# Patient Record
Sex: FEMALE | Race: WHITE | NOT HISPANIC OR LATINO | Employment: OTHER | URBAN - METROPOLITAN AREA
[De-identification: names, ages, dates, MRNs, and addresses within clinical notes are randomized per-mention and may not be internally consistent; named-entity substitution may affect disease eponyms.]

---

## 2017-05-31 RX ORDER — OXYBUTYNIN CHLORIDE 5 MG/1
TABLET ORAL
Qty: 180 TABLET | Refills: 3 | Status: SHIPPED | OUTPATIENT
Start: 2017-05-31 | End: 2021-02-03 | Stop reason: SDUPTHER

## 2019-02-19 PROBLEM — M17.0 PRIMARY OSTEOARTHRITIS OF BOTH KNEES: Status: ACTIVE | Noted: 2019-02-19

## 2019-02-19 PROBLEM — M85.80 OSTEOPENIA DETERMINED BY X-RAY: Status: ACTIVE | Noted: 2019-02-19

## 2019-02-19 PROBLEM — Z87.81 HISTORY OF VERTEBRAL COMPRESSION FRACTURE: Status: ACTIVE | Noted: 2019-02-19

## 2020-03-12 ENCOUNTER — OFFICE VISIT (OUTPATIENT)
Dept: HEMATOLOGY/ONCOLOGY | Facility: CLINIC | Age: 75
End: 2020-03-12
Payer: MEDICARE

## 2020-03-12 VITALS
TEMPERATURE: 99 F | HEART RATE: 99 BPM | RESPIRATION RATE: 20 BRPM | WEIGHT: 202 LBS | HEIGHT: 61 IN | DIASTOLIC BLOOD PRESSURE: 78 MMHG | BODY MASS INDEX: 38.14 KG/M2 | SYSTOLIC BLOOD PRESSURE: 127 MMHG

## 2020-03-12 DIAGNOSIS — D53.9 ANEMIA ASSOCIATED WITH NUTRITIONAL DEFICIENCY: ICD-10-CM

## 2020-03-12 DIAGNOSIS — D50.0 IRON DEFICIENCY ANEMIA DUE TO CHRONIC BLOOD LOSS: ICD-10-CM

## 2020-03-12 DIAGNOSIS — E07.9 THYROID DISORDER: Primary | ICD-10-CM

## 2020-03-12 PROCEDURE — 3074F PR MOST RECENT SYSTOLIC BLOOD PRESSURE < 130 MM HG: ICD-10-PCS | Mod: S$GLB,,, | Performed by: INTERNAL MEDICINE

## 2020-03-12 PROCEDURE — 1126F PR PAIN SEVERITY QUANTIFIED, NO PAIN PRESENT: ICD-10-PCS | Mod: S$GLB,,, | Performed by: INTERNAL MEDICINE

## 2020-03-12 PROCEDURE — 1101F PT FALLS ASSESS-DOCD LE1/YR: CPT | Mod: S$GLB,,, | Performed by: INTERNAL MEDICINE

## 2020-03-12 PROCEDURE — 99204 PR OFFICE/OUTPT VISIT, NEW, LEVL IV, 45-59 MIN: ICD-10-PCS | Mod: S$GLB,,, | Performed by: INTERNAL MEDICINE

## 2020-03-12 PROCEDURE — 3078F DIAST BP <80 MM HG: CPT | Mod: S$GLB,,, | Performed by: INTERNAL MEDICINE

## 2020-03-12 PROCEDURE — 3078F PR MOST RECENT DIASTOLIC BLOOD PRESSURE < 80 MM HG: ICD-10-PCS | Mod: S$GLB,,, | Performed by: INTERNAL MEDICINE

## 2020-03-12 PROCEDURE — 1159F PR MEDICATION LIST DOCUMENTED IN MEDICAL RECORD: ICD-10-PCS | Mod: S$GLB,,, | Performed by: INTERNAL MEDICINE

## 2020-03-12 PROCEDURE — 1101F PR PT FALLS ASSESS DOC 0-1 FALLS W/OUT INJ PAST YR: ICD-10-PCS | Mod: S$GLB,,, | Performed by: INTERNAL MEDICINE

## 2020-03-12 PROCEDURE — 3074F SYST BP LT 130 MM HG: CPT | Mod: S$GLB,,, | Performed by: INTERNAL MEDICINE

## 2020-03-12 PROCEDURE — 99204 OFFICE O/P NEW MOD 45 MIN: CPT | Mod: S$GLB,,, | Performed by: INTERNAL MEDICINE

## 2020-03-12 PROCEDURE — 1159F MED LIST DOCD IN RCRD: CPT | Mod: S$GLB,,, | Performed by: INTERNAL MEDICINE

## 2020-03-12 PROCEDURE — 1126F AMNT PAIN NOTED NONE PRSNT: CPT | Mod: S$GLB,,, | Performed by: INTERNAL MEDICINE

## 2020-03-12 NOTE — PATIENT INSTRUCTIONS
Iron-Deficiency Anemia (Adult)  Red blood cells carry oxygen to the tissues of your body. Anemia is a condition in which you have too few red blood cells. You need iron to make red cells. Anemia makes you feel tired and run down. When anemia becomes severe, your skin becomes pale. You may feel short of breath after physical activity. Other symptoms include:  · Headaches  · Dizziness  · Leg cramps with physical activity  · Drowsiness  · Restless legs  Your anemia is caused by not having enough iron in your body. This may be because of:  · Loss of blood. This can be caused by heavy menstrual periods. It can also be caused by bleeding from the stomach or intestines.  · Poor diet. You may not be eating enough foods that contain iron.  · Inability to absorb iron from the foods you eat  · Pregnancy  If your blood count is low enough, your healthcare provider may prescribe an iron supplement. It usually takes about 2 to 3 months of treatment with iron supplements to correct anemia. Severe cases of anemia need a blood transfusion to quickly ease symptoms and deliver more oxygen to the cells.  Home care  Follow these guidelines when caring for yourself at home:  · Eat foods high in iron. This will boost the amount of iron stored in your body. It is a natural way to build up the number of blood cells. Good sources of iron include beef, liver, spinach and other dark green leafy vegetables, whole grains, beans, and nuts.  · Do not overexert yourself.  · Talk with your healthcare provider before traveling by air or traveling to high altitudes.  Follow-up care  Follow up with your healthcare provider in 2 months, or as advised. This is to have another red blood cell count to be sure your anemia has been fixed.  When to seek medical advice  Call your healthcare provider right away if any of these occur:  · Shortness of breath or chest pain  · Dizziness or fainting  · Vomiting blood or passing red or black-colored stool   Date  Last Reviewed: 2/25/2016  © 1843-9756 [employer]. [address] [place] [address], [place] [address]. All rights reserved. This information is not intended as a substitute for professional medical care. Always follow your healthcare professional's instructions.

## 2020-03-23 NOTE — PROGRESS NOTES
[facility] History & Physical    Subjective:      Patient ID:   Daryleen G Moran  74 y.o. female  1945  [facility],      Chief Complaint:   Anemia eval.    HPI:  74 y.o. female with FARRIS+, dizzyness, fatigue x's 6 weeks.  Tongue soreness x's 1 year.  VHD, stenosis per ECHO.  AVS.    Adrenal mass removed on L 1 1/2 year ago, has a hernia at site.  At [facility] main campus.  Complicated by fluid overload and CHF sx.  Breast reduction.  Appendectomy, T & A, vocal cord nodule removed.    Borderline DM, HTN, cholesterol, depression.    Smoke 1/2 ppd x's 50 years.  Discussed referral to smoking clinic.  She does not want referral to the smoking clinic.  Allergy sulfa.  ETOH no.    Sister anemia, Sister MVA, LBP, plate in neck.  Dad CABG, CVA.  [place] name Mikey, Best.        ROS:   GEN: normal without any fever, night sweats or weight loss  HEENT: normal with no HA's, sore throat, stiff neck, changes in vision  CV: See HPI, normal with no CP, SOB, PND, FARRIS or orthopnea  PULM: normal with no SOB, cough, hemoptysis, sputum or pleuritic pain  GI: normal with no abdominal pain, nausea, vomiting, constipation, diarrhea, melanotic stools, BRBPR, or hematemesis  : normal with no hematuria, dysuria  BREAST: normal with no mass, discharge, pain  SKIN: normal with no rash, erythema, bruising, or swelling     Past Medical History:   Diagnosis Date    Arthritis     Diabetes mellitus type II     Hypertension     Osteoporosis     Wears glasses      Past Surgical History:   Procedure Laterality Date     vocal cord nodules removed  long time ago     twice    APPENDECTOMY  within last 5yrs    FIXATION KYPHOPLASTY THORACIC SPINE      8-20-13    FOOT SURGERY      left 2nd toe was too long     HERNIA REPAIR  within last 5yrs    TONSILLECTOMY, ADENOIDECTOMY  long time ago       Review of patient's allergies indicates:   Allergen Reactions    Sulfacetamide sodium      Pain perineal area    Adhesive Itching     SKIN GETS RED WITH  "TAPE AND BANDAIDS    Sulfa (sulfonamide antibiotics) Rash           Current Outpatient Medications:     aspirin (ECOTRIN) 81 MG EC tablet, Take 81 mg by mouth once daily., Disp: , Rfl:     calcium carbonate (OS-TK) 600 mg (1,500 mg) Tab, Take 600 mg by mouth 2 (two) times daily with meals., Disp: , Rfl:     citalopram (CELEXA) 40 MG tablet, Take 40 mg by mouth once daily., Disp: , Rfl:     ergocalciferol (ERGOCALCIFEROL) 50,000 unit Cap, Take 50,000 Units by mouth every 7 days., Disp: , Rfl:     lisinopril (PRINIVIL,ZESTRIL) 20 MG tablet, Take 20 mg by mouth every evening. , Disp: , Rfl:     lisinopril-hydrochlorothiazide (PRINZIDE,ZESTORETIC) 20-12.5 mg per tablet, Take 1 tablet by mouth once daily. , Disp: , Rfl:     metformin (GLUCOPHAGE) 500 MG tablet, Take 1 tablet (500 mg total) by mouth 2 (two) times daily with meals., Disp: , Rfl:     multivitamin capsule, Take 1 capsule by mouth once daily., Disp: , Rfl:     omeprazole (PRILOSEC) 20 MG capsule, Take 20 mg by mouth once daily., Disp: , Rfl:     oxybutynin (DITROPAN) 5 MG Tab, TAKE 1 TABLET TWICE DAILY, Disp: 180 tablet, Rfl: 3    oxycodone-acetaminophen (PERCOCET) 5-325 mg per tablet, Take 1-2 tablets by mouth every 4 (four) hours as needed., Disp: 31 tablet, Rfl: 0    polyethylene glycol (GLYCOLAX) 17 gram PwPk, Take 17 g by mouth once daily., Disp: 30 packet, Rfl: 0    simvastatin (ZOCOR) 40 MG tablet, Take 40 mg by mouth every evening. , Disp: , Rfl:     furosemide (LASIX) 40 MG tablet, Take 1 tablet (40 mg total) by mouth once daily., Disp: 1 tablet, Rfl: 0          Objective:   Vitals:  Blood pressure 127/78, pulse 99, temperature 98.6 °F (37 °C), temperature source Oral, resp. rate 20, height 5' 1" (1.549 m), weight 91.6 kg (202 lb).    Physical Examination:   GEN: no apparent distress, comfortable  HEAD: atraumatic and normocephalic  EYES: + pallor, no icterus  ENT:  No pharyngeal erythema, external ears WNL; no nasal discharge  NECK: " no masses, thyroid normal, trachea midline, no LAD/LN's, supple  CV: RRR with no murmur; normal pulse; normal S1 and S2; no pedal edema  CHEST: Normal respiratory effort; CTAB; normal breath sounds; no wheeze or crackles  ABDOM: nontender and nondistended; soft; normal bowel sounds; no rebound/guarding, L/S NP  MUSC/Skeletal: ROM normal; no crepitus; joints normal; no deformities   EXTREM: no clubbing, cyanosis, inflammation or swelling  SKIN: no rashes, lesions, ulcers, petechiae   : no cvat  NEURO: grossly intact; motor/sensory WNL;  no tremors  PSYCH: normal mood, affect and behavior  LYMPH: normal cervical, supraclavicular, axillary and groin LN's    H/H 7.4/25.6  Ferritin 11  MCV 72.      Assessment:   (1) 74 y.o. female with diagnosis of Fe deficiency anemia  2nd GI blood loss, or hemolysis 2nd VHD.  Check stools for occult blood.    (2)Discussed Ferrlicet infusion x's 8 weeks.  1-2% of reaction to infusion, observe for 1 hour after each infusion.  Orders written.  RTC 1 months, with lab.                Follow up in about 5 weeks (around 4/16/2020) for check of blood status after therapy.    I have explained and the patient understands all of  the current recommendation(s). I have answered all of their questions to the best of my ability and to their complete satisfaction.

## 2020-04-15 ENCOUNTER — OFFICE VISIT (OUTPATIENT)
Dept: HEMATOLOGY/ONCOLOGY | Facility: CLINIC | Age: 75
End: 2020-04-15
Payer: MEDICARE

## 2020-04-15 DIAGNOSIS — D53.9 ANEMIA ASSOCIATED WITH NUTRITIONAL DEFICIENCY: ICD-10-CM

## 2020-04-15 DIAGNOSIS — D50.0 IRON DEFICIENCY ANEMIA DUE TO CHRONIC BLOOD LOSS: Primary | ICD-10-CM

## 2020-04-15 PROCEDURE — 99441 PR PHYSICIAN TELEPHONE EVALUATION 5-10 MIN: CPT | Mod: 95,,, | Performed by: INTERNAL MEDICINE

## 2020-04-15 PROCEDURE — 99441 PR PHYSICIAN TELEPHONE EVALUATION 5-10 MIN: ICD-10-PCS | Mod: 95,,, | Performed by: INTERNAL MEDICINE

## 2020-04-18 NOTE — PROGRESS NOTES
[facility] telemedicine audio visit progress note  April 15, 2020    He she has been in isolation at home because of the corona virus epidemic.    The the this is a audio phone conversation only in lieu of in-person visit due to the corona virus emergency.  Patient acknowledged and agreed to the telephone encounter.    Subjective:      Patient ID:   Daryleen G Moran  75 y.o. female  1945  [facility],      Chief Complaint:   Anemia eval.    HPI:  75 y.o. female with FARRIS+, dizzyness, fatigue x's 6 weeks.  Tongue soreness x's 1 year.  VHD, stenosis per ECHO.  AVS.    Adrenal mass removed on L 1 1/2 year ago, has a hernia at site.  At [facility].  Complicated by fluid overload and CHF sx.  Breast reduction.  Appendectomy, T & A, vocal cord nodule removed.    Borderline DM, HTN, cholesterol, depression.    Smoke 1/2 ppd x's 50 years.  Discussed referral to smoking clinic.  She does not want referral to the smoking clinic.  Allergy sulfa.  ETOH no.    Sister anemia, Sister MVA, LBP, plate in neck.  Dad CABG, CVA.  [place] name Best Jensen.        ROS:   GEN: normal without any fever, night sweats or weight loss  HEENT: normal with no HA's, sore throat, stiff neck, changes in vision  CV: See HPI, normal with no CP, SOB, PND, FARRIS or orthopnea  PULM: normal with no SOB, cough, hemoptysis, sputum or pleuritic pain  GI: normal with no abdominal pain, nausea, vomiting, constipation, diarrhea, melanotic stools, BRBPR, or hematemesis  : normal with no hematuria, dysuria  BREAST: normal with no mass, discharge, pain  SKIN: normal with no rash, erythema, bruising, or swelling     Past Medical History:   Diagnosis Date    Arthritis     Diabetes mellitus type II     Hypertension     Osteoporosis     Wears glasses      Past Surgical History:   Procedure Laterality Date     vocal cord nodules removed  long time ago     twice    APPENDECTOMY  within last 5yrs    FIXATION KYPHOPLASTY THORACIC SPINE      8-20-13    FOOT  SURGERY      left 2nd toe was too long     HERNIA REPAIR  within last 5yrs    TONSILLECTOMY, ADENOIDECTOMY  long time ago       Review of patient's allergies indicates:   Allergen Reactions    Sulfacetamide sodium      Pain perineal area    Adhesive Itching     SKIN GETS RED WITH TAPE AND BANDAIDS    Sulfa (sulfonamide antibiotics) Rash           Current Outpatient Medications:     aspirin (ECOTRIN) 81 MG EC tablet, Take 81 mg by mouth once daily., Disp: , Rfl:     calcium carbonate (OS-TK) 600 mg (1,500 mg) Tab, Take 600 mg by mouth 2 (two) times daily with meals., Disp: , Rfl:     citalopram (CELEXA) 40 MG tablet, Take 40 mg by mouth once daily., Disp: , Rfl:     ergocalciferol (ERGOCALCIFEROL) 50,000 unit Cap, Take 50,000 Units by mouth every 7 days., Disp: , Rfl:     furosemide (LASIX) 40 MG tablet, Take 1 tablet (40 mg total) by mouth once daily., Disp: 1 tablet, Rfl: 0    lisinopril (PRINIVIL,ZESTRIL) 20 MG tablet, Take 20 mg by mouth every evening. , Disp: , Rfl:     lisinopril-hydrochlorothiazide (PRINZIDE,ZESTORETIC) 20-12.5 mg per tablet, Take 1 tablet by mouth once daily. , Disp: , Rfl:     metformin (GLUCOPHAGE) 500 MG tablet, Take 1 tablet (500 mg total) by mouth 2 (two) times daily with meals., Disp: , Rfl:     multivitamin capsule, Take 1 capsule by mouth once daily., Disp: , Rfl:     omeprazole (PRILOSEC) 20 MG capsule, Take 20 mg by mouth once daily., Disp: , Rfl:     oxybutynin (DITROPAN) 5 MG Tab, TAKE 1 TABLET TWICE DAILY, Disp: 180 tablet, Rfl: 3    oxycodone-acetaminophen (PERCOCET) 5-325 mg per tablet, Take 1-2 tablets by mouth every 4 (four) hours as needed., Disp: 31 tablet, Rfl: 0    polyethylene glycol (GLYCOLAX) 17 gram PwPk, Take 17 g by mouth once daily., Disp: 30 packet, Rfl: 0    simvastatin (ZOCOR) 40 MG tablet, Take 40 mg by mouth every evening. , Disp: , Rfl:           Objective:   Vitals:  There were no vitals taken for this visit.    Physical Examination:    GEN: no apparent distress, comfortable  HEAD: atraumatic and normocephalic  EYES: + pallor, no icterus  ENT:  No pharyngeal erythema, external ears WNL; no nasal discharge  NECK: no masses, thyroid normal, trachea midline, no LAD/LN's, supple  CV: RRR with no murmur; normal pulse; normal S1 and S2; no pedal edema  CHEST: Normal respiratory effort; CTAB; normal breath sounds; no wheeze or crackles  ABDOM: nontender and nondistended; soft; normal bowel sounds; no rebound/guarding, L/S NP  MUSC/Skeletal: ROM normal; no crepitus; joints normal; no deformities   EXTREM: no clubbing, cyanosis, inflammation or swelling  SKIN: no rashes, lesions, ulcers, petechiae   : no cvat  NEURO: grossly intact; motor/sensory WNL;  no tremors  PSYCH: normal mood, affect and behavior  LYMPH: normal cervical, supraclavicular, axillary and groin LN's    H/H 7.4/25.6  Ferritin 11  MCV 72.    The current labs are to be done.  He she has not gone for lab work because of fear of contagioun with the corona virus epidemic.  Assessment:   (1) 75 y.o. female with diagnosis of Fe deficiency anemia  2nd GI blood loss, or hemolysis 2nd VHD.  Check stools for occult blood.  Pending.    (2)Discussed Ferrlicet infusion x's 8 weeks.  1-2% of reaction to infusion, observe for 1 hour after each infusion.  Orders written.  Infusions have been completed.  Her energy is improved he and activity is improved.    CBC and ferritin and return to clinic in 3 months in June            I have explained and the patient understands all of  the current recommendation(s). I have answered all of their questions to the best of my ability and to their complete satisfaction.

## 2020-04-21 ENCOUNTER — TELEPHONE (OUTPATIENT)
Dept: HEMATOLOGY/ONCOLOGY | Facility: CLINIC | Age: 75
End: 2020-04-21

## 2020-07-21 ENCOUNTER — TELEPHONE (OUTPATIENT)
Dept: HEMATOLOGY/ONCOLOGY | Facility: CLINIC | Age: 75
End: 2020-07-21

## 2020-07-21 DIAGNOSIS — D50.0 IRON DEFICIENCY ANEMIA DUE TO CHRONIC BLOOD LOSS: Primary | ICD-10-CM

## 2020-07-21 DIAGNOSIS — D53.9 ANEMIA ASSOCIATED WITH NUTRITIONAL DEFICIENCY: ICD-10-CM

## 2020-07-21 NOTE — TELEPHONE ENCOUNTER
----- Message from Piedad Meadows sent at 7/21/2020  3:33 PM CDT -----  The patient called to ask for lab orders to be sent to [place] so she can go get them done before her appointment next week. She would like a call back once the orders are faxed so she will know they are there. Please call her at [phone].

## 2020-07-30 ENCOUNTER — OFFICE VISIT (OUTPATIENT)
Dept: HEMATOLOGY/ONCOLOGY | Facility: CLINIC | Age: 75
End: 2020-07-30
Payer: MEDICARE

## 2020-07-30 ENCOUNTER — TELEPHONE (OUTPATIENT)
Dept: HEMATOLOGY/ONCOLOGY | Facility: CLINIC | Age: 75
End: 2020-07-30

## 2020-07-30 VITALS
BODY MASS INDEX: 37.13 KG/M2 | RESPIRATION RATE: 19 BRPM | DIASTOLIC BLOOD PRESSURE: 68 MMHG | SYSTOLIC BLOOD PRESSURE: 135 MMHG | HEART RATE: 93 BPM | TEMPERATURE: 97 F | WEIGHT: 196.5 LBS

## 2020-07-30 DIAGNOSIS — D51.8 ANEMIA OF DECREASED VITAMIN B12 ABSORPTION: ICD-10-CM

## 2020-07-30 DIAGNOSIS — D50.0 IRON DEFICIENCY ANEMIA DUE TO CHRONIC BLOOD LOSS: Primary | ICD-10-CM

## 2020-07-30 PROCEDURE — 99214 PR OFFICE/OUTPT VISIT, EST, LEVL IV, 30-39 MIN: ICD-10-PCS | Mod: S$GLB,,, | Performed by: INTERNAL MEDICINE

## 2020-07-30 PROCEDURE — 1125F PR PAIN SEVERITY QUANTIFIED, PAIN PRESENT: ICD-10-PCS | Mod: S$GLB,,, | Performed by: INTERNAL MEDICINE

## 2020-07-30 PROCEDURE — 99214 OFFICE O/P EST MOD 30 MIN: CPT | Mod: S$GLB,,, | Performed by: INTERNAL MEDICINE

## 2020-07-30 PROCEDURE — 3075F PR MOST RECENT SYSTOLIC BLOOD PRESS GE 130-139MM HG: ICD-10-PCS | Mod: S$GLB,,, | Performed by: INTERNAL MEDICINE

## 2020-07-30 PROCEDURE — 3008F PR BODY MASS INDEX (BMI) DOCUMENTED: ICD-10-PCS | Mod: S$GLB,,, | Performed by: INTERNAL MEDICINE

## 2020-07-30 PROCEDURE — 3078F DIAST BP <80 MM HG: CPT | Mod: S$GLB,,, | Performed by: INTERNAL MEDICINE

## 2020-07-30 PROCEDURE — 3008F BODY MASS INDEX DOCD: CPT | Mod: S$GLB,,, | Performed by: INTERNAL MEDICINE

## 2020-07-30 PROCEDURE — 1159F PR MEDICATION LIST DOCUMENTED IN MEDICAL RECORD: ICD-10-PCS | Mod: S$GLB,,, | Performed by: INTERNAL MEDICINE

## 2020-07-30 PROCEDURE — 3078F PR MOST RECENT DIASTOLIC BLOOD PRESSURE < 80 MM HG: ICD-10-PCS | Mod: S$GLB,,, | Performed by: INTERNAL MEDICINE

## 2020-07-30 PROCEDURE — 1159F MED LIST DOCD IN RCRD: CPT | Mod: S$GLB,,, | Performed by: INTERNAL MEDICINE

## 2020-07-30 PROCEDURE — 1101F PT FALLS ASSESS-DOCD LE1/YR: CPT | Mod: S$GLB,,, | Performed by: INTERNAL MEDICINE

## 2020-07-30 PROCEDURE — 3075F SYST BP GE 130 - 139MM HG: CPT | Mod: S$GLB,,, | Performed by: INTERNAL MEDICINE

## 2020-07-30 PROCEDURE — 1101F PR PT FALLS ASSESS DOC 0-1 FALLS W/OUT INJ PAST YR: ICD-10-PCS | Mod: S$GLB,,, | Performed by: INTERNAL MEDICINE

## 2020-07-30 PROCEDURE — 1125F AMNT PAIN NOTED PAIN PRSNT: CPT | Mod: S$GLB,,, | Performed by: INTERNAL MEDICINE

## 2020-07-30 RX ORDER — GLUCOSAMINE/CHONDRO SU A 500-400 MG
1 TABLET ORAL DAILY
COMMUNITY

## 2020-07-31 NOTE — PROGRESS NOTES
[place] [facility] in office encounter for follow-up progress note    July 30, 2020  Subjective:      Patient ID:   Daryleen G Moran  75 y.o. female  1945  [place],      Chief Complaint:   Anemia eval.    HPI:  75 y.o. female with FARRIS+, dizzyness, fatigue x's 6 weeks.  Tongue soreness x's 1 year.  VHD, stenosis per ECHO.  AVS.    Adrenal mass removed on L 1 1/2 year ago, has a hernia at site.  At [facility].  Complicated by fluid overload and CHF sx.  Breast reduction.  Appendectomy, T & A, vocal cord nodule removed.    Borderline DM, HTN, cholesterol, depression.    Smoke 1/2 ppd x's 50 years.  Discussed referral to smoking clinic.  She does not want referral to the smoking clinic.  Allergy sulfa.  ETOH no.    Sister anemia, Sister MVA, LBP, plate in neck.  Dad CABG, CVA.  [place] name Mikey, [place].    She has iron deficiency anemia      ROS:   GEN: normal without any fever, night sweats or weight loss  HEENT: normal with no HA's, sore throat, stiff neck, changes in vision  CV: See HPI, normal with no CP, SOB, PND, FARRIS or orthopnea  PULM: normal with no SOB, cough, hemoptysis, sputum or pleuritic pain  GI: normal with no abdominal pain, nausea, vomiting, constipation, diarrhea, melanotic stools, BRBPR, or hematemesis  : normal with no hematuria, dysuria  BREAST: normal with no mass, discharge, pain  SKIN: normal with no rash, erythema, bruising, or swelling     Past Medical History:   Diagnosis Date    Arthritis     Diabetes mellitus type II     Hypertension     Osteoporosis     Wears glasses      Past Surgical History:   Procedure Laterality Date     vocal cord nodules removed  long time ago     twice    APPENDECTOMY  within last 5yrs    FIXATION KYPHOPLASTY THORACIC SPINE      8-20-13    FOOT SURGERY      left 2nd toe was too long     HERNIA REPAIR  within last 5yrs    TONSILLECTOMY, ADENOIDECTOMY  long time ago       Review of patient's allergies indicates:    Allergen Reactions    Sulfacetamide sodium      Pain perineal area    Adhesive Itching     SKIN GETS RED WITH TAPE AND BANDAIDS    Sulfa (sulfonamide antibiotics) Rash           Current Outpatient Medications:     aspirin (ECOTRIN) 81 MG EC tablet, Take 81 mg by mouth once daily., Disp: , Rfl:     calcium carbonate (OS-TK) 600 mg (1,500 mg) Tab, Take 600 mg by mouth 2 (two) times daily with meals., Disp: , Rfl:     citalopram (CELEXA) 40 MG tablet, Take 40 mg by mouth once daily., Disp: , Rfl:     ergocalciferol (ERGOCALCIFEROL) 50,000 unit Cap, Take 50,000 Units by mouth every 7 days., Disp: , Rfl:     glucosamine-chondroitin 500-400 mg tablet, Take 1 tablet by mouth 3 (three) times daily., Disp: , Rfl:     lisinopril (PRINIVIL,ZESTRIL) 20 MG tablet, Take 20 mg by mouth every evening. , Disp: , Rfl:     lisinopril-hydrochlorothiazide (PRINZIDE,ZESTORETIC) 20-12.5 mg per tablet, Take 1 tablet by mouth once daily. , Disp: , Rfl:     metformin (GLUCOPHAGE) 500 MG tablet, Take 1 tablet (500 mg total) by mouth 2 (two) times daily with meals., Disp: , Rfl:     multivitamin capsule, Take 1 capsule by mouth once daily., Disp: , Rfl:     omeprazole (PRILOSEC) 20 MG capsule, Take 20 mg by mouth once daily., Disp: , Rfl:     oxybutynin (DITROPAN) 5 MG Tab, TAKE 1 TABLET TWICE DAILY, Disp: 180 tablet, Rfl: 3    oxycodone-acetaminophen (PERCOCET) 5-325 mg per tablet, Take 1-2 tablets by mouth every 4 (four) hours as needed., Disp: 31 tablet, Rfl: 0    polyethylene glycol (GLYCOLAX) 17 gram PwPk, Take 17 g by mouth once daily., Disp: 30 packet, Rfl: 0    simvastatin (ZOCOR) 40 MG tablet, Take 40 mg by mouth every evening. , Disp: , Rfl:     furosemide (LASIX) 40 MG tablet, Take 1 tablet (40 mg total) by mouth once daily., Disp: 1 tablet, Rfl: 0          Objective:   Vitals:  Blood pressure 135/68, pulse 93, temperature 97.4 °F (36.3 °C), resp. rate 19, weight 89.1 kg (196 lb 8 oz).    Physical Examination:    GEN: no apparent distress, comfortable  HEAD: atraumatic and normocephalic  EYES: + pallor, no icterus  ENT:  No pharyngeal erythema, external ears WNL; no nasal discharge  NECK: no masses, thyroid normal, trachea midline, no LAD/LN's, supple  CV: RRR with no murmur; normal pulse; normal S1 and S2; no pedal edema  CHEST: Normal respiratory effort; CTAB; normal breath sounds; no wheeze or crackles  ABDOM: nontender and nondistended; soft; normal bowel sounds; no rebound/guarding, L/S NP  MUSC/Skeletal: ROM normal; no crepitus; joints normal; no deformities   EXTREM: no clubbing, cyanosis, inflammation or swelling  SKIN: no rashes, lesions, ulcers, petechiae   : no cvat  NEURO: grossly intact; motor/sensory WNL;  no tremors  PSYCH: normal mood, affect and behavior  LYMPH: normal cervical, supraclavicular, axillary and groin LN's    H/H 7.4/25.6  Ferritin 11  MCV 72.    The current labs are to be done.  He she has not gone for lab work because of fear of contagioun with the corona virus epidemic.  Assessment:   (1) 75 y.o. female with diagnosis of Fe deficiency anemia  2nd GI blood loss, or hemolysis 2nd VHD.  Check stools for occult blood.  Pending.    (2)Discussed Ferrlicet infusion x's 8 weeks.  1-2% of reaction to infusion, observe for 1 hour after each infusion.  Orders written.  Infusions have been completed.  Her energy is improved he and activity is improved.    CBC and ferritin and return to clinic in 3 months in June            I have explained and the patient understands all of  the current recommendation(s). I have answered all of their questions to the best of my ability and to their complete satisfaction.

## 2020-07-31 NOTE — PROGRESS NOTES
[place] [facility] Oncology in office encounter for follow-up progress note    July 30, 2020    Subjective:      Patient ID:   Daryleen G Moran  75 y.o. female  1945  [place],      Chief Complaint:   Anemia eval.    HPI:  75 y.o. female with FARRIS+, dizzyness, fatigue x's 6 weeks.  Tongue soreness x's 1 year.  VHD, stenosis per ECHO.  AVS.    Adrenal mass removed on L 1 1/2 year ago, has a hernia at site.  At [facility] main campus.  Complicated by fluid overload and CHF sx.  Breast reduction.  Appendectomy, T & A, vocal cord nodule removed.    Borderline DM, HTN, cholesterol, depression.    Smoke 1/2 ppd x's 50 years.  Discussed referral to smoking clinic.  She does not want referral to the smoking clinic.  Allergy sulfa.  ETOH no.    Sister anemia, Sister MVA, LBP, plate in neck.  Dad CABG, CVA.  [place] name Mikey, Best.    She had an iron deficiency anemia.  She took Ferrlecit infusion weekly.  Energy is improved.  Tongue soreness has resolved.        ROS:   GEN: normal without any fever, night sweats or weight loss  HEENT: normal with no HA's, sore throat, stiff neck, changes in vision  CV: See HPI, normal with no CP, SOB, PND, FARRIS or orthopnea  PULM: normal with no SOB, cough, hemoptysis, sputum or pleuritic pain  GI: normal with no abdominal pain, nausea, vomiting, constipation, diarrhea, melanotic stools, BRBPR, or hematemesis  : normal with no hematuria, dysuria  BREAST: normal with no mass, discharge, pain  SKIN: normal with no rash, erythema, bruising, or swelling     Past Medical History:   Diagnosis Date    Arthritis     Diabetes mellitus type II     Hypertension     Osteoporosis     Wears glasses      Past Surgical History:   Procedure Laterality Date     vocal cord nodules removed  long time ago     twice    APPENDECTOMY  within last 5yrs    FIXATION KYPHOPLASTY THORACIC SPINE      8-20-13    FOOT SURGERY      left 2nd toe was too long     HERNIA REPAIR  within last 5yrs     TONSILLECTOMY, ADENOIDECTOMY  long time ago       Review of patient's allergies indicates:   Allergen Reactions    Sulfacetamide sodium      Pain perineal area    Adhesive Itching     SKIN GETS RED WITH TAPE AND BANDAIDS    Sulfa (sulfonamide antibiotics) Rash           Current Outpatient Medications:     aspirin (ECOTRIN) 81 MG EC tablet, Take 81 mg by mouth once daily., Disp: , Rfl:     calcium carbonate (OS-TK) 600 mg (1,500 mg) Tab, Take 600 mg by mouth 2 (two) times daily with meals., Disp: , Rfl:     citalopram (CELEXA) 40 MG tablet, Take 40 mg by mouth once daily., Disp: , Rfl:     ergocalciferol (ERGOCALCIFEROL) 50,000 unit Cap, Take 50,000 Units by mouth every 7 days., Disp: , Rfl:     furosemide (LASIX) 40 MG tablet, Take 1 tablet (40 mg total) by mouth once daily., Disp: 1 tablet, Rfl: 0    glucosamine-chondroitin 500-400 mg tablet, Take 1 tablet by mouth 3 (three) times daily., Disp: , Rfl:     lisinopril (PRINIVIL,ZESTRIL) 20 MG tablet, Take 20 mg by mouth every evening. , Disp: , Rfl:     lisinopril-hydrochlorothiazide (PRINZIDE,ZESTORETIC) 20-12.5 mg per tablet, Take 1 tablet by mouth once daily. , Disp: , Rfl:     metformin (GLUCOPHAGE) 500 MG tablet, Take 1 tablet (500 mg total) by mouth 2 (two) times daily with meals., Disp: , Rfl:     multivitamin capsule, Take 1 capsule by mouth once daily., Disp: , Rfl:     omeprazole (PRILOSEC) 20 MG capsule, Take 20 mg by mouth once daily., Disp: , Rfl:     oxybutynin (DITROPAN) 5 MG Tab, TAKE 1 TABLET TWICE DAILY, Disp: 180 tablet, Rfl: 3    oxycodone-acetaminophen (PERCOCET) 5-325 mg per tablet, Take 1-2 tablets by mouth every 4 (four) hours as needed., Disp: 31 tablet, Rfl: 0    polyethylene glycol (GLYCOLAX) 17 gram PwPk, Take 17 g by mouth once daily., Disp: 30 packet, Rfl: 0    simvastatin (ZOCOR) 40 MG tablet, Take 40 mg by mouth every evening. , Disp: , Rfl:           Objective:   Vitals:  There were no vitals taken for this  visit.    Physical Examination:   GEN: no apparent distress, comfortable  HEAD: atraumatic and normocephalic  EYES: + pallor, no icterus  ENT:  No pharyngeal erythema, external ears WNL; no nasal discharge  NECK: no masses, thyroid normal, trachea midline, no LAD/LN's, supple  CV: RRR with no murmur; normal pulse; normal S1 and S2; no pedal edema  CHEST: Normal respiratory effort; CTAB; normal breath sounds; no wheeze or crackles  ABDOM: nontender and nondistended; soft; normal bowel sounds; no rebound/guarding, L/S NP  MUSC/Skeletal: ROM normal; no crepitus; joints normal; no deformities   EXTREM: no clubbing, cyanosis, inflammation or swelling  SKIN: no rashes, lesions, ulcers, petechiae   : no cvat  NEURO: grossly intact; motor/sensory WNL;  no tremors  PSYCH: normal mood, affect and behavior  LYMPH: normal cervical, supraclavicular, axillary and groin LN's    H/H 7.4/25.6  Ferritin 11  MCV 72.  H/H improved up to 10.1 in 31.5, MCV is 81,  ferritin is still at 10.  Assessment:   (1) 75 y.o. female with diagnosis of Fe deficiency anemia  2nd GI blood loss, or hemolysis 2nd VHD.  Check stools for occult blood are negative x3.    (2)Discussed Ferrlicet infusion x's 8 weeks.  1-2% of reaction to infusion, observe for 1 hour after each infusion.  Orders written.  Infusions have been completed.  Her energy is improved he and activity is improved.  She is improved after the administration of iron infusion however hemoglobin is 10 and ferritin is 10.  She needs more iron.  Will order an additional 6 Ferrlecit infusions to replenish iron stores.    CBC and ferritin and return to clinic in 3 months            I have explained and the patient understands all of  the current recommendation(s). I have answered all of their questions to the best of my ability and to their complete satisfaction.

## 2020-07-31 NOTE — TELEPHONE ENCOUNTER
I have placed orders for Ferrlecit at [facility] weekly x6 weeks.    Please get her a date and time.     Please get authorization.    CBC and ferritin [facility] in September.      Return to clinic October 2020.

## 2020-10-27 ENCOUNTER — TELEPHONE (OUTPATIENT)
Dept: CARDIOLOGY | Facility: CLINIC | Age: 75
End: 2020-10-27

## 2020-10-27 DIAGNOSIS — D50.0 IRON DEFICIENCY ANEMIA DUE TO CHRONIC BLOOD LOSS: ICD-10-CM

## 2020-10-27 DIAGNOSIS — I10 ESSENTIAL HYPERTENSION: ICD-10-CM

## 2020-10-27 DIAGNOSIS — E78.5 HYPERLIPIDEMIA, UNSPECIFIED HYPERLIPIDEMIA TYPE: Primary | ICD-10-CM

## 2020-10-27 NOTE — TELEPHONE ENCOUNTER
----- Message from Javier Alamo sent at 10/27/2020 11:07 AM CDT -----  Regarding: orders  Pt and  have appt 12/2/2020 he has orders but she doesn't   Orders for [facility] for both       Roni Cyr  Male, 82 y.o., 09/13/1938  [phone]  MRN:   [id number]

## 2020-11-13 ENCOUNTER — TELEPHONE (OUTPATIENT)
Dept: HEMATOLOGY/ONCOLOGY | Facility: CLINIC | Age: 75
End: 2020-11-13

## 2020-11-13 DIAGNOSIS — D50.0 IRON DEFICIENCY ANEMIA DUE TO CHRONIC BLOOD LOSS: Primary | ICD-10-CM

## 2020-11-13 NOTE — TELEPHONE ENCOUNTER
----- Message from Alyson Lal, Patient Care Assistant sent at 11/13/2020 11:12 AM CST -----  Regarding: Lab orders  Patient called in stating she would like her lab orders sent to [facility] lab. She can be reached at [phone]

## 2020-11-23 ENCOUNTER — TELEPHONE (OUTPATIENT)
Dept: CARDIOLOGY | Facility: CLINIC | Age: 75
End: 2020-11-23

## 2020-11-23 NOTE — TELEPHONE ENCOUNTER
----- Message from Princess TORSTEN Dos Santos sent at 11/23/2020  2:46 PM CST -----  Contact: pt  Type: Needs Medical Advice  Who Called:  pt   Best Call Back Number: [phone]     Additional Information: requesting lab orders to be sent over so she and he  can be done tomorrow if possible.

## 2020-12-02 ENCOUNTER — TELEPHONE (OUTPATIENT)
Dept: CARDIOLOGY | Facility: CLINIC | Age: 75
End: 2020-12-02

## 2020-12-02 NOTE — TELEPHONE ENCOUNTER
----- Message from Mili Beltran, Patient Care Assistant sent at 12/2/2020  3:05 PM CST -----  Regarding: advice  Contact: pt  Type: Needs Medical Advice  Who Called:  pt   Best Call Back Number: [phone]  Additional Information: pt states she would like a callback regarding a scan. Thanks!

## 2020-12-17 ENCOUNTER — OFFICE VISIT (OUTPATIENT)
Dept: CARDIOLOGY | Facility: CLINIC | Age: 75
End: 2020-12-17
Payer: MEDICARE

## 2020-12-17 VITALS
OXYGEN SATURATION: 98 % | RESPIRATION RATE: 16 BRPM | SYSTOLIC BLOOD PRESSURE: 126 MMHG | HEIGHT: 61 IN | DIASTOLIC BLOOD PRESSURE: 76 MMHG | BODY MASS INDEX: 38.14 KG/M2 | WEIGHT: 202 LBS | HEART RATE: 85 BPM

## 2020-12-17 DIAGNOSIS — I35.0 AORTIC STENOSIS, MILD: ICD-10-CM

## 2020-12-17 DIAGNOSIS — I10 ESSENTIAL HYPERTENSION: ICD-10-CM

## 2020-12-17 DIAGNOSIS — E78.5 HYPERLIPIDEMIA, UNSPECIFIED HYPERLIPIDEMIA TYPE: Primary | ICD-10-CM

## 2020-12-17 DIAGNOSIS — J44.9 COPD, MILD: ICD-10-CM

## 2020-12-17 PROCEDURE — 1159F MED LIST DOCD IN RCRD: CPT | Mod: S$GLB,,, | Performed by: INTERNAL MEDICINE

## 2020-12-17 PROCEDURE — 3078F PR MOST RECENT DIASTOLIC BLOOD PRESSURE < 80 MM HG: ICD-10-PCS | Mod: CPTII,S$GLB,, | Performed by: INTERNAL MEDICINE

## 2020-12-17 PROCEDURE — 99499 RISK ADDL DX/OHS AUDIT: ICD-10-PCS | Mod: S$GLB,,, | Performed by: INTERNAL MEDICINE

## 2020-12-17 PROCEDURE — 3288F PR FALLS RISK ASSESSMENT DOCUMENTED: ICD-10-PCS | Mod: CPTII,S$GLB,, | Performed by: INTERNAL MEDICINE

## 2020-12-17 PROCEDURE — 1101F PR PT FALLS ASSESS DOC 0-1 FALLS W/OUT INJ PAST YR: ICD-10-PCS | Mod: CPTII,S$GLB,, | Performed by: INTERNAL MEDICINE

## 2020-12-17 PROCEDURE — 3008F PR BODY MASS INDEX (BMI) DOCUMENTED: ICD-10-PCS | Mod: CPTII,S$GLB,, | Performed by: INTERNAL MEDICINE

## 2020-12-17 PROCEDURE — 3074F PR MOST RECENT SYSTOLIC BLOOD PRESSURE < 130 MM HG: ICD-10-PCS | Mod: CPTII,S$GLB,, | Performed by: INTERNAL MEDICINE

## 2020-12-17 PROCEDURE — 99214 OFFICE O/P EST MOD 30 MIN: CPT | Mod: S$GLB,,, | Performed by: INTERNAL MEDICINE

## 2020-12-17 PROCEDURE — 1125F PR PAIN SEVERITY QUANTIFIED, PAIN PRESENT: ICD-10-PCS | Mod: S$GLB,,, | Performed by: INTERNAL MEDICINE

## 2020-12-17 PROCEDURE — 1101F PT FALLS ASSESS-DOCD LE1/YR: CPT | Mod: CPTII,S$GLB,, | Performed by: INTERNAL MEDICINE

## 2020-12-17 PROCEDURE — 3008F BODY MASS INDEX DOCD: CPT | Mod: CPTII,S$GLB,, | Performed by: INTERNAL MEDICINE

## 2020-12-17 PROCEDURE — 99214 PR OFFICE/OUTPT VISIT, EST, LEVL IV, 30-39 MIN: ICD-10-PCS | Mod: S$GLB,,, | Performed by: INTERNAL MEDICINE

## 2020-12-17 PROCEDURE — 1159F PR MEDICATION LIST DOCUMENTED IN MEDICAL RECORD: ICD-10-PCS | Mod: S$GLB,,, | Performed by: INTERNAL MEDICINE

## 2020-12-17 PROCEDURE — 3078F DIAST BP <80 MM HG: CPT | Mod: CPTII,S$GLB,, | Performed by: INTERNAL MEDICINE

## 2020-12-17 PROCEDURE — 3074F SYST BP LT 130 MM HG: CPT | Mod: CPTII,S$GLB,, | Performed by: INTERNAL MEDICINE

## 2020-12-17 PROCEDURE — 99499 UNLISTED E&M SERVICE: CPT | Mod: S$GLB,,, | Performed by: INTERNAL MEDICINE

## 2020-12-17 PROCEDURE — 1125F AMNT PAIN NOTED PAIN PRSNT: CPT | Mod: S$GLB,,, | Performed by: INTERNAL MEDICINE

## 2020-12-17 PROCEDURE — 3288F FALL RISK ASSESSMENT DOCD: CPT | Mod: CPTII,S$GLB,, | Performed by: INTERNAL MEDICINE

## 2020-12-17 NOTE — ASSESSMENT & PLAN NOTE
Her cholesterol is controlled on current medications.  She has carotid stenosis she was asked to discontinue smoking

## 2020-12-17 NOTE — ASSESSMENT & PLAN NOTE
She has mild COPD she continues to smoke.  She had carotid stenosis she has been asked to discontinue smoking

## 2021-01-06 ENCOUNTER — TELEPHONE (OUTPATIENT)
Dept: HEMATOLOGY/ONCOLOGY | Facility: CLINIC | Age: 76
End: 2021-01-06

## 2021-01-06 DIAGNOSIS — D50.0 IRON DEFICIENCY ANEMIA DUE TO CHRONIC BLOOD LOSS: Primary | ICD-10-CM

## 2021-01-06 DIAGNOSIS — D51.8 ANEMIA OF DECREASED VITAMIN B12 ABSORPTION: ICD-10-CM

## 2021-02-03 RX ORDER — BENAZEPRIL HYDROCHLORIDE 20 MG/1
20 TABLET ORAL
COMMUNITY
Start: 2020-06-08 | End: 2021-12-06

## 2021-02-03 RX ORDER — EZETIMIBE AND SIMVASTATIN 10; 40 MG/1; MG/1
1 TABLET ORAL
Status: ON HOLD | COMMUNITY
End: 2022-05-03 | Stop reason: HOSPADM

## 2021-02-03 RX ORDER — BUPROPION HYDROCHLORIDE 150 MG/1
150 TABLET ORAL
COMMUNITY
End: 2022-11-22

## 2021-02-03 RX ORDER — DIPHENOXYLATE HYDROCHLORIDE AND ATROPINE SULFATE 2.5; .025 MG/1; MG/1
TABLET ORAL
COMMUNITY
End: 2021-12-15 | Stop reason: CLARIF

## 2021-02-03 RX ORDER — METFORMIN HYDROCHLORIDE 500 MG/1
TABLET ORAL
COMMUNITY
Start: 2020-10-12 | End: 2021-02-03 | Stop reason: SDUPTHER

## 2021-02-03 RX ORDER — DOCUSATE SODIUM 100 MG/1
CAPSULE, LIQUID FILLED ORAL
COMMUNITY
Start: 2020-07-30 | End: 2021-12-15 | Stop reason: CLARIF

## 2021-02-03 RX ORDER — HYDROCHLOROTHIAZIDE 12.5 MG/1
12.5 CAPSULE ORAL
COMMUNITY
End: 2021-12-15 | Stop reason: CLARIF

## 2021-02-03 RX ORDER — GLUC/MSM/COLGN2/HYAL/ANTIARTH3 375-375-20
1 TABLET ORAL
COMMUNITY
End: 2022-11-22

## 2021-02-03 RX ORDER — FERROUS SULFATE 325(65) MG
1 TABLET ORAL
COMMUNITY
Start: 2020-07-31 | End: 2021-12-15 | Stop reason: CLARIF

## 2021-02-04 RX ORDER — SIMVASTATIN 40 MG/1
40 TABLET, FILM COATED ORAL NIGHTLY
Qty: 90 TABLET | Refills: 3 | Status: SHIPPED | OUTPATIENT
Start: 2021-02-04 | End: 2023-09-08 | Stop reason: DRUGHIGH

## 2021-02-04 RX ORDER — OXYBUTYNIN CHLORIDE 5 MG/1
5 TABLET ORAL 2 TIMES DAILY
Qty: 180 TABLET | Refills: 3 | Status: SHIPPED | OUTPATIENT
Start: 2021-02-04 | End: 2024-02-12

## 2021-02-04 RX ORDER — OMEPRAZOLE 20 MG/1
20 CAPSULE, DELAYED RELEASE ORAL DAILY
Qty: 90 CAPSULE | Refills: 3 | Status: ON HOLD | OUTPATIENT
Start: 2021-02-04 | End: 2023-09-15 | Stop reason: HOSPADM

## 2021-02-04 RX ORDER — CITALOPRAM 40 MG/1
40 TABLET, FILM COATED ORAL DAILY
Qty: 90 TABLET | Refills: 3 | Status: SHIPPED | OUTPATIENT
Start: 2021-02-04

## 2021-02-04 RX ORDER — METFORMIN HYDROCHLORIDE 500 MG/1
500 TABLET ORAL 2 TIMES DAILY WITH MEALS
Qty: 180 TABLET | Refills: 3 | Status: ON HOLD | OUTPATIENT
Start: 2021-02-04 | End: 2022-05-06 | Stop reason: SDUPTHER

## 2021-02-04 RX ORDER — ERGOCALCIFEROL 1.25 MG/1
50000 CAPSULE ORAL
Qty: 12 CAPSULE | Refills: 3 | Status: ON HOLD | OUTPATIENT
Start: 2021-02-04 | End: 2022-05-03 | Stop reason: HOSPADM

## 2021-04-19 ENCOUNTER — TELEPHONE (OUTPATIENT)
Dept: CARDIOLOGY | Facility: CLINIC | Age: 76
End: 2021-04-19

## 2021-05-04 ENCOUNTER — OFFICE VISIT (OUTPATIENT)
Dept: CARDIOLOGY | Facility: CLINIC | Age: 76
End: 2021-05-04
Payer: MEDICARE

## 2021-05-04 VITALS
DIASTOLIC BLOOD PRESSURE: 80 MMHG | HEART RATE: 97 BPM | WEIGHT: 202 LBS | BODY MASS INDEX: 38.14 KG/M2 | SYSTOLIC BLOOD PRESSURE: 140 MMHG | OXYGEN SATURATION: 96 % | HEIGHT: 61 IN

## 2021-05-04 DIAGNOSIS — E78.2 MIXED HYPERLIPIDEMIA: ICD-10-CM

## 2021-05-04 DIAGNOSIS — I71.40 ABDOMINAL AORTIC ANEURYSM, WITHOUT RUPTURE: ICD-10-CM

## 2021-05-04 DIAGNOSIS — I10 ESSENTIAL HYPERTENSION: ICD-10-CM

## 2021-05-04 DIAGNOSIS — I35.0 AORTIC STENOSIS, MILD: ICD-10-CM

## 2021-05-04 PROCEDURE — 3079F PR MOST RECENT DIASTOLIC BLOOD PRESSURE 80-89 MM HG: ICD-10-PCS | Mod: CPTII,S$GLB,, | Performed by: INTERNAL MEDICINE

## 2021-05-04 PROCEDURE — 1101F PT FALLS ASSESS-DOCD LE1/YR: CPT | Mod: CPTII,S$GLB,, | Performed by: INTERNAL MEDICINE

## 2021-05-04 PROCEDURE — 3079F DIAST BP 80-89 MM HG: CPT | Mod: CPTII,S$GLB,, | Performed by: INTERNAL MEDICINE

## 2021-05-04 PROCEDURE — 3077F SYST BP >= 140 MM HG: CPT | Mod: CPTII,S$GLB,, | Performed by: INTERNAL MEDICINE

## 2021-05-04 PROCEDURE — 3288F PR FALLS RISK ASSESSMENT DOCUMENTED: ICD-10-PCS | Mod: CPTII,S$GLB,, | Performed by: INTERNAL MEDICINE

## 2021-05-04 PROCEDURE — 1159F MED LIST DOCD IN RCRD: CPT | Mod: S$GLB,,, | Performed by: INTERNAL MEDICINE

## 2021-05-04 PROCEDURE — 3077F PR MOST RECENT SYSTOLIC BLOOD PRESSURE >= 140 MM HG: ICD-10-PCS | Mod: CPTII,S$GLB,, | Performed by: INTERNAL MEDICINE

## 2021-05-04 PROCEDURE — 99214 PR OFFICE/OUTPT VISIT, EST, LEVL IV, 30-39 MIN: ICD-10-PCS | Mod: S$GLB,,, | Performed by: INTERNAL MEDICINE

## 2021-05-04 PROCEDURE — 1159F PR MEDICATION LIST DOCUMENTED IN MEDICAL RECORD: ICD-10-PCS | Mod: S$GLB,,, | Performed by: INTERNAL MEDICINE

## 2021-05-04 PROCEDURE — 99214 OFFICE O/P EST MOD 30 MIN: CPT | Mod: S$GLB,,, | Performed by: INTERNAL MEDICINE

## 2021-05-04 PROCEDURE — 3288F FALL RISK ASSESSMENT DOCD: CPT | Mod: CPTII,S$GLB,, | Performed by: INTERNAL MEDICINE

## 2021-05-04 PROCEDURE — 1101F PR PT FALLS ASSESS DOC 0-1 FALLS W/OUT INJ PAST YR: ICD-10-PCS | Mod: CPTII,S$GLB,, | Performed by: INTERNAL MEDICINE

## 2021-05-11 ENCOUNTER — TELEPHONE (OUTPATIENT)
Dept: HEMATOLOGY/ONCOLOGY | Facility: CLINIC | Age: 76
End: 2021-05-11

## 2021-05-12 ENCOUNTER — OFFICE VISIT (OUTPATIENT)
Dept: HEMATOLOGY/ONCOLOGY | Facility: CLINIC | Age: 76
End: 2021-05-12
Payer: MEDICARE

## 2021-05-12 VITALS
SYSTOLIC BLOOD PRESSURE: 121 MMHG | TEMPERATURE: 98 F | HEART RATE: 91 BPM | DIASTOLIC BLOOD PRESSURE: 74 MMHG | WEIGHT: 202 LBS | BODY MASS INDEX: 38.17 KG/M2

## 2021-05-12 DIAGNOSIS — D50.0 IRON DEFICIENCY ANEMIA DUE TO CHRONIC BLOOD LOSS: Primary | ICD-10-CM

## 2021-05-12 DIAGNOSIS — D53.9 ANEMIA ASSOCIATED WITH NUTRITIONAL DEFICIENCY: ICD-10-CM

## 2021-05-12 PROCEDURE — 1159F MED LIST DOCD IN RCRD: CPT | Mod: S$GLB,,, | Performed by: INTERNAL MEDICINE

## 2021-05-12 PROCEDURE — 99214 OFFICE O/P EST MOD 30 MIN: CPT | Mod: S$GLB,,, | Performed by: INTERNAL MEDICINE

## 2021-05-12 PROCEDURE — 1101F PT FALLS ASSESS-DOCD LE1/YR: CPT | Mod: S$GLB,,, | Performed by: INTERNAL MEDICINE

## 2021-05-12 PROCEDURE — 99214 PR OFFICE/OUTPT VISIT, EST, LEVL IV, 30-39 MIN: ICD-10-PCS | Mod: S$GLB,,, | Performed by: INTERNAL MEDICINE

## 2021-05-12 PROCEDURE — 3288F PR FALLS RISK ASSESSMENT DOCUMENTED: ICD-10-PCS | Mod: S$GLB,,, | Performed by: INTERNAL MEDICINE

## 2021-05-12 PROCEDURE — 1126F AMNT PAIN NOTED NONE PRSNT: CPT | Mod: S$GLB,,, | Performed by: INTERNAL MEDICINE

## 2021-05-12 PROCEDURE — 1126F PR PAIN SEVERITY QUANTIFIED, NO PAIN PRESENT: ICD-10-PCS | Mod: S$GLB,,, | Performed by: INTERNAL MEDICINE

## 2021-05-12 PROCEDURE — 1101F PR PT FALLS ASSESS DOC 0-1 FALLS W/OUT INJ PAST YR: ICD-10-PCS | Mod: S$GLB,,, | Performed by: INTERNAL MEDICINE

## 2021-05-12 PROCEDURE — 3288F FALL RISK ASSESSMENT DOCD: CPT | Mod: S$GLB,,, | Performed by: INTERNAL MEDICINE

## 2021-05-12 PROCEDURE — 1159F PR MEDICATION LIST DOCUMENTED IN MEDICAL RECORD: ICD-10-PCS | Mod: S$GLB,,, | Performed by: INTERNAL MEDICINE

## 2021-05-13 ENCOUNTER — TELEPHONE (OUTPATIENT)
Dept: CARDIOLOGY | Facility: CLINIC | Age: 76
End: 2021-05-13

## 2021-05-31 ENCOUNTER — HOSPITAL ENCOUNTER (OUTPATIENT)
Dept: RADIOLOGY | Facility: HOSPITAL | Age: 76
Discharge: HOME | End: 2021-05-31
Attending: INTERNAL MEDICINE
Payer: MEDICARE

## 2021-05-31 DIAGNOSIS — E78.2 MIXED HYPERLIPIDEMIA: ICD-10-CM

## 2021-05-31 DIAGNOSIS — I71.40 ABDOMINAL AORTIC ANEURYSM, WITHOUT RUPTURE: ICD-10-CM

## 2021-05-31 DIAGNOSIS — I35.0 AORTIC STENOSIS, MILD: ICD-10-CM

## 2021-05-31 DIAGNOSIS — I10 ESSENTIAL HYPERTENSION: ICD-10-CM

## 2021-05-31 LAB
CREAT SERPL-MCNC: 0.9 MG/DL (ref 0.5–1.4)
SAMPLE: NORMAL

## 2021-05-31 PROCEDURE — 82565 ASSAY OF CREATININE: CPT | Mod: PO

## 2021-05-31 PROCEDURE — 25500020 PHARM REV CODE 255: Mod: PO | Performed by: INTERNAL MEDICINE

## 2021-05-31 RX ADMIN — IOHEXOL 100 ML: 350 INJECTION, SOLUTION INTRAVENOUS at 09:05

## 2021-06-03 ENCOUNTER — OFFICE VISIT (OUTPATIENT)
Dept: CARDIOLOGY | Facility: CLINIC | Age: 76
End: 2021-06-03
Payer: MEDICARE

## 2021-06-03 VITALS
OXYGEN SATURATION: 97 % | SYSTOLIC BLOOD PRESSURE: 120 MMHG | DIASTOLIC BLOOD PRESSURE: 70 MMHG | WEIGHT: 205 LBS | HEIGHT: 61 IN | BODY MASS INDEX: 38.71 KG/M2 | HEART RATE: 96 BPM

## 2021-06-03 DIAGNOSIS — I35.0 AORTIC STENOSIS, MILD: ICD-10-CM

## 2021-06-03 DIAGNOSIS — I10 ESSENTIAL HYPERTENSION: ICD-10-CM

## 2021-06-03 DIAGNOSIS — I35.0 AORTIC VALVE STENOSIS, ETIOLOGY OF CARDIAC VALVE DISEASE UNSPECIFIED: Chronic | ICD-10-CM

## 2021-06-03 DIAGNOSIS — E78.2 MIXED HYPERLIPIDEMIA: ICD-10-CM

## 2021-06-03 DIAGNOSIS — I71.40 ABDOMINAL ANEURYSM: Chronic | ICD-10-CM

## 2021-06-03 PROCEDURE — 1159F PR MEDICATION LIST DOCUMENTED IN MEDICAL RECORD: ICD-10-PCS | Mod: CPTII,S$GLB,, | Performed by: INTERNAL MEDICINE

## 2021-06-03 PROCEDURE — 1160F PR REVIEW ALL MEDS BY PRESCRIBER/CLIN PHARMACIST DOCUMENTED: ICD-10-PCS | Mod: CPTII,S$GLB,, | Performed by: INTERNAL MEDICINE

## 2021-06-03 PROCEDURE — 3074F PR MOST RECENT SYSTOLIC BLOOD PRESSURE < 130 MM HG: ICD-10-PCS | Mod: CPTII,S$GLB,, | Performed by: INTERNAL MEDICINE

## 2021-06-03 PROCEDURE — 3288F PR FALLS RISK ASSESSMENT DOCUMENTED: ICD-10-PCS | Mod: CPTII,S$GLB,, | Performed by: INTERNAL MEDICINE

## 2021-06-03 PROCEDURE — 1101F PR PT FALLS ASSESS DOC 0-1 FALLS W/OUT INJ PAST YR: ICD-10-PCS | Mod: CPTII,S$GLB,, | Performed by: INTERNAL MEDICINE

## 2021-06-03 PROCEDURE — 1159F MED LIST DOCD IN RCRD: CPT | Mod: CPTII,S$GLB,, | Performed by: INTERNAL MEDICINE

## 2021-06-03 PROCEDURE — 3078F PR MOST RECENT DIASTOLIC BLOOD PRESSURE < 80 MM HG: ICD-10-PCS | Mod: CPTII,S$GLB,, | Performed by: INTERNAL MEDICINE

## 2021-06-03 PROCEDURE — 99213 OFFICE O/P EST LOW 20 MIN: CPT | Mod: S$GLB,,, | Performed by: INTERNAL MEDICINE

## 2021-06-03 PROCEDURE — 3078F DIAST BP <80 MM HG: CPT | Mod: CPTII,S$GLB,, | Performed by: INTERNAL MEDICINE

## 2021-06-03 PROCEDURE — 1160F RVW MEDS BY RX/DR IN RCRD: CPT | Mod: CPTII,S$GLB,, | Performed by: INTERNAL MEDICINE

## 2021-06-03 PROCEDURE — 3288F FALL RISK ASSESSMENT DOCD: CPT | Mod: CPTII,S$GLB,, | Performed by: INTERNAL MEDICINE

## 2021-06-03 PROCEDURE — 3074F SYST BP LT 130 MM HG: CPT | Mod: CPTII,S$GLB,, | Performed by: INTERNAL MEDICINE

## 2021-06-03 PROCEDURE — 1101F PT FALLS ASSESS-DOCD LE1/YR: CPT | Mod: CPTII,S$GLB,, | Performed by: INTERNAL MEDICINE

## 2021-06-03 PROCEDURE — 99213 PR OFFICE/OUTPT VISIT, EST, LEVL III, 20-29 MIN: ICD-10-PCS | Mod: S$GLB,,, | Performed by: INTERNAL MEDICINE

## 2021-11-10 ENCOUNTER — OFFICE VISIT (OUTPATIENT)
Dept: HEMATOLOGY/ONCOLOGY | Facility: CLINIC | Age: 76
End: 2021-11-10
Payer: MEDICARE

## 2021-11-10 VITALS
DIASTOLIC BLOOD PRESSURE: 70 MMHG | BODY MASS INDEX: 38.73 KG/M2 | SYSTOLIC BLOOD PRESSURE: 108 MMHG | TEMPERATURE: 100 F | WEIGHT: 205 LBS | HEART RATE: 109 BPM

## 2021-11-10 DIAGNOSIS — D50.0 IRON DEFICIENCY ANEMIA DUE TO CHRONIC BLOOD LOSS: Primary | ICD-10-CM

## 2021-11-10 PROCEDURE — 1125F PR PAIN SEVERITY QUANTIFIED, PAIN PRESENT: ICD-10-PCS | Mod: S$GLB,,, | Performed by: INTERNAL MEDICINE

## 2021-11-10 PROCEDURE — 3288F FALL RISK ASSESSMENT DOCD: CPT | Mod: S$GLB,,, | Performed by: INTERNAL MEDICINE

## 2021-11-10 PROCEDURE — 99214 PR OFFICE/OUTPT VISIT, EST, LEVL IV, 30-39 MIN: ICD-10-PCS | Mod: S$GLB,,, | Performed by: INTERNAL MEDICINE

## 2021-11-10 PROCEDURE — 1125F AMNT PAIN NOTED PAIN PRSNT: CPT | Mod: S$GLB,,, | Performed by: INTERNAL MEDICINE

## 2021-11-10 PROCEDURE — 1101F PT FALLS ASSESS-DOCD LE1/YR: CPT | Mod: S$GLB,,, | Performed by: INTERNAL MEDICINE

## 2021-11-10 PROCEDURE — 4010F PR ACE/ARB THEARPY RXD/TAKEN: ICD-10-PCS | Mod: S$GLB,,, | Performed by: INTERNAL MEDICINE

## 2021-11-10 PROCEDURE — 1159F PR MEDICATION LIST DOCUMENTED IN MEDICAL RECORD: ICD-10-PCS | Mod: S$GLB,,, | Performed by: INTERNAL MEDICINE

## 2021-11-10 PROCEDURE — 3074F PR MOST RECENT SYSTOLIC BLOOD PRESSURE < 130 MM HG: ICD-10-PCS | Mod: S$GLB,,, | Performed by: INTERNAL MEDICINE

## 2021-11-10 PROCEDURE — 1159F MED LIST DOCD IN RCRD: CPT | Mod: S$GLB,,, | Performed by: INTERNAL MEDICINE

## 2021-11-10 PROCEDURE — 3078F DIAST BP <80 MM HG: CPT | Mod: S$GLB,,, | Performed by: INTERNAL MEDICINE

## 2021-11-10 PROCEDURE — 1101F PR PT FALLS ASSESS DOC 0-1 FALLS W/OUT INJ PAST YR: ICD-10-PCS | Mod: S$GLB,,, | Performed by: INTERNAL MEDICINE

## 2021-11-10 PROCEDURE — 3074F SYST BP LT 130 MM HG: CPT | Mod: S$GLB,,, | Performed by: INTERNAL MEDICINE

## 2021-11-10 PROCEDURE — 3078F PR MOST RECENT DIASTOLIC BLOOD PRESSURE < 80 MM HG: ICD-10-PCS | Mod: S$GLB,,, | Performed by: INTERNAL MEDICINE

## 2021-11-10 PROCEDURE — 4010F ACE/ARB THERAPY RXD/TAKEN: CPT | Mod: S$GLB,,, | Performed by: INTERNAL MEDICINE

## 2021-11-10 PROCEDURE — 99214 OFFICE O/P EST MOD 30 MIN: CPT | Mod: S$GLB,,, | Performed by: INTERNAL MEDICINE

## 2021-11-10 PROCEDURE — 3288F PR FALLS RISK ASSESSMENT DOCUMENTED: ICD-10-PCS | Mod: S$GLB,,, | Performed by: INTERNAL MEDICINE

## 2021-11-16 ENCOUNTER — TELEPHONE (OUTPATIENT)
Dept: CARDIOLOGY | Facility: CLINIC | Age: 76
End: 2021-11-16
Payer: MEDICARE

## 2021-11-18 ENCOUNTER — TELEPHONE (OUTPATIENT)
Dept: CARDIOLOGY | Facility: CLINIC | Age: 76
End: 2021-11-18
Payer: MEDICARE

## 2021-12-06 ENCOUNTER — OFFICE VISIT (OUTPATIENT)
Dept: CARDIOLOGY | Facility: CLINIC | Age: 76
End: 2021-12-06
Payer: MEDICARE

## 2021-12-06 VITALS
BODY MASS INDEX: 36.82 KG/M2 | DIASTOLIC BLOOD PRESSURE: 68 MMHG | HEART RATE: 96 BPM | OXYGEN SATURATION: 97 % | SYSTOLIC BLOOD PRESSURE: 130 MMHG | HEIGHT: 61 IN | WEIGHT: 195 LBS

## 2021-12-06 DIAGNOSIS — J44.9 COPD, MILD: ICD-10-CM

## 2021-12-06 DIAGNOSIS — D50.0 IRON DEFICIENCY ANEMIA DUE TO CHRONIC BLOOD LOSS: ICD-10-CM

## 2021-12-06 DIAGNOSIS — I35.0 AORTIC VALVE STENOSIS, ETIOLOGY OF CARDIAC VALVE DISEASE UNSPECIFIED: Primary | ICD-10-CM

## 2021-12-06 DIAGNOSIS — I10 ESSENTIAL HYPERTENSION: ICD-10-CM

## 2021-12-06 DIAGNOSIS — I10 PRIMARY HYPERTENSION: ICD-10-CM

## 2021-12-06 DIAGNOSIS — E78.2 MIXED HYPERLIPIDEMIA: ICD-10-CM

## 2021-12-06 PROCEDURE — 3078F DIAST BP <80 MM HG: CPT | Mod: CPTII,S$GLB,, | Performed by: INTERNAL MEDICINE

## 2021-12-06 PROCEDURE — 3288F FALL RISK ASSESSMENT DOCD: CPT | Mod: CPTII,S$GLB,, | Performed by: INTERNAL MEDICINE

## 2021-12-06 PROCEDURE — 1101F PT FALLS ASSESS-DOCD LE1/YR: CPT | Mod: CPTII,S$GLB,, | Performed by: INTERNAL MEDICINE

## 2021-12-06 PROCEDURE — 3075F SYST BP GE 130 - 139MM HG: CPT | Mod: CPTII,S$GLB,, | Performed by: INTERNAL MEDICINE

## 2021-12-06 PROCEDURE — 3288F PR FALLS RISK ASSESSMENT DOCUMENTED: ICD-10-PCS | Mod: CPTII,S$GLB,, | Performed by: INTERNAL MEDICINE

## 2021-12-06 PROCEDURE — 1126F PR PAIN SEVERITY QUANTIFIED, NO PAIN PRESENT: ICD-10-PCS | Mod: CPTII,S$GLB,, | Performed by: INTERNAL MEDICINE

## 2021-12-06 PROCEDURE — 1126F AMNT PAIN NOTED NONE PRSNT: CPT | Mod: CPTII,S$GLB,, | Performed by: INTERNAL MEDICINE

## 2021-12-06 PROCEDURE — 99213 OFFICE O/P EST LOW 20 MIN: CPT | Mod: S$GLB,,, | Performed by: INTERNAL MEDICINE

## 2021-12-06 PROCEDURE — 3075F PR MOST RECENT SYSTOLIC BLOOD PRESS GE 130-139MM HG: ICD-10-PCS | Mod: CPTII,S$GLB,, | Performed by: INTERNAL MEDICINE

## 2021-12-06 PROCEDURE — 99213 PR OFFICE/OUTPT VISIT, EST, LEVL III, 20-29 MIN: ICD-10-PCS | Mod: S$GLB,,, | Performed by: INTERNAL MEDICINE

## 2021-12-06 PROCEDURE — 3078F PR MOST RECENT DIASTOLIC BLOOD PRESSURE < 80 MM HG: ICD-10-PCS | Mod: CPTII,S$GLB,, | Performed by: INTERNAL MEDICINE

## 2021-12-06 PROCEDURE — 1101F PR PT FALLS ASSESS DOC 0-1 FALLS W/OUT INJ PAST YR: ICD-10-PCS | Mod: CPTII,S$GLB,, | Performed by: INTERNAL MEDICINE

## 2021-12-06 NOTE — PROGRESS NOTES
Patient ID:  Daryleen G Moran is a 76 y.o. female who presents for follow-up of No chief complaint on file.      Her right knee is doing worse.  She is having a lot of knee pain.  Her activity is limited because of the arthritis of the knee.  She denies any chest pains any shortness of breath.      Past Medical History:   Diagnosis Date    Aortic stenosis     Arthritis     Back pain     Diabetes mellitus type II     Hyperlipidemia     Hypertension     Osteoporosis     Wears glasses         Past Surgical History:   Procedure Laterality Date     vocal cord nodules removed  long time ago     twice    APPENDECTOMY  within last 5yrs    FIXATION KYPHOPLASTY THORACIC SPINE      8-20-13    FOOT SURGERY      left 2nd toe was too long     HERNIA REPAIR  within last 5yrs    TONSILLECTOMY, ADENOIDECTOMY  long time ago          Current Outpatient Medications   Medication Instructions    aspirin (ECOTRIN) 81 mg, Daily    aspirin-calcium carbonate 81 mg-300 mg calcium(777 mg) Tab 81 mg, Oral    buPROPion (WELLBUTRIN XL) 150 mg, Oral    calcium carbonate (OS-TK) 600 mg, Oral, 2 times daily with meals    ciprofloxacin HCl (CIPRO) 500 mg, Oral, 2 times daily    citalopram (CELEXA) 40 mg, Oral, Daily    ergocalciferol (ERGOCALCIFEROL) 50,000 Units, Oral, Every 7 days    ezetimibe-simvastatin 10-40 mg (VYTORIN) 10-40 mg per tablet 1 tablet, Oral    glucosamine hawkins 2KCl-chondroit 500-400 mg Tab 1 tablet, Oral    glucosamine-chondroitin 500-400 mg tablet 1 tablet, Oral, 3 times daily    HYDROcodone-acetaminophen (NORCO) 5-325 mg per tablet 1 tablet, Oral, Every 6 hours PRN    lisinopriL (PRINIVIL,ZESTRIL) 20 mg, Oral, Nightly    lisinopril-hydrochlorothiazide (PRINZIDE,ZESTORETIC) 20-12.5 mg per tablet 1 tablet, Oral, Daily    metFORMIN (GLUCOPHAGE) 500 mg, Oral, 2 times daily with meals    multivitamin capsule 1 capsule, Oral, Daily    omeprazole (PRILOSEC) 20 mg, Oral, Daily    oxybutynin (DITROPAN)  "5 mg, Oral, 2 times daily    simvastatin (ZOCOR) 40 mg, Oral, Nightly        Review of patient's allergies indicates:   Allergen Reactions    Sulfacetamide sodium      Pain perineal area    Sulfasalazine Hives    Adhesive Itching     SKIN GETS RED WITH TAPE AND BANDAIDS    Adhesive tape-silicones Itching     SKIN GETS RED WITH TAPE AND BANDAIDS    Sulfa (sulfonamide antibiotics) Rash        Review of Systems   Constitutional: Negative for chills and fever.   Cardiovascular: Negative for chest pain, dyspnea on exertion and leg swelling.   Respiratory: Negative for cough and shortness of breath.    Musculoskeletal: Positive for arthritis and joint pain.        Objective:     Vitals:    12/06/21 1252   BP: 130/68   BP Location: Left arm   Patient Position: Sitting   BP Method: Large (Manual)   Pulse: 96   SpO2: 97%   Weight: 88.5 kg (195 lb)   Height: 5' 1" (1.549 m)       Physical Exam  Vitals and nursing note reviewed.   Constitutional:       Appearance: She is well-developed.   HENT:      Head: Normocephalic and atraumatic.   Eyes:      Conjunctiva/sclera: Conjunctivae normal.   Cardiovascular:      Rate and Rhythm: Normal rate and regular rhythm.      Heart sounds: Murmur (Grade 3/6 systolic murmur at the base) heard.       Pulmonary:      Effort: Pulmonary effort is normal.      Breath sounds: Normal breath sounds.   Abdominal:      General: Bowel sounds are normal.      Palpations: Abdomen is soft.   Musculoskeletal:         General: Normal range of motion.   Skin:     General: Skin is warm and dry.   Neurological:      Mental Status: She is alert and oriented to person, place, and time.   Psychiatric:         Behavior: Behavior normal.         Thought Content: Thought content normal.         Judgment: Judgment normal.       CMP  Sodium   Date Value Ref Range Status   12/15/2021 135 (L) 136 - 145 mmol/L Final     Potassium   Date Value Ref Range Status   12/15/2021 4.1 3.5 - 5.1 mmol/L Final     Chloride "   Date Value Ref Range Status   12/15/2021 97 95 - 110 mmol/L Final     CO2   Date Value Ref Range Status   12/15/2021 28 23 - 29 mmol/L Final     Glucose   Date Value Ref Range Status   12/15/2021 106 70 - 110 mg/dL Final     BUN   Date Value Ref Range Status   12/15/2021 16 8 - 23 mg/dL Final     Creatinine   Date Value Ref Range Status   12/15/2021 0.5 0.5 - 1.4 mg/dL Final   08/21/2013 0.8 0.5 - 1.4 mg/dL Final     Calcium   Date Value Ref Range Status   12/15/2021 9.6 8.7 - 10.5 mg/dL Final   08/21/2013 10.3 8.7 - 10.5 mg/dL Final     Total Protein   Date Value Ref Range Status   12/15/2021 7.3 6.0 - 8.4 g/dL Final     Albumin   Date Value Ref Range Status   12/15/2021 3.8 3.5 - 5.2 g/dL Final     Total Bilirubin   Date Value Ref Range Status   12/15/2021 0.8 0.1 - 1.0 mg/dL Final     Comment:     For infants and newborns, interpretation of results should be based  on gestational age, weight and in agreement with clinical  observations.    Premature Infant recommended reference ranges:  Up to 24 hours.............<8.0 mg/dL  Up to 48 hours............<12.0 mg/dL  3-5 days..................<15.0 mg/dL  6-29 days.................<15.0 mg/dL       Alkaline Phosphatase   Date Value Ref Range Status   12/15/2021 59 55 - 135 U/L Final     AST   Date Value Ref Range Status   12/15/2021 33 10 - 40 U/L Final     ALT   Date Value Ref Range Status   12/15/2021 36 10 - 44 U/L Final     Anion Gap   Date Value Ref Range Status   12/15/2021 10 8 - 16 mmol/L Final   08/21/2013 11 5 - 15 meq/L Final     eGFR if    Date Value Ref Range Status   12/15/2021 >60.0 >60 mL/min/1.73 m^2 Final     eGFR if non    Date Value Ref Range Status   12/15/2021 >60.0 >60 mL/min/1.73 m^2 Final     Comment:     Calculation used to obtain the estimated glomerular filtration  rate (eGFR) is the CKD-EPI equation.         BMP  Lab Results   Component Value Date     (L) 12/15/2021    K 4.1 12/15/2021    CL 97  12/15/2021    CO2 28 12/15/2021    BUN 16 12/15/2021    CREATININE 0.5 12/15/2021    CALCIUM 9.6 12/15/2021    ANIONGAP 10 12/15/2021    ESTGFRAFRICA >60.0 12/15/2021    EGFRNONAA >60.0 12/15/2021      BNP  @LABRCNTIP(BNP,BNPTRIAGEBLO)@   No results found for: CHOL  No results found for: HDL  No results found for: LDLCALC  No results found for: TRIG  No results found for: CHOLHDL   Lab Results   Component Value Date    TSH 1.13 11/17/2009     Lab Results   Component Value Date    HGBA1C 6.5 (H) 09/13/2016     Lab Results   Component Value Date    WBC 9.55 12/15/2021    HGB 11.8 (L) 12/15/2021    HCT 37.7 12/15/2021    MCV 88 12/15/2021     12/15/2021         No results found for this or any previous visit.     No results found for this or any previous visit.         Assessment:       Aortic stenosis  Will obtain an echocardiogram to assess the degree of aortic stenosis.    Hypertension  Controlled on current medical therapy    Hyperlipidemia  Controlled on current medical therapy       Plan:       Continue the current medical therapy return to the office in 6 months with a lipid CMP as well as an echocardiogram.

## 2021-12-15 ENCOUNTER — HOSPITAL ENCOUNTER (OUTPATIENT)
Dept: RADIOLOGY | Facility: HOSPITAL | Age: 76
Discharge: HOME | End: 2021-12-15
Attending: SPECIALIST
Payer: MEDICARE

## 2021-12-15 ENCOUNTER — HOSPITAL ENCOUNTER (OUTPATIENT)
Dept: PREADMISSION TESTING | Facility: HOSPITAL | Age: 76
Discharge: HOME | End: 2021-12-15
Attending: SPECIALIST
Payer: MEDICARE

## 2021-12-15 VITALS
HEART RATE: 90 BPM | OXYGEN SATURATION: 99 % | SYSTOLIC BLOOD PRESSURE: 136 MMHG | RESPIRATION RATE: 17 BRPM | DIASTOLIC BLOOD PRESSURE: 78 MMHG | TEMPERATURE: 99 F

## 2021-12-15 DIAGNOSIS — Z01.811 PRE-OP CHEST EXAM: ICD-10-CM

## 2021-12-15 DIAGNOSIS — Z01.818 PREOP TESTING: Primary | ICD-10-CM

## 2021-12-15 DIAGNOSIS — Z01.811 PRE-OP CHEST EXAM: Primary | ICD-10-CM

## 2021-12-15 LAB — SARS-COV-2 RDRP RESP QL NAA+PROBE: NEGATIVE

## 2021-12-15 PROCEDURE — 93010 ELECTROCARDIOGRAM REPORT: CPT | Mod: ,,, | Performed by: INTERNAL MEDICINE

## 2021-12-15 PROCEDURE — 71046 X-RAY EXAM CHEST 2 VIEWS: CPT | Mod: TC

## 2021-12-15 PROCEDURE — 93005 ELECTROCARDIOGRAM TRACING: CPT | Performed by: INTERNAL MEDICINE

## 2021-12-15 PROCEDURE — U0002 COVID-19 LAB TEST NON-CDC: HCPCS | Performed by: SPECIALIST

## 2021-12-15 PROCEDURE — 93010 EKG 12-LEAD: ICD-10-PCS | Mod: ,,, | Performed by: INTERNAL MEDICINE

## 2021-12-15 RX ORDER — CEFAZOLIN SODIUM 2 G/50ML
2 SOLUTION INTRAVENOUS ONCE
Status: DISCONTINUED | OUTPATIENT
Start: 2021-12-16 | End: 2021-12-16 | Stop reason: HOSPADM

## 2021-12-16 ENCOUNTER — ANESTHESIA (OUTPATIENT)
Dept: SURGERY | Facility: HOSPITAL | Age: 76
End: 2021-12-16
Payer: MEDICARE

## 2021-12-16 ENCOUNTER — ANESTHESIA EVENT (OUTPATIENT)
Dept: SURGERY | Facility: HOSPITAL | Age: 76
End: 2021-12-16
Payer: MEDICARE

## 2021-12-16 ENCOUNTER — HOSPITAL ENCOUNTER (OUTPATIENT)
Facility: HOSPITAL | Age: 76
Discharge: HOME | End: 2021-12-16
Attending: SPECIALIST | Admitting: SPECIALIST
Payer: MEDICARE

## 2021-12-16 VITALS
OXYGEN SATURATION: 97 % | RESPIRATION RATE: 22 BRPM | SYSTOLIC BLOOD PRESSURE: 133 MMHG | WEIGHT: 200 LBS | TEMPERATURE: 98 F | HEART RATE: 85 BPM | BODY MASS INDEX: 39.27 KG/M2 | DIASTOLIC BLOOD PRESSURE: 65 MMHG | HEIGHT: 60 IN

## 2021-12-16 DIAGNOSIS — N39.3 SUI (STRESS URINARY INCONTINENCE, FEMALE): Primary | ICD-10-CM

## 2021-12-16 LAB
GLUCOSE SERPL-MCNC: 126 MG/DL (ref 70–110)
GLUCOSE SERPL-MCNC: 128 MG/DL (ref 70–110)
GLUCOSE SERPL-MCNC: 145 MG/DL (ref 70–110)

## 2021-12-16 PROCEDURE — 71000015 HC POSTOP RECOV 1ST HR: Performed by: SPECIALIST

## 2021-12-16 PROCEDURE — 82962 GLUCOSE BLOOD TEST: CPT | Performed by: SPECIALIST

## 2021-12-16 PROCEDURE — 25000003 PHARM REV CODE 250: Performed by: ANESTHESIOLOGY

## 2021-12-16 PROCEDURE — 25000003 PHARM REV CODE 250: Performed by: NURSE ANESTHETIST, CERTIFIED REGISTERED

## 2021-12-16 PROCEDURE — 71000016 HC POSTOP RECOV ADDL HR: Performed by: SPECIALIST

## 2021-12-16 PROCEDURE — 37000009 HC ANESTHESIA EA ADD 15 MINS: Performed by: SPECIALIST

## 2021-12-16 PROCEDURE — 27202105 HC BIS BILATERAL SENSOR: Performed by: ANESTHESIOLOGY

## 2021-12-16 PROCEDURE — 71000033 HC RECOVERY, INTIAL HOUR: Performed by: SPECIALIST

## 2021-12-16 PROCEDURE — 27000671 HC TUBING MICROBORE EXT: Performed by: ANESTHESIOLOGY

## 2021-12-16 PROCEDURE — 27201423 OPTIME MED/SURG SUP & DEVICES STERILE SUPPLY: Performed by: SPECIALIST

## 2021-12-16 PROCEDURE — 63600175 PHARM REV CODE 636 W HCPCS: Performed by: ANESTHESIOLOGY

## 2021-12-16 PROCEDURE — 27000653 HC CATH, IV CATHLN: Performed by: ANESTHESIOLOGY

## 2021-12-16 PROCEDURE — 27201107 HC STYLET, STANDARD: Performed by: ANESTHESIOLOGY

## 2021-12-16 PROCEDURE — 36000707: Performed by: SPECIALIST

## 2021-12-16 PROCEDURE — 63600175 PHARM REV CODE 636 W HCPCS: Performed by: NURSE ANESTHETIST, CERTIFIED REGISTERED

## 2021-12-16 PROCEDURE — C1771 REP DEV, URINARY, W/SLING: HCPCS | Performed by: SPECIALIST

## 2021-12-16 PROCEDURE — 71000039 HC RECOVERY, EACH ADD'L HOUR: Performed by: SPECIALIST

## 2021-12-16 PROCEDURE — 27000673 HC TUBING BLOOD Y: Performed by: ANESTHESIOLOGY

## 2021-12-16 PROCEDURE — 25000003 PHARM REV CODE 250: Performed by: SPECIALIST

## 2021-12-16 PROCEDURE — 37000008 HC ANESTHESIA 1ST 15 MINUTES: Performed by: SPECIALIST

## 2021-12-16 PROCEDURE — 36000706: Performed by: SPECIALIST

## 2021-12-16 DEVICE — IMPLANTABLE DEVICE: Type: IMPLANTABLE DEVICE | Site: VAGINA | Status: FUNCTIONAL

## 2021-12-16 RX ORDER — LIDOCAINE HYDROCHLORIDE 20 MG/ML
JELLY TOPICAL
Status: DISCONTINUED | OUTPATIENT
Start: 2021-12-16 | End: 2021-12-16 | Stop reason: HOSPADM

## 2021-12-16 RX ORDER — FENTANYL CITRATE 50 UG/ML
INJECTION, SOLUTION INTRAMUSCULAR; INTRAVENOUS
Status: DISCONTINUED | OUTPATIENT
Start: 2021-12-16 | End: 2021-12-16

## 2021-12-16 RX ORDER — ONDANSETRON 2 MG/ML
4 INJECTION INTRAMUSCULAR; INTRAVENOUS DAILY PRN
Status: DISCONTINUED | OUTPATIENT
Start: 2021-12-16 | End: 2021-12-16 | Stop reason: HOSPADM

## 2021-12-16 RX ORDER — SUCCINYLCHOLINE CHLORIDE 20 MG/ML
INJECTION INTRAMUSCULAR; INTRAVENOUS
Status: DISCONTINUED | OUTPATIENT
Start: 2021-12-16 | End: 2021-12-16

## 2021-12-16 RX ORDER — PROPOFOL 10 MG/ML
VIAL (ML) INTRAVENOUS
Status: DISCONTINUED | OUTPATIENT
Start: 2021-12-16 | End: 2021-12-16

## 2021-12-16 RX ORDER — LIDOCAINE HCL/PF 100 MG/5ML
SYRINGE (ML) INTRAVENOUS
Status: DISCONTINUED | OUTPATIENT
Start: 2021-12-16 | End: 2021-12-16

## 2021-12-16 RX ORDER — DIPHENHYDRAMINE HYDROCHLORIDE 50 MG/ML
12.5 INJECTION INTRAMUSCULAR; INTRAVENOUS
Status: DISCONTINUED | OUTPATIENT
Start: 2021-12-16 | End: 2021-12-16 | Stop reason: HOSPADM

## 2021-12-16 RX ORDER — HYDROCODONE BITARTRATE AND ACETAMINOPHEN 5; 325 MG/1; MG/1
1 TABLET ORAL EVERY 6 HOURS PRN
Qty: 12 TABLET | Refills: 0
Start: 2021-12-16 | End: 2022-12-01 | Stop reason: SDUPTHER

## 2021-12-16 RX ORDER — ONDANSETRON 2 MG/ML
INJECTION INTRAMUSCULAR; INTRAVENOUS
Status: DISCONTINUED | OUTPATIENT
Start: 2021-12-16 | End: 2021-12-16

## 2021-12-16 RX ORDER — SODIUM CHLORIDE 0.9 % (FLUSH) 0.9 %
10 SYRINGE (ML) INJECTION
Status: DISCONTINUED | OUTPATIENT
Start: 2021-12-16 | End: 2021-12-16 | Stop reason: HOSPADM

## 2021-12-16 RX ORDER — ROCURONIUM BROMIDE 10 MG/ML
INJECTION, SOLUTION INTRAVENOUS
Status: DISCONTINUED | OUTPATIENT
Start: 2021-12-16 | End: 2021-12-16

## 2021-12-16 RX ORDER — FENTANYL CITRATE 50 UG/ML
25 INJECTION, SOLUTION INTRAMUSCULAR; INTRAVENOUS EVERY 5 MIN PRN
Status: COMPLETED | OUTPATIENT
Start: 2021-12-16 | End: 2021-12-16

## 2021-12-16 RX ORDER — GABAPENTIN 100 MG/1
100 CAPSULE ORAL ONCE
Status: COMPLETED | OUTPATIENT
Start: 2021-12-16 | End: 2021-12-16

## 2021-12-16 RX ORDER — CIPROFLOXACIN 500 MG/1
500 TABLET ORAL 2 TIMES DAILY
Qty: 10 TABLET | Refills: 0 | Status: ON HOLD
Start: 2021-12-16 | End: 2022-05-03 | Stop reason: HOSPADM

## 2021-12-16 RX ORDER — ACETAMINOPHEN 10 MG/ML
INJECTION, SOLUTION INTRAVENOUS
Status: DISCONTINUED | OUTPATIENT
Start: 2021-12-16 | End: 2021-12-16

## 2021-12-16 RX ORDER — OXYCODONE HYDROCHLORIDE 5 MG/1
5 TABLET ORAL
Status: DISCONTINUED | OUTPATIENT
Start: 2021-12-16 | End: 2021-12-16 | Stop reason: HOSPADM

## 2021-12-16 RX ORDER — MEPERIDINE HYDROCHLORIDE 50 MG/ML
12.5 INJECTION INTRAMUSCULAR; INTRAVENOUS; SUBCUTANEOUS EVERY 10 MIN PRN
Status: DISCONTINUED | OUTPATIENT
Start: 2021-12-16 | End: 2021-12-16 | Stop reason: HOSPADM

## 2021-12-16 RX ORDER — CEFAZOLIN SODIUM 1 G/3ML
INJECTION, POWDER, FOR SOLUTION INTRAMUSCULAR; INTRAVENOUS
Status: DISCONTINUED | OUTPATIENT
Start: 2021-12-16 | End: 2021-12-16

## 2021-12-16 RX ADMIN — FENTANYL CITRATE 25 MCG: 50 INJECTION, SOLUTION INTRAMUSCULAR; INTRAVENOUS at 12:12

## 2021-12-16 RX ADMIN — SUCCINYLCHOLINE CHLORIDE 140 MG: 20 INJECTION, SOLUTION INTRAMUSCULAR; INTRAVENOUS at 10:12

## 2021-12-16 RX ADMIN — ACETAMINOPHEN 1000 MG: 10 INJECTION, SOLUTION INTRAVENOUS at 11:12

## 2021-12-16 RX ADMIN — SODIUM CHLORIDE, SODIUM LACTATE, POTASSIUM CHLORIDE, AND CALCIUM CHLORIDE: .6; .31; .03; .02 INJECTION, SOLUTION INTRAVENOUS at 10:12

## 2021-12-16 RX ADMIN — ONDANSETRON 4 MG: 2 INJECTION INTRAMUSCULAR; INTRAVENOUS at 11:12

## 2021-12-16 RX ADMIN — FENTANYL CITRATE 50 MCG: 50 INJECTION INTRAMUSCULAR; INTRAVENOUS at 10:12

## 2021-12-16 RX ADMIN — GABAPENTIN 100 MG: 100 CAPSULE ORAL at 10:12

## 2021-12-16 RX ADMIN — SUGAMMADEX 200 MG: 100 INJECTION, SOLUTION INTRAVENOUS at 11:12

## 2021-12-16 RX ADMIN — ROCURONIUM BROMIDE 30 MG: 10 INJECTION, SOLUTION INTRAVENOUS at 11:12

## 2021-12-16 RX ADMIN — LIDOCAINE HYDROCHLORIDE 75 MG: 20 INJECTION, SOLUTION INTRAVENOUS at 10:12

## 2021-12-16 RX ADMIN — OXYCODONE HYDROCHLORIDE 5 MG: 5 TABLET ORAL at 12:12

## 2021-12-16 RX ADMIN — CEFAZOLIN 2 G: 330 INJECTION, POWDER, FOR SOLUTION INTRAMUSCULAR; INTRAVENOUS at 10:12

## 2021-12-16 RX ADMIN — PROPOFOL 90 MG: 10 INJECTION, EMULSION INTRAVENOUS at 10:12

## 2022-04-18 ENCOUNTER — TELEPHONE (OUTPATIENT)
Dept: PULMONOLOGY | Facility: CLINIC | Age: 77
End: 2022-04-18
Payer: MEDICARE

## 2022-04-18 NOTE — TELEPHONE ENCOUNTER
Tried to contact patient to schedule. Could not reach.      ----- Message from Holland Garber sent at 4/18/2022  1:12 PM CDT -----  Contact: Selene ()  Caller is requesting a sooner appointment.           Caller declined first available appointment listed below.           Caller will not accept being placed on the waitlist and is requesting a message be sent to doctor.         Name of Caller: Selene ()         When is the first available appointment? 07/07         Symptoms: COPD Hospital follow up from [facility].         Best Call Back Number: [phone]         Additional Information: contact patient to schedule.

## 2022-04-30 ENCOUNTER — HOSPITAL ENCOUNTER (INPATIENT)
Facility: HOSPITAL | Age: 77
LOS: 3 days | Discharge: HOME | DRG: 281 | End: 2022-05-03
Attending: EMERGENCY MEDICINE | Admitting: INTERNAL MEDICINE
Payer: MEDICARE

## 2022-04-30 ENCOUNTER — HOSPITAL ENCOUNTER (EMERGENCY)
Facility: HOSPITAL | Age: 77
Discharge: ANOTHER HOSPITAL | End: 2022-04-30
Attending: EMERGENCY MEDICINE
Payer: MEDICARE

## 2022-04-30 VITALS
SYSTOLIC BLOOD PRESSURE: 95 MMHG | DIASTOLIC BLOOD PRESSURE: 51 MMHG | RESPIRATION RATE: 22 BRPM | HEART RATE: 129 BPM | TEMPERATURE: 99 F | OXYGEN SATURATION: 95 %

## 2022-04-30 DIAGNOSIS — R07.9 CHEST PAIN: ICD-10-CM

## 2022-04-30 DIAGNOSIS — I21.4 NSTEMI (NON-ST ELEVATED MYOCARDIAL INFARCTION): Primary | ICD-10-CM

## 2022-04-30 DIAGNOSIS — R53.1 WEAKNESS: Primary | ICD-10-CM

## 2022-04-30 DIAGNOSIS — R94.31 ST ELEVATION: ICD-10-CM

## 2022-04-30 DIAGNOSIS — I48.91 ATRIAL FIBRILLATION WITH RVR: ICD-10-CM

## 2022-04-30 LAB
ALBUMIN SERPL BCP-MCNC: 3.1 G/DL (ref 3.5–5.2)
ALBUMIN SERPL BCP-MCNC: 3.2 G/DL (ref 3.5–5.2)
ALP SERPL-CCNC: 47 U/L (ref 55–135)
ALP SERPL-CCNC: 57 U/L (ref 55–135)
ALT SERPL W/O P-5'-P-CCNC: 44 U/L (ref 10–44)
ALT SERPL W/O P-5'-P-CCNC: 50 U/L (ref 10–44)
ANION GAP SERPL CALC-SCNC: 12 MMOL/L (ref 8–16)
ANION GAP SERPL CALC-SCNC: 14 MMOL/L (ref 8–16)
AST SERPL-CCNC: 30 U/L (ref 10–40)
AST SERPL-CCNC: 35 U/L (ref 10–40)
BASOPHILS # BLD AUTO: 0.06 K/UL (ref 0–0.2)
BASOPHILS # BLD AUTO: 0.09 K/UL (ref 0–0.2)
BASOPHILS NFR BLD: 0.3 % (ref 0–1.9)
BASOPHILS NFR BLD: 0.5 % (ref 0–1.9)
BILIRUB SERPL-MCNC: 1.1 MG/DL (ref 0.1–1)
BILIRUB SERPL-MCNC: 1.2 MG/DL (ref 0.1–1)
BNP SERPL-MCNC: 329 PG/ML (ref 0–99)
BNP SERPL-MCNC: 403 PG/ML (ref 0–99)
BUN SERPL-MCNC: 12 MG/DL (ref 8–23)
BUN SERPL-MCNC: 15 MG/DL (ref 8–23)
CALCIUM SERPL-MCNC: 8.6 MG/DL (ref 8.7–10.5)
CALCIUM SERPL-MCNC: 9.3 MG/DL (ref 8.7–10.5)
CHLORIDE SERPL-SCNC: 100 MMOL/L (ref 95–110)
CHLORIDE SERPL-SCNC: 99 MMOL/L (ref 95–110)
CO2 SERPL-SCNC: 26 MMOL/L (ref 23–29)
CO2 SERPL-SCNC: 26 MMOL/L (ref 23–29)
CREAT SERPL-MCNC: 0.7 MG/DL (ref 0.5–1.4)
CREAT SERPL-MCNC: 0.8 MG/DL (ref 0.5–1.4)
DIFFERENTIAL METHOD: ABNORMAL
DIFFERENTIAL METHOD: ABNORMAL
EOSINOPHIL # BLD AUTO: 0.1 K/UL (ref 0–0.5)
EOSINOPHIL # BLD AUTO: 0.2 K/UL (ref 0–0.5)
EOSINOPHIL NFR BLD: 0.7 % (ref 0–8)
EOSINOPHIL NFR BLD: 0.9 % (ref 0–8)
ERYTHROCYTE [DISTWIDTH] IN BLOOD BY AUTOMATED COUNT: 25.8 % (ref 11.5–14.5)
ERYTHROCYTE [DISTWIDTH] IN BLOOD BY AUTOMATED COUNT: 26 % (ref 11.5–14.5)
EST. GFR  (AFRICAN AMERICAN): >60 ML/MIN/1.73 M^2
EST. GFR  (AFRICAN AMERICAN): >60 ML/MIN/1.73 M^2
EST. GFR  (NON AFRICAN AMERICAN): >60 ML/MIN/1.73 M^2
EST. GFR  (NON AFRICAN AMERICAN): >60 ML/MIN/1.73 M^2
GLUCOSE SERPL-MCNC: 134 MG/DL (ref 70–110)
GLUCOSE SERPL-MCNC: 153 MG/DL (ref 70–110)
HCT VFR BLD AUTO: 34.7 % (ref 37–48.5)
HCT VFR BLD AUTO: 34.7 % (ref 37–48.5)
HGB BLD-MCNC: 10.3 G/DL (ref 12–16)
HGB BLD-MCNC: 9.7 G/DL (ref 12–16)
IMM GRANULOCYTES # BLD AUTO: 0.07 K/UL (ref 0–0.04)
IMM GRANULOCYTES # BLD AUTO: 0.11 K/UL (ref 0–0.04)
IMM GRANULOCYTES NFR BLD AUTO: 0.4 % (ref 0–0.5)
IMM GRANULOCYTES NFR BLD AUTO: 0.6 % (ref 0–0.5)
LYMPHOCYTES # BLD AUTO: 1.3 K/UL (ref 1–4.8)
LYMPHOCYTES # BLD AUTO: 1.4 K/UL (ref 1–4.8)
LYMPHOCYTES NFR BLD: 7.4 % (ref 18–48)
LYMPHOCYTES NFR BLD: 8.2 % (ref 18–48)
MCH RBC QN AUTO: 23.5 PG (ref 27–31)
MCH RBC QN AUTO: 24.6 PG (ref 27–31)
MCHC RBC AUTO-ENTMCNC: 28 G/DL (ref 32–36)
MCHC RBC AUTO-ENTMCNC: 29.7 G/DL (ref 32–36)
MCV RBC AUTO: 83 FL (ref 82–98)
MCV RBC AUTO: 84 FL (ref 82–98)
MONOCYTES # BLD AUTO: 0.9 K/UL (ref 0.3–1)
MONOCYTES # BLD AUTO: 0.9 K/UL (ref 0.3–1)
MONOCYTES NFR BLD: 4.9 % (ref 4–15)
MONOCYTES NFR BLD: 5.5 % (ref 4–15)
NEUTROPHILS # BLD AUTO: 14.3 K/UL (ref 1.8–7.7)
NEUTROPHILS # BLD AUTO: 15 K/UL (ref 1.8–7.7)
NEUTROPHILS NFR BLD: 84.5 % (ref 38–73)
NEUTROPHILS NFR BLD: 86.1 % (ref 38–73)
NRBC BLD-RTO: 0 /100 WBC
NRBC BLD-RTO: 0 /100 WBC
PLATELET # BLD AUTO: 274 K/UL (ref 150–450)
PLATELET # BLD AUTO: 280 K/UL (ref 150–450)
PMV BLD AUTO: 10.8 FL (ref 9.2–12.9)
PMV BLD AUTO: 11.2 FL (ref 9.2–12.9)
POTASSIUM SERPL-SCNC: 3.4 MMOL/L (ref 3.5–5.1)
POTASSIUM SERPL-SCNC: 3.4 MMOL/L (ref 3.5–5.1)
PROT SERPL-MCNC: 6.1 G/DL (ref 6–8.4)
PROT SERPL-MCNC: 6.3 G/DL (ref 6–8.4)
RBC # BLD AUTO: 4.13 M/UL (ref 4–5.4)
RBC # BLD AUTO: 4.19 M/UL (ref 4–5.4)
SARS-COV-2 RDRP RESP QL NAA+PROBE: NEGATIVE
SODIUM SERPL-SCNC: 138 MMOL/L (ref 136–145)
SODIUM SERPL-SCNC: 139 MMOL/L (ref 136–145)
TROPONIN I SERPL DL<=0.01 NG/ML-MCNC: 0.32 NG/ML (ref 0–0.03)
TROPONIN I SERPL DL<=0.01 NG/ML-MCNC: 0.55 NG/ML
WBC # BLD AUTO: 16.88 K/UL (ref 3.9–12.7)
WBC # BLD AUTO: 17.46 K/UL (ref 3.9–12.7)

## 2022-04-30 PROCEDURE — 93010 EKG 12-LEAD: ICD-10-PCS | Mod: 77,,, | Performed by: GENERAL PRACTICE

## 2022-04-30 PROCEDURE — 85025 COMPLETE CBC W/AUTO DIFF WBC: CPT | Mod: 91 | Performed by: EMERGENCY MEDICINE

## 2022-04-30 PROCEDURE — 96374 THER/PROPH/DIAG INJ IV PUSH: CPT

## 2022-04-30 PROCEDURE — 25000003 PHARM REV CODE 250: Performed by: EMERGENCY MEDICINE

## 2022-04-30 PROCEDURE — 84484 ASSAY OF TROPONIN QUANT: CPT | Performed by: EMERGENCY MEDICINE

## 2022-04-30 PROCEDURE — U0002 COVID-19 LAB TEST NON-CDC: HCPCS | Performed by: EMERGENCY MEDICINE

## 2022-04-30 PROCEDURE — 84484 ASSAY OF TROPONIN QUANT: CPT | Mod: 91 | Performed by: EMERGENCY MEDICINE

## 2022-04-30 PROCEDURE — 36415 COLL VENOUS BLD VENIPUNCTURE: CPT | Performed by: EMERGENCY MEDICINE

## 2022-04-30 PROCEDURE — 80053 COMPREHEN METABOLIC PANEL: CPT | Performed by: EMERGENCY MEDICINE

## 2022-04-30 PROCEDURE — 85025 COMPLETE CBC W/AUTO DIFF WBC: CPT | Performed by: EMERGENCY MEDICINE

## 2022-04-30 PROCEDURE — 93005 ELECTROCARDIOGRAM TRACING: CPT | Performed by: INTERNAL MEDICINE

## 2022-04-30 PROCEDURE — 93010 ELECTROCARDIOGRAM REPORT: CPT | Mod: 77,,, | Performed by: GENERAL PRACTICE

## 2022-04-30 PROCEDURE — 99285 EMERGENCY DEPT VISIT HI MDM: CPT | Mod: 25

## 2022-04-30 PROCEDURE — 83880 ASSAY OF NATRIURETIC PEPTIDE: CPT | Performed by: EMERGENCY MEDICINE

## 2022-04-30 PROCEDURE — 99291 CRITICAL CARE FIRST HOUR: CPT | Mod: 25

## 2022-04-30 PROCEDURE — 80053 COMPREHEN METABOLIC PANEL: CPT | Mod: 91 | Performed by: EMERGENCY MEDICINE

## 2022-04-30 PROCEDURE — 83880 ASSAY OF NATRIURETIC PEPTIDE: CPT | Mod: 91 | Performed by: EMERGENCY MEDICINE

## 2022-04-30 PROCEDURE — 20000000 HC ICU ROOM

## 2022-04-30 PROCEDURE — 93010 EKG 12-LEAD: ICD-10-PCS | Mod: ,,, | Performed by: INTERNAL MEDICINE

## 2022-04-30 PROCEDURE — 93010 ELECTROCARDIOGRAM REPORT: CPT | Mod: ,,, | Performed by: INTERNAL MEDICINE

## 2022-04-30 PROCEDURE — 93005 ELECTROCARDIOGRAM TRACING: CPT

## 2022-04-30 RX ORDER — BENAZEPRIL HYDROCHLORIDE 20 MG/1
20 TABLET ORAL NIGHTLY
Status: ON HOLD | COMMUNITY
Start: 2021-06-09 | End: 2022-05-03 | Stop reason: HOSPADM

## 2022-04-30 RX ORDER — METOPROLOL TARTRATE 1 MG/ML
INJECTION, SOLUTION INTRAVENOUS
Status: DISPENSED
Start: 2022-04-30 | End: 2022-05-01

## 2022-04-30 RX ORDER — ASPIRIN 325 MG
325 TABLET ORAL
Status: DISCONTINUED | OUTPATIENT
Start: 2022-04-30 | End: 2022-04-30

## 2022-04-30 RX ORDER — FEXOFENADINE HYDROCHLORIDE 180 MG/1
180 TABLET, FILM COATED ORAL DAILY
COMMUNITY
Start: 2022-01-17 | End: 2022-05-06

## 2022-04-30 RX ORDER — IBUPROFEN 100 MG/5ML
1000 SUSPENSION, ORAL (FINAL DOSE FORM) ORAL DAILY
Status: ON HOLD | COMMUNITY
End: 2022-05-06 | Stop reason: HOSPADM

## 2022-04-30 RX ORDER — ALBUTEROL SULFATE 90 UG/1
2 AEROSOL, METERED RESPIRATORY (INHALATION) EVERY 6 HOURS PRN
COMMUNITY
Start: 2022-04-15

## 2022-04-30 RX ORDER — FERROUS SULFATE TAB 325 MG (65 MG ELEMENTAL FE) 325 (65 FE) MG
1 TAB ORAL EVERY MORNING
Status: ON HOLD | COMMUNITY
Start: 2022-04-15 | End: 2022-05-03 | Stop reason: SDUPTHER

## 2022-04-30 RX ORDER — METOPROLOL TARTRATE 1 MG/ML
2.5 INJECTION, SOLUTION INTRAVENOUS
Status: COMPLETED | OUTPATIENT
Start: 2022-04-30 | End: 2022-04-30

## 2022-04-30 RX ORDER — BUDESONIDE AND FORMOTEROL FUMARATE DIHYDRATE 80; 4.5 UG/1; UG/1
2 AEROSOL RESPIRATORY (INHALATION) 2 TIMES DAILY PRN
COMMUNITY
Start: 2022-04-16 | End: 2023-09-08 | Stop reason: ALTCHOICE

## 2022-04-30 RX ORDER — CLINDAMYCIN HYDROCHLORIDE 300 MG/1
300 CAPSULE ORAL 3 TIMES DAILY
Status: ON HOLD | COMMUNITY
Start: 2022-02-23 | End: 2022-05-03 | Stop reason: HOSPADM

## 2022-04-30 RX ORDER — DILTIAZEM HYDROCHLORIDE 5 MG/ML
15 INJECTION INTRAVENOUS ONCE
Status: DISCONTINUED | OUTPATIENT
Start: 2022-04-30 | End: 2022-04-30

## 2022-04-30 RX ORDER — ASPIRIN 325 MG
TABLET ORAL
Status: DISPENSED
Start: 2022-04-30 | End: 2022-05-01

## 2022-04-30 RX ORDER — DONEPEZIL HYDROCHLORIDE 5 MG/1
5 TABLET, FILM COATED ORAL NIGHTLY
COMMUNITY
Start: 2022-04-15

## 2022-04-30 RX ORDER — DOXYCYCLINE HYCLATE 100 MG
100 TABLET ORAL 2 TIMES DAILY
Status: ON HOLD | COMMUNITY
Start: 2022-03-14 | End: 2022-05-03 | Stop reason: HOSPADM

## 2022-04-30 RX ORDER — PREDNISONE 20 MG/1
TABLET ORAL
Status: ON HOLD | COMMUNITY
Start: 2022-04-15 | End: 2022-05-03 | Stop reason: HOSPADM

## 2022-04-30 RX ORDER — MUPIROCIN 20 MG/G
OINTMENT TOPICAL 2 TIMES DAILY
COMMUNITY
Start: 2022-01-11 | End: 2022-11-22

## 2022-04-30 RX ORDER — DOXYCYCLINE 50 MG/1
50 CAPSULE ORAL 2 TIMES DAILY
Status: ON HOLD | COMMUNITY
Start: 2022-04-15 | End: 2022-05-03 | Stop reason: HOSPADM

## 2022-04-30 RX ADMIN — METOPROLOL TARTRATE 2.5 MG: 5 INJECTION INTRAVENOUS at 08:04

## 2022-04-30 RX ADMIN — SODIUM CHLORIDE 1000 ML: 0.9 INJECTION, SOLUTION INTRAVENOUS at 07:04

## 2022-04-30 NOTE — Clinical Note
The site was marked. The right radial was prepped. The site was prepped with ChloraPrep. The site was clipped. The patient was draped. The patient was positioned supine.

## 2022-04-30 NOTE — Clinical Note
Radial flush administered by Dr. Echols.   Radial Flush:1.25 mg of Verapamil, 1.25 mg of Nitroglycerin, & 750 units of Heparin mixed in 250 ml of NS

## 2022-04-30 NOTE — Clinical Note
The radial band was applied to the right radial artery. 11 cc's of air were inserted into the closure device.

## 2022-04-30 NOTE — Clinical Note
The site was marked. The groin was prepped. The site was clipped. The patient was draped. The patient was positioned supine.

## 2022-05-01 ENCOUNTER — CLINICAL SUPPORT (OUTPATIENT)
Dept: CARDIOLOGY | Facility: HOSPITAL | Age: 77
DRG: 281 | End: 2022-05-01
Payer: MEDICARE

## 2022-05-01 VITALS — WEIGHT: 212.06 LBS | HEIGHT: 60 IN | BODY MASS INDEX: 41.63 KG/M2

## 2022-05-01 PROBLEM — I21.4 NSTEMI (NON-ST ELEVATED MYOCARDIAL INFARCTION): Status: ACTIVE | Noted: 2022-05-01

## 2022-05-01 LAB
ALBUMIN SERPL BCP-MCNC: 2.8 G/DL (ref 3.5–5.2)
ALBUMIN SERPL BCP-MCNC: 2.9 G/DL (ref 3.5–5.2)
ALP SERPL-CCNC: 41 U/L (ref 55–135)
ALP SERPL-CCNC: 41 U/L (ref 55–135)
ALT SERPL W/O P-5'-P-CCNC: 38 U/L (ref 10–44)
ALT SERPL W/O P-5'-P-CCNC: 38 U/L (ref 10–44)
ANION GAP SERPL CALC-SCNC: 11 MMOL/L (ref 8–16)
ANION GAP SERPL CALC-SCNC: 6 MMOL/L (ref 8–16)
APTT PPP: 20.6 SEC (ref 23.3–35.1)
APTT PPP: 32.6 SEC (ref 23.3–35.1)
APTT PPP: 35.9 SEC (ref 23.3–35.1)
AST SERPL-CCNC: 48 U/L (ref 10–40)
AST SERPL-CCNC: 49 U/L (ref 10–40)
AV INDEX (PROSTH): 0.28
AV MEAN GRADIENT: 34 MMHG
AV VALVE AREA: 1.22 CM2
BACTERIA #/AREA URNS HPF: NEGATIVE /HPF
BASOPHILS # BLD AUTO: 0.06 K/UL (ref 0–0.2)
BASOPHILS NFR BLD: 0.5 % (ref 0–1.9)
BILIRUB SERPL-MCNC: 0.9 MG/DL (ref 0.1–1)
BILIRUB SERPL-MCNC: 1.1 MG/DL (ref 0.1–1)
BILIRUB UR QL STRIP: NEGATIVE
BSA FOR ECHO PROCEDURE: 2.02 M2
BUN SERPL-MCNC: 13 MG/DL (ref 8–23)
BUN SERPL-MCNC: 15 MG/DL (ref 8–23)
CALCIUM SERPL-MCNC: 8.6 MG/DL (ref 8.7–10.5)
CALCIUM SERPL-MCNC: 9 MG/DL (ref 8.7–10.5)
CHLORIDE SERPL-SCNC: 97 MMOL/L (ref 95–110)
CHLORIDE SERPL-SCNC: 98 MMOL/L (ref 95–110)
CLARITY UR: CLEAR
CO2 SERPL-SCNC: 28 MMOL/L (ref 23–29)
CO2 SERPL-SCNC: 34 MMOL/L (ref 23–29)
COLOR UR: YELLOW
CREAT SERPL-MCNC: 0.5 MG/DL (ref 0.5–1.4)
CREAT SERPL-MCNC: 0.6 MG/DL (ref 0.5–1.4)
CV ECHO LV RWT: 0.53 CM
DIFFERENTIAL METHOD: ABNORMAL
DOP CALC AO VTI: 91.88 CM
DOP CALC LVOT AREA: 4.4 CM2
DOP CALC LVOT DIAMETER: 2.37 CM
DOP CALC LVOT PEAK VEL: 106.58 M/S
DOP CALC LVOT STROKE VOLUME: 112.39 CM3
DOP CALCLVOT PEAK VEL VTI: 25.49 CM
E WAVE DECELERATION TIME: 256.92 MSEC
E/A RATIO: 0.88
E/E' RATIO: 28.4 M/S
ECHO LV POSTERIOR WALL: 1.23 CM (ref 0.6–1.1)
EJECTION FRACTION: 70 %
EOSINOPHIL # BLD AUTO: 0.3 K/UL (ref 0–0.5)
EOSINOPHIL NFR BLD: 2 % (ref 0–8)
ERYTHROCYTE [DISTWIDTH] IN BLOOD BY AUTOMATED COUNT: 26 % (ref 11.5–14.5)
EST. GFR  (AFRICAN AMERICAN): >60 ML/MIN/1.73 M^2
EST. GFR  (AFRICAN AMERICAN): >60 ML/MIN/1.73 M^2
EST. GFR  (NON AFRICAN AMERICAN): >60 ML/MIN/1.73 M^2
EST. GFR  (NON AFRICAN AMERICAN): >60 ML/MIN/1.73 M^2
ESTIMATED AVG GLUCOSE: 131 MG/DL (ref 68–131)
FRACTIONAL SHORTENING: 30 % (ref 28–44)
GLUCOSE SERPL-MCNC: 117 MG/DL (ref 70–110)
GLUCOSE SERPL-MCNC: 130 MG/DL (ref 70–110)
GLUCOSE SERPL-MCNC: 143 MG/DL (ref 70–110)
GLUCOSE SERPL-MCNC: 155 MG/DL (ref 70–110)
GLUCOSE SERPL-MCNC: 165 MG/DL (ref 70–110)
GLUCOSE SERPL-MCNC: 200 MG/DL (ref 70–110)
GLUCOSE SERPL-MCNC: 252 MG/DL (ref 70–110)
GLUCOSE UR QL STRIP: NEGATIVE
HBA1C MFR BLD: 6.2 % (ref 4.5–6.2)
HCT VFR BLD AUTO: 31.7 % (ref 37–48.5)
HGB BLD-MCNC: 9.1 G/DL (ref 12–16)
HGB UR QL STRIP: NEGATIVE
HYALINE CASTS #/AREA URNS LPF: 2 /LPF
IMM GRANULOCYTES # BLD AUTO: 0.04 K/UL (ref 0–0.04)
IMM GRANULOCYTES NFR BLD AUTO: 0.3 % (ref 0–0.5)
INR PPP: 1.1
INTERVENTRICULAR SEPTUM: 1.23 CM (ref 0.6–1.1)
IVRT: 99.45 MSEC
KETONES UR QL STRIP: NEGATIVE
LEFT ATRIUM SIZE: 4.01 CM
LEFT INTERNAL DIMENSION IN SYSTOLE: 3.29 CM (ref 2.1–4)
LEFT VENTRICLE MASS INDEX: 114 G/M2
LEFT VENTRICULAR INTERNAL DIMENSION IN DIASTOLE: 4.67 CM (ref 3.5–6)
LEFT VENTRICULAR MASS: 217.44 G
LEUKOCYTE ESTERASE UR QL STRIP: ABNORMAL
LV LATERAL E/E' RATIO: 23.67 M/S
LV SEPTAL E/E' RATIO: 35.5 M/S
LYMPHOCYTES # BLD AUTO: 2.1 K/UL (ref 1–4.8)
LYMPHOCYTES NFR BLD: 17.2 % (ref 18–48)
MCH RBC QN AUTO: 23.7 PG (ref 27–31)
MCHC RBC AUTO-ENTMCNC: 28.7 G/DL (ref 32–36)
MCV RBC AUTO: 83 FL (ref 82–98)
MICROSCOPIC COMMENT: ABNORMAL
MONOCYTES # BLD AUTO: 0.7 K/UL (ref 0.3–1)
MONOCYTES NFR BLD: 5.8 % (ref 4–15)
MV PEAK A VEL: 1.61 M/S
MV PEAK E VEL: 1.42 M/S
NEUTROPHILS # BLD AUTO: 9.2 K/UL (ref 1.8–7.7)
NEUTROPHILS NFR BLD: 74.2 % (ref 38–73)
NITRITE UR QL STRIP: NEGATIVE
NRBC BLD-RTO: 0 /100 WBC
PH UR STRIP: 6 [PH] (ref 5–8)
PISA TR MAX VEL: 3.03 M/S
PLATELET # BLD AUTO: 240 K/UL (ref 150–450)
PMV BLD AUTO: 10.5 FL (ref 9.2–12.9)
POTASSIUM SERPL-SCNC: 3.1 MMOL/L (ref 3.5–5.1)
POTASSIUM SERPL-SCNC: 3.4 MMOL/L (ref 3.5–5.1)
PROT SERPL-MCNC: 5.3 G/DL (ref 6–8.4)
PROT SERPL-MCNC: 5.4 G/DL (ref 6–8.4)
PROT UR QL STRIP: NEGATIVE
PROTHROMBIN TIME: 13.5 SEC (ref 11.4–13.7)
RA PRESSURE: 8 MMHG
RBC # BLD AUTO: 3.84 M/UL (ref 4–5.4)
RBC #/AREA URNS HPF: 1 /HPF (ref 0–4)
RIGHT VENTRICULAR END-DIASTOLIC DIMENSION: 205 CM
SODIUM SERPL-SCNC: 136 MMOL/L (ref 136–145)
SODIUM SERPL-SCNC: 138 MMOL/L (ref 136–145)
SP GR UR STRIP: 1.01 (ref 1–1.03)
SQUAMOUS #/AREA URNS HPF: 1 /HPF
TDI LATERAL: 0.06 M/S
TDI SEPTAL: 0.04 M/S
TDI: 0.05 M/S
TR MAX PG: 37 MMHG
TRICUSPID ANNULAR PLANE SYSTOLIC EXCURSION: 176 CM
TROPONIN I SERPL DL<=0.01 NG/ML-MCNC: 2.76 NG/ML
TROPONIN I SERPL DL<=0.01 NG/ML-MCNC: 3.15 NG/ML
TROPONIN I SERPL DL<=0.01 NG/ML-MCNC: 3.18 NG/ML
TROPONIN I SERPL DL<=0.01 NG/ML-MCNC: 4.15 NG/ML
TV REST PULMONARY ARTERY PRESSURE: 45 MMHG
URN SPEC COLLECT METH UR: ABNORMAL
UROBILINOGEN UR STRIP-ACNC: NEGATIVE EU/DL
WBC # BLD AUTO: 12.34 K/UL (ref 3.9–12.7)
WBC #/AREA URNS HPF: 3 /HPF (ref 0–5)

## 2022-05-01 PROCEDURE — 63600175 PHARM REV CODE 636 W HCPCS: Performed by: INTERNAL MEDICINE

## 2022-05-01 PROCEDURE — 85730 THROMBOPLASTIN TIME PARTIAL: CPT | Performed by: NURSE PRACTITIONER

## 2022-05-01 PROCEDURE — 63600175 PHARM REV CODE 636 W HCPCS: Performed by: NURSE PRACTITIONER

## 2022-05-01 PROCEDURE — 84484 ASSAY OF TROPONIN QUANT: CPT | Performed by: EMERGENCY MEDICINE

## 2022-05-01 PROCEDURE — 36415 COLL VENOUS BLD VENIPUNCTURE: CPT | Performed by: NURSE PRACTITIONER

## 2022-05-01 PROCEDURE — 93306 TTE W/DOPPLER COMPLETE: CPT

## 2022-05-01 PROCEDURE — 85610 PROTHROMBIN TIME: CPT | Performed by: NURSE PRACTITIONER

## 2022-05-01 PROCEDURE — 36415 COLL VENOUS BLD VENIPUNCTURE: CPT | Performed by: INTERNAL MEDICINE

## 2022-05-01 PROCEDURE — 99900035 HC TECH TIME PER 15 MIN (STAT)

## 2022-05-01 PROCEDURE — 84484 ASSAY OF TROPONIN QUANT: CPT | Mod: 91 | Performed by: INTERNAL MEDICINE

## 2022-05-01 PROCEDURE — 84484 ASSAY OF TROPONIN QUANT: CPT | Mod: 91 | Performed by: NURSE PRACTITIONER

## 2022-05-01 PROCEDURE — 99223 PR INITIAL HOSPITAL CARE,LEVL III: ICD-10-PCS | Mod: 25,,, | Performed by: INTERNAL MEDICINE

## 2022-05-01 PROCEDURE — 25000003 PHARM REV CODE 250: Performed by: NURSE PRACTITIONER

## 2022-05-01 PROCEDURE — 99223 1ST HOSP IP/OBS HIGH 75: CPT | Mod: 25,,, | Performed by: INTERNAL MEDICINE

## 2022-05-01 PROCEDURE — 94761 N-INVAS EAR/PLS OXIMETRY MLT: CPT

## 2022-05-01 PROCEDURE — 80053 COMPREHEN METABOLIC PANEL: CPT | Mod: 91 | Performed by: INTERNAL MEDICINE

## 2022-05-01 PROCEDURE — 85730 THROMBOPLASTIN TIME PARTIAL: CPT | Mod: 91 | Performed by: INTERNAL MEDICINE

## 2022-05-01 PROCEDURE — 93306 ECHO (CUPID ONLY): ICD-10-PCS | Mod: 26,,, | Performed by: INTERNAL MEDICINE

## 2022-05-01 PROCEDURE — 85025 COMPLETE CBC W/AUTO DIFF WBC: CPT | Performed by: NURSE PRACTITIONER

## 2022-05-01 PROCEDURE — 83036 HEMOGLOBIN GLYCOSYLATED A1C: CPT | Performed by: NURSE PRACTITIONER

## 2022-05-01 PROCEDURE — 25000242 PHARM REV CODE 250 ALT 637 W/ HCPCS: Performed by: NURSE PRACTITIONER

## 2022-05-01 PROCEDURE — 20000000 HC ICU ROOM

## 2022-05-01 PROCEDURE — 93306 TTE W/DOPPLER COMPLETE: CPT | Mod: 26,,, | Performed by: INTERNAL MEDICINE

## 2022-05-01 PROCEDURE — 80053 COMPREHEN METABOLIC PANEL: CPT

## 2022-05-01 PROCEDURE — 27000221 HC OXYGEN, UP TO 24 HOURS

## 2022-05-01 PROCEDURE — 36415 COLL VENOUS BLD VENIPUNCTURE: CPT

## 2022-05-01 PROCEDURE — 81001 URINALYSIS AUTO W/SCOPE: CPT | Performed by: EMERGENCY MEDICINE

## 2022-05-01 PROCEDURE — 94640 AIRWAY INHALATION TREATMENT: CPT

## 2022-05-01 RX ORDER — POTASSIUM CHLORIDE 20 MEQ/1
20 TABLET, EXTENDED RELEASE ORAL
Status: DISCONTINUED | OUTPATIENT
Start: 2022-05-01 | End: 2022-05-03 | Stop reason: HOSPADM

## 2022-05-01 RX ORDER — POTASSIUM CHLORIDE 20 MEQ/1
40 TABLET, EXTENDED RELEASE ORAL
Status: DISCONTINUED | OUTPATIENT
Start: 2022-05-01 | End: 2022-05-03 | Stop reason: HOSPADM

## 2022-05-01 RX ORDER — MORPHINE SULFATE 4 MG/ML
4 INJECTION, SOLUTION INTRAMUSCULAR; INTRAVENOUS EVERY 4 HOURS PRN
Status: DISCONTINUED | OUTPATIENT
Start: 2022-05-01 | End: 2022-05-03 | Stop reason: HOSPADM

## 2022-05-01 RX ORDER — BUPROPION HYDROCHLORIDE 150 MG/1
150 TABLET ORAL DAILY
Status: DISCONTINUED | OUTPATIENT
Start: 2022-05-01 | End: 2022-05-03 | Stop reason: HOSPADM

## 2022-05-01 RX ORDER — MAGNESIUM SULFATE 1 G/100ML
1 INJECTION INTRAVENOUS
Status: DISCONTINUED | OUTPATIENT
Start: 2022-05-01 | End: 2022-05-03 | Stop reason: HOSPADM

## 2022-05-01 RX ORDER — SODIUM CHLORIDE 0.9 % (FLUSH) 0.9 %
10 SYRINGE (ML) INJECTION
Status: DISCONTINUED | OUTPATIENT
Start: 2022-05-01 | End: 2022-05-01

## 2022-05-01 RX ORDER — SODIUM CHLORIDE 9 MG/ML
INJECTION, SOLUTION INTRAVENOUS ONCE
Status: CANCELLED | OUTPATIENT
Start: 2022-05-01 | End: 2022-05-01

## 2022-05-01 RX ORDER — ALBUTEROL SULFATE 0.83 MG/ML
2.5 SOLUTION RESPIRATORY (INHALATION) EVERY 4 HOURS PRN
Status: DISCONTINUED | OUTPATIENT
Start: 2022-05-01 | End: 2022-05-03 | Stop reason: HOSPADM

## 2022-05-01 RX ORDER — POTASSIUM CHLORIDE 7.45 MG/ML
20 INJECTION INTRAVENOUS
Status: DISCONTINUED | OUTPATIENT
Start: 2022-05-01 | End: 2022-05-03 | Stop reason: HOSPADM

## 2022-05-01 RX ORDER — DONEPEZIL HYDROCHLORIDE 5 MG/1
5 TABLET, FILM COATED ORAL NIGHTLY
Status: DISCONTINUED | OUTPATIENT
Start: 2022-05-01 | End: 2022-05-03 | Stop reason: HOSPADM

## 2022-05-01 RX ORDER — FLUTICASONE FUROATE AND VILANTEROL 100; 25 UG/1; UG/1
1 POWDER RESPIRATORY (INHALATION) DAILY
Status: DISCONTINUED | OUTPATIENT
Start: 2022-05-01 | End: 2022-05-03 | Stop reason: HOSPADM

## 2022-05-01 RX ORDER — CETIRIZINE HYDROCHLORIDE 10 MG/1
10 TABLET ORAL DAILY
Status: DISCONTINUED | OUTPATIENT
Start: 2022-05-01 | End: 2022-05-02

## 2022-05-01 RX ORDER — HEPARIN SODIUM,PORCINE/D5W 25000/250
12 INTRAVENOUS SOLUTION INTRAVENOUS CONTINUOUS
Status: DISCONTINUED | OUTPATIENT
Start: 2022-05-01 | End: 2022-05-01

## 2022-05-01 RX ORDER — ASPIRIN 325 MG
325 TABLET, DELAYED RELEASE (ENTERIC COATED) ORAL DAILY
Status: DISCONTINUED | OUTPATIENT
Start: 2022-05-01 | End: 2022-05-03 | Stop reason: HOSPADM

## 2022-05-01 RX ORDER — LOPERAMIDE HYDROCHLORIDE 2 MG/1
2 CAPSULE ORAL
Status: DISCONTINUED | OUTPATIENT
Start: 2022-05-01 | End: 2022-05-03 | Stop reason: HOSPADM

## 2022-05-01 RX ORDER — LISINOPRIL 20 MG/1
20 TABLET ORAL DAILY
Status: DISCONTINUED | OUTPATIENT
Start: 2022-05-01 | End: 2022-05-02

## 2022-05-01 RX ORDER — INSULIN ASPART 100 [IU]/ML
0-5 INJECTION, SOLUTION INTRAVENOUS; SUBCUTANEOUS EVERY 6 HOURS PRN
Status: DISCONTINUED | OUTPATIENT
Start: 2022-05-01 | End: 2022-05-03 | Stop reason: HOSPADM

## 2022-05-01 RX ORDER — MAGNESIUM SULFATE HEPTAHYDRATE 40 MG/ML
2 INJECTION, SOLUTION INTRAVENOUS
Status: DISCONTINUED | OUTPATIENT
Start: 2022-05-01 | End: 2022-05-03 | Stop reason: HOSPADM

## 2022-05-01 RX ORDER — DIPHENHYDRAMINE HCL 25 MG
50 CAPSULE ORAL
Status: CANCELLED | OUTPATIENT
Start: 2022-05-01

## 2022-05-01 RX ORDER — LANOLIN ALCOHOL/MO/W.PET/CERES
1 CREAM (GRAM) TOPICAL DAILY
Status: DISCONTINUED | OUTPATIENT
Start: 2022-05-01 | End: 2022-05-03 | Stop reason: HOSPADM

## 2022-05-01 RX ORDER — LANOLIN ALCOHOL/MO/W.PET/CERES
800 CREAM (GRAM) TOPICAL
Status: DISCONTINUED | OUTPATIENT
Start: 2022-05-01 | End: 2022-05-03 | Stop reason: HOSPADM

## 2022-05-01 RX ORDER — POTASSIUM CHLORIDE 7.45 MG/ML
40 INJECTION INTRAVENOUS
Status: DISCONTINUED | OUTPATIENT
Start: 2022-05-01 | End: 2022-05-03 | Stop reason: HOSPADM

## 2022-05-01 RX ORDER — ATORVASTATIN CALCIUM 20 MG/1
20 TABLET, FILM COATED ORAL DAILY
Status: DISCONTINUED | OUTPATIENT
Start: 2022-05-01 | End: 2022-05-03 | Stop reason: HOSPADM

## 2022-05-01 RX ORDER — ASCORBIC ACID 500 MG
1000 TABLET ORAL DAILY
Status: DISCONTINUED | OUTPATIENT
Start: 2022-05-01 | End: 2022-05-02

## 2022-05-01 RX ORDER — CITALOPRAM 20 MG/1
20 TABLET, FILM COATED ORAL DAILY
Status: DISCONTINUED | OUTPATIENT
Start: 2022-05-01 | End: 2022-05-03 | Stop reason: HOSPADM

## 2022-05-01 RX ORDER — GLUCAGON 1 MG
1 KIT INJECTION
Status: DISCONTINUED | OUTPATIENT
Start: 2022-05-01 | End: 2022-05-03 | Stop reason: HOSPADM

## 2022-05-01 RX ORDER — PANTOPRAZOLE SODIUM 40 MG/1
40 TABLET, DELAYED RELEASE ORAL
Status: DISCONTINUED | OUTPATIENT
Start: 2022-05-01 | End: 2022-05-03 | Stop reason: HOSPADM

## 2022-05-01 RX ORDER — ACETAMINOPHEN 325 MG/1
650 TABLET ORAL EVERY 4 HOURS PRN
Status: DISCONTINUED | OUTPATIENT
Start: 2022-05-01 | End: 2022-05-03 | Stop reason: HOSPADM

## 2022-05-01 RX ORDER — MAGNESIUM SULFATE HEPTAHYDRATE 40 MG/ML
4 INJECTION, SOLUTION INTRAVENOUS
Status: DISCONTINUED | OUTPATIENT
Start: 2022-05-01 | End: 2022-05-03 | Stop reason: HOSPADM

## 2022-05-01 RX ORDER — TALC
6 POWDER (GRAM) TOPICAL NIGHTLY PRN
Status: DISCONTINUED | OUTPATIENT
Start: 2022-05-01 | End: 2022-05-03 | Stop reason: HOSPADM

## 2022-05-01 RX ORDER — OXYBUTYNIN CHLORIDE 5 MG/1
5 TABLET ORAL 2 TIMES DAILY
Status: DISCONTINUED | OUTPATIENT
Start: 2022-05-01 | End: 2022-05-03 | Stop reason: HOSPADM

## 2022-05-01 RX ORDER — NITROGLYCERIN 0.4 MG/1
0.4 TABLET SUBLINGUAL EVERY 5 MIN PRN
Status: DISCONTINUED | OUTPATIENT
Start: 2022-05-01 | End: 2022-05-03 | Stop reason: HOSPADM

## 2022-05-01 RX ORDER — ONDANSETRON 2 MG/ML
4 INJECTION INTRAMUSCULAR; INTRAVENOUS EVERY 6 HOURS PRN
Status: DISCONTINUED | OUTPATIENT
Start: 2022-05-01 | End: 2022-05-03 | Stop reason: HOSPADM

## 2022-05-01 RX ORDER — SODIUM CHLORIDE 0.9 % (FLUSH) 0.9 %
10 SYRINGE (ML) INJECTION
Status: DISCONTINUED | OUTPATIENT
Start: 2022-05-01 | End: 2022-05-03 | Stop reason: HOSPADM

## 2022-05-01 RX ORDER — AMOXICILLIN 250 MG
1 CAPSULE ORAL 2 TIMES DAILY PRN
Status: DISCONTINUED | OUTPATIENT
Start: 2022-05-01 | End: 2022-05-03 | Stop reason: HOSPADM

## 2022-05-01 RX ORDER — ENOXAPARIN SODIUM 100 MG/ML
80 INJECTION SUBCUTANEOUS
Status: DISCONTINUED | OUTPATIENT
Start: 2022-05-01 | End: 2022-05-03 | Stop reason: HOSPADM

## 2022-05-01 RX ADMIN — FLUTICASONE FUROATE AND VILANTEROL TRIFENATATE 1 PUFF: 100; 25 POWDER RESPIRATORY (INHALATION) at 10:05

## 2022-05-01 RX ADMIN — ENOXAPARIN SODIUM 80 MG: 80 INJECTION SUBCUTANEOUS at 02:05

## 2022-05-01 RX ADMIN — HEPARIN SODIUM AND DEXTROSE 12 UNITS/KG/HR: 10000; 5 INJECTION INTRAVENOUS at 01:05

## 2022-05-01 RX ADMIN — CITALOPRAM HYDROBROMIDE 20 MG: 20 TABLET ORAL at 10:05

## 2022-05-01 RX ADMIN — CETIRIZINE HYDROCHLORIDE 10 MG: 10 TABLET, FILM COATED ORAL at 10:05

## 2022-05-01 RX ADMIN — OXYCODONE HYDROCHLORIDE AND ACETAMINOPHEN 1000 MG: 500 TABLET ORAL at 04:05

## 2022-05-01 RX ADMIN — DONEPEZIL HYDROCHLORIDE 5 MG: 5 TABLET, FILM COATED ORAL at 09:05

## 2022-05-01 RX ADMIN — OXYBUTYNIN CHLORIDE 5 MG: 5 TABLET ORAL at 08:05

## 2022-05-01 RX ADMIN — THERA TABS 1 TABLET: TAB at 10:05

## 2022-05-01 RX ADMIN — OXYBUTYNIN CHLORIDE 5 MG: 5 TABLET ORAL at 09:05

## 2022-05-01 RX ADMIN — DONEPEZIL HYDROCHLORIDE 5 MG: 5 TABLET, FILM COATED ORAL at 03:05

## 2022-05-01 RX ADMIN — LISINOPRIL 20 MG: 20 TABLET ORAL at 08:05

## 2022-05-01 RX ADMIN — FERROUS SULFATE TAB 325 MG (65 MG ELEMENTAL FE) 1 EACH: 325 (65 FE) TAB at 10:05

## 2022-05-01 RX ADMIN — POTASSIUM CHLORIDE 40 MEQ: 1500 TABLET, EXTENDED RELEASE ORAL at 09:05

## 2022-05-01 RX ADMIN — ATORVASTATIN CALCIUM 20 MG: 20 TABLET, FILM COATED ORAL at 08:05

## 2022-05-01 RX ADMIN — BUPROPION HYDROCHLORIDE 150 MG: 150 TABLET, FILM COATED, EXTENDED RELEASE ORAL at 08:05

## 2022-05-01 RX ADMIN — ASPIRIN 325 MG: 325 TABLET, COATED ORAL at 08:05

## 2022-05-01 NOTE — ED PROVIDER NOTES
Encounter Date: 4/30/2022       History     Chief Complaint   Patient presents with    Chest Pain     She felt weak as well     77-year-old female past medical history of aortic stenosis, diabetes, hyperlipidemia hypertension presents secondary to concern for STEMI versus NSTEMI.  Patient was transferred from [facility] after patient presented there in AFib RVR and concern for STEMI.  Patient EKG noted to have ST segment elevations in AVR with depression in lead 1. Patient states he never had any chest pain but became diaphoretic as well [place]  She was given IV metoprolol which converted her out of AFib RVR upon arrival with us with normal sinus but ST changes remain.  She denies any shortness of breath, fever, chills, nausea vomiting.  Patient is otherwise stable and has other complaints.        Review of patient's allergies indicates:   Allergen Reactions    Sulfacetamide sodium      Pain perineal area    Sulfasalazine Hives    Adhesive Itching     SKIN GETS RED WITH TAPE AND BANDAIDS    Adhesive tape-silicones Itching     SKIN GETS RED WITH TAPE AND BANDAIDS    Sulfa (sulfonamide antibiotics) Rash     Past Medical History:   Diagnosis Date    Aortic stenosis     Arthritis     Back pain     Diabetes mellitus type II     Hyperlipidemia     Hypertension     NSTEMI (non-ST elevated myocardial infarction) 5/1/2022    Osteoporosis     Wears glasses      Past Surgical History:   Procedure Laterality Date     vocal cord nodules removed  long time ago     twice    APPENDECTOMY  within last 5yrs    CATHETERIZATION OF BOTH LEFT AND RIGHT HEART Left 5/6/2022    Procedure: CATHETERIZATION, HEART, BOTH LEFT AND RIGHT;  Surgeon: Michelet Vargas MD;  Location[facility] CATH[facility]EP LAB;  Service: Cardiology;  Laterality: Left;    FIXATION KYPHOPLASTY THORACIC SPINE      8-20-13    FOOT SURGERY      left 2nd toe was too long     HERNIA REPAIR  within last 5yrs    LEFT  HEART CATHETERIZATION Left 5/2/2022    Procedure: Left heart cath;  Surgeon: Jose Echols MD;  Location: [facility] CATH/EP LAB;  Service: Cardiology;  Laterality: Left;    TONSILLECTOMY, ADENOIDECTOMY  long time ago     Family History   Problem Relation Age of Onset    Collagen disease Neg Hx      Social History     Tobacco Use    Smoking status: Current Every Day Smoker     Packs/day: 0.50     Years: 35.00     Pack years: 17.50    Smokeless tobacco: Never Used   Substance Use Topics    Alcohol use: No    Drug use: No     Review of Systems   Constitutional: Positive for diaphoresis.   Neurological: Positive for weakness and light-headedness.   All other systems reviewed and are negative.      Physical Exam     Initial Vitals [04/30/22 2105]   BP Pulse Resp Temp SpO2   (!) 119/53 88 18 97.7 °F (36.5 °C) 95 %      MAP       --         Physical Exam    Nursing note and vitals reviewed.  Constitutional: She appears well-developed and well-nourished. No distress.   HENT:   Head: Normocephalic and atraumatic.   Mouth/Throat: Oropharynx is clear and moist.   Eyes: Conjunctivae and EOM are normal. Pupils are equal, round, and reactive to light.   Neck: No tracheal deviation present. No JVD present.   Normal range of motion.  Cardiovascular: Normal rate, regular rhythm, normal heart sounds and intact distal pulses. Exam reveals no gallop and no friction rub.    No murmur heard.  Pulmonary/Chest: Breath sounds normal. No respiratory distress. She has no wheezes. She exhibits no tenderness.   Abdominal: Abdomen is soft. Bowel sounds are normal. She exhibits no distension. There is no abdominal tenderness. There is no rebound and no guarding.   Musculoskeletal:         General: No tenderness or edema. Normal range of motion.      Cervical back: Normal range of motion.     Neurological: She is alert and oriented to person, place, and time. She has normal strength. No cranial nerve deficit or sensory deficit.   Skin: Skin  is warm and dry. Capillary refill takes less than 2 seconds. No erythema.   Psychiatric: She has a normal mood and affect.         ED Course   Critical Care    Date/Time: 5/10/2022 8:10 PM  Performed by: Mitul Garcias MD  Authorized by: Michael Lacy MD   Direct patient critical care time: 10 minutes  Additional history critical care time: 5 minutes  Ordering / reviewing critical care time: 5 minutes  Documentation critical care time: 10 minutes  Consulting other physicians critical care time: 5 minutes  Total critical care time (exclusive of procedural time) : 35 minutes  Critical care was necessary to treat or prevent imminent or life-threatening deterioration of the following conditions: cardiac failure.  Critical care was time spent personally by me on the following activities: discussions with consultants, evaluation of patient's response to treatment, ordering and review of laboratory studies, re-evaluation of patient's condition, ordering and review of radiographic studies, ordering and performing treatments and interventions and examination of patient.        Labs Reviewed   CBC W/ AUTO DIFFERENTIAL - Abnormal; Notable for the following components:       Result Value    WBC 17.46 (*)     Hemoglobin 9.7 (*)     Hematocrit 34.7 (*)     MCH 23.5 (*)     MCHC 28.0 (*)     RDW 26.0 (*)     Immature Granulocytes 0.6 (*)     Gran # (ANC) 15.0 (*)     Immature Grans (Abs) 0.11 (*)     Gran % 86.1 (*)     Lymph % 7.4 (*)     All other components within normal limits   COMPREHENSIVE METABOLIC PANEL - Abnormal; Notable for the following components:    Potassium 3.4 (*)     Glucose 134 (*)     Calcium 8.6 (*)     Albumin 3.2 (*)     Total Bilirubin 1.2 (*)     Alkaline Phosphatase 47 (*)     All other components within normal limits   TROPONIN I - Abnormal; Notable for the following components:    Troponin I 0.549 (*)     All other components within normal limits    Narrative:      trop critical result(s)  repeated. Called and verbal readback   obtained from chel mata rn er  by TOMEKA 04/30/2022 22:11   B-TYPE NATRIURETIC PEPTIDE - Abnormal; Notable for the following components:     (*)     All other components within normal limits   SARS-COV-2 RNA AMPLIFICATION, QUAL   PROCALCITONIN     EKG Readings: (Independently Interpreted)   Initial Reading: Possible STEMI. Previous EKG: Compared with most recent EKG Heart Rate: 90. ST Segment Elevation: AVR. ST Segment Depression: I.       Imaging Results          X-Ray Chest AP Portable (Final result)  Result time 05/01/22 07:26:06    Final result by Ellis Cruz MD (05/01/22 07:26:06)                 Impression:      Resolution of previously reported right basilar opacities.      Electronically signed by: Ellis Cruz MD  Date:    05/01/2022  Time:    07:26             Narrative:    EXAMINATION:  XR CHEST AP PORTABLE    CLINICAL HISTORY:  Chest Pain;    FINDINGS:  Portable chest at 2115 compared with 04/30/2022 shows slightly enlarged cardiac silhouette size with normal mediastinal contours.    Lungs appear clear with resolution of prior reported right basilar opacities.  Pulmonary vasculature is normal. No acute osseous abnormality.                                 Medications   heparin 25,000 units in dextrose 5% (100 units/ml) IV bolus from bag INITIAL BOLUS (max bolus 4000 units) (4,000 Units Intravenous Bolus from Bag 5/1/22 0030)   magnesium sulfate 2g in water 50mL IVPB (premix) (0 g Intravenous Stopped 5/2/22 1406)   potassium bicarbonate disintegrating tablet 50 mEq (50 mEq Oral Given 5/2/22 1330)   magnesium sulfate in dextrose IVPB (premix) 1 g (1 g Intravenous New Bag 5/3/22 1127)     Medical Decision Making:   Initial Assessment:   [age] year female initial assessment in no acute distress at this time.  Patient is alert oriented x3, neurologically and neurovascular intact no focal deficits.  She is nontoxic appearing vital stable this  time.  Differential Diagnosis:   MI, angina, arrhythmia, GERD, electrolyte abnormality  Independently Interpreted Test(s):   I have ordered and independently interpreted X-rays - see prior notes.  I have ordered and independently interpreted EKG Reading(s) - see prior notes  Clinical Tests:   Lab Tests: Ordered and Reviewed  The following lab test(s) were unremarkable: CBC, CMP, Troponin, BNP and Urinalysis  Radiological Study: Ordered and Reviewed  Medical Tests: Ordered and Reviewed  ED Management:  Patient has been reassessed noted to have no acute changes in her condition.  Upon arrival she is no longer in AFib RVR after metoprolol was given.  Patient still has ST segment elevations in AVR and depressions in lead 1. She continues to deny any chest pain and vitals are stable.  Patient has been consulted to hospitalist.  I spoke to Dr. Echols with update of troponin increasing from 0.3 to 0.5.  He suggested patient be placed on heparin IV for anticoagulation as well as admitted to ICU with close observation.  Patient has remained stable while in the ED and Ms. Cyr and family are aware of the plan and in agreement with admission.    Laboratory results and radiology imaging results reviewed.  Based on the patient's history, physical, and workup here in the emergency department I believe the patient requires admission for a diagnosis of NSTEMI.  I discussed the patient's case with the on-call hospitalist who has agreed to evaluate the patient for admission.  In addition, I discussed the results with the patient and they have verbalized understanding of the results, plan, and need for admission.    ________________________  MEGHANN Garcias MD   Emergency Medicine                        Clinical Impression:   Final diagnoses:  [R07.9] Chest pain  [I21.4] NSTEMI (non-ST elevated myocardial infarction) (Primary)          ED Disposition Condition    Admit               Mitul Garcias MD  04/30/22 0258       Mitul MARTEL  MD Dieter  05/10/22 2012

## 2022-05-01 NOTE — ED PROVIDER NOTES
Encounter Date: 4/30/2022       History     Chief Complaint   Patient presents with    Palpitations     Brought by EMS with abnormal EKG     77-year-old female history of aortic stenosis, diabetes, hyperlipidemia and hypertension presents with 2 hours of weakness that began when she was at a [place].  Patient smokes.  She states she felt like she was going to faint earlier but this has improved.  She denies chest pain, denies shortness of breath, denies nausea, or diaphoresis.  Feels better than she did earlier.  She reports her symptoms are improving.        Review of patient's allergies indicates:   Allergen Reactions    Sulfacetamide sodium      Pain perineal area    Sulfasalazine Hives    Adhesive Itching     SKIN GETS RED WITH TAPE AND BANDAIDS    Adhesive tape-silicones Itching     SKIN GETS RED WITH TAPE AND BANDAIDS    Sulfa (sulfonamide antibiotics) Rash     Past Medical History:   Diagnosis Date    Aortic stenosis     Arthritis     Back pain     Diabetes mellitus type II     Hyperlipidemia     Hypertension     Osteoporosis     Wears glasses      Past Surgical History:   Procedure Laterality Date     vocal cord nodules removed  long time ago     twice    APPENDECTOMY  within last 5yrs    FIXATION KYPHOPLASTY THORACIC SPINE      8-20-13    FOOT SURGERY      left 2nd toe was too long     HERNIA REPAIR  within last 5yrs    TONSILLECTOMY, ADENOIDECTOMY  long time ago     Family History   Problem Relation Age of Onset    Collagen disease Neg Hx      Social History     Tobacco Use    Smoking status: Current Every Day Smoker     Packs/day: 0.50     Years: 35.00     Pack years: 17.50    Smokeless tobacco: Never Used   Substance Use Topics    Alcohol use: No    Drug use: No     Review of Systems   Constitutional: Positive for activity change and fatigue.   Cardiovascular: Negative for chest pain.   Gastrointestinal: Negative for nausea.   Neurological: Positive for weakness.   All other  systems reviewed and are negative.      Physical Exam     Initial Vitals   BP Pulse Resp Temp SpO2   04/30/22 2008 04/30/22 1945 04/30/22 1945 04/30/22 1945 04/30/22 1945   (!) 83/53 (!) 155 (!) 22 98.9 °F (37.2 °C) 96 %      MAP       --                Physical Exam    Nursing note and vitals reviewed.  Constitutional: She appears well-developed and well-nourished.   HENT:   Head: Normocephalic and atraumatic.   Eyes: EOM are normal.   Neck: Neck supple.   Normal range of motion.  Cardiovascular: Normal heart sounds. An irregularly irregular rhythm present.  Tachycardia present.  Exam reveals no gallop and no friction rub.    No murmur heard.  Pulmonary/Chest: No respiratory distress. She has decreased breath sounds in the right lower field and the left lower field. She has wheezes in the right lower field and the left lower field. She has no rhonchi. She has rales in the right lower field and the left lower field.   Decreased breath sounds crackles at the bases, no respiratory distress   Musculoskeletal:         General: Normal range of motion.      Cervical back: Normal range of motion and neck supple.     Neurological: She is alert and oriented to person, place, and time.   Skin: Skin is warm and dry.   Psychiatric: She has a normal mood and affect. Her behavior is normal. Judgment and thought content normal.         ED Course   Critical Care    Date/Time: 4/30/2022 8:46 PM  Performed by: Andrei Hope MD  Authorized by: Andrei Hope MD   Direct patient critical care time: 46 minutes  Ordering / reviewing critical care time: 10 minutes  Documentation critical care time: 7 minutes  Consulting other physicians critical care time: 7 (cardiology x 2) minutes  Total critical care time (exclusive of procedural time) : 70 minutes  Critical care was necessary to treat or prevent imminent or life-threatening deterioration of the following conditions: cardiac failure.  Critical care was time spent personally by  me on the following activities: discussions with consultants, interpretation of cardiac output measurements, evaluation of patient's response to treatment, examination of patient, obtaining history from patient or surrogate, ordering and performing treatments and interventions, ordering and review of laboratory studies, ordering and review of radiographic studies, re-evaluation of patient's condition, pulse oximetry and review of old charts.        Labs Reviewed   CBC W/ AUTO DIFFERENTIAL - Abnormal; Notable for the following components:       Result Value    WBC 16.88 (*)     Hemoglobin 10.3 (*)     Hematocrit 34.7 (*)     MCH 24.6 (*)     MCHC 29.7 (*)     RDW 25.8 (*)     Gran # (ANC) 14.3 (*)     Immature Grans (Abs) 0.07 (*)     Gran % 84.5 (*)     Lymph % 8.2 (*)     All other components within normal limits   COMPREHENSIVE METABOLIC PANEL - Abnormal; Notable for the following components:    Potassium 3.4 (*)     Glucose 153 (*)     Albumin 3.1 (*)     Total Bilirubin 1.1 (*)     ALT 50 (*)     All other components within normal limits   TROPONIN I - Abnormal; Notable for the following components:    Troponin I 0.318 (*)     All other components within normal limits   B-TYPE NATRIURETIC PEPTIDE - Abnormal; Notable for the following components:     (*)     All other components within normal limits          Imaging Results          X-Ray Chest AP Portable (In process)  Result time 04/30/22 20:40:26                 Medications   sodium chloride 0.9% bolus 1,000 mL (1,000 mLs Intravenous New Bag 4/30/22 1953)   metoprolol injection 2.5 mg (2.5 mg Intravenous Given 4/30/22 2008)   metoprolol injection 2.5 mg (2.5 mg Intravenous Given 4/30/22 2022)                 ED Course as of 04/30/22 2047   Sat Apr 30, 2022   1943 Stemi called, has elevation in lead avr and depression in I. I think ST elevations or likely driven by the rate and hopefully this will resolve if we can slow her down but I will start the  process of transfer. [EF]   1945 SpO2: 96 % [EF]   1945 Resp(!): 22 [EF]   1945 Pulse(!): 155 [EF]   1945 Temp src: Oral [EF]   1945 Temp: 98.9 °F (37.2 °C)  EKG rapid atrial fibrillation 155 beats per minute.  She has ST elevation in lead AVR she has depression in 1 and aVL. [EF]   1946 77-year-old female presents to the emergency room with general malaise that started around 530. EKG demonstrates rapid AFib but she has ST elevation in lead AVR.  Code STEMI called. [EF]   1946 Once we up blood pressure I will give her something to slow her down [EF]   2005 EKG sent to dr echols for him to review, does not want patient sent directly to cath lab.  Requests IV metoprolol here.  [EF]   2010 BP(!): 83/53 [EF]   2010 Hypotensive but no distress, fluids going.  Patient is awake alert.  I do not think she needs emergent cardioversion at this time will try IV fluids and metoprolol 1st. [EF]   2015 Troponin I(!): 0.318  Trop up, hr 138, will repeat ekg and call cardiology back [EF]   2021 Repeat EKG AFib with  beats per minute mild ST elevation in lead AVR improved from initial EKG. [EF]   2022 Repeat metoprolol will be given.  Systolic blood pressure 100 at this time.  Heart rate 138. Patient voices improvement in her symptoms.   [EF]   2031 Dr echols confirms he does want patient sent to [facility] ER  [EF]   2035 EMS here to transport.  Patient will be transferred ER to ER at [facility].  Dr. Echols is the accepting physician. [EF]      ED Course User Index  [EF] Andrei Hope MD             Clinical Impression:   Final diagnoses:  [R53.1] Weakness (Primary)  [R07.9] Chest pain  [R94.31] ST elevation  [I48.91] Atrial fibrillation with RVR          ED Disposition Condition    Transfer to Another Facility Stable              77-year-old female with a history of smoking, hypertension, obesity presents to the ER with general malaise and near-syncope.  EMS EKG demonstrates ST elevation in lead AVR.  This  was confirmed with the 12 lead here.  Patient also in rapid atrial fibrillation.  She was given IV fluids and metoprolol with improvement in her symptoms and heart rate.  I did speak with Interventional Cardiology at [facility] Dr. Echols on the patient's arrival and had him review the initial EKG.  He did not think it was conclusive for a STEMI.  He did however accept the patient at [facility].  Patient transferred ER to ER.     Andrei Hope MD  04/30/22 2050

## 2022-05-01 NOTE — H&P
[facility] History & Physical Examination   Patient Name: Daryleen G Moran  MRN: [id number]  Patient Class: IP- Inpatient   Admission Date: 4/30/2022  9:02 PM  Length of Stay: 0  Attending Physician:  Sen Rodriguez MD  Primary Care Provider: Abhi De Oliveira Iv, MD  Face-to-Face encounter date: 04/30/2022  Code Status: full   Chief Complaint: Chest Pain (She felt weak as well)          Patient information was obtained from patient, past medical records and ER records.   HISTORY OF PRESENT ILLNESS:   History was obtained from the patient and collateral obtained from the family present at the bedside and ER physician Sign-out.  Patient is a 77-year-old female with history of hypertension, hyperlipidemia, diabetes, aortic stenosis, COPD, are deficiency anemia who presents to the ED as a transfer from [facility] for cardiology consult.  The [place] had a sudden onset of weakness, palpitations and lightheadedness.  She states that she had not eaten since earlier that day so she thought she was just hungry.  She found to seat and ate a BLT sandwich without much improvement in her symptoms.  Episode lasted about 2 hours.  EMS was called and she was brought to the ED.  she denies any associated chest pain, shortness a breath, diaphoresis, nausea, or vomiting.    On arrival to outside hospital patient was found to be hypotensive and in AFib with RVR.  There was concern for ischemic changes on EKG.  She was treated with IV fluids and IVP metoprolol x2 with improvement in heart rate and symptoms.  Troponin was found to be elevated and she was transferred here for cardiology consult.    The patient reports that she was admitted to [facility] about 2-3 weeks ago and treated for COPD exacerbation and anemia requiring a blood transfusion.  She states she had weakness at that time so she thought her symptoms could be due to anemia.       Patient denies fever, chills, cough, abdominal pain, dysuria, hematuria, melena, rectal bleeding, headache, visual changes, focal weakness, numbness, tingling, or LOC..         In the ED here patient is afebrile.  She is hemodynamically stable with blood pressure 119/53.  She is in sinus rhythm with heart rate 80s.  CBC with elevated WBC 17, H&H stable 9.7/34.    CMP with potassium 3.4, glucose 134  BNP is elevated at 329 and troponin is 0.5.  EKG shows normal sinus rhythm with diffuse nonspecific ST abnormalities.  ST elevation AVR.  ST depression Elena 1 and aVL  CXR with no airspace opacities and pleural effusion to the right lung base suggestive of pulmonary edema versus pneumonia.  Cardiology is notified of patient's arrival in a ED provider and recommends the patient be admitted to the ICU with heparin drip.      REVIEW OF SYSTEMS:   10 Point Review of System was performed and was found to be negative except for that mentioned already in the HPI above.     PAST MEDICAL HISTORY:     Past Medical History:   Diagnosis Date    Aortic stenosis     Arthritis     Back pain     Diabetes mellitus type II     Hyperlipidemia     Hypertension     NSTEMI (non-ST elevated myocardial infarction) 5/1/2022    Osteoporosis     Wears glasses        PAST SURGICAL HISTORY:     Past Surgical History:   Procedure Laterality Date     vocal cord nodules removed  long time ago     twice    APPENDECTOMY  within last 5yrs    FIXATION KYPHOPLASTY THORACIC SPINE      8-20-13    FOOT SURGERY      left 2nd toe was too long     HERNIA REPAIR  within last 5yrs    TONSILLECTOMY, ADENOIDECTOMY  long time ago       ALLERGIES:   Sulfacetamide sodium, Sulfasalazine, Adhesive, Adhesive tape-silicones, and Sulfa (sulfonamide antibiotics)    FAMILY HISTORY:     Family History   Problem Relation Age of Onset    Collagen disease Neg Hx        SOCIAL HISTORY:     Social History     Tobacco Use    Smoking status: Current Every Day  Smoker     Packs/day: 0.50     Years: 35.00     Pack years: 17.50    Smokeless tobacco: Never Used   Substance Use Topics    Alcohol use: No        Social History     Substance and Sexual Activity   Sexual Activity Not on file        HOME MEDICATIONS:     Prior to Admission medications    Medication Sig Start Date End Date Taking? Authorizing Provider   benazepriL (LOTENSIN) 20 MG tablet Take 20 mg by mouth every evening. 6/9/21  Yes Historical Provider   albuterol (PROVENTIL/VENTOLIN HFA) 90 mcg/actuation inhaler Inhale 2 puffs into the lungs every 6 (six) hours as needed. 4/15/22   Historical Provider   ALLERGY RELIEF, FEXOFENADINE, 180 mg tablet Take 180 mg by mouth once daily. 1/17/22   Historical Provider   ascorbic acid, vitamin C, (VITAMIN C) 1000 MG tablet Take 1,000 mg by mouth Daily.    Historical Provider   aspirin (ECOTRIN) 81 MG EC tablet Take 81 mg by mouth once daily.    Historical Provider   aspirin-calcium carbonate 81 mg-300 mg calcium(777 mg) Tab Take 81 mg by mouth.    Historical Provider   budesonide-formoterol 80-4.5 mcg (SYMBICORT) 80-4.5 mcg/actuation HFAA Inhale 2 puffs into the lungs 2 (two) times daily. 4/16/22   Historical Provider   buPROPion (WELLBUTRIN XL) 150 MG TB24 tablet Take 150 mg by mouth.    Historical Provider   calcium carbonate (OS-TK) 600 mg (1,500 mg) Tab Take 600 mg by mouth 2 (two) times daily with meals.    Historical Provider   ciprofloxacin HCl (CIPRO) 500 MG tablet Take 1 tablet (500 mg total) by mouth 2 (two) times daily. 12/16/21   Hakan Bhat MD   citalopram (CELEXA) 40 MG tablet Take 1 tablet (40 mg total) by mouth once daily. 2/4/21   Brown Macias MD   clindamycin (CLEOCIN) 300 MG capsule Take 300 mg by mouth 3 (three) times daily. 2/23/22   Historical Provider   donepeziL (ARICEPT) 5 MG tablet Take 5 mg by mouth nightly. 4/15/22   Historical Provider   doxycycline (MONODOX) 50 MG Cap Take 50 mg by mouth 2 (two) times daily. 4/15/22    Historical Provider   doxycycline (VIBRA-TABS) 100 MG tablet Take 100 mg by mouth 2 (two) times daily. 3/14/22   Historical Provider   ergocalciferol (ERGOCALCIFEROL) 50,000 unit Cap Take 1 capsule (50,000 Units total) by mouth every 7 days. 2/4/21   Brown Macias MD   ezetimibe-simvastatin 10-40 mg (VYTORIN) 10-40 mg per tablet Take 1 tablet by mouth.    Historical Provider   FEROSUL 325 mg (65 mg iron) Tab tablet Take 1 tablet by mouth every morning. 4/15/22   Historical Provider   glucosamine hawkins 2KCl-chondroit 500-400 mg Tab Take 1 tablet by mouth.    Historical Provider   glucosamine-chondroitin 500-400 mg tablet Take 1 tablet by mouth 3 (three) times daily.    Historical Provider   HYDROcodone-acetaminophen (NORCO) 5-325 mg per tablet Take 1 tablet by mouth every 6 (six) hours as needed for Pain. 12/16/21   Hakan Bhat MD   lisinopril (PRINIVIL,ZESTRIL) 20 MG tablet Take 20 mg by mouth every evening.  7/14/16   Historical Provider   lisinopril-hydrochlorothiazide (PRINZIDE,ZESTORETIC) 20-12.5 mg per tablet Take 1 tablet by mouth once daily.  6/10/16   Historical Provider   metFORMIN (GLUCOPHAGE) 500 MG tablet Take 1 tablet (500 mg total) by mouth 2 (two) times daily with meals. 2/4/21   Brown Macias MD   multivitamin capsule Take 1 capsule by mouth once daily.    Historical Provider   mupirocin (BACTROBAN) 2 % ointment Apply topically 2 (two) times daily. 1/11/22   Historical Provider   omeprazole (PRILOSEC) 20 MG capsule Take 1 capsule (20 mg total) by mouth once daily. 2/4/21   Brown Macias MD   oxybutynin (DITROPAN) 5 MG Tab Take 1 tablet (5 mg total) by mouth 2 (two) times daily. 2/4/21   Brown Macias MD   predniSONE (DELTASONE) 20 MG tablet Take by mouth. 4/15/22 4/30/22  Historical Provider   simvastatin (ZOCOR) 40 MG tablet Take 1 tablet (40 mg total) by mouth every evening. 2/4/21   Brown Macias MD         PHYSICAL EXAM:   /63   Pulse 90   Temp 97.7  °F (36.5 °C) (Oral)   Resp 18   Ht 5' (1.524 m)   Wt 99.8 kg (220 lb)   LMP  (LMP Unknown)   SpO2 96%   BMI 42.97 kg/m²   Vitals Reviewed  General appearance: Well-developed, overweight, elderly White female   Skin: No Rash.  Warm, dry.  Neuro:  AAO x3.  Follows commands.  Motor and sensory exams grossly intact. Good tone. Power in all 4 extremities 5/5.   HENT: Atraumatic head. Moist mucous membranes of oral cavity.  Eyes: Normal extraocular movements.   Neck: Supple. No evidence of lymphadenopathy. No thyroidomegaly.  Lungs:  Diminished to RLL.  Mild expiratory wheezing..   Heart: Regular rate and rhythm. S1 and S2 present with no /gallop/rub.  Murmur is audible.  No pedal edema. No JVD present.   Abdomen: Soft, non-distended, non-tender. No rebound tenderness/guarding. No masses or organomegaly. Bowel sounds are normal. Bladder is not palpable.   Extremities:  Capillary refill less than 2 seconds.  Psych/mental status:  Anxious behavior. Cooperative. Responds appropriately to questions.   EMERGENCY DEPARTMENT LABS AND IMAGING:     Labs Reviewed   CBC W/ AUTO DIFFERENTIAL - Abnormal; Notable for the following components:       Result Value    WBC 17.46 (*)     Hemoglobin 9.7 (*)     Hematocrit 34.7 (*)     MCH 23.5 (*)     MCHC 28.0 (*)     RDW 26.0 (*)     Immature Granulocytes 0.6 (*)     Gran # (ANC) 15.0 (*)     Immature Grans (Abs) 0.11 (*)     Gran % 86.1 (*)     Lymph % 7.4 (*)     All other components within normal limits   COMPREHENSIVE METABOLIC PANEL - Abnormal; Notable for the following components:    Potassium 3.4 (*)     Glucose 134 (*)     Calcium 8.6 (*)     Albumin 3.2 (*)     Total Bilirubin 1.2 (*)     Alkaline Phosphatase 47 (*)     All other components within normal limits   TROPONIN I - Abnormal; Notable for the following components:    Troponin I 0.549 (*)     All other components within normal limits    Narrative:      trop critical result(s) repeated. Called and verbal readback    obtained from [facility]l mata rn er  by TOMEKA 04/30/2022 22:11   B-TYPE NATRIURETIC PEPTIDE - Abnormal; Notable for the following components:     (*)     All other components within normal limits   SARS-COV-2 RNA AMPLIFICATION, QUAL   TROPONIN I   URINALYSIS, REFLEX TO URINE CULTURE   APTT   PROTIME-INR   PROCALCITONIN       X-Ray Chest AP Portable    (Results Pending)       ASSESSMENT & PLAN:   Daryleen G Moran is a 77 y.o. female admitted for    Active problems/reason for admission  NSTEMI  Right pleural effusion- fluid overload vs pneumonia  Leukocytosis    Chronic problems  Hypertension  Hyperlipidemia  Anemia  Diabetes  Aortic stenosis  COPD  Tobacco abuse        Plan  Admit to ICU  Trend troponin; trending up.  Patient denies chest pain  Heparin drip  2D echo  Keep NPO  Continue ASA/statin/ACEI/nitro p.r.n.  Consult cardiology  Chest x-ray with RLL acute infiltrate and pleural effusion.  Patient denies shortness of breath.  Observed sitting up in bed conversing with family and staff.  No signs of distress. Afebrile. She does have leukocytosis but just completed prednisone taper from recent hospital admission for COPD. She also completed round of doxycycline  Check procalcitonin  Nebs prn  accuchecks with low dose SSI; hypoglycemia protocol    Diet: NPO    DVT Prophylaxis: Heparin drip for DVT prophylaxis.    Discharge Planning and Disposition:  Patient will be discharged in 1-2 days  ________________________________________________________________________________    This patient is high risk for life-threatening deterioration and death secondary to above comorbidities and need for IV treatment. This patient meets inpatient criteria.   Face-to-Face encounter date: 04/30/2022  Encounter included review of the medical records, interviewing and examining the patient face-to-face, discussion with family and other health care providers including emergency medicine physician, admission orders, interpreting  lab/test results and formulating a plan of care.   Medical Decision Making during this encounter was  [_] Low Complexity  [_] Moderate Complexity  [x] High Complexity    Critical care time: 55 minutes  _________________________________________________________________________________    INPATIENT LIST OF MEDICATIONS     Current Facility-Administered Medications:     heparin 25,000 units in dextrose 5% (100 units/ml) IV bolus from bag - ADDITIONAL PRN BOLUS - 30 units/kg (max bolus 4000 units), 30 Units/kg (Adjusted), Intravenous, PRN, Rowan Kenny NP    heparin 25,000 units in dextrose 5% (100 units/ml) IV bolus from bag - ADDITIONAL PRN BOLUS - 60 units/kg (max bolus 4000 units), 59.5 Units/kg (Adjusted), Intravenous, PRN, Rowan Kenny NP    heparin 25,000 units in dextrose 5% (100 units/ml) IV bolus from bag INITIAL BOLUS (max bolus 4000 units), 59.5 Units/kg (Adjusted), Intravenous, Once, Rowan Kenny NP    heparin 25,000 units in dextrose 5% 250 mL (100 units/mL) infusion LOW INTENSITY nomogram - [facility], 12 Units/kg/hr (Adjusted), Intravenous, Continuous, Rowan Kenny NP      Scheduled Meds:   heparin (PORCINE)  59.5 Units/kg (Adjusted) Intravenous Once     Continuous Infusions:   heparin (porcine) in D5W       PRN Meds:.      Rowan Kenny  [facility] Hospitalist  04/30/2022

## 2022-05-01 NOTE — HOSPITAL COURSE
77-year-old morbidly obese lady transferred from other facility after she was found to have NSTEMI.  She was also found to have AFib with RVR on admission day however quickly converted to sinus rhythm.  In the beginning she was managed medically with heparin drip and other cardioprotective medications.  She went to cath lab yesterday however found to have Candida untreated ago in groin area and attempts through radial access were not successful due to torturous anatomy.  LHC was not performed.  Patient remained chest pain-free.  We started topical antifungal and Diflucan 150 mg once a week for 4 doses.  She was cleared for discharge by Cardiology on aspirin, Eliquis, beta-blocker.  I provided prescription of topical antifungal powder as well as 3 more doses of Diflucan 150 mg once a week.  Cardiology will plan outpatient angiogram.      Instructions provided to follow up with primary care physician as outpatient. Patient verbalized understanding and is aware to contact primary care physician or return to ED if new or worsening symptoms.    Physical exam on the day of discharge:  General: Patient resting comfortably in no acute distress.  MORBIDLY OBESE  Lungs: CTA. Good air entry.  CVS: Regular rate and rhythm. No murmurs. No pedal edema.  Abd: Soft. Nontender. Non-distended.  Neuro: A&O x3. Moving all 4 extremities equally  Ext: No clubbing. No cyanosis.    Skin:  Candida intertrigo in groin area noted

## 2022-05-01 NOTE — SUBJECTIVE & OBJECTIVE
Review of Systems   All other systems reviewed and are negative.  Objective:     Vital Signs (Most Recent):  Temp: 98.7 °F (37.1 °C) (05/01/22 1241)  Pulse: 96 (05/01/22 1301)  Resp: (!) 33 (05/01/22 1301)  BP: (!) 84/42 (05/01/22 1301)  SpO2: (!) 94 % (05/01/22 1301)   Vital Signs (24h Range):  Temp:  [97.7 °F (36.5 °C)-98.9 °F (37.2 °C)] 98.7 °F (37.1 °C)  Pulse:  [] 96  Resp:  [18-59] 33  SpO2:  [90 %-98 %] 94 %  BP: ()/() 84/42     Weight: 96.2 kg (212 lb 1.3 oz)  Body mass index is 41.42 kg/m².    Intake/Output Summary (Last 24 hours) at 5/1/2022 1326  Last data filed at 5/1/2022 1301  Gross per 24 hour   Intake 851.28 ml   Output 500 ml   Net 351.28 ml      Physical Exam  Vitals and nursing note reviewed.   HENT:      Head: Normocephalic.   Eyes:      Pupils: Pupils are equal, round, and reactive to light.   Cardiovascular:      Rate and Rhythm: Normal rate and regular rhythm.      Pulses: Normal pulses.   Pulmonary:      Effort: Pulmonary effort is normal.      Breath sounds: Normal breath sounds.   Abdominal:      Palpations: Abdomen is soft.   Musculoskeletal:      Cervical back: Normal range of motion.   Neurological:      General: No focal deficit present.      Mental Status: She is alert. Mental status is at baseline.   Psychiatric:         Mood and Affect: Mood normal.       Significant Labs: All pertinent labs within the past 24 hours have been reviewed.  BMP:   Recent Labs   Lab 05/01/22  0700   *      K 3.1*   CL 98   CO2 34*   BUN 13   CREATININE 0.5   CALCIUM 8.6*     CBC:   Recent Labs   Lab 04/30/22 1948 04/30/22 2125 05/01/22  0343   WBC 16.88* 17.46* 12.34   HGB 10.3* 9.7* 9.1*   HCT 34.7* 34.7* 31.7*    280 240     CMP:   Recent Labs   Lab 04/30/22  1947 04/30/22 2125 05/01/22  0700    138 138   K 3.4* 3.4* 3.1*   CL 99 100 98   CO2 26 26 34*   * 134* 130*   BUN 12 15 13   CREATININE 0.8 0.7 0.5   CALCIUM 9.3 8.6* 8.6*   PROT 6.3 6.1 5.3*    ALBUMIN 3.1* 3.2* 2.9*   BILITOT 1.1* 1.2* 1.1*   ALKPHOS 57 47* 41*   AST 30 35 48*   ALT 50* 44 38   ANIONGAP 14 12 6*   EGFRNONAA >60 >60.0 >60.0     Cardiac Markers:   Recent Labs   Lab 04/30/22 2125   *     Magnesium: No results for input(s): MG in the last 48 hours.  Troponin:   Recent Labs   Lab 04/30/22 2125 05/01/22  0033 05/01/22  0647   TROPONINI 0.549* 2.756* 4.150*       Significant Imaging: I have reviewed all pertinent imaging results/findings within the past 24 hours.

## 2022-05-01 NOTE — CONSULTS
[facility]  Department of Cardiology  Consult Note      PATIENT NAME: Daryleen G Moran  MRN: [id number]  TODAY'S DATE: 05/01/2022  ADMIT DATE: 4/30/2022                          CONSULT REQUESTED BY: Harjinder Hanna MD    SUBJECTIVE     PRINCIPAL PROBLEM: NSTEMI (non-ST elevated myocardial infarction)      REASON FOR CONSULT:  NSTEMI       HPI:  77-year-old female with a past medical history of hypertension, high cholesterol, aortic stenosis, COPD who came in as a transfer from [facility] secondary to NSTEMI.  She was at the [facility] when she suddenly started to feel palpitations and lightheadedness.  The symptoms do not improve within male so then she came to the ED.  She was found to be in AFib RVR in the 160s.  Her rate improved with metoprolol.  EKG with nonspecific ST abnormality.  She is currently chest pain free and is breathing better.  She has no more palpitations or lightheadedness.  She is now on normal sinus rhythm.  Troponin has peaked to 4.     Per H and P:  History was obtained from the patient and collateral obtained from the family present at the bedside and ER physician Sign-out.  Patient is a 77-year-old female with history of hypertension, hyperlipidemia, diabetes, aortic stenosis, COPD, are deficiency anemia who presents to the ED as a transfer from [facility] for cardiology consult.  The patient states that she was at the [facility] when she had a sudden onset of weakness, palpitations and lightheadedness.  She states that she had not eaten since earlier that day so she thought she was just hungry.  She found to seat and ate a BLT sandwich without much improvement in her symptoms.  Episode lasted about 2 hours.  EMS was called and she was brought to the ED.  she denies any associated chest pain, shortness a breath, diaphoresis, nausea, or vomiting.     On arrival to outside hospital patient was found to be hypotensive and in AFib with RVR.  There was concern for  ischemic changes on EKG.  She was treated with IV fluids and IVP metoprolol x2 with improvement in heart rate and symptoms.  Troponin was found to be elevated and she was transferred here for cardiology consult.     The patient reports that she was admitted to [facility] about 2-3 weeks ago and treated for COPD exacerbation and anemia requiring a blood transfusion.  She states she had weakness at that time so she thought her symptoms could be due to anemia.       Patient denies fever, chills, cough, abdominal pain, dysuria, hematuria, melena, rectal bleeding, headache, visual changes, focal weakness, numbness, tingling, or LOC..     Review of patient's allergies indicates:   Allergen Reactions    Sulfacetamide sodium      Pain perineal area    Sulfasalazine Hives    Adhesive Itching     SKIN GETS RED WITH TAPE AND BANDAIDS    Adhesive tape-silicones Itching     SKIN GETS RED WITH TAPE AND BANDAIDS    Sulfa (sulfonamide antibiotics) Rash       Past Medical History:   Diagnosis Date    Aortic stenosis     Arthritis     Back pain     Diabetes mellitus type II     Hyperlipidemia     Hypertension     NSTEMI (non-ST elevated myocardial infarction) 5/1/2022    Osteoporosis     Wears glasses      Past Surgical History:   Procedure Laterality Date     vocal cord nodules removed  long time ago     twice    APPENDECTOMY  within last 5yrs    FIXATION KYPHOPLASTY THORACIC SPINE      8-20-13    FOOT SURGERY      left 2nd toe was too long     HERNIA REPAIR  within last 5yrs    TONSILLECTOMY, ADENOIDECTOMY  long time ago     Social History     Tobacco Use    Smoking status: Current Every Day Smoker     Packs/day: 0.50     Years: 35.00     Pack years: 17.50    Smokeless tobacco: Never Used   Substance Use Topics    Alcohol use: No    Drug use: No        REVIEW OF SYSTEMS  CONSTITUTIONAL: Negative for chills, fatigue and fever.   EYES: No double vision, No blurred vision  NEURO: No headaches, No  dizziness  RESPIRATORY: Negative for cough, shortness of breath and wheezing.    CARDIOVASCULAR: Negative for chest pain. Negative for palpitations and leg swelling.   GI: Negative for abdominal pain, No melena, diarrhea, nausea and vomiting.   : Negative for dysuria and frequency, Negative for hematuria  SKIN: Negative for bruising, Negative for edema or discoloration noted.   ENDOCRINE: Negative for polyphagia, Negative for heat intolerance, Negative for cold intolerance  PSYCHIATRIC: Negative for depression, Negative for anxiety, Negative for memory loss  MUSCULOSKELETAL: Negative for neck pain, Negative for muscle weakness, Negative for back pain     OBJECTIVE     VITAL SIGNS (Most Recent)  Temp: 98.7 °F (37.1 °C) (05/01/22 1241)  Pulse: 96 (05/01/22 1301)  Resp: (!) 33 (05/01/22 1301)  BP: (!) 84/42 (05/01/22 1301)  SpO2: (!) 94 % (05/01/22 1301)    VENTILATION STATUS  Resp: (!) 33 (05/01/22 1301)  SpO2: (!) 94 % (05/01/22 1301)       I & O (Last 24H):    Intake/Output Summary (Last 24 hours) at 5/1/2022 1349  Last data filed at 5/1/2022 1301  Gross per 24 hour   Intake 851.28 ml   Output 500 ml   Net 351.28 ml       WEIGHTS  Wt Readings from Last 3 Encounters:   05/01/22 0130 96.2 kg (212 lb 1.3 oz)   04/30/22 2105 99.8 kg (220 lb)   05/01/22 0758 96.2 kg (212 lb 1.3 oz)   04/30/22 2002 90.7 kg (199 lb 15.3 oz)       PHYSICAL EXAM  GENERAL: well built, well nourished, well-developed in no apparent distress alert and oriented.   HEENT: Normocephalic. Pupils normal and conjunctivae normal.  Mucous membranes normal, no cyanosis or icterus, trachea central,no pallor or icterus is noted..   NECK: No JVD. No bruit..   THYROID: Thyroid not enlarged. No nodules present..   CARDIAC: Regular rate and rhythm. S1 is normal.S2 is normal.No gallops, clicks or murmurs noted at this time.  CHEST ANATOMY: normal.   LUNGS: Clear to auscultation. No wheezing or rhonchi..   ABDOMEN: Soft no masses or organomegaly.  No abdomen  pulsations or bruits.  Normal bowel sounds. No pulsations and no masses felt, No guarding or rebound.   URINARY: No gutierrez catheter   EXTREMITIES: No cyanosis, clubbing or edema noted at this time., no calf tenderness bilaterally.   PERIPHERAL VASCULAR SYSTEM: Good palpable distal pulses.   CENTRAL NERVOUS SYSTEM: No focal motor or sensory deficits noted.   SKIN: Skin without lesions, moist, well perfused.   MUSCLE STRENGTH & TONE: No noteable weakness, atrophy or abnormal movement.     HOME MEDICATIONS:  Current Facility-Administered Medications on File Prior to Encounter   Medication Dose Route Frequency Provider Last Rate Last Admin    [COMPLETED] metoprolol injection 2.5 mg  2.5 mg Intravenous ED 1 Time Andrei Hope MD   2.5 mg at 04/30/22 2008    [COMPLETED] metoprolol injection 2.5 mg  2.5 mg Intravenous ED 1 Time Andrei Hope MD   2.5 mg at 04/30/22 2022    [COMPLETED] sodium chloride 0.9% bolus 1,000 mL  1,000 mL Intravenous ED 1 Time Andrei Hope  mL/hr at 04/30/22 1953 1,000 mL at 04/30/22 1953    [DISCONTINUED] aspirin tablet 325 mg  325 mg Oral ED 1 Time Andrei Hope MD        [DISCONTINUED] diltiaZEM injection 15 mg  15 mg Intravenous Once Andrei Hope MD         Current Outpatient Medications on File Prior to Encounter   Medication Sig Dispense Refill    albuterol (PROVENTIL/VENTOLIN HFA) 90 mcg/actuation inhaler Inhale 2 puffs into the lungs every 6 (six) hours as needed.      ALLERGY RELIEF, FEXOFENADINE, 180 mg tablet Take 180 mg by mouth once daily.      ascorbic acid, vitamin C, (VITAMIN C) 1000 MG tablet Take 1,000 mg by mouth Daily.      benazepriL (LOTENSIN) 20 MG tablet Take 20 mg by mouth every evening.      budesonide-formoterol 80-4.5 mcg (SYMBICORT) 80-4.5 mcg/actuation HFAA Inhale 2 puffs into the lungs 2 (two) times daily.      donepeziL (ARICEPT) 5 MG tablet Take 5 mg by mouth nightly.      FEROSUL 325 mg (65 mg iron) Tab tablet Take 1 tablet by  mouth every morning.      lisinopril-hydrochlorothiazide (PRINZIDE,ZESTORETIC) 20-12.5 mg per tablet Take 1 tablet by mouth once daily.       metFORMIN (GLUCOPHAGE) 500 MG tablet Take 1 tablet (500 mg total) by mouth 2 (two) times daily with meals. 180 tablet 3    multivitamin capsule Take 1 capsule by mouth once daily.      omeprazole (PRILOSEC) 20 MG capsule Take 1 capsule (20 mg total) by mouth once daily. 90 capsule 3    oxybutynin (DITROPAN) 5 MG Tab Take 1 tablet (5 mg total) by mouth 2 (two) times daily. 180 tablet 3    simvastatin (ZOCOR) 40 MG tablet Take 1 tablet (40 mg total) by mouth every evening. 90 tablet 3    aspirin (ECOTRIN) 81 MG EC tablet Take 81 mg by mouth once daily.      aspirin-calcium carbonate 81 mg-300 mg calcium(777 mg) Tab Take 81 mg by mouth.      buPROPion (WELLBUTRIN XL) 150 MG TB24 tablet Take 150 mg by mouth.      calcium carbonate (OS-TK) 600 mg (1,500 mg) Tab Take 600 mg by mouth 2 (two) times daily with meals.      ciprofloxacin HCl (CIPRO) 500 MG tablet Take 1 tablet (500 mg total) by mouth 2 (two) times daily. 10 tablet 0    citalopram (CELEXA) 40 MG tablet Take 1 tablet (40 mg total) by mouth once daily. 90 tablet 3    clindamycin (CLEOCIN) 300 MG capsule Take 300 mg by mouth 3 (three) times daily.      doxycycline (MONODOX) 50 MG Cap Take 50 mg by mouth 2 (two) times daily.      doxycycline (VIBRA-TABS) 100 MG tablet Take 100 mg by mouth 2 (two) times daily.      ergocalciferol (ERGOCALCIFEROL) 50,000 unit Cap Take 1 capsule (50,000 Units total) by mouth every 7 days. 12 capsule 3    ezetimibe-simvastatin 10-40 mg (VYTORIN) 10-40 mg per tablet Take 1 tablet by mouth.      glucosamine hawkins 2KCl-chondroit 500-400 mg Tab Take 1 tablet by mouth.      glucosamine-chondroitin 500-400 mg tablet Take 1 tablet by mouth 3 (three) times daily.      HYDROcodone-acetaminophen (NORCO) 5-325 mg per tablet Take 1 tablet by mouth every 6 (six) hours as needed for Pain. 12  tablet 0    lisinopril (PRINIVIL,ZESTRIL) 20 MG tablet Take 20 mg by mouth every evening.       mupirocin (BACTROBAN) 2 % ointment Apply topically 2 (two) times daily.      [] predniSONE (DELTASONE) 20 MG tablet Take by mouth.         SCHEDULED MEDS:   ascorbic acid (vitamin C)  1,000 mg Oral Daily    aspirin  325 mg Oral Daily    atorvastatin  20 mg Oral Daily    buPROPion  150 mg Oral Daily    cetirizine  10 mg Oral Daily    citalopram  20 mg Oral Daily    donepeziL  5 mg Oral Nightly    enoxparin  80 mg Subcutaneous Q12H    ferrous sulfate  1 tablet Oral Daily    fluticasone furoate-vilanteroL  1 puff Inhalation Daily    lisinopriL  20 mg Oral Daily    multivitamin  1 tablet Oral Daily    oxybutynin  5 mg Oral BID    pantoprazole  40 mg Oral Before breakfast       CONTINUOUS INFUSIONS:    PRN MEDS:acetaminophen, albuterol sulfate, calcium chloride IVPB, calcium chloride IVPB, calcium chloride IVPB, dextrose 50%, glucagon (human recombinant), insulin aspart U-100, loperamide, magnesium oxide, magnesium sulfate IVPB, magnesium sulfate IVPB, magnesium sulfate IVPB, magnesium sulfate IVPB, melatonin, morphine, nitroGLYCERIN, ondansetron, potassium chloride in water, potassium chloride in water, potassium chloride in water, potassium chloride in water, potassium chloride, potassium chloride, potassium chloride, potassium chloride, senna-docusate 8.6-50 mg, sodium chloride 0.9%    LABS AND DIAGNOSTICS     CBC LAST 3 DAYS  Recent Labs   Lab 22  1947 22  2125 22  0343   WBC 16.88* 17.46* 12.34   RBC 4.19 4.13 3.84*   HGB 10.3* 9.7* 9.1*   HCT 34.7* 34.7* 31.7*   MCV 83 84 83   MCH 24.6* 23.5* 23.7*   MCHC 29.7* 28.0* 28.7*   RDW 25.8* 26.0* 26.0*    280 240   MPV 10.8 11.2 10.5   GRAN 84.5*  14.3* 86.1*  15.0* 74.2*  9.2*   LYMPH 8.2*  1.4 7.4*  1.3 17.2*  2.1   MONO 5.5  0.9 4.9  0.9 5.8  0.7   BASO 0.09 0.06 0.06   NRBC 0 0 0       COAGULATION LAST 3  DAYS  Recent Labs   Lab 05/01/22  0033 05/01/22  0343 05/01/22  0940   LABPT 13.5  --   --    INR 1.1  --   --    APTT 20.6* 35.9* 32.6       CHEMISTRY LAST 3 DAYS  Recent Labs   Lab 04/30/22 1947 04/30/22 2125 05/01/22  0700    138 138   K 3.4* 3.4* 3.1*   CL 99 100 98   CO2 26 26 34*   ANIONGAP 14 12 6*   BUN 12 15 13   CREATININE 0.8 0.7 0.5   * 134* 130*   CALCIUM 9.3 8.6* 8.6*   ALBUMIN 3.1* 3.2* 2.9*   PROT 6.3 6.1 5.3*   ALKPHOS 57 47* 41*   ALT 50* 44 38   AST 30 35 48*   BILITOT 1.1* 1.2* 1.1*       CARDIAC PROFILE LAST 3 DAYS  Recent Labs   Lab 04/30/22 1947 04/30/22 2125 05/01/22 0033 05/01/22  0647   * 329*  --   --    TROPONINI 0.318* 0.549* 2.756* 4.150*       ENDOCRINE LAST 3 DAYS  No results for input(s): TSH, PROCAL in the last 168 hours.    LAST ARTERIAL BLOOD GAS  ABG  No results for input(s): PH, PO2, PCO2, HCO3, BE in the last 168 hours.    LAST 7 DAYS MICROBIOLOGY   Microbiology Results (last 7 days)     ** No results found for the last 168 hours. **          MOST RECENT IMAGING  Echo  · The estimated PA systolic pressure is 45 mmHg.  · The left ventricle is normal in size with mild concentric hypertrophy   and normal systolic function.  · The estimated ejection fraction is 70%.  · Grade II left ventricular diastolic dysfunction.  · Normal right ventricular size with normal right ventricular systolic   function.  · Mild left atrial enlargement.  · There is severe aortic valve stenosis.  · Aortic valve area is 1.22 cm2; peak velocity is m/s; mean gradient is 34   mmHg.  · Mild-to-moderate mitral regurgitation.  · Mild tricuspid regurgitation.  · Mild aortic regurgitation.     X-Ray Chest AP Portable  Narrative: EXAMINATION:  XR CHEST AP PORTABLE    CLINICAL HISTORY:  Chest Pain;    FINDINGS:  Portable chest at 2115 compared with 04/30/2022 shows slightly enlarged cardiac silhouette size with normal mediastinal contours.    Lungs appear clear with resolution of prior  reported right basilar opacities.  Pulmonary vasculature is normal. No acute osseous abnormality.  Impression: Resolution of previously reported right basilar opacities.    Electronically signed by: Ellis Cruz MD  Date:    05/01/2022  Time:    07:26      ECHOCARDIOGRAM RESULTS (last 5)  Results for orders placed during the hospital encounter of 04/30/22    Echo    Interpretation Summary  · The estimated PA systolic pressure is 45 mmHg.  · The left ventricle is normal in size with mild concentric hypertrophy and normal systolic function.  · The estimated ejection fraction is 70%.  · Grade II left ventricular diastolic dysfunction.  · Normal right ventricular size with normal right ventricular systolic function.  · Mild left atrial enlargement.  · There is severe aortic valve stenosis.  · Aortic valve area is 1.22 cm2; peak velocity is m/s; mean gradient is 34 mmHg.  · Mild-to-moderate mitral regurgitation.  · Mild tricuspid regurgitation.  · Mild aortic regurgitation.      CURRENT/PREVIOUS VISIT EKG  Results for orders placed or performed during the hospital encounter of 04/30/22   EKG 12-lead    Collection Time: 04/30/22  9:09 PM    Narrative    Test Reason : R53.1,    Vent. Rate : 090 BPM     Atrial Rate : 090 BPM     P-R Int : 136 ms          QRS Dur : 066 ms      QT Int : 340 ms       P-R-T Axes : 062 015 093 degrees     QTc Int : 415 ms    Normal sinus rhythm  Nonspecific ST abnormality  Abnormal QRS-T angle, consider primary T wave abnormality  Abnormal ECG  When compared with ECG of 30-APR-2022 20:18,  Sinus rhythm has replaced Atrial fibrillation  T wave inversion no longer evident in Inferior leads    Referred By: [email or web address]   SELF           Confirmed By:            ASSESSMENT/PLAN:     Active Hospital Problems    Diagnosis    *NSTEMI (non-ST elevated myocardial infarction)    Diabetes mellitus type II     Dx updated per 2019 IMO Load         ASSESSMENT & PLAN:   1. NSTEMI   2. Essential HTN   3. PAF   4.  T2DM   5. Moderate to severe aortic stenosis      RECOMMENDATIONS:  Will discuss with Dr. Macias in the morning about plans for left heart catheterization  Keep NPO after midnight  Transition heparin to Lovenox full dose  Troponin peaked to 4.1        Keysha Fairbanks PA-C  [facility], later [facility]  Department of Cardiology  Date of Service: 05/01/2022        I have personally interviewed and examined the patient, I have reviewed the Physician Assistant's history and physical, assessment, and plan. I agree with the findings and plan.    1. Patient has had paroxysmal atrial fibrillation and overnight has spontaneously converted to sinus rhythm.  During her rapid heart rate patient had ST T-wave changes suggestive of ischemia.  2. Patient also has severe aortic stenosis with a valve area of about 1.1 centimeter sq with peak gradient about 60 and a mean gradient of 35.  3. Discussed with patient in regards with options of coronary angiography and possible percutaneous intervention.  Also discussed with her the option of medical management at the present time.  4.Discussed with patient the benefits and options at length in detail.  Coronary Angiogram +/- PCI  AntiPlatelet therapy-  Asprin Plavix Brilinta  Access - Bilateral CFA/ Radial  Allergies : No Iodine Allergy  Pre Hydration IVF  cc /hr  Pre Procedure Medication : Benadryl 25 - 50 mg po prior to procedure X 1  No recent head trauma.  No bleeding issues or Blood in the stools or Urine.   Risks benefits and alternate options were explained to the Patient.    Risks include but not limited to bleeding, allergic reaction to the dye, vascular damage, renal failure with potential need for Dialysis, Infection, Rhythm abnormalities, stroke, myocardial infarction, emergency bypass surgery and death.    The risks is of moderate sedation include hypotension respiratory depression arrhythmias bronchospasm and death.  Patient does understand these risks and wants to  proceed with cardiac catheterization.  Should stenting be indicated, patient has agreed to dual antiplatelet therapy for 12 to 18 months with drug-eluting stent and a minimum of 1 month of dual antiplatelet therapy with the use of bare metal stent.  Additionally patient is aware of the noncompliance with medications is likely to result in the stent clotting with heart attack and heart failure and or death.  All questions have been answered to patient's satisfaction.And patient has voiced to proceed with the procedure.  Verbal consent has been obtained and patient is agreeable to proceed with the procedure.   Will keep patient nothing by mouth post midnight and proceed with the coronary angiography possible PCI in the morning.  5. Patient's primary cardiologist is will be consulted tomorrow in regards with her percutaneous intervention..         Jose Echols M.D.  [facility]  Department of Cardiology  Date of Service: 05/01/2022  1:49 PM

## 2022-05-01 NOTE — ED NOTES
Daryleen G Moran now accepted in transfer to [facility] ED by Dr. Le; call report to:  [phone].  Transfer Center arranging STAT transport.

## 2022-05-01 NOTE — PLAN OF CARE
Pt NPO after midnight for possible angiogram with Dr Macias.  Pt jony po nutrition.  Pt able to use bedside commode with min assist.

## 2022-05-01 NOTE — CARE UPDATE
05/01/22 1056   Patient Assessment/Suction   Level of Consciousness (AVPU) alert   Respiratory Effort Unlabored   Expansion/Accessory Muscles/Retractions no use of accessory muscles   All Lung Fields Breath Sounds clear;equal bilaterally   PRE-TX-O2   O2 Device (Oxygen Therapy) room air   SpO2 (!) 94 %   Pulse Oximetry Type Continuous   $ Pulse Oximetry - Multiple Charge Pulse Oximetry - Multiple   Pulse 104   Resp 18   Inhaler   $ Inhaler Charges MDI (Metered Dose Inahler) Treatment   Daily Review of Necessity (Inhaler) completed   Respiratory Treatment Status (Inhaler) given   Treatment Route (Inhaler) mouthpiece   Patient Position (Inhaler) HOB elevated   Post Treatment Assessment (Inhaler) breath sounds unchanged   Signs of Intolerance (Inhaler) none   Respiratory Evaluation   $ Care Plan Tech Time 15 min

## 2022-05-01 NOTE — PROGRESS NOTES
[facility]  Progress Note    Patient Name: Daryleen G Moran  MRN: [id number]  Patient Class: IP- Inpatient   Admission Date: 4/30/2022  Length of Stay: 1 days  Attending Physician: Harjinder Hanna MD  Primary Care Provider: Abhi De Oliveira Iv, MD        Subjective:     Principal Problem:NSTEMI (non-ST elevated myocardial infarction)        HPI:  History was obtained from the patient and collateral obtained from the family present at the bedside and ER physician Sign-out.  Patient is a 77-year-old female with history of hypertension, hyperlipidemia, diabetes, aortic stenosis, COPD, are deficiency anemia who presents to the ED as a transfer from [facility] for cardiology consult.  The [place] had a sudden onset of weakness, palpitations and lightheadedness.  She states that she had not eaten since earlier that day so she thought she was just hungry.  She found to seat and ate a BLT sandwich without much improvement in her symptoms.  Episode lasted about 2 hours.  EMS was called and she was brought to the ED.  she denies any associated chest pain, shortness a breath, diaphoresis, nausea, or vomiting.     On arrival to outside hospital patient was found to be hypotensive and in AFib with RVR.  There was concern for ischemic changes on EKG.  She was treated with IV fluids and IVP metoprolol x2 with improvement in heart rate and symptoms.  Troponin was found to be elevated and she was transferred here for cardiology consult.     The patient reports that she was admitted to [facility] about 2-3 weeks ago and treated for COPD exacerbation and anemia requiring a blood transfusion.  She states she had weakness at that time so she thought her symptoms could be due to anemia.       Patient denies fever, chills, cough, abdominal pain, dysuria, hematuria, melena, rectal bleeding, headache, visual changes, focal weakness, numbness,  tingling, or LOC..            In the ED here patient is afebrile.  She is hemodynamically stable with blood pressure 119/53.  She is in sinus rhythm with heart rate 80s.  CBC with elevated WBC 17, H&H stable 9.7/34.    CMP with potassium 3.4, glucose 134  BNP is elevated at 329 and troponin is 0.5.  EKG shows normal sinus rhythm with diffuse nonspecific ST abnormalities.  ST elevation AVR.  ST depression Elena 1 and aVL  CXR with no airspace opacities and pleural effusion to the right lung base suggestive of pulmonary edema versus pneumonia.  Cardiology is notified of patient's arrival in a ED provider and recommends the patient be admitted to the ICU with heparin drip.      Overview/Hospital Course:  5/10:  Patient currently on heparin drip and bump in troponin to 4.15 noted.  Cardiology consulted and patient is currently chest pain-free        Review of Systems   All other systems reviewed and are negative.  Objective:     Vital Signs (Most Recent):  Temp: 98.7 °F (37.1 °C) (05/01/22 1241)  Pulse: 96 (05/01/22 1301)  Resp: (!) 33 (05/01/22 1301)  BP: (!) 84/42 (05/01/22 1301)  SpO2: (!) 94 % (05/01/22 1301)   Vital Signs (24h Range):  Temp:  [97.7 °F (36.5 °C)-98.9 °F (37.2 °C)] 98.7 °F (37.1 °C)  Pulse:  [] 96  Resp:  [18-59] 33  SpO2:  [90 %-98 %] 94 %  BP: ()/() 84/42     Weight: 96.2 kg (212 lb 1.3 oz)  Body mass index is 41.42 kg/m².    Intake/Output Summary (Last 24 hours) at 5/1/2022 1326  Last data filed at 5/1/2022 1301  Gross per 24 hour   Intake 851.28 ml   Output 500 ml   Net 351.28 ml      Physical Exam  Vitals and nursing note reviewed.   HENT:      Head: Normocephalic.   Eyes:      Pupils: Pupils are equal, round, and reactive to light.   Cardiovascular:      Rate and Rhythm: Normal rate and regular rhythm.      Pulses: Normal pulses.   Pulmonary:      Effort: Pulmonary effort is normal.      Breath sounds: Normal breath sounds.   Abdominal:      Palpations: Abdomen is soft.    Musculoskeletal:      Cervical back: Normal range of motion.   Neurological:      General: No focal deficit present.      Mental Status: She is alert. Mental status is at baseline.   Psychiatric:         Mood and Affect: Mood normal.       Significant Labs: All pertinent labs within the past 24 hours have been reviewed.  BMP:   Recent Labs   Lab 05/01/22  0700   *      K 3.1*   CL 98   CO2 34*   BUN 13   CREATININE 0.5   CALCIUM 8.6*     CBC:   Recent Labs   Lab 04/30/22 1948 04/30/22 2125 05/01/22  0343   WBC 16.88* 17.46* 12.34   HGB 10.3* 9.7* 9.1*   HCT 34.7* 34.7* 31.7*    280 240     CMP:   Recent Labs   Lab 04/30/22 1947 04/30/22 2125 05/01/22  0700    138 138   K 3.4* 3.4* 3.1*   CL 99 100 98   CO2 26 26 34*   * 134* 130*   BUN 12 15 13   CREATININE 0.8 0.7 0.5   CALCIUM 9.3 8.6* 8.6*   PROT 6.3 6.1 5.3*   ALBUMIN 3.1* 3.2* 2.9*   BILITOT 1.1* 1.2* 1.1*   ALKPHOS 57 47* 41*   AST 30 35 48*   ALT 50* 44 38   ANIONGAP 14 12 6*   EGFRNONAA >60 >60.0 >60.0     Cardiac Markers:   Recent Labs   Lab 04/30/22 2125   *     Magnesium: No results for input(s): MG in the last 48 hours.  Troponin:   Recent Labs   Lab 04/30/22 2125 05/01/22  0033 05/01/22  0647   TROPONINI 0.549* 2.756* 4.150*       Significant Imaging: I have reviewed all pertinent imaging results/findings within the past 24 hours.      Assessment/Plan:      * NSTEMI (non-ST elevated myocardial infarction)  Troponin trending upwards  Currently on heparin drip  Cardiology consulted      Diabetes mellitus type II  Insulin sliding scale  Regular Accu-Chek        VTE Risk Mitigation (From admission, onward)         Ordered     enoxaparin injection 80 mg  Every 12 hours (non-standard times)         05/01/22 1307     IP VTE HIGH RISK PATIENT  Once         05/01/22 0219     Place sequential compression device  Until discontinued         05/01/22 0219                Discharge Planning   ISAAK: 5/3/2022     Code  Status: Full Code   Is the patient medically ready for discharge?: No    Reason for patient still in hospital (select all that apply): Patient trending condition                     Harjinder Hanna MD  Department of Hospital Medicine   [facility]

## 2022-05-01 NOTE — HPI
History was obtained from the patient and collateral obtained from the family present at the bedside and ER physician Sign-out.  Patient is a 77-year-old female with history of hypertension, hyperlipidemia, diabetes, aortic stenosis, COPD, are deficiency anemia who presents to the ED as a transfer from [facility] for cardiology consult.  The patient [place] she had a sudden onset of weakness, palpitations and lightheadedness.  She states that she had not eaten since earlier that day so she thought she was just hungry.  She found to seat and ate a BLT sandwich without much improvement in her symptoms.  Episode lasted about 2 hours.  EMS was called and she was brought to the ED.  she denies any associated chest pain, shortness a breath, diaphoresis, nausea, or vomiting.     On arrival to outside hospital patient was found to be hypotensive and in AFib with RVR.  There was concern for ischemic changes on EKG.  She was treated with IV fluids and IVP metoprolol x2 with improvement in heart rate and symptoms.  Troponin was found to be elevated and she was transferred here for cardiology consult.     The patient reports that she was admitted to [facility] about 2-3 weeks ago and treated for COPD exacerbation and anemia requiring a blood transfusion.  She states she had weakness at that time so she thought her symptoms could be due to anemia.       Patient denies fever, chills, cough, abdominal pain, dysuria, hematuria, melena, rectal bleeding, headache, visual changes, focal weakness, numbness, tingling, or LOC..            In the ED here patient is afebrile.  She is hemodynamically stable with blood pressure 119/53.  She is in sinus rhythm with heart rate 80s.  CBC with elevated WBC 17, H&H stable 9.7/34.    CMP with potassium 3.4, glucose 134  BNP is elevated at 329 and troponin is 0.5.  EKG shows normal sinus rhythm with diffuse nonspecific ST abnormalities.  ST elevation  AVR.  ST depression Elena 1 and aVL  CXR with no airspace opacities and pleural effusion to the right lung base suggestive of pulmonary edema versus pneumonia.  Cardiology is notified of patient's arrival in a ED provider and recommends the patient be admitted to the ICU with heparin drip.

## 2022-05-02 PROBLEM — I48.0 PAROXYSMAL ATRIAL FIBRILLATION: Status: ACTIVE | Noted: 2022-05-02

## 2022-05-02 LAB
ALBUMIN SERPL BCP-MCNC: 2.9 G/DL (ref 3.5–5.2)
ALP SERPL-CCNC: 43 U/L (ref 55–135)
ALT SERPL W/O P-5'-P-CCNC: 34 U/L (ref 10–44)
ANION GAP SERPL CALC-SCNC: 10 MMOL/L (ref 8–16)
AST SERPL-CCNC: 34 U/L (ref 10–40)
BASOPHILS # BLD AUTO: 0.06 K/UL (ref 0–0.2)
BASOPHILS NFR BLD: 0.8 % (ref 0–1.9)
BILIRUB SERPL-MCNC: 1 MG/DL (ref 0.1–1)
BNP SERPL-MCNC: 301 PG/ML (ref 0–99)
BUN SERPL-MCNC: 13 MG/DL (ref 8–23)
CALCIUM SERPL-MCNC: 8.7 MG/DL (ref 8.7–10.5)
CHLORIDE SERPL-SCNC: 102 MMOL/L (ref 95–110)
CO2 SERPL-SCNC: 28 MMOL/L (ref 23–29)
CREAT SERPL-MCNC: 0.4 MG/DL (ref 0.5–1.4)
DIFFERENTIAL METHOD: ABNORMAL
EOSINOPHIL # BLD AUTO: 0.3 K/UL (ref 0–0.5)
EOSINOPHIL NFR BLD: 4 % (ref 0–8)
ERYTHROCYTE [DISTWIDTH] IN BLOOD BY AUTOMATED COUNT: 25.6 % (ref 11.5–14.5)
EST. GFR  (AFRICAN AMERICAN): >60 ML/MIN/1.73 M^2
EST. GFR  (NON AFRICAN AMERICAN): >60 ML/MIN/1.73 M^2
GLUCOSE SERPL-MCNC: 132 MG/DL (ref 70–110)
GLUCOSE SERPL-MCNC: 133 MG/DL (ref 70–110)
GLUCOSE SERPL-MCNC: 144 MG/DL (ref 70–110)
HCT VFR BLD AUTO: 31.4 % (ref 37–48.5)
HGB BLD-MCNC: 8.9 G/DL (ref 12–16)
IMM GRANULOCYTES # BLD AUTO: 0.03 K/UL (ref 0–0.04)
IMM GRANULOCYTES NFR BLD AUTO: 0.4 % (ref 0–0.5)
LYMPHOCYTES # BLD AUTO: 1.5 K/UL (ref 1–4.8)
LYMPHOCYTES NFR BLD: 20.1 % (ref 18–48)
MAGNESIUM SERPL-MCNC: 1.6 MG/DL (ref 1.6–2.6)
MCH RBC QN AUTO: 24 PG (ref 27–31)
MCHC RBC AUTO-ENTMCNC: 28.3 G/DL (ref 32–36)
MCV RBC AUTO: 85 FL (ref 82–98)
MONOCYTES # BLD AUTO: 0.5 K/UL (ref 0.3–1)
MONOCYTES NFR BLD: 6.6 % (ref 4–15)
NEUTROPHILS # BLD AUTO: 5.1 K/UL (ref 1.8–7.7)
NEUTROPHILS NFR BLD: 68.1 % (ref 38–73)
NRBC BLD-RTO: 0 /100 WBC
PLATELET # BLD AUTO: 228 K/UL (ref 150–450)
PMV BLD AUTO: 11.4 FL (ref 9.2–12.9)
POTASSIUM SERPL-SCNC: 3.8 MMOL/L (ref 3.5–5.1)
PROT SERPL-MCNC: 5.3 G/DL (ref 6–8.4)
RBC # BLD AUTO: 3.71 M/UL (ref 4–5.4)
SODIUM SERPL-SCNC: 140 MMOL/L (ref 136–145)
WBC # BLD AUTO: 7.46 K/UL (ref 3.9–12.7)

## 2022-05-02 PROCEDURE — C1894 INTRO/SHEATH, NON-LASER: HCPCS | Performed by: INTERNAL MEDICINE

## 2022-05-02 PROCEDURE — 63600175 PHARM REV CODE 636 W HCPCS: Performed by: INTERNAL MEDICINE

## 2022-05-02 PROCEDURE — 25000003 PHARM REV CODE 250: Performed by: HOSPITALIST

## 2022-05-02 PROCEDURE — 25000003 PHARM REV CODE 250

## 2022-05-02 PROCEDURE — 99152 MOD SED SAME PHYS/QHP 5/>YRS: CPT | Performed by: INTERNAL MEDICINE

## 2022-05-02 PROCEDURE — 94640 AIRWAY INHALATION TREATMENT: CPT

## 2022-05-02 PROCEDURE — C1769 GUIDE WIRE: HCPCS | Performed by: INTERNAL MEDICINE

## 2022-05-02 PROCEDURE — 93458 PR CATH PLACE/CORON ANGIO, IMG SUPER/INTERP,W LEFT HEART VENTRICULOGRAPHY: ICD-10-PCS | Mod: 26,52,, | Performed by: INTERNAL MEDICINE

## 2022-05-02 PROCEDURE — 93458 L HRT ARTERY/VENTRICLE ANGIO: CPT | Mod: 26,52,, | Performed by: INTERNAL MEDICINE

## 2022-05-02 PROCEDURE — 63600175 PHARM REV CODE 636 W HCPCS: Performed by: HOSPITALIST

## 2022-05-02 PROCEDURE — 21400001 HC TELEMETRY ROOM

## 2022-05-02 PROCEDURE — 25000003 PHARM REV CODE 250: Performed by: NURSE PRACTITIONER

## 2022-05-02 PROCEDURE — 93458 L HRT ARTERY/VENTRICLE ANGIO: CPT | Mod: 74 | Performed by: INTERNAL MEDICINE

## 2022-05-02 PROCEDURE — 99900035 HC TECH TIME PER 15 MIN (STAT)

## 2022-05-02 PROCEDURE — 99153 MOD SED SAME PHYS/QHP EA: CPT | Performed by: INTERNAL MEDICINE

## 2022-05-02 PROCEDURE — 99152 PR MOD CONSCIOUS SEDATION, SAME PHYS, 5+ YRS, FIRST 15 MIN: ICD-10-PCS | Mod: ,,, | Performed by: INTERNAL MEDICINE

## 2022-05-02 PROCEDURE — 25000003 PHARM REV CODE 250: Performed by: INTERNAL MEDICINE

## 2022-05-02 PROCEDURE — 36415 COLL VENOUS BLD VENIPUNCTURE: CPT | Performed by: NURSE PRACTITIONER

## 2022-05-02 PROCEDURE — 80053 COMPREHEN METABOLIC PANEL: CPT | Performed by: INTERNAL MEDICINE

## 2022-05-02 PROCEDURE — 99152 MOD SED SAME PHYS/QHP 5/>YRS: CPT | Mod: ,,, | Performed by: INTERNAL MEDICINE

## 2022-05-02 PROCEDURE — 94761 N-INVAS EAR/PLS OXIMETRY MLT: CPT

## 2022-05-02 PROCEDURE — 83735 ASSAY OF MAGNESIUM: CPT | Performed by: NURSE PRACTITIONER

## 2022-05-02 PROCEDURE — 83880 ASSAY OF NATRIURETIC PEPTIDE: CPT | Performed by: NURSE PRACTITIONER

## 2022-05-02 PROCEDURE — 63700000 PHARM REV CODE 250 ALT 637 W/O HCPCS: Performed by: HOSPITALIST

## 2022-05-02 PROCEDURE — 85025 COMPLETE CBC W/AUTO DIFF WBC: CPT | Performed by: NURSE PRACTITIONER

## 2022-05-02 PROCEDURE — C1887 CATHETER, GUIDING: HCPCS | Performed by: INTERNAL MEDICINE

## 2022-05-02 PROCEDURE — 27000221 HC OXYGEN, UP TO 24 HOURS

## 2022-05-02 RX ORDER — MIDAZOLAM HYDROCHLORIDE 1 MG/ML
INJECTION INTRAMUSCULAR; INTRAVENOUS
Status: DISCONTINUED | OUTPATIENT
Start: 2022-05-02 | End: 2022-05-02 | Stop reason: HOSPADM

## 2022-05-02 RX ORDER — HEPARIN SODIUM 1000 [USP'U]/ML
INJECTION, SOLUTION INTRAVENOUS; SUBCUTANEOUS
Status: DISCONTINUED | OUTPATIENT
Start: 2022-05-02 | End: 2022-05-02 | Stop reason: HOSPADM

## 2022-05-02 RX ORDER — FENTANYL CITRATE 50 UG/ML
INJECTION, SOLUTION INTRAMUSCULAR; INTRAVENOUS
Status: DISCONTINUED | OUTPATIENT
Start: 2022-05-02 | End: 2022-05-02 | Stop reason: HOSPADM

## 2022-05-02 RX ORDER — MAGNESIUM SULFATE HEPTAHYDRATE 40 MG/ML
2 INJECTION, SOLUTION INTRAVENOUS ONCE
Status: COMPLETED | OUTPATIENT
Start: 2022-05-02 | End: 2022-05-02

## 2022-05-02 RX ORDER — MUPIROCIN 20 MG/G
OINTMENT TOPICAL 2 TIMES DAILY
Status: DISCONTINUED | OUTPATIENT
Start: 2022-05-02 | End: 2022-05-03 | Stop reason: HOSPADM

## 2022-05-02 RX ORDER — CHLORHEXIDINE GLUCONATE ORAL RINSE 1.2 MG/ML
15 SOLUTION DENTAL 2 TIMES DAILY
Status: DISCONTINUED | OUTPATIENT
Start: 2022-05-02 | End: 2022-05-03 | Stop reason: HOSPADM

## 2022-05-02 RX ORDER — FLUCONAZOLE 50 MG/1
150 TABLET ORAL WEEKLY
Status: DISCONTINUED | OUTPATIENT
Start: 2022-05-02 | End: 2022-05-03 | Stop reason: HOSPADM

## 2022-05-02 RX ORDER — LIDOCAINE HYDROCHLORIDE 10 MG/ML
INJECTION, SOLUTION EPIDURAL; INFILTRATION; INTRACAUDAL; PERINEURAL
Status: DISCONTINUED | OUTPATIENT
Start: 2022-05-02 | End: 2022-05-02 | Stop reason: HOSPADM

## 2022-05-02 RX ORDER — DOXYLAMINE SUCCINATE 25 MG
TABLET ORAL 2 TIMES DAILY
Status: DISCONTINUED | OUTPATIENT
Start: 2022-05-02 | End: 2022-05-03 | Stop reason: HOSPADM

## 2022-05-02 RX ORDER — METOPROLOL TARTRATE 25 MG/1
12.5 TABLET ORAL 2 TIMES DAILY
Status: DISCONTINUED | OUTPATIENT
Start: 2022-05-02 | End: 2022-05-03 | Stop reason: HOSPADM

## 2022-05-02 RX ADMIN — DONEPEZIL HYDROCHLORIDE 5 MG: 5 TABLET, FILM COATED ORAL at 09:05

## 2022-05-02 RX ADMIN — METOPROLOL TARTRATE 12.5 MG: 25 TABLET, FILM COATED ORAL at 09:05

## 2022-05-02 RX ADMIN — BUPROPION HYDROCHLORIDE 150 MG: 150 TABLET, FILM COATED, EXTENDED RELEASE ORAL at 12:05

## 2022-05-02 RX ADMIN — MUPIROCIN: 20 OINTMENT TOPICAL at 09:05

## 2022-05-02 RX ADMIN — OXYBUTYNIN CHLORIDE 5 MG: 5 TABLET ORAL at 09:05

## 2022-05-02 RX ADMIN — FLUCONAZOLE 150 MG: 100 TABLET ORAL at 03:05

## 2022-05-02 RX ADMIN — ENOXAPARIN SODIUM 80 MG: 80 INJECTION SUBCUTANEOUS at 01:05

## 2022-05-02 RX ADMIN — LISINOPRIL 20 MG: 20 TABLET ORAL at 08:05

## 2022-05-02 RX ADMIN — MAGNESIUM SULFATE IN WATER 2 G: 40 INJECTION, SOLUTION INTRAVENOUS at 12:05

## 2022-05-02 RX ADMIN — THERA TABS 1 TABLET: TAB at 12:05

## 2022-05-02 RX ADMIN — ASPIRIN 325 MG: 325 TABLET, COATED ORAL at 12:05

## 2022-05-02 RX ADMIN — FERROUS SULFATE TAB 325 MG (65 MG ELEMENTAL FE) 1 EACH: 325 (65 FE) TAB at 12:05

## 2022-05-02 RX ADMIN — LOPERAMIDE HYDROCHLORIDE 2 MG: 2 CAPSULE ORAL at 03:05

## 2022-05-02 RX ADMIN — METOPROLOL TARTRATE 12.5 MG: 25 TABLET, FILM COATED ORAL at 01:05

## 2022-05-02 RX ADMIN — POTASSIUM BICARBONATE 50 MEQ: 977.5 TABLET, EFFERVESCENT ORAL at 01:05

## 2022-05-02 RX ADMIN — CHLORHEXIDINE GLUCONATE 15 ML: 1.2 RINSE ORAL at 09:05

## 2022-05-02 RX ADMIN — CHLORHEXIDINE GLUCONATE 15 ML: 1.2 RINSE ORAL at 12:05

## 2022-05-02 RX ADMIN — ATORVASTATIN CALCIUM 20 MG: 20 TABLET, FILM COATED ORAL at 08:05

## 2022-05-02 RX ADMIN — FLUTICASONE FUROATE AND VILANTEROL TRIFENATATE 1 PUFF: 100; 25 POWDER RESPIRATORY (INHALATION) at 07:05

## 2022-05-02 RX ADMIN — MELATONIN 6 MG: at 09:05

## 2022-05-02 RX ADMIN — CITALOPRAM HYDROBROMIDE 20 MG: 20 TABLET ORAL at 12:05

## 2022-05-02 RX ADMIN — OXYBUTYNIN CHLORIDE 5 MG: 5 TABLET ORAL at 12:05

## 2022-05-02 RX ADMIN — MUPIROCIN: 20 OINTMENT TOPICAL at 12:05

## 2022-05-02 RX ADMIN — CETIRIZINE HYDROCHLORIDE 10 MG: 10 TABLET, FILM COATED ORAL at 12:05

## 2022-05-02 NOTE — NURSING
Pt arrived to room 2519 from icu at this time via wheelchair on room air and portable tele monitor with no s/s pain or distress. Pt is AAOx 4 and ambulatory. Pt ambulated to restroom at this time and is now sitting up on the side of bed

## 2022-05-02 NOTE — CONSULTS
Bilateral groin and abdominal skin fold red, moist.  Patient is using miconizole 2 % powder at this time.  Instructed to keep all areas clean and dry and use antifungal, cotton underwear, can use clean white t shirt to separate skin folds.

## 2022-05-02 NOTE — PLAN OF CARE
[facility]  Initial Discharge Assessment       Primary Care Provider: Abhi De Oliveira Iv, MD    Admission Diagnosis: NSTEMI (non-ST elevated myocardial infarction) [I21.4]    Admission Date: 4/30/2022  Expected Discharge Date: 5/3/2022    Discharge Barriers Identified: None    Assessment completed at bedside with pt and her adult daughter. Pt gave permission to discuss plan with her present. Pt lives with her  and wants to return home at discharge. Pt has a rolling walker from a prior knee surgery but she no longer uses it. Pt is not requesting any DME for discharge but pt and daughter expressed interest in being evaluated for home health services. POA/AD not discussed.     Payor: [employer] MANAGED MEDICARE / Plan: [employer] MEDICARE HMO / Product Type: Capitation /     Extended Emergency Contact Information  Primary Emergency Contact: Roni Cyr  Address:  [place] [address] [facility]  Home Phone: [phone]  Mobile Phone: [phone]  Relation: Spouse  Preferred language: English   needed? No  Secondary Emergency Contact: Christiano Zuleta  Mobile Phone: [phone]  Relation: Sister    Discharge Plan A: Home with family  Discharge Plan B: Home with family, Home Health      [facility] - [facility] [address]  [address] [facility] [address]  Phone: [phone] Fax: [phone]    [facility]. - [place], [place] - [address] [place] [address]  Phone: [phone] Fax: [phone]      Initial Assessment (most recent)       Adult Discharge Assessment - 05/02/22 1226          Discharge Assessment    Assessment Type Discharge Planning Assessment     Confirmed/corrected address, phone number and insurance Yes     Confirmed Demographics Correct on Facesheet     Source of Information patient;family     When was your last doctors appointment? --   last month w/ PCP    Communicated  ISAAK with patient/caregiver Date not available/Unable to determine     Reason For Admission related to prior admission, COPD     Lives With spouse     Facility Arrived From: home     Do you expect to return to your current living situation? Yes     Do you have help at home or someone to help you manage your care at home? Yes     Who are your caregiver(s) and their phone number(s)? Roni Cyr, [phone], spouse     Prior to hospitilization cognitive status: Alert/Oriented     Current cognitive status: Alert/Oriented     Walking or Climbing Stairs Difficulty none   moves slowly but is otherwise independent    Dressing/Bathing Difficulty none     Home Accessibility not wheelchair accessible     Home Layout Able to live on 1st floor     Equipment Currently Used at Home none   has a rolling walker from a prior knee surgery but does not use regularly    Readmission within 30 days? Yes     Patient currently being followed by outpatient case management? No     Do you currently have service(s) that help you manage your care at home? No     Do you take prescription medications? Yes     Do you have prescription coverage? Yes     Coverage [employer] medicare     Do you have any problems affording any of your prescribed medications? No     Is the patient taking medications as prescribed? yes     Who is going to help you get home at discharge? spouse or adult daughter     How do you get to doctors appointments? car, drives self;family or friend will provide     Are you on dialysis? No     Do you take coumadin? No     Discharge Plan A Home with family     Discharge Plan B Home with family;Home Health     DME Needed Upon Discharge  none     Discharge Plan discussed with: Patient;Adult children     Discharge Barriers Identified None                     Readmission Assessment (most recent)       Readmission Assessment - 05/02/22 1229          Readmission    Why were you hospitalized in the last 30 days? COPD (P)      Why were you  readmitted? Related to previous admission (P)      When you left the hospital how did you feel? ready to go (P)      When you left the hospital where did you go? Home with Family (P)      Did patient/caregiver refused recommended DC plan? No (P)      Tell me about what happened between when you left the hospital and the day you returned. went about life normally before symptoms started to return (P)      When did you start not feeling well? 4/30/22 (P)      Was this a planned readmission? No (P)

## 2022-05-02 NOTE — PROGRESS NOTES
[facility]  Adult Nutrition   Progress Note (Initial Assessment)     SUMMARY     Recommendations/Interventions:    Recommendation/Intervention: 1. Continue current diet as tolerated, encourage intake. 2.  to assist in meal choices daily.  Goals: 1. Patient to meet at least 75% of estimated needs through meal intake.  Nutrition Goal Status: new    Dietitian Rounds Brief:  · ICU Status. Patient presented with palpitations, lightheadedness. Admitted 2' NSTEMI. LHC planned for today? Appetite and intake are good-consuming % of meals. Will continue to monitor intake, labs, and plan of care.  Reason for Assessment  Reason For Assessment: identified at risk by screening criteria (ICU Status)  Relevant Medical History: T2DM, NSTEMI, abdominal aneurysm, DDD, GERD, HTN, HLD, COPD, iron deficiency anemia  Interdisciplinary Rounds: did not attend (ICU Status)    Nutrition Risk Screen  Nutrition Risk Screen: no indicators present    Nutrition/Diet History  Food Allergies: NKFA  Factors Affecting Nutritional Intake: None identified at this time    Anthropometrics  Temp: 98.9 °F (37.2 °C)  Height Method: Stated  Height: 5' (152.4 cm)  Height (inches): 60 in  Weight Method: Bed Scale  Weight: 96.2 kg (212 lb 1.3 oz)  Weight (lb): 212.08 lb  Ideal Body Weight (IBW), Female: 100 lb  % Ideal Body Weight, Female (lb): 212.08 %  BMI (Calculated): 41.4  BMI Grade: greater than 40 - morbid obesity     Weight History:  Wt Readings from Last 10 Encounters:   05/01/22 96.2 kg (212 lb 1.3 oz)   05/01/22 96.2 kg (212 lb 1.3 oz)   04/30/22 90.7 kg (199 lb 15.3 oz)   12/16/21 90.7 kg (200 lb)   12/06/21 88.5 kg (195 lb)   11/10/21 93 kg (205 lb)   06/03/21 93 kg (205 lb)   05/12/21 91.6 kg (202 lb)   05/04/21 91.6 kg (202 lb)   12/17/20 91.6 kg (202 lb)     Lab/Procedures/Meds: Pertinent Labs Reviewed  Clinical Chemistry:  Recent Labs   Lab 05/02/22  0411      K 3.8      CO2 28   *   BUN 13    CREATININE 0.4*   CALCIUM 8.7   PROT 5.3*   ALBUMIN 2.9*   BILITOT 1.0   ALKPHOS 43*   AST 34   ALT 34   ANIONGAP 10   ESTGFRAFRICA >60.0   EGFRNONAA >60.0   MG 1.6     CBC:   Recent Labs   Lab 05/02/22  0411   WBC 7.46   RBC 3.71*   HGB 8.9*   HCT 31.4*      MCV 85   MCH 24.0*   MCHC 28.3*     Cardiac Profile:  Recent Labs   Lab 04/30/22  1947 04/30/22  2125 05/01/22  0647 05/01/22  1309 05/01/22  1911   * 329*  --   --   --    TROPONINI 0.318* 0.549* 4.150* 3.181* 3.150*    < > = values in this interval not displayed.     Diabetes:  Recent Labs   Lab 05/01/22  0343   HGBA1C 6.2     Medications: Pertinent Medications reviewed  Scheduled Meds:   ascorbic acid (vitamin C)  1,000 mg Oral Daily    aspirin  325 mg Oral Daily    atorvastatin  20 mg Oral Daily    buPROPion  150 mg Oral Daily    cetirizine  10 mg Oral Daily    chlorhexidine  15 mL Mouth/Throat BID    citalopram  20 mg Oral Daily    donepeziL  5 mg Oral Nightly    enoxparin  80 mg Subcutaneous Q12H    ferrous sulfate  1 tablet Oral Daily    fluticasone furoate-vilanteroL  1 puff Inhalation Daily    lisinopriL  20 mg Oral Daily    multivitamin  1 tablet Oral Daily    mupirocin   Nasal BID    oxybutynin  5 mg Oral BID    pantoprazole  40 mg Oral Before breakfast     Continuous Infusions:  PRN Meds:.acetaminophen, albuterol sulfate, calcium chloride IVPB, calcium chloride IVPB, calcium chloride IVPB, dextrose 50%, glucagon (human recombinant), insulin aspart U-100, loperamide, magnesium oxide, magnesium sulfate IVPB, magnesium sulfate IVPB, magnesium sulfate IVPB, magnesium sulfate IVPB, melatonin, morphine, nitroGLYCERIN, ondansetron, potassium chloride in water, potassium chloride in water, potassium chloride in water, potassium chloride in water, potassium chloride, potassium chloride, potassium chloride, potassium chloride, senna-docusate 8.6-50 mg, sodium chloride 0.9%    Antibiotics (From admission, onward)             Start     Stop Route Frequency Ordered    05/02/22 0930  mupirocin 2 % ointment  (MRSA Decolonization Orders [facility])         05/07 0859 Nasl 2 times daily 05/02/22 0824        Estimated/Assessed Needs  Weight Used For Calorie Calculations: 96.2 kg (212 lb 1.3 oz)  Energy Calorie Requirements (kcal): 1924 kcals/day (20 kcals/kg)  Energy Need Method: Kcal/kg  Protein Requirements: 68-91 g/day (1.5-2.0 g/kg IBW)  Weight Used For Protein Calculations: 45.4 kg (100 lb) (IBW)  Fluid Requirements (mL): 1 mL/kcal or per MD  RDA Method (mL): 1924    Nutrition Prescription Ordered    Current Diet Order: Cardiac/ Diabetic Diet    Evaluation of Received Nutrient/Fluid Intake    Energy Calories Required: meeting needs  Protein Required: meeting needs  Fluid Required: meeting needs  Tolerance: tolerating  % Intake of Estimated Energy Needs: 75 - 100 %  % Meal Intake: 75 - 100 %    Intake/Output Summary (Last 24 hours) at 5/2/2022 0847  Last data filed at 5/2/2022 0400  Gross per 24 hour   Intake 1131.44 ml   Output 300 ml   Net 831.44 ml      Nutrition Risk    Level of Risk/Frequency of Follow-up: high   Monitor and Evaluation    Food and Nutrient Intake: energy intake, food and beverage intake  Food and Nutrient Adminstration: diet order  Physical Activity and Function: nutrition-related ADLs and IADLs, factors affecting access to physical activity  Anthropometric Measurements: weight, weight change, body mass index  Biochemical Data, Medical Tests and Procedures: electrolyte and renal panel, lipid profile, inflammatory profile, glucose/endocrine profile, gastrointestinal profile  Nutrition-Focused Physical Findings: overall appearance     Nutrition Follow-Up    RD Follow-up?: Yes  Rosemary Mann RD 05/02/2022 10:35 AM

## 2022-05-02 NOTE — PROGRESS NOTES
[facility] Medicine  Progress Note    Patient name: Daryleen G Moran  MRN: [id number]  Admit Date: 4/30/2022   LOS: 2 days     SUBJECTIVE:     Principal problem: NSTEMI (non-ST elevated myocardial infarction)    Interval History:  Patient was seen and examined bedside.  She went to cath lab however patient has groin Candida intertrigo, multiple attempts via radial approach were not successful and she was sent back to her room.  Upon my entering the room patient is busy eating her lunch, denies any active chest pain, hemodynamically stable.  Remains in sinus rhythm.     Scheduled Meds:   ascorbic acid (vitamin C)  1,000 mg Oral Daily    aspirin  325 mg Oral Daily    atorvastatin  20 mg Oral Daily    buPROPion  150 mg Oral Daily    cetirizine  10 mg Oral Daily    chlorhexidine  15 mL Mouth/Throat BID    citalopram  20 mg Oral Daily    donepeziL  5 mg Oral Nightly    enoxparin  80 mg Subcutaneous Q12H    ferrous sulfate  1 tablet Oral Daily    fluticasone furoate-vilanteroL  1 puff Inhalation Daily    lisinopriL  20 mg Oral Daily    magnesium sulfate IVPB  2 g Intravenous Once    multivitamin  1 tablet Oral Daily    mupirocin   Nasal BID    oxybutynin  5 mg Oral BID    pantoprazole  40 mg Oral Before breakfast    potassium bicarbonate  50 mEq Oral Once     Continuous Infusions:  PRN Meds:acetaminophen, albuterol sulfate, calcium chloride IVPB, calcium chloride IVPB, calcium chloride IVPB, dextrose 50%, glucagon (human recombinant), insulin aspart U-100, loperamide, magnesium oxide, magnesium sulfate IVPB, magnesium sulfate IVPB, magnesium sulfate IVPB, magnesium sulfate IVPB, melatonin, morphine, nitroGLYCERIN, ondansetron, potassium chloride in water, potassium chloride in water, potassium chloride in water, potassium chloride in water, potassium chloride, potassium chloride, potassium chloride, potassium chloride, senna-docusate 8.6-50 mg, sodium chloride 0.9%    Review of  patient's allergies indicates:   Allergen Reactions    Sulfacetamide sodium      Pain perineal area    Sulfasalazine Hives    Adhesive Itching     SKIN GETS RED WITH TAPE AND BANDAIDS    Adhesive tape-silicones Itching     SKIN GETS RED WITH TAPE AND BANDAIDS    Sulfa (sulfonamide antibiotics) Rash       Review of Systems: As per interval history    OBJECTIVE:     Vital Signs (Most Recent)  Temp: 98.9 °F (37.2 °C) (05/02/22 0701)  Pulse: 104 (05/02/22 1001)  Resp: 18 (05/02/22 0741)  BP: (!) 118/56 (05/02/22 1001)  SpO2: (!) 92 % (05/02/22 1001)    Vital Signs Range (Last 24H):  Temp:  [98 °F (36.7 °C)-98.9 °F (37.2 °C)]   Pulse:  []   Resp:  [18-43]   BP: ()/(42-70)   SpO2:  [91 %-100 %]     I & O (Last 24H):    Intake/Output Summary (Last 24 hours) at 5/2/2022 1033  Last data filed at 5/2/2022 0400  Gross per 24 hour   Intake 1131.44 ml   Output 300 ml   Net 831.44 ml       Physical Exam:  General: Patient resting comfortably in no acute distress. Appears as stated age. Calm, morbidly obese  Eyes: No conjunctival injection. No scleral icterus.  ENT: Hearing grossly intact. No discharge from ears. No nasal discharge.   Neck: Supple, trachea midline. No JVD  CVS: RRR. No LE edema BL  Lungs:  No tachypnea or accessory muscle use.  Clear to auscultation bilaterally  Abdomen:  Soft, nontender and nondistended.  No organomegaly  Neuro: AOx3. Moves all extremities. Follows commands. Responds appropriately   Skin:  Groin area Candida intertrigo noted    Laboratory:  I have reviewed all pertinent lab results within the past 24 hours.    Diagnostic Results:  Reviewed all imaging    ASSESSMENT/PLAN:     77-year-old morbidly obese lady transferred from outside facility being treated for new onset AFib, NSTEMI.  Coronary angiogram was attempted today however due to Candida intertrigo Cardiology attempted through wrist however due to difficult anatomy procedure was aborted.    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         Plan:   Troponin peaked at 4.1, currently chest pain-free, coronary angiogram was attempted today however due to difficult anatomy procedure was aborted, of note patient has Candida intertrigo  Topical antifungals, weekly Diflucan 4 doses  Will start low-dose beta-blocker considering AFib and NSTEMI  Continue aspirin, statin, BB.  May introduce lisinopril tomorrow if blood pressure permits  Continue weight based Lovenox  Continue essential home medications  Consult wound care  Telemetry monitoring  Daily weight, accurate charting of urine output  Transfer to cardiology B      VTE Risk Mitigation (From admission, onward)         Ordered     enoxaparin injection 80 mg  Every 12 hours (non-standard times)         05/01/22 1307     IP VTE HIGH RISK PATIENT  Once         05/01/22 0219     Place sequential compression device  Until discontinued         05/01/22 0219                    Department Hospital Medicine  [facility]  Michael Lacy MD  Date of service: 05/02/2022        Please note: This note was transcribed using voice recognition software. Because of this technology, there are often uinintended grammatical, spelling, and other transcription errors. Please disregard these errors.

## 2022-05-02 NOTE — PLAN OF CARE
05/01/22 2249   Patient Assessment/Suction   Level of Consciousness (AVPU) alert   Respiratory Effort Unlabored   Expansion/Accessory Muscles/Retractions expansion symmetric   All Lung Fields Breath Sounds clear   Rhythm/Pattern, Respiratory depth regular;pattern regular   Cough Frequency infrequent   Cough Type good;nonproductive   PRE-TX-O2   O2 Device (Oxygen Therapy) room air   $ Is the patient on Low Flow Oxygen? Yes   SpO2 100 %   Pulse Oximetry Type Continuous   $ Pulse Oximetry - Multiple Charge Pulse Oximetry - Multiple   Pulse 92   Resp (!) 22

## 2022-05-02 NOTE — NURSING
1020: Pt taken to cath lab in bed.  Pt stable at time of transfer, Alert and oriented X 4.  Daughter at bedside.  Pt only complaint is she is hungry..

## 2022-05-03 VITALS
HEIGHT: 60 IN | RESPIRATION RATE: 20 BRPM | DIASTOLIC BLOOD PRESSURE: 60 MMHG | HEART RATE: 72 BPM | WEIGHT: 212.06 LBS | BODY MASS INDEX: 41.63 KG/M2 | TEMPERATURE: 98 F | OXYGEN SATURATION: 92 % | SYSTOLIC BLOOD PRESSURE: 118 MMHG

## 2022-05-03 DIAGNOSIS — R79.1 ABNORMAL COAGULATION PROFILE: ICD-10-CM

## 2022-05-03 DIAGNOSIS — R79.89 ELEVATED TROPONIN: Primary | ICD-10-CM

## 2022-05-03 PROBLEM — I21.4 NSTEMI (NON-ST ELEVATED MYOCARDIAL INFARCTION): Status: RESOLVED | Noted: 2022-05-01 | Resolved: 2022-05-03

## 2022-05-03 LAB
ALBUMIN SERPL BCP-MCNC: 3.2 G/DL (ref 3.5–5.2)
ALP SERPL-CCNC: 45 U/L (ref 55–135)
ALT SERPL W/O P-5'-P-CCNC: 35 U/L (ref 10–44)
ANION GAP SERPL CALC-SCNC: 9 MMOL/L (ref 8–16)
AST SERPL-CCNC: 28 U/L (ref 10–40)
BASOPHILS # BLD AUTO: 0.04 K/UL (ref 0–0.2)
BASOPHILS NFR BLD: 0.5 % (ref 0–1.9)
BILIRUB SERPL-MCNC: 0.6 MG/DL (ref 0.1–1)
BUN SERPL-MCNC: 17 MG/DL (ref 8–23)
CALCIUM SERPL-MCNC: 8.7 MG/DL (ref 8.7–10.5)
CHLORIDE SERPL-SCNC: 99 MMOL/L (ref 95–110)
CO2 SERPL-SCNC: 28 MMOL/L (ref 23–29)
CREAT SERPL-MCNC: 0.5 MG/DL (ref 0.5–1.4)
DIFFERENTIAL METHOD: ABNORMAL
EOSINOPHIL # BLD AUTO: 0.3 K/UL (ref 0–0.5)
EOSINOPHIL NFR BLD: 4.1 % (ref 0–8)
ERYTHROCYTE [DISTWIDTH] IN BLOOD BY AUTOMATED COUNT: 25.2 % (ref 11.5–14.5)
EST. GFR  (AFRICAN AMERICAN): >60 ML/MIN/1.73 M^2
EST. GFR  (NON AFRICAN AMERICAN): >60 ML/MIN/1.73 M^2
GLUCOSE SERPL-MCNC: 135 MG/DL (ref 70–110)
GLUCOSE SERPL-MCNC: 136 MG/DL (ref 70–110)
GLUCOSE SERPL-MCNC: 139 MG/DL (ref 70–110)
HCT VFR BLD AUTO: 31 % (ref 37–48.5)
HGB BLD-MCNC: 8.7 G/DL (ref 12–16)
IMM GRANULOCYTES # BLD AUTO: 0.03 K/UL (ref 0–0.04)
IMM GRANULOCYTES NFR BLD AUTO: 0.4 % (ref 0–0.5)
LYMPHOCYTES # BLD AUTO: 1.5 K/UL (ref 1–4.8)
LYMPHOCYTES NFR BLD: 19.7 % (ref 18–48)
MCH RBC QN AUTO: 24.1 PG (ref 27–31)
MCHC RBC AUTO-ENTMCNC: 28.1 G/DL (ref 32–36)
MCV RBC AUTO: 86 FL (ref 82–98)
MONOCYTES # BLD AUTO: 0.5 K/UL (ref 0.3–1)
MONOCYTES NFR BLD: 6.7 % (ref 4–15)
NEUTROPHILS # BLD AUTO: 5.2 K/UL (ref 1.8–7.7)
NEUTROPHILS NFR BLD: 68.6 % (ref 38–73)
NRBC BLD-RTO: 0 /100 WBC
PLATELET # BLD AUTO: 228 K/UL (ref 150–450)
PMV BLD AUTO: 11.7 FL (ref 9.2–12.9)
POTASSIUM SERPL-SCNC: 4.1 MMOL/L (ref 3.5–5.1)
PROT SERPL-MCNC: 5.9 G/DL (ref 6–8.4)
RBC # BLD AUTO: 3.61 M/UL (ref 4–5.4)
SODIUM SERPL-SCNC: 136 MMOL/L (ref 136–145)
WBC # BLD AUTO: 7.63 K/UL (ref 3.9–12.7)

## 2022-05-03 PROCEDURE — 82962 GLUCOSE BLOOD TEST: CPT

## 2022-05-03 PROCEDURE — 63600175 PHARM REV CODE 636 W HCPCS: Performed by: HOSPITALIST

## 2022-05-03 PROCEDURE — 25000003 PHARM REV CODE 250: Performed by: NURSE PRACTITIONER

## 2022-05-03 PROCEDURE — 25000003 PHARM REV CODE 250

## 2022-05-03 PROCEDURE — 80053 COMPREHEN METABOLIC PANEL: CPT | Performed by: INTERNAL MEDICINE

## 2022-05-03 PROCEDURE — 99231 SBSQ HOSP IP/OBS SF/LOW 25: CPT | Mod: ,,, | Performed by: INTERNAL MEDICINE

## 2022-05-03 PROCEDURE — 85025 COMPLETE CBC W/AUTO DIFF WBC: CPT | Performed by: NURSE PRACTITIONER

## 2022-05-03 PROCEDURE — 36415 COLL VENOUS BLD VENIPUNCTURE: CPT | Performed by: INTERNAL MEDICINE

## 2022-05-03 PROCEDURE — 25000003 PHARM REV CODE 250: Performed by: HOSPITALIST

## 2022-05-03 PROCEDURE — 99231 PR SUBSEQUENT HOSPITAL CARE,LEVL I: ICD-10-PCS | Mod: ,,, | Performed by: INTERNAL MEDICINE

## 2022-05-03 RX ORDER — DIPHENHYDRAMINE HCL 25 MG
50 CAPSULE ORAL
Status: CANCELLED | OUTPATIENT
Start: 2022-05-03

## 2022-05-03 RX ORDER — DOXYLAMINE SUCCINATE 25 MG
TABLET ORAL 2 TIMES DAILY
Qty: 92 G | Refills: 1 | Status: SHIPPED | OUTPATIENT
Start: 2022-05-03 | End: 2022-11-22

## 2022-05-03 RX ORDER — MAGNESIUM SULFATE 1 G/100ML
1 INJECTION INTRAVENOUS ONCE
Status: COMPLETED | OUTPATIENT
Start: 2022-05-03 | End: 2022-05-03

## 2022-05-03 RX ORDER — FERROUS SULFATE TAB 325 MG (65 MG ELEMENTAL FE) 325 (65 FE) MG
325 TAB ORAL DAILY
Qty: 90 TABLET | Refills: 0 | Status: SHIPPED | OUTPATIENT
Start: 2022-05-03 | End: 2022-08-01

## 2022-05-03 RX ORDER — FLUCONAZOLE 150 MG/1
150 TABLET ORAL WEEKLY
Qty: 3 TABLET | Refills: 0 | Status: SHIPPED | OUTPATIENT
Start: 2022-05-09 | End: 2022-05-24

## 2022-05-03 RX ORDER — SODIUM CHLORIDE 450 MG/100ML
INJECTION, SOLUTION INTRAVENOUS CONTINUOUS
Status: CANCELLED | OUTPATIENT
Start: 2022-05-03

## 2022-05-03 RX ORDER — METOPROLOL TARTRATE 25 MG/1
12.5 TABLET, FILM COATED ORAL 2 TIMES DAILY
Qty: 30 TABLET | Refills: 0 | Status: SHIPPED | OUTPATIENT
Start: 2022-05-03 | End: 2022-11-22

## 2022-05-03 RX ADMIN — OXYBUTYNIN CHLORIDE 5 MG: 5 TABLET ORAL at 09:05

## 2022-05-03 RX ADMIN — ASPIRIN 325 MG: 325 TABLET, COATED ORAL at 09:05

## 2022-05-03 RX ADMIN — CHLORHEXIDINE GLUCONATE 15 ML: 1.2 RINSE ORAL at 09:05

## 2022-05-03 RX ADMIN — MICONAZOLE NITRATE: 20 CREAM TOPICAL at 09:05

## 2022-05-03 RX ADMIN — METOPROLOL TARTRATE 12.5 MG: 25 TABLET, FILM COATED ORAL at 09:05

## 2022-05-03 RX ADMIN — BUPROPION HYDROCHLORIDE 150 MG: 150 TABLET, FILM COATED, EXTENDED RELEASE ORAL at 09:05

## 2022-05-03 RX ADMIN — THERA TABS 1 TABLET: TAB at 09:05

## 2022-05-03 RX ADMIN — ATORVASTATIN CALCIUM 20 MG: 20 TABLET, FILM COATED ORAL at 09:05

## 2022-05-03 RX ADMIN — MAGNESIUM SULFATE IN DEXTROSE 1 G: 10 INJECTION, SOLUTION INTRAVENOUS at 11:05

## 2022-05-03 RX ADMIN — CITALOPRAM HYDROBROMIDE 20 MG: 20 TABLET ORAL at 09:05

## 2022-05-03 RX ADMIN — FERROUS SULFATE TAB 325 MG (65 MG ELEMENTAL FE) 1 EACH: 325 (65 FE) TAB at 09:05

## 2022-05-03 RX ADMIN — MUPIROCIN: 20 OINTMENT TOPICAL at 09:05

## 2022-05-03 NOTE — PROGRESS NOTES
Unable to complete LHC yesterday due to yeast in the groin and unable to cross properly in the radial due to tortuous arteries. Patient is stable with no complaints of chest pain.     Plan:  1. Continue with topical and oral antifungals.   2. Plan for LHC next week with Dr. Macias possibly via common femoral arteries..   Will plan for next Wednesday tentatively.

## 2022-05-03 NOTE — PLAN OF CARE
Important Message from Medicare was sign, explained and given to patient/caregiver on 05/03/2022 at 9:55am     addressed any questions or concerns.    Important Message from Medicare document will be scanned into patient's medical record

## 2022-05-03 NOTE — PLAN OF CARE
Patient provided with 30 day free trial coupon for Eliquis to present to pharmacy of choice.     Discharge orders and chart reviewed with no further post-acute discharge needs identified at this time.  At this time, patient is cleared for discharge from Case Management standpoint.        05/03/22 1245   Final Note   Assessment Type Final Discharge Note   Anticipated Discharge Disposition Home   Post-Acute Status   Post-Acute Authorization Other   Other Status See Comments   Discharge Delays None known at this time

## 2022-05-03 NOTE — NURSING
Dc instructions given, including medications. Pt verbalizes understanding. Pt Dc'ed home via wheelchair on room air to POV with daughter. Pt tolerated well. No s/s of pain or distress noted.

## 2022-05-03 NOTE — NURSING
"Patient refused to do prep for Angiogram this AM. Patient reports " I didn't have to take a shower or change my sheets yesterday. I'm not doing that now." Charge nurse made aware.  "

## 2022-05-03 NOTE — DISCHARGE SUMMARY
[facility] Medicine  Discharge Summary      Patient Name: Daryleen G Moran  MRN: [id number]  Patient Class: IP- Inpatient  Admission Date: 4/30/2022  Hospital Length of Stay: 3 days  Discharge Date and Time:  05/03/2022 4:14 PM  Attending Physician: Yesy att. providers found   Discharging Provider: Michael Lacy MD  Primary Care Provider: Abhi De Oliveira Iv, MD      HPI:   History was obtained from the patient and collateral obtained from the family present at the bedside and ER physician Sign-out.  Patient is a 77-year-old female with history of hypertension, hyperlipidemia, diabetes, aortic stenosis, COPD, are deficiency anemia who presents to the ED as a transfer from [facility] for cardiology consult.  The patient [place] had a sudden onset of weakness, palpitations and lightheadedness.  She states that she had not eaten since earlier that day so she thought she was just hungry.  She found to seat and ate a BLT sandwich without much improvement in her symptoms.  Episode lasted about 2 hours.  EMS was called and she was brought to the ED.  she denies any associated chest pain, shortness a breath, diaphoresis, nausea, or vomiting.     On arrival to outside hospital patient was found to be hypotensive and in AFib with RVR.  There was concern for ischemic changes on EKG.  She was treated with IV fluids and IVP metoprolol x2 with improvement in heart rate and symptoms.  Troponin was found to be elevated and she was transferred here for cardiology consult.     The patient reports that she was admitted to [facility] about 2-3 weeks ago and treated for COPD exacerbation and anemia requiring a blood transfusion.  She states she had weakness at that time so she thought her symptoms could be due to anemia.       Patient denies fever, chills, cough, abdominal pain, dysuria, hematuria, melena, rectal bleeding, headache, visual changes, focal  weakness, numbness, tingling, or LOC..            In the ED here patient is afebrile.  She is hemodynamically stable with blood pressure 119/53.  She is in sinus rhythm with heart rate 80s.  CBC with elevated WBC 17, H&H stable 9.7/34.    CMP with potassium 3.4, glucose 134  BNP is elevated at 329 and troponin is 0.5.  EKG shows normal sinus rhythm with diffuse nonspecific ST abnormalities.  ST elevation AVR.  ST depression Elena 1 and aVL  CXR with no airspace opacities and pleural effusion to the right lung base suggestive of pulmonary edema versus pneumonia.  Cardiology is notified of patient's arrival in a ED provider and recommends the patient be admitted to the ICU with heparin drip.      Procedure(s) (LRB):  Left heart cath (Left)  INCOMPLETE-PROCEDURE/ATTEMPTED      Hospital Course:   77-year-old morbidly obese lady transferred from other facility after she was found to have NSTEMI.  She was also found to have AFib with RVR on admission day however quickly converted to sinus rhythm.  In the beginning she was managed medically with heparin drip and other cardioprotective medications.  She went to cath lab yesterday however found to have Candida untreated ago in groin area and attempts through radial access were not successful due to torturous anatomy.  LHC was not performed.  Patient remained chest pain-free.  We started topical antifungal and Diflucan 150 mg once a week for 4 doses.  She was cleared for discharge by Cardiology on aspirin, Eliquis, beta-blocker.  I provided prescription of topical antifungal powder as well as 3 more doses of Diflucan 150 mg once a week.  Cardiology will plan outpatient angiogram.      Instructions provided to follow up with primary care physician as outpatient. Patient verbalized understanding and is aware to contact primary care physician or return to ED if new or worsening symptoms.    Physical exam on the day of discharge:  General: Patient resting comfortably in no acute  distress.  MORBIDLY OBESE  Lungs: CTA. Good air entry.  CVS: Regular rate and rhythm. No murmurs. No pedal edema.  Abd: Soft. Nontender. Non-distended.  Neuro: A&O x3. Moving all 4 extremities equally  Ext: No clubbing. No cyanosis.    Skin:  Candida intertrigo in groin area noted       Goals of Care Treatment Preferences:  Code Status: Full Code      Consults:   Consults (From admission, onward)        Status Ordering Provider     Inpatient consult to Cardiology  Once        Provider:  Jose Echols MD    Completed LIV ELIZABETH          No new Assessment & Plan notes have been filed under this hospital service since the last note was generated.  Service: Hospital Medicine    Final Active Diagnoses:    Diagnosis Date Noted POA    Paroxysmal atrial fibrillation [I48.0] 05/02/2022 Yes    Abdominal aneurysm [I71.4]  Yes     Chronic    Aortic stenosis [I35.0]  Yes     Chronic    COPD, mild [J44.9] 09/27/2016 Yes    Diabetes mellitus type II [E11.9] 09/13/2016 Yes    Hypertension [I10] 09/13/2016 Yes      Problems Resolved During this Admission:    Diagnosis Date Noted Date Resolved POA    PRINCIPAL PROBLEM:  NSTEMI (non-ST elevated myocardial infarction) [I21.4] 05/01/2022 05/03/2022 Yes       Discharged Condition: good    Disposition: Home or Self Care    Follow Up:   Follow-up Information     Brown Macias MD Follow up in 2 week(s).    Specialties: Interventional Cardiology, Cardiovascular Disease, Cardiology  Contact information:  [address] [address]  CARDIOLOGY [facility] [address]  [phone]             Abhi De Oliveira Iv, MD Follow up in 1 week(s).    Specialty: Family Medicine  Contact information:  [address]   [place] [address]  [phone]                       Patient Instructions:      Comprehensive metabolic panel   Standing Status: Future Standing Exp. Date: 06/30/23     CBC auto differential   Standing Status: Future Standing Exp. Date: 06/30/23      Urinalysis, Reflex to Urine Culture Urine, Clean Catch   Standing Status: Future Standing Exp. Date: 06/30/23     Order Specific Question Answer Comments   Preferred Collection Type Urine, Clean Catch    Specimen Source Urine      APTT   Standing Status: Future Standing Exp. Date: 06/30/23     Diet Cardiac     Notify your health care provider if you experience any of the following:  severe uncontrolled pain     Notify your health care provider if you experience any of the following:  difficulty breathing or increased cough     Activity as tolerated       Significant Diagnostic Studies: Labs: All labs within the past 24 hours have been reviewed    Pending Diagnostic Studies:     Procedure Component Value Units Date/Time    Procalcitonin [[id number]] Collected: 05/01/22 0033    Order Status: Sent Lab Status: In process Updated: 05/01/22 0040    Specimen: Blood          Medications:  Reconciled Home Medications:      Medication List      START taking these medications    apixaban 5 mg Tab  Commonly known as: ELIQUIS  Take 1 tablet (5 mg total) by mouth 2 (two) times daily.     fluconazole 150 MG Tab  Commonly known as: DIFLUCAN  Take 1 tablet (150 mg total) by mouth once a week. for 3 doses  Start taking on: May 9, 2022     metoprolol tartrate 25 MG tablet  Commonly known as: LOPRESSOR  Take 0.5 tablets (12.5 mg total) by mouth 2 (two) times daily.     miconazole 2 % cream  Commonly known as: MICOTIN  Apply topically 2 (two) times daily. for 7 days        CHANGE how you take these medications    FeroSuL 325 mg (65 mg iron) Tab tablet  Generic drug: ferrous sulfate  Take 1 tablet (325 mg total) by mouth once daily.  What changed:   · how much to take  · when to take this        CONTINUE taking these medications    albuterol 90 mcg/actuation inhaler  Commonly known as: PROVENTIL/VENTOLIN HFA  Inhale 2 puffs into the lungs every 6 (six) hours as needed.     ALLERGY RELIEF (FEXOFENADINE) 180 MG tablet  Generic drug:  fexofenadine  Take 180 mg by mouth once daily.     ascorbic acid (vitamin C) 1000 MG tablet  Commonly known as: VITAMIN C  Take 1,000 mg by mouth Daily.     aspirin 81 MG EC tablet  Commonly known as: ECOTRIN  Take 81 mg by mouth once daily.     budesonide-formoterol 80-4.5 mcg 80-4.5 mcg/actuation Hfaa  Commonly known as: SYMBICORT  Inhale 2 puffs into the lungs 2 (two) times daily.     buPROPion 150 MG TB24 tablet  Commonly known as: WELLBUTRIN XL  Take 150 mg by mouth.     calcium carbonate 600 mg calcium (1,500 mg) Tab  Commonly known as: OS-TK  Take 600 mg by mouth 2 (two) times daily with meals.     citalopram 40 MG tablet  Commonly known as: CeleXA  Take 1 tablet (40 mg total) by mouth once daily.     donepeziL 5 MG tablet  Commonly known as: ARICEPT  Take 5 mg by mouth nightly.     glucosamine hawkins 2KCl-chondroit 500-400 mg Tab  Take 1 tablet by mouth.     glucosamine-chondroitin 500-400 mg tablet  Take 1 tablet by mouth 3 (three) times daily.     HYDROcodone-acetaminophen 5-325 mg per tablet  Commonly known as: NORCO  Take 1 tablet by mouth every 6 (six) hours as needed for Pain.     lisinopriL 20 MG tablet  Commonly known as: PRINIVIL,ZESTRIL  Take 20 mg by mouth every evening.     metFORMIN 500 MG tablet  Commonly known as: GLUCOPHAGE  Take 1 tablet (500 mg total) by mouth 2 (two) times daily with meals.     multivitamin capsule  Take 1 capsule by mouth once daily.     mupirocin 2 % ointment  Commonly known as: BACTROBAN  Apply topically 2 (two) times daily.     omeprazole 20 MG capsule  Commonly known as: PRILOSEC  Take 1 capsule (20 mg total) by mouth once daily.     oxybutynin 5 MG Tab  Commonly known as: DITROPAN  Take 1 tablet (5 mg total) by mouth 2 (two) times daily.     simvastatin 40 MG tablet  Commonly known as: ZOCOR  Take 1 tablet (40 mg total) by mouth every evening.        STOP taking these medications    aspirin-calcium carbonate 81 mg-300 mg calcium(777 mg) Tab     benazepriL 20 MG  tablet  Commonly known as: LOTENSIN     ciprofloxacin HCl 500 MG tablet  Commonly known as: CIPRO     clindamycin 300 MG capsule  Commonly known as: CLEOCIN     doxycycline 100 MG tablet  Commonly known as: VIBRA-TABS     doxycycline 50 MG Cap  Commonly known as: MONODOX     ergocalciferol 50,000 unit Cap  Commonly known as: ERGOCALCIFEROL     ezetimibe-simvastatin 10-40 mg 10-40 mg per tablet  Commonly known as: VYTORIN     lisinopriL-hydrochlorothiazide 20-12.5 mg per tablet  Commonly known as: PRINZIDE,ZESTORETIC     predniSONE 20 MG tablet  Commonly known as: DELTASONE            Indwelling Lines/Drains at time of discharge:   Lines/Drains/Airways     None                 Time spent on the discharge of patient: 42 minutes         Michael Lacy MD  Department of Hospital Medicine  [facility]

## 2022-05-04 ENCOUNTER — TELEPHONE (OUTPATIENT)
Dept: CARDIOLOGY | Facility: CLINIC | Age: 77
End: 2022-05-04
Payer: MEDICARE

## 2022-05-04 NOTE — TELEPHONE ENCOUNTER
[facility] 5/11/22 at 830am  preop 5/9 at 12pm  S/w pt & advised on times & dates. Advised to hold the eliquis 2 days before procedure.  Advised if she has a rash still Monday morning to give me a call if no rash go ahead to pre op

## 2022-05-05 ENCOUNTER — HOSPITAL ENCOUNTER (INPATIENT)
Facility: HOSPITAL | Age: 77
LOS: 1 days | Discharge: HOME | DRG: 282 | End: 2022-05-06
Attending: INTERNAL MEDICINE | Admitting: INTERNAL MEDICINE
Payer: MEDICARE

## 2022-05-05 DIAGNOSIS — R07.9 CHEST PAIN: ICD-10-CM

## 2022-05-05 DIAGNOSIS — I21.4 NSTEMI (NON-ST ELEVATED MYOCARDIAL INFARCTION): ICD-10-CM

## 2022-05-05 DIAGNOSIS — I35.0 AORTIC VALVE STENOSIS, ETIOLOGY OF CARDIAC VALVE DISEASE UNSPECIFIED: Primary | Chronic | ICD-10-CM

## 2022-05-05 DIAGNOSIS — D53.9 ANEMIA ASSOCIATED WITH NUTRITIONAL DEFICIENCY: ICD-10-CM

## 2022-05-05 DIAGNOSIS — R06.02 SOB (SHORTNESS OF BREATH): ICD-10-CM

## 2022-05-05 LAB
ANION GAP SERPL CALC-SCNC: 11 MMOL/L (ref 8–16)
ANION GAP SERPL CALC-SCNC: 13 MMOL/L (ref 8–16)
BASOPHILS # BLD AUTO: 0.03 K/UL (ref 0–0.2)
BASOPHILS NFR BLD: 0.5 % (ref 0–1.9)
BNP SERPL-MCNC: 509 PG/ML (ref 0–99)
BUN SERPL-MCNC: 10 MG/DL (ref 8–23)
BUN SERPL-MCNC: 9 MG/DL (ref 8–23)
CALCIUM SERPL-MCNC: 8.7 MG/DL (ref 8.7–10.5)
CALCIUM SERPL-MCNC: 8.9 MG/DL (ref 8.7–10.5)
CHLORIDE SERPL-SCNC: 95 MMOL/L (ref 95–110)
CHLORIDE SERPL-SCNC: 95 MMOL/L (ref 95–110)
CO2 SERPL-SCNC: 27 MMOL/L (ref 23–29)
CO2 SERPL-SCNC: 29 MMOL/L (ref 23–29)
CREAT SERPL-MCNC: 0.5 MG/DL (ref 0.5–1.4)
CREAT SERPL-MCNC: 0.5 MG/DL (ref 0.5–1.4)
DIFFERENTIAL METHOD: ABNORMAL
EOSINOPHIL # BLD AUTO: 0.2 K/UL (ref 0–0.5)
EOSINOPHIL NFR BLD: 3.8 % (ref 0–8)
ERYTHROCYTE [DISTWIDTH] IN BLOOD BY AUTOMATED COUNT: 25.6 % (ref 11.5–14.5)
EST. GFR  (AFRICAN AMERICAN): >60 ML/MIN/1.73 M^2
EST. GFR  (AFRICAN AMERICAN): >60 ML/MIN/1.73 M^2
EST. GFR  (NON AFRICAN AMERICAN): >60 ML/MIN/1.73 M^2
EST. GFR  (NON AFRICAN AMERICAN): >60 ML/MIN/1.73 M^2
GLUCOSE SERPL-MCNC: 120 MG/DL (ref 70–110)
GLUCOSE SERPL-MCNC: 128 MG/DL (ref 70–110)
HCT VFR BLD AUTO: 33 % (ref 37–48.5)
HGB BLD-MCNC: 9.7 G/DL (ref 12–16)
IMM GRANULOCYTES # BLD AUTO: 0.02 K/UL (ref 0–0.04)
IMM GRANULOCYTES NFR BLD AUTO: 0.3 % (ref 0–0.5)
INR PPP: 1.1
LYMPHOCYTES # BLD AUTO: 1 K/UL (ref 1–4.8)
LYMPHOCYTES NFR BLD: 17.4 % (ref 18–48)
MCH RBC QN AUTO: 24.4 PG (ref 27–31)
MCHC RBC AUTO-ENTMCNC: 29.4 G/DL (ref 32–36)
MCV RBC AUTO: 83 FL (ref 82–98)
MONOCYTES # BLD AUTO: 0.5 K/UL (ref 0.3–1)
MONOCYTES NFR BLD: 8.7 % (ref 4–15)
NEUTROPHILS # BLD AUTO: 4.1 K/UL (ref 1.8–7.7)
NEUTROPHILS NFR BLD: 69.3 % (ref 38–73)
NRBC BLD-RTO: 0 /100 WBC
PLATELET # BLD AUTO: 218 K/UL (ref 150–450)
PMV BLD AUTO: 10.4 FL (ref 9.2–12.9)
POTASSIUM SERPL-SCNC: 3.6 MMOL/L (ref 3.5–5.1)
POTASSIUM SERPL-SCNC: 3.6 MMOL/L (ref 3.5–5.1)
PROTHROMBIN TIME: 13.2 SEC (ref 11.4–13.7)
RBC # BLD AUTO: 3.97 M/UL (ref 4–5.4)
SODIUM SERPL-SCNC: 135 MMOL/L (ref 136–145)
SODIUM SERPL-SCNC: 135 MMOL/L (ref 136–145)
TROPONIN I SERPL DL<=0.01 NG/ML-MCNC: 1.37 NG/ML
WBC # BLD AUTO: 5.86 K/UL (ref 3.9–12.7)

## 2022-05-05 PROCEDURE — 83880 ASSAY OF NATRIURETIC PEPTIDE: CPT | Performed by: INTERNAL MEDICINE

## 2022-05-05 PROCEDURE — 99223 PR INITIAL HOSPITAL CARE,LEVL III: ICD-10-PCS | Mod: ,,, | Performed by: GENERAL PRACTICE

## 2022-05-05 PROCEDURE — 36415 COLL VENOUS BLD VENIPUNCTURE: CPT | Performed by: HOSPITALIST

## 2022-05-05 PROCEDURE — 21400001 HC TELEMETRY ROOM

## 2022-05-05 PROCEDURE — 85025 COMPLETE CBC W/AUTO DIFF WBC: CPT | Performed by: HOSPITALIST

## 2022-05-05 PROCEDURE — 25000003 PHARM REV CODE 250: Performed by: INTERNAL MEDICINE

## 2022-05-05 PROCEDURE — 93010 EKG 12-LEAD: ICD-10-PCS | Mod: ,,, | Performed by: GENERAL PRACTICE

## 2022-05-05 PROCEDURE — 27000221 HC OXYGEN, UP TO 24 HOURS

## 2022-05-05 PROCEDURE — 99223 1ST HOSP IP/OBS HIGH 75: CPT | Mod: ,,, | Performed by: GENERAL PRACTICE

## 2022-05-05 PROCEDURE — 84484 ASSAY OF TROPONIN QUANT: CPT | Performed by: HOSPITALIST

## 2022-05-05 PROCEDURE — 36415 COLL VENOUS BLD VENIPUNCTURE: CPT | Performed by: INTERNAL MEDICINE

## 2022-05-05 PROCEDURE — 80048 BASIC METABOLIC PNL TOTAL CA: CPT | Mod: 91 | Performed by: HOSPITALIST

## 2022-05-05 PROCEDURE — 85610 PROTHROMBIN TIME: CPT | Performed by: HOSPITALIST

## 2022-05-05 PROCEDURE — 99900035 HC TECH TIME PER 15 MIN (STAT)

## 2022-05-05 PROCEDURE — 94799 UNLISTED PULMONARY SVC/PX: CPT

## 2022-05-05 PROCEDURE — 93005 ELECTROCARDIOGRAM TRACING: CPT | Performed by: GENERAL PRACTICE

## 2022-05-05 PROCEDURE — 94761 N-INVAS EAR/PLS OXIMETRY MLT: CPT

## 2022-05-05 PROCEDURE — 80048 BASIC METABOLIC PNL TOTAL CA: CPT | Performed by: INTERNAL MEDICINE

## 2022-05-05 PROCEDURE — 63600175 PHARM REV CODE 636 W HCPCS: Performed by: INTERNAL MEDICINE

## 2022-05-05 PROCEDURE — 99900031 HC PATIENT EDUCATION (STAT)

## 2022-05-05 PROCEDURE — 93010 ELECTROCARDIOGRAM REPORT: CPT | Mod: ,,, | Performed by: GENERAL PRACTICE

## 2022-05-05 RX ORDER — FUROSEMIDE 10 MG/ML
20 INJECTION INTRAMUSCULAR; INTRAVENOUS DAILY
Status: DISCONTINUED | OUTPATIENT
Start: 2022-05-05 | End: 2022-05-06

## 2022-05-05 RX ORDER — HYDROCODONE BITARTRATE AND ACETAMINOPHEN 5; 325 MG/1; MG/1
1 TABLET ORAL EVERY 6 HOURS PRN
Status: DISCONTINUED | OUTPATIENT
Start: 2022-05-05 | End: 2022-05-06 | Stop reason: HOSPADM

## 2022-05-05 RX ORDER — FLUCONAZOLE 50 MG/1
150 TABLET ORAL WEEKLY
Status: DISCONTINUED | OUTPATIENT
Start: 2022-05-05 | End: 2022-05-06 | Stop reason: HOSPADM

## 2022-05-05 RX ORDER — LEVALBUTEROL INHALATION SOLUTION 0.63 MG/3ML
0.63 SOLUTION RESPIRATORY (INHALATION)
Status: DISCONTINUED | OUTPATIENT
Start: 2022-05-05 | End: 2022-05-05

## 2022-05-05 RX ORDER — LEVALBUTEROL INHALATION SOLUTION 0.63 MG/3ML
0.63 SOLUTION RESPIRATORY (INHALATION) 3 TIMES DAILY PRN
Status: DISCONTINUED | OUTPATIENT
Start: 2022-05-05 | End: 2022-05-06 | Stop reason: HOSPADM

## 2022-05-05 RX ORDER — DOXYLAMINE SUCCINATE 25 MG
TABLET ORAL 2 TIMES DAILY
Status: DISCONTINUED | OUTPATIENT
Start: 2022-05-05 | End: 2022-05-06 | Stop reason: HOSPADM

## 2022-05-05 RX ORDER — LISINOPRIL 20 MG/1
20 TABLET ORAL NIGHTLY
Status: DISCONTINUED | OUTPATIENT
Start: 2022-05-05 | End: 2022-05-06 | Stop reason: HOSPADM

## 2022-05-05 RX ORDER — BUPROPION HYDROCHLORIDE 150 MG/1
150 TABLET ORAL DAILY
Status: DISCONTINUED | OUTPATIENT
Start: 2022-05-05 | End: 2022-05-06 | Stop reason: HOSPADM

## 2022-05-05 RX ORDER — CITALOPRAM 20 MG/1
20 TABLET, FILM COATED ORAL DAILY
Status: DISCONTINUED | OUTPATIENT
Start: 2022-05-05 | End: 2022-05-06 | Stop reason: HOSPADM

## 2022-05-05 RX ORDER — FLUTICASONE FUROATE AND VILANTEROL 100; 25 UG/1; UG/1
1 POWDER RESPIRATORY (INHALATION) DAILY
Status: DISCONTINUED | OUTPATIENT
Start: 2022-05-05 | End: 2022-05-06 | Stop reason: HOSPADM

## 2022-05-05 RX ORDER — ASPIRIN 81 MG/1
81 TABLET ORAL DAILY
Status: DISCONTINUED | OUTPATIENT
Start: 2022-05-05 | End: 2022-05-06 | Stop reason: HOSPADM

## 2022-05-05 RX ORDER — ATORVASTATIN CALCIUM 40 MG/1
40 TABLET, FILM COATED ORAL DAILY
Status: DISCONTINUED | OUTPATIENT
Start: 2022-05-05 | End: 2022-05-06 | Stop reason: HOSPADM

## 2022-05-05 RX ORDER — ALBUTEROL SULFATE 90 UG/1
2 AEROSOL, METERED RESPIRATORY (INHALATION) EVERY 6 HOURS PRN
Status: DISCONTINUED | OUTPATIENT
Start: 2022-05-05 | End: 2022-05-06 | Stop reason: HOSPADM

## 2022-05-05 RX ORDER — METOPROLOL TARTRATE 25 MG/1
12.5 TABLET ORAL 2 TIMES DAILY
Status: DISCONTINUED | OUTPATIENT
Start: 2022-05-05 | End: 2022-05-06 | Stop reason: HOSPADM

## 2022-05-05 RX ORDER — DONEPEZIL HYDROCHLORIDE 5 MG/1
5 TABLET, FILM COATED ORAL NIGHTLY
Status: DISCONTINUED | OUTPATIENT
Start: 2022-05-05 | End: 2022-05-06 | Stop reason: HOSPADM

## 2022-05-05 RX ORDER — OXYBUTYNIN CHLORIDE 5 MG/1
5 TABLET ORAL 2 TIMES DAILY
Status: DISCONTINUED | OUTPATIENT
Start: 2022-05-05 | End: 2022-05-06 | Stop reason: HOSPADM

## 2022-05-05 RX ORDER — SODIUM CHLORIDE 0.9 % (FLUSH) 0.9 %
10 SYRINGE (ML) INJECTION
Status: DISCONTINUED | OUTPATIENT
Start: 2022-05-05 | End: 2022-05-06 | Stop reason: HOSPADM

## 2022-05-05 RX ADMIN — DONEPEZIL HYDROCHLORIDE 5 MG: 5 TABLET, FILM COATED ORAL at 01:05

## 2022-05-05 RX ADMIN — DONEPEZIL HYDROCHLORIDE 5 MG: 5 TABLET, FILM COATED ORAL at 09:05

## 2022-05-05 RX ADMIN — OXYBUTYNIN CHLORIDE 5 MG: 5 TABLET ORAL at 09:05

## 2022-05-05 RX ADMIN — CITALOPRAM HYDROBROMIDE 20 MG: 20 TABLET ORAL at 09:05

## 2022-05-05 RX ADMIN — BUPROPION HYDROCHLORIDE 150 MG: 150 TABLET, FILM COATED, EXTENDED RELEASE ORAL at 09:05

## 2022-05-05 RX ADMIN — LISINOPRIL 20 MG: 20 TABLET ORAL at 09:05

## 2022-05-05 RX ADMIN — FUROSEMIDE 20 MG: 10 INJECTION, SOLUTION INTRAVENOUS at 09:05

## 2022-05-05 RX ADMIN — FLUCONAZOLE 150 MG: 50 TABLET ORAL at 09:05

## 2022-05-05 RX ADMIN — ATORVASTATIN CALCIUM 40 MG: 40 TABLET, FILM COATED ORAL at 09:05

## 2022-05-05 RX ADMIN — METOPROLOL TARTRATE 12.5 MG: 25 TABLET, FILM COATED ORAL at 09:05

## 2022-05-05 RX ADMIN — MICONAZOLE NITRATE: 20 CREAM TOPICAL at 09:05

## 2022-05-05 RX ADMIN — ASPIRIN 81 MG: 81 TABLET, DELAYED RELEASE ORAL at 09:05

## 2022-05-05 RX ADMIN — LISINOPRIL 20 MG: 20 TABLET ORAL at 01:05

## 2022-05-05 NOTE — CONSULTS
[facility]  Department of Cardiology  Consult Note      PATIENT NAME: Daryleen G Moran  MRN: [id number]  TODAY'S DATE: 05/05/2022  ADMIT DATE: 5/5/2022                          CONSULT REQUESTED BY: Wili Burgess DO    SUBJECTIVE     PRINCIPAL PROBLEM: SOB (shortness of breath)      REASON FOR CONSULT:  SOB     HPI:  77 year old F who was recently admitted with NSTEMI present again due to SOB. She states she was fine the day after she returned home from the hospital (Monday evening), but yesterday started to get very SOB with minimal exertion. She has been given a nebulizer treatment and IV lasix and has noticed much improvement. She is still on NC but seems as if she does not need it any longer. The yeast in her groin has improved. Hgb 9.7, has received transfusion/iron in the past.  , troponin 1.371. No significant ST changes found on EKG from this morning.     Per H and P:   77-year-old female with history of hypertension, hyperlipidemia, diabetes, aortic stenosis, COPD, are deficiency anemia was transferred from [facility] overnight with shortness of breath.  She was recently discharged yesterday after cardiac evaluation and LHC was not able to do because both radial and femoral access are not amenable.  She was also found  AFib with RVR and There was concern for ischemic changes on EKG.   See presented there with shortness of breath and given Lasix and also nebulizer treatment and currently she is on 2 L and noted no shortness of breath and talking spontaneously.  She had history of COPD  Troponin elevation was noted but it was elevated since previous admission .  LHC was failed because  Candida untreated in groin area and attempts through radial access were not successful due to torturous anatomy.     Review of patient's allergies indicates:   Allergen Reactions    Sulfacetamide sodium      Pain perineal area    Sulfasalazine Hives    Adhesive Itching     SKIN GETS RED WITH TAPE  AND BANDAIDS    Adhesive tape-silicones Itching     SKIN GETS RED WITH TAPE AND BANDAIDS    Sulfa (sulfonamide antibiotics) Rash       Past Medical History:   Diagnosis Date    Aortic stenosis     Arthritis     Back pain     Diabetes mellitus type II     Hyperlipidemia     Hypertension     NSTEMI (non-ST elevated myocardial infarction) 5/1/2022    Osteoporosis     Wears glasses      Past Surgical History:   Procedure Laterality Date     vocal cord nodules removed  long time ago     twice    APPENDECTOMY  within last 5yrs    FIXATION KYPHOPLASTY THORACIC SPINE      8-20-13    FOOT SURGERY      left 2nd toe was too long     HERNIA REPAIR  within last 5yrs    LEFT HEART CATHETERIZATION Left 5/2/2022    Procedure: Left heart cath;  Surgeon: Jose Echols MD;  Location: [facility] CATH/EP LAB;  Service: Cardiology;  Laterality: Left;    TONSILLECTOMY, ADENOIDECTOMY  long time ago     Social History     Tobacco Use    Smoking status: Current Every Day Smoker     Packs/day: 0.50     Years: 35.00     Pack years: 17.50    Smokeless tobacco: Never Used   Substance Use Topics    Alcohol use: No    Drug use: No        REVIEW OF SYSTEMS  CONSTITUTIONAL: Negative for chills, fatigue and fever.   EYES: No double vision, No blurred vision  NEURO: No headaches, No dizziness  RESPIRATORY:+ SOB. Negative for cough and wheezing.    CARDIOVASCULAR: Negative for chest pain. Negative for palpitations and leg swelling.   GI: Negative for abdominal pain, No melena, diarrhea, nausea and vomiting.   : Negative for dysuria and frequency, Negative for hematuria  SKIN: Negative for bruising, Negative for edema or discoloration noted.   ENDOCRINE: Negative for polyphagia, Negative for heat intolerance, Negative for cold intolerance  PSYCHIATRIC: Negative for depression, Negative for anxiety, Negative for memory loss  MUSCULOSKELETAL: Negative for neck pain, Negative for muscle weakness, Negative for back pain     OBJECTIVE     VITAL SIGNS (Most  Recent)  Temp: 97.8 °F (36.6 °C) (05/05/22 0812)  Pulse: 79 (05/05/22 0927)  Resp: 18 (05/05/22 0812)  BP: (!) 118/56 (05/05/22 0927)  SpO2: 96 % (05/05/22 0812)    VENTILATION STATUS  Resp: 18 (05/05/22 0812)  SpO2: 96 % (05/05/22 0812)       I & O (Last 24H):    Intake/Output Summary (Last 24 hours) at 5/5/2022 1200  Last data filed at 5/5/2022 0400  Gross per 24 hour   Intake --   Output 0 ml   Net 0 ml       WEIGHTS  Wt Readings from Last 3 Encounters:   05/05/22 0121 93.9 kg (207 lb)   05/04/22 1840 95.7 kg (211 lb)   05/01/22 0130 96.2 kg (212 lb 1.3 oz)   04/30/22 2105 99.8 kg (220 lb)       PHYSICAL EXAM  GENERAL: well built, well nourished, well-developed in no apparent distress alert and oriented.   HEENT: Normocephalic. Pupils normal and conjunctivae normal.  Mucous membranes normal, no cyanosis or icterus, trachea central,no pallor or icterus is noted..   NECK: No JVD. No bruit..   CARDIAC: Regular rate and rhythm. S1 is normal.S2 is normal.Systolic murmur   CHEST ANATOMY: normal.   LUNGS: Clear to auscultation. No wheezing or rhonchi..   ABDOMEN: Soft no masses or organomegaly.  No abdomen pulsations or bruits.  Normal bowel sounds. No pulsations and no masses felt, No guarding or rebound.   URINARY: No gutierrez catheter   EXTREMITIES: No cyanosis, clubbing or edema noted at this time., no calf tenderness bilaterally.   PERIPHERAL VASCULAR SYSTEM: Good palpable distal pulses.   CENTRAL NERVOUS SYSTEM: No focal motor or sensory deficits noted.   SKIN: Skin without lesions, moist, well perfused.   MUSCLE STRENGTH & TONE: No noteable weakness, atrophy or abnormal movement.     HOME MEDICATIONS:  No current facility-administered medications on file prior to encounter.     Current Outpatient Medications on File Prior to Encounter   Medication Sig Dispense Refill    albuterol (PROVENTIL/VENTOLIN HFA) 90 mcg/actuation inhaler Inhale 2 puffs into the lungs every 6 (six) hours as needed.      ALLERGY RELIEF,  FEXOFENADINE, 180 mg tablet Take 180 mg by mouth once daily.      apixaban (ELIQUIS) 5 mg Tab Take 1 tablet (5 mg total) by mouth 2 (two) times daily. 60 tablet 0    ascorbic acid, vitamin C, (VITAMIN C) 1000 MG tablet Take 1,000 mg by mouth Daily.      aspirin (ECOTRIN) 81 MG EC tablet Take 81 mg by mouth once daily.      budesonide-formoterol 80-4.5 mcg (SYMBICORT) 80-4.5 mcg/actuation HFAA Inhale 2 puffs into the lungs 2 (two) times daily.      buPROPion (WELLBUTRIN XL) 150 MG TB24 tablet Take 150 mg by mouth.      calcium carbonate (OS-TK) 600 mg (1,500 mg) Tab Take 600 mg by mouth 2 (two) times daily with meals.      citalopram (CELEXA) 40 MG tablet Take 1 tablet (40 mg total) by mouth once daily. 90 tablet 3    donepeziL (ARICEPT) 5 MG tablet Take 5 mg by mouth nightly.      ergocalciferol (ERGOCALCIFEROL) 50,000 unit Cap TAKE 1 CAPSULE (50,000 UNITS TOTAL) EVERY 7 DAYS. 12 capsule 3    FEROSUL 325 mg (65 mg iron) Tab tablet Take 1 tablet (325 mg total) by mouth once daily. 90 tablet 0    [START ON 5/9/2022] fluconazole (DIFLUCAN) 150 MG Tab Take 1 tablet (150 mg total) by mouth once a week. for 3 doses 3 tablet 0    glucosamine hawkins 2KCl-chondroit 500-400 mg Tab Take 1 tablet by mouth.      glucosamine-chondroitin 500-400 mg tablet Take 1 tablet by mouth 3 (three) times daily.      HYDROcodone-acetaminophen (NORCO) 5-325 mg per tablet Take 1 tablet by mouth every 6 (six) hours as needed for Pain. 12 tablet 0    lisinopril (PRINIVIL,ZESTRIL) 20 MG tablet Take 20 mg by mouth every evening.       metFORMIN (GLUCOPHAGE) 500 MG tablet Take 1 tablet (500 mg total) by mouth 2 (two) times daily with meals. 180 tablet 3    metoprolol tartrate (LOPRESSOR) 25 MG tablet Take 0.5 tablets (12.5 mg total) by mouth 2 (two) times daily. 30 tablet 0    miconazole (MICOTIN) 2 % cream Apply topically 2 (two) times daily. for 7 days 92 g 1    multivitamin capsule Take 1 capsule by mouth once daily.      mupirocin (BACTROBAN) 2 %  ointment Apply topically 2 (two) times daily.      omeprazole (PRILOSEC) 20 MG capsule Take 1 capsule (20 mg total) by mouth once daily. 90 capsule 3    oxybutynin (DITROPAN) 5 MG Tab Take 1 tablet (5 mg total) by mouth 2 (two) times daily. 180 tablet 3    simvastatin (ZOCOR) 40 MG tablet Take 1 tablet (40 mg total) by mouth every evening. 90 tablet 3    [DISCONTINUED] ezetimibe-simvastatin 10-40 mg (VYTORIN) 10-40 mg per tablet Take 1 tablet by mouth.      [DISCONTINUED] lisinopril-hydrochlorothiazide (PRINZIDE,ZESTORETIC) 20-12.5 mg per tablet Take 1 tablet by mouth once daily.          SCHEDULED MEDS:   aspirin  81 mg Oral Daily    atorvastatin  40 mg Oral Daily    buPROPion  150 mg Oral Daily    citalopram  20 mg Oral Daily    donepeziL  5 mg Oral Nightly    fluconazole  150 mg Oral Weekly    fluticasone furoate-vilanteroL  1 puff Inhalation Daily    furosemide (LASIX) injection  20 mg Intravenous Daily    lisinopriL  20 mg Oral QHS    metoprolol tartrate  12.5 mg Oral BID    miconazole   Topical (Top) BID    oxybutynin  5 mg Oral BID       CONTINUOUS INFUSIONS:    PRN MEDS:albuterol, HYDROcodone-acetaminophen, levalbuterol    LABS AND DIAGNOSTICS     CBC LAST 3 DAYS  Recent Labs   Lab 05/02/22  0411 05/03/22  0421 05/05/22  1001   WBC 7.46 7.63 5.86   RBC 3.71* 3.61* 3.97*   HGB 8.9* 8.7* 9.7*   HCT 31.4* 31.0* 33.0*   MCV 85 86 83   MCH 24.0* 24.1* 24.4*   MCHC 28.3* 28.1* 29.4*   RDW 25.6* 25.2* 25.6*    228 218   MPV 11.4 11.7 10.4   GRAN 68.1  5.1 68.6  5.2 69.3  4.1   LYMPH 20.1  1.5 19.7  1.5 17.4*  1.0   MONO 6.6  0.5 6.7  0.5 8.7  0.5   BASO 0.06 0.04 0.03   NRBC 0 0 0       COAGULATION LAST 3 DAYS  Recent Labs   Lab 05/01/22  0033 05/01/22  0343 05/01/22  0940 05/05/22  1001   LABPT 13.5  --   --  13.2   INR 1.1  --   --  1.1   APTT 20.6* 35.9* 32.6  --        CHEMISTRY LAST 3 DAYS  Recent Labs   Lab 05/01/22  1309 05/02/22  0411 05/03/22  0421 05/05/22  0227 05/05/22  1001    140  136 135* 135*   K 3.4* 3.8 4.1 3.6 3.6   CL 97 102 99 95 95   CO2 28 28 28 27 29   ANIONGAP 11 10 9 13 11   BUN 15 13 17 9 10   CREATININE 0.6 0.4* 0.5 0.5 0.5   * 132* 135* 120* 128*   CALCIUM 9.0 8.7 8.7 8.7 8.9   MG  --  1.6  --   --   --    ALBUMIN 2.8* 2.9* 3.2*  --   --    PROT 5.4* 5.3* 5.9*  --   --    ALKPHOS 41* 43* 45*  --   --    ALT 38 34 35  --   --    AST 49* 34 28  --   --    BILITOT 0.9 1.0 0.6  --   --        CARDIAC PROFILE LAST 3 DAYS  Recent Labs   Lab 04/30/22  2125 05/01/22  0033 05/01/22  1309 05/01/22  1911 05/02/22  0411 05/05/22  0227 05/05/22  1001   *  --   --   --  301* 509*  --    TROPONINI 0.549*   < > 3.181* 3.150*  --   --  1.371*    < > = values in this interval not displayed.       ENDOCRINE LAST 3 DAYS  No results for input(s): TSH, PROCAL in the last 168 hours.    LAST ARTERIAL BLOOD GAS  ABG  No results for input(s): PH, PO2, PCO2, HCO3, BE in the last 168 hours.    LAST 7 DAYS MICROBIOLOGY   Microbiology Results (last 7 days)       ** No results found for the last 168 hours. **            MOST RECENT IMAGING  X-Ray Chest AP Portable  Portable chest x-ray at 6:19 AM is compared to prior study dated 4/30/2022    Clinical history shortness of breath    The heart is enlarged. There are no confluent infiltrates or pleural effusions. There are degenerative changes of the spine.    IMPRESSION: Cardiomegaly with no acute pulmonary process    Electronically signed by:  Lori Ventura MD  5/5/2022 6:51 AM CDT Workstation: [id number]      ECHOCARDIOGRAM RESULTS (last 5)  Results for orders placed during the hospital encounter of 04/30/22    Echo    Interpretation Summary  · The estimated PA systolic pressure is 45 mmHg.  · The left ventricle is normal in size with mild concentric hypertrophy and normal systolic function.  · The estimated ejection fraction is 70%.  · Grade II left ventricular diastolic dysfunction.  · Normal right ventricular size with normal right  ventricular systolic function.  · Mild left atrial enlargement.  · There is severe aortic valve stenosis.  · Aortic valve area is 1.22 cm2; peak velocity is m/s; mean gradient is 34 mmHg.  · Mild-to-moderate mitral regurgitation.  · Mild tricuspid regurgitation.  · Mild aortic regurgitation.      CURRENT/PREVIOUS VISIT EKG  Results for orders placed or performed during the hospital encounter of 05/05/22   EKG 12-lead    Collection Time: 05/05/22  6:03 AM    Narrative    Test Reason : R07.9,    Vent. Rate : 079 BPM     Atrial Rate : 079 BPM     P-R Int : 152 ms          QRS Dur : 074 ms      QT Int : 374 ms       P-R-T Axes : 052 011 111 degrees     QTc Int : 428 ms    Normal sinus rhythm  ST and T wave abnormality, consider lateral ischemia  Abnormal ECG  When compared with ECG of 30-APR-2022 21:09,  No significant change was found    Referred By: DIPAK TENA           Confirmed By:            ASSESSMENT/PLAN:     Active Hospital Problems    Diagnosis    *SOB (shortness of breath)    Aortic stenosis    COPD, mild    Diabetes mellitus type II     Dx updated per 2019 IMO Load      Hypertension    Depression    GERD (gastroesophageal reflux disease)    DDD (degenerative disc disease), lumbar       ASSESSMENT & PLAN:   1. Mod to severe aortic stenosis   2. COPD   3. Recent NSTEMI       RECOMMENDATIONS:  She took Eliquis yesterday afternoon and so LHC will have to be held until at least tomorrow evening   We will re-discuss with the patient the options tomorrow morning   She can have a LIGHT breakfast in the morning tomorrow   Continue to hold Eliquis  Consult GI to clear for possible DAPT if she were to need a stent   Trend troponin             Keysha Fairbanks PA-C  [facility]  Department of Cardiology  Date of Service: 05/05/2022        I have personally interviewed and examined the patient, I have reviewed the Physician Assistant's history and physical, assessment, and plan. I agree with the findings  and plan.    Patient was seen and examined.  Discussed with Dr. VENKATA Echols.  Discussed with Dr. Burgess.  Her med list shows that she took Eliquis yesterday evening.  No sign of active heart failure or is acute ischemia at this time.  Continue observe overnight to see if the patient is need to proceed with angiogram versus discharge home with for elective catheterization after holding Eliquis for 48 hours prior to the procedure.  Increase level of activity.     Michelet Vargas M.D.  [facility]  Department of Cardiology  Date of Service: 05/05/2022  12:00 PM

## 2022-05-05 NOTE — ASSESSMENT & PLAN NOTE
Multifactorial.  Possible post infarct CHF exacerbation or tachycardia induced cardiomyopathy/COPD exacerbation      Plan   IV lasix   Nebulization treatments PRN   Stable and will not start steroid   Cardiology consulted

## 2022-05-05 NOTE — ASSESSMENT & PLAN NOTE
Possible symptoms from severe aortic stenosis an echocardiogram.    Plan  Diuresis  Follow cardiology for possible need for intervention

## 2022-05-05 NOTE — HOSPITAL COURSE
5/5 no acute events since admission.  Case discussed with cardiologist.  Cardiology deciding if they will do angiogram.  Patient are already had angiogram scheduled for next week.  She was readmitted with shortness of breath.  O2 sats appear to be acceptable while patient at rest.  Patient denies chest pain abdominal pain.  5/6 No acute events overnight . S/P angiogram . Needs outpt  surgery eval for aortic valve   Patient also to have GI workup next Thursday for anemia.  Hold Eliquis until resumed by Cardiology  Will discharge patient home later today if cleared by Cardiology

## 2022-05-05 NOTE — Clinical Note
The catheter was repositioned into the ostium   left main. An angiography was performed of the left coronary arteries. Multiple views were taken. The angiography was performed via power injection. The injected amount was 8 mL contrast at 4 mL/s.

## 2022-05-05 NOTE — PLAN OF CARE
[facility]  Initial Discharge Assessment       Primary Care Provider: Abhi De Oliveira Iv, MD    Admission Diagnosis: SOB (shortness of breath) [R06.02]    Admission Date: 5/5/2022  Expected Discharge Date:     Discharge Barriers Identified: (P) None    Payor: [employer] MANAGED MEDICARE / Plan: [employer] MEDICARE HMO / Product Type: Capitation /     Extended Emergency Contact Information  Primary Emergency Contact: Roni Cyr  Address: [address] [place] [address] [facility]  Home Phone: [phone]  Mobile Phone: [phone]  Relation: Spouse  Preferred language: English   needed? No  Secondary Emergency Contact: Christiano Zuleta  Mobile Phone: [phone]  Relation: Sister    Discharge Plan A: (P) Home with family  Discharge Plan B: (P) Home with family      [facility] - [facility] [address]  [address] [address]  Phone: [phone] Fax: [phone]    [facility] - [place], [place] - [address]  [address] [place] [address]  Phone: [phone] Fax: [phone]      Initial Assessment (most recent)       Adult Discharge Assessment - 05/05/22 1615          Discharge Assessment    Assessment Type Discharge Planning Assessment     Confirmed/corrected address, phone number and insurance Yes     Confirmed Demographics Correct on Facesheet     Source of Information patient   Assessment completed at bedside    Communicated ISAAK with patient/caregiver Yes     Lives With spouse     Facility Arrived From: Home     Do you expect to return to your current living situation? Yes     Do you have help at home or someone to help you manage your care at home? Yes     Who are your caregiver(s) and their phone number(s)? Roni Cyr (Spouse)   [phone] (P)      Prior to hospitilization cognitive status: Alert/Oriented (P)      Current cognitive status: Alert/Oriented (P)      Walking or Climbing Stairs  Difficulty ambulation difficulty, requires equipment (P)      Mobility Management Rollator (P)      Dressing/Bathing Difficulty none (P)      Equipment Currently Used at Home rollator (P)      Readmission within 30 days? Yes (P)      Patient currently being followed by outpatient case management? No (P)      Do you currently have service(s) that help you manage your care at home? No (P)      Do you take prescription medications? Yes (P)      Do you have prescription coverage? Yes (P)      Coverage [employer] (P)      Do you have any problems affording any of your prescribed medications? No (P)      Is the patient taking medications as prescribed? yes (P)      Who is going to help you get home at discharge? Roni Cyr (Spouse)   [phone] (P)      How do you get to doctors appointments? car, drives self;family or friend will provide (P)      Are you on dialysis? No (P)      Do you take coumadin? No (P)      Discharge Plan A Home with family (P)      Discharge Plan B Home with family (P)      DME Needed Upon Discharge  none (P)      Discharge Plan discussed with: Patient (P)      Discharge Barriers Identified None (P)         Relationship/Environment    Name(s) of Who Lives With Patient Roni Cyr (Spouse)   [phone] (P)

## 2022-05-05 NOTE — PLAN OF CARE
05/05/22 1633   Readmission   Why were you hospitalized in the last 30 days? SOB   Why were you readmitted? Related to previous admission;Alarmed about signs/symptoms   When you left the hospital how did you feel? Fine   When you left the hospital where did you go? Home with Family   Did patient/caregiver refused recommended DC plan? No   Tell me about what happened between when you left the hospital and the day you returned. Felt fine day of dischage, began feeling short of breath the next day   Did you try to manage your symptoms your self? No   Did you call anyone? No   Why? Went to local ER   Did you try to see or did see a doctor or nurse before you came? No   Did you have  a follow-up appointment on discharge? Yes   Did you go? No   Why?   (In future)   Was this a planned readmission? No

## 2022-05-05 NOTE — H&P
[facility]  History & Physical    Patient Name: Daryleen G Moran  MRN: [id number]  Patient Class: IP- Inpatient  Admission Date: 5/5/2022  Attending Physician: Hipolito Mahajan MD   Primary Care Provider: Abhi De Oliveira Iv, MD         Patient information was obtained from patient and ER records.     Subjective:     Principal Problem:SOB (shortness of breath)    Chief Complaint: No chief complaint on file.       HPI:  77-year-old female with history of hypertension, hyperlipidemia, diabetes, aortic stenosis, COPD, are deficiency anemia was transferred from [facility] overnight with shortness of breath.  She was recently discharged yesterday after cardiac evaluation and LHC was not able to do because both radial and femoral access are not amenable.  She was also found  AFib with RVR and There was concern for ischemic changes on EKG.   See presented there with shortness of breath and given Lasix and also nebulizer treatment and currently she is on 2 L and noted no shortness of breath and talking spontaneously.  She had history of COPD  Troponin elevation was noted but it was elevated since previous admission .  LHC was failed because  Candida untreated in groin area and attempts through radial access were not successful due to torturous anatomy.       Past Medical History:   Diagnosis Date    Aortic stenosis     Arthritis     Back pain     Diabetes mellitus type II     Hyperlipidemia     Hypertension     NSTEMI (non-ST elevated myocardial infarction) 5/1/2022    Osteoporosis     Wears glasses        Past Surgical History:   Procedure Laterality Date     vocal cord nodules removed  long time ago     twice    APPENDECTOMY  within last 5yrs    FIXATION KYPHOPLASTY THORACIC SPINE      8-20-13    FOOT SURGERY      left 2nd toe was too long     HERNIA REPAIR  within last 5yrs    LEFT HEART CATHETERIZATION Left 5/2/2022    Procedure: Left heart cath;  Surgeon: Jose Alaniz  MD Onesimo;  Location: [facility] CATH/EP LAB;  Service: Cardiology;  Laterality: Left;    TONSILLECTOMY, ADENOIDECTOMY  long time ago       Review of patient's allergies indicates:   Allergen Reactions    Sulfacetamide sodium      Pain perineal area    Sulfasalazine Hives    Adhesive Itching     SKIN GETS RED WITH TAPE AND BANDAIDS    Adhesive tape-silicones Itching     SKIN GETS RED WITH TAPE AND BANDAIDS    Sulfa (sulfonamide antibiotics) Rash       No current facility-administered medications on file prior to encounter.     Current Outpatient Medications on File Prior to Encounter   Medication Sig    albuterol (PROVENTIL/VENTOLIN HFA) 90 mcg/actuation inhaler Inhale 2 puffs into the lungs every 6 (six) hours as needed.    ALLERGY RELIEF, FEXOFENADINE, 180 mg tablet Take 180 mg by mouth once daily.    apixaban (ELIQUIS) 5 mg Tab Take 1 tablet (5 mg total) by mouth 2 (two) times daily.    ascorbic acid, vitamin C, (VITAMIN C) 1000 MG tablet Take 1,000 mg by mouth Daily.    aspirin (ECOTRIN) 81 MG EC tablet Take 81 mg by mouth once daily.    budesonide-formoterol 80-4.5 mcg (SYMBICORT) 80-4.5 mcg/actuation HFAA Inhale 2 puffs into the lungs 2 (two) times daily.    buPROPion (WELLBUTRIN XL) 150 MG TB24 tablet Take 150 mg by mouth.    calcium carbonate (OS-TK) 600 mg (1,500 mg) Tab Take 600 mg by mouth 2 (two) times daily with meals.    citalopram (CELEXA) 40 MG tablet Take 1 tablet (40 mg total) by mouth once daily.    donepeziL (ARICEPT) 5 MG tablet Take 5 mg by mouth nightly.    ergocalciferol (ERGOCALCIFEROL) 50,000 unit Cap TAKE 1 CAPSULE (50,000 UNITS TOTAL) EVERY 7 DAYS.    FEROSUL 325 mg (65 mg iron) Tab tablet Take 1 tablet (325 mg total) by mouth once daily.    [START ON 5/9/2022] fluconazole (DIFLUCAN) 150 MG Tab Take 1 tablet (150 mg total) by mouth once a week. for 3 doses    glucosamine hawkins 2KCl-chondroit 500-400 mg Tab Take 1 tablet by mouth.    glucosamine-chondroitin 500-400 mg tablet  Take 1 tablet by mouth 3 (three) times daily.    HYDROcodone-acetaminophen (NORCO) 5-325 mg per tablet Take 1 tablet by mouth every 6 (six) hours as needed for Pain.    lisinopril (PRINIVIL,ZESTRIL) 20 MG tablet Take 20 mg by mouth every evening.     metFORMIN (GLUCOPHAGE) 500 MG tablet Take 1 tablet (500 mg total) by mouth 2 (two) times daily with meals.    metoprolol tartrate (LOPRESSOR) 25 MG tablet Take 0.5 tablets (12.5 mg total) by mouth 2 (two) times daily.    miconazole (MICOTIN) 2 % cream Apply topically 2 (two) times daily. for 7 days    multivitamin capsule Take 1 capsule by mouth once daily.    mupirocin (BACTROBAN) 2 % ointment Apply topically 2 (two) times daily.    omeprazole (PRILOSEC) 20 MG capsule Take 1 capsule (20 mg total) by mouth once daily.    oxybutynin (DITROPAN) 5 MG Tab Take 1 tablet (5 mg total) by mouth 2 (two) times daily.    simvastatin (ZOCOR) 40 MG tablet Take 1 tablet (40 mg total) by mouth every evening.    [DISCONTINUED] ezetimibe-simvastatin 10-40 mg (VYTORIN) 10-40 mg per tablet Take 1 tablet by mouth.    [DISCONTINUED] lisinopril-hydrochlorothiazide (PRINZIDE,ZESTORETIC) 20-12.5 mg per tablet Take 1 tablet by mouth once daily.      Family History    None       Tobacco Use    Smoking status: Current Every Day Smoker     Packs/day: 0.50     Years: 35.00     Pack years: 17.50    Smokeless tobacco: Never Used   Substance and Sexual Activity    Alcohol use: No    Drug use: No    Sexual activity: Not on file     Review of Systems   Constitutional:  Negative for activity change and fever.   Respiratory:  Positive for shortness of breath. Negative for wheezing.    Cardiovascular:  Negative for chest pain and leg swelling.   Gastrointestinal:  Negative for abdominal distention and abdominal pain.   Genitourinary:  Negative for difficulty urinating.   Skin:  Negative for color change.   Neurological:  Negative for dizziness and facial asymmetry.    Psychiatric/Behavioral:  Negative for agitation and confusion.    All other systems reviewed and are negative.  Objective:     Vital Signs (Most Recent):  Temp: 98.4 °F (36.9 °C) (05/05/22 0117)  Pulse: 97 (05/05/22 0117)  Resp: 20 (05/05/22 0117)  BP: (!) 144/67 (05/05/22 0117)   Vital Signs (24h Range):  Temp:  [98.4 °F (36.9 °C)] 98.4 °F (36.9 °C)  Pulse:  [] 97  Resp:  [18-20] 20  SpO2:  [92 %-99 %] 99 %  BP: (110-144)/(57-83) 144/67     Weight: 93.9 kg (207 lb)  Body mass index is 40.43 kg/m².    Physical Exam  Vitals and nursing note reviewed.   HENT:      Head: Normocephalic and atraumatic.      Mouth/Throat:      Mouth: Mucous membranes are moist.   Eyes:      Conjunctiva/sclera: Conjunctivae normal.   Neck:      Vascular: No JVD.   Cardiovascular:      Rate and Rhythm: Normal rate.      Heart sounds: Normal heart sounds.   Pulmonary:      Effort: Pulmonary effort is normal.      Breath sounds: Normal breath sounds.   Abdominal:      General: Bowel sounds are normal.      Palpations: Abdomen is soft.   Musculoskeletal:         General: Normal range of motion.      Cervical back: Normal range of motion.   Skin:     General: Skin is warm.   Neurological:      Mental Status: She is alert and oriented to person, place, and time.   Psychiatric:         Mood and Affect: Mood normal.           Significant Labs: All pertinent labs within the past 24 hours have been reviewed.  CBC:   Recent Labs   Lab 05/03/22 0421   WBC 7.63   HGB 8.7*   HCT 31.0*        CMP:   Recent Labs   Lab 05/03/22 0421      K 4.1   CL 99   CO2 28   *   BUN 17   CREATININE 0.5   CALCIUM 8.7   PROT 5.9*   ALBUMIN 3.2*   BILITOT 0.6   ALKPHOS 45*   AST 28   ALT 35   ANIONGAP 9   EGFRNONAA >60.0     Cardiac Markers: No results for input(s): CKMB, MYOGLOBIN, BNP, TROPISTAT in the last 48 hours.    Significant Imaging: I have reviewed all pertinent imaging results/findings within the past 24 hours.    Assessment/Plan:      * SOB (shortness of breath)    Multifactorial.  Possible post infarct CHF exacerbation or tachycardia induced cardiomyopathy/COPD exacerbation      Plan   IV lasix   Nebulization treatments PRN   Stable and will not start steroid   Cardiology consulted   Do EKG and CXR at arrival   Continue antifungal Rx for candida . Possible can assess now   Labs      Aortic stenosis  Possible symptoms from severe aortic stenosis an echocardiogram.    Plan  Diuresis  Follow cardiology for possible need for intervention      COPD, mild  aware, home medication  Possible transient exacerbation       GERD (gastroesophageal reflux disease)  aware, home medication      Depression  aware, home medication      Hypertension  aware, home medication      Diabetes mellitus type II  Aware, SSI      DDD (degenerative disc disease), lumbar  Aware        VTE Risk Mitigation (From admission, onward)         Ordered     apixaban tablet 5 mg  2 times daily         05/05/22 0109                   Hipolito Mahajan MD  Department of Hospital Medicine   [facility]

## 2022-05-05 NOTE — PROGRESS NOTES
[facility]  Progress Note    Patient Name: Daryleen G Moran  MRN: [id number]  Patient Class: IP- Inpatient   Admission Date: 5/5/2022  Length of Stay: 0 days  Attending Physician: Wili Burgess DO  Primary Care Provider: Abhi De Oliveira Iv, MD        Subjective:     Principal Problem:SOB (shortness of breath)        HPI:   77-year-old female with history of hypertension, hyperlipidemia, diabetes, aortic stenosis, COPD, are deficiency anemia was transferred from [facility] overnight with shortness of breath.  She was recently discharged yesterday after cardiac evaluation and C was not able to do because both radial and femoral access are not amenable.  She was also found  AFib with RVR and There was concern for ischemic changes on EKG.   See presented there with shortness of breath and given Lasix and also nebulizer treatment and currently she is on 2 L and noted no shortness of breath and talking spontaneously.  She had history of COPD  Troponin elevation was noted but it was elevated since previous admission .  LHC was failed because  Candida untreated in groin area and attempts through radial access were not successful due to torturous anatomy.       Overview/Hospital Course:  5/5 no acute events since admission.  Case discussed with cardiologist.  Cardiology deciding if they will do angiogram.  Patient are already had angiogram scheduled for next week.  She was readmitted with shortness of breath.  O2 sats appear to be acceptable while patient at rest.  Patient denies chest pain abdominal pain.      Interval History:  No acute events since admission  Case discussed with Cardiology      Objective:     Vital Signs (Most Recent):  Temp: 98.2 °F (36.8 °C) (05/05/22 1543)  Pulse: 80 (05/05/22 1543)  Resp: 20 (05/05/22 1543)  BP: (!) 100/53 (05/05/22 1543)  SpO2: 96 % (05/05/22 1543)   Vital Signs (24h Range):  Temp:  [97.8 °F (36.6 °C)-98.4 °F (36.9 °C)] 98.2 °F (36.8  °C)  Pulse:  [] 80  Resp:  [18-20] 20  SpO2:  [92 %-99 %] 96 %  BP: ()/(49-83) 100/53     Weight: 93.9 kg (207 lb)  Body mass index is 40.43 kg/m².    Intake/Output Summary (Last 24 hours) at 5/5/2022 1603  Last data filed at 5/5/2022 0400  Gross per 24 hour   Intake --   Output 0 ml   Net 0 ml      Physical Exam  Patient appears no distress lying in bed cardiologist at bedside  HEENT sclerae nonicteric  Neck is supple nontender  Lungs symmetrical expansion clear moderate effort  Heart regular rate rhythm loud murmur left upper sternal border  Abdomen is soft nontender obese  Extremities no edema no deformities  Neuro patient is alert oriented x3 motor exam is nonfocal  Skin +rash growing left worse than right   no Soni    Significant Labs: All pertinent labs within the past 24 hours have been reviewed.  BMP:   Recent Labs   Lab 05/05/22  1001   *   *   K 3.6   CL 95   CO2 29   BUN 10   CREATININE 0.5   CALCIUM 8.9     CBC:   Recent Labs   Lab 05/05/22  1001   WBC 5.86   HGB 9.7*   HCT 33.0*        CMP:   Recent Labs   Lab 05/05/22  0227 05/05/22  1001   * 135*   K 3.6 3.6   CL 95 95   CO2 27 29   * 128*   BUN 9 10   CREATININE 0.5 0.5   CALCIUM 8.7 8.9   ANIONGAP 13 11   EGFRNONAA >60.0 >60.0       Significant Imaging:       Assessment/Plan:      * SOB (shortness of breath)    Multifactorial.  Possible post infarct CHF exacerbation or tachycardia induced cardiomyopathy/COPD exacerbation      Plan   IV lasix   Nebulization treatments PRN   Stable and will not start steroid   Cardiology consulted     Aortic stenosis  Possible symptoms from severe aortic stenosis an echocardiogram.    Plan  Diuresis  Follow cardiology for possible need for intervention      COPD, mild  aware, home medication  Possible transient exacerbation       GERD (gastroesophageal reflux disease)  aware, home medication      Depression  aware, home medication      Hypertension  aware, home  medication      Diabetes mellitus type II  Aware, SSI      DDD (degenerative disc disease), lumbar  Aware      #1 moderate to severe aortic stenosis  #2 Recent NSTEMI  #3 Growing fungal infection-Tinea   #4 PAF  #5 DM2  #6 Essential HTN  #7 Hx COPD   #8 Anemia most likely chronic disease    Cardiology following patient needs angiogram  Could consider doing as an outpatient if not going to do tomorrow  Holding Eliquis  VTE Risk Mitigation (From admission, onward)    None          Discharge Planning   ISAAK:      Code Status: Prior   Is the patient medically ready for discharge?:     Reason for patient still in hospital (select all that apply): Patient trending condition                     Wili Burgess DO  Department of Hospital Medicine   [facility]

## 2022-05-05 NOTE — SUBJECTIVE & OBJECTIVE
Past Medical History:   Diagnosis Date    Aortic stenosis     Arthritis     Back pain     Diabetes mellitus type II     Hyperlipidemia     Hypertension     NSTEMI (non-ST elevated myocardial infarction) 5/1/2022    Osteoporosis     Wears glasses        Past Surgical History:   Procedure Laterality Date     vocal cord nodules removed  long time ago     twice    APPENDECTOMY  within last 5yrs    FIXATION KYPHOPLASTY THORACIC SPINE      8-20-13    FOOT SURGERY      left 2nd toe was too long     HERNIA REPAIR  within last 5yrs    LEFT HEART CATHETERIZATION Left 5/2/2022    Procedure: Left heart cath;  Surgeon: Jose Echols MD;  Location: [facility] CATH/EP LAB;  Service: Cardiology;  Laterality: Left;    TONSILLECTOMY, ADENOIDECTOMY  long time ago       Review of patient's allergies indicates:   Allergen Reactions    Sulfacetamide sodium      Pain perineal area    Sulfasalazine Hives    Adhesive Itching     SKIN GETS RED WITH TAPE AND BANDAIDS    Adhesive tape-silicones Itching     SKIN GETS RED WITH TAPE AND BANDAIDS    Sulfa (sulfonamide antibiotics) Rash       No current facility-administered medications on file prior to encounter.     Current Outpatient Medications on File Prior to Encounter   Medication Sig    albuterol (PROVENTIL/VENTOLIN HFA) 90 mcg/actuation inhaler Inhale 2 puffs into the lungs every 6 (six) hours as needed.    ALLERGY RELIEF, FEXOFENADINE, 180 mg tablet Take 180 mg by mouth once daily.    apixaban (ELIQUIS) 5 mg Tab Take 1 tablet (5 mg total) by mouth 2 (two) times daily.    ascorbic acid, vitamin C, (VITAMIN C) 1000 MG tablet Take 1,000 mg by mouth Daily.    aspirin (ECOTRIN) 81 MG EC tablet Take 81 mg by mouth once daily.    budesonide-formoterol 80-4.5 mcg (SYMBICORT) 80-4.5 mcg/actuation HFAA Inhale 2 puffs into the lungs 2 (two) times daily.    buPROPion (WELLBUTRIN XL) 150 MG TB24 tablet Take 150 mg by mouth.    calcium carbonate (OS-TK) 600 mg (1,500 mg) Tab Take 600 mg by mouth 2 (two)  times daily with meals.    citalopram (CELEXA) 40 MG tablet Take 1 tablet (40 mg total) by mouth once daily.    donepeziL (ARICEPT) 5 MG tablet Take 5 mg by mouth nightly.    ergocalciferol (ERGOCALCIFEROL) 50,000 unit Cap TAKE 1 CAPSULE (50,000 UNITS TOTAL) EVERY 7 DAYS.    FEROSUL 325 mg (65 mg iron) Tab tablet Take 1 tablet (325 mg total) by mouth once daily.    [START ON 5/9/2022] fluconazole (DIFLUCAN) 150 MG Tab Take 1 tablet (150 mg total) by mouth once a week. for 3 doses    glucosamine hawkins 2KCl-chondroit 500-400 mg Tab Take 1 tablet by mouth.    glucosamine-chondroitin 500-400 mg tablet Take 1 tablet by mouth 3 (three) times daily.    HYDROcodone-acetaminophen (NORCO) 5-325 mg per tablet Take 1 tablet by mouth every 6 (six) hours as needed for Pain.    lisinopril (PRINIVIL,ZESTRIL) 20 MG tablet Take 20 mg by mouth every evening.     metFORMIN (GLUCOPHAGE) 500 MG tablet Take 1 tablet (500 mg total) by mouth 2 (two) times daily with meals.    metoprolol tartrate (LOPRESSOR) 25 MG tablet Take 0.5 tablets (12.5 mg total) by mouth 2 (two) times daily.    miconazole (MICOTIN) 2 % cream Apply topically 2 (two) times daily. for 7 days    multivitamin capsule Take 1 capsule by mouth once daily.    mupirocin (BACTROBAN) 2 % ointment Apply topically 2 (two) times daily.    omeprazole (PRILOSEC) 20 MG capsule Take 1 capsule (20 mg total) by mouth once daily.    oxybutynin (DITROPAN) 5 MG Tab Take 1 tablet (5 mg total) by mouth 2 (two) times daily.    simvastatin (ZOCOR) 40 MG tablet Take 1 tablet (40 mg total) by mouth every evening.    [DISCONTINUED] ezetimibe-simvastatin 10-40 mg (VYTORIN) 10-40 mg per tablet Take 1 tablet by mouth.    [DISCONTINUED] lisinopril-hydrochlorothiazide (PRINZIDE,ZESTORETIC) 20-12.5 mg per tablet Take 1 tablet by mouth once daily.      Family History    None       Tobacco Use    Smoking status: Current Every Day Smoker     Packs/day: 0.50     Years: 35.00     Pack years: 17.50    Smokeless  tobacco: Never Used   Substance and Sexual Activity    Alcohol use: No    Drug use: No    Sexual activity: Not on file     Review of Systems   Constitutional:  Negative for activity change and fever.   Respiratory:  Positive for shortness of breath. Negative for wheezing.    Cardiovascular:  Negative for chest pain and leg swelling.   Gastrointestinal:  Negative for abdominal distention and abdominal pain.   Genitourinary:  Negative for difficulty urinating.   Skin:  Negative for color change.   Neurological:  Negative for dizziness and facial asymmetry.   Psychiatric/Behavioral:  Negative for agitation and confusion.    All other systems reviewed and are negative.  Objective:     Vital Signs (Most Recent):  Temp: 98.4 °F (36.9 °C) (05/05/22 0117)  Pulse: 97 (05/05/22 0117)  Resp: 20 (05/05/22 0117)  BP: (!) 144/67 (05/05/22 0117)   Vital Signs (24h Range):  Temp:  [98.4 °F (36.9 °C)] 98.4 °F (36.9 °C)  Pulse:  [] 97  Resp:  [18-20] 20  SpO2:  [92 %-99 %] 99 %  BP: (110-144)/(57-83) 144/67     Weight: 93.9 kg (207 lb)  Body mass index is 40.43 kg/m².    Physical Exam  Vitals and nursing note reviewed.   HENT:      Head: Normocephalic and atraumatic.      Mouth/Throat:      Mouth: Mucous membranes are moist.   Eyes:      Conjunctiva/sclera: Conjunctivae normal.   Neck:      Vascular: No JVD.   Cardiovascular:      Rate and Rhythm: Normal rate.      Heart sounds: Normal heart sounds.   Pulmonary:      Effort: Pulmonary effort is normal.      Breath sounds: Normal breath sounds.   Abdominal:      General: Bowel sounds are normal.      Palpations: Abdomen is soft.   Musculoskeletal:         General: Normal range of motion.      Cervical back: Normal range of motion.   Skin:     General: Skin is warm.   Neurological:      Mental Status: She is alert and oriented to person, place, and time.   Psychiatric:         Mood and Affect: Mood normal.           Significant Labs: All pertinent labs within the past 24 hours  have been reviewed.  CBC:   Recent Labs   Lab 05/03/22 0421   WBC 7.63   HGB 8.7*   HCT 31.0*        CMP:   Recent Labs   Lab 05/03/22 0421      K 4.1   CL 99   CO2 28   *   BUN 17   CREATININE 0.5   CALCIUM 8.7   PROT 5.9*   ALBUMIN 3.2*   BILITOT 0.6   ALKPHOS 45*   AST 28   ALT 35   ANIONGAP 9   EGFRNONAA >60.0     Cardiac Markers: No results for input(s): CKMB, MYOGLOBIN, BNP, TROPISTAT in the last 48 hours.    Significant Imaging: I have reviewed all pertinent imaging results/findings within the past 24 hours.

## 2022-05-05 NOTE — SUBJECTIVE & OBJECTIVE
Interval History:  No acute events since admission  Case discussed with Cardiology      Objective:     Vital Signs (Most Recent):  Temp: 98.2 °F (36.8 °C) (05/05/22 1543)  Pulse: 80 (05/05/22 1543)  Resp: 20 (05/05/22 1543)  BP: (!) 100/53 (05/05/22 1543)  SpO2: 96 % (05/05/22 1543)   Vital Signs (24h Range):  Temp:  [97.8 °F (36.6 °C)-98.4 °F (36.9 °C)] 98.2 °F (36.8 °C)  Pulse:  [] 80  Resp:  [18-20] 20  SpO2:  [92 %-99 %] 96 %  BP: ()/(49-83) 100/53     Weight: 93.9 kg (207 lb)  Body mass index is 40.43 kg/m².    Intake/Output Summary (Last 24 hours) at 5/5/2022 1603  Last data filed at 5/5/2022 0400  Gross per 24 hour   Intake --   Output 0 ml   Net 0 ml      Physical Exam  Patient appears no distress lying in bed cardiologist at bedside  HEENT sclerae nonicteric  Neck is supple nontender  Lungs symmetrical expansion clear moderate effort  Heart regular rate rhythm loud murmur left upper sternal border  Abdomen is soft nontender obese  Extremities no edema no deformities  Neuro patient is alert oriented x3 motor exam is nonfocal  Skin +rash growing left worse than right   no Soni    Significant Labs: All pertinent labs within the past 24 hours have been reviewed.  BMP:   Recent Labs   Lab 05/05/22  1001   *   *   K 3.6   CL 95   CO2 29   BUN 10   CREATININE 0.5   CALCIUM 8.9     CBC:   Recent Labs   Lab 05/05/22  1001   WBC 5.86   HGB 9.7*   HCT 33.0*        CMP:   Recent Labs   Lab 05/05/22  0227 05/05/22  1001   * 135*   K 3.6 3.6   CL 95 95   CO2 27 29   * 128*   BUN 9 10   CREATININE 0.5 0.5   CALCIUM 8.7 8.9   ANIONGAP 13 11   EGFRNONAA >60.0 >60.0       Significant Imaging:

## 2022-05-05 NOTE — HPI
77-year-old female with history of hypertension, hyperlipidemia, diabetes, aortic stenosis, COPD, are deficiency anemia was transferred from [facility] overnight with shortness of breath.  She was recently discharged yesterday after cardiac evaluation and C was not able to do because both radial and femoral access are not amenable.  She was also found  AFib with RVR and There was concern for ischemic changes on EKG.   See presented there with shortness of breath and given Lasix and also nebulizer treatment and currently she is on 2 L and noted no shortness of breath and talking spontaneously.  She had history of COPD  Troponin elevation was noted but it was elevated since previous admission .  LHC was failed because  Candida untreated in groin area and attempts through radial access were not successful due to torturous anatomy.

## 2022-05-05 NOTE — PLAN OF CARE
Problem: Adult Inpatient Plan of Care  Goal: Plan of Care Review  Outcome: Ongoing, Progressing  Goal: Patient-Specific Goal (Individualized)  Outcome: Ongoing, Progressing  Goal: Absence of Hospital-Acquired Illness or Injury  Outcome: Ongoing, Progressing  Goal: Optimal Comfort and Wellbeing  Outcome: Ongoing, Progressing  Goal: Readiness for Transition of Care  Outcome: Ongoing, Progressing     Problem: Diabetes Comorbidity  Goal: Blood Glucose Level Within Targeted Range  Outcome: Ongoing, Progressing     Problem: Bariatric Environmental Safety  Goal: Safety Maintained with Care  Outcome: Ongoing, Progressing     Problem: Skin Injury Risk Increased  Goal: Skin Health and Integrity  Outcome: Ongoing, Progressing     Problem: Infection  Goal: Absence of Infection Signs and Symptoms  Outcome: Ongoing, Progressing

## 2022-05-05 NOTE — PROVIDER TRANSFER
Outside Transfer Acceptance Note / [facility] Referral [facility]    Referring facility:  [facility]   Referring provider: YANIRA SAMPSON SIMAL J.  Accepting facility: [facility]  Accepting provider: HARVEY AGUILERA  Admitting provider: RACHELLE  Reason for transfer: Higher Level of Care   Transfer diagnosis: Shortness of breath, right pleural effusion non st elevated mi, right lower lobe infiltrate  Transfer specialty requested: Cardiology  Transfer specialty notified: yes  Transfer level: NUMBER 1-5: 2  Bed type requested: tele  Isolation status: No active isolations   Admission class or status: Emergency  IP- Inpatient      Narrative     77F with obesity, DM2, AFib, HTN, HLD, AS, COPD who was recently discharged on 5/3 from [facility] for admission for NSTEMI & AF RVR that was medically managed; unable to do LHC due to Candida groin infection and tortuous anatomy at radial site. She now presents to [facility] ED with SOB, hypoxic 84% on room air. No chest pain.   Temp: 98.4              Blood Pressure: 110/83              Respiratory Rate: 17              Pulse: 100              Oxygen Saturation: 92% (2 L NC)    Troponin 0.229 >0.267, normal WBC, BNP > 1000.  CXR: right pleural effusion, RLL infiltrate  Received IV Lasix 40 mg. Transferring for Cardiology.      [facility]  Admit to Hospital Medicine  Upon patient arrival to floor, please contact Hospital Medicine on call.

## 2022-05-05 NOTE — Clinical Note
The catheter was repositioned into the ostium   right coronary artery. An angiography was performed of the right coronary arteries. Multiple views were taken. The angiography was performed via power injection. The injected amount was 6 mL contrast at 3 mL/s.

## 2022-05-05 NOTE — Clinical Note
The left ventricle was injected. The injected rate was 15 mL/sec. The total injected volume was 30 mL.

## 2022-05-06 VITALS
TEMPERATURE: 98 F | RESPIRATION RATE: 20 BRPM | HEIGHT: 60 IN | OXYGEN SATURATION: 93 % | BODY MASS INDEX: 40.6 KG/M2 | SYSTOLIC BLOOD PRESSURE: 130 MMHG | HEART RATE: 74 BPM | WEIGHT: 206.81 LBS | DIASTOLIC BLOOD PRESSURE: 66 MMHG

## 2022-05-06 LAB
ALBUMIN SERPL BCP-MCNC: 3.2 G/DL (ref 3.5–5.2)
ALLENS TEST: ABNORMAL
ALLENS TEST: ABNORMAL
ALP SERPL-CCNC: 49 U/L (ref 55–135)
ALT SERPL W/O P-5'-P-CCNC: 29 U/L (ref 10–44)
ANION GAP SERPL CALC-SCNC: 11 MMOL/L (ref 8–16)
AST SERPL-CCNC: 22 U/L (ref 10–40)
BILIRUB SERPL-MCNC: 1.1 MG/DL (ref 0.1–1)
BUN SERPL-MCNC: 22 MG/DL (ref 8–23)
CALCIUM SERPL-MCNC: 9.1 MG/DL (ref 8.7–10.5)
CATH EF QUANTITATIVE: 65 %
CHLORIDE SERPL-SCNC: 93 MMOL/L (ref 95–110)
CO2 SERPL-SCNC: 28 MMOL/L (ref 23–29)
CREAT SERPL-MCNC: 0.6 MG/DL (ref 0.5–1.4)
DELSYS: ABNORMAL
DELSYS: ABNORMAL
EST. GFR  (AFRICAN AMERICAN): >60 ML/MIN/1.73 M^2
EST. GFR  (NON AFRICAN AMERICAN): >60 ML/MIN/1.73 M^2
FLOW: 4
FLOW: 4
GLUCOSE SERPL-MCNC: 142 MG/DL (ref 70–110)
HCO3 UR-SCNC: 31.7 MMOL/L (ref 24–28)
HCO3 UR-SCNC: 32.2 MMOL/L (ref 24–28)
MODE: ABNORMAL
MODE: ABNORMAL
PCO2 BLDA: 61.3 MMHG (ref 35–45)
PCO2 BLDA: 61.9 MMHG (ref 35–45)
PH SMN: 7.32 [PH] (ref 7.35–7.45)
PH SMN: 7.32 [PH] (ref 7.35–7.45)
PO2 BLDA: 107 MMHG (ref 80–100)
PO2 BLDA: 36 MMHG (ref 40–60)
POC BE: 6 MMOL/L
POC BE: 6 MMOL/L
POC SATURATED O2: 62 % (ref 95–100)
POC SATURATED O2: 98 % (ref 95–100)
POC TCO2: 34 MMOL/L (ref 23–27)
POC TCO2: 34 MMOL/L (ref 24–29)
POTASSIUM SERPL-SCNC: 3.7 MMOL/L (ref 3.5–5.1)
PROT SERPL-MCNC: 6 G/DL (ref 6–8.4)
SAMPLE: ABNORMAL
SAMPLE: ABNORMAL
SITE: ABNORMAL
SITE: ABNORMAL
SODIUM SERPL-SCNC: 132 MMOL/L (ref 136–145)

## 2022-05-06 PROCEDURE — 63600175 PHARM REV CODE 636 W HCPCS: Performed by: INTERNAL MEDICINE

## 2022-05-06 PROCEDURE — C1751 CATH, INF, PER/CENT/MIDLINE: HCPCS | Performed by: GENERAL PRACTICE

## 2022-05-06 PROCEDURE — 99153 MOD SED SAME PHYS/QHP EA: CPT | Performed by: GENERAL PRACTICE

## 2022-05-06 PROCEDURE — 99900031 HC PATIENT EDUCATION (STAT)

## 2022-05-06 PROCEDURE — 99152 MOD SED SAME PHYS/QHP 5/>YRS: CPT | Performed by: GENERAL PRACTICE

## 2022-05-06 PROCEDURE — 99152 MOD SED SAME PHYS/QHP 5/>YRS: CPT | Mod: ,,, | Performed by: GENERAL PRACTICE

## 2022-05-06 PROCEDURE — 93460 PR CATH PLACE/CORON ANGIO, IMG SUPER/INTERP,R&L HRT CATH, L HRT VENTRIC: ICD-10-PCS | Mod: 26,,, | Performed by: GENERAL PRACTICE

## 2022-05-06 PROCEDURE — 25000003 PHARM REV CODE 250

## 2022-05-06 PROCEDURE — 25000003 PHARM REV CODE 250: Performed by: GENERAL PRACTICE

## 2022-05-06 PROCEDURE — 99900035 HC TECH TIME PER 15 MIN (STAT)

## 2022-05-06 PROCEDURE — 25000003 PHARM REV CODE 250: Performed by: INTERNAL MEDICINE

## 2022-05-06 PROCEDURE — 93460 R&L HRT ART/VENTRICLE ANGIO: CPT | Performed by: GENERAL PRACTICE

## 2022-05-06 PROCEDURE — 93460 R&L HRT ART/VENTRICLE ANGIO: CPT | Mod: 26,,, | Performed by: GENERAL PRACTICE

## 2022-05-06 PROCEDURE — C1769 GUIDE WIRE: HCPCS | Performed by: GENERAL PRACTICE

## 2022-05-06 PROCEDURE — C1894 INTRO/SHEATH, NON-LASER: HCPCS | Performed by: GENERAL PRACTICE

## 2022-05-06 PROCEDURE — 80053 COMPREHEN METABOLIC PANEL: CPT

## 2022-05-06 PROCEDURE — C1760 CLOSURE DEV, VASC: HCPCS | Performed by: GENERAL PRACTICE

## 2022-05-06 PROCEDURE — 94761 N-INVAS EAR/PLS OXIMETRY MLT: CPT

## 2022-05-06 PROCEDURE — 25000242 PHARM REV CODE 250 ALT 637 W/ HCPCS: Performed by: INTERNAL MEDICINE

## 2022-05-06 PROCEDURE — 99152 PR MOD CONSCIOUS SEDATION, SAME PHYS, 5+ YRS, FIRST 15 MIN: ICD-10-PCS | Mod: ,,, | Performed by: GENERAL PRACTICE

## 2022-05-06 PROCEDURE — C1887 CATHETER, GUIDING: HCPCS | Performed by: GENERAL PRACTICE

## 2022-05-06 PROCEDURE — 94640 AIRWAY INHALATION TREATMENT: CPT

## 2022-05-06 PROCEDURE — 37799 UNLISTED PX VASCULAR SURGERY: CPT

## 2022-05-06 PROCEDURE — 63600175 PHARM REV CODE 636 W HCPCS: Performed by: GENERAL PRACTICE

## 2022-05-06 PROCEDURE — 36415 COLL VENOUS BLD VENIPUNCTURE: CPT

## 2022-05-06 PROCEDURE — 82803 BLOOD GASES ANY COMBINATION: CPT

## 2022-05-06 DEVICE — IMPLANTABLE DEVICE: Type: IMPLANTABLE DEVICE | Site: GROIN | Status: FUNCTIONAL

## 2022-05-06 RX ORDER — SODIUM CHLORIDE 9 MG/ML
INJECTION, SOLUTION INTRAVENOUS ONCE
Status: DISCONTINUED | OUTPATIENT
Start: 2022-05-06 | End: 2022-05-06 | Stop reason: HOSPADM

## 2022-05-06 RX ORDER — ACETAMINOPHEN 325 MG/1
650 TABLET ORAL EVERY 4 HOURS PRN
Status: DISCONTINUED | OUTPATIENT
Start: 2022-05-06 | End: 2022-05-06 | Stop reason: HOSPADM

## 2022-05-06 RX ORDER — MIDAZOLAM HYDROCHLORIDE 1 MG/ML
INJECTION INTRAMUSCULAR; INTRAVENOUS
Status: DISCONTINUED | OUTPATIENT
Start: 2022-05-06 | End: 2022-05-06 | Stop reason: HOSPADM

## 2022-05-06 RX ORDER — METFORMIN HYDROCHLORIDE 500 MG/1
500 TABLET ORAL 2 TIMES DAILY WITH MEALS
Qty: 60 TABLET | Refills: 0 | Status: ON HOLD
Start: 2022-05-07 | End: 2022-12-21 | Stop reason: HOSPADM

## 2022-05-06 RX ORDER — LIDOCAINE HYDROCHLORIDE 10 MG/ML
INJECTION INFILTRATION; PERINEURAL
Status: DISCONTINUED | OUTPATIENT
Start: 2022-05-06 | End: 2022-05-06 | Stop reason: HOSPADM

## 2022-05-06 RX ORDER — FENTANYL CITRATE 50 UG/ML
INJECTION, SOLUTION INTRAMUSCULAR; INTRAVENOUS
Status: DISCONTINUED | OUTPATIENT
Start: 2022-05-06 | End: 2022-05-06 | Stop reason: HOSPADM

## 2022-05-06 RX ORDER — HEPARIN 100 UNIT/ML
SYRINGE INTRAVENOUS
Status: DISCONTINUED | OUTPATIENT
Start: 2022-05-06 | End: 2022-05-06 | Stop reason: HOSPADM

## 2022-05-06 RX ORDER — DIPHENHYDRAMINE HCL 25 MG
50 CAPSULE ORAL
Status: DISCONTINUED | OUTPATIENT
Start: 2022-05-06 | End: 2022-05-06 | Stop reason: HOSPADM

## 2022-05-06 RX ORDER — SODIUM CHLORIDE 9 MG/ML
INJECTION, SOLUTION INTRAVENOUS CONTINUOUS
Status: DISCONTINUED | OUTPATIENT
Start: 2022-05-06 | End: 2022-05-06 | Stop reason: HOSPADM

## 2022-05-06 RX ADMIN — METOPROLOL TARTRATE 12.5 MG: 25 TABLET, FILM COATED ORAL at 10:05

## 2022-05-06 RX ADMIN — ASPIRIN 81 MG: 81 TABLET, DELAYED RELEASE ORAL at 10:05

## 2022-05-06 RX ADMIN — MICONAZOLE NITRATE: 20 CREAM TOPICAL at 09:05

## 2022-05-06 RX ADMIN — DIPHENHYDRAMINE HYDROCHLORIDE 50 MG: 25 CAPSULE ORAL at 12:05

## 2022-05-06 RX ADMIN — FLUTICASONE FUROATE AND VILANTEROL TRIFENATATE 1 PUFF: 100; 25 POWDER RESPIRATORY (INHALATION) at 08:05

## 2022-05-06 RX ADMIN — FUROSEMIDE 20 MG: 10 INJECTION, SOLUTION INTRAVENOUS at 10:05

## 2022-05-06 RX ADMIN — ATORVASTATIN CALCIUM 40 MG: 40 TABLET, FILM COATED ORAL at 10:05

## 2022-05-06 RX ADMIN — OXYBUTYNIN CHLORIDE 5 MG: 5 TABLET ORAL at 10:05

## 2022-05-06 RX ADMIN — BUPROPION HYDROCHLORIDE 150 MG: 150 TABLET, FILM COATED, EXTENDED RELEASE ORAL at 10:05

## 2022-05-06 RX ADMIN — CITALOPRAM HYDROBROMIDE 20 MG: 20 TABLET ORAL at 10:05

## 2022-05-06 NOTE — NURSING
Pt arrived to unit from heart center in stable condition.  R groin site with small, marked area of oozing noted to bandage.  No change in size from nurse's initial marking.  No s/s bleeding, swelling or hematoma.  Pedal pulses are palpable.  Verbalizes understanding of bedrest.  Family at bedside.  Call light in reach.

## 2022-05-06 NOTE — NURSING
Per GEOVANNI Smith for Dr Vargas, patient can be discharged home once bedrest expires at 1845.  Dr Aguirre would like to see patient in his office Thursday for EGD.

## 2022-05-06 NOTE — DISCHARGE SUMMARY
[facility] Medicine  Discharge Summary      Patient Name: Daryleen G Moran  MRN: [id number]  Patient Class: IP- Inpatient  Admission Date: 5/5/2022  Hospital Length of Stay: 1 days  Discharge Date and Time:  05/06/2022 5:05 PM  Attending Physician: Wili Burgess DO   Discharging Provider: Wili Burgess DO  Primary Care Provider: Abhi De Oliveira Iv, MD      HPI:    77-year-old female with history of hypertension, hyperlipidemia, diabetes, aortic stenosis, COPD, are deficiency anemia was transferred from [facility] overnight with shortness of breath.  She was recently discharged yesterday after cardiac evaluation and C was not able to do because both radial and femoral access are not amenable.  She was also found  AFib with RVR and There was concern for ischemic changes on EKG.   See presented there with shortness of breath and given Lasix and also nebulizer treatment and currently she is on 2 L and noted no shortness of breath and talking spontaneously.  She had history of COPD  Troponin elevation was noted but it was elevated since previous admission .  LHC was failed because  Candida untreated in groin area and attempts through radial access were not successful due to torturous anatomy.       Procedure(s) (LRB):  CATHETERIZATION, HEART, BOTH LEFT AND RIGHT (Left)  Ventriculogram, Left      Hospital Course:   5/5 no acute events since admission.  Case discussed with cardiologist.  Cardiology deciding if they will do angiogram.  Patient are already had angiogram scheduled for next week.  She was readmitted with shortness of breath.  O2 sats appear to be acceptable while patient at rest.  Patient denies chest pain abdominal pain.  5/6 No acute events overnight . S/P angiogram . Needs outpt  surgery eval for aortic valve   Patient also to have GI workup next Thursday for anemia.  Hold Eliquis until resumed by Cardiology  Will discharge patient home later today if cleared by  Cardiology       Goals of Care Treatment Preferences:  Code Status: Full Code      Consults:   Consults (From admission, onward)        Status Ordering Provider     Inpatient consult to Social Work/Case Management  Once        Provider:  (Not yet assigned)    Ordered PHILIP NOBLE     Inpatient consult to Gastroenterology  Once        Provider:  Philip Noble MD    Completed GENARO GERMAIN     Inpatient consult to Cardiology  Once        Provider:  Michelet Vargas MD    Completed CLARENCE BUSH          Discharge diagnoses  #1 moderate to severe aortic stenosis  #2 Recent NSTEMI  #3 Growing fungal infection-Tinea   #4 PAF  #5 DM2  #6 Essential HTN  #7 Hx COPD   #8 Anemia most likely chronic disease       Holding Eliquis.  Patient has GI workup on Thursday  Resume when okay by Cardiology      Final Active Diagnoses:    Diagnosis Date Noted POA    PRINCIPAL PROBLEM:  SOB (shortness of breath) [R06.02] 05/05/2022 Unknown    Aortic stenosis [I35.0]  Yes     Chronic    COPD, mild [J44.9] 09/27/2016 Yes    Diabetes mellitus type II [E11.9] 09/13/2016 Yes    Hypertension [I10] 09/13/2016 Yes    Depression [F32.A] 09/13/2016 Yes    GERD (gastroesophageal reflux disease) [K21.9] 09/13/2016 Yes    DDD (degenerative disc disease), lumbar [M51.36] 07/25/2013 Yes      Problems Resolved During this Admission:    Diagnosis Date Noted Date Resolved POA    Chest pain [R07.9] 05/05/2022 05/05/2022 Unknown       Discharged Condition:  Patient appears in no distress lying on a gurney in [facility]  Lungs clear to auscultation  Heart regular rate rhythm  Neuro patient is alert oriented x3    Disposition: Home or Self Care    Follow Up:   Follow-up Information     Follow-up with cardiology and GI as arranged next week Follow up.                     Patient Instructions:      Comprehensive metabolic panel   Standing Status: Future Standing Exp. Date: 07/04/23     CBC auto differential   Standing Status: Future Standing  Exp. Date: 07/04/23     Urinalysis, Reflex to Urine Culture Urine, Clean Catch   Standing Status: Future Standing Exp. Date: 07/04/23     Order Specific Question Answer Comments   Preferred Collection Type Urine, Clean Catch    Specimen Source Urine      APTT   Standing Status: Future Standing Exp. Date: 07/04/23     Diet Cardiac     Activity as tolerated       Significant Diagnostic Studies: Labs:   BMP:   Recent Labs   Lab 05/05/22 0227 05/05/22  1001 05/06/22  0427   * 128* 142*   * 135* 132*   K 3.6 3.6 3.7   CL 95 95 93*   CO2 27 29 28   BUN 9 10 22   CREATININE 0.5 0.5 0.6   CALCIUM 8.7 8.9 9.1   , CMP   Recent Labs   Lab 05/05/22 0227 05/05/22  1001 05/06/22  0427   * 135* 132*   K 3.6 3.6 3.7   CL 95 95 93*   CO2 27 29 28   * 128* 142*   BUN 9 10 22   CREATININE 0.5 0.5 0.6   CALCIUM 8.7 8.9 9.1   PROT  --   --  6.0   ALBUMIN  --   --  3.2*   BILITOT  --   --  1.1*   ALKPHOS  --   --  49*   AST  --   --  22   ALT  --   --  29   ANIONGAP 13 11 11   ESTGFRAFRICA >60.0 >60.0 >60.0   EGFRNONAA >60.0 >60.0 >60.0    and CBC   Recent Labs   Lab 05/05/22  1001   WBC 5.86   HGB 9.7*   HCT 33.0*          Pending Diagnostic Studies:     None         Medications:  Reconciled Home Medications:      Medication List      CHANGE how you take these medications    apixaban 5 mg Tab  Commonly known as: ELIQUIS  Take 1 tablet (5 mg total) by mouth 2 (two) times daily. Hold eliquis until restarted by cardiology . Pt to have GI eval next Thursday  What changed: additional instructions        CONTINUE taking these medications    albuterol 90 mcg/actuation inhaler  Commonly known as: PROVENTIL/VENTOLIN HFA  Inhale 2 puffs into the lungs every 6 (six) hours as needed.     aspirin 81 MG EC tablet  Commonly known as: ECOTRIN  Take 81 mg by mouth once daily.     budesonide-formoterol 80-4.5 mcg 80-4.5 mcg/actuation Hfaa  Commonly known as: SYMBICORT  Inhale 2 puffs into the lungs 2 (two) times  daily.     buPROPion 150 MG TB24 tablet  Commonly known as: WELLBUTRIN XL  Take 150 mg by mouth.     calcium carbonate 600 mg calcium (1,500 mg) Tab  Commonly known as: OS-TK  Take 600 mg by mouth 2 (two) times daily with meals.     citalopram 40 MG tablet  Commonly known as: CeleXA  Take 1 tablet (40 mg total) by mouth once daily.     donepeziL 5 MG tablet  Commonly known as: ARICEPT  Take 5 mg by mouth nightly.     ergocalciferol 50,000 unit Cap  Commonly known as: ERGOCALCIFEROL  TAKE 1 CAPSULE (50,000 UNITS TOTAL) EVERY 7 DAYS.     FeroSuL 325 mg (65 mg iron) Tab tablet  Generic drug: ferrous sulfate  Take 1 tablet (325 mg total) by mouth once daily.     fluconazole 150 MG Tab  Commonly known as: DIFLUCAN  Take 1 tablet (150 mg total) by mouth once a week. for 3 doses  Start taking on: May 9, 2022     glucosamine hawkins 2KCl-chondroit 500-400 mg Tab  Take 1 tablet by mouth.     glucosamine-chondroitin 500-400 mg tablet  Take 1 tablet by mouth 3 (three) times daily.     HYDROcodone-acetaminophen 5-325 mg per tablet  Commonly known as: NORCO  Take 1 tablet by mouth every 6 (six) hours as needed for Pain.     lisinopriL 20 MG tablet  Commonly known as: PRINIVIL,ZESTRIL  Take 20 mg by mouth every evening.     metFORMIN 500 MG tablet  Commonly known as: GLUCOPHAGE  Take 1 tablet (500 mg total) by mouth 2 (two) times daily with meals.  Start taking on: May 7, 2022     metoprolol tartrate 25 MG tablet  Commonly known as: LOPRESSOR  Take 0.5 tablets (12.5 mg total) by mouth 2 (two) times daily.     miconazole 2 % cream  Commonly known as: MICOTIN  Apply topically 2 (two) times daily. for 7 days     mupirocin 2 % ointment  Commonly known as: BACTROBAN  Apply topically 2 (two) times daily.     omeprazole 20 MG capsule  Commonly known as: PRILOSEC  Take 1 capsule (20 mg total) by mouth once daily.     oxybutynin 5 MG Tab  Commonly known as: DITROPAN  Take 1 tablet (5 mg total) by mouth 2 (two) times daily.     simvastatin 40  MG tablet  Commonly known as: ZOCOR  Take 1 tablet (40 mg total) by mouth every evening.        STOP taking these medications    ALLERGY RELIEF (FEXOFENADINE) 180 MG tablet  Generic drug: fexofenadine     ascorbic acid (vitamin C) 1000 MG tablet  Commonly known as: VITAMIN C     ezetimibe-simvastatin 10-40 mg 10-40 mg per tablet  Commonly known as: VYTORIN     lisinopriL-hydrochlorothiazide 20-12.5 mg per tablet  Commonly known as: CHAY NASCIMENTO        ASK your doctor about these medications    multivitamin capsule  Take 1 capsule by mouth once daily.            Indwelling Lines/Drains at time of discharge:   Lines/Drains/Airways     None                 Time spent on the discharge of patient: 23 minutes         Wili Burgess DO  Department of Hospital Medicine  [facility]

## 2022-05-06 NOTE — PLAN OF CARE
Important Message from Medicare was sign, explained and given to patient/caregiver on 05/06/2022 at 9:05am     addressed any questions or concerns.    Important Message from Medicare document will be scanned into patient's medical record

## 2022-05-06 NOTE — CONSULTS
GASTROENTEROLOGY INPATIENT CONSULT NOTE  Patient Name: Daryleen G Moran  Patient MRN: [id number]  Patient : 1945    Admit Date: 2022  Service date: 2022    Reason for Consult: anemia    PCP: Abhi De Oliveira Iv, MD    HPI: Patient is a 77 y.o. female with PMHx  Aortic stenosis presented with symptomatic anemia.  Pt underwent LHC and found to have significant AS and plans for TAVR in next few weeks.  Found to have moderate persistent normocytic anemia first noticed around 22.  Hgb 11.8 12/15/21.  No overt bleeding      Of note pt was hospitalized at [facility] 3 weeks ago with symptomatic anemia and received 2 units prbc and tx for copd.  Pt reports seeing dr bae for anemia years ago and received iv iron twice.      Chart Review   Colon - ileocolonic anastomosis    Iron 11 on 22 and hgb 7   Latest Reference Range & Units 16 04:36 16 03:15 12/15/21 15:35 22 19:48 22 21:25 22 03:43 22 04:11 22 04:21 22 10:01   Hemoglobin 12.0 - 16.0 g/dL 11.9 (L) 12.6 11.8 (L) 10.3 (L) 9.7 (L) 9.1 (L) 8.9 (L) 8.7 (L) 9.7 (L)   Hematocrit 37.0 - 48.5 % 37.4 38.7 37.7 34.7 (L) 34.7 (L) 31.7 (L) 31.4 (L) 31.0 (L) 33.0 (L)   MCV 82 - 98 fL 90 89 88 83 84 83 85 86 83   (L): Data is abnormally low      Past Medical History:  Past Medical History:   Diagnosis Date    Aortic stenosis     Arthritis     Back pain     Diabetes mellitus type II     Hyperlipidemia     Hypertension     NSTEMI (non-ST elevated myocardial infarction) 2022    Osteoporosis     Wears glasses         Past Surgical History:  Past Surgical History:   Procedure Laterality Date     vocal cord nodules removed  long time ago     twice    APPENDECTOMY  within last 5yrs    FIXATION KYPHOPLASTY THORACIC SPINE      13    FOOT SURGERY      left 2nd toe was too long     HERNIA REPAIR  within last 5yrs    LEFT HEART CATHETERIZATION Left 2022    Procedure: Left heart cath;  Surgeon:  Jose Echols MD;  [facility] CATH[facility]EP LAB;  Service: Cardiology;  Laterality: Left;    TONSILLECTOMY, ADENOIDECTOMY  long time ago        Home Medications:  Medications Prior to Admission   Medication Sig Dispense Refill Last Dose    albuterol (PROVENTIL/VENTOLIN HFA) 90 mcg/actuation inhaler Inhale 2 puffs into the lungs every 6 (six) hours as needed.   Unknown at Unknown time    ALLERGY RELIEF, FEXOFENADINE, 180 mg tablet Take 180 mg by mouth once daily.   Unknown at Unknown time    apixaban (ELIQUIS) 5 mg Tab Take 1 tablet (5 mg total) by mouth 2 (two) times daily. 60 tablet 0 Unknown at Unknown time    ascorbic acid, vitamin C, (VITAMIN C) 1000 MG tablet Take 1,000 mg by mouth Daily.   Unknown at Unknown time    aspirin (ECOTRIN) 81 MG EC tablet Take 81 mg by mouth once daily.   Unknown at Unknown time    budesonide-formoterol 80-4.5 mcg (SYMBICORT) 80-4.5 mcg/actuation HFAA Inhale 2 puffs into the lungs 2 (two) times daily.   Unknown at Unknown time    buPROPion (WELLBUTRIN XL) 150 MG TB24 tablet Take 150 mg by mouth.   Unknown at Unknown time    calcium carbonate (OS-TK) 600 mg (1,500 mg) Tab Take 600 mg by mouth 2 (two) times daily with meals.   Unknown at Unknown time    citalopram (CELEXA) 40 MG tablet Take 1 tablet (40 mg total) by mouth once daily. 90 tablet 3 Unknown at Unknown time    donepeziL (ARICEPT) 5 MG tablet Take 5 mg by mouth nightly.   Unknown at Unknown time    ergocalciferol (ERGOCALCIFEROL) 50,000 unit Cap TAKE 1 CAPSULE (50,000 UNITS TOTAL) EVERY 7 DAYS. 12 capsule 3 Unknown at Unknown time    FEROSUL 325 mg (65 mg iron) Tab tablet Take 1 tablet (325 mg total) by mouth once daily. 90 tablet 0 Unknown at Unknown time    [START ON 5/9/2022] fluconazole (DIFLUCAN) 150 MG Tab Take 1 tablet (150 mg total) by mouth once a week. for 3 doses 3 tablet 0 Unknown at Unknown time    glucosamine hawkins 2KCl-chondroit 500-400 mg Tab Take 1 tablet by mouth.   Unknown at Unknown time     glucosamine-chondroitin 500-400 mg tablet Take 1 tablet by mouth 3 (three) times daily.   Unknown at Unknown time    HYDROcodone-acetaminophen (NORCO) 5-325 mg per tablet Take 1 tablet by mouth every 6 (six) hours as needed for Pain. 12 tablet 0 Unknown at Unknown time    lisinopril (PRINIVIL,ZESTRIL) 20 MG tablet Take 20 mg by mouth every evening.    Unknown at Unknown time    metFORMIN (GLUCOPHAGE) 500 MG tablet Take 1 tablet (500 mg total) by mouth 2 (two) times daily with meals. 180 tablet 3 Unknown at Unknown time    metoprolol tartrate (LOPRESSOR) 25 MG tablet Take 0.5 tablets (12.5 mg total) by mouth 2 (two) times daily. 30 tablet 0 Unknown at Unknown time    miconazole (MICOTIN) 2 % cream Apply topically 2 (two) times daily. for 7 days 92 g 1 Unknown at Unknown time    multivitamin capsule Take 1 capsule by mouth once daily.   Unknown at Unknown time    mupirocin (BACTROBAN) 2 % ointment Apply topically 2 (two) times daily.   Unknown at Unknown time    omeprazole (PRILOSEC) 20 MG capsule Take 1 capsule (20 mg total) by mouth once daily. 90 capsule 3 Unknown at Unknown time    oxybutynin (DITROPAN) 5 MG Tab Take 1 tablet (5 mg total) by mouth 2 (two) times daily. 180 tablet 3 Unknown at Unknown time    simvastatin (ZOCOR) 40 MG tablet Take 1 tablet (40 mg total) by mouth every evening. 90 tablet 3 Unknown at Unknown time       Inpatient Medications:   sodium chloride 0.9%   Intravenous Once    apixaban  5 mg Oral BID    aspirin  81 mg Oral Daily    atorvastatin  40 mg Oral Daily    buPROPion  150 mg Oral Daily    citalopram  20 mg Oral Daily    donepeziL  5 mg Oral Nightly    fluconazole  150 mg Oral Weekly    fluticasone furoate-vilanteroL  1 puff Inhalation Daily    lisinopriL  20 mg Oral QHS    metoprolol tartrate  12.5 mg Oral BID    miconazole   Topical (Top) BID    oxybutynin  5 mg Oral BID     acetaminophen, albuterol, diphenhydrAMINE, fentaNYL, heparin, porcine (PF),  HYDROcodone-acetaminophen, levalbuterol, LIDOcaine HCL 10 mg/ml (1%), midazolam, sodium chloride 0.9%    Review of patient's allergies indicates:   Allergen Reactions    Sulfacetamide sodium      Pain perineal area    Sulfasalazine Hives    Adhesive Itching     SKIN GETS RED WITH TAPE AND BANDAIDS    Adhesive tape-silicones Itching     SKIN GETS RED WITH TAPE AND BANDAIDS    Sulfa (sulfonamide antibiotics) Rash       Social History:   Social History     Occupational History    Not on file   Tobacco Use    Smoking status: Current Every Day Smoker     Packs/day: 0.50     Years: 35.00     Pack years: 17.50    Smokeless tobacco: Never Used   Substance and Sexual Activity    Alcohol use: No    Drug use: No    Sexual activity: Not on file       Family History:   Family History   Problem Relation Age of Onset    Collagen disease Neg Hx        Review of Systems:  A 10 point review of systems was performed and was normal, except as mentioned in the HPI, including constitutional, HEENT, heme, lymph, cardiovascular, respiratory, gastrointestinal, genitourinary, neurologic, endocrine, psychiatric and musculoskeletal.      OBJECTIVE:    Physical Exam:  24 Hour Vital Sign Ranges: Temp:  [97.9 °F (36.6 °C)-98.5 °F (36.9 °C)] 98.3 °F (36.8 °C)  Pulse:  [] 74  Resp:  [16-25] 20  SpO2:  [89 %-98 %] 93 %  BP: ()/(41-66) 130/66  Most recent vitals: /66   Pulse 74   Temp 98.3 °F (36.8 °C) (Oral)   Resp 20   Ht 5' (1.524 m)   Wt 93.8 kg (206 lb 12.7 oz)   LMP  (LMP Unknown)   SpO2 (!) 93%   BMI 40.39 kg/m²    GEN: well-developed, well-nourished, awake and alert, non-toxic appearing adult  HEENT: PERRL, sclera anicteric, oral mucosa pink and moist without lesion  NECK: trachea midline; Good ROM  CV: regular rate and rhythm, no murmurs or gallops  RESP: clear to auscultation bilaterally, no wheezes, rhonci or rales  ABD: soft, non-tender, non-distended, normal bowel sounds  EXT: no swelling or edema,  2+ pulses distally  SKIN: no rashes or jaundice  PSYCH: normal affect    Labs:   Recent Labs     05/05/22  1001   WBC 5.86   MCV 83        Recent Labs     05/05/22  0227 05/05/22  1001 05/06/22  0427   * 135* 132*   K 3.6 3.6 3.7   CL 95 95 93*   CO2 27 29 28   BUN 9 10 22   * 128* 142*     No results for input(s): ALB in the last 72 hours.    Invalid input(s): ALKP, SGOT, SGPT, TBIL, DBIL, TPRO  Recent Labs     05/05/22  1001   INR 1.1         Radiology Review:  X-Ray Chest AP Portable   Final Result            IMPRESSION / RECOMMENDATIONS:    77-year-old female with aortic stenosis with upcoming plans for TAVR found to have progressive normocytic anemia without overt blood loss    -plan for outpatient push enteroscopy and colonoscopy this upcoming Thursday.  Patient will hold her Eliquis for 48 hours prior to procedure and resume shortly thereafter.  If endoscopic procedures are unremarkable will plan for outpatient capsule endoscopy on anticoagulation searching for small bowel AVMs or other lesions.    Thank you for this consult.    Cornell Aguirre  5/6/2022  4:28 PM

## 2022-05-06 NOTE — PLAN OF CARE
Delivered senior resource guide to bedside, patient not in room.  Page with meals on wheels flagged for patient.  Called daughter Sally at [phone] to inform.  All questions answered.

## 2022-05-07 NOTE — PLAN OF CARE
05/07/22 0847   Final Note   Assessment Type Final Discharge Note   Anticipated Discharge Disposition Home   What phone number can be called within the next 1-3 days to see how you are doing after discharge? [phone]   Post-Acute Status   Coverage [employer]   Discharge Delays None known at this time         CM reviewed the discharge orders. No case management/discharge planning needs noted.

## 2022-05-09 LAB
ALLENS TEST: ABNORMAL
ALLENS TEST: ABNORMAL
DELSYS: ABNORMAL
DELSYS: ABNORMAL
FLOW: 4
FLOW: 4
HCO3 UR-SCNC: 32.2 MMOL/L (ref 24–28)
HCO3 UR-SCNC: 32.9 MMOL/L (ref 24–28)
MODE: ABNORMAL
MODE: ABNORMAL
PCO2 BLDA: 62.2 MMHG (ref 35–45)
PCO2 BLDA: 62.8 MMHG (ref 35–45)
PH SMN: 7.32 [PH] (ref 7.35–7.45)
PH SMN: 7.33 [PH] (ref 7.35–7.45)
PO2 BLDA: 36 MMHG (ref 40–60)
PO2 BLDA: 36 MMHG (ref 40–60)
POC BE: 6 MMOL/L
POC BE: 7 MMOL/L
POC SATURATED O2: 62 % (ref 95–100)
POC SATURATED O2: 62 % (ref 95–100)
POC TCO2: 34 MMOL/L (ref 24–29)
POC TCO2: 35 MMOL/L (ref 24–29)
SAMPLE: ABNORMAL
SAMPLE: ABNORMAL
SITE: ABNORMAL
SITE: ABNORMAL

## 2022-05-09 NOTE — CARE UPDATE
05/05/22 0812   Patient Assessment/Suction   Level of Consciousness (AVPU) alert   Respiratory Effort Normal   Expansion/Accessory Muscles/Retractions no use of accessory muscles   All Lung Fields Breath Sounds equal bilaterally   Rhythm/Pattern, Respiratory snoring   Cough Frequency no cough   PRE-TX-O2   O2 Device (Oxygen Therapy) nasal cannula   $ Is the patient on Low Flow Oxygen? Yes   Flow (L/min) 2   SpO2 96 %   Pulse Oximetry Type Intermittent   $ Pulse Oximetry - Multiple Charge Pulse Oximetry - Multiple   Pulse 90   Resp 18   Temp 97.8 °F (36.6 °C)   BP (!) 98/49   Inhaler   $ Inhaler Charges Refused  (patient trying to sleep very agitated)   Education   $ Education Bronchodilator;15 min   Respiratory Evaluation   $ Care Plan Tech Time 15 min   $ Eval/Re-eval Charges Evaluation     
   05/05/22 2100   Patient Assessment/Suction   Level of Consciousness (AVPU) alert   Respiratory Effort Normal;Unlabored   Expansion/Accessory Muscles/Retractions no use of accessory muscles   All Lung Fields Breath Sounds   (decreased lll)   PRE-TX-O2   O2 Device (Oxygen Therapy) room air   SpO2 (!) 94 %   Pulse Oximetry Type Intermittent   $ Pulse Oximetry - Multiple Charge Pulse Oximetry - Multiple   Pulse 73   Resp 20   Maintain adequate oxygenation  
   05/06/22 1326   Labs   $ Was an ABG obtained? A Line;ISTAT - Blood gas   $ Labs Tech Time 15 min   Critical Value Communication   Date Result Received 05/06/22   Time Result Received 1326   Resulting Department of Critical Value Resp   Who communicated critical value from resulting department? Yuridia VILLALTA, RRT   Critical Test #1 sO2  (PA #2 sample)   Critical Test #1 Result 62   Name of Notified Physician/Designee Dr Vargas   Date Notified 05/06/22   Time Notified 1327   Read Back Verification Yes     
   05/06/22 1331   Labs   $ Was an ABG obtained? A Line;ISTAT - Blood gas   $ Labs Tech Time 15 min   Critical Value Communication   Date Result Received 05/06/22   Time Result Received 1331   Resulting Department of Critical Value Resp   Who communicated critical value from resulting department? Yuridia WRIGHT, RRT   Critical Test #1 sO2  (PA #1 sample)   Critical Test #1 Result 62   Name of Notified Physician/Designee Dr Vargas   Date Notified 05/06/22   Time Notified 1332   Read Back Verification Yes     
   05/06/22 1335   Labs   $ Was an ABG obtained? A Line;ISTAT - Blood gas   $ Labs Tech Time 15 min   Critical Value Communication   Date Result Received 05/06/22   Time Result Received 1335   Resulting Department of Critical Value resp   Who communicated critical value from resulting department? Yuridia WRIGHT, RRT   Critical Test #1 sO2  (Aorta)   Critical Test #1 Result 98  (on 4L NC)   Name of Notified Physician/Designee Dr Vargas   Date Notified 05/06/22   Time Notified 1335   Read Back Verification Yes     
   05/06/22 1346   Labs   $ Was an ABG obtained? A Line;ISTAT - Blood gas   $ Labs Tech Time 15 min   Critical Value Communication   Date Result Received 05/06/22   Time Result Received 1346   Resulting Department of Critical Value resp   Who communicated critical value from resulting department? Yuridia WRIGHT, RRT   Critical Test #1 sO2   Critical Test #1 Result 62  (RA)   Name of Notified Physician/Designee Dr Vargas   Date Notified 05/06/22   Time Notified 1346   Read Back Verification Yes     
no

## 2022-05-11 ENCOUNTER — OFFICE VISIT (OUTPATIENT)
Dept: HEMATOLOGY/ONCOLOGY | Facility: CLINIC | Age: 77
End: 2022-05-11
Payer: MEDICARE

## 2022-05-11 ENCOUNTER — TELEPHONE (OUTPATIENT)
Dept: HEMATOLOGY/ONCOLOGY | Facility: CLINIC | Age: 77
End: 2022-05-11

## 2022-05-11 VITALS
DIASTOLIC BLOOD PRESSURE: 74 MMHG | SYSTOLIC BLOOD PRESSURE: 129 MMHG | HEART RATE: 86 BPM | HEIGHT: 60 IN | WEIGHT: 204 LBS | RESPIRATION RATE: 16 BRPM | TEMPERATURE: 98 F | BODY MASS INDEX: 40.05 KG/M2

## 2022-05-11 DIAGNOSIS — D50.0 IRON DEFICIENCY ANEMIA DUE TO CHRONIC BLOOD LOSS: Primary | ICD-10-CM

## 2022-05-11 PROCEDURE — 1126F AMNT PAIN NOTED NONE PRSNT: CPT | Mod: CPTII,S$GLB,, | Performed by: INTERNAL MEDICINE

## 2022-05-11 PROCEDURE — 3074F SYST BP LT 130 MM HG: CPT | Mod: CPTII,S$GLB,, | Performed by: INTERNAL MEDICINE

## 2022-05-11 PROCEDURE — 1101F PT FALLS ASSESS-DOCD LE1/YR: CPT | Mod: CPTII,S$GLB,, | Performed by: INTERNAL MEDICINE

## 2022-05-11 PROCEDURE — 3288F PR FALLS RISK ASSESSMENT DOCUMENTED: ICD-10-PCS | Mod: CPTII,S$GLB,, | Performed by: INTERNAL MEDICINE

## 2022-05-11 PROCEDURE — 99214 PR OFFICE/OUTPT VISIT, EST, LEVL IV, 30-39 MIN: ICD-10-PCS | Mod: S$GLB,,, | Performed by: INTERNAL MEDICINE

## 2022-05-11 PROCEDURE — 1159F MED LIST DOCD IN RCRD: CPT | Mod: CPTII,S$GLB,, | Performed by: INTERNAL MEDICINE

## 2022-05-11 PROCEDURE — 3288F FALL RISK ASSESSMENT DOCD: CPT | Mod: CPTII,S$GLB,, | Performed by: INTERNAL MEDICINE

## 2022-05-11 PROCEDURE — 3078F PR MOST RECENT DIASTOLIC BLOOD PRESSURE < 80 MM HG: ICD-10-PCS | Mod: CPTII,S$GLB,, | Performed by: INTERNAL MEDICINE

## 2022-05-11 PROCEDURE — 1159F PR MEDICATION LIST DOCUMENTED IN MEDICAL RECORD: ICD-10-PCS | Mod: CPTII,S$GLB,, | Performed by: INTERNAL MEDICINE

## 2022-05-11 PROCEDURE — 99214 OFFICE O/P EST MOD 30 MIN: CPT | Mod: S$GLB,,, | Performed by: INTERNAL MEDICINE

## 2022-05-11 PROCEDURE — 1126F PR PAIN SEVERITY QUANTIFIED, NO PAIN PRESENT: ICD-10-PCS | Mod: CPTII,S$GLB,, | Performed by: INTERNAL MEDICINE

## 2022-05-11 PROCEDURE — 1101F PR PT FALLS ASSESS DOC 0-1 FALLS W/OUT INJ PAST YR: ICD-10-PCS | Mod: CPTII,S$GLB,, | Performed by: INTERNAL MEDICINE

## 2022-05-11 PROCEDURE — 3078F DIAST BP <80 MM HG: CPT | Mod: CPTII,S$GLB,, | Performed by: INTERNAL MEDICINE

## 2022-05-11 PROCEDURE — 3074F PR MOST RECENT SYSTOLIC BLOOD PRESSURE < 130 MM HG: ICD-10-PCS | Mod: CPTII,S$GLB,, | Performed by: INTERNAL MEDICINE

## 2022-05-11 PROCEDURE — 1111F DSCHRG MED/CURRENT MED MERGE: CPT | Mod: CPTII,S$GLB,, | Performed by: INTERNAL MEDICINE

## 2022-05-11 PROCEDURE — 1111F PR DISCHARGE MEDS RECONCILED W/ CURRENT OUTPATIENT MED LIST: ICD-10-PCS | Mod: CPTII,S$GLB,, | Performed by: INTERNAL MEDICINE

## 2022-05-11 NOTE — TELEPHONE ENCOUNTER
Orders for ferrlecit weekly x's 8 weeks, Epic for [facility].    Get her a date and time.    Get it authorized.    Check lab HH in 3 months, q 3 months.    RTC 14 weeks.    Pt's cell # [phone].

## 2022-05-11 NOTE — PROGRESS NOTES
[facility] hematology Oncology in office Subsequent encounter  note    5/11/22  Subjective:      Patient ID:   Daryleen G Moran  77 y.o. female  1945  [place] De [place]ira      Chief Complaint:   Anemia eval.    HPI:  77 y.o. female with FARRIS+, dizzyness, fatigue x's 6 weeks.  Tongue soreness x's 1 year.  VHD, stenosis per ECHO.  AVS.    Adrenal mass removed on L 1 1/2 year ago, has a hernia at site.  At [facility] main campus.  Complicated by fluid overload and CHF sx.  Breast reduction.  Appendectomy, T & A, vocal cord nodule removed.    Borderline DM, HTN, cholesterol, depression.    Smoke 1/2 ppd x's 50 years.  Discussed referral to smoking clinic.  She does not want referral to the smoking clinic.  Allergy sulfa.  ETOH no.    Sister anemia, Sister MVA, LBP, plate in neck.  Dad CABG, CVA.  [place] name Mikey, Best.    She has iron deficiency anemia.    S/P Ferrlicet weekly x's 8 weeks, HH.  Stool heme negative x's 3. U/A no blood seen.  No bleeding sx. She has incontinence sx, seeing Dr. Pate.  She took 1/1 J & J vaccines. She plans to take the booster when available.    Recent hospitalization for mild AMI, and CHF sx.  On Eliquis 5 mg 1 BID.  Dr. Gillespie.  Transfused 1 u pRBC.    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  Dr. Aguirre planning GI eval.  Ferrlecit weekly x's 8 weeks.  HH.    Follow CBC, ferritin q 3 months HH.  RTC 4 months.          ROS:   GEN: normal without any fever, night sweats or weight loss  HEENT: normal with no HA's, sore throat, stiff neck, changes in vision  CV: See HPI, normal with no CP, SOB, PND, FARRIS or orthopnea  PULM: normal with no SOB, cough, hemoptysis, sputum or pleuritic pain  GI: normal with no abdominal pain, nausea, vomiting, constipation, diarrhea, melanotic stools, BRBPR, or hematemesis  : normal with no hematuria, dysuria  BREAST: normal with no mass, discharge, pain  SKIN: normal with no rash, erythema, bruising, or swelling     Past Medical History:    Diagnosis Date    Aortic stenosis     Arthritis     Back pain     Diabetes mellitus type II     Hyperlipidemia     Hypertension     NSTEMI (non-ST elevated myocardial infarction) 5/1/2022    Osteoporosis     Wears glasses      Past Surgical History:   Procedure Laterality Date     vocal cord nodules removed  long time ago     twice    APPENDECTOMY  within last 5yrs    CATHETERIZATION OF BOTH LEFT AND RIGHT HEART Left 5/6/2022    Procedure: CATHETERIZATION, HEART, BOTH LEFT AND RIGHT;  Surgeon: Michelet Vargas MD;  Location: [facility] CATH/EP LAB;  Service: Cardiology;  Laterality: Left;    FIXATION KYPHOPLASTY THORACIC SPINE      8-20-13    FOOT SURGERY      left 2nd toe was too long     HERNIA REPAIR  within last 5yrs    LEFT HEART CATHETERIZATION Left 5/2/2022    Procedure: Left heart cath;  Surgeon: Jose Echols MD;  Location: [facility] CATH/EP LAB;  Service: Cardiology;  Laterality: Left;    TONSILLECTOMY, ADENOIDECTOMY  long time ago       Review of patient's allergies indicates:   Allergen Reactions    Sulfacetamide sodium      Pain perineal area    Sulfasalazine Hives    Adhesive Itching     SKIN GETS RED WITH TAPE AND BANDAIDS    Adhesive tape-silicones Itching     SKIN GETS RED WITH TAPE AND BANDAIDS    Sulfa (sulfonamide antibiotics) Rash           Current Outpatient Medications:     albuterol (PROVENTIL/VENTOLIN HFA) 90 mcg/actuation inhaler, Inhale 2 puffs into the lungs every 6 (six) hours as needed., Disp: , Rfl:     apixaban (ELIQUIS) 5 mg Tab, Take 1 tablet (5 mg total) by mouth 2 (two) times daily. Hold eliquis until restarted by cardiology . Pt to have GI eval next Thursday, Disp: 60 tablet, Rfl: 0    aspirin (ECOTRIN) 81 MG EC tablet, Take 81 mg by mouth once daily., Disp: , Rfl:     budesonide-formoterol 80-4.5 mcg (SYMBICORT) 80-4.5 mcg/actuation HFAA, Inhale 2 puffs into the lungs 2 (two) times daily., Disp: , Rfl:     buPROPion (WELLBUTRIN XL) 150 MG TB24 tablet, Take  150 mg by mouth., Disp: , Rfl:     calcium carbonate (OS-TK) 600 mg (1,500 mg) Tab, Take 600 mg by mouth 2 (two) times daily with meals., Disp: , Rfl:     citalopram (CELEXA) 40 MG tablet, Take 1 tablet (40 mg total) by mouth once daily., Disp: 90 tablet, Rfl: 3    donepeziL (ARICEPT) 5 MG tablet, Take 5 mg by mouth nightly., Disp: , Rfl:     ergocalciferol (ERGOCALCIFEROL) 50,000 unit Cap, TAKE 1 CAPSULE (50,000 UNITS TOTAL) EVERY 7 DAYS., Disp: 12 capsule, Rfl: 3    FEROSUL 325 mg (65 mg iron) Tab tablet, Take 1 tablet (325 mg total) by mouth once daily., Disp: 90 tablet, Rfl: 0    fluconazole (DIFLUCAN) 150 MG Tab, Take 1 tablet (150 mg total) by mouth once a week. for 3 doses, Disp: 3 tablet, Rfl: 0    glucosamine hawkins 2KCl-chondroit 500-400 mg Tab, Take 1 tablet by mouth., Disp: , Rfl:     glucosamine-chondroitin 500-400 mg tablet, Take 1 tablet by mouth 3 (three) times daily., Disp: , Rfl:     HYDROcodone-acetaminophen (NORCO) 5-325 mg per tablet, Take 1 tablet by mouth every 6 (six) hours as needed for Pain., Disp: 12 tablet, Rfl: 0    lisinopril (PRINIVIL,ZESTRIL) 20 MG tablet, Take 20 mg by mouth every evening. , Disp: , Rfl:     metFORMIN (GLUCOPHAGE) 500 MG tablet, Take 1 tablet (500 mg total) by mouth 2 (two) times daily with meals., Disp: 60 tablet, Rfl: 0    metoprolol tartrate (LOPRESSOR) 25 MG tablet, Take 0.5 tablets (12.5 mg total) by mouth 2 (two) times daily., Disp: 30 tablet, Rfl: 0    miconazole (MICOTIN) 2 % cream, Apply topically 2 (two) times daily. for 7 days, Disp: 92 g, Rfl: 1    multivitamin capsule, Take 1 capsule by mouth once daily., Disp: , Rfl:     mupirocin (BACTROBAN) 2 % ointment, Apply topically 2 (two) times daily., Disp: , Rfl:     omeprazole (PRILOSEC) 20 MG capsule, Take 1 capsule (20 mg total) by mouth once daily., Disp: 90 capsule, Rfl: 3    oxybutynin (DITROPAN) 5 MG Tab, Take 1 tablet (5 mg total) by mouth 2 (two) times daily., Disp: 180 tablet, Rfl: 3     simvastatin (ZOCOR) 40 MG tablet, Take 1 tablet (40 mg total) by mouth every evening., Disp: 90 tablet, Rfl: 3          Objective:   Vitals:  Blood pressure 129/74, pulse 86, temperature 98.4 °F (36.9 °C), resp. rate 16, height 5' (1.524 m), weight 92.5 kg (204 lb).    Physical Examination:   GEN: no apparent distress, comfortable  HEAD: atraumatic and normocephalic  EYES: no  pallor, no icterus  ENT:  No pharyngeal erythema, external ears WNL; no nasal discharge  NECK: no masses, thyroid normal, trachea midline, no LAD/LN's, supple  CV: RRR with no murmur; normal pulse; normal S1 and S2; no pedal edema  CHEST: Normal respiratory effort; CTAB; normal breath sounds; no wheeze or crackles  ABDOM: nontender and nondistended; soft; normal bowel sounds; no rebound/guarding, L/S NP  MUSC/Skeletal: ROM normal; no crepitus; joints normal; no deformities   EXTREM: no clubbing, cyanosis, inflammation or swelling  SKIN: no rashes, lesions, ulcers, petechiae   : no cvat  NEURO: grossly intact; motor/sensory WNL;  no tremors  PSYCH: normal mood, affect and behavior  LYMPH: normal cervical, supraclavicular, axillary and groin LN's    H/H 7.4/25.6  Ferritin 11  MCV 72.  Assessment:   (1) 77 y.o. female with diagnosis of Fe deficiency anemia  2nd GI blood loss, or hemolysis 2nd VHD.  Check stools for occult blood. Negative for blood.    (2)S/P  Ferrlicet infusion x's 8 weeks.    Energy 3-4/10.    CBC, ferritin pending.    May need further Fe infusion.  RTC 6 months.                           His areas the music is

## 2022-05-26 ENCOUNTER — TELEPHONE (OUTPATIENT)
Dept: HEMATOLOGY/ONCOLOGY | Facility: CLINIC | Age: 77
End: 2022-05-26

## 2022-05-26 NOTE — TELEPHONE ENCOUNTER
----- Message from Piedad Meadows sent at 5/26/2022 11:08 AM CDT -----  The patient's daughter Sally called and said [facility] told her they do not have the patient's orders to do her iron infusions. Please fax orders to Shana at [facility].

## 2022-06-02 ENCOUNTER — OFFICE VISIT (OUTPATIENT)
Dept: CARDIOLOGY | Facility: CLINIC | Age: 77
End: 2022-06-02
Payer: MEDICARE

## 2022-06-02 VITALS
HEIGHT: 60 IN | SYSTOLIC BLOOD PRESSURE: 132 MMHG | WEIGHT: 208.69 LBS | DIASTOLIC BLOOD PRESSURE: 80 MMHG | HEART RATE: 97 BPM | BODY MASS INDEX: 40.97 KG/M2 | OXYGEN SATURATION: 96 %

## 2022-06-02 DIAGNOSIS — I35.0 NONRHEUMATIC AORTIC VALVE STENOSIS: Chronic | ICD-10-CM

## 2022-06-02 DIAGNOSIS — J44.9 COPD, MILD: ICD-10-CM

## 2022-06-02 PROCEDURE — 1101F PR PT FALLS ASSESS DOC 0-1 FALLS W/OUT INJ PAST YR: ICD-10-PCS | Mod: CPTII,S$GLB,, | Performed by: INTERNAL MEDICINE

## 2022-06-02 PROCEDURE — 3288F PR FALLS RISK ASSESSMENT DOCUMENTED: ICD-10-PCS | Mod: CPTII,S$GLB,, | Performed by: INTERNAL MEDICINE

## 2022-06-02 PROCEDURE — 3075F SYST BP GE 130 - 139MM HG: CPT | Mod: CPTII,S$GLB,, | Performed by: INTERNAL MEDICINE

## 2022-06-02 PROCEDURE — 1126F AMNT PAIN NOTED NONE PRSNT: CPT | Mod: CPTII,S$GLB,, | Performed by: INTERNAL MEDICINE

## 2022-06-02 PROCEDURE — 1111F PR DISCHARGE MEDS RECONCILED W/ CURRENT OUTPATIENT MED LIST: ICD-10-PCS | Mod: CPTII,S$GLB,, | Performed by: INTERNAL MEDICINE

## 2022-06-02 PROCEDURE — 3079F DIAST BP 80-89 MM HG: CPT | Mod: CPTII,S$GLB,, | Performed by: INTERNAL MEDICINE

## 2022-06-02 PROCEDURE — 3079F PR MOST RECENT DIASTOLIC BLOOD PRESSURE 80-89 MM HG: ICD-10-PCS | Mod: CPTII,S$GLB,, | Performed by: INTERNAL MEDICINE

## 2022-06-02 PROCEDURE — 3075F PR MOST RECENT SYSTOLIC BLOOD PRESS GE 130-139MM HG: ICD-10-PCS | Mod: CPTII,S$GLB,, | Performed by: INTERNAL MEDICINE

## 2022-06-02 PROCEDURE — 1111F DSCHRG MED/CURRENT MED MERGE: CPT | Mod: CPTII,S$GLB,, | Performed by: INTERNAL MEDICINE

## 2022-06-02 PROCEDURE — 1126F PR PAIN SEVERITY QUANTIFIED, NO PAIN PRESENT: ICD-10-PCS | Mod: CPTII,S$GLB,, | Performed by: INTERNAL MEDICINE

## 2022-06-02 PROCEDURE — 1160F RVW MEDS BY RX/DR IN RCRD: CPT | Mod: CPTII,S$GLB,, | Performed by: INTERNAL MEDICINE

## 2022-06-02 PROCEDURE — 1159F PR MEDICATION LIST DOCUMENTED IN MEDICAL RECORD: ICD-10-PCS | Mod: CPTII,S$GLB,, | Performed by: INTERNAL MEDICINE

## 2022-06-02 PROCEDURE — 1160F PR REVIEW ALL MEDS BY PRESCRIBER/CLIN PHARMACIST DOCUMENTED: ICD-10-PCS | Mod: CPTII,S$GLB,, | Performed by: INTERNAL MEDICINE

## 2022-06-02 PROCEDURE — 3288F FALL RISK ASSESSMENT DOCD: CPT | Mod: CPTII,S$GLB,, | Performed by: INTERNAL MEDICINE

## 2022-06-02 PROCEDURE — 1101F PT FALLS ASSESS-DOCD LE1/YR: CPT | Mod: CPTII,S$GLB,, | Performed by: INTERNAL MEDICINE

## 2022-06-02 PROCEDURE — 99214 PR OFFICE/OUTPT VISIT, EST, LEVL IV, 30-39 MIN: ICD-10-PCS | Mod: S$GLB,,, | Performed by: INTERNAL MEDICINE

## 2022-06-02 PROCEDURE — 99214 OFFICE O/P EST MOD 30 MIN: CPT | Mod: S$GLB,,, | Performed by: INTERNAL MEDICINE

## 2022-06-02 PROCEDURE — 1159F MED LIST DOCD IN RCRD: CPT | Mod: CPTII,S$GLB,, | Performed by: INTERNAL MEDICINE

## 2022-06-02 RX ORDER — POTASSIUM CHLORIDE 750 MG/1
10 TABLET, EXTENDED RELEASE ORAL
COMMUNITY
Start: 2022-05-24 | End: 2022-11-22

## 2022-06-02 RX ORDER — BENAZEPRIL HYDROCHLORIDE 20 MG/1
20 TABLET ORAL DAILY
Status: ON HOLD | COMMUNITY
Start: 2022-05-05 | End: 2023-01-05 | Stop reason: SDUPTHER

## 2022-06-02 RX ORDER — FUROSEMIDE 40 MG/1
40 TABLET ORAL DAILY
Status: ON HOLD | COMMUNITY
Start: 2022-05-24 | End: 2023-01-05 | Stop reason: SDUPTHER

## 2022-06-02 NOTE — PROGRESS NOTES
Patient ID:  Daryleen G Moran is a 77 y.o. female who presents for follow-up of Hospital Follow Up (x3), Atrial Fibrillation, and Aortic Stenosis      She was recently admitted to [facility] because of shortness of breath.  Initially she was told that she had COPD.  She had a 2nd admission because of shortness of breath she was noted to have severe aortic stenosis.  The coronary arteriogram did not reveal any significant CAD.  The left heart catheterization revealed a aortic valve area 0.5 centimeter sq.      Past Medical History:   Diagnosis Date    Aortic stenosis     Arthritis     Back pain     Diabetes mellitus type II     Hyperlipidemia     Hypertension     NSTEMI (non-ST elevated myocardial infarction) 5/1/2022    Osteoporosis     Wears glasses         Past Surgical History:   Procedure Laterality Date     vocal cord nodules removed  long time ago     twice    APPENDECTOMY  within last 5yrs    CATHETERIZATION OF BOTH LEFT AND RIGHT HEART Left 5/6/2022    Procedure: CATHETERIZATION, HEART, BOTH LEFT AND RIGHT;  Surgeon: Michelet Vargas MD;  [facility];  Service: Cardiology;  Laterality: Left;    FIXATION KYPHOPLASTY THORACIC SPINE      8-20-13    FOOT SURGERY      left 2nd toe was too long     HERNIA REPAIR  within last 5yrs    LEFT HEART CATHETERIZATION Left 5/2/2022    Procedure: Left heart cath;  Surgeon: Jose Echols MD;  [facility];  Service: Cardiology;  Laterality: Left;    TONSILLECTOMY, ADENOIDECTOMY  long time ago          Current Outpatient Medications   Medication Instructions    albuterol (PROVENTIL/VENTOLIN HFA) 90 mcg/actuation inhaler 2 puffs, Inhalation, Every 6 hours PRN    aspirin (ECOTRIN) 81 mg, Daily    benazepriL (LOTENSIN) 20 mg, Oral, Daily    budesonide-formoterol 80-4.5 mcg (SYMBICORT) 80-4.5 mcg/actuation HFAA 2 puffs, Inhalation, 2 times daily    buPROPion (WELLBUTRIN XL) 150 mg, Oral    calcium  carbonate (OS-TK) 600 mg, Oral, 2 times daily with meals    citalopram (CELEXA) 40 mg, Oral, Daily    donepeziL (ARICEPT) 5 mg, Oral, Nightly    ergocalciferol (ERGOCALCIFEROL) 50,000 unit Cap TAKE 1 CAPSULE (50,000 UNITS TOTAL) EVERY 7 DAYS.    FeroSuL 325 mg, Oral, Daily    furosemide (LASIX) 40 mg, Oral    glucosamine hawkins 2KCl-chondroit 500-400 mg Tab 1 tablet, Oral    glucosamine-chondroitin 500-400 mg tablet 1 tablet, Oral, 3 times daily    HYDROcodone-acetaminophen (NORCO) 5-325 mg per tablet 1 tablet, Oral, Every 6 hours PRN    lisinopriL (PRINIVIL,ZESTRIL) 20 mg, Oral, Nightly    metFORMIN (GLUCOPHAGE) 500 mg, Oral, 2 times daily with meals    metoprolol tartrate (LOPRESSOR) 12.5 mg, Oral, 2 times daily    miconazole (MICOTIN) 2 % cream Topical (Top), 2 times daily    multivitamin capsule 1 capsule, Oral, Daily    mupirocin (BACTROBAN) 2 % ointment Topical (Top), 2 times daily    omeprazole (PRILOSEC) 20 mg, Oral, Daily    oxybutynin (DITROPAN) 5 mg, Oral, 2 times daily    potassium chloride (KLOR-CON) 10 MEQ TbSR 10 mEq, Oral    simvastatin (ZOCOR) 40 mg, Oral, Nightly        Review of patient's allergies indicates:   Allergen Reactions    Sulfacetamide sodium      Pain perineal area    Sulfasalazine Hives    Adhesive Itching     SKIN GETS RED WITH TAPE AND BANDAIDS    Adhesive tape-silicones Itching     SKIN GETS RED WITH TAPE AND BANDAIDS    Sulfa (sulfonamide antibiotics) Rash        Review of Systems   Cardiovascular: Positive for dyspnea on exertion. Negative for chest pain and palpitations.   Respiratory: Positive for cough and shortness of breath.         Objective:     Vitals:    06/02/22 1619   BP: 132/80   BP Location: Left arm   Patient Position: Sitting   BP Method: Medium (Manual)   Pulse: 97   SpO2: 96%   Weight: 94.7 kg (208 lb 10.7 oz)   Height: 5' (1.524 m)       Physical Exam  Vitals and nursing note reviewed.   Constitutional:       Appearance: She is  well-developed.   HENT:      Head: Normocephalic and atraumatic.   Eyes:      Conjunctiva/sclera: Conjunctivae normal.   Cardiovascular:      Rate and Rhythm: Normal rate and regular rhythm.      Heart sounds: Murmur (4/6 systolic murmur at the base) heard.   Pulmonary:      Effort: Pulmonary effort is normal.      Breath sounds: Normal breath sounds.   Abdominal:      General: Bowel sounds are normal.      Palpations: Abdomen is soft.   Musculoskeletal:         General: Normal range of motion.   Skin:     General: Skin is warm and dry.   Neurological:      Mental Status: She is alert and oriented to person, place, and time.   Psychiatric:         Behavior: Behavior normal.         Thought Content: Thought content normal.         Judgment: Judgment normal.       CMP  Sodium   Date Value Ref Range Status   05/06/2022 132 (L) 136 - 145 mmol/L Final     Potassium   Date Value Ref Range Status   05/06/2022 3.7 3.5 - 5.1 mmol/L Final     Chloride   Date Value Ref Range Status   05/06/2022 93 (L) 95 - 110 mmol/L Final     CO2   Date Value Ref Range Status   05/06/2022 28 23 - 29 mmol/L Final     Glucose   Date Value Ref Range Status   05/06/2022 142 (H) 70 - 110 mg/dL Final     BUN   Date Value Ref Range Status   05/06/2022 22 8 - 23 mg/dL Final     Creatinine   Date Value Ref Range Status   05/06/2022 0.6 0.5 - 1.4 mg/dL Final   08/21/2013 0.8 0.5 - 1.4 mg/dL Final     Calcium   Date Value Ref Range Status   05/06/2022 9.1 8.7 - 10.5 mg/dL Final   08/21/2013 10.3 8.7 - 10.5 mg/dL Final     Total Protein   Date Value Ref Range Status   05/06/2022 6.0 6.0 - 8.4 g/dL Final     Albumin   Date Value Ref Range Status   05/06/2022 3.2 (L) 3.5 - 5.2 g/dL Final     Total Bilirubin   Date Value Ref Range Status   05/06/2022 1.1 (H) 0.1 - 1.0 mg/dL Final     Comment:     For infants and newborns, interpretation of results should be based  on gestational age, weight and in agreement with clinical  observations.    Premature Infant  recommended reference ranges:  Up to 24 hours.............<8.0 mg/dL  Up to 48 hours............<12.0 mg/dL  3-5 days..................<15.0 mg/dL  6-29 days.................<15.0 mg/dL       Alkaline Phosphatase   Date Value Ref Range Status   05/06/2022 49 (L) 55 - 135 U/L Final     AST   Date Value Ref Range Status   05/06/2022 22 10 - 40 U/L Final     ALT   Date Value Ref Range Status   05/06/2022 29 10 - 44 U/L Final     Anion Gap   Date Value Ref Range Status   05/06/2022 11 8 - 16 mmol/L Final   08/21/2013 11 5 - 15 meq/L Final     eGFR if    Date Value Ref Range Status   05/06/2022 >60.0 >60 mL/min/1.73 m^2 Final     eGFR if non    Date Value Ref Range Status   05/06/2022 >60.0 >60 mL/min/1.73 m^2 Final     Comment:     Calculation used to obtain the estimated glomerular filtration  rate (eGFR) is the CKD-EPI equation.         BMP  Lab Results   Component Value Date     (L) 05/06/2022    K 3.7 05/06/2022    CL 93 (L) 05/06/2022    CO2 28 05/06/2022    BUN 22 05/06/2022    CREATININE 0.6 05/06/2022    CALCIUM 9.1 05/06/2022    ANIONGAP 11 05/06/2022    ESTGFRAFRICA >60.0 05/06/2022    EGFRNONAA >60.0 05/06/2022      BNP  @LABRCNTIP(BNP,BNPTRIAGEBLO)@   No results found for: CHOL  No results found for: HDL  No results found for: LDLCALC  No results found for: TRIG  No results found for: CHOLHDL   Lab Results   Component Value Date    TSH 1.13 11/17/2009     Lab Results   Component Value Date    HGBA1C 6.2 05/01/2022     Lab Results   Component Value Date    WBC 5.86 05/05/2022    HGB 9.7 (L) 05/05/2022    HCT 33.0 (L) 05/05/2022    MCV 83 05/05/2022     05/05/2022         Results for orders placed during the hospital encounter of 04/30/22    Echo    Interpretation Summary  · The estimated PA systolic pressure is 45 mmHg.  · The left ventricle is normal in size with mild concentric hypertrophy and normal systolic function.  · The estimated ejection fraction is  70%.  · Grade II left ventricular diastolic dysfunction.  · Normal right ventricular size with normal right ventricular systolic function.  · Mild left atrial enlargement.  · There is severe aortic valve stenosis.  · Aortic valve area is 1.22 cm2; peak velocity is m/s; mean gradient is 34 mmHg.  · Mild-to-moderate mitral regurgitation.  · Mild tricuspid regurgitation.  · Mild aortic regurgitation.     Results for orders placed during the hospital encounter of 05/05/22    Cardiac catheterization    Conclusion  · The pre-procedure left ventricular end diastolic pressure was 12.  · The ejection fraction was calculated to be 65%.  · The post-procedure left ventricular end diastolic pressure was 12.  · [place] shaped  · The Ost Cx lesion was 65% stenosed.  · The estimated blood loss was none.  · There was non-obstructive coronary artery disease.. Diffuse ectasia.  · The filling pressures mildly elevated. Pulmonary hypertension was mild.    The procedure log was documented by Documenter: RT Marck and verified by Michelet Vargas MD.    Date: 5/6/2022  Time: 2:37 PM         Assessment:       Aortic stenosis  Severe by left heart catheterization    COPD, mild  Aware       Plan:       Make an appointment to see Dr. Krishnan at [facility].  Take a copy of the coronary arteriogram.  Return to the office in 3 months

## 2022-06-14 ENCOUNTER — PATIENT MESSAGE (OUTPATIENT)
Dept: CARDIOLOGY | Facility: CLINIC | Age: 77
End: 2022-06-14
Payer: MEDICARE

## 2022-06-21 ENCOUNTER — TELEPHONE (OUTPATIENT)
Dept: CARDIOLOGY | Facility: CLINIC | Age: 77
End: 2022-06-21
Payer: MEDICARE

## 2022-06-21 NOTE — TELEPHONE ENCOUNTER
----- Message from Debbie Weinberg sent at 6/21/2022  4:26 PM CDT -----  Contact: t  Type: Needs Medical Advice         Who Called: PT  Best Call Back Number:[phone]  Additional Information: Requesting a call back regarding  pt was to send a disc of her cardiogram results  to dr krishnan in [facility] but [facility] didn't do that test and  Dr Krishnan is wanting to know if Dr Macias will be ordering a Cardiogram.   Please Advise- Thank you

## 2022-06-28 ENCOUNTER — TELEPHONE (OUTPATIENT)
Dept: HEMATOLOGY/ONCOLOGY | Facility: CLINIC | Age: 77
End: 2022-06-28

## 2022-06-28 DIAGNOSIS — D50.0 IRON DEFICIENCY ANEMIA DUE TO CHRONIC BLOOD LOSS: Primary | ICD-10-CM

## 2022-06-28 NOTE — TELEPHONE ENCOUNTER
Received message in regards to pt not doing anymore iron infusions due to them not getting a vein.  She has received a total of 5 iron infusions. Last dose being 6/16.  Per Dr. Cazares, we will recheck labs on 7/28 at [place] . Orders faxed.  RTC 2 months. Message sent to .   Pt verbalized understanding.

## 2022-08-09 NOTE — PROGRESS NOTES
Patient, Daryleen G Moran (MRN #[id number]), presented with a recorded BMI of 40.75 kg/m^2 consistent with the definition of morbid obesity (ICD-10 E66.01). The patient's morbid obesity was monitored, evaluated, addressed and/or treated. This addendum to the medical record is made on 08/09/2022.

## 2022-10-10 ENCOUNTER — TELEPHONE (OUTPATIENT)
Dept: ELECTROPHYSIOLOGY | Facility: CLINIC | Age: 77
End: 2022-10-10
Payer: MEDICARE

## 2022-10-10 NOTE — TELEPHONE ENCOUNTER
Called Ms. Cyr to inform her Dr. Gonzalez suggest she sees Dr. Krishnan first before her treats her for her AF. Left message and call back number.

## 2022-10-11 ENCOUNTER — TELEPHONE (OUTPATIENT)
Dept: CARDIOLOGY | Facility: CLINIC | Age: 77
End: 2022-10-11
Payer: MEDICARE

## 2022-10-11 DIAGNOSIS — R07.9 CHEST PAIN, UNSPECIFIED TYPE: ICD-10-CM

## 2022-10-11 DIAGNOSIS — I35.0 NONRHEUMATIC AORTIC VALVE STENOSIS: Primary | Chronic | ICD-10-CM

## 2022-11-09 ENCOUNTER — OFFICE VISIT (OUTPATIENT)
Dept: VASCULAR SURGERY | Facility: CLINIC | Age: 77
End: 2022-11-09
Payer: MEDICARE

## 2022-11-09 VITALS
HEART RATE: 91 BPM | DIASTOLIC BLOOD PRESSURE: 49 MMHG | BODY MASS INDEX: 40.62 KG/M2 | SYSTOLIC BLOOD PRESSURE: 84 MMHG | HEIGHT: 60 IN

## 2022-11-09 DIAGNOSIS — I35.0 NONRHEUMATIC AORTIC VALVE STENOSIS: Primary | Chronic | ICD-10-CM

## 2022-11-09 PROCEDURE — 3288F PR FALLS RISK ASSESSMENT DOCUMENTED: ICD-10-PCS | Mod: CPTII,S$GLB,, | Performed by: THORACIC SURGERY (CARDIOTHORACIC VASCULAR SURGERY)

## 2022-11-09 PROCEDURE — 1101F PT FALLS ASSESS-DOCD LE1/YR: CPT | Mod: CPTII,S$GLB,, | Performed by: THORACIC SURGERY (CARDIOTHORACIC VASCULAR SURGERY)

## 2022-11-09 PROCEDURE — 3074F SYST BP LT 130 MM HG: CPT | Mod: CPTII,S$GLB,, | Performed by: THORACIC SURGERY (CARDIOTHORACIC VASCULAR SURGERY)

## 2022-11-09 PROCEDURE — 3078F PR MOST RECENT DIASTOLIC BLOOD PRESSURE < 80 MM HG: ICD-10-PCS | Mod: CPTII,S$GLB,, | Performed by: THORACIC SURGERY (CARDIOTHORACIC VASCULAR SURGERY)

## 2022-11-09 PROCEDURE — 1159F MED LIST DOCD IN RCRD: CPT | Mod: CPTII,S$GLB,, | Performed by: THORACIC SURGERY (CARDIOTHORACIC VASCULAR SURGERY)

## 2022-11-09 PROCEDURE — 99204 PR OFFICE/OUTPT VISIT, NEW, LEVL IV, 45-59 MIN: ICD-10-PCS | Mod: S$GLB,,, | Performed by: THORACIC SURGERY (CARDIOTHORACIC VASCULAR SURGERY)

## 2022-11-09 PROCEDURE — 1160F RVW MEDS BY RX/DR IN RCRD: CPT | Mod: CPTII,S$GLB,, | Performed by: THORACIC SURGERY (CARDIOTHORACIC VASCULAR SURGERY)

## 2022-11-09 PROCEDURE — 1160F PR REVIEW ALL MEDS BY PRESCRIBER/CLIN PHARMACIST DOCUMENTED: ICD-10-PCS | Mod: CPTII,S$GLB,, | Performed by: THORACIC SURGERY (CARDIOTHORACIC VASCULAR SURGERY)

## 2022-11-09 PROCEDURE — 1101F PR PT FALLS ASSESS DOC 0-1 FALLS W/OUT INJ PAST YR: ICD-10-PCS | Mod: CPTII,S$GLB,, | Performed by: THORACIC SURGERY (CARDIOTHORACIC VASCULAR SURGERY)

## 2022-11-09 PROCEDURE — 3288F FALL RISK ASSESSMENT DOCD: CPT | Mod: CPTII,S$GLB,, | Performed by: THORACIC SURGERY (CARDIOTHORACIC VASCULAR SURGERY)

## 2022-11-09 PROCEDURE — 99204 OFFICE O/P NEW MOD 45 MIN: CPT | Mod: S$GLB,,, | Performed by: THORACIC SURGERY (CARDIOTHORACIC VASCULAR SURGERY)

## 2022-11-09 PROCEDURE — 1159F PR MEDICATION LIST DOCUMENTED IN MEDICAL RECORD: ICD-10-PCS | Mod: CPTII,S$GLB,, | Performed by: THORACIC SURGERY (CARDIOTHORACIC VASCULAR SURGERY)

## 2022-11-09 PROCEDURE — 3074F PR MOST RECENT SYSTOLIC BLOOD PRESSURE < 130 MM HG: ICD-10-PCS | Mod: CPTII,S$GLB,, | Performed by: THORACIC SURGERY (CARDIOTHORACIC VASCULAR SURGERY)

## 2022-11-09 PROCEDURE — 3078F DIAST BP <80 MM HG: CPT | Mod: CPTII,S$GLB,, | Performed by: THORACIC SURGERY (CARDIOTHORACIC VASCULAR SURGERY)

## 2022-11-09 RX ORDER — FERROUS GLUCONATE 324(37.5)
324 TABLET ORAL DAILY
COMMUNITY
Start: 2022-10-25 | End: 2024-02-12 | Stop reason: SDUPTHER

## 2022-11-09 RX ORDER — AMOXICILLIN AND CLAVULANATE POTASSIUM 875; 125 MG/1; MG/1
TABLET, FILM COATED ORAL
COMMUNITY
Start: 2022-10-25 | End: 2022-11-22

## 2022-11-09 RX ORDER — DOXYCYCLINE 100 MG/1
CAPSULE ORAL
COMMUNITY
Start: 2022-10-25 | End: 2022-11-22

## 2022-11-09 RX ORDER — PREDNISONE 20 MG/1
TABLET ORAL
Status: ON HOLD | COMMUNITY
Start: 2022-10-25 | End: 2022-11-29 | Stop reason: HOSPADM

## 2022-11-09 NOTE — PROGRESS NOTES
This patient has aortic stenosis.  She was referred for consideration of transaortic valvular replacement.  She has chronic hypertension and COPD.  She has smoked for decades.  She wears supplemental oxygen on a daily basis.  Medicines are part of the epic record.  Her problem list was reviewed.  She has serious back problems and degenerative disc disease and chronic back pain.  She takes Percocet regularly.  She has had no recent surgeries.  On exam vital signs are stable.  Pupils are equal and round reactive to light.  Neck is supple.  Chest is equal breath sounds.  Heart is in a regular rate and rhythm with a murmur consistent with aortic stenosis.  Abdomen is protuberant but benign.  Perfusion to the legs and feet seems to be satisfactory.    The patient has aortic stenosis.  She also has fairly advanced COPD with oxygen dependency.  Patient is not a good candidate for open surgery.  She is under consideration for transaortic valvular replacement.  I explained this to her and her daughter.  She will be high risk for any procedure and certainly TAVR.  Once she completes the protocol for TAVR surgery will perhaps be scheduled

## 2022-11-21 ENCOUNTER — HOSPITAL ENCOUNTER (OUTPATIENT)
Facility: HOSPITAL | Age: 77
Discharge: HOME HEALTH | End: 2022-11-30
Attending: EMERGENCY MEDICINE | Admitting: EMERGENCY MEDICINE
Payer: MEDICARE

## 2022-11-21 DIAGNOSIS — R07.9 CHEST PAIN: ICD-10-CM

## 2022-11-21 DIAGNOSIS — S22.000A COMPRESSION FRACTURE OF BODY OF THORACIC VERTEBRA: ICD-10-CM

## 2022-11-21 DIAGNOSIS — S32.000A COMPRESSION FRACTURE OF LUMBAR VERTEBRA, UNSPECIFIED LUMBAR VERTEBRAL LEVEL, INITIAL ENCOUNTER: Primary | ICD-10-CM

## 2022-11-21 DIAGNOSIS — M17.12 ARTHRITIS OF LEFT KNEE: ICD-10-CM

## 2022-11-21 DIAGNOSIS — M80.00XA OSTEOPOROSIS WITH CURRENT PATHOLOGICAL FRACTURE, UNSPECIFIED OSTEOPOROSIS TYPE, INITIAL ENCOUNTER: ICD-10-CM

## 2022-11-21 PROBLEM — R19.7 DIARRHEA: Status: ACTIVE | Noted: 2022-11-21

## 2022-11-21 LAB
ALBUMIN SERPL BCP-MCNC: 2.7 G/DL (ref 3.5–5.2)
ALP SERPL-CCNC: 73 U/L (ref 55–135)
ALT SERPL W/O P-5'-P-CCNC: 15 U/L (ref 10–44)
ANION GAP SERPL CALC-SCNC: 9 MMOL/L (ref 8–16)
AST SERPL-CCNC: 20 U/L (ref 10–40)
BACTERIA #/AREA URNS AUTO: ABNORMAL /HPF
BASOPHILS # BLD AUTO: 0.08 K/UL (ref 0–0.2)
BASOPHILS NFR BLD: 0.9 % (ref 0–1.9)
BILIRUB SERPL-MCNC: 0.5 MG/DL (ref 0.1–1)
BILIRUB UR QL STRIP: NEGATIVE
BUN SERPL-MCNC: 13 MG/DL (ref 6–30)
BUN SERPL-MCNC: 13 MG/DL (ref 8–23)
CALCIUM SERPL-MCNC: 8.5 MG/DL (ref 8.7–10.5)
CHLORIDE SERPL-SCNC: 90 MMOL/L (ref 95–110)
CHLORIDE SERPL-SCNC: 93 MMOL/L (ref 95–110)
CLARITY UR REFRACT.AUTO: ABNORMAL
CO2 SERPL-SCNC: 29 MMOL/L (ref 23–29)
COLOR UR AUTO: YELLOW
CREAT SERPL-MCNC: 0.6 MG/DL (ref 0.5–1.4)
CREAT SERPL-MCNC: 0.6 MG/DL (ref 0.5–1.4)
DIFFERENTIAL METHOD: ABNORMAL
EOSINOPHIL # BLD AUTO: 0.2 K/UL (ref 0–0.5)
EOSINOPHIL NFR BLD: 1.7 % (ref 0–8)
ERYTHROCYTE [DISTWIDTH] IN BLOOD BY AUTOMATED COUNT: 15.1 % (ref 11.5–14.5)
EST. GFR  (NO RACE VARIABLE): >60 ML/MIN/1.73 M^2
GLUCOSE SERPL-MCNC: 122 MG/DL (ref 70–110)
GLUCOSE SERPL-MCNC: 125 MG/DL (ref 70–110)
GLUCOSE UR QL STRIP: NEGATIVE
HCT VFR BLD AUTO: 36.7 % (ref 37–48.5)
HCT VFR BLD CALC: 31 %PCV (ref 36–54)
HGB BLD-MCNC: 10.9 G/DL (ref 12–16)
HGB UR QL STRIP: NEGATIVE
HYALINE CASTS UR QL AUTO: 12 /LPF
IMM GRANULOCYTES # BLD AUTO: 0.03 K/UL (ref 0–0.04)
IMM GRANULOCYTES NFR BLD AUTO: 0.3 % (ref 0–0.5)
KETONES UR QL STRIP: NEGATIVE
LEUKOCYTE ESTERASE UR QL STRIP: ABNORMAL
LIPASE SERPL-CCNC: 27 U/L (ref 4–60)
LYMPHOCYTES # BLD AUTO: 1.3 K/UL (ref 1–4.8)
LYMPHOCYTES NFR BLD: 13.8 % (ref 18–48)
MCH RBC QN AUTO: 25.8 PG (ref 27–31)
MCHC RBC AUTO-ENTMCNC: 29.7 G/DL (ref 32–36)
MCV RBC AUTO: 87 FL (ref 82–98)
MICROSCOPIC COMMENT: ABNORMAL
MONOCYTES # BLD AUTO: 1 K/UL (ref 0.3–1)
MONOCYTES NFR BLD: 10.9 % (ref 4–15)
NEUTROPHILS # BLD AUTO: 6.8 K/UL (ref 1.8–7.7)
NEUTROPHILS NFR BLD: 72.4 % (ref 38–73)
NITRITE UR QL STRIP: NEGATIVE
NON-SQ EPI CELLS #/AREA URNS AUTO: 1 /HPF
NRBC BLD-RTO: 0 /100 WBC
PH UR STRIP: 5 [PH] (ref 5–8)
PLATELET # BLD AUTO: 268 K/UL (ref 150–450)
PMV BLD AUTO: 10.2 FL (ref 9.2–12.9)
POC IONIZED CALCIUM: 1 MMOL/L (ref 1.06–1.42)
POC TCO2 (MEASURED): 31 MMOL/L (ref 23–29)
POCT GLUCOSE: 100 MG/DL (ref 70–110)
POTASSIUM BLD-SCNC: 3 MMOL/L (ref 3.5–5.1)
POTASSIUM SERPL-SCNC: 3.1 MMOL/L (ref 3.5–5.1)
PROT SERPL-MCNC: 5.3 G/DL (ref 6–8.4)
PROT UR QL STRIP: NEGATIVE
RBC # BLD AUTO: 4.22 M/UL (ref 4–5.4)
RBC #/AREA URNS AUTO: 3 /HPF (ref 0–4)
SAMPLE: ABNORMAL
SODIUM BLD-SCNC: 132 MMOL/L (ref 136–145)
SODIUM SERPL-SCNC: 131 MMOL/L (ref 136–145)
SP GR UR STRIP: 1.02 (ref 1–1.03)
SQUAMOUS #/AREA URNS AUTO: 6 /HPF
URN SPEC COLLECT METH UR: ABNORMAL
WBC # BLD AUTO: 9.33 K/UL (ref 3.9–12.7)
WBC #/AREA URNS AUTO: 13 /HPF (ref 0–5)

## 2022-11-21 PROCEDURE — 83690 ASSAY OF LIPASE: CPT | Performed by: EMERGENCY MEDICINE

## 2022-11-21 PROCEDURE — 82962 GLUCOSE BLOOD TEST: CPT

## 2022-11-21 PROCEDURE — 96361 HYDRATE IV INFUSION ADD-ON: CPT

## 2022-11-21 PROCEDURE — 25000003 PHARM REV CODE 250: Performed by: STUDENT IN AN ORGANIZED HEALTH CARE EDUCATION/TRAINING PROGRAM

## 2022-11-21 PROCEDURE — 27000221 HC OXYGEN, UP TO 24 HOURS

## 2022-11-21 PROCEDURE — 94761 N-INVAS EAR/PLS OXIMETRY MLT: CPT

## 2022-11-21 PROCEDURE — 99204 PR OFFICE/OUTPT VISIT, NEW, LEVL IV, 45-59 MIN: ICD-10-PCS | Mod: GC,,, | Performed by: STUDENT IN AN ORGANIZED HEALTH CARE EDUCATION/TRAINING PROGRAM

## 2022-11-21 PROCEDURE — 80047 BASIC METABLC PNL IONIZED CA: CPT

## 2022-11-21 PROCEDURE — 99285 EMERGENCY DEPT VISIT HI MDM: CPT | Mod: ,,, | Performed by: EMERGENCY MEDICINE

## 2022-11-21 PROCEDURE — 63600175 PHARM REV CODE 636 W HCPCS: Performed by: EMERGENCY MEDICINE

## 2022-11-21 PROCEDURE — 25500020 PHARM REV CODE 255: Performed by: EMERGENCY MEDICINE

## 2022-11-21 PROCEDURE — G0378 HOSPITAL OBSERVATION PER HR: HCPCS

## 2022-11-21 PROCEDURE — 87088 URINE BACTERIA CULTURE: CPT | Performed by: EMERGENCY MEDICINE

## 2022-11-21 PROCEDURE — 99285 EMERGENCY DEPT VISIT HI MDM: CPT | Mod: 25

## 2022-11-21 PROCEDURE — 99220 PR INITIAL OBSERVATION CARE,LEVL III: CPT | Mod: GC,,, | Performed by: HOSPITALIST

## 2022-11-21 PROCEDURE — 87186 SC STD MICRODIL/AGAR DIL: CPT | Performed by: EMERGENCY MEDICINE

## 2022-11-21 PROCEDURE — 99220 PR INITIAL OBSERVATION CARE,LEVL III: ICD-10-PCS | Mod: GC,,, | Performed by: HOSPITALIST

## 2022-11-21 PROCEDURE — 87077 CULTURE AEROBIC IDENTIFY: CPT | Performed by: EMERGENCY MEDICINE

## 2022-11-21 PROCEDURE — 96372 THER/PROPH/DIAG INJ SC/IM: CPT | Performed by: STUDENT IN AN ORGANIZED HEALTH CARE EDUCATION/TRAINING PROGRAM

## 2022-11-21 PROCEDURE — 99204 OFFICE O/P NEW MOD 45 MIN: CPT | Mod: GC,,, | Performed by: STUDENT IN AN ORGANIZED HEALTH CARE EDUCATION/TRAINING PROGRAM

## 2022-11-21 PROCEDURE — 96375 TX/PRO/DX INJ NEW DRUG ADDON: CPT

## 2022-11-21 PROCEDURE — 81001 URINALYSIS AUTO W/SCOPE: CPT | Performed by: EMERGENCY MEDICINE

## 2022-11-21 PROCEDURE — 85025 COMPLETE CBC W/AUTO DIFF WBC: CPT | Performed by: EMERGENCY MEDICINE

## 2022-11-21 PROCEDURE — 25000003 PHARM REV CODE 250: Performed by: EMERGENCY MEDICINE

## 2022-11-21 PROCEDURE — 99285 PR EMERGENCY DEPT VISIT,LEVEL V: ICD-10-PCS | Mod: ,,, | Performed by: EMERGENCY MEDICINE

## 2022-11-21 PROCEDURE — 63600175 PHARM REV CODE 636 W HCPCS: Performed by: STUDENT IN AN ORGANIZED HEALTH CARE EDUCATION/TRAINING PROGRAM

## 2022-11-21 PROCEDURE — 87086 URINE CULTURE/COLONY COUNT: CPT | Performed by: EMERGENCY MEDICINE

## 2022-11-21 PROCEDURE — 80053 COMPREHEN METABOLIC PANEL: CPT | Performed by: EMERGENCY MEDICINE

## 2022-11-21 RX ORDER — BUPROPION HYDROCHLORIDE 150 MG/1
150 TABLET ORAL DAILY
Status: DISCONTINUED | OUTPATIENT
Start: 2022-11-21 | End: 2022-11-30 | Stop reason: HOSPADM

## 2022-11-21 RX ORDER — ALBUTEROL SULFATE 90 UG/1
2 AEROSOL, METERED RESPIRATORY (INHALATION) EVERY 6 HOURS PRN
Status: DISCONTINUED | OUTPATIENT
Start: 2022-11-21 | End: 2022-11-30 | Stop reason: HOSPADM

## 2022-11-21 RX ORDER — ACETAMINOPHEN 325 MG/1
650 TABLET ORAL EVERY 4 HOURS PRN
Status: DISCONTINUED | OUTPATIENT
Start: 2022-11-21 | End: 2022-11-30 | Stop reason: HOSPADM

## 2022-11-21 RX ORDER — OXYBUTYNIN CHLORIDE 5 MG/1
5 TABLET ORAL 2 TIMES DAILY
Status: DISCONTINUED | OUTPATIENT
Start: 2022-11-21 | End: 2022-11-30 | Stop reason: HOSPADM

## 2022-11-21 RX ORDER — POTASSIUM CHLORIDE 20 MEQ/1
40 TABLET, EXTENDED RELEASE ORAL
Status: ACTIVE | OUTPATIENT
Start: 2022-11-21 | End: 2022-11-21

## 2022-11-21 RX ORDER — ATORVASTATIN CALCIUM 20 MG/1
20 TABLET, FILM COATED ORAL DAILY
Status: DISCONTINUED | OUTPATIENT
Start: 2022-11-21 | End: 2022-11-30 | Stop reason: HOSPADM

## 2022-11-21 RX ORDER — ENOXAPARIN SODIUM 100 MG/ML
40 INJECTION SUBCUTANEOUS EVERY 12 HOURS
Status: DISCONTINUED | OUTPATIENT
Start: 2022-11-21 | End: 2022-11-30 | Stop reason: HOSPADM

## 2022-11-21 RX ORDER — LIDOCAINE 50 MG/G
1 PATCH TOPICAL
Status: COMPLETED | OUTPATIENT
Start: 2022-11-21 | End: 2022-11-22

## 2022-11-21 RX ORDER — OXYCODONE HYDROCHLORIDE 5 MG/1
5 TABLET ORAL EVERY 6 HOURS PRN
Status: DISCONTINUED | OUTPATIENT
Start: 2022-11-21 | End: 2022-11-21

## 2022-11-21 RX ORDER — SODIUM CHLORIDE 0.9 % (FLUSH) 0.9 %
10 SYRINGE (ML) INJECTION EVERY 12 HOURS PRN
Status: DISCONTINUED | OUTPATIENT
Start: 2022-11-21 | End: 2022-11-30 | Stop reason: HOSPADM

## 2022-11-21 RX ORDER — DONEPEZIL HYDROCHLORIDE 5 MG/1
5 TABLET, FILM COATED ORAL NIGHTLY
Status: DISCONTINUED | OUTPATIENT
Start: 2022-11-21 | End: 2022-11-30 | Stop reason: HOSPADM

## 2022-11-21 RX ORDER — GLUCAGON 1 MG
1 KIT INJECTION
Status: DISCONTINUED | OUTPATIENT
Start: 2022-11-21 | End: 2022-11-30 | Stop reason: HOSPADM

## 2022-11-21 RX ORDER — IBUPROFEN 200 MG
16 TABLET ORAL
Status: DISCONTINUED | OUTPATIENT
Start: 2022-11-21 | End: 2022-11-30 | Stop reason: HOSPADM

## 2022-11-21 RX ORDER — METHOCARBAMOL 500 MG/1
500 TABLET, FILM COATED ORAL 4 TIMES DAILY
Status: DISCONTINUED | OUTPATIENT
Start: 2022-11-21 | End: 2022-11-22

## 2022-11-21 RX ORDER — IBUPROFEN 200 MG
24 TABLET ORAL
Status: DISCONTINUED | OUTPATIENT
Start: 2022-11-21 | End: 2022-11-30 | Stop reason: HOSPADM

## 2022-11-21 RX ORDER — OXYCODONE HYDROCHLORIDE 5 MG/1
5 TABLET ORAL EVERY 6 HOURS PRN
Status: DISCONTINUED | OUTPATIENT
Start: 2022-11-21 | End: 2022-11-24

## 2022-11-21 RX ORDER — INSULIN ASPART 100 [IU]/ML
1-10 INJECTION, SOLUTION INTRAVENOUS; SUBCUTANEOUS
Status: DISCONTINUED | OUTPATIENT
Start: 2022-11-21 | End: 2022-11-28

## 2022-11-21 RX ORDER — FLUTICASONE FUROATE AND VILANTEROL 100; 25 UG/1; UG/1
1 POWDER RESPIRATORY (INHALATION) DAILY
Status: DISCONTINUED | OUTPATIENT
Start: 2022-11-21 | End: 2022-11-30 | Stop reason: HOSPADM

## 2022-11-21 RX ORDER — LISINOPRIL 5 MG/1
5 TABLET ORAL DAILY
Status: DISCONTINUED | OUTPATIENT
Start: 2022-11-22 | End: 2022-11-25

## 2022-11-21 RX ORDER — OXYCODONE HYDROCHLORIDE 10 MG/1
10 TABLET ORAL EVERY 6 HOURS PRN
Status: DISCONTINUED | OUTPATIENT
Start: 2022-11-21 | End: 2022-11-21

## 2022-11-21 RX ORDER — ASPIRIN 81 MG/1
81 TABLET ORAL DAILY
Status: DISCONTINUED | OUTPATIENT
Start: 2022-11-21 | End: 2022-11-30 | Stop reason: HOSPADM

## 2022-11-21 RX ORDER — OXYCODONE AND ACETAMINOPHEN 5; 325 MG/1; MG/1
1 TABLET ORAL
Status: COMPLETED | OUTPATIENT
Start: 2022-11-21 | End: 2022-11-21

## 2022-11-21 RX ORDER — LIDOCAINE 50 MG/G
1 PATCH TOPICAL DAILY
Status: DISCONTINUED | OUTPATIENT
Start: 2022-11-21 | End: 2022-11-30 | Stop reason: HOSPADM

## 2022-11-21 RX ORDER — CALCIUM CARBONATE 200(500)MG
500 TABLET,CHEWABLE ORAL DAILY
Status: DISCONTINUED | OUTPATIENT
Start: 2022-11-21 | End: 2022-11-23

## 2022-11-21 RX ORDER — ACETAMINOPHEN 500 MG
1000 TABLET ORAL EVERY 6 HOURS
Status: DISCONTINUED | OUTPATIENT
Start: 2022-11-21 | End: 2022-11-22

## 2022-11-21 RX ORDER — PANTOPRAZOLE SODIUM 40 MG/1
40 TABLET, DELAYED RELEASE ORAL DAILY
Status: DISCONTINUED | OUTPATIENT
Start: 2022-11-21 | End: 2022-11-30 | Stop reason: HOSPADM

## 2022-11-21 RX ORDER — MORPHINE SULFATE 4 MG/ML
4 INJECTION, SOLUTION INTRAMUSCULAR; INTRAVENOUS
Status: COMPLETED | OUTPATIENT
Start: 2022-11-21 | End: 2022-11-21

## 2022-11-21 RX ORDER — CALCITONIN SALMON 200 [IU]/.09ML
1 SPRAY, METERED NASAL DAILY
Status: DISCONTINUED | OUTPATIENT
Start: 2022-11-21 | End: 2022-11-30 | Stop reason: HOSPADM

## 2022-11-21 RX ORDER — CITALOPRAM 10 MG/1
40 TABLET ORAL DAILY
Status: DISCONTINUED | OUTPATIENT
Start: 2022-11-21 | End: 2022-11-30 | Stop reason: HOSPADM

## 2022-11-21 RX ORDER — ONDANSETRON 2 MG/ML
4 INJECTION INTRAMUSCULAR; INTRAVENOUS
Status: COMPLETED | OUTPATIENT
Start: 2022-11-21 | End: 2022-11-21

## 2022-11-21 RX ORDER — NALOXONE HCL 0.4 MG/ML
0.02 VIAL (ML) INJECTION
Status: DISCONTINUED | OUTPATIENT
Start: 2022-11-21 | End: 2022-11-30 | Stop reason: HOSPADM

## 2022-11-21 RX ADMIN — METHOCARBAMOL 500 MG: 500 TABLET ORAL at 09:11

## 2022-11-21 RX ADMIN — OXYCODONE 5 MG: 5 TABLET ORAL at 09:11

## 2022-11-21 RX ADMIN — BUPROPION HYDROCHLORIDE 150 MG: 150 TABLET, FILM COATED, EXTENDED RELEASE ORAL at 04:11

## 2022-11-21 RX ADMIN — POTASSIUM CHLORIDE 40 MEQ: 1500 TABLET, EXTENDED RELEASE ORAL at 05:11

## 2022-11-21 RX ADMIN — PANTOPRAZOLE SODIUM 40 MG: 40 TABLET, DELAYED RELEASE ORAL at 04:11

## 2022-11-21 RX ADMIN — LIDOCAINE 1 PATCH: 50 PATCH CUTANEOUS at 04:11

## 2022-11-21 RX ADMIN — SODIUM CHLORIDE 1000 ML: 0.9 INJECTION, SOLUTION INTRAVENOUS at 12:11

## 2022-11-21 RX ADMIN — ONDANSETRON 4 MG: 2 INJECTION INTRAMUSCULAR; INTRAVENOUS at 09:11

## 2022-11-21 RX ADMIN — OXYCODONE HYDROCHLORIDE AND ACETAMINOPHEN 1 TABLET: 5; 325 TABLET ORAL at 12:11

## 2022-11-21 RX ADMIN — OXYBUTYNIN CHLORIDE 5 MG: 5 TABLET ORAL at 10:11

## 2022-11-21 RX ADMIN — METHOCARBAMOL 500 MG: 500 TABLET ORAL at 04:11

## 2022-11-21 RX ADMIN — IOHEXOL 100 ML: 350 INJECTION, SOLUTION INTRAVENOUS at 10:11

## 2022-11-21 RX ADMIN — DONEPEZIL HYDROCHLORIDE 5 MG: 5 TABLET, FILM COATED ORAL at 10:11

## 2022-11-21 RX ADMIN — ATORVASTATIN CALCIUM 20 MG: 20 TABLET, FILM COATED ORAL at 04:11

## 2022-11-21 RX ADMIN — ENOXAPARIN SODIUM 40 MG: 40 INJECTION SUBCUTANEOUS at 10:11

## 2022-11-21 RX ADMIN — MORPHINE SULFATE 4 MG: 4 INJECTION INTRAVENOUS at 09:11

## 2022-11-21 RX ADMIN — LIDOCAINE 1 PATCH: 50 PATCH CUTANEOUS at 12:11

## 2022-11-21 RX ADMIN — ASPIRIN 81 MG: 81 TABLET, COATED ORAL at 04:11

## 2022-11-21 RX ADMIN — ACETAMINOPHEN 1000 MG: 500 TABLET ORAL at 04:11

## 2022-11-21 RX ADMIN — CITALOPRAM HYDROBROMIDE 40 MG: 20 TABLET ORAL at 04:11

## 2022-11-21 NOTE — ASSESSMENT & PLAN NOTE
Patient's FSGs are controlled on current medication regimen.  Last A1c reviewed-   Lab Results   Component Value Date    HGBA1C 6.2 05/01/2022     Most recent fingerstick glucose reviewed- No results for input(s): POCTGLUCOSE in the last 24 hours.  Current correctional scale  High  Maintain anti-hyperglycemic dose as follows-   Antihyperglycemics (From admission, onward)    Start     Stop Route Frequency Ordered    11/21/22 1546  insulin aspart U-100 pen 1-10 Units         -- SubQ Before meals & nightly PRN 11/21/22 1448        Hold Oral hypoglycemics while patient is in the hospital.

## 2022-11-21 NOTE — H&P
[facility] Emergency Dept  Hospital Medicine  History & Physical    Patient Name: Daryleen G Moran  MRN: [id number]  Patient Class: OP- Observation  Admission Date: 11/21/2022  Attending Physician: Jelena Castellanos MD   Primary Care Provider: Abhi De Oliveira Iv, MD         Patient information was obtained from patient, past medical records and ER records.     Subjective:     Principal Problem:Compression fracture of thoracic vertebra    Chief Complaint:   Chief Complaint   Patient presents with    Diarrhea     X2wks, secondary to eating pork. Last episode this AM.         HPI: 77-year-old woman with a history of hypertension, hyperlipidemia, T2DM, CAD, COPD on 2L, severe aortic stenosis, and chronic back pain who is brought to the ED for severe lower back pain as well as diarrhea. Per patient, she refers about 3 d of watery diarrhea,  describing about 3-4 episodes of watery diarrhea. Onset of symptoms started acutely and she denies any sick contacts though per documentation apparently the diarrhea started after eating pork. She was most recently admitted to an OSH for intractable back pain after bending forward and was found to have a L2 compression fracture. She was managed conservatively and was discharged to a nursing home (?). Again was readmitted to the same OSH with back pain and confusion and was found to be hyponatremic to 118. Appears to have improved conservatively and per documentation patient and family both refused, so was taken home. Since then patient refers she has been confined to a wheelchair and unable to take care of herself. Today, she refers terrible lower back pain that is aggravated with movement and alleviated with lying still. She denies any urinary or fecal incontinence, denies any saddle anesthesia. No fevers, chills, chest pain, SOB, abd pain, n/v/d, GIB. Denies any recent abx however care everywhere shows recent rx for augmentin and doxy. She has been taking ibuprofen without relief of her  symptoms, however otherwise does not know what medications she takes    At the ED patient HDS, VSS. Labs not overtly unremarkable other than Na 131, K 3.1. Hgb stable. UA with 3+ leuk, 13 WBC. CT abd/pelvis with stable remote L2 compression fracture however noted new T12 compression fx. She was given oxy 5mg and admitted to hospital medicine for further workup.       Past Medical History:   Diagnosis Date    Aortic stenosis     Arthritis     Back pain     Diabetes mellitus type II     Hyperlipidemia     Hypertension     NSTEMI (non-ST elevated myocardial infarction) 5/1/2022    Osteoporosis     Wears glasses        Past Surgical History:   Procedure Laterality Date     vocal cord nodules removed  long time ago     twice    APPENDECTOMY  within last 5yrs    CATHETERIZATION OF BOTH LEFT AND RIGHT HEART Left 5/6/2022    Procedure: CATHETERIZATION, HEART, BOTH LEFT AND RIGHT;  Surgeon: Michelet Vargas MD;  Location: [facility] CATH/EP LAB;  Service: Cardiology;  Laterality: Left;    FIXATION KYPHOPLASTY THORACIC SPINE      8-20-13    FOOT SURGERY      left 2nd toe was too long     HERNIA REPAIR  within last 5yrs    LEFT HEART CATHETERIZATION Left 5/2/2022    Procedure: Left heart cath;  Surgeon: Jose Echols MD;  Location: [facility] CATH/EP LAB;  Service: Cardiology;  Laterality: Left;    TONSILLECTOMY, ADENOIDECTOMY  long time ago       Review of patient's allergies indicates:   Allergen Reactions    Sulfacetamide sodium      Pain perineal area    Sulfasalazine Hives    Adhesive Itching     SKIN GETS RED WITH TAPE AND BANDAIDS    Adhesive tape-silicones Itching     SKIN GETS RED WITH TAPE AND BANDAIDS    Sulfa (sulfonamide antibiotics) Rash       No current facility-administered medications on file prior to encounter.     Current Outpatient Medications on File Prior to Encounter   Medication Sig    albuterol (PROVENTIL/VENTOLIN HFA) 90 mcg/actuation inhaler Inhale 2 puffs into the lungs every 6 (six)  hours as needed.    amoxicillin-clavulanate 875-125mg (AUGMENTIN) 875-125 mg per tablet TAKE ONE TABLET BY MOUTH EVERY 12 HOURS FOR 10 DAYS    aspirin (ECOTRIN) 81 MG EC tablet Take 81 mg by mouth once daily.    benazepriL (LOTENSIN) 20 MG tablet Take 20 mg by mouth once daily.    budesonide-formoterol 80-4.5 mcg (SYMBICORT) 80-4.5 mcg/actuation HFAA Inhale 2 puffs into the lungs 2 (two) times daily.    buPROPion (WELLBUTRIN XL) 150 MG TB24 tablet Take 150 mg by mouth.    calcium carbonate (OS-TK) 600 mg (1,500 mg) Tab Take 600 mg by mouth 2 (two) times daily with meals.    citalopram (CELEXA) 40 MG tablet Take 1 tablet (40 mg total) by mouth once daily.    donepeziL (ARICEPT) 5 MG tablet Take 5 mg by mouth nightly.    doxycycline (MONODOX) 100 MG capsule TAKE 1 CAPSULE BY MOUTH EVERY 12 HOURS FOR 7 DAYS    ergocalciferol (ERGOCALCIFEROL) 50,000 unit Cap TAKE 1 CAPSULE (50,000 UNITS TOTAL) EVERY 7 DAYS.    ferrous gluconate 324 mg (37.5 mg iron) Tab tablet Take 324 mg by mouth.    furosemide (LASIX) 40 MG tablet Take 40 mg by mouth.    glucosamine hawkins 2KCl-chondroit 500-400 mg Tab Take 1 tablet by mouth.    glucosamine-chondroitin 500-400 mg tablet Take 1 tablet by mouth 3 (three) times daily.    HYDROcodone-acetaminophen (NORCO) 5-325 mg per tablet Take 1 tablet by mouth every 6 (six) hours as needed for Pain.    lisinopril (PRINIVIL,ZESTRIL) 20 MG tablet Take 20 mg by mouth every evening.     metFORMIN (GLUCOPHAGE) 500 MG tablet Take 1 tablet (500 mg total) by mouth 2 (two) times daily with meals.    metoprolol tartrate (LOPRESSOR) 25 MG tablet Take 0.5 tablets (12.5 mg total) by mouth 2 (two) times daily.    miconazole (MICOTIN) 2 % cream Apply topically 2 (two) times daily. for 7 days    multivitamin capsule Take 1 capsule by mouth once daily.    mupirocin (BACTROBAN) 2 % ointment Apply topically 2 (two) times daily.    omeprazole (PRILOSEC) 20 MG capsule Take 1 capsule (20 mg total) by  mouth once daily.    oxybutynin (DITROPAN) 5 MG Tab Take 1 tablet (5 mg total) by mouth 2 (two) times daily.    potassium chloride (KLOR-CON) 10 MEQ TbSR Take 10 mEq by mouth.    predniSONE (DELTASONE) 20 MG tablet TAKE ONE TABLET BY MOUTH TWICE DAILY FOR 3 DAYS, THEN 1 TABLET DAILY FOR 3 DAYS    simvastatin (ZOCOR) 40 MG tablet Take 1 tablet (40 mg total) by mouth every evening.    [DISCONTINUED] ALLERGY RELIEF, FEXOFENADINE, 180 mg tablet Take 180 mg by mouth once daily.    [DISCONTINUED] ezetimibe-simvastatin 10-40 mg (VYTORIN) 10-40 mg per tablet Take 1 tablet by mouth.    [DISCONTINUED] lisinopril-hydrochlorothiazide (PRINZIDE,ZESTORETIC) 20-12.5 mg per tablet Take 1 tablet by mouth once daily.      Family History    None       Tobacco Use    Smoking status: Former     Packs/day: 0.50     Years: 35.00     Pack years: 17.50     Types: Cigarettes     Quit date: 10/5/2022     Years since quittin.1    Smokeless tobacco: Never   Substance and Sexual Activity    Alcohol use: No    Drug use: No    Sexual activity: Not on file     Review of Systems   Constitutional:  Positive for activity change and chills. Negative for fatigue and fever.   HENT:  Negative for congestion and sore throat.    Eyes:  Negative for visual disturbance.   Respiratory:  Negative for cough, shortness of breath and wheezing.    Cardiovascular:  Negative for chest pain, palpitations and leg swelling.   Gastrointestinal:  Negative for abdominal pain, blood in stool, diarrhea, nausea and vomiting.   Endocrine: Negative for polyuria.   Genitourinary:  Negative for dysuria, flank pain and frequency.   Musculoskeletal:  Positive for arthralgias and back pain. Negative for myalgias.   Skin:  Negative for pallor and rash.   Neurological:  Negative for weakness, light-headedness, numbness and headaches.   Objective:     Vital Signs (Most Recent):  Temp: 98 °F (36.7 °C) (22 1222)  Pulse: 83 (22 1400)  Resp: 19 (22  1400)  BP: (!) 119/56 (11/21/22 1400)  SpO2: 97 % (11/21/22 1400)   Vital Signs (24h Range):  Temp:  [98 °F (36.7 °C)-98.7 °F (37.1 °C)] 98 °F (36.7 °C)  Pulse:  [77-88] 83  Resp:  [16-19] 19  SpO2:  [95 %-100 %] 97 %  BP: (109-157)/(53-69) 119/56     Weight: 94.3 kg (207 lb 14.3 oz)  Body mass index is 40.6 kg/m².    Physical Exam  Vitals and nursing note reviewed.   Constitutional:       General: She is not in acute distress.     Appearance: She is obese. She is not ill-appearing or toxic-appearing.   HENT:      Head: Normocephalic and atraumatic.      Mouth/Throat:      Mouth: Mucous membranes are moist.      Pharynx: No oropharyngeal exudate.   Eyes:      Extraocular Movements: Extraocular movements intact.      Pupils: Pupils are equal, round, and reactive to light.   Cardiovascular:      Rate and Rhythm: Normal rate and regular rhythm.      Heart sounds: No murmur heard.  Pulmonary:      Effort: Pulmonary effort is normal.      Breath sounds: No wheezing or rales.   Abdominal:      General: Abdomen is flat. Bowel sounds are normal. There is no distension.      Tenderness: There is no abdominal tenderness (very slight lower abdominal tenderness). There is no guarding.   Musculoskeletal:         General: No swelling or tenderness (ttp at junction of thoracic and lower spine). Normal range of motion.      Cervical back: Normal range of motion.      Right lower leg: No edema.      Left lower leg: No edema.   Lymphadenopathy:      Cervical: No cervical adenopathy.   Skin:     General: Skin is warm.      Coloration: Skin is not jaundiced.      Findings: No bruising or rash.   Neurological:      General: No focal deficit present.      Mental Status: She is alert and oriented to person, place, and time.      Cranial Nerves: No cranial nerve deficit.      Sensory: No sensory deficit.         CRANIAL NERVES     CN III, IV, VI   Pupils are equal, round, and reactive to light.     Significant Labs: All pertinent labs  within the past 24 hours have been reviewed.  Recent Lab Results         11/21/22  1041   11/21/22  1036   11/21/22  0955   11/21/22  0934        Albumin   2.7           Alkaline Phosphatase   73           ALT   15           Anion Gap   9           Appearance, UA     Hazy         AST   20           Bacteria, UA     Rare         Baso #       0.08       Basophil %       0.9       Bilirubin (UA)     Negative         BILIRUBIN TOTAL   0.5  Comment: For infants and newborns, interpretation of results should be based  on gestational age, weight and in agreement with clinical  observations.    Premature Infant recommended reference ranges:  Up to 24 hours.............<8.0 mg/dL  Up to 48 hours............<12.0 mg/dL  3-5 days..................<15.0 mg/dL  6-29 days.................<15.0 mg/dL             BUN   13           Calcium   8.5           Chloride   93           CO2   29           Color, UA     Yellow         Creatinine   0.6           Differential Method       Automated       eGFR   >60.0           Eos #       0.2       Eosinophil %       1.7       Glucose   122           Glucose, UA     Negative         Gran # (ANC)       6.8       Gran %       72.4       Hematocrit       36.7       Hemoglobin       10.9       Hyaline Casts, UA     12         Immature Grans (Abs)       0.03  Comment: Mild elevation in immature granulocytes is non specific and   can be seen in a variety of conditions including stress response,   acute inflammation, trauma and pregnancy. Correlation with other   laboratory and clinical findings is essential.         Immature Granulocytes       0.3       Ketones, UA     Negative         Leukocytes, UA     3+         Lipase   27           Lymph #       1.3       Lymph %       13.8       MCH       25.8       MCHC       29.7       MCV       87       Microscopic Comment     SEE COMMENT  Comment: Other formed elements not mentioned in the report are not   present in the microscopic examination.             Mono #       1.0       Mono %       10.9       MPV       10.2       NITRITE UA     Negative         Non-Squam Epith     1         nRBC       0       Occult Blood UA     Negative         pH, UA     5.0         Platelets       268       POC BUN 13             POC Chloride 90             POC Creatinine 0.6             POC Glucose 125             POC Hematocrit 31             POC Ionized Calcium 1.00             POC Potassium 3.0             POC Sodium 132             POC TCO2 (MEASURED) 31             Potassium   3.1           PROTEIN TOTAL   5.3           Protein, UA     Negative  Comment: Recommend a 24 hour urine protein or a urine   protein/creatinine ratio if globulin induced proteinuria is  clinically suspected.           RBC       4.22       RBC, UA     3         RDW       15.1       Sample RINKU             Sodium   131           Specific Gravity, UA     1.020         Specimen UA     Urine, Clean Catch         Squam Epithel, UA     6         WBC, UA     13         WBC       9.33               Significant Imaging: I have reviewed all pertinent imaging results/findings within the past 24 hours.    Assessment/Plan:     * Compression fracture of thoracic vertebra  77-year-old woman with a history of COPD on 2L, T2DM, severe AS, morbid obesity, chronic back pain here with new onset T12 fracture and subacute L2 fx. Most recently admitted to an OSH for intractable back pain after bending forward and was found to have a L2 compression fracture. She was managed conservatively and was discharged to a nursing home (?). Again was readmitted to the same OSH with similar symptosm. Imaging here with stable remote L2 compression fracture however noted new T12 compression fx.     -scheduled tylenol, robaxin, lidocaine patches  -oxy 5mg for breakthrough pain  -intranasal calcitonin  -TLSO brace placed  -NSGY spine consult, appreciate assitance  -PT/OT      Diarrhea  Refers about 3 d of watery diarrhea,  describing about 3-4  episodes of watery diarrhea. Onset of symptoms started acutely and she denies any sick contacts though per documentation apparently the diarrhea started after eating pork. Appears to have recently taken antibiotics.     -stool culture, C.diff ordered (though low treshold to discontinue given no current BM in hospital)  -judicious IVF  -daily CMP    Aortic stenosis  Hx of Aortic stenosis and undergoing TAVR workup      COPD, mild  Stable and on home 2L NC    Depression  -continuing home medications        Diabetes mellitus type II  Patient's FSGs are controlled on current medication regimen.  Last A1c reviewed-   Lab Results   Component Value Date    HGBA1C 6.2 05/01/2022     Most recent fingerstick glucose reviewed- No results for input(s): POCTGLUCOSE in the last 24 hours.  Current correctional scale  High  Maintain anti-hyperglycemic dose as follows-   Antihyperglycemics (From admission, onward)    Start     Stop Route Frequency Ordered    11/21/22 1546  insulin aspart U-100 pen 1-10 Units         -- SubQ Before meals & nightly PRN 11/21/22 1448        Hold Oral hypoglycemics while patient is in the hospital.      VTE Risk Mitigation (From admission, onward)         Ordered     enoxaparin injection 40 mg  Every 12 hours         11/21/22 1448     IP VTE HIGH RISK PATIENT  Once         11/21/22 1448     Place sequential compression device  Until discontinued         11/21/22 1448                   Arron Jerome MD  Department of Hospital Medicine   [facility] Emergency Dept

## 2022-11-21 NOTE — ED NOTES
I-STAT Chem-8+ Results:   Value Reference Range   Sodium 132 136-145 mmol/L   Potassium  3.0 3.5-5.1 mmol/L   Chloride 90  mmol/L   Ionized Calcium 1.00 1.06-1.42 mmol/L   CO2 (measured) 31 23-29 mmol/L   Glucose 125  mg/dL   BUN 13 6-30 mg/dL   Creatinine 0.6 0.5-1.4 mg/dL   Hematocrit 31 36-54%

## 2022-11-21 NOTE — ASSESSMENT & PLAN NOTE
Refers about 3 d of watery diarrhea,  describing about 3-4 episodes of watery diarrhea. Onset of symptoms started acutely and she denies any sick contacts though per documentation apparently the diarrhea started after eating pork. Appears to have recently taken antibiotics.     -stool culture, C.diff ordered (though low treshold to discontinue given no current BM in hospital)  -judicious IVF  -daily CMP

## 2022-11-21 NOTE — ED NOTES
"Patient identifiers verified and correct for Daryleen Moran   C/C: Pt states "I have been having diarrhea for 2 weeks, and my back is killing me"  APPEARANCE: awake and alert in no acute distress.  SKIN: warm, dry and intact. No breakdown or bruising.  MUSCULOSKELETAL: Patient moving all extremities spontaneously, no obvious swelling or deformities noted. C/o 10/10 back pain  RESPIRATORY: Denies shortness of breath. Respirations unlabored. On 2 L nasal cannula at baseline  CARDIAC: Denies CP, 2+ distal pulses; no peripheral edema  ABDOMEN: soft, non-tender, and non-distended, Denies N/V. C/o diarrhea x2 weeks  : voids spontaneously, denies difficulty  Neurologic: AAO x 4; follows commands equal strength in all extremities; denies numbness/tingling. Denies dizziness   "

## 2022-11-21 NOTE — ASSESSMENT & PLAN NOTE
77-year-old woman with a history of COPD on 2L, T2DM, severe AS, morbid obesity, chronic back pain here with new onset T12 fracture and subacute L2 fx. Most recently admitted to an OSH for intractable back pain after bending forward and was found to have a L2 compression fracture. She was managed conservatively and was discharged to a nursing home (?). Again was readmitted to the same OSH with similar symptosm. Imaging here with stable remote L2 compression fracture however noted new T12 compression fx.     -scheduled tylenol, robaxin, lidocaine patches  -oxy 5mg for breakthrough pain  -intranasal calcitonin  -TLSO brace placed  -NSGY spine consult, appreciate assitance  -PT/OT

## 2022-11-21 NOTE — ED NOTES
Pt c/o dry mouth, Dr. Dixon stated ok for pt to receive wet mouth swabs until CT results are reviewed. Provided pt with wet swabs

## 2022-11-21 NOTE — SUBJECTIVE & OBJECTIVE
Past Medical History:   Diagnosis Date    Aortic stenosis     Arthritis     Back pain     Diabetes mellitus type II     Hyperlipidemia     Hypertension     NSTEMI (non-ST elevated myocardial infarction) 5/1/2022    Osteoporosis     Wears glasses        Past Surgical History:   Procedure Laterality Date     vocal cord nodules removed  long time ago     twice    APPENDECTOMY  within last 5yrs    CATHETERIZATION OF BOTH LEFT AND RIGHT HEART Left 5/6/2022    Procedure: CATHETERIZATION, HEART, BOTH LEFT AND RIGHT;  Surgeon: Michelet Vargas MD;  Location: [facility] CATH/EP LAB;  Service: Cardiology;  Laterality: Left;    FIXATION KYPHOPLASTY THORACIC SPINE      8-20-13    FOOT SURGERY      left 2nd toe was too long     HERNIA REPAIR  within last 5yrs    LEFT HEART CATHETERIZATION Left 5/2/2022    Procedure: Left heart cath;  Surgeon: Jose Echols MD;  Location: [facility] CATH/EP LAB;  Service: Cardiology;  Laterality: Left;    TONSILLECTOMY, ADENOIDECTOMY  long time ago       Review of patient's allergies indicates:   Allergen Reactions    Sulfacetamide sodium      Pain perineal area    Sulfasalazine Hives    Adhesive Itching     SKIN GETS RED WITH TAPE AND BANDAIDS    Adhesive tape-silicones Itching     SKIN GETS RED WITH TAPE AND BANDAIDS    Sulfa (sulfonamide antibiotics) Rash       No current facility-administered medications on file prior to encounter.     Current Outpatient Medications on File Prior to Encounter   Medication Sig    albuterol (PROVENTIL/VENTOLIN HFA) 90 mcg/actuation inhaler Inhale 2 puffs into the lungs every 6 (six) hours as needed.    amoxicillin-clavulanate 875-125mg (AUGMENTIN) 875-125 mg per tablet TAKE ONE TABLET BY MOUTH EVERY 12 HOURS FOR 10 DAYS    aspirin (ECOTRIN) 81 MG EC tablet Take 81 mg by mouth once daily.    benazepriL (LOTENSIN) 20 MG tablet Take 20 mg by mouth once daily.    budesonide-formoterol 80-4.5 mcg (SYMBICORT) 80-4.5 mcg/actuation HFAA Inhale 2 puffs into the lungs 2 (two)  times daily.    buPROPion (WELLBUTRIN XL) 150 MG TB24 tablet Take 150 mg by mouth.    calcium carbonate (OS-TK) 600 mg (1,500 mg) Tab Take 600 mg by mouth 2 (two) times daily with meals.    citalopram (CELEXA) 40 MG tablet Take 1 tablet (40 mg total) by mouth once daily.    donepeziL (ARICEPT) 5 MG tablet Take 5 mg by mouth nightly.    doxycycline (MONODOX) 100 MG capsule TAKE 1 CAPSULE BY MOUTH EVERY 12 HOURS FOR 7 DAYS    ergocalciferol (ERGOCALCIFEROL) 50,000 unit Cap TAKE 1 CAPSULE (50,000 UNITS TOTAL) EVERY 7 DAYS.    ferrous gluconate 324 mg (37.5 mg iron) Tab tablet Take 324 mg by mouth.    furosemide (LASIX) 40 MG tablet Take 40 mg by mouth.    glucosamine hawkins 2KCl-chondroit 500-400 mg Tab Take 1 tablet by mouth.    glucosamine-chondroitin 500-400 mg tablet Take 1 tablet by mouth 3 (three) times daily.    HYDROcodone-acetaminophen (NORCO) 5-325 mg per tablet Take 1 tablet by mouth every 6 (six) hours as needed for Pain.    lisinopril (PRINIVIL,ZESTRIL) 20 MG tablet Take 20 mg by mouth every evening.     metFORMIN (GLUCOPHAGE) 500 MG tablet Take 1 tablet (500 mg total) by mouth 2 (two) times daily with meals.    metoprolol tartrate (LOPRESSOR) 25 MG tablet Take 0.5 tablets (12.5 mg total) by mouth 2 (two) times daily.    miconazole (MICOTIN) 2 % cream Apply topically 2 (two) times daily. for 7 days    multivitamin capsule Take 1 capsule by mouth once daily.    mupirocin (BACTROBAN) 2 % ointment Apply topically 2 (two) times daily.    omeprazole (PRILOSEC) 20 MG capsule Take 1 capsule (20 mg total) by mouth once daily.    oxybutynin (DITROPAN) 5 MG Tab Take 1 tablet (5 mg total) by mouth 2 (two) times daily.    potassium chloride (KLOR-CON) 10 MEQ TbSR Take 10 mEq by mouth.    predniSONE (DELTASONE) 20 MG tablet TAKE ONE TABLET BY MOUTH TWICE DAILY FOR 3 DAYS, THEN 1 TABLET DAILY FOR 3 DAYS    simvastatin (ZOCOR) 40 MG tablet Take 1 tablet (40 mg total) by mouth every evening.    [DISCONTINUED] ALLERGY RELIEF,  FEXOFENADINE, 180 mg tablet Take 180 mg by mouth once daily.    [DISCONTINUED] ezetimibe-simvastatin 10-40 mg (VYTORIN) 10-40 mg per tablet Take 1 tablet by mouth.    [DISCONTINUED] lisinopril-hydrochlorothiazide (PRINZIDE,ZESTORETIC) 20-12.5 mg per tablet Take 1 tablet by mouth once daily.      Family History    None       Tobacco Use    Smoking status: Former     Packs/day: 0.50     Years: 35.00     Pack years: 17.50     Types: Cigarettes     Quit date: 10/5/2022     Years since quittin.1    Smokeless tobacco: Never   Substance and Sexual Activity    Alcohol use: No    Drug use: No    Sexual activity: Not on file     Review of Systems   Constitutional:  Positive for activity change and chills. Negative for fatigue and fever.   HENT:  Negative for congestion and sore throat.    Eyes:  Negative for visual disturbance.   Respiratory:  Negative for cough, shortness of breath and wheezing.    Cardiovascular:  Negative for chest pain, palpitations and leg swelling.   Gastrointestinal:  Negative for abdominal pain, blood in stool, diarrhea, nausea and vomiting.   Endocrine: Negative for polyuria.   Genitourinary:  Negative for dysuria, flank pain and frequency.   Musculoskeletal:  Positive for arthralgias and back pain. Negative for myalgias.   Skin:  Negative for pallor and rash.   Neurological:  Negative for weakness, light-headedness, numbness and headaches.   Objective:     Vital Signs (Most Recent):  Temp: 98 °F (36.7 °C) (22 1222)  Pulse: 83 (22 1400)  Resp: 19 (22 1400)  BP: (!) 119/56 (22 1400)  SpO2: 97 % (22 1400)   Vital Signs (24h Range):  Temp:  [98 °F (36.7 °C)-98.7 °F (37.1 °C)] 98 °F (36.7 °C)  Pulse:  [77-88] 83  Resp:  [16-19] 19  SpO2:  [95 %-100 %] 97 %  BP: (109-157)/(53-69) 119/56     Weight: 94.3 kg (207 lb 14.3 oz)  Body mass index is 40.6 kg/m².    Physical Exam  Vitals and nursing note reviewed.   Constitutional:       General: She is not in acute distress.      Appearance: She is obese. She is not ill-appearing or toxic-appearing.   HENT:      Head: Normocephalic and atraumatic.      Mouth/Throat:      Mouth: Mucous membranes are moist.      Pharynx: No oropharyngeal exudate.   Eyes:      Extraocular Movements: Extraocular movements intact.      Pupils: Pupils are equal, round, and reactive to light.   Cardiovascular:      Rate and Rhythm: Normal rate and regular rhythm.      Heart sounds: No murmur heard.  Pulmonary:      Effort: Pulmonary effort is normal.      Breath sounds: No wheezing or rales.   Abdominal:      General: Abdomen is flat. Bowel sounds are normal. There is no distension.      Tenderness: There is no abdominal tenderness (very slight lower abdominal tenderness). There is no guarding.   Musculoskeletal:         General: No swelling or tenderness (ttp at junction of thoracic and lower spine). Normal range of motion.      Cervical back: Normal range of motion.      Right lower leg: No edema.      Left lower leg: No edema.   Lymphadenopathy:      Cervical: No cervical adenopathy.   Skin:     General: Skin is warm.      Coloration: Skin is not jaundiced.      Findings: No bruising or rash.   Neurological:      General: No focal deficit present.      Mental Status: She is alert and oriented to person, place, and time.      Cranial Nerves: No cranial nerve deficit.      Sensory: No sensory deficit.         CRANIAL NERVES     CN III, IV, VI   Pupils are equal, round, and reactive to light.     Significant Labs: All pertinent labs within the past 24 hours have been reviewed.  Recent Lab Results         11/21/22  1041   11/21/22  1036   11/21/22  0955   11/21/22  0934        Albumin   2.7           Alkaline Phosphatase   73           ALT   15           Anion Gap   9           Appearance, UA     Hazy         AST   20           Bacteria, UA     Rare         Baso #       0.08       Basophil %       0.9       Bilirubin (UA)     Negative         BILIRUBIN TOTAL    0.5  Comment: For infants and newborns, interpretation of results should be based  on gestational age, weight and in agreement with clinical  observations.    Premature Infant recommended reference ranges:  Up to 24 hours.............<8.0 mg/dL  Up to 48 hours............<12.0 mg/dL  3-5 days..................<15.0 mg/dL  6-29 days.................<15.0 mg/dL             BUN   13           Calcium   8.5           Chloride   93           CO2   29           Color, UA     Yellow         Creatinine   0.6           Differential Method       Automated       eGFR   >60.0           Eos #       0.2       Eosinophil %       1.7       Glucose   122           Glucose, UA     Negative         Gran # (ANC)       6.8       Gran %       72.4       Hematocrit       36.7       Hemoglobin       10.9       Hyaline Casts, UA     12         Immature Grans (Abs)       0.03  Comment: Mild elevation in immature granulocytes is non specific and   can be seen in a variety of conditions including stress response,   acute inflammation, trauma and pregnancy. Correlation with other   laboratory and clinical findings is essential.         Immature Granulocytes       0.3       Ketones, UA     Negative         Leukocytes, UA     3+         Lipase   27           Lymph #       1.3       Lymph %       13.8       MCH       25.8       MCHC       29.7       MCV       87       Microscopic Comment     SEE COMMENT  Comment: Other formed elements not mentioned in the report are not   present in the microscopic examination.            Mono #       1.0       Mono %       10.9       MPV       10.2       NITRITE UA     Negative         Non-Squam Epith     1         nRBC       0       Occult Blood UA     Negative         pH, UA     5.0         Platelets       268       POC BUN 13             POC Chloride 90             POC Creatinine 0.6             POC Glucose 125             POC Hematocrit 31             POC Ionized Calcium 1.00             POC Potassium 3.0              POC Sodium 132             POC TCO2 (MEASURED) 31             Potassium   3.1           PROTEIN TOTAL   5.3           Protein, UA     Negative  Comment: Recommend a 24 hour urine protein or a urine   protein/creatinine ratio if globulin induced proteinuria is  clinically suspected.           RBC       4.22       RBC, UA     3         RDW       15.1       Sample RINKU             Sodium   131           Specific Gravity, UA     1.020         Specimen UA     Urine, Clean Catch         Squam Epithel, UA     6         WBC, UA     13         WBC       9.33               Significant Imaging: I have reviewed all pertinent imaging results/findings within the past 24 hours.

## 2022-11-21 NOTE — HPI
77-year-old woman with a history of hypertension, hyperlipidemia, T2DM, CAD, COPD on 2L, severe aortic stenosis, and chronic back pain who is brought to the ED for severe lower back pain as well as diarrhea. Per patient, she refers about 3 d of watery diarrhea,  describing about 3-4 episodes of watery diarrhea. Onset of symptoms started acutely and she denies any sick contacts though per documentation apparently the diarrhea started after eating pork. She was most recently admitted to an OSH for intractable back pain after bending forward and was found to have a L2 compression fracture. She was managed conservatively and was discharged to a nursing home (?). Again was readmitted to the same OSH with back pain and confusion and was found to be hyponatremic to 118. Appears to have improved conservatively and per documentation patient and family both refused, so was taken home. Since then patient refers she has been confined to a wheelchair and unable to take care of herself. Today, she refers terrible lower back pain that is aggravated with movement and alleviated with lying still. She denies any urinary or fecal incontinence, denies any saddle anesthesia. No fevers, chills, chest pain, SOB, abd pain, n/v/d, GIB. Denies any recent abx however care everywhere shows recent rx for augmentin and doxy. She has been taking ibuprofen without relief of her symptoms, however otherwise does not know what medications she takes    At the ED patient HDS, VSS. Labs not overtly unremarkable other than Na 131, K 3.1. Hgb stable. UA with 3+ leuk, 13 WBC. CT abd/pelvis with stable remote L2 compression fracture however noted new T12 compression fx. She was given oxy 5mg and admitted to hospital medicine for further workup.

## 2022-11-22 LAB
ALBUMIN SERPL BCP-MCNC: 2.8 G/DL (ref 3.5–5.2)
ALP SERPL-CCNC: 77 U/L (ref 55–135)
ALT SERPL W/O P-5'-P-CCNC: 14 U/L (ref 10–44)
ANION GAP SERPL CALC-SCNC: 10 MMOL/L (ref 8–16)
AST SERPL-CCNC: 19 U/L (ref 10–40)
BASOPHILS # BLD AUTO: 0.06 K/UL (ref 0–0.2)
BASOPHILS NFR BLD: 0.8 % (ref 0–1.9)
BILIRUB SERPL-MCNC: 0.5 MG/DL (ref 0.1–1)
BUN SERPL-MCNC: 11 MG/DL (ref 8–23)
CALCIUM SERPL-MCNC: 9.7 MG/DL (ref 8.7–10.5)
CHLORIDE SERPL-SCNC: 96 MMOL/L (ref 95–110)
CO2 SERPL-SCNC: 29 MMOL/L (ref 23–29)
CREAT SERPL-MCNC: 0.5 MG/DL (ref 0.5–1.4)
DIFFERENTIAL METHOD: ABNORMAL
EOSINOPHIL # BLD AUTO: 0.1 K/UL (ref 0–0.5)
EOSINOPHIL NFR BLD: 1.4 % (ref 0–8)
ERYTHROCYTE [DISTWIDTH] IN BLOOD BY AUTOMATED COUNT: 14.7 % (ref 11.5–14.5)
EST. GFR  (NO RACE VARIABLE): >60 ML/MIN/1.73 M^2
GLUCOSE SERPL-MCNC: 121 MG/DL (ref 70–110)
HCT VFR BLD AUTO: 32.2 % (ref 37–48.5)
HGB BLD-MCNC: 9.8 G/DL (ref 12–16)
IMM GRANULOCYTES # BLD AUTO: 0.02 K/UL (ref 0–0.04)
IMM GRANULOCYTES NFR BLD AUTO: 0.3 % (ref 0–0.5)
LYMPHOCYTES # BLD AUTO: 1.2 K/UL (ref 1–4.8)
LYMPHOCYTES NFR BLD: 16.5 % (ref 18–48)
MAGNESIUM SERPL-MCNC: 1.2 MG/DL (ref 1.6–2.6)
MCH RBC QN AUTO: 26.5 PG (ref 27–31)
MCHC RBC AUTO-ENTMCNC: 30.4 G/DL (ref 32–36)
MCV RBC AUTO: 87 FL (ref 82–98)
MONOCYTES # BLD AUTO: 0.7 K/UL (ref 0.3–1)
MONOCYTES NFR BLD: 9.9 % (ref 4–15)
NEUTROPHILS # BLD AUTO: 5.3 K/UL (ref 1.8–7.7)
NEUTROPHILS NFR BLD: 71.1 % (ref 38–73)
NRBC BLD-RTO: 0 /100 WBC
PHOSPHATE SERPL-MCNC: 3.5 MG/DL (ref 2.7–4.5)
PLATELET # BLD AUTO: 212 K/UL (ref 150–450)
PMV BLD AUTO: 10.4 FL (ref 9.2–12.9)
POCT GLUCOSE: 119 MG/DL (ref 70–110)
POCT GLUCOSE: 126 MG/DL (ref 70–110)
POCT GLUCOSE: 133 MG/DL (ref 70–110)
POTASSIUM SERPL-SCNC: 3.4 MMOL/L (ref 3.5–5.1)
PROT SERPL-MCNC: 5.7 G/DL (ref 6–8.4)
RBC # BLD AUTO: 3.7 M/UL (ref 4–5.4)
SODIUM SERPL-SCNC: 135 MMOL/L (ref 136–145)
WBC # BLD AUTO: 7.38 K/UL (ref 3.9–12.7)

## 2022-11-22 PROCEDURE — 80053 COMPREHEN METABOLIC PANEL: CPT | Performed by: STUDENT IN AN ORGANIZED HEALTH CARE EDUCATION/TRAINING PROGRAM

## 2022-11-22 PROCEDURE — 99226 PR SUBSEQUENT OBSERVATION CARE,LEVEL III: ICD-10-PCS | Mod: GC,,, | Performed by: STUDENT IN AN ORGANIZED HEALTH CARE EDUCATION/TRAINING PROGRAM

## 2022-11-22 PROCEDURE — 85025 COMPLETE CBC W/AUTO DIFF WBC: CPT | Performed by: STUDENT IN AN ORGANIZED HEALTH CARE EDUCATION/TRAINING PROGRAM

## 2022-11-22 PROCEDURE — 96365 THER/PROPH/DIAG IV INF INIT: CPT

## 2022-11-22 PROCEDURE — 84100 ASSAY OF PHOSPHORUS: CPT | Performed by: STUDENT IN AN ORGANIZED HEALTH CARE EDUCATION/TRAINING PROGRAM

## 2022-11-22 PROCEDURE — 83735 ASSAY OF MAGNESIUM: CPT | Performed by: STUDENT IN AN ORGANIZED HEALTH CARE EDUCATION/TRAINING PROGRAM

## 2022-11-22 PROCEDURE — 94640 AIRWAY INHALATION TREATMENT: CPT

## 2022-11-22 PROCEDURE — G0378 HOSPITAL OBSERVATION PER HR: HCPCS

## 2022-11-22 PROCEDURE — 63600175 PHARM REV CODE 636 W HCPCS: Performed by: STUDENT IN AN ORGANIZED HEALTH CARE EDUCATION/TRAINING PROGRAM

## 2022-11-22 PROCEDURE — 25000003 PHARM REV CODE 250: Performed by: STUDENT IN AN ORGANIZED HEALTH CARE EDUCATION/TRAINING PROGRAM

## 2022-11-22 PROCEDURE — 96372 THER/PROPH/DIAG INJ SC/IM: CPT | Mod: 59 | Performed by: STUDENT IN AN ORGANIZED HEALTH CARE EDUCATION/TRAINING PROGRAM

## 2022-11-22 PROCEDURE — 82962 GLUCOSE BLOOD TEST: CPT | Mod: 91

## 2022-11-22 PROCEDURE — 25000242 PHARM REV CODE 250 ALT 637 W/ HCPCS: Performed by: STUDENT IN AN ORGANIZED HEALTH CARE EDUCATION/TRAINING PROGRAM

## 2022-11-22 PROCEDURE — 27000221 HC OXYGEN, UP TO 24 HOURS

## 2022-11-22 PROCEDURE — 94761 N-INVAS EAR/PLS OXIMETRY MLT: CPT

## 2022-11-22 PROCEDURE — 99226 PR SUBSEQUENT OBSERVATION CARE,LEVEL III: CPT | Mod: GC,,, | Performed by: STUDENT IN AN ORGANIZED HEALTH CARE EDUCATION/TRAINING PROGRAM

## 2022-11-22 RX ORDER — LOPERAMIDE HCL 2 MG
TABLET ORAL
Status: ON HOLD | COMMUNITY
End: 2022-11-30 | Stop reason: SDUPTHER

## 2022-11-22 RX ORDER — ACETAMINOPHEN 500 MG
1000 TABLET ORAL EVERY 8 HOURS
Status: DISCONTINUED | OUTPATIENT
Start: 2022-11-22 | End: 2022-11-30 | Stop reason: HOSPADM

## 2022-11-22 RX ORDER — HYDROMORPHONE HYDROCHLORIDE 1 MG/ML
0.2 INJECTION, SOLUTION INTRAMUSCULAR; INTRAVENOUS; SUBCUTANEOUS ONCE
Status: DISCONTINUED | OUTPATIENT
Start: 2022-11-22 | End: 2022-11-23

## 2022-11-22 RX ORDER — METHOCARBAMOL 750 MG/1
750 TABLET, FILM COATED ORAL 4 TIMES DAILY
Status: DISCONTINUED | OUTPATIENT
Start: 2022-11-22 | End: 2022-11-30 | Stop reason: HOSPADM

## 2022-11-22 RX ADMIN — OXYBUTYNIN CHLORIDE 5 MG: 5 TABLET ORAL at 08:11

## 2022-11-22 RX ADMIN — METHOCARBAMOL 500 MG: 500 TABLET ORAL at 01:11

## 2022-11-22 RX ADMIN — METHOCARBAMOL 750 MG: 750 TABLET ORAL at 08:11

## 2022-11-22 RX ADMIN — METHOCARBAMOL 500 MG: 500 TABLET ORAL at 09:11

## 2022-11-22 RX ADMIN — METHOCARBAMOL 750 MG: 750 TABLET ORAL at 05:11

## 2022-11-22 RX ADMIN — OXYCODONE 5 MG: 5 TABLET ORAL at 03:11

## 2022-11-22 RX ADMIN — LISINOPRIL 5 MG: 5 TABLET ORAL at 09:11

## 2022-11-22 RX ADMIN — ASPIRIN 81 MG: 81 TABLET, COATED ORAL at 09:11

## 2022-11-22 RX ADMIN — CALCIUM CARBONATE (ANTACID) CHEW TAB 500 MG 500 MG: 500 CHEW TAB at 09:11

## 2022-11-22 RX ADMIN — OXYCODONE 5 MG: 5 TABLET ORAL at 05:11

## 2022-11-22 RX ADMIN — ATORVASTATIN CALCIUM 20 MG: 20 TABLET, FILM COATED ORAL at 09:11

## 2022-11-22 RX ADMIN — OXYCODONE 5 MG: 5 TABLET ORAL at 11:11

## 2022-11-22 RX ADMIN — ENOXAPARIN SODIUM 40 MG: 40 INJECTION SUBCUTANEOUS at 09:11

## 2022-11-22 RX ADMIN — CITALOPRAM HYDROBROMIDE 40 MG: 20 TABLET ORAL at 09:11

## 2022-11-22 RX ADMIN — FLUTICASONE FUROATE AND VILANTEROL TRIFENATATE 1 PUFF: 100; 25 POWDER RESPIRATORY (INHALATION) at 07:11

## 2022-11-22 RX ADMIN — ACETAMINOPHEN 1000 MG: 500 TABLET ORAL at 01:11

## 2022-11-22 RX ADMIN — OXYBUTYNIN CHLORIDE 5 MG: 5 TABLET ORAL at 09:11

## 2022-11-22 RX ADMIN — CEFTRIAXONE 1 G: 1 INJECTION, SOLUTION INTRAVENOUS at 05:11

## 2022-11-22 RX ADMIN — BUPROPION HYDROCHLORIDE 150 MG: 150 TABLET, FILM COATED, EXTENDED RELEASE ORAL at 09:11

## 2022-11-22 RX ADMIN — PANTOPRAZOLE SODIUM 40 MG: 40 TABLET, DELAYED RELEASE ORAL at 09:11

## 2022-11-22 RX ADMIN — CALCITONIN SALMON 1 SPRAY: 200 SPRAY, METERED NASAL at 09:11

## 2022-11-22 RX ADMIN — ACETAMINOPHEN 1000 MG: 500 TABLET ORAL at 03:11

## 2022-11-22 RX ADMIN — ACETAMINOPHEN 650 MG: 325 TABLET ORAL at 08:11

## 2022-11-22 RX ADMIN — ENOXAPARIN SODIUM 40 MG: 40 INJECTION SUBCUTANEOUS at 08:11

## 2022-11-22 RX ADMIN — LIDOCAINE 1 PATCH: 50 PATCH CUTANEOUS at 09:11

## 2022-11-22 RX ADMIN — DONEPEZIL HYDROCHLORIDE 5 MG: 5 TABLET, FILM COATED ORAL at 08:11

## 2022-11-22 RX ADMIN — ACETAMINOPHEN 650 MG: 325 TABLET ORAL at 11:11

## 2022-11-22 NOTE — ED NOTES
Linens changed, brief changed, repositioned in bed.  Bed locked and in lowest position. Call light within reach.

## 2022-11-22 NOTE — CONSULTS
[facility] - Emergency Dept  Neurosurgery  Consult Note    Inpatient consult to Neurosurgery  Consult performed by: Areli Pabon MD  Consult ordered by: Arron Jerome MD        Subjective:     Chief Complaint/Reason for Admission: low back pain    History of Present Illness: Daryleen G Moran is a 77 y.o. female with PMH of obesity, chronic low back pain, COPD, HTN, HLD and DM who presents for evaluation of 1 week of diarrhea. She also has complains of back pain for the past 2 weeks after bending down. She denies any recent trauma. She denies radicular pain, weakness, and numbness/tingling. Besides the diarrhea, patient denies bowel/bladder dysfunction. NSGY consulted for T12 fracture and known L2 fracture.      (Not in a hospital admission)      Review of patient's allergies indicates:   Allergen Reactions    Sulfacetamide sodium      Pain perineal area    Sulfasalazine Hives    Adhesive Itching     SKIN GETS RED WITH TAPE AND BANDAIDS    Adhesive tape-silicones Itching     SKIN GETS RED WITH TAPE AND BANDAIDS    Sulfa (sulfonamide antibiotics) Rash       Past Medical History:   Diagnosis Date    Aortic stenosis     Arthritis     Back pain     Diabetes mellitus type II     Hyperlipidemia     Hypertension     NSTEMI (non-ST elevated myocardial infarction) 5/1/2022    Osteoporosis     Wears glasses      Past Surgical History:   Procedure Laterality Date     vocal cord nodules removed  long time ago     twice    APPENDECTOMY  within last 5yrs    CATHETERIZATION OF BOTH LEFT AND RIGHT HEART Left 5/6/2022    Procedure: CATHETERIZATION, HEART, BOTH LEFT AND RIGHT;  Surgeon: Michelet Vargas MD;  Location: [facility] CATH/EP LAB;  Service: Cardiology;  Laterality: Left;    FIXATION KYPHOPLASTY THORACIC SPINE      8-20-13    FOOT SURGERY      left 2nd toe was too long     HERNIA REPAIR  within last 5yrs    LEFT HEART CATHETERIZATION Left 5/2/2022    Procedure: Left heart cath;  Surgeon: Jose Alaniz  MD Ones[place];  Location[facility] CATH[facility]EP LAB;  Service: Cardiology;  Laterality: Left;    TONSILLECTOMY, ADENOIDECTOMY  long time ago     Family History    None       Tobacco Use    Smoking status: Former     Packs/day: 0.50     Years: 35.00     Pack years: 17.50     Types: Cigarettes     Quit date: 10/5/2022     Years since quittin.1    Smokeless tobacco: Never   Substance and Sexual Activity    Alcohol use: No    Drug use: No    Sexual activity: Not on file     Review of Systems   Constitutional:  Negative for fever.   HENT:  Negative for congestion.    Eyes:  Negative for visual disturbance.   Respiratory:  Negative for shortness of breath.    Cardiovascular:  Negative for chest pain.   Gastrointestinal:  Positive for diarrhea. Negative for nausea and vomiting.   Endocrine: Negative for polyuria.   Genitourinary:  Negative for difficulty urinating.   Musculoskeletal:  Positive for back pain.   Neurological:  Negative for weakness and numbness.   Psychiatric/Behavioral:  Negative for confusion.    Objective:     Weight: 94.3 kg (207 lb 14.3 oz)  Body mass index is 40.6 kg/m².  Vital Signs (Most Recent):  Temp: 98 °F (36.7 °C) (22 1222)  Pulse: 83 (22 1400)  Resp: 18 (22 2155)  BP: (!) 119/56 (22 1400)  SpO2: 97 % (22 1400)   Vital Signs (24h Range):  Temp:  [98 °F (36.7 °C)-98.7 °F (37.1 °C)] 98 °F (36.7 °C)  Pulse:  [77-88] 83  Resp:  [16-19] 18  SpO2:  [95 %-100 %] 97 %  BP: (109-157)/(53-69) 119/56                     Female External Urinary Catheter 22 0938 (Active)       Physical Exam  General: Awake, alert, oriented  Head: Non-traumatic, normocephalic  Eyes: Pupils equal, EOMI  Neck: Supple, normal ROM, no tenderness to palpation  CVS: Normal rate and rhythm, distal pulses present  Pulm: Symmetric expansion, no respiratory distress  GI: Abdomen nondistended, nontender  MSK: Moves all extremities without restriction, atraumatic  Skin: Dry, intact  Psych: Normal  thought content and cognition    Neurosurgery Physical Exam  General: AOx3, GCS [id number]  CNII-XII: Intact on fine exam, PERRL, visual fields grossly intact, EOMI, facial sensation preserved, no facial asymmetry, tongue midline, shoulder shrug equal, no pronator drift  Extremities: 5/5 motor throughout, SILT  TTP low back (R>L)    Significant Labs:  Recent Labs   Lab 11/21/22  1036   *   *   K 3.1*   CL 93*   CO2 29   BUN 13   CREATININE 0.6   CALCIUM 8.5*     Recent Labs   Lab 11/21/22  0934 11/21/22  1041   WBC 9.33  --    HGB 10.9*  --    HCT 36.7* 31*     --      No results for input(s): LABPT, INR, APTT in the last 48 hours.  Microbiology Results (last 7 days)       Procedure Component Value Units Date/Time    Clostridium difficile EIA [[id number]]     Order Status: No result Specimen: Stool     Stool culture [[id number]]     Order Status: No result Specimen: Stool     Urine culture [[id number]] Collected: 11/21/22 0955    Order Status: No result Specimen: Urine Updated: 11/21/22 1036          All pertinent labs from the last 24 hours have been reviewed.    Significant Diagnostics:  I have reviewed all pertinent imaging results/findings within the past 24 hours.    Assessment/Plan:     * Compression fracture of thoracic vertebra  Daryleen G Moran is a 77 y.o. female with PMH of obesity, chronic low back pain, COPD, HTN, HLD and DM who presents with low back pain without weakness or radiculopathy. NSGY consulted for T12 compression fracture and known L2 fracture.    --Patient seen by NSGY at bedside  --q4 neurochecks  --TLSO brace  --MRI T/L spine wo con  --CT T/L spine wo con  --after MRI, obtain supine upright XR with TLSO brace on  --Continue to monitor clinically, notify NSGY immediately with any changes in neuro status    D/w Dr. Newton          Thank you for your consult. I will follow-up with patient. Please contact us if you have any additional questions.    Areli Pabon,  MD  Neurosurgery  [facility] - Emergency Dept

## 2022-11-22 NOTE — ED NOTES
"Pt laying in bed on left side and eating lunch tray. Pt states " when is it going to heal. I have all of these fractures. I can't even move." 3 Lidocaine patches noted to back. Pt with female external catheter in place. Pt reporting 8/10 back pain.     Admit info / head to toe assessment done.   "

## 2022-11-22 NOTE — ASSESSMENT & PLAN NOTE
Daryleen G Moran is a 77 y.o. female with PMH of obesity, chronic low back pain, COPD, HTN, HLD and DM who presents with low back pain without weakness or radiculopathy. NSGY consulted for T12 compression fracture and known L2 fracture.    --Patient seen by NSGY at bedside  --q4 neurochecks  --TLSO brace  --MRI T/L spine wo con  --CT T/L spine wo con  --after MRI, obtain supine upright XR with TLSO brace on  --Continue to monitor clinically, notify NSGY immediately with any changes in neuro status    D/w Dr. Newton

## 2022-11-22 NOTE — PROGRESS NOTES
[facility] - Neurosurgery ([facility])  [facility] Medicine  Progress Note    Patient Name: Daryleen G Moran  MRN: [id number]  Patient Class: OP- Observation   Admission Date: 11/21/2022  Length of Stay: 0 days  Attending Physician: Jelena Castellanos MD  Primary Care Provider: Abhi De Oliveira Iv, MD        Subjective:     Principal Problem:Compression fracture of thoracic vertebra        HPI:  77-year-old woman with a history of hypertension, hyperlipidemia, T2DM, CAD, COPD on 2L, severe aortic stenosis, and chronic back pain who is brought to the ED for severe lower back pain as well as diarrhea. Per patient, she refers about 3 d of watery diarrhea,  describing about 3-4 episodes of watery diarrhea. Onset of symptoms started acutely and she denies any sick contacts though per documentation apparently the diarrhea started after eating pork. She was most recently admitted to an OSH for intractable back pain after bending forward and was found to have a L2 compression fracture. She was managed conservatively and was discharged to a nursing home (?). Again was readmitted to the same OSH with back pain and confusion and was found to be hyponatremic to 118. Appears to have improved conservatively and per documentation patient and family both refused, so was taken home. Since then patient refers she has been confined to a wheelchair and unable to take care of herself. Today, she refers terrible lower back pain that is aggravated with movement and alleviated with lying still. She denies any urinary or fecal incontinence, denies any saddle anesthesia. No fevers, chills, chest pain, SOB, abd pain, n/v/d, GIB. Denies any recent abx however care everywhere shows recent rx for augmentin and doxy. She has been taking ibuprofen without relief of her symptoms, however otherwise does not know what medications she takes    At the ED patient HDS, VSS. Labs not overtly unremarkable other than Na 131, K 3.1. Hgb stable. UA with 3+ leuk, 13 WBC. CT  abd/pelvis with stable remote L2 compression fracture however noted new T12 compression fx. She was given oxy 5mg and admitted to hospital medicine for further workup.       Overview/Hospital Course:  Admitted to hospital medicine for intractable back pain and diarrhea. Diarrhea resolved on admission. UA dirty and later grew enterococcus and patient with mild suprapubic discomfort, started on IV CTX. NSGY spine consulted given new T12 fx and subacute L2 fx and recommending TLSO brace and further imaging. PT consulted however NSGY requesting holding off on PT prior to imaging. She remains complaining of back pain and somewhat non-compliant with her care here. Requesting holding off on further imaging given back pain however has not been consistently using her PRN meds      Interval History: Diarrhea resolved on admission. UA dirty and later grew enterococcus and patient with mild suprapubic discomfort, started on IV CTX. NSGY spine consulted given new T12 fx and subacute L2 fx and recommending TLSO brace and further imaging. PT consulted however NSGY requesting holding off on PT prior to imaging. She remains complaining of back pain and somewhat non-compliant with her care here. Requesting holding off on further imaging given back pain however has not been consistently using her PRN meds. Plan to obtain thoracolumbar XR with pain meds.     Review of Systems   Constitutional:  Positive for activity change and chills. Negative for fatigue and fever.   HENT:  Negative for congestion and sore throat.    Eyes:  Negative for visual disturbance.   Respiratory:  Negative for cough, shortness of breath and wheezing.    Cardiovascular:  Negative for chest pain, palpitations and leg swelling.   Gastrointestinal:  Negative for abdominal pain, blood in stool, diarrhea, nausea and vomiting.   Endocrine: Negative for polyuria.   Genitourinary:  Negative for dysuria, flank pain and frequency.   Musculoskeletal:  Positive for  arthralgias and back pain. Negative for myalgias.   Skin:  Negative for pallor and rash.   Neurological:  Negative for weakness, light-headedness, numbness and headaches.   Objective:     Vital Signs (Most Recent):  Temp: 98.2 °F (36.8 °C) (11/22/22 1528)  Pulse: 87 (11/22/22 1528)  Resp: 16 (11/22/22 1528)  BP: (!) 101/58 (11/22/22 1528)  SpO2: 96 % (11/22/22 1528)   Vital Signs (24h Range):  Temp:  [97.9 °F (36.6 °C)-98.6 °F (37 °C)] 98.2 °F (36.8 °C)  Pulse:  [77-87] 87  Resp:  [16-19] 16  SpO2:  [92 %-98 %] 96 %  BP: (101-122)/(53-70) 101/58     Weight: 94.3 kg (207 lb 14.3 oz)  Body mass index is 40.6 kg/m².  No intake or output data in the 24 hours ending 11/22/22 1540   Physical Exam  Vitals and nursing note reviewed.   Constitutional:       General: She is not in acute distress.     Appearance: She is obese. She is not ill-appearing or toxic-appearing.   HENT:      Head: Normocephalic and atraumatic.      Mouth/Throat:      Mouth: Mucous membranes are moist.      Pharynx: No oropharyngeal exudate.   Eyes:      Extraocular Movements: Extraocular movements intact.      Pupils: Pupils are equal, round, and reactive to light.   Cardiovascular:      Rate and Rhythm: Normal rate and regular rhythm.      Heart sounds: No murmur heard.  Pulmonary:      Effort: Pulmonary effort is normal.      Breath sounds: No wheezing or rales.   Abdominal:      General: Abdomen is flat. Bowel sounds are normal. There is no distension.      Tenderness: There is no abdominal tenderness (very slight lower abdominal tenderness). There is no guarding.   Musculoskeletal:         General: No swelling or tenderness (ttp at junction of thoracic and lower spine). Normal range of motion.      Cervical back: Normal range of motion.      Right lower leg: No edema.      Left lower leg: No edema.   Lymphadenopathy:      Cervical: No cervical adenopathy.   Skin:     General: Skin is warm.      Coloration: Skin is not jaundiced.      Findings: No  bruising or rash.   Neurological:      General: No focal deficit present.      Mental Status: She is alert and oriented to person, place, and time.      Cranial Nerves: No cranial nerve deficit.      Sensory: No sensory deficit.       Significant Labs: All pertinent labs within the past 24 hours have been reviewed.    Significant Imaging: I have reviewed all pertinent imaging results/findings within the past 24 hours.      Assessment/Plan:      * Compression fracture of thoracic vertebra  77-year-old woman with a history of COPD on 2L, T2DM, severe AS, morbid obesity, chronic back pain here with new onset T12 fracture and subacute L2 fx. Most recently admitted to an OSH for intractable back pain after bending forward and was found to have a L2 compression fracture. She was managed conservatively and was discharged to a nursing home (?). Again was readmitted to the same OSH with similar symptosm. Imaging here with stable remote L2 compression fracture however noted new T12 compression fx.     -scheduled tylenol, robaxin, lidocaine patches  -oxy 5mg for breakthrough pain  -intranasal calcitonin  -TLSO brace placed  -NSGY spine consult, appreciate assitance  -plan for thoracolumbar XR today with brace on  -PT/OT once NSGY imaging is done  -patient again encouraged to participate with plan of care. Major limitation is pain and inability to tolerate much movement      Diarrhea  Refers about 3 d of watery diarrhea,  describing about 3-4 episodes of watery diarrhea. Onset of symptoms started acutely and she denies any sick contacts though per documentation apparently the diarrhea started after eating pork. Appears to have recently taken antibiotics.     Diarrhea resolved on admission.     -discontinuing C diff, contact precautions  -judicious IVF  -daily CMP    Aortic stenosis  Hx of Aortic stenosis and undergoing TAVR workup      COPD, mild  Stable and on home 2L NC    Depression  -continuing home  medications        Diabetes mellitus type II  Patient's FSGs are controlled on current medication regimen.  Last A1c reviewed-   Lab Results   Component Value Date    HGBA1C 6.2 05/01/2022     Most recent fingerstick glucose reviewed- No results for input(s): POCTGLUCOSE in the last 24 hours.  Current correctional scale  High  Maintain anti-hyperglycemic dose as follows-   Antihyperglycemics (From admission, onward)    Start     Stop Route Frequency Ordered    11/21/22 1546  insulin aspart U-100 pen 1-10 Units         -- SubQ Before meals & nightly PRN 11/21/22 1448        Hold Oral hypoglycemics while patient is in the hospital.      VTE Risk Mitigation (From admission, onward)         Ordered     enoxaparin injection 40 mg  Every 12 hours         11/21/22 1448     IP VTE HIGH RISK PATIENT  Once         11/21/22 1448     Place sequential compression device  Until discontinued         11/21/22 1448                Discharge Planning   ISAAK: 11/25/2022     Code Status: Full Code   Is the patient medically ready for discharge?: No    Reason for patient still in hospital (select all that apply): Treatment                     Arron Jerome MD  Department of Hospital Medicine   [facility] - Neurosurgery ([facility])

## 2022-11-22 NOTE — ED NOTES
Attempt to give meds before pt went for CT. Pt stated ''I need something for my back''. RN gave muscle relaxant. Pt stated ''this is not a pain medication''. RN attempted to look up PRN pain medications bedside. Pt stated ''I do not want you back in here again, get me another nurse and get me my pain pill first''.

## 2022-11-22 NOTE — ED NOTES
Pt utilizing call light very frequently. Needs addressed.  Bed locked and in lowest position. Call light within reach.

## 2022-11-22 NOTE — ASSESSMENT & PLAN NOTE
77-year-old woman with a history of COPD on 2L, T2DM, severe AS, morbid obesity, chronic back pain here with new onset T12 fracture and subacute L2 fx. Most recently admitted to an OSH for intractable back pain after bending forward and was found to have a L2 compression fracture. She was managed conservatively and was discharged to a nursing home (?). Again was readmitted to the same OSH with similar symptosm. Imaging here with stable remote L2 compression fracture however noted new T12 compression fx.     -scheduled tylenol, robaxin, lidocaine patches  -oxy 5mg for breakthrough pain  -intranasal calcitonin  -TLSO brace placed  -NSGY spine consult, appreciate assitance  -plan for thoracolumbar XR today with brace on  -PT/OT once NSGY imaging is done  -patient again encouraged to participate with plan of care. Major limitation is pain and inability to tolerate much movement

## 2022-11-22 NOTE — PHARMACY MED REC
"Admission Medication History     The home medication history was taken by Emily Andrade.    You may go to "Admission" then "Reconcile Home Medications" tabs to review and/or act upon these items.     The home medication list has been updated by the Pharmacy department.   Please read ALL comments highlighted in yellow.   Please address this information as you see fit.    Feel free to contact us if you have any questions or require assistance.    The medications listed below were removed from the home medication list. Please reorder if appropriate:  Patient reports no longer taking the following medication(s):  AMOXICILLIN-CLAVULANATE 875-125 MG TAB  BUPROPION  MG TAB  DOXYCYCLINE 100 MG CAP  LISINOPRIL 20 MG TAB  METOPROLOL 25 MG TAB  MICONAZOLE 2 % CRM  MUPIROCIN 2 % OINT  POTASSIUM CHLORIDE 10 MEQ TAB    Medications listed below were obtained from: Patient    Current Outpatient Medications on File Prior to Encounter   Medication Sig    albuterol (PROVENTIL/VENTOLIN HFA) 90 mcg/actuation inhaler   Inhale 2 puffs into the lungs every 6 (six) hours as needed.    aspirin (ECOTRIN) 81 MG EC tablet   Take 81 mg by mouth once daily.    benazepriL (LOTENSIN) 20 MG tablet   Take 20 mg by mouth once daily.    budesonide-formoterol 80-4.5 mcg (SYMBICORT) 80-4.5 mcg/actuation HFAA   Inhale 2 puffs into the lungs 2 (two) times daily as needed (Asthma).    calcium carbonate (OS-TK) 600 mg (1,500 mg) Tab   Take 600 mg by mouth 2 (two) times daily with meals.    cetirizine HCl (ZYRTEC ORAL) Take 1 tablet by mouth daily as needed (Allergies).      citalopram (CELEXA) 40 MG tablet   Take 1 tablet (40 mg total) by mouth once daily.    donepeziL (ARICEPT) 5 MG tablet   Take 5 mg by mouth nightly.    ergocalciferol (ERGOCALCIFEROL) 50,000 unit Cap   TAKE 1 CAPSULE (50,000 UNITS TOTAL) EVERY 7 DAYS.    ferrous gluconate 324 mg (37.5 mg iron) Tab tablet   Take 324 mg by mouth once daily.    furosemide (LASIX) 40 MG tablet   Take 40 mg " by mouth once daily.    glucosamine-chondroitin 500-400 mg tablet   Take 1 tablet by mouth once daily.    loperamide (IMODIUM A-D) 2 mg Tab   Take 2 tablets by mouth initially then 1 tablet after each loose stool as needed for diarrhea.    metFORMIN (GLUCOPHAGE) 500 MG tablet   Take 1 tablet (500 mg total) by mouth 2 (two) times daily with meals.    multivitamin capsule   Take 1 capsule by mouth once daily.    omeprazole (PRILOSEC) 20 MG capsule   Take 1 capsule (20 mg total) by mouth once daily.    oxybutynin (DITROPAN) 5 MG Tab   Take 1 tablet (5 mg total) by mouth 2 (two) times daily.    simvastatin (ZOCOR) 40 MG tablet   Take 1 tablet (40 mg total) by mouth every evening.       Potential issues to be addressed PRIOR TO DISCHARGE  Patient reported not taking the following medications: (NORCO, PREDNISONE). These medications remain on the home medication list. Please address accordingly.     Emily Andrade CPhT  EXT [phone]                  .

## 2022-11-22 NOTE — SUBJECTIVE & OBJECTIVE
Interval History: Diarrhea resolved on admission. UA dirty and later grew enterococcus and patient with mild suprapubic discomfort, started on IV CTX. NSGY spine consulted given new T12 fx and subacute L2 fx and recommending TLSO brace and further imaging. PT consulted however NSGY requesting holding off on PT prior to imaging. She remains complaining of back pain and somewhat non-compliant with her care here. Requesting holding off on further imaging given back pain however has not been consistently using her PRN meds. Plan to obtain thoracolumbar XR with pain meds.     Review of Systems   Constitutional:  Positive for activity change and chills. Negative for fatigue and fever.   HENT:  Negative for congestion and sore throat.    Eyes:  Negative for visual disturbance.   Respiratory:  Negative for cough, shortness of breath and wheezing.    Cardiovascular:  Negative for chest pain, palpitations and leg swelling.   Gastrointestinal:  Negative for abdominal pain, blood in stool, diarrhea, nausea and vomiting.   Endocrine: Negative for polyuria.   Genitourinary:  Negative for dysuria, flank pain and frequency.   Musculoskeletal:  Positive for arthralgias and back pain. Negative for myalgias.   Skin:  Negative for pallor and rash.   Neurological:  Negative for weakness, light-headedness, numbness and headaches.   Objective:     Vital Signs (Most Recent):  Temp: 98.2 °F (36.8 °C) (11/22/22 1528)  Pulse: 87 (11/22/22 1528)  Resp: 16 (11/22/22 1528)  BP: (!) 101/58 (11/22/22 1528)  SpO2: 96 % (11/22/22 1528)   Vital Signs (24h Range):  Temp:  [97.9 °F (36.6 °C)-98.6 °F (37 °C)] 98.2 °F (36.8 °C)  Pulse:  [77-87] 87  Resp:  [16-19] 16  SpO2:  [92 %-98 %] 96 %  BP: (101-122)/(53-70) 101/58     Weight: 94.3 kg (207 lb 14.3 oz)  Body mass index is 40.6 kg/m².  No intake or output data in the 24 hours ending 11/22/22 1540   Physical Exam  Vitals and nursing note reviewed.   Constitutional:       General: She is not in acute  distress.     Appearance: She is obese. She is not ill-appearing or toxic-appearing.   HENT:      Head: Normocephalic and atraumatic.      Mouth/Throat:      Mouth: Mucous membranes are moist.      Pharynx: No oropharyngeal exudate.   Eyes:      Extraocular Movements: Extraocular movements intact.      Pupils: Pupils are equal, round, and reactive to light.   Cardiovascular:      Rate and Rhythm: Normal rate and regular rhythm.      Heart sounds: No murmur heard.  Pulmonary:      Effort: Pulmonary effort is normal.      Breath sounds: No wheezing or rales.   Abdominal:      General: Abdomen is flat. Bowel sounds are normal. There is no distension.      Tenderness: There is no abdominal tenderness (very slight lower abdominal tenderness). There is no guarding.   Musculoskeletal:         General: No swelling or tenderness (ttp at junction of thoracic and lower spine). Normal range of motion.      Cervical back: Normal range of motion.      Right lower leg: No edema.      Left lower leg: No edema.   Lymphadenopathy:      Cervical: No cervical adenopathy.   Skin:     General: Skin is warm.      Coloration: Skin is not jaundiced.      Findings: No bruising or rash.   Neurological:      General: No focal deficit present.      Mental Status: She is alert and oriented to person, place, and time.      Cranial Nerves: No cranial nerve deficit.      Sensory: No sensory deficit.       Significant Labs: All pertinent labs within the past 24 hours have been reviewed.    Significant Imaging: I have reviewed all pertinent imaging results/findings within the past 24 hours.

## 2022-11-22 NOTE — SUBJECTIVE & OBJECTIVE
(Not in a hospital admission)      Review of patient's allergies indicates:   Allergen Reactions    Sulfacetamide sodium      Pain perineal area    Sulfasalazine Hives    Adhesive Itching     SKIN GETS RED WITH TAPE AND BANDAIDS    Adhesive tape-silicones Itching     SKIN GETS RED WITH TAPE AND BANDAIDS    Sulfa (sulfonamide antibiotics) Rash       Past Medical History:   Diagnosis Date    Aortic stenosis     Arthritis     Back pain     Diabetes mellitus type II     Hyperlipidemia     Hypertension     NSTEMI (non-ST elevated myocardial infarction) 2022    Osteoporosis     Wears glasses      Past Surgical History:   Procedure Laterality Date     vocal cord nodules removed  long time ago     twice    APPENDECTOMY  within last 5yrs    CATHETERIZATION OF BOTH LEFT AND RIGHT HEART Left 2022    Procedure: CATHETERIZATION, HEART, BOTH LEFT AND RIGHT;  Surgeon: Michelet Vargas MD;  Location: [facility] CATH/EP LAB;  Service: Cardiology;  Laterality: Left;    FIXATION KYPHOPLASTY THORACIC SPINE      13    FOOT SURGERY      left 2nd toe was too long     HERNIA REPAIR  within last 5yrs    LEFT HEART CATHETERIZATION Left 2022    Procedure: Left heart cath;  Surgeon: Jose Echols MD;  Location: [facility] CATH/EP LAB;  Service: Cardiology;  Laterality: Left;    TONSILLECTOMY, ADENOIDECTOMY  long time ago     Family History    None       Tobacco Use    Smoking status: Former     Packs/day: 0.50     Years: 35.00     Pack years: 17.50     Types: Cigarettes     Quit date: 10/5/2022     Years since quittin.1    Smokeless tobacco: Never   Substance and Sexual Activity    Alcohol use: No    Drug use: No    Sexual activity: Not on file     Review of Systems   Constitutional:  Negative for fever.   HENT:  Negative for congestion.    Eyes:  Negative for visual disturbance.   Respiratory:  Negative for shortness of breath.    Cardiovascular:  Negative for chest pain.   Gastrointestinal:  Positive for diarrhea. Negative for  nausea and vomiting.   Endocrine: Negative for polyuria.   Genitourinary:  Negative for difficulty urinating.   Musculoskeletal:  Positive for back pain.   Neurological:  Negative for weakness and numbness.   Psychiatric/Behavioral:  Negative for confusion.    Objective:     Weight: 94.3 kg (207 lb 14.3 oz)  Body mass index is 40.6 kg/m².  Vital Signs (Most Recent):  Temp: 98 °F (36.7 °C) (11/21/22 1222)  Pulse: 83 (11/21/22 1400)  Resp: 18 (11/21/22 2155)  BP: (!) 119/56 (11/21/22 1400)  SpO2: 97 % (11/21/22 1400)   Vital Signs (24h Range):  Temp:  [98 °F (36.7 °C)-98.7 °F (37.1 °C)] 98 °F (36.7 °C)  Pulse:  [77-88] 83  Resp:  [16-19] 18  SpO2:  [95 %-100 %] 97 %  BP: (109-157)/(53-69) 119/56                     Female External Urinary Catheter 11/21/22 0938 (Active)       Physical Exam  General: Awake, alert, oriented  Head: Non-traumatic, normocephalic  Eyes: Pupils equal, EOMI  Neck: Supple, normal ROM, no tenderness to palpation  CVS: Normal rate and rhythm, distal pulses present  Pulm: Symmetric expansion, no respiratory distress  GI: Abdomen nondistended, nontender  MSK: Moves all extremities without restriction, atraumatic  Skin: Dry, intact  Psych: Normal thought content and cognition    Neurosurgery Physical Exam  General: AOx3, GCS [id number]  CNII-XII: Intact on fine exam, PERRL, visual fields grossly intact, EOMI, facial sensation preserved, no facial asymmetry, tongue midline, shoulder shrug equal, no pronator drift  Extremities: 5/5 motor throughout, SILT  TTP low back (R>L)    Significant Labs:  Recent Labs   Lab 11/21/22  1036   *   *   K 3.1*   CL 93*   CO2 29   BUN 13   CREATININE 0.6   CALCIUM 8.5*     Recent Labs   Lab 11/21/22  0934 11/21/22  1041   WBC 9.33  --    HGB 10.9*  --    HCT 36.7* 31*     --      No results for input(s): LABPT, INR, APTT in the last 48 hours.  Microbiology Results (last 7 days)       Procedure Component Value Units Date/Time    Clostridium difficile  EIA [[id number]]     Order Status: No result Specimen: Stool     Stool culture [[id number]]     Order Status: No result Specimen: Stool     Urine culture [[id number]] Collected: 11/21/22 0955    Order Status: No result Specimen: Urine Updated: 11/21/22 1036          All pertinent labs from the last 24 hours have been reviewed.    Significant Diagnostics:  I have reviewed all pertinent imaging results/findings within the past 24 hours.

## 2022-11-22 NOTE — ASSESSMENT & PLAN NOTE
Refers about 3 d of watery diarrhea,  describing about 3-4 episodes of watery diarrhea. Onset of symptoms started acutely and she denies any sick contacts though per documentation apparently the diarrhea started after eating pork. Appears to have recently taken antibiotics.     Diarrhea resolved on admission.     -discontinuing C diff, contact precautions  -judicious IVF  -daily CMP

## 2022-11-22 NOTE — HPI
Daryleen G Moran is a 77 y.o. female with PMH of obesity, chronic low back pain, COPD, HTN, HLD and DM who presents for evaluation of 1 week of diarrhea. She also has complains of back pain for the past 2 weeks after bending down. She denies any recent trauma. She denies radicular pain, weakness, and numbness/tingling. Besides the diarrhea, patient denies bowel/bladder dysfunction. NSGY consulted for T12 fracture and known L2 fracture.

## 2022-11-22 NOTE — HOSPITAL COURSE
Admitted for intractable back pain and diarrhea. Diarrhea resolved on admission. UA dirty and later grew enterococcus and patient with mild suprapubic discomfort, started on IV CTX. NSGY spine consulted given new T12 fx and subacute L2 fx and recommending TLSO brace and further imaging. PT consulted however NSGY requesting holding off on PT prior to imaging. She remains complaining of back pain and somewhat non-compliant with her care here. Once imaging completed (and suggestive of osteoporotic fractures) NSGY recommended TLSO brace OOB and follow up in 6wks. She remains on intranasal calcitonin, vit D, calcium, and multimodals with some relief. PT scheduled to see patient. Patient encouraged to participate with them and get out of bed in spite of her pain. Neuro IR consulted for an inpatient kyphoplasty. Patient now participating with PT and ambulating a bit more. Interval development of multiple episodes of diarrhea initially described by bedside nurse as dark and watery, however later more formed. C. Diff testing initially ordered however per RN and Charge nurse(s), too formed to send for testing. Diarrhea improving, no appropriate specimen for C.diff testing collected. IR kyphoplasty 11/28. Will need NSGY f/u at discharge as well as osteoporosis f/u with endocrinology. S/p 6 day course of ampicillin for enterococcus UTI. s/p kyphoplasty w improvement in pain. C.diff and stool cx negative started loperamide w some improvement. Discharged home w .

## 2022-11-23 LAB
ALBUMIN SERPL BCP-MCNC: 2.8 G/DL (ref 3.5–5.2)
ALP SERPL-CCNC: 70 U/L (ref 55–135)
ALT SERPL W/O P-5'-P-CCNC: 13 U/L (ref 10–44)
ANION GAP SERPL CALC-SCNC: 10 MMOL/L (ref 8–16)
AST SERPL-CCNC: 36 U/L (ref 10–40)
BASOPHILS # BLD AUTO: 0.11 K/UL (ref 0–0.2)
BASOPHILS NFR BLD: 1.5 % (ref 0–1.9)
BILIRUB SERPL-MCNC: 0.4 MG/DL (ref 0.1–1)
BUN SERPL-MCNC: 14 MG/DL (ref 8–23)
CALCIUM SERPL-MCNC: 9.6 MG/DL (ref 8.7–10.5)
CHLORIDE SERPL-SCNC: 96 MMOL/L (ref 95–110)
CO2 SERPL-SCNC: 26 MMOL/L (ref 23–29)
CREAT SERPL-MCNC: 0.6 MG/DL (ref 0.5–1.4)
DIFFERENTIAL METHOD: ABNORMAL
EOSINOPHIL # BLD AUTO: 0.2 K/UL (ref 0–0.5)
EOSINOPHIL NFR BLD: 2.3 % (ref 0–8)
ERYTHROCYTE [DISTWIDTH] IN BLOOD BY AUTOMATED COUNT: 15 % (ref 11.5–14.5)
EST. GFR  (NO RACE VARIABLE): >60 ML/MIN/1.73 M^2
GLUCOSE SERPL-MCNC: 108 MG/DL (ref 70–110)
HCT VFR BLD AUTO: 32.3 % (ref 37–48.5)
HGB BLD-MCNC: 9.7 G/DL (ref 12–16)
IMM GRANULOCYTES # BLD AUTO: 0.2 K/UL (ref 0–0.04)
IMM GRANULOCYTES NFR BLD AUTO: 2.7 % (ref 0–0.5)
LYMPHOCYTES # BLD AUTO: 1 K/UL (ref 1–4.8)
LYMPHOCYTES NFR BLD: 13.4 % (ref 18–48)
MAGNESIUM SERPL-MCNC: 1.5 MG/DL (ref 1.6–2.6)
MCH RBC QN AUTO: 26.4 PG (ref 27–31)
MCHC RBC AUTO-ENTMCNC: 30 G/DL (ref 32–36)
MCV RBC AUTO: 88 FL (ref 82–98)
MONOCYTES # BLD AUTO: 0.7 K/UL (ref 0.3–1)
MONOCYTES NFR BLD: 9.3 % (ref 4–15)
NEUTROPHILS # BLD AUTO: 5.3 K/UL (ref 1.8–7.7)
NEUTROPHILS NFR BLD: 70.8 % (ref 38–73)
NRBC BLD-RTO: 0 /100 WBC
PHOSPHATE SERPL-MCNC: 3.4 MG/DL (ref 2.7–4.5)
PLATELET # BLD AUTO: 230 K/UL (ref 150–450)
PMV BLD AUTO: 11.4 FL (ref 9.2–12.9)
POCT GLUCOSE: 172 MG/DL (ref 70–110)
POTASSIUM SERPL-SCNC: 4.2 MMOL/L (ref 3.5–5.1)
PROT SERPL-MCNC: 6.1 G/DL (ref 6–8.4)
RBC # BLD AUTO: 3.68 M/UL (ref 4–5.4)
SODIUM SERPL-SCNC: 132 MMOL/L (ref 136–145)
WBC # BLD AUTO: 7.44 K/UL (ref 3.9–12.7)

## 2022-11-23 PROCEDURE — 83735 ASSAY OF MAGNESIUM: CPT | Performed by: STUDENT IN AN ORGANIZED HEALTH CARE EDUCATION/TRAINING PROGRAM

## 2022-11-23 PROCEDURE — 94640 AIRWAY INHALATION TREATMENT: CPT

## 2022-11-23 PROCEDURE — 85025 COMPLETE CBC W/AUTO DIFF WBC: CPT | Performed by: STUDENT IN AN ORGANIZED HEALTH CARE EDUCATION/TRAINING PROGRAM

## 2022-11-23 PROCEDURE — 99226 PR SUBSEQUENT OBSERVATION CARE,LEVEL III: ICD-10-PCS | Mod: GC,,, | Performed by: STUDENT IN AN ORGANIZED HEALTH CARE EDUCATION/TRAINING PROGRAM

## 2022-11-23 PROCEDURE — 96372 THER/PROPH/DIAG INJ SC/IM: CPT | Performed by: STUDENT IN AN ORGANIZED HEALTH CARE EDUCATION/TRAINING PROGRAM

## 2022-11-23 PROCEDURE — 36415 COLL VENOUS BLD VENIPUNCTURE: CPT | Performed by: STUDENT IN AN ORGANIZED HEALTH CARE EDUCATION/TRAINING PROGRAM

## 2022-11-23 PROCEDURE — 27000221 HC OXYGEN, UP TO 24 HOURS

## 2022-11-23 PROCEDURE — 99226 PR SUBSEQUENT OBSERVATION CARE,LEVEL III: CPT | Mod: GC,,, | Performed by: STUDENT IN AN ORGANIZED HEALTH CARE EDUCATION/TRAINING PROGRAM

## 2022-11-23 PROCEDURE — 80053 COMPREHEN METABOLIC PANEL: CPT | Performed by: STUDENT IN AN ORGANIZED HEALTH CARE EDUCATION/TRAINING PROGRAM

## 2022-11-23 PROCEDURE — 63600175 PHARM REV CODE 636 W HCPCS: Performed by: STUDENT IN AN ORGANIZED HEALTH CARE EDUCATION/TRAINING PROGRAM

## 2022-11-23 PROCEDURE — G0378 HOSPITAL OBSERVATION PER HR: HCPCS

## 2022-11-23 PROCEDURE — 25000003 PHARM REV CODE 250: Performed by: STUDENT IN AN ORGANIZED HEALTH CARE EDUCATION/TRAINING PROGRAM

## 2022-11-23 PROCEDURE — 97116 GAIT TRAINING THERAPY: CPT

## 2022-11-23 PROCEDURE — 96367 TX/PROPH/DG ADDL SEQ IV INF: CPT

## 2022-11-23 PROCEDURE — 99900035 HC TECH TIME PER 15 MIN (STAT)

## 2022-11-23 PROCEDURE — 94761 N-INVAS EAR/PLS OXIMETRY MLT: CPT

## 2022-11-23 PROCEDURE — 97161 PT EVAL LOW COMPLEX 20 MIN: CPT

## 2022-11-23 PROCEDURE — 84100 ASSAY OF PHOSPHORUS: CPT | Performed by: STUDENT IN AN ORGANIZED HEALTH CARE EDUCATION/TRAINING PROGRAM

## 2022-11-23 PROCEDURE — 97165 OT EVAL LOW COMPLEX 30 MIN: CPT

## 2022-11-23 PROCEDURE — 25000242 PHARM REV CODE 250 ALT 637 W/ HCPCS: Performed by: STUDENT IN AN ORGANIZED HEALTH CARE EDUCATION/TRAINING PROGRAM

## 2022-11-23 PROCEDURE — 97535 SELF CARE MNGMENT TRAINING: CPT

## 2022-11-23 RX ORDER — CALCIUM CARBONATE 200(500)MG
1000 TABLET,CHEWABLE ORAL DAILY
Status: DISCONTINUED | OUTPATIENT
Start: 2022-11-23 | End: 2022-11-30 | Stop reason: HOSPADM

## 2022-11-23 RX ORDER — AMPICILLIN 500 MG/1
500 CAPSULE ORAL 3 TIMES DAILY
Status: DISCONTINUED | OUTPATIENT
Start: 2022-11-23 | End: 2022-11-26

## 2022-11-23 RX ORDER — HYDROXYZINE PAMOATE 25 MG/1
50 CAPSULE ORAL EVERY 8 HOURS PRN
Status: DISCONTINUED | OUTPATIENT
Start: 2022-11-23 | End: 2022-11-30 | Stop reason: HOSPADM

## 2022-11-23 RX ORDER — MAGNESIUM SULFATE HEPTAHYDRATE 40 MG/ML
2 INJECTION, SOLUTION INTRAVENOUS
Status: ACTIVE | OUTPATIENT
Start: 2022-11-23 | End: 2022-11-23

## 2022-11-23 RX ORDER — MAGNESIUM SULFATE HEPTAHYDRATE 40 MG/ML
4 INJECTION, SOLUTION INTRAVENOUS ONCE
Status: DISCONTINUED | OUTPATIENT
Start: 2022-11-23 | End: 2022-11-23

## 2022-11-23 RX ORDER — CHOLECALCIFEROL (VITAMIN D3) 25 MCG
1000 TABLET ORAL DAILY
Status: DISCONTINUED | OUTPATIENT
Start: 2022-11-23 | End: 2022-11-30 | Stop reason: HOSPADM

## 2022-11-23 RX ADMIN — OXYBUTYNIN CHLORIDE 5 MG: 5 TABLET ORAL at 09:11

## 2022-11-23 RX ADMIN — ENOXAPARIN SODIUM 40 MG: 40 INJECTION SUBCUTANEOUS at 09:11

## 2022-11-23 RX ADMIN — METHOCARBAMOL 750 MG: 750 TABLET ORAL at 09:11

## 2022-11-23 RX ADMIN — METHOCARBAMOL 750 MG: 750 TABLET ORAL at 02:11

## 2022-11-23 RX ADMIN — Medication 600 MG: at 06:11

## 2022-11-23 RX ADMIN — CALCIUM CARBONATE (ANTACID) CHEW TAB 500 MG 1000 MG: 500 CHEW TAB at 10:11

## 2022-11-23 RX ADMIN — OXYCODONE 5 MG: 5 TABLET ORAL at 11:11

## 2022-11-23 RX ADMIN — LIDOCAINE 1 PATCH: 50 PATCH CUTANEOUS at 09:11

## 2022-11-23 RX ADMIN — LISINOPRIL 5 MG: 5 TABLET ORAL at 09:11

## 2022-11-23 RX ADMIN — ASPIRIN 81 MG: 81 TABLET, COATED ORAL at 09:11

## 2022-11-23 RX ADMIN — OXYCODONE 5 MG: 5 TABLET ORAL at 05:11

## 2022-11-23 RX ADMIN — ACETAMINOPHEN 1000 MG: 500 TABLET ORAL at 02:11

## 2022-11-23 RX ADMIN — DONEPEZIL HYDROCHLORIDE 5 MG: 5 TABLET, FILM COATED ORAL at 08:11

## 2022-11-23 RX ADMIN — BUPROPION HYDROCHLORIDE 150 MG: 150 TABLET, FILM COATED, EXTENDED RELEASE ORAL at 09:11

## 2022-11-23 RX ADMIN — Medication 600 MG: at 02:11

## 2022-11-23 RX ADMIN — CALCITONIN SALMON 1 SPRAY: 200 SPRAY, METERED NASAL at 10:11

## 2022-11-23 RX ADMIN — PANTOPRAZOLE SODIUM 40 MG: 40 TABLET, DELAYED RELEASE ORAL at 09:11

## 2022-11-23 RX ADMIN — ACETAMINOPHEN 1000 MG: 500 TABLET ORAL at 05:11

## 2022-11-23 RX ADMIN — ACETAMINOPHEN 1000 MG: 500 TABLET ORAL at 08:11

## 2022-11-23 RX ADMIN — ENOXAPARIN SODIUM 40 MG: 40 INJECTION SUBCUTANEOUS at 08:11

## 2022-11-23 RX ADMIN — FLUTICASONE FUROATE AND VILANTEROL TRIFENATATE 1 PUFF: 100; 25 POWDER RESPIRATORY (INHALATION) at 08:11

## 2022-11-23 RX ADMIN — CHOLECALCIFEROL TAB 25 MCG (1000 UNIT) 1000 UNITS: 25 TAB at 09:11

## 2022-11-23 RX ADMIN — ATORVASTATIN CALCIUM 20 MG: 20 TABLET, FILM COATED ORAL at 09:11

## 2022-11-23 RX ADMIN — MAGNESIUM SULFATE 2 G: 2 INJECTION INTRAVENOUS at 10:11

## 2022-11-23 RX ADMIN — OXYBUTYNIN CHLORIDE 5 MG: 5 TABLET ORAL at 08:11

## 2022-11-23 RX ADMIN — CITALOPRAM HYDROBROMIDE 40 MG: 20 TABLET ORAL at 10:11

## 2022-11-23 RX ADMIN — METHOCARBAMOL 750 MG: 750 TABLET ORAL at 06:11

## 2022-11-23 RX ADMIN — HYDROXYZINE PAMOATE 50 MG: 25 CAPSULE ORAL at 09:11

## 2022-11-23 RX ADMIN — OXYCODONE 5 MG: 5 TABLET ORAL at 06:11

## 2022-11-23 RX ADMIN — AMPICILLIN 500 MG: 500 CAPSULE ORAL at 08:11

## 2022-11-23 NOTE — NURSING
"IV magnesium stopped due to leaking at IV site, patient c/o wet feeling, site appears WNL.  IV access attempted with patient permission, upon tourniquet application patient began shouting "Hurry up, hurry up!"    Attempt made x1 with 22 gauge IV. Upon insertion of needle, patient began screaming "you're killing me!" And makes a fist stating "I'll hit you if you don't get that thing out of me".  Needle removed, bandage applied, patient consoled, and MD notified.  "

## 2022-11-23 NOTE — PLAN OF CARE
Problem: Physical Therapy  Goal: Physical Therapy Goal  Description: PT goals until 11/30/22    1. Pt supine to sit with mod independent-not met  2. Pt sit to supine with mod independent-not met  3. Pt sit to stand with RW with supervision-not met  4. Pt to perform gait 75ft with RW with CGA.-not met  5. Pt to perform B LE exs in sitting or supine x 10 reps to strengthen B LE to improve functional mobility.-not met    Outcome: Ongoing, Progressing   Pt's goals set and pt will benefit from skilled PT services to work towards improved functional mobility including: bed mobility, transfers, and gait.   11/23/2022

## 2022-11-23 NOTE — PLAN OF CARE
Discussed with primary team. If back pain controlled, can follow up in 4-6 weeks in NSGY clinic. Wear TLSO brace when OOB. If pain remains uncontrolled, consider consult neuro IR for possible kyphoplasty as an inpatient. Please page with any questions.

## 2022-11-23 NOTE — PLAN OF CARE
CT T/L spine reviewed. Multiple anterior wedge deformities with multiple prior kyphoplasties. Dynamic imaging done. Supine/upright XR demonstrates no instability. Pain control per primary. Recommend TLSO brace when OOB. Please optimize bone health given osteoporosis likely underlying fractures. Follow up in clinic in 6 weeks. Further workup per primary team. NSGY will sign off.

## 2022-11-23 NOTE — PLAN OF CARE
Problem: Adult Inpatient Plan of Care  Goal: Plan of Care Review  Outcome: Ongoing, Progressing     Problem: Fall Injury Risk  Goal: Absence of Fall and Fall-Related Injury  Outcome: Ongoing, Progressing     Patient is AAO x4. POC reviewed with patient. Patient verbalized understanding. Patient's breathing is unlabored with equal chest expansion. Patient voids per purewick. Patient remained free from falls. Patient rested well through shift. Bed in lowest position,bed alarm on, side rails up x3, no complaints or signs of distress. WCTM during shift.  See flowsheets for full assessment and VS info.

## 2022-11-23 NOTE — SUBJECTIVE & OBJECTIVE
Interval History: No major events overnight. She remains complaining of back pain. Verbally abuse of staff overnight. Once imaging completed (and suggestive of osteoporotic fractures) NSGY recommended TLSO brace OOB and follow up in 6wks. She remains on intranasal calcitonin, vit D, calcium, and multimodals with some relief. PT scheduled to see patient. Patient encouraged to participate with them and get out of bed in spite of her pain. Awaiting dispo plans, however likely plans for facility based PT discussed with patient.    Review of Systems   Constitutional:  Positive for activity change. Negative for chills, fatigue and fever.   HENT:  Negative for congestion and sore throat.    Eyes:  Negative for visual disturbance.   Respiratory:  Negative for cough, shortness of breath and wheezing.    Cardiovascular:  Negative for chest pain, palpitations and leg swelling.   Gastrointestinal:  Negative for abdominal pain, blood in stool, diarrhea, nausea and vomiting.   Endocrine: Negative for polyuria.   Genitourinary:  Negative for dysuria, flank pain and frequency.   Musculoskeletal:  Positive for arthralgias and back pain. Negative for myalgias.   Skin:  Negative for pallor and rash.   Neurological:  Negative for weakness, light-headedness, numbness and headaches.   Objective:     Vital Signs (Most Recent):  Temp: 97.7 °F (36.5 °C) (11/23/22 0802)  Pulse: 82 (11/23/22 0839)  Resp: 20 (11/23/22 0839)  BP: (!) 130/57 (11/23/22 0802)  SpO2: 100 % (11/23/22 0839)   Vital Signs (24h Range):  Temp:  [96.7 °F (35.9 °C)-98.5 °F (36.9 °C)] 97.7 °F (36.5 °C)  Pulse:  [77-88] 82  Resp:  [16-20] 20  SpO2:  [92 %-100 %] 100 %  BP: (101-130)/(52-58) 130/57     Weight: 94.3 kg (207 lb 14.3 oz)  Body mass index is 40.6 kg/m².  No intake or output data in the 24 hours ending 11/23/22 0955   Physical Exam  Vitals and nursing note reviewed.   Constitutional:       General: She is not in acute distress.     Appearance: She is obese. She  is not ill-appearing or toxic-appearing.   HENT:      Head: Normocephalic and atraumatic.      Mouth/Throat:      Mouth: Mucous membranes are moist.      Pharynx: No oropharyngeal exudate.   Eyes:      Extraocular Movements: Extraocular movements intact.      Pupils: Pupils are equal, round, and reactive to light.   Cardiovascular:      Rate and Rhythm: Normal rate and regular rhythm.      Heart sounds: No murmur heard.  Pulmonary:      Effort: Pulmonary effort is normal.      Breath sounds: No wheezing or rales.   Abdominal:      General: Abdomen is flat. Bowel sounds are normal. There is no distension.      Tenderness: There is no abdominal tenderness (very slight lower abdominal tenderness). There is no guarding.   Musculoskeletal:         General: No swelling or tenderness (ttp at junction of thoracic and lower spine). Normal range of motion.      Cervical back: Normal range of motion.      Right lower leg: No edema.      Left lower leg: No edema.   Lymphadenopathy:      Cervical: No cervical adenopathy.   Skin:     General: Skin is warm.      Coloration: Skin is not jaundiced.      Findings: No bruising or rash.   Neurological:      General: No focal deficit present.      Mental Status: She is alert and oriented to person, place, and time.      Cranial Nerves: No cranial nerve deficit.      Sensory: No sensory deficit.       Significant Labs: All pertinent labs within the past 24 hours have been reviewed.    Significant Imaging: I have reviewed all pertinent imaging results/findings within the past 24 hours.

## 2022-11-23 NOTE — PROGRESS NOTES
[facility] ([facility])  [facility]  Progress Note    Patient Name: Daryleen G Moran  MRN: [id number]  Patient Class: OP- Observation   Admission Date: 11/21/2022  Length of Stay: 0 days  Attending Physician: Ramiro Chakraborty DO  Primary Care Provider: Abhi De Oliveira Iv, MD        Subjective:     Principal Problem:Compression fracture of thoracic vertebra        HPI:  77-year-old woman with a history of hypertension, hyperlipidemia, T2DM, CAD, COPD on 2L, severe aortic stenosis, and chronic back pain who is brought to the ED for severe lower back pain as well as diarrhea. Per patient, she refers about 3 d of watery diarrhea,  describing about 3-4 episodes of watery diarrhea. Onset of symptoms started acutely and she denies any sick contacts though per documentation apparently the diarrhea started after eating pork. She was most recently admitted to an OSH for intractable back pain after bending forward and was found to have a L2 compression fracture. She was managed conservatively and was discharged to a nursing home (?). Again was readmitted to the same OSH with back pain and confusion and was found to be hyponatremic to 118. Appears to have improved conservatively and per documentation patient and family both refused, so was taken home. Since then patient refers she has been confined to a wheelchair and unable to take care of herself. Today, she refers terrible lower back pain that is aggravated with movement and alleviated with lying still. She denies any urinary or fecal incontinence, denies any saddle anesthesia. No fevers, chills, chest pain, SOB, abd pain, n/v/d, GIB. Denies any recent abx however care everywhere shows recent rx for augmentin and doxy. She has been taking ibuprofen without relief of her symptoms, however otherwise does not know what medications she takes    At the ED patient HDS, VSS. Labs not overtly unremarkable other than Na 131, K 3.1. Hgb stable. UA with 3+ leuk, 13 WBC.  CT abd/pelvis with stable remote L2 compression fracture however noted new T12 compression fx. She was given oxy 5mg and admitted to hospital medicine for further workup.       Overview/Hospital Course:  Admitted to hospital medicine for intractable back pain and diarrhea. Diarrhea resolved on admission. UA dirty and later grew enterococcus and patient with mild suprapubic discomfort, started on IV CTX. NSGY spine consulted given new T12 fx and subacute L2 fx and recommending TLSO brace and further imaging. PT consulted however NSGY requesting holding off on PT prior to imaging. She remains complaining of back pain and somewhat non-compliant with her care here. Once imaging completed (and suggestive of osteoporotic fractures) NSGY recommended TLSO brace OOB and follow up in 6wks. She remains on intranasal calcitonin, vit D, calcium, and multimodals with some relief. PT scheduled to see patient. Patient encouraged to participate with them and get out of bed in spite of her pain.       Interval History: No major events overnight. She remains complaining of back pain. Verbally abuse of staff overnight. Once imaging completed (and suggestive of osteoporotic fractures) NSGY recommended TLSO brace OOB and follow up in 6wks. She remains on intranasal calcitonin, vit D, calcium, and multimodals with some relief. PT scheduled to see patient. Patient encouraged to participate with them and get out of bed in spite of her pain. Awaiting dispo plans, however likely plans for facility based PT discussed with patient.    Review of Systems   Constitutional:  Positive for activity change. Negative for chills, fatigue and fever.   HENT:  Negative for congestion and sore throat.    Eyes:  Negative for visual disturbance.   Respiratory:  Negative for cough, shortness of breath and wheezing.    Cardiovascular:  Negative for chest pain, palpitations and leg swelling.   Gastrointestinal:  Negative for abdominal pain, blood in stool,  diarrhea, nausea and vomiting.   Endocrine: Negative for polyuria.   Genitourinary:  Negative for dysuria, flank pain and frequency.   Musculoskeletal:  Positive for arthralgias and back pain. Negative for myalgias.   Skin:  Negative for pallor and rash.   Neurological:  Negative for weakness, light-headedness, numbness and headaches.   Objective:     Vital Signs (Most Recent):  Temp: 97.7 °F (36.5 °C) (11/23/22 0802)  Pulse: 82 (11/23/22 0839)  Resp: 20 (11/23/22 0839)  BP: (!) 130/57 (11/23/22 0802)  SpO2: 100 % (11/23/22 0839)   Vital Signs (24h Range):  Temp:  [96.7 °F (35.9 °C)-98.5 °F (36.9 °C)] 97.7 °F (36.5 °C)  Pulse:  [77-88] 82  Resp:  [16-20] 20  SpO2:  [92 %-100 %] 100 %  BP: (101-130)/(52-58) 130/57     Weight: 94.3 kg (207 lb 14.3 oz)  Body mass index is 40.6 kg/m².  No intake or output data in the 24 hours ending 11/23/22 0955   Physical Exam  Vitals and nursing note reviewed.   Constitutional:       General: She is not in acute distress.     Appearance: She is obese. She is not ill-appearing or toxic-appearing.   HENT:      Head: Normocephalic and atraumatic.      Mouth/Throat:      Mouth: Mucous membranes are moist.      Pharynx: No oropharyngeal exudate.   Eyes:      Extraocular Movements: Extraocular movements intact.      Pupils: Pupils are equal, round, and reactive to light.   Cardiovascular:      Rate and Rhythm: Normal rate and regular rhythm.      Heart sounds: No murmur heard.  Pulmonary:      Effort: Pulmonary effort is normal.      Breath sounds: No wheezing or rales.   Abdominal:      General: Abdomen is flat. Bowel sounds are normal. There is no distension.      Tenderness: There is no abdominal tenderness (very slight lower abdominal tenderness). There is no guarding.   Musculoskeletal:         General: No swelling or tenderness (ttp at junction of thoracic and lower spine). Normal range of motion.      Cervical back: Normal range of motion.      Right lower leg: No edema.      Left  lower leg: No edema.   Lymphadenopathy:      Cervical: No cervical adenopathy.   Skin:     General: Skin is warm.      Coloration: Skin is not jaundiced.      Findings: No bruising or rash.   Neurological:      General: No focal deficit present.      Mental Status: She is alert and oriented to person, place, and time.      Cranial Nerves: No cranial nerve deficit.      Sensory: No sensory deficit.       Significant Labs: All pertinent labs within the past 24 hours have been reviewed.    Significant Imaging: I have reviewed all pertinent imaging results/findings within the past 24 hours.      Assessment/Plan:      * Compression fracture of thoracic vertebra  77-year-old woman with a history of COPD on 2L, T2DM, severe AS, morbid obesity, former chronic smoker chronic back pain here with new onset T12 fracture and subacute L2 fx. Most recently admitted to an OSH for intractable back pain after bending forward and was found to have a L2 compression fracture. She was managed conservatively and was discharged to a nursing home (?). Again was readmitted to the same OSH with similar symptosm. Imaging here with stable remote L2 compression fracture however noted new T12 compression fx. Has a history of osteoporosis (as noted in DEXA completed in 2019 with T score of -3.6) and has risk      -scheduled tylenol, robaxin, lidocaine patches  -oxy 5mg for breakthrough pain  -intranasal calcitonin  -TLSO brace placed; needs it OOB  -NSGY follow up in 6 weeks  -PT/OT   -Patient again encouraged to participate with plan of care. Major limitation is patient's unwillingness to participate in movement due to pain , however she was again encouraged to participate with therapy despite her pain.      Diarrhea  Refers about 3 d of watery diarrhea,  describing about 3-4 episodes of watery diarrhea. Onset of symptoms started acutely and she denies any sick contacts though per documentation apparently the diarrhea started after eating pork.  Appears to have recently taken antibiotics.     Diarrhea resolved on admission.     -discontinuing C diff, contact precautions  -judicious IVF  -daily CMP    Aortic stenosis  Hx of Aortic stenosis and undergoing TAVR workup      COPD, mild  Stable and on home 2L NC    Depression  -continuing home medications        Diabetes mellitus type II  Patient's FSGs are controlled on current medication regimen.  Last A1c reviewed-   Lab Results   Component Value Date    HGBA1C 6.2 05/01/2022     Most recent fingerstick glucose reviewed- No results for input(s): POCTGLUCOSE in the last 24 hours.  Current correctional scale  High  Maintain anti-hyperglycemic dose as follows-   Antihyperglycemics (From admission, onward)    Start     Stop Route Frequency Ordered    11/21/22 1546  insulin aspart U-100 pen 1-10 Units         -- SubQ Before meals & nightly PRN 11/21/22 1448        Hold Oral hypoglycemics while patient is in the hospital.      VTE Risk Mitigation (From admission, onward)         Ordered     enoxaparin injection 40 mg  Every 12 hours         11/21/22 1448     IP VTE HIGH RISK PATIENT  Once         11/21/22 1448     Place sequential compression device  Until discontinued         11/21/22 1448                Discharge Planning   ISAAK: 11/25/2022     Code Status: Full Code   Is the patient medically ready for discharge?: No    Reason for patient still in hospital (select all that apply): Treatment, PT / OT recommendations and Pending disposition                     Arron Jerome MD  Department of Hospital Medicine   [facility] - Neurosurgery ([facility])

## 2022-11-23 NOTE — PLAN OF CARE
Problem: Occupational Therapy  Goal: Occupational Therapy Goal  Description: Goals to be met by: 12/21/2022     Patient will increase functional independence with ADLs by performing:    UE Dressing, including LSO brace, with Minimal Assistance.  LE Dressing with Minimal Assistance.  Grooming while seated with Set-up Assistance.  Toileting from toilet/bedside commode with Minimal Assistance for hygiene and clothing management.   Sitting at edge of bed x10 minutes with Supervision.  Toilet transfer to toilet/bedside commode with Stand-by Assistance and LRAD.    Outcome: Ongoing, Progressing   OT eval complete with goals established.

## 2022-11-23 NOTE — PLAN OF CARE
[facility] (Hospital)  Initial Discharge Assessment       Primary Care Provider: Abhi De Oliveira Iv, MD    Admission Diagnosis: Chest pain [R07.9]  Compression fracture of body of thoracic vertebra [S22.000A]    Admission Date: 11/21/2022  Expected Discharge Date: 11/25/2022         Payor: HU[employer] MANAGED MEDICARE / Plan: HU[employer] MEDICARE HMO / Product Type: Capitation /     Extended Emergency Contact Information  Primary Emergency Contact: Roni Cyr  Address: [address] [place] [address] [facility]  Home Phone: [phone]  Mobile Phone: [phone]  Relation: Spouse  Preferred language: English   needed? No  Secondary Emergency Contact: Christiano Zuleta  Mobile Phone: [phone]  Relation: Sister    Discharge Plan A: Skilled Nursing Facility  Discharge Plan B: Home with family, [facility]      [facility] - [facility] [address] [facility]  [address] [facility] [address]  Phone: [phone] Fax: [phone]    [facility] - [place], [place] - [address]  [address] [facility] [place] [address]  Phone: [phone] Fax: [phone]    CM met with patient and daughter to obtain discharge planning assessment.      Initial Assessment (most recent)       Adult Discharge Assessment - 11/23/22 1545          Discharge Assessment    Assessment Type Discharge Planning Assessment     Confirmed/corrected address, phone number and insurance Yes     Confirmed Demographics Correct on Facesheet     Source of Information patient     Communicated ISAAK with patient/caregiver Date not available/Unable to determine     Reason For Admission compression fracture     Lives With spouse     Do you expect to return to your current living situation? Yes     Do you have help at home or someone to help you manage your care at home? Yes     Who are your caregiver(s) and their phone number(s)? [facility] [phone]     Prior to  hospitilization cognitive status: Alert/Oriented     Current cognitive status: Alert/Oriented     Walking or Climbing Stairs Difficulty none     Equipment Currently Used at Home bedside commode;hospital bed;oxygen     Readmission within 30 days? No     Patient currently being followed by outpatient case management? No     Do you currently have service(s) that help you manage your care at home? No     Do you take prescription medications? Yes     Do you have prescription coverage? Yes     Coverage [employer] MANAGED MEDICARE - [employer] MEDICARE HMO     Do you have any problems affording any of your prescribed medications? No     Is the patient taking medications as prescribed? yes     Who is going to help you get home at discharge? family     How do you get to doctors appointments? family or friend will provide     Are you on dialysis? No     Do you take coumadin? No     Discharge Plan A Skilled Nursing Facility     Discharge Plan B Home with family;Home Health     DME Needed Upon Discharge  none     Discharge Plan discussed with: Patient;Adult children        Relationship/Environment    Name(s) of Who Lives With Patient

## 2022-11-23 NOTE — PT/OT/SLP EVAL
"Occupational Therapy   Evaluation & Treatment    Name: Daryleen G Moran  MRN: [id number]  Admitting Diagnosis:  Compression fracture of thoracic vertebra  Recent Surgery: * No surgery found *      Recommendations:     Discharge Recommendations: nursing facility, skilled  Discharge Equipment Recommendations:  walker, rolling, bedside commode  Barriers to discharge:   (Increased assistance required)    Assessment:     Daryleen G Moran is a 77 y.o. female with a medical diagnosis of Compression fracture of thoracic vertebra.  She presents with s/p T12 compression fx. Pt was primarily limited by pain, decreased tolerance to activity, and frequent urgency to urinate on this date. She was agreeable to limited participation. She required max (A) for UBD, CGA for step transfer bedside chair>EOB, and min-mod (A) for bed mobility. Performance deficits affecting function: weakness, impaired endurance, impaired self care skills, impaired functional mobility, gait instability, impaired balance, pain, decreased safety awareness, decreased lower extremity function, decreased upper extremity function, orthopedic precautions. Due to pt's level of (A) required for performance of ADLs, self care, and functional mobility/transfers, she is not safe to discharge home at this time and is at risk for falls. Once medically stable, OT recommending SNF in order to maximize independence with functional activities, reduce caregiver burden, and facilitate safe discharge.     Rehab Prognosis: Good; patient would benefit from acute skilled OT services to address these deficits and reach maximum level of function.       Plan:     Patient to be seen 4 x/week to address the above listed problems via self-care/home management, therapeutic activities, therapeutic exercises, neuromuscular re-education  Plan of Care Expires: 12/23/22  Plan of Care Reviewed with: patient    Subjective   "Just rip this brace off"  Chief Complaint: pain  Patient/Family " Comments/goals: to go home    Occupational Profile:  Living Environment: Pt lives with spouse in a H with no ANIL. She has a W-I-S with built in chair  Previous level of function: I PTA  Roles and Routines: wife; pt (A)  with UBD and household chores  Equipment Used at Home:  wheelchair, rollator (built in bench in W-I-S)  Assistance upon Discharge: unsure level of (A)  is able to provide    Pain/Comfort:  Pain Rating 1: 7/10  Location - Orientation 1: midline  Location 1: back  Pain Addressed 1: Pre-medicate for activity, Reposition, Distraction, Cessation of Activity  Pain Rating Post-Intervention 1:  (Pt did not rate)    Patients cultural, spiritual, Scientology conflicts given the current situation: no    Objective:     Communicated with: RN prior to session.  Patient found up in chair with chair check, oxygen, PureWick (LSO) upon OT entry to room.    General Precautions: Standard, fall   Orthopedic Precautions:spinal precautions   Braces: LSO  Respiratory Status: Nasal cannula, flow 2 L/min    Occupational Performance:    Bed Mobility:    Patient completed Scooting/Bridging toward HOB in supine x3 trials with moderate assistance  Verbal cues for hand placement on bed rails overhead and to the right/left as well as to glute bridge through B LE  Patient completed Sit to Supine with minimum assistance required to raise L LE d/t pain  HOB flat  Cues for spinal precautions    Functional Mobility/Transfers:  Patient completed Sit <> Stand Transfer with contact guard assistance  with  rolling walker   X1 trial  Cues for hand placement  Patient completed Bed < Bedside Chair Transfer using Step Transfer technique with contact guard assistance with rolling walker  Cues for RW management    Activities of Daily Living:  Upper Body Dressing: maximal assistance required to doff LSO brace and doff posterior gown seated EOB  Toileting: maximal assistance required to doff soiled brief and don new brief via  logroll  Pt unwilling to attempt donning/doffing brief seated/standing from EOB and returned to supine. She agreed to complete task via logrolling due to pain    Cognitive/Visual Perceptual:  Cognitive/Psychosocial Skills:     -       Oriented to: Person, Place, Time, and Situation   -       Follows Commands/attention:Follows one-step commands  -       Communication: clear/fluent  -       Memory: No Deficits noted  -       Safety awareness/insight to disability: impaired   -       Mood/Affect/Coping skills/emotional control: Cooperative, Blunted, and Anxious    Physical Exam:  Balance:    -       fair standing balance with RW; fair+ sitting balance  Postural examination/scapula alignment:    -       Rounded shoulders  -       Forward head  Sensation:    -       Intact  light/touch B UE  Upper Extremity Range of Motion:     -       Right Upper Extremity: WFL  -       Left Upper Extremity: WFL  Upper Extremity Strength: -       Right Upper Extremity: WFL  -       Left Upper Extremity: WFL  Fine Motor Coordination:    -       Intact  Left hand, manipulation of objects and Right hand, manipulation of objects  Gross motor coordination:   WFL    AMPAC 6 Click ADL:  AMPAC Total Score: 15    Treatment & Education:  -Education on spinal precautions and LSO brace wear schedule  -Education on energy conservation and task modification to maximize safety and (I) during ADLs and mobility  -Education on importance of OOB activity to improve overall activity tolerance and promote recovery  -Pt educated to call for assistance and to transfer with hospital staff only  -Provided education regarding role of OT, POC, & discharge recommendations with pt verbalizing understanding.  Pt had no further questions & when asked whether there were any concerns pt reported none.     Patient left HOB elevated with all lines intact, call button in reach, and RN notified    GOALS:   Multidisciplinary Problems       Occupational Therapy Goals           Problem: Occupational Therapy    Goal Priority Disciplines Outcome Interventions   Occupational Therapy Goal     OT, PT/OT Ongoing, Progressing    Description: Goals to be met by: 12/21/2022     Patient will increase functional independence with ADLs by performing:    UE Dressing, including TLSO brace, with Minimal Assistance.  LE Dressing with Minimal Assistance.  Grooming while seated with Set-up Assistance.  Toileting from toilet/bedside commode with Minimal Assistance for hygiene and clothing management.   Sitting at edge of bed x10 minutes with Supervision.  Toilet transfer to toilet/bedside commode with Stand-by Assistance and LRAD.                         History:     Past Medical History:   Diagnosis Date    Aortic stenosis     Arthritis     Back pain     Diabetes mellitus type II     Hyperlipidemia     Hypertension     NSTEMI (non-ST elevated myocardial infarction) 5/1/2022    Osteoporosis     Wears glasses          Past Surgical History:   Procedure Laterality Date     vocal cord nodules removed  long time ago     twice    APPENDECTOMY  within last 5yrs    CATHETERIZATION OF BOTH LEFT AND RIGHT HEART Left 5/6/2022    Procedure: CATHETERIZATION, HEART, BOTH LEFT AND RIGHT;  Surgeon: Michelet Vargas MD;  Location: [facility] CATH/EP LAB;  Service: Cardiology;  Laterality: Left;    FIXATION KYPHOPLASTY THORACIC SPINE      8-20-13    FOOT SURGERY      left 2nd toe was too long     HERNIA REPAIR  within last 5yrs    LEFT HEART CATHETERIZATION Left 5/2/2022    Procedure: Left heart cath;  Surgeon: Jose Echols MD;  Location: [facility] CATH/EP LAB;  Service: Cardiology;  Laterality: Left;    TONSILLECTOMY, ADENOIDECTOMY  long time ago       Time Tracking:     OT Date of Treatment: 11/23/22  OT Start Time: 1306  OT Stop Time: 1327  OT Total Time (min): 21 min    Billable Minutes:Evaluation 10  Self Care/Home Management 11    11/23/2022

## 2022-11-23 NOTE — PLAN OF CARE
CM sent referrals out to home area SNF.  Family states they will stay in [facility].  Referrals sent to [facility] SNF and facilities in [facility].   11/23/22 5387   Post-Acute Status   Post-Acute Authorization Placement   Post-Acute Placement Status Referrals Sent

## 2022-11-23 NOTE — NURSING
"I entered patient's room to find her bed alarm going off and one of her legs hanging off the right side of the bed. I told patient to get back in bed. Patient stated she wanted to "sit up" to drink her drink. I re-iterated that she needs to get back in bed and that I could sit her up in bed. Patient started saying she wanted to speak to "administration" because she's the "boss" and I'm not. Patient then began yelling at the top of her lungs. Lena (charge nurse) called to beside. Patient stated "Get this bitch out of my room". I left per patient's and Lena's request. I was told to not enter patient's room, and that if anything needed to be done, to let Lena know.   "

## 2022-11-23 NOTE — ASSESSMENT & PLAN NOTE
77-year-old woman with a history of COPD on 2L, T2DM, severe AS, morbid obesity, former chronic smoker chronic back pain here with new onset T12 fracture and subacute L2 fx. Most recently admitted to an OSH for intractable back pain after bending forward and was found to have a L2 compression fracture. She was managed conservatively and was discharged to a nursing home (?). Again was readmitted to the same OSH with similar symptosm. Imaging here with stable remote L2 compression fracture however noted new T12 compression fx. Has a history of osteoporosis (as noted in DEXA completed in 2019 with T score of -3.6) and has risk      -scheduled tylenol, robaxin, lidocaine patches  -oxy 5mg for breakthrough pain  -intranasal calcitonin  -TLSO brace placed; needs it OOB  -NSGY follow up in 6 weeks  -PT/OT   -Patient again encouraged to participate with plan of care. Major limitation is patient's unwillingness to participate in movement due to pain , however she was again encouraged to participate with therapy despite her pain.

## 2022-11-23 NOTE — PT/OT/SLP EVAL
"Physical Therapy Evaluation    Patient Name:  Daryleen G Moran   MRN:  [id number]    Recommendations:     Discharge Recommendations:  nursing facility, skilled   Discharge Equipment Recommendations: walker, rolling, bedside commode   Barriers to discharge: Decreased caregiver support    Assessment:     Daryleen G Moran is a 77 y.o. female admitted with a medical diagnosis of Compression fracture of thoracic vertebra.  She presents with the following impairments/functional limitations:  weakness, impaired balance, impaired self care skills, impaired functional mobility, gait instability, decreased lower extremity function, pain, decreased safety awareness .Pt is unsafe with functional mobility at this time due to pt requires minimal assist for bed mobility, minimal assist from bed and moderate assist from bedside chair for transfers, and moderate assist for gait due to pt c/o B knee pain on L>R during gait. Pt is motivated to progress with functional mobility.     Rehab Prognosis: Good; patient would benefit from acute skilled PT services to address these deficits and reach maximum level of function.    Recent Surgery: * No surgery found *      Plan:     During this hospitalization, patient to be seen 4 x/week to address the identified rehab impairments via gait training, therapeutic activities, therapeutic exercises, neuromuscular re-education and progress toward the following goals:    Plan of Care Expires:  12/23/22    Subjective   "I am hurting on this side" (pointing to the R side)    Pain/Comfort:  Pain Rating 1: 7/10  Location - Orientation 1: midline  Location 1: back  Pain Addressed 1: Reposition, Cessation of Activity  Pain Rating Post-Intervention 1: 7/10    Patients cultural, spiritual, Scientology conflicts given the current situation: no    Living Environment:  Pt lives with her  in a 1 story home with no ANIL  Prior to admission, patients level of function was independent with gait short distances " and ADLs.  Equipment used at home: rollator, wheelchair.    Upon discharge, patient will have assistance from unknown, pt states her  is limited with his ability to assist.    Objective:     Communicated with nurse prior to session.  Patient found HOB elevated with PureWick  upon PT entry to room.    General Precautions: Standard, fall   Orthopedic Precautions:spinal precautions   Braces: LSO  Respiratory Status: Nasal cannula, flow 2 L/min    Exams:  Cognitive Exam:  Patient is oriented to Person and Place    Functional Mobility:  Bed Mobility:     Rolling Left:  minimum assistance  Supine to Sit: moderate assistance  Transfers:     Sit to Stand:  minimum assistance and moderate assistance with rolling walker  Gait: 5 steps with RW with moderate assist; pt performed gait with step to gait, antalgic gait on the R>L , and at slow pace.     AM-PAC 6 CLICK MOBILITY  Total Score:15     Treatment & Education:  Pt sat on the EOB ~ 8 min with CGA prior to transfer due to c/o back pain upon sitting. Pt assisted with donning LSO brace in sitting    Patient left up in chair with all lines intact, call button in reach, chair alarm on, and nurse notified.    GOALS:   Multidisciplinary Problems       Physical Therapy Goals          Problem: Physical Therapy    Goal Priority Disciplines Outcome Goal Variances Interventions   Physical Therapy Goal     PT, PT/OT Ongoing, Progressing     Description: PT goals until 11/30/22    1. Pt supine to sit with mod independent-not met  2. Pt sit to supine with mod independent-not met  3. Pt sit to stand with RW with supervision-not met  4. Pt to perform gait 75ft with RW with CGA.-not met  5. Pt to perform B LE exs in sitting or supine x 10 reps to strengthen B LE to improve functional mobility.-not met                         History:     Past Medical History:   Diagnosis Date    Aortic stenosis     Arthritis     Back pain     Diabetes mellitus type II     Hyperlipidemia      Hypertension     NSTEMI (non-ST elevated myocardial infarction) 5/1/2022    Osteoporosis     Wears glasses        Past Surgical History:   Procedure Laterality Date     vocal cord nodules removed  long time ago     twice    APPENDECTOMY  within last 5yrs    CATHETERIZATION OF BOTH LEFT AND RIGHT HEART Left 5/6/2022    Procedure: CATHETERIZATION, HEART, BOTH LEFT AND RIGHT;  Surgeon: Michelet Vargas MD;  Location: [facility] CATH/EP LAB;  Service: Cardiology;  Laterality: Left;    FIXATION KYPHOPLASTY THORACIC SPINE      8-20-13    FOOT SURGERY      left 2nd toe was too long     HERNIA REPAIR  within last 5yrs    LEFT HEART CATHETERIZATION Left 5/2/2022    Procedure: Left heart cath;  Surgeon: Jose Echols MD;  Location: [facility] CATH/EP LAB;  Service: Cardiology;  Laterality: Left;    TONSILLECTOMY, ADENOIDECTOMY  long time ago       Time Tracking:     PT Received On: 11/23/22  PT Start Time: 1214     PT Stop Time: 1242  PT Total Time (min): 28 min     Billable Minutes: Evaluation 18 and Therapeutic Activity 10      11/23/2022

## 2022-11-24 PROBLEM — R19.7 DIARRHEA: Status: RESOLVED | Noted: 2022-11-21 | Resolved: 2022-11-24

## 2022-11-24 LAB
ALBUMIN SERPL BCP-MCNC: 2.7 G/DL (ref 3.5–5.2)
ALP SERPL-CCNC: 67 U/L (ref 55–135)
ALT SERPL W/O P-5'-P-CCNC: 11 U/L (ref 10–44)
ANION GAP SERPL CALC-SCNC: 9 MMOL/L (ref 8–16)
AST SERPL-CCNC: 14 U/L (ref 10–40)
BASOPHILS # BLD AUTO: 0.04 K/UL (ref 0–0.2)
BASOPHILS NFR BLD: 0.7 % (ref 0–1.9)
BILIRUB SERPL-MCNC: 0.3 MG/DL (ref 0.1–1)
BUN SERPL-MCNC: 10 MG/DL (ref 8–23)
CALCIUM SERPL-MCNC: 9.2 MG/DL (ref 8.7–10.5)
CHLORIDE SERPL-SCNC: 97 MMOL/L (ref 95–110)
CO2 SERPL-SCNC: 29 MMOL/L (ref 23–29)
CREAT SERPL-MCNC: 0.5 MG/DL (ref 0.5–1.4)
DIFFERENTIAL METHOD: ABNORMAL
EOSINOPHIL # BLD AUTO: 0.4 K/UL (ref 0–0.5)
EOSINOPHIL NFR BLD: 7.2 % (ref 0–8)
ERYTHROCYTE [DISTWIDTH] IN BLOOD BY AUTOMATED COUNT: 14.8 % (ref 11.5–14.5)
EST. GFR  (NO RACE VARIABLE): >60 ML/MIN/1.73 M^2
GLUCOSE SERPL-MCNC: 116 MG/DL (ref 70–110)
HCT VFR BLD AUTO: 29.2 % (ref 37–48.5)
HGB BLD-MCNC: 8.8 G/DL (ref 12–16)
IMM GRANULOCYTES # BLD AUTO: 0.02 K/UL (ref 0–0.04)
IMM GRANULOCYTES NFR BLD AUTO: 0.4 % (ref 0–0.5)
LYMPHOCYTES # BLD AUTO: 0.9 K/UL (ref 1–4.8)
LYMPHOCYTES NFR BLD: 15.4 % (ref 18–48)
MAGNESIUM SERPL-MCNC: 1.5 MG/DL (ref 1.6–2.6)
MCH RBC QN AUTO: 25.7 PG (ref 27–31)
MCHC RBC AUTO-ENTMCNC: 30.1 G/DL (ref 32–36)
MCV RBC AUTO: 85 FL (ref 82–98)
MONOCYTES # BLD AUTO: 0.5 K/UL (ref 0.3–1)
MONOCYTES NFR BLD: 8.9 % (ref 4–15)
NEUTROPHILS # BLD AUTO: 3.8 K/UL (ref 1.8–7.7)
NEUTROPHILS NFR BLD: 67.4 % (ref 38–73)
NRBC BLD-RTO: 0 /100 WBC
PHOSPHATE SERPL-MCNC: 3.1 MG/DL (ref 2.7–4.5)
PLATELET # BLD AUTO: 245 K/UL (ref 150–450)
PMV BLD AUTO: 11.2 FL (ref 9.2–12.9)
POCT GLUCOSE: 113 MG/DL (ref 70–110)
POCT GLUCOSE: 123 MG/DL (ref 70–110)
POCT GLUCOSE: 134 MG/DL (ref 70–110)
POTASSIUM SERPL-SCNC: 3.2 MMOL/L (ref 3.5–5.1)
PROT SERPL-MCNC: 5.6 G/DL (ref 6–8.4)
RBC # BLD AUTO: 3.42 M/UL (ref 4–5.4)
SODIUM SERPL-SCNC: 135 MMOL/L (ref 136–145)
WBC # BLD AUTO: 5.59 K/UL (ref 3.9–12.7)

## 2022-11-24 PROCEDURE — 84100 ASSAY OF PHOSPHORUS: CPT | Performed by: STUDENT IN AN ORGANIZED HEALTH CARE EDUCATION/TRAINING PROGRAM

## 2022-11-24 PROCEDURE — 27000221 HC OXYGEN, UP TO 24 HOURS

## 2022-11-24 PROCEDURE — 99226 PR SUBSEQUENT OBSERVATION CARE,LEVEL III: CPT | Mod: GC,,, | Performed by: STUDENT IN AN ORGANIZED HEALTH CARE EDUCATION/TRAINING PROGRAM

## 2022-11-24 PROCEDURE — 80053 COMPREHEN METABOLIC PANEL: CPT | Performed by: STUDENT IN AN ORGANIZED HEALTH CARE EDUCATION/TRAINING PROGRAM

## 2022-11-24 PROCEDURE — 99226 PR SUBSEQUENT OBSERVATION CARE,LEVEL III: ICD-10-PCS | Mod: GC,,, | Performed by: STUDENT IN AN ORGANIZED HEALTH CARE EDUCATION/TRAINING PROGRAM

## 2022-11-24 PROCEDURE — G0378 HOSPITAL OBSERVATION PER HR: HCPCS

## 2022-11-24 PROCEDURE — 94761 N-INVAS EAR/PLS OXIMETRY MLT: CPT

## 2022-11-24 PROCEDURE — 63600175 PHARM REV CODE 636 W HCPCS: Performed by: STUDENT IN AN ORGANIZED HEALTH CARE EDUCATION/TRAINING PROGRAM

## 2022-11-24 PROCEDURE — 83735 ASSAY OF MAGNESIUM: CPT | Performed by: STUDENT IN AN ORGANIZED HEALTH CARE EDUCATION/TRAINING PROGRAM

## 2022-11-24 PROCEDURE — 99900035 HC TECH TIME PER 15 MIN (STAT)

## 2022-11-24 PROCEDURE — 25000003 PHARM REV CODE 250: Performed by: STUDENT IN AN ORGANIZED HEALTH CARE EDUCATION/TRAINING PROGRAM

## 2022-11-24 PROCEDURE — 36415 COLL VENOUS BLD VENIPUNCTURE: CPT | Performed by: STUDENT IN AN ORGANIZED HEALTH CARE EDUCATION/TRAINING PROGRAM

## 2022-11-24 PROCEDURE — 85025 COMPLETE CBC W/AUTO DIFF WBC: CPT | Performed by: STUDENT IN AN ORGANIZED HEALTH CARE EDUCATION/TRAINING PROGRAM

## 2022-11-24 PROCEDURE — 96372 THER/PROPH/DIAG INJ SC/IM: CPT | Performed by: STUDENT IN AN ORGANIZED HEALTH CARE EDUCATION/TRAINING PROGRAM

## 2022-11-24 RX ORDER — MAGNESIUM SULFATE HEPTAHYDRATE 40 MG/ML
2 INJECTION, SOLUTION INTRAVENOUS ONCE
Status: DISCONTINUED | OUTPATIENT
Start: 2022-11-24 | End: 2022-11-25

## 2022-11-24 RX ORDER — LANOLIN ALCOHOL/MO/W.PET/CERES
800 CREAM (GRAM) TOPICAL EVERY 4 HOURS
Status: COMPLETED | OUTPATIENT
Start: 2022-11-24 | End: 2022-11-24

## 2022-11-24 RX ORDER — HYDROCODONE BITARTRATE AND ACETAMINOPHEN 5; 325 MG/1; MG/1
1 TABLET ORAL EVERY 6 HOURS PRN
Status: DISCONTINUED | OUTPATIENT
Start: 2022-11-24 | End: 2022-11-30 | Stop reason: HOSPADM

## 2022-11-24 RX ADMIN — LISINOPRIL 5 MG: 5 TABLET ORAL at 08:11

## 2022-11-24 RX ADMIN — BUPROPION HYDROCHLORIDE 150 MG: 150 TABLET, FILM COATED, EXTENDED RELEASE ORAL at 08:11

## 2022-11-24 RX ADMIN — OXYBUTYNIN CHLORIDE 5 MG: 5 TABLET ORAL at 08:11

## 2022-11-24 RX ADMIN — HYDROCODONE BITARTRATE AND ACETAMINOPHEN 1 TABLET: 5; 325 TABLET ORAL at 08:11

## 2022-11-24 RX ADMIN — CALCITONIN SALMON 1 SPRAY: 200 SPRAY, METERED NASAL at 09:11

## 2022-11-24 RX ADMIN — ENOXAPARIN SODIUM 40 MG: 40 INJECTION SUBCUTANEOUS at 08:11

## 2022-11-24 RX ADMIN — ACETAMINOPHEN 1000 MG: 500 TABLET ORAL at 02:11

## 2022-11-24 RX ADMIN — METHOCARBAMOL 750 MG: 750 TABLET ORAL at 05:11

## 2022-11-24 RX ADMIN — POTASSIUM BICARBONATE 40 MEQ: 391 TABLET, EFFERVESCENT ORAL at 12:11

## 2022-11-24 RX ADMIN — ASPIRIN 81 MG: 81 TABLET, COATED ORAL at 08:11

## 2022-11-24 RX ADMIN — DONEPEZIL HYDROCHLORIDE 5 MG: 5 TABLET, FILM COATED ORAL at 08:11

## 2022-11-24 RX ADMIN — AMPICILLIN 500 MG: 500 CAPSULE ORAL at 02:11

## 2022-11-24 RX ADMIN — OXYCODONE 5 MG: 5 TABLET ORAL at 05:11

## 2022-11-24 RX ADMIN — PANTOPRAZOLE SODIUM 40 MG: 40 TABLET, DELAYED RELEASE ORAL at 08:11

## 2022-11-24 RX ADMIN — METHOCARBAMOL 750 MG: 750 TABLET ORAL at 08:11

## 2022-11-24 RX ADMIN — POTASSIUM BICARBONATE 40 MEQ: 391 TABLET, EFFERVESCENT ORAL at 10:11

## 2022-11-24 RX ADMIN — MAGNESIUM OXIDE TAB 400 MG (241.3 MG ELEMENTAL MG) 800 MG: 400 (241.3 MG) TAB at 10:11

## 2022-11-24 RX ADMIN — CITALOPRAM HYDROBROMIDE 40 MG: 20 TABLET ORAL at 08:11

## 2022-11-24 RX ADMIN — METHOCARBAMOL 750 MG: 750 TABLET ORAL at 12:11

## 2022-11-24 RX ADMIN — ACETAMINOPHEN 1000 MG: 500 TABLET ORAL at 09:11

## 2022-11-24 RX ADMIN — ATORVASTATIN CALCIUM 20 MG: 20 TABLET, FILM COATED ORAL at 08:11

## 2022-11-24 RX ADMIN — CALCIUM CARBONATE (ANTACID) CHEW TAB 500 MG 1000 MG: 500 CHEW TAB at 08:11

## 2022-11-24 RX ADMIN — AMPICILLIN 500 MG: 500 CAPSULE ORAL at 08:11

## 2022-11-24 RX ADMIN — MAGNESIUM OXIDE TAB 400 MG (241.3 MG ELEMENTAL MG) 800 MG: 400 (241.3 MG) TAB at 02:11

## 2022-11-24 RX ADMIN — LIDOCAINE 1 PATCH: 50 PATCH CUTANEOUS at 08:11

## 2022-11-24 RX ADMIN — CHOLECALCIFEROL TAB 25 MCG (1000 UNIT) 1000 UNITS: 25 TAB at 08:11

## 2022-11-24 RX ADMIN — FLUTICASONE FUROATE AND VILANTEROL TRIFENATATE 1 PUFF: 100; 25 POWDER RESPIRATORY (INHALATION) at 10:11

## 2022-11-24 NOTE — ASSESSMENT & PLAN NOTE
77-year-old woman with a history of COPD on 2L, T2DM, severe AS, morbid obesity, former chronic smoker chronic back pain here with new onset T12 fracture and subacute L2 fx. Most recently admitted to an OSH for intractable back pain after bending forward and was found to have a L2 compression fracture. She was managed conservatively and was discharged to a nursing home (?). Again was readmitted to the same OSH with similar symptosm. Imaging here with stable remote L2 compression fracture however noted new T12 compression fx. Has a history of osteoporosis (as noted in DEXA completed in 2019 with T score of -3.6) and has risk      -scheduled tylenol, robaxin, lidocaine patches  -norco 5mg for breakthrough pain  -intranasal calcitonin  -TLSO brace placed; needs it OOB  -NSGY follow up in 6 weeks  -PT/OT; recommending SNF  -Now discussing inpatient kyphoplasty with IR, tentative for Monday  -Patient again encouraged to participate with plan of care. Major limitation is patient's unwillingness to participate in movement due to pain , however she was again encouraged to participate despite her pain.

## 2022-11-24 NOTE — PLAN OF CARE
Problem: Adult Inpatient Plan of Care  Goal: Plan of Care Review  Outcome: Ongoing, Progressing  Goal: Absence of Hospital-Acquired Illness or Injury  Outcome: Ongoing, Progressing  Intervention: Prevent Infection  Flowsheets (Taken 11/24/2022 0536)  Infection Prevention: hand hygiene promoted     Problem: Diabetes Comorbidity  Goal: Blood Glucose Level Within Targeted Range  Outcome: Ongoing, Progressing  Intervention: Monitor and Manage Glycemia  Flowsheets (Taken 11/24/2022 0536)  Glycemic Management: blood glucose monitored

## 2022-11-24 NOTE — PLAN OF CARE
Problem: Adult Inpatient Plan of Care  Goal: Plan of Care Review  Outcome: Ongoing, Progressing  Goal: Patient-Specific Goal (Individualized)  Outcome: Ongoing, Progressing  Goal: Absence of Hospital-Acquired Illness or Injury  Outcome: Ongoing, Progressing  Goal: Optimal Comfort and Wellbeing  Outcome: Ongoing, Progressing  Goal: Readiness for Transition of Care  Outcome: Ongoing, Progressing     Problem: Bariatric Environmental Safety  Goal: Safety Maintained with Care  Outcome: Ongoing, Progressing     Problem: Fall Injury Risk  Goal: Absence of Fall and Fall-Related Injury  Outcome: Ongoing, Progressing     Problem: Skin Injury Risk Increased  Goal: Skin Health and Integrity  Outcome: Ongoing, Progressing     Problem: Diabetes Comorbidity  Goal: Blood Glucose Level Within Targeted Range  Outcome: Ongoing, Progressing     Problem: Infection  Goal: Absence of Infection Signs and Symptoms  Outcome: Ongoing, Progressing     POC reviewed with the patient and they verbalized understanding. All comments and concerns addressed. Bed locked in lowest position with bed alarm set, call light within reach. Safety precautions maintained. VSS, see flowsheets. No events this shift. Will continue to monitor for changes to POC and clinical condition.    Recommend liberalizing diet, defer consistency to team/Speech Pathologist. Monitor/adjust as needed. Recommend Glucerna 2x/day (220 kcal, 10 g protein in each) to optimize intake, thickened to appropriate consistency PRN. PO diet: Dysphagia 1 Pureed - Honey Consistency Fluid  consistent carbohydrate (evening snack)

## 2022-11-24 NOTE — NURSING
POC reviewed with pt and pt verbalized understanding. Questions/Concerns addressed. A&Ox3. +Agitated +Uncooperative @ times. NADN. Respirations equal and unlabored. Bed in low position, side rails up x3.  Aspiration/Seizure/Safety/Fall precautions in place per facility protocol for safety. Instructed pt to press call bell for assistance if needed pt verbalized understanding. VS stable. Neuro assessment completed see assessment. Will continue to monitor closely throughout this [id number] nightshift Safety maintained. Call bell in reach. Bed alarm activated for safety.Took night medications swallows without difficulty. Continent of B/B. Pt is on bedrest encouraged/ weight shifted to turn Q 2 hours to prevent skin breakdown. Perwick intact to suction per facility protocol no complications see assessment.

## 2022-11-24 NOTE — SUBJECTIVE & OBJECTIVE
Interval History: Overnight patient again refusing IV access. She worked with PT yesterday with them recommending SNF. Will discuss with IR regarding kyphoplasty as suggested by NSGY    Review of Systems   Constitutional:  Positive for activity change. Negative for chills, fatigue and fever.   HENT:  Negative for congestion and sore throat.    Eyes:  Negative for visual disturbance.   Respiratory:  Negative for cough, shortness of breath and wheezing.    Cardiovascular:  Negative for chest pain, palpitations and leg swelling.   Gastrointestinal:  Negative for abdominal pain, blood in stool, diarrhea, nausea and vomiting.   Endocrine: Negative for polyuria.   Genitourinary:  Negative for dysuria, flank pain and frequency.   Musculoskeletal:  Positive for arthralgias and back pain. Negative for myalgias.   Skin:  Negative for pallor and rash.   Neurological:  Negative for weakness, light-headedness, numbness and headaches.   Objective:     Vital Signs (Most Recent):  Temp: 97.3 °F (36.3 °C) (11/24/22 1130)  Pulse: 81 (11/24/22 1130)  Resp: 16 (11/24/22 1130)  BP: 135/65 (11/24/22 1130)  SpO2: 97 % (11/24/22 1130) Vital Signs (24h Range):  Temp:  [97.3 °F (36.3 °C)-98.5 °F (36.9 °C)] 97.3 °F (36.3 °C)  Pulse:  [81-85] 81  Resp:  [16-18] 16  SpO2:  [93 %-99 %] 97 %  BP: (110-135)/(53-65) 135/65     Weight: 94.3 kg (207 lb 14.3 oz)  Body mass index is 40.6 kg/m².    Intake/Output Summary (Last 24 hours) at 11/24/2022 1219  Last data filed at 11/24/2022 0608  Gross per 24 hour   Intake 480 ml   Output 800 ml   Net -320 ml      Physical Exam  Vitals and nursing note reviewed.   Constitutional:       General: She is not in acute distress.     Appearance: She is obese. She is not ill-appearing or toxic-appearing.   HENT:      Head: Normocephalic and atraumatic.      Mouth/Throat:      Mouth: Mucous membranes are moist.      Pharynx: No oropharyngeal exudate.   Eyes:      Extraocular Movements: Extraocular movements intact.       Pupils: Pupils are equal, round, and reactive to light.   Cardiovascular:      Rate and Rhythm: Normal rate and regular rhythm.      Heart sounds: No murmur heard.  Pulmonary:      Effort: Pulmonary effort is normal.      Breath sounds: No wheezing or rales.   Abdominal:      General: Abdomen is flat. Bowel sounds are normal. There is no distension.      Tenderness: There is no abdominal tenderness (very slight lower abdominal tenderness). There is no guarding.   Musculoskeletal:         General: No swelling or tenderness (ttp at junction of thoracic and lower spine). Normal range of motion.      Cervical back: Normal range of motion.      Right lower leg: No edema.      Left lower leg: No edema.   Lymphadenopathy:      Cervical: No cervical adenopathy.   Skin:     General: Skin is warm.      Coloration: Skin is not jaundiced.      Findings: No bruising or rash.   Neurological:      General: No focal deficit present.      Mental Status: She is alert and oriented to person, place, and time.      Cranial Nerves: No cranial nerve deficit.      Sensory: No sensory deficit.       Significant Labs: All pertinent labs within the past 24 hours have been reviewed.    Significant Imaging: I have reviewed all pertinent imaging results/findings within the past 24 hours.

## 2022-11-24 NOTE — PROGRESS NOTES
Ell[facility] Purcell - Neurosurgery ([facility])  [facility] Medicine  Progress Note    Patient Name: Daryleen G Moran  MRN: [id number]  Patient Class: OP- Observation   Admission Date: 11/21/2022  Length of Stay: 0 days  Attending Physician: Ramiro Chakraborty DO  Primary Care Provider: Abhi De Oliveira Iv, MD        Subjective:     Principal Problem:Compression fracture of thoracic vertebra        HPI:  77-year-old woman with a history of hypertension, hyperlipidemia, T2DM, CAD, COPD on 2L, severe aortic stenosis, and chronic back pain who is brought to the ED for severe lower back pain as well as diarrhea. Per patient, she refers about 3 d of watery diarrhea,  describing about 3-4 episodes of watery diarrhea. Onset of symptoms started acutely and she denies any sick contacts though per documentation apparently the diarrhea started after eating pork. She was most recently admitted to an OSH for intractable back pain after bending forward and was found to have a L2 compression fracture. She was managed conservatively and was discharged to a nursing home (?). Again was readmitted to the same OSH with back pain and confusion and was found to be hyponatremic to 118. Appears to have improved conservatively and per documentation patient and family both refused, so was taken home. Since then patient refers she has been confined to a wheelchair and unable to take care of herself. Today, she refers terrible lower back pain that is aggravated with movement and alleviated with lying still. She denies any urinary or fecal incontinence, denies any saddle anesthesia. No fevers, chills, chest pain, SOB, abd pain, n/v/d, GIB. Denies any recent abx however care everywhere shows recent rx for augmentin and doxy. She has been taking ibuprofen without relief of her symptoms, however otherwise does not know what medications she takes    At the ED patient HDS, VSS. Labs not overtly unremarkable other than Na 131, K 3.1. Hgb stable. UA with 3+ leuk, 13 WBC.  CT abd/pelvis with stable remote L2 compression fracture however noted new T12 compression fx. She was given oxy 5mg and admitted to hospital medicine for further workup.       Overview/Hospital Course:  Admitted to hospital medicine for intractable back pain and diarrhea. Diarrhea resolved on admission. UA dirty and later grew enterococcus and patient with mild suprapubic discomfort, started on IV CTX. NSGY spine consulted given new T12 fx and subacute L2 fx and recommending TLSO brace and further imaging. PT consulted however NSGY requesting holding off on PT prior to imaging. She remains complaining of back pain and somewhat non-compliant with her care here. Once imaging completed (and suggestive of osteoporotic fractures) NSGY recommended TLSO brace OOB and follow up in 6wks. She remains on intranasal calcitonin, vit D, calcium, and multimodals with some relief. PT scheduled to see patient. Patient encouraged to participate with them and get out of bed in spite of her pain.       Interval History: Overnight patient again refusing IV access. She worked with PT yesterday with them recommending SNF. Will discuss with IR regarding kyphoplasty as suggested by NSGY    Review of Systems   Constitutional:  Positive for activity change. Negative for chills, fatigue and fever.   HENT:  Negative for congestion and sore throat.    Eyes:  Negative for visual disturbance.   Respiratory:  Negative for cough, shortness of breath and wheezing.    Cardiovascular:  Negative for chest pain, palpitations and leg swelling.   Gastrointestinal:  Negative for abdominal pain, blood in stool, diarrhea, nausea and vomiting.   Endocrine: Negative for polyuria.   Genitourinary:  Negative for dysuria, flank pain and frequency.   Musculoskeletal:  Positive for arthralgias and back pain. Negative for myalgias.   Skin:  Negative for pallor and rash.   Neurological:  Negative for weakness, light-headedness, numbness and headaches.   Objective:      Vital Signs (Most Recent):  Temp: 97.3 °F (36.3 °C) (11/24/22 1130)  Pulse: 81 (11/24/22 1130)  Resp: 16 (11/24/22 1130)  BP: 135/65 (11/24/22 1130)  SpO2: 97 % (11/24/22 1130) Vital Signs (24h Range):  Temp:  [97.3 °F (36.3 °C)-98.5 °F (36.9 °C)] 97.3 °F (36.3 °C)  Pulse:  [81-85] 81  Resp:  [16-18] 16  SpO2:  [93 %-99 %] 97 %  BP: (110-135)/(53-65) 135/65     Weight: 94.3 kg (207 lb 14.3 oz)  Body mass index is 40.6 kg/m².    Intake/Output Summary (Last 24 hours) at 11/24/2022 1219  Last data filed at 11/24/2022 0608  Gross per 24 hour   Intake 480 ml   Output 800 ml   Net -320 ml      Physical Exam  Vitals and nursing note reviewed.   Constitutional:       General: She is not in acute distress.     Appearance: She is obese. She is not ill-appearing or toxic-appearing.   HENT:      Head: Normocephalic and atraumatic.      Mouth/Throat:      Mouth: Mucous membranes are moist.      Pharynx: No oropharyngeal exudate.   Eyes:      Extraocular Movements: Extraocular movements intact.      Pupils: Pupils are equal, round, and reactive to light.   Cardiovascular:      Rate and Rhythm: Normal rate and regular rhythm.      Heart sounds: No murmur heard.  Pulmonary:      Effort: Pulmonary effort is normal.      Breath sounds: No wheezing or rales.   Abdominal:      General: Abdomen is flat. Bowel sounds are normal. There is no distension.      Tenderness: There is no abdominal tenderness (very slight lower abdominal tenderness). There is no guarding.   Musculoskeletal:         General: No swelling or tenderness (ttp at junction of thoracic and lower spine). Normal range of motion.      Cervical back: Normal range of motion.      Right lower leg: No edema.      Left lower leg: No edema.   Lymphadenopathy:      Cervical: No cervical adenopathy.   Skin:     General: Skin is warm.      Coloration: Skin is not jaundiced.      Findings: No bruising or rash.   Neurological:      General: No focal deficit present.      Mental  Status: She is alert and oriented to person, place, and time.      Cranial Nerves: No cranial nerve deficit.      Sensory: No sensory deficit.       Significant Labs: All pertinent labs within the past 24 hours have been reviewed.    Significant Imaging: I have reviewed all pertinent imaging results/findings within the past 24 hours.      Assessment/Plan:      * Compression fracture of thoracic vertebra  77-year-old woman with a history of COPD on 2L, T2DM, severe AS, morbid obesity, former chronic smoker chronic back pain here with new onset T12 fracture and subacute L2 fx. Most recently admitted to an OSH for intractable back pain after bending forward and was found to have a L2 compression fracture. She was managed conservatively and was discharged to a nursing home (?). Again was readmitted to the same OSH with similar symptosm. Imaging here with stable remote L2 compression fracture however noted new T12 compression fx. Has a history of osteoporosis (as noted in DEXA completed in 2019 with T score of -3.6) and has risk      -scheduled tylenol, robaxin, lidocaine patches  -norco 5mg for breakthrough pain  -intranasal calcitonin  -TLSO brace placed; needs it OOB  -NSGY follow up in 6 weeks  -PT/OT; recommending SNF  -Now discussing inpatient kyphoplasty with IR, tentative for Monday  -Patient again encouraged to participate with plan of care. Major limitation is patient's unwillingness to participate in movement due to pain , however she was again encouraged to participate despite her pain.      Aortic stenosis  Hx of Aortic stenosis and undergoing TAVR workup      COPD, mild  Stable and on home 2L NC  -PRN nebs    Depression  -continuing home medications        Diabetes mellitus type II  Patient's FSGs are controlled on current medication regimen.  Last A1c reviewed-   Lab Results   Component Value Date    HGBA1C 6.2 05/01/2022     Most recent fingerstick glucose reviewed- No results for input(s): POCTGLUCOSE in  the last 24 hours.  Current correctional scale  High  Maintain anti-hyperglycemic dose as follows-   Antihyperglycemics (From admission, onward)    Start     Stop Route Frequency Ordered    11/21/22 1546  insulin aspart U-100 pen 1-10 Units         -- SubQ Before meals & nightly PRN 11/21/22 1448        Hold Oral hypoglycemics while patient is in the hospital.      VTE Risk Mitigation (From admission, onward)         Ordered     enoxaparin injection 40 mg  Every 12 hours         11/21/22 1448     IP VTE HIGH RISK PATIENT  Once         11/21/22 1448     Place sequential compression device  Until discontinued         11/21/22 1448                Discharge Planning   ISAAK: 11/28/2022     Code Status: Full Code   Is the patient medically ready for discharge?: No    Reason for patient still in hospital (select all that apply): Treatment, Consult recommendations and Pending disposition  Discharge Plan A: Skilled Nursing Facility                  Arron Jerome MD  Department of Hospital Medicine   [facility] - Neurosurgery ([facility])

## 2022-11-25 PROBLEM — N39.0 UTI (URINARY TRACT INFECTION): Status: ACTIVE | Noted: 2022-11-25

## 2022-11-25 LAB
ALBUMIN SERPL BCP-MCNC: 2.9 G/DL (ref 3.5–5.2)
ALP SERPL-CCNC: 72 U/L (ref 55–135)
ALT SERPL W/O P-5'-P-CCNC: 12 U/L (ref 10–44)
ANION GAP SERPL CALC-SCNC: 11 MMOL/L (ref 8–16)
AST SERPL-CCNC: 17 U/L (ref 10–40)
BILIRUB SERPL-MCNC: 0.3 MG/DL (ref 0.1–1)
BUN SERPL-MCNC: 12 MG/DL (ref 8–23)
CALCIUM SERPL-MCNC: 9.3 MG/DL (ref 8.7–10.5)
CHLORIDE SERPL-SCNC: 99 MMOL/L (ref 95–110)
CO2 SERPL-SCNC: 25 MMOL/L (ref 23–29)
CREAT SERPL-MCNC: 0.6 MG/DL (ref 0.5–1.4)
EST. GFR  (NO RACE VARIABLE): >60 ML/MIN/1.73 M^2
GLUCOSE SERPL-MCNC: 100 MG/DL (ref 70–110)
MAGNESIUM SERPL-MCNC: 1.6 MG/DL (ref 1.6–2.6)
PHOSPHATE SERPL-MCNC: 3.1 MG/DL (ref 2.7–4.5)
POCT GLUCOSE: 120 MG/DL (ref 70–110)
POCT GLUCOSE: 173 MG/DL (ref 70–110)
POCT GLUCOSE: 177 MG/DL (ref 70–110)
POTASSIUM SERPL-SCNC: 4.3 MMOL/L (ref 3.5–5.1)
PROT SERPL-MCNC: 5.9 G/DL (ref 6–8.4)
SODIUM SERPL-SCNC: 135 MMOL/L (ref 136–145)

## 2022-11-25 PROCEDURE — 96366 THER/PROPH/DIAG IV INF ADDON: CPT

## 2022-11-25 PROCEDURE — 96372 THER/PROPH/DIAG INJ SC/IM: CPT | Performed by: STUDENT IN AN ORGANIZED HEALTH CARE EDUCATION/TRAINING PROGRAM

## 2022-11-25 PROCEDURE — 83735 ASSAY OF MAGNESIUM: CPT | Performed by: STUDENT IN AN ORGANIZED HEALTH CARE EDUCATION/TRAINING PROGRAM

## 2022-11-25 PROCEDURE — 25000003 PHARM REV CODE 250: Performed by: STUDENT IN AN ORGANIZED HEALTH CARE EDUCATION/TRAINING PROGRAM

## 2022-11-25 PROCEDURE — 99226 PR SUBSEQUENT OBSERVATION CARE,LEVEL III: CPT | Mod: GC,,, | Performed by: STUDENT IN AN ORGANIZED HEALTH CARE EDUCATION/TRAINING PROGRAM

## 2022-11-25 PROCEDURE — 99226 PR SUBSEQUENT OBSERVATION CARE,LEVEL III: ICD-10-PCS | Mod: GC,,, | Performed by: STUDENT IN AN ORGANIZED HEALTH CARE EDUCATION/TRAINING PROGRAM

## 2022-11-25 PROCEDURE — 80053 COMPREHEN METABOLIC PANEL: CPT | Performed by: STUDENT IN AN ORGANIZED HEALTH CARE EDUCATION/TRAINING PROGRAM

## 2022-11-25 PROCEDURE — 97116 GAIT TRAINING THERAPY: CPT

## 2022-11-25 PROCEDURE — 84100 ASSAY OF PHOSPHORUS: CPT | Performed by: STUDENT IN AN ORGANIZED HEALTH CARE EDUCATION/TRAINING PROGRAM

## 2022-11-25 PROCEDURE — 94761 N-INVAS EAR/PLS OXIMETRY MLT: CPT

## 2022-11-25 PROCEDURE — 36415 COLL VENOUS BLD VENIPUNCTURE: CPT | Performed by: STUDENT IN AN ORGANIZED HEALTH CARE EDUCATION/TRAINING PROGRAM

## 2022-11-25 PROCEDURE — 63600175 PHARM REV CODE 636 W HCPCS: Performed by: STUDENT IN AN ORGANIZED HEALTH CARE EDUCATION/TRAINING PROGRAM

## 2022-11-25 PROCEDURE — G0378 HOSPITAL OBSERVATION PER HR: HCPCS

## 2022-11-25 RX ORDER — MAGNESIUM SULFATE HEPTAHYDRATE 40 MG/ML
2 INJECTION, SOLUTION INTRAVENOUS
Status: COMPLETED | OUTPATIENT
Start: 2022-11-25 | End: 2022-11-25

## 2022-11-25 RX ORDER — LISINOPRIL 5 MG/1
10 TABLET ORAL DAILY
Status: DISCONTINUED | OUTPATIENT
Start: 2022-11-25 | End: 2022-11-30 | Stop reason: HOSPADM

## 2022-11-25 RX ADMIN — CITALOPRAM HYDROBROMIDE 40 MG: 20 TABLET ORAL at 09:11

## 2022-11-25 RX ADMIN — MAGNESIUM SULFATE 2 G: 2 INJECTION INTRAVENOUS at 09:11

## 2022-11-25 RX ADMIN — ENOXAPARIN SODIUM 40 MG: 40 INJECTION SUBCUTANEOUS at 09:11

## 2022-11-25 RX ADMIN — METHOCARBAMOL 750 MG: 750 TABLET ORAL at 01:11

## 2022-11-25 RX ADMIN — PANTOPRAZOLE SODIUM 40 MG: 40 TABLET, DELAYED RELEASE ORAL at 09:11

## 2022-11-25 RX ADMIN — CALCITONIN SALMON 1 SPRAY: 200 SPRAY, METERED NASAL at 09:11

## 2022-11-25 RX ADMIN — LIDOCAINE 1 PATCH: 50 PATCH CUTANEOUS at 09:11

## 2022-11-25 RX ADMIN — DONEPEZIL HYDROCHLORIDE 5 MG: 5 TABLET, FILM COATED ORAL at 08:11

## 2022-11-25 RX ADMIN — OXYBUTYNIN CHLORIDE 5 MG: 5 TABLET ORAL at 08:11

## 2022-11-25 RX ADMIN — MAGNESIUM SULFATE 2 G: 2 INJECTION INTRAVENOUS at 01:11

## 2022-11-25 RX ADMIN — METHOCARBAMOL 750 MG: 750 TABLET ORAL at 08:11

## 2022-11-25 RX ADMIN — INSULIN ASPART 2 UNITS: 100 INJECTION, SOLUTION INTRAVENOUS; SUBCUTANEOUS at 04:11

## 2022-11-25 RX ADMIN — METHOCARBAMOL 750 MG: 750 TABLET ORAL at 04:11

## 2022-11-25 RX ADMIN — ASPIRIN 81 MG: 81 TABLET, COATED ORAL at 09:11

## 2022-11-25 RX ADMIN — FLUTICASONE FUROATE AND VILANTEROL TRIFENATATE 1 PUFF: 100; 25 POWDER RESPIRATORY (INHALATION) at 09:11

## 2022-11-25 RX ADMIN — HYDROCODONE BITARTRATE AND ACETAMINOPHEN 1 TABLET: 5; 325 TABLET ORAL at 11:11

## 2022-11-25 RX ADMIN — LISINOPRIL 10 MG: 5 TABLET ORAL at 09:11

## 2022-11-25 RX ADMIN — AMPICILLIN 500 MG: 500 CAPSULE ORAL at 09:11

## 2022-11-25 RX ADMIN — AMPICILLIN 500 MG: 500 CAPSULE ORAL at 04:11

## 2022-11-25 RX ADMIN — BUPROPION HYDROCHLORIDE 150 MG: 150 TABLET, FILM COATED, EXTENDED RELEASE ORAL at 09:11

## 2022-11-25 RX ADMIN — ACETAMINOPHEN 1000 MG: 500 TABLET ORAL at 09:11

## 2022-11-25 RX ADMIN — OXYBUTYNIN CHLORIDE 5 MG: 5 TABLET ORAL at 09:11

## 2022-11-25 RX ADMIN — ENOXAPARIN SODIUM 40 MG: 40 INJECTION SUBCUTANEOUS at 08:11

## 2022-11-25 RX ADMIN — AMPICILLIN 500 MG: 500 CAPSULE ORAL at 08:11

## 2022-11-25 RX ADMIN — CHOLECALCIFEROL TAB 25 MCG (1000 UNIT) 1000 UNITS: 25 TAB at 09:11

## 2022-11-25 RX ADMIN — ATORVASTATIN CALCIUM 20 MG: 20 TABLET, FILM COATED ORAL at 09:11

## 2022-11-25 RX ADMIN — HYDROCODONE BITARTRATE AND ACETAMINOPHEN 1 TABLET: 5; 325 TABLET ORAL at 06:11

## 2022-11-25 RX ADMIN — METHOCARBAMOL 750 MG: 750 TABLET ORAL at 09:11

## 2022-11-25 RX ADMIN — CALCIUM CARBONATE (ANTACID) CHEW TAB 500 MG 1000 MG: 500 CHEW TAB at 09:11

## 2022-11-25 NOTE — ASSESSMENT & PLAN NOTE
77-year-old woman with a history of COPD on 2L, T2DM, severe AS, morbid obesity, former chronic smoker chronic back pain here with new onset T12 fracture and subacute L2 fx. Most recently admitted to an OSH for intractable back pain after bending forward and was found to have a L2 compression fracture. She was managed conservatively and was discharged to a nursing home (?). Again was readmitted to the same OSH with similar symptosm. Imaging here with stable remote L2 compression fracture however noted new T12 compression fx. Has a history of osteoporosis (as noted in DEXA completed in 2019 with T score of -3.6) and has risk      -scheduled tylenol, robaxin, lidocaine patches  -norco 5mg for breakthrough pain  -intranasal calcitonin  -TLSO brace placed; needs it OOB  -NSGY follow up in 6 weeks  -PT/OT; recommending SNF  -Now discussing inpatient kyphoplasty with IR, tentative for Monday  -Patient again encouraged to participate with plan of care. Able to ambulate with PT today

## 2022-11-25 NOTE — PT/OT/SLP PROGRESS
"Physical Therapy Treatment    Patient Name:  Daryleen G Moran   MRN:  [id number]    Recommendations:     Discharge Recommendations:  nursing facility, skilled   Discharge Equipment Recommendations: bedside commode, walker, rolling   Barriers to discharge: Decreased caregiver support requires assist for mobility    Assessment:     Daryleen G Moran is a 77 y.o. female admitted with a medical diagnosis of Compression fracture of thoracic vertebra.  She presents with the following impairments/functional limitations:  weakness, impaired endurance, impaired functional mobility, gait instability, pain, orthopedic precautions, decreased upper extremity function, decreased lower extremity function . Pt is unsafe with functional mobility at this time due to pt requires minimal assist for bed mobility, minimal assist for transfers, and moderate assist for gait due to c/o pain in her knees and abdomen. Pt is motivated to progress with functional mobility.     Rehab Prognosis: Good; patient would benefit from acute skilled PT services to address these deficits and reach maximum level of function.    Recent Surgery: * No surgery found *      Plan:     During this hospitalization, patient to be seen 4 x/week to address the identified rehab impairments via gait training, therapeutic activities, therapeutic exercises, neuromuscular re-education and progress toward the following goals:    Plan of Care Expires:  12/23/22    Subjective   "I am hurting so bad"    Pain/Comfort:  Pain Rating 1: 8/10  Location - Orientation 1: lower  Location 1: back (pt c/o severe pain in R lower quadrant of her abdomen upon standing x 1 time, nurse notified)  Pain Addressed 1: Pre-medicate for activity, Reposition, Cessation of Activity, Nurse notified  Pain Rating Post-Intervention 1: 8/10      Objective:     Communicated with nurse prior to session.  Patient found HOB elevated with bed alarm, oxygen, PureWick upon PT entry to room.     General " Precautions: Standard, fall   Orthopedic Precautions:spinal precautions   Braces: LSO  Respiratory Status: Room air     Functional Mobility:  Bed Mobility:     Rolling Left:  minimum assistance  Supine to Sit: minimum assistance  Sit to Supine: moderate assistance  Transfers:     Sit to Stand:  minimum assistance with rolling walker  Gait: 40ft then 25ft with RW with minimal assist, pt performed gait with flexed trunk, leaning on her forearms at times, decreased step length, and at slow pace. Pt rested in sitting between gait trials    AM-PAC 6 CLICK MOBILITY  Turning over in bed (including adjusting bedclothes, sheets and blankets)?: 3  Sitting down on and standing up from a chair with arms (e.g., wheelchair, bedside commode, etc.): 3  Moving from lying on back to sitting on the side of the bed?: 3  Moving to and from a bed to a chair (including a wheelchair)?: 2  Need to walk in hospital room?: 3  Climbing 3-5 steps with a railing?: 2  Basic Mobility Total Score: 16     Treatment & Education:  Pt sat on the EOB and required assist to don her back brace, pt did not want to try to don back brace when asked to do so.     Patient left HOB elevated with all lines intact, call button in reach, bed alarm on, and nurse notified..    GOALS:   Multidisciplinary Problems       Physical Therapy Goals          Problem: Physical Therapy    Goal Priority Disciplines Outcome Goal Variances Interventions   Physical Therapy Goal     PT, PT/OT Ongoing, Progressing     Description: PT goals until 11/30/22    1. Pt supine to sit with mod independent-not met  2. Pt sit to supine with mod independent-not met  3. Pt sit to stand with RW with supervision-not met  4. Pt to perform gait 75ft with RW with CGA.-not met  5. Pt to perform B LE exs in sitting or supine x 10 reps to strengthen B LE to improve functional mobility.-not met                         Time Tracking:     PT Received On: 11/25/22  PT Start Time: 1043     PT Stop Time:  1115  PT Total Time (min): 32 min     Billable Minutes: Gait Training 32    Treatment Type: Treatment  PT/PTA: PT           11/25/2022

## 2022-11-25 NOTE — ASSESSMENT & PLAN NOTE
Patient with suprapubic discomfort and urine culture growing Enterococcus.    -continuing amoxicillin  -following urine culture

## 2022-11-25 NOTE — PROGRESS NOTES
Ellis Purcell - Neurosurgery ([facility])  [facility] Medicine  Progress Note    Patient Name: Daryleen G Moran  MRN: [id number]  Patient Class: OP- Observation   Admission Date: 11/21/2022  Length of Stay: 0 days  Attending Physician: Ramiro Chakraborty DO  Primary Care Provider: Abhi De Oliveira Iv, MD        Subjective:     Principal Problem:Compression fracture of thoracic vertebra        HPI:  77-year-old woman with a history of hypertension, hyperlipidemia, T2DM, CAD, COPD on 2L, severe aortic stenosis, and chronic back pain who is brought to the ED for severe lower back pain as well as diarrhea. Per patient, she refers about 3 d of watery diarrhea,  describing about 3-4 episodes of watery diarrhea. Onset of symptoms started acutely and she denies any sick contacts though per documentation apparently the diarrhea started after eating pork. She was most recently admitted to an OSH for intractable back pain after bending forward and was found to have a L2 compression fracture. She was managed conservatively and was discharged to a nursing home (?). Again was readmitted to the same OSH with back pain and confusion and was found to be hyponatremic to 118. Appears to have improved conservatively and per documentation patient and family both refused, so was taken home. Since then patient refers she has been confined to a wheelchair and unable to take care of herself. Today, she refers terrible lower back pain that is aggravated with movement and alleviated with lying still. She denies any urinary or fecal incontinence, denies any saddle anesthesia. No fevers, chills, chest pain, SOB, abd pain, n/v/d, GIB. Denies any recent abx however care everywhere shows recent rx for augmentin and doxy. She has been taking ibuprofen without relief of her symptoms, however otherwise does not know what medications she takes    At the ED patient HDS, VSS. Labs not overtly unremarkable other than Na 131, K 3.1. Hgb stable. UA with 3+ leuk, 13 WBC.  CT abd/pelvis with stable remote L2 compression fracture however noted new T12 compression fx. She was given oxy 5mg and admitted to hospital medicine for further workup.       Overview/Hospital Course:  Admitted to hospital medicine for intractable back pain and diarrhea. Diarrhea resolved on admission. UA dirty and later grew enterococcus and patient with mild suprapubic discomfort, started on IV CTX. NSGY spine consulted given new T12 fx and subacute L2 fx and recommending TLSO brace and further imaging. PT consulted however NSGY requesting holding off on PT prior to imaging. She remains complaining of back pain and somewhat non-compliant with her care here. Once imaging completed (and suggestive of osteoporotic fractures) NSGY recommended TLSO brace OOB and follow up in 6wks. She remains on intranasal calcitonin, vit D, calcium, and multimodals with some relief. PT scheduled to see patient. Patient encouraged to participate with them and get out of bed in spite of her pain. Neuro IR consulted for an inpatient kyphoplasty. Patient now participating with PT and ambulating a bit more.       Interval History: NAEON. Refers back pain is a bit more improved. Able to ambulate with PT today. Planning for Neuro IR intervention on Monday. Remains on amoxicillin for Enterococcal UTI    Review of Systems   Constitutional:  Positive for activity change. Negative for chills, fatigue and fever.   HENT:  Negative for congestion and sore throat.    Eyes:  Negative for visual disturbance.   Respiratory:  Negative for cough, shortness of breath and wheezing.    Cardiovascular:  Negative for chest pain, palpitations and leg swelling.   Gastrointestinal:  Negative for abdominal pain, blood in stool, diarrhea, nausea and vomiting.   Endocrine: Negative for polyuria.   Genitourinary:  Negative for dysuria, flank pain and frequency.   Musculoskeletal:  Positive for arthralgias and back pain. Negative for myalgias.   Skin:  Negative for  pallor and rash.   Neurological:  Negative for weakness, light-headedness, numbness and headaches.   Objective:     Vital Signs (Most Recent):  Temp: 98.2 °F (36.8 °C) (11/25/22 1140)  Pulse: 93 (11/25/22 1140)  Resp: 18 (11/25/22 1140)  BP: (!) 109/47 (11/25/22 1140)  SpO2: 95 % (11/25/22 1140)   Vital Signs (24h Range):  Temp:  [96.9 °F (36.1 °C)-98.6 °F (37 °C)] 98.2 °F (36.8 °C)  Pulse:  [83-96] 93  Resp:  [14-18] 18  SpO2:  [93 %-96 %] 95 %  BP: (109-142)/(47-63) 109/47     Weight: 94.3 kg (207 lb 14.3 oz)  Body mass index is 40.6 kg/m².    Intake/Output Summary (Last 24 hours) at 11/25/2022 1412  Last data filed at 11/24/2022 1827  Gross per 24 hour   Intake --   Output 700 ml   Net -700 ml      Physical Exam  Vitals and nursing note reviewed.   Constitutional:       General: She is not in acute distress.     Appearance: She is obese. She is not ill-appearing or toxic-appearing.   HENT:      Head: Normocephalic and atraumatic.      Mouth/Throat:      Mouth: Mucous membranes are moist.      Pharynx: No oropharyngeal exudate.   Eyes:      Extraocular Movements: Extraocular movements intact.      Pupils: Pupils are equal, round, and reactive to light.   Cardiovascular:      Rate and Rhythm: Normal rate and regular rhythm.      Heart sounds: No murmur heard.  Pulmonary:      Effort: Pulmonary effort is normal.      Breath sounds: No wheezing or rales.   Abdominal:      General: Abdomen is flat. Bowel sounds are normal. There is no distension.      Tenderness: There is no abdominal tenderness (very slight lower abdominal tenderness). There is no guarding.   Musculoskeletal:         General: No swelling or tenderness (ttp at junction of thoracic and lower spine). Normal range of motion.      Cervical back: Normal range of motion.      Right lower leg: No edema.      Left lower leg: No edema.   Lymphadenopathy:      Cervical: No cervical adenopathy.   Skin:     General: Skin is warm.      Coloration: Skin is not  jaundiced.      Findings: No bruising or rash.   Neurological:      General: No focal deficit present.      Mental Status: She is alert and oriented to person, place, and time.      Cranial Nerves: No cranial nerve deficit.      Sensory: No sensory deficit.       Significant Labs: All pertinent labs within the past 24 hours have been reviewed.    Significant Imaging: I have reviewed all pertinent imaging results/findings within the past 24 hours.      Assessment/Plan:      * Compression fracture of thoracic vertebra  77-year-old woman with a history of COPD on 2L, T2DM, severe AS, morbid obesity, former chronic smoker chronic back pain here with new onset T12 fracture and subacute L2 fx. Most recently admitted to an OSH for intractable back pain after bending forward and was found to have a L2 compression fracture. She was managed conservatively and was discharged to a nursing home (?). Again was readmitted to the same OSH with similar symptosm. Imaging here with stable remote L2 compression fracture however noted new T12 compression fx. Has a history of osteoporosis (as noted in DEXA completed in 2019 with T score of -3.6) and has risk      -scheduled tylenol, robaxin, lidocaine patches  -norco 5mg for breakthrough pain  -intranasal calcitonin  -TLSO brace placed; needs it OOB  -NSGY follow up in 6 weeks  -PT/OT; recommending SNF  -Now discussing inpatient kyphoplasty with IR, tentative for Monday  -Patient again encouraged to participate with plan of care. Able to ambulate with PT today      UTI (urinary tract infection)  Patient with suprapubic discomfort and urine culture growing Enterococcus.    -continuing amoxicillin  -following urine culture    Aortic stenosis  Hx of Aortic stenosis and undergoing TAVR workup      COPD, mild  Stable and on home 2L NC  -PRN nebs    Depression  -continuing home medications        Diabetes mellitus type II  Patient's FSGs are controlled on current medication regimen.  Last A1c  reviewed-   Lab Results   Component Value Date    HGBA1C 6.2 05/01/2022     Most recent fingerstick glucose reviewed- No results for input(s): POCTGLUCOSE in the last 24 hours.  Current correctional scale  High  Maintain anti-hyperglycemic dose as follows-   Antihyperglycemics (From admission, onward)    Start     Stop Route Frequency Ordered    11/21/22 1546  insulin aspart U-100 pen 1-10 Units         -- SubQ Before meals & nightly PRN 11/21/22 1448        Hold Oral hypoglycemics while patient is in the hospital.      VTE Risk Mitigation (From admission, onward)         Ordered     enoxaparin injection 40 mg  Every 12 hours         11/21/22 1448     IP VTE HIGH RISK PATIENT  Once         11/21/22 1448     Place sequential compression device  Until discontinued         11/21/22 1448                Discharge Planning   ISAAK: 11/28/2022     Code Status: Full Code   Is the patient medically ready for discharge?: No    Reason for patient still in hospital (select all that apply): Treatment, Consult recommendations and Pending disposition  Discharge Plan A: Skilled Nursing Facility                  Arron Jerome MD  Department of Hospital Medicine   [facility] - Neurosurgery ([facility])

## 2022-11-25 NOTE — PLAN OF CARE
Problem: Physical Therapy  Goal: Physical Therapy Goal  Description: PT goals until 11/30/22    1. Pt supine to sit with mod independent-not met  2. Pt sit to supine with mod independent-not met  3. Pt sit to stand with RW with supervision-not met  4. Pt to perform gait 75ft with RW with CGA.-not met  5. Pt to perform B LE exs in sitting or supine x 10 reps to strengthen B LE to improve functional mobility.-not met    Outcome: Ongoing, Progressing   Pt's goals remain appropriate and pt will continue to benefit from skilled PT services to work towards improved functional mobility including: bed mobility, transfers, and gait.   11/25/2022

## 2022-11-25 NOTE — SUBJECTIVE & OBJECTIVE
Interval History: NAEON. Refers back pain is a bit more improved. Able to ambulate with PT today. Planning for Neuro IR intervention on Monday. Remains on amoxicillin for Enterococcal UTI    Review of Systems   Constitutional:  Positive for activity change. Negative for chills, fatigue and fever.   HENT:  Negative for congestion and sore throat.    Eyes:  Negative for visual disturbance.   Respiratory:  Negative for cough, shortness of breath and wheezing.    Cardiovascular:  Negative for chest pain, palpitations and leg swelling.   Gastrointestinal:  Negative for abdominal pain, blood in stool, diarrhea, nausea and vomiting.   Endocrine: Negative for polyuria.   Genitourinary:  Negative for dysuria, flank pain and frequency.   Musculoskeletal:  Positive for arthralgias and back pain. Negative for myalgias.   Skin:  Negative for pallor and rash.   Neurological:  Negative for weakness, light-headedness, numbness and headaches.   Objective:     Vital Signs (Most Recent):  Temp: 98.2 °F (36.8 °C) (11/25/22 1140)  Pulse: 93 (11/25/22 1140)  Resp: 18 (11/25/22 1140)  BP: (!) 109/47 (11/25/22 1140)  SpO2: 95 % (11/25/22 1140)   Vital Signs (24h Range):  Temp:  [96.9 °F (36.1 °C)-98.6 °F (37 °C)] 98.2 °F (36.8 °C)  Pulse:  [83-96] 93  Resp:  [14-18] 18  SpO2:  [93 %-96 %] 95 %  BP: (109-142)/(47-63) 109/47     Weight: 94.3 kg (207 lb 14.3 oz)  Body mass index is 40.6 kg/m².    Intake/Output Summary (Last 24 hours) at 11/25/2022 1412  Last data filed at 11/24/2022 1827  Gross per 24 hour   Intake --   Output 700 ml   Net -700 ml      Physical Exam  Vitals and nursing note reviewed.   Constitutional:       General: She is not in acute distress.     Appearance: She is obese. She is not ill-appearing or toxic-appearing.   HENT:      Head: Normocephalic and atraumatic.      Mouth/Throat:      Mouth: Mucous membranes are moist.      Pharynx: No oropharyngeal exudate.   Eyes:      Extraocular Movements: Extraocular movements  intact.      Pupils: Pupils are equal, round, and reactive to light.   Cardiovascular:      Rate and Rhythm: Normal rate and regular rhythm.      Heart sounds: No murmur heard.  Pulmonary:      Effort: Pulmonary effort is normal.      Breath sounds: No wheezing or rales.   Abdominal:      General: Abdomen is flat. Bowel sounds are normal. There is no distension.      Tenderness: There is no abdominal tenderness (very slight lower abdominal tenderness). There is no guarding.   Musculoskeletal:         General: No swelling or tenderness (ttp at junction of thoracic and lower spine). Normal range of motion.      Cervical back: Normal range of motion.      Right lower leg: No edema.      Left lower leg: No edema.   Lymphadenopathy:      Cervical: No cervical adenopathy.   Skin:     General: Skin is warm.      Coloration: Skin is not jaundiced.      Findings: No bruising or rash.   Neurological:      General: No focal deficit present.      Mental Status: She is alert and oriented to person, place, and time.      Cranial Nerves: No cranial nerve deficit.      Sensory: No sensory deficit.       Significant Labs: All pertinent labs within the past 24 hours have been reviewed.    Significant Imaging: I have reviewed all pertinent imaging results/findings within the past 24 hours.

## 2022-11-26 LAB
ALBUMIN SERPL BCP-MCNC: 2.8 G/DL (ref 3.5–5.2)
ALP SERPL-CCNC: 67 U/L (ref 55–135)
ALT SERPL W/O P-5'-P-CCNC: 14 U/L (ref 10–44)
ANION GAP SERPL CALC-SCNC: 8 MMOL/L (ref 8–16)
AST SERPL-CCNC: 16 U/L (ref 10–40)
BACTERIA UR CULT: ABNORMAL
BASOPHILS # BLD AUTO: 0.05 K/UL (ref 0–0.2)
BASOPHILS NFR BLD: 0.9 % (ref 0–1.9)
BILIRUB SERPL-MCNC: 0.3 MG/DL (ref 0.1–1)
BUN SERPL-MCNC: 11 MG/DL (ref 8–23)
CALCIUM SERPL-MCNC: 8.9 MG/DL (ref 8.7–10.5)
CHLORIDE SERPL-SCNC: 102 MMOL/L (ref 95–110)
CO2 SERPL-SCNC: 28 MMOL/L (ref 23–29)
CREAT SERPL-MCNC: 0.5 MG/DL (ref 0.5–1.4)
DIFFERENTIAL METHOD: ABNORMAL
EOSINOPHIL # BLD AUTO: 0.3 K/UL (ref 0–0.5)
EOSINOPHIL NFR BLD: 5.2 % (ref 0–8)
ERYTHROCYTE [DISTWIDTH] IN BLOOD BY AUTOMATED COUNT: 15.1 % (ref 11.5–14.5)
EST. GFR  (NO RACE VARIABLE): >60 ML/MIN/1.73 M^2
GLUCOSE SERPL-MCNC: 112 MG/DL (ref 70–110)
HCT VFR BLD AUTO: 28.3 % (ref 37–48.5)
HGB BLD-MCNC: 8.6 G/DL (ref 12–16)
IMM GRANULOCYTES # BLD AUTO: 0.01 K/UL (ref 0–0.04)
IMM GRANULOCYTES NFR BLD AUTO: 0.2 % (ref 0–0.5)
LYMPHOCYTES # BLD AUTO: 1.2 K/UL (ref 1–4.8)
LYMPHOCYTES NFR BLD: 20.7 % (ref 18–48)
MAGNESIUM SERPL-MCNC: 1.8 MG/DL (ref 1.6–2.6)
MCH RBC QN AUTO: 26.1 PG (ref 27–31)
MCHC RBC AUTO-ENTMCNC: 30.4 G/DL (ref 32–36)
MCV RBC AUTO: 86 FL (ref 82–98)
MONOCYTES # BLD AUTO: 0.5 K/UL (ref 0.3–1)
MONOCYTES NFR BLD: 9.1 % (ref 4–15)
NEUTROPHILS # BLD AUTO: 3.6 K/UL (ref 1.8–7.7)
NEUTROPHILS NFR BLD: 63.9 % (ref 38–73)
NRBC BLD-RTO: 0 /100 WBC
PHOSPHATE SERPL-MCNC: 3.2 MG/DL (ref 2.7–4.5)
PLATELET # BLD AUTO: 268 K/UL (ref 150–450)
PMV BLD AUTO: 11.5 FL (ref 9.2–12.9)
POCT GLUCOSE: 121 MG/DL (ref 70–110)
POCT GLUCOSE: 128 MG/DL (ref 70–110)
POTASSIUM SERPL-SCNC: 3.9 MMOL/L (ref 3.5–5.1)
PROT SERPL-MCNC: 5.5 G/DL (ref 6–8.4)
RBC # BLD AUTO: 3.29 M/UL (ref 4–5.4)
SODIUM SERPL-SCNC: 138 MMOL/L (ref 136–145)
WBC # BLD AUTO: 5.6 K/UL (ref 3.9–12.7)

## 2022-11-26 PROCEDURE — 99225 PR SUBSEQUENT OBSERVATION CARE,LEVEL II: CPT | Mod: GC,,, | Performed by: STUDENT IN AN ORGANIZED HEALTH CARE EDUCATION/TRAINING PROGRAM

## 2022-11-26 PROCEDURE — 96372 THER/PROPH/DIAG INJ SC/IM: CPT | Performed by: STUDENT IN AN ORGANIZED HEALTH CARE EDUCATION/TRAINING PROGRAM

## 2022-11-26 PROCEDURE — 83735 ASSAY OF MAGNESIUM: CPT | Performed by: STUDENT IN AN ORGANIZED HEALTH CARE EDUCATION/TRAINING PROGRAM

## 2022-11-26 PROCEDURE — 96366 THER/PROPH/DIAG IV INF ADDON: CPT

## 2022-11-26 PROCEDURE — 63600175 PHARM REV CODE 636 W HCPCS: Performed by: STUDENT IN AN ORGANIZED HEALTH CARE EDUCATION/TRAINING PROGRAM

## 2022-11-26 PROCEDURE — 99900035 HC TECH TIME PER 15 MIN (STAT)

## 2022-11-26 PROCEDURE — 94761 N-INVAS EAR/PLS OXIMETRY MLT: CPT

## 2022-11-26 PROCEDURE — 94640 AIRWAY INHALATION TREATMENT: CPT

## 2022-11-26 PROCEDURE — 97535 SELF CARE MNGMENT TRAINING: CPT

## 2022-11-26 PROCEDURE — 84100 ASSAY OF PHOSPHORUS: CPT | Performed by: STUDENT IN AN ORGANIZED HEALTH CARE EDUCATION/TRAINING PROGRAM

## 2022-11-26 PROCEDURE — 25000003 PHARM REV CODE 250: Performed by: STUDENT IN AN ORGANIZED HEALTH CARE EDUCATION/TRAINING PROGRAM

## 2022-11-26 PROCEDURE — G0378 HOSPITAL OBSERVATION PER HR: HCPCS

## 2022-11-26 PROCEDURE — 99225 PR SUBSEQUENT OBSERVATION CARE,LEVEL II: ICD-10-PCS | Mod: GC,,, | Performed by: STUDENT IN AN ORGANIZED HEALTH CARE EDUCATION/TRAINING PROGRAM

## 2022-11-26 PROCEDURE — 87427 SHIGA-LIKE TOXIN AG IA: CPT | Performed by: STUDENT IN AN ORGANIZED HEALTH CARE EDUCATION/TRAINING PROGRAM

## 2022-11-26 PROCEDURE — 36415 COLL VENOUS BLD VENIPUNCTURE: CPT | Performed by: STUDENT IN AN ORGANIZED HEALTH CARE EDUCATION/TRAINING PROGRAM

## 2022-11-26 PROCEDURE — 87045 FECES CULTURE AEROBIC BACT: CPT | Performed by: STUDENT IN AN ORGANIZED HEALTH CARE EDUCATION/TRAINING PROGRAM

## 2022-11-26 PROCEDURE — 85025 COMPLETE CBC W/AUTO DIFF WBC: CPT | Performed by: STUDENT IN AN ORGANIZED HEALTH CARE EDUCATION/TRAINING PROGRAM

## 2022-11-26 PROCEDURE — 87046 STOOL CULTR AEROBIC BACT EA: CPT | Mod: 59 | Performed by: STUDENT IN AN ORGANIZED HEALTH CARE EDUCATION/TRAINING PROGRAM

## 2022-11-26 PROCEDURE — 80053 COMPREHEN METABOLIC PANEL: CPT | Performed by: STUDENT IN AN ORGANIZED HEALTH CARE EDUCATION/TRAINING PROGRAM

## 2022-11-26 RX ORDER — MAGNESIUM SULFATE HEPTAHYDRATE 40 MG/ML
2 INJECTION, SOLUTION INTRAVENOUS ONCE
Status: COMPLETED | OUTPATIENT
Start: 2022-11-26 | End: 2022-11-26

## 2022-11-26 RX ORDER — AMPICILLIN 500 MG/1
500 CAPSULE ORAL 3 TIMES DAILY
Status: COMPLETED | OUTPATIENT
Start: 2022-11-26 | End: 2022-11-28

## 2022-11-26 RX ORDER — LOPERAMIDE HYDROCHLORIDE 2 MG/1
2 CAPSULE ORAL 4 TIMES DAILY PRN
Status: DISCONTINUED | OUTPATIENT
Start: 2022-11-26 | End: 2022-11-26

## 2022-11-26 RX ADMIN — FLUTICASONE FUROATE AND VILANTEROL TRIFENATATE 1 PUFF: 100; 25 POWDER RESPIRATORY (INHALATION) at 09:11

## 2022-11-26 RX ADMIN — AMPICILLIN 500 MG: 500 CAPSULE ORAL at 02:11

## 2022-11-26 RX ADMIN — PANTOPRAZOLE SODIUM 40 MG: 40 TABLET, DELAYED RELEASE ORAL at 09:11

## 2022-11-26 RX ADMIN — LISINOPRIL 10 MG: 5 TABLET ORAL at 09:11

## 2022-11-26 RX ADMIN — HYDROCODONE BITARTRATE AND ACETAMINOPHEN 1 TABLET: 5; 325 TABLET ORAL at 10:11

## 2022-11-26 RX ADMIN — METHOCARBAMOL 750 MG: 750 TABLET ORAL at 05:11

## 2022-11-26 RX ADMIN — AMPICILLIN 500 MG: 500 CAPSULE ORAL at 09:11

## 2022-11-26 RX ADMIN — ENOXAPARIN SODIUM 40 MG: 40 INJECTION SUBCUTANEOUS at 09:11

## 2022-11-26 RX ADMIN — METHOCARBAMOL 750 MG: 750 TABLET ORAL at 09:11

## 2022-11-26 RX ADMIN — MAGNESIUM SULFATE 2 G: 2 INJECTION INTRAVENOUS at 09:11

## 2022-11-26 RX ADMIN — OXYBUTYNIN CHLORIDE 5 MG: 5 TABLET ORAL at 09:11

## 2022-11-26 RX ADMIN — METHOCARBAMOL 750 MG: 750 TABLET ORAL at 02:11

## 2022-11-26 RX ADMIN — ACETAMINOPHEN 1000 MG: 500 TABLET ORAL at 02:11

## 2022-11-26 RX ADMIN — BUPROPION HYDROCHLORIDE 150 MG: 150 TABLET, FILM COATED, EXTENDED RELEASE ORAL at 09:11

## 2022-11-26 RX ADMIN — CHOLECALCIFEROL TAB 25 MCG (1000 UNIT) 1000 UNITS: 25 TAB at 09:11

## 2022-11-26 RX ADMIN — CALCITONIN SALMON 1 SPRAY: 200 SPRAY, METERED NASAL at 09:11

## 2022-11-26 RX ADMIN — LOPERAMIDE HYDROCHLORIDE 2 MG: 2 CAPSULE ORAL at 10:11

## 2022-11-26 RX ADMIN — ASPIRIN 81 MG: 81 TABLET, COATED ORAL at 09:11

## 2022-11-26 RX ADMIN — ATORVASTATIN CALCIUM 20 MG: 20 TABLET, FILM COATED ORAL at 09:11

## 2022-11-26 RX ADMIN — HYDROCODONE BITARTRATE AND ACETAMINOPHEN 1 TABLET: 5; 325 TABLET ORAL at 09:11

## 2022-11-26 RX ADMIN — CITALOPRAM HYDROBROMIDE 40 MG: 20 TABLET ORAL at 09:11

## 2022-11-26 RX ADMIN — CALCIUM CARBONATE (ANTACID) CHEW TAB 500 MG 1000 MG: 500 CHEW TAB at 09:11

## 2022-11-26 RX ADMIN — ACETAMINOPHEN 1000 MG: 500 TABLET ORAL at 09:11

## 2022-11-26 RX ADMIN — LIDOCAINE 1 PATCH: 50 PATCH CUTANEOUS at 09:11

## 2022-11-26 RX ADMIN — DONEPEZIL HYDROCHLORIDE 5 MG: 5 TABLET, FILM COATED ORAL at 09:11

## 2022-11-26 NOTE — PLAN OF CARE
Problem: Adult Inpatient Plan of Care  Goal: Plan of Care Review  Outcome: Ongoing, Progressing  Goal: Patient-Specific Goal (Individualized)  Outcome: Ongoing, Progressing  Goal: Absence of Hospital-Acquired Illness or Injury  Outcome: Ongoing, Progressing  Goal: Optimal Comfort and Wellbeing  Outcome: Ongoing, Progressing  Goal: Readiness for Transition of Care  Outcome: Ongoing, Progressing     Problem: Bariatric Environmental Safety  Goal: Safety Maintained with Care  Outcome: Ongoing, Progressing     Problem: Fall Injury Risk  Goal: Absence of Fall and Fall-Related Injury  Outcome: Ongoing, Progressing     Problem: Skin Injury Risk Increased  Goal: Skin Health and Integrity  Outcome: Ongoing, Progressing     Problem: Diabetes Comorbidity  Goal: Blood Glucose Level Within Targeted Range  Outcome: Ongoing, Progressing     Problem: Infection  Goal: Absence of Infection Signs and Symptoms  Outcome: Ongoing, Progressing    POC reviewed with pt and daughter at bedside, both verbalized understanding. Pt complained of back pain, norco administered per MD order .  Safety precautions maintained: bed locked in lowest position, bed alarm set, call light within reach. Check flowsheet for VS.

## 2022-11-26 NOTE — PT/OT/SLP PROGRESS
Occupational Therapy   Treatment    Name: Daryleen G Moran  MRN: [id number]  Admitting Diagnosis:  Compression fracture of thoracic vertebra       Recommendations:     Discharge Recommendations: nursing facility, skilled  Discharge Equipment Recommendations:  walker, rolling, 3-in-1 commode, shower chair  Barriers to discharge:  Other (Comment) (INC assistance required)    Assessment:     Daryleen G Moran is a 77 y.o. female with a medical diagnosis of Compression fracture of thoracic vertebra.  Performance deficits affecting function are weakness, impaired functional mobility, decreased safety awareness, impaired endurance, gait instability, pain, impaired self care skills.     Pt presents with 8/10 pain in lower back and anxious to get to BSC 2/2 diarrhea on this date w/ OT. Pt completed fxnl bed mobility w/ Min A x 1, fxnl transfer w/ Min A x 1 w/ BUE HHA, taking ~ 5 steps x 2 to/from EOB<> BSC w/ CGA x 1 w/ 1 HHA. Pt req'd Max verbal and tactile cues 2/2 impulsivity w/ initiation of tasks, demanding therapist throughout tx session and disregarding advice for log rolling, safety awareness, and donning LSO when OOB.  Pt req'd supervision with toileting on BSC, pt refused to richard new depends from BSC and instructed therapist she will richard depends at bed level. Pt donned depends at bed level w/ independent rolling and pericare with bridging. OT provided max verbal encouragement for continuing therapy OOB w/ EBP reasoning for improved overall recovery with proper LSO wear, pt demanded therapist to go see other patients and leave her alone. Pt is progressing towards fxnl goals,however is still functioning well below baseline and is not safe to D/C home. D/C rec to Skilled Nursing Facility. Cont POC.   Plan:     Patient to be seen 4 x/week to address the above listed problems via self-care/home management, therapeutic activities, therapeutic exercises, neuromuscular re-education  Plan of Care Expires: 12/23/22  Plan of  "Care Reviewed with: patient    Subjective     Pain/Comfort:  Pain Rating 1: 8/10  Location - Side 1: Bilateral  Location - Orientation 1: lower  Location 1: back  Pain Addressed 1: Pre-medicate for activity, Reposition, Cessation of Activity, Nurse notified  Pain Rating Post-Intervention 1: 8/10    Objective:     Chief complaint: "I don't want to sit up in bed - everyone needs to stop asking me to sit up. It hurts and I don't want to do it."   Communicated with: nurse prior to session.  Patient found supine with bed alarm, oxygen, PureWick, peripheral IV upon OT entry to room.    General Precautions: Standard, fall   Orthopedic Precautions:spinal precautions   Braces: LSO  Respiratory Status: Room air    Bed Mobility:    Patient completed Rolling/Turning to Left with  independence  Patient completed Rolling/Turning to Right with independence  Patient completed Scooting/Bridging with independence  Patient completed Supine to Sit with minimum assistance  Patient completed Sit to Supine with independence     Functional Mobility/Transfers:  Patient completed Sit <> Stand Transfer with minimum assistance  with  hand-held assist   Patient completed Toilet Transfer Step Transfer technique with contact guard assistance with  hand-held assist  Functional Mobility: Pt took ~5 steps x 2 to/from EOB<>BSC secured by bedside w/ CGA x 1 w/ 1 UE HHA  Max vc's to slow down 2/2 impulsivity   Pt refused LSO wear     Activities of Daily Living:  Grooming: set up assistance to brush hair while upright in bed   Lower Body Dressing: maximal assistance to richard b/l socks while long sitting in bed  Toileting: supervision while seated on BSC to complete maxine care  Supervision to doff old depends and richard new depends at bed level w/ rolling L<>R       [facility] 6 Click ADL: 19    Treatment & Education:  Pt educated on role of OT, POC, and goals for therapy.    POC was dicussed with patient/caregiver, who was included in its development and is in " agreement with the identified goals and treatment plan.   Patient and family aware of patient's deficits and therapy progression.   Time provided for therapeutic counseling and discussion of health disposition.   Educated on importance of EOB/OOB mobility, maintaining routine, sitting up in chair, and maximizing independence with ADLs during admission   Pt completed ADLs and functional mobility for treatment session as noted above   Pt/caregiver verbalized understanding and expressed no further concerns/questions.      Patient left supine with all lines intact and call button in reach    GOALS:   Multidisciplinary Problems       Occupational Therapy Goals          Problem: Occupational Therapy    Goal Priority Disciplines Outcome Interventions   Occupational Therapy Goal     OT, PT/OT Ongoing, Progressing    Description: Goals to be met by: 12/21/2022     Patient will increase functional independence with ADLs by performing:    UE Dressing, including LSO brace, with Minimal Assistance.  LE Dressing with Minimal Assistance.  Grooming while seated with Set-up Assistance.  Toileting from toilet/bedside commode with Minimal Assistance for hygiene and clothing management.   Sitting at edge of bed x10 minutes with Supervision.  Toilet transfer to toilet/bedside commode with Stand-by Assistance and LRAD.                         Time Tracking:     OT Date of Treatment: 11/26/22  OT Start Time: 1044  OT Stop Time: 1107  OT Total Time (min): 23 min    Billable Minutes:Self Care/Home Management 23    OT/FILI: OT     FILI Visit Number: 0    11/26/2022

## 2022-11-26 NOTE — PROGRESS NOTES
[facility] - Neurosurgery ([facility])  [facility] Medicine  Progress Note    Patient Name: Daryleen G Moran  MRN: [id number]  Patient Class: OP- Observation   Admission Date: 11/21/2022  Length of Stay: 0 days  Attending Physician: Alexis Mantilla MD  Primary Care Provider: Abhi De Oliveira Iv, MD        Subjective:     Principal Problem:Compression fracture of thoracic vertebra        HPI:  77-year-old woman with a history of hypertension, hyperlipidemia, T2DM, CAD, COPD on 2L, severe aortic stenosis, and chronic back pain who is brought to the ED for severe lower back pain as well as diarrhea. Per patient, she refers about 3 d of watery diarrhea,  describing about 3-4 episodes of watery diarrhea. Onset of symptoms started acutely and she denies any sick contacts though per documentation apparently the diarrhea started after eating pork. She was most recently admitted to an OSH for intractable back pain after bending forward and was found to have a L2 compression fracture. She was managed conservatively and was discharged to a nursing home (?). Again was readmitted to the same OSH with back pain and confusion and was found to be hyponatremic to 118. Appears to have improved conservatively and per documentation patient and family both refused, so was taken home. Since then patient refers she has been confined to a wheelchair and unable to take care of herself. Today, she refers terrible lower back pain that is aggravated with movement and alleviated with lying still. She denies any urinary or fecal incontinence, denies any saddle anesthesia. No fevers, chills, chest pain, SOB, abd pain, n/v/d, GIB. Denies any recent abx however care everywhere shows recent rx for augmentin and doxy. She has been taking ibuprofen without relief of her symptoms, however otherwise does not know what medications she takes    At the ED patient HDS, VSS. Labs not overtly unremarkable other than Na 131, K 3.1. Hgb stable. UA with 3+ leuk, 13 WBC. CT  abd/pelvis with stable remote L2 compression fracture however noted new T12 compression fx. She was given oxy 5mg and admitted to hospital medicine for further workup.       Overview/Hospital Course:  Admitted to hospital medicine for intractable back pain and diarrhea. Diarrhea resolved on admission. UA dirty and later grew enterococcus and patient with mild suprapubic discomfort, started on IV CTX. NSGY spine consulted given new T12 fx and subacute L2 fx and recommending TLSO brace and further imaging. PT consulted however NSGY requesting holding off on PT prior to imaging. She remains complaining of back pain and somewhat non-compliant with her care here. Once imaging completed (and suggestive of osteoporotic fractures) NSGY recommended TLSO brace OOB and follow up in 6wks. She remains on intranasal calcitonin, vit D, calcium, and multimodals with some relief. PT scheduled to see patient. Patient encouraged to participate with them and get out of bed in spite of her pain. Neuro IR consulted for an inpatient kyphoplasty. Patient now participating with PT and ambulating a bit more.       Interval History: Now with diarrhea, dark stool in color. Denied any abdominal pain. Ucx speciated to E. Faecalis susceptible to ampicillin. Planning for IR kyphoplasty on Monday.    Review of Systems   Constitutional:  Positive for activity change. Negative for chills, fatigue and fever.   HENT:  Negative for congestion and sore throat.    Eyes:  Negative for visual disturbance.   Respiratory:  Negative for cough, shortness of breath and wheezing.    Cardiovascular:  Negative for chest pain, palpitations and leg swelling.   Gastrointestinal:  Negative for abdominal pain, blood in stool, diarrhea, nausea and vomiting.   Endocrine: Negative for polyuria.   Genitourinary:  Negative for dysuria, flank pain and frequency.   Musculoskeletal:  Positive for arthralgias and back pain. Negative for myalgias.   Skin:  Negative for pallor and  rash.   Neurological:  Negative for weakness, light-headedness, numbness and headaches.   Objective:     Vital Signs (Most Recent):  Temp: 98.5 °F (36.9 °C) (11/26/22 1100)  Pulse: 79 (11/26/22 1100)  Resp: 18 (11/26/22 1100)  BP: 138/65 (11/26/22 1100)  SpO2: 95 % (11/26/22 1100) Vital Signs (24h Range):  Temp:  [97.3 °F (36.3 °C)-98.5 °F (36.9 °C)] 98.5 °F (36.9 °C)  Pulse:  [79-93] 79  Resp:  [16-20] 18  SpO2:  [93 %-96 %] 95 %  BP: ()/(55-65) 138/65     Weight: 94.3 kg (207 lb 14.3 oz)  Body mass index is 40.6 kg/m².    Intake/Output Summary (Last 24 hours) at 11/26/2022 1240  Last data filed at 11/26/2022 0930  Gross per 24 hour   Intake --   Output 2200 ml   Net -2200 ml      Physical Exam  Vitals and nursing note reviewed.   Constitutional:       General: She is not in acute distress.     Appearance: She is obese. She is not ill-appearing or toxic-appearing.   HENT:      Head: Normocephalic and atraumatic.      Mouth/Throat:      Mouth: Mucous membranes are moist.      Pharynx: No oropharyngeal exudate.   Eyes:      Extraocular Movements: Extraocular movements intact.      Pupils: Pupils are equal, round, and reactive to light.   Cardiovascular:      Rate and Rhythm: Normal rate and regular rhythm.      Heart sounds: No murmur heard.  Pulmonary:      Effort: Pulmonary effort is normal.      Breath sounds: No wheezing or rales.   Abdominal:      General: Abdomen is flat. Bowel sounds are normal. There is no distension.      Tenderness: There is no abdominal tenderness (very slight lower abdominal tenderness). There is no guarding.   Musculoskeletal:         General: No swelling or tenderness (ttp at junction of thoracic and lower spine). Normal range of motion.      Cervical back: Normal range of motion.      Right lower leg: No edema.      Left lower leg: No edema.   Lymphadenopathy:      Cervical: No cervical adenopathy.   Skin:     General: Skin is warm.      Coloration: Skin is not jaundiced.       Findings: No bruising or rash.   Neurological:      General: No focal deficit present.      Mental Status: She is alert and oriented to person, place, and time.      Cranial Nerves: No cranial nerve deficit.      Sensory: No sensory deficit.       Significant Labs: All pertinent labs within the past 24 hours have been reviewed.    Significant Imaging: I have reviewed all pertinent imaging results/findings within the past 24 hours.      Assessment/Plan:      * Compression fracture of thoracic vertebra  77-year-old woman with a history of COPD on 2L, T2DM, severe AS, morbid obesity, former chronic smoker chronic back pain here with new onset T12 fracture and subacute L2 fx. Most recently admitted to an OSH for intractable back pain after bending forward and was found to have a L2 compression fracture. She was managed conservatively and was discharged to a nursing home (?). Again was readmitted to the same OSH with similar symptosm. Imaging here with stable remote L2 compression fracture however noted new T12 compression fx. Has a history of osteoporosis (as noted in DEXA completed in 2019 with T score of -3.6) and has risk      -scheduled tylenol, robaxin, lidocaine patches  -norco 5mg for breakthrough pain  -intranasal calcitonin  -TLSO brace placed; needs it OOB  -NSGY follow up in 6 weeks  -PT/OT; recommending SNF  -Now discussing inpatient kyphoplasty with IR, tentative for Monday        UTI (urinary tract infection)  Patient with suprapubic discomfort and urine grew E. faecalis    -continuing amoxicillin for a total of 5d      Aortic stenosis  Hx of Aortic stenosis and undergoing TAVR workup      COPD, mild  Stable and on home 2L NC  -PRN nebs    Depression  -continuing home medications        Diabetes mellitus type II  Patient's FSGs are controlled on current medication regimen.  Last A1c reviewed-   Lab Results   Component Value Date    HGBA1C 6.2 05/01/2022     Most recent fingerstick glucose reviewed- No  results for input(s): POCTGLUCOSE in the last 24 hours.  Current correctional scale  High  Maintain anti-hyperglycemic dose as follows-   Antihyperglycemics (From admission, onward)    Start     Stop Route Frequency Ordered    11/21/22 1546  insulin aspart U-100 pen 1-10 Units         -- SubQ Before meals & nightly PRN 11/21/22 1448        Hold Oral hypoglycemics while patient is in the hospital.      VTE Risk Mitigation (From admission, onward)         Ordered     enoxaparin injection 40 mg  Every 12 hours         11/21/22 1448     IP VTE HIGH RISK PATIENT  Once         11/21/22 1448     Place sequential compression device  Until discontinued         11/21/22 1448                Discharge Planning   ISAAK: 11/28/2022     Code Status: Full Code   Is the patient medically ready for discharge?: No    Reason for patient still in hospital (select all that apply): Patient trending condition and Consult recommendations  Discharge Plan A: Skilled Nursing Facility                  Arron Jerome MD  Department of Hospital Medicine   [facility] - Neurosurgery ([facility])

## 2022-11-26 NOTE — SUBJECTIVE & OBJECTIVE
Interval History: Now with diarrhea, dark stool in color. Denied any abdominal pain. Ucx speciated to E. Faecalis susceptible to ampicillin. Planning for IR kyphoplasty on Monday.    Review of Systems   Constitutional:  Positive for activity change. Negative for chills, fatigue and fever.   HENT:  Negative for congestion and sore throat.    Eyes:  Negative for visual disturbance.   Respiratory:  Negative for cough, shortness of breath and wheezing.    Cardiovascular:  Negative for chest pain, palpitations and leg swelling.   Gastrointestinal:  Negative for abdominal pain, blood in stool, diarrhea, nausea and vomiting.   Endocrine: Negative for polyuria.   Genitourinary:  Negative for dysuria, flank pain and frequency.   Musculoskeletal:  Positive for arthralgias and back pain. Negative for myalgias.   Skin:  Negative for pallor and rash.   Neurological:  Negative for weakness, light-headedness, numbness and headaches.   Objective:     Vital Signs (Most Recent):  Temp: 98.5 °F (36.9 °C) (11/26/22 1100)  Pulse: 79 (11/26/22 1100)  Resp: 18 (11/26/22 1100)  BP: 138/65 (11/26/22 1100)  SpO2: 95 % (11/26/22 1100) Vital Signs (24h Range):  Temp:  [97.3 °F (36.3 °C)-98.5 °F (36.9 °C)] 98.5 °F (36.9 °C)  Pulse:  [79-93] 79  Resp:  [16-20] 18  SpO2:  [93 %-96 %] 95 %  BP: ()/(55-65) 138/65     Weight: 94.3 kg (207 lb 14.3 oz)  Body mass index is 40.6 kg/m².    Intake/Output Summary (Last 24 hours) at 11/26/2022 1240  Last data filed at 11/26/2022 0930  Gross per 24 hour   Intake --   Output 2200 ml   Net -2200 ml      Physical Exam  Vitals and nursing note reviewed.   Constitutional:       General: She is not in acute distress.     Appearance: She is obese. She is not ill-appearing or toxic-appearing.   HENT:      Head: Normocephalic and atraumatic.      Mouth/Throat:      Mouth: Mucous membranes are moist.      Pharynx: No oropharyngeal exudate.   Eyes:      Extraocular Movements: Extraocular movements intact.       Pupils: Pupils are equal, round, and reactive to light.   Cardiovascular:      Rate and Rhythm: Normal rate and regular rhythm.      Heart sounds: No murmur heard.  Pulmonary:      Effort: Pulmonary effort is normal.      Breath sounds: No wheezing or rales.   Abdominal:      General: Abdomen is flat. Bowel sounds are normal. There is no distension.      Tenderness: There is no abdominal tenderness (very slight lower abdominal tenderness). There is no guarding.   Musculoskeletal:         General: No swelling or tenderness (ttp at junction of thoracic and lower spine). Normal range of motion.      Cervical back: Normal range of motion.      Right lower leg: No edema.      Left lower leg: No edema.   Lymphadenopathy:      Cervical: No cervical adenopathy.   Skin:     General: Skin is warm.      Coloration: Skin is not jaundiced.      Findings: No bruising or rash.   Neurological:      General: No focal deficit present.      Mental Status: She is alert and oriented to person, place, and time.      Cranial Nerves: No cranial nerve deficit.      Sensory: No sensory deficit.       Significant Labs: All pertinent labs within the past 24 hours have been reviewed.    Significant Imaging: I have reviewed all pertinent imaging results/findings within the past 24 hours.

## 2022-11-26 NOTE — ASSESSMENT & PLAN NOTE
Patient with suprapubic discomfort and urine grew E. faecalis    -continuing amoxicillin for a total of 5d

## 2022-11-26 NOTE — PLAN OF CARE
Problem: Adult Inpatient Plan of Care  Goal: Plan of Care Review  Outcome: Ongoing, Progressing  Goal: Patient-Specific Goal (Individualized)  Outcome: Ongoing, Progressing  Goal: Absence of Hospital-Acquired Illness or Injury  Outcome: Ongoing, Progressing  Goal: Optimal Comfort and Wellbeing  Outcome: Ongoing, Progressing  Goal: Readiness for Transition of Care  Outcome: Ongoing, Progressing     Problem: Bariatric Environmental Safety  Goal: Safety Maintained with Care  Outcome: Ongoing, Progressing     Problem: Fall Injury Risk  Goal: Absence of Fall and Fall-Related Injury  Outcome: Ongoing, Progressing     Problem: Skin Injury Risk Increased  Goal: Skin Health and Integrity  Outcome: Ongoing, Progressing     Problem: Diabetes Comorbidity  Goal: Blood Glucose Level Within Targeted Range  Outcome: Ongoing, Progressing     Problem: Infection  Goal: Absence of Infection Signs and Symptoms  Outcome: Ongoing, Progressing    POC reviewed with the patient and they verbalized understanding. All comments and concerns addressed. Bed locked in lowest position with bed alarm set, call light within reach. Safety precautions maintained. VSS, see flowsheets. No events this shift. Will continue to monitor for changes to POC and clinical condition.

## 2022-11-26 NOTE — ASSESSMENT & PLAN NOTE
77-year-old woman with a history of COPD on 2L, T2DM, severe AS, morbid obesity, former chronic smoker chronic back pain here with new onset T12 fracture and subacute L2 fx. Most recently admitted to an OSH for intractable back pain after bending forward and was found to have a L2 compression fracture. She was managed conservatively and was discharged to a nursing home (?). Again was readmitted to the same OSH with similar symptosm. Imaging here with stable remote L2 compression fracture however noted new T12 compression fx. Has a history of osteoporosis (as noted in DEXA completed in 2019 with T score of -3.6) and has risk      -scheduled tylenol, robaxin, lidocaine patches  -norco 5mg for breakthrough pain  -intranasal calcitonin  -TLSO brace placed; needs it OOB  -NSGY follow up in 6 weeks  -PT/OT; recommending SNF  -Now discussing inpatient kyphoplasty with IR, tentative for Monday

## 2022-11-27 LAB
ALBUMIN SERPL BCP-MCNC: 2.8 G/DL (ref 3.5–5.2)
ALP SERPL-CCNC: 68 U/L (ref 55–135)
ALT SERPL W/O P-5'-P-CCNC: 17 U/L (ref 10–44)
ANION GAP SERPL CALC-SCNC: 7 MMOL/L (ref 8–16)
AST SERPL-CCNC: 20 U/L (ref 10–40)
BASOPHILS # BLD AUTO: 0.05 K/UL (ref 0–0.2)
BASOPHILS NFR BLD: 0.8 % (ref 0–1.9)
BILIRUB SERPL-MCNC: 0.3 MG/DL (ref 0.1–1)
BUN SERPL-MCNC: 12 MG/DL (ref 8–23)
CALCIUM SERPL-MCNC: 8.9 MG/DL (ref 8.7–10.5)
CHLORIDE SERPL-SCNC: 102 MMOL/L (ref 95–110)
CO2 SERPL-SCNC: 26 MMOL/L (ref 23–29)
CREAT SERPL-MCNC: 0.7 MG/DL (ref 0.5–1.4)
DIFFERENTIAL METHOD: ABNORMAL
EOSINOPHIL # BLD AUTO: 0.2 K/UL (ref 0–0.5)
EOSINOPHIL NFR BLD: 3 % (ref 0–8)
ERYTHROCYTE [DISTWIDTH] IN BLOOD BY AUTOMATED COUNT: 14.9 % (ref 11.5–14.5)
EST. GFR  (NO RACE VARIABLE): >60 ML/MIN/1.73 M^2
GLUCOSE SERPL-MCNC: 150 MG/DL (ref 70–110)
HCT VFR BLD AUTO: 28 % (ref 37–48.5)
HGB BLD-MCNC: 8.5 G/DL (ref 12–16)
IMM GRANULOCYTES # BLD AUTO: 0.01 K/UL (ref 0–0.04)
IMM GRANULOCYTES NFR BLD AUTO: 0.2 % (ref 0–0.5)
LYMPHOCYTES # BLD AUTO: 1.2 K/UL (ref 1–4.8)
LYMPHOCYTES NFR BLD: 18 % (ref 18–48)
MAGNESIUM SERPL-MCNC: 1.5 MG/DL (ref 1.6–2.6)
MCH RBC QN AUTO: 26.2 PG (ref 27–31)
MCHC RBC AUTO-ENTMCNC: 30.4 G/DL (ref 32–36)
MCV RBC AUTO: 86 FL (ref 82–98)
MONOCYTES # BLD AUTO: 0.6 K/UL (ref 0.3–1)
MONOCYTES NFR BLD: 8.6 % (ref 4–15)
NEUTROPHILS # BLD AUTO: 4.6 K/UL (ref 1.8–7.7)
NEUTROPHILS NFR BLD: 69.4 % (ref 38–73)
NRBC BLD-RTO: 0 /100 WBC
PHOSPHATE SERPL-MCNC: 3.3 MG/DL (ref 2.7–4.5)
PLATELET # BLD AUTO: 271 K/UL (ref 150–450)
PMV BLD AUTO: 11 FL (ref 9.2–12.9)
POCT GLUCOSE: 121 MG/DL (ref 70–110)
POCT GLUCOSE: 128 MG/DL (ref 70–110)
POCT GLUCOSE: 151 MG/DL (ref 70–110)
POCT GLUCOSE: 154 MG/DL (ref 70–110)
POTASSIUM SERPL-SCNC: 4.3 MMOL/L (ref 3.5–5.1)
PROT SERPL-MCNC: 5.6 G/DL (ref 6–8.4)
RBC # BLD AUTO: 3.24 M/UL (ref 4–5.4)
SODIUM SERPL-SCNC: 135 MMOL/L (ref 136–145)
WBC # BLD AUTO: 6.65 K/UL (ref 3.9–12.7)

## 2022-11-27 PROCEDURE — 94640 AIRWAY INHALATION TREATMENT: CPT

## 2022-11-27 PROCEDURE — 36415 COLL VENOUS BLD VENIPUNCTURE: CPT | Performed by: STUDENT IN AN ORGANIZED HEALTH CARE EDUCATION/TRAINING PROGRAM

## 2022-11-27 PROCEDURE — 94761 N-INVAS EAR/PLS OXIMETRY MLT: CPT

## 2022-11-27 PROCEDURE — 96372 THER/PROPH/DIAG INJ SC/IM: CPT | Performed by: STUDENT IN AN ORGANIZED HEALTH CARE EDUCATION/TRAINING PROGRAM

## 2022-11-27 PROCEDURE — 99225 PR SUBSEQUENT OBSERVATION CARE,LEVEL II: ICD-10-PCS | Mod: GC,,, | Performed by: STUDENT IN AN ORGANIZED HEALTH CARE EDUCATION/TRAINING PROGRAM

## 2022-11-27 PROCEDURE — 99225 PR SUBSEQUENT OBSERVATION CARE,LEVEL II: CPT | Mod: GC,,, | Performed by: STUDENT IN AN ORGANIZED HEALTH CARE EDUCATION/TRAINING PROGRAM

## 2022-11-27 PROCEDURE — G0378 HOSPITAL OBSERVATION PER HR: HCPCS

## 2022-11-27 PROCEDURE — 99900035 HC TECH TIME PER 15 MIN (STAT)

## 2022-11-27 PROCEDURE — 80053 COMPREHEN METABOLIC PANEL: CPT | Performed by: STUDENT IN AN ORGANIZED HEALTH CARE EDUCATION/TRAINING PROGRAM

## 2022-11-27 PROCEDURE — 83735 ASSAY OF MAGNESIUM: CPT | Performed by: STUDENT IN AN ORGANIZED HEALTH CARE EDUCATION/TRAINING PROGRAM

## 2022-11-27 PROCEDURE — 25000003 PHARM REV CODE 250: Performed by: STUDENT IN AN ORGANIZED HEALTH CARE EDUCATION/TRAINING PROGRAM

## 2022-11-27 PROCEDURE — 84100 ASSAY OF PHOSPHORUS: CPT | Performed by: STUDENT IN AN ORGANIZED HEALTH CARE EDUCATION/TRAINING PROGRAM

## 2022-11-27 PROCEDURE — 85025 COMPLETE CBC W/AUTO DIFF WBC: CPT | Performed by: STUDENT IN AN ORGANIZED HEALTH CARE EDUCATION/TRAINING PROGRAM

## 2022-11-27 PROCEDURE — 63600175 PHARM REV CODE 636 W HCPCS: Performed by: STUDENT IN AN ORGANIZED HEALTH CARE EDUCATION/TRAINING PROGRAM

## 2022-11-27 RX ORDER — MAGNESIUM SULFATE HEPTAHYDRATE 40 MG/ML
2 INJECTION, SOLUTION INTRAVENOUS ONCE
Status: DISCONTINUED | OUTPATIENT
Start: 2022-11-27 | End: 2022-11-27

## 2022-11-27 RX ADMIN — LISINOPRIL 10 MG: 5 TABLET ORAL at 09:11

## 2022-11-27 RX ADMIN — ATORVASTATIN CALCIUM 20 MG: 20 TABLET, FILM COATED ORAL at 09:11

## 2022-11-27 RX ADMIN — METHOCARBAMOL 750 MG: 750 TABLET ORAL at 06:11

## 2022-11-27 RX ADMIN — CITALOPRAM HYDROBROMIDE 40 MG: 20 TABLET ORAL at 09:11

## 2022-11-27 RX ADMIN — AMPICILLIN 500 MG: 500 CAPSULE ORAL at 09:11

## 2022-11-27 RX ADMIN — HYDROCODONE BITARTRATE AND ACETAMINOPHEN 1 TABLET: 5; 325 TABLET ORAL at 02:11

## 2022-11-27 RX ADMIN — OXYBUTYNIN CHLORIDE 5 MG: 5 TABLET ORAL at 09:11

## 2022-11-27 RX ADMIN — CALCITONIN SALMON 1 SPRAY: 200 SPRAY, METERED NASAL at 10:11

## 2022-11-27 RX ADMIN — ACETAMINOPHEN 1000 MG: 500 TABLET ORAL at 05:11

## 2022-11-27 RX ADMIN — BUPROPION HYDROCHLORIDE 150 MG: 150 TABLET, FILM COATED, EXTENDED RELEASE ORAL at 09:11

## 2022-11-27 RX ADMIN — ACETAMINOPHEN 1000 MG: 500 TABLET ORAL at 09:11

## 2022-11-27 RX ADMIN — LIDOCAINE 1 PATCH: 50 PATCH CUTANEOUS at 09:11

## 2022-11-27 RX ADMIN — PANTOPRAZOLE SODIUM 40 MG: 40 TABLET, DELAYED RELEASE ORAL at 09:11

## 2022-11-27 RX ADMIN — AMPICILLIN 500 MG: 500 CAPSULE ORAL at 02:11

## 2022-11-27 RX ADMIN — HYDROCODONE BITARTRATE AND ACETAMINOPHEN 1 TABLET: 5; 325 TABLET ORAL at 09:11

## 2022-11-27 RX ADMIN — METHOCARBAMOL 750 MG: 750 TABLET ORAL at 12:11

## 2022-11-27 RX ADMIN — ASPIRIN 81 MG: 81 TABLET, COATED ORAL at 09:11

## 2022-11-27 RX ADMIN — METHOCARBAMOL 750 MG: 750 TABLET ORAL at 09:11

## 2022-11-27 RX ADMIN — CHOLECALCIFEROL TAB 25 MCG (1000 UNIT) 1000 UNITS: 25 TAB at 09:11

## 2022-11-27 RX ADMIN — DONEPEZIL HYDROCHLORIDE 5 MG: 5 TABLET, FILM COATED ORAL at 09:11

## 2022-11-27 RX ADMIN — HYDROCODONE BITARTRATE AND ACETAMINOPHEN 1 TABLET: 5; 325 TABLET ORAL at 04:11

## 2022-11-27 RX ADMIN — ENOXAPARIN SODIUM 40 MG: 40 INJECTION SUBCUTANEOUS at 09:11

## 2022-11-27 RX ADMIN — FLUTICASONE FUROATE AND VILANTEROL TRIFENATATE 1 PUFF: 100; 25 POWDER RESPIRATORY (INHALATION) at 09:11

## 2022-11-27 RX ADMIN — CALCIUM CARBONATE (ANTACID) CHEW TAB 500 MG 1000 MG: 500 CHEW TAB at 09:11

## 2022-11-27 NOTE — SUBJECTIVE & OBJECTIVE
Interval History: Patient refusing lab draws. Complaints of frequent diarrhea. Initially described by bedside nurse as dark and watery, however later more formed. C. Diff testing initially ordered however per RN and Charge nurse(s), too formed to send for testing. Plan for IR kyphoplasty tomorrow.    Review of Systems   Constitutional:  Positive for activity change. Negative for chills, fatigue and fever.   HENT:  Negative for congestion and sore throat.    Eyes:  Negative for visual disturbance.   Respiratory:  Negative for cough, shortness of breath and wheezing.    Cardiovascular:  Negative for chest pain, palpitations and leg swelling.   Gastrointestinal:  Negative for abdominal pain, blood in stool, diarrhea, nausea and vomiting.   Endocrine: Negative for polyuria.   Genitourinary:  Negative for dysuria, flank pain and frequency.   Musculoskeletal:  Positive for arthralgias and back pain. Negative for myalgias.   Skin:  Negative for pallor and rash.   Neurological:  Negative for weakness, light-headedness, numbness and headaches.   Objective:     Vital Signs (Most Recent):  Temp: 98.7 °F (37.1 °C) (11/27/22 1100)  Pulse: 94 (11/27/22 0901)  Resp: 16 (11/27/22 0901)  BP: (!) 152/66 (11/27/22 0800)  SpO2: 96 % (11/27/22 0901)   Vital Signs (24h Range):  Temp:  [97.3 °F (36.3 °C)-98.8 °F (37.1 °C)] 98.7 °F (37.1 °C)  Pulse:  [79-94] 94  Resp:  [16-30] 16  SpO2:  [92 %-96 %] 96 %  BP: (105-156)/(49-67) 152/66     Weight: 94.3 kg (207 lb 14.3 oz)  Body mass index is 40.6 kg/m².    Intake/Output Summary (Last 24 hours) at 11/27/2022 1314  Last data filed at 11/27/2022 0800  Gross per 24 hour   Intake 920 ml   Output 1500 ml   Net -580 ml      Physical Exam  Vitals and nursing note reviewed.   Constitutional:       General: She is not in acute distress.     Appearance: She is obese. She is not ill-appearing or toxic-appearing.   HENT:      Head: Normocephalic and atraumatic.      Mouth/Throat:      Mouth: Mucous  membranes are moist.      Pharynx: No oropharyngeal exudate.   Eyes:      Extraocular Movements: Extraocular movements intact.      Pupils: Pupils are equal, round, and reactive to light.   Cardiovascular:      Rate and Rhythm: Normal rate and regular rhythm.      Heart sounds: No murmur heard.  Pulmonary:      Effort: Pulmonary effort is normal.      Breath sounds: No wheezing or rales.   Abdominal:      General: Abdomen is flat. Bowel sounds are normal. There is no distension.      Tenderness: There is no abdominal tenderness (very slight lower abdominal tenderness). There is no guarding.   Musculoskeletal:         General: No swelling or tenderness (ttp at junction of thoracic and lower spine). Normal range of motion.      Cervical back: Normal range of motion.      Right lower leg: No edema.      Left lower leg: No edema.   Lymphadenopathy:      Cervical: No cervical adenopathy.   Skin:     General: Skin is warm.      Coloration: Skin is not jaundiced.      Findings: No bruising or rash.   Neurological:      General: No focal deficit present.      Mental Status: She is alert and oriented to person, place, and time.      Cranial Nerves: No cranial nerve deficit.      Sensory: No sensory deficit.       Significant Labs: All pertinent labs within the past 24 hours have been reviewed.    Significant Imaging: I have reviewed all pertinent imaging results/findings within the past 24 hours.

## 2022-11-27 NOTE — PLAN OF CARE
[facility] Care Plan    POC reviewed with Daryleen G Moran at 0300. Pt verbalized understanding. Questions and concerns addressed. No acute events overnight. Pt progressing toward goals. Will continue to monitor. See below and flowsheets for full assessment and VS info.           Is this a stroke patient? no    Neuro:  Mullens Coma Scale  Best Eye Response: 4-->(E4) spontaneous  Best Motor Response: 6-->(M6) obeys commands  Best Verbal Response: 5-->(V5) oriented  Mullens Coma Scale Score: 15  Assessment Qualifiers: patient not sedated/intubated, no eye obstruction present  Pupil PERRLA: yes     24hr Temp:  [97.3 °F (36.3 °C)-98.8 °F (37.1 °C)]     CV:   Rhythm: normal sinus rhythm  BP goals:   SBP < 160  MAP > 65    Resp:   O2 Device (Oxygen Therapy): (S) room air       Plan: N/A    GI/:     Diet/Nutrition Received: 2 gram sodium  Last Bowel Movement: 11/27/22  Voiding Characteristics: external catheter    Intake/Output Summary (Last 24 hours) at 11/27/2022 0342  Last data filed at 11/26/2022 2257  Gross per 24 hour   Intake 440 ml   Output 1800 ml   Net -1360 ml     Unmeasured Output  Urine Occurrence: 1  Stool Occurrence: 1    Labs/Accuchecks:  Recent Labs   Lab 11/26/22  0453   WBC 5.60   RBC 3.29*   HGB 8.6*   HCT 28.3*         Recent Labs   Lab 11/26/22  0453      K 3.9   CO2 28      BUN 11   CREATININE 0.5   ALKPHOS 67   ALT 14   AST 16   BILITOT 0.3    No results for input(s): PROTIME, INR, APTT, HEPANTIXA in the last 168 hours. No results for input(s): CPK, CPKMB, TROPONINI, MB in the last 168 hours.    Electrolytes: N/A - electrolytes WDL  Accuchecks: Q6H    Gtts:      LDA/Wounds:  Lines/Drains/Airways       Drain  Duration             Female External Urinary Catheter 11/21/22 0938 5 days              Peripheral Intravenous Line  Duration                  Peripheral IV - Single Lumen 11/24/22 1154 20 G;1 3/4 in Left Forearm 2 days                  Wounds: No  Wound care consulted: No

## 2022-11-27 NOTE — PROGRESS NOTES
[facility] Neuro[facility] ([facility])  [facility] Medicine  Progress Note    Patient Name: Daryleen G Moran  MRN: [id number]  Patient Class: OP- Observation   Admission Date: 11/21/2022  Length of Stay: 0 days  Attending Physician: Alexis Mantilla MD  Primary Care Provider: Abhi De Oliveira Iv, MD        Subjective:     Principal Problem:Compression fracture of thoracic vertebra        HPI:  77-year-old woman with a history of hypertension, hyperlipidemia, T2DM, CAD, COPD on 2L, severe aortic stenosis, and chronic back pain who is brought to the ED for severe lower back pain as well as diarrhea. Per patient, she refers about 3 d of watery diarrhea,  describing about 3-4 episodes of watery diarrhea. Onset of symptoms started acutely and she denies any sick contacts though per documentation apparently the diarrhea started after eating pork. She was most recently admitted to an OSH for intractable back pain after bending forward and was found to have a L2 compression fracture. She was managed conservatively and was discharged to a nursing home (?). Again was readmitted to the same OSH with back pain and confusion and was found to be hyponatremic to 118. Appears to have improved conservatively and per documentation patient and family both refused, so was taken home. Since then patient refers she has been confined to a wheelchair and unable to take care of herself. Today, she refers terrible lower back pain that is aggravated with movement and alleviated with lying still. She denies any urinary or fecal incontinence, denies any saddle anesthesia. No fevers, chills, chest pain, SOB, abd pain, n/v/d, GIB. Denies any recent abx however care everywhere shows recent rx for augmentin and doxy. She has been taking ibuprofen without relief of her symptoms, however otherwise does not know what medications she takes    At the ED patient HDS, VSS. Labs not overtly unremarkable other than Na 131, K 3.1. Hgb stable. UA with 3+ leuk, 13 WBC. CT  abd/pelvis with stable remote L2 compression fracture however noted new T12 compression fx. She was given oxy 5mg and admitted to hospital medicine for further workup.       Overview/Hospital Course:  Admitted to hospital medicine for intractable back pain and diarrhea. Diarrhea resolved on admission. UA dirty and later grew enterococcus and patient with mild suprapubic discomfort, started on IV CTX. NSGY spine consulted given new T12 fx and subacute L2 fx and recommending TLSO brace and further imaging. PT consulted however NSGY requesting holding off on PT prior to imaging. She remains complaining of back pain and somewhat non-compliant with her care here. Once imaging completed (and suggestive of osteoporotic fractures) NSGY recommended TLSO brace OOB and follow up in 6wks. She remains on intranasal calcitonin, vit D, calcium, and multimodals with some relief. PT scheduled to see patient. Patient encouraged to participate with them and get out of bed in spite of her pain. Neuro IR consulted for an inpatient kyphoplasty. Patient now participating with PT and ambulating a bit more. Interval development of multiple episodes of diarrhea initially described by bedside nurse as dark and watery, however later more formed. C. Diff testing initially ordered however per RN and Charge nurse(s), too formed to send for testing,       Interval History: Patient refusing lab draws. Complaints of frequent diarrhea. Initially described by bedside nurse as dark and watery, however later more formed. C. Diff testing initially ordered however per RN and Charge nurse(s), too formed to send for testing. Plan for IR kyphoplasty tomorrow.    Review of Systems   Constitutional:  Positive for activity change. Negative for chills, fatigue and fever.   HENT:  Negative for congestion and sore throat.    Eyes:  Negative for visual disturbance.   Respiratory:  Negative for cough, shortness of breath and wheezing.    Cardiovascular:  Negative  for chest pain, palpitations and leg swelling.   Gastrointestinal:  Negative for abdominal pain, blood in stool, diarrhea, nausea and vomiting.   Endocrine: Negative for polyuria.   Genitourinary:  Negative for dysuria, flank pain and frequency.   Musculoskeletal:  Positive for arthralgias and back pain. Negative for myalgias.   Skin:  Negative for pallor and rash.   Neurological:  Negative for weakness, light-headedness, numbness and headaches.   Objective:     Vital Signs (Most Recent):  Temp: 98.7 °F (37.1 °C) (11/27/22 1100)  Pulse: 94 (11/27/22 0901)  Resp: 16 (11/27/22 0901)  BP: (!) 152/66 (11/27/22 0800)  SpO2: 96 % (11/27/22 0901)   Vital Signs (24h Range):  Temp:  [97.3 °F (36.3 °C)-98.8 °F (37.1 °C)] 98.7 °F (37.1 °C)  Pulse:  [79-94] 94  Resp:  [16-30] 16  SpO2:  [92 %-96 %] 96 %  BP: (105-156)/(49-67) 152/66     Weight: 94.3 kg (207 lb 14.3 oz)  Body mass index is 40.6 kg/m².    Intake/Output Summary (Last 24 hours) at 11/27/2022 1314  Last data filed at 11/27/2022 0800  Gross per 24 hour   Intake 920 ml   Output 1500 ml   Net -580 ml      Physical Exam  Vitals and nursing note reviewed.   Constitutional:       General: She is not in acute distress.     Appearance: She is obese. She is not ill-appearing or toxic-appearing.   HENT:      Head: Normocephalic and atraumatic.      Mouth/Throat:      Mouth: Mucous membranes are moist.      Pharynx: No oropharyngeal exudate.   Eyes:      Extraocular Movements: Extraocular movements intact.      Pupils: Pupils are equal, round, and reactive to light.   Cardiovascular:      Rate and Rhythm: Normal rate and regular rhythm.      Heart sounds: No murmur heard.  Pulmonary:      Effort: Pulmonary effort is normal.      Breath sounds: No wheezing or rales.   Abdominal:      General: Abdomen is flat. Bowel sounds are normal. There is no distension.      Tenderness: There is no abdominal tenderness (very slight lower abdominal tenderness). There is no guarding.    Musculoskeletal:         General: No swelling or tenderness (ttp at junction of thoracic and lower spine). Normal range of motion.      Cervical back: Normal range of motion.      Right lower leg: No edema.      Left lower leg: No edema.   Lymphadenopathy:      Cervical: No cervical adenopathy.   Skin:     General: Skin is warm.      Coloration: Skin is not jaundiced.      Findings: No bruising or rash.   Neurological:      General: No focal deficit present.      Mental Status: She is alert and oriented to person, place, and time.      Cranial Nerves: No cranial nerve deficit.      Sensory: No sensory deficit.       Significant Labs: All pertinent labs within the past 24 hours have been reviewed.    Significant Imaging: I have reviewed all pertinent imaging results/findings within the past 24 hours.      Assessment/Plan:      * Compression fracture of thoracic vertebra  77-year-old woman with a history of COPD on 2L, T2DM, severe AS, morbid obesity, former chronic smoker chronic back pain here with new onset T12 fracture and subacute L2 fx. Most recently admitted to an OSH for intractable back pain after bending forward and was found to have a L2 compression fracture. She was managed conservatively and was discharged to a nursing home (?). Again was readmitted to the same OSH with similar symptosm. Imaging here with stable remote L2 compression fracture however noted new T12 compression fx. Has a history of osteoporosis (as noted in DEXA completed in 2019 with T score of -3.6) and has risk      -scheduled tylenol, robaxin, lidocaine patches  -norco 5mg for breakthrough pain  -intranasal calcitonin  -TLSO brace placed; needs it OOB  -NSGY follow up in 6 weeks  -PT/OT; recommending SNF  -Plan for inpatient kyphoplasty with IR, tentative for Monday. NPO at [facility]        UTI (urinary tract infection)  Patient with suprapubic discomfort and urine grew E. faecalis    -continuing amoxicillin for a total of  5d      Diarrhea  Came in with about 3 d of watery diarrhea, however resolved upon admission.    Interval development of multiple episodes of diarrhea initially described by bedside nurse as dark and watery, however later more formed. C. Diff testing initially ordered however per RN and Charge nurse(s), too formed to send for testing.      -will order C diff once stool caliber appropriate  -will hold off on loperamide  -daily CMP    Aortic stenosis  Hx of Aortic stenosis and undergoing TAVR workup      COPD, mild  Stable and on home 2L NC  -PRN nebs    Depression  -continuing home medications        Diabetes mellitus type II  Patient's FSGs are controlled on current medication regimen.  Last A1c reviewed-   Lab Results   Component Value Date    HGBA1C 6.2 05/01/2022     Most recent fingerstick glucose reviewed- No results for input(s): POCTGLUCOSE in the last 24 hours.  Current correctional scale  High  Maintain anti-hyperglycemic dose as follows-   Antihyperglycemics (From admission, onward)    Start     Stop Route Frequency Ordered    11/21/22 1546  insulin aspart U-100 pen 1-10 Units         -- SubQ Before meals & nightly PRN 11/21/22 1448        Hold Oral hypoglycemics while patient is in the hospital.      VTE Risk Mitigation (From admission, onward)         Ordered     enoxaparin injection 40 mg  Every 12 hours         11/21/22 1448     IP VTE HIGH RISK PATIENT  Once         11/21/22 1448     Place sequential compression device  Until discontinued         11/21/22 1448                Discharge Planning   ISAAK: 11/28/2022     Code Status: Full Code   Is the patient medically ready for discharge?: No    Reason for patient still in hospital (select all that apply): Patient trending condition and Consult recommendations  Discharge Plan A: Skilled Nursing Facility                  Arron Jerome MD  Department of Hospital Medicine   [facility] - Neurosurgery ([facility])

## 2022-11-27 NOTE — ASSESSMENT & PLAN NOTE
77-year-old woman with a history of COPD on 2L, T2DM, severe AS, morbid obesity, former chronic smoker chronic back pain here with new onset T12 fracture and subacute L2 fx. Most recently admitted to an OSH for intractable back pain after bending forward and was found to have a L2 compression fracture. She was managed conservatively and was discharged to a nursing home (?). Again was readmitted to the same OSH with similar symptosm. Imaging here with stable remote L2 compression fracture however noted new T12 compression fx. Has a history of osteoporosis (as noted in DEXA completed in 2019 with T score of -3.6) and has risk      -scheduled tylenol, robaxin, lidocaine patches  -norco 5mg for breakthrough pain  -intranasal calcitonin  -TLSO brace placed; needs it OOB  -NSGY follow up in 6 weeks  -PT/OT; recommending SNF  -Plan for inpatient kyphoplasty with IR, tentative for Monday. NPO at [facility]

## 2022-11-27 NOTE — ASSESSMENT & PLAN NOTE
Came in with about 3 d of watery diarrhea, however resolved upon admission.    Interval development of multiple episodes of diarrhea initially described by bedside nurse as dark and watery, however later more formed. C. Diff testing initially ordered however per RN and Charge nurse(s), too formed to send for testing.      -will order C diff once stool caliber appropriate  -will hold off on loperamide  -daily CMP

## 2022-11-28 ENCOUNTER — PATIENT MESSAGE (OUTPATIENT)
Dept: NEUROSURGERY | Facility: CLINIC | Age: 77
End: 2022-11-28
Payer: MEDICARE

## 2022-11-28 LAB
ALBUMIN SERPL BCP-MCNC: 2.9 G/DL (ref 3.5–5.2)
ALP SERPL-CCNC: 70 U/L (ref 55–135)
ALT SERPL W/O P-5'-P-CCNC: 18 U/L (ref 10–44)
ANION GAP SERPL CALC-SCNC: 11 MMOL/L (ref 8–16)
AST SERPL-CCNC: 22 U/L (ref 10–40)
BASOPHILS # BLD AUTO: 0.08 K/UL (ref 0–0.2)
BASOPHILS NFR BLD: 1.2 % (ref 0–1.9)
BILIRUB SERPL-MCNC: 0.3 MG/DL (ref 0.1–1)
BUN SERPL-MCNC: 15 MG/DL (ref 8–23)
CALCIUM SERPL-MCNC: 9.4 MG/DL (ref 8.7–10.5)
CHLORIDE SERPL-SCNC: 101 MMOL/L (ref 95–110)
CO2 SERPL-SCNC: 26 MMOL/L (ref 23–29)
CREAT SERPL-MCNC: 0.6 MG/DL (ref 0.5–1.4)
DIFFERENTIAL METHOD: ABNORMAL
EOSINOPHIL # BLD AUTO: 0.2 K/UL (ref 0–0.5)
EOSINOPHIL NFR BLD: 3.2 % (ref 0–8)
ERYTHROCYTE [DISTWIDTH] IN BLOOD BY AUTOMATED COUNT: 15.2 % (ref 11.5–14.5)
EST. GFR  (NO RACE VARIABLE): >60 ML/MIN/1.73 M^2
GLUCOSE SERPL-MCNC: 103 MG/DL (ref 70–110)
HCT VFR BLD AUTO: 29.4 % (ref 37–48.5)
HGB BLD-MCNC: 9.2 G/DL (ref 12–16)
IMM GRANULOCYTES # BLD AUTO: 0.09 K/UL (ref 0–0.04)
IMM GRANULOCYTES NFR BLD AUTO: 1.3 % (ref 0–0.5)
LYMPHOCYTES # BLD AUTO: 1.6 K/UL (ref 1–4.8)
LYMPHOCYTES NFR BLD: 22.7 % (ref 18–48)
MAGNESIUM SERPL-MCNC: 1.4 MG/DL (ref 1.6–2.6)
MCH RBC QN AUTO: 26.1 PG (ref 27–31)
MCHC RBC AUTO-ENTMCNC: 31.3 G/DL (ref 32–36)
MCV RBC AUTO: 84 FL (ref 82–98)
MONOCYTES # BLD AUTO: 0.6 K/UL (ref 0.3–1)
MONOCYTES NFR BLD: 8.7 % (ref 4–15)
NEUTROPHILS # BLD AUTO: 4.4 K/UL (ref 1.8–7.7)
NEUTROPHILS NFR BLD: 62.9 % (ref 38–73)
NRBC BLD-RTO: 0 /100 WBC
PHOSPHATE SERPL-MCNC: 3.7 MG/DL (ref 2.7–4.5)
PLATELET # BLD AUTO: 249 K/UL (ref 150–450)
PMV BLD AUTO: 11.7 FL (ref 9.2–12.9)
POCT GLUCOSE: 103 MG/DL (ref 70–110)
POCT GLUCOSE: 117 MG/DL (ref 70–110)
POCT GLUCOSE: 126 MG/DL (ref 70–110)
POTASSIUM SERPL-SCNC: 4.1 MMOL/L (ref 3.5–5.1)
PROT SERPL-MCNC: 5.6 G/DL (ref 6–8.4)
RBC # BLD AUTO: 3.52 M/UL (ref 4–5.4)
SODIUM SERPL-SCNC: 138 MMOL/L (ref 136–145)
WBC # BLD AUTO: 6.93 K/UL (ref 3.9–12.7)

## 2022-11-28 PROCEDURE — 97110 THERAPEUTIC EXERCISES: CPT

## 2022-11-28 PROCEDURE — 25000003 PHARM REV CODE 250: Performed by: STUDENT IN AN ORGANIZED HEALTH CARE EDUCATION/TRAINING PROGRAM

## 2022-11-28 PROCEDURE — 83735 ASSAY OF MAGNESIUM: CPT | Performed by: STUDENT IN AN ORGANIZED HEALTH CARE EDUCATION/TRAINING PROGRAM

## 2022-11-28 PROCEDURE — 99226 PR SUBSEQUENT OBSERVATION CARE,LEVEL III: ICD-10-PCS | Mod: GC,,, | Performed by: HOSPITALIST

## 2022-11-28 PROCEDURE — 87427 SHIGA-LIKE TOXIN AG IA: CPT | Mod: 59 | Performed by: STUDENT IN AN ORGANIZED HEALTH CARE EDUCATION/TRAINING PROGRAM

## 2022-11-28 PROCEDURE — 94761 N-INVAS EAR/PLS OXIMETRY MLT: CPT

## 2022-11-28 PROCEDURE — 99226 PR SUBSEQUENT OBSERVATION CARE,LEVEL III: CPT | Mod: GC,,, | Performed by: HOSPITALIST

## 2022-11-28 PROCEDURE — 25000003 PHARM REV CODE 250: Performed by: RADIOLOGY

## 2022-11-28 PROCEDURE — 63600175 PHARM REV CODE 636 W HCPCS: Performed by: STUDENT IN AN ORGANIZED HEALTH CARE EDUCATION/TRAINING PROGRAM

## 2022-11-28 PROCEDURE — 97116 GAIT TRAINING THERAPY: CPT | Mod: CQ

## 2022-11-28 PROCEDURE — 96372 THER/PROPH/DIAG INJ SC/IM: CPT | Performed by: STUDENT IN AN ORGANIZED HEALTH CARE EDUCATION/TRAINING PROGRAM

## 2022-11-28 PROCEDURE — 80053 COMPREHEN METABOLIC PANEL: CPT | Performed by: STUDENT IN AN ORGANIZED HEALTH CARE EDUCATION/TRAINING PROGRAM

## 2022-11-28 PROCEDURE — 84100 ASSAY OF PHOSPHORUS: CPT | Performed by: STUDENT IN AN ORGANIZED HEALTH CARE EDUCATION/TRAINING PROGRAM

## 2022-11-28 PROCEDURE — 94640 AIRWAY INHALATION TREATMENT: CPT

## 2022-11-28 PROCEDURE — 97530 THERAPEUTIC ACTIVITIES: CPT | Mod: CQ

## 2022-11-28 PROCEDURE — 85025 COMPLETE CBC W/AUTO DIFF WBC: CPT | Performed by: STUDENT IN AN ORGANIZED HEALTH CARE EDUCATION/TRAINING PROGRAM

## 2022-11-28 PROCEDURE — 36415 COLL VENOUS BLD VENIPUNCTURE: CPT | Performed by: STUDENT IN AN ORGANIZED HEALTH CARE EDUCATION/TRAINING PROGRAM

## 2022-11-28 PROCEDURE — G0378 HOSPITAL OBSERVATION PER HR: HCPCS

## 2022-11-28 PROCEDURE — 87045 FECES CULTURE AEROBIC BACT: CPT | Performed by: STUDENT IN AN ORGANIZED HEALTH CARE EDUCATION/TRAINING PROGRAM

## 2022-11-28 PROCEDURE — 97535 SELF CARE MNGMENT TRAINING: CPT

## 2022-11-28 PROCEDURE — 87046 STOOL CULTR AEROBIC BACT EA: CPT | Performed by: STUDENT IN AN ORGANIZED HEALTH CARE EDUCATION/TRAINING PROGRAM

## 2022-11-28 PROCEDURE — 96366 THER/PROPH/DIAG IV INF ADDON: CPT

## 2022-11-28 PROCEDURE — 63600175 PHARM REV CODE 636 W HCPCS: Performed by: RADIOLOGY

## 2022-11-28 RX ORDER — LIDOCAINE HYDROCHLORIDE 10 MG/ML
INJECTION INFILTRATION; PERINEURAL
Status: COMPLETED | OUTPATIENT
Start: 2022-11-28 | End: 2022-11-28

## 2022-11-28 RX ORDER — FENTANYL CITRATE 50 UG/ML
INJECTION, SOLUTION INTRAMUSCULAR; INTRAVENOUS
Status: COMPLETED | OUTPATIENT
Start: 2022-11-28 | End: 2022-11-28

## 2022-11-28 RX ORDER — INSULIN ASPART 100 [IU]/ML
0-5 INJECTION, SOLUTION INTRAVENOUS; SUBCUTANEOUS
Status: DISCONTINUED | OUTPATIENT
Start: 2022-11-28 | End: 2022-11-30 | Stop reason: HOSPADM

## 2022-11-28 RX ORDER — MAGNESIUM SULFATE HEPTAHYDRATE 40 MG/ML
2 INJECTION, SOLUTION INTRAVENOUS ONCE
Status: COMPLETED | OUTPATIENT
Start: 2022-11-28 | End: 2022-11-28

## 2022-11-28 RX ADMIN — HYDROCODONE BITARTRATE AND ACETAMINOPHEN 1 TABLET: 5; 325 TABLET ORAL at 04:11

## 2022-11-28 RX ADMIN — LISINOPRIL 10 MG: 5 TABLET ORAL at 08:11

## 2022-11-28 RX ADMIN — OXYBUTYNIN CHLORIDE 5 MG: 5 TABLET ORAL at 08:11

## 2022-11-28 RX ADMIN — FENTANYL CITRATE 25 MCG: 50 INJECTION, SOLUTION INTRAMUSCULAR; INTRAVENOUS at 03:11

## 2022-11-28 RX ADMIN — DONEPEZIL HYDROCHLORIDE 5 MG: 5 TABLET, FILM COATED ORAL at 08:11

## 2022-11-28 RX ADMIN — HYDROXYZINE PAMOATE 50 MG: 25 CAPSULE ORAL at 08:11

## 2022-11-28 RX ADMIN — CHOLECALCIFEROL TAB 25 MCG (1000 UNIT) 1000 UNITS: 25 TAB at 08:11

## 2022-11-28 RX ADMIN — LIDOCAINE HYDROCHLORIDE 10 ML: 10 INJECTION, SOLUTION INFILTRATION; PERINEURAL at 03:11

## 2022-11-28 RX ADMIN — BUPROPION HYDROCHLORIDE 150 MG: 150 TABLET, FILM COATED, EXTENDED RELEASE ORAL at 08:11

## 2022-11-28 RX ADMIN — CALCITONIN SALMON 1 SPRAY: 200 SPRAY, METERED NASAL at 08:11

## 2022-11-28 RX ADMIN — FENTANYL CITRATE 50 MCG: 50 INJECTION, SOLUTION INTRAMUSCULAR; INTRAVENOUS at 03:11

## 2022-11-28 RX ADMIN — ACETAMINOPHEN 1000 MG: 500 TABLET ORAL at 08:11

## 2022-11-28 RX ADMIN — METHOCARBAMOL 750 MG: 750 TABLET ORAL at 04:11

## 2022-11-28 RX ADMIN — HYDROCODONE BITARTRATE AND ACETAMINOPHEN 1 TABLET: 5; 325 TABLET ORAL at 08:11

## 2022-11-28 RX ADMIN — HYDROCODONE BITARTRATE AND ACETAMINOPHEN 1 TABLET: 5; 325 TABLET ORAL at 03:11

## 2022-11-28 RX ADMIN — METHOCARBAMOL 750 MG: 750 TABLET ORAL at 08:11

## 2022-11-28 RX ADMIN — FLUTICASONE FUROATE AND VILANTEROL TRIFENATATE 1 PUFF: 100; 25 POWDER RESPIRATORY (INHALATION) at 08:11

## 2022-11-28 RX ADMIN — AMPICILLIN 500 MG: 500 CAPSULE ORAL at 08:11

## 2022-11-28 RX ADMIN — METHOCARBAMOL 750 MG: 750 TABLET ORAL at 12:11

## 2022-11-28 RX ADMIN — LIDOCAINE 1 PATCH: 50 PATCH CUTANEOUS at 08:11

## 2022-11-28 RX ADMIN — ATORVASTATIN CALCIUM 20 MG: 20 TABLET, FILM COATED ORAL at 08:11

## 2022-11-28 RX ADMIN — CITALOPRAM HYDROBROMIDE 40 MG: 20 TABLET ORAL at 08:11

## 2022-11-28 RX ADMIN — PANTOPRAZOLE SODIUM 40 MG: 40 TABLET, DELAYED RELEASE ORAL at 08:11

## 2022-11-28 RX ADMIN — Medication 1 CAPSULE: at 12:11

## 2022-11-28 RX ADMIN — CALCIUM CARBONATE (ANTACID) CHEW TAB 500 MG 1000 MG: 500 CHEW TAB at 08:11

## 2022-11-28 RX ADMIN — MAGNESIUM SULFATE 2 G: 2 INJECTION INTRAVENOUS at 08:11

## 2022-11-28 RX ADMIN — ENOXAPARIN SODIUM 40 MG: 40 INJECTION SUBCUTANEOUS at 08:11

## 2022-11-28 NOTE — SUBJECTIVE & OBJECTIVE
Interval History:   NAEON. Soft formed stools, no diarrhea. Plan for kyphoplasty w IR today. Mg 1.4, replaced. Last day of ampicillin s/p 6 day course for enterococcus UTI.       Review of Systems   Constitutional:  Negative for chills, fatigue and fever.   HENT:  Negative for congestion and sore throat.    Eyes:  Negative for visual disturbance.   Respiratory:  Negative for cough, shortness of breath and wheezing.    Cardiovascular:  Negative for chest pain, palpitations and leg swelling.   Gastrointestinal:  Negative for abdominal pain, blood in stool, diarrhea, nausea and vomiting.   Endocrine: Negative for polyuria.   Genitourinary:  Negative for dysuria, flank pain and frequency.   Musculoskeletal:  Positive for arthralgias and back pain. Negative for myalgias.   Skin:  Negative for pallor and rash.   Neurological:  Negative for weakness, light-headedness, numbness and headaches.   Objective:     Vital Signs (Most Recent):  Temp: 98.1 °F (36.7 °C) (11/28/22 0744)  Pulse: 91 (11/28/22 0810)  Resp: 16 (11/28/22 0810)  BP: (!) 159/67 (11/28/22 0744)  SpO2: (!) 94 % (11/28/22 0810)   Vital Signs (24h Range):  Temp:  [98.1 °F (36.7 °C)-98.6 °F (37 °C)] 98.1 °F (36.7 °C)  Pulse:  [81-91] 91  Resp:  [14-24] 16  SpO2:  [92 %-95 %] 94 %  BP: (113-159)/(54-67) 159/67     Weight: 94.3 kg (207 lb 14.3 oz)  Body mass index is 40.6 kg/m².    Intake/Output Summary (Last 24 hours) at 11/28/2022 1125  Last data filed at 11/28/2022 0609  Gross per 24 hour   Intake 740 ml   Output 1900 ml   Net -1160 ml        Physical Exam  Vitals and nursing note reviewed.   Constitutional:       General: She is not in acute distress.     Appearance: She is obese. She is not ill-appearing or toxic-appearing.   HENT:      Head: Normocephalic and atraumatic.      Mouth/Throat:      Mouth: Mucous membranes are moist.      Pharynx: No oropharyngeal exudate.   Eyes:      Extraocular Movements: Extraocular movements intact.      Pupils: Pupils are  equal, round, and reactive to light.   Cardiovascular:      Rate and Rhythm: Normal rate and regular rhythm.      Heart sounds: No murmur heard.  Pulmonary:      Effort: Pulmonary effort is normal.      Breath sounds: No wheezing or rales.   Abdominal:      General: Abdomen is flat. Bowel sounds are normal. There is no distension.      Tenderness: There is no abdominal tenderness. There is no guarding.   Musculoskeletal:         General: Tenderness (ttp at junction of thoracic and lower spine) present. No swelling. Normal range of motion.      Cervical back: Normal range of motion.      Right lower leg: No edema.      Left lower leg: No edema.   Lymphadenopathy:      Cervical: No cervical adenopathy.   Skin:     General: Skin is warm.      Coloration: Skin is not jaundiced.      Findings: No bruising or rash.   Neurological:      General: No focal deficit present.      Mental Status: She is alert and oriented to person, place, and time.      Cranial Nerves: No cranial nerve deficit.      Sensory: No sensory deficit.      Motor: No weakness.      Deep Tendon Reflexes: Reflexes normal.       Significant Labs: All pertinent labs within the past 24 hours have been reviewed.    Significant Imaging: I have reviewed all pertinent imaging results/findings within the past 24 hours.

## 2022-11-28 NOTE — ASSESSMENT & PLAN NOTE
Compression fx on admission.     - continue intranasal calcitonin for 4 weeks  - will need f/u with endocrinology outpatient

## 2022-11-28 NOTE — PLAN OF CARE
Pt completed kyphoplasty without adverse event. Pt to remain flat for one hour, then may sit up at 45 degrees for two hours. Pt to recover in MPU while waiting on transport to return to the floor, report to be given at bedside and called to floor nurse.

## 2022-11-28 NOTE — PROGRESS NOTES
[facility] - Neurosurgery ([facility])  [facility] Medicine  Progress Note    Patient Name: Daryleen G Moran  MRN: [id number]  Patient Class: OP- Observation   Admission Date: 11/21/2022  Length of Stay: 0 days  Attending Physician: Zarina Dukes MD  Primary Care Provider: Abhi De Oliveira Iv, MD        Subjective:     Principal Problem:Compression fracture of thoracic vertebra        HPI:  77-year-old woman with a history of hypertension, hyperlipidemia, T2DM, CAD, COPD on 2L, severe aortic stenosis, and chronic back pain who is brought to the ED for severe lower back pain as well as diarrhea. Per patient, she refers about 3 d of watery diarrhea,  describing about 3-4 episodes of watery diarrhea. Onset of symptoms started acutely and she denies any sick contacts though per documentation apparently the diarrhea started after eating pork. She was most recently admitted to an OSH for intractable back pain after bending forward and was found to have a L2 compression fracture. She was managed conservatively and was discharged to a nursing home (?). Again was readmitted to the same OSH with back pain and confusion and was found to be hyponatremic to 118. Appears to have improved conservatively and per documentation patient and family both refused, so was taken home. Since then patient refers she has been confined to a wheelchair and unable to take care of herself. Today, she refers terrible lower back pain that is aggravated with movement and alleviated with lying still. She denies any urinary or fecal incontinence, denies any saddle anesthesia. No fevers, chills, chest pain, SOB, abd pain, n/v/d, GIB. Denies any recent abx however care everywhere shows recent rx for augmentin and doxy. She has been taking ibuprofen without relief of her symptoms, however otherwise does not know what medications she takes    At the ED patient HDS, VSS. Labs not overtly unremarkable other than Na 131, K 3.1. Hgb stable. UA with 3+ leuk, 13  WBC. CT abd/pelvis with stable remote L2 compression fracture however noted new T12 compression fx. She was given oxy 5mg and admitted to hospital medicine for further workup.       Overview/Hospital Course:  Admitted for intractable back pain and diarrhea. Diarrhea resolved on admission. UA dirty and later grew enterococcus and patient with mild suprapubic discomfort, started on IV CTX. NSGY spine consulted given new T12 fx and subacute L2 fx and recommending TLSO brace and further imaging. PT consulted however NSGY requesting holding off on PT prior to imaging. She remains complaining of back pain and somewhat non-compliant with her care here. Once imaging completed (and suggestive of osteoporotic fractures) NSGY recommended TLSO brace OOB and follow up in 6wks. She remains on intranasal calcitonin, vit D, calcium, and multimodals with some relief. PT scheduled to see patient. Patient encouraged to participate with them and get out of bed in spite of her pain. Neuro IR consulted for an inpatient kyphoplasty. Patient now participating with PT and ambulating a bit more. Interval development of multiple episodes of diarrhea initially described by bedside nurse as dark and watery, however later more formed. C. Diff testing initially ordered however per RN and Charge nurse(s), too formed to send for testing. Diarrhea improving, no appropriate specimen for C.diff testing collected. IR kyphoplasty 11/28. Will need NSGY f/u at discharge as well as osteoporosis f/u with endocrinology. S/p 6 day course of ampicillin for enterococcus UTI.       Interval History:   NAEON. Soft formed stools, no diarrhea. Plan for kyphoplasty w IR today. Mg 1.4, replaced. Last day of ampicillin s/p 6 day course for enterococcus UTI.       Review of Systems   Constitutional:  Negative for chills, fatigue and fever.   HENT:  Negative for congestion and sore throat.    Eyes:  Negative for visual disturbance.   Respiratory:  Negative for cough,  shortness of breath and wheezing.    Cardiovascular:  Negative for chest pain, palpitations and leg swelling.   Gastrointestinal:  Negative for abdominal pain, blood in stool, diarrhea, nausea and vomiting.   Endocrine: Negative for polyuria.   Genitourinary:  Negative for dysuria, flank pain and frequency.   Musculoskeletal:  Positive for arthralgias and back pain. Negative for myalgias.   Skin:  Negative for pallor and rash.   Neurological:  Negative for weakness, light-headedness, numbness and headaches.   Objective:     Vital Signs (Most Recent):  Temp: 98.1 °F (36.7 °C) (11/28/22 0744)  Pulse: 91 (11/28/22 0810)  Resp: 16 (11/28/22 0810)  BP: (!) 159/67 (11/28/22 0744)  SpO2: (!) 94 % (11/28/22 0810)   Vital Signs (24h Range):  Temp:  [98.1 °F (36.7 °C)-98.6 °F (37 °C)] 98.1 °F (36.7 °C)  Pulse:  [81-91] 91  Resp:  [14-24] 16  SpO2:  [92 %-95 %] 94 %  BP: (113-159)/(54-67) 159/67     Weight: 94.3 kg (207 lb 14.3 oz)  Body mass index is 40.6 kg/m².    Intake/Output Summary (Last 24 hours) at 11/28/2022 1125  Last data filed at 11/28/2022 0609  Gross per 24 hour   Intake 740 ml   Output 1900 ml   Net -1160 ml        Physical Exam  Vitals and nursing note reviewed.   Constitutional:       General: She is not in acute distress.     Appearance: She is obese. She is not ill-appearing or toxic-appearing.   HENT:      Head: Normocephalic and atraumatic.      Mouth/Throat:      Mouth: Mucous membranes are moist.      Pharynx: No oropharyngeal exudate.   Eyes:      Extraocular Movements: Extraocular movements intact.      Pupils: Pupils are equal, round, and reactive to light.   Cardiovascular:      Rate and Rhythm: Normal rate and regular rhythm.      Heart sounds: No murmur heard.  Pulmonary:      Effort: Pulmonary effort is normal.      Breath sounds: No wheezing or rales.   Abdominal:      General: Abdomen is flat. Bowel sounds are normal. There is no distension.      Tenderness: There is no abdominal tenderness.  There is no guarding.   Musculoskeletal:         General: Tenderness (ttp at junction of thoracic and lower spine) present. No swelling. Normal range of motion.      Cervical back: Normal range of motion.      Right lower leg: No edema.      Left lower leg: No edema.   Lymphadenopathy:      Cervical: No cervical adenopathy.   Skin:     General: Skin is warm.      Coloration: Skin is not jaundiced.      Findings: No bruising or rash.   Neurological:      General: No focal deficit present.      Mental Status: She is alert and oriented to person, place, and time.      Cranial Nerves: No cranial nerve deficit.      Sensory: No sensory deficit.      Motor: No weakness.      Deep Tendon Reflexes: Reflexes normal.       Significant Labs: All pertinent labs within the past 24 hours have been reviewed.    Significant Imaging: I have reviewed all pertinent imaging results/findings within the past 24 hours.      Assessment/Plan:      * Compression fracture of thoracic vertebra  77-year-old woman with a history of COPD on 2L, T2DM, severe AS, morbid obesity, former chronic smoker chronic back pain here with new onset T12 fracture and subacute L2 fx. Most recently admitted to an OSH for intractable back pain after bending forward and was found to have a L2 compression fracture. She was managed conservatively and was discharged to a nursing home (?). Again was readmitted to the same OSH with similar symptosm. Imaging here with stable remote L2 compression fracture however noted new T12 compression fx. Has a history of osteoporosis (as noted in DEXA completed in 2019 with T score of -3.6) and has risk      -scheduled tylenol, robaxin, lidocaine patches  -norco 5mg for breakthrough pain  -intranasal calcitonin  -TLSO brace placed; needs it OOB  -NSGY follow up in 6 weeks  -PT/OT; recommending SNF  -Plan for inpatient kyphoplasty with IR 11/28    UTI (urinary tract infection)  Patient with suprapubic discomfort and urine grew E.  faecalis    -s/p tx with ampicillin for 6 days      Diarrhea  Came in with about 3 d of watery diarrhea, however resolved upon admission.    Interval development of multiple episodes of diarrhea initially described by bedside nurse as dark and watery, however later more formed. C. Diff testing initially ordered however per RN and Charge nurse(s), too formed to send for testing.    Having soft formed stool, not diarrhea 11/28.   No c.diff sample collected.     - ctm  - daily CMP  - lactobacillus, susepect abx associated diarrhea    Aortic stenosis  Hx of Aortic stenosis and undergoing TAVR workup.    COPD, mild  Stable and on home 2L NC  -PRN nebs    Depression  -continuing home medications    Osteoporosis with current pathological fracture  Compression fx on admission.     - continue intranasal calcitonin for 4 weeks  - will need f/u with endocrinology outpatient     Diabetes mellitus type II  Patient's FSGs are controlled on current medication regimen.  Last A1c reviewed-   Lab Results   Component Value Date    HGBA1C 6.2 05/01/2022     Most recent fingerstick glucose reviewed-   Recent Labs   Lab 11/27/22  1149 11/27/22  1548 11/28/22  0833   POCTGLUCOSE 121* 151* 126*     Current correctional scale  High  Maintain anti-hyperglycemic dose as follows-   Antihyperglycemics (From admission, onward)    Start     Stop Route Frequency Ordered    11/21/22 1546  insulin aspart U-100 pen 1-10 Units         -- SubQ Before meals & nightly PRN 11/21/22 1448          - low dose correction scale  - add long-acting if needed  - BG goal 140-180 inpatient    VTE Risk Mitigation (From admission, onward)         Ordered     enoxaparin injection 40 mg  Every 12 hours         11/21/22 1448     IP VTE HIGH RISK PATIENT  Once         11/21/22 1448     Place sequential compression device  Until discontinued         11/21/22 1448                Discharge Planning   ISAAK: 11/28/2022     Code Status: Full Code   Is the patient medically  ready for discharge?: No    Reason for patient still in hospital (select all that apply): Treatment  Discharge Plan A: Skilled Nursing Facility            Bridgette Robles MD  Department of Hospital Medicine   [facility] - Neurosurgery ([facility])

## 2022-11-28 NOTE — PLAN OF CARE
[facility] completed LOCET via phone. [facility] faxed PASSR to obtain the 142 for NH admission.    TERRIE Mccain  [phone]

## 2022-11-28 NOTE — PT/OT/SLP PROGRESS
Physical Therapy Treatment    Patient Name:  Daryleen G Moran   MRN:  [id number]    Recommendations:     Discharge Recommendations:  nursing facility, skilled   Discharge Equipment Recommendations: bedside commode, walker, rolling   Barriers to discharge: Decreased caregiver support requires assist for mobility.     Assessment:     Daryleen G Moran is a 77 y.o. female admitted with a medical diagnosis of Compression fracture of thoracic vertebra.  She presents with the following impairments/functional limitations:  weakness, impaired endurance, impaired self care skills, impaired functional mobility, gait instability, pain, orthopedic precautions, decreased upper extremity function, decreased lower extremity function. Pt tolerated treatment well despite pain. Pt will continue to benefit from skilled PT services to improve all deficits noted above. Resume PT POC as indicated.     Rehab Prognosis: Good; patient would benefit from acute skilled PT services to address these deficits and reach maximum level of function.    Recent Surgery: * No surgery found *      Plan:     During this hospitalization, patient to be seen 4 x/week to address the identified rehab impairments via gait training, therapeutic activities, therapeutic exercises, neuromuscular re-education and progress toward the following goals:    Plan of Care Expires:  12/23/22    Subjective     Pt was willing to participate with therapy.   Pain/Comfort:  Pain Rating 1: 8/10  Location - Orientation 1: lower  Location 1: back  Pain Addressed 1: Reposition, Distraction, Pre-medicate for activity  Pain Rating Post-Intervention 1: 8/10      Objective:     Communicated with nursing  prior to session.  Patient found HOB elevated  soiled with  (all lines intact) upon PT entry to room.     General Precautions: Standard, fall   Orthopedic Precautions:spinal precautions   Braces: LSO  Respiratory Status: Room air     Functional Mobility:  Bed Mobility:  Rolling Left:  multiple times  contact guard assistance with use of bed rail  Rolling Right: multiple times contact guard assistance  Scooting: contact guard assistance  Supine to Sit: contact guard assistance  Sit to Supine: minimum assistance  Transfers:  Sit to Stand: to/from EOB, to/from bedside chair x2 trials  minimum assistance with rolling walker  Gait: x2 trials of ~38ft and 26ft with RW and Min A. Pt required seated rest breaks b/w trials. Distance limited 2/2 (L) knee pain.       AM-PAC 6 CLICK MOBILITY  Turning over in bed (including adjusting bedclothes, sheets and blankets)?: 3  Sitting down on and standing up from a chair with arms (e.g., wheelchair, bedside commode, etc.): 3  Moving from lying on back to sitting on the side of the bed?: 3  Moving to and from a bed to a chair (including a wheelchair)?: 2  Need to walk in hospital room?: 3  Climbing 3-5 steps with a railing?: 2  Basic Mobility Total Score: 16       Treatment & Education:  -Pt required total assistance with maxine-care, donning brief,  socks, and LSO at start of tx session.   -Answered all questions/concerns within PTA scope of practice.       Patient left HOB elevated with all lines intact, call button in reach, and nursing notified..    GOALS:   Multidisciplinary Problems       Physical Therapy Goals          Problem: Physical Therapy    Goal Priority Disciplines Outcome Goal Variances Interventions   Physical Therapy Goal     PT, PT/OT Ongoing, Progressing     Description: PT goals until 11/30/22    1. Pt supine to sit with mod independent-not met  2. Pt sit to supine with mod independent-not met  3. Pt sit to stand with RW with supervision-not met  4. Pt to perform gait 75ft with RW with CGA.-not met  5. Pt to perform B LE exs in sitting or supine x 10 reps to strengthen B LE to improve functional mobility.-not met                         Time Tracking:     PT Received On: 11/28/22  PT Start Time: 0957     PT Stop Time: 1036  PT Total Time  (min): 38 min     Billable Minutes: Gait Training 12 and Therapeutic Activity 26    Treatment Type: Treatment  PT/PTA: PTA     PTA Visit Number: 1 11/28/2022

## 2022-11-28 NOTE — ASSESSMENT & PLAN NOTE
77-year-old woman with a history of COPD on 2L, T2DM, severe AS, morbid obesity, former chronic smoker chronic back pain here with new onset T12 fracture and subacute L2 fx. Most recently admitted to an OSH for intractable back pain after bending forward and was found to have a L2 compression fracture. She was managed conservatively and was discharged to a nursing home (?). Again was readmitted to the same OSH with similar symptosm. Imaging here with stable remote L2 compression fracture however noted new T12 compression fx. Has a history of osteoporosis (as noted in DEXA completed in 2019 with T score of -3.6) and has risk      -scheduled tylenol, robaxin, lidocaine patches  -norco 5mg for breakthrough pain  -intranasal calcitonin  -TLSO brace placed; needs it OOB  -NSGY follow up in 6 weeks  -PT/OT; recommending SNF  -Plan for inpatient kyphoplasty with IR 11/28

## 2022-11-28 NOTE — ASSESSMENT & PLAN NOTE
Patient with suprapubic discomfort and urine grew E. faecalis    -s/p tx with ampicillin for 6 days

## 2022-11-28 NOTE — CONSULTS
"Inpatient Radiology Pre-procedure Note    History of Present Illness:  Daryleen G Moran is a 77 y.o. female who presents for T12 vertebral body augmentation 2/2 fracture with persistent pain.     Per chart  "77-year-old woman with a history of hypertension, hyperlipidemia, T2DM, CAD, COPD on 2L, severe aortic stenosis, and chronic back pain who is brought to the ED for severe lower back pain as well as diarrhea. Per patient, she refers about 3 d of watery diarrhea,  describing about 3-4 episodes of watery diarrhea. Onset of symptoms started acutely and she denies any sick contacts though per documentation apparently the diarrhea started after eating pork. She was most recently admitted to an OSH for intractable back pain after bending forward and was found to have a L2 compression fracture. She was managed conservatively and was discharged to a nursing home (?). Again was readmitted to the same OSH with back pain and confusion and was found to be hyponatremic to 118. Appears to have improved conservatively and per documentation patient and family both refused, so was taken home. Since then patient refers she has been confined to a wheelchair and unable to take care of herself. Today, she refers terrible lower back pain that is aggravated with movement and alleviated with lying still. She denies any urinary or fecal incontinence, denies any saddle anesthesia. No fevers, chills, chest pain, SOB, abd pain, n/v/d, GIB. Denies any recent abx however care everywhere shows recent rx for augmentin and doxy. She has been taking ibuprofen without relief of her symptoms, however otherwise does not know what medications she takes    At the ED patient HDS, VSS. Labs not overtly unremarkable other than Na 131, K 3.1. Hgb stable. UA with 3+ leuk, 13 WBC. CT abd/pelvis with stable remote L2 compression fracture however noted new T12 compression fx. She was given oxy 5mg and admitted to hospital medicine for further workup. " ""    Admission H&P reviewed.  Past Medical History:   Diagnosis Date    Aortic stenosis     Arthritis     Back pain     Diabetes mellitus type II     Hyperlipidemia     Hypertension     NSTEMI (non-ST elevated myocardial infarction) 5/1/2022    Osteoporosis     Wears glasses      Past Surgical History:   Procedure Laterality Date     vocal cord nodules removed  long time ago     twice    APPENDECTOMY  within last 5yrs    CATHETERIZATION OF BOTH LEFT AND RIGHT HEART Left 5/6/2022    Procedure: CATHETERIZATION, HEART, BOTH LEFT AND RIGHT;  Surgeon: Michelet Vargas MD;  Location: [facility] CATH/EP LAB;  Service: Cardiology;  Laterality: Left;    FIXATION KYPHOPLASTY THORACIC SPINE      8-20-13    FOOT SURGERY      left 2nd toe was too long     HERNIA REPAIR  within last 5yrs    LEFT HEART CATHETERIZATION Left 5/2/2022    Procedure: Left heart cath;  Surgeon: Jose Echols MD;  Location: [facility] CATH/EP LAB;  Service: Cardiology;  Laterality: Left;    TONSILLECTOMY, ADENOIDECTOMY  long time ago       Review of Systems:   As documented in primary team H&P    Home Meds:   Prior to Admission medications    Medication Sig Start Date End Date Taking? Authorizing Provider   albuterol (PROVENTIL/VENTOLIN HFA) 90 mcg/actuation inhaler Inhale 2 puffs into the lungs every 6 (six) hours as needed. 4/15/22  Yes Historical Provider   aspirin (ECOTRIN) 81 MG EC tablet Take 81 mg by mouth once daily.   Yes Historical Provider   benazepriL (LOTENSIN) 20 MG tablet Take 20 mg by mouth once daily. 5/5/22  Yes Historical Provider   budesonide-formoterol 80-4.5 mcg (SYMBICORT) 80-4.5 mcg/actuation HFAA Inhale 2 puffs into the lungs 2 (two) times daily as needed (Asthma). 4/16/22  Yes Historical Provider   calcium carbonate (OS-TK) 600 mg (1,500 mg) Tab Take 600 mg by mouth 2 (two) times daily with meals.   Yes Historical Provider   citalopram (CELEXA) 40 MG tablet Take 1 tablet (40 mg total) by mouth once daily. 2/4/21  Yes Brown MALDONADO" MD Colleen   donepeziL (ARICEPT) 5 MG tablet Take 5 mg by mouth nightly. 4/15/22  Yes Historical Provider   ergocalciferol (ERGOCALCIFEROL) 50,000 unit Cap TAKE 1 CAPSULE (50,000 UNITS TOTAL) EVERY 7 DAYS. 5/4/22  Yes Brown Macias MD   omeprazole (PRILOSEC) 20 MG capsule Take 1 capsule (20 mg total) by mouth once daily. 2/4/21  Yes Brown Macias MD   oxybutynin (DITROPAN) 5 MG Tab Take 1 tablet (5 mg total) by mouth 2 (two) times daily. 2/4/21  Yes Brown Macias MD   simvastatin (ZOCOR) 40 MG tablet Take 1 tablet (40 mg total) by mouth every evening. 2/4/21  Yes Brown Macias MD   cetirizine HCl (ZYRTEC ORAL) Take 1 tablet by mouth daily as needed (Allergies).    Historical Provider   ferrous gluconate 324 mg (37.5 mg iron) Tab tablet Take 324 mg by mouth once daily. 10/25/22   Historical Provider   furosemide (LASIX) 40 MG tablet Take 40 mg by mouth once daily. 5/24/22 5/24/23  Historical Provider   glucosamine-chondroitin 500-400 mg tablet Take 1 tablet by mouth once daily.    Historical Provider   HYDROcodone-acetaminophen (NORCO) 5-325 mg per tablet Take 1 tablet by mouth every 6 (six) hours as needed for Pain.  Patient not taking: Reported on 11/22/2022 12/16/21   Hakan Bhat MD   loperamide (IMODIUM A-D) 2 mg Tab Take 2 tablets by mouth initially then 1 tablet after each loose stool as needed for diarrhea.    Historical Provider   metFORMIN (GLUCOPHAGE) 500 MG tablet Take 1 tablet (500 mg total) by mouth 2 (two) times daily with meals. 5/7/22 11/22/22  Wili Burgess DO   multivitamin capsule Take 1 capsule by mouth once daily.    Historical Provider   predniSONE (DELTASONE) 20 MG tablet TAKE ONE TABLET BY MOUTH TWICE DAILY FOR 3 DAYS, THEN 1 TABLET DAILY FOR 3 DAYS 10/25/22   Historical Provider   ALLERGY RELIEF, FEXOFENADINE, 180 mg tablet Take 180 mg by mouth once daily. 1/17/22 5/6/22  Historical Provider   ezetimibe-simvastatin 10-40 mg (VYTORIN) 10-40 mg per  tablet Take 1 tablet by mouth.  5/3/22  Historical Provider   lisinopril-hydrochlorothiazide (PRINZIDE,ZESTORETIC) 20-12.5 mg per tablet Take 1 tablet by mouth once daily.  6/10/16 5/3/22  Historical Provider     Scheduled Meds:    acetaminophen  1,000 mg Oral Q8H    aspirin  81 mg Oral Daily    atorvastatin  20 mg Oral Daily    buPROPion  150 mg Oral Daily    calcitonin (salmon)  1 spray Alternating Nostrils Daily    calcium carbonate  1,000 mg Oral Daily    citalopram  40 mg Oral Daily    donepeziL  5 mg Oral Nightly    enoxparin  40 mg Subcutaneous Q12H    fluticasone furoate-vilanteroL  1 puff Inhalation Daily    LIDOcaine  1 patch Transdermal Daily    lisinopriL  10 mg Oral Daily    methocarbamoL  750 mg Oral QID    oxybutynin  5 mg Oral BID    pantoprazole  40 mg Oral Daily    vitamin D  1,000 Units Oral Daily     Continuous Infusions:   PRN Meds:acetaminophen, albuterol, dextrose 10%, dextrose 10%, glucagon (human recombinant), glucose, glucose, HYDROcodone-acetaminophen, hydrOXYzine pamoate, insulin aspart U-100, naloxone, sodium chloride 0.9%  Anticoagulants/Antiplatelets: aspirin    Allergies:   Review of patient's allergies indicates:   Allergen Reactions    Sulfacetamide sodium      Pain perineal area    Sulfasalazine Hives    Adhesive Itching     SKIN GETS RED WITH TAPE AND BANDAIDS    Adhesive tape-silicones Itching     SKIN GETS RED WITH TAPE AND BANDAIDS    Sulfa (sulfonamide antibiotics) Rash     Sedation Hx: have not been any systemic reactions    Labs:  No results for input(s): INR in the last 168 hours.    Invalid input(s):  PT,  PTT    Recent Labs   Lab 11/28/22  0334   WBC 6.93   HGB 9.2*   HCT 29.4*   MCV 84         Recent Labs   Lab 11/28/22  0334         K 4.1      CO2 26   BUN 15   CREATININE 0.6   CALCIUM 9.4   MG 1.4*   ALT 18   AST 22   ALBUMIN 2.9*   BILITOT 0.3         Vitals:  Temp: 98.1 °F (36.7 °C) (11/28/22 0744)  Pulse: 91 (11/28/22 0810)  Resp: 16  (11/28/22 0810)  BP: (!) 159/67 (11/28/22 0744)  SpO2: (!) 94 % (11/28/22 0810)     Physical Exam:  ASA: 2  Mallampati: 2    General: no acute distress  Mental Status: alert and oriented to person, place and time  HEENT: normocephalic, atraumatic  Chest: unlabored breathing  Heart: regular heart rate  Abdomen: nondistended  Extremity: moves all extremities, Pain within mid-low back    Plan:   77-year-old woman with a history of hypertension, hyperlipidemia, T2DM, CAD, COPD on 2L, severe aortic stenosis, and chronic back pain who presents for severe lower back pain. CT revealed a T12 veterbral body fracture, which appears to has progressed when compared to  cardiac CTA in 10/28/2022.     Will proceed with T12 vertebral body augmentation     Sedation Plan: Local and Fentanyl     Kirby Jackson DO

## 2022-11-28 NOTE — PLAN OF CARE
Problem: Adult Inpatient Plan of Care  Goal: Plan of Care Review  Outcome: Ongoing, Progressing  Goal: Patient-Specific Goal (Individualized)  Outcome: Ongoing, Progressing  Goal: Absence of Hospital-Acquired Illness or Injury  Outcome: Ongoing, Progressing  Goal: Optimal Comfort and Wellbeing  Outcome: Ongoing, Progressing  Goal: Readiness for Transition of Care  Outcome: Ongoing, Progressing     Problem: Bariatric Environmental Safety  Goal: Safety Maintained with Care  Outcome: Ongoing, Progressing     Problem: Fall Injury Risk  Goal: Absence of Fall and Fall-Related Injury  Outcome: Ongoing, Progressing     Problem: Skin Injury Risk Increased  Goal: Skin Health and Integrity  Outcome: Ongoing, Progressing     Problem: Diabetes Comorbidity  Goal: Blood Glucose Level Within Targeted Range  Outcome: Ongoing, Progressing     Problem: Infection  Goal: Absence of Infection Signs and Symptoms  Outcome: Ongoing, Progressing     POC reviewed with the patient and they verbalized understanding. All comments and concerns addressed. Bed locked in lowest position with bed alarm set, call light within reach. Safety precautions maintained. VSS, see flowsheets. Pt to remains NPO at midnight, pt aware. Will continue to monitor for changes to POC and clinical condition.

## 2022-11-28 NOTE — ASSESSMENT & PLAN NOTE
Came in with about 3 d of watery diarrhea, however resolved upon admission.    Interval development of multiple episodes of diarrhea initially described by bedside nurse as dark and watery, however later more formed. C. Diff testing initially ordered however per RN and Charge nurse(s), too formed to send for testing.    Having soft formed stool, not diarrhea 11/28.   No c.diff sample collected.     - ctm  - daily CMP  - lactobacillus, susepect abx associated diarrhea

## 2022-11-28 NOTE — PROCEDURES
Radiology postop note    Pre Op Diagnosis: T12 vertebral fracture  Post Op Diagnosis: Same    Procedure: T12 vertebroplasty    Procedure performed by: Leslie    Written Informed Consent Obtained: Yes  Specimen Removed: NO  Estimated Blood Loss: Minimal    Findings:   T12 vertebroplasty without acute complication.     Pt to remain flat for one hour, then may sit up at 45 degrees for two hours.     Patient tolerated procedure well.    Kirby Jackson, DO  Interventional Radiology

## 2022-11-28 NOTE — PT/OT/SLP PROGRESS
Occupational Therapy Treatment    Patient Name:  Daryleen G Moran   MRN:  [id number]  Admit Date: 11/21/2022  Admitting Diagnosis:  Compression fracture of thoracic vertebra   Length of Stay: 0 days  Recent Surgery: * No surgery found *      Recommendations:     Discharge Recommendations: nursing facility, skilled  Discharge Equipment Recommendations:  walker, rolling, 3-in-1 commode, shower chair  Barriers to discharge:  Other (Comment) (Increased skilled assistance required)    Plan:     Patient to be seen 4 x/week to address the above listed problems via self-care/home management, therapeutic activities, therapeutic exercises, neuromuscular re-education  Plan of Care Expires: 12/23/22  Plan of Care Reviewed with: patient    Assessment:   Daryleen G Moran is a 77 y.o. female with a medical diagnosis of Compression fracture of thoracic vertebra.  She presents with the following performance deficits affecting function: weakness, impaired endurance, impaired self care skills, impaired functional mobility, gait instability, decreased lower extremity function, decreased upper extremity function, decreased coordination, impaired balance, decreased safety awareness, pain, decreased ROM, impaired coordination, orthopedic precautions.      Pt tolerated session fairly this date and was willing to participate. Demonstrating continued increased pain, impaired LLE function, decreased activity tolerance, impaired endurance, increased fatigue, increased weakness, impaired balance and decreased safety awareness, requiring increased time and assistance to complete functional tasks. Patient requesting LLE knee be wrapped using ace wrap prior to mobility due to arthritic pain and reports of knee buckling during mobility. Education on unlikelihood of wrapping solving issue, however, willing to trial. Patient completed functional mobility of household distance ~20ft using RW, increased reports of knee and back pain. Patient completed BUE  "therex seated UIC. Patient is progressing towards established goals, and continues to benefit from acute skilled OT services to increase functional performance and improve quality of life. OT to continue to recommend SNF at discharge to improve pt functional independence and increase patient safety before returning home.      Rehab Prognosis: Fair; patient would benefit from acute skilled OT services to address these deficits and reach maximum level of function.        Subjective   Communicated with: Nurse prior to session.  Patient found HOB elevated with PureWick, peripheral IV upon OT entry to room. Pt agreeable to participate at this time.    Patient: " I don't want to but I know I need to. "    Pain/Comfort:  Pain Rating 1: 5/10  Location - Orientation 1: generalized  Location 1: back  Pain Addressed 1: Reposition, Distraction, Cessation of Activity  Pain Rating Post-Intervention 1: 7/10  Pain Rating 2: 0/10  Location - Side 2: Left  Location - Orientation 2: lateral  Location 2: knee  Pain Addressed 2: Reposition, Distraction, Cessation of Activity  Pain Rating Post-Intervention 2: other (see comments) (patient did not rate)    Objective:   Patient found with: PureWick, peripheral IV   General Precautions: Standard, Cardiac fall   Orthopedic Precautions:spinal precautions   Braces: LSO   Oxygen Device: Room Air  Vitals: BP (!) 110/56 (BP Location: Right arm, Patient Position: Lying)   Pulse 86   Temp 98 °F (36.7 °C) (Oral)   Resp 18   Ht 5' (1.524 m)   Wt 94.3 kg (207 lb 14.3 oz)   LMP  (LMP Unknown)   SpO2 (!) 89%   Breastfeeding No   BMI 40.60 kg/m²     Outcome Measures:  AMPAC 6 Click ADL: 17    Cognition:   Alert and Cooperative  Command following: follows one-step commands  Communication: clear/fluent    Occupational Performance:  Bed Mobility:    Patient completed Rolling/Turning to Left with  contact guard assistance  Patient completed Supine to Sit with contact guard assistance and minimum " assistance on L side of bed; required increased time with HOB elevated  Scooting anteriorly to EOB to have both feet planted on floor: contact guard assistance    Functional Mobility/Transfers:  Static Sitting EOB: SBA  Patient completed Sit <> Stand Transfer from EOB with contact guard assistance  with  rolling walker x 5 trials (EOB, chair and BSC levels)  Static Standing Balance: CGA  Dynamic Standing Balance: CGA  Patient completed BSC Transfer Step Transfer technique with minimum assistance with  rolling walker  Patient completed functional mobility of household distance ~20ft with Min A using RW; required increased time due to increased reports of pain and cueing for posture.      Activities of Daily Living:  Upper Body Dressing: stand by assistance to doff/don gown; Mod A to doff/don LSO brace x 2 trials  Toileting: maximal assistance to perform pericare in supported stance following BM    AMPAC 6 Click ADL:  AMPAC Total Score: 17    Treatment & Education:  -Pt education on OT role and POC.  -Importance of E/OOB activity with staff assistance, emphasis on daily participation  -BUE therex seated UIC x 12 each: chest press and shoulder flexion  -Safety during functional transfer and mobility ensured  -Patient provided with education on importance of Bilateral UB/LB integration during functional tasks for improvement in functional performance.   -Education provided/reviewed, questions answered within OT scope of practice.   -Patient demonstrates understanding and learning this date.         Patient left up in chair with all lines intact, call button in reach, and nurse notified    GOALS:   Multidisciplinary Problems       Occupational Therapy Goals          Problem: Occupational Therapy    Goal Priority Disciplines Outcome Interventions   Occupational Therapy Goal     OT, PT/OT Ongoing, Progressing    Description: Goals to be met by: 12/21/2022     Patient will increase functional independence with ADLs by  performing:    UE Dressing, including LSO brace, with Minimal Assistance.  LE Dressing with Minimal Assistance.  Grooming while seated with Set-up Assistance.  Toileting from toilet/bedside commode with Minimal Assistance for hygiene and clothing management.   Sitting at edge of bed x10 minutes with Supervision.  Toilet transfer to toilet/bedside commode with Stand-by Assistance and LRAD.                         Time Tracking:     OT Date of Treatment: 11/28/22  OT Start Time: 1139  OT Stop Time: 1222  OT Total Time (min): 43 min    Billable Minutes:Self Care/Home Management 30  Therapeutic Exercise 13      11/28/2022

## 2022-11-28 NOTE — PLAN OF CARE
[facility] Care Plan    POC reviewed with Daryleen G Moran at 0300. Pt verbalized understanding. Questions and concerns addressed. No acute events overnight. Pt progressing toward goals. Will continue to monitor. See below and flowsheets for full assessment and VS info.     Is this a stroke patient? no    Neuro:  [place] Coma Scale  Best Eye Response: 4-->(E4) spontaneous  Best Motor Response: 6-->(M6) obeys commands  Best Verbal Response: 5-->(V5) oriented  [place] Coma Scale Score: 15  Assessment Qualifiers: patient not sedated/intubated, no eye obstruction present  Pupil PERRLA: yes     24hr Temp:  [97.7 °F (36.5 °C)-98.7 °F (37.1 °C)]     CV:   Rhythm: normal sinus rhythm  BP goals:   SBP < 180  MAP > 65    Resp:   O2 Device (Oxygen Therapy): room air       Plan: N/A    GI/:     Diet/Nutrition Received: 2 gram sodium  Last Bowel Movement: 11/27/22  Voiding Characteristics: external catheter    Intake/Output Summary (Last 24 hours) at 11/28/2022 0320  Last data filed at 11/28/2022 0000  Gross per 24 hour   Intake 980 ml   Output 2400 ml   Net -1420 ml     Unmeasured Output  Urine Occurrence: 1  Stool Occurrence: 1  Pad Count: 1    Labs/Accuchecks:  Recent Labs   Lab 11/27/22  1446   WBC 6.65   RBC 3.24*   HGB 8.5*   HCT 28.0*         Recent Labs   Lab 11/27/22  1446   *   K 4.3   CO2 26      BUN 12   CREATININE 0.7   ALKPHOS 68   ALT 17   AST 20   BILITOT 0.3    No results for input(s): PROTIME, INR, APTT, HEPANTIXA in the last 168 hours. No results for input(s): CPK, CPKMB, TROPONINI, MB in the last 168 hours.    Electrolytes: N/A - electrolytes WDL  Accuchecks: Q6H    Gtts:      LDA/Wounds:  Lines/Drains/Airways       Drain  Duration             Female External Urinary Catheter 11/21/22 0938 6 days                  Wounds: No  Wound care consulted: No

## 2022-11-28 NOTE — PLAN OF CARE
Problem: Adult Inpatient Plan of Care  Goal: Plan of Care Review  Outcome: Ongoing, Progressing  Goal: Patient-Specific Goal (Individualized)  Outcome: Ongoing, Progressing  Goal: Absence of Hospital-Acquired Illness or Injury  Outcome: Ongoing, Progressing  Goal: Optimal Comfort and Wellbeing  Outcome: Ongoing, Progressing  Goal: Readiness for Transition of Care  Outcome: Ongoing, Progressing     Problem: Bariatric Environmental Safety  Goal: Safety Maintained with Care  Outcome: Ongoing, Progressing     Problem: Fall Injury Risk  Goal: Absence of Fall and Fall-Related Injury  Outcome: Ongoing, Progressing     Problem: Skin Injury Risk Increased  Goal: Skin Health and Integrity  Outcome: Ongoing, Progressing     Problem: Diabetes Comorbidity  Goal: Blood Glucose Level Within Targeted Range  Outcome: Ongoing, Progressing     Problem: Infection  Goal: Absence of Infection Signs and Symptoms  Outcome: Ongoing, Progressing

## 2022-11-29 LAB
ALBUMIN SERPL BCP-MCNC: 2.8 G/DL (ref 3.5–5.2)
ALP SERPL-CCNC: 69 U/L (ref 55–135)
ALT SERPL W/O P-5'-P-CCNC: 23 U/L (ref 10–44)
ANION GAP SERPL CALC-SCNC: 8 MMOL/L (ref 8–16)
AST SERPL-CCNC: 22 U/L (ref 10–40)
BACTERIA STL CULT: NORMAL
BACTERIA STL CULT: NORMAL
BASOPHILS # BLD AUTO: 0.08 K/UL (ref 0–0.2)
BASOPHILS NFR BLD: 1.2 % (ref 0–1.9)
BILIRUB SERPL-MCNC: 0.3 MG/DL (ref 0.1–1)
BUN SERPL-MCNC: 17 MG/DL (ref 8–23)
C DIFF GDH STL QL: NEGATIVE
C DIFF TOX A+B STL QL IA: NEGATIVE
CALCIUM SERPL-MCNC: 9.2 MG/DL (ref 8.7–10.5)
CHLORIDE SERPL-SCNC: 101 MMOL/L (ref 95–110)
CO2 SERPL-SCNC: 28 MMOL/L (ref 23–29)
CREAT SERPL-MCNC: 0.6 MG/DL (ref 0.5–1.4)
DIFFERENTIAL METHOD: ABNORMAL
EOSINOPHIL # BLD AUTO: 0.2 K/UL (ref 0–0.5)
EOSINOPHIL NFR BLD: 2.7 % (ref 0–8)
ERYTHROCYTE [DISTWIDTH] IN BLOOD BY AUTOMATED COUNT: 15.3 % (ref 11.5–14.5)
EST. GFR  (NO RACE VARIABLE): >60 ML/MIN/1.73 M^2
GLUCOSE SERPL-MCNC: 101 MG/DL (ref 70–110)
HCT VFR BLD AUTO: 28.9 % (ref 37–48.5)
HGB BLD-MCNC: 8.7 G/DL (ref 12–16)
IMM GRANULOCYTES # BLD AUTO: 0.02 K/UL (ref 0–0.04)
IMM GRANULOCYTES NFR BLD AUTO: 0.3 % (ref 0–0.5)
LYMPHOCYTES # BLD AUTO: 1.4 K/UL (ref 1–4.8)
LYMPHOCYTES NFR BLD: 20.5 % (ref 18–48)
MAGNESIUM SERPL-MCNC: 1.7 MG/DL (ref 1.6–2.6)
MCH RBC QN AUTO: 26.2 PG (ref 27–31)
MCHC RBC AUTO-ENTMCNC: 30.1 G/DL (ref 32–36)
MCV RBC AUTO: 87 FL (ref 82–98)
MONOCYTES # BLD AUTO: 0.8 K/UL (ref 0.3–1)
MONOCYTES NFR BLD: 11.1 % (ref 4–15)
NEUTROPHILS # BLD AUTO: 4.3 K/UL (ref 1.8–7.7)
NEUTROPHILS NFR BLD: 64.2 % (ref 38–73)
NRBC BLD-RTO: 0 /100 WBC
PHOSPHATE SERPL-MCNC: 4.2 MG/DL (ref 2.7–4.5)
PLATELET # BLD AUTO: 315 K/UL (ref 150–450)
PMV BLD AUTO: 11.2 FL (ref 9.2–12.9)
POCT GLUCOSE: 117 MG/DL (ref 70–110)
POCT GLUCOSE: 129 MG/DL (ref 70–110)
POCT GLUCOSE: 144 MG/DL (ref 70–110)
POTASSIUM SERPL-SCNC: 4 MMOL/L (ref 3.5–5.1)
PROT SERPL-MCNC: 5.4 G/DL (ref 6–8.4)
RBC # BLD AUTO: 3.32 M/UL (ref 4–5.4)
SODIUM SERPL-SCNC: 137 MMOL/L (ref 136–145)
WBC # BLD AUTO: 6.73 K/UL (ref 3.9–12.7)

## 2022-11-29 PROCEDURE — 25000003 PHARM REV CODE 250: Performed by: STUDENT IN AN ORGANIZED HEALTH CARE EDUCATION/TRAINING PROGRAM

## 2022-11-29 PROCEDURE — 80053 COMPREHEN METABOLIC PANEL: CPT | Performed by: STUDENT IN AN ORGANIZED HEALTH CARE EDUCATION/TRAINING PROGRAM

## 2022-11-29 PROCEDURE — 97530 THERAPEUTIC ACTIVITIES: CPT | Mod: CQ

## 2022-11-29 PROCEDURE — 96372 THER/PROPH/DIAG INJ SC/IM: CPT | Performed by: STUDENT IN AN ORGANIZED HEALTH CARE EDUCATION/TRAINING PROGRAM

## 2022-11-29 PROCEDURE — 36415 COLL VENOUS BLD VENIPUNCTURE: CPT | Performed by: STUDENT IN AN ORGANIZED HEALTH CARE EDUCATION/TRAINING PROGRAM

## 2022-11-29 PROCEDURE — 87449 NOS EACH ORGANISM AG IA: CPT | Performed by: STUDENT IN AN ORGANIZED HEALTH CARE EDUCATION/TRAINING PROGRAM

## 2022-11-29 PROCEDURE — 94640 AIRWAY INHALATION TREATMENT: CPT

## 2022-11-29 PROCEDURE — 99226 PR SUBSEQUENT OBSERVATION CARE,LEVEL III: ICD-10-PCS | Mod: GC,,, | Performed by: HOSPITALIST

## 2022-11-29 PROCEDURE — 94761 N-INVAS EAR/PLS OXIMETRY MLT: CPT

## 2022-11-29 PROCEDURE — 84100 ASSAY OF PHOSPHORUS: CPT | Performed by: STUDENT IN AN ORGANIZED HEALTH CARE EDUCATION/TRAINING PROGRAM

## 2022-11-29 PROCEDURE — G0378 HOSPITAL OBSERVATION PER HR: HCPCS

## 2022-11-29 PROCEDURE — 99226 PR SUBSEQUENT OBSERVATION CARE,LEVEL III: CPT | Mod: GC,,, | Performed by: HOSPITALIST

## 2022-11-29 PROCEDURE — 83735 ASSAY OF MAGNESIUM: CPT | Performed by: STUDENT IN AN ORGANIZED HEALTH CARE EDUCATION/TRAINING PROGRAM

## 2022-11-29 PROCEDURE — 63600175 PHARM REV CODE 636 W HCPCS: Performed by: STUDENT IN AN ORGANIZED HEALTH CARE EDUCATION/TRAINING PROGRAM

## 2022-11-29 PROCEDURE — 85025 COMPLETE CBC W/AUTO DIFF WBC: CPT | Performed by: STUDENT IN AN ORGANIZED HEALTH CARE EDUCATION/TRAINING PROGRAM

## 2022-11-29 RX ORDER — METHOCARBAMOL 750 MG/1
750 TABLET, FILM COATED ORAL 4 TIMES DAILY PRN
Qty: 40 TABLET | Refills: 0 | Status: SHIPPED | OUTPATIENT
Start: 2022-11-29 | End: 2022-12-10

## 2022-11-29 RX ORDER — CALCITONIN SALMON 200 [IU]/.09ML
1 SPRAY, METERED NASAL DAILY
Qty: 3.7 ML | Refills: 0 | Status: SHIPPED | OUTPATIENT
Start: 2022-11-29 | End: 2023-02-06

## 2022-11-29 RX ORDER — LIDOCAINE 50 MG/G
1 PATCH TOPICAL DAILY
Qty: 15 PATCH | Refills: 0 | Status: SHIPPED | OUTPATIENT
Start: 2022-11-29 | End: 2023-01-03 | Stop reason: CLARIF

## 2022-11-29 RX ADMIN — METHOCARBAMOL 750 MG: 750 TABLET ORAL at 09:11

## 2022-11-29 RX ADMIN — METHOCARBAMOL 750 MG: 750 TABLET ORAL at 04:11

## 2022-11-29 RX ADMIN — METHOCARBAMOL 750 MG: 750 TABLET ORAL at 01:11

## 2022-11-29 RX ADMIN — HYDROXYZINE PAMOATE 50 MG: 25 CAPSULE ORAL at 09:11

## 2022-11-29 RX ADMIN — LIDOCAINE 1 PATCH: 50 PATCH CUTANEOUS at 08:11

## 2022-11-29 RX ADMIN — METHOCARBAMOL 750 MG: 750 TABLET ORAL at 08:11

## 2022-11-29 RX ADMIN — PANTOPRAZOLE SODIUM 40 MG: 40 TABLET, DELAYED RELEASE ORAL at 08:11

## 2022-11-29 RX ADMIN — OXYBUTYNIN CHLORIDE 5 MG: 5 TABLET ORAL at 09:11

## 2022-11-29 RX ADMIN — CITALOPRAM HYDROBROMIDE 40 MG: 20 TABLET ORAL at 08:11

## 2022-11-29 RX ADMIN — ENOXAPARIN SODIUM 40 MG: 40 INJECTION SUBCUTANEOUS at 08:11

## 2022-11-29 RX ADMIN — BUPROPION HYDROCHLORIDE 150 MG: 150 TABLET, FILM COATED, EXTENDED RELEASE ORAL at 08:11

## 2022-11-29 RX ADMIN — CALCIUM CARBONATE (ANTACID) CHEW TAB 500 MG 1000 MG: 500 CHEW TAB at 08:11

## 2022-11-29 RX ADMIN — ASPIRIN 81 MG: 81 TABLET, COATED ORAL at 08:11

## 2022-11-29 RX ADMIN — FLUTICASONE FUROATE AND VILANTEROL TRIFENATATE 1 PUFF: 100; 25 POWDER RESPIRATORY (INHALATION) at 08:11

## 2022-11-29 RX ADMIN — CALCITONIN SALMON 1 SPRAY: 200 SPRAY, METERED NASAL at 08:11

## 2022-11-29 RX ADMIN — Medication 1 CAPSULE: at 08:11

## 2022-11-29 RX ADMIN — LISINOPRIL 10 MG: 5 TABLET ORAL at 08:11

## 2022-11-29 RX ADMIN — ENOXAPARIN SODIUM 40 MG: 40 INJECTION SUBCUTANEOUS at 09:11

## 2022-11-29 RX ADMIN — DONEPEZIL HYDROCHLORIDE 5 MG: 5 TABLET, FILM COATED ORAL at 09:11

## 2022-11-29 RX ADMIN — ACETAMINOPHEN 1000 MG: 500 TABLET ORAL at 09:11

## 2022-11-29 RX ADMIN — OXYBUTYNIN CHLORIDE 5 MG: 5 TABLET ORAL at 08:11

## 2022-11-29 RX ADMIN — ACETAMINOPHEN 1000 MG: 500 TABLET ORAL at 01:11

## 2022-11-29 RX ADMIN — ATORVASTATIN CALCIUM 20 MG: 20 TABLET, FILM COATED ORAL at 08:11

## 2022-11-29 RX ADMIN — CHOLECALCIFEROL TAB 25 MCG (1000 UNIT) 1000 UNITS: 25 TAB at 08:11

## 2022-11-29 RX ADMIN — ACETAMINOPHEN 1000 MG: 500 TABLET ORAL at 06:11

## 2022-11-29 NOTE — PLAN OF CARE
CM in communication with daughter who states that [facility] is preference.  [facility] to submit for insurance auth.   11/29/22 1011   Post-Acute Status   Post-Acute Authorization Placement   Post-Acute Placement Status Pending payor review/awaiting authorization (if required)

## 2022-11-29 NOTE — PLAN OF CARE
NURSING HOME ORDERS    11/29/2022  [facility]  [facility]  [address]  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       Patient is homebound due to:  Compression fracture of thoracic vertebra    Allergies:  Review of patient's allergies indicates:   Allergen Reactions    Sulfacetamide sodium      Pain perineal area    Sulfasalazine Hives    Adhesive Itching     SKIN GETS RED WITH TAPE AND BANDAIDS    Adhesive tape-silicones Itching     SKIN GETS RED WITH TAPE AND BANDAIDS    Sulfa (sulfonamide antibiotics) Rash       Vitals:  Routine    Diet: diabetic diet: 2000 calorie    Activities:   Activity as tolerated    Goals of Care Treatment Preferences:  Code Status: Full Code      Labs:  per facility protocol    Nursing Precautions:  Fall    Consults:   PT to evaluate and treat- 3 times a week and OT to evaluate and treat- 3 times a week     Miscellaneous Care: Routine Skin for Bedridden Patients:  Apply moisture barrier cream to all                   Diabetes Care:  SN to perform and educate Diabetic management with blood glucose monitoring: and Report CBG < 60 or > 350 to physician.      Medications: Discontinue all previous medication orders, if any. See new list below.     Medication List        START taking these medications      calcitonin (salmon) 200 unit/actuation nasal spray  Commonly known as:  FORTICAL  1 spray by Nasal route once daily. for 21 days     HYDROcodone-acetaminophen 5-325 mg per tablet  Commonly known as: NORCO  Take 1 tablet by mouth every 6 (six) hours as needed for Pain.     LIDOcaine 5 %  Commonly known as: LIDODERM  Place 1 patch onto the skin once daily. Apply to back. Remove & Discard patch within 12 hours or as directed by MD     methocarbamoL 750 MG Tab  Commonly known as: ROBAXIN  Take 1 tablet (750 mg total) by mouth 4 (four) times daily as needed (back pain).            CONTINUE taking these medications      albuterol 90 mcg/actuation inhaler  Commonly known as: PROVENTIL/VENTOLIN HFA  Inhale 2 puffs into the lungs every 6 (six) hours as needed.     aspirin 81 MG EC tablet  Commonly known as: ECOTRIN  Take 81 mg by mouth once daily.     benazepriL 20 MG tablet  Commonly known as: LOTENSIN  Take 20 mg by mouth once daily.     budesonide-formoterol 80-4.5 mcg 80-4.5 mcg/actuation Hfaa  Commonly known as: SYMBICORT  Inhale 2 puffs into the lungs 2 (two) times daily as needed (Asthma).     calcium carbonate 600 mg calcium (1,500 mg) Tab  Commonly known as: OS-TK  Take 600 mg by mouth 2 (two) times daily with meals.     citalopram 40 MG tablet  Commonly known as: CeleXA  Take 1 tablet (40 mg total) by mouth once daily.     donepeziL 5 MG tablet  Commonly known as: ARICEPT  Take 5 mg by mouth nightly.     ergocalciferol 50,000 unit Cap  Commonly known as: ERGOCALCIFEROL  TAKE 1 CAPSULE (50,000 UNITS TOTAL) EVERY 7 DAYS.     ferrous gluconate 324 mg (37.5 mg iron) Tab tablet  Take 324 mg by mouth once daily.     furosemide 40 MG tablet  Commonly known as: LASIX  Take 40 mg by mouth once daily.     glucosamine-chondroitin 500-400 mg tablet  Take 1 tablet by mouth once daily.     loperamide 2 mg Tab  Commonly known as: IMODIUM A-D  Take 2 tablets by mouth initially then 1 tablet after each loose stool as needed for diarrhea.     metFORMIN 500 MG tablet  Commonly known as: GLUCOPHAGE  Take  1 tablet (500 mg total) by mouth 2 (two) times daily with meals.     multivitamin capsule  Take 1 capsule by mouth once daily.     omeprazole 20 MG capsule  Commonly known as: PRILOSEC  Take 1 capsule (20 mg total) by mouth once daily.     oxybutynin 5 MG Tab  Commonly known as: DITROPAN  Take 1 tablet (5 mg total) by mouth 2 (two) times daily.     simvastatin 40 MG tablet  Commonly known as: ZOCOR  Take 1 tablet (40 mg total) by mouth every evening.     ZYRTEC ORAL  Take 1 tablet by mouth daily as needed (Allergies).            STOP taking these medications      predniSONE 20 MG tablet  Commonly known as: DELTASONE                Immunizations Administered as of 11/29/2022       Name Date Dose VIS Date Route Exp Date    COVID-19, vector-nr, rS-Ad26 (J&J) 3/12/2021 0.5 mL -- -- --    Lot: [id number]     External: Auto Reconciled From Outside Source             This patient has had both covid vaccinations    Some patients may experience side effects after vaccination.  These may include fever, headache, muscle or joint aches.  Most symptoms resolve with 24-48 hours and do not require urgent medical evaluation unless they persist for more than 72 hours or symptoms are concerning for an unrelated medical condition.          _________________________________  Bridgette Robles MD  11/29/2022

## 2022-11-29 NOTE — ASSESSMENT & PLAN NOTE
77-year-old woman with a history of COPD on 2L, T2DM, severe AS, morbid obesity, former chronic smoker chronic back pain here with new onset T12 fracture and subacute L2 fx. Most recently admitted to an OSH for intractable back pain after bending forward and was found to have a L2 compression fracture. She was managed conservatively and was discharged to a nursing home (?). Again was readmitted to the same OSH with similar symptosm. Imaging here with stable remote L2 compression fracture however noted new T12 compression fx. Has a history of osteoporosis (as noted in DEXA completed in 2019 with T score of -3.6) and has risk      -scheduled tylenol, robaxin, lidocaine patches  -norco 5mg for breakthrough pain  -intranasal calcitonin  -TLSO brace placed; needs it OOB  -NSGY follow up in 6 weeks  -PT/OT; recommending SNF, awaiting placement  -Plan for inpatient kyphoplasty with IR 11/28  - s/p kyphoplasty, back pain improved

## 2022-11-29 NOTE — CARE UPDATE
Notified that patient has had recurrent diarrhea x3. Will check C.diff prior to starting anti-diarrheal agents.     Bridgette Robles MD

## 2022-11-29 NOTE — PT/OT/SLP PROGRESS
Physical Therapy Treatment    Patient Name:  Daryleen G Moran   MRN:  [id number]    Recommendations:     Discharge Recommendations:  nursing facility, skilled   Discharge Equipment Recommendations: bedside commode, walker, rolling   Barriers to discharge: Decreased caregiver support    Assessment:     Daryleen G Moran is a 77 y.o. female admitted with a medical diagnosis of Compression fracture of thoracic vertebra.  She presents with the following impairments/functional limitations:  weakness, impaired endurance, impaired functional mobility, impaired self care skills, gait instability, decreased lower extremity function, decreased upper extremity function, orthopedic precautions. Pt participated well with therapy, but very limited with activity on this date due to pt rating of 12/10 to (L) knee with WB activity. Pt will continue to benefit from skilled PT services to improve all deficits noted above. Resume PT POC as indicated.     Rehab Prognosis: Good; patient would benefit from acute skilled PT services to address these deficits and reach maximum level of function.    Recent Surgery: * No surgery found *      Plan:     During this hospitalization, patient to be seen 4 x/week to address the identified rehab impairments via gait training, therapeutic activities, therapeutic exercises, neuromuscular re-education and progress toward the following goals:    Plan of Care Expires:  12/23/22    Subjective     Chief Complaint: pain to (L) knee w/ WB activity  Patient/Family Comments/goals: none stated  Pain/Comfort:  Pain Rating 1:  (not rated)  Location - Side 1: Left  Location - Orientation 1: lateral  Location 1: knee  Pain Rating Post-Intervention 1: other (see comments) (12/10 with WB activity)      Objective:     Communicated with nursing prior to session.  Patient found supine with  (lines intact) upon PT entry to room.     General Precautions: Standard, fall   Orthopedic Precautions:spinal precautions   Braces:  LSO  Respiratory Status: Room air     Functional Mobility:  Bed Mobility:  Scooting: stand by assistance  Supine to Sit: stand by assistance  Sit to Supine: contact guard assistance  Transfers:  Sit to Stand: to/from EOB x2 trials contact guard assistance and minimum assistance with rolling walker  Gait: Pt took 4 steps forward/backwards with RW and CGA. Activity ceased 2/2 severe knee pain reported by patient.   Balance: Sitting, and standing balance good.       AM-PAC 6 CLICK MOBILITY  Turning over in bed (including adjusting bedclothes, sheets and blankets)?: 3  Sitting down on and standing up from a chair with arms (e.g., wheelchair, bedside commode, etc.): 3  Moving from lying on back to sitting on the side of the bed?: 3  Moving to and from a bed to a chair (including a wheelchair)?: 2  Need to walk in hospital room?: 3  Climbing 3-5 steps with a railing?: 2  Basic Mobility Total Score: 16       Treatment & Education:  -Answered all questions/concerns within PTA scope of practice.   -Donned/Doffed LSO with assistance     Patient left HOB elevated with all lines intact, call button in reach, and nursing notified..    GOALS:   Multidisciplinary Problems       Physical Therapy Goals          Problem: Physical Therapy    Goal Priority Disciplines Outcome Goal Variances Interventions   Physical Therapy Goal     PT, PT/OT Ongoing, Progressing     Description: PT goals until 11/30/22    1. Pt supine to sit with mod independent-not met  2. Pt sit to supine with mod independent-not met  3. Pt sit to stand with RW with supervision-not met  4. Pt to perform gait 75ft with RW with CGA.-not met  5. Pt to perform B LE exs in sitting or supine x 10 reps to strengthen B LE to improve functional mobility.-not met                         Time Tracking:     PT Received On: 11/29/22  PT Start Time: 1407     PT Stop Time: 1423  PT Total Time (min): 16 min     Billable Minutes: Therapeutic Activity 16    Treatment Type:  Treatment  PT/PTA: PTA     PTA Visit Number: 2     11/29/2022

## 2022-11-29 NOTE — SUBJECTIVE & OBJECTIVE
Interval History:   NAEON. Improving back pain post kyphoplasty. Awaiting SNF.     Review of Systems   Constitutional:  Negative for chills, fatigue and fever.   HENT:  Negative for congestion and sore throat.    Eyes:  Negative for visual disturbance.   Respiratory:  Negative for cough, shortness of breath and wheezing.    Cardiovascular:  Negative for chest pain, palpitations and leg swelling.   Gastrointestinal:  Negative for abdominal pain, blood in stool, diarrhea, nausea and vomiting.   Endocrine: Negative for polyuria.   Genitourinary:  Negative for dysuria, flank pain and frequency.   Musculoskeletal:  Positive for arthralgias and back pain. Negative for myalgias.   Skin:  Negative for pallor and rash.   Neurological:  Negative for weakness, light-headedness, numbness and headaches.   Objective:     Vital Signs (Most Recent):  Temp: 98.4 °F (36.9 °C) (11/29/22 1218)  Pulse: 85 (11/29/22 1218)  Resp: 16 (11/29/22 1218)  BP: (!) 107/53 (11/29/22 1218)  SpO2: (!) 94 % (11/29/22 1218)   Vital Signs (24h Range):  Temp:  [96.5 °F (35.8 °C)-98.5 °F (36.9 °C)] 98.4 °F (36.9 °C)  Pulse:  [81-94] 85  Resp:  [15-19] 16  SpO2:  [87 %-94 %] 94 %  BP: (100-116)/(43-55) 107/53     Weight: 94.3 kg (207 lb 14.3 oz)  Body mass index is 40.6 kg/m².    Intake/Output Summary (Last 24 hours) at 11/29/2022 1324  Last data filed at 11/29/2022 0636  Gross per 24 hour   Intake --   Output 600 ml   Net -600 ml        Physical Exam  Vitals and nursing note reviewed.   Constitutional:       General: She is not in acute distress.     Appearance: She is obese. She is not ill-appearing or toxic-appearing.   HENT:      Head: Normocephalic and atraumatic.      Mouth/Throat:      Mouth: Mucous membranes are moist.      Pharynx: No oropharyngeal exudate.   Eyes:      Extraocular Movements: Extraocular movements intact.      Pupils: Pupils are equal, round, and reactive to light.   Cardiovascular:      Rate and Rhythm: Normal rate and regular  rhythm.      Heart sounds: No murmur heard.  Pulmonary:      Effort: Pulmonary effort is normal.      Breath sounds: No wheezing or rales.   Abdominal:      General: Abdomen is flat. Bowel sounds are normal. There is no distension.      Tenderness: There is no abdominal tenderness. There is no guarding.   Musculoskeletal:         General: Tenderness (ttp at junction of thoracic and lower spine) present. No swelling. Normal range of motion.      Cervical back: Normal range of motion.      Right lower leg: No edema.      Left lower leg: No edema.   Lymphadenopathy:      Cervical: No cervical adenopathy.   Skin:     General: Skin is warm.      Coloration: Skin is not jaundiced.      Findings: No bruising, erythema or rash.   Neurological:      General: No focal deficit present.      Mental Status: She is alert and oriented to person, place, and time.      Cranial Nerves: No cranial nerve deficit.      Sensory: No sensory deficit.      Motor: No weakness.      Deep Tendon Reflexes: Reflexes normal.       Significant Labs: All pertinent labs within the past 24 hours have been reviewed.    Significant Imaging: I have reviewed all pertinent imaging results/findings within the past 24 hours.

## 2022-11-29 NOTE — PROGRESS NOTES
[facility] - Neurosurgery ([facility])  [facility] Medicine  Progress Note    Patient Name: Daryleen G Moran  MRN: [id number]  Patient Class: OP- Observation   Admission Date: 11/21/2022  Length of Stay: 0 days  Attending Physician: Zarina Dukes MD  Primary Care Provider: Abhi De Oliveira Iv, MD        Subjective:     Principal Problem:Compression fracture of thoracic vertebra        HPI:  77-year-old woman with a history of hypertension, hyperlipidemia, T2DM, CAD, COPD on 2L, severe aortic stenosis, and chronic back pain who is brought to the ED for severe lower back pain as well as diarrhea. Per patient, she refers about 3 d of watery diarrhea,  describing about 3-4 episodes of watery diarrhea. Onset of symptoms started acutely and she denies any sick contacts though per documentation apparently the diarrhea started after eating pork. She was most recently admitted to an OSH for intractable back pain after bending forward and was found to have a L2 compression fracture. She was managed conservatively and was discharged to a nursing home (?). Again was readmitted to the same OSH with back pain and confusion and was found to be hyponatremic to 118. Appears to have improved conservatively and per documentation patient and family both refused, so was taken home. Since then patient refers she has been confined to a wheelchair and unable to take care of herself. Today, she refers terrible lower back pain that is aggravated with movement and alleviated with lying still. She denies any urinary or fecal incontinence, denies any saddle anesthesia. No fevers, chills, chest pain, SOB, abd pain, n/v/d, GIB. Denies any recent abx however care everywhere shows recent rx for augmentin and doxy. She has been taking ibuprofen without relief of her symptoms, however otherwise does not know what medications she takes    At the ED patient HDS, VSS. Labs not overtly unremarkable other than Na 131, K 3.1. Hgb stable. UA with 3+ leuk, 13  WBC. CT abd/pelvis with stable remote L2 compression fracture however noted new T12 compression fx. She was given oxy 5mg and admitted to hospital medicine for further workup.       Overview/Hospital Course:  Admitted for intractable back pain and diarrhea. Diarrhea resolved on admission. UA dirty and later grew enterococcus and patient with mild suprapubic discomfort, started on IV CTX. NSGY spine consulted given new T12 fx and subacute L2 fx and recommending TLSO brace and further imaging. PT consulted however NSGY requesting holding off on PT prior to imaging. She remains complaining of back pain and somewhat non-compliant with her care here. Once imaging completed (and suggestive of osteoporotic fractures) NSGY recommended TLSO brace OOB and follow up in 6wks. She remains on intranasal calcitonin, vit D, calcium, and multimodals with some relief. PT scheduled to see patient. Patient encouraged to participate with them and get out of bed in spite of her pain. Neuro IR consulted for an inpatient kyphoplasty. Patient now participating with PT and ambulating a bit more. Interval development of multiple episodes of diarrhea initially described by bedside nurse as dark and watery, however later more formed. C. Diff testing initially ordered however per RN and Charge nurse(s), too formed to send for testing. Diarrhea improving, no appropriate specimen for C.diff testing collected. IR kyphoplasty 11/28. Will need NSGY f/u at discharge as well as osteoporosis f/u with endocrinology. S/p 6 day course of ampicillin for enterococcus UTI. s/p kyphoplasty w improvement in pain. Awaiting placement.       Interval History:   NAEON. Improving back pain post kyphoplasty. Awaiting SNF.     Review of Systems   Constitutional:  Negative for chills, fatigue and fever.   HENT:  Negative for congestion and sore throat.    Eyes:  Negative for visual disturbance.   Respiratory:  Negative for cough, shortness of breath and wheezing.     Cardiovascular:  Negative for chest pain, palpitations and leg swelling.   Gastrointestinal:  Negative for abdominal pain, blood in stool, diarrhea, nausea and vomiting.   Endocrine: Negative for polyuria.   Genitourinary:  Negative for dysuria, flank pain and frequency.   Musculoskeletal:  Positive for arthralgias and back pain. Negative for myalgias.   Skin:  Negative for pallor and rash.   Neurological:  Negative for weakness, light-headedness, numbness and headaches.   Objective:     Vital Signs (Most Recent):  Temp: 98.4 °F (36.9 °C) (11/29/22 1218)  Pulse: 85 (11/29/22 1218)  Resp: 16 (11/29/22 1218)  BP: (!) 107/53 (11/29/22 1218)  SpO2: (!) 94 % (11/29/22 1218)   Vital Signs (24h Range):  Temp:  [96.5 °F (35.8 °C)-98.5 °F (36.9 °C)] 98.4 °F (36.9 °C)  Pulse:  [81-94] 85  Resp:  [15-19] 16  SpO2:  [87 %-94 %] 94 %  BP: (100-116)/(43-55) 107/53     Weight: 94.3 kg (207 lb 14.3 oz)  Body mass index is 40.6 kg/m².    Intake/Output Summary (Last 24 hours) at 11/29/2022 1324  Last data filed at 11/29/2022 0636  Gross per 24 hour   Intake --   Output 600 ml   Net -600 ml        Physical Exam  Vitals and nursing note reviewed.   Constitutional:       General: She is not in acute distress.     Appearance: She is obese. She is not ill-appearing or toxic-appearing.   HENT:      Head: Normocephalic and atraumatic.      Mouth/Throat:      Mouth: Mucous membranes are moist.      Pharynx: No oropharyngeal exudate.   Eyes:      Extraocular Movements: Extraocular movements intact.      Pupils: Pupils are equal, round, and reactive to light.   Cardiovascular:      Rate and Rhythm: Normal rate and regular rhythm.      Heart sounds: No murmur heard.  Pulmonary:      Effort: Pulmonary effort is normal.      Breath sounds: No wheezing or rales.   Abdominal:      General: Abdomen is flat. Bowel sounds are normal. There is no distension.      Tenderness: There is no abdominal tenderness. There is no guarding.   Musculoskeletal:          General: Tenderness (ttp at junction of thoracic and lower spine) present. No swelling. Normal range of motion.      Cervical back: Normal range of motion.      Right lower leg: No edema.      Left lower leg: No edema.   Lymphadenopathy:      Cervical: No cervical adenopathy.   Skin:     General: Skin is warm.      Coloration: Skin is not jaundiced.      Findings: No bruising, erythema or rash.   Neurological:      General: No focal deficit present.      Mental Status: She is alert and oriented to person, place, and time.      Cranial Nerves: No cranial nerve deficit.      Sensory: No sensory deficit.      Motor: No weakness.      Deep Tendon Reflexes: Reflexes normal.       Significant Labs: All pertinent labs within the past 24 hours have been reviewed.    Significant Imaging: I have reviewed all pertinent imaging results/findings within the past 24 hours.      Assessment/Plan:      * Compression fracture of thoracic vertebra  77-year-old woman with a history of COPD on 2L, T2DM, severe AS, morbid obesity, former chronic smoker chronic back pain here with new onset T12 fracture and subacute L2 fx. Most recently admitted to an OSH for intractable back pain after bending forward and was found to have a L2 compression fracture. She was managed conservatively and was discharged to a nursing home (?). Again was readmitted to the same OSH with similar symptosm. Imaging here with stable remote L2 compression fracture however noted new T12 compression fx. Has a history of osteoporosis (as noted in DEXA completed in 2019 with T score of -3.6) and has risk      -scheduled tylenol, robaxin, lidocaine patches  -norco 5mg for breakthrough pain  -intranasal calcitonin  -TLSO brace placed; needs it OOB  -NSGY follow up in 6 weeks  -PT/OT; recommending SNF, awaiting placement  -Plan for inpatient kyphoplasty with IR 11/28  - s/p kyphoplasty, back pain improved    UTI (urinary tract infection)  Patient with suprapubic  discomfort and urine grew E. faecalis    -s/p tx with ampicillin for 6 days      Diarrhea  Came in with about 3 d of watery diarrhea, however resolved upon admission.    Interval development of multiple episodes of diarrhea initially described by bedside nurse as dark and watery, however later more formed. C. Diff testing initially ordered however per RN and Charge nurse(s), too formed to send for testing.    Having soft formed stool, not diarrhea 11/28.   No c.diff sample collected.     - ctm  - daily CMP  - lactobacillus, susepect abx associated diarrhea    Aortic stenosis  Hx of Aortic stenosis and undergoing TAVR workup.    COPD, mild  Stable and on home 2L NC  -PRN nebs    Depression  -continuing home medications    Osteoporosis with current pathological fracture  Compression fx on admission.     - continue intranasal calcitonin for 4 weeks  - will need f/u with endocrinology outpatient     Diabetes mellitus type II  Patient's FSGs are controlled on current medication regimen.  Last A1c reviewed-   Lab Results   Component Value Date    HGBA1C 6.2 05/01/2022     Most recent fingerstick glucose reviewed-   Recent Labs   Lab 11/27/22  1149 11/27/22  1548 11/28/22  0833   POCTGLUCOSE 121* 151* 126*     Current correctional scale  High  Maintain anti-hyperglycemic dose as follows-   Antihyperglycemics (From admission, onward)    Start     Stop Route Frequency Ordered    11/21/22 1546  insulin aspart U-100 pen 1-10 Units         -- SubQ Before meals & nightly PRN 11/21/22 1448          - low dose correction scale  - add long-acting if needed  - BG goal 140-180 inpatient      VTE Risk Mitigation (From admission, onward)         Ordered     enoxaparin injection 40 mg  Every 12 hours         11/21/22 1448     IP VTE HIGH RISK PATIENT  Once         11/21/22 1448     Place sequential compression device  Until discontinued         11/21/22 1448                Discharge Planning   ISAAK: 11/30/2022     Code Status: Full Code    Is the patient medically ready for discharge?: Yes    Reason for patient still in hospital (select all that apply): Pending disposition  Discharge Plan A: Skilled Nursing Facility        Bridgette Robles MD  Department of Hospital Medicine   [facility] - Neurosurgery ([facility])

## 2022-11-30 VITALS
TEMPERATURE: 98 F | WEIGHT: 207.88 LBS | RESPIRATION RATE: 18 BRPM | BODY MASS INDEX: 40.81 KG/M2 | SYSTOLIC BLOOD PRESSURE: 100 MMHG | HEIGHT: 60 IN | HEART RATE: 97 BPM | OXYGEN SATURATION: 91 % | DIASTOLIC BLOOD PRESSURE: 52 MMHG

## 2022-11-30 LAB
E COLI SXT1 STL QL IA: NEGATIVE
E COLI SXT2 STL QL IA: NEGATIVE
POCT GLUCOSE: 123 MG/DL (ref 70–110)

## 2022-11-30 PROCEDURE — 25000003 PHARM REV CODE 250: Performed by: STUDENT IN AN ORGANIZED HEALTH CARE EDUCATION/TRAINING PROGRAM

## 2022-11-30 PROCEDURE — 63600175 PHARM REV CODE 636 W HCPCS: Performed by: STUDENT IN AN ORGANIZED HEALTH CARE EDUCATION/TRAINING PROGRAM

## 2022-11-30 PROCEDURE — 96372 THER/PROPH/DIAG INJ SC/IM: CPT | Performed by: STUDENT IN AN ORGANIZED HEALTH CARE EDUCATION/TRAINING PROGRAM

## 2022-11-30 PROCEDURE — 94640 AIRWAY INHALATION TREATMENT: CPT

## 2022-11-30 PROCEDURE — G0378 HOSPITAL OBSERVATION PER HR: HCPCS

## 2022-11-30 PROCEDURE — 94761 N-INVAS EAR/PLS OXIMETRY MLT: CPT

## 2022-11-30 PROCEDURE — 99217 PR OBSERVATION CARE DISCHARGE: ICD-10-PCS | Mod: GC,,, | Performed by: STUDENT IN AN ORGANIZED HEALTH CARE EDUCATION/TRAINING PROGRAM

## 2022-11-30 PROCEDURE — 99217 PR OBSERVATION CARE DISCHARGE: CPT | Mod: GC,,, | Performed by: STUDENT IN AN ORGANIZED HEALTH CARE EDUCATION/TRAINING PROGRAM

## 2022-11-30 PROCEDURE — 97535 SELF CARE MNGMENT TRAINING: CPT

## 2022-11-30 RX ORDER — LOPERAMIDE HYDROCHLORIDE 2 MG/1
2 CAPSULE ORAL 4 TIMES DAILY PRN
Status: DISCONTINUED | OUTPATIENT
Start: 2022-11-30 | End: 2022-11-30 | Stop reason: HOSPADM

## 2022-11-30 RX ORDER — LOPERAMIDE HCL 2 MG
2 TABLET ORAL 3 TIMES DAILY PRN
Qty: 15 TABLET | Refills: 0 | Status: SHIPPED | OUTPATIENT
Start: 2022-11-30

## 2022-11-30 RX ORDER — LOPERAMIDE HYDROCHLORIDE 2 MG/1
2 CAPSULE ORAL 4 TIMES DAILY PRN
Qty: 15 CAPSULE | Refills: 0 | Status: SHIPPED | OUTPATIENT
Start: 2022-11-30 | End: 2022-12-10

## 2022-11-30 RX ADMIN — BUPROPION HYDROCHLORIDE 150 MG: 150 TABLET, FILM COATED, EXTENDED RELEASE ORAL at 08:11

## 2022-11-30 RX ADMIN — Medication 1 CAPSULE: at 08:11

## 2022-11-30 RX ADMIN — CHOLECALCIFEROL TAB 25 MCG (1000 UNIT) 1000 UNITS: 25 TAB at 08:11

## 2022-11-30 RX ADMIN — FLUTICASONE FUROATE AND VILANTEROL TRIFENATATE 1 PUFF: 100; 25 POWDER RESPIRATORY (INHALATION) at 09:11

## 2022-11-30 RX ADMIN — LOPERAMIDE HYDROCHLORIDE 2 MG: 2 CAPSULE ORAL at 08:11

## 2022-11-30 RX ADMIN — OXYBUTYNIN CHLORIDE 5 MG: 5 TABLET ORAL at 08:11

## 2022-11-30 RX ADMIN — LISINOPRIL 10 MG: 5 TABLET ORAL at 08:11

## 2022-11-30 RX ADMIN — ASPIRIN 81 MG: 81 TABLET, COATED ORAL at 08:11

## 2022-11-30 RX ADMIN — METHOCARBAMOL 750 MG: 750 TABLET ORAL at 08:11

## 2022-11-30 RX ADMIN — PANTOPRAZOLE SODIUM 40 MG: 40 TABLET, DELAYED RELEASE ORAL at 08:11

## 2022-11-30 RX ADMIN — ENOXAPARIN SODIUM 40 MG: 40 INJECTION SUBCUTANEOUS at 08:11

## 2022-11-30 RX ADMIN — CITALOPRAM HYDROBROMIDE 40 MG: 20 TABLET ORAL at 08:11

## 2022-11-30 RX ADMIN — ACETAMINOPHEN 1000 MG: 500 TABLET ORAL at 03:11

## 2022-11-30 RX ADMIN — CALCIUM CARBONATE (ANTACID) CHEW TAB 500 MG 1000 MG: 500 CHEW TAB at 08:11

## 2022-11-30 RX ADMIN — LOPERAMIDE HYDROCHLORIDE 2 MG: 2 CAPSULE ORAL at 07:11

## 2022-11-30 RX ADMIN — METHOCARBAMOL 750 MG: 750 TABLET ORAL at 03:11

## 2022-11-30 RX ADMIN — CALCITONIN SALMON 1 SPRAY: 200 SPRAY, METERED NASAL at 08:11

## 2022-11-30 RX ADMIN — ATORVASTATIN CALCIUM 20 MG: 20 TABLET, FILM COATED ORAL at 08:11

## 2022-11-30 NOTE — DISCHARGE SUMMARY
[facility] - Neurosurgery ([facility])  [facility] Medicine  Discharge Summary      Patient Name: Daryleen G Moran  MRN: [id number]  LUIZ: [id number]  Patient Class: OP- Observation  Admission Date: 11/21/2022  Hospital Length of Stay: 0 days  Discharge Date and Time:  11/30/2022 3:09 PM  Attending Physician: Ramiro Chakraborty DO   Discharging Provider: Bridgette Robles MD  Primary Care Provider: Abhi De Oliveira Iv, MD  [facility] Medicine Team: [facility] Bridgette Robles MD  Primary Care Team: [facility]    HPI:   77-year-old woman with a history of hypertension, hyperlipidemia, T2DM, CAD, COPD on 2L, severe aortic stenosis, and chronic back pain who is brought to the ED for severe lower back pain as well as diarrhea. Per patient, she refers about 3 d of watery diarrhea,  describing about 3-4 episodes of watery diarrhea. Onset of symptoms started acutely and she denies any sick contacts though per documentation apparently the diarrhea started after eating pork. She was most recently admitted to an OSH for intractable back pain after bending forward and was found to have a L2 compression fracture. She was managed conservatively and was discharged to a nursing home (?). Again was readmitted to the same OSH with back pain and confusion and was found to be hyponatremic to 118. Appears to have improved conservatively and per documentation patient and family both refused, so was taken home. Since then patient refers she has been confined to a wheelchair and unable to take care of herself. Today, she refers terrible lower back pain that is aggravated with movement and alleviated with lying still. She denies any urinary or fecal incontinence, denies any saddle anesthesia. No fevers, chills, chest pain, SOB, abd pain, n/v/d, GIB. Denies any recent abx however care everywhere shows recent rx for augmentin and doxy. She has been taking ibuprofen without relief of her symptoms, however otherwise does not know what medications she  takes    At the ED patient HDS, VSS. Labs not overtly unremarkable other than Na 131, K 3.1. Hgb stable. UA with 3+ leuk, 13 WBC. CT abd/pelvis with stable remote L2 compression fracture however noted new T12 compression fx. She was given oxy 5mg and admitted to hospital medicine for further workup.       * No surgery found *      Hospital Course:   Admitted for intractable back pain and diarrhea. Diarrhea resolved on admission. UA dirty and later grew enterococcus and patient with mild suprapubic discomfort, started on IV CTX. NSGY spine consulted given new T12 fx and subacute L2 fx and recommending TLSO brace and further imaging. PT consulted however NSGY requesting holding off on PT prior to imaging. She remains complaining of back pain and somewhat non-compliant with her care here. Once imaging completed (and suggestive of osteoporotic fractures) NSGY recommended TLSO brace OOB and follow up in 6wks. She remains on intranasal calcitonin, vit D, calcium, and multimodals with some relief. PT scheduled to see patient. Patient encouraged to participate with them and get out of bed in spite of her pain. Neuro IR consulted for an inpatient kyphoplasty. Patient now participating with PT and ambulating a bit more. Interval development of multiple episodes of diarrhea initially described by bedside nurse as dark and watery, however later more formed. C. Diff testing initially ordered however per RN and Charge nurse(s), too formed to send for testing. Diarrhea improving, no appropriate specimen for C.diff testing collected. IR kyphoplasty 11/28. Will need NSGY f/u at discharge as well as osteoporosis f/u with endocrinology. S/p 6 day course of ampicillin for enterococcus UTI. s/p kyphoplasty w improvement in pain. C.diff and stool cx negative started loperamide w some improvement. Discharged home w HH.      BP (!) 125/59   Pulse 91   Temp 98 °F (36.7 °C)   Resp 18   Ht 5' (1.524 m)   Wt 94.3 kg (207 lb 14.3 oz)    LMP  (LMP Unknown)   SpO2 (!) 94%   Breastfeeding No   BMI 40.60 kg/m²   Physical Exam  Vitals and nursing note reviewed.   Constitutional:       General: She is not in acute distress.     Appearance: She is obese. She is not ill-appearing or toxic-appearing.   HENT:      Head: Normocephalic and atraumatic.      Mouth/Throat:      Mouth: Mucous membranes are moist.      Pharynx: No oropharyngeal exudate.   Eyes:      Extraocular Movements: Extraocular movements intact.      Pupils: Pupils are equal, round, and reactive to light.   Cardiovascular:      Rate and Rhythm: Normal rate and regular rhythm.      Heart sounds: No murmur heard.  Pulmonary:      Effort: Pulmonary effort is normal.      Breath sounds: No wheezing or rales.   Abdominal:      General: Abdomen is flat. Bowel sounds are normal. There is no distension.      Tenderness: There is no abdominal tenderness. There is no guarding.   Musculoskeletal:         General: No tenderness present. No swelling. Normal range of motion.      Cervical back: Normal range of motion.      Right lower leg: No edema.      Left lower leg: No edema.   Lymphadenopathy:      Cervical: No cervical adenopathy.   Skin:     General: Skin is warm.      Coloration: Skin is not jaundiced.      Findings: No bruising, erythema or rash.   Neurological:      General: No focal deficit present.      Mental Status: She is alert and oriented to person, place, and time.      Cranial Nerves: No cranial nerve deficit.      Sensory: No sensory deficit.      Motor: No weakness.      Deep Tendon Reflexes: Reflexes normal.          Goals of Care Treatment Preferences:  Code Status: Full Code      Consults:   Consults (From admission, onward)        Status Ordering Provider     Inpatient consult to Interventional Radiology  Once        Provider:  (Not yet assigned)    Completed NITHIN CONNER     Inpatient consult to Midline team  Once        Provider:  (Not yet assigned)    Completed  "AGAPITO RAMIREZ     Inpatient consult to Neurosurgery  Once        Provider:  (Not yet assigned)    Completed NITHIN CONNER          No new Assessment & Plan notes have been filed under this hospital service since the last note was generated.  Service: Hospital Medicine    Final Active Diagnoses:    Diagnosis Date Noted POA    PRINCIPAL PROBLEM:  Compression fracture of thoracic vertebra [S22.000A] 07/25/2013 Yes    UTI (urinary tract infection) [N39.0] 11/25/2022 Yes    Diarrhea [R19.7] 11/21/2022 No    Aortic stenosis [I35.0]  Yes     Chronic    COPD, mild [J44.9] 09/27/2016 Yes    Diabetes mellitus type II [E11.9] 09/13/2016 Yes    Depression [F32.A] 09/13/2016 Yes    Osteoporosis with current pathological fracture [M80.00XA] 09/13/2016 Yes      Problems Resolved During this Admission:       Discharged Condition: stable    Disposition: Home-Health Care [facility]    Follow Up:   Follow-up Information     [facility] ENDOCRINOLOGY. Schedule an appointment as soon as possible for a visit.    Specialty: Endocrinology  Contact information:  [address] Pur[address]  [facility] [id number]  [phone]           Abhi De Oliveira Iv, MD. Go on 12/8/2022.    Specialty: Family Medicine  Why: Hospital follow up 12/8 at 10am  Contact information:  [address]   [place] [id number] [phone]                       Patient Instructions:      WALKER FOR HOME USE     Order Specific Question Answer Comments   Type of Walker: Adult (5'4"-6'6")    With wheels? Yes    Height: 5' (1.524 m)    Weight: 94.3 kg (207 lb 14.3 oz)    Length of need (1-99 months): 99    Does patient have medical equipment at home? bedside commode    Does patient have medical equipment at home? hospital bed    Does patient have medical equipment at home? oxygen    Please check all that apply: Patient is unable to safely ambulate without equipment.      Ambulatory referral/consult to Neurosurgery   Standing Status: Future   Referral " Priority: Routine Referral Type: Consultation   Referral Reason: Specialty Services Required   Requested Specialty: Neurosurgery   Number of Visits Requested: 1     Ambulatory referral/consult to Endocrinology   Standing Status: Future   Referral Priority: Routine Referral Type: Consultation   Requested Specialty: Endocrinology   Number of Visits Requested: 1       Significant Diagnostic Studies: Labs: All labs within the past 24 hours have been reviewed    Pending Diagnostic Studies:     Procedure Component Value Units Date/Time    IR Kyphoplasty Thoracic First Vertebral with Imaging [[id number]]     Order Status: Sent Lab Status: No result          Medications:  Reconciled Home Medications:      Medication List      START taking these medications    calcitonin (salmon) 200 unit/actuation nasal spray  Commonly known as: FORTICAL  1 spray by Nasal route once daily. for 21 days     HYDROcodone-acetaminophen 5-325 mg per tablet  Commonly known as: NORCO  Take 1 tablet by mouth every 6 (six) hours as needed for Pain.     LIDOcaine 5 %  Commonly known as: LIDODERM  Place 1 patch onto the skin once daily. Apply to back. Remove & Discard patch within 12 hours or as directed by MD     methocarbamoL 750 MG Tab  Commonly known as: ROBAXIN  Take 1 tablet (750 mg total) by mouth 4 (four) times daily as needed (back pain).        CHANGE how you take these medications    * loperamide 2 mg capsule  Commonly known as: IMODIUM  Take 1 capsule (2 mg total) by mouth 4 (four) times daily as needed for Diarrhea.  What changed: You were already taking a medication with the same name, and this prescription was added. Make sure you understand how and when to take each.     * loperamide 2 mg Tab  Commonly known as: IMODIUM A-D  Take 1 tablet (2 mg total) by mouth 3 (three) times daily as needed (diarrhea). Take 2 tablets by mouth initially then 1 tablet after each loose stool as needed for diarrhea.  What changed:   · how much to  take  · how to take this  · when to take this  · reasons to take this         * This list has 2 medication(s) that are the same as other medications prescribed for you. Read the directions carefully, and ask your doctor or other care provider to review them with you.            CONTINUE taking these medications    albuterol 90 mcg/actuation inhaler  Commonly known as: PROVENTIL/VENTOLIN HFA  Inhale 2 puffs into the lungs every 6 (six) hours as needed.     aspirin 81 MG EC tablet  Commonly known as: ECOTRIN  Take 81 mg by mouth once daily.     benazepriL 20 MG tablet  Commonly known as: LOTENSIN  Take 20 mg by mouth once daily.     budesonide-formoterol 80-4.5 mcg 80-4.5 mcg/actuation Hfaa  Commonly known as: SYMBICORT  Inhale 2 puffs into the lungs 2 (two) times daily as needed (Asthma).     calcium carbonate 600 mg calcium (1,500 mg) Tab  Commonly known as: OS-TK  Take 600 mg by mouth 2 (two) times daily with meals.     citalopram 40 MG tablet  Commonly known as: CeleXA  Take 1 tablet (40 mg total) by mouth once daily.     donepeziL 5 MG tablet  Commonly known as: ARICEPT  Take 5 mg by mouth nightly.     ergocalciferol 50,000 unit Cap  Commonly known as: ERGOCALCIFEROL  TAKE 1 CAPSULE (50,000 UNITS TOTAL) EVERY 7 DAYS.     ferrous gluconate 324 mg (37.5 mg iron) Tab tablet  Take 324 mg by mouth once daily.     furosemide 40 MG tablet  Commonly known as: LASIX  Take 40 mg by mouth once daily.     glucosamine-chondroitin 500-400 mg tablet  Take 1 tablet by mouth once daily.     metFORMIN 500 MG tablet  Commonly known as: GLUCOPHAGE  Take 1 tablet (500 mg total) by mouth 2 (two) times daily with meals.     multivitamin capsule  Take 1 capsule by mouth once daily.     omeprazole 20 MG capsule  Commonly known as: PRILOSEC  Take 1 capsule (20 mg total) by mouth once daily.     oxybutynin 5 MG Tab  Commonly known as: DITROPAN  Take 1 tablet (5 mg total) by mouth 2 (two) times daily.     simvastatin 40 MG tablet  Commonly  known as: ZOCOR  Take 1 tablet (40 mg total) by mouth every evening.     ZYRTEC ORAL  Take 1 tablet by mouth daily as needed (Allergies).        STOP taking these medications    predniSONE 20 MG tablet  Commonly known as: DELTASONE            Indwelling Lines/Drains at time of discharge:   Lines/Drains/Airways     Drain  Duration           Female External Urinary Catheter 11/21/22 0938 9 days                Time spent on the discharge of patient: 35 minutes         Bridgette Robles MD  Department of Hospital Medicine  [facility] - Neurosurgery ([facility])

## 2022-11-30 NOTE — CARE UPDATE
Notified patient is concerned for L knee pain that impairs ambulation. Will obtain L knee Xray.     Bridgette Robles MD

## 2022-11-30 NOTE — PLAN OF CARE
Ellis Purcell - Neurosurgery ([facility])      HOME HEALTH ORDERS  FACE TO FACE ENCOUNTER    Patient Name: Daryleen G Moran  YOB: 1945    PCP: Abhi De Oliveira Iv, MD   PCP Address: [address]   [place] / [place] [address]  PCP Phone Number: [phone]  PCP Fax: [phone]    Encounter Date: 11/21/22    Admit to Home Health    Diagnoses:  Active Hospital Problems    Diagnosis  POA    *Compression fracture of thoracic vertebra [S22.000A]  Yes    UTI (urinary tract infection) [N39.0]  Yes    Diarrhea [R19.7]  No    Aortic stenosis [I35.0]  Yes     Chronic    COPD, mild [J44.9]  Yes    Diabetes mellitus type II [E11.9]  Yes     Dx updated per 2019 IMO Load      Depression [F32.A]  Yes    Osteoporosis with current pathological fracture [M80.00XA]  Yes      Resolved Hospital Problems   No resolved problems to display.       Follow Up Appointments:  Future Appointments   Date Time Provider Department Center   1/4/2023  1:30 PM Lilli Maki NP [facility] NEUROS8 [facility]cell       Allergies:  Review of patient's allergies indicates:   Allergen Reactions    Sulfacetamide sodium      Pain perineal area    Sulfasalazine Hives    Adhesive Itching     SKIN GETS RED WITH TAPE AND BANDAIDS    Adhesive tape-silicones Itching     SKIN GETS RED WITH TAPE AND BANDAIDS    Sulfa (sulfonamide antibiotics) Rash       Medications: Review discharge medications with patient and family and provide education.    Current Facility-Administered Medications   Medication Dose Route Frequency Provider Last Rate Last Admin    acetaminophen tablet 1,000 mg  1,000 mg Oral Q8H Arron Jerome MD   1,000 mg at 11/29/22 2129    acetaminophen tablet 650 mg  650 mg Oral Q4H PRN Arron Jerome MD   650 mg at 11/22/22 2013    albuterol inhaler 2 puff  2 puff Inhalation Q6H PRN Arron Jerome MD        aspirin EC tablet 81 mg  81 mg Oral Daily Arron Jerome MD   81 mg at 11/30/22 0850    atorvastatin  tablet 20 mg  20 mg Oral Daily Arron Jerome MD   20 mg at 11/30/22 0851    buPROPion TB24 tablet 150 mg  150 mg Oral Daily Arron Jerome MD   150 mg at 11/30/22 0850    calcitonin (salmon) nasal spray 1 spray  1 spray Alternating Nostrils Daily Arron Jerome MD   1 spray at 11/30/22 0851    calcium carbonate 200 mg calcium (500 mg) chewable tablet 1,000 mg  1,000 mg Oral Daily Arron Jerome MD   1,000 mg at 11/30/22 0850    citalopram tablet 40 mg  40 mg Oral Daily Arron Jerome MD   40 mg at 11/30/22 0850    dextrose 10% bolus 125 mL  12.5 g Intravenous PRN Arron Jerome MD        dextrose 10% bolus 250 mL  25 g Intravenous PRN Arron Jerome MD        donepeziL tablet 5 mg  5 mg Oral Nightly Arron Jerome MD   5 mg at 11/29/22 2119    enoxaparin injection 40 mg  40 mg Subcutaneous Q12H Arron Jerome MD   40 mg at 11/30/22 0851    fluticasone furoate-vilanteroL 100-25 mcg/dose diskus inhaler 1 puff  1 puff Inhalation Daily Arron Jerome MD   1 puff at 11/30/22 0921    glucagon (human recombinant) injection 1 mg  1 mg Intramuscular PRN Arron Jerome MD        glucose chewable tablet 16 g  16 g Oral PRN Arron Jerome MD        glucose chewable tablet 24 g  24 g Oral PRN Arron Jerome MD        HYDROcodone-acetaminophen 5-325 mg per tablet 1 tablet  1 tablet Oral Q6H PRN Arron Jerome MD   1 tablet at 11/28/22 2052    hydrOXYzine pamoate capsule 50 mg  50 mg Oral Q8H PRN Arron Jerome MD   50 mg at 11/29/22 2119    insulin aspart U-100 pen 0-5 Units  0-5 Units Subcutaneous QID (AC + HS) PRN Bridgette Robles MD        Lactobacillus rhamnosus GG capsule 1 capsule  1 capsule Oral Daily Bridgette Robles MD   1 capsule at 11/30/22 0851    LIDOcaine 5 % patch 1 patch  1 patch Transdermal Daily Arron Jerome MD   1 patch at  11/29/22 0842    lisinopriL tablet 10 mg  10 mg Oral Daily Arron Jerome MD   10 mg at 11/30/22 0851    loperamide capsule 2 mg  2 mg Oral QID PRN Bridgette Robles MD   2 mg at 11/30/22 0850    methocarbamoL tablet 750 mg  750 mg Oral QID Arron Jerome MD   750 mg at 11/30/22 0850    naloxone 0.4 mg/mL injection 0.02 mg  0.02 mg Intravenous PRN Arron Jerome MD        oxybutynin tablet 5 mg  5 mg Oral BID Arron Jerome MD   5 mg at 11/30/22 0851    pantoprazole EC tablet 40 mg  40 mg Oral Daily Arron Jerome MD   40 mg at 11/30/22 0850    sodium chloride 0.9% flush 10 mL  10 mL Intravenous Q12H PRN Arron Jerome MD        vitamin D 1000 units tablet 1,000 Units  1,000 Units Oral Daily Arron Jerome MD   1,000 Units at 11/30/22 0850     Current Discharge Medication List        START taking these medications    Details   calcitonin, salmon, (FORTICAL) 200 unit/actuation nasal spray 1 spray by Nasal route once daily. for 21 days  Qty: 3.7 mL, Refills: 0      LIDOcaine (LIDODERM) 5 % Place 1 patch onto the skin once daily. Apply to back. Remove & Discard patch within 12 hours or as directed by MD  Qty: 15 patch, Refills: 0      loperamide (IMODIUM) 2 mg capsule Take 1 capsule (2 mg total) by mouth 4 (four) times daily as needed for Diarrhea.  Qty: 15 capsule, Refills: 0      methocarbamoL (ROBAXIN) 750 MG Tab Take 1 tablet (750 mg total) by mouth 4 (four) times daily as needed (back pain).  Qty: 40 tablet, Refills: 0           CONTINUE these medications which have CHANGED    Details   loperamide (IMODIUM A-D) 2 mg Tab Take 1 tablet (2 mg total) by mouth 3 (three) times daily as needed (diarrhea). Take 2 tablets by mouth initially then 1 tablet after each loose stool as needed for diarrhea.  Qty: 15 tablet, Refills: 0           CONTINUE these medications which have NOT CHANGED    Details   albuterol (PROVENTIL/VENTOLIN HFA) 90  mcg/actuation inhaler Inhale 2 puffs into the lungs every 6 (six) hours as needed.      aspirin (ECOTRIN) 81 MG EC tablet Take 81 mg by mouth once daily.      benazepriL (LOTENSIN) 20 MG tablet Take 20 mg by mouth once daily.      budesonide-formoterol 80-4.5 mcg (SYMBICORT) 80-4.5 mcg/actuation HFAA Inhale 2 puffs into the lungs 2 (two) times daily as needed (Asthma).      calcium carbonate (OS-TK) 600 mg (1,500 mg) Tab Take 600 mg by mouth 2 (two) times daily with meals.      citalopram (CELEXA) 40 MG tablet Take 1 tablet (40 mg total) by mouth once daily.  Qty: 90 tablet, Refills: 3      donepeziL (ARICEPT) 5 MG tablet Take 5 mg by mouth nightly.      ergocalciferol (ERGOCALCIFEROL) 50,000 unit Cap TAKE 1 CAPSULE (50,000 UNITS TOTAL) EVERY 7 DAYS.  Qty: 12 capsule, Refills: 3      omeprazole (PRILOSEC) 20 MG capsule Take 1 capsule (20 mg total) by mouth once daily.  Qty: 90 capsule, Refills: 3      oxybutynin (DITROPAN) 5 MG Tab Take 1 tablet (5 mg total) by mouth 2 (two) times daily.  Qty: 180 tablet, Refills: 3      simvastatin (ZOCOR) 40 MG tablet Take 1 tablet (40 mg total) by mouth every evening.  Qty: 90 tablet, Refills: 3      cetirizine HCl (ZYRTEC ORAL) Take 1 tablet by mouth daily as needed (Allergies).      ferrous gluconate 324 mg (37.5 mg iron) Tab tablet Take 324 mg by mouth once daily.      furosemide (LASIX) 40 MG tablet Take 40 mg by mouth once daily.      glucosamine-chondroitin 500-400 mg tablet Take 1 tablet by mouth once daily.      HYDROcodone-acetaminophen (NORCO) 5-325 mg per tablet Take 1 tablet by mouth every 6 (six) hours as needed for Pain.  Qty: 12 tablet, Refills: 0    Comments: n/a       metFORMIN (GLUCOPHAGE) 500 MG tablet Take 1 tablet (500 mg total) by mouth 2 (two) times daily with meals.  Qty: 60 tablet, Refills: 0      multivitamin capsule Take 1 capsule by mouth once daily.           STOP taking these medications       buPROPion (WELLBUTRIN XL) 150 MG TB24 tablet Comments:    Reason for Stopping:         predniSONE (DELTASONE) 20 MG tablet Comments:   Reason for Stopping:                 I have seen and examined this patient within the last 30 days. My clinical findings that support the need for the home health skilled services and home bound status are the following:no   Weakness/numbness causing balance and gait disturbance due to Weakness/Debility making it taxing to leave home.     Diet:   diabetic diet 2000 calorie    Labs:  none    Referrals/ Consults  Physical Therapy to evaluate and treat. Evaluate for home safety and equipment needs; Establish/upgrade home exercise program. Perform / instruct on therapeutic exercises, gait training, transfer training, and Range of Motion.  Occupational Therapy to evaluate and treat. Evaluate home environment for safety and equipment needs. Perform/Instruct on transfers, ADL training, ROM, and therapeutic exercises.    Activities:   activity as tolerated    Nursing:   Agency to admit patient within 24 hours of hospital discharge unless specified on physician order or at patient request    SN to complete comprehensive assessment including routine vital signs. Instruct on disease process and s/s of complications to report to MD. Review/verify medication list sent home with the patient at time of discharge  and instruct patient/caregiver as needed. Frequency may be adjusted depending on start of care date.     Skilled nurse to perform up to 3 visits PRN for symptoms related to diagnosis    Notify MD if SBP > 160 or < 90; DBP > 90 or < 50; HR > 120 or < 50; Temp > 101; O2 < 88%.    Ok to schedule additional visits based on staff availability and patient request on consecutive days within the home health episode.    When multiple disciplines ordered:    Start of Care occurs on Sunday - Wednesday schedule remaining discipline evaluations as ordered on separate consecutive days following the start of care.    Thursday SOC -schedule subsequent  evaluations Friday and Monday the following week.     Friday - Saturday SOC - schedule subsequent discipline evaluations on consecutive days starting Monday of the following week.    For all post-discharge communication and subsequent orders please contact patient's primary care physician.     Miscellaneous   none    Home Health Aide:  Nursing Three times weekly, Physical Therapy Three times weekly, and Occupational Therapy Three times weekly    Wound Care Orders  no    I certify that this patient is confined to her home and needs intermittent skilled nursing care, physical therapy, and occupational therapy.    Bridgette Robles MD

## 2022-11-30 NOTE — PLAN OF CARE
[facility] (Hospital)  Discharge Final Note    Primary Care Provider: Abhi De Oliveira Iv, MD    Expected Discharge Date: 11/30/2022    Patient to be discharged to [facility] home.  The patient will have home health with O  upon discharge.  Patient provided with a rolling walker.  Family to provide transportation home.    Future Appointments   Date Time Provider Department Center   1/4/2023  1:30 PM [facility] MD Abhi Valadez Iv, Iv, MD  PCP - General  Family Medicine  [phone]  [address] [place] [address]      Next Steps: Go on 12/8/2022  Appointment:   Instructions: Hospital follow up 12/8 at 10am     Final Discharge Note (most recent)       Final Note - 11/30/22 1310          Final Note    Assessment Type Final Discharge Note     Anticipated Discharge Disposition Home-Health Care Svc        Post-Acute Status    Post-Acute Authorization Placement;Home Health     Post-Acute Placement Status Discharge Plan Changed     Home Health Status Set-up Complete/Auth obtained     Discharge Delays None known at this time                     Important Message from Medicare             Contact Info       [facility] ENDOCRINOLOGY   Specialty: Endocrinology    [address]  [facility] [address]   Phone: [phone]       Next Steps: Schedule an appointment as soon as possible for a visit    Abhi De Oliveira Iv, MD   Specialty: Family Medicine   Relationship: PCP - General    [facility] [address]   [place] [address]   Phone: [phone]       Next Steps: Go on 12/8/2022    Instructions: Hospital follow up 12/8 at 10am

## 2022-11-30 NOTE — PT/OT/SLP PROGRESS
Occupational Therapy   Treatment    Name: Daryleen G Moran  MRN: [id number]  Admitting Diagnosis:  Compression fracture of thoracic vertebra       Recommendations:     Discharge Recommendations: nursing facility, skilled  Discharge Equipment Recommendations:  walker, rolling, 3-in-1 commode, shower chair  Barriers to discharge:   (increased level of assistance needed at this time.)    Assessment:     Daryleen G Moran is a 77 y.o. female with a medical diagnosis of Compression fracture of thoracic vertebra. Performance deficits affecting function are weakness, impaired endurance, impaired self care skills, impaired functional mobility, gait instability, impaired balance, decreased lower extremity function, decreased upper extremity function, orthopedic precautions. Patient agreed to therapy, but was limited with participate by extreme pain to L knee and was concerned of L knee of potential buckling during ambulation Nurse notified at end of OT session. LSO and L knee brace (that daughter bought for patient to wear per patient report) was applied prior to OOB activity. Patient would benefit from continued skilled acute OT 3x/wk to improve functional mobility, increase independence with ADLs, and address established goals. Patient would benefit from continued skilled acute OT 4x/wk to improve functional mobility, increase independence with ADLs, and address established goals.    Rehab Prognosis:  Good; patient would benefit from acute skilled OT services to address these deficits and reach maximum level of function.       Plan:     Patient to be seen 4 x/week to address the above listed problems via self-care/home management, therapeutic activities, therapeutic exercises, neuromuscular re-education  Plan of Care Expires: 12/23/22  Plan of Care Reviewed with: patient    Subjective     Pain/Comfort:  Pain Rating 1:  (patient did not rate)  Location - Side 1: Left  Location - Orientation 1: lateral  Location 1: knee  Pain  Addressed 1: Reposition, Distraction, Cessation of Activity  Pain Rating Post-Intervention 1:  (patient did not rate)    Objective:     Communicated with: NSG prior to session.  Patient found HOB elevated with  (all lines intact) upon OT entry to room.    General Precautions: Standard, fall   Orthopedic Precautions:spinal precautions   Braces: LSO , knee brace that daughter bought for patient to wear   Respiratory Status: Room air     Occupational Performance:     Bed Mobility:    Patient completed Scooting/Bridging with stand by assistance  Patient completed Supine to Sit with stand by assistance  Patient completed Sit to Supine with stand by assistance     Functional Mobility/Transfers:  Patient completed Sit <> Stand Transfer with contact guard assistance  with  rolling walker   Functional Mobility: Patient ambulated bed<>sink in bathroom with RW and CGA (this therapist pulled BSC behind patient during g/h task for safety, but patient declined sitting on it for a transfer or seated rest break due to pain and then requested to return back to bed).     Activities of Daily Living:  Feeding:  independence    Grooming: contact guard assistance standing at sink for standing balance at sink for safety as patient washed and dried hands  Upper Body Dressing: maximal assistance Nubieber and donning LSO brace sitting EOB  Lower Body Dressing: total assistance Donning socks sitting EOB    AMPAC 6 Click ADL: 16    Treatment & Education:  Role of OT and POC  ADL retraining  Functional mobility training  Safety    Patient left HOB elevated with all lines intact, call button in reach, nurse notified, and all needs met.     GOALS:   Multidisciplinary Problems       Occupational Therapy Goals          Problem: Occupational Therapy    Goal Priority Disciplines Outcome Interventions   Occupational Therapy Goal     OT, PT/OT Ongoing, Progressing    Description: Goals to be met by: 12/21/2022     Patient will increase functional  independence with ADLs by performing:    UE Dressing, including LSO brace, with Minimal Assistance.  LE Dressing with Minimal Assistance.  Grooming while seated with Set-up Assistance.  Toileting from toilet/bedside commode with Minimal Assistance for hygiene and clothing management.   Sitting at edge of bed x10 minutes with Supervision.  Toilet transfer to toilet/bedside commode with Stand-by Assistance and LRAD.                         Time Tracking:     OT Date of Treatment: 11/30/22  OT Start Time: 1146  OT Stop Time: 1210  OT Total Time (min): 24 min    Billable Minutes:Self Care/Home Management 24          hospitals Visit Number: 0    11/30/2022

## 2022-12-01 RX ORDER — HYDROCODONE BITARTRATE AND ACETAMINOPHEN 5; 325 MG/1; MG/1
1 TABLET ORAL EVERY 8 HOURS PRN
Qty: 21 TABLET | Refills: 0 | Status: SHIPPED | OUTPATIENT
Start: 2022-12-01 | End: 2023-02-25

## 2022-12-02 LAB — BACTERIA STL CULT: NORMAL

## 2022-12-13 ENCOUNTER — TELEPHONE (OUTPATIENT)
Dept: NEUROSURGERY | Facility: CLINIC | Age: 77
End: 2022-12-13
Payer: MEDICARE

## 2022-12-13 NOTE — TELEPHONE ENCOUNTER
----- Message from Erik Damon sent at 12/13/2022  2:26 PM CST -----  Regarding: Appointment  Contact: [phone]  Genevieve ( daughter) is calling stating that her mother is having heart surgery on 01/04. She wants to know if her mother will be able to be seen sooner. Please call to discuss further @ [phone]

## 2022-12-13 NOTE — TELEPHONE ENCOUNTER
Informed patient that she will have to keep her upcoming appointment that scheduled patient verbally understood.

## 2022-12-19 ENCOUNTER — HOSPITAL ENCOUNTER (INPATIENT)
Facility: HOSPITAL | Age: 77
LOS: 2 days | Discharge: HOME HEALTH | DRG: 641 | End: 2022-12-21
Attending: EMERGENCY MEDICINE | Admitting: HOSPITALIST
Payer: MEDICARE

## 2022-12-19 DIAGNOSIS — E87.6 HYPOKALEMIA: ICD-10-CM

## 2022-12-19 DIAGNOSIS — I10 PRIMARY HYPERTENSION: ICD-10-CM

## 2022-12-19 DIAGNOSIS — M54.9 BACK PAIN: ICD-10-CM

## 2022-12-19 DIAGNOSIS — E83.42 HYPOMAGNESEMIA: Primary | ICD-10-CM

## 2022-12-19 DIAGNOSIS — Z87.81 HISTORY OF VERTEBRAL COMPRESSION FRACTURE: ICD-10-CM

## 2022-12-19 DIAGNOSIS — E11.9 TYPE 2 DIABETES MELLITUS WITHOUT COMPLICATION, WITHOUT LONG-TERM CURRENT USE OF INSULIN: ICD-10-CM

## 2022-12-19 DIAGNOSIS — R07.9 CHEST PAIN: ICD-10-CM

## 2022-12-19 PROBLEM — I25.10 CAD (CORONARY ARTERY DISEASE): Status: ACTIVE | Noted: 2022-12-19

## 2022-12-19 PROBLEM — R41.82 AMS (ALTERED MENTAL STATUS): Status: RESOLVED | Noted: 2022-12-19 | Resolved: 2022-12-19

## 2022-12-19 PROBLEM — E87.1 HYPONATREMIA: Status: ACTIVE | Noted: 2022-12-19

## 2022-12-19 PROBLEM — R41.82 AMS (ALTERED MENTAL STATUS): Status: ACTIVE | Noted: 2022-12-19

## 2022-12-19 PROBLEM — E87.8 ELECTROLYTE ABNORMALITY: Status: ACTIVE | Noted: 2022-12-19

## 2022-12-19 LAB
ALBUMIN SERPL BCP-MCNC: 3.5 G/DL (ref 3.5–5.2)
ALP SERPL-CCNC: 73 U/L (ref 55–135)
ALT SERPL W/O P-5'-P-CCNC: 16 U/L (ref 10–44)
ANION GAP SERPL CALC-SCNC: 11 MMOL/L (ref 8–16)
ANION GAP SERPL CALC-SCNC: 13 MMOL/L (ref 8–16)
ANION GAP SERPL CALC-SCNC: 9 MMOL/L (ref 8–16)
AST SERPL-CCNC: 17 U/L (ref 10–40)
BACTERIA #/AREA URNS AUTO: ABNORMAL /HPF
BASOPHILS # BLD AUTO: 0.1 K/UL (ref 0–0.2)
BASOPHILS NFR BLD: 0.8 % (ref 0–1.9)
BILIRUB SERPL-MCNC: 0.6 MG/DL (ref 0.1–1)
BILIRUB UR QL STRIP: NEGATIVE
BNP SERPL-MCNC: 114 PG/ML (ref 0–99)
BNP SERPL-MCNC: 88 PG/ML (ref 0–99)
BUN SERPL-MCNC: 10 MG/DL (ref 8–23)
BUN SERPL-MCNC: 8 MG/DL (ref 8–23)
BUN SERPL-MCNC: 9 MG/DL (ref 8–23)
CALCIUM SERPL-MCNC: 8.3 MG/DL (ref 8.7–10.5)
CALCIUM SERPL-MCNC: 9 MG/DL (ref 8.7–10.5)
CALCIUM SERPL-MCNC: 9.3 MG/DL (ref 8.7–10.5)
CHLORIDE SERPL-SCNC: 92 MMOL/L (ref 95–110)
CHLORIDE SERPL-SCNC: 93 MMOL/L (ref 95–110)
CHLORIDE SERPL-SCNC: 97 MMOL/L (ref 95–110)
CLARITY UR REFRACT.AUTO: ABNORMAL
CO2 SERPL-SCNC: 28 MMOL/L (ref 23–29)
CO2 SERPL-SCNC: 29 MMOL/L (ref 23–29)
CO2 SERPL-SCNC: 31 MMOL/L (ref 23–29)
COLOR UR AUTO: YELLOW
CREAT SERPL-MCNC: 0.6 MG/DL (ref 0.5–1.4)
CREAT SERPL-MCNC: 0.7 MG/DL (ref 0.5–1.4)
CREAT SERPL-MCNC: 0.8 MG/DL (ref 0.5–1.4)
DIFFERENTIAL METHOD: ABNORMAL
EOSINOPHIL # BLD AUTO: 0.5 K/UL (ref 0–0.5)
EOSINOPHIL NFR BLD: 4 % (ref 0–8)
ERYTHROCYTE [DISTWIDTH] IN BLOOD BY AUTOMATED COUNT: 14.4 % (ref 11.5–14.5)
EST. GFR  (NO RACE VARIABLE): >60 ML/MIN/1.73 M^2
GLUCOSE SERPL-MCNC: 124 MG/DL (ref 70–110)
GLUCOSE SERPL-MCNC: 134 MG/DL (ref 70–110)
GLUCOSE SERPL-MCNC: 152 MG/DL (ref 70–110)
GLUCOSE UR QL STRIP: NEGATIVE
HCT VFR BLD AUTO: 33.5 % (ref 37–48.5)
HGB BLD-MCNC: 10.4 G/DL (ref 12–16)
HGB UR QL STRIP: NEGATIVE
IMM GRANULOCYTES # BLD AUTO: 0.04 K/UL (ref 0–0.04)
IMM GRANULOCYTES NFR BLD AUTO: 0.3 % (ref 0–0.5)
KETONES UR QL STRIP: NEGATIVE
LACTATE SERPL-SCNC: 1 MMOL/L (ref 0.5–2.2)
LEUKOCYTE ESTERASE UR QL STRIP: ABNORMAL
LIPASE SERPL-CCNC: 14 U/L (ref 4–60)
LYMPHOCYTES # BLD AUTO: 1.6 K/UL (ref 1–4.8)
LYMPHOCYTES NFR BLD: 13.5 % (ref 18–48)
MAGNESIUM SERPL-MCNC: 0.8 MG/DL (ref 1.6–2.6)
MAGNESIUM SERPL-MCNC: 1.6 MG/DL (ref 1.6–2.6)
MCH RBC QN AUTO: 24.4 PG (ref 27–31)
MCHC RBC AUTO-ENTMCNC: 31 G/DL (ref 32–36)
MCV RBC AUTO: 79 FL (ref 82–98)
MICROSCOPIC COMMENT: ABNORMAL
MONOCYTES # BLD AUTO: 1.1 K/UL (ref 0.3–1)
MONOCYTES NFR BLD: 9.1 % (ref 4–15)
NEUTROPHILS # BLD AUTO: 8.7 K/UL (ref 1.8–7.7)
NEUTROPHILS NFR BLD: 72.3 % (ref 38–73)
NITRITE UR QL STRIP: NEGATIVE
NRBC BLD-RTO: 0 /100 WBC
OSMOLALITY SERPL: 276 MOSM/KG (ref 275–295)
OSMOLALITY UR: 166 MOSM/KG (ref 50–1200)
PH UR STRIP: 5 [PH] (ref 5–8)
PHOSPHATE SERPL-MCNC: 2.8 MG/DL (ref 2.7–4.5)
PLATELET # BLD AUTO: 247 K/UL (ref 150–450)
PMV BLD AUTO: 11.8 FL (ref 9.2–12.9)
POCT GLUCOSE: 132 MG/DL (ref 70–110)
POTASSIUM SERPL-SCNC: 2.8 MMOL/L (ref 3.5–5.1)
POTASSIUM SERPL-SCNC: 2.8 MMOL/L (ref 3.5–5.1)
POTASSIUM SERPL-SCNC: 3.7 MMOL/L (ref 3.5–5.1)
PROT SERPL-MCNC: 6.7 G/DL (ref 6–8.4)
PROT UR QL STRIP: NEGATIVE
RBC # BLD AUTO: 4.26 M/UL (ref 4–5.4)
RBC #/AREA URNS AUTO: 7 /HPF (ref 0–4)
SODIUM SERPL-SCNC: 134 MMOL/L (ref 136–145)
SODIUM SERPL-SCNC: 134 MMOL/L (ref 136–145)
SODIUM SERPL-SCNC: 135 MMOL/L (ref 136–145)
SODIUM UR-SCNC: 11 MMOL/L (ref 20–250)
SP GR UR STRIP: 1.01 (ref 1–1.03)
SQUAMOUS #/AREA URNS AUTO: 1 /HPF
URN SPEC COLLECT METH UR: ABNORMAL
WBC # BLD AUTO: 12.06 K/UL (ref 3.9–12.7)
WBC #/AREA URNS AUTO: 1 /HPF (ref 0–5)

## 2022-12-19 PROCEDURE — 99223 PR INITIAL HOSPITAL CARE,LEVL III: ICD-10-PCS | Mod: ,,, | Performed by: INTERNAL MEDICINE

## 2022-12-19 PROCEDURE — 96365 THER/PROPH/DIAG IV INF INIT: CPT

## 2022-12-19 PROCEDURE — 81001 URINALYSIS AUTO W/SCOPE: CPT | Performed by: STUDENT IN AN ORGANIZED HEALTH CARE EDUCATION/TRAINING PROGRAM

## 2022-12-19 PROCEDURE — 25000003 PHARM REV CODE 250

## 2022-12-19 PROCEDURE — 93010 ELECTROCARDIOGRAM REPORT: CPT | Mod: ,,, | Performed by: INTERNAL MEDICINE

## 2022-12-19 PROCEDURE — 80048 BASIC METABOLIC PNL TOTAL CA: CPT | Mod: XB

## 2022-12-19 PROCEDURE — 96361 HYDRATE IV INFUSION ADD-ON: CPT

## 2022-12-19 PROCEDURE — 83935 ASSAY OF URINE OSMOLALITY: CPT

## 2022-12-19 PROCEDURE — 96360 HYDRATION IV INFUSION INIT: CPT | Mod: 59

## 2022-12-19 PROCEDURE — 99285 PR EMERGENCY DEPT VISIT,LEVEL V: ICD-10-PCS | Mod: ,,, | Performed by: EMERGENCY MEDICINE

## 2022-12-19 PROCEDURE — 27000207 HC ISOLATION

## 2022-12-19 PROCEDURE — 99285 EMERGENCY DEPT VISIT HI MDM: CPT | Mod: ,,, | Performed by: EMERGENCY MEDICINE

## 2022-12-19 PROCEDURE — 80053 COMPREHEN METABOLIC PANEL: CPT | Performed by: STUDENT IN AN ORGANIZED HEALTH CARE EDUCATION/TRAINING PROGRAM

## 2022-12-19 PROCEDURE — 25000003 PHARM REV CODE 250: Performed by: HOSPITALIST

## 2022-12-19 PROCEDURE — 96368 THER/DIAG CONCURRENT INF: CPT

## 2022-12-19 PROCEDURE — 85025 COMPLETE CBC W/AUTO DIFF WBC: CPT | Performed by: STUDENT IN AN ORGANIZED HEALTH CARE EDUCATION/TRAINING PROGRAM

## 2022-12-19 PROCEDURE — 82962 GLUCOSE BLOOD TEST: CPT

## 2022-12-19 PROCEDURE — 83605 ASSAY OF LACTIC ACID: CPT | Performed by: HOSPITALIST

## 2022-12-19 PROCEDURE — 83880 ASSAY OF NATRIURETIC PEPTIDE: CPT | Mod: 91

## 2022-12-19 PROCEDURE — 63600175 PHARM REV CODE 636 W HCPCS: Performed by: HOSPITALIST

## 2022-12-19 PROCEDURE — 20600001 HC STEP DOWN PRIVATE ROOM

## 2022-12-19 PROCEDURE — 83735 ASSAY OF MAGNESIUM: CPT | Mod: 91 | Performed by: HOSPITALIST

## 2022-12-19 PROCEDURE — 84100 ASSAY OF PHOSPHORUS: CPT | Performed by: STUDENT IN AN ORGANIZED HEALTH CARE EDUCATION/TRAINING PROGRAM

## 2022-12-19 PROCEDURE — 83735 ASSAY OF MAGNESIUM: CPT | Performed by: STUDENT IN AN ORGANIZED HEALTH CARE EDUCATION/TRAINING PROGRAM

## 2022-12-19 PROCEDURE — 80048 BASIC METABOLIC PNL TOTAL CA: CPT | Mod: 91,XB

## 2022-12-19 PROCEDURE — 83880 ASSAY OF NATRIURETIC PEPTIDE: CPT | Performed by: STUDENT IN AN ORGANIZED HEALTH CARE EDUCATION/TRAINING PROGRAM

## 2022-12-19 PROCEDURE — 93010 EKG 12-LEAD: ICD-10-PCS | Mod: ,,, | Performed by: INTERNAL MEDICINE

## 2022-12-19 PROCEDURE — 83690 ASSAY OF LIPASE: CPT | Performed by: STUDENT IN AN ORGANIZED HEALTH CARE EDUCATION/TRAINING PROGRAM

## 2022-12-19 PROCEDURE — 93005 ELECTROCARDIOGRAM TRACING: CPT

## 2022-12-19 PROCEDURE — 63600175 PHARM REV CODE 636 W HCPCS

## 2022-12-19 PROCEDURE — 83930 ASSAY OF BLOOD OSMOLALITY: CPT | Performed by: STUDENT IN AN ORGANIZED HEALTH CARE EDUCATION/TRAINING PROGRAM

## 2022-12-19 PROCEDURE — 99223 1ST HOSP IP/OBS HIGH 75: CPT | Mod: ,,, | Performed by: INTERNAL MEDICINE

## 2022-12-19 PROCEDURE — 12000002 HC ACUTE/MED SURGE SEMI-PRIVATE ROOM

## 2022-12-19 PROCEDURE — 99285 EMERGENCY DEPT VISIT HI MDM: CPT | Mod: 25

## 2022-12-19 PROCEDURE — 96372 THER/PROPH/DIAG INJ SC/IM: CPT

## 2022-12-19 PROCEDURE — 84300 ASSAY OF URINE SODIUM: CPT

## 2022-12-19 PROCEDURE — 96375 TX/PRO/DX INJ NEW DRUG ADDON: CPT

## 2022-12-19 PROCEDURE — 96366 THER/PROPH/DIAG IV INF ADDON: CPT

## 2022-12-19 PROCEDURE — 63600175 PHARM REV CODE 636 W HCPCS: Performed by: STUDENT IN AN ORGANIZED HEALTH CARE EDUCATION/TRAINING PROGRAM

## 2022-12-19 RX ORDER — MORPHINE SULFATE 4 MG/ML
4 INJECTION, SOLUTION INTRAMUSCULAR; INTRAVENOUS
Status: DISCONTINUED | OUTPATIENT
Start: 2022-12-19 | End: 2022-12-19

## 2022-12-19 RX ORDER — POTASSIUM CHLORIDE 7.45 MG/ML
10 INJECTION INTRAVENOUS
Status: COMPLETED | OUTPATIENT
Start: 2022-12-19 | End: 2022-12-19

## 2022-12-19 RX ORDER — NALOXONE HCL 0.4 MG/ML
0.02 VIAL (ML) INJECTION
Status: DISCONTINUED | OUTPATIENT
Start: 2022-12-19 | End: 2022-12-21 | Stop reason: HOSPADM

## 2022-12-19 RX ORDER — ASPIRIN 81 MG/1
81 TABLET ORAL DAILY
Status: DISCONTINUED | OUTPATIENT
Start: 2022-12-19 | End: 2022-12-21 | Stop reason: HOSPADM

## 2022-12-19 RX ORDER — HYDROCODONE BITARTRATE AND ACETAMINOPHEN 5; 325 MG/1; MG/1
1 TABLET ORAL EVERY 8 HOURS PRN
Status: DISCONTINUED | OUTPATIENT
Start: 2022-12-19 | End: 2022-12-21 | Stop reason: HOSPADM

## 2022-12-19 RX ORDER — ENOXAPARIN SODIUM 100 MG/ML
40 INJECTION SUBCUTANEOUS EVERY 24 HOURS
Status: DISCONTINUED | OUTPATIENT
Start: 2022-12-19 | End: 2022-12-21 | Stop reason: HOSPADM

## 2022-12-19 RX ORDER — IBUPROFEN 200 MG
16 TABLET ORAL
Status: DISCONTINUED | OUTPATIENT
Start: 2022-12-19 | End: 2022-12-21 | Stop reason: HOSPADM

## 2022-12-19 RX ORDER — POTASSIUM CHLORIDE 20 MEQ/1
40 TABLET, EXTENDED RELEASE ORAL
Status: COMPLETED | OUTPATIENT
Start: 2022-12-19 | End: 2022-12-19

## 2022-12-19 RX ORDER — MAGNESIUM SULFATE HEPTAHYDRATE 40 MG/ML
2 INJECTION, SOLUTION INTRAVENOUS ONCE
Status: DISCONTINUED | OUTPATIENT
Start: 2022-12-19 | End: 2022-12-19

## 2022-12-19 RX ORDER — MORPHINE SULFATE 2 MG/ML
6 INJECTION, SOLUTION INTRAMUSCULAR; INTRAVENOUS
Status: COMPLETED | OUTPATIENT
Start: 2022-12-19 | End: 2022-12-19

## 2022-12-19 RX ORDER — LOPERAMIDE HYDROCHLORIDE 2 MG/1
2 CAPSULE ORAL ONCE AS NEEDED
Status: DISCONTINUED | OUTPATIENT
Start: 2022-12-19 | End: 2022-12-21 | Stop reason: HOSPADM

## 2022-12-19 RX ORDER — KETOROLAC TROMETHAMINE 30 MG/ML
10 INJECTION, SOLUTION INTRAMUSCULAR; INTRAVENOUS
Status: COMPLETED | OUTPATIENT
Start: 2022-12-19 | End: 2022-12-19

## 2022-12-19 RX ORDER — ATORVASTATIN CALCIUM 20 MG/1
20 TABLET, FILM COATED ORAL DAILY
Status: DISCONTINUED | OUTPATIENT
Start: 2022-12-19 | End: 2022-12-21 | Stop reason: HOSPADM

## 2022-12-19 RX ORDER — LISINOPRIL 20 MG/1
20 TABLET ORAL DAILY
Status: DISCONTINUED | OUTPATIENT
Start: 2022-12-19 | End: 2022-12-21 | Stop reason: HOSPADM

## 2022-12-19 RX ORDER — INSULIN ASPART 100 [IU]/ML
0-5 INJECTION, SOLUTION INTRAVENOUS; SUBCUTANEOUS
Status: DISCONTINUED | OUTPATIENT
Start: 2022-12-19 | End: 2022-12-21 | Stop reason: HOSPADM

## 2022-12-19 RX ORDER — DONEPEZIL HYDROCHLORIDE 5 MG/1
5 TABLET, FILM COATED ORAL NIGHTLY
Status: DISCONTINUED | OUTPATIENT
Start: 2022-12-19 | End: 2022-12-21 | Stop reason: HOSPADM

## 2022-12-19 RX ORDER — IBUPROFEN 200 MG
24 TABLET ORAL
Status: DISCONTINUED | OUTPATIENT
Start: 2022-12-19 | End: 2022-12-21 | Stop reason: HOSPADM

## 2022-12-19 RX ORDER — CALCITONIN SALMON 200 [IU]/.09ML
1 SPRAY, METERED NASAL DAILY
Status: DISCONTINUED | OUTPATIENT
Start: 2022-12-19 | End: 2022-12-19

## 2022-12-19 RX ORDER — MAGNESIUM SULFATE HEPTAHYDRATE 40 MG/ML
2 INJECTION, SOLUTION INTRAVENOUS ONCE
Status: COMPLETED | OUTPATIENT
Start: 2022-12-19 | End: 2022-12-19

## 2022-12-19 RX ORDER — LANOLIN ALCOHOL/MO/W.PET/CERES
400 CREAM (GRAM) TOPICAL ONCE
Status: COMPLETED | OUTPATIENT
Start: 2022-12-19 | End: 2022-12-19

## 2022-12-19 RX ORDER — POTASSIUM CHLORIDE 20 MEQ/1
40 TABLET, EXTENDED RELEASE ORAL
Status: DISCONTINUED | OUTPATIENT
Start: 2022-12-19 | End: 2022-12-19

## 2022-12-19 RX ORDER — MAGNESIUM SULFATE HEPTAHYDRATE 40 MG/ML
2 INJECTION, SOLUTION INTRAVENOUS
Status: COMPLETED | OUTPATIENT
Start: 2022-12-19 | End: 2022-12-19

## 2022-12-19 RX ORDER — FUROSEMIDE 40 MG/1
40 TABLET ORAL DAILY
Status: DISCONTINUED | OUTPATIENT
Start: 2022-12-19 | End: 2022-12-19

## 2022-12-19 RX ORDER — CITALOPRAM 20 MG/1
40 TABLET, FILM COATED ORAL DAILY
Status: DISCONTINUED | OUTPATIENT
Start: 2022-12-19 | End: 2022-12-21 | Stop reason: HOSPADM

## 2022-12-19 RX ORDER — METHOCARBAMOL 500 MG/1
500 TABLET, FILM COATED ORAL 3 TIMES DAILY
Status: DISCONTINUED | OUTPATIENT
Start: 2022-12-19 | End: 2022-12-21 | Stop reason: HOSPADM

## 2022-12-19 RX ORDER — SODIUM CHLORIDE 0.9 % (FLUSH) 0.9 %
10 SYRINGE (ML) INJECTION EVERY 12 HOURS PRN
Status: DISCONTINUED | OUTPATIENT
Start: 2022-12-19 | End: 2022-12-21 | Stop reason: HOSPADM

## 2022-12-19 RX ORDER — DROPERIDOL 2.5 MG/ML
2.5 INJECTION, SOLUTION INTRAMUSCULAR; INTRAVENOUS ONCE
Status: COMPLETED | OUTPATIENT
Start: 2022-12-19 | End: 2022-12-19

## 2022-12-19 RX ORDER — TALC
6 POWDER (GRAM) TOPICAL NIGHTLY PRN
Status: DISCONTINUED | OUTPATIENT
Start: 2022-12-19 | End: 2022-12-21 | Stop reason: HOSPADM

## 2022-12-19 RX ORDER — LIDOCAINE 50 MG/G
2 PATCH TOPICAL
Status: DISCONTINUED | OUTPATIENT
Start: 2022-12-19 | End: 2022-12-21 | Stop reason: HOSPADM

## 2022-12-19 RX ORDER — GLUCAGON 1 MG
1 KIT INJECTION
Status: DISCONTINUED | OUTPATIENT
Start: 2022-12-19 | End: 2022-12-21 | Stop reason: HOSPADM

## 2022-12-19 RX ORDER — IPRATROPIUM BROMIDE AND ALBUTEROL SULFATE 2.5; .5 MG/3ML; MG/3ML
3 SOLUTION RESPIRATORY (INHALATION) EVERY 6 HOURS PRN
Status: DISCONTINUED | OUTPATIENT
Start: 2022-12-19 | End: 2022-12-21 | Stop reason: HOSPADM

## 2022-12-19 RX ADMIN — METHOCARBAMOL 500 MG: 500 TABLET ORAL at 08:12

## 2022-12-19 RX ADMIN — Medication 400 MG: at 04:12

## 2022-12-19 RX ADMIN — KETOROLAC TROMETHAMINE 10 MG: 30 INJECTION, SOLUTION INTRAMUSCULAR; INTRAVENOUS at 01:12

## 2022-12-19 RX ADMIN — ENOXAPARIN SODIUM 40 MG: 40 INJECTION SUBCUTANEOUS at 04:12

## 2022-12-19 RX ADMIN — ASPIRIN 81 MG: 81 TABLET, COATED ORAL at 08:12

## 2022-12-19 RX ADMIN — SODIUM CHLORIDE 1000 ML: 9 INJECTION, SOLUTION INTRAVENOUS at 07:12

## 2022-12-19 RX ADMIN — POTASSIUM CHLORIDE 40 MEQ: 1500 TABLET, EXTENDED RELEASE ORAL at 09:12

## 2022-12-19 RX ADMIN — LIDOCAINE 2 PATCH: 50 PATCH CUTANEOUS at 08:12

## 2022-12-19 RX ADMIN — DONEPEZIL HYDROCHLORIDE 5 MG: 5 TABLET, FILM COATED ORAL at 09:12

## 2022-12-19 RX ADMIN — METHOCARBAMOL 500 MG: 500 TABLET ORAL at 09:12

## 2022-12-19 RX ADMIN — LISINOPRIL 20 MG: 20 TABLET ORAL at 08:12

## 2022-12-19 RX ADMIN — POTASSIUM CHLORIDE 40 MEQ: 1500 TABLET, EXTENDED RELEASE ORAL at 02:12

## 2022-12-19 RX ADMIN — METHOCARBAMOL 500 MG: 500 TABLET ORAL at 04:12

## 2022-12-19 RX ADMIN — POTASSIUM CHLORIDE 10 MEQ: 7.46 INJECTION, SOLUTION INTRAVENOUS at 03:12

## 2022-12-19 RX ADMIN — CITALOPRAM HYDROBROMIDE 40 MG: 20 TABLET ORAL at 08:12

## 2022-12-19 RX ADMIN — MAGNESIUM SULFATE 2 G: 2 INJECTION INTRAVENOUS at 09:12

## 2022-12-19 RX ADMIN — MORPHINE SULFATE 6 MG: 2 INJECTION, SOLUTION INTRAMUSCULAR; INTRAVENOUS at 01:12

## 2022-12-19 RX ADMIN — MAGNESIUM SULFATE 2 G: 2 INJECTION INTRAVENOUS at 03:12

## 2022-12-19 RX ADMIN — SODIUM CHLORIDE 1000 ML: 0.9 INJECTION, SOLUTION INTRAVENOUS at 04:12

## 2022-12-19 RX ADMIN — ATORVASTATIN CALCIUM 20 MG: 20 TABLET, FILM COATED ORAL at 08:12

## 2022-12-19 RX ADMIN — SODIUM CHLORIDE, SODIUM LACTATE, POTASSIUM CHLORIDE, AND CALCIUM CHLORIDE 1000 ML: .6; .31; .03; .02 INJECTION, SOLUTION INTRAVENOUS at 05:12

## 2022-12-19 RX ADMIN — DROPERIDOL 2.5 MG: 2.5 INJECTION, SOLUTION INTRAMUSCULAR; INTRAVENOUS at 03:12

## 2022-12-19 NOTE — ED PROVIDER NOTES
Encounter Date: 12/19/2022       History     Chief Complaint   Patient presents with    Back Pain     From home, c/o severe back pain. Had spinal surgery in [place] recently. Daughter recently took patient out of nursing home to live with her so patient has run out of her prescribed pain medication.    OTHER     Daughter is also concerned pt may be hyponatremic, due to hx and patient has been drinking a lot of water. Pt is more agitated, hx of dementia     77-year-old female with history of diabetes, aortic stenosis, hypertension, hyperlipidemia, recent IR kyphoplasty one month ago presenting to the ED with worsening back pain.  Reports she ran out of her pain medication, and developed significantly worsening lower back pain.  Known saddle anesthesia, numbness or weakness in bilateral lower extremities.  Difficult to appreciate urinary incontinence as patient wears adult diapers at baseline.  Patient reports that back pain is paraspinal on both sides.  No urinary symptoms, nausea, vomiting, fevers or chills.  In addition, there is concern for care increased agitation which can sometimes happened with patient's altered sensorium when she has hyponatremia.  She is had increased diarrhea so family members worried about electrolytes.      Review of patient's allergies indicates:   Allergen Reactions    Sulfacetamide sodium      Pain perineal area    Sulfasalazine Hives    Adhesive Itching     SKIN GETS RED WITH TAPE AND BANDAIDS    Adhesive tape-silicones Itching     SKIN GETS RED WITH TAPE AND BANDAIDS    Sulfa (sulfonamide antibiotics) Rash     Past Medical History:   Diagnosis Date    Aortic stenosis     Arthritis     Back pain     Diabetes mellitus type II     Hyperlipidemia     Hypertension     NSTEMI (non-ST elevated myocardial infarction) 5/1/2022    Osteoporosis     Wears glasses      Past Surgical History:   Procedure Laterality Date     vocal cord nodules removed  long time ago     twice    APPENDECTOMY   within last 5yrs    CATHETERIZATION OF BOTH LEFT AND RIGHT HEART Left 2022    Procedure: CATHETERIZATION, HEART, BOTH LEFT AND RIGHT;  Surgeon: Michelet Vargas MD;  Location: [facility] CATH/EP LAB;  Service: Cardiology;  Laterality: Left;    FIXATION KYPHOPLASTY THORACIC SPINE      14    FOOT SURGERY      left 2nd toe was too long     HERNIA REPAIR  within last 5yrs    LEFT HEART CATHETERIZATION Left 2022    Procedure: Left heart cath;  Surgeon: Jose Echols MD;  Location: [facility] CATH/EP LAB;  Service: Cardiology;  Laterality: Left;    TONSILLECTOMY, ADENOIDECTOMY  long time ago     Family History   Problem Relation Age of Onset    Collagen disease Neg Hx      Social History     Tobacco Use    Smoking status: Former     Packs/day: 0.50     Years: 35.00     Pack years: 17.50     Types: Cigarettes     Quit date: 10/5/2022     Years since quittin.2    Smokeless tobacco: Never   Substance Use Topics    Alcohol use: No    Drug use: No     Review of Systems   Constitutional:  Negative for fever.   HENT:  Negative for sore throat.    Respiratory:  Negative for shortness of breath.    Cardiovascular:  Negative for chest pain.   Gastrointestinal:  Negative for abdominal pain, diarrhea, nausea and vomiting.   Genitourinary:  Negative for dysuria.   Musculoskeletal:  Positive for back pain.   Skin:  Negative for rash.   Neurological:  Negative for weakness and numbness.   Hematological:  Does not bruise/bleed easily.     Physical Exam     Initial Vitals [22 0123]   BP Pulse Resp Temp SpO2   110/70 102 18 98 °F (36.7 °C) 100 %      MAP       --         Physical Exam    Nursing note and vitals reviewed.  Constitutional: She appears well-developed and well-nourished. She is not diaphoretic. No distress.   HENT:   Head: Normocephalic and atraumatic.   Eyes: Conjunctivae and EOM are normal. Right eye exhibits no discharge. Left eye exhibits no discharge. No scleral icterus.   Neck:   Normal range of  motion.  Cardiovascular:  Normal rate and regular rhythm.     Exam reveals no gallop and no friction rub.       No murmur heard.  Pulmonary/Chest: No respiratory distress. She has no wheezes. She has no rhonchi. She has no rales. She exhibits no tenderness.   Abdominal: Abdomen is soft. She exhibits no distension and no mass. There is no abdominal tenderness. There is no rebound and no guarding.   Musculoskeletal:      Cervical back: Normal range of motion.      Comments: No midline lumbar spine tenderness to palpation     Neurological: She is alert and oriented to person, place, and time. She has normal strength. No cranial nerve deficit or sensory deficit.   Skin: Skin is warm and dry. No erythema. No pallor.       ED Course   Procedures  Labs Reviewed   CBC W/ AUTO DIFFERENTIAL - Abnormal; Notable for the following components:       Result Value    Hemoglobin 10.4 (*)     Hematocrit 33.5 (*)     MCV 79 (*)     MCH 24.4 (*)     MCHC 31.0 (*)     Gran # (ANC) 8.7 (*)     Mono # 1.1 (*)     Lymph % 13.5 (*)     All other components within normal limits   COMPREHENSIVE METABOLIC PANEL - Abnormal; Notable for the following components:    Sodium 134 (*)     Potassium 2.8 (*)     Chloride 92 (*)     Glucose 152 (*)     All other components within normal limits   MAGNESIUM - Abnormal; Notable for the following components:    Magnesium 0.8 (*)     All other components within normal limits   URINALYSIS, REFLEX TO URINE CULTURE - Abnormal; Notable for the following components:    Appearance, UA Hazy (*)     Leukocytes, UA 1+ (*)     All other components within normal limits    Narrative:     Specimen Source->Urine   B-TYPE NATRIURETIC PEPTIDE - Abnormal; Notable for the following components:     (*)     All other components within normal limits   SODIUM, URINE, RANDOM - Abnormal; Notable for the following components:    Sodium, Urine 11 (*)     All other components within normal limits    Narrative:     Specimen  Source->Urine   URINALYSIS MICROSCOPIC - Abnormal; Notable for the following components:    RBC, UA 7 (*)     All other components within normal limits    Narrative:     Specimen Source->Urine   BASIC METABOLIC PANEL - Abnormal; Notable for the following components:    Sodium 135 (*)     Potassium 2.8 (*)     Chloride 93 (*)     CO2 31 (*)     Glucose 134 (*)     All other components within normal limits   BASIC METABOLIC PANEL - Abnormal; Notable for the following components:    Sodium 134 (*)     Glucose 124 (*)     Calcium 8.3 (*)     All other components within normal limits    Narrative:     for + sepsis screen   POCT GLUCOSE - Abnormal; Notable for the following components:    POCT Glucose 132 (*)     All other components within normal limits   CLOSTRIDIUM DIFFICILE   CULTURE, STOOL   PHOSPHORUS   LIPASE   B-TYPE NATRIURETIC PEPTIDE   B-TYPE NATRIURETIC PEPTIDE    Narrative:     Add on BNP per Selene Figueroa RN on  12/19/2022  06:36 [id number]   OSMOLALITY, URINE RANDOM    Narrative:     Specimen Source->Urine   OSMOLALITY, SERUM   HEMOGLOBIN A1C   OSMOLALITY, SERUM    Narrative:     Add on OSMO pre BOLA COON MD 12/19/2022  07:57 [id number]   Add on BNP per Selene Figueroa RN on  12/19/2022  06:36 [id number]   MAGNESIUM    Narrative:     for + sepsis screen   LACTIC ACID, PLASMA    Narrative:     for + sepsis screen   H. PYLORI ANTIGEN, STOOL   PANCREATIC ELASTASE, FECAL   FECAL FAT, QUALITATIVE   OCCULT BLOOD X 1, STOOL   WBC, STOOL   ROTAVIRUS ANTIGEN, STOOL   CALPROTECTIN, STOOL   GIARDIA/CRYPTOSPORIDIUM (EIA)   STOOL EXAM-OVA,CYSTS,PARASITES   BASIC METABOLIC PANEL   ISTAT CHEM8   POCT GLUCOSE MONITORING CONTINUOUS     EKG Readings: (Independently Interpreted)   Normal sinus rhythm, rate of 95. No ST elevations or depressions concerning for ischemia.  No STEMI per my independent interpretation     ECG Results              EKG 12-lead (Final result)  Result time 12/19/22 09:16:46      Final result by  Interface, Lab In [facility] (12/19/22 09:16:46)                   Narrative:    Test Reason : M54.9,    Vent. Rate : 095 BPM     Atrial Rate : 095 BPM     P-R Int : 110 ms          QRS Dur : 082 ms      QT Int : 354 ms       P-R-T Axes : 000 -08 077 degrees     QTc Int : 444 ms    Sinus rhythm with short AL  Nonspecific ST abnormality  Abnormal ECG  When compared with ECG of 21-NOV-2022 09:09,  No significant change was found  Confirmed by KHADIJAH LEDESMA MD ([id number]) on 12/19/2022 9:16:34 AM    Referred By: [email or web address]   SELF           Confirmed By:KHADIJAH LEDESMA MD                                  Imaging Results              X-Ray Chest AP Portable (Final result)  Result time 12/19/22 05:51:06      Final result by Ezekiel Leblanc MD (12/19/22 05:51:06)                   Impression:      Continued demonstration of cardiomegaly, but there has been no significant detrimental interval change in the appearance of the chest since 05/05/2022.      Electronically signed by: Ezekiel Leblanc MD  Date:    12/19/2022  Time:    05:51               Narrative:    EXAMINATION:  XR CHEST AP PORTABLE    CLINICAL HISTORY:  CHF;    TECHNIQUE:  One view    COMPARISON:  Comparison is made to 05/05/2022, the most recent prior chest radiograph.    FINDINGS:  Cardiomediastinal silhouette is again noted to be prominent, but the degree of cardiomegaly and the appearance of the cardiomediastinal silhouette have not changed appreciably since the examination referenced above.  Lung zones also appear stable, with no significant new areas of airspace consolidation or volume loss having developed.  No pleural fluid of any substantial volume is seen on either side.  A skin fold is superimposed over the upper right hemithorax, but no pneumothorax is appreciated.  Multiple compression deformities relating to the thoracic spine are again observed, with opaque material within compressed vertebral bodies at the T4, T5, T7, T10, and T12 levels present, relating to  prior vertebroplasty treatment at these levels.  The T12 vertebroplasty is the most recent, having been performed on 11/28/2022.  Incidental observations also include calcification in the wall of the thoracic aorta and numerous surgical clips related to the soft tissues of the chest wall bilaterally.                                       Medications   sodium chloride 0.9% flush 10 mL (has no administration in time range)   naloxone 0.4 mg/mL injection 0.02 mg (has no administration in time range)   insulin aspart U-100 pen 0-5 Units (has no administration in time range)   glucose chewable tablet 16 g (has no administration in time range)   glucose chewable tablet 24 g (has no administration in time range)   dextrose 10% bolus 125 mL 125 mL (has no administration in time range)   dextrose 10% bolus 250 mL 250 mL (has no administration in time range)   glucagon (human recombinant) injection 1 mg (has no administration in time range)   enoxaparin injection 40 mg (40 mg Subcutaneous Given 12/19/22 1636)   albuterol-ipratropium 2.5 mg-0.5 mg/3 mL nebulizer solution 3 mL (has no administration in time range)   melatonin tablet 6 mg (has no administration in time range)   aspirin EC tablet 81 mg (81 mg Oral Given 12/19/22 0842)   citalopram tablet 40 mg (40 mg Oral Given 12/19/22 0840)   donepeziL tablet 5 mg (has no administration in time range)   HYDROcodone-acetaminophen 5-325 mg per tablet 1 tablet (has no administration in time range)   methocarbamoL tablet 500 mg (500 mg Oral Given 12/19/22 1636)   LIDOcaine 5 % patch 2 patch (2 patches Transdermal Patch Applied 12/19/22 0816)   atorvastatin tablet 20 mg (20 mg Oral Given 12/19/22 0841)   loperamide capsule 2 mg (has no administration in time range)   lisinopriL tablet 20 mg (20 mg Oral Given 12/19/22 0841)   ketorolac injection 9.999 mg (9.999 mg Intravenous Given 12/19/22 0156)   morphine injection 6 mg (6 mg Intravenous Given 12/19/22 0155)   droperidoL injection  2.5 mg (2.5 mg Intravenous Given 12/19/22 0330)   magnesium sulfate 2g in water 50mL IVPB (premix) (0 g Intravenous Stopped 12/19/22 0519)   potassium chloride 10 mEq in 100 mL IVPB (0 mEq Intravenous Stopped 12/19/22 0519)   magnesium sulfate 2g in water 50mL IVPB (premix) (0 g Intravenous Stopped 12/19/22 1148)   potassium chloride SA CR tablet 40 mEq (40 mEq Oral Given 12/19/22 1403)   magnesium oxide tablet 400 mg (400 mg Oral Given 12/19/22 1636)     Medical Decision Making:   History:   Old Medical Records: I decided to obtain old medical records.  Initial Assessment:   77-year-old female presenting to the ED with worsening low back pain after surgery.  Ran out of pain meds.  No red flags concerning for cauda equina history or physical exam.  Also been more agitated which sometimes happens with electrolyte abnormalities per family member.    Differential includes but is not limited to worsening back pain, UTI, pyelonephritis, electrolyte abnormalities.    Pt had no relief with morphine and toradol; given droperidol with pain relief. CBC unremarkable. CMP showed mild hypokalemia. Magnesium critically low at 0.8. Repleted mag and potassium. Due to electrolyte abnormalities, discussed with HM for admission.  Had low suspicion for infection as minimally invasive surgery, no overt signs of surgical site infection, and no leukocytosis/fever.    Independently Interpreted Test(s):   I have ordered and independently interpreted EKG Reading(s) - see prior notes  Clinical Tests:   Lab Tests: Ordered and Reviewed  Medical Tests: Ordered and Reviewed  Other:   I have discussed this case with another health care provider.          Attending Attestation:   Physician Attestation Statement for Resident:  As the supervising MD   Physician Attestation Statement: I have personally seen and examined this patient.   I agree with the above history.  -:   As the supervising MD I agree with the above PE.     As the supervising MD I  agree with the above treatment, course, plan, and disposition.    I have reviewed and agree with the residents interpretation of the following: lab data.  I have reviewed the following: old records at this facility.                           Clinical Impression:   Final diagnoses:  [M54.9] Back pain  [E83.42] Hypomagnesemia (Primary)  [E87.6] Hypokalemia        ED Disposition Condition    Observation                 Kemal Kiran MD  Resident  12/19/22 1733       Shana Galeas MD  12/20/22 0031

## 2022-12-19 NOTE — ASSESSMENT & PLAN NOTE
Patient's FSGs are controlled on current medication regimen.  Last A1c reviewed-   Lab Results   Component Value Date    HGBA1C 6.2 05/01/2022     Most recent fingerstick glucose reviewed- No results for input(s): POCTGLUCOSE in the last 24 hours.  Current correctional scale  Low  Maintain anti-hyperglycemic dose as follows-   Antihyperglycemics (From admission, onward)    Start     Stop Route Frequency Ordered    12/19/22 0503  insulin aspart U-100 pen 0-5 Units         -- SubQ Before meals & nightly PRN 12/19/22 0405        Hold Oral hypoglycemics while patient is in the hospital.    Plan  - Maintain -180  - LDSS  - diabetic diet  - BF

## 2022-12-19 NOTE — ED NOTES
Daryleen G Moran, a 77 y.o. female presents to the ED w/ complaint of back pain following recent back spinal fracture diagnosis. Per family. Pt ran out of pain medication d/t primary being in [place]. Pt was in nursing home and family/daughter is now primary caregiver.     Triage note:  Chief Complaint   Patient presents with    Back Pain     From home, c/o severe back pain. Had spinal surgery in [place] recently. Daughter recently took patient out of nursing home to live with her so patient has run out of her prescribed pain medication.    OTHER     Daughter is also concerned pt may be hyponatremic, due to hx and patient has been drinking a lot of water. Pt is more agitated, hx of dementia     Review of patient's allergies indicates:   Allergen Reactions    Sulfacetamide sodium      Pain perineal area    Sulfasalazine Hives    Adhesive Itching     SKIN GETS RED WITH TAPE AND BANDAIDS    Adhesive tape-silicones Itching     SKIN GETS RED WITH TAPE AND BANDAIDS    Sulfa (sulfonamide antibiotics) Rash     Past Medical History:   Diagnosis Date    Aortic stenosis     Arthritis     Back pain     Diabetes mellitus type II     Hyperlipidemia     Hypertension     NSTEMI (non-ST elevated myocardial infarction) 5/1/2022    Osteoporosis     Wears glasses

## 2022-12-19 NOTE — ED NOTES
Pt brief change x1 urine episode. New linens provided. Pt refused gown change at this time- home gown is clean and dry. Skin intact. Daughter at bedside. Purewick applied. Pt adamantly refusing straight catheter at this time.

## 2022-12-19 NOTE — ASSESSMENT & PLAN NOTE
Patient has had chronic diarrhea for 1.5 months. Upto 5 BM a day, ranging from liquid to soft.     - F/u on stool studies   - Daily BMP  - Immodium PRN

## 2022-12-19 NOTE — CARE UPDATE
Primary team notified regarding new onset hypotension ranging from 60's to 80's systolic. Requested rapids page and response. Patient was resting comfortably, and in no acute distress. Repeat blood pressures measure in the 80's systolic. Patient had received 1L of fluid thus far. Will add second liter with pressure bag. Pressures currently holding in the mid 80s SBP at start of second bag infusion. BMP, lactic, mag and phos ordered, will follow-up. Will consult MICU If failure to respond well to fluids/and ability to maintain adequate blood pressures.

## 2022-12-19 NOTE — H&P
[facility] - Emergency Dept  [facility]  History & Physical    Patient Name: Daryleen G Moran  MRN: [id number]  Patient Class: OP- Observation  Admission Date: 12/19/2022  Attending Physician: Murtaza Gudino MD   Primary Care Provider: Abhi De Oliveira Iv, MD         Patient information was obtained from patient, relative(s) and ER records.     Subjective:     Principal Problem:Electrolyte abnormality    Chief Complaint:   Chief Complaint   Patient presents with    Back Pain     From home, c/o severe back pain. Had spinal surgery in [place] recently. Daughter recently took patient out of nursing home to live with her so patient has run out of her prescribed pain medication.    OTHER     Daughter is also concerned pt may be hyponatremic, due to hx and patient has been drinking a lot of water. Pt is more agitated, hx of dementia        HPI: Ms. Cyr is a 77-year-old female with a history of multiple compression fractures (T12 and L2) s/p IR kyphoplasty (11/28), osteoporosis (DEXA T score of -3.6),  T2DM, HLD, HTN,  CHF (G1DD EF 65%), aortic stenosis undergoing TAVR in Jan, COPD (on home 2L NC), and dementia who presents to the ED for intractable back pain, new onset agitation, and chronic diarrhea.      History was obtained from daughter who was present at bedside. Patient was able to follow commands, however communication was limited by patient's AMS. Her back pain became worse on 12/18/22 after running out of her pain medications for which they received a 21 dy supply of. Her last dose was on 12/17/22. As per daughter, pain is paraspinal and causing increased restlessness in patient. She used to receive steroid injections done by ortho for pain, however daugher does not want her to be seen by orthopedics. Back pain is not associated with loss of sensation. She is able to ambulate with a walker, however limited as she develops dyspnea on exertion.    Of note, patient had a multiple hospitlization from  11/21/22-11/30/22 for intractable back pain and diarrhea. She was diagnosed with a L2 compression fracture 2 weeks prior and T12 fx on admission. NSGY was consulted  who recommended TLSO brace and further imaging (unable to tolerate MRI). She ev eventually had a IR kyphophlasty performed on 11/28 with improvement in pain. Also, UA at that time grew enterococcus and she was on IV CTX and eventually de-escalated to ampicillin. Diarrhea improved and C-diff and stool cx were negative. She was d/c with .    ED Course:  In the ED, patient was HDS, afebrile, and saturating % on 2L NC. Initial labs notable for hyponatremia (134), hypokalemia (2.8), severe hypomagnesiumia (0.8), anemia (hg of 10.4). UA pending. EKG showed NSR with QTC of 444. Received K Cl 10 meq, Mg 2g, morphine 6 mg, ketorolac 10mg, droperidol 2.5 mg.       Past Medical History:   Diagnosis Date    Aortic stenosis     Arthritis     Back pain     Diabetes mellitus type II     Hyperlipidemia     Hypertension     NSTEMI (non-ST elevated myocardial infarction) 5/1/2022    Osteoporosis     Wears glasses        Past Surgical History:   Procedure Laterality Date     vocal cord nodules removed  long time ago     twice    APPENDECTOMY  within last 5yrs    CATHETERIZATION OF BOTH LEFT AND RIGHT HEART Left 5/6/2022    Procedure: CATHETERIZATION, HEART, BOTH LEFT AND RIGHT;  Surgeon: Michelet Vargas MD;  Location: [facility] CATH/EP LAB;  Service: Cardiology;  Laterality: Left;    FIXATION KYPHOPLASTY THORACIC SPINE      8-20-13    FOOT SURGERY      left 2nd toe was too long     HERNIA REPAIR  within last 5yrs    LEFT HEART CATHETERIZATION Left 5/2/2022    Procedure: Left heart cath;  Surgeon: Jose Echols MD;  Location: [facility] CATH/EP LAB;  Service: Cardiology;  Laterality: Left;    TONSILLECTOMY, ADENOIDECTOMY  long time ago       Review of patient's allergies indicates:   Allergen Reactions    Sulfacetamide sodium      Pain perineal area    Sulfasalazine  Hives    Adhesive Itching     SKIN GETS RED WITH TAPE AND BANDAIDS    Adhesive tape-silicones Itching     SKIN GETS RED WITH TAPE AND BANDAIDS    Sulfa (sulfonamide antibiotics) Rash       No current facility-administered medications on file prior to encounter.     Current Outpatient Medications on File Prior to Encounter   Medication Sig    albuterol (PROVENTIL/VENTOLIN HFA) 90 mcg/actuation inhaler Inhale 2 puffs into the lungs every 6 (six) hours as needed.    aspirin (ECOTRIN) 81 MG EC tablet Take 81 mg by mouth once daily.    benazepriL (LOTENSIN) 20 MG tablet Take 20 mg by mouth once daily.    budesonide-formoterol 80-4.5 mcg (SYMBICORT) 80-4.5 mcg/actuation HFAA Inhale 2 puffs into the lungs 2 (two) times daily as needed (Asthma).    calcitonin, salmon, (FORTICAL) 200 unit/actuation nasal spray 1 spray by Nasal route once daily. for 21 days    calcium carbonate (OS-TK) 600 mg (1,500 mg) Tab Take 600 mg by mouth 2 (two) times daily with meals.    cetirizine HCl (ZYRTEC ORAL) Take 1 tablet by mouth daily as needed (Allergies).    citalopram (CELEXA) 40 MG tablet Take 1 tablet (40 mg total) by mouth once daily.    donepeziL (ARICEPT) 5 MG tablet Take 5 mg by mouth nightly.    ergocalciferol (ERGOCALCIFEROL) 50,000 unit Cap TAKE 1 CAPSULE (50,000 UNITS TOTAL) EVERY 7 DAYS.    ferrous gluconate 324 mg (37.5 mg iron) Tab tablet Take 324 mg by mouth once daily.    furosemide (LASIX) 40 MG tablet Take 40 mg by mouth once daily.    glucosamine-chondroitin 500-400 mg tablet Take 1 tablet by mouth once daily.    HYDROcodone-acetaminophen (NORCO) 5-325 mg per tablet Take 1 tablet by mouth every 8 (eight) hours as needed for Pain.    LIDOcaine (LIDODERM) 5 % Place 1 patch onto the skin once daily. Apply to back. Remove & Discard patch within 12 hours or as directed by MD    loperamide (IMODIUM A-D) 2 mg Tab Take 1 tablet (2 mg total) by mouth 3 (three) times daily as needed (diarrhea). Take 2 tablets by mouth initially  then 1 tablet after each loose stool as needed for diarrhea.    metFORMIN (GLUCOPHAGE) 500 MG tablet Take 1 tablet (500 mg total) by mouth 2 (two) times daily with meals.    multivitamin capsule Take 1 capsule by mouth once daily.    omeprazole (PRILOSEC) 20 MG capsule Take 1 capsule (20 mg total) by mouth once daily.    oxybutynin (DITROPAN) 5 MG Tab Take 1 tablet (5 mg total) by mouth 2 (two) times daily.    simvastatin (ZOCOR) 40 MG tablet Take 1 tablet (40 mg total) by mouth every evening.    [DISCONTINUED] ALLERGY RELIEF, FEXOFENADINE, 180 mg tablet Take 180 mg by mouth once daily.    [DISCONTINUED] ezetimibe-simvastatin 10-40 mg (VYTORIN) 10-40 mg per tablet Take 1 tablet by mouth.    [DISCONTINUED] lisinopril-hydrochlorothiazide (PRINZIDE,ZESTORETIC) 20-12.5 mg per tablet Take 1 tablet by mouth once daily.      Family History    None       Tobacco Use    Smoking status: Former     Packs/day: 0.50     Years: 35.00     Pack years: 17.50     Types: Cigarettes     Quit date: 10/5/2022     Years since quittin.2    Smokeless tobacco: Never   Substance and Sexual Activity    Alcohol use: No    Drug use: No    Sexual activity: Not on file     Review of Systems   Unable to perform ROS: Mental status change   Objective:     Vital Signs (Most Recent):  Temp: 98 °F (36.7 °C) (22 0123)  Pulse: 90 (22 0303)  Resp: 18 (22 0155)  BP: 115/74 (22 0303)  SpO2: 97 % (22 0303) Vital Signs (24h Range):  Temp:  [98 °F (36.7 °C)] 98 °F (36.7 °C)  Pulse:  [] 90  Resp:  [18] 18  SpO2:  [97 %-100 %] 97 %  BP: (110-123)/(62-74) 115/74        There is no height or weight on file to calculate BMI.    Physical Exam  Constitutional:       General: She is not in acute distress.     Appearance: Normal appearance. She is not ill-appearing.   HENT:      Head: Normocephalic and atraumatic.      Mouth/Throat:      Mouth: Mucous membranes are moist.   Eyes:      Extraocular Movements: Extraocular movements  intact.      Conjunctiva/sclera: Conjunctivae normal.      Pupils: Pupils are equal, round, and reactive to light.   Cardiovascular:      Rate and Rhythm: Normal rate and regular rhythm.      Pulses:           Dorsalis pedis pulses are 3+ on the right side and 3+ on the left side.      Heart sounds: Normal heart sounds.   Pulmonary:      Effort: Pulmonary effort is normal. No respiratory distress.      Breath sounds: Wheezing (Expiratory wheeze) present.   Abdominal:      General: Abdomen is flat. Bowel sounds are normal. There is no distension.      Palpations: Abdomen is soft.      Tenderness: There is no abdominal tenderness. There is no guarding.      Hernia: A hernia is present.   Musculoskeletal:         General: No swelling.      Right lower leg: No edema.      Left lower leg: No edema.   Lymphadenopathy:      Cervical: No cervical adenopathy.   Skin:     General: Skin is warm.      Capillary Refill: Capillary refill takes less than 2 seconds.   Neurological:      General: No focal deficit present.      Mental Status: She is alert. She is disoriented.   Psychiatric:         Mood and Affect: Mood normal.         Behavior: Behavior normal.         CRANIAL NERVES     CN III, IV, VI   Pupils are equal, round, and reactive to light.     Significant Labs: All pertinent labs within the past 24 hours have been reviewed.  CBC:   Recent Labs   Lab 12/19/22  0159   WBC 12.06   HGB 10.4*   HCT 33.5*        CMP:   Recent Labs   Lab 12/19/22  0159   *   K 2.8*   CL 92*   CO2 29   *   BUN 10   CREATININE 0.7   CALCIUM 9.3   PROT 6.7   ALBUMIN 3.5   BILITOT 0.6   ALKPHOS 73   AST 17   ALT 16   ANIONGAP 13       Significant Imaging: I have reviewed all pertinent imaging results/findings within the past 24 hours.    Assessment/Plan:     * Electrolyte abnormality  Hypomagnesiumia (0.8) and hypokalemia (2.8) likely 2/2 GI loss from significant chronic diarrhea. Unknown etiology of chronic diarrhea. Likely  causing agitation.     - F/u on Q8H BMP   - replete lytes PRN      Hypokalemia due to excessive gastrointestinal loss of potassium  See electrolyte abnormality      Hypomagnesemia  See electrolyte abnormality        CAD (coronary artery disease)  - continue home aspirin 81 mg QD      Hyponatremia  Na 134 on admission. Likely secondary to diuretic use vs poor PO intake vs primary polydipsia vs underlying malignancy vs SIADH     Plan:  - F/u on BNP and CXR prior to administering IVF  - If BNP wnl and CXR w/o pulmonary vascular congestion, start IVF  - F/u serum osm, urine osm, urine Na         Diarrhea  Patient has had chronic diarrhea for 1.5 months. Upto 5 BM a day, ranging from liquid to soft.     - F/u on stool studies   - Daily BMP  - Immodium PRN         Aortic stenosis  Echo done on 9/23/22. Shows severe aortic valve stenosis. Aortic valve area of 0.89 cm2; peak velocity is 4.69 m/s; mean gradient is a 53 mmHg. Plan to undergo TAVR in January       History of vertebral compression fracture  S/p kyphoplasty on 11/28.     Plan  -scheduled tylenol, robaxin, lidocaine patches  -norco 5mg for breakthrough pain  - f/u with endocrine outpatient  - f/u with [facility]       COPD, mild  On home 2L NC    - PRN duonebs       Hyperlipidemia  - resumed home atorvastatin 20 mg QD      Depression  Patient has persistent depression which is unknown and is currently controlled. Will Continue anti-depressant medications. We will not consult psychiatry at this time. Patient does not display psychosis at this time. Continue to monitor closely and adjust plan of care as needed.    Plan  - resumed home celexa        Hypertension  - resumed home lisinopril 20 mg QD  - held home lasix 40 mg QD in setting of hyponatremia       Type 2 diabetes mellitus, without long-term current use of insulin  Patient's FSGs are controlled on current medication regimen.  Last A1c reviewed-   Lab Results   Component Value Date    HGBA1C  6.2 05/01/2022     Most recent fingerstick glucose reviewed- No results for input(s): POCTGLUCOSE in the last 24 hours.  Current correctional scale  Low  Maintain anti-hyperglycemic dose as follows-   Antihyperglycemics (From admission, onward)      Start     Stop Route Frequency Ordered    12/19/22 0503  insulin aspart U-100 pen 0-5 Units         -- SubQ Before meals & nightly PRN 12/19/22 0405          Hold Oral hypoglycemics while patient is in the hospital.    Plan  - Maintain -180  - LDSS  - diabetic diet        VTE Risk Mitigation (From admission, onward)           Ordered     enoxaparin injection 40 mg  Daily         12/19/22 0405     IP VTE HIGH RISK PATIENT  Once         12/19/22 0405     Place sequential compression device  Until discontinued         12/19/22 0405                       Niurka Kirkpatrick MD  Department of Hospital Medicine   [facility] - Emergency Dept

## 2022-12-19 NOTE — ASSESSMENT & PLAN NOTE
Na 134 on admission. Likely secondary to diuretic use vs poor PO intake vs primary polydipsia vs underlying malignancy vs SIADH     Plan:  - F/u on BNP and CXR prior to administering IVF  - If BNP wnl and CXR w/o pulmonary vascular congestion, start IVF  - F/u serum osm, urine osm, urine Na

## 2022-12-19 NOTE — ED NOTES
LOC: The patient is awake, alert, and oriented to self, place, time, and situation. Pt is anxious and visibly upset. Pt yelling at staff for pain medication.       SKIN: The skin is warm and dry; color consistent with ethnicity.  Patient has normal skin turgor and moist mucus membranes.      MUSCULOSKELETAL: Patient moving upper and lower extremities without difficulty; Pt complains of extreme back pain.  Denies weakness.     RESPIRATORY: Airway is open and patent. Respirations spontaneous, even, easy, and non-labored.  Patient has a normal effort and rate.  No accessory muscle use noted. Denies cough.     CARDIAC:  Normal rate noted.  No peripheral edema noted. No complaints of chest pain.      ABDOMEN: Soft and non tender to palpation.  No distention noted. Pt denies abdominal pain; denies nausea, vomiting, diarrhea, or constipation.    NEUROLOGIC: Eyes open spontaneously.  Behavior appropriate to situation.  Follows commands; facial expression symmetrical.  Purposeful motor response noted; normal sensation in all extremities. Pt denies headache; denies lightheadedness or dizziness; denies visual disturbances; denies loss of balance; denies unilateral weakness.

## 2022-12-19 NOTE — ASSESSMENT & PLAN NOTE
Hypomagnesiumia (0.8) and hypokalemia (2.8) likely 2/2 GI loss from significant chronic diarrhea. Unknown etiology of chronic diarrhea. Likely causing agitation.     - F/u on Q8H BMP   - replete lytes PRN

## 2022-12-19 NOTE — ED NOTES
Pt continues to rest comfortably without distress.Visible rise and fall of chest.  No needs observed or expressed.  Bed locked and in lowest position. Call light within reach.

## 2022-12-19 NOTE — SUBJECTIVE & OBJECTIVE
Past Medical History:   Diagnosis Date    Aortic stenosis     Arthritis     Back pain     Diabetes mellitus type II     Hyperlipidemia     Hypertension     NSTEMI (non-ST elevated myocardial infarction) 5/1/2022    Osteoporosis     Wears glasses        Past Surgical History:   Procedure Laterality Date     vocal cord nodules removed  long time ago     twice    APPENDECTOMY  within last 5yrs    CATHETERIZATION OF BOTH LEFT AND RIGHT HEART Left 5/6/2022    Procedure: CATHETERIZATION, HEART, BOTH LEFT AND RIGHT;  Surgeon: Michelet Vargas MD;  Location: [facility] CATH/EP LAB;  Service: Cardiology;  Laterality: Left;    FIXATION KYPHOPLASTY THORACIC SPINE      8-20-13    FOOT SURGERY      left 2nd toe was too long     HERNIA REPAIR  within last 5yrs    LEFT HEART CATHETERIZATION Left 5/2/2022    Procedure: Left heart cath;  Surgeon: Jose Echols MD;  Location: [facility] CATH/EP LAB;  Service: Cardiology;  Laterality: Left;    TONSILLECTOMY, ADENOIDECTOMY  long time ago       Review of patient's allergies indicates:   Allergen Reactions    Sulfacetamide sodium      Pain perineal area    Sulfasalazine Hives    Adhesive Itching     SKIN GETS RED WITH TAPE AND BANDAIDS    Adhesive tape-silicones Itching     SKIN GETS RED WITH TAPE AND BANDAIDS    Sulfa (sulfonamide antibiotics) Rash       No current facility-administered medications on file prior to encounter.     Current Outpatient Medications on File Prior to Encounter   Medication Sig    albuterol (PROVENTIL/VENTOLIN HFA) 90 mcg/actuation inhaler Inhale 2 puffs into the lungs every 6 (six) hours as needed.    aspirin (ECOTRIN) 81 MG EC tablet Take 81 mg by mouth once daily.    benazepriL (LOTENSIN) 20 MG tablet Take 20 mg by mouth once daily.    budesonide-formoterol 80-4.5 mcg (SYMBICORT) 80-4.5 mcg/actuation HFAA Inhale 2 puffs into the lungs 2 (two) times daily as needed (Asthma).    calcitonin, salmon, (FORTICAL) 200 unit/actuation nasal spray 1 spray by Nasal route once  daily. for 21 days    calcium carbonate (OS-TK) 600 mg (1,500 mg) Tab Take 600 mg by mouth 2 (two) times daily with meals.    cetirizine HCl (ZYRTEC ORAL) Take 1 tablet by mouth daily as needed (Allergies).    citalopram (CELEXA) 40 MG tablet Take 1 tablet (40 mg total) by mouth once daily.    donepeziL (ARICEPT) 5 MG tablet Take 5 mg by mouth nightly.    ergocalciferol (ERGOCALCIFEROL) 50,000 unit Cap TAKE 1 CAPSULE (50,000 UNITS TOTAL) EVERY 7 DAYS.    ferrous gluconate 324 mg (37.5 mg iron) Tab tablet Take 324 mg by mouth once daily.    furosemide (LASIX) 40 MG tablet Take 40 mg by mouth once daily.    glucosamine-chondroitin 500-400 mg tablet Take 1 tablet by mouth once daily.    HYDROcodone-acetaminophen (NORCO) 5-325 mg per tablet Take 1 tablet by mouth every 8 (eight) hours as needed for Pain.    LIDOcaine (LIDODERM) 5 % Place 1 patch onto the skin once daily. Apply to back. Remove & Discard patch within 12 hours or as directed by MD    loperamide (IMODIUM A-D) 2 mg Tab Take 1 tablet (2 mg total) by mouth 3 (three) times daily as needed (diarrhea). Take 2 tablets by mouth initially then 1 tablet after each loose stool as needed for diarrhea.    metFORMIN (GLUCOPHAGE) 500 MG tablet Take 1 tablet (500 mg total) by mouth 2 (two) times daily with meals.    multivitamin capsule Take 1 capsule by mouth once daily.    omeprazole (PRILOSEC) 20 MG capsule Take 1 capsule (20 mg total) by mouth once daily.    oxybutynin (DITROPAN) 5 MG Tab Take 1 tablet (5 mg total) by mouth 2 (two) times daily.    simvastatin (ZOCOR) 40 MG tablet Take 1 tablet (40 mg total) by mouth every evening.    [DISCONTINUED] ALLERGY RELIEF, FEXOFENADINE, 180 mg tablet Take 180 mg by mouth once daily.    [DISCONTINUED] ezetimibe-simvastatin 10-40 mg (VYTORIN) 10-40 mg per tablet Take 1 tablet by mouth.    [DISCONTINUED] lisinopril-hydrochlorothiazide (PRINZIDE,ZESTORETIC) 20-12.5 mg per tablet Take 1 tablet by mouth once daily.      Family  History    None       Tobacco Use    Smoking status: Former     Packs/day: 0.50     Years: 35.00     Pack years: 17.50     Types: Cigarettes     Quit date: 10/5/2022     Years since quittin.2    Smokeless tobacco: Never   Substance and Sexual Activity    Alcohol use: No    Drug use: No    Sexual activity: Not on file     Review of Systems   Unable to perform ROS: Mental status change   Objective:     Vital Signs (Most Recent):  Temp: 98 °F (36.7 °C) (22 0123)  Pulse: 90 (22 0303)  Resp: 18 (22 0155)  BP: 115/74 (22 0303)  SpO2: 97 % (22 0303) Vital Signs (24h Range):  Temp:  [98 °F (36.7 °C)] 98 °F (36.7 °C)  Pulse:  [] 90  Resp:  [18] 18  SpO2:  [97 %-100 %] 97 %  BP: (110-123)/(62-74) 115/74        There is no height or weight on file to calculate BMI.    Physical Exam  Constitutional:       General: She is not in acute distress.     Appearance: Normal appearance. She is not ill-appearing.   HENT:      Head: Normocephalic and atraumatic.      Mouth/Throat:      Mouth: Mucous membranes are moist.   Eyes:      Extraocular Movements: Extraocular movements intact.      Conjunctiva/sclera: Conjunctivae normal.      Pupils: Pupils are equal, round, and reactive to light.   Cardiovascular:      Rate and Rhythm: Normal rate and regular rhythm.      Pulses:           Dorsalis pedis pulses are 3+ on the right side and 3+ on the left side.      Heart sounds: Normal heart sounds.   Pulmonary:      Effort: Pulmonary effort is normal. No respiratory distress.      Breath sounds: Wheezing (Expiratory wheeze) present.   Abdominal:      General: Abdomen is flat. Bowel sounds are normal. There is no distension.      Palpations: Abdomen is soft.      Tenderness: There is no abdominal tenderness. There is no guarding.      Hernia: A hernia is present.   Musculoskeletal:         General: No swelling.      Right lower leg: No edema.      Left lower leg: No edema.   Lymphadenopathy:      Cervical:  No cervical adenopathy.   Skin:     General: Skin is warm.      Capillary Refill: Capillary refill takes less than 2 seconds.   Neurological:      General: No focal deficit present.      Mental Status: She is alert. She is disoriented.   Psychiatric:         Mood and Affect: Mood normal.         Behavior: Behavior normal.         CRANIAL NERVES     CN III, IV, VI   Pupils are equal, round, and reactive to light.     Significant Labs: All pertinent labs within the past 24 hours have been reviewed.  CBC:   Recent Labs   Lab 12/19/22 0159   WBC 12.06   HGB 10.4*   HCT 33.5*        CMP:   Recent Labs   Lab 12/19/22 0159   *   K 2.8*   CL 92*   CO2 29   *   BUN 10   CREATININE 0.7   CALCIUM 9.3   PROT 6.7   ALBUMIN 3.5   BILITOT 0.6   ALKPHOS 73   AST 17   ALT 16   ANIONGAP 13       Significant Imaging: I have reviewed all pertinent imaging results/findings within the past 24 hours.

## 2022-12-19 NOTE — ASSESSMENT & PLAN NOTE
Patient has persistent depression which is unknown and is currently controlled. Will Continue anti-depressant medications. We will not consult psychiatry at this time. Patient does not display psychosis at this time. Continue to monitor closely and adjust plan of care as needed.    Plan  - resumed home celexa

## 2022-12-19 NOTE — Clinical Note
Diagnosis: Hypomagnesemia [222345]   Future Attending Provider: BOLA COON [[id number]]   Admitting Provider:: BOLA COON [[id number]]

## 2022-12-19 NOTE — CODE/ RAPID DOCUMENTATION
RAPID RESPONSE NURSE NOTE        Admit Date: 2022  LOS: 0  Code Status: Full Code   Date of Consult: 2022  : 1945  Age: 77 y.o.  Weight:   Wt Readings from Last 1 Encounters:   22 94.3 kg (207 lb 14.3 oz)     Sex: female  Race: White   Bed: [place] [address]:   MRN: [id number]  Time Rapid Response Team page Received: 1544  Time Rapid Response Team at Bedside: 1545  Time Rapid Response Team left Bedside: 1615  Was the patient discharged from an ICU this admission? No   Was the patient discharged from a PACU within last 24 hours? No   Did the patient receive conscious sedation/general anesthesia in last 24 hours? No  Was the patient in the ED within the past 24 hours? Yes  Was the patient on NIPPV within the past 24 hours? No   Did this progress into an ARC or CPA: no  Attending Physician: Murtaza Gudino MD  Primary Service: [facility] MED 3       SITUATION    Notified by MD Mantilla.  Reason for alert: hypo  Called to evaluate the patient for Circulatory    BACKGROUND     Why is the patient in the hospital?: Electrolyte abnormality    Patient has a past medical history of Aortic stenosis, Arthritis, Back pain, Diabetes mellitus type II, Hyperlipidemia, Hypertension, NSTEMI (non-ST elevated myocardial infarction), Osteoporosis, and Wears glasses.    Last Vitals:  Temp: 98.6 °F (37 °C) ( 151)  Pulse: 85 ( 1518)  Resp: 18 ( 1518)  BP: 82/37 ( 1518)  SpO2: 98 % ( 1407)    24 Hours Vitals Range:  Temp:  [97.6 °F (36.4 °C)-98.6 °F (37 °C)]   Pulse:  []   Resp:  [14-20]   BP: ()/(30-74)   SpO2:  [95 %-100 %]     Labs:  Recent Labs     22  0159   WBC 12.06   HGB 10.4*   HCT 33.5*          Recent Labs     22  0159 22  0840   * 135*   K 2.8* 2.8*   CL 92* 93*   CO2 29 31*   CREATININE 0.7 0.6   * 134*   PHOS 2.8  --    MG 0.8*  --         No results for input(s): PH, PCO2, PO2, HCO3, POCSATURATED, BE in the last 72 hours.      ASSESSMENT    Physical Exam    INTERVENTIONS    The patient was seen for Cardiac problem. Staff concerns included hypotension. The following interventions were performed: BMP, CBC, and 1 L lactated ringers.    RECOMMENDATIONS    We recommend: Fluid resuscitation, BMP, and CBC sent.    PROVIDER ESCALATION    Orders received and case discussed with {RRS provider type:50695}.    Primary team arrival time: ***    Disposition: {Dispostion RRT:04254}    FOLLOW UP    {RN Choice:74513} updated on plan of care. Instructed to call the Rapid Response Nurse, Sera Jenkins RN at {RRN Phone:[phone]} for additional questions or concerns.

## 2022-12-19 NOTE — ASSESSMENT & PLAN NOTE
Echo done on 9/23/22. Shows severe aortic valve stenosis. Aortic valve area of 0.89 cm2; peak velocity is 4.69 m/s; mean gradient is a 53 mmHg. Plan to undergo TAVR in January

## 2022-12-19 NOTE — ASSESSMENT & PLAN NOTE
S/p kyphoplasty on 11/28.     Plan  -scheduled tylenol, robaxin, lidocaine patches  -norco 5mg for breakthrough pain  - f/u with endocrine outpatient  - f/u with [facility]

## 2022-12-20 PROBLEM — I95.9 HYPOTENSION: Status: ACTIVE | Noted: 2022-12-20

## 2022-12-20 LAB
ANION GAP SERPL CALC-SCNC: 8 MMOL/L (ref 8–16)
BASOPHILS # BLD AUTO: 0.03 K/UL (ref 0–0.2)
BASOPHILS NFR BLD: 0.5 % (ref 0–1.9)
BUN SERPL-MCNC: 9 MG/DL (ref 8–23)
CALCIUM SERPL-MCNC: 8.4 MG/DL (ref 8.7–10.5)
CHLORIDE SERPL-SCNC: 95 MMOL/L (ref 95–110)
CO2 SERPL-SCNC: 27 MMOL/L (ref 23–29)
CREAT SERPL-MCNC: 0.6 MG/DL (ref 0.5–1.4)
DIFFERENTIAL METHOD: ABNORMAL
EOSINOPHIL # BLD AUTO: 0.3 K/UL (ref 0–0.5)
EOSINOPHIL NFR BLD: 4.5 % (ref 0–8)
ERYTHROCYTE [DISTWIDTH] IN BLOOD BY AUTOMATED COUNT: 14.8 % (ref 11.5–14.5)
EST. GFR  (NO RACE VARIABLE): >60 ML/MIN/1.73 M^2
GLUCOSE SERPL-MCNC: 103 MG/DL (ref 70–110)
HCT VFR BLD AUTO: 28.7 % (ref 37–48.5)
HGB BLD-MCNC: 8.6 G/DL (ref 12–16)
IMM GRANULOCYTES # BLD AUTO: 0.02 K/UL (ref 0–0.04)
IMM GRANULOCYTES NFR BLD AUTO: 0.3 % (ref 0–0.5)
LYMPHOCYTES # BLD AUTO: 1.2 K/UL (ref 1–4.8)
LYMPHOCYTES NFR BLD: 19.3 % (ref 18–48)
MAGNESIUM SERPL-MCNC: 1.6 MG/DL (ref 1.6–2.6)
MCH RBC QN AUTO: 24.2 PG (ref 27–31)
MCHC RBC AUTO-ENTMCNC: 30 G/DL (ref 32–36)
MCV RBC AUTO: 81 FL (ref 82–98)
MONOCYTES # BLD AUTO: 0.5 K/UL (ref 0.3–1)
MONOCYTES NFR BLD: 8.1 % (ref 4–15)
NEUTROPHILS # BLD AUTO: 4.1 K/UL (ref 1.8–7.7)
NEUTROPHILS NFR BLD: 67.3 % (ref 38–73)
NRBC BLD-RTO: 0 /100 WBC
PHOSPHATE SERPL-MCNC: 2.3 MG/DL (ref 2.7–4.5)
PLATELET # BLD AUTO: 208 K/UL (ref 150–450)
PMV BLD AUTO: 11.3 FL (ref 9.2–12.9)
POCT GLUCOSE: 110 MG/DL (ref 70–110)
POCT GLUCOSE: 117 MG/DL (ref 70–110)
POCT GLUCOSE: 149 MG/DL (ref 70–110)
POCT GLUCOSE: 153 MG/DL (ref 70–110)
POTASSIUM SERPL-SCNC: 3.7 MMOL/L (ref 3.5–5.1)
RBC # BLD AUTO: 3.56 M/UL (ref 4–5.4)
SODIUM SERPL-SCNC: 130 MMOL/L (ref 136–145)
WBC # BLD AUTO: 6.16 K/UL (ref 3.9–12.7)

## 2022-12-20 PROCEDURE — 84100 ASSAY OF PHOSPHORUS: CPT

## 2022-12-20 PROCEDURE — 83735 ASSAY OF MAGNESIUM: CPT

## 2022-12-20 PROCEDURE — 63600175 PHARM REV CODE 636 W HCPCS

## 2022-12-20 PROCEDURE — 25000003 PHARM REV CODE 250

## 2022-12-20 PROCEDURE — 20600001 HC STEP DOWN PRIVATE ROOM

## 2022-12-20 PROCEDURE — 80048 BASIC METABOLIC PNL TOTAL CA: CPT

## 2022-12-20 PROCEDURE — 27000207 HC ISOLATION

## 2022-12-20 PROCEDURE — 99233 SBSQ HOSP IP/OBS HIGH 50: CPT | Mod: GC,,, | Performed by: HOSPITALIST

## 2022-12-20 PROCEDURE — 85025 COMPLETE CBC W/AUTO DIFF WBC: CPT | Performed by: HOSPITALIST

## 2022-12-20 PROCEDURE — 99233 PR SUBSEQUENT HOSPITAL CARE,LEVL III: ICD-10-PCS | Mod: GC,,, | Performed by: HOSPITALIST

## 2022-12-20 PROCEDURE — 94761 N-INVAS EAR/PLS OXIMETRY MLT: CPT

## 2022-12-20 PROCEDURE — 36415 COLL VENOUS BLD VENIPUNCTURE: CPT

## 2022-12-20 RX ORDER — POTASSIUM CHLORIDE 20 MEQ/1
40 TABLET, EXTENDED RELEASE ORAL ONCE
Status: COMPLETED | OUTPATIENT
Start: 2022-12-20 | End: 2022-12-20

## 2022-12-20 RX ORDER — LANOLIN ALCOHOL/MO/W.PET/CERES
800 CREAM (GRAM) TOPICAL ONCE
Status: COMPLETED | OUTPATIENT
Start: 2022-12-20 | End: 2022-12-20

## 2022-12-20 RX ORDER — MAGNESIUM SULFATE HEPTAHYDRATE 40 MG/ML
2 INJECTION, SOLUTION INTRAVENOUS ONCE
Status: COMPLETED | OUTPATIENT
Start: 2022-12-20 | End: 2022-12-20

## 2022-12-20 RX ORDER — MUPIROCIN 20 MG/G
OINTMENT TOPICAL 2 TIMES DAILY
Status: CANCELLED | OUTPATIENT
Start: 2022-12-20 | End: 2022-12-25

## 2022-12-20 RX ADMIN — ATORVASTATIN CALCIUM 20 MG: 20 TABLET, FILM COATED ORAL at 08:12

## 2022-12-20 RX ADMIN — METHOCARBAMOL 500 MG: 500 TABLET ORAL at 08:12

## 2022-12-20 RX ADMIN — ENOXAPARIN SODIUM 40 MG: 40 INJECTION SUBCUTANEOUS at 04:12

## 2022-12-20 RX ADMIN — DONEPEZIL HYDROCHLORIDE 5 MG: 5 TABLET, FILM COATED ORAL at 08:12

## 2022-12-20 RX ADMIN — METHOCARBAMOL 500 MG: 500 TABLET ORAL at 02:12

## 2022-12-20 RX ADMIN — LIDOCAINE 2 PATCH: 50 PATCH CUTANEOUS at 08:12

## 2022-12-20 RX ADMIN — ASPIRIN 81 MG: 81 TABLET, COATED ORAL at 08:12

## 2022-12-20 RX ADMIN — POTASSIUM CHLORIDE 40 MEQ: 1500 TABLET, EXTENDED RELEASE ORAL at 08:12

## 2022-12-20 RX ADMIN — MAGNESIUM SULFATE 2 G: 2 INJECTION INTRAVENOUS at 02:12

## 2022-12-20 RX ADMIN — CITALOPRAM HYDROBROMIDE 40 MG: 20 TABLET ORAL at 08:12

## 2022-12-20 RX ADMIN — Medication 800 MG: at 08:12

## 2022-12-20 NOTE — ASSESSMENT & PLAN NOTE
Patient has had chronic diarrhea for 1.5 months. Upto 5 BM a day, ranging from liquid to soft.     - F/u on stool studies   - Daily BMP  - Immodium PRN   - Resolved according to patient

## 2022-12-20 NOTE — ASSESSMENT & PLAN NOTE
Patient has had several episodes of hypotension requiring aggressive IVF to stabilize. Potentially secondary to significant volume loss after periods of persistent diarrhea without adequate repletion  - Currently BP stable  119/56  - Holding home lisinopril for now

## 2022-12-20 NOTE — NURSING
"Pt in bed awake, she is alert oriented x 3 at tis time. Pt very agitated throughout the night. She refused random VS check, pulled IV site out. " Pulled it well..." she said. She remains very sensitive with simple touch. 20 G Angiocath placed to her left ac with one attempt. She tolerated it well.  "

## 2022-12-20 NOTE — ASSESSMENT & PLAN NOTE
Hypomagnesiumia (0.8) and hypokalemia (2.8) likely 2/2 GI loss from significant chronic diarrhea. Unknown etiology of chronic diarrhea. Likely causing agitation.     - F/u on Q8H BMP   - replete lytes PRN  - Lytes returning normal limits

## 2022-12-20 NOTE — PLAN OF CARE
[facility] - Int[facility] Care ([facility]-)  Initial Discharge Assessment       Primary Care Provider: No primary care provider on file.-pts dtg would like pt set up with [facility] PCP in [facility]    Admission Diagnosis: Hypokalemia [E87.6]  Hypomagnesemia [E83.42]  Back pain [M54.9]  Chest pain [R07.9]    Admission Date: 12/19/2022  Expected Discharge Date: TBD    Discharge Barriers Identified: None    Payor: [employer] MEDICARE / Plan: [employer] MEDICARE HMO / Product Type: Capitation /     Extended Emergency Contact Information  Primary Emergency Contact: Sally Reardon  Mobile Phone: [phone]  Relation: Daughter  Secondary Emergency Contact: Roni Cyr  Address:  [place] [address] [facility]  Home Phone: [phone]  Mobile Phone: [phone]  Relation: Spouse  Preferred language: English   needed? No    Discharge Plan A: Home Health  Discharge Plan B: Skilled Nursing Facility      [facility] - [facility] [address]  [address] [address]  Phone: [phone] Fax: [phone]    [facility]. - [place], [place] - [address].  [address] [place] [address]  Phone: [phone] Fax: [phone]    [id number]  [address] [address]  [address] AT [facility] [address]  [address] [address]  Phone: [phone] Fax: [phone]    SW met with pt and her dtg via telephone to complete dc assessment. Pt has been living with her dtg Sally. She requires assistance with ADLs and uses a RW for ambulation. Pt has a wheelchair for long distances as well as home o2, hospital bed and BSC. Pt was recently dc from [facility] with HH through [facility].     Pts dtg will be able to provide transportation upon d/c.     Pts dtg is requesting PCP set up with an [facility] PCP in the [facility].    Pt was recently admitted to a NH for SNF and dtg took pt out after 1 week due to the care.  She reports not being interested in SNF if it is recommended at this hospitalization. Sally is available to assist pt 24/7.    SW/CM will follow for dc needs.    Initial Assessment (most recent)       Adult Discharge Assessment - 12/20/22 1502          Discharge Assessment    Assessment Type Discharge Planning Assessment     Confirmed/corrected address, phone number and insurance Yes     Confirmed Demographics Correct on Facesheet     Source of Information family;patient     Communicated ISAAK with patient/caregiver Date not available/Unable to determine     People in Home child(maddie), adult     Do you expect to return to your current living situation? Yes     Do you have help at home or someone to help you manage your care at home? Yes     Who are your caregiver(s) and their phone number(s)? Dtg Sally Reardon [phone]     Prior to hospitilization cognitive status: Unable to Assess     Current cognitive status: Alert/Oriented     Walking or Climbing Stairs ambulation difficulty, assistance 1 person     Mobility Management RW and wheelchair for long distances     Dressing/Bathing bathing difficulty, assistance 1 person;dressing difficulty, assistance 1 person     Dressing/Bathing Management dtg has to assist with ADLs     Equipment Currently Used at Home bedside commode;hospital bed;oxygen;walker, rolling;wheelchair     Readmission within 30 days? Yes     Patient currently being followed by outpatient case management? No     Do you currently have service(s) that help you manage your care at home? No     Do you take prescription medications? Yes     Do you have prescription coverage? Yes     Do you have any problems affording any of your prescribed medications? No     Is the patient taking medications as prescribed? yes     Who is going to help you get home at discharge? randa Zavala     How do you get to doctors appointments? family or friend will provide     Are you on dialysis? No     Do you take coumadin? No      Discharge Plan A Home Health     Discharge Plan B Skilled Nursing Facility     DME Needed Upon Discharge  other (see comments)   TBD    Discharge Plan discussed with: Patient;Adult children     Discharge Barriers Identified None                   Usha Mccann LCSW  PRN

## 2022-12-20 NOTE — PROGRESS NOTES
[facility] - Intensive Care ([address] Medicine  Progress Note    Patient Name: Daryleen G Moran  MRN: [id number]  Patient Class: IP- Inpatient   Admission Date: 12/19/2022  Length of Stay: 1 days  Attending Physician: Murtaza Gudino MD  Primary Care Provider: Abhi De Oliveira Iv, MD        Subjective:     Principal Problem:Electrolyte abnormality        HPI:  Ms. Cyr is a 77-year-old female with a history of multiple compression fractures (T12 and L2) s/p IR kyphoplasty (11/28), osteoporosis (DEXA T score of -3.6),  T2DM, HLD, HTN,  CHF (G1DD EF 65%), aortic stenosis undergoing TAVR in Jan, COPD (on home 2L NC), and dementia who presents to the ED for intractable back pain, new onset agitation, and chronic diarrhea.      History was obtained from daughter who was present at bedside. Patient was able to follow commands, however communication was limited by patient's AMS. Her back pain became worse on 12/18/22 after running out of her pain medications for which they received a 21 dy supply of. Her last dose was on 12/17/22. As per daughter, pain is paraspinal and causing increased restlessness in patient. She used to receive steroid injections done by ortho for pain, however daugher does not want her to be seen by orthopedics. Back pain is not associated with loss of sensation. She is able to ambulate with a walker, however limited as she develops dyspnea on exertion.    Of note, patient had a multiple hospitlization from 11/21/22-11/30/22 for intractable back pain and diarrhea. She was diagnosed with a L2 compression fracture 2 weeks prior and T12 fx on admission. NSGY was consulted  who recommended TLSO brace and further imaging (unable to tolerate MRI). She ev eventually had a IR kyphophlasty performed on 11/28 with improvement in pain. Also, UA at that time grew enterococcus and she was on IV CTX and eventually de-escalated to ampicillin. Diarrhea improved and C-diff and stool cx were negative. She  was d/c with HH.    ED Course:  In the ED, patient was HDS, afebrile, and saturating % on 2L NC. Initial labs notable for hyponatremia (134), hypokalemia (2.8), severe hypomagnesiumia (0.8), anemia (hg of 10.4). UA pending. EKG showed NSR with QTC of 444. Received K Cl 10 meq, Mg 2g, morphine 6 mg, ketorolac 10mg, droperidol 2.5 mg.       Overview/Hospital Course:  Patient admitted due to severe electrolyte dysregulation requiring aggressive repletion and several days worth of chronic diarrhea. While in Obs patient became severely hypotensive as low as upper 60's SBP. She stayed asymptomatic at this time. Repeat readings also reinforced the initial  low reading. Primary advised nursing to call rapids who came to bedside. At this time patient was given 1 liter of fluid thus far, rapids added second  liter along with pressure bag. BP stabilized in the 80s systolic at start of second IVF. Patient has remained asymptomatic throughout this episode. Repeat lactic was negative. Patient was moved to the floor soon thereafter. In the middle of the night, BP once again, decreased. Patient was given 3rd bag of IVF which her BP responded very well to. She further denies any diarrhea. Patient takes lisinopril 20mg, will hold in setting of repeated epsidoes of hypotension requiring aggressive fluid repletion. Hgb stable >8. Daughter expressed concerns regarding patient ability to take care of herself at home and has noticed increasing frequency of hospitalization or medical intervention being needed. Will have PT/OT evaluate and treat for any acute needs.       Interval History: Night team paged to bedside due to new hypotension. 3rd bag IVF given with good response. BP has remained stable since with lowest SBP of 98 on chart review. Patient declines any symptoms currently. Will have PT/OT evaluate and treat for potential acute rehab needs.     Review of Systems   Constitutional:  Negative for chills, diaphoresis and  fatigue.   Respiratory:  Negative for shortness of breath and wheezing.    Cardiovascular:  Negative for chest pain, palpitations and leg swelling.   Gastrointestinal:  Negative for abdominal distention, diarrhea and nausea.   Musculoskeletal:  Negative for joint swelling and myalgias.   Skin:  Negative for color change, pallor, rash and wound.   Neurological:  Negative for tremors, weakness, light-headedness and headaches.   Hematological: Negative.    Psychiatric/Behavioral:  Negative for agitation, confusion and decreased concentration.    Objective:     Vital Signs (Most Recent):  Temp: 98.5 °F (36.9 °C) (12/20/22 1203)  Pulse: 88 (12/20/22 1203)  Resp: 17 (12/20/22 1203)  BP: (!) 119/56 (12/20/22 1203)  SpO2: 95 % (12/20/22 1203)   Vital Signs (24h Range):  Temp:  [96.2 °F (35.7 °C)-98.6 °F (37 °C)] 98.5 °F (36.9 °C)  Pulse:  [83-96] 88  Resp:  [12-20] 17  SpO2:  [90 %-99 %] 95 %  BP: ()/(30-76) 119/56        There is no height or weight on file to calculate BMI.    Intake/Output Summary (Last 24 hours) at 12/20/2022 1314  Last data filed at 12/20/2022 0730  Gross per 24 hour   Intake 3000 ml   Output 1500 ml   Net 1500 ml      Physical Exam  Constitutional:       General: She is not in acute distress.     Appearance: Normal appearance. She is not ill-appearing.   HENT:      Head: Normocephalic and atraumatic.      Mouth/Throat:      Mouth: Mucous membranes are moist.   Eyes:      Extraocular Movements: Extraocular movements intact.      Conjunctiva/sclera: Conjunctivae normal.      Pupils: Pupils are equal, round, and reactive to light.   Cardiovascular:      Rate and Rhythm: Normal rate and regular rhythm.      Heart sounds: Normal heart sounds. No murmur heard.  Pulmonary:      Effort: Pulmonary effort is normal. No respiratory distress.      Breath sounds: No rales.   Chest:      Chest wall: No tenderness.   Abdominal:      General: Abdomen is flat. Bowel sounds are normal. There is no distension.       Palpations: Abdomen is soft.      Tenderness: There is no abdominal tenderness. There is no guarding.      Hernia: A hernia is present.   Musculoskeletal:         General: No swelling.      Right lower leg: No edema.      Left lower leg: No edema.   Lymphadenopathy:      Cervical: No cervical adenopathy.   Skin:     General: Skin is warm.      Capillary Refill: Capillary refill takes less than 2 seconds.      Coloration: Skin is not pale.   Neurological:      General: No focal deficit present.      Mental Status: She is alert.      Motor: No weakness.   Psychiatric:         Mood and Affect: Mood normal.         Behavior: Behavior normal.       Significant Labs: All pertinent labs within the past 24 hours have been reviewed.    Significant Imaging: I have reviewed all pertinent imaging results/findings within the past 24 hours.      Assessment/Plan:      * Electrolyte abnormality  Hypomagnesiumia (0.8) and hypokalemia (2.8) likely 2/2 GI loss from significant chronic diarrhea. Unknown etiology of chronic diarrhea. Likely causing agitation.     - F/u on Q8H BMP   - replete lytes PRN  - Lytes returning normal limits       Hypotension  Patient has had several episodes of hypotension requiring aggressive IVF to stabilize. Potentially secondary to significant volume loss after periods of persistent diarrhea without adequate repletion  - Currently BP stable  119/56  - Holding home lisinopril for now       Hypokalemia due to excessive gastrointestinal loss of potassium  See electrolyte abnormality      Hypomagnesemia  See electrolyte abnormality        CAD (coronary artery disease)  - continue home aspirin 81 mg QD      Hyponatremia  Na 134 on admission. Likely secondary to diuretic use vs poor PO intake vs primary polydipsia vs underlying malignancy vs SIADH     Plan:  - Sodium currently 130 after 3L of IVF   - BMP q8, will follow-up   - Urine sodium decreased         Diarrhea  Patient has had chronic diarrhea for 1.5  months. Upto 5 BM a day, ranging from liquid to soft.     - F/u on stool studies   - Daily BMP  - Immodium PRN   - Resolved according to patient       Aortic stenosis  Echo done on 9/23/22. Shows severe aortic valve stenosis. Aortic valve area of 0.89 cm2; peak velocity is 4.69 m/s; mean gradient is a 53 mmHg. Plan to undergo TAVR in January       History of vertebral compression fracture  S/p kyphoplasty on 11/28.     Plan  -scheduled tylenol, robaxin, lidocaine patches  -norco 5mg for breakthrough pain  - f/u with endocrine outpatient  - f/u with [facility]       COPD, mild  On home 2L NC    - PRN duonebs       Hyperlipidemia  - resumed home atorvastatin 20 mg QD      Depression  Patient has persistent depression which is unknown and is currently controlled. Will Continue anti-depressant medications. We will not consult psychiatry at this time. Patient does not display psychosis at this time. Continue to monitor closely and adjust plan of care as needed.    Plan  - resumed home celexa        Hypertension  - resumed home lisinopril 20 mg QD  - held home lasix 40 mg QD in setting of hyponatremia       Type 2 diabetes mellitus, without long-term current use of insulin  Patient's FSGs are controlled on current medication regimen.  Last A1c reviewed-   Lab Results   Component Value Date    HGBA1C 6.2 05/01/2022     Most recent fingerstick glucose reviewed- No results for input(s): POCTGLUCOSE in the last 24 hours.  Current correctional scale  Low  Maintain anti-hyperglycemic dose as follows-   Antihyperglycemics (From admission, onward)    Start     Stop Route Frequency Ordered    12/19/22 0503  insulin aspart U-100 pen 0-5 Units         -- SubQ Before meals & nightly PRN 12/19/22 0405        Hold Oral hypoglycemics while patient is in the hospital.    Plan  - Maintain -180  - LDSS  - diabetic diet  - BF       VTE Risk Mitigation (From admission, onward)         Ordered     enoxaparin injection  40 mg  Daily         12/19/22 0405     IP VTE HIGH RISK PATIENT  Once         12/19/22 0405     Place sequential compression device  Until discontinued         12/19/22 0405                Discharge Planning   ISAAK:      Code Status: Full Code   Is the patient medically ready for discharge?:     Reason for patient still in hospital (select all that apply): Patient trending condition and PT / OT recommendations                     G[place], DO  Department of Hospital Medicine   [facility] - Intensive Care ([place]-14)

## 2022-12-20 NOTE — NURSING
Patient is AAOx4, 2L nasal cannula (home dose), last , Blood cultures x 2 ordered, followed-up with phlebotomy @ 01133, but Patient refused when phlebotomist arrived to unit.  Mg+ 1.6, MgSo+ 2g given by night shift.  External female Catheter container changed.  Patient is able to roll side to side, feed self.  Call light within reach.  Security completed investigation for missing glasses, pocket book, and wallet.

## 2022-12-20 NOTE — HOSPITAL COURSE
Patient admitted due to severe electrolyte dysregulation requiring aggressive repletion and several days worth of chronic diarrhea. While in Obs patient became severely hypotensive as low as upper 60's SBP. She stayed asymptomatic at this time. Repeat readings also reinforced the initial  low reading. Primary advised nursing to call rapids who came to bedside. At this time patient was given 1 liter of fluid thus far, rapids added second  liter along with pressure bag. BP stabilized in the 80s systolic at start of second IVF. Patient has remained asymptomatic throughout this episode. Repeat lactic was negative. Patient was moved to the floor soon thereafter. In the middle of the night, BP once again, decreased. Patient was given 3rd bag of IVF which her BP responded very well to. She further denies any diarrhea. Patient takes lisinopril 20mg, will hold in setting of repeated epsidoes of hypotension requiring aggressive fluid repletion. Hgb stable >8. Daughter expressed concerns regarding patient ability to take care of herself at home and has noticed increasing frequency of hospitalization or medical intervention being needed. Will have PT/OT evaluate and treat for any acute needs. Patient blood pressure has increased to >140s SBP (lisinopril held day prior due to recent hypotension episodes). Therapy recommends home health, will order. Follow-up appointment made 1 week after discharge. Per daughter concerns, will also refer to dietary. Patient further to have referral to [facility] program.

## 2022-12-20 NOTE — CARE UPDATE
RAPID RESPONSE NURSE FOLLOW-UP NOTE       Followed up with patient for a rapid response.  Patient remains hypotensive, spoke with Dr. Gaurdado, night coverage to contact MICU. Reviewed plan of care with Bedside RNEmmy .   Team will continue to follow.  Please call Rapid Response RN, Renee Schwarz RN with any questions or concerns at [phone].

## 2022-12-20 NOTE — ED NOTES
"Assumed care of pt.  Report received.  RN attempted to placed second line due to change in hemodynamic status, pt stated that,"no one is sticking me again".  Educated pt on importance of getting a second line if she were to deteriorate.  Pt stated that she would not "get worse".    "

## 2022-12-20 NOTE — CODE/ RAPID DOCUMENTATION
RAPID RESPONSE NURSE PROACTIVE ROUNDING NOTE       Time of Visit:     Admit Date: 2022  LOS: 0  Code Status: Full Code   Date of Visit: 2022  : 1945  Age: 77 y.o.  Sex: female  Race: White  Bed: ED :   MRN: [id number]  Was the patient discharged from an ICU this admission? No   Was the patient discharged from a PACU within last 24 hours? No   Did the patient receive conscious sedation/general anesthesia in last 24 hours? No  Was the patient in the ED within the past 24 hours? Yes  Was the patient on NIPPV within the past 24 hours? No   Attending Physician: Murtaza Gudino MD  Primary Service: [facility]   Time spent at the bedside: < 15 min    SITUATION    Notified by previous RRN during handoff.  Reason for alert: hypotension  Called to evaluate the patient for Circulatory    BACKGROUND     Why is the patient in the hospital?: Electrolyte abnormality    Patient has a past medical history of Aortic stenosis, Arthritis, Back pain, Diabetes mellitus type II, Hyperlipidemia, Hypertension, NSTEMI (non-ST elevated myocardial infarction), Osteoporosis, and Wears glasses.    Last Vitals:  Temp: 98.6 °F (37 °C) (1916)  Pulse: 95 (2105)  Resp: 16 (2105)  BP: 125/60 (2105)  SpO2: 96 % (2105)    24 Hours Vitals Range:  Temp:  [97.6 °F (36.4 °C)-98.6 °F (37 °C)]   Pulse:  []   Resp:  [14-20]   BP: ()/(30-74)   SpO2:  [94 %-100 %]     Labs:  Recent Labs     22  0159   WBC 12.06   HGB 10.4*   HCT 33.5*          Recent Labs     22  0159 22  0840 22  1558   * 135* 134*   K 2.8* 2.8* 3.7   CL 92* 93* 97   CO2 29 31* 28   CREATININE 0.7 0.6 0.8   * 134* 124*   PHOS 2.8  --   --    MG 0.8*  --  1.6        No results for input(s): PH, PCO2, PO2, HCO3, POCSATURATED, BE in the last 72 hours.     ASSESSMENT    Physical Exam  Cardiovascular:      Rate and Rhythm: Normal rate.      Pulses: Normal pulses.   Pulmonary:       Effort: Pulmonary effort is normal.      Breath sounds: Decreased breath sounds present.   Skin:     General: Skin is warm and dry.   Neurological:      Mental Status: She is alert. Mental status is at baseline.     HR 93  /56 (72)  RR 16, SpO2 94% on 3L NC    INTERVENTIONS    The patient was seen for Cardiac problem. Staff concerns included hypotension. The following interventions were performed: No additional interventions needed at this time..    3rd liter IVF currently infusing. Patient awake and alert, now normotensive.     RECOMMENDATIONS    Monitor and record I&O  Continue to monitor VS  Notify MD ANGELICA if patient becomes hypotensive again    PROVIDER ESCALATION    Yes/No  no    Orders received and case discussed with NA.    Disposition: Tx to ER bed 22. Patient transferred from EDOU 10 to ED 22  At this point patient appears stable to go to floor  Ethete will continue to follow    FOLLOW-UP    bedside Hasmukh MARLEY  updated on plan of care. Instructed to call the Rapid Response Nurse, Adeola Mathias RN at [phone] for additional questions or concerns.

## 2022-12-20 NOTE — SUBJECTIVE & OBJECTIVE
Interval History: Night team paged to bedside due to new hypotension. 3rd bag IVF given with good response. BP has remained stable since with lowest SBP of 98 on chart review. Patient declines any symptoms currently. Will have PT/OT evaluate and treat for potential acute rehab needs.     Review of Systems   Constitutional:  Negative for chills, diaphoresis and fatigue.   Respiratory:  Negative for shortness of breath and wheezing.    Cardiovascular:  Negative for chest pain, palpitations and leg swelling.   Gastrointestinal:  Negative for abdominal distention, diarrhea and nausea.   Musculoskeletal:  Negative for joint swelling and myalgias.   Skin:  Negative for color change, pallor, rash and wound.   Neurological:  Negative for tremors, weakness, light-headedness and headaches.   Hematological: Negative.    Psychiatric/Behavioral:  Negative for agitation, confusion and decreased concentration.    Objective:     Vital Signs (Most Recent):  Temp: 98.5 °F (36.9 °C) (12/20/22 1203)  Pulse: 88 (12/20/22 1203)  Resp: 17 (12/20/22 1203)  BP: (!) 119/56 (12/20/22 1203)  SpO2: 95 % (12/20/22 1203)   Vital Signs (24h Range):  Temp:  [96.2 °F (35.7 °C)-98.6 °F (37 °C)] 98.5 °F (36.9 °C)  Pulse:  [83-96] 88  Resp:  [12-20] 17  SpO2:  [90 %-99 %] 95 %  BP: ()/(30-76) 119/56        There is no height or weight on file to calculate BMI.    Intake/Output Summary (Last 24 hours) at 12/20/2022 1314  Last data filed at 12/20/2022 0730  Gross per 24 hour   Intake 3000 ml   Output 1500 ml   Net 1500 ml      Physical Exam  Constitutional:       General: She is not in acute distress.     Appearance: Normal appearance. She is not ill-appearing.   HENT:      Head: Normocephalic and atraumatic.      Mouth/Throat:      Mouth: Mucous membranes are moist.   Eyes:      Extraocular Movements: Extraocular movements intact.      Conjunctiva/sclera: Conjunctivae normal.      Pupils: Pupils are equal, round, and reactive to light.    Cardiovascular:      Rate and Rhythm: Normal rate and regular rhythm.      Heart sounds: Normal heart sounds. No murmur heard.  Pulmonary:      Effort: Pulmonary effort is normal. No respiratory distress.      Breath sounds: No rales.   Chest:      Chest wall: No tenderness.   Abdominal:      General: Abdomen is flat. Bowel sounds are normal. There is no distension.      Palpations: Abdomen is soft.      Tenderness: There is no abdominal tenderness. There is no guarding.      Hernia: A hernia is present.   Musculoskeletal:         General: No swelling.      Right lower leg: No edema.      Left lower leg: No edema.   Lymphadenopathy:      Cervical: No cervical adenopathy.   Skin:     General: Skin is warm.      Capillary Refill: Capillary refill takes less than 2 seconds.      Coloration: Skin is not pale.   Neurological:      General: No focal deficit present.      Mental Status: She is alert.      Motor: No weakness.   Psychiatric:         Mood and Affect: Mood normal.         Behavior: Behavior normal.       Significant Labs: All pertinent labs within the past 24 hours have been reviewed.    Significant Imaging: I have reviewed all pertinent imaging results/findings within the past 24 hours.

## 2022-12-20 NOTE — NURSING
Patient states she left her reading glasses, black pocketbook with zipper, small dark tan wallet, compact, chapstick, and mints. Bedside RN called ED, but they were not able to locate item.  Abdias Giron, RN, notified.  Security called and will send someone to room to take report.

## 2022-12-20 NOTE — ASSESSMENT & PLAN NOTE
Na 134 on admission. Likely secondary to diuretic use vs poor PO intake vs primary polydipsia vs underlying malignancy vs SIADH     Plan:  - Sodium currently 130 after 3L of IVF   - BMP q8, will follow-up   - Urine sodium decreased

## 2022-12-21 VITALS
RESPIRATION RATE: 18 BRPM | OXYGEN SATURATION: 94 % | SYSTOLIC BLOOD PRESSURE: 123 MMHG | DIASTOLIC BLOOD PRESSURE: 51 MMHG | TEMPERATURE: 98 F | HEART RATE: 92 BPM

## 2022-12-21 PROBLEM — R53.81 DEBILITY: Status: ACTIVE | Noted: 2022-12-21

## 2022-12-21 LAB — POCT GLUCOSE: 103 MG/DL (ref 70–110)

## 2022-12-21 PROCEDURE — 97165 OT EVAL LOW COMPLEX 30 MIN: CPT

## 2022-12-21 PROCEDURE — 99239 HOSP IP/OBS DSCHRG MGMT >30: CPT | Mod: GC,,, | Performed by: HOSPITALIST

## 2022-12-21 PROCEDURE — 1111F PR DISCHARGE MEDS RECONCILED W/ CURRENT OUTPATIENT MED LIST: ICD-10-PCS | Mod: CPTII,GC,, | Performed by: HOSPITALIST

## 2022-12-21 PROCEDURE — 25000003 PHARM REV CODE 250

## 2022-12-21 PROCEDURE — 97530 THERAPEUTIC ACTIVITIES: CPT

## 2022-12-21 PROCEDURE — 99239 PR HOSPITAL DISCHARGE DAY,>30 MIN: ICD-10-PCS | Mod: GC,,, | Performed by: HOSPITALIST

## 2022-12-21 PROCEDURE — 97161 PT EVAL LOW COMPLEX 20 MIN: CPT

## 2022-12-21 PROCEDURE — 1111F DSCHRG MED/CURRENT MED MERGE: CPT | Mod: CPTII,GC,, | Performed by: HOSPITALIST

## 2022-12-21 RX ORDER — LORATADINE 10 MG/1
10 TABLET ORAL DAILY
Refills: 0 | Status: ON HOLD
Start: 2022-12-21 | End: 2024-03-11 | Stop reason: HOSPADM

## 2022-12-21 RX ORDER — CALCIUM CARBONATE 500(1250)
1 TABLET ORAL DAILY
Refills: 0
Start: 2022-12-21 | End: 2023-12-21

## 2022-12-21 RX ORDER — PANTOPRAZOLE SODIUM 40 MG/1
40 TABLET, DELAYED RELEASE ORAL DAILY
Status: DISCONTINUED | OUTPATIENT
Start: 2022-12-21 | End: 2022-12-21 | Stop reason: HOSPADM

## 2022-12-21 RX ORDER — IBUPROFEN 200 MG
200 TABLET ORAL EVERY 6 HOURS PRN
Status: ON HOLD | COMMUNITY
End: 2022-12-21 | Stop reason: HOSPADM

## 2022-12-21 RX ADMIN — ATORVASTATIN CALCIUM 20 MG: 20 TABLET, FILM COATED ORAL at 08:12

## 2022-12-21 RX ADMIN — LISINOPRIL 20 MG: 20 TABLET ORAL at 08:12

## 2022-12-21 RX ADMIN — CITALOPRAM HYDROBROMIDE 40 MG: 20 TABLET ORAL at 08:12

## 2022-12-21 RX ADMIN — METHOCARBAMOL 500 MG: 500 TABLET ORAL at 08:12

## 2022-12-21 RX ADMIN — ASPIRIN 81 MG: 81 TABLET, COATED ORAL at 08:12

## 2022-12-21 RX ADMIN — HYDROCODONE BITARTRATE AND ACETAMINOPHEN 1 TABLET: 5; 325 TABLET ORAL at 08:12

## 2022-12-21 RX ADMIN — PANTOPRAZOLE SODIUM 40 MG: 40 TABLET, DELAYED RELEASE ORAL at 09:12

## 2022-12-21 NOTE — PLAN OF CARE
Problem: Occupational Therapy  Goal: Occupational Therapy Goal  Description: Goals to be met by: 1/23/23     Patient will increase functional independence with ADLs by performing:    UE Dressing with Supervision.  Grooming while standing with Supervision.  Toileting from toilet with Supervision for hygiene and clothing management.   Supine to sit with Supervision.  Step transfer with Supervision using RW  Toilet transfer to toilet with Supervision using RW.    Outcome: Ongoing, Progressing     OT eval completed.

## 2022-12-21 NOTE — PT/OT/SLP EVAL
Occupational Therapy   Evaluation    Name: Daryleen G Moran  MRN: [id number]  Admitting Diagnosis: Electrolyte abnormality  Recent Surgery: * No surgery found *      Recommendations:     Discharge Recommendations: home health OT  Discharge Equipment Recommendations:  none  Barriers to discharge:  None    Assessment:     Daryleen G Moran is a 77 y.o. female with a medical diagnosis of Electrolyte abnormality.  She presents with decreased independence with ADL's. Performance deficits affecting function: weakness, impaired endurance, impaired self care skills, impaired balance, impaired functional mobility, decreased upper extremity function, decreased lower extremity function, pain, decreased safety awareness, impaired cardiopulmonary response to activity.      Rehab Prognosis: Fair; patient would benefit from acute skilled OT services to address these deficits and reach maximum level of function.       Plan:     Patient to be seen 3 x/week to address the above listed problems via self-care/home management, therapeutic activities, therapeutic exercises, neuromuscular re-education  Plan of Care Expires: 01/20/23  Plan of Care Reviewed with: patient    Subjective     Chief Complaint: left knee pain  Patient/Family Comments/goals: to get back home    Occupational Profile:  Living Environment: Pt resides currently with daughter in 1 story house with 2 steps to enter. Pt normally lives in 1 story house with her spouse however due to recent weakness & illness has been staying with daughter for (A) for the last 6 weeks & her spouse had stroke 4 years ago and has trouble remembering things per pt.  Pt requires (A) with donning socks & shoes & performs wash offs at the sink due to unable to get into/out of the bathtub safely (requires a little (A) for bathing as well).  Pt able to perform ambulation with walker without (A) as well as toileting without (A).  Pt requires (A) with getting in & out of the bed. Pt is retired & is an  active .  Equipment Used at Home: wheelchair, walker, rolling, bedside commode, hospital bed, oxygen  Assistance upon Discharge: daughter    Pain/Comfort:  Pain Rating 1: 0/10  Location 1:  (left knee during ambulation)  Pain Addressed 1: Reposition, Distraction, Cessation of Activity, Nurse notified  Pain Rating Post-Intervention 1:  (did not rate)    Patients cultural, spiritual, Congregation conflicts given the current situation: no    Objective:     Communicated with: RN prior to session.  Patient found supine with telemetry, [employer] (no family present) upon OT entry to room.    General Precautions: Standard, fall, special contact  Orthopedic Precautions: N/A  Braces: N/A  Respiratory Status: pt had nasal cannula in lap & reported that she took it off    Occupational Performance:    Bed Mobility:    Patient completed Scooting/Bridging with contact guard assistance forward on EOB; dependent drawsheet transfer up HOB while supine  Patient completed Supine to Sit with minimum assistance  Patient completed Sit to Supine with stand by assistance    Functional Mobility/Transfers:  Patient completed Sit <> Stand Transfer with contact guard assistance  with  rolling walker from EOB x 2 trials  Functional Mobility: CGA with taking steps ~ 6 forward & backward from EOB using RW & along EOB to the left using RW (pt with pain in left knee that limited mobility therefore returned to supine in bed - notified RN)    Activities of Daily Living:  Upper Body Dressing: stand by assistance donning gown around back while seated EOB  Lower Body Dressing: total assistance donning socks    Cognitive/Visual Perceptual:  Cognitive/Psychosocial Skills:     -       Oriented to: Person, Place, Time, and Situation   -       Follows Commands/attention:Follows multistep  commands  -       Safety awareness/insight to disability: intact     Physical Exam:  Sensation:    -       Intact  Dominant hand:    -       right  Upper Extremity Range  of Motion:  BUE WFL except 90* shoulder flexion  Upper Extremity Strength: appeared WFL however pt unable to tolerate resistance for testing of strength due to pain in BUE   Strength: fair BUE    AMPAC 6 Click ADL:  AMPAC Total Score: 14    Treatment & Education:  SBA with postural control while seated EOB. Provided education on safety & need for (A) with OOB activities.  Provided education regarding role of OT, POC, & discharge recommendations with pt verbalizing understanding.  Pt had no further questions & when asked whether there were any concerns pt reported none.      Patient left supine with all lines intact, call button in reach, and RN notified    GOALS:   Multidisciplinary Problems       Occupational Therapy Goals          Problem: Occupational Therapy    Goal Priority Disciplines Outcome Interventions   Occupational Therapy Goal     OT, PT/OT Ongoing, Progressing    Description: Goals to be met by: 1/23/23     Patient will increase functional independence with ADLs by performing:    UE Dressing with Supervision.  Grooming while standing with Supervision.  Toileting from toilet with Supervision for hygiene and clothing management.   Supine to sit with Supervision.  Step transfer with Supervision using RW  Toilet transfer to toilet with Supervision using RW.                         History:     Past Medical History:   Diagnosis Date    Aortic stenosis     Arthritis     Back pain     Diabetes mellitus type II     Hyperlipidemia     Hypertension     NSTEMI (non-ST elevated myocardial infarction) 5/1/2022    Osteoporosis     Wears glasses          Past Surgical History:   Procedure Laterality Date     vocal cord nodules removed  long time ago     twice    APPENDECTOMY  within last 5yrs    CATHETERIZATION OF BOTH LEFT AND RIGHT HEART Left 5/6/2022    Procedure: CATHETERIZATION, HEART, BOTH LEFT AND RIGHT;  Surgeon: Michelet Vargas MD;  Location: [facility] CATH/EP LAB;  Service: Cardiology;  Laterality: Left;     FIXATION KYPHOPLASTY THORACIC SPINE      8-20-13    FOOT SURGERY      left 2nd toe was too long     HERNIA REPAIR  within last 5yrs    LEFT HEART CATHETERIZATION Left 5/2/2022    Procedure: Left heart cath;  Surgeon: Jose Echols MD;  Location: [facility] CATH[facility]EP LAB;  Service: Cardiology;  Laterality: Left;    TONSILLECTOMY, ADENOIDECTOMY  long time ago       Time Tracking:     OT Date of Treatment: 12/21/22  OT Start Time: 0946  OT Stop Time: 1012  OT Total Time (min): 26 min    Billable Minutes:Evaluation 16  Therapeutic Activity 10    12/21/2022

## 2022-12-21 NOTE — DISCHARGE SUMMARY
Staff Addendum: 77F with a history of multiple compression fractures (T12 and L2) s/p IR kyphoplasty (11/28), osteoporosis (DEXA T score of -3.6),  T2DM, HLD, HTN,  dCHF (G1DD EF 65%), aortic stenosis undergoing TAVR in Jan, COPD (on home 2L NC), and dementia who presents to the ED for intractable back pain, new onset agitation, and chronic diarrhea.  Patient has been much improved with pain control. She had hypotension of unclear etiology, with sepsis w/u negative and given history of chronic diarrhea, electrolyte abnl, and response to fluid - dehydration thought most likely. She is now euvolemic. Diarrheal studies ordered but this has stopped upon admission. Will hold metformin given a1c at goal and may be culprit. PT/OT consulted for safe disposition and recommending of  PT/OT.    [facility] - Intensive Care ([place] [id number])  [facility] Medicine  Discharge Summary      Patient Name: Daryleen G Moran  MRN: [id number]  LUIZ: [id number]  Patient Class: IP- Inpatient  Admission Date: 12/19/2022  Hospital Length of Stay: 2 days  Discharge Date and Time:  12/21/2022 2:31 PM  Attending Physician: Murtaza Gudino MD   Discharging Provider: Murtaza Gudino MD  Primary Care Provider: No primary care provider on file.  Hospital Medicine Team: [facility] HOSP MED 3 [facility]  Primary Care Team: [facility] 3    HPI:   Ms. Cyr is a 77-year-old female with a history of multiple compression fractures (T12 and L2) s/p IR kyphoplasty (11/28), osteoporosis (DEXA T score of -3.6),  T2DM, HLD, HTN,  CHF (G1DD EF 65%), aortic stenosis undergoing TAVR in Jan, COPD (on home 2L NC), and dementia who presents to the ED for intractable back pain, new onset agitation, and chronic diarrhea.      History was obtained from daughter who was present at bedside. Patient was able to follow commands, however communication was limited by patient's AMS. Her back pain became worse on 12/18/22 after running out of her pain medications for which  they received a 21 dy supply of. Her last dose was on 12/17/22. As per daughter, pain is paraspinal and causing increased restlessness in patient. She used to receive steroid injections done by ortho for pain, however moneugher does not want her to be seen by orthopedics. Back pain is not associated with loss of sensation. She is able to ambulate with a walker, however limited as she develops dyspnea on exertion.    Of note, patient had a multiple hospitlization from 11/21/22-11/30/22 for intractable back pain and diarrhea. She was diagnosed with a L2 compression fracture 2 weeks prior and T12 fx on admission. NSGY was consulted  who recommended TLSO brace and further imaging (unable to tolerate MRI). She ev eventually had a IR kyphophlasty performed on 11/28 with improvement in pain. Also, UA at that time grew enterococcus and she was on IV CTX and eventually de-escalated to ampicillin. Diarrhea improved and C-diff and stool cx were negative. She was d/c with .    ED Course:  In the ED, patient was HDS, afebrile, and saturating % on 2L NC. Initial labs notable for hyponatremia (134), hypokalemia (2.8), severe hypomagnesiumia (0.8), anemia (hg of 10.4). UA pending. EKG showed NSR with QTC of 444. Received K Cl 10 meq, Mg 2g, morphine 6 mg, ketorolac 10mg, droperidol 2.5 mg.       * No surgery found *      Hospital Course:   Patient admitted due to severe electrolyte dysregulation requiring aggressive repletion and several days worth of chronic diarrhea. While in Obs patient became severely hypotensive as low as upper 60's SBP. She stayed asymptomatic at this time. Repeat readings also reinforced the initial  low reading. Primary advised nursing to call rapids who came to bedside. At this time patient was given 1 liter of fluid thus far, rapids added second  liter along with pressure bag. BP stabilized in the 80s systolic at start of second IVF. Patient has remained asymptomatic throughout this episode. Repeat  lactic was negative. Patient was moved to the floor soon thereafter. In the middle of the night, BP once again, decreased. Patient was given 3rd bag of IVF which her BP responded very well to. She further denies any diarrhea. Patient takes lisinopril 20mg, will hold in setting of repeated epsidoes of hypotension requiring aggressive fluid repletion. Hgb stable >8. Daughter expressed concerns regarding patient ability to take care of herself at home and has noticed increasing frequency of hospitalization or medical intervention being needed. Will have PT/OT evaluate and treat for any acute needs. Patient blood pressure has increased to >140s SBP (lisinopril held day prior due to recent hypotension episodes). Therapy recommends home health, will order. Follow-up appointment made 1 week after discharge. Per daughter concerns, will also refer to dietary. Patient further to have referral to healthy backs program.     Interval History: Blood pressures stable today. SBPS >140s, though held home lisinopril yesterday due to recent hypotension episodes, can resume. Therapy recommends home health. Will refer to health backs, and dietary. One week hospital follow-up already set up.     Review of Systems   Constitutional:  Negative for chills, diaphoresis and fatigue.   Respiratory:  Negative for shortness of breath and wheezing.    Cardiovascular:  Negative for chest pain, palpitations and leg swelling.   Gastrointestinal:  Negative for abdominal distention, diarrhea and nausea.   Musculoskeletal:  Negative for joint swelling and myalgias.   Skin:  Negative for color change, pallor, rash and wound.   Neurological:  Negative for tremors, weakness, light-headedness and headaches.   Hematological: Negative.    Psychiatric/Behavioral:  Negative for agitation, confusion and decreased concentration.    Objective:     Vital Signs (Most Recent):  Temp: 98 °F (36.7 °C) (12/21/22 0758)  Pulse: 84 (12/21/22 0758)  Resp: 18 (12/21/22  0848)  BP: (!) 145/65 (12/21/22 0758)  SpO2: 95 % (12/21/22 0758)   Vital Signs (24h Range):  Temp:  [98 °F (36.7 °C)-98.6 °F (37 °C)] 98 °F (36.7 °C)  Pulse:  [78-92] 84  Resp:  [16-20] 18  SpO2:  [95 %-97 %] 95 %  BP: (111-145)/(54-65) 145/65        There is no height or weight on file to calculate BMI.    Intake/Output Summary (Last 24 hours) at 12/21/2022 0908  Last data filed at 12/21/2022 0450  Gross per 24 hour   Intake 222 ml   Output 1650 ml   Net -1428 ml      Physical Exam  Constitutional:       General: She is not in acute distress.     Appearance: Normal appearance. She is not ill-appearing.   HENT:      Head: Normocephalic and atraumatic.   Eyes:      Extraocular Movements: Extraocular movements intact.      Conjunctiva/sclera: Conjunctivae normal.      Pupils: Pupils are equal, round, and reactive to light.   Cardiovascular:      Rate and Rhythm: Normal rate and regular rhythm.   Pulmonary:      Effort: Pulmonary effort is normal. No respiratory distress.   Chest:      Chest wall: No tenderness.   Abdominal:      General: Abdomen is flat. Bowel sounds are normal. There is no distension.      Palpations: Abdomen is soft.      Tenderness: There is no abdominal tenderness. There is no guarding.      Hernia: A hernia is present.   Musculoskeletal:         General: No swelling.      Right lower leg: No edema.      Left lower leg: No edema.   Skin:     General: Skin is warm.      Capillary Refill: Capillary refill takes less than 2 seconds.      Coloration: Skin is not pale.   Neurological:      General: No focal deficit present.      Mental Status: She is alert.      Motor: No weakness.   Psychiatric:         Mood and Affect: Mood normal.         Behavior: Behavior normal.       Significant Labs: All pertinent labs within the past 24 hours have been reviewed.    Significant Imaging: I have reviewed all pertinent imaging results/findings within the past 24 hours.    Goals of Care Treatment  Preferences:  Code Status: Full Code      Consults:     * Electrolyte abnormality  Hypomagnesiumia (0.8) and hypokalemia (2.8) likely 2/2 GI loss from significant chronic diarrhea. Unknown etiology of chronic diarrhea. Likely causing agitation.     - replete lytes PRN  - Lytes returning to normal limits  - Hospital follow-up scheduled Dec 28th   - Ambulatory referral to dietary made       Debility  Patient's daughter expressed concerns regarding patient's ability to take care of herself at home and overall increase in hospitalizations/medical interventions  - Therapy consulted, recommends home health       Hypotension  Patient has had several episodes of hypotension requiring aggressive IVF to stabilize. Potentially secondary to significant volume loss after periods of persistent diarrhea without adequate repletion  - Resume home lisinopril          Hypokalemia due to excessive gastrointestinal loss of potassium  See electrolyte abnormality      Hypomagnesemia  See electrolyte abnormality        CAD (coronary artery disease)  - continue home aspirin 81 mg QD      Hyponatremia  Na 134 on admission. Likely secondary to diuretic use vs poor PO intake vs primary polydipsia vs underlying malignancy vs SIADH     - Last sodium 130   - Will receive BMP as needed as patient noted to refuse lab draws         Diarrhea  Patient has had chronic diarrhea for 1.5 months. Upto 5 BM a day, ranging from liquid to soft.     - F/u on stool studies - never collected   - Immodium PRN   - Resolved according to patient       Aortic stenosis  Echo done on 9/23/22. Shows severe aortic valve stenosis. Aortic valve area of 0.89 cm2; peak velocity is 4.69 m/s; mean gradient is a 53 mmHg. Plan to undergo TAVR in January       History of vertebral compression fracture  S/p kyphoplasty on 11/28.     Plan  -scheduled tylenol, robaxin, lidocaine patches  -norco 5mg for breakthrough pain  - f/u with [facility]       COPD, mild  On  home 2L NC    - PRN duonebs       Hyperlipidemia  - resumed home atorvastatin 20 mg QD      Depression  Patient has persistent depression which is unknown and is currently controlled. Will Continue anti-depressant medications. We will not consult psychiatry at this time. Patient does not display psychosis at this time. Continue to monitor closely and adjust plan of care as needed.    Plan  - resumed home celexa        Hypertension  - resumed home lisinopril 20 mg QD after holding in setting of hypotension       Type 2 diabetes mellitus, without long-term current use of insulin  Patient's FSGs are controlled on current medication regimen.  Last A1c reviewed-   Lab Results   Component Value Date    HGBA1C 6.2 05/01/2022     Most recent fingerstick glucose reviewed-   Recent Labs   Lab 12/20/22  1547 12/20/22 2007 12/21/22  0759   POCTGLUCOSE 149* 153* 103     Current correctional scale  Low  Maintain anti-hyperglycemic dose as follows-   Antihyperglycemics (From admission, onward)      Start     Stop Route Frequency Ordered    12/19/22 0503  insulin aspart U-100 pen 0-5 Units         -- SubQ Before meals & nightly PRN 12/19/22 0405          Hold Oral hypoglycemics while patient is in the hospital.    Plan  - Maintain -180  - LDSS  - diabetic diet  - BF       Final Active Diagnoses:    Diagnosis Date Noted POA    PRINCIPAL PROBLEM:  Electrolyte abnormality [E87.8] 12/19/2022 Unknown    Debility [R53.81] 12/21/2022 Yes    Hypotension [I95.9] 12/20/2022 No    Hyponatremia [E87.1] 12/19/2022 Yes    CAD (coronary artery disease) [I25.10] 12/19/2022 Yes    Hypomagnesemia [E83.42] 12/19/2022 Yes    Hypokalemia due to excessive gastrointestinal loss of potassium [E87.6] 12/19/2022 Yes    Diarrhea [R19.7] 11/21/2022 Yes    Aortic stenosis [I35.0]  Yes     Chronic    History of vertebral compression fracture [Z87.81] 02/19/2019 Not Applicable    COPD, mild [J44.9] 09/27/2016 Yes    Depression [F32.A] 09/13/2016 Yes     Hypertension [I10] 09/13/2016 Yes    Hyperlipidemia [E78.5] 09/13/2016 Yes    Type 2 diabetes mellitus, without long-term current use of insulin [E11.9] 09/13/2016 Unknown      Problems Resolved During this Admission:    Diagnosis Date Noted Date Resolved POA    AMS (altered mental status) [R41.82] 12/19/2022 12/19/2022 Yes       Discharged Condition: stable    Disposition:     Follow Up:    Patient Instructions:      Ambulatory referral/consult to Nutrition Services   Standing Status: Future   Referral Priority: Routine Referral Type: Consultation   Referral Reason: Specialty Services Required   Requested Specialty: Nutrition   Number of Visits Requested: 1     Ambulatory referral/consult to [facility]   Standing Status: Future   Referral Priority: Routine Referral Type: Consultation   Referral Reason: Specialty Services Required   Requested Specialty: Physical Medicine and Rehabilitation   Number of Visits Requested: 1       Significant Diagnostic Studies: Labs: All labs within the past 24 hours have been reviewed      Medications:  Reconciled Home Medications:      Medication List        START taking these medications      calcium carbonate 500 mg calcium (1,250 mg) tablet  Commonly known as: OS-TK  Take 1 tablet (500 mg total) by mouth once daily.            CHANGE how you take these medications      ergocalciferol 50,000 unit Cap  Commonly known as: ERGOCALCIFEROL  TAKE 1 CAPSULE (50,000 UNITS TOTAL) EVERY 7 DAYS.  What changed: See the new instructions.     loratadine 10 mg tablet  Commonly known as: CLARITIN  Take 1 tablet (10 mg total) by mouth once daily.  What changed:   medication strength  how much to take            CONTINUE taking these medications      albuterol 90 mcg/actuation inhaler  Commonly known as: PROVENTIL/VENTOLIN HFA  Inhale 2 puffs into the lungs every 6 (six) hours as needed.     aspirin 81 MG EC tablet  Commonly known as: ECOTRIN  Take 81 mg by mouth once daily.      benazepriL 20 MG tablet  Commonly known as: LOTENSIN  Take 20 mg by mouth once daily.     budesonide-formoterol 80-4.5 mcg 80-4.5 mcg/actuation Hfaa  Commonly known as: SYMBICORT  Inhale 2 puffs into the lungs 2 (two) times daily as needed (Asthma).     calcitonin (salmon) 200 unit/actuation nasal spray  Commonly known as: FORTICAL  1 spray by Nasal route once daily. for 21 days     citalopram 40 MG tablet  Commonly known as: CeleXA  Take 1 tablet (40 mg total) by mouth once daily.     donepeziL 5 MG tablet  Commonly known as: ARICEPT  Take 5 mg by mouth nightly.     ferrous gluconate 324 mg (37.5 mg iron) Tab tablet  Take 324 mg by mouth once daily.     furosemide 40 MG tablet  Commonly known as: LASIX  Take 40 mg by mouth once daily.     glucosamine-chondroitin 500-400 mg tablet  Take 1 tablet by mouth once daily.     HYDROcodone-acetaminophen 5-325 mg per tablet  Commonly known as: NORCO  Take 1 tablet by mouth every 8 (eight) hours as needed for Pain.     LIDOcaine 5 %  Commonly known as: LIDODERM  Place 1 patch onto the skin once daily. Apply to back. Remove & Discard patch within 12 hours or as directed by MD     loperamide 2 mg Tab  Commonly known as: IMODIUM A-D  Take 1 tablet (2 mg total) by mouth 3 (three) times daily as needed (diarrhea). Take 2 tablets by mouth initially then 1 tablet after each loose stool as needed for diarrhea.     multivitamin capsule  Take 1 capsule by mouth once daily.     omeprazole 20 MG capsule  Commonly known as: PRILOSEC  Take 1 capsule (20 mg total) by mouth once daily.     oxybutynin 5 MG Tab  Commonly known as: DITROPAN  Take 1 tablet (5 mg total) by mouth 2 (two) times daily.     simvastatin 40 MG tablet  Commonly known as: ZOCOR  Take 1 tablet (40 mg total) by mouth every evening.            STOP taking these medications      AdviL 200 MG tablet  Generic drug: ibuprofen     CALCIUM ORAL     metFORMIN 500 MG tablet  Commonly known as: GLUCOPHAGE              Indwelling  Lines/Drains at time of discharge:   Lines/Drains/Airways       Drain  Duration             Female External Urinary Catheter 12/20/22 1432 <1 day                    Time spent on the discharge of patient: 45 minutes         Murtaza Gudino MD  Department of Hospital Medicine  [facility] - Intensive Care ([place]-14)

## 2022-12-21 NOTE — NURSING
AVS reprinted.  Called Bedside Delivery no new medications in the cue.  Patient instructed to take Calcium Carbonate as directed from previous script.  Patient instructed to discontinue Metformin.  Daughter verbalized understanding of all instructions.

## 2022-12-21 NOTE — NURSING
Patient refused BG monitoring.  Patient has remained on room air for the past two hours.  Patient stated she does not normally wear oxygen during the day unless she is in bed.  SPO2 94%.  Daughter is at bedside and did not bring oxygen.  Patient does not appear to be in distress.  She is comfortable and ready to go home.

## 2022-12-21 NOTE — NURSING
"Patient observed telling daughter to take a "pain pill" home.  Pill was wrapped in paper towel.  Bedside RN, informed Patient that the medication could not be taken home and must be taken now.  Patient held medication since am.  Patient consumed medication.   "

## 2022-12-21 NOTE — PLAN OF CARE
Patient discharged.  AVS given to Daughter, explanation with opportunity to ask questions provided.  PIV removed.  Telemetry discontinued.  No bedside delivery required.  Transport called.

## 2022-12-21 NOTE — ASSESSMENT & PLAN NOTE
Patient's daughter expressed concerns regarding patient's ability to take care of herself at home and overall increase in hospitalizations/medical interventions  - Therapy consulted, recommends home health

## 2022-12-21 NOTE — ASSESSMENT & PLAN NOTE
Na 134 on admission. Likely secondary to diuretic use vs poor PO intake vs primary polydipsia vs underlying malignancy vs SIADH     - Last sodium 130   - Will receive BMP as needed as patient noted to refuse lab draws

## 2022-12-21 NOTE — PT/OT/SLP EVAL
"Physical Therapy Evaluation    Patient Name:  Daryleen G Moran   MRN:  [id number]    Recommendations:     Discharge Recommendations: home health PT   Discharge Equipment Recommendations: none   Barriers to discharge: Pt currently requiring increased level of assistance with mobility    Assessment:     Daryleen G Moran is a 77 y.o. female admitted with a medical diagnosis of Electrolyte abnormality.  She presents with the following impairments/functional limitations: weakness, impaired endurance, impaired functional mobility, gait instability, impaired balance, decreased lower extremity function, pain, impaired cardiopulmonary response to activity. AAOx4, pain limiting mobility, limited participation due to increasing agitation. Per pt, pt requires a knee brace donned to improve steadiness, however not present in room. Pt performed bed mob [place], STS RW CGA, and amb 5 steps RW CGA. Pt will benefit from skilled PT services while in house in order to address the aforementioned deficits.      Rehab Prognosis: Good; patient would benefit from acute skilled PT services to address these deficits and reach maximum level of function.    Recent Surgery: * No surgery found *      Plan:     During this hospitalization, patient to be seen 3 x/week to address the identified rehab impairments via gait training, therapeutic activities, therapeutic exercises, neuromuscular re-education and progress toward the following goals:    Plan of Care Expires:  01/20/23    Subjective     "I'm going to keep bugging them until they let me out of here"    Pain/Comfort:  Pain Rating 1:  (not rated)  Location - Side 1: Left  Location - Orientation 1: generalized  Location 1: knee  Pain Addressed 1: Nurse notified, Distraction, Reposition  Pain Rating Post-Intervention 1:  (not rated)    Patients cultural, spiritual, Jewish conflicts given the current situation: no    Living Environment:  Per EMR, Pt resides currently with daughter in 1 story house " with 2 steps to enter. Pt normally lives in 1 story house with her spouse however due to recent weakness & illness has been staying with daughter for (A) for the last 6 weeks & her spouse had stroke 4 years ago and has trouble remembering things per pt.  Pt requires (A) with donning socks & shoes & performs wash offs at the sink due to unable to get into/out of the bathtub safely (requires a little (A) for bathing as well).  Pt able to perform ambulation with walker without (A) as well as toileting without (A).  Pt requires (A) with getting in & out of the bed. Pt is retired & is an active .   Equipment used at home: wheelchair, walker, rolling, bedside commode, hospital bed, oxygen. Upon discharge, patient will have assistance from daughter and family; per daughter, pt will always have someone present to assist.    Objective:     Communicated with RN prior to session.  Patient found HOB elevated with PureWick  upon PT entry to room.    General Precautions: Standard, fall  Orthopedic Precautions:N/A   Braces: N/A  Respiratory Status:  nasal cannula resting on bed    Exams:  Cognitive Exam:  Patient is oriented to AAOx4  Sensation:    -       Intact  RLE ROM: WFL  RLE Strength: Grossly 3/5  LLE ROM: WFL  LLE Strength: Grossly 3/5    Functional Mobility:  Bed Mobility:     Scooting: contact guard assistance  Supine to Sit: minimum assistance  Transfers:     Sit to Stand:  contact guard assistance with rolling walker  Bed to Chair: contact guard assistance with  rolling walker  using  Step Transfer  Gait: Pt amb 5 steps RW CGA. Pt presented with head down, B shortened step, no buckling noted  Balance:   Good sitting balance  Fair standing balance      AM-PAC 6 CLICK MOBILITY  Total Score:17       Treatment & Education:  Discussed sitting upright in chair, pt agreeable    Educated pt on PT role/POC  Educated pt on importance of OOB activity and daily ambulation   Pt educated on proper body mechanics, safety  techniques, and energy conservation with PT facilitation and cueing throughout session   Pt verbalized understanding      Patient left up in chair with all lines intact, call button in reach, RN notified, and daughter present.    GOALS:   Multidisciplinary Problems       Physical Therapy Goals          Problem: Physical Therapy    Goal Priority Disciplines Outcome Goal Variances Interventions   Physical Therapy Goal     PT, PT/OT Ongoing, Progressing     Description: Goals to be met by: 2022     Patient will increase functional independence with mobility by performin. Supine to sit with [place]  2. Sit to supine with [place]  3. Sit to stand transfer with Supervision  4. Bed to chair transfer with Supervision using LRAD  5. Gait  x 50 feet with Supervision using LRAD.                          History:     Past Medical History:   Diagnosis Date    Aortic stenosis     Arthritis     Back pain     Diabetes mellitus type II     Hyperlipidemia     Hypertension     NSTEMI (non-ST elevated myocardial infarction) 2022    Osteoporosis     Wears glasses        Past Surgical History:   Procedure Laterality Date     vocal cord nodules removed  long time ago     twice    APPENDECTOMY  within last 5yrs    CATHETERIZATION OF BOTH LEFT AND RIGHT HEART Left 2022    Procedure: CATHETERIZATION, HEART, BOTH LEFT AND RIGHT;  Surgeon: Michelet Vargas MD;  Location: [facility] CATH/EP LAB;  Service: Cardiology;  Laterality: Left;    FIXATION KYPHOPLASTY THORACIC SPINE      8-20-13    FOOT SURGERY      left 2nd toe was too long     HERNIA REPAIR  within last 5yrs    LEFT HEART CATHETERIZATION Left 2022    Procedure: Left heart cath;  Surgeon: Jose Echols MD;  Location: [facility] CATH/EP LAB;  Service: Cardiology;  Laterality: Left;    TONSILLECTOMY, ADENOIDECTOMY  long time ago       Time Tracking:     PT Received On: 22  PT Start Time: 1406     PT Stop Time: 1420  PT Total Time (min): 14 min      Billable Minutes: Evaluation 6 and Therapeutic Activity 8      12/21/2022

## 2022-12-21 NOTE — PHARMACY MED REC
"Admission Medication History     The home medication history was taken by Jimenez Lao.    You may go to "Admission" then "Reconcile Home Medications" tabs to review and/or act upon these items.     The home medication list has been updated by the Pharmacy department.   Please read ALL comments highlighted in yellow.   Please address this information as you see fit.    Feel free to contact us if you have any questions or require assistance.      The medications listed below were removed from the home medication list. Please reorder if appropriate:  Patient reports no longer taking the following medication(s):  FEXOFENADINE 180 MG  CETIRIZINE HCL     Medications listed below were obtained from: Patient/family    PTA Medications   Medication Sig    albuterol (PROVENTIL/VENTOLIN HFA) 90 mcg/actuation inhaler   Inhale 2 puffs into the lungs every 6 (six) hours as needed.    aspirin (ECOTRIN) 81 MG EC tablet   Take 81 mg by mouth once daily.    benazepriL (LOTENSIN) 20 MG tablet   Take 20 mg by mouth once daily.    budesonide-formoterol 80-4.5 mcg (SYMBICORT) 80-4.5 mcg/actuation HFAA   Inhale 2 puffs into the lungs 2 (two) times daily as needed (Asthma).    calcitonin, salmon, (FORTICAL) 200 unit/actuation nasal spray   1 spray by Nasal route once daily. for 21 days    CALCIUM ORAL   Take 1,200 mg by mouth once daily.    citalopram (CELEXA) 40 MG tablet   Take 1 tablet (40 mg total) by mouth once daily.    donepeziL (ARICEPT) 5 MG tablet   Take 5 mg by mouth nightly.    ergocalciferol (ERGOCALCIFEROL) 50,000 unit Cap    Take 50,000 Units by mouth every Thursday.    ferrous gluconate 324 mg (37.5 mg iron) Tab tablet   Take 324 mg by mouth once daily.    furosemide (LASIX) 40 MG tablet   Take 40 mg by mouth once daily.    glucosamine-chondroitin 500-400 mg    Take 1 tablet by mouth once daily.    ibuprofen (ADVIL) 200 MG tablet   Take 200 mg by mouth every 6 (six) hours as needed for Pain.    loperamide (IMODIUM A-D) " "2 mg Tab Take 1 tablet (2 mg total) by mouth 3 (three) times daily as needed (diarrhea). Take 2 tablets by mouth initially then 1 tablet after each loose stool as needed for diarrhea.      loratadine (CLARITIN ORAL)   Take 1 tablet by mouth once daily.    metFORMIN (GLUCOPHAGE) 500 MG tablet   Take 1 tablet (500 mg total) by mouth 2 (two) times daily with meals.    multivitamin capsule   Take 1 capsule by mouth once daily.    omeprazole (PRILOSEC) 20 MG capsule   Take 1 capsule (20 mg total) by mouth once daily.    oxybutynin (DITROPAN) 5 MG Tab   Take 1 tablet (5 mg total) by mouth 2 (two) times daily.    simvastatin (ZOCOR) 40 MG tablet   Take 1 tablet (40 mg total) by mouth every evening.    HYDROcodone-acetaminophen (NORCO) 5-325 mg per tablet   Take 1 tablet by mouth every 8 (eight) hours as needed for Pain.    LIDOcaine (LIDODERM) 5 % Place 1 patch onto the skin once daily. Apply to back. Remove & Discard patch within 12 hours or as directed by MD       Potential issues to be addressed PRIOR TO DISCHARGE  Patient reported not taking the following medications: (HYDROCODONE-ACETAMINOPHEN 5-325 MG, LIDOCAINE 5%, FERROUS GLUCONATE 324 MG). These medications remain on the home medication list. Please address accordingly. Patient's daughter reported "taking a break from ASPIRIN 81 MG" with last dose given last week.    Patient requested refills for the following medications: (LIDOCAINE 5% PATCH, HYDROCODONE ACETAMINOPHEN 5-325 MG, FERROUS GLUCONATE 324 MG)    Jimenez Lao  EXT [id number]                 .        "

## 2022-12-21 NOTE — ASSESSMENT & PLAN NOTE
Patient's FSGs are controlled on current medication regimen.  Last A1c reviewed-   Lab Results   Component Value Date    HGBA1C 6.2 05/01/2022     Most recent fingerstick glucose reviewed-   Recent Labs   Lab 12/20/22  1547 12/20/22 2007 12/21/22  0759   POCTGLUCOSE 149* 153* 103     Current correctional scale  Low  Maintain anti-hyperglycemic dose as follows-   Antihyperglycemics (From admission, onward)    Start     Stop Route Frequency Ordered    12/19/22 0503  insulin aspart U-100 pen 0-5 Units         -- SubQ Before meals & nightly PRN 12/19/22 0405        Hold Oral hypoglycemics while patient is in the hospital.    Plan  - Maintain -180  - LDSS  - diabetic diet  - BF

## 2022-12-21 NOTE — ASSESSMENT & PLAN NOTE
Hypomagnesiumia (0.8) and hypokalemia (2.8) likely 2/2 GI loss from significant chronic diarrhea. Unknown etiology of chronic diarrhea. Likely causing agitation.     - replete lytes PRN  - Lytes returning normal limits - labs d/c'd   - Hospital follow-up scheduled Dec 28th   - Ambulatory referral to dietary made

## 2022-12-21 NOTE — PLAN OF CARE
PT Evaluation completed and PT POC established.    Problem: Physical Therapy  Goal: Physical Therapy Goal  Description: Goals to be met by: 2022     Patient will increase functional independence with mobility by performin. Supine to sit with [place]  2. Sit to supine with [place]  3. Sit to stand transfer with Supervision  4. Bed to chair transfer with Supervision using LRAD  5. Gait  x 50 feet with Supervision using LRAD.     Outcome: Ongoing, Progressing

## 2022-12-21 NOTE — PLAN OF CARE
[facility] ([place] [id number])      HOME HEALTH ORDERS  FACE TO FACE ENCOUNTER    Patient Name: Daryleen G Moran  YOB: 1945    PCP: Evelin Dominguez NP  PCP Address: [address] [place] [id number]      Encounter Date: 12/21/2022    Admit to Home Health    Diagnoses:  Active Hospital Problems    Diagnosis  POA    *Electrolyte abnormality [E87.8]  Unknown    Debility [R53.81]  Yes    Hypotension [I95.9]  No    Hyponatremia [E87.1]  Yes    CAD (coronary artery disease) [I25.10]  Yes    Hypomagnesemia [E83.42]  Yes    Hypokalemia due to excessive gastrointestinal loss of potassium [E87.6]  Yes    Diarrhea [R19.7]  Yes    Aortic stenosis [I35.0]  Yes     Chronic    History of vertebral compression fracture [Z87.81]  Not Applicable    COPD, mild [J44.9]  Yes    Depression [F32.A]  Yes    Hypertension [I10]  Yes    Hyperlipidemia [E78.5]  Yes    Type 2 diabetes mellitus, without long-term current use of insulin [E11.9]  Unknown     Dx updated per 2019 IMO Load        Resolved Hospital Problems    Diagnosis Date Resolved POA    AMS (altered mental status) [R41.82] 12/19/2022 Yes       Follow Up Appointments:  Future Appointments   Date Time Provider Department Center   12/28/2022 10:30 AM Evelin Dominguez NP [facility] IM [facility]   1/4/2023  1:30 PM Lilli Maki NP [facility] [id number] [facility]   4/20/2023 11:00 AM Sahil Palm MD [facility] ENDODIA [facility]       Allergies:  Review of patient's allergies indicates:   Allergen Reactions    Sulfacetamide sodium      Pain perineal area    Sulfasalazine Hives    Adhesive Itching     SKIN GETS RED WITH TAPE AND BANDAIDS    Adhesive tape-silicones Itching     SKIN GETS RED WITH TAPE AND BANDAIDS    Sulfa (sulfonamide antibiotics) Rash       Medications: Review discharge medications with patient and family and provide education.    Current Facility-Administered Medications   Medication Dose Route Frequency Provider Last Rate Last  Admin    albuterol-ipratropium 2.5 mg-0.5 mg/3 mL nebulizer solution 3 mL  3 mL Nebulization Q6H PRN Niurka Kirkpatrick MD        aspirin EC tablet 81 mg  81 mg Oral Daily Niurka Kirkpatrick MD   81 mg at 12/21/22 0834    atorvastatin tablet 20 mg  20 mg Oral Daily Niurka Kirkpatrick MD   20 mg at 12/21/22 0835    citalopram tablet 40 mg  40 mg Oral Daily Niurka Kirkpatrick MD   40 mg at 12/21/22 0835    dextrose 10% bolus 125 mL 125 mL  12.5 g Intravenous PRN Niurka Kirkpatrick MD        dextrose 10% bolus 250 mL 250 mL  25 g Intravenous PRN Niurka Kirkpatrick MD        donepeziL tablet 5 mg  5 mg Oral Nightly Niurka Kirkpatrick MD   5 mg at 12/20/22 2048    enoxaparin injection 40 mg  40 mg Subcutaneous Daily Niurka Kirkpatrick MD   40 mg at 12/20/22 1612    glucagon (human recombinant) injection 1 mg  1 mg Intramuscular PRN Niurka Kirkpatrick MD        glucose chewable tablet 16 g  16 g Oral PRN Niurka Kirkpatrick MD        glucose chewable tablet 24 g  24 g Oral PRN Niurka Kirkpatrick MD        HYDROcodone-acetaminophen 5-325 mg per tablet 1 tablet  1 tablet Oral Q8H PRN Niurka Kirkpatrick MD   1 tablet at 12/21/22 0848    insulin aspart U-100 pen 0-5 Units  0-5 Units Subcutaneous QID (AC + HS) PRN Niurka Kirkpatrick MD        LIDOcaine 5 % patch 2 patch  2 patch Transdermal Q24H Niurka Kirkpatrick MD   2 patch at 12/20/22 0809    lisinopriL tablet 20 mg  20 mg Oral Daily Niurka Kirkpatrick MD   20 mg at 12/21/22 0836    loperamide capsule 2 mg  2 mg Oral Once PRN Niurka Kirkpatrick MD        melatonin tablet 6 mg  6 mg Oral Nightly PRN Niurka Kirkpatrick MD        methocarbamoL tablet 500 mg  500 mg Oral TID Niurka Kirkpatrick MD   500 mg at 12/21/22 0837    naloxone 0.4 mg/mL injection 0.02 mg  0.02 mg Intravenous PRN Niurka Kirkpatrick MD        pantoprazole EC tablet 40 mg  40 mg Oral Daily Roberth Mantilla DO   40 mg at 12/21/22 0913    sodium chloride 0.9% flush 10 mL  10 mL Intravenous Q12H PRN Niurka Kirkpatrick MD         Current Discharge Medication List        START taking these  medications    Details   calcium carbonate (OS-TK) 500 mg calcium (1,250 mg) tablet Take 1 tablet (500 mg total) by mouth once daily.  Refills: 0           CONTINUE these medications which have CHANGED    Details   loratadine (CLARITIN) 10 mg tablet Take 1 tablet (10 mg total) by mouth once daily.  Refills: 0           CONTINUE these medications which have NOT CHANGED    Details   albuterol (PROVENTIL/VENTOLIN HFA) 90 mcg/actuation inhaler Inhale 2 puffs into the lungs every 6 (six) hours as needed.      aspirin (ECOTRIN) 81 MG EC tablet Take 81 mg by mouth once daily.      benazepriL (LOTENSIN) 20 MG tablet Take 20 mg by mouth once daily.      budesonide-formoterol 80-4.5 mcg (SYMBICORT) 80-4.5 mcg/actuation HFAA Inhale 2 puffs into the lungs 2 (two) times daily as needed (Asthma).      calcitonin, salmon, (FORTICAL) 200 unit/actuation nasal spray 1 spray by Nasal route once daily. for 21 days  Qty: 3.7 mL, Refills: 0      citalopram (CELEXA) 40 MG tablet Take 1 tablet (40 mg total) by mouth once daily.  Qty: 90 tablet, Refills: 3      donepeziL (ARICEPT) 5 MG tablet Take 5 mg by mouth nightly.      ergocalciferol (ERGOCALCIFEROL) 50,000 unit Cap TAKE 1 CAPSULE (50,000 UNITS TOTAL) EVERY 7 DAYS.  Qty: 12 capsule, Refills: 3      ferrous gluconate 324 mg (37.5 mg iron) Tab tablet Take 324 mg by mouth once daily.      furosemide (LASIX) 40 MG tablet Take 40 mg by mouth once daily.      glucosamine-chondroitin 500-400 mg tablet Take 1 tablet by mouth once daily.      loperamide (IMODIUM A-D) 2 mg Tab Take 1 tablet (2 mg total) by mouth 3 (three) times daily as needed (diarrhea). Take 2 tablets by mouth initially then 1 tablet after each loose stool as needed for diarrhea.  Qty: 15 tablet, Refills: 0      multivitamin capsule Take 1 capsule by mouth once daily.      omeprazole (PRILOSEC) 20 MG capsule Take 1 capsule (20 mg total) by mouth once daily.  Qty: 90 capsule, Refills: 3      oxybutynin (DITROPAN) 5 MG Tab  Take 1 tablet (5 mg total) by mouth 2 (two) times daily.  Qty: 180 tablet, Refills: 3      simvastatin (ZOCOR) 40 MG tablet Take 1 tablet (40 mg total) by mouth every evening.  Qty: 90 tablet, Refills: 3      HYDROcodone-acetaminophen (NORCO) 5-325 mg per tablet Take 1 tablet by mouth every 8 (eight) hours as needed for Pain.  Qty: 21 tablet, Refills: 0    Comments: n/a       LIDOcaine (LIDODERM) 5 % Place 1 patch onto the skin once daily. Apply to back. Remove & Discard patch within 12 hours or as directed by MD  Qty: 15 patch, Refills: 0           STOP taking these medications       CALCIUM ORAL Comments:   Reason for Stopping:         ibuprofen (ADVIL) 200 MG tablet Comments:   Reason for Stopping:         metFORMIN (GLUCOPHAGE) 500 MG tablet Comments:   Reason for Stopping:                 I have seen and examined this patient within the last 30 days. My clinical findings that support the need for the home health skilled services and home bound status are the following:no   Weakness/numbness causing balance and gait disturbance due to Coronary Heart Disease, Weakness/Debility, and Dehydration making it taxing to leave home.     Diet:   diabetic diet 2000 calorie    Labs:  Report Lab results to PCP. Patient to establish with hospital follow-up Dec 28th.     Referrals/ Consults  Physical Therapy to evaluate and treat. Evaluate for home safety and equipment needs; Establish/upgrade home exercise program. Perform / instruct on therapeutic exercises, gait training, transfer training, and Range of Motion.  Occupational Therapy to evaluate and treat. Evaluate home environment for safety and equipment needs. Perform/Instruct on transfers, ADL training, ROM, and therapeutic exercises.   to evaluate for community resources/long-range planning.  Aide to provide assistance with personal care, ADLs, and vital signs.    Activities:   activity as tolerated    Nursing:   Agency to admit patient within 24 hours of  hospital discharge unless specified on physician order or at patient request    SN to complete comprehensive assessment including routine vital signs. Instruct on disease process and s/s of complications to report to MD. Review/verify medication list sent home with the patient at time of discharge  and instruct patient/caregiver as needed. Frequency may be adjusted depending on start of care date.     Skilled nurse to perform up to 3 visits PRN for symptoms related to diagnosis    Notify MD if SBP > 160 or < 90; DBP > 90 or < 50; HR > 120 or < 50; Temp > 101; O2 < 88%; Other:       Ok to schedule additional visits based on staff availability and patient request on consecutive days within the home health episode.    When multiple disciplines ordered:    Start of Care occurs on Sunday - Wednesday schedule remaining discipline evaluations as ordered on separate consecutive days following the start of care.    Thursday SOC -schedule subsequent evaluations Friday and Monday the following week.     Friday - Saturday SOC - schedule subsequent discipline evaluations on consecutive days starting Monday of the following week.    For all post-discharge communication and subsequent orders please contact patient's primary care physician. If unable to reach primary care physician or do not receive response within 30 minutes, please contact [facility] for clinical staff order clarification    Miscellaneous   Routine Skin for Bedridden Patients: Instruct patient/caregiver to apply moisture barrier cream to all skin folds and wet areas in perineal area daily and after baths and all bowel movements.    Home Health Aide:  Nursing Three times weekly, Physical Therapy Three times weekly, Occupational Therapy Three times weekly, Medical Social Work Three times weekly, and Home Health Aide Three times weekly    Wound Care Orders  no    I certify that this patient is confined to her home and needs intermittent skilled  nursing care, physical therapy, and occupational therapy.

## 2022-12-21 NOTE — PLAN OF CARE
Problem: Adult Inpatient Plan of Care  Goal: Plan of Care Review  Outcome: Ongoing, Progressing  Goal: Patient-Specific Goal (Individualized)  Outcome: Ongoing, Progressing  Goal: Absence of Hospital-Acquired Illness or Injury  Outcome: Ongoing, Progressing  Goal: Optimal Comfort and Wellbeing  Outcome: Ongoing, Progressing  Goal: Readiness for Transition of Care  Outcome: Ongoing, Progressing     Problem: Bariatric Environmental Safety  Goal: Safety Maintained with Care  Outcome: Ongoing, Progressing     Problem: Diabetes Comorbidity  Goal: Blood Glucose Level Within Targeted Range  Outcome: Ongoing, Progressing     Problem: Infection  Goal: Absence of Infection Signs and Symptoms  Outcome: Ongoing, Progressing     Problem: Fall Injury Risk  Goal: Absence of Fall and Fall-Related Injury  Outcome: Ongoing, Progressing     Problem: Skin Injury Risk Increased  Goal: Skin Health and Integrity  Outcome: Ongoing, Progressing

## 2022-12-21 NOTE — NURSING
Patient was told that she is being discharged by her report.  She removed her telemetry, refused vs, and blood glucose.  She told bedside to find out now when she will be discharge because her daughter is on the way.  Team notified via secure chat.

## 2022-12-21 NOTE — NURSING
Patient's daughter is at bedside.  She stated that she wants all follow-up appointments scheduled for [facility].  Team notified via secure chat.

## 2022-12-21 NOTE — ASSESSMENT & PLAN NOTE
S/p kyphoplasty on 11/28.     Plan  -scheduled tylenol, robaxin, lidocaine patches  -norco 5mg for breakthrough pain  - f/u with [facility]

## 2022-12-21 NOTE — NURSING
"Patient refused blood cultures x 2.  Patient stated, "I don't care what he wants, I am not getting stuck anymore tonight.  Please shut the lights off."  TM 3 paged at [phone].     "

## 2022-12-21 NOTE — SUBJECTIVE & OBJECTIVE
Interval History: Blood pressures stable today. SBPS >140s, though held home lisinopril yesterday due to recent hypotension episodes, can resume. Therapy recommends home health. Will refer to health backs, and dietary. One week hospital follow-up already set up.     Review of Systems   Constitutional:  Negative for chills, diaphoresis and fatigue.   Respiratory:  Negative for shortness of breath and wheezing.    Cardiovascular:  Negative for chest pain, palpitations and leg swelling.   Gastrointestinal:  Negative for abdominal distention, diarrhea and nausea.   Musculoskeletal:  Negative for joint swelling and myalgias.   Skin:  Negative for color change, pallor, rash and wound.   Neurological:  Negative for tremors, weakness, light-headedness and headaches.   Hematological: Negative.    Psychiatric/Behavioral:  Negative for agitation, confusion and decreased concentration.    Objective:     Vital Signs (Most Recent):  Temp: 98 °F (36.7 °C) (12/21/22 0758)  Pulse: 84 (12/21/22 0758)  Resp: 18 (12/21/22 0848)  BP: (!) 145/65 (12/21/22 0758)  SpO2: 95 % (12/21/22 0758)   Vital Signs (24h Range):  Temp:  [98 °F (36.7 °C)-98.6 °F (37 °C)] 98 °F (36.7 °C)  Pulse:  [78-92] 84  Resp:  [16-20] 18  SpO2:  [95 %-97 %] 95 %  BP: (111-145)/(54-65) 145/65        There is no height or weight on file to calculate BMI.    Intake/Output Summary (Last 24 hours) at 12/21/2022 0908  Last data filed at 12/21/2022 0450  Gross per 24 hour   Intake 222 ml   Output 1650 ml   Net -1428 ml      Physical Exam  Constitutional:       General: She is not in acute distress.     Appearance: Normal appearance. She is not ill-appearing.   HENT:      Head: Normocephalic and atraumatic.   Eyes:      Extraocular Movements: Extraocular movements intact.      Conjunctiva/sclera: Conjunctivae normal.      Pupils: Pupils are equal, round, and reactive to light.   Cardiovascular:      Rate and Rhythm: Normal rate and regular rhythm.   Pulmonary:      Effort:  Pulmonary effort is normal. No respiratory distress.   Chest:      Chest wall: No tenderness.   Abdominal:      General: Abdomen is flat. Bowel sounds are normal. There is no distension.      Palpations: Abdomen is soft.      Tenderness: There is no abdominal tenderness. There is no guarding.      Hernia: A hernia is present.   Musculoskeletal:         General: No swelling.      Right lower leg: No edema.      Left lower leg: No edema.   Skin:     General: Skin is warm.      Capillary Refill: Capillary refill takes less than 2 seconds.      Coloration: Skin is not pale.   Neurological:      General: No focal deficit present.      Mental Status: She is alert.      Motor: No weakness.   Psychiatric:         Mood and Affect: Mood normal.         Behavior: Behavior normal.       Significant Labs: All pertinent labs within the past 24 hours have been reviewed.    Significant Imaging: I have reviewed all pertinent imaging results/findings within the past 24 hours.

## 2022-12-21 NOTE — NURSING
"PIV removed, Telemetry discontinued.  Patient informed External Female Cath had to be removed prior to discharge.  Patient stated, "What am I suppose to do now, just pee on myself?  Fine I will just pee on myself."  Patient has been rude and demanding to staff for the duration of the day.  Patient informed Carlyn PT, that she was going to continuously press light until she was discharged.  PT consult completed.  Daughter at bedside.  Patient is discharged, awaiting transport.  "

## 2022-12-21 NOTE — NURSING
Called Bedside Delivery @ [phone], to follow-up with meds prior to discharge.  Tech stated there were no medications ordered for delivery/pick-up.

## 2022-12-21 NOTE — ASSESSMENT & PLAN NOTE
Patient has had several episodes of hypotension requiring aggressive IVF to stabilize. Potentially secondary to significant volume loss after periods of persistent diarrhea without adequate repletion  - Resume home lisinopril

## 2022-12-22 ENCOUNTER — PATIENT OUTREACH (OUTPATIENT)
Dept: ADMINISTRATIVE | Facility: CLINIC | Age: 77
End: 2022-12-22
Payer: MEDICARE

## 2022-12-22 NOTE — PROGRESS NOTES
C3 nurse attempted to contact Daryleen G Moran for a TCC post hospital discharge follow up call. The patient is unable to conduct the call @ this time. The patient requested a callback.    The patient has a scheduled hospitals appointment with Evelin Dominguez NP on 12/28/22 @ 1030. Message sent to Physician staff.     Patient states therapist present, requests callback.

## 2022-12-22 NOTE — PROGRESS NOTES
2nd Attempt made to reach patient for TCC call. Patient unable to complete the call at this time, requests callback.

## 2022-12-23 NOTE — PROGRESS NOTES
3rd Attempt made to reach patient for TCC call. Left voicemail please call [phone] leave first name, last name, and  Marianne will return your call.

## 2023-01-04 PROBLEM — I50.33 ACUTE ON CHRONIC DIASTOLIC CHF (CONGESTIVE HEART FAILURE), NYHA CLASS 3: Status: ACTIVE | Noted: 2023-01-04

## 2023-01-12 ENCOUNTER — PATIENT MESSAGE (OUTPATIENT)
Dept: NEUROSURGERY | Facility: CLINIC | Age: 78
End: 2023-01-12
Payer: MEDICARE

## 2023-01-19 ENCOUNTER — OFFICE VISIT (OUTPATIENT)
Dept: INTERNAL MEDICINE | Facility: CLINIC | Age: 78
End: 2023-01-19
Payer: MEDICARE

## 2023-01-19 VITALS
HEIGHT: 60 IN | BODY MASS INDEX: 36.49 KG/M2 | WEIGHT: 185.88 LBS | DIASTOLIC BLOOD PRESSURE: 70 MMHG | SYSTOLIC BLOOD PRESSURE: 140 MMHG | HEART RATE: 89 BPM | OXYGEN SATURATION: 96 %

## 2023-01-19 DIAGNOSIS — R09.02 HYPOXEMIA: ICD-10-CM

## 2023-01-19 DIAGNOSIS — F02.80 DEMENTIA IN OTHER DISEASES CLASSIFIED ELSEWHERE, UNSPECIFIED SEVERITY, WITHOUT BEHAVIORAL DISTURBANCE, PSYCHOTIC DISTURBANCE, MOOD DISTURBANCE, AND ANXIETY: ICD-10-CM

## 2023-01-19 DIAGNOSIS — R41.3 OTHER AMNESIA: ICD-10-CM

## 2023-01-19 DIAGNOSIS — E11.9 TYPE 2 DIABETES MELLITUS WITHOUT COMPLICATION, WITHOUT LONG-TERM CURRENT USE OF INSULIN: ICD-10-CM

## 2023-01-19 DIAGNOSIS — Z99.81 ON HOME OXYGEN THERAPY: ICD-10-CM

## 2023-01-19 DIAGNOSIS — F32.A DEPRESSION, UNSPECIFIED DEPRESSION TYPE: ICD-10-CM

## 2023-01-19 DIAGNOSIS — R53.81 DEBILITY: ICD-10-CM

## 2023-01-19 DIAGNOSIS — E27.8 LEFT ADRENAL MASS: ICD-10-CM

## 2023-01-19 DIAGNOSIS — S22.080D COMPRESSION FRACTURE OF T12 VERTEBRA WITH ROUTINE HEALING, SUBSEQUENT ENCOUNTER: ICD-10-CM

## 2023-01-19 DIAGNOSIS — F33.8 OTHER RECURRENT DEPRESSIVE DISORDERS: ICD-10-CM

## 2023-01-19 DIAGNOSIS — J44.9 COPD, MILD: Primary | ICD-10-CM

## 2023-01-19 DIAGNOSIS — E78.2 MIXED HYPERLIPIDEMIA: ICD-10-CM

## 2023-01-19 DIAGNOSIS — S32.020D COMPRESSION FRACTURE OF L2 VERTEBRA WITH ROUTINE HEALING, SUBSEQUENT ENCOUNTER: ICD-10-CM

## 2023-01-19 DIAGNOSIS — I48.0 PAROXYSMAL ATRIAL FIBRILLATION: ICD-10-CM

## 2023-01-19 DIAGNOSIS — I25.10 CORONARY ARTERY DISEASE, UNSPECIFIED VESSEL OR LESION TYPE, UNSPECIFIED WHETHER ANGINA PRESENT, UNSPECIFIED WHETHER NATIVE OR TRANSPLANTED HEART: ICD-10-CM

## 2023-01-19 DIAGNOSIS — I35.0 SEVERE AORTIC VALVE STENOSIS: ICD-10-CM

## 2023-01-19 DIAGNOSIS — I10 PRIMARY HYPERTENSION: ICD-10-CM

## 2023-01-19 DIAGNOSIS — I50.33 ACUTE ON CHRONIC DIASTOLIC CHF (CONGESTIVE HEART FAILURE), NYHA CLASS 3: ICD-10-CM

## 2023-01-19 DIAGNOSIS — M80.00XD OSTEOPOROSIS WITH CURRENT PATHOLOGICAL FRACTURE WITH ROUTINE HEALING, UNSPECIFIED OSTEOPOROSIS TYPE, SUBSEQUENT ENCOUNTER: ICD-10-CM

## 2023-01-19 PROCEDURE — 3078F DIAST BP <80 MM HG: CPT | Mod: CPTII,S$GLB,, | Performed by: NURSE PRACTITIONER

## 2023-01-19 PROCEDURE — 1126F PR PAIN SEVERITY QUANTIFIED, NO PAIN PRESENT: ICD-10-PCS | Mod: CPTII,S$GLB,, | Performed by: NURSE PRACTITIONER

## 2023-01-19 PROCEDURE — 3288F PR FALLS RISK ASSESSMENT DOCUMENTED: ICD-10-PCS | Mod: CPTII,S$GLB,, | Performed by: NURSE PRACTITIONER

## 2023-01-19 PROCEDURE — 1159F MED LIST DOCD IN RCRD: CPT | Mod: CPTII,S$GLB,, | Performed by: NURSE PRACTITIONER

## 2023-01-19 PROCEDURE — 1101F PT FALLS ASSESS-DOCD LE1/YR: CPT | Mod: CPTII,S$GLB,, | Performed by: NURSE PRACTITIONER

## 2023-01-19 PROCEDURE — 3077F PR MOST RECENT SYSTOLIC BLOOD PRESSURE >= 140 MM HG: ICD-10-PCS | Mod: CPTII,S$GLB,, | Performed by: NURSE PRACTITIONER

## 2023-01-19 PROCEDURE — 99999 PR PBB SHADOW E&M-EST. PATIENT-LVL V: CPT | Mod: PBBFAC,,, | Performed by: NURSE PRACTITIONER

## 2023-01-19 PROCEDURE — 1101F PR PT FALLS ASSESS DOC 0-1 FALLS W/OUT INJ PAST YR: ICD-10-PCS | Mod: CPTII,S$GLB,, | Performed by: NURSE PRACTITIONER

## 2023-01-19 PROCEDURE — 3288F FALL RISK ASSESSMENT DOCD: CPT | Mod: CPTII,S$GLB,, | Performed by: NURSE PRACTITIONER

## 2023-01-19 PROCEDURE — 99999 PR PBB SHADOW E&M-EST. PATIENT-LVL V: ICD-10-PCS | Mod: PBBFAC,,, | Performed by: NURSE PRACTITIONER

## 2023-01-19 PROCEDURE — 99215 PR OFFICE/OUTPT VISIT, EST, LEVL V, 40-54 MIN: ICD-10-PCS | Mod: S$GLB,,, | Performed by: NURSE PRACTITIONER

## 2023-01-19 PROCEDURE — 1126F AMNT PAIN NOTED NONE PRSNT: CPT | Mod: CPTII,S$GLB,, | Performed by: NURSE PRACTITIONER

## 2023-01-19 PROCEDURE — 1111F DSCHRG MED/CURRENT MED MERGE: CPT | Mod: CPTII,S$GLB,, | Performed by: NURSE PRACTITIONER

## 2023-01-19 PROCEDURE — 1111F PR DISCHARGE MEDS RECONCILED W/ CURRENT OUTPATIENT MED LIST: ICD-10-PCS | Mod: CPTII,S$GLB,, | Performed by: NURSE PRACTITIONER

## 2023-01-19 PROCEDURE — 3078F PR MOST RECENT DIASTOLIC BLOOD PRESSURE < 80 MM HG: ICD-10-PCS | Mod: CPTII,S$GLB,, | Performed by: NURSE PRACTITIONER

## 2023-01-19 PROCEDURE — 3077F SYST BP >= 140 MM HG: CPT | Mod: CPTII,S$GLB,, | Performed by: NURSE PRACTITIONER

## 2023-01-19 PROCEDURE — 99215 OFFICE O/P EST HI 40 MIN: CPT | Mod: S$GLB,,, | Performed by: NURSE PRACTITIONER

## 2023-01-19 PROCEDURE — 1159F PR MEDICATION LIST DOCUMENTED IN MEDICAL RECORD: ICD-10-PCS | Mod: CPTII,S$GLB,, | Performed by: NURSE PRACTITIONER

## 2023-01-19 NOTE — PROGRESS NOTES
INTERNAL MEDICINE INITIAL VISIT NOTE      CHIEF COMPLAINT     Chief Complaint   Patient presents with    Follow-up     Heart surgery       HPI     Daryleen G Moran is a 77 y.o.  female with mulitple compression fractures (T12 and L2) s/p IR kyphoplasty (11/28), osteoporosis (dexa T score -3.6), TMs, HLD, HTN, CHF (G1DD EF 65%), aortic stenosis s/p TAVR, COPD (on home O2) presents for hospital follow up.      Pt d/c'ed from [facility] s/o TAVR     Aortic stenosis s/p TAVR - 1/4/2023 -   Labs and echo was ordered for one month an one year post TAVR per protocol. DAPT with Plavix / ecasa x 6 months, can stop Plavix thereafter and continue ECASA lifelong. Started on low dose Coreg, decreased Lasix to 20 mg po qd, Lotensin 10 mg po qd  H/O paroxysmal Afib, has been off of Eliquis since December 2/2 anemia. Maintained NSR while IP here.  Consider Watchmen.     Taking asa, plavix   On benazepril and coreg   Lasix 20mg daily   BP elevated in clinic - did not take meds this morning. At home BP controlled per daughter     CHF- taking lasix, coreg and benazepril   Last BNP 1/4/2023 1930  Taking lasix 20mg   Not weighing daily - no swelling   One episode of sob with activity     HLD- taking statin   No results found for: LDLCALC    COPD- on home O2 with [employer]   Taking symbicort     Anemia- H&H on d/c 1/5/2023 8.6 and 29.2    PreDM- controlled on diet alone   Lab Results   Component Value Date    HGBA1C 5.7 (H) 01/04/2023     Dementia - was started on Aricept by previous PCP   Pt has not driven in months - plans on returning to driving  Daughter reports getting lost in familiar places   Cooks- does not forget to turn off the stove or oven   No trouble paying bills          Past Medical History:  Past Medical History:   Diagnosis Date    Anesthesia complication     BLADDER DYSFUNCTION    Aortic stenosis     Arthritis     Back pain     Diabetes mellitus type II     NO LONGER DIABETIC    Encounter for blood transfusion      Hyperlipidemia     Hypertension     NSTEMI (non-ST elevated myocardial infarction) 05/01/2022    Osteoporosis     Wears glasses        Past Surgical History:  Past Surgical History:   Procedure Laterality Date     vocal cord nodules removed  long time ago     twice    ADRENAL TUMOR      APPENDECTOMY  within last 5yrs    CATHETERIZATION OF BOTH LEFT AND RIGHT HEART Left 05/06/2022    Procedure: CATHETERIZATION, HEART, BOTH LEFT AND RIGHT;  Surgeon: Michelet Vargas MD;  Location: [facility] CATH/EP LAB;  Service: Cardiology;  Laterality: Left;    FIXATION KYPHOPLASTY THORACIC SPINE      8-20-13    FOOT SURGERY      left 2nd toe was too long     HERNIA REPAIR  within last 5yrs    INSERTION OF TEMPORARY PACEMAKER N/A 1/4/2023    Procedure: INSERTION, PACEMAKER, TEMPORARY;  Surgeon: Bhavesh Peña MD;  Location: [facility] CATH;  Service: Cardiology;  Laterality: N/A;    LEFT HEART CATHETERIZATION Left 05/02/2022    Procedure: Left heart cath;  Surgeon: Jose Echols MD;  Location: [facility] CATH/EP LAB;  Service: Cardiology;  Laterality: Left;    TONSILLECTOMY, ADENOIDECTOMY  long time ago    TRANSCATHETER AORTIC VALVE REPLACEMENT (TAVR) Bilateral 1/4/2023    Procedure: REPLACEMENT, AORTIC VALVE, TRANSCATHETER (TAVR);  Surgeon: Bhavesh Peña MD;  Location: [facility] CATH;  Service: Cardiology;  Laterality: Bilateral;    TRANSCATHETER AORTIC VALVE REPLACEMENT (TAVR) Bilateral 1/4/2023    Procedure: REPLACEMENT, AORTIC VALVE, TRANSCATHETER (TAVR);  Surgeon: Dallin Verma MD;  Location: [facility] CATH;  Service: Cardiology;  Laterality: Bilateral;    TRANSTHORACIC ECHOCARDIOGRAPHY (TTE)  1/4/2023    Procedure: ECHOCARDIOGRAM, TRANSTHORACIC;  Surgeon: Bhavesh Peña MD;  Location: [facility] CATH;  Service: Cardiology;;       Allergies:  Review of patient's allergies indicates:   Allergen Reactions    Latex      Other reaction(s): Unknown  Other reaction(s): Unknown    Sulfacetamide sodium      Pain perineal area    Sulfasalazine Hives     Adhesive Itching     SKIN GETS RED WITH TAPE AND BANDAIDS    Adhesive tape-silicones Itching     SKIN GETS RED WITH TAPE AND BANDAIDS    Sulfa (sulfonamide antibiotics) Rash       Home Medications:  Prior to Admission medications    Medication Sig Start Date End Date Taking? Authorizing Provider   albuterol (PROVENTIL/VENTOLIN HFA) 90 mcg/actuation inhaler Inhale 2 puffs into the lungs every 6 (six) hours as needed. 4/15/22   Historical Provider   aspirin (ECOTRIN) 81 MG EC tablet Take 81 mg by mouth once daily.    Historical Provider   benazepriL (LOTENSIN) 20 MG tablet Take 0.5 tablets (10 mg total) by mouth once daily. 1/5/23 4/5/23  Aide Polanco NP   budesonide-formoterol 80-4.5 mcg (SYMBICORT) 80-4.5 mcg/actuation HFAA Inhale 2 puffs into the lungs 2 (two) times daily as needed (Asthma). 4/16/22   Historical Provider   calcitonin, salmon, (FORTICAL) 200 unit/actuation nasal spray 1 spray by Nasal route once daily. for 21 days 11/29/22 1/3/23  Bridgette Robles MD   calcium carbonate (OS-TK) 500 mg calcium (1,250 mg) tablet Take 1 tablet (500 mg total) by mouth once daily. 12/21/22 12/21/23  Murtaza Gudino MD   carvediloL (COREG) 3.125 MG tablet Take 1 tablet (3.125 mg total) by mouth 2 (two) times daily. 1/5/23 1/5/24  Aide Polanco NP   citalopram (CELEXA) 40 MG tablet Take 1 tablet (40 mg total) by mouth once daily. 2/4/21   Brown Macias MD   clopidogreL (PLAVIX) 75 mg tablet Take 1 tablet (75 mg total) by mouth once daily. 1/6/23 7/5/23  Aide Polanco NP   donepeziL (ARICEPT) 5 MG tablet Take 5 mg by mouth nightly. 4/15/22   Historical Provider   ergocalciferol (ERGOCALCIFEROL) 50,000 unit Cap TAKE 1 CAPSULE (50,000 UNITS TOTAL) EVERY 7 DAYS. 5/4/22   Brown Macias MD   ferrous gluconate 324 mg (37.5 mg iron) Tab tablet Take 324 mg by mouth once daily. 10/25/22   Historical Provider   furosemide (LASIX) 40 MG tablet Take 0.5 tablets (20 mg total) by mouth once daily. 1/5/23  24  Aide Polanco NP   glucosamine-chondroitin 500-400 mg tablet Take 1 tablet by mouth once daily.    Historical Provider   HYDROcodone-acetaminophen (NORCO) 5-325 mg per tablet Take 1 tablet by mouth every 8 (eight) hours as needed for Pain. 22   Bridgette Robles MD   loperamide (IMODIUM A-D) 2 mg Tab Take 1 tablet (2 mg total) by mouth 3 (three) times daily as needed (diarrhea). Take 2 tablets by mouth initially then 1 tablet after each loose stool as needed for diarrhea. 22   Bridgette Robles MD   loratadine (CLARITIN) 10 mg tablet Take 1 tablet (10 mg total) by mouth once daily. 22  Murtaza Gudino MD   multivitamin capsule Take 1 capsule by mouth once daily.    Historical Provider   omeprazole (PRILOSEC) 20 MG capsule Take 1 capsule (20 mg total) by mouth once daily. 21   Brown Macias MD   oxybutynin (DITROPAN) 5 MG Tab Take 1 tablet (5 mg total) by mouth 2 (two) times daily. 21   Brown Macias MD   simvastatin (ZOCOR) 40 MG tablet Take 1 tablet (40 mg total) by mouth every evening. 21   Brown Macias MD   ALLERGY RELIEF, FEXOFENADINE, 180 mg tablet Take 180 mg by mouth once daily. 22  Historical Provider   ezetimibe-simvastatin 10-40 mg (VYTORIN) 10-40 mg per tablet Take 1 tablet by mouth.  5/3/22  Historical Provider   lisinopril-hydrochlorothiazide (PRINZIDE,ZESTORETIC) 20-12.5 mg per tablet Take 1 tablet by mouth once daily.  6/10/16 5/3/22  Historical Provider       Family History:  Family History   Problem Relation Age of Onset    Collagen disease Neg Hx        Social History:  Social History     Tobacco Use    Smoking status: Former     Packs/day: 0.50     Years: 35.00     Pack years: 17.50     Types: Cigarettes     Quit date: 10/5/2022     Years since quittin.2    Smokeless tobacco: Never   Substance Use Topics    Alcohol use: No    Drug use: No       Review of Systems:  Review of Systems   Constitutional:  Negative for  chills, fatigue, fever and unexpected weight change.   HENT:  Negative for congestion, hearing loss, rhinorrhea and sinus pressure.    Eyes:  Negative for pain, redness and visual disturbance.   Respiratory:  Positive for shortness of breath (using home O2). Negative for cough and wheezing.    Cardiovascular:  Negative for chest pain, palpitations and leg swelling.   Gastrointestinal:  Negative for abdominal distention, abdominal pain, constipation, diarrhea, nausea and vomiting.   Endocrine: Negative for polydipsia, polyphagia and polyuria.   Genitourinary:  Negative for dysuria, frequency, urgency and vaginal discharge.   Musculoskeletal:  Negative for arthralgias, gait problem and myalgias.   Skin:  Negative for color change and rash.   Allergic/Immunologic: Negative for environmental allergies and immunocompromised state.   Neurological:  Negative for dizziness, weakness, light-headedness and headaches.   Hematological:  Negative for adenopathy. Does not bruise/bleed easily.   Psychiatric/Behavioral:  Positive for confusion (memopry changes). Negative for sleep disturbance. The patient is not nervous/anxious.      Health Maintainence:   Health Maintenance         Date Due Completion Date    Hepatitis C Screening Never done ---    Diabetes Urine Screening Never done ---    Pneumococcal Vaccines (Age 65+) (1 - PCV) Never done ---    Eye Exam Never done ---    TETANUS VACCINE Never done ---    DEXA Scan Never done ---    Shingles Vaccine (1 of 2) Never done ---    COVID-19 Vaccine (2 - Booster for Ignacia series) 05/07/2021 3/12/2021    Influenza Vaccine (1) 09/01/2022 9/30/2020    Lipid Panel 04/11/2023 4/11/2022    Hemoglobin A1c 07/04/2023 1/4/2023              PHYSICAL EXAM     LMP  (LMP Unknown)   There is no height or weight on file to calculate BMI.    Physical Exam  Constitutional:       General: She is not in acute distress.     Appearance: Normal appearance. She is obese. She is not ill-appearing,  toxic-appearing or diaphoretic.   HENT:      Head: Normocephalic.      Right Ear: Tympanic membrane and external ear normal.      Left Ear: Tympanic membrane and external ear normal.      Nose: Nose normal.      Mouth/Throat:      Mouth: Mucous membranes are moist.      Pharynx: No oropharyngeal exudate or posterior oropharyngeal erythema.   Eyes:      Pupils: Pupils are equal, round, and reactive to light.   Cardiovascular:      Rate and Rhythm: Normal rate.      Heart sounds: Murmur heard.   Pulmonary:      Effort: Pulmonary effort is normal. No respiratory distress.      Breath sounds: No wheezing.   Abdominal:      General: Abdomen is flat. Bowel sounds are normal. There is no distension.      Palpations: Abdomen is soft.      Tenderness: There is no abdominal tenderness. There is no right CVA tenderness or left CVA tenderness.   Musculoskeletal:      Right lower leg: No edema.      Left lower leg: No edema.   Skin:     General: Skin is warm and dry.      Capillary Refill: Capillary refill takes less than 2 seconds.   Neurological:      Mental Status: She is alert.         LABS     Lab Results   Component Value Date    HGBA1C 5.7 (H) 01/04/2023     CMP  Sodium   Date Value Ref Range Status   01/05/2023 134 (L) 136 - 145 mmol/L Final     Potassium   Date Value Ref Range Status   01/05/2023 3.0 (L) 3.5 - 5.1 mmol/L Final     Chloride   Date Value Ref Range Status   01/05/2023 94 (L) 95 - 110 mmol/L Final     CO2   Date Value Ref Range Status   01/05/2023 37 (H) 22 - 31 mmol/L Final     Glucose   Date Value Ref Range Status   01/05/2023 132 (H) 70 - 110 mg/dL Final     Comment:     The ADA recommends the following guidelines for fasting glucose:    Normal:       less than 100 mg/dL    Prediabetes:  100 mg/dL to 125 mg/dL    Diabetes:     126 mg/dL or higher       BUN   Date Value Ref Range Status   01/05/2023 11 7 - 18 mg/dL Final     Creatinine   Date Value Ref Range Status   01/05/2023 0.52 0.50 - 1.40 mg/dL  Final   08/21/2013 0.8 0.5 - 1.4 mg/dL Final     Calcium   Date Value Ref Range Status   01/05/2023 8.8 8.4 - 10.2 mg/dL Final   08/21/2013 10.3 8.7 - 10.5 mg/dL Final     Total Protein   Date Value Ref Range Status   01/05/2023 6.3 6.0 - 8.4 g/dL Final     Albumin   Date Value Ref Range Status   01/05/2023 3.7 3.5 - 5.2 g/dL Final     Total Bilirubin   Date Value Ref Range Status   01/05/2023 0.7 0.2 - 1.3 mg/dL Final     Alkaline Phosphatase   Date Value Ref Range Status   01/05/2023 67 38 - 145 U/L Final     AST   Date Value Ref Range Status   01/05/2023 32 14 - 36 U/L Final     ALT   Date Value Ref Range Status   01/05/2023 17 0 - 35 U/L Final     Anion Gap   Date Value Ref Range Status   01/05/2023 3 (L) 8 - 16 mmol/L Final   08/21/2013 11 5 - 15 meq/L Final     eGFR if    Date Value Ref Range Status   05/06/2022 >60.0 >60 mL/min/1.73 m^2 Final     eGFR if non    Date Value Ref Range Status   05/06/2022 >60.0 >60 mL/min/1.73 m^2 Final     Comment:     Calculation used to obtain the estimated glomerular filtration  rate (eGFR) is the CKD-EPI equation.        Lab Results   Component Value Date    WBC 7.76 01/05/2023    HGB 8.6 (L) 01/05/2023    HCT 29.2 (L) 01/05/2023    MCV 83 01/05/2023     01/05/2023     No results found for: CHOL  No results found for: HDL  No results found for: LDLCALC  No results found for: TRIG  No results found for: CHOLHDL  Lab Results   Component Value Date    TSH 1.13 11/17/2009       ASSESSMENT/PLAN     Daryleen G Moran is a 77 y.o. female    COPD, mild- will cont home O2 and Symbicort. Also continue albuterol inh as needed   -     Vitamin B1; Future; Expected date: 01/19/2023  -     Vitamin B12; Future; Expected date: 01/19/2023  -     Folate; Future; Expected date: 01/19/2023  -     TSH; Future; Expected date: 01/19/2023    Hypoxemia- stable. Resolved. Will cont Home O2   -     Vitamin B1; Future; Expected date: 01/19/2023  -     Vitamin B12;  Future; Expected date: 01/19/2023  -     Folate; Future; Expected date: 01/19/2023  -     TSH; Future; Expected date: 01/19/2023    On home oxygen therapy- stable. Will cont     Osteoporosis with current pathological fracture with routine healing, unspecified osteoporosis type, subsequent encounter- stable. Will monitor   -     Vitamin B1; Future; Expected date: 01/19/2023  -     Vitamin B12; Future; Expected date: 01/19/2023  -     Folate; Future; Expected date: 01/19/2023  -     TSH; Future; Expected date: 01/19/2023    Compression fracture of L2 vertebra with routine healing, subsequent encounter- stable. F/u with ortho   -     Vitamin B1; Future; Expected date: 01/19/2023  -     Vitamin B12; Future; Expected date: 01/19/2023  -     Folate; Future; Expected date: 01/19/2023  -     TSH; Future; Expected date: 01/19/2023    Compression fracture of T12 vertebra with routine healing, subsequent encounter- stbale. Will f/u with ortho   -     Vitamin B1; Future; Expected date: 01/19/2023  -     Vitamin B12; Future; Expected date: 01/19/2023  -     Folate; Future; Expected date: 01/19/2023  -     TSH; Future; Expected date: 01/19/2023    Depression, unspecified depression type- stable. Will cont celexa   -     Vitamin B1; Future; Expected date: 01/19/2023  -     Vitamin B12; Future; Expected date: 01/19/2023  -     Folate; Future; Expected date: 01/19/2023  -     TSH; Future; Expected date: 01/19/2023    Severe aortic valve stenosis- s/p TAVR will monitor and f/u with cardiology   -     Vitamin B1; Future; Expected date: 01/19/2023  -     Vitamin B12; Future; Expected date: 01/19/2023  -     Folate; Future; Expected date: 01/19/2023  -     TSH; Future; Expected date: 01/19/2023    Paroxysmal atrial fibrillation- rated controlled. Will cont current meds.   -     Vitamin B1; Future; Expected date: 01/19/2023  -     Vitamin B12; Future; Expected date: 01/19/2023  -     Folate; Future; Expected date: 01/19/2023  -     TSH;  Future; Expected date: 01/19/2023    Primary hypertension- near goal. Will cont current meds.   -     Vitamin B1; Future; Expected date: 01/19/2023  -     Vitamin B12; Future; Expected date: 01/19/2023  -     Folate; Future; Expected date: 01/19/2023  -     TSH; Future; Expected date: 01/19/2023    Mixed hyperlipidemia  -     Vitamin B1; Future; Expected date: 01/19/2023  -     Vitamin B12; Future; Expected date: 01/19/2023  -     Folate; Future; Expected date: 01/19/2023  -     TSH; Future; Expected date: 01/19/2023  -     Lipid Panel; Future; Expected date: 01/19/2023    Coronary artery disease, unspecified vessel or lesion type, unspecified whether angina present, unspecified whether native or transplanted heart  -     Vitamin B1; Future; Expected date: 01/19/2023  -     Vitamin B12; Future; Expected date: 01/19/2023  -     Folate; Future; Expected date: 01/19/2023  -     TSH; Future; Expected date: 01/19/2023    Acute on chronic diastolic CHF (congestive heart failure), NYHA class 3- euvolemic. Will cont current meds daily weight   -     Vitamin B1; Future; Expected date: 01/19/2023  -     Vitamin B12; Future; Expected date: 01/19/2023  -     Folate; Future; Expected date: 01/19/2023  -     TSH; Future; Expected date: 01/19/2023  -     B-TYPE NATRIURETIC PEPTIDE; Future; Expected date: 01/19/2023    Debility  -     Vitamin B1; Future; Expected date: 01/19/2023  -     Vitamin B12; Future; Expected date: 01/19/2023  -     Folate; Future; Expected date: 01/19/2023  -     TSH; Future; Expected date: 01/19/2023    Type 2 diabetes mellitus without complication, without long-term current use of insulin  -     Vitamin B1; Future; Expected date: 01/19/2023  -     Vitamin B12; Future; Expected date: 01/19/2023  -     Folate; Future; Expected date: 01/19/2023  -     TSH; Future; Expected date: 01/19/2023    BMI 40.0-44.9, adult  -     Vitamin B1; Future; Expected date: 01/19/2023  -     Vitamin B12; Future; Expected date:  01/19/2023  -     Folate; Future; Expected date: 01/19/2023  -     TSH; Future; Expected date: 01/19/2023    Dementia in other diseases classified elsewhere, unspecified severity, without behavioral disturbance, psychotic disturbance, mood disturbance, and anxiety- will send for Ct scan and memory workup   -     Ambulatory referral/consult to Neuropsychology; Future; Expected date: 01/26/2023  -     CBC Without Differential; Future; Expected date: 01/19/2023  -     Comprehensive Metabolic Panel; Future; Expected date: 01/19/2023  -     Vitamin B1; Future; Expected date: 01/19/2023  -     Vitamin B12; Future; Expected date: 01/19/2023  -     Folate; Future; Expected date: 01/19/2023  -     TSH; Future; Expected date: 01/19/2023  -     CT Head Without Contrast; Future; Expected date: 01/19/2023    Left adrenal mass  -     Vitamin B1; Future; Expected date: 01/19/2023  -     Vitamin B12; Future; Expected date: 01/19/2023  -     Folate; Future; Expected date: 01/19/2023  -     TSH; Future; Expected date: 01/19/2023    Other recurrent depressive disorders  -     Vitamin B1; Future; Expected date: 01/19/2023  -     Vitamin B12; Future; Expected date: 01/19/2023  -     Folate; Future; Expected date: 01/19/2023  -     TSH; Future; Expected date: 01/19/2023    Other amnesia  -     Vitamin B1; Future; Expected date: 01/19/2023  -     Vitamin B12; Future; Expected date: 01/19/2023  -     Folate; Future; Expected date: 01/19/2023  -     TSH; Future; Expected date: 01/19/2023  -     CT Head Without Contrast; Future; Expected date: 01/19/2023         Follow up with PCP     Post Discharge Follow-up Today:   Future Appointments:  Future Appointments   Date Time Provider Department Center   2/6/2023  1:00 PM [facility], ECHO EKG ROOM [facility] OP CARD [facility]   2/6/2023  1:45 PM Aide Polanco NP [facility] PROC [facility]   4/20/2023 11:00 AM Sahil Palm MD [facility]   1/8/2024  1:00 PM [facility], ECHO EKG ROOM [facility] OP CARD [facility]   1/8/2024  1:45 PM Aide Polanco NP [facility] PROC [facility] - [facility]            Evelin GARCIA, BRIDGET, FNP-c   Department of Internal Medicine - [facility] [address]  3:18 PM

## 2023-01-20 ENCOUNTER — TELEPHONE (OUTPATIENT)
Dept: INTERVENTIONAL RADIOLOGY/VASCULAR | Facility: CLINIC | Age: 78
End: 2023-01-20
Payer: MEDICARE

## 2023-01-20 NOTE — TELEPHONE ENCOUNTER
Call and spoke to patient to schedule a NeuroIR follow up clinic visit. Patient s/p vertebroplasty of T12 11/28/22.  Patient refused to do a follow up, stated she's doing fine. Message forward to provider.

## 2023-01-23 ENCOUNTER — DOCUMENT SCAN (OUTPATIENT)
Dept: HOME HEALTH SERVICES | Facility: HOSPITAL | Age: 78
End: 2023-01-23
Payer: MEDICARE

## 2023-01-24 PROBLEM — Z99.81 ON HOME OXYGEN THERAPY: Status: ACTIVE | Noted: 2023-01-24

## 2023-02-07 NOTE — ED PROVIDER NOTES
Encounter Date: 11/21/2022       History     Chief Complaint   Patient presents with    Diarrhea     X2wks, secondary to eating pork. Last episode this AM.      HPI    This is a 77-year-old woman with a history of hypertension, hyperlipidemia, diabetes, NSTEMI and aortic stenosis who presents from home with report of acute onset low back pain as well as diarrhea for the past 3 days.  She has had a complicated recent past medical history, was diagnosed with an L2 compression fracture 2 weeks ago, was admitted to an outside hospital, had a rehab stay, was readmitted at an outside hospital for hyponatremia, GI bleed, and for the past 5 days has been at her daughter's house.  Due to her low back pain, the diarrhea has been particularly difficult to tolerate, as it is exceptionally painful to get up and go to the commode.  Her daughter denies seeing any obvious blood.  She is not on any pain medication.  She is taken 1 ibuprofen, and it has not been helpful.  Review of patient's allergies indicates:   Allergen Reactions    Sulfacetamide sodium      Pain perineal area    Sulfasalazine Hives    Adhesive Itching     SKIN GETS RED WITH TAPE AND BANDAIDS    Adhesive tape-silicones Itching     SKIN GETS RED WITH TAPE AND BANDAIDS    Sulfa (sulfonamide antibiotics) Rash     Past Medical History:   Diagnosis Date    Aortic stenosis     Arthritis     Back pain     Diabetes mellitus type II     Hyperlipidemia     Hypertension     NSTEMI (non-ST elevated myocardial infarction) 5/1/2022    Osteoporosis     Wears glasses      Past Surgical History:   Procedure Laterality Date     vocal cord nodules removed  long time ago     twice    APPENDECTOMY  within last 5yrs    CATHETERIZATION OF BOTH LEFT AND RIGHT HEART Left 5/6/2022    Procedure: CATHETERIZATION, HEART, BOTH LEFT AND RIGHT;  Surgeon: Michelet Vargas MD;  Location: [facility] CATH/EP LAB;  Service: Cardiology;  Laterality: Left;    FIXATION KYPHOPLASTY THORACIC SPINE      8-20-13     FOOT SURGERY      left 2nd toe was too long     HERNIA REPAIR  within last 5yrs    LEFT HEART CATHETERIZATION Left 2022    Procedure: Left heart cath;  Surgeon: Jose Echols MD;  Location: [facility] CATH/EP LAB;  Service: Cardiology;  Laterality: Left;    TONSILLECTOMY, ADENOIDECTOMY  long time ago     Family History   Problem Relation Age of Onset    Collagen disease Neg Hx      Social History     Tobacco Use    Smoking status: Former     Packs/day: 0.50     Years: 35.00     Pack years: 17.50     Types: Cigarettes     Quit date: 10/5/2022     Years since quittin.1    Smokeless tobacco: Never   Substance Use Topics    Alcohol use: No    Drug use: No     Review of Systems   Constitutional:  Negative for chills, diaphoresis, fatigue and fever.   HENT:  Negative for congestion, rhinorrhea and sore throat.    Eyes:  Negative for visual disturbance.   Respiratory:  Negative for cough, chest tightness and shortness of breath.    Cardiovascular:  Negative for chest pain.   Gastrointestinal:  Positive for blood in stool and diarrhea. Negative for abdominal pain, constipation and vomiting.   Genitourinary:  Negative for dysuria, hematuria and urgency.   Musculoskeletal:  Positive for back pain.   Skin:  Negative for rash.   Neurological:  Negative for seizures and syncope.   Hematological:  Does not bruise/bleed easily.   Psychiatric/Behavioral:  Negative for agitation and hallucinations.      Physical Exam     Initial Vitals [22 0854]   BP Pulse Resp Temp SpO2   112/60 88 18 98.7 °F (37.1 °C) 98 %      MAP       --         Physical Exam  Gen: AxOx3, lying in the right lateral decubitus position, well nourished, appears stated age  Eye: EOMI, no scleral icterus, no periorbital edema or ecchymosis  Head: normocephalic, atraumatic, no lesions, scalp appears normal  ENT: neck supple, no stridor, no masses, no drooling or voice changes  CVS: RRR, no m/r/g, distal pulses intact/symmetric  Pulm: CTAB, no wheezes,  rales or rhonchi, no increased work of breathing  Abd: soft, tenderness to palpation in the left lower quadrant and left flank., nondistended, no organomegaly, no CVAT  Ext:  Back is tender in the midline of the lumbar spine, as well as over the left posterior superior iliac crest, no edema, no lesions, rashes, or deformity  Neuro: GCS15, moving all extremities, gait intact, face grossly symmetric  Psych: normal affect, cooperative, well groomed, makes good eye contact  ED Course   Procedures  Labs Reviewed   CBC W/ AUTO DIFFERENTIAL - Abnormal; Notable for the following components:       Result Value    Hemoglobin 10.9 (*)     Hematocrit 36.7 (*)     MCH 25.8 (*)     MCHC 29.7 (*)     RDW 15.1 (*)     Lymph % 13.8 (*)     All other components within normal limits   URINALYSIS, REFLEX TO URINE CULTURE - Abnormal; Notable for the following components:    Appearance, UA Hazy (*)     Leukocytes, UA 3+ (*)     All other components within normal limits    Narrative:     Specimen Source->Urine   URINALYSIS MICROSCOPIC - Abnormal; Notable for the following components:    WBC, UA 13 (*)     Non-Squam Epith 1 (*)     Hyaline Casts, UA 12 (*)     All other components within normal limits    Narrative:     Specimen Source->Urine   COMPREHENSIVE METABOLIC PANEL - Abnormal; Notable for the following components:    Sodium 131 (*)     Potassium 3.1 (*)     Chloride 93 (*)     Glucose 122 (*)     Calcium 8.5 (*)     Total Protein 5.3 (*)     Albumin 2.7 (*)     All other components within normal limits   ISTAT PROCEDURE - Abnormal; Notable for the following components:    POC Glucose 125 (*)     POC Sodium 132 (*)     POC Potassium 3.0 (*)     POC Chloride 90 (*)     POC TCO2 (MEASURED) 31 (*)     POC Ionized Calcium 1.00 (*)     POC Hematocrit 31 (*)     All other components within normal limits   CULTURE, URINE   LIPASE   ISTAT CHEM8          Imaging Results               CT Abdomen Pelvis With Contrast (Final result)  Result  time 11/21/22 11:50:53      Final result by Bharat Baer MD (11/21/22 11:50:53)                   Impression:      No acute intra-abdominal abnormality.    Anterior abdominal wall hernia containing loop of transverse colon stable from prior.  No bowel obstruction.    T12 compression fracture which is new when compared to prior exam.  Recommend correlation for focal tenderness in this location.  Further assessment could be obtained with MRI if clinically warranted.    Small remote vertebral body compression fractures some of which have under undergone prior vertebroplasty.    This report was flagged in Epic as abnormal.    Electronically signed by resident: Avis Robb  Date:    11/21/2022  Time:    10:38    Electronically signed by: Bharat Baer MD  Date:    11/21/2022  Time:    11:50               Narrative:    EXAMINATION:  CT ABDOMEN PELVIS WITH CONTRAST    CLINICAL HISTORY:  Flank pain, kidney stone suspected;LLQ abdominal pain;    TECHNIQUE:  Axial images of the abdomen and pelvis were acquired  after the use of 100 cc [id number] IV contrast.  Coronal and sagittal reconstructions were also obtained.    COMPARISON:  CT abdomen pelvis 05/31/2021    FINDINGS:  Heart: Normal in size. No pericardial effusion.  Calcific coronary atherosclerosis.  Aortic calcifications.    Lungs: Right lower lobe subsegmental atelectasis.    Liver: Normal in size and contour.  Diffuse low-attenuation of the liver suggests hepatic steatosis.  No focal hepatic lesion.    Gallbladder: No calcified gallstones.    Bile Ducts: No evidence of dilated ducts.    Pancreas: No mass or peripancreatic fat stranding.    Spleen: Unremarkable.    Stomach and duodenum: Unremarkable.    Adrenals: Unremarkable.    Kidneys/ Ureters: Normal in size and location. Normal enhancement.  There is a 1.2 cm hypodense lesion in the right midpole consistent with simple cyst.  There are additional bilateral subcentimeter hypodensities, too small to  characterize.  No hydronephrosis or nephrolithiasis. No ureteral dilatation.    Bladder: Nondistended.    Reproductive organs: Unremarkable.    Bowel/Mesentery: Small bowel is normal in caliber with no evidence of obstruction. No evidence of inflammation or wall thickening.  Colon demonstrates no focal wall thickening.  Appendix is surgically absent.    Peritoneum: No intraperitoneal free air or fluid.    Lymph nodes: No retroperitoneal lymphadenopathy.    Vasculature: Extensive calcific atherosclerosis.  Small saccular aneurysm arising from the left lateral aspect of the infrarenal aorta, unchanged from prior.    Abdominal wall:  Left-sided abdominal wall hernia containing transverse colon without evidence of obstruction.    Bones: There is multilevel degenerative change.  There are multiple remote compression fracture with lower thoracic question fractures having undergone prior vertebroplasty.  Stable remote L2 compression fracture.  There is a T12 compression fracture with mild height loss which is new when compared to prior.                                       Medications   LIDOcaine 5 % patch 1 patch (1 patch Transdermal Patch Applied 11/21/22 1221)   morphine injection 4 mg (4 mg Intravenous Given 11/21/22 0932)   ondansetron injection 4 mg (4 mg Intravenous Given 11/21/22 0932)   iohexoL (OMNIPAQUE 350) injection 100 mL (100 mLs Intravenous Given 11/21/22 1033)   sodium chloride 0.9% bolus 1,000 mL (0 mLs Intravenous Stopped 11/21/22 1321)   oxyCODONE-acetaminophen 5-325 mg per tablet 1 tablet (1 tablet Oral Given 11/21/22 1222)     Medical Decision Making:   History:   I obtained history from: someone other than patient.       <> Summary of History: Daughter at bedside  Old Medical Records: I decided to obtain old medical records.  Old Records Summarized: records from clinic visits.  Initial Assessment:   This is a 77-year-old woman with a recent complex past medical history including ICU stay for  hyponatremia, L2 compression fracture and potentially GI bleed who presents with diarrhea and acute onset back pain.  I think the back pain is likely due to her known compression fracture, differential for the diarrhea is broad and includes colitis given her left lower quadrant pain, C diff given her recent hospitalization, versus medication side effect.  Plan for labs, CT scan of the abdomen and pelvis to further evaluate.  Clinical Tests:   Lab Tests: Ordered and Reviewed  Radiological Study: Ordered and Reviewed  ED Management:  Labs are reassuring, we who electrolyte abnormality or acute kidney injury head CT scan by my independent interpretation is negative for acute colitis but does show a T12 compression fracture.  I think this is likely the fracture that the patient's family reports was diagnosed recently.  She does not have signs or symptoms of cauda equina on exam.  She is still having significant pain is unable to ambulate, so I think she would benefit from brace and PT evaluation in the hospital.  Patient is agreeable to plan was placed in observation for further care.                        Clinical Impression:   Final diagnoses:  [S22.000A] Compression fracture of body of thoracic vertebra      ED Disposition Condition    Observation Stable                Leti Dixon MD  11/21/22 8388     [employer]

## 2023-02-24 ENCOUNTER — HOSPITAL ENCOUNTER (OUTPATIENT)
Facility: HOSPITAL | Age: 78
Discharge: HOME | End: 2023-02-25
Attending: EMERGENCY MEDICINE | Admitting: STUDENT IN AN ORGANIZED HEALTH CARE EDUCATION/TRAINING PROGRAM
Payer: MEDICARE

## 2023-02-24 DIAGNOSIS — R51.9 INTRACTABLE HEADACHE, UNSPECIFIED CHRONICITY PATTERN, UNSPECIFIED HEADACHE TYPE: ICD-10-CM

## 2023-02-24 DIAGNOSIS — R47.01 APHASIA: Primary | ICD-10-CM

## 2023-02-24 DIAGNOSIS — I63.9 STROKE: ICD-10-CM

## 2023-02-24 LAB
ALBUMIN SERPL BCP-MCNC: 3.7 G/DL (ref 3.5–5.2)
ALP SERPL-CCNC: 67 U/L (ref 55–135)
ALT SERPL W/O P-5'-P-CCNC: 23 U/L (ref 10–44)
ANION GAP SERPL CALC-SCNC: 11 MMOL/L (ref 8–16)
AST SERPL-CCNC: 27 U/L (ref 10–40)
BASOPHILS # BLD AUTO: 0.04 K/UL (ref 0–0.2)
BASOPHILS NFR BLD: 0.5 % (ref 0–1.9)
BILIRUB SERPL-MCNC: 0.7 MG/DL (ref 0.1–1)
BNP SERPL-MCNC: 165 PG/ML (ref 0–99)
BUN SERPL-MCNC: 13 MG/DL (ref 8–23)
CALCIUM SERPL-MCNC: 9.3 MG/DL (ref 8.7–10.5)
CHLORIDE SERPL-SCNC: 101 MMOL/L (ref 95–110)
CHOLEST SERPL-MCNC: 152 MG/DL (ref 120–199)
CHOLEST/HDLC SERPL: 2.2 {RATIO} (ref 2–5)
CO2 SERPL-SCNC: 26 MMOL/L (ref 23–29)
CREAT SERPL-MCNC: 0.6 MG/DL (ref 0.5–1.4)
CREAT SERPL-MCNC: 0.6 MG/DL (ref 0.5–1.4)
DIFFERENTIAL METHOD: ABNORMAL
EOSINOPHIL # BLD AUTO: 0.1 K/UL (ref 0–0.5)
EOSINOPHIL NFR BLD: 1.4 % (ref 0–8)
ERYTHROCYTE [DISTWIDTH] IN BLOOD BY AUTOMATED COUNT: 19.9 % (ref 11.5–14.5)
EST. GFR  (NO RACE VARIABLE): >60 ML/MIN/1.73 M^2
GLUCOSE SERPL-MCNC: 142 MG/DL (ref 70–110)
GLUCOSE SERPL-MCNC: 150 MG/DL (ref 70–110)
HCT VFR BLD AUTO: 34.3 % (ref 37–48.5)
HDLC SERPL-MCNC: 70 MG/DL (ref 40–75)
HDLC SERPL: 46.1 % (ref 20–50)
HGB BLD-MCNC: 9.6 G/DL (ref 12–16)
IMM GRANULOCYTES # BLD AUTO: 0.02 K/UL (ref 0–0.04)
IMM GRANULOCYTES NFR BLD AUTO: 0.3 % (ref 0–0.5)
INR PPP: 1.1 (ref 0.8–1.2)
LDLC SERPL CALC-MCNC: 61.6 MG/DL (ref 63–159)
LYMPHOCYTES # BLD AUTO: 1.3 K/UL (ref 1–4.8)
LYMPHOCYTES NFR BLD: 16.9 % (ref 18–48)
MCH RBC QN AUTO: 23.5 PG (ref 27–31)
MCHC RBC AUTO-ENTMCNC: 28 G/DL (ref 32–36)
MCV RBC AUTO: 84 FL (ref 82–98)
MONOCYTES # BLD AUTO: 0.5 K/UL (ref 0.3–1)
MONOCYTES NFR BLD: 6.5 % (ref 4–15)
NEUTROPHILS # BLD AUTO: 5.7 K/UL (ref 1.8–7.7)
NEUTROPHILS NFR BLD: 74.4 % (ref 38–73)
NONHDLC SERPL-MCNC: 82 MG/DL
NRBC BLD-RTO: 0 /100 WBC
PLATELET # BLD AUTO: 221 K/UL (ref 150–450)
PMV BLD AUTO: 11.3 FL (ref 9.2–12.9)
POTASSIUM SERPL-SCNC: 3.4 MMOL/L (ref 3.5–5.1)
PROT SERPL-MCNC: 6.8 G/DL (ref 6–8.4)
PROTHROMBIN TIME: 11.5 SEC (ref 9–12.5)
RBC # BLD AUTO: 4.08 M/UL (ref 4–5.4)
SAMPLE: NORMAL
SODIUM SERPL-SCNC: 138 MMOL/L (ref 136–145)
TRIGL SERPL-MCNC: 102 MG/DL (ref 30–150)
TROPONIN I SERPL HS-MCNC: 51.3 PG/ML (ref 0–14.9)
TROPONIN I SERPL HS-MCNC: 53.6 PG/ML (ref 0–14.9)
TSH SERPL DL<=0.005 MIU/L-ACNC: 1.63 UIU/ML (ref 0.34–5.6)
WBC # BLD AUTO: 7.59 K/UL (ref 3.9–12.7)

## 2023-02-24 PROCEDURE — 63600175 PHARM REV CODE 636 W HCPCS: Performed by: EMERGENCY MEDICINE

## 2023-02-24 PROCEDURE — 84484 ASSAY OF TROPONIN QUANT: CPT | Mod: 91 | Performed by: STUDENT IN AN ORGANIZED HEALTH CARE EDUCATION/TRAINING PROGRAM

## 2023-02-24 PROCEDURE — 96375 TX/PRO/DX INJ NEW DRUG ADDON: CPT

## 2023-02-24 PROCEDURE — 85610 PROTHROMBIN TIME: CPT | Performed by: EMERGENCY MEDICINE

## 2023-02-24 PROCEDURE — 85025 COMPLETE CBC W/AUTO DIFF WBC: CPT | Performed by: EMERGENCY MEDICINE

## 2023-02-24 PROCEDURE — 93010 EKG 12-LEAD: ICD-10-PCS | Mod: ,,, | Performed by: SPECIALIST

## 2023-02-24 PROCEDURE — 80307 DRUG TEST PRSMV CHEM ANLYZR: CPT | Performed by: STUDENT IN AN ORGANIZED HEALTH CARE EDUCATION/TRAINING PROGRAM

## 2023-02-24 PROCEDURE — 84484 ASSAY OF TROPONIN QUANT: CPT | Performed by: EMERGENCY MEDICINE

## 2023-02-24 PROCEDURE — 84443 ASSAY THYROID STIM HORMONE: CPT | Performed by: EMERGENCY MEDICINE

## 2023-02-24 PROCEDURE — 99285 EMERGENCY DEPT VISIT HI MDM: CPT | Mod: 25

## 2023-02-24 PROCEDURE — 25500020 PHARM REV CODE 255: Performed by: STUDENT IN AN ORGANIZED HEALTH CARE EDUCATION/TRAINING PROGRAM

## 2023-02-24 PROCEDURE — 82962 GLUCOSE BLOOD TEST: CPT

## 2023-02-24 PROCEDURE — 83880 ASSAY OF NATRIURETIC PEPTIDE: CPT | Performed by: EMERGENCY MEDICINE

## 2023-02-24 PROCEDURE — 93005 ELECTROCARDIOGRAM TRACING: CPT | Performed by: SPECIALIST

## 2023-02-24 PROCEDURE — 93010 ELECTROCARDIOGRAM REPORT: CPT | Mod: ,,, | Performed by: SPECIALIST

## 2023-02-24 PROCEDURE — 63600175 PHARM REV CODE 636 W HCPCS: Performed by: STUDENT IN AN ORGANIZED HEALTH CARE EDUCATION/TRAINING PROGRAM

## 2023-02-24 PROCEDURE — 96374 THER/PROPH/DIAG INJ IV PUSH: CPT | Mod: 59

## 2023-02-24 PROCEDURE — 80061 LIPID PANEL: CPT | Performed by: EMERGENCY MEDICINE

## 2023-02-24 PROCEDURE — 80053 COMPREHEN METABOLIC PANEL: CPT | Performed by: EMERGENCY MEDICINE

## 2023-02-24 RX ORDER — METFORMIN HYDROCHLORIDE 500 MG/1
500 TABLET ORAL
COMMUNITY
Start: 2023-01-18 | End: 2023-09-08

## 2023-02-24 RX ORDER — KETOROLAC TROMETHAMINE 30 MG/ML
10 INJECTION, SOLUTION INTRAMUSCULAR; INTRAVENOUS ONCE
Status: COMPLETED | OUTPATIENT
Start: 2023-02-24 | End: 2023-02-24

## 2023-02-24 RX ORDER — DROPERIDOL 2.5 MG/ML
0.62 INJECTION, SOLUTION INTRAMUSCULAR; INTRAVENOUS ONCE
Status: COMPLETED | OUTPATIENT
Start: 2023-02-24 | End: 2023-02-24

## 2023-02-24 RX ORDER — MORPHINE SULFATE 2 MG/ML
2 INJECTION, SOLUTION INTRAMUSCULAR; INTRAVENOUS
Status: COMPLETED | OUTPATIENT
Start: 2023-02-24 | End: 2023-02-24

## 2023-02-24 RX ADMIN — DROPERIDOL 0.62 MG: 2.5 INJECTION, SOLUTION INTRAMUSCULAR; INTRAVENOUS at 10:02

## 2023-02-24 RX ADMIN — KETOROLAC TROMETHAMINE 10 MG: 30 INJECTION, SOLUTION INTRAMUSCULAR at 10:02

## 2023-02-24 RX ADMIN — IOHEXOL 100 ML: 350 INJECTION, SOLUTION INTRAVENOUS at 08:02

## 2023-02-24 RX ADMIN — MORPHINE SULFATE 2 MG: 2 INJECTION, SOLUTION INTRAMUSCULAR; INTRAVENOUS at 09:02

## 2023-02-25 VITALS
RESPIRATION RATE: 18 BRPM | HEIGHT: 60 IN | HEART RATE: 93 BPM | BODY MASS INDEX: 36.36 KG/M2 | TEMPERATURE: 98 F | WEIGHT: 185.19 LBS | SYSTOLIC BLOOD PRESSURE: 144 MMHG | DIASTOLIC BLOOD PRESSURE: 65 MMHG | OXYGEN SATURATION: 94 %

## 2023-02-25 PROBLEM — R47.01 APHASIA: Status: RESOLVED | Noted: 2023-02-25 | Resolved: 2023-02-25

## 2023-02-25 PROBLEM — R47.01 APHASIA: Status: ACTIVE | Noted: 2023-02-25

## 2023-02-25 PROCEDURE — G0378 HOSPITAL OBSERVATION PER HR: HCPCS

## 2023-02-25 RX ORDER — ASPIRIN 81 MG/1
81 TABLET ORAL DAILY
Status: CANCELLED | OUTPATIENT
Start: 2023-02-25

## 2023-02-25 RX ORDER — OXYBUTYNIN CHLORIDE 5 MG/1
5 TABLET ORAL 2 TIMES DAILY
Status: CANCELLED | OUTPATIENT
Start: 2023-02-25

## 2023-02-25 RX ORDER — FUROSEMIDE 20 MG/1
20 TABLET ORAL DAILY
Status: CANCELLED | OUTPATIENT
Start: 2023-02-25

## 2023-02-25 RX ORDER — INSULIN ASPART 100 [IU]/ML
0-5 INJECTION, SOLUTION INTRAVENOUS; SUBCUTANEOUS
Status: CANCELLED | OUTPATIENT
Start: 2023-02-25

## 2023-02-25 RX ORDER — LANOLIN ALCOHOL/MO/W.PET/CERES
1 CREAM (GRAM) TOPICAL DAILY
Status: CANCELLED | OUTPATIENT
Start: 2023-02-25

## 2023-02-25 RX ORDER — CAPTOPRIL 25 MG/1
50 TABLET ORAL 2 TIMES DAILY
Status: CANCELLED | OUTPATIENT
Start: 2023-02-25

## 2023-02-25 RX ORDER — ATORVASTATIN CALCIUM 20 MG/1
20 TABLET, FILM COATED ORAL DAILY
Status: CANCELLED | OUTPATIENT
Start: 2023-02-25

## 2023-02-25 RX ORDER — CARVEDILOL 3.12 MG/1
3.12 TABLET ORAL 2 TIMES DAILY
Status: CANCELLED | OUTPATIENT
Start: 2023-02-25

## 2023-02-25 RX ORDER — ALBUTEROL SULFATE 90 UG/1
2 AEROSOL, METERED RESPIRATORY (INHALATION) EVERY 6 HOURS PRN
Status: CANCELLED | OUTPATIENT
Start: 2023-02-25

## 2023-02-25 RX ORDER — LABETALOL HYDROCHLORIDE 5 MG/ML
10 INJECTION, SOLUTION INTRAVENOUS EVERY 6 HOURS PRN
Status: CANCELLED | OUTPATIENT
Start: 2023-02-25

## 2023-02-25 RX ORDER — HYDRALAZINE HYDROCHLORIDE 20 MG/ML
10 INJECTION INTRAMUSCULAR; INTRAVENOUS EVERY 6 HOURS PRN
Status: CANCELLED | OUTPATIENT
Start: 2023-02-25

## 2023-02-25 RX ORDER — ENOXAPARIN SODIUM 100 MG/ML
40 INJECTION SUBCUTANEOUS EVERY 24 HOURS
Status: CANCELLED | OUTPATIENT
Start: 2023-02-25

## 2023-02-25 RX ORDER — IBUPROFEN 200 MG
16 TABLET ORAL
Status: CANCELLED | OUTPATIENT
Start: 2023-02-25

## 2023-02-25 RX ORDER — PANTOPRAZOLE SODIUM 40 MG/1
40 TABLET, DELAYED RELEASE ORAL DAILY
Status: CANCELLED | OUTPATIENT
Start: 2023-02-25

## 2023-02-25 RX ORDER — ACETAMINOPHEN 325 MG/1
650 TABLET ORAL EVERY 8 HOURS PRN
Status: CANCELLED | OUTPATIENT
Start: 2023-02-25

## 2023-02-25 RX ORDER — SODIUM CHLORIDE 0.9 % (FLUSH) 0.9 %
3 SYRINGE (ML) INJECTION EVERY 6 HOURS PRN
Status: CANCELLED | OUTPATIENT
Start: 2023-02-25

## 2023-02-25 RX ORDER — CITALOPRAM 20 MG/1
40 TABLET, FILM COATED ORAL DAILY
Status: CANCELLED | OUTPATIENT
Start: 2023-02-25

## 2023-02-25 RX ORDER — CLOPIDOGREL BISULFATE 75 MG/1
75 TABLET ORAL DAILY
Status: CANCELLED | OUTPATIENT
Start: 2023-02-25

## 2023-02-25 RX ORDER — IBUPROFEN 200 MG
24 TABLET ORAL
Status: CANCELLED | OUTPATIENT
Start: 2023-02-25

## 2023-02-25 RX ORDER — GLUCAGON 1 MG
1 KIT INJECTION
Status: CANCELLED | OUTPATIENT
Start: 2023-02-25

## 2023-02-25 RX ORDER — TALC
6 POWDER (GRAM) TOPICAL NIGHTLY PRN
Status: CANCELLED | OUTPATIENT
Start: 2023-02-25

## 2023-02-25 RX ORDER — DONEPEZIL HYDROCHLORIDE 5 MG/1
5 TABLET, FILM COATED ORAL NIGHTLY
Status: CANCELLED | OUTPATIENT
Start: 2023-02-25

## 2023-02-25 NOTE — DISCHARGE SUMMARY
"[facility] - Emergency Dept  Hospital Medicine  Discharge Summary      Patient Name: Daryleen G Moran  MRN: [id number]  LUIZ: [id number]  Patient Class: OP- Observation  Admission Date: 2/24/2023  Hospital Length of Stay: 0 days  Discharge Date and Time:  02/25/2023 1:12 PM  Attending Physician: No att. providers found   Discharging Provider: Rubens Schrader MD  Primary Care Provider: Evelin Dominguez NP    Primary Care Team: Networked reference to record PCT     HPI:   78 yo F with PMH including aortic stenosis, recent TAVR, arthritis, DM, HTN who presents for aphasia. Patient was in the ED waiting with her  after his fall when she began to develop headache, neck pain, lightheadedness, and "could not speak." States it lasted for approximately an hour and half prior to resolving. She still as slight neck pain, headache, and has not stood up since but her lightheadedness has improved. She states she does not believe she is hypertensive or diabetic any longer. Denies any prior history of TIA/Stroke. Describes having been hospitalized lately for her TAVR/ heart and COPD but has been doing okay. In the ED, BP is documented with  - 164 and her CT/ CTA did not have acute findings, and vascular neurology discussed treating pain. ED physician requested hospitalist admission to complete a TIA/stroke work-up.        * No surgery found *      Hospital Course:   Pt got admitted with suspected TIA which was short lives  CT Brain / CTA head and neck done r/o any acute abnormalities  Pt was evaluated by Tele Stroke MD  Pts condition came back to baseline and she is already on aspirin/plavix at home  Pt expressed her desire to go home and demanded outpatient neurology evaluation only  Pt was discharged back to home        Goals of Care Treatment Preferences:  Code Status: Full Code      Consults:   Consults (From admission, onward)        Status Ordering Provider     Inpatient consult to Hospitalist  Once "        Provider:  Blank Guthrie MD    Acknowledged LIV ELIZABETH     Consult to Telemedicine - Acute Stroke  Once        Provider:  (Not yet assigned)    Acknowledged LIV ELIZABETH          No notes have been filed under this hospital service.  Service: Hospital Medicine    Final Active Diagnoses:      Problems Resolved During this Admission:    Diagnosis Date Noted Date Resolved POA    PRINCIPAL PROBLEM:  Aphasia [R47.01] 02/25/2023 02/25/2023 Yes       Discharged Condition: good    Disposition: Home or Self Care    Follow Up:    Patient Instructions:   No discharge procedures on file.    Significant Diagnostic Studies: Labs:   CMP   Recent Labs   Lab 02/24/23 2017      K 3.4*      CO2 26   *   BUN 13   CREATININE 0.6   CALCIUM 9.3   PROT 6.8   ALBUMIN 3.7   BILITOT 0.7   ALKPHOS 67   AST 27   ALT 23   ANIONGAP 11    and CBC   Recent Labs   Lab 02/24/23 2017   WBC 7.59   HGB 9.6*   HCT 34.3*          Pending Diagnostic Studies:     Procedure Component Value Units Date/Time    Drugs of Abuse Screen, Blood [[id number]] Collected: 02/24/23 2116    Order Status: Sent Lab Status: In process Updated: 02/24/23 2130    Specimen: Blood          Medications:  Reconciled Home Medications:      Medication List      CHANGE how you take these medications    ergocalciferol 50,000 unit Cap  Commonly known as: ERGOCALCIFEROL  TAKE 1 CAPSULE (50,000 UNITS TOTAL) EVERY 7 DAYS.  What changed: See the new instructions.        CONTINUE taking these medications    albuterol 90 mcg/actuation inhaler  Commonly known as: PROVENTIL/VENTOLIN HFA  Inhale 2 puffs into the lungs every 6 (six) hours as needed.     aspirin 81 MG EC tablet  Commonly known as: ECOTRIN  Take 81 mg by mouth once daily.     benazepriL 20 MG tablet  Commonly known as: LOTENSIN  Take 0.5 tablets (10 mg total) by mouth once daily.     budesonide-formoterol 80-4.5 mcg 80-4.5 mcg/actuation Hfaa  Commonly known as: SYMBICORT  Inhale  2 puffs into the lungs 2 (two) times daily as needed (Asthma).     calcium carbonate 500 mg calcium (1,250 mg) tablet  Commonly known as: OS-TK  Take 1 tablet (500 mg total) by mouth once daily.     carvediloL 3.125 MG tablet  Commonly known as: COREG  Take 1 tablet (3.125 mg total) by mouth 2 (two) times daily.     citalopram 40 MG tablet  Commonly known as: CeleXA  Take 1 tablet (40 mg total) by mouth once daily.     clopidogreL 75 mg tablet  Commonly known as: PLAVIX  Take 1 tablet (75 mg total) by mouth once daily.     donepeziL 5 MG tablet  Commonly known as: ARICEPT  Take 5 mg by mouth nightly.     ferrous gluconate 324 mg (37.5 mg iron) Tab tablet  Take 324 mg by mouth once daily.     furosemide 40 MG tablet  Commonly known as: LASIX  Take 0.5 tablets (20 mg total) by mouth once daily.     glucosamine-chondroitin 500-400 mg tablet  Take 1 tablet by mouth once daily.     loperamide 2 mg Tab  Commonly known as: IMODIUM A-D  Take 1 tablet (2 mg total) by mouth 3 (three) times daily as needed (diarrhea). Take 2 tablets by mouth initially then 1 tablet after each loose stool as needed for diarrhea.     loratadine 10 mg tablet  Commonly known as: CLARITIN  Take 1 tablet (10 mg total) by mouth once daily.     metFORMIN 500 MG tablet  Commonly known as: GLUCOPHAGE  Take 500 mg by mouth daily with breakfast.     multivitamin capsule  Take 1 capsule by mouth once daily.     omeprazole 20 MG capsule  Commonly known as: PRILOSEC  Take 1 capsule (20 mg total) by mouth once daily.     oxybutynin 5 MG Tab  Commonly known as: DITROPAN  Take 1 tablet (5 mg total) by mouth 2 (two) times daily.     simvastatin 40 MG tablet  Commonly known as: ZOCOR  Take 1 tablet (40 mg total) by mouth every evening.        STOP taking these medications    HYDROcodone-acetaminophen 5-325 mg per tablet  Commonly known as: NORCO            Indwelling Lines/Drains at time of discharge:   Lines/Drains/Airways     None               Physical Exam    Constitutional: She is oriented to person, place, and time.   Cardiovascular: Normal rate.   Neurological: She is alert and oriented to person, place, and time.     Time spent on the discharge of patient: 45 minutes         Rubens Schrader MD  Department of Hospital Medicine  [facility] - Emergency Dept

## 2023-02-25 NOTE — HPI
"78 yo F with PMH including aortic stenosis, recent TAVR, arthritis, DM, HTN who presents for aphasia. Patient was in the ED waiting with her  after his fall when she began to develop headache, neck pain, lightheadedness, and "could not speak." States it lasted for approximately an hour and half prior to resolving. She still as slight neck pain, headache, and has not stood up since but her lightheadedness has improved. She states she does not believe she is hypertensive or diabetic any longer. Denies any prior history of TIA/Stroke. Describes having been hospitalized lately for her TAVR/ heart and COPD but has been doing okay. In the ED, BP is documented with  - 164 and her CT/ CTA did not have acute findings, and vascular neurology discussed treating pain. ED physician requested hospitalist admission to complete a TIA/stroke work-up.    "

## 2023-02-25 NOTE — HOSPITAL COURSE
Pt got admitted with suspected TIA which was short lives  CT Brain / CTA head and neck done r/o any acute abnormalities  Pt was evaluated by Tele Stroke MD  Pts condition came back to baseline and she is already on aspirin/plavix at home  Pt expressed her desire to go home and demanded outpatient neurology evaluation only  Pt was discharged back to home

## 2023-02-25 NOTE — CARE UPDATE
Spoke with referral center and was told that the [facility] asked for me to call back in 10-15 min when pt comes back from CT.    .Mansoor Crow MD   Vascular Neurology  [facility]  [facility]

## 2023-02-25 NOTE — ED PROVIDER NOTES
Encounter Date: 2/24/2023       History     Chief Complaint   Patient presents with    Neck Pain     Pt visiting  has sudden neck pain starting about an hour ago     HPI  Patient is a 77-year-old woman with past medical history of hypertension, recent TAVR related to aortic stenosis in January 2023, NSTEMI, hyperlipidemia, and previous diabetes who presents as a stroke activation.    Patient history is somewhat limited, however appears that patient was visiting her  and had acute onset left-sided neck and head pain around 6:30 p.m. that was associated with expressive aphasia.  Patient moving all extremities, appears to understand questions and commands, denies any vision changes, chest pain, shortness breath, abdominal pain, nausea/vomiting, fevers or chills.  Per chart review, patient on Plavix for her recent TAVR.     Review of patient's allergies indicates:   Allergen Reactions    Latex      Other reaction(s): Unknown  Other reaction(s): Unknown    Sulfacetamide sodium      Pain perineal area    Sulfasalazine Hives    Adhesive Itching     SKIN GETS RED WITH TAPE AND BANDAIDS    Adhesive tape-silicones Itching     SKIN GETS RED WITH TAPE AND BANDAIDS    Sulfa (sulfonamide antibiotics) Rash     Past Medical History:   Diagnosis Date    Anesthesia complication     BLADDER DYSFUNCTION    Aortic stenosis     Arthritis     Back pain     Diabetes mellitus type II     NO LONGER DIABETIC    Encounter for blood transfusion     Hyperlipidemia     Hypertension     NSTEMI (non-ST elevated myocardial infarction) 05/01/2022    Osteoporosis     Wears glasses      Past Surgical History:   Procedure Laterality Date     vocal cord nodules removed  long time ago     twice    ADRENAL TUMOR      APPENDECTOMY  within last 5yrs    CATHETERIZATION OF BOTH LEFT AND RIGHT HEART Left 05/06/2022    Procedure: CATHETERIZATION, HEART, BOTH LEFT AND RIGHT;  Surgeon: Michelet Vargas MD;  Location: [facility] CATH/EP LAB;  Service:  Cardiology;  Laterality: Left;    FIXATION KYPHOPLASTY THORACIC SPINE      20    FOOT SURGERY      left 2nd toe was too long     HERNIA REPAIR  within last 5yrs    INSERTION OF TEMPORARY PACEMAKER N/A 2023    Procedure: INSERTION, PACEMAKER, TEMPORARY;  Surgeon: Bhavesh Peña MD;  Location: [facility] CATH;  Service: Cardiology;  Laterality: N/A;    LEFT HEART CATHETERIZATION Left 2022    Procedure: Left heart cath;  Surgeon: Jose Echols MD;  Location: [facility] CATH/EP LAB;  Service: Cardiology;  Laterality: Left;    TONSILLECTOMY, ADENOIDECTOMY  long time ago    TRANSCATHETER AORTIC VALVE REPLACEMENT (TAVR) Bilateral 2023    Procedure: REPLACEMENT, AORTIC VALVE, TRANSCATHETER (TAVR);  Surgeon: Bhavesh Peña MD;  Location: [facility] CATH;  Service: Cardiology;  Laterality: Bilateral;    TRANSCATHETER AORTIC VALVE REPLACEMENT (TAVR) Bilateral 2023    Procedure: REPLACEMENT, AORTIC VALVE, TRANSCATHETER (TAVR);  Surgeon: Dallin Verma MD;  Location: [facility] CATH;  Service: Cardiology;  Laterality: Bilateral;    TRANSTHORACIC ECHOCARDIOGRAPHY (TTE)  2023    Procedure: ECHOCARDIOGRAM, TRANSTHORACIC;  Surgeon: Bhavesh Peña MD;  Location: [facility] CATH;  Service: Cardiology;;     Family History   Problem Relation Age of Onset    Collagen disease Neg Hx      Social History     Tobacco Use    Smoking status: Former     Packs/day: 0.50     Years: 35.00     Pack years: 17.50     Types: Cigarettes     Quit date: 10/5/2022     Years since quittin.3    Smokeless tobacco: Never   Substance Use Topics    Alcohol use: No    Drug use: No     Review of Systems   Unable to perform ROS: Other (Aphasic)     Physical Exam     Initial Vitals [23]   BP Pulse Resp Temp SpO2   (!) 177/84 102 18 98.3 °F (36.8 °C) 97 %      MAP       --         Physical Exam    Constitutional: She appears well-developed and well-nourished. She is not diaphoretic. She appears distressed.   HENT:   Head: Normocephalic and  atraumatic.   Right Ear: External ear normal.   Left Ear: External ear normal.   Nose: Nose normal.   Mouth/Throat: Oropharynx is clear and moist.   Eyes: Conjunctivae and EOM are normal. Pupils are equal, round, and reactive to light.   Neck: Neck supple.   Normal range of motion.  Cardiovascular:  Regular rhythm, normal heart sounds and intact distal pulses.   Tachycardia present.   Exam reveals no gallop and no friction rub.       No murmur heard.  Pulmonary/Chest: Breath sounds normal. No respiratory distress. She has no wheezes. She has no rhonchi. She has no rales. She exhibits no tenderness.   Abdominal: Abdomen is soft. She exhibits no distension. There is no abdominal tenderness. There is no rebound and no guarding.   Musculoskeletal:         General: No edema. Normal range of motion.      Cervical back: Normal range of motion and neck supple.     Neurological: She is alert and oriented to person, place, and time. She has normal strength. She displays no tremor. No cranial nerve deficit or sensory deficit. She displays a negative Romberg sign. Coordination normal. GCS score is 15. GCS eye subscore is 4. GCS verbal subscore is 4. GCS motor subscore is 6.   Expressive aphasia   Skin: Skin is warm and dry. Capillary refill takes less than 2 seconds. No erythema. No pallor.   Psychiatric: She has a normal mood and affect. Her behavior is normal. Judgment and thought content normal.       ED Course   Procedures  Labs Reviewed   CBC W/ AUTO DIFFERENTIAL - Abnormal; Notable for the following components:       Result Value    Hemoglobin 9.6 (*)     Hematocrit 34.3 (*)     MCH 23.5 (*)     MCHC 28.0 (*)     RDW 19.9 (*)     Gran % 74.4 (*)     Lymph % 16.9 (*)     All other components within normal limits   COMPREHENSIVE METABOLIC PANEL - Abnormal; Notable for the following components:    Potassium 3.4 (*)     Glucose 142 (*)     All other components within normal limits   LIPID PANEL - Abnormal; Notable for the  following components:    LDL Cholesterol 61.6 (*)     All other components within normal limits   TROPONIN I HIGH SENSITIVITY - Abnormal; Notable for the following components:    Troponin I High Sensitivity 53.6 (*)     All other components within normal limits    Narrative:     Troponin  critical result(s) repeated. Called and verbal readback   obtained from Soha Rodriguez Rn/ER by AS2 02/24/2023 21:18   B-TYPE NATRIURETIC PEPTIDE - Abnormal; Notable for the following components:     (*)     All other components within normal limits   POCT GLUCOSE - Abnormal; Notable for the following components:    POC Glucose 150 (*)     All other components within normal limits   PROTIME-INR   TSH   DRUGS OF ABUSE SCREEN, BLOOD   TROPONIN I HIGH SENSITIVITY   ISTAT CREATININE   POCT GLUCOSE MONITORING CONTINUOUS          Imaging Results              CTA Head and Neck (xpd) (Edited Result - FINAL)  Result time 02/24/23 21:39:15      Edited Result - FINAL by Cornell Carvalho MD (02/24/23 21:39:15)                   Narrative:         ADDENDUM #1       Findings were discussed with Dr. Yina Hinkle on date of exam at 9:33 PM    Electronically signed by:  Cornell Carvalho MD  2/24/2023 9:39 PM CST Workstation: [id number]     ORIGINAL REPORT       CTA Head and Neck with and without contrast:    CTA Neck with and without contrast:    Comparisons:  None    Additional pertinent history:  Possible stroke    TECHNIQUE:  Multiple axial images were obtained from the superior mediastinum through the mid portion of the brain during the continuous infusion of iodinated contrast.  2-D and 3-D reformatted images were obtained off the axial source data.  Degrees of stenosis of the cervical internal carotid arteries were obtained using NASCET criteria.  One of the following dose optimization techniques was utilized in the performance of this exam: Automated exposure control; adjustment of the mA and/or kV according to the patient's size; or use of  an iterative  reconstruction technique.  Specific details can be referenced in the facility's radiology CT exam operational policy.    CONTRAST:  100 mL of Omnipaque 350    FINDINGS:    Thoracic aortic arch/origins of the great vessels:  Calcified atherosclerotic plaque involving the thoracic aortic arch as well as at the origins of the great vessels. No significant stenosis.    Vertebral arteries:  The patient is right vertebral dominant. The vertebral arteries have a normal appearance.    Common carotid arteries:  Mild intimal thickening along both common carotid arteries without significant stenosis.    Carotid artery bifurcations:  Dense calcified atherosclerotic plaque involving the bifurcations of both common carotid arteries with moderate degrees of stenosis involving both common carotid arteries.    Cervical internal carotid arteries:  Dense calcified atherosclerotic plaque involving the proximal aspects of both cervical internal carotid arteries with approximately 50% stenoses bilaterally. The more distal aspects of both cervical internal carotid arteries are unremarkable.    Surrounding soft tissues:  Negative    Osseous structures:  Mild spondylitic change involving the cervical spine and upper thoracic spine.    Lung apices:  Negative    IMPRESSION:  1. Approximate 50% stenoses involving both common carotid artery bifurcations as well as the proximal internal carotid arteries bilaterally.  2. Remaining arterial vasculature is within normal limits except for atherosclerotic disease of the thoracic aortic arch and origins of the great vessels.    CTA Head with and without contrast:    COMPARISONS: None    ADDITIONAL PERTINENT HISTORY: Neuro deficit    Technique: Multiple axial images were obtained from the skull base to the vertex during the continuous infusion of IV contrast.  2-D and 3-D reformatted were obtained off the axial source data.  One of the following dose optimization techniques was utilized  in the performance of this exam: automated exposure control; adjustment of the mA and/or kA according to patient size; or use of iterative reconstruction technique. Specific details can be referenced in the [facility] radiology CT exam operational policy.  CONTRAST:  100 mL of Omnipaque 350    FINDINGS:    Vascular variants: None    Visualized vertebrobasilar system: Negative    Distal internal carotid arteries: Cavernous internal carotid artery calcifications. No significant stenosis.    Internal carotid artery bifurcation: Negative    A1 and M1 segments: Negative    A2 and M2 segments: Negative    Tertiary branches of the middle cerebral artery distributions: There are findings concerning for a decreased number of tertiary branches which are opacified within the anterior aspects of the right middle cerebral artery distribution concerning for a distal branch occlusion in this region.    Anterior communicating artery: Negative    P1 segments: Negative    Brain parenchyma:  Negative    Osseous structures:  Negative    Visualized paranasal sinuses and mastoid air cells:  Negative    IMPRESSION:  1. Although no central large vessel occlusion is noted there are findings concerning for a decrease in number of tertiary branches within the anterior right middle cerebral artery distribution concerning for a distal branch occlusion in this region.    Electronically signed by:  Cornell Carvalho MD  2/24/2023 9:19 PM CST Workstation: [id number]                      Final result by Cornell Carvalho MD (02/24/23 21:19:08)                   Narrative:    CTA Head and Neck with and without contrast:    CTA Neck with and without contrast:    Comparisons:  None    Additional pertinent history:  Possible stroke    TECHNIQUE:  Multiple axial images were obtained from the superior mediastinum through the mid portion of the brain during the continuous infusion of iodinated contrast.  2-D and 3-D reformatted images were obtained off the axial  source data.  Degrees of stenosis of the cervical internal carotid arteries were obtained using NASCET criteria.  One of the following dose optimization techniques was utilized in the performance of this exam: Automated exposure control; adjustment of the mA and/or kV according to the patient's size; or use of an iterative  reconstruction technique.  Specific details can be referenced in the facility's radiology CT exam operational policy.    CONTRAST:  100 mL of Omnipaque 350    FINDINGS:    Thoracic aortic arch/origins of the great vessels:  Calcified atherosclerotic plaque involving the thoracic aortic arch as well as at the origins of the great vessels. No significant stenosis.    Vertebral arteries:  The patient is right vertebral dominant. The vertebral arteries have a normal appearance.    Common carotid arteries:  Mild intimal thickening along both common carotid arteries without significant stenosis.    Carotid artery bifurcations:  Dense calcified atherosclerotic plaque involving the bifurcations of both common carotid arteries with moderate degrees of stenosis involving both common carotid arteries.    Cervical internal carotid arteries:  Dense calcified atherosclerotic plaque involving the proximal aspects of both cervical internal carotid arteries with approximately 50% stenoses bilaterally. The more distal aspects of both cervical internal carotid arteries are unremarkable.    Surrounding soft tissues:  Negative    Osseous structures:  Mild spondylitic change involving the cervical spine and upper thoracic spine.    Lung apices:  Negative    IMPRESSION:  1. Approximate 50% stenoses involving both common carotid artery bifurcations as well as the proximal internal carotid arteries bilaterally.  2. Remaining arterial vasculature is within normal limits except for atherosclerotic disease of the thoracic aortic arch and origins of the great vessels.    CTA Head with and without contrast:    COMPARISONS:  None    ADDITIONAL PERTINENT HISTORY: Neuro deficit    Technique: Multiple axial images were obtained from the skull base to the vertex during the continuous infusion of IV contrast.  2-D and 3-D reformatted were obtained off the axial source data.  One of the following dose optimization techniques was utilized in the performance of this exam: automated exposure control; adjustment of the mA and/or kA according to patient size; or use of iterative reconstruction technique. Specific details can be referenced in the [facility] radiology CT exam operational policy.  CONTRAST:  100 mL of Omnipaque 350    FINDINGS:    Vascular variants: None    Visualized vertebrobasilar system: Negative    Distal internal carotid arteries: Cavernous internal carotid artery calcifications. No significant stenosis.    Internal carotid artery bifurcation: Negative    A1 and M1 segments: Negative    A2 and M2 segments: Negative    Tertiary branches of the middle cerebral artery distributions: There are findings concerning for a decreased number of tertiary branches which are opacified within the anterior aspects of the right middle cerebral artery distribution concerning for a distal branch occlusion in this region.    Anterior communicating artery: Negative    P1 segments: Negative    Brain parenchyma:  Negative    Osseous structures:  Negative    Visualized paranasal sinuses and mastoid air cells:  Negative    IMPRESSION:  1. Although no central large vessel occlusion is noted there are findings concerning for a decrease in number of tertiary branches within the anterior right middle cerebral artery distribution concerning for a distal branch occlusion in this region.    Electronically signed by:  Cornell Carvalho MD  2/24/2023 9:19 PM CST Workstation: [id number]                                     CT Head Without Contrast (Edited Result - FINAL)  Result time 02/24/23 20:51:04      Edited Result - FINAL by Cesilia Ortiz MD (02/24/23  20:51:04)                   Narrative:         ADDENDUM #1       THIS REPORT CONTAINS FINDINGS THAT MAY BE CRITICAL TO PATIENT CARE: The findings were verbally discussed via telephone conference with Dr. Hinkle   by Dr. Michelle Ortiz on 2/24/2023 8:50 PM CST .The results were acknowledged and understood.    Electronically signed by:  Cesilia Ortiz MD  2/24/2023 8:51 PM CST Workstation: [id number]     ORIGINAL REPORT       EXAM:  CT Head Without Intravenous Contrast    CLINICAL HISTORY:  The patient is 77 years old and is Female; Neuro deficit, acute, stroke suspected    TECHNIQUE:  Axial computed tomography images of the head/brain without intravenous contrast.  Sagittal and coronal reformatted images were created and reviewed.  This CT exam was performed using one or more of the following dose reduction techniques:  automated exposure control, adjustment of the mA and/or kV according to patient size, and/or use of iterative reconstruction technique.    COMPARISON:  No relevant prior studies available.    FINDINGS:  BRAIN:  There is diffuse cerebral atrophy present, consistent with this patient's age.  There is patchy hypoattenuation of the deep white matter which is non-specific, but most likely owing to chronic small vessel ischemic change in a patient of this age group.  No intracranial hemorrhage, mass effect, midline shift is seen. There are no extra-axial fluid collections.  VENTRICLES:  Unremarkable.  No ventriculomegaly.  BONES/JOINTS:  No acute fracture.  SOFT TISSUES:  Unremarkable.  VASCULATURE:  Atherosclerosis of intracranial vasculature is present.  SINUSES:  Unremarkable as visualized.  No acute sinusitis.  MASTOID AIR CELLS:  Unremarkable as visualized.  No mastoid effusion.  ORBITS:  Unremarkable as visualized.    IMPRESSION:  Age-related atrophy and chronic white matter ischemic changes, with no evidence of an acute intracranial abnormality.    Electronically signed by:  Cesilia Ortiz MD   2/24/2023 8:29 PM [facility] Workstation: [id number]                      Final result by Cesilia Ortiz MD (02/24/23 20:29:07)                   Narrative:    EXAM:  CT Head Without Intravenous Contrast    CLINICAL HISTORY:  The patient is 77 years old and is Female; Neuro deficit, acute, stroke suspected    TECHNIQUE:  Axial computed tomography images of the head/brain without intravenous contrast.  Sagittal and coronal reformatted images were created and reviewed.  This CT exam was performed using one or more of the following dose reduction techniques:  automated exposure control, adjustment of the mA and/or kV according to patient size, and/or use of iterative reconstruction technique.    COMPARISON:  No relevant prior studies available.    FINDINGS:  BRAIN:  There is diffuse cerebral atrophy present, consistent with this patient's age.  There is patchy hypoattenuation of the deep white matter which is non-specific, but most likely owing to chronic small vessel ischemic change in a patient of this age group.  No intracranial hemorrhage, mass effect, midline shift is seen. There are no extra-axial fluid collections.  VENTRICLES:  Unremarkable.  No ventriculomegaly.  BONES/JOINTS:  No acute fracture.  SOFT TISSUES:  Unremarkable.  VASCULATURE:  Atherosclerosis of intracranial vasculature is present.  SINUSES:  Unremarkable as visualized.  No acute sinusitis.  MASTOID AIR CELLS:  Unremarkable as visualized.  No mastoid effusion.  ORBITS:  Unremarkable as visualized.    IMPRESSION:  Age-related atrophy and chronic white matter ischemic changes, with no evidence of an acute intracranial abnormality.    Electronically signed by:  Cesilia Ortiz MD  2/24/2023 8:29 PM [facility] Workstation: [id number]                                     Medications   morphine injection 2 mg (2 mg Intravenous Given 2/24/23 2111)   iohexoL (OMNIPAQUE 350) injection 100 mL (100 mLs Intravenous Given 2/24/23 2059)   ketorolac injection 9.999 mg  (9.999 mg Intravenous Given 2/24/23 2230)   droperidoL injection 0.625 mg (0.625 mg Intravenous Given 2/24/23 2252)         Patient is a 77-year-old woman with past medical history as noted above who presents as a stroke activation after acute onset left-sided neck and head pain associated with expressive aphasia beginning around 630 this evening.  Patient on Plavix after TAVR performed in January of this year.  Patient denies any history of previous CVA or TIA but history limited by expressive aphasia.  Patient hypertensive and mildly tachycardic, other vitals within normal limits.  Exam notable for expressive aphasia.  Normal strength and sensation, no facial droop, extraocular motions intact.  Gait not assessed.  Concern for CVA, TIA, hypoglycemia, hyperglycemia, hypertensive emergency, carotid dissection, intracranial bleed, intracranial mass, complex migraine, electrolyte abnormality, metabolic derangement.  Stroke workup initiated, pending workup and results.    Yina Hinkle MD  Emergency Medicine PGY4  8:25 PM    Update:  No acute findings on CT head her CTA.  Labs concerning for troponin elevation at 53, repeat pending.  Also with mildly elevated BNP.  Stable anemia.  By the time patient returned from CT scanner, her aphasia had resolved but she was still complaining of left-sided neck pain and headache.  No other focal neurologic deficits.  She is alert oriented, GCS 15 but cannot state why she was aphasic though she recalls the event.  Due to this, patient will be admitted to the hospital for MRI and continued TIA workup.  There may be an aspect of dementia associated with difficult memory.    Yina Hinkle MD  Emergency Medicine PGY4  11:02 PM              ED Course as of 02/24/23 2302 Fri Feb 24, 2023 2020 POC Glucose(!): 150 [LA]   2042 Call from stroke radiology says that there is no acute abnormality on CT head or CTA. [LA]      ED Course User Index  [LA] Yina Hinkle MD                    Clinical Impression:   Final diagnoses:  [I63.9] Stroke  [R47.01] Aphasia (Primary)  [R51.9] Intractable headache, unspecified chronicity pattern, unspecified headache type        ED Disposition Condition    Observation Stable                Yina Hinkle MD  Resident  02/24/23 [id number]

## 2023-02-25 NOTE — CARE UPDATE
Tele stroke attending on call:    Pt with HTN, HLD, NSTEMI, s/p recent TAVR, came to visit her  to the ED and then reported severe L neck pain and staff noted that she was aphasic.   Currently back to baseline but complaining of severe L neck pain.  CT head without acute abnormality.  CTA head showed no definite LVO.  CTA neck without dissection or hemodynamically significant carotid disease.  Odd story. Discussed with ED attending, plan to treat her pain for now and then decide if further neuroimaging needed.      .Mansoor Crow MD   Vascular Neurology  [facility]  [facility]

## 2023-02-25 NOTE — SUBJECTIVE & OBJECTIVE
Past Medical History:   Diagnosis Date    Anesthesia complication     BLADDER DYSFUNCTION    Aortic stenosis     Arthritis     Back pain     Diabetes mellitus type II     NO LONGER DIABETIC    Encounter for blood transfusion     Hyperlipidemia     Hypertension     NSTEMI (non-ST elevated myocardial infarction) 05/01/2022    Osteoporosis     Wears glasses        Past Surgical History:   Procedure Laterality Date     vocal cord nodules removed  long time ago     twice    ADRENAL TUMOR      APPENDECTOMY  within last 5yrs    CATHETERIZATION OF BOTH LEFT AND RIGHT HEART Left 05/06/2022    Procedure: CATHETERIZATION, HEART, BOTH LEFT AND RIGHT;  Surgeon: Michelet Vargas MD;  Location: [facility] CATH/EP LAB;  Service: Cardiology;  Laterality: Left;    FIXATION KYPHOPLASTY THORACIC SPINE      8-20-13    FOOT SURGERY      left 2nd toe was too long     HERNIA REPAIR  within last 5yrs    INSERTION OF TEMPORARY PACEMAKER N/A 1/4/2023    Procedure: INSERTION, PACEMAKER, TEMPORARY;  Surgeon: Bhavesh Peña MD;  Location: [facility] CATH;  Service: Cardiology;  Laterality: N/A;    LEFT HEART CATHETERIZATION Left 05/02/2022    Procedure: Left heart cath;  Surgeon: Jose Echols MD;  Location: [facility] CATH/EP LAB;  Service: Cardiology;  Laterality: Left;    TONSILLECTOMY, ADENOIDECTOMY  long time ago    TRANSCATHETER AORTIC VALVE REPLACEMENT (TAVR) Bilateral 1/4/2023    Procedure: REPLACEMENT, AORTIC VALVE, TRANSCATHETER (TAVR);  Surgeon: Bhavesh Peña MD;  Location: [facility] CATH;  Service: Cardiology;  Laterality: Bilateral;    TRANSCATHETER AORTIC VALVE REPLACEMENT (TAVR) Bilateral 1/4/2023    Procedure: REPLACEMENT, AORTIC VALVE, TRANSCATHETER (TAVR);  Surgeon: Dallin Verma MD;  Location: [facility];  Service: Cardiology;  Laterality: Bilateral;    TRANSTHORACIC ECHOCARDIOGRAPHY (TTE)  1/4/2023    Procedure: ECHOCARDIOGRAM, TRANSTHORACIC;  Surgeon: Bhavesh Peña MD;  Location: [facility];  Service: Cardiology;;       Review of patient's  allergies indicates:   Allergen Reactions    Latex      Other reaction(s): Unknown  Other reaction(s): Unknown    Sulfacetamide sodium      Pain perineal area    Sulfasalazine Hives    Adhesive Itching     SKIN GETS RED WITH TAPE AND BANDAIDS    Adhesive tape-silicones Itching     SKIN GETS RED WITH TAPE AND BANDAIDS    Sulfa (sulfonamide antibiotics) Rash       No current facility-administered medications on file prior to encounter.     Current Outpatient Medications on File Prior to Encounter   Medication Sig    albuterol (PROVENTIL/VENTOLIN HFA) 90 mcg/actuation inhaler Inhale 2 puffs into the lungs every 6 (six) hours as needed.    aspirin (ECOTRIN) 81 MG EC tablet Take 81 mg by mouth once daily.    benazepriL (LOTENSIN) 20 MG tablet Take 0.5 tablets (10 mg total) by mouth once daily.    budesonide-formoterol 80-4.5 mcg (SYMBICORT) 80-4.5 mcg/actuation HFAA Inhale 2 puffs into the lungs 2 (two) times daily as needed (Asthma).    calcium carbonate (OS-TK) 500 mg calcium (1,250 mg) tablet Take 1 tablet (500 mg total) by mouth once daily.    carvediloL (COREG) 3.125 MG tablet Take 1 tablet (3.125 mg total) by mouth 2 (two) times daily.    citalopram (CELEXA) 40 MG tablet Take 1 tablet (40 mg total) by mouth once daily.    clopidogreL (PLAVIX) 75 mg tablet Take 1 tablet (75 mg total) by mouth once daily.    donepeziL (ARICEPT) 5 MG tablet Take 5 mg by mouth nightly.    ergocalciferol (ERGOCALCIFEROL) 50,000 unit Cap TAKE 1 CAPSULE (50,000 UNITS TOTAL) EVERY 7 DAYS. (Patient taking differently: Take 50,000 Units by mouth every 7 days.)    ferrous gluconate 324 mg (37.5 mg iron) Tab tablet Take 324 mg by mouth once daily.    furosemide (LASIX) 40 MG tablet Take 0.5 tablets (20 mg total) by mouth once daily.    glucosamine-chondroitin 500-400 mg tablet Take 1 tablet by mouth once daily.    loperamide (IMODIUM A-D) 2 mg Tab Take 1 tablet (2 mg total) by mouth 3 (three) times daily as needed (diarrhea). Take 2 tablets  by mouth initially then 1 tablet after each loose stool as needed for diarrhea.    loratadine (CLARITIN) 10 mg tablet Take 1 tablet (10 mg total) by mouth once daily.    metFORMIN (GLUCOPHAGE) 500 MG tablet Take 500 mg by mouth daily with breakfast.    multivitamin capsule Take 1 capsule by mouth once daily.    omeprazole (PRILOSEC) 20 MG capsule Take 1 capsule (20 mg total) by mouth once daily.    oxybutynin (DITROPAN) 5 MG Tab Take 1 tablet (5 mg total) by mouth 2 (two) times daily.    simvastatin (ZOCOR) 40 MG tablet Take 1 tablet (40 mg total) by mouth every evening.    HYDROcodone-acetaminophen (NORCO) 5-325 mg per tablet Take 1 tablet by mouth every 8 (eight) hours as needed for Pain.    [DISCONTINUED] ALLERGY RELIEF, FEXOFENADINE, 180 mg tablet Take 180 mg by mouth once daily.    [DISCONTINUED] ezetimibe-simvastatin 10-40 mg (VYTORIN) 10-40 mg per tablet Take 1 tablet by mouth.    [DISCONTINUED] lisinopril-hydrochlorothiazide (PRINZIDE,ZESTORETIC) 20-12.5 mg per tablet Take 1 tablet by mouth once daily.      Family History    None       Tobacco Use    Smoking status: Former     Packs/day: 0.50     Years: 35.00     Pack years: 17.50     Types: Cigarettes     Quit date: 10/5/2022     Years since quittin.3    Smokeless tobacco: Never   Substance and Sexual Activity    Alcohol use: No    Drug use: No    Sexual activity: Not on file     Review of Systems   Constitutional:  Negative for chills and fever.   HENT:  Negative for hearing loss and sore throat.    Eyes:  Negative for pain and redness.   Respiratory:  Negative for cough and shortness of breath.    Cardiovascular:  Negative for chest pain and palpitations.   Gastrointestinal:  Negative for diarrhea and nausea.   Genitourinary:  Negative for dysuria and flank pain.   Musculoskeletal:  Positive for neck pain. Negative for back pain.   Neurological:  Positive for dizziness, speech difficulty and headaches.   Psychiatric/Behavioral:  Negative for confusion  and hallucinations.    Objective:     Vital Signs (Most Recent):  Temp: 98.3 °F (36.8 °C) (02/24/23 2001)  Pulse: 99 (02/25/23 0000)  Resp: 20 (02/25/23 0000)  BP: (!) 164/87 (02/25/23 0000)  SpO2: 95 % (02/25/23 0000)   Vital Signs (24h Range):  Temp:  [98.3 °F (36.8 °C)] 98.3 °F (36.8 °C)  Pulse:  [] 99  Resp:  [18-28] 20  SpO2:  [95 %-97 %] 95 %  BP: (164-203)/() 164/87     Weight: 84 kg (185 lb 3 oz)  Body mass index is 36.17 kg/m².    Physical Exam  Vitals reviewed.   Constitutional:       General: She is not in acute distress.     Appearance: She is not toxic-appearing.   HENT:      Head: Normocephalic and atraumatic.   Eyes:      Extraocular Movements: Extraocular movements intact.      Conjunctiva/sclera: Conjunctivae normal.   Cardiovascular:      Rate and Rhythm: Normal rate and regular rhythm.   Pulmonary:      Breath sounds: No wheezing or rhonchi.   Abdominal:      General: There is no distension.      Palpations: Abdomen is soft.      Tenderness: There is no abdominal tenderness.   Musculoskeletal:         General: No tenderness.      Cervical back: Neck supple. No rigidity.      Right lower leg: No edema.      Left lower leg: No edema.   Skin:     General: Skin is warm and dry.   Neurological:      General: No focal deficit present.      Mental Status: She is oriented to person, place, and time.   Psychiatric:         Mood and Affect: Mood normal.         Judgment: Judgment normal.           Significant Labs: All pertinent labs within the past 24 hours have been reviewed.    Significant Imaging: I have reviewed all pertinent imaging results/findings within the past 24 hours.

## 2023-02-25 NOTE — H&P
"[facility] - Emergency Dept  Hospital Medicine  History & Physical    Patient Name: Daryleen G Moran  MRN: [id number]  Patient Class: OP- Observation  Admission Date: 2/24/2023  Attending Physician: Blank Guthrie MD   Primary Care Provider: Evelin Dominguez NP         Patient information was obtained from patient, past medical records and ER records.     Subjective:     Principal Problem:Aphasia    Chief Complaint:   Chief Complaint   Patient presents with    Neck Pain     Pt visiting  has sudden neck pain starting about an hour ago        HPI: 76 yo F with PMH including aortic stenosis, recent TAVR, arthritis, DM, HTN who presents for aphasia. Patient was in the ED waiting with her  after his fall when she began to develop headache, neck pain, lightheadedness, and "could not speak." States it lasted for approximately an hour and half prior to resolving. She still as slight neck pain, headache, and has not stood up since but her lightheadedness has improved. She states she does not believe she is hypertensive or diabetic any longer. Denies any prior history of TIA/Stroke. Describes having been hospitalized lately for her TAVR/ heart and COPD but has been doing okay. In the ED, BP is documented with  - 164 and her CT/ CTA did not have acute findings, and vascular neurology discussed treating pain. ED physician requested hospitalist admission to complete a TIA/stroke work-up.        Past Medical History:   Diagnosis Date    Anesthesia complication     BLADDER DYSFUNCTION    Aortic stenosis     Arthritis     Back pain     Diabetes mellitus type II     NO LONGER DIABETIC    Encounter for blood transfusion     Hyperlipidemia     Hypertension     NSTEMI (non-ST elevated myocardial infarction) 05/01/2022    Osteoporosis     Wears glasses        Past Surgical History:   Procedure Laterality Date     vocal cord nodules removed  long time ago     twice    ADRENAL " TUMOR      APPENDECTOMY  within last 5yrs    CATHETERIZATION OF BOTH LEFT AND RIGHT HEART Left 05/06/2022    Procedure: CATHETERIZATION, HEART, BOTH LEFT AND RIGHT;  Surgeon: Michelet Vargas MD;  Location: [facility] CATH/EP LAB;  Service: Cardiology;  Laterality: Left;    FIXATION KYPHOPLASTY THORACIC SPINE      8-20-13    FOOT SURGERY      left 2nd toe was too long     HERNIA REPAIR  within last 5yrs    INSERTION OF TEMPORARY PACEMAKER N/A 1/4/2023    Procedure: INSERTION, PACEMAKER, TEMPORARY;  Surgeon: Bhavesh Peña MD;  Location: [facility] CATH;  Service: Cardiology;  Laterality: N/A;    LEFT HEART CATHETERIZATION Left 05/02/2022    Procedure: Left heart cath;  Surgeon: Jose Echols MD;  Location: [facility] CATH/EP LAB;  Service: Cardiology;  Laterality: Left;    TONSILLECTOMY, ADENOIDECTOMY  long time ago    TRANSCATHETER AORTIC VALVE REPLACEMENT (TAVR) Bilateral 1/4/2023    Procedure: REPLACEMENT, AORTIC VALVE, TRANSCATHETER (TAVR);  Surgeon: Bhavesh Peña MD;  Location: [facility] CATH;  Service: Cardiology;  Laterality: Bilateral;    TRANSCATHETER AORTIC VALVE REPLACEMENT (TAVR) Bilateral 1/4/2023    Procedure: REPLACEMENT, AORTIC VALVE, TRANSCATHETER (TAVR);  Surgeon: Dallin Verma MD;  [facility];  Service: Cardiology;  Laterality: Bilateral;    TRANSTHORACIC ECHOCARDIOGRAPHY (TTE)  1/4/2023    Procedure: ECHOCARDIOGRAM, TRANSTHORACIC;  Surgeon: Bhavesh Peña MD;  [facility];  Service: Cardiology;;       Review of patient's allergies indicates:   Allergen Reactions    Latex      Other reaction(s): Unknown  Other reaction(s): Unknown    Sulfacetamide sodium      Pain perineal area    Sulfasalazine Hives    Adhesive Itching     SKIN GETS RED WITH TAPE AND BANDAIDS    Adhesive tape-silicones Itching     SKIN GETS RED WITH TAPE AND BANDAIDS    Sulfa (sulfonamide antibiotics) Rash       No current facility-administered medications on file prior to encounter.     Current Outpatient  Medications on File Prior to Encounter   Medication Sig    albuterol (PROVENTIL/VENTOLIN HFA) 90 mcg/actuation inhaler Inhale 2 puffs into the lungs every 6 (six) hours as needed.    aspirin (ECOTRIN) 81 MG EC tablet Take 81 mg by mouth once daily.    benazepriL (LOTENSIN) 20 MG tablet Take 0.5 tablets (10 mg total) by mouth once daily.    budesonide-formoterol 80-4.5 mcg (SYMBICORT) 80-4.5 mcg/actuation HFAA Inhale 2 puffs into the lungs 2 (two) times daily as needed (Asthma).    calcium carbonate (OS-TK) 500 mg calcium (1,250 mg) tablet Take 1 tablet (500 mg total) by mouth once daily.    carvediloL (COREG) 3.125 MG tablet Take 1 tablet (3.125 mg total) by mouth 2 (two) times daily.    citalopram (CELEXA) 40 MG tablet Take 1 tablet (40 mg total) by mouth once daily.    clopidogreL (PLAVIX) 75 mg tablet Take 1 tablet (75 mg total) by mouth once daily.    donepeziL (ARICEPT) 5 MG tablet Take 5 mg by mouth nightly.    ergocalciferol (ERGOCALCIFEROL) 50,000 unit Cap TAKE 1 CAPSULE (50,000 UNITS TOTAL) EVERY 7 DAYS. (Patient taking differently: Take 50,000 Units by mouth every 7 days.)    ferrous gluconate 324 mg (37.5 mg iron) Tab tablet Take 324 mg by mouth once daily.    furosemide (LASIX) 40 MG tablet Take 0.5 tablets (20 mg total) by mouth once daily.    glucosamine-chondroitin 500-400 mg tablet Take 1 tablet by mouth once daily.    loperamide (IMODIUM A-D) 2 mg Tab Take 1 tablet (2 mg total) by mouth 3 (three) times daily as needed (diarrhea). Take 2 tablets by mouth initially then 1 tablet after each loose stool as needed for diarrhea.    loratadine (CLARITIN) 10 mg tablet Take 1 tablet (10 mg total) by mouth once daily.    metFORMIN (GLUCOPHAGE) 500 MG tablet Take 500 mg by mouth daily with breakfast.    multivitamin capsule Take 1 capsule by mouth once daily.    omeprazole (PRILOSEC) 20 MG capsule Take 1 capsule (20 mg total) by mouth once daily.    oxybutynin (DITROPAN) 5 MG Tab Take 1  tablet (5 mg total) by mouth 2 (two) times daily.    simvastatin (ZOCOR) 40 MG tablet Take 1 tablet (40 mg total) by mouth every evening.    HYDROcodone-acetaminophen (NORCO) 5-325 mg per tablet Take 1 tablet by mouth every 8 (eight) hours as needed for Pain.    [DISCONTINUED] ALLERGY RELIEF, FEXOFENADINE, 180 mg tablet Take 180 mg by mouth once daily.    [DISCONTINUED] ezetimibe-simvastatin 10-40 mg (VYTORIN) 10-40 mg per tablet Take 1 tablet by mouth.    [DISCONTINUED] lisinopril-hydrochlorothiazide (PRINZIDE,ZESTORETIC) 20-12.5 mg per tablet Take 1 tablet by mouth once daily.      Family History    None       Tobacco Use    Smoking status: Former     Packs/day: 0.50     Years: 35.00     Pack years: 17.50     Types: Cigarettes     Quit date: 10/5/2022     Years since quittin.3    Smokeless tobacco: Never   Substance and Sexual Activity    Alcohol use: No    Drug use: No    Sexual activity: Not on file     Review of Systems   Constitutional:  Negative for chills and fever.   HENT:  Negative for hearing loss and sore throat.    Eyes:  Negative for pain and redness.   Respiratory:  Negative for cough and shortness of breath.    Cardiovascular:  Negative for chest pain and palpitations.   Gastrointestinal:  Negative for diarrhea and nausea.   Genitourinary:  Negative for dysuria and flank pain.   Musculoskeletal:  Positive for neck pain. Negative for back pain.   Neurological:  Positive for dizziness, speech difficulty and headaches.   Psychiatric/Behavioral:  Negative for confusion and hallucinations.    Objective:     Vital Signs (Most Recent):  Temp: 98.3 °F (36.8 °C) (23)  Pulse: 99 (23 0000)  Resp: 20 (23 0000)  BP: (!) 164/87 (23 0000)  SpO2: 95 % (23 0000)   Vital Signs (24h Range):  Temp:  [98.3 °F (36.8 °C)] 98.3 °F (36.8 °C)  Pulse:  [] 99  Resp:  [18-28] 20  SpO2:  [95 %-97 %] 95 %  BP: (164-203)/() 164/87     Weight: 84 kg (185 lb 3 oz)  Body  mass index is 36.17 kg/m².    Physical Exam  Vitals reviewed.   Constitutional:       General: She is not in acute distress.     Appearance: She is not toxic-appearing.   HENT:      Head: Normocephalic and atraumatic.   Eyes:      Extraocular Movements: Extraocular movements intact.      Conjunctiva/sclera: Conjunctivae normal.   Cardiovascular:      Rate and Rhythm: Normal rate and regular rhythm.   Pulmonary:      Breath sounds: No wheezing or rhonchi.   Abdominal:      General: There is no distension.      Palpations: Abdomen is soft.      Tenderness: There is no abdominal tenderness.   Musculoskeletal:         General: No tenderness.      Cervical back: Neck supple. No rigidity.      Right lower leg: No edema.      Left lower leg: No edema.   Skin:     General: Skin is warm and dry.   Neurological:      General: No focal deficit present.      Mental Status: She is oriented to person, place, and time.   Psychiatric:         Mood and Affect: Mood normal.         Judgment: Judgment normal.           Significant Labs: All pertinent labs within the past 24 hours have been reviewed.    Significant Imaging: I have reviewed all pertinent imaging results/findings within the past 24 hours.    Assessment/Plan:     Aphasia - Resolved  Secondary to TIA / Stroke versus complicated Migraine versus Other Etiology  Hypertension, uncontrolled  Headache - Improved  Neck pain- Improved  TAVR  Diabetes    -Permissive hypertension to a degree until rule out stroke  -However assessing also whether hypertensive urgency could've also played a role  -Will likely need meds adjusted on d/c  -Already on ASA, plavix, statin - Will continue  -MRI without contrast  -Had recent echo  -Neurochecks  -Telemetry  -Consult neurology  -PT/OT/ST  -ISS, accuchecks  -Resume home meds as appropriate    FULL CODE  DVT ppx Lovenox     Blank Guthrie MD  Department of Hospital Medicine   [facility] - Emergency Dept

## 2023-02-27 NOTE — PLAN OF CARE
02/27/23 1015   Final Note   Assessment Type Final Discharge Note   Anticipated Discharge Disposition Home   Post-Acute Status   Post-Acute Authorization Other   Other Status No Post-Acute Service Needs   Discharge Delays None known at this time

## 2023-03-02 ENCOUNTER — TELEPHONE (OUTPATIENT)
Dept: RADIOLOGY | Facility: HOSPITAL | Age: 78
End: 2023-03-02

## 2023-03-02 NOTE — NURSING
"Pt returned follow up phone call for IV contrast extravasation to right arm.  States arm is doing "ok".  Denies any swelling/pain or deformities.    "

## 2023-03-03 LAB
AMPHETAMINES SERPL QL SCN: NEGATIVE NG/ML
BARBITURATES SERPL QL SCN: NEGATIVE UG/ML
BENZODIAZ SERPL QL SCN: NEGATIVE NG/ML
BENZODIAZ SERPL QL SCN: NEGATIVE NG/ML
CANNABINOIDS SERPL QL SCN: NEGATIVE NG/ML
METHADONE SERPL QL SCN: NEGATIVE NG/ML
OPIATES SERPL QL SCN: NEGATIVE NG/ML
OXYCODONE+OXYMORPHONE SERPLBLD QL SCN: NEGATIVE NG/ML
PCP SERPL QL SCN: NEGATIVE NG/ML
PROPOXYPH SERPL QL SCN: NEGATIVE NG/ML

## 2023-03-06 PROBLEM — N39.0 UTI (URINARY TRACT INFECTION): Status: RESOLVED | Noted: 2022-11-25 | Resolved: 2023-03-06

## 2023-05-15 RX ORDER — ERGOCALCIFEROL 1.25 MG/1
CAPSULE ORAL
Qty: 12 CAPSULE | Refills: 3 | Status: SHIPPED | OUTPATIENT
Start: 2023-05-15

## 2023-06-21 ENCOUNTER — TELEPHONE (OUTPATIENT)
Dept: CARDIOLOGY | Facility: CLINIC | Age: 78
End: 2023-06-21
Payer: MEDICARE

## 2023-06-21 ENCOUNTER — HOSPITAL ENCOUNTER (INPATIENT)
Facility: HOSPITAL | Age: 78
LOS: 3 days | Discharge: HOME | DRG: 378 | End: 2023-06-24
Attending: INTERNAL MEDICINE | Admitting: INTERNAL MEDICINE
Payer: MEDICARE

## 2023-06-21 DIAGNOSIS — J44.9 CHRONIC OBSTRUCTIVE PULMONARY DISEASE, UNSPECIFIED COPD TYPE: ICD-10-CM

## 2023-06-21 DIAGNOSIS — I50.9 CONGESTIVE HEART FAILURE, UNSPECIFIED HF CHRONICITY, UNSPECIFIED HEART FAILURE TYPE: ICD-10-CM

## 2023-06-21 DIAGNOSIS — D64.9 ANEMIA, UNSPECIFIED TYPE: ICD-10-CM

## 2023-06-21 DIAGNOSIS — K92.2 GI BLEED: Primary | ICD-10-CM

## 2023-06-21 LAB
ERYTHROCYTE [DISTWIDTH] IN BLOOD BY AUTOMATED COUNT: 17.1 % (ref 11.5–14.5)
HCT VFR BLD AUTO: 26.1 % (ref 37–48.5)
HGB BLD-MCNC: 7.9 G/DL (ref 12–16)
MCH RBC QN AUTO: 25.2 PG (ref 27–31)
MCHC RBC AUTO-ENTMCNC: 30.3 G/DL (ref 32–36)
MCV RBC AUTO: 83 FL (ref 82–98)
PLATELET # BLD AUTO: 154 K/UL (ref 150–450)
PMV BLD AUTO: 11.8 FL (ref 9.2–12.9)
RBC # BLD AUTO: 3.14 M/UL (ref 4–5.4)
WBC # BLD AUTO: 6.96 K/UL (ref 3.9–12.7)

## 2023-06-21 PROCEDURE — 21000000 HC CCU ICU ROOM CHARGE

## 2023-06-21 PROCEDURE — C9113 INJ PANTOPRAZOLE SODIUM, VIA: HCPCS | Performed by: STUDENT IN AN ORGANIZED HEALTH CARE EDUCATION/TRAINING PROGRAM

## 2023-06-21 PROCEDURE — 25000003 PHARM REV CODE 250: Performed by: STUDENT IN AN ORGANIZED HEALTH CARE EDUCATION/TRAINING PROGRAM

## 2023-06-21 PROCEDURE — 36415 COLL VENOUS BLD VENIPUNCTURE: CPT | Performed by: STUDENT IN AN ORGANIZED HEALTH CARE EDUCATION/TRAINING PROGRAM

## 2023-06-21 PROCEDURE — 63600175 PHARM REV CODE 636 W HCPCS: Performed by: STUDENT IN AN ORGANIZED HEALTH CARE EDUCATION/TRAINING PROGRAM

## 2023-06-21 PROCEDURE — 85027 COMPLETE CBC AUTOMATED: CPT | Performed by: STUDENT IN AN ORGANIZED HEALTH CARE EDUCATION/TRAINING PROGRAM

## 2023-06-21 RX ORDER — FUROSEMIDE 40 MG/1
40 TABLET ORAL DAILY
Status: DISCONTINUED | OUTPATIENT
Start: 2023-06-22 | End: 2023-06-24 | Stop reason: HOSPADM

## 2023-06-21 RX ORDER — CITALOPRAM 20 MG/1
40 TABLET, FILM COATED ORAL DAILY
Status: DISCONTINUED | OUTPATIENT
Start: 2023-06-22 | End: 2023-06-24 | Stop reason: HOSPADM

## 2023-06-21 RX ORDER — PROCHLORPERAZINE EDISYLATE 5 MG/ML
5 INJECTION INTRAMUSCULAR; INTRAVENOUS EVERY 6 HOURS PRN
Status: DISCONTINUED | OUTPATIENT
Start: 2023-06-21 | End: 2023-06-24 | Stop reason: HOSPADM

## 2023-06-21 RX ORDER — ASPIRIN 81 MG/1
81 TABLET ORAL DAILY
Status: DISCONTINUED | OUTPATIENT
Start: 2023-06-22 | End: 2023-06-22

## 2023-06-21 RX ORDER — ACETAMINOPHEN 325 MG/1
650 TABLET ORAL EVERY 8 HOURS PRN
Status: DISCONTINUED | OUTPATIENT
Start: 2023-06-21 | End: 2023-06-24 | Stop reason: HOSPADM

## 2023-06-21 RX ORDER — POLYETHYLENE GLYCOL 3350 17 G/17G
17 POWDER, FOR SOLUTION ORAL DAILY
Status: DISCONTINUED | OUTPATIENT
Start: 2023-06-22 | End: 2023-06-22

## 2023-06-21 RX ORDER — BUDESONIDE 0.5 MG/2ML
0.5 INHALANT ORAL EVERY 12 HOURS PRN
Status: DISCONTINUED | OUTPATIENT
Start: 2023-06-21 | End: 2023-06-24 | Stop reason: HOSPADM

## 2023-06-21 RX ORDER — PANTOPRAZOLE SODIUM 40 MG/1
40 TABLET, DELAYED RELEASE ORAL DAILY
Status: DISCONTINUED | OUTPATIENT
Start: 2023-06-22 | End: 2023-06-21

## 2023-06-21 RX ORDER — ONDANSETRON 2 MG/ML
4 INJECTION INTRAMUSCULAR; INTRAVENOUS EVERY 8 HOURS PRN
Status: DISCONTINUED | OUTPATIENT
Start: 2023-06-21 | End: 2023-06-24 | Stop reason: HOSPADM

## 2023-06-21 RX ORDER — SODIUM CHLORIDE 0.9 % (FLUSH) 0.9 %
10 SYRINGE (ML) INJECTION EVERY 6 HOURS PRN
Status: DISCONTINUED | OUTPATIENT
Start: 2023-06-21 | End: 2023-06-24 | Stop reason: HOSPADM

## 2023-06-21 RX ORDER — DONEPEZIL HYDROCHLORIDE 5 MG/1
5 TABLET, FILM COATED ORAL NIGHTLY
Status: DISCONTINUED | OUTPATIENT
Start: 2023-06-21 | End: 2023-06-24 | Stop reason: HOSPADM

## 2023-06-21 RX ORDER — HYDROCODONE BITARTRATE AND ACETAMINOPHEN 5; 325 MG/1; MG/1
1 TABLET ORAL EVERY 4 HOURS PRN
Status: DISCONTINUED | OUTPATIENT
Start: 2023-06-21 | End: 2023-06-24 | Stop reason: HOSPADM

## 2023-06-21 RX ORDER — BUDESONIDE AND FORMOTEROL FUMARATE DIHYDRATE 80; 4.5 UG/1; UG/1
2 AEROSOL RESPIRATORY (INHALATION) 2 TIMES DAILY PRN
Status: DISCONTINUED | OUTPATIENT
Start: 2023-06-21 | End: 2023-06-21

## 2023-06-21 RX ORDER — LANOLIN ALCOHOL/MO/W.PET/CERES
1 CREAM (GRAM) TOPICAL DAILY
Status: DISCONTINUED | OUTPATIENT
Start: 2023-06-22 | End: 2023-06-24 | Stop reason: HOSPADM

## 2023-06-21 RX ORDER — ARFORMOTEROL TARTRATE 15 UG/2ML
15 SOLUTION RESPIRATORY (INHALATION) 2 TIMES DAILY PRN
Status: DISCONTINUED | OUTPATIENT
Start: 2023-06-21 | End: 2023-06-24 | Stop reason: HOSPADM

## 2023-06-21 RX ORDER — ATORVASTATIN CALCIUM 40 MG/1
40 TABLET, FILM COATED ORAL DAILY
Status: DISCONTINUED | OUTPATIENT
Start: 2023-06-22 | End: 2023-06-24 | Stop reason: HOSPADM

## 2023-06-21 RX ORDER — MORPHINE SULFATE 4 MG/ML
4 INJECTION, SOLUTION INTRAMUSCULAR; INTRAVENOUS EVERY 4 HOURS PRN
Status: DISCONTINUED | OUTPATIENT
Start: 2023-06-21 | End: 2023-06-24 | Stop reason: HOSPADM

## 2023-06-21 RX ORDER — PANTOPRAZOLE SODIUM 40 MG/10ML
40 INJECTION, POWDER, LYOPHILIZED, FOR SOLUTION INTRAVENOUS 2 TIMES DAILY
Status: DISCONTINUED | OUTPATIENT
Start: 2023-06-21 | End: 2023-06-24 | Stop reason: HOSPADM

## 2023-06-21 RX ORDER — CARVEDILOL 3.12 MG/1
3.12 TABLET ORAL 2 TIMES DAILY
Status: DISCONTINUED | OUTPATIENT
Start: 2023-06-21 | End: 2023-06-24 | Stop reason: HOSPADM

## 2023-06-21 RX ORDER — CETIRIZINE HYDROCHLORIDE 5 MG/1
5 TABLET ORAL DAILY
Status: DISCONTINUED | OUTPATIENT
Start: 2023-06-22 | End: 2023-06-24 | Stop reason: HOSPADM

## 2023-06-21 RX ORDER — TALC
6 POWDER (GRAM) TOPICAL NIGHTLY PRN
Status: DISCONTINUED | OUTPATIENT
Start: 2023-06-21 | End: 2023-06-24 | Stop reason: HOSPADM

## 2023-06-21 RX ADMIN — PANTOPRAZOLE SODIUM 40 MG: 40 INJECTION, POWDER, FOR SOLUTION INTRAVENOUS at 11:06

## 2023-06-21 RX ADMIN — CARVEDILOL 3.12 MG: 3.12 TABLET, FILM COATED ORAL at 11:06

## 2023-06-21 RX ADMIN — DONEPEZIL HYDROCHLORIDE 5 MG: 5 TABLET ORAL at 11:06

## 2023-06-21 NOTE — TELEPHONE ENCOUNTER
----- Message from Taisha Graf sent at 6/21/2023  9:45 AM CDT -----  Contact: pt [phone]  Type:  Same Day Appointment Request    Caller is requesting a same day appointment.  Caller declined first available appointment listed below.      Name of Caller:  Pt   When is the first available appointment?  Jul 5  Symptoms:  can not catch breath while walking   Best Call Back Number:  [phone] (home)     Additional Information:   Pls call back and advise.

## 2023-06-21 NOTE — PROVIDER TRANSFER
Outside Transfer Acceptance Note / Regional Referral Center    Referring facility: [facility]   Referring provider: YANIRA SAMPSON SIMAL J. NOTINSYSTEM, PROVIDER  Accepting facility: [facility]  Accepting provider: Víctor Castano MD  Admitting provider:   Reason for transfer: Higher Level of Care   Transfer diagnosis: Symptomatic anemia  Transfer specialty requested: GI  Transfer specialty notified: Yes  Transfer level: NUMBER 1-5: 2  Bed type requested: telemetry  Isolation status: No active isolations   Admission class or status: Emergency  IP- Inpatient      Narrative     Daryleen G Moran is a 78 y.o. female with a PMH of HFpED, CAD, Afib, aortic valve stenosis, HTN, HLD, DM2, COPD, GERD, BOZENA, MDD, dementia, and obesity who presented to the [facility] ED on 6/21/23 with complaints of severe persistent fatigue associated with shortness of breath and black tar-like sticky stool present for 1 to 2 weeks. Denied PUD history or recent NSAID use.    In the ED, VSS. Labs remarkable for Hb 5.6. ED transfusing 2u pRBCs. [facility] contacted to facilitate transfer, and pt approved for transfer to  at [facility] (with GI to consult) for further care and management).      Objective       Allergies:   Review of patient's allergies indicates:   Allergen Reactions    Latex      Other reaction(s): Unknown  Other reaction(s): Unknown    Sulfacetamide sodium      Pain perineal area    Sulfasalazine Hives    Adhesive Itching     SKIN GETS RED WITH TAPE AND BANDAIDS    Adhesive tape-silicones Itching     SKIN GETS RED WITH TAPE AND BANDAIDS    Sulfa (sulfonamide antibiotics) Rash      NPO: No    Anticoagulation:   Anticoagulants       None             Instructions      [facility]  Admit to Hospital Medicine  Upon patient arrival to floor, please contact Hospital Medicine on call.

## 2023-06-22 ENCOUNTER — ANESTHESIA (OUTPATIENT)
Dept: SURGERY | Facility: HOSPITAL | Age: 78
DRG: 378 | End: 2023-06-22
Payer: MEDICARE

## 2023-06-22 ENCOUNTER — ANESTHESIA EVENT (OUTPATIENT)
Dept: SURGERY | Facility: HOSPITAL | Age: 78
DRG: 378 | End: 2023-06-22
Payer: MEDICARE

## 2023-06-22 LAB
ANION GAP SERPL CALC-SCNC: 6 MMOL/L (ref 8–16)
BASOPHILS # BLD AUTO: 0.06 K/UL (ref 0–0.2)
BASOPHILS NFR BLD: 0.8 % (ref 0–1.9)
BUN SERPL-MCNC: 13 MG/DL (ref 8–23)
CALCIUM SERPL-MCNC: 8.5 MG/DL (ref 8.7–10.5)
CHLORIDE SERPL-SCNC: 97 MMOL/L (ref 95–110)
CO2 SERPL-SCNC: 35 MMOL/L (ref 23–29)
CREAT SERPL-MCNC: 0.4 MG/DL (ref 0.5–1.4)
DIFFERENTIAL METHOD: ABNORMAL
EOSINOPHIL # BLD AUTO: 0.2 K/UL (ref 0–0.5)
EOSINOPHIL NFR BLD: 1.9 % (ref 0–8)
ERYTHROCYTE [DISTWIDTH] IN BLOOD BY AUTOMATED COUNT: 17.5 % (ref 11.5–14.5)
EST. GFR  (NO RACE VARIABLE): >60 ML/MIN/1.73 M^2
GLUCOSE SERPL-MCNC: 101 MG/DL (ref 70–110)
HCT VFR BLD AUTO: 26.3 % (ref 37–48.5)
HGB BLD-MCNC: 7.6 G/DL (ref 12–16)
IMM GRANULOCYTES # BLD AUTO: 0.05 K/UL (ref 0–0.04)
IMM GRANULOCYTES NFR BLD AUTO: 0.6 % (ref 0–0.5)
LYMPHOCYTES # BLD AUTO: 1 K/UL (ref 1–4.8)
LYMPHOCYTES NFR BLD: 13.2 % (ref 18–48)
MCH RBC QN AUTO: 24.4 PG (ref 27–31)
MCHC RBC AUTO-ENTMCNC: 28.9 G/DL (ref 32–36)
MCV RBC AUTO: 84 FL (ref 82–98)
MONOCYTES # BLD AUTO: 0.7 K/UL (ref 0.3–1)
MONOCYTES NFR BLD: 8.9 % (ref 4–15)
NEUTROPHILS # BLD AUTO: 5.7 K/UL (ref 1.8–7.7)
NEUTROPHILS NFR BLD: 74.6 % (ref 38–73)
NRBC BLD-RTO: 0 /100 WBC
PLATELET # BLD AUTO: 163 K/UL (ref 150–450)
PMV BLD AUTO: 12.4 FL (ref 9.2–12.9)
POTASSIUM SERPL-SCNC: 2.9 MMOL/L (ref 3.5–5.1)
RBC # BLD AUTO: 3.12 M/UL (ref 4–5.4)
SODIUM SERPL-SCNC: 138 MMOL/L (ref 136–145)
WBC # BLD AUTO: 7.71 K/UL (ref 3.9–12.7)

## 2023-06-22 PROCEDURE — D9220A PRA ANESTHESIA: ICD-10-PCS | Mod: ANES,,, | Performed by: ANESTHESIOLOGY

## 2023-06-22 PROCEDURE — 21000000 HC CCU ICU ROOM CHARGE

## 2023-06-22 PROCEDURE — 85025 COMPLETE CBC W/AUTO DIFF WBC: CPT | Performed by: STUDENT IN AN ORGANIZED HEALTH CARE EDUCATION/TRAINING PROGRAM

## 2023-06-22 PROCEDURE — D9220A PRA ANESTHESIA: Mod: ANES,,, | Performed by: ANESTHESIOLOGY

## 2023-06-22 PROCEDURE — 27000221 HC OXYGEN, UP TO 24 HOURS

## 2023-06-22 PROCEDURE — 63600175 PHARM REV CODE 636 W HCPCS: Performed by: STUDENT IN AN ORGANIZED HEALTH CARE EDUCATION/TRAINING PROGRAM

## 2023-06-22 PROCEDURE — 80048 BASIC METABOLIC PNL TOTAL CA: CPT | Performed by: STUDENT IN AN ORGANIZED HEALTH CARE EDUCATION/TRAINING PROGRAM

## 2023-06-22 PROCEDURE — C9113 INJ PANTOPRAZOLE SODIUM, VIA: HCPCS | Performed by: STUDENT IN AN ORGANIZED HEALTH CARE EDUCATION/TRAINING PROGRAM

## 2023-06-22 PROCEDURE — 44378 SMALL BOWEL ENDOSCOPY: CPT | Performed by: INTERNAL MEDICINE

## 2023-06-22 PROCEDURE — 36415 COLL VENOUS BLD VENIPUNCTURE: CPT | Performed by: STUDENT IN AN ORGANIZED HEALTH CARE EDUCATION/TRAINING PROGRAM

## 2023-06-22 PROCEDURE — 25000003 PHARM REV CODE 250: Performed by: STUDENT IN AN ORGANIZED HEALTH CARE EDUCATION/TRAINING PROGRAM

## 2023-06-22 PROCEDURE — 63600175 PHARM REV CODE 636 W HCPCS: Performed by: NURSE ANESTHETIST, CERTIFIED REGISTERED

## 2023-06-22 PROCEDURE — 37000008 HC ANESTHESIA 1ST 15 MINUTES: Performed by: INTERNAL MEDICINE

## 2023-06-22 PROCEDURE — 25000003 PHARM REV CODE 250: Performed by: NURSE ANESTHETIST, CERTIFIED REGISTERED

## 2023-06-22 PROCEDURE — D9220A PRA ANESTHESIA: ICD-10-PCS | Mod: CRNA,,, | Performed by: NURSE ANESTHETIST, CERTIFIED REGISTERED

## 2023-06-22 PROCEDURE — D9220A PRA ANESTHESIA: Mod: CRNA,,, | Performed by: NURSE ANESTHETIST, CERTIFIED REGISTERED

## 2023-06-22 PROCEDURE — 94760 N-INVAS EAR/PLS OXIMETRY 1: CPT

## 2023-06-22 PROCEDURE — 99900035 HC TECH TIME PER 15 MIN (STAT)

## 2023-06-22 PROCEDURE — 44369 SMALL BOWEL ENDOSCOPY: CPT | Performed by: INTERNAL MEDICINE

## 2023-06-22 PROCEDURE — 27202049 HC PROBE, APC ERBE: Performed by: INTERNAL MEDICINE

## 2023-06-22 PROCEDURE — 25000003 PHARM REV CODE 250: Performed by: INTERNAL MEDICINE

## 2023-06-22 PROCEDURE — 37000009 HC ANESTHESIA EA ADD 15 MINS: Performed by: INTERNAL MEDICINE

## 2023-06-22 RX ORDER — CALCIUM GLUCONATE 20 MG/ML
3 INJECTION, SOLUTION INTRAVENOUS
Status: DISCONTINUED | OUTPATIENT
Start: 2023-06-22 | End: 2023-06-22

## 2023-06-22 RX ORDER — MAGNESIUM SULFATE HEPTAHYDRATE 40 MG/ML
4 INJECTION, SOLUTION INTRAVENOUS
Status: DISCONTINUED | OUTPATIENT
Start: 2023-06-22 | End: 2023-06-24 | Stop reason: HOSPADM

## 2023-06-22 RX ORDER — CALCIUM GLUCONATE 20 MG/ML
3 INJECTION, SOLUTION INTRAVENOUS
Status: DISCONTINUED | OUTPATIENT
Start: 2023-06-22 | End: 2023-06-24 | Stop reason: HOSPADM

## 2023-06-22 RX ORDER — CALCIUM GLUCONATE 20 MG/ML
2 INJECTION, SOLUTION INTRAVENOUS
Status: DISCONTINUED | OUTPATIENT
Start: 2023-06-22 | End: 2023-06-24 | Stop reason: HOSPADM

## 2023-06-22 RX ORDER — SODIUM,POTASSIUM PHOSPHATES 280-250MG
2 POWDER IN PACKET (EA) ORAL
Status: DISCONTINUED | OUTPATIENT
Start: 2023-06-22 | End: 2023-06-22

## 2023-06-22 RX ORDER — MAGNESIUM SULFATE HEPTAHYDRATE 40 MG/ML
2 INJECTION, SOLUTION INTRAVENOUS
Status: DISCONTINUED | OUTPATIENT
Start: 2023-06-22 | End: 2023-06-24 | Stop reason: HOSPADM

## 2023-06-22 RX ORDER — MAGNESIUM SULFATE HEPTAHYDRATE 40 MG/ML
2 INJECTION, SOLUTION INTRAVENOUS
Status: DISCONTINUED | OUTPATIENT
Start: 2023-06-22 | End: 2023-06-22

## 2023-06-22 RX ORDER — LANOLIN ALCOHOL/MO/W.PET/CERES
800 CREAM (GRAM) TOPICAL
Status: DISCONTINUED | OUTPATIENT
Start: 2023-06-22 | End: 2023-06-22

## 2023-06-22 RX ORDER — SODIUM,POTASSIUM PHOSPHATES 280-250MG
2 POWDER IN PACKET (EA) ORAL
Status: DISCONTINUED | OUTPATIENT
Start: 2023-06-22 | End: 2023-06-24 | Stop reason: HOSPADM

## 2023-06-22 RX ORDER — POTASSIUM CHLORIDE 7.45 MG/ML
40 INJECTION INTRAVENOUS
Status: DISCONTINUED | OUTPATIENT
Start: 2023-06-22 | End: 2023-06-24 | Stop reason: HOSPADM

## 2023-06-22 RX ORDER — MUPIROCIN 20 MG/G
OINTMENT TOPICAL 2 TIMES DAILY
Status: DISCONTINUED | OUTPATIENT
Start: 2023-06-22 | End: 2023-06-24 | Stop reason: HOSPADM

## 2023-06-22 RX ORDER — CALCIUM GLUCONATE 20 MG/ML
1 INJECTION, SOLUTION INTRAVENOUS
Status: DISCONTINUED | OUTPATIENT
Start: 2023-06-22 | End: 2023-06-22

## 2023-06-22 RX ORDER — POTASSIUM CHLORIDE 7.45 MG/ML
40 INJECTION INTRAVENOUS
Status: DISCONTINUED | OUTPATIENT
Start: 2023-06-22 | End: 2023-06-22

## 2023-06-22 RX ORDER — PROPOFOL 10 MG/ML
VIAL (ML) INTRAVENOUS
Status: DISCONTINUED | OUTPATIENT
Start: 2023-06-22 | End: 2023-06-22

## 2023-06-22 RX ORDER — LANOLIN ALCOHOL/MO/W.PET/CERES
800 CREAM (GRAM) TOPICAL
Status: DISCONTINUED | OUTPATIENT
Start: 2023-06-22 | End: 2023-06-24 | Stop reason: HOSPADM

## 2023-06-22 RX ORDER — CHLORHEXIDINE GLUCONATE ORAL RINSE 1.2 MG/ML
15 SOLUTION DENTAL 2 TIMES DAILY
Status: DISCONTINUED | OUTPATIENT
Start: 2023-06-22 | End: 2023-06-24 | Stop reason: HOSPADM

## 2023-06-22 RX ORDER — MAGNESIUM SULFATE HEPTAHYDRATE 40 MG/ML
4 INJECTION, SOLUTION INTRAVENOUS
Status: DISCONTINUED | OUTPATIENT
Start: 2023-06-22 | End: 2023-06-22

## 2023-06-22 RX ORDER — POTASSIUM CHLORIDE 7.45 MG/ML
80 INJECTION INTRAVENOUS
Status: DISCONTINUED | OUTPATIENT
Start: 2023-06-22 | End: 2023-06-24 | Stop reason: HOSPADM

## 2023-06-22 RX ORDER — CALCIUM GLUCONATE 20 MG/ML
2 INJECTION, SOLUTION INTRAVENOUS
Status: DISCONTINUED | OUTPATIENT
Start: 2023-06-22 | End: 2023-06-22

## 2023-06-22 RX ORDER — POTASSIUM CHLORIDE 7.45 MG/ML
60 INJECTION INTRAVENOUS
Status: DISCONTINUED | OUTPATIENT
Start: 2023-06-22 | End: 2023-06-24 | Stop reason: HOSPADM

## 2023-06-22 RX ORDER — POLYETHYLENE GLYCOL 3350, SODIUM SULFATE ANHYDROUS, SODIUM BICARBONATE, SODIUM CHLORIDE, POTASSIUM CHLORIDE 236; 22.74; 6.74; 5.86; 2.97 G/4L; G/4L; G/4L; G/4L; G/4L
4000 POWDER, FOR SOLUTION ORAL ONCE
Status: COMPLETED | OUTPATIENT
Start: 2023-06-22 | End: 2023-06-22

## 2023-06-22 RX ORDER — HYDRALAZINE HYDROCHLORIDE 20 MG/ML
10 INJECTION INTRAMUSCULAR; INTRAVENOUS EVERY 4 HOURS PRN
Status: DISCONTINUED | OUTPATIENT
Start: 2023-06-22 | End: 2023-06-24 | Stop reason: HOSPADM

## 2023-06-22 RX ORDER — CALCIUM GLUCONATE 20 MG/ML
1 INJECTION, SOLUTION INTRAVENOUS
Status: DISCONTINUED | OUTPATIENT
Start: 2023-06-22 | End: 2023-06-24 | Stop reason: HOSPADM

## 2023-06-22 RX ORDER — POTASSIUM CHLORIDE 7.45 MG/ML
80 INJECTION INTRAVENOUS
Status: DISCONTINUED | OUTPATIENT
Start: 2023-06-22 | End: 2023-06-22

## 2023-06-22 RX ORDER — POTASSIUM CHLORIDE 7.45 MG/ML
60 INJECTION INTRAVENOUS
Status: DISCONTINUED | OUTPATIENT
Start: 2023-06-22 | End: 2023-06-22

## 2023-06-22 RX ADMIN — POTASSIUM CHLORIDE 20 MEQ: 7.46 INJECTION, SOLUTION INTRAVENOUS at 08:06

## 2023-06-22 RX ADMIN — FUROSEMIDE 40 MG: 40 TABLET ORAL at 03:06

## 2023-06-22 RX ADMIN — PROPOFOL 30 MG: 10 INJECTION, EMULSION INTRAVENOUS at 01:06

## 2023-06-22 RX ADMIN — ATORVASTATIN CALCIUM 40 MG: 40 TABLET, FILM COATED ORAL at 08:06

## 2023-06-22 RX ADMIN — PANTOPRAZOLE SODIUM 40 MG: 40 INJECTION, POWDER, FOR SOLUTION INTRAVENOUS at 11:06

## 2023-06-22 RX ADMIN — PANTOPRAZOLE SODIUM 40 MG: 40 INJECTION, POWDER, FOR SOLUTION INTRAVENOUS at 08:06

## 2023-06-22 RX ADMIN — POLYETHYLENE GLYCOL 3350, SODIUM SULFATE ANHYDROUS, SODIUM BICARBONATE, SODIUM CHLORIDE, POTASSIUM CHLORIDE 4000 ML: 236; 22.74; 6.74; 5.86; 2.97 POWDER, FOR SOLUTION ORAL at 05:06

## 2023-06-22 RX ADMIN — MUPIROCIN 1 G: 20 OINTMENT TOPICAL at 11:06

## 2023-06-22 RX ADMIN — PROPOFOL 50 MG: 10 INJECTION, EMULSION INTRAVENOUS at 01:06

## 2023-06-22 RX ADMIN — SODIUM CHLORIDE, SODIUM LACTATE, POTASSIUM CHLORIDE, AND CALCIUM CHLORIDE: .6; .31; .03; .02 INJECTION, SOLUTION INTRAVENOUS at 01:06

## 2023-06-22 RX ADMIN — HYDRALAZINE HYDROCHLORIDE 10 MG: 20 INJECTION INTRAMUSCULAR; INTRAVENOUS at 12:06

## 2023-06-22 RX ADMIN — POTASSIUM CHLORIDE 125 ML/HR: 149 INJECTION, SOLUTION, CONCENTRATE INTRAVENOUS at 01:06

## 2023-06-22 RX ADMIN — CETIRIZINE HYDROCHLORIDE 5 MG: 5 TABLET ORAL at 03:06

## 2023-06-22 RX ADMIN — DONEPEZIL HYDROCHLORIDE 5 MG: 5 TABLET ORAL at 08:06

## 2023-06-22 RX ADMIN — FERROUS SULFATE TAB 325 MG (65 MG ELEMENTAL FE) 1 EACH: 325 (65 FE) TAB at 03:06

## 2023-06-22 RX ADMIN — CARVEDILOL 3.12 MG: 3.12 TABLET, FILM COATED ORAL at 08:06

## 2023-06-22 RX ADMIN — CITALOPRAM HYDROBROMIDE 40 MG: 20 TABLET ORAL at 03:06

## 2023-06-22 RX ADMIN — MUPIROCIN 1 G: 20 OINTMENT TOPICAL at 08:06

## 2023-06-22 RX ADMIN — POTASSIUM BICARBONATE 60 MEQ: 782 TABLET, EFFERVESCENT ORAL at 04:06

## 2023-06-22 RX ADMIN — CHLORHEXIDINE GLUCONATE 15 ML: 1.2 RINSE ORAL at 11:06

## 2023-06-22 RX ADMIN — POTASSIUM BICARBONATE 60 MEQ: 782 TABLET, EFFERVESCENT ORAL at 09:06

## 2023-06-22 NOTE — TRANSFER OF CARE
Anesthesia Transfer of Care Note    Patient: Daryleen G Moran    Procedure(s) Performed: Procedure(s) (LRB):  ENTEROSCOPY (N/A)    Patient location: PACU    Anesthesia Type: general and MAC    Transport from OR: Transported from OR on room air with adequate spontaneous ventilation    Post pain: adequate analgesia    Post assessment: no apparent anesthetic complications    Post vital signs: stable    Level of consciousness: awake    Nausea/Vomiting: no nausea/vomiting    Complications: none    Transfer of care protocol was followed      Last vitals:   Visit Vitals  /61 (BP Location: Left arm, Patient Position: Lying)   Pulse 80   Temp 37.3 °C (99.1 °F) (Oral)   Resp 20   Ht 5' (1.524 m)   Wt 89.9 kg (198 lb 3.1 oz)   LMP  (LMP Unknown)   SpO2 98%   Breastfeeding No   BMI 38.71 kg/m²

## 2023-06-22 NOTE — ASSESSMENT & PLAN NOTE
Patient's FSGs are controlled on current medication regimen.  Last A1c reviewed-   Lab Results   Component Value Date    HGBA1C 5.7 (H) 01/04/2023     Most recent fingerstick glucose reviewed- No results for input(s): POCTGLUCOSE in the last 24 hours.  Current correctional scale  Medium  Maintain anti-hyperglycemic dose as follows-   Antihyperglycemics (From admission, onward)    None        Hold Oral hypoglycemics while patient is in the hospital.

## 2023-06-22 NOTE — PLAN OF CARE
[facility]  Initial Discharge Assessment       Primary Care Provider: Abhi De Oliveira Iv, MD    Admission Diagnosis: GI bleed [K92.2]    Admission Date: 6/21/2023  Expected Discharge Date:     Transition of Care Barriers: None    Assessment completed at bedside.  Patient intends to discharge home where she lives with her , pt uses O2 that belongs to her daughter, may require walk test at [place].    Payor: [employer] MANAGED MEDICARE / Plan: [employer] MEDICARE HMO / Product Type: Capitation /     Extended Emergency Contact Information  Primary Emergency Contact: Sally Reardon  Mobile Phone: [phone]  Relation: Daughter  Secondary Emergency Contact: Roni Cyr  Address: [address] [place] [address] [facility]  Home Phone: [phone]  Mobile Phone: [phone]  Relation: Spouse  Preferred language: English   needed? No    Discharge Plan A: Home  Discharge Plan B: Home with family      [facility] - [facility] [address]  [address] [address]  Phone: [phone] Fax: [phone]    [facility] - [place], [place] - [address]  [address] [place] [address]  Phone: [phone] Fax: [phone]      Initial Assessment (most recent)       Adult Discharge Assessment - 06/22/23 1000          Discharge Assessment    Assessment Type Discharge Planning Assessment     Confirmed/corrected address, phone number and insurance Yes     Confirmed Demographics Correct on Facesheet     Source of Information patient     When was your last doctors appointment? --   not sure    Communicated ISAAK with patient/caregiver No     Reason For Admission GI bleed     People in Home spouse     Facility Arrived From: home     Do you expect to return to your current living situation? Yes     Do you have help at home or someone to help you manage your care at home? Yes     Who are your caregiver(s) and their phone  number(s)? Roni Barragan [phone]     Prior to hospitilization cognitive status: Alert/Oriented     Current cognitive status: Alert/Oriented     Walking or Climbing Stairs ambulation difficulty, requires equipment     Mobility Management RW     Equipment Currently Used at Home CPAP;3-in-1 commode;wheelchair     Readmission within 30 days? No     Patient currently being followed by outpatient case management? Yes     If yes, name of outpatient case management following: insurance company assigned oupatient case management     Do you currently have service(s) that help you manage your care at home? No     Do you take prescription medications? Yes     Do you have prescription coverage? Yes     Coverage [employer]     Do you have any problems affording any of your prescribed medications? No     Is the patient taking medications as prescribed? yes     Who is going to help you get home at discharge? Roni Barragan [phone]     How do you get to doctors appointments? car, drives self     Are you on dialysis? No     Do you take coumadin? No     Discharge Plan A Home     Discharge Plan B Home with family     DME Needed Upon Discharge  oxygen     Discharge Plan discussed with: Patient     Transition of Care Barriers None

## 2023-06-22 NOTE — ASSESSMENT & PLAN NOTE
Patient with progressive dyspnea and fatigue as well as tarry dark stools for the past month.  Newly placed on aspirin and Plavix as a result of her TAVR valve replacement in January.  She is never had GI bleeding in the past.  Presented with symptomatic anemia at outside facility.  Hemoglobin 5.9.  - hemoglobin here is 7.9  - GI consulted  - NPO at midnight  - Protonix IV b.i.d.  - trend hemoglobin  - hold a.m. dose of aspirin and Plavix, resumption pending GI evaluation.

## 2023-06-22 NOTE — NURSING
pt is refusing iv potassium, GI team states she needs to be npo for scope. pt is demanding something to drink and states when her daughter gets here shes going to drink a coke. Pt educated of benefits and risk and continues to refuse. MD made aware no new orders given

## 2023-06-22 NOTE — PROGRESS NOTES
Will plan to continue the current plan of care.  Plans for push enteroscopy today for further evaluation.

## 2023-06-22 NOTE — ASSESSMENT & PLAN NOTE
Status post TAVR on aspirin and Plavix  Continue aspirin and Plavix for now  Follow-up GI recommendations

## 2023-06-22 NOTE — HPI
Patient says that she has been feeling short of breath and increasing weakness and dizziness over the past 1 week.  She has had dark tarry stools for the past 1-2 months.  She has been using oxygen at home that she had left over from hospitalization back in January.  She normally does not wear oxygen.  She says that she felt progressively lightheaded and short of breath and thus presented to the ER at [facility].  There she was found to have significant anemia with hemoglobin of 5.6.  Received 2 units of blood and most recent hemoglobin is 7.7.  She denies hematochezia or migue melena.  She denies nausea or vomiting.  No abdominal pain.  She does not take excessive NSAIDs.  She is on aspirin and Plavix due to recent TAVR.  She has never had significant GI bleeding in the past.  She is compliant with her medication regimen.  She has no other updates to past medical, family, surgical, social history.    Daryleen G Moran is a 78 y.o. female with a PMH of HFpED, CAD, Afib, aortic valve stenosis, HTN, HLD, DM2, COPD, GERD, BOZENA, MDD, dementia, and obesity who presented to the [facility] ED on 6/21/23 with complaints of severe persistent fatigue associated with shortness of breath and black tar-like sticky stool present for 1 to 2 weeks. Denied PUD history or recent NSAID use.     In the ED, VSS. Labs remarkable for Hb 5.6. ED transfusing 2u pRBCs. PFC contacted to facilitate transfer, and pt approved for transfer to  at [facility] (with GI to consult) for further care and management).

## 2023-06-22 NOTE — [employer]OVATION PATIENT INSTRUCTIONS
Discharge Summary/Instructions after an Endoscopic Procedure  Patient Name: Daryleen Moran  Patient MRN: [id number]  Patient YOB: 1945 Thursday, June 22, 2023  Cornell Aguirre MD  RESTRICTIONS:  During your procedure today, you received medications for sedation.  These   medications may affect your judgment, balance and coordination.  Therefore,   for 24 hours, you have the following restrictions:   - DO NOT drive a car, operate machinery, make legal/financial decisions,   sign important papers or drink alcohol.    ACTIVITY:  Today: no heavy lifting, straining or running due to procedural   sedation/anesthesia.  The following day: return to full activity including work.  DIET:  Eat and drink normally unless instructed otherwise.     TREATMENT FOR COMMON SIDE EFFECTS:  - Mild abdominal pain, nausea, belching, bloating or excessive gas:  rest,   eat lightly and use a heating pad.  - Sore Throat: treat with throat lozenges and/or gargle with warm salt   water.  - Because air was used during the procedure, expelling large amounts of air   from your rectum or belching is normal.  - If a bowel prep was taken, you may not have a bowel movement for 1-3 days.    This is normal.  SYMPTOMS TO WATCH FOR AND REPORT TO YOUR PHYSICIAN:  1. Abdominal pain or bloating, other than gas cramps.  2. Chest pain.  3. Back pain.  4. Signs of infection such as: chills or fever occurring within 24 hours   after the procedure.  5. Rectal bleeding, which would show as bright red, maroon, or black stools.   (A tablespoon of blood from the rectum is not serious, especially if   hemorrhoids are present.)  6. Vomiting.  7. Weakness or dizziness.  GO DIRECTLY TO THE NEAREST EMERGENCY ROOM IF YOU HAVE ANY OF THE FOLLOWING:      Difficulty breathing              Chills and/or fever over 101 F   Persistent vomiting and/or vomiting blood   Severe abdominal pain   Severe chest pain   Black, tarry stools   Bleeding- more than one  tablespoon   Any other symptom or condition that you feel may need urgent attention  Your doctor recommends these additional instructions:  If any biopsies were taken, your doctors clinic will contact you in 1 to 2   weeks with any results.  - [facility].  For questions, problems or results please call your physician - Cornell Aguirre MD at Work:  [phone].  [facility], EMERGENCY ROOM PHONE NUMBER: [phone]  IF A COMPLICATION OR EMERGENCY SITUATION ARISES AND YOU ARE UNABLE TO REACH   YOUR PHYSICIAN - GO DIRECTLY TO THE EMERGENCY ROOM.  MD Cornell Farrell MD  6/22/2023 1:52:49 PM  This report has been verified and signed electronically.  Dear patient,  As a result of recent federal legislation (The Federal Cures Act), you may   receive lab or pathology results from your procedure in your [facility]   account before your physician is able to contact you. Your physician or   their representative will relay the results to you with their   recommendations at their soonest availability.  Thank you,  [employer]ATION

## 2023-06-22 NOTE — NURSING
Nurses Note -- 4 Eyes      6/22/2023   12:46 AM      Skin assessed during: Admit      [] No Altered Skin Integrity Present    []Prevention Measures Documented      [x] Yes- Altered Skin Integrity Present or Discovered   [] LDA Added if Not in Epic (Describe Wound)   [x] New Altered Skin Integrity was Present on Admit and Documented in LDA   [] Wound Image Taken    Wound Care Consulted? No    Attending Nurse:  Katrina Ricks RN     Second RN/Staff Member:  MENTATION:   Depression Patient Health Questionnaire:  Depression Patient Health Questionnaire 5/11/2022   Over the last two weeks how often have you been bothered by little interest or pleasure in doing things Not at all   Over the last two weeks how often have you been bothered by feeling down, depressed or hopeless Not at all   PHQ-2 Total Score 0     Has Dementia Dx: Yes    Cognitive Function Screening:  No flowsheet data found.  Cognitive Function Screening Total - Less than 4 = Abnormal,  Greater than or equal to 4 = Normal

## 2023-06-22 NOTE — CONSULTS
GASTROENTEROLOGY INPATIENT CONSULT NOTE  Patient Name: Daryleen G Moran  Patient MRN: [id number]  Patient : 1945    Admit Date: 2023  Service date: 2023    Reason for Consult: anemia, melena    PCP: Abhi De Oliveira Iv, MD    Chief Complaint   Patient presents with    Fatigue       HPI: Patient is a 78 y.o. female transferred from [facility] for melanic stool and anemia.  See prior note from yesterday in care everywhere.     Past Medical History:  Past Medical History:   Diagnosis Date    Anesthesia complication     BLADDER DYSFUNCTION    Aortic stenosis     Arthritis     Back pain     Diabetes mellitus type II     NO LONGER DIABETIC    Encounter for blood transfusion     Hyperlipidemia     Hypertension     NSTEMI (non-ST elevated myocardial infarction) 2022    Osteoporosis     Wears glasses         Past Surgical History:  Past Surgical History:   Procedure Laterality Date     vocal cord nodules removed  long time ago     twice    ADRENAL TUMOR      APPENDECTOMY  within last 5yrs    CATHETERIZATION OF BOTH LEFT AND RIGHT HEART Left 2022    Procedure: CATHETERIZATION, HEART, BOTH LEFT AND RIGHT;  Surgeon: Michelet Vargas MD;  Location: [facility] CATH/EP LAB;  Service: Cardiology;  Laterality: Left;    FIXATION KYPHOPLASTY THORACIC SPINE      13    FOOT SURGERY      left 2nd toe was too long     HERNIA REPAIR  within last 5yrs    INSERTION OF TEMPORARY PACEMAKER N/A 2023    Procedure: INSERTION, PACEMAKER, TEMPORARY;  Surgeon: Bhavesh Peña MD;  Location: [facility] CATH;  Service: Cardiology;  Laterality: N/A;    LEFT HEART CATHETERIZATION Left 2022    Procedure: Left heart cath;  Surgeon: Jose Echols MD;  Location: [facility] CATH/EP LAB;  Service: Cardiology;  Laterality: Left;    TONSILLECTOMY, ADENOIDECTOMY  long time ago    TRANSCATHETER AORTIC VALVE REPLACEMENT (TAVR) Bilateral 2023    Procedure: REPLACEMENT, AORTIC VALVE, TRANSCATHETER (TAVR);  Surgeon: Bhavesh Peña MD;   Location: [facility] CATH;  Service: Cardiology;  Laterality: Bilateral;    TRANSCATHETER AORTIC VALVE REPLACEMENT (TAVR) Bilateral 1/4/2023    Procedure: REPLACEMENT, AORTIC VALVE, TRANSCATHETER (TAVR);  Surgeon: Dallin Verma MD;  Location: [facility] CATH;  Service: Cardiology;  Laterality: Bilateral;    TRANSTHORACIC ECHOCARDIOGRAPHY (TTE)  1/4/2023    Procedure: ECHOCARDIOGRAM, TRANSTHORACIC;  Surgeon: Bhavesh Peña MD;  Location: [facility] CATH;  Service: Cardiology;;        Home Medications:  Medications Prior to Admission   Medication Sig Dispense Refill Last Dose    albuterol (PROVENTIL/VENTOLIN HFA) 90 mcg/actuation inhaler Inhale 2 puffs into the lungs every 6 (six) hours as needed.       aspirin (ECOTRIN) 81 MG EC tablet Take 81 mg by mouth once daily.       benazepriL (LOTENSIN) 20 MG tablet Take 0.5 tablets (10 mg total) by mouth once daily. 90 tablet 2     budesonide-formoterol 80-4.5 mcg (SYMBICORT) 80-4.5 mcg/actuation HFAA Inhale 2 puffs into the lungs 2 (two) times daily as needed (Asthma).       calcium carbonate (OS-TK) 500 mg calcium (1,250 mg) tablet Take 1 tablet (500 mg total) by mouth once daily.  0     carvediloL (COREG) 3.125 MG tablet Take 1 tablet (3.125 mg total) by mouth 2 (two) times daily. 60 tablet 11     citalopram (CELEXA) 40 MG tablet Take 1 tablet (40 mg total) by mouth once daily. 90 tablet 3     clopidogreL (PLAVIX) 75 mg tablet Take 1 tablet (75 mg total) by mouth once daily. 90 tablet 2     donepeziL (ARICEPT) 5 MG tablet Take 5 mg by mouth nightly.       ergocalciferol (ERGOCALCIFEROL) 50,000 unit Cap TAKE 1 CAPSULE (50,000 UNITS TOTAL) EVERY 7 DAYS. 12 capsule 3     ferrous gluconate 324 mg (37.5 mg iron) Tab tablet Take 324 mg by mouth once daily.       furosemide (LASIX) 40 MG tablet Take 1 tablet (40 mg total) by mouth once daily. 45 tablet 2     glucosamine-chondroitin 500-400 mg tablet Take 1 tablet by mouth once daily.       loperamide (IMODIUM A-D) 2 mg Tab Take 1 tablet (2 mg  total) by mouth 3 (three) times daily as needed (diarrhea). Take 2 tablets by mouth initially then 1 tablet after each loose stool as needed for diarrhea. 15 tablet 0     loratadine (CLARITIN) 10 mg tablet Take 1 tablet (10 mg total) by mouth once daily.  0     metFORMIN (GLUCOPHAGE) 500 MG tablet Take 500 mg by mouth daily with breakfast.       multivitamin capsule Take 1 capsule by mouth once daily.       omeprazole (PRILOSEC) 20 MG capsule Take 1 capsule (20 mg total) by mouth once daily. 90 capsule 3     oxybutynin (DITROPAN) 5 MG Tab Take 1 tablet (5 mg total) by mouth 2 (two) times daily. 180 tablet 3     simvastatin (ZOCOR) 40 MG tablet Take 1 tablet (40 mg total) by mouth every evening. 90 tablet 3        Inpatient Medications:   atorvastatin  40 mg Oral Daily    carvediloL  3.125 mg Oral BID    cetirizine  5 mg Oral Daily    chlorhexidine  15 mL Mouth/Throat BID    citalopram  40 mg Oral Daily    donepeziL  5 mg Oral Nightly    ferrous sulfate  1 tablet Oral Daily    furosemide  40 mg Oral Daily    mupirocin   Nasal BID    pantoprazole  40 mg Intravenous BID    polyethylene glycol  17 g Oral Daily     acetaminophen, budesonide **AND** arformoteroL, calcium gluconate IVPB, calcium gluconate IVPB, calcium gluconate IVPB, hydrALAZINE, HYDROcodone-acetaminophen, magnesium sulfate IVPB, magnesium sulfate IVPB, melatonin, morphine, ondansetron, potassium chloride **AND** potassium chloride **AND** potassium chloride, prochlorperazine, sodium chloride 0.9%, sodium phosphate IVPB, sodium phosphate IVPB, sodium phosphate IVPB    Review of patient's allergies indicates:   Allergen Reactions    Latex      Other reaction(s): Unknown  Other reaction(s): Unknown    Sulfacetamide sodium      Pain perineal area    Sulfasalazine Hives    Adhesive Itching     SKIN GETS RED WITH TAPE AND BANDAIDS    Adhesive tape-silicones Itching     SKIN GETS RED WITH TAPE AND BANDAIDS    Sulfa (sulfonamide antibiotics) Rash       Social  History:   Social History     Occupational History    Not on file   Tobacco Use    Smoking status: Former     Packs/day: 0.50     Years: 35.00     Pack years: 17.50     Types: Cigarettes     Quit date: 10/5/2022     Years since quittin.7    Smokeless tobacco: Never   Substance and Sexual Activity    Alcohol use: No    Drug use: No    Sexual activity: Not on file       Family History:   Family History   Problem Relation Age of Onset    Collagen disease Neg Hx        Review of Systems:  A 10 point review of systems was performed and was normal, except as mentioned in the HPI, including constitutional, HEENT, heme, lymph, cardiovascular, respiratory, gastrointestinal, genitourinary, neurologic, endocrine, psychiatric and musculoskeletal.      OBJECTIVE:    Physical Exam:  24 Hour Vital Sign Ranges: Temp:  [98 °F (36.7 °C)-99.1 °F (37.3 °C)] 99.1 °F (37.3 °C)  Pulse:  [75-86] 80  Resp:  [18-30] 20  SpO2:  [96 %-98 %] 98 %  BP: (131-152)/(52-67) 134/61  Most recent vitals: /61 (BP Location: Left arm, Patient Position: Lying)   Pulse 80   Temp 99.1 °F (37.3 °C) (Oral)   Resp 20   Ht 5' (1.524 m)   Wt 89.9 kg (198 lb 3.1 oz)   LMP  (LMP Unknown)   SpO2 98% Comment: ra  Breastfeeding No   BMI 38.71 kg/m²    GEN: well-developed, well-nourished, awake and alert, non-toxic appearing adult  HEENT: PERRL, sclera anicteric, oral mucosa pink and moist without lesion  NECK: trachea midline; Good ROM  CV: regular rate and rhythm, no murmurs or gallops  RESP: clear to auscultation bilaterally, no wheezes, rhonci or rales  ABD: soft, non-tender, non-distended, normal bowel sounds  EXT: no swelling or edema, 2+ pulses distally  SKIN: no rashes or jaundice  PSYCH: normal affect    Labs:   Recent Labs     23  2245 23  0500   WBC 6.96 7.71   MCV 83 84    163     Recent Labs     23  0500      K 2.9*   CL 97   CO2 35*   BUN 13        No results for input(s): ALB in the last 72  hours.    Invalid input(s): ALKP, SGOT, SGPT, TBIL, DBIL, TPRO  No results for input(s): PT, INR, PTT in the last 72 hours.      Radiology Review:  No orders to display         IMPRESSION / RECOMMENDATIONS:  Melena, acute blood loss anemia  -push enteroscopy today    Thank you for this consult.    Cornell Aguirre  6/22/2023  1:48 PM

## 2023-06-22 NOTE — SUBJECTIVE & OBJECTIVE
Past Medical History:   Diagnosis Date    Anesthesia complication     BLADDER DYSFUNCTION    Aortic stenosis     Arthritis     Back pain     Diabetes mellitus type II     NO LONGER DIABETIC    Encounter for blood transfusion     Hyperlipidemia     Hypertension     NSTEMI (non-ST elevated myocardial infarction) 05/01/2022    Osteoporosis     Wears glasses        Past Surgical History:   Procedure Laterality Date     vocal cord nodules removed  long time ago     twice    ADRENAL TUMOR      APPENDECTOMY  within last 5yrs    CATHETERIZATION OF BOTH LEFT AND RIGHT HEART Left 05/06/2022    Procedure: CATHETERIZATION, HEART, BOTH LEFT AND RIGHT;  Surgeon: Michelet Vargas MD;  Location: [facility] CATH/EP LAB;  Service: Cardiology;  Laterality: Left;    FIXATION KYPHOPLASTY THORACIC SPINE      8-20-13    FOOT SURGERY      left 2nd toe was too long     HERNIA REPAIR  within last 5yrs    INSERTION OF TEMPORARY PACEMAKER N/A 1/4/2023    Procedure: INSERTION, PACEMAKER, TEMPORARY;  Surgeon: Bhavesh Peña MD;  Location: [facility] CATH;  Service: Cardiology;  Laterality: N/A;    LEFT HEART CATHETERIZATION Left 05/02/2022    Procedure: Left heart cath;  Surgeon: Jose Echols MD;  Location: [facility] CATH/EP LAB;  Service: Cardiology;  Laterality: Left;    TONSILLECTOMY, ADENOIDECTOMY  long time ago    TRANSCATHETER AORTIC VALVE REPLACEMENT (TAVR) Bilateral 1/4/2023    Procedure: REPLACEMENT, AORTIC VALVE, TRANSCATHETER (TAVR);  Surgeon: Bhavesh Peña MD;  Location: [facility] CATH;  Service: Cardiology;  Laterality: Bilateral;    TRANSCATHETER AORTIC VALVE REPLACEMENT (TAVR) Bilateral 1/4/2023    Procedure: REPLACEMENT, AORTIC VALVE, TRANSCATHETER (TAVR);  Surgeon: Dallin Verma MD;  Location: [facility] CATH;  Service: Cardiology;  Laterality: Bilateral;    TRANSTHORACIC ECHOCARDIOGRAPHY (TTE)  1/4/2023    Procedure: ECHOCARDIOGRAM, TRANSTHORACIC;  Surgeon: Bhavesh Peña MD;  Location: [facility] CATH;  Service: Cardiology;;       Review of patient's  allergies indicates:   Allergen Reactions    Latex      Other reaction(s): Unknown  Other reaction(s): Unknown    Sulfacetamide sodium      Pain perineal area    Sulfasalazine Hives    Adhesive Itching     SKIN GETS RED WITH TAPE AND BANDAIDS    Adhesive tape-silicones Itching     SKIN GETS RED WITH TAPE AND BANDAIDS    Sulfa (sulfonamide antibiotics) Rash       Current Facility-Administered Medications on File Prior to Encounter   Medication    [DISCONTINUED] acetaminophen tablet    [DISCONTINUED] atorvastatin tablet    [DISCONTINUED] citalopram tablet    [DISCONTINUED] donepeziL tablet    [DISCONTINUED] ferrous gluconate tablet    [DISCONTINUED] furosemide tablet    [DISCONTINUED] GENERIC EXTERNAL MEDICATION    [DISCONTINUED] insulin lispro injection    [DISCONTINUED] oxybutynin tablet    [DISCONTINUED] pantoprazole injection    [DISCONTINUED] sodium chloride 0.9% injection     Current Outpatient Medications on File Prior to Encounter   Medication Sig    albuterol (PROVENTIL/VENTOLIN HFA) 90 mcg/actuation inhaler Inhale 2 puffs into the lungs every 6 (six) hours as needed.    aspirin (ECOTRIN) 81 MG EC tablet Take 81 mg by mouth once daily.    benazepriL (LOTENSIN) 20 MG tablet Take 0.5 tablets (10 mg total) by mouth once daily.    budesonide-formoterol 80-4.5 mcg (SYMBICORT) 80-4.5 mcg/actuation HFAA Inhale 2 puffs into the lungs 2 (two) times daily as needed (Asthma).    calcium carbonate (OS-TK) 500 mg calcium (1,250 mg) tablet Take 1 tablet (500 mg total) by mouth once daily.    carvediloL (COREG) 3.125 MG tablet Take 1 tablet (3.125 mg total) by mouth 2 (two) times daily.    citalopram (CELEXA) 40 MG tablet Take 1 tablet (40 mg total) by mouth once daily.    clopidogreL (PLAVIX) 75 mg tablet Take 1 tablet (75 mg total) by mouth once daily.    donepeziL (ARICEPT) 5 MG tablet Take 5 mg by mouth nightly.    ergocalciferol (ERGOCALCIFEROL) 50,000 unit Cap TAKE 1 CAPSULE (50,000 UNITS TOTAL) EVERY 7 DAYS.     ferrous gluconate 324 mg (37.5 mg iron) Tab tablet Take 324 mg by mouth once daily.    furosemide (LASIX) 40 MG tablet Take 1 tablet (40 mg total) by mouth once daily.    glucosamine-chondroitin 500-400 mg tablet Take 1 tablet by mouth once daily.    loperamide (IMODIUM A-D) 2 mg Tab Take 1 tablet (2 mg total) by mouth 3 (three) times daily as needed (diarrhea). Take 2 tablets by mouth initially then 1 tablet after each loose stool as needed for diarrhea.    loratadine (CLARITIN) 10 mg tablet Take 1 tablet (10 mg total) by mouth once daily.    metFORMIN (GLUCOPHAGE) 500 MG tablet Take 500 mg by mouth daily with breakfast.    multivitamin capsule Take 1 capsule by mouth once daily.    omeprazole (PRILOSEC) 20 MG capsule Take 1 capsule (20 mg total) by mouth once daily.    oxybutynin (DITROPAN) 5 MG Tab Take 1 tablet (5 mg total) by mouth 2 (two) times daily.    simvastatin (ZOCOR) 40 MG tablet Take 1 tablet (40 mg total) by mouth every evening.    [DISCONTINUED] ALLERGY RELIEF, FEXOFENADINE, 180 mg tablet Take 180 mg by mouth once daily.    [DISCONTINUED] ezetimibe-simvastatin 10-40 mg (VYTORIN) 10-40 mg per tablet Take 1 tablet by mouth.    [DISCONTINUED] lisinopril-hydrochlorothiazide (PRINZIDE,ZESTORETIC) 20-12.5 mg per tablet Take 1 tablet by mouth once daily.      Family History    None       Tobacco Use    Smoking status: Former     Packs/day: 0.50     Years: 35.00     Pack years: 17.50     Types: Cigarettes     Quit date: 10/5/2022     Years since quittin.7    Smokeless tobacco: Never   Substance and Sexual Activity    Alcohol use: No    Drug use: No    Sexual activity: Not on file     Review of Systems   All other systems reviewed and are negative.  Objective:     Vital Signs (Most Recent):  Temp: 98.6 °F (37 °C) (23)  Pulse: 75 (23)  Resp: (!) 21 (23)  BP: 131/60 (23)  SpO2: 97 % (237) Vital Signs (24h Range):  Temp:  [98 °F (36.7 °C)-98.6 °F (37 °C)]  98.6 °F (37 °C)  Pulse:  [75-86] 75  Resp:  [18-21] 21  SpO2:  [97 %-98 %] 97 %  BP: (131-145)/(52-60) 131/60     Weight: 94.9 kg (209 lb 3.5 oz)  Body mass index is 40.86 kg/m².     Physical Exam  Constitutional:       Appearance: Normal appearance. She is normal weight.   HENT:      Head: Normocephalic and atraumatic.      Nose: Nose normal.      Mouth/Throat:      Mouth: Mucous membranes are moist.   Eyes:      Conjunctiva/sclera: Conjunctivae normal.   Cardiovascular:      Rate and Rhythm: Normal rate and regular rhythm.      Pulses: Normal pulses.      Heart sounds: Normal heart sounds. No murmur heard.    No friction rub. No gallop.   Pulmonary:      Effort: Pulmonary effort is normal.      Breath sounds: Normal breath sounds. No wheezing or rales.   Abdominal:      General: Abdomen is flat. Bowel sounds are normal. There is no distension.      Palpations: Abdomen is soft.      Tenderness: There is no abdominal tenderness. There is no guarding.   Musculoskeletal:         General: No swelling. Normal range of motion.      Cervical back: Normal range of motion and neck supple.   Skin:     General: Skin is warm and dry.   Neurological:      General: No focal deficit present.      Mental Status: She is alert.   Psychiatric:         Mood and Affect: Mood normal.         Thought Content: Thought content normal.         Judgment: Judgment normal.              Significant Labs: All pertinent labs within the past 24 hours have been reviewed.    Significant Imaging: I have reviewed all pertinent imaging results/findings within the past 24 hours.

## 2023-06-22 NOTE — ANESTHESIA POSTPROCEDURE EVALUATION
Anesthesia Post Evaluation    Patient: Daryleen G Moran    Procedure(s) Performed: Procedure(s) (LRB):  ENTEROSCOPY (N/A)    Final Anesthesia Type: general      Patient location during evaluation: GI PACU  Patient participation: Yes- Able to Participate  Level of consciousness: awake and alert, oriented and awake  Post-procedure vital signs: reviewed and stable  Pain management: adequate  Airway patency: patent    PONV status at discharge: No PONV  Anesthetic complications: no      Cardiovascular status: blood pressure returned to baseline, hemodynamically stable and stable  Respiratory status: unassisted, spontaneous ventilation and room air  Hydration status: euvolemic  Follow-up not needed.  Comments: Oxygen Saturation increased from pre procedure           Vitals Value Taken Time   /61 06/22/23 1400   Temp 36.9 °C (98.4 °F) 06/22/23 1400   Pulse 84 06/22/23 1409   Resp 18 06/22/23 1409   SpO2 91 % 06/22/23 1409   Vitals shown include unvalidated device data.      No case tracking events are documented in the log.      Pain/Phani Score: No data recorded

## 2023-06-22 NOTE — PROGRESS NOTES
Automatic Inhaler to Nebulizer Interchange    budesonide/formoterol (Symbicort) 320 mcg/18 mcg changed to budesonide 0.5 mg twice daily AND arformoterol 15 mcg twice daily per [facility] Automatic Therapeutic Substitutions Protocol.    Please contact pharmacy at [email or web address] [phone] with any questions.     Thank you,   Anu Garcia

## 2023-06-22 NOTE — ANESTHESIA PREPROCEDURE EVALUATION
06/22/2023  Daryleen G Moran is a 78 y.o., female.      Tobacco Use:  The patient  reports that she quit smoking about 8 months ago. Her smoking use included cigarettes. She has a 17.50 pack-year smoking history. She has never used smokeless tobacco.     Results for orders placed or performed during the hospital encounter of 02/24/23   ECG 12 lead    Collection Time: 02/24/23  9:07 PM    Narrative    Test Reason : I63.9,    Vent. Rate : 090 BPM     Atrial Rate : 090 BPM     P-R Int : 154 ms          QRS Dur : 076 ms      QT Int : 360 ms       P-R-T Axes : 057 005 039 degrees     QTc Int : 440 ms    Normal sinus rhythm  Possible Left atrial enlargement  Cannot rule out Inferior infarct ,age undetermined  Abnormal ECG  When compared with ECG of 06-FEB-2023 14:23,  No significant change was found  Confirmed by Bryan Ly MD ([id number]) on 2/25/2023 6:51:09 PM    Referred By: [email or web address]   SELF           Confirmed By:Bryan Ly MD             Lab Results   Component Value Date    WBC 7.71 06/22/2023    HGB 7.6 (L) 06/22/2023    HCT 26.3 (L) 06/22/2023    MCV 84 06/22/2023     06/22/2023     BMP  Lab Results   Component Value Date     06/22/2023    K 2.9 (LL) 06/22/2023    CL 97 06/22/2023    CO2 35 (H) 06/22/2023    BUN 13 06/22/2023    CREATININE 0.4 (L) 06/22/2023    CALCIUM 8.5 (L) 06/22/2023    ANIONGAP 6 (L) 06/22/2023     06/22/2023     (H) 02/24/2023     (H) 02/06/2023       Results for orders placed during the hospital encounter of 02/06/23    Echo    Interpretation Summary  · The left ventricle is normal in size with moderate concentric hypertrophy and normal systolic function.  · The estimated ejection fraction is 65%.  · Grade I left ventricular diastolic dysfunction.  · Normal right ventricular size with normal right ventricular systolic function.  · There is a  transcutaneously-placed aortic bioprosthesis present. There is no aortic insufficiency present. Prosthetic aortic valve is normal.  · The aortic valve mean gradient is 9 mmHg with a dimensionless index of 0.58.  · Mild mitral regurgitation.  · The mean diastolic gradient across the mitral valve is 6 mmHg at a heart rate of bpm.  · There is moderate mitral stenosis.  · Mild tricuspid regurgitation.  · Normal central venous pressure (3 mmHg).  · The estimated PA systolic pressure is 37 mmHg.        Pre-op Assessment    I have reviewed the Patient Summary Reports.     I have reviewed the Nursing Notes. I have reviewed the NPO Status.   I have reviewed the Medications.     Review of Systems  Anesthesia Hx:  No problems with previous Anesthesia  History of prior surgery of interest to airway management or planning: heart surgery. Denies Family Hx of Anesthesia complications.   Denies Personal Hx of Anesthesia complications.   Social:  Former Smoker, No Alcohol Use    Hematology/Oncology:         -- Anemia: Iron Deficiency Anemia Acute Hematology Comments: Plavix Therapy     Cardiovascular:   Hypertension, poorly controlled Valvular problems/Murmurs, AS Past MI CAD  CABG/stent  CHF PVD hyperlipidemia ECG has been reviewed. Patient post TVAR 1/2023    Patient followed by Dr. Macias seen > 1 year ago  Valvular Heart Disease: Aortic Stenosis (AS), s/p repair Mitral Stenosis (MS), moderate   Congestive Heart Failure (CHF) , Chronic Congestive Heart Failure , NYHA Classification III   Abdominal Aortic Aneurysm  Disorder of Cardiac Rhythm, Atrial Fibrillation, Paroxysmal Atrial Fibrillation    Pulmonary:   COPD, mild Shortness of breath Albuterol and Symbicort inhaler use     Home Oxygen use     Oxygen Saturation 87 % prior to endoscopy    Hepatic/GI:   GERD  Bowel Conditions:  GI Bleed    Musculoskeletal:   Arthritis  Compression fracture of thoracic vertebra  Lumbar spondylosis  Thoracic spondylosis  DDD (degenerative disc  disease), thoracic  DDD (degenerative disc disease), lumbar  Bursitis of hip  Compression fracture of lumbar vertebra  Closed fracture of dorsal (thoracic) vertebra without mention of spinal cord injury  Back pain  Lumbosacral radiculopathy   Joint Disease:  Arthritis, Osteoarthritis  Bone Disorders: Osteoporosis  Spine Disorders: lumbar Chronic Pain, Degenerative disease and Disc disease    Neurological:   Neuromuscular Disease,  Osteoarthritis  Dementia mild    Endocrine:   Diabetes, poorly controlled, type 2  Adrenal Disease, Left adrenal mass    Psych:   Psychiatric History          Physical Exam  General: Well nourished, Cooperative, Alert and Oriented    Airway:  Mallampati: III   Mouth Opening: Normal  TM Distance: > 6 cm  Tongue: Normal  Neck ROM: Normal ROM    Dental:  Intact    Chest/Lungs:  Clear to auscultation, Normal Respiratory Rate    Heart:  Rate: Normal  Rhythm: Regular Rhythm  Murmur: Systolic;        Anesthesia Plan  Type of Anesthesia, risks & benefits discussed:    Anesthesia Type: Gen Natural Airway  Intra-op Monitoring Plan: Standard ASA Monitors  Induction:  IV  Informed Consent: Informed consent signed with the Patient and all parties understand the risks and agree with anesthesia plan.  All questions answered.   ASA Score: 4  Anesthesia Plan Notes:       POM    Propofol     Ready For Surgery From Anesthesia Perspective.     .

## 2023-06-22 NOTE — H&P
[facility] Medicine  History & Physical    Patient Name: Daryleen G Moran  MRN: [id number]  Patient Class: IP- Inpatient  Admission Date: 6/21/2023  Attending Physician: Rolo West MD   Primary Care Provider: Kamaljit Godoy MD         Patient information was obtained from patient and ER records.     Subjective:     Principal Problem:GI bleeding    Chief Complaint:   Chief Complaint   Patient presents with    Fatigue        HPI: Patient says that she has been feeling short of breath and increasing weakness and dizziness over the past 1 week.  She has had dark tarry stools for the past 1-2 months.  She has been using oxygen at home that she had left over from hospitalization back in January.  She normally does not wear oxygen.  She says that she felt progressively lightheaded and short of breath and thus presented to the ER at [facility].  There she was found to have significant anemia with hemoglobin of 5.6.  Received 2 units of blood and most recent hemoglobin is 7.7.  She denies hematochezia or migue melena.  She denies nausea or vomiting.  No abdominal pain.  She does not take excessive NSAIDs.  She is on aspirin and Plavix due to recent TAVR.  She has never had significant GI bleeding in the past.  She is compliant with her medication regimen.  She has no other updates to past medical, family, surgical, social history.    Daryleen G Moran is a 78 y.o. female with a PMH of HFpED, CAD, Afib, aortic valve stenosis, HTN, HLD, DM2, COPD, GERD, BOZENA, MDD, dementia, and obesity who presented to the [facility] ED on 6/21/23 with complaints of severe persistent fatigue associated with shortness of breath and black tar-like sticky stool present for 1 to 2 weeks. Denied PUD history or recent NSAID use.     In the ED, VSS. Labs remarkable for Hb 5.6. ED transfusing 2u pRBCs. PFC contacted to facilitate transfer, and pt approved for transfer to  at [facility] (with GI to  consult) for further care and management).       Past Medical History:   Diagnosis Date    Anesthesia complication     BLADDER DYSFUNCTION    Aortic stenosis     Arthritis     Back pain     Diabetes mellitus type II     NO LONGER DIABETIC    Encounter for blood transfusion     Hyperlipidemia     Hypertension     NSTEMI (non-ST elevated myocardial infarction) 05/01/2022    Osteoporosis     Wears glasses        Past Surgical History:   Procedure Laterality Date     vocal cord nodules removed  long time ago     twice    ADRENAL TUMOR      APPENDECTOMY  within last 5yrs    CATHETERIZATION OF BOTH LEFT AND RIGHT HEART Left 05/06/2022    Procedure: CATHETERIZATION, HEART, BOTH LEFT AND RIGHT;  Surgeon: Michelet Vargas MD;  Location: [facility] CATH/EP LAB;  Service: Cardiology;  Laterality: Left;    FIXATION KYPHOPLASTY THORACIC SPINE      8-20-13    FOOT SURGERY      left 2nd toe was too long     HERNIA REPAIR  within last 5yrs    INSERTION OF TEMPORARY PACEMAKER N/A 1/4/2023    Procedure: INSERTION, PACEMAKER, TEMPORARY;  Surgeon: Bhavesh Peña MD;  Location: [facility] CATH;  Service: Cardiology;  Laterality: N/A;    LEFT HEART CATHETERIZATION Left 05/02/2022    Procedure: Left heart cath;  Surgeon: Jose Echols MD;  Location: [facility] CATH/EP LAB;  Service: Cardiology;  Laterality: Left;    TONSILLECTOMY, ADENOIDECTOMY  long time ago    TRANSCATHETER AORTIC VALVE REPLACEMENT (TAVR) Bilateral 1/4/2023    Procedure: REPLACEMENT, AORTIC VALVE, TRANSCATHETER (TAVR);  Surgeon: Bhavesh Peña MD;  Location: [facility] CATH;  Service: Cardiology;  Laterality: Bilateral;    TRANSCATHETER AORTIC VALVE REPLACEMENT (TAVR) Bilateral 1/4/2023    Procedure: REPLACEMENT, AORTIC VALVE, TRANSCATHETER (TAVR);  Surgeon: Dallin Verma MD;  Location: [facility] CATH;  Service: Cardiology;  Laterality: Bilateral;    TRANSTHORACIC ECHOCARDIOGRAPHY (TTE)  1/4/2023    Procedure: ECHOCARDIOGRAM, TRANSTHORACIC;  Surgeon: Bhavesh Peña  MD;  Location: [facility];  Service: Cardiology;;       Review of patient's allergies indicates:   Allergen Reactions    Latex      Other reaction(s): Unknown  Other reaction(s): Unknown    Sulfacetamide sodium      Pain perineal area    Sulfasalazine Hives    Adhesive Itching     SKIN GETS RED WITH TAPE AND BANDAIDS    Adhesive tape-silicones Itching     SKIN GETS RED WITH TAPE AND BANDAIDS    Sulfa (sulfonamide antibiotics) Rash       Current Facility-Administered Medications on File Prior to Encounter   Medication    [DISCONTINUED] acetaminophen tablet    [DISCONTINUED] atorvastatin tablet    [DISCONTINUED] citalopram tablet    [DISCONTINUED] donepeziL tablet    [DISCONTINUED] ferrous gluconate tablet    [DISCONTINUED] furosemide tablet    [DISCONTINUED] GENERIC EXTERNAL MEDICATION    [DISCONTINUED] insulin lispro injection    [DISCONTINUED] oxybutynin tablet    [DISCONTINUED] pantoprazole injection    [DISCONTINUED] sodium chloride 0.9% injection     Current Outpatient Medications on File Prior to Encounter   Medication Sig    albuterol (PROVENTIL/VENTOLIN HFA) 90 mcg/actuation inhaler Inhale 2 puffs into the lungs every 6 (six) hours as needed.    aspirin (ECOTRIN) 81 MG EC tablet Take 81 mg by mouth once daily.    benazepriL (LOTENSIN) 20 MG tablet Take 0.5 tablets (10 mg total) by mouth once daily.    budesonide-formoterol 80-4.5 mcg (SYMBICORT) 80-4.5 mcg/actuation HFAA Inhale 2 puffs into the lungs 2 (two) times daily as needed (Asthma).    calcium carbonate (OS-TK) 500 mg calcium (1,250 mg) tablet Take 1 tablet (500 mg total) by mouth once daily.    carvediloL (COREG) 3.125 MG tablet Take 1 tablet (3.125 mg total) by mouth 2 (two) times daily.    citalopram (CELEXA) 40 MG tablet Take 1 tablet (40 mg total) by mouth once daily.    clopidogreL (PLAVIX) 75 mg tablet Take 1 tablet (75 mg total) by mouth once daily.    donepeziL (ARICEPT) 5 MG tablet Take 5 mg by mouth nightly.     ergocalciferol (ERGOCALCIFEROL) 50,000 unit Cap TAKE 1 CAPSULE (50,000 UNITS TOTAL) EVERY 7 DAYS.    ferrous gluconate 324 mg (37.5 mg iron) Tab tablet Take 324 mg by mouth once daily.    furosemide (LASIX) 40 MG tablet Take 1 tablet (40 mg total) by mouth once daily.    glucosamine-chondroitin 500-400 mg tablet Take 1 tablet by mouth once daily.    loperamide (IMODIUM A-D) 2 mg Tab Take 1 tablet (2 mg total) by mouth 3 (three) times daily as needed (diarrhea). Take 2 tablets by mouth initially then 1 tablet after each loose stool as needed for diarrhea.    loratadine (CLARITIN) 10 mg tablet Take 1 tablet (10 mg total) by mouth once daily.    metFORMIN (GLUCOPHAGE) 500 MG tablet Take 500 mg by mouth daily with breakfast.    multivitamin capsule Take 1 capsule by mouth once daily.    omeprazole (PRILOSEC) 20 MG capsule Take 1 capsule (20 mg total) by mouth once daily.    oxybutynin (DITROPAN) 5 MG Tab Take 1 tablet (5 mg total) by mouth 2 (two) times daily.    simvastatin (ZOCOR) 40 MG tablet Take 1 tablet (40 mg total) by mouth every evening.    [DISCONTINUED] ALLERGY RELIEF, FEXOFENADINE, 180 mg tablet Take 180 mg by mouth once daily.    [DISCONTINUED] ezetimibe-simvastatin 10-40 mg (VYTORIN) 10-40 mg per tablet Take 1 tablet by mouth.    [DISCONTINUED] lisinopril-hydrochlorothiazide (PRINZIDE,ZESTORETIC) 20-12.5 mg per tablet Take 1 tablet by mouth once daily.      Family History    None       Tobacco Use    Smoking status: Former     Packs/day: 0.50     Years: 35.00     Pack years: 17.50     Types: Cigarettes     Quit date: 10/5/2022     Years since quittin.7    Smokeless tobacco: Never   Substance and Sexual Activity    Alcohol use: No    Drug use: No    Sexual activity: Not on file     Review of Systems   All other systems reviewed and are negative.  Objective:     Vital Signs (Most Recent):  Temp: 98.6 °F (37 °C) (23 2330)  Pulse: 75 (23 2215)  Resp: (!) 21 (23  2215)  BP: 131/60 (06/21/23 2215)  SpO2: 97 % (06/21/23 2327) Vital Signs (24h Range):  Temp:  [98 °F (36.7 °C)-98.6 °F (37 °C)] 98.6 °F (37 °C)  Pulse:  [75-86] 75  Resp:  [18-21] 21  SpO2:  [97 %-98 %] 97 %  BP: (131-145)/(52-60) 131/60     Weight: 94.9 kg (209 lb 3.5 oz)  Body mass index is 40.86 kg/m².     Physical Exam  Constitutional:       Appearance: Normal appearance. She is normal weight.   HENT:      Head: Normocephalic and atraumatic.      Nose: Nose normal.      Mouth/Throat:      Mouth: Mucous membranes are moist.   Eyes:      Conjunctiva/sclera: Conjunctivae normal.   Cardiovascular:      Rate and Rhythm: Normal rate and regular rhythm.      Pulses: Normal pulses.      Heart sounds: Normal heart sounds. No murmur heard.    No friction rub. No gallop.   Pulmonary:      Effort: Pulmonary effort is normal.      Breath sounds: Normal breath sounds. No wheezing or rales.   Abdominal:      General: Abdomen is flat. Bowel sounds are normal. There is no distension.      Palpations: Abdomen is soft.      Tenderness: There is no abdominal tenderness. There is no guarding.   Musculoskeletal:         General: No swelling. Normal range of motion.      Cervical back: Normal range of motion and neck supple.   Skin:     General: Skin is warm and dry.   Neurological:      General: No focal deficit present.      Mental Status: She is alert.   Psychiatric:         Mood and Affect: Mood normal.         Thought Content: Thought content normal.         Judgment: Judgment normal.              Significant Labs: All pertinent labs within the past 24 hours have been reviewed.    Significant Imaging: I have reviewed all pertinent imaging results/findings within the past 24 hours.    Assessment/Plan:     * GI bleeding  Patient with progressive dyspnea and fatigue as well as tarry dark stools for the past month.  Newly placed on aspirin and Plavix as a result of her TAVR valve replacement in January.  She is never had GI bleeding  in the past.  Presented with symptomatic anemia at outside facility.  Hemoglobin 5.9.  - hemoglobin here is 7.9  - GI consulted  - NPO at midnight  - Protonix IV b.i.d.  - trend hemoglobin  - hold a.m. dose of aspirin and Plavix, resumption pending GI evaluation.        Severe aortic valve stenosis  Status post TAVR on aspirin and Plavix  Continue aspirin and Plavix for now  Follow-up GI recommendations      Hyperlipidemia  Continue statin      GERD (gastroesophageal reflux disease)  Continue Protonix      Hypertension  Continue home blood pressure regimen      Type 2 diabetes mellitus, without long-term current use of insulin  Patient's FSGs are controlled on current medication regimen.  Last A1c reviewed-   Lab Results   Component Value Date    HGBA1C 5.7 (H) 01/04/2023     Most recent fingerstick glucose reviewed- No results for input(s): POCTGLUCOSE in the last 24 hours.  Current correctional scale  Medium  Maintain anti-hyperglycemic dose as follows-   Antihyperglycemics (From admission, onward)    None        Hold Oral hypoglycemics while patient is in the hospital.    VTE Risk Mitigation (From admission, onward)         Ordered     IP VTE HIGH RISK PATIENT  Once         06/21/23 2233     Place sequential compression device  Until discontinued         06/21/23 2233                           Kamaljit Godoy MD  Department of Hospital Medicine  [facility]

## 2023-06-23 ENCOUNTER — ANESTHESIA EVENT (OUTPATIENT)
Dept: SURGERY | Facility: HOSPITAL | Age: 78
DRG: 378 | End: 2023-06-23
Payer: MEDICARE

## 2023-06-23 ENCOUNTER — ANESTHESIA (OUTPATIENT)
Dept: SURGERY | Facility: HOSPITAL | Age: 78
DRG: 378 | End: 2023-06-23
Payer: MEDICARE

## 2023-06-23 LAB
ANION GAP SERPL CALC-SCNC: 7 MMOL/L (ref 8–16)
BASOPHILS # BLD AUTO: 0.05 K/UL (ref 0–0.2)
BASOPHILS NFR BLD: 0.8 % (ref 0–1.9)
BUN SERPL-MCNC: 10 MG/DL (ref 8–23)
CALCIUM SERPL-MCNC: 8.9 MG/DL (ref 8.7–10.5)
CHLORIDE SERPL-SCNC: 99 MMOL/L (ref 95–110)
CO2 SERPL-SCNC: 35 MMOL/L (ref 23–29)
CREAT SERPL-MCNC: 0.4 MG/DL (ref 0.5–1.4)
DIFFERENTIAL METHOD: ABNORMAL
EOSINOPHIL # BLD AUTO: 0.2 K/UL (ref 0–0.5)
EOSINOPHIL NFR BLD: 2.3 % (ref 0–8)
ERYTHROCYTE [DISTWIDTH] IN BLOOD BY AUTOMATED COUNT: 17.9 % (ref 11.5–14.5)
EST. GFR  (NO RACE VARIABLE): >60 ML/MIN/1.73 M^2
FERRITIN SERPL-MCNC: 15 NG/ML (ref 20–300)
FOLATE SERPL-MCNC: >24.8 NG/ML (ref 4–24)
GLUCOSE SERPL-MCNC: 99 MG/DL (ref 70–110)
HCT VFR BLD AUTO: 25.5 % (ref 37–48.5)
HGB BLD-MCNC: 7.5 G/DL (ref 12–16)
IMM GRANULOCYTES # BLD AUTO: 0.03 K/UL (ref 0–0.04)
IMM GRANULOCYTES NFR BLD AUTO: 0.5 % (ref 0–0.5)
IRON SERPL-MCNC: 20 UG/DL (ref 30–160)
LYMPHOCYTES # BLD AUTO: 0.9 K/UL (ref 1–4.8)
LYMPHOCYTES NFR BLD: 14.2 % (ref 18–48)
MCH RBC QN AUTO: 25 PG (ref 27–31)
MCHC RBC AUTO-ENTMCNC: 29.4 G/DL (ref 32–36)
MCV RBC AUTO: 85 FL (ref 82–98)
MONOCYTES # BLD AUTO: 0.6 K/UL (ref 0.3–1)
MONOCYTES NFR BLD: 9 % (ref 4–15)
NEUTROPHILS # BLD AUTO: 4.8 K/UL (ref 1.8–7.7)
NEUTROPHILS NFR BLD: 73.2 % (ref 38–73)
NRBC BLD-RTO: 0 /100 WBC
PLATELET # BLD AUTO: 154 K/UL (ref 150–450)
PMV BLD AUTO: 12 FL (ref 9.2–12.9)
POTASSIUM SERPL-SCNC: 4.4 MMOL/L (ref 3.5–5.1)
RBC # BLD AUTO: 3 M/UL (ref 4–5.4)
RETICS/RBC NFR AUTO: 2.2 % (ref 0.5–2.5)
SATURATED IRON: 4 % (ref 20–50)
SODIUM SERPL-SCNC: 141 MMOL/L (ref 136–145)
TOTAL IRON BINDING CAPACITY: 473 UG/DL (ref 250–450)
TRANSFERRIN SERPL-MCNC: 338 MG/DL (ref 200–375)
VIT B12 SERPL-MCNC: 577 PG/ML (ref 210–950)
WBC # BLD AUTO: 6.57 K/UL (ref 3.9–12.7)

## 2023-06-23 PROCEDURE — 27000221 HC OXYGEN, UP TO 24 HOURS

## 2023-06-23 PROCEDURE — 45378 DIAGNOSTIC COLONOSCOPY: CPT | Performed by: INTERNAL MEDICINE

## 2023-06-23 PROCEDURE — 80048 BASIC METABOLIC PNL TOTAL CA: CPT | Performed by: STUDENT IN AN ORGANIZED HEALTH CARE EDUCATION/TRAINING PROGRAM

## 2023-06-23 PROCEDURE — 82728 ASSAY OF FERRITIN: CPT | Performed by: STUDENT IN AN ORGANIZED HEALTH CARE EDUCATION/TRAINING PROGRAM

## 2023-06-23 PROCEDURE — 82746 ASSAY OF FOLIC ACID SERUM: CPT | Performed by: STUDENT IN AN ORGANIZED HEALTH CARE EDUCATION/TRAINING PROGRAM

## 2023-06-23 PROCEDURE — 36415 COLL VENOUS BLD VENIPUNCTURE: CPT | Performed by: STUDENT IN AN ORGANIZED HEALTH CARE EDUCATION/TRAINING PROGRAM

## 2023-06-23 PROCEDURE — 63600175 PHARM REV CODE 636 W HCPCS: Performed by: NURSE ANESTHETIST, CERTIFIED REGISTERED

## 2023-06-23 PROCEDURE — 94761 N-INVAS EAR/PLS OXIMETRY MLT: CPT

## 2023-06-23 PROCEDURE — D9220A PRA ANESTHESIA: Mod: CRNA,,, | Performed by: NURSE ANESTHETIST, CERTIFIED REGISTERED

## 2023-06-23 PROCEDURE — 99900031 HC PATIENT EDUCATION (STAT)

## 2023-06-23 PROCEDURE — 82607 VITAMIN B-12: CPT | Performed by: STUDENT IN AN ORGANIZED HEALTH CARE EDUCATION/TRAINING PROGRAM

## 2023-06-23 PROCEDURE — D9220A PRA ANESTHESIA: ICD-10-PCS | Mod: CRNA,,, | Performed by: NURSE ANESTHETIST, CERTIFIED REGISTERED

## 2023-06-23 PROCEDURE — 25000003 PHARM REV CODE 250: Performed by: NURSE ANESTHETIST, CERTIFIED REGISTERED

## 2023-06-23 PROCEDURE — 37000008 HC ANESTHESIA 1ST 15 MINUTES: Performed by: INTERNAL MEDICINE

## 2023-06-23 PROCEDURE — 85045 AUTOMATED RETICULOCYTE COUNT: CPT | Performed by: STUDENT IN AN ORGANIZED HEALTH CARE EDUCATION/TRAINING PROGRAM

## 2023-06-23 PROCEDURE — 99900035 HC TECH TIME PER 15 MIN (STAT)

## 2023-06-23 PROCEDURE — 21000000 HC CCU ICU ROOM CHARGE

## 2023-06-23 PROCEDURE — C9113 INJ PANTOPRAZOLE SODIUM, VIA: HCPCS | Performed by: STUDENT IN AN ORGANIZED HEALTH CARE EDUCATION/TRAINING PROGRAM

## 2023-06-23 PROCEDURE — 25000003 PHARM REV CODE 250: Performed by: STUDENT IN AN ORGANIZED HEALTH CARE EDUCATION/TRAINING PROGRAM

## 2023-06-23 PROCEDURE — 94618 PULMONARY STRESS TESTING: CPT

## 2023-06-23 PROCEDURE — 37000009 HC ANESTHESIA EA ADD 15 MINS: Performed by: INTERNAL MEDICINE

## 2023-06-23 PROCEDURE — 85025 COMPLETE CBC W/AUTO DIFF WBC: CPT | Performed by: STUDENT IN AN ORGANIZED HEALTH CARE EDUCATION/TRAINING PROGRAM

## 2023-06-23 PROCEDURE — D9220A PRA ANESTHESIA: ICD-10-PCS | Mod: ANES,,, | Performed by: ANESTHESIOLOGY

## 2023-06-23 PROCEDURE — 84466 ASSAY OF TRANSFERRIN: CPT | Performed by: STUDENT IN AN ORGANIZED HEALTH CARE EDUCATION/TRAINING PROGRAM

## 2023-06-23 PROCEDURE — D9220A PRA ANESTHESIA: Mod: ANES,,, | Performed by: ANESTHESIOLOGY

## 2023-06-23 PROCEDURE — 63600175 PHARM REV CODE 636 W HCPCS: Performed by: STUDENT IN AN ORGANIZED HEALTH CARE EDUCATION/TRAINING PROGRAM

## 2023-06-23 RX ORDER — LIDOCAINE HYDROCHLORIDE 20 MG/ML
INJECTION INTRAVENOUS
Status: DISCONTINUED | OUTPATIENT
Start: 2023-06-23 | End: 2023-06-23

## 2023-06-23 RX ORDER — PROPOFOL 10 MG/ML
INJECTION, EMULSION INTRAVENOUS
Status: DISCONTINUED | OUTPATIENT
Start: 2023-06-23 | End: 2023-06-23

## 2023-06-23 RX ORDER — SODIUM CHLORIDE, SODIUM LACTATE, POTASSIUM CHLORIDE, CALCIUM CHLORIDE 600; 310; 30; 20 MG/100ML; MG/100ML; MG/100ML; MG/100ML
INJECTION, SOLUTION INTRAVENOUS CONTINUOUS PRN
Status: DISCONTINUED | OUTPATIENT
Start: 2023-06-23 | End: 2023-06-23

## 2023-06-23 RX ADMIN — FERROUS SULFATE TAB 325 MG (65 MG ELEMENTAL FE) 1 EACH: 325 (65 FE) TAB at 02:06

## 2023-06-23 RX ADMIN — CHLORHEXIDINE GLUCONATE 15 ML: 1.2 RINSE ORAL at 08:06

## 2023-06-23 RX ADMIN — LIDOCAINE HYDROCHLORIDE 30 MG: 20 INJECTION, SOLUTION INTRAVENOUS at 01:06

## 2023-06-23 RX ADMIN — MUPIROCIN 1 G: 20 OINTMENT TOPICAL at 09:06

## 2023-06-23 RX ADMIN — SODIUM CHLORIDE, SODIUM LACTATE, POTASSIUM CHLORIDE, AND CALCIUM CHLORIDE: .6; .31; .03; .02 INJECTION, SOLUTION INTRAVENOUS at 01:06

## 2023-06-23 RX ADMIN — PROPOFOL 30 MG: 10 INJECTION, EMULSION INTRAVENOUS at 01:06

## 2023-06-23 RX ADMIN — ATORVASTATIN CALCIUM 40 MG: 40 TABLET, FILM COATED ORAL at 09:06

## 2023-06-23 RX ADMIN — CARVEDILOL 3.12 MG: 3.12 TABLET, FILM COATED ORAL at 09:06

## 2023-06-23 RX ADMIN — CITALOPRAM HYDROBROMIDE 40 MG: 20 TABLET ORAL at 02:06

## 2023-06-23 RX ADMIN — CHLORHEXIDINE GLUCONATE 15 ML: 1.2 RINSE ORAL at 09:06

## 2023-06-23 RX ADMIN — DONEPEZIL HYDROCHLORIDE 5 MG: 5 TABLET ORAL at 09:06

## 2023-06-23 RX ADMIN — CETIRIZINE HYDROCHLORIDE 5 MG: 5 TABLET ORAL at 02:06

## 2023-06-23 RX ADMIN — PANTOPRAZOLE SODIUM 40 MG: 40 INJECTION, POWDER, FOR SOLUTION INTRAVENOUS at 09:06

## 2023-06-23 RX ADMIN — PROPOFOL 50 MG: 10 INJECTION, EMULSION INTRAVENOUS at 01:06

## 2023-06-23 RX ADMIN — PANTOPRAZOLE SODIUM 40 MG: 40 INJECTION, POWDER, FOR SOLUTION INTRAVENOUS at 08:06

## 2023-06-23 NOTE — [employer] PATIENT INSTRUCTIONS
Discharge Summary/Instructions after an Endoscopic Procedure  Patient Name: Daryleen Moran  Patient MRN: [id number]  Patient YOB: 1945 Friday, June 23, 2023  Joel Neal III, MD  RESTRICTIONS:  During your procedure today, you received medications for sedation.  These   medications may affect your judgment, balance and coordination.  Therefore,   for 24 hours, you have the following restrictions:   - DO NOT drive a car, operate machinery, make legal/financial decisions,   sign important papers or drink alcohol.    ACTIVITY:  Today: no heavy lifting, straining or running due to procedural   sedation/anesthesia.  The following day: return to full activity including work.  DIET:  Eat and drink normally unless instructed otherwise.     TREATMENT FOR COMMON SIDE EFFECTS:  - Mild abdominal pain, nausea, belching, bloating or excessive gas:  rest,   eat lightly and use a heating pad.  - Sore Throat: treat with throat lozenges and/or gargle with warm salt   water.  - Because air was used during the procedure, expelling large amounts of air   from your rectum or belching is normal.  - If a bowel prep was taken, you may not have a bowel movement for 1-3 days.    This is normal.  SYMPTOMS TO WATCH FOR AND REPORT TO YOUR PHYSICIAN:  1. Abdominal pain or bloating, other than gas cramps.  2. Chest pain.  3. Back pain.  4. Signs of infection such as: chills or fever occurring within 24 hours   after the procedure.  5. Rectal bleeding, which would show as bright red, maroon, or black stools.   (A tablespoon of blood from the rectum is not serious, especially if   hemorrhoids are present.)  6. Vomiting.  7. Weakness or dizziness.  GO DIRECTLY TO THE NEAREST EMERGENCY ROOM IF YOU HAVE ANY OF THE FOLLOWING:      Difficulty breathing              Chills and/or fever over 101 F   Persistent vomiting and/or vomiting blood   Severe abdominal pain   Severe chest pain   Black, tarry stools   Bleeding- more than one  tablespoon   Any other symptom or condition that you feel may need urgent attention  Your doctor recommends these additional instructions:  If any biopsies were taken, your doctors clinic will contact you in 1 to 2   weeks with any results.  - Patient has a contact number available for emergencies.  The signs and   symptoms of potential delayed complications were discussed with the   patient.  Return to normal activities tomorrow.  Written discharge   instructions were provided to the patient.   - Resume previous diet.   - Continue present medications.   - Ok to continue DAPT and will set up for f/u w/ VCE given AVM seen on EGD  - No recommendation at this time regarding repeat colonoscopy.   - Return patient to [facility] for [facility] care.  For questions, problems or results please call your physician - Joel Neal III, MD at Work:  [phone].  [facility], EMERGENCY ROOM PHONE NUMBER: [phone]  IF A COMPLICATION OR EMERGENCY SITUATION ARISES AND YOU ARE UNABLE TO REACH   YOUR PHYSICIAN - GO DIRECTLY TO THE EMERGENCY ROOM.  Joel Neal III, MD  6/23/2023 1:44:30 PM  This report has been verified and signed electronically.  Dear patient,  As a result of recent federal legislation (The Federal Cures Act), you may   receive lab or pathology results from your procedure in your [facility]   account before your physician is able to contact you. Your physician or   their representative will relay the results to you with their   recommendations at their soonest availability.  Thank you,  [employer]

## 2023-06-23 NOTE — ASSESSMENT & PLAN NOTE
Patient with progressive dyspnea and fatigue as well as tarry dark stools for the past month.  Newly placed on aspirin and Plavix as a result of her TAVR valve replacement in January.  She has never had GI bleeding in the past.  Presented with symptomatic anemia at outside facility.  Hemoglobin 5.9. 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  - trend hemoglobin  - hold a.m. dose of aspirin and Plavix, resumption pending GI evaluation.

## 2023-06-23 NOTE — PROGRESS NOTES
[facility] Medicine  Progress Note    Patient Name: Daryleen G Moran  MRN: [id number]  Patient Class: IP- Inpatient   Admission Date: 6/21/2023  Length of Stay: 2 days  Attending Physician: Kamaljit Godoy MD  Primary Care Provider: Abhi De Oliveira Iv, MD        Subjective:     Principal Problem:GI bleeding        HPI:  Patient says that she has been feeling short of breath and increasing weakness and dizziness over the past 1 week.  She has had dark tarry stools for the past 1-2 months.  She has been using oxygen at home that she had left over from hospitalization back in January.  She normally does not wear oxygen.  She says that she felt progressively lightheaded and short of breath and thus presented to the ER at .  There she was found to have significant anemia with hemoglobin of 5.6.  Received 2 units of blood and most recent hemoglobin is 7.7.  She denies hematochezia or migue melena.  She denies nausea or vomiting.  No abdominal pain.  She does not take excessive NSAIDs.  She is on aspirin and Plavix due to recent TAVR.  She has never had significant GI bleeding in the past.  She is compliant with her medication regimen.  She has no other updates to past medical, family, surgical, social history.    Daryleen G Moran is a 78 y.o. female with a PMH of HFpED, CAD, Afib, aortic valve stenosis, HTN, HLD, DM2, COPD, GERD, BOZENA, MDD, dementia, and obesity who presented to the [facility] ED on 6/21/23 with complaints of severe persistent fatigue associated with shortness of breath and black tar-like sticky stool present for 1 to 2 weeks. Denied PUD history or recent NSAID use.     In the ED, VSS. Labs remarkable for Hb 5.6. ED transfusing 2u pRBCs. PFC contacted to facilitate transfer, and pt approved for transfer to  at [facility] (with GI to consult) for further care and management).       Overview/Hospital Course:  No notes on file    Interval History:  No  acute events overnight.  Hemoglobin is stable.  Two lesions treated with APC during endoscopy yesterday.  She would like to eat and return home ASAP.  Pending colonoscopy today.    Review of Systems   All other systems reviewed and are negative.  Objective:     Vital Signs (Most Recent):  Temp: 98.5 °F (36.9 °C) (06/23/23 0701)  Pulse: 80 (06/23/23 0757)  Resp: (!) 21 (06/22/23 2306)  BP: (!) 113/56 (06/22/23 2306)  SpO2: 97 % (06/23/23 0757) Vital Signs (24h Range):  Temp:  [98.1 °F (36.7 °C)-99.6 °F (37.6 °C)] 98.5 °F (36.9 °C)  Pulse:  [75-85] 80  Resp:  [15-41] 21  SpO2:  [88 %-100 %] 97 %  BP: (113-150)/(56-67) 113/56     Weight: 94 kg (207 lb 3.7 oz)  Body mass index is 40.47 kg/m².    Intake/Output Summary (Last 24 hours) at 6/23/2023 0856  Last data filed at 6/23/2023 0613  Gross per 24 hour   Intake 1387.92 ml   Output 2379 ml   Net -991.08 ml         Physical Exam  Constitutional:       Appearance: Normal appearance. She is normal weight.   HENT:      Head: Normocephalic and atraumatic.      Nose: Nose normal.      Mouth/Throat:      Mouth: Mucous membranes are moist.   Eyes:      Conjunctiva/sclera: Conjunctivae normal.   Cardiovascular:      Rate and Rhythm: Normal rate and regular rhythm.      Pulses: Normal pulses.      Heart sounds: Normal heart sounds. No murmur heard.    No friction rub. No gallop.   Pulmonary:      Effort: Pulmonary effort is normal.      Breath sounds: Normal breath sounds. No wheezing or rales.   Abdominal:      General: Abdomen is flat. Bowel sounds are normal. There is no distension.      Palpations: Abdomen is soft.      Tenderness: There is no abdominal tenderness. There is no guarding.   Musculoskeletal:         General: No swelling. Normal range of motion.      Cervical back: Normal range of motion and neck supple.   Skin:     General: Skin is warm and dry.   Neurological:      General: No focal deficit present.      Mental Status: She is alert.   Psychiatric:         Mood  and Affect: Mood normal.         Thought Content: Thought content normal.         Judgment: Judgment normal.           Significant Labs: All pertinent labs within the past 24 hours have been reviewed.    Significant Imaging: I have reviewed all pertinent imaging results/findings within the past 24 hours.      Assessment/Plan:      * GI bleeding  Patient with progressive dyspnea and fatigue as well as tarry dark stools for the past month.  Newly placed on aspirin and Plavix as a result of her TAVR valve replacement in January.  She has never had GI bleeding in the past.  Presented with symptomatic anemia at outside facility.  Hemoglobin 5.9. 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  - trend hemoglobin  - hold a.m. dose of aspirin and Plavix, resumption pending GI evaluation.        Severe aortic valve stenosis  Status post TAVR on aspirin and Plavix  Continue aspirin and Plavix for now  Follow-up GI recommendations      Hyperlipidemia  Continue statin      GERD (gastroesophageal reflux disease)  Continue Protonix      Hypertension  Continue home blood pressure regimen      Type 2 diabetes mellitus, without long-term current use of insulin  Patient's FSGs are controlled on current medication regimen.  Last A1c reviewed-   Lab Results   Component Value Date    HGBA1C 5.7 (H) 01/04/2023     Most recent fingerstick glucose reviewed- No results for input(s): POCTGLUCOSE in the last 24 hours.  Current correctional scale  Medium  Maintain anti-hyperglycemic dose as follows-   Antihyperglycemics (From admission, onward)    None        Hold Oral hypoglycemics while patient is in the hospital.      VTE Risk Mitigation (From admission, onward)         Ordered     IP VTE HIGH RISK PATIENT  Once         06/21/23 2233     Place sequential compression device  Until discontinued          06/21/23 2233                Discharge Planning   ISAAK: 6/25/2023     Code Status: Full Code   Is the patient medically ready for discharge?:     Reason for patient still in hospital (select all that apply): Treatment  Discharge Plan A: Home                  Kamaljit Godoy MD  Department of Hospital Medicine   [facility]

## 2023-06-23 NOTE — ANESTHESIA PREPROCEDURE EVALUATION
06/23/2023  Daryleen G Moran is a 78 y.o., female.      Tobacco Use:  The patient  reports that she quit smoking about 8 months ago. Her smoking use included cigarettes. She has a 17.50 pack-year smoking history. She has never used smokeless tobacco.     Results for orders placed or performed during the hospital encounter of 02/24/23   ECG 12 lead    Collection Time: 02/24/23  9:07 PM    Narrative    Test Reason : I63.9,    Vent. Rate : 090 BPM     Atrial Rate : 090 BPM     P-R Int : 154 ms          QRS Dur : 076 ms      QT Int : 360 ms       P-R-T Axes : 057 005 039 degrees     QTc Int : 440 ms    Normal sinus rhythm  Possible Left atrial enlargement  Cannot rule out Inferior infarct ,age undetermined  Abnormal ECG  When compared with ECG of 06-FEB-2023 14:23,  No significant change was found  Confirmed by Bryan Ly MD ([id number]) on 2/25/2023 6:51:09 PM    Referred By: [email or web address]   SELF           Confirmed By:Bryan Ly MD             Lab Results   Component Value Date    WBC 6.57 06/23/2023    HGB 7.5 (L) 06/23/2023    HCT 25.5 (L) 06/23/2023    MCV 85 06/23/2023     06/23/2023     BMP  Lab Results   Component Value Date     06/23/2023    K 4.4 06/23/2023    CL 99 06/23/2023    CO2 35 (H) 06/23/2023    BUN 10 06/23/2023    CREATININE 0.4 (L) 06/23/2023    CALCIUM 8.9 06/23/2023    ANIONGAP 7 (L) 06/23/2023    GLU 99 06/23/2023     06/22/2023     (H) 02/24/2023       Results for orders placed during the hospital encounter of 02/06/23    Echo    Interpretation Summary  · The left ventricle is normal in size with moderate concentric hypertrophy and normal systolic function.  · The estimated ejection fraction is 65%.  · Grade I left ventricular diastolic dysfunction.  · Normal right ventricular size with normal right ventricular systolic function.  · There is a  transcutaneously-placed aortic bioprosthesis present. There is no aortic insufficiency present. Prosthetic aortic valve is normal.  · The aortic valve mean gradient is 9 mmHg with a dimensionless index of 0.58.  · Mild mitral regurgitation.  · The mean diastolic gradient across the mitral valve is 6 mmHg at a heart rate of bpm.  · There is moderate mitral stenosis.  · Mild tricuspid regurgitation.  · Normal central venous pressure (3 mmHg).  · The estimated PA systolic pressure is 37 mmHg.        Pre-op Assessment    I have reviewed the Patient Summary Reports.     I have reviewed the Nursing Notes. I have reviewed the NPO Status.   I have reviewed the Medications.     Review of Systems  Anesthesia Hx:  No problems with previous Anesthesia  History of prior surgery of interest to airway management or planning: heart surgery. Denies Family Hx of Anesthesia complications.   Denies Personal Hx of Anesthesia complications.   Social:  Former Smoker, No Alcohol Use    Hematology/Oncology:         -- Anemia: Iron Deficiency Anemia Acute Hematology Comments: Plavix Therapy     Cardiovascular:   Hypertension, poorly controlled Valvular problems/Murmurs, AS Past MI CAD  CABG/stent  CHF PVD hyperlipidemia ECG has been reviewed. Patient post TVAR 1/2023    Patient followed by Dr. Macias seen > 1 year ago  Valvular Heart Disease: Aortic Stenosis (AS), s/p repair Mitral Stenosis (MS), moderate   Congestive Heart Failure (CHF) , Chronic Congestive Heart Failure , NYHA Classification III   Abdominal Aortic Aneurysm  Disorder of Cardiac Rhythm, Atrial Fibrillation, Paroxysmal Atrial Fibrillation    Pulmonary:   COPD, mild Shortness of breath Albuterol and Symbicort inhaler use     Home Oxygen use     Oxygen Saturation 87 % prior to endoscopy    Hepatic/GI:   GERD  Bowel Conditions:  GI Bleed    Musculoskeletal:   Arthritis  Compression fracture of thoracic vertebra  Lumbar spondylosis  Thoracic spondylosis  DDD (degenerative disc  disease), thoracic  DDD (degenerative disc disease), lumbar  Bursitis of hip  Compression fracture of lumbar vertebra  Closed fracture of dorsal (thoracic) vertebra without mention of spinal cord injury  Back pain  Lumbosacral radiculopathy   Joint Disease:  Arthritis, Osteoarthritis  Bone Disorders: Osteoporosis  Spine Disorders: lumbar Chronic Pain, Degenerative disease and Disc disease    Neurological:   Neuromuscular Disease,  Osteoarthritis  Dementia mild    Endocrine:   Diabetes, poorly controlled, type 2  Adrenal Disease, Left adrenal mass    Psych:   Psychiatric History          Physical Exam  General: Well nourished, Cooperative, Alert and Oriented    Airway:  Mallampati: III   Mouth Opening: Normal  TM Distance: > 6 cm  Tongue: Normal  Neck ROM: Normal ROM    Dental:  Intact    Chest/Lungs:  Clear to auscultation, Normal Respiratory Rate    Heart:  Rate: Normal  Rhythm: Regular Rhythm  Murmur: Systolic;        Anesthesia Plan  Type of Anesthesia, risks & benefits discussed:    Anesthesia Type: Gen Natural Airway  Intra-op Monitoring Plan: Standard ASA Monitors  Induction:  IV  Informed Consent: Informed consent signed with the Patient and all parties understand the risks and agree with anesthesia plan.  All questions answered.   ASA Score: 4  Anesthesia Plan Notes:       POM    Propofol     Ready For Surgery From Anesthesia Perspective.     .

## 2023-06-23 NOTE — CARE UPDATE
06/23/23 0757   Patient Assessment/Suction   Level of Consciousness (AVPU) alert   Respiratory Effort Normal;Unlabored   Expansion/Accessory Muscles/Retractions no use of accessory muscles   All Lung Fields Breath Sounds diminished   PRE-TX-O2   Device (Oxygen Therapy) nasal cannula   $ Is the patient on Low Flow Oxygen? Yes   Flow (L/min) 2   SpO2 97 %   Pulse 80   Aerosol Therapy   $ Aerosol Therapy Charges PRN treatment not required   Respiratory Evaluation   $ Care Plan Tech Time 15 min

## 2023-06-23 NOTE — TRANSFER OF CARE
Anesthesia Transfer of Care Note    Patient: Daryleen G Moran    Procedure(s) Performed: Procedure(s) (LRB):  COLONOSCOPY (N/A)    Patient location: GI    Anesthesia Type: general    Transport from OR: Transported from OR on room air with adequate spontaneous ventilation    Post pain: adequate analgesia    Post assessment: no apparent anesthetic complications    Post vital signs: stable    Level of consciousness: awake and alert    Complications: none    Transfer of care protocol was followed      Last vitals:   Visit Vitals  /60   Pulse 80   Temp 36.9 °C (98.5 °F)   Resp 18   Ht 5' (1.524 m)   Wt 94 kg (207 lb 3.7 oz)   LMP  (LMP Unknown)   SpO2 98%   Breastfeeding No   BMI 40.47 kg/m²

## 2023-06-23 NOTE — ANESTHESIA POSTPROCEDURE EVALUATION
Anesthesia Post Evaluation    Patient: Daryleen G Moran    Procedure(s) Performed: Procedure(s) (LRB):  COLONOSCOPY (N/A)    Final Anesthesia Type: general      Patient location during evaluation: GI PACU  Patient participation: Yes- Able to Participate  Level of consciousness: awake and alert, oriented and awake  Post-procedure vital signs: reviewed and stable  Pain management: adequate  Airway patency: patent    PONV status at discharge: No PONV  Anesthetic complications: no      Cardiovascular status: blood pressure returned to baseline, hemodynamically stable and stable  Respiratory status: unassisted, spontaneous ventilation and room air  Hydration status: euvolemic  Follow-up not needed.          Vitals Value Taken Time   /60 06/23/23 1410   Temp 36.6 °C (97.8 °F) 06/23/23 1350   Pulse 78 06/23/23 1415   Resp 18 06/23/23 1259   SpO2 100 % 06/23/23 1415   Vitals shown include unvalidated device data.      No case tracking events are documented in the log.      Pain/Phani Score: No data recorded

## 2023-06-23 NOTE — CARE UPDATE
06/23/23 1527   Home Oxygen Qualification   $ Home O2 Qualification Pulmonary Stress Test/6 min walk;Tech time 15 minutes   Room Air SpO2 At Rest (!) 86 %   Room Air SpO2 During Ambulation (!) 84 %   SpO2 During Ambulation on O2 94 %   Heart Rate on O2 90 bpm   Ambulation O2 LPM 3 LPM   SpO2 Post Ambulation 96 %   Post Ambulation Heart Rate 84 bpm   Post Ambulation O2 LPM 2 LPM   Home O2 Eval Comments pt qualifies for home O2   Respiratory Evaluation   $ Care Plan Tech Time 15 min

## 2023-06-23 NOTE — SUBJECTIVE & OBJECTIVE
Interval History:  No acute events overnight.  Hemoglobin is stable.  Two lesions treated with APC during endoscopy yesterday.  She would like to eat and return home ASAP.  Pending colonoscopy today.    Review of Systems   All other systems reviewed and are negative.  Objective:     Vital Signs (Most Recent):  Temp: 98.5 °F (36.9 °C) (06/23/23 0701)  Pulse: 80 (06/23/23 0757)  Resp: (!) 21 (06/22/23 2306)  BP: (!) 113/56 (06/22/23 2306)  SpO2: 97 % (06/23/23 0757) Vital Signs (24h Range):  Temp:  [98.1 °F (36.7 °C)-99.6 °F (37.6 °C)] 98.5 °F (36.9 °C)  Pulse:  [75-85] 80  Resp:  [15-41] 21  SpO2:  [88 %-100 %] 97 %  BP: (113-150)/(56-67) 113/56     Weight: 94 kg (207 lb 3.7 oz)  Body mass index is 40.47 kg/m².    Intake/Output Summary (Last 24 hours) at 6/23/2023 0856  Last data filed at 6/23/2023 0613  Gross per 24 hour   Intake 1387.92 ml   Output 2379 ml   Net -991.08 ml         Physical Exam  Constitutional:       Appearance: Normal appearance. She is normal weight.   HENT:      Head: Normocephalic and atraumatic.      Nose: Nose normal.      Mouth/Throat:      Mouth: Mucous membranes are moist.   Eyes:      Conjunctiva/sclera: Conjunctivae normal.   Cardiovascular:      Rate and Rhythm: Normal rate and regular rhythm.      Pulses: Normal pulses.      Heart sounds: Normal heart sounds. No murmur heard.    No friction rub. No gallop.   Pulmonary:      Effort: Pulmonary effort is normal.      Breath sounds: Normal breath sounds. No wheezing or rales.   Abdominal:      General: Abdomen is flat. Bowel sounds are normal. There is no distension.      Palpations: Abdomen is soft.      Tenderness: There is no abdominal tenderness. There is no guarding.   Musculoskeletal:         General: No swelling. Normal range of motion.      Cervical back: Normal range of motion and neck supple.   Skin:     General: Skin is warm and dry.   Neurological:      General: No focal deficit present.      Mental Status: She is alert.    Psychiatric:         Mood and Affect: Mood normal.         Thought Content: Thought content normal.         Judgment: Judgment normal.           Significant Labs: All pertinent labs within the past 24 hours have been reviewed.    Significant Imaging: I have reviewed all pertinent imaging results/findings within the past 24 hours.

## 2023-06-24 VITALS
RESPIRATION RATE: 20 BRPM | DIASTOLIC BLOOD PRESSURE: 55 MMHG | WEIGHT: 207.25 LBS | TEMPERATURE: 98 F | BODY MASS INDEX: 40.69 KG/M2 | HEART RATE: 82 BPM | HEIGHT: 60 IN | OXYGEN SATURATION: 96 % | SYSTOLIC BLOOD PRESSURE: 123 MMHG

## 2023-06-24 PROBLEM — R06.02 SOB (SHORTNESS OF BREATH): Status: RESOLVED | Noted: 2022-05-05 | Resolved: 2023-06-24

## 2023-06-24 PROBLEM — R19.7 DIARRHEA: Status: RESOLVED | Noted: 2022-11-21 | Resolved: 2023-06-24

## 2023-06-24 PROBLEM — M12.9 ARTHROPATHY, UNSPECIFIED: Status: ACTIVE | Noted: 2022-11-03

## 2023-06-24 PROBLEM — K57.90 DIVERTICULOSIS: Status: ACTIVE | Noted: 2023-06-24

## 2023-06-24 PROBLEM — I95.9 HYPOTENSION: Status: RESOLVED | Noted: 2022-12-20 | Resolved: 2023-06-24

## 2023-06-24 PROBLEM — K92.2 GI BLEEDING: Status: RESOLVED | Noted: 2023-06-21 | Resolved: 2023-06-24

## 2023-06-24 PROBLEM — D50.9 IDA (IRON DEFICIENCY ANEMIA): Status: ACTIVE | Noted: 2023-06-24

## 2023-06-24 PROBLEM — R41.3 MEMORY IMPAIRMENT: Status: ACTIVE | Noted: 2021-06-09

## 2023-06-24 PROBLEM — Z98.890 HISTORY OF ADRENAL SURGERY: Status: ACTIVE | Noted: 2019-06-25

## 2023-06-24 PROBLEM — R41.840 ATTENTION AND CONCENTRATION DEFICIT: Status: ACTIVE | Noted: 2022-11-03

## 2023-06-24 PROBLEM — D50.9 IDA (IRON DEFICIENCY ANEMIA): Status: RESOLVED | Noted: 2023-06-24 | Resolved: 2023-06-24

## 2023-06-24 PROBLEM — Q27.30 AVM (ARTERIOVENOUS MALFORMATION): Status: ACTIVE | Noted: 2023-06-24

## 2023-06-24 PROBLEM — M81.0 AGE-RELATED OSTEOPOROSIS WITHOUT CURRENT PATHOLOGICAL FRACTURE: Status: ACTIVE | Noted: 2019-03-25

## 2023-06-24 PROBLEM — E83.42 HYPOMAGNESEMIA: Status: RESOLVED | Noted: 2022-12-19 | Resolved: 2023-06-24

## 2023-06-24 PROBLEM — E55.9 VITAMIN D DEFICIENCY: Status: ACTIVE | Noted: 2019-03-25

## 2023-06-24 PROBLEM — E87.8 ELECTROLYTE ABNORMALITY: Status: RESOLVED | Noted: 2022-12-19 | Resolved: 2023-06-24

## 2023-06-24 PROBLEM — K14.1 GEOGRAPHIC TONGUE: Status: ACTIVE | Noted: 2018-08-13

## 2023-06-24 PROBLEM — K46.9 ABDOMINAL HERNIA: Status: ACTIVE | Noted: 2022-04-11

## 2023-06-24 PROBLEM — E87.6 HYPOKALEMIA DUE TO EXCESSIVE GASTROINTESTINAL LOSS OF POTASSIUM: Status: RESOLVED | Noted: 2022-12-19 | Resolved: 2023-06-24

## 2023-06-24 PROBLEM — I50.33 ACUTE ON CHRONIC DIASTOLIC CHF (CONGESTIVE HEART FAILURE), NYHA CLASS 3: Status: RESOLVED | Noted: 2023-01-04 | Resolved: 2023-06-24

## 2023-06-24 PROBLEM — F32.9 MDD (MAJOR DEPRESSIVE DISORDER): Status: ACTIVE | Noted: 2023-06-24

## 2023-06-24 PROBLEM — E87.1 HYPONATREMIA: Status: RESOLVED | Noted: 2022-12-19 | Resolved: 2023-06-24

## 2023-06-24 PROBLEM — J96.10 CHRONIC RESPIRATORY FAILURE: Status: ACTIVE | Noted: 2023-06-24

## 2023-06-24 PROBLEM — I50.9 CHF (CONGESTIVE HEART FAILURE): Status: ACTIVE | Noted: 2022-10-21

## 2023-06-24 PROBLEM — M85.80 OSTEOPENIA DETERMINED BY X-RAY: Status: RESOLVED | Noted: 2019-02-19 | Resolved: 2023-06-24

## 2023-06-24 PROBLEM — Z99.81 ON HOME OXYGEN THERAPY: Status: RESOLVED | Noted: 2023-01-24 | Resolved: 2023-06-24

## 2023-06-24 PROBLEM — R09.89 CAROTID BRUIT: Status: ACTIVE | Noted: 2021-06-09

## 2023-06-24 PROBLEM — M81.0 AGE-RELATED OSTEOPOROSIS WITHOUT CURRENT PATHOLOGICAL FRACTURE: Status: RESOLVED | Noted: 2019-03-25 | Resolved: 2023-06-24

## 2023-06-24 LAB
ANION GAP SERPL CALC-SCNC: 4 MMOL/L (ref 8–16)
BASOPHILS # BLD AUTO: 0.07 K/UL (ref 0–0.2)
BASOPHILS NFR BLD: 1.1 % (ref 0–1.9)
BUN SERPL-MCNC: 16 MG/DL (ref 8–23)
CALCIUM SERPL-MCNC: 8.6 MG/DL (ref 8.7–10.5)
CHLORIDE SERPL-SCNC: 101 MMOL/L (ref 95–110)
CO2 SERPL-SCNC: 35 MMOL/L (ref 23–29)
CREAT SERPL-MCNC: 0.6 MG/DL (ref 0.5–1.4)
DIFFERENTIAL METHOD: ABNORMAL
EOSINOPHIL # BLD AUTO: 0.2 K/UL (ref 0–0.5)
EOSINOPHIL NFR BLD: 3.4 % (ref 0–8)
ERYTHROCYTE [DISTWIDTH] IN BLOOD BY AUTOMATED COUNT: 18.4 % (ref 11.5–14.5)
EST. GFR  (NO RACE VARIABLE): >60 ML/MIN/1.73 M^2
GLUCOSE SERPL-MCNC: 118 MG/DL (ref 70–110)
HCT VFR BLD AUTO: 26.7 % (ref 37–48.5)
HGB BLD-MCNC: 7.7 G/DL (ref 12–16)
IMM GRANULOCYTES # BLD AUTO: 0.01 K/UL (ref 0–0.04)
IMM GRANULOCYTES NFR BLD AUTO: 0.2 % (ref 0–0.5)
LYMPHOCYTES # BLD AUTO: 1.1 K/UL (ref 1–4.8)
LYMPHOCYTES NFR BLD: 17.5 % (ref 18–48)
MCH RBC QN AUTO: 24.9 PG (ref 27–31)
MCHC RBC AUTO-ENTMCNC: 28.8 G/DL (ref 32–36)
MCV RBC AUTO: 86 FL (ref 82–98)
MONOCYTES # BLD AUTO: 0.6 K/UL (ref 0.3–1)
MONOCYTES NFR BLD: 9.8 % (ref 4–15)
NEUTROPHILS # BLD AUTO: 4.2 K/UL (ref 1.8–7.7)
NEUTROPHILS NFR BLD: 68 % (ref 38–73)
NRBC BLD-RTO: 0 /100 WBC
PLATELET # BLD AUTO: 168 K/UL (ref 150–450)
PMV BLD AUTO: 12.5 FL (ref 9.2–12.9)
POTASSIUM SERPL-SCNC: 4.5 MMOL/L (ref 3.5–5.1)
RBC # BLD AUTO: 3.09 M/UL (ref 4–5.4)
SODIUM SERPL-SCNC: 140 MMOL/L (ref 136–145)
WBC # BLD AUTO: 6.13 K/UL (ref 3.9–12.7)

## 2023-06-24 PROCEDURE — 25000003 PHARM REV CODE 250: Performed by: STUDENT IN AN ORGANIZED HEALTH CARE EDUCATION/TRAINING PROGRAM

## 2023-06-24 PROCEDURE — 99900035 HC TECH TIME PER 15 MIN (STAT)

## 2023-06-24 PROCEDURE — C9113 INJ PANTOPRAZOLE SODIUM, VIA: HCPCS | Performed by: STUDENT IN AN ORGANIZED HEALTH CARE EDUCATION/TRAINING PROGRAM

## 2023-06-24 PROCEDURE — 63600175 PHARM REV CODE 636 W HCPCS: Performed by: STUDENT IN AN ORGANIZED HEALTH CARE EDUCATION/TRAINING PROGRAM

## 2023-06-24 PROCEDURE — 27000221 HC OXYGEN, UP TO 24 HOURS

## 2023-06-24 PROCEDURE — 36415 COLL VENOUS BLD VENIPUNCTURE: CPT | Performed by: STUDENT IN AN ORGANIZED HEALTH CARE EDUCATION/TRAINING PROGRAM

## 2023-06-24 PROCEDURE — 94799 UNLISTED PULMONARY SVC/PX: CPT

## 2023-06-24 PROCEDURE — 80048 BASIC METABOLIC PNL TOTAL CA: CPT | Performed by: STUDENT IN AN ORGANIZED HEALTH CARE EDUCATION/TRAINING PROGRAM

## 2023-06-24 PROCEDURE — 99900031 HC PATIENT EDUCATION (STAT)

## 2023-06-24 PROCEDURE — 85025 COMPLETE CBC W/AUTO DIFF WBC: CPT | Performed by: STUDENT IN AN ORGANIZED HEALTH CARE EDUCATION/TRAINING PROGRAM

## 2023-06-24 RX ORDER — BENAZEPRIL HYDROCHLORIDE 20 MG/1
10 TABLET ORAL DAILY
Qty: 45 TABLET | Refills: 0 | Status: ON HOLD | OUTPATIENT
Start: 2023-06-24 | End: 2023-09-09 | Stop reason: HOSPADM

## 2023-06-24 RX ADMIN — CHLORHEXIDINE GLUCONATE 15 ML: 1.2 RINSE ORAL at 09:06

## 2023-06-24 RX ADMIN — FUROSEMIDE 40 MG: 40 TABLET ORAL at 09:06

## 2023-06-24 RX ADMIN — CETIRIZINE HYDROCHLORIDE 5 MG: 5 TABLET ORAL at 09:06

## 2023-06-24 RX ADMIN — PANTOPRAZOLE SODIUM 40 MG: 40 INJECTION, POWDER, FOR SOLUTION INTRAVENOUS at 09:06

## 2023-06-24 RX ADMIN — MUPIROCIN 1 G: 20 OINTMENT TOPICAL at 09:06

## 2023-06-24 RX ADMIN — CARVEDILOL 3.12 MG: 3.12 TABLET, FILM COATED ORAL at 09:06

## 2023-06-24 RX ADMIN — CITALOPRAM HYDROBROMIDE 40 MG: 20 TABLET ORAL at 09:06

## 2023-06-24 RX ADMIN — FERROUS SULFATE TAB 325 MG (65 MG ELEMENTAL FE) 1 EACH: 325 (65 FE) TAB at 09:06

## 2023-06-24 NOTE — HOSPITAL COURSE
Daryleen Moran is a 78 year old female with a past medical history of CAD, TAVR on DAPT, HLD, HTN, Afib, CHF, obesity, DM, COPD, BOZENA, GERD, MDD and dementia who presented with a GIB (melena). EGD showed angioectasias to the stomach and duodenum which were addressed by GI. Colonoscopy also showed diverticulosis. The patient received two units of blood during her hospital course and her Hgb remained stable thereafter. She was restarted on DAPT on discharge and will follow up with GI in the outpatient setting of VCE.

## 2023-06-24 NOTE — DISCHARGE SUMMARY
[facility] Medicine  Discharge Summary      Patient Name: Daryleen G Moran  MRN: [id number]  [facility]: [id number]  Patient Class: IP- Inpatient  Admission Date: 6/21/2023  Hospital Length of Stay: 3 days  Discharge Date and Time: No discharge date for patient encounter.  Attending Physician: Mitul Fuller MD   Discharging Provider: Mitul Fuller MD  Primary Care Provider: Abhi De Oliveira Iv, MD    Primary Care Team: Networked reference to record PCT     HPI:   Patient says that she has been feeling short of breath and increasing weakness and dizziness over the past 1 week.  She has had dark tarry stools for the past 1-2 months.  She has been using oxygen at home that she had left over from hospitalization back in January.  She normally does not wear oxygen.  She says that she felt progressively lightheaded and short of breath and thus presented to the ER at [facility].  There she was found to have significant anemia with hemoglobin of 5.6.  Received 2 units of blood and most recent hemoglobin is 7.7.  She denies hematochezia or migue melena.  She denies nausea or vomiting.  No abdominal pain.  She does not take excessive NSAIDs.  She is on aspirin and Plavix due to recent TAVR.  She has never had significant GI bleeding in the past.  She is compliant with her medication regimen.  She has no other updates to past medical, family, surgical, social history.    Daryleen G Moran is a 78 y.o. female with a PMH of HFpED, CAD, Afib, aortic valve stenosis, HTN, HLD, DM2, COPD, GERD, BOZENA, MDD, dementia, and obesity who presented to the [facility] ED on 6/21/23 with complaints of severe persistent fatigue associated with shortness of breath and black tar-like sticky stool present for 1 to 2 weeks. Denied PUD history or recent NSAID use.     In the ED, VSS. Labs remarkable for Hb 5.6. ED transfusing 2u pRBCs. PFC contacted to facilitate transfer, and pt approved for transfer to   at [facility] (with GI to consult) for further care and management).       Procedure(s) (LRB):  COLONOSCOPY (N/A)      Hospital Course:   Daryleen Moran is a 78 year old female with a past medical history of CAD, TAVR on DAPT, HLD, HTN, Afib, CHF, obesity, DM, COPD, BOZENA, GERD, MDD and dementia who presented with a GIB (melena). EGD showed angioectasias to the stomach and duodenum which were addressed by GI. Colonoscopy also showed diverticulosis. The patient received two units of blood during her hospital course and her Hgb remained stable thereafter. She was restarted on DAPT on discharge and will follow up with GI in the outpatient setting of VCE.       Goals of Care Treatment Preferences:  Code Status: Full Code      Consults:   Consults (From admission, onward)        Status Ordering Provider     Inpatient consult to Gastroenterology  Once        Provider:  Cornell Aguirre MD    Completed RAMON KOCH          Pulmonary  Chronic respiratory failure  -Home O2 ordered    COPD (chronic obstructive pulmonary disease)  -Home O2 ordered  -Continue home maintenance inhaler      Cardiac/Vascular  * AVM (arteriovenous malformation)  -Follow up with GI for VCE      CHF (congestive heart failure)  -Continue diuretic, ACE-I and BB      CAD (coronary artery disease)  -Continue DAPT and statin      Paroxysmal atrial fibrillation  -DAPT  -Coreg      S/P TAVR (transcatheter aortic valve replacement)  -Continue DAPT  -Follow up with Dr. Peña      Hyperlipidemia  -Continue statin      Hypertension  -Continue home blood pressure regimen      Oncology  Iron deficiency anemia due to chronic blood loss  -Continue PO iron supplementation  -Follow up with GI      Endocrine  BMI 40.0-44.9, adult  Body mass index is 40.47 kg/m². Morbid obesity complicates all aspects of disease management from diagnostic modalities to treatment.         Type 2 diabetes mellitus, without long-term current use of insulin  -Continue home  medications    GI  GERD (gastroesophageal reflux disease)  -Continue PPI      Diverticulosis  -Follow up with GI      Other  MDD (major depressive disorder)  -Continue home citalopram      Dementia in other diseases classified elsewhere, unspecified severity, without behavioral disturbance, psychotic disturbance, mood disturbance, and anxiety  -Continue home Aricept        Final Active Diagnoses:    Diagnosis Date Noted POA    PRINCIPAL PROBLEM:  AVM (arteriovenous malformation) [Q27.30] 06/24/2023 Not Applicable    Diverticulosis [K57.90] 06/24/2023 Yes    MDD (major depressive disorder) [F32.9] 06/24/2023 Yes    Chronic respiratory failure [J96.10] 06/24/2023 Yes    BMI 40.0-44.9, adult [Z68.41] 01/19/2023 Not Applicable    Dementia in other diseases classified elsewhere, unspecified severity, without behavioral disturbance, psychotic disturbance, mood disturbance, and anxiety [F02.80] 01/19/2023 Yes    CAD (coronary artery disease) [I25.10] 12/19/2022 Yes    CHF (congestive heart failure) [I50.9] 10/21/2022 Yes    Paroxysmal atrial fibrillation [I48.0] 05/02/2022 Yes    Iron deficiency anemia due to chronic blood loss [D50.0] 03/12/2020 Yes    COPD (chronic obstructive pulmonary disease) [J44.9] 09/27/2016 Yes    S/P TAVR (transcatheter aortic valve replacement) [Z95.2] 09/16/2016 Not Applicable    Type 2 diabetes mellitus, without long-term current use of insulin [E11.9] 09/13/2016 Yes    Hypertension [I10] 09/13/2016 Yes    GERD (gastroesophageal reflux disease) [K21.9] 09/13/2016 Yes    Hyperlipidemia [E78.5] 09/13/2016 Yes      Problems Resolved During this Admission:    Diagnosis Date Noted Date Resolved POA    BOZENA (iron deficiency anemia) [D50.9] 06/24/2023 06/24/2023 Yes    GI bleeding [K92.2] 06/21/2023 06/24/2023 Yes    On home oxygen therapy [Z99.81] 01/24/2023 06/24/2023 Not Applicable    Smoker [F17.200] 10/07/2011 06/24/2023 Yes       Discharged Condition: stable    Disposition:  Home or Self Care    Follow Up:   Follow-up Information     Abhi De Oliveira Iv, MD Follow up in 2 day(s).    Specialty: Family Medicine  Contact information:  [address]   [place] [address]  [phone]             Bhavesh Peña MD Follow up on 10/10/2023.    Specialties: Interventional Cardiology, Cardiology  Contact information:  [address] [facility] [address]  [phone]             Cornell Aguirre MD Follow up in 2 week(s).    Specialty: Gastroenterology  Contact information:  [address] [facility] [address]  [phone]                       Patient Instructions:      OXYGEN FOR HOME USE     Order Specific Question Answer Comments   Liter Flow 3    Duration With activity    Qualifying Test Performed at: Activity    Oxygen saturation at rest 86    Oxygen saturation with activity 84    Oxygen saturation with activity on oxygen 94    Portable mode: continuous    Route nasal cannula    Device: home concentrator with portable tanks    Length of need (in months): 12 mos    Patient condition with qualifying saturation COPD    Height: 5' (1.524 m)    Weight: 94 kg (207 lb 3.7 oz)    Alternative treatment measures have been tried or considered and deemed clinically ineffective. Yes      Diet diabetic     Notify your health care provider if you experience any of the following:  increased confusion or weakness     Notify your health care provider if you experience any of the following:  persistent dizziness, light-headedness, or visual disturbances     Notify your health care provider if you experience any of the following:  severe persistent headache     Notify your health care provider if you experience any of the following:  difficulty breathing or increased cough     Notify your health care provider if you experience any of the following:  severe uncontrolled pain     Notify your health care provider if you experience any of the following:  temperature >100.4     Notify your health care  provider if you experience any of the following:  persistent nausea and vomiting or diarrhea     Activity as tolerated       Significant Diagnostic Studies: Labs: All labs within the past 24 hours have been reviewed    Pending Diagnostic Studies:     None         Medications:  Reconciled Home Medications:      Medication List      CONTINUE taking these medications    albuterol 90 mcg/actuation inhaler  Commonly known as: PROVENTIL/VENTOLIN HFA  Inhale 2 puffs into the lungs every 6 (six) hours as needed.     aspirin 81 MG EC tablet  Commonly known as: ECOTRIN  Take 81 mg by mouth once daily.     benazepriL 20 MG tablet  Commonly known as: LOTENSIN  Take 0.5 tablets (10 mg total) by mouth once daily.     budesonide-formoterol 80-4.5 mcg 80-4.5 mcg/actuation Hfaa  Commonly known as: SYMBICORT  Inhale 2 puffs into the lungs 2 (two) times daily as needed (Asthma).     calcium carbonate 500 mg calcium (1,250 mg) tablet  Commonly known as: OS-TK  Take 1 tablet (500 mg total) by mouth once daily.     carvediloL 3.125 MG tablet  Commonly known as: COREG  Take 1 tablet (3.125 mg total) by mouth 2 (two) times daily.     citalopram 40 MG tablet  Commonly known as: CeleXA  Take 1 tablet (40 mg total) by mouth once daily.     clopidogreL 75 mg tablet  Commonly known as: PLAVIX  Take 1 tablet (75 mg total) by mouth once daily.     donepeziL 5 MG tablet  Commonly known as: ARICEPT  Take 5 mg by mouth nightly.     ergocalciferol 50,000 unit Cap  Commonly known as: ERGOCALCIFEROL  TAKE 1 CAPSULE (50,000 UNITS TOTAL) EVERY 7 DAYS.     ferrous gluconate 324 mg (37.5 mg iron) Tab tablet  Take 324 mg by mouth once daily.     furosemide 40 MG tablet  Commonly known as: LASIX  Take 1 tablet (40 mg total) by mouth once daily.     glucosamine-chondroitin 500-400 mg tablet  Take 1 tablet by mouth once daily.     loperamide 2 mg Tab  Commonly known as: IMODIUM A-D  Take 1 tablet (2 mg total) by mouth 3 (three) times daily as needed  (diarrhea). Take 2 tablets by mouth initially then 1 tablet after each loose stool as needed for diarrhea.     loratadine 10 mg tablet  Commonly known as: CLARITIN  Take 1 tablet (10 mg total) by mouth once daily.     metFORMIN 500 MG tablet  Commonly known as: GLUCOPHAGE  Take 500 mg by mouth daily with breakfast.     multivitamin capsule  Take 1 capsule by mouth once daily.     omeprazole 20 MG capsule  Commonly known as: PRILOSEC  Take 1 capsule (20 mg total) by mouth once daily.     oxybutynin 5 MG Tab  Commonly known as: DITROPAN  Take 1 tablet (5 mg total) by mouth 2 (two) times daily.     simvastatin 40 MG tablet  Commonly known as: ZOCOR  Take 1 tablet (40 mg total) by mouth every evening.            Indwelling Lines/Drains at time of discharge:   Lines/Drains/Airways     Airway  Duration                Airway - Non-Surgical 06/22/23 1309 Other (Comment) 1 day                Time spent on the discharge of patient: 34 minutes         Mitul Fuller MD  Department of Hospital Medicine  [facility]

## 2023-06-24 NOTE — PLAN OF CARE
Problem: Adult Inpatient Plan of Care  Goal: Plan of Care Review  Outcome: Ongoing, Progressing  Goal: Patient-Specific Goal (Individualized)  Outcome: Ongoing, Progressing  Goal: Absence of Hospital-Acquired Illness or Injury  Outcome: Ongoing, Progressing  Goal: Optimal Comfort and Wellbeing  Outcome: Ongoing, Progressing  Goal: Readiness for Transition of Care  Outcome: Ongoing, Progressing     Problem: Diabetes Comorbidity  Goal: Blood Glucose Level Within Targeted Range  Outcome: Ongoing, Progressing     Problem: Bariatric Environmental Safety  Goal: Safety Maintained with Care  Outcome: Ongoing, Progressing     Problem: Skin Injury Risk Increased  Goal: Skin Health and Integrity  Outcome: Ongoing, Progressing

## 2023-06-24 NOTE — ASSESSMENT & PLAN NOTE
Body mass index is 40.47 kg/m². Morbid obesity complicates all aspects of disease management from diagnostic modalities to treatment.

## 2023-06-24 NOTE — PLAN OF CARE
06/24/23 0840   Patient Assessment/Suction   Level of Consciousness (AVPU) alert   Respiratory Effort Unlabored;Normal   Expansion/Accessory Muscles/Retractions no use of accessory muscles   All Lung Fields Breath Sounds clear   Rhythm/Pattern, Respiratory pattern regular;depth regular   PRE-TX-O2   Device (Oxygen Therapy) nasal cannula   $ Is the patient on Low Flow Oxygen? Yes   Flow (L/min) 2   SpO2 98 %   Pulse Oximetry Type Continuous   Oximetry Probe Site Assessed   Pulse 84   Resp (!) 36   Aerosol Therapy   $ Aerosol Therapy Charges PRN treatment not required   Education   $ Education Bronchodilator;15 min   Respiratory Evaluation   $ Care Plan Tech Time 15 min   $ Eval/Re-eval Charges Re-evaluation   Evaluation For Re-Eval 3 day

## 2023-06-24 NOTE — CARE UPDATE
06/23/23 2010   Patient Assessment/Suction   Level of Consciousness (AVPU) alert   Respiratory Effort Normal   PRE-TX-O2   Device (Oxygen Therapy) nasal cannula   $ Is the patient on Low Flow Oxygen? Yes   Flow (L/min) 2   SpO2 95 %   Pulse Oximetry Type Continuous   $ Pulse Oximetry - Multiple Charge Pulse Oximetry - Multiple   Pulse 81   Resp (!) 37   Aerosol Therapy   $ Aerosol Therapy Charges PRN treatment not required   Education   $ Education 15 min   Respiratory Evaluation   $ Care Plan Tech Time 15 min

## 2023-06-24 NOTE — PLAN OF CARE
Oxygen approved by [facility] once home oxygen scrip ordered correctly with proper dx codes, delivered to room paperwork signed. Patient ready for dc.    06/24/23 1248   Final Note   Assessment Type Final Discharge Note   Anticipated Discharge Disposition Home   Hospital Resources/Appts/Education Provided Provided patient/caregiver with written discharge plan information   Post-Acute Status   Post-Acute Authorization Other   Coverage Oxygen approved by [facility], delivered to room   Discharge Delays Orders Needed

## 2023-07-25 NOTE — PROGRESS NOTES
Patient ID:  Daryleen G Moran is a 75 y.o. female who presents for follow-up of Hypertension and Hyperlipidemia      She went to see her primary care physician that noted a carotid bruit.  She was sent for a carotid ultrasound that showed a carotid plaque with no significant stenosis.  Her cholesterol is been doing very well.  She does smokes on regular basis.  She is having a lot of urinary incontinence.  She was asked to go see a urologist.      Past Medical History:   Diagnosis Date    Arthritis     Diabetes mellitus type II     Hypertension     Osteoporosis     Wears glasses         Past Surgical History:   Procedure Laterality Date     vocal cord nodules removed  long time ago     twice    APPENDECTOMY  within last 5yrs    FIXATION KYPHOPLASTY THORACIC SPINE      8-20-13    FOOT SURGERY      left 2nd toe was too long     HERNIA REPAIR  within last 5yrs    TONSILLECTOMY, ADENOIDECTOMY  long time ago          Current Outpatient Medications   Medication Instructions    aspirin (ECOTRIN) 81 mg, Daily    calcium carbonate (OS-TK) 600 mg, Oral, 2 times daily with meals    citalopram (CELEXA) 40 mg, Daily    ergocalciferol (ERGOCALCIFEROL) 50,000 Units, Every 7 days    furosemide (LASIX) 40 mg, Oral, Daily    glucosamine-chondroitin 500-400 mg tablet 1 tablet, Oral, 3 times daily    lisinopriL (PRINIVIL,ZESTRIL) 20 mg, Oral, Nightly    lisinopril-hydrochlorothiazide (PRINZIDE,ZESTORETIC) 20-12.5 mg per tablet 1 tablet, Oral, Daily    metFORMIN (GLUCOPHAGE) 500 mg, Oral, 2 times daily with meals    multivitamin capsule 1 capsule, Oral, Daily    omeprazole (PRILOSEC) 20 mg, Daily    oxybutynin (DITROPAN) 5 MG Tab TAKE 1 TABLET TWICE DAILY    oxycodone-acetaminophen (PERCOCET) 5-325 mg per tablet 1-2 tablets, Oral, Every 4 hours PRN    polyethylene glycol (GLYCOLAX) 17 g, Oral, Daily    simvastatin (ZOCOR) 40 mg, Oral, Nightly        Review of patient's allergies indicates:   Allergen  "Reactions    Sulfacetamide sodium      Pain perineal area    Adhesive Itching     SKIN GETS RED WITH TAPE AND BANDAIDS    Sulfa (sulfonamide antibiotics) Rash        Review of Systems   Constitution: Negative for chills and fever.   HENT: Positive for congestion. Negative for sore throat.    Eyes: Negative for blurred vision and double vision.   Cardiovascular: Negative.  Negative for chest pain and leg swelling.   Respiratory: Positive for cough. Negative for shortness of breath.    Skin: Positive for itching. Negative for rash.   Musculoskeletal: Positive for back pain. Negative for joint pain.   Gastrointestinal: Negative for abdominal pain and heartburn.   Genitourinary: Positive for bladder incontinence, decreased libido and frequency.   Neurological: Negative.  Negative for dizziness and weakness.   Psychiatric/Behavioral: Negative.  Negative for altered mental status. The patient does not have insomnia.    Allergic/Immunologic: Positive for environmental allergies.        Objective:     Vitals:    12/17/20 1057   BP: 126/76   BP Location: Right arm   Patient Position: Sitting   BP Method: Large (Manual)   Pulse: 85   Resp: 16   SpO2: 98%   Weight: 91.6 kg (202 lb)   Height: 5' 1" (1.549 m)       Physical Exam   Constitutional: She is oriented to person, place, and time. She appears well-developed and well-nourished.   overwt.   HENT:   Head: Normocephalic and atraumatic.   Eyes: Conjunctivae and EOM are normal.   Cardiovascular: Normal rate, regular rhythm and normal heart sounds.   Pulmonary/Chest: Effort normal and breath sounds normal.   Abdominal: Soft. Bowel sounds are normal.   Musculoskeletal: Normal range of motion.   Neurological: She is alert and oriented to person, place, and time.   Skin: Skin is warm and dry.   Psychiatric: She has a normal mood and affect. Her behavior is normal. Judgment and thought content normal.   Nursing note and vitals reviewed.    CMP  Sodium   Date Value Ref Range " Status   09/24/2016 128 (L) 136 - 145 mmol/L Final     Potassium   Date Value Ref Range Status   09/24/2016 3.8 3.5 - 5.1 mmol/L Final     Comment:     *Slightly Hemolyzed     Chloride   Date Value Ref Range Status   09/24/2016 88 (L) 95 - 110 mmol/L Final     CO2   Date Value Ref Range Status   09/24/2016 29 23 - 29 mmol/L Final     Glucose   Date Value Ref Range Status   09/24/2016 135 (H) 70 - 110 mg/dL Final     BUN   Date Value Ref Range Status   09/24/2016 5 (L) 8 - 23 mg/dL Final     Creatinine   Date Value Ref Range Status   09/24/2016 0.5 0.5 - 1.4 mg/dL Final   08/21/2013 0.8 0.5 - 1.4 mg/dL Final     Calcium   Date Value Ref Range Status   09/24/2016 8.7 8.7 - 10.5 mg/dL Final   08/21/2013 10.3 8.7 - 10.5 mg/dL Final     Total Protein   Date Value Ref Range Status   09/24/2016 6.4 6.0 - 8.4 g/dL Final     Albumin   Date Value Ref Range Status   09/24/2016 3.2 (L) 3.5 - 5.2 g/dL Final     Total Bilirubin   Date Value Ref Range Status   09/24/2016 0.8 0.1 - 1.0 mg/dL Final     Comment:     For infants and newborns, interpretation of results should be based  on gestational age, weight and in agreement with clinical  observations.  Premature Infant recommended reference ranges:  Up to 24 hours.............<8.0 mg/dL  Up to 48 hours............<12.0 mg/dL  3-5 days..................<15.0 mg/dL  6-29 days.................<15.0 mg/dL       Alkaline Phosphatase   Date Value Ref Range Status   09/24/2016 75 55 - 135 U/L Final     AST   Date Value Ref Range Status   09/24/2016 38 10 - 40 U/L Final     ALT   Date Value Ref Range Status   09/24/2016 30 10 - 44 U/L Final     Anion Gap   Date Value Ref Range Status   09/24/2016 11 8 - 16 mmol/L Final   08/21/2013 11 5 - 15 meq/L Final     eGFR if    Date Value Ref Range Status   09/24/2016 >60.0 >60 mL/min/1.73 m^2 Final     eGFR if non    Date Value Ref Range Status   09/24/2016 >60.0 >60 mL/min/1.73 m^2 Final     Comment:      Calculation used to obtain the estimated glomerular filtration  rate (eGFR) is the CKD-EPI equation. Since race is unknown   in our information system, the eGFR values for   -American and Non--American patients are given   for each creatinine result.        BMP  Lab Results   Component Value Date     (L) 09/24/2016    K 3.8 09/24/2016    CL 88 (L) 09/24/2016    CO2 29 09/24/2016    BUN 5 (L) 09/24/2016    CREATININE 0.5 09/24/2016    CALCIUM 8.7 09/24/2016    ANIONGAP 11 09/24/2016    ESTGFRAFRICA >60.0 09/24/2016    EGFRNONAA >60.0 09/24/2016      BNP  @LABRCNTIP(BNP,BNPTRIAGEBLO)@   No results found for: CHOL  No results found for: HDL  No results found for: LDLCALC  No results found for: TRIG  No results found for: CHOLHDL   Lab Results   Component Value Date    TSH 1.13 11/17/2009     Lab Results   Component Value Date    HGBA1C 6.5 (H) 09/13/2016     Lab Results   Component Value Date    WBC 13.31 (H) 09/24/2016    HGB 12.6 09/24/2016    HCT 38.7 09/24/2016    MCV 89 09/24/2016     09/24/2016         No results found for this or any previous visit.   No results found for this or any previous visit.       Assessment:       Hypertension  Her blood pressure is controlled on current medications    HLD (hyperlipidemia)  Her cholesterol is controlled on current medications.  She has carotid stenosis she was asked to discontinue smoking    COPD, mild  She has mild COPD she continues to smoke.  She had carotid stenosis she has been asked to discontinue smoking       Plan:       Continue the current medications.  Go see the urologist for her  problems.  stop smoking  She will be seen in the office in 6 months with blood work including a CBC CMP TSH and a lipid profile         no

## 2023-08-30 ENCOUNTER — TELEPHONE (OUTPATIENT)
Dept: CARDIOLOGY | Facility: CLINIC | Age: 78
End: 2023-08-30
Payer: MEDICARE

## 2023-08-30 NOTE — TELEPHONE ENCOUNTER
----- Message from Rox Fernando sent at 8/30/2023  1:10 PM CDT -----  Type: Need Medical Advice   Who Called: Sally daughter of patient  Best callback number:   Additional Information: Patient daughter called to schedule an appointment for her mother to see a cardiologist in [place], the patient currently sees Dr Peña in [place] but the drive is too far  Please call to further assist, Thanks.

## 2023-09-08 ENCOUNTER — HOSPITAL ENCOUNTER (OUTPATIENT)
Facility: HOSPITAL | Age: 78
Discharge: HOME | End: 2023-09-09
Attending: STUDENT IN AN ORGANIZED HEALTH CARE EDUCATION/TRAINING PROGRAM | Admitting: FAMILY MEDICINE
Payer: MEDICARE

## 2023-09-08 DIAGNOSIS — R79.89 ELEVATED TROPONIN: Primary | ICD-10-CM

## 2023-09-08 DIAGNOSIS — R07.9 CHEST PAIN: ICD-10-CM

## 2023-09-08 DIAGNOSIS — J44.1 COPD EXACERBATION: ICD-10-CM

## 2023-09-08 DIAGNOSIS — D64.9 ANEMIA, UNSPECIFIED TYPE: ICD-10-CM

## 2023-09-08 DIAGNOSIS — I50.9 ACUTE CHF: ICD-10-CM

## 2023-09-08 DIAGNOSIS — I50.33 ACUTE ON CHRONIC DIASTOLIC CHF (CONGESTIVE HEART FAILURE): ICD-10-CM

## 2023-09-08 LAB
ALBUMIN SERPL BCP-MCNC: 3.3 G/DL (ref 3.5–5.2)
ALBUMIN SERPL BCP-MCNC: 3.4 G/DL (ref 3.5–5.2)
ALP SERPL-CCNC: 63 U/L (ref 55–135)
ALP SERPL-CCNC: 64 U/L (ref 55–135)
ALT SERPL W/O P-5'-P-CCNC: 27 U/L (ref 10–44)
ALT SERPL W/O P-5'-P-CCNC: 27 U/L (ref 10–44)
ANION GAP SERPL CALC-SCNC: 5 MMOL/L (ref 8–16)
ANION GAP SERPL CALC-SCNC: 6 MMOL/L (ref 8–16)
ANION GAP SERPL CALC-SCNC: 7 MMOL/L (ref 8–16)
AST SERPL-CCNC: 25 U/L (ref 10–40)
AST SERPL-CCNC: 26 U/L (ref 10–40)
BASOPHILS # BLD AUTO: 0.02 K/UL (ref 0–0.2)
BASOPHILS # BLD AUTO: 0.04 K/UL (ref 0–0.2)
BASOPHILS # BLD AUTO: 0.06 K/UL (ref 0–0.2)
BASOPHILS NFR BLD: 0.3 % (ref 0–1.9)
BASOPHILS NFR BLD: 0.5 % (ref 0–1.9)
BASOPHILS NFR BLD: 0.8 % (ref 0–1.9)
BILIRUB SERPL-MCNC: 1.3 MG/DL (ref 0.1–1)
BILIRUB SERPL-MCNC: 1.3 MG/DL (ref 0.1–1)
BILIRUB UR QL STRIP: NEGATIVE
BNP SERPL-MCNC: 537 PG/ML (ref 0–99)
BUN SERPL-MCNC: 15 MG/DL (ref 8–23)
BUN SERPL-MCNC: 16 MG/DL (ref 8–23)
BUN SERPL-MCNC: 17 MG/DL (ref 8–23)
CALCIUM SERPL-MCNC: 8.5 MG/DL (ref 8.7–10.5)
CALCIUM SERPL-MCNC: 8.7 MG/DL (ref 8.7–10.5)
CALCIUM SERPL-MCNC: 8.7 MG/DL (ref 8.7–10.5)
CHLORIDE SERPL-SCNC: 100 MMOL/L (ref 95–110)
CHLORIDE SERPL-SCNC: 95 MMOL/L (ref 95–110)
CHLORIDE SERPL-SCNC: 97 MMOL/L (ref 95–110)
CLARITY UR: CLEAR
CO2 SERPL-SCNC: 34 MMOL/L (ref 23–29)
CO2 SERPL-SCNC: 34 MMOL/L (ref 23–29)
CO2 SERPL-SCNC: 35 MMOL/L (ref 23–29)
COLOR UR: COLORLESS
CREAT SERPL-MCNC: 0.5 MG/DL (ref 0.5–1.4)
CREAT SERPL-MCNC: 0.5 MG/DL (ref 0.5–1.4)
CREAT SERPL-MCNC: 0.7 MG/DL (ref 0.5–1.4)
DIFFERENTIAL METHOD: ABNORMAL
EOSINOPHIL # BLD AUTO: 0 K/UL (ref 0–0.5)
EOSINOPHIL # BLD AUTO: 0.1 K/UL (ref 0–0.5)
EOSINOPHIL # BLD AUTO: 0.1 K/UL (ref 0–0.5)
EOSINOPHIL NFR BLD: 0 % (ref 0–8)
EOSINOPHIL NFR BLD: 1.1 % (ref 0–8)
EOSINOPHIL NFR BLD: 1.9 % (ref 0–8)
ERYTHROCYTE [DISTWIDTH] IN BLOOD BY AUTOMATED COUNT: 21.2 % (ref 11.5–14.5)
ERYTHROCYTE [DISTWIDTH] IN BLOOD BY AUTOMATED COUNT: 21.3 % (ref 11.5–14.5)
ERYTHROCYTE [DISTWIDTH] IN BLOOD BY AUTOMATED COUNT: 21.3 % (ref 11.5–14.5)
EST. GFR  (NO RACE VARIABLE): >60 ML/MIN/1.73 M^2
ESTIMATED AVG GLUCOSE: 137 MG/DL (ref 68–131)
FERRITIN SERPL-MCNC: 12 NG/ML (ref 20–300)
GLUCOSE SERPL-MCNC: 122 MG/DL (ref 70–110)
GLUCOSE SERPL-MCNC: 129 MG/DL (ref 70–110)
GLUCOSE SERPL-MCNC: 134 MG/DL (ref 70–110)
GLUCOSE SERPL-MCNC: 149 MG/DL (ref 70–110)
GLUCOSE SERPL-MCNC: 176 MG/DL (ref 70–110)
GLUCOSE SERPL-MCNC: 237 MG/DL (ref 70–110)
GLUCOSE UR QL STRIP: NEGATIVE
HBA1C MFR BLD: 6.4 % (ref 4.5–6.2)
HCT VFR BLD AUTO: 28.2 % (ref 37–48.5)
HCT VFR BLD AUTO: 28.5 % (ref 37–48.5)
HCT VFR BLD AUTO: 29.5 % (ref 37–48.5)
HGB BLD-MCNC: 7.8 G/DL (ref 12–16)
HGB BLD-MCNC: 8 G/DL (ref 12–16)
HGB BLD-MCNC: 8.3 G/DL (ref 12–16)
HGB UR QL STRIP: NEGATIVE
IMM GRANULOCYTES # BLD AUTO: 0.02 K/UL (ref 0–0.04)
IMM GRANULOCYTES # BLD AUTO: 0.02 K/UL (ref 0–0.04)
IMM GRANULOCYTES # BLD AUTO: 0.03 K/UL (ref 0–0.04)
IMM GRANULOCYTES NFR BLD AUTO: 0.3 % (ref 0–0.5)
IMM GRANULOCYTES NFR BLD AUTO: 0.3 % (ref 0–0.5)
IMM GRANULOCYTES NFR BLD AUTO: 0.4 % (ref 0–0.5)
IRON SERPL-MCNC: 32 UG/DL (ref 30–160)
KETONES UR QL STRIP: NEGATIVE
LEUKOCYTE ESTERASE UR QL STRIP: NEGATIVE
LYMPHOCYTES # BLD AUTO: 0.5 K/UL (ref 1–4.8)
LYMPHOCYTES # BLD AUTO: 0.7 K/UL (ref 1–4.8)
LYMPHOCYTES # BLD AUTO: 1.1 K/UL (ref 1–4.8)
LYMPHOCYTES NFR BLD: 14.7 % (ref 18–48)
LYMPHOCYTES NFR BLD: 6.5 % (ref 18–48)
LYMPHOCYTES NFR BLD: 8.8 % (ref 18–48)
MAGNESIUM SERPL-MCNC: 1.5 MG/DL (ref 1.6–2.6)
MAGNESIUM SERPL-MCNC: 1.6 MG/DL (ref 1.6–2.6)
MAGNESIUM SERPL-MCNC: 1.9 MG/DL (ref 1.6–2.6)
MCH RBC QN AUTO: 22.5 PG (ref 27–31)
MCH RBC QN AUTO: 22.8 PG (ref 27–31)
MCH RBC QN AUTO: 22.9 PG (ref 27–31)
MCHC RBC AUTO-ENTMCNC: 27.7 G/DL (ref 32–36)
MCHC RBC AUTO-ENTMCNC: 28.1 G/DL (ref 32–36)
MCHC RBC AUTO-ENTMCNC: 28.1 G/DL (ref 32–36)
MCV RBC AUTO: 81 FL (ref 82–98)
MCV RBC AUTO: 82 FL (ref 82–98)
MCV RBC AUTO: 82 FL (ref 82–98)
MONOCYTES # BLD AUTO: 0.2 K/UL (ref 0.3–1)
MONOCYTES # BLD AUTO: 0.3 K/UL (ref 0.3–1)
MONOCYTES # BLD AUTO: 0.5 K/UL (ref 0.3–1)
MONOCYTES NFR BLD: 3.1 % (ref 4–15)
MONOCYTES NFR BLD: 3.6 % (ref 4–15)
MONOCYTES NFR BLD: 6.7 % (ref 4–15)
NEUTROPHILS # BLD AUTO: 5.5 K/UL (ref 1.8–7.7)
NEUTROPHILS # BLD AUTO: 6.8 K/UL (ref 1.8–7.7)
NEUTROPHILS # BLD AUTO: 7 K/UL (ref 1.8–7.7)
NEUTROPHILS NFR BLD: 75.6 % (ref 38–73)
NEUTROPHILS NFR BLD: 85.7 % (ref 38–73)
NEUTROPHILS NFR BLD: 89.7 % (ref 38–73)
NITRITE UR QL STRIP: NEGATIVE
NRBC BLD-RTO: 0 /100 WBC
OB PNL STL: POSITIVE
PH UR STRIP: 7 [PH] (ref 5–8)
PLATELET # BLD AUTO: 133 K/UL (ref 150–450)
PLATELET # BLD AUTO: 137 K/UL (ref 150–450)
PLATELET # BLD AUTO: 167 K/UL (ref 150–450)
PLATELET BLD QL SMEAR: ABNORMAL
PMV BLD AUTO: 11.3 FL (ref 9.2–12.9)
PMV BLD AUTO: 11.5 FL (ref 9.2–12.9)
PMV BLD AUTO: ABNORMAL FL (ref 9.2–12.9)
POTASSIUM SERPL-SCNC: 3 MMOL/L (ref 3.5–5.1)
POTASSIUM SERPL-SCNC: 3.1 MMOL/L (ref 3.5–5.1)
POTASSIUM SERPL-SCNC: 3.7 MMOL/L (ref 3.5–5.1)
PROCALCITONIN SERPL IA-MCNC: <0.05 NG/ML (ref 0–0.5)
PROT SERPL-MCNC: 6.6 G/DL (ref 6–8.4)
PROT SERPL-MCNC: 6.6 G/DL (ref 6–8.4)
PROT UR QL STRIP: NEGATIVE
RBC # BLD AUTO: 3.46 M/UL (ref 4–5.4)
RBC # BLD AUTO: 3.51 M/UL (ref 4–5.4)
RBC # BLD AUTO: 3.62 M/UL (ref 4–5.4)
SATURATED IRON: 6 % (ref 20–50)
SODIUM SERPL-SCNC: 137 MMOL/L (ref 136–145)
SODIUM SERPL-SCNC: 137 MMOL/L (ref 136–145)
SODIUM SERPL-SCNC: 139 MMOL/L (ref 136–145)
SP GR UR STRIP: 1 (ref 1–1.03)
TOTAL IRON BINDING CAPACITY: 514 UG/DL (ref 250–450)
TRANSFERRIN SERPL-MCNC: 367 MG/DL (ref 200–375)
TRANSFERRIN SERPL-MCNC: 367 MG/DL (ref 200–375)
TROPONIN I SERPL HS-MCNC: 49.4 PG/ML (ref 0–14.9)
TROPONIN I SERPL HS-MCNC: 52.8 PG/ML (ref 0–14.9)
URN SPEC COLLECT METH UR: ABNORMAL
UROBILINOGEN UR STRIP-ACNC: NEGATIVE EU/DL
WBC # BLD AUTO: 7.26 K/UL (ref 3.9–12.7)
WBC # BLD AUTO: 7.84 K/UL (ref 3.9–12.7)
WBC # BLD AUTO: 7.87 K/UL (ref 3.9–12.7)

## 2023-09-08 PROCEDURE — 63600175 PHARM REV CODE 636 W HCPCS: Performed by: STUDENT IN AN ORGANIZED HEALTH CARE EDUCATION/TRAINING PROGRAM

## 2023-09-08 PROCEDURE — 93010 ELECTROCARDIOGRAM REPORT: CPT | Mod: ,,, | Performed by: GENERAL PRACTICE

## 2023-09-08 PROCEDURE — 82728 ASSAY OF FERRITIN: CPT | Performed by: FAMILY MEDICINE

## 2023-09-08 PROCEDURE — G0378 HOSPITAL OBSERVATION PER HR: HCPCS

## 2023-09-08 PROCEDURE — 96365 THER/PROPH/DIAG IV INF INIT: CPT

## 2023-09-08 PROCEDURE — 80048 BASIC METABOLIC PNL TOTAL CA: CPT | Performed by: FAMILY MEDICINE

## 2023-09-08 PROCEDURE — 25000003 PHARM REV CODE 250: Performed by: FAMILY MEDICINE

## 2023-09-08 PROCEDURE — 83735 ASSAY OF MAGNESIUM: CPT | Performed by: STUDENT IN AN ORGANIZED HEALTH CARE EDUCATION/TRAINING PROGRAM

## 2023-09-08 PROCEDURE — 80053 COMPREHEN METABOLIC PANEL: CPT | Performed by: STUDENT IN AN ORGANIZED HEALTH CARE EDUCATION/TRAINING PROGRAM

## 2023-09-08 PROCEDURE — 84466 ASSAY OF TRANSFERRIN: CPT | Performed by: FAMILY MEDICINE

## 2023-09-08 PROCEDURE — 25000242 PHARM REV CODE 250 ALT 637 W/ HCPCS: Performed by: FAMILY MEDICINE

## 2023-09-08 PROCEDURE — 27000221 HC OXYGEN, UP TO 24 HOURS

## 2023-09-08 PROCEDURE — 93010 EKG 12-LEAD: ICD-10-PCS | Mod: ,,, | Performed by: GENERAL PRACTICE

## 2023-09-08 PROCEDURE — 84484 ASSAY OF TROPONIN QUANT: CPT | Mod: 91 | Performed by: FAMILY MEDICINE

## 2023-09-08 PROCEDURE — 99900035 HC TECH TIME PER 15 MIN (STAT)

## 2023-09-08 PROCEDURE — 63600175 PHARM REV CODE 636 W HCPCS: Performed by: FAMILY MEDICINE

## 2023-09-08 PROCEDURE — 83880 ASSAY OF NATRIURETIC PEPTIDE: CPT | Performed by: STUDENT IN AN ORGANIZED HEALTH CARE EDUCATION/TRAINING PROGRAM

## 2023-09-08 PROCEDURE — 96376 TX/PRO/DX INJ SAME DRUG ADON: CPT

## 2023-09-08 PROCEDURE — 80053 COMPREHEN METABOLIC PANEL: CPT | Mod: 91 | Performed by: FAMILY MEDICINE

## 2023-09-08 PROCEDURE — 36415 COLL VENOUS BLD VENIPUNCTURE: CPT | Performed by: FAMILY MEDICINE

## 2023-09-08 PROCEDURE — 84145 PROCALCITONIN (PCT): CPT | Performed by: FAMILY MEDICINE

## 2023-09-08 PROCEDURE — 93005 ELECTROCARDIOGRAM TRACING: CPT | Performed by: GENERAL PRACTICE

## 2023-09-08 PROCEDURE — 96372 THER/PROPH/DIAG INJ SC/IM: CPT | Performed by: FAMILY MEDICINE

## 2023-09-08 PROCEDURE — 85025 COMPLETE CBC W/AUTO DIFF WBC: CPT | Performed by: STUDENT IN AN ORGANIZED HEALTH CARE EDUCATION/TRAINING PROGRAM

## 2023-09-08 PROCEDURE — 94799 UNLISTED PULMONARY SVC/PX: CPT

## 2023-09-08 PROCEDURE — 82962 GLUCOSE BLOOD TEST: CPT | Mod: 91

## 2023-09-08 PROCEDURE — 25000003 PHARM REV CODE 250: Performed by: STUDENT IN AN ORGANIZED HEALTH CARE EDUCATION/TRAINING PROGRAM

## 2023-09-08 PROCEDURE — 83735 ASSAY OF MAGNESIUM: CPT | Mod: 91 | Performed by: FAMILY MEDICINE

## 2023-09-08 PROCEDURE — 81003 URINALYSIS AUTO W/O SCOPE: CPT | Performed by: FAMILY MEDICINE

## 2023-09-08 PROCEDURE — 96366 THER/PROPH/DIAG IV INF ADDON: CPT

## 2023-09-08 PROCEDURE — 94640 AIRWAY INHALATION TREATMENT: CPT

## 2023-09-08 PROCEDURE — 84484 ASSAY OF TROPONIN QUANT: CPT | Performed by: STUDENT IN AN ORGANIZED HEALTH CARE EDUCATION/TRAINING PROGRAM

## 2023-09-08 PROCEDURE — 94761 N-INVAS EAR/PLS OXIMETRY MLT: CPT

## 2023-09-08 PROCEDURE — 83540 ASSAY OF IRON: CPT | Performed by: FAMILY MEDICINE

## 2023-09-08 PROCEDURE — 96375 TX/PRO/DX INJ NEW DRUG ADDON: CPT

## 2023-09-08 PROCEDURE — 99285 EMERGENCY DEPT VISIT HI MDM: CPT | Mod: 25

## 2023-09-08 PROCEDURE — 82272 OCCULT BLD FECES 1-3 TESTS: CPT | Performed by: FAMILY MEDICINE

## 2023-09-08 PROCEDURE — 83036 HEMOGLOBIN GLYCOSYLATED A1C: CPT | Performed by: FAMILY MEDICINE

## 2023-09-08 PROCEDURE — 99900031 HC PATIENT EDUCATION (STAT)

## 2023-09-08 PROCEDURE — 85025 COMPLETE CBC W/AUTO DIFF WBC: CPT | Mod: 91 | Performed by: FAMILY MEDICINE

## 2023-09-08 PROCEDURE — 25000242 PHARM REV CODE 250 ALT 637 W/ HCPCS: Performed by: STUDENT IN AN ORGANIZED HEALTH CARE EDUCATION/TRAINING PROGRAM

## 2023-09-08 PROCEDURE — 83735 ASSAY OF MAGNESIUM: CPT | Mod: 91 | Performed by: STUDENT IN AN ORGANIZED HEALTH CARE EDUCATION/TRAINING PROGRAM

## 2023-09-08 RX ORDER — POTASSIUM CHLORIDE 7.45 MG/ML
20 INJECTION INTRAVENOUS
Status: DISCONTINUED | OUTPATIENT
Start: 2023-09-08 | End: 2023-09-09 | Stop reason: HOSPADM

## 2023-09-08 RX ORDER — POTASSIUM CHLORIDE 7.45 MG/ML
40 INJECTION INTRAVENOUS
Status: DISCONTINUED | OUTPATIENT
Start: 2023-09-08 | End: 2023-09-09 | Stop reason: HOSPADM

## 2023-09-08 RX ORDER — POTASSIUM CHLORIDE 20 MEQ/1
20 TABLET, EXTENDED RELEASE ORAL
Status: DISCONTINUED | OUTPATIENT
Start: 2023-09-08 | End: 2023-09-09 | Stop reason: HOSPADM

## 2023-09-08 RX ORDER — PANTOPRAZOLE SODIUM 40 MG/1
40 TABLET, DELAYED RELEASE ORAL DAILY
Status: DISCONTINUED | OUTPATIENT
Start: 2023-09-08 | End: 2023-09-09 | Stop reason: HOSPADM

## 2023-09-08 RX ORDER — HYDROCODONE BITARTRATE AND ACETAMINOPHEN 5; 325 MG/1; MG/1
1 TABLET ORAL EVERY 4 HOURS PRN
Status: DISCONTINUED | OUTPATIENT
Start: 2023-09-08 | End: 2023-09-09 | Stop reason: HOSPADM

## 2023-09-08 RX ORDER — MAGNESIUM SULFATE 1 G/100ML
1 INJECTION INTRAVENOUS
Status: DISCONTINUED | OUTPATIENT
Start: 2023-09-08 | End: 2023-09-09 | Stop reason: HOSPADM

## 2023-09-08 RX ORDER — LABETALOL HYDROCHLORIDE 5 MG/ML
20 INJECTION, SOLUTION INTRAVENOUS
Status: COMPLETED | OUTPATIENT
Start: 2023-09-08 | End: 2023-09-08

## 2023-09-08 RX ORDER — FLUTICASONE FUROATE AND VILANTEROL 100; 25 UG/1; UG/1
1 POWDER RESPIRATORY (INHALATION) DAILY
Status: DISCONTINUED | OUTPATIENT
Start: 2023-09-08 | End: 2023-09-08

## 2023-09-08 RX ORDER — MAGNESIUM SULFATE HEPTAHYDRATE 40 MG/ML
2 INJECTION, SOLUTION INTRAVENOUS
Status: DISCONTINUED | OUTPATIENT
Start: 2023-09-08 | End: 2023-09-09 | Stop reason: HOSPADM

## 2023-09-08 RX ORDER — IPRATROPIUM BROMIDE AND ALBUTEROL SULFATE 2.5; .5 MG/3ML; MG/3ML
3 SOLUTION RESPIRATORY (INHALATION) EVERY 4 HOURS PRN
Status: DISCONTINUED | OUTPATIENT
Start: 2023-09-08 | End: 2023-09-09 | Stop reason: HOSPADM

## 2023-09-08 RX ORDER — ACETAMINOPHEN 500 MG
1000 TABLET ORAL
Status: COMPLETED | OUTPATIENT
Start: 2023-09-08 | End: 2023-09-08

## 2023-09-08 RX ORDER — IBUPROFEN 200 MG
16 TABLET ORAL
Status: DISCONTINUED | OUTPATIENT
Start: 2023-09-08 | End: 2023-09-09 | Stop reason: HOSPADM

## 2023-09-08 RX ORDER — METOPROLOL TARTRATE 1 MG/ML
5 INJECTION, SOLUTION INTRAVENOUS EVERY 5 MIN PRN
Status: DISCONTINUED | OUTPATIENT
Start: 2023-09-08 | End: 2023-09-09 | Stop reason: HOSPADM

## 2023-09-08 RX ORDER — ARFORMOTEROL TARTRATE 15 UG/2ML
15 SOLUTION RESPIRATORY (INHALATION) 2 TIMES DAILY
Status: DISCONTINUED | OUTPATIENT
Start: 2023-09-08 | End: 2023-09-09 | Stop reason: HOSPADM

## 2023-09-08 RX ORDER — IPRATROPIUM BROMIDE AND ALBUTEROL SULFATE 2.5; .5 MG/3ML; MG/3ML
3 SOLUTION RESPIRATORY (INHALATION)
Status: COMPLETED | OUTPATIENT
Start: 2023-09-08 | End: 2023-09-08

## 2023-09-08 RX ORDER — LANOLIN ALCOHOL/MO/W.PET/CERES
1 CREAM (GRAM) TOPICAL DAILY
Status: DISCONTINUED | OUTPATIENT
Start: 2023-09-08 | End: 2023-09-09 | Stop reason: HOSPADM

## 2023-09-08 RX ORDER — MAGNESIUM SULFATE HEPTAHYDRATE 40 MG/ML
2 INJECTION, SOLUTION INTRAVENOUS ONCE
Status: COMPLETED | OUTPATIENT
Start: 2023-09-08 | End: 2023-09-08

## 2023-09-08 RX ORDER — NALOXONE HCL 0.4 MG/ML
0.02 VIAL (ML) INJECTION
Status: DISCONTINUED | OUTPATIENT
Start: 2023-09-08 | End: 2023-09-09 | Stop reason: HOSPADM

## 2023-09-08 RX ORDER — ONDANSETRON 2 MG/ML
4 INJECTION INTRAMUSCULAR; INTRAVENOUS EVERY 6 HOURS PRN
Status: DISCONTINUED | OUTPATIENT
Start: 2023-09-08 | End: 2023-09-09 | Stop reason: HOSPADM

## 2023-09-08 RX ORDER — ASPIRIN 325 MG
325 TABLET ORAL
Status: COMPLETED | OUTPATIENT
Start: 2023-09-08 | End: 2023-09-08

## 2023-09-08 RX ORDER — GLUCAGON 1 MG
1 KIT INJECTION
Status: DISCONTINUED | OUTPATIENT
Start: 2023-09-08 | End: 2023-09-09 | Stop reason: HOSPADM

## 2023-09-08 RX ORDER — CLOPIDOGREL BISULFATE 75 MG/1
75 TABLET ORAL DAILY
Status: CANCELLED | OUTPATIENT
Start: 2023-09-08

## 2023-09-08 RX ORDER — MORPHINE SULFATE 4 MG/ML
4 INJECTION, SOLUTION INTRAMUSCULAR; INTRAVENOUS EVERY 4 HOURS PRN
Status: DISCONTINUED | OUTPATIENT
Start: 2023-09-08 | End: 2023-09-09 | Stop reason: HOSPADM

## 2023-09-08 RX ORDER — CARVEDILOL 12.5 MG/1
12.5 TABLET ORAL 2 TIMES DAILY
COMMUNITY
Start: 2023-08-29

## 2023-09-08 RX ORDER — AMOXICILLIN 250 MG
1 CAPSULE ORAL 2 TIMES DAILY PRN
Status: DISCONTINUED | OUTPATIENT
Start: 2023-09-08 | End: 2023-09-09 | Stop reason: HOSPADM

## 2023-09-08 RX ORDER — INSULIN ASPART 100 [IU]/ML
0-10 INJECTION, SOLUTION INTRAVENOUS; SUBCUTANEOUS
Status: DISCONTINUED | OUTPATIENT
Start: 2023-09-08 | End: 2023-09-09 | Stop reason: HOSPADM

## 2023-09-08 RX ORDER — ATORVASTATIN CALCIUM 20 MG/1
20 TABLET, FILM COATED ORAL NIGHTLY
Status: DISCONTINUED | OUTPATIENT
Start: 2023-09-08 | End: 2023-09-09 | Stop reason: HOSPADM

## 2023-09-08 RX ORDER — DONEPEZIL HYDROCHLORIDE 5 MG/1
5 TABLET, FILM COATED ORAL NIGHTLY
Status: DISCONTINUED | OUTPATIENT
Start: 2023-09-08 | End: 2023-09-09 | Stop reason: HOSPADM

## 2023-09-08 RX ORDER — POTASSIUM CHLORIDE 20 MEQ/1
40 TABLET, EXTENDED RELEASE ORAL
Status: DISCONTINUED | OUTPATIENT
Start: 2023-09-08 | End: 2023-09-09 | Stop reason: HOSPADM

## 2023-09-08 RX ORDER — LISINOPRIL 20 MG/1
20 TABLET ORAL DAILY
Status: DISCONTINUED | OUTPATIENT
Start: 2023-09-08 | End: 2023-09-09

## 2023-09-08 RX ORDER — SIMETHICONE 80 MG
1 TABLET,CHEWABLE ORAL 4 TIMES DAILY PRN
Status: DISCONTINUED | OUTPATIENT
Start: 2023-09-08 | End: 2023-09-09 | Stop reason: HOSPADM

## 2023-09-08 RX ORDER — MAGNESIUM SULFATE HEPTAHYDRATE 40 MG/ML
4 INJECTION, SOLUTION INTRAVENOUS
Status: DISCONTINUED | OUTPATIENT
Start: 2023-09-08 | End: 2023-09-09 | Stop reason: HOSPADM

## 2023-09-08 RX ORDER — BUDESONIDE 0.5 MG/2ML
0.5 INHALANT ORAL EVERY 12 HOURS
Status: DISCONTINUED | OUTPATIENT
Start: 2023-09-08 | End: 2023-09-09 | Stop reason: HOSPADM

## 2023-09-08 RX ORDER — PREDNISONE 20 MG/1
60 TABLET ORAL
Status: COMPLETED | OUTPATIENT
Start: 2023-09-08 | End: 2023-09-08

## 2023-09-08 RX ORDER — FLUTICASONE FUROATE AND VILANTEROL 100; 25 UG/1; UG/1
1 POWDER RESPIRATORY (INHALATION) DAILY
COMMUNITY

## 2023-09-08 RX ORDER — ACETAMINOPHEN 325 MG/1
650 TABLET ORAL EVERY 8 HOURS PRN
Status: DISCONTINUED | OUTPATIENT
Start: 2023-09-08 | End: 2023-09-09 | Stop reason: HOSPADM

## 2023-09-08 RX ORDER — TALC
6 POWDER (GRAM) TOPICAL NIGHTLY PRN
Status: DISCONTINUED | OUTPATIENT
Start: 2023-09-08 | End: 2023-09-09 | Stop reason: HOSPADM

## 2023-09-08 RX ORDER — POLYETHYLENE GLYCOL 3350 17 G/17G
17 POWDER, FOR SOLUTION ORAL 2 TIMES DAILY PRN
Status: DISCONTINUED | OUTPATIENT
Start: 2023-09-08 | End: 2023-09-09 | Stop reason: HOSPADM

## 2023-09-08 RX ORDER — OXYBUTYNIN CHLORIDE 5 MG/1
5 TABLET ORAL 2 TIMES DAILY
Status: DISCONTINUED | OUTPATIENT
Start: 2023-09-08 | End: 2023-09-09 | Stop reason: HOSPADM

## 2023-09-08 RX ORDER — SIMVASTATIN 10 MG/1
10 TABLET, FILM COATED ORAL NIGHTLY
COMMUNITY
Start: 2023-08-29

## 2023-09-08 RX ORDER — IBUPROFEN 200 MG
24 TABLET ORAL
Status: DISCONTINUED | OUTPATIENT
Start: 2023-09-08 | End: 2023-09-09 | Stop reason: HOSPADM

## 2023-09-08 RX ORDER — FUROSEMIDE 10 MG/ML
80 INJECTION INTRAMUSCULAR; INTRAVENOUS
Status: COMPLETED | OUTPATIENT
Start: 2023-09-08 | End: 2023-09-08

## 2023-09-08 RX ORDER — DILTIAZEM HYDROCHLORIDE 30 MG/1
1 TABLET, FILM COATED ORAL EVERY 6 HOURS
Status: ON HOLD | COMMUNITY
Start: 2023-08-29 | End: 2023-09-09 | Stop reason: HOSPADM

## 2023-09-08 RX ORDER — CITALOPRAM 20 MG/1
40 TABLET, FILM COATED ORAL DAILY
Status: DISCONTINUED | OUTPATIENT
Start: 2023-09-08 | End: 2023-09-09 | Stop reason: HOSPADM

## 2023-09-08 RX ORDER — FUROSEMIDE 10 MG/ML
40 INJECTION INTRAMUSCULAR; INTRAVENOUS 2 TIMES DAILY
Status: DISCONTINUED | OUTPATIENT
Start: 2023-09-08 | End: 2023-09-09 | Stop reason: HOSPADM

## 2023-09-08 RX ORDER — SODIUM CHLORIDE 0.9 % (FLUSH) 0.9 %
10 SYRINGE (ML) INJECTION
Status: DISCONTINUED | OUTPATIENT
Start: 2023-09-08 | End: 2023-09-09 | Stop reason: HOSPADM

## 2023-09-08 RX ORDER — ASPIRIN 81 MG/1
81 TABLET ORAL DAILY
Status: CANCELLED | OUTPATIENT
Start: 2023-09-08

## 2023-09-08 RX ORDER — CARVEDILOL 3.12 MG/1
3.12 TABLET ORAL 2 TIMES DAILY
Status: DISCONTINUED | OUTPATIENT
Start: 2023-09-08 | End: 2023-09-09

## 2023-09-08 RX ADMIN — DONEPEZIL HYDROCHLORIDE 5 MG: 5 TABLET ORAL at 04:09

## 2023-09-08 RX ADMIN — BUDESONIDE 0.5 MG: 0.5 INHALANT RESPIRATORY (INHALATION) at 09:09

## 2023-09-08 RX ADMIN — CITALOPRAM HYDROBROMIDE 40 MG: 20 TABLET ORAL at 08:09

## 2023-09-08 RX ADMIN — ACETAMINOPHEN 1000 MG: 500 TABLET ORAL at 02:09

## 2023-09-08 RX ADMIN — OXYBUTYNIN CHLORIDE 5 MG: 5 TABLET ORAL at 08:09

## 2023-09-08 RX ADMIN — POTASSIUM CHLORIDE EXTENDED-RELEASE 40 MEQ: 1500 TABLET ORAL at 09:09

## 2023-09-08 RX ADMIN — INSULIN ASPART 4 UNITS: 100 INJECTION, SOLUTION INTRAVENOUS; SUBCUTANEOUS at 09:09

## 2023-09-08 RX ADMIN — ARFORMOTEROL TARTRATE 15 MCG: 15 SOLUTION RESPIRATORY (INHALATION) at 09:09

## 2023-09-08 RX ADMIN — FUROSEMIDE 80 MG: 20 INJECTION, SOLUTION INTRAMUSCULAR; INTRAVENOUS at 03:09

## 2023-09-08 RX ADMIN — CARVEDILOL 3.12 MG: 3.12 TABLET, FILM COATED ORAL at 09:09

## 2023-09-08 RX ADMIN — IPRATROPIUM BROMIDE AND ALBUTEROL SULFATE 3 ML: .5; 3 SOLUTION RESPIRATORY (INHALATION) at 02:09

## 2023-09-08 RX ADMIN — POTASSIUM BICARBONATE 50 MEQ: 977.5 TABLET, EFFERVESCENT ORAL at 04:09

## 2023-09-08 RX ADMIN — MORPHINE SULFATE 4 MG: 4 INJECTION, SOLUTION INTRAMUSCULAR; INTRAVENOUS at 08:09

## 2023-09-08 RX ADMIN — METOPROLOL TARTRATE 5 MG: 1 INJECTION, SOLUTION INTRAVENOUS at 02:09

## 2023-09-08 RX ADMIN — CARVEDILOL 3.12 MG: 3.12 TABLET, FILM COATED ORAL at 08:09

## 2023-09-08 RX ADMIN — FERROUS SULFATE TAB 325 MG (65 MG ELEMENTAL FE) 1 EACH: 325 (65 FE) TAB at 09:09

## 2023-09-08 RX ADMIN — LABETALOL HYDROCHLORIDE 20 MG: 5 INJECTION INTRAVENOUS at 03:09

## 2023-09-08 RX ADMIN — PANTOPRAZOLE SODIUM 40 MG: 40 TABLET, DELAYED RELEASE ORAL at 06:09

## 2023-09-08 RX ADMIN — OXYBUTYNIN CHLORIDE 5 MG: 5 TABLET ORAL at 09:09

## 2023-09-08 RX ADMIN — LISINOPRIL 20 MG: 20 TABLET ORAL at 08:09

## 2023-09-08 RX ADMIN — DONEPEZIL HYDROCHLORIDE 5 MG: 5 TABLET ORAL at 09:09

## 2023-09-08 RX ADMIN — ASPIRIN 325 MG ORAL TABLET 325 MG: 325 PILL ORAL at 03:09

## 2023-09-08 RX ADMIN — PREDNISONE 60 MG: 20 TABLET ORAL at 03:09

## 2023-09-08 RX ADMIN — ATORVASTATIN CALCIUM 20 MG: 20 TABLET, FILM COATED ORAL at 09:09

## 2023-09-08 RX ADMIN — FUROSEMIDE 40 MG: 10 INJECTION, SOLUTION INTRAVENOUS at 08:09

## 2023-09-08 RX ADMIN — FUROSEMIDE 40 MG: 10 INJECTION, SOLUTION INTRAVENOUS at 06:09

## 2023-09-08 RX ADMIN — BUDESONIDE 0.5 MG: 0.5 INHALANT RESPIRATORY (INHALATION) at 08:09

## 2023-09-08 RX ADMIN — ONDANSETRON 4 MG: 2 INJECTION INTRAMUSCULAR; INTRAVENOUS at 08:09

## 2023-09-08 RX ADMIN — MAGNESIUM SULFATE IN WATER 2 G: 40 INJECTION, SOLUTION INTRAVENOUS at 04:09

## 2023-09-08 RX ADMIN — HYDROCODONE BITARTRATE AND ACETAMINOPHEN 1 TABLET: 5; 325 TABLET ORAL at 07:09

## 2023-09-08 RX ADMIN — ARFORMOTEROL TARTRATE 15 MCG: 15 SOLUTION RESPIRATORY (INHALATION) at 08:09

## 2023-09-08 NOTE — ED PROVIDER NOTES
Encounter Date: 9/8/2023       History     Chief Complaint   Patient presents with    Shortness of Breath     With multiple other complaints     HPI  Daryleen G Moran is a 78 y.o. w/ PMHx HFpEF, CAD, Afib, Aortic valve stenosis, HTN, HLD, DM2, COPD presenting for shortness of breath.  Endorses 1 week of mild shortness of breath which worsened today around approximately noon.  Difficulty breathing progressed until she called EMS.  Endorses cough with mild sputum production.  Endorses heart palpitations beginning today.  Denies fever, chills, congestion, sore throat, nausea, vomiting, leg swelling.     On 2 L NC at home      Review of patient's allergies indicates:   Allergen Reactions    Latex      Other reaction(s): Unknown  Other reaction(s): Unknown    Sulfacetamide sodium      Pain perineal area    Sulfasalazine Hives    Adhesive Itching     SKIN GETS RED WITH TAPE AND BANDAIDS    Adhesive tape-silicones Itching     SKIN GETS RED WITH TAPE AND BANDAIDS    Sulfa (sulfonamide antibiotics) Rash     Past Medical History:   Diagnosis Date    Anesthesia complication     BLADDER DYSFUNCTION    Aortic stenosis     Arthritis     Back pain     Diabetes mellitus type II     NO LONGER DIABETIC    Encounter for blood transfusion     Hyperlipidemia     Hypertension     NSTEMI (non-ST elevated myocardial infarction) 05/01/2022    Osteoporosis     Wears glasses      Past Surgical History:   Procedure Laterality Date     vocal cord nodules removed  long time ago     twice    ADRENAL TUMOR      APPENDECTOMY  within last 5yrs    CATHETERIZATION OF BOTH LEFT AND RIGHT HEART Left 05/06/2022    Procedure: CATHETERIZATION, HEART, BOTH LEFT AND RIGHT;  Surgeon: Michelet Vargas MD;  Location: [facility] CATH/EP LAB;  Service: Cardiology;  Laterality: Left;    COLONOSCOPY N/A 6/23/2023    Procedure: COLONOSCOPY;  Surgeon: Joel Neal III, MD;  Location: [facility] ENDO;  Service: Endoscopy;  Laterality: N/A;    FIXATION KYPHOPLASTY THORACIC  SPINE      8-20-13    FOOT SURGERY      left 2nd toe was too long     HERNIA REPAIR  within last 5yrs    INSERTION OF TEMPORARY PACEMAKER N/A 2023    Procedure: INSERTION, PACEMAKER, TEMPORARY;  Surgeon: Bhavesh Peña MD;  Location: [facility] CATH;  Service: Cardiology;  Laterality: N/A;    LEFT HEART CATHETERIZATION Left 2022    Procedure: Left heart cath;  Surgeon: Jose Echols MD;  Location: [facility] CATH/EP LAB;  Service: Cardiology;  Laterality: Left;    SMALL BOWEL ENTEROSCOPY N/A 2023    Procedure: ENTEROSCOPY;  Surgeon: Cornell Aguirre MD;  Location: [facility] ENDO;  Service: Endoscopy;  Laterality: N/A;    TONSILLECTOMY, ADENOIDECTOMY  long time ago    TRANSCATHETER AORTIC VALVE REPLACEMENT (TAVR) Bilateral 2023    Procedure: REPLACEMENT, AORTIC VALVE, TRANSCATHETER (TAVR);  Surgeon: Bhavesh Peña MD;  Location: [facility] CATH;  Service: Cardiology;  Laterality: Bilateral;    TRANSCATHETER AORTIC VALVE REPLACEMENT (TAVR) Bilateral 2023    Procedure: REPLACEMENT, AORTIC VALVE, TRANSCATHETER (TAVR);  Surgeon: Dallin Verma MD;  Location: [facility] CATH;  Service: Cardiology;  Laterality: Bilateral;    TRANSTHORACIC ECHOCARDIOGRAPHY (TTE)  2023    Procedure: ECHOCARDIOGRAM, TRANSTHORACIC;  Surgeon: Bhavesh Peña MD;  Location: [facility] CATH;  Service: Cardiology;;     Family History   Problem Relation Age of Onset    Collagen disease Neg Hx      Social History     Tobacco Use    Smoking status: Former     Current packs/day: 0.00     Average packs/day: 0.5 packs/day for 35.0 years (17.5 ttl pk-yrs)     Types: Cigarettes     Start date: 10/5/1987     Quit date: 10/5/2022     Years since quittin.9    Smokeless tobacco: Never   Substance Use Topics    Alcohol use: No    Drug use: No     Review of Systems   Constitutional:  Negative for chills and fever.   HENT:  Negative for congestion and sore throat.    Respiratory:  Positive for cough and shortness of breath. Negative for stridor.     Cardiovascular:  Positive for palpitations. Negative for chest pain and leg swelling.   Gastrointestinal:  Negative for abdominal pain, nausea and vomiting.   Musculoskeletal:  Negative for back pain and neck pain.   Neurological:  Negative for weakness and light-headedness.   Psychiatric/Behavioral:  Negative for agitation and confusion.        Physical Exam     Initial Vitals   BP Pulse Resp Temp SpO2   09/08/23 0151 09/08/23 0151 09/08/23 0151 09/08/23 0155 09/08/23 0151   (!) 145/79 (!) 115 18 98.7 °F (37.1 °C) 100 %      MAP       --                Physical Exam    Constitutional: She appears well-developed.   HENT:   Head: Normocephalic and atraumatic.   Right Ear: External ear normal.   Left Ear: External ear normal.   Eyes: Conjunctivae are normal. Right eye exhibits no discharge. Left eye exhibits no discharge.   Neck: Neck supple.   Normal range of motion.  Cardiovascular:  Intact distal pulses.           Irregularly irregular   Pulmonary/Chest: No respiratory distress. She has no wheezes. She has no rales.   On home 2 L NC  Tight lung sounds throughout, no wheezing   Abdominal: Abdomen is soft. She exhibits no distension. There is no abdominal tenderness.   Musculoskeletal:         General: Edema present. Normal range of motion.      Cervical back: Normal range of motion and neck supple.      Comments: Trace pitting edema bilateral lower extremities     Neurological: She is alert and oriented to person, place, and time.   Skin: Skin is warm and dry.         ED Course   Procedures  Labs Reviewed   CBC W/ AUTO DIFFERENTIAL - Abnormal; Notable for the following components:       Result Value    RBC 3.46 (*)     Hemoglobin 7.8 (*)     Hematocrit 28.2 (*)     MCH 22.5 (*)     MCHC 27.7 (*)     RDW 21.3 (*)     Platelets 133 (*)     Gran % 75.6 (*)     Lymph % 14.7 (*)     All other components within normal limits   COMPREHENSIVE METABOLIC PANEL - Abnormal; Notable for the following components:    Potassium  3.1 (*)     CO2 34 (*)     Glucose 122 (*)     Albumin 3.3 (*)     Total Bilirubin 1.3 (*)     Anion Gap 5 (*)     All other components within normal limits   TROPONIN I HIGH SENSITIVITY - Abnormal; Notable for the following components:    Troponin I High Sensitivity 49.4 (*)     All other components within normal limits   B-TYPE NATRIURETIC PEPTIDE - Abnormal; Notable for the following components:     (*)     All other components within normal limits   CBC W/ AUTO DIFFERENTIAL - Abnormal; Notable for the following components:    RBC 3.62 (*)     Hemoglobin 8.3 (*)     Hematocrit 29.5 (*)     MCH 22.9 (*)     MCHC 28.1 (*)     RDW 21.2 (*)     Platelets 137 (*)     All other components within normal limits   URINALYSIS, REFLEX TO URINE CULTURE - Abnormal; Notable for the following components:    Color, UA Colorless (*)     All other components within normal limits    Narrative:     Specimen Source->Urine   POCT GLUCOSE - Abnormal; Notable for the following components:    POC Glucose 134 (*)     All other components within normal limits   MAGNESIUM   PROCALCITONIN   OCCULT BLOOD X 1, STOOL   TROPONIN I HIGH SENSITIVITY   PROCALCITONIN   MAGNESIUM   HEMOGLOBIN A1C   COMPREHENSIVE METABOLIC PANEL   IRON AND TIBC   FERRITIN   TRANSFERRIN   POCT GLUCOSE, HAND-HELD DEVICE          Imaging Results              X-Ray Chest AP Portable (In process)                      Medications   furosemide injection 40 mg (has no administration in time range)   magnesium sulfate 2g in water 50mL IVPB (premix) (2 g Intravenous New Bag 9/8/23 0423)   atorvastatin tablet 20 mg (has no administration in time range)   oxybutynin tablet 5 mg (has no administration in time range)   pantoprazole EC tablet 40 mg (has no administration in time range)   ferrous sulfate tablet 1 each (has no administration in time range)   donepeziL tablet 5 mg (5 mg Oral Given 9/8/23 0424)   citalopram tablet 40 mg (has no administration in time range)    carvediloL tablet 3.125 mg (has no administration in time range)   lisinopriL tablet 20 mg (has no administration in time range)   sodium chloride 0.9% flush 10 mL (has no administration in time range)   albuterol-ipratropium 2.5 mg-0.5 mg/3 mL nebulizer solution 3 mL (has no administration in time range)   melatonin tablet 6 mg (has no administration in time range)   ondansetron injection 4 mg (has no administration in time range)   polyethylene glycol packet 17 g (has no administration in time range)   senna-docusate 8.6-50 mg per tablet 1 tablet (has no administration in time range)   acetaminophen tablet 650 mg (has no administration in time range)   simethicone chewable tablet 80 mg (has no administration in time range)   HYDROcodone-acetaminophen 5-325 mg per tablet 1 tablet (has no administration in time range)   morphine injection 4 mg (has no administration in time range)   naloxone 0.4 mg/mL injection 0.02 mg (has no administration in time range)   glucose chewable tablet 16 g (has no administration in time range)   glucose chewable tablet 24 g (has no administration in time range)   dextrose 50% injection 12.5 g (has no administration in time range)   dextrose 50% injection 25 g (has no administration in time range)   glucagon (human recombinant) injection 1 mg (has no administration in time range)   insulin aspart U-100 pen 0-10 Units (has no administration in time range)   budesonide nebulizer solution 0.5 mg (has no administration in time range)     And   arformoteroL nebulizer solution 15 mcg (has no administration in time range)   potassium chloride SA CR tablet 20 mEq (has no administration in time range)   potassium chloride SA CR tablet 40 mEq (has no administration in time range)   potassium chloride SA CR tablet 20 mEq (has no administration in time range)   potassium chloride SA CR tablet 40 mEq (has no administration in time range)   potassium chloride 10 mEq in 100 mL IVPB (has no  administration in time range)   potassium chloride 10 mEq in 100 mL IVPB (has no administration in time range)   potassium chloride 10 mEq in 100 mL IVPB (has no administration in time range)   potassium chloride 10 mEq in 100 mL IVPB (has no administration in time range)   magnesium sulfate 2g in water 50mL IVPB (premix) (has no administration in time range)   magnesium sulfate in dextrose IVPB (premix) 1 g (has no administration in time range)   magnesium sulfate 2g in water 50mL IVPB (premix) (has no administration in time range)   magnesium sulfate 2g in water 50mL IVPB (premix) (has no administration in time range)   sodium phosphate 15 mmol in dextrose 5 % (D5W) 250 mL IVPB (has no administration in time range)   sodium phosphate 20.01 mmol in dextrose 5 % (D5W) 250 mL IVPB (has no administration in time range)   sodium phosphate 15 mmol in dextrose 5 % (D5W) 250 mL IVPB (has no administration in time range)   sodium phosphate 30 mmol in dextrose 5 % (D5W) 250 mL IVPB (has no administration in time range)   sodium phosphate 20.01 mmol in dextrose 5 % (D5W) 250 mL IVPB (has no administration in time range)   albuterol-ipratropium 2.5 mg-0.5 mg/3 mL nebulizer solution 3 mL (3 mLs Nebulization Given 9/8/23 0227)   acetaminophen tablet 1,000 mg (1,000 mg Oral Given 9/8/23 0219)   predniSONE tablet 60 mg (60 mg Oral Given 9/8/23 0301)   furosemide injection 80 mg (80 mg Intravenous Given 9/8/23 0329)   labetaloL injection 20 mg (20 mg Intravenous Given 9/8/23 0332)   aspirin tablet 325 mg (325 mg Oral Given 9/8/23 0334)   potassium bicarbonate disintegrating tablet 50 mEq (50 mEq Oral Given 9/8/23 0423)     Medical Decision Making  Amount and/or Complexity of Data Reviewed  Labs: ordered.  Radiology: ordered and independent interpretation performed.  ECG/medicine tests: ordered and independent interpretation performed.    Risk  OTC drugs.  Prescription drug management.  Decision regarding hospitalization.      78  y.o. F w/ PMHx HFpEF, CAD, Afib, Aortic valve stenosis s/p TAVR, HTN, HLD, DM2, COPD presenting for one week of shortness of breath, worsening today prompting ED visit. Associated heart palpitations.  Vitals: tachycardic to 100-110s, SBP initially 140s uptrending to 180-190s. Afebrile. Exam: on home 2L NC, lungs with diminished air movement throughout. Trace pitting edema bilateral lower extremities.     Received breathing treatment on route with EMS.  DuoNeb, prednisone on arrival. Patient endor Hypertensive to SBP 180s. Took home BP medications PTA. Given labetalol. BNP elevated 537, CXR per my interpretation with evidence of fluid overload and blunting of R costophrenic angle. Possible component of CHF exacerbation, given lasix. EKG atrial fibrillation at 99 bpm, known hx Afib, no STEMI or new TWI.  Troponin elevated to 49.4. Given ASA. Hemoglobin downtrending to 7.8 (9.1 10 days prior). Denying any melena, blood in stool, hematuria.     Plan admit to medicine for elevated troponin, COPD v CHF exacerbation, anemia.               Attending Attestation:   Physician Attestation Statement for Resident:  As the supervising MD   Physician Attestation Statement: I have personally seen and examined this patient.   I agree with the above history.  -:   As the supervising MD I agree with the above PE.     As the supervising MD I agree with the above treatment, course, plan, and disposition.                                    Clinical Impression:   Final diagnoses:  [I50.33] Acute on chronic diastolic CHF (congestive heart failure)  [R77.8] Elevated troponin (Primary)  [J44.1] COPD exacerbation  [D64.9] Anemia, unspecified type        ED Disposition Condition    Observation Stable                Júnior Dos Santos MD  Resident  09/08/23 2339       Pedro Delgado MD  09/08/23 5528

## 2023-09-08 NOTE — PHARMACY MED REC
"Admission Medication History     The home medication history was taken by Chris Sosa.    You may go to "Admission" then "Reconcile Home Medications" tabs to review and/or act upon these items.     The home medication list has been updated by the Pharmacy department.   Please read ALL comments highlighted in yellow.   Please address this information as you see fit.    Feel free to contact us if you have any questions or require assistance.      Medications listed below were obtained from: Patient/family and Analytic software- [employer]  Current Facility-Administered Medications on File Prior to Encounter   Medication Dose Route Frequency Provider Last Rate Last Admin    [DISCONTINUED] GENERIC EXTERNAL MEDICATION     Provider, Generic External Data         Current Outpatient Medications on File Prior to Encounter   Medication Sig Dispense Refill    albuterol (PROVENTIL/VENTOLIN HFA) 90 mcg/actuation inhaler Inhale 2 puffs into the lungs every 6 (six) hours as needed for Shortness of Breath.      aspirin (ECOTRIN) 81 MG EC tablet Take 81 mg by mouth once daily.      carvediloL (COREG) 12.5 MG tablet Take 12.5 mg by mouth 2 (two) times daily.      citalopram (CELEXA) 40 MG tablet Take 1 tablet (40 mg total) by mouth once daily. 90 tablet 3    clopidogreL (PLAVIX) 75 mg tablet Take 1 tablet (75 mg total) by mouth once daily. 90 tablet 2    diltiaZEM (CARDIZEM) 30 MG tablet Take 1 tablet by mouth every 6 (six) hours.      donepeziL (ARICEPT) 5 MG tablet Take 5 mg by mouth nightly.      ergocalciferol (ERGOCALCIFEROL) 50,000 unit Cap TAKE 1 CAPSULE (50,000 UNITS TOTAL) EVERY 7 DAYS. (Patient taking differently: Take 50,000 Units by mouth every 7 days.) 12 capsule 3    fluticasone furoate-vilanteroL (BREO ELLIPTA) 100-25 mcg/dose diskus inhaler Inhale 1 puff into the lungs once daily. Controller      furosemide (LASIX) 40 MG tablet Take 1 tablet (40 mg total) by mouth once daily. 45 tablet 2    loperamide (IMODIUM A-D) 2 " mg Tab Take 1 tablet (2 mg total) by mouth 3 (three) times daily as needed (diarrhea). Take 2 tablets by mouth initially then 1 tablet after each loose stool as needed for diarrhea. 15 tablet 0    multivitamin capsule Take 1 capsule by mouth once daily.      omeprazole (PRILOSEC) 20 MG capsule Take 1 capsule (20 mg total) by mouth once daily. 90 capsule 3    oxybutynin (DITROPAN) 5 MG Tab Take 1 tablet (5 mg total) by mouth 2 (two) times daily. 180 tablet 3    simvastatin (ZOCOR) 10 MG tablet Take 10 mg by mouth every evening.      benazepriL (LOTENSIN) 20 MG tablet Take 0.5 tablets (10 mg total) by mouth once daily. (Patient not taking: Reported on 9/8/2023) 45 tablet 0    calcium carbonate (OS-TK) 500 mg calcium (1,250 mg) tablet Take 1 tablet (500 mg total) by mouth once daily.  0    ferrous gluconate 324 mg (37.5 mg iron) Tab tablet Take 324 mg by mouth once daily.      glucosamine-chondroitin 500-400 mg tablet Take 1 tablet by mouth once daily.      loratadine (CLARITIN) 10 mg tablet Take 1 tablet (10 mg total) by mouth once daily.  0    [DISCONTINUED] ALLERGY RELIEF, FEXOFENADINE, 180 mg tablet Take 180 mg by mouth once daily.      [DISCONTINUED] budesonide-formoterol 80-4.5 mcg (SYMBICORT) 80-4.5 mcg/actuation HFAA Inhale 2 puffs into the lungs 2 (two) times daily as needed (Asthma).      [DISCONTINUED] carvediloL (COREG) 3.125 MG tablet Take 1 tablet (3.125 mg total) by mouth 2 (two) times daily. 60 tablet 11    [DISCONTINUED] ezetimibe-simvastatin 10-40 mg (VYTORIN) 10-40 mg per tablet Take 1 tablet by mouth.      [DISCONTINUED] lisinopril-hydrochlorothiazide (PRINZIDE,ZESTORETIC) 20-12.5 mg per tablet Take 1 tablet by mouth once daily.       [DISCONTINUED] metFORMIN (GLUCOPHAGE) 500 MG tablet Take 500 mg by mouth daily with breakfast.      [DISCONTINUED] simvastatin (ZOCOR) 40 MG tablet Take 1 tablet (40 mg total) by mouth every evening. 90 tablet 3         Chris Sosa  EXT 1924                  .

## 2023-09-08 NOTE — PLAN OF CARE
[facility] - Emergency Dept  Initial Discharge Assessment       Primary Care Provider: Abhi De Oliveira IV, MD    Admission Diagnosis: Acute on chronic diastolic CHF (congestive heart failure) [I50.33]    Admission Date: 9/8/2023  Expected Discharge Date:     Transition of Care Barriers: None    Initial assessment completed at bedside with pt. Pt lives with spouse and daughter Sally. Pt has home O2 supplied by [facility]. Pt anticipates discharging home with home health when medically clear.     Payor: HU[employer] MANAGED MEDICARE / Plan: [employer] MEDICARE HMO / Product Type: Capitation /     Extended Emergency Contact Information  Primary Emergency Contact: Sally Reardon  Mobile Phone: [phone]  Relation: Daughter  Secondary Emergency Contact: Roni Cyr  Address:  [place] [address] [facility]  Home Phone: [phone]  Mobile Phone: [phone]  Relation: Spouse  Preferred language: English   needed? No    Discharge Plan A: Home Health  Discharge Plan B: Home Health      [facility] - [facility] [address]  [address] [address]  Phone: [phone] Fax: [phone]    [facility]. - [place], [place] - [address].  [address]  [place] [address]  Phone: [phone] Fax: [phone]      Initial Assessment (most recent)       Adult Discharge Assessment - 09/08/23 1533          Discharge Assessment    Assessment Type Discharge Planning Assessment     Confirmed/corrected address, phone number and insurance Yes     Confirmed Demographics Correct on Facesheet     Source of Information patient     When was your last doctors appointment? --   unk    Does patient/caregiver understand observation status Yes     Reason For Admission CHF     People in Home spouse     Facility Arrived From: home     Do you expect to return to your current living situation? Yes     Do you have help at  home or someone to help you manage your care at home? Yes     Who are your caregiver(s) and their phone number(s)? Sally (daughter) [phone]     Walking or Climbing Stairs ambulation difficulty, requires equipment     Mobility Management rolling walker, wheelchair     Dressing/Bathing bathing difficulty, requires equipment     Dressing/Bathing Management walker     Do you have any problems with: Needs other help     Specify other help Sally (daughter) [phone]     Home Layout Able to live on 1st floor     Equipment Currently Used at Home CPAP;3-in-1 commode;walker, rolling;wheelchair;oxygen     Readmission within 30 days? No     Patient currently being followed by outpatient case management? No     Do you currently have service(s) that help you manage your care at home? No     Do you take prescription medications? Yes     Do you have prescription coverage? Yes     Coverage [employer]     Do you have any problems affording any of your prescribed medications? No     Is the patient taking medications as prescribed? yes     Who is going to help you get home at discharge? Sally (daughter) [phone]     How do you get to doctors appointments? family or friend will provide     Are you on dialysis? No     Do you take coumadin? No     DME Needed Upon Discharge  none     Discharge Plan discussed with: Patient     Transition of Care Barriers None     Discharge Plan A Home Health     Discharge Plan B Home Health        Physical Activity    On average, how many days per week do you engage in moderate to strenuous exercise (like a brisk walk)? Patient refused     On average, how many minutes do you engage in exercise at this level? Patient refused        Financial Resource Strain    How hard is it for you to pay for the very basics like food, housing, medical care, and heating? Patient refused        Housing Stability    In the last 12 months, was there a time when you were not able to pay the mortgage or rent on time?  Patient refused     In the last 12 months, was there a time when you did not have a steady place to sleep or slept in a shelter (including now)? Patient refused        Transportation Needs    In the past 12 months, has lack of transportation kept you from medical appointments or from getting medications? Patient refused     In the past 12 months, has lack of transportation kept you from meetings, work, or from getting things needed for daily living? Patient refused        Food Insecurity    Within the past 12 months, you worried that your food would run out before you got the money to buy more. Patient refused     Within the past 12 months, the food you bought just didn't last and you didn't have money to get more. Patient refused        Stress    Do you feel stress - tense, restless, nervous, or anxious, or unable to sleep at night because your mind is troubled all the time - these days? Patient refused        Social Connections    In a typical week, how many times do you talk on the phone with family, friends, or neighbors? Patient refused     How often do you get together with friends or relatives? Patient refused     How often do you attend Episcopalian or Yazidi services? Patient refused     Do you belong to any clubs or organizations such as Episcopalian groups, unions, fraternal or athletic groups, or school groups? Patient refused     How often do you attend meetings of the clubs or organizations you belong to? Patient refused     Are you , , , , never , or living with a partner? Patient refused        Alcohol Use    Q1: How often do you have a drink containing alcohol? Patient refused     Q2: How many drinks containing alcohol do you have on a typical day when you are drinking? Patient refused     Q3: How often do you have six or more drinks on one occasion? Patient refused        OTHER    Name(s) of People in Home Sally (daughter) [phone]

## 2023-09-08 NOTE — H&P
[facility] Medicine   History & Physical     Patient Name: Daryleen G Moran  MRN: [id number]  Admission Date: 9/8/2023  1:40 AM  Attending Physician: Patsy Vernon MD  Face-to-Face encounter date: 09/08/2023 3:49 AM    Patient information was obtained from patient, past medical records, ER physician, and ER records.     HISTORY OF PRESENT ILLNESS:     Daryleen G Moran is a 78 y.o. White female   With PMH of chronic respiratory failure on 2LNC,  HFpEF, pAF, CAD, AS s/p TAVR,  COPD, DM2, HTN, HLD,  who presents with SOB.    Onset 1 week ago  Progressively worsening  Much worse today  Pt denies cough to me  +palpitations  No CP  No fever or chills    Pt has been eating large bags of chips  And a lot of other added salt to her food    REVIEW OF SYSTEMS:     All systems reviewed and are negative except as noted per above.    PAST MEDICAL HISTORY:     Past Medical History:   Diagnosis Date    Anesthesia complication     BLADDER DYSFUNCTION    Aortic stenosis     Arthritis     Back pain     Diabetes mellitus type II     NO LONGER DIABETIC    Encounter for blood transfusion     Hyperlipidemia     Hypertension     NSTEMI (non-ST elevated myocardial infarction) 05/01/2022    Osteoporosis     Wears glasses        PAST SURGICAL HISTORY:     Past Surgical History:   Procedure Laterality Date     vocal cord nodules removed  long time ago     twice    ADRENAL TUMOR      APPENDECTOMY  within last 5yrs    CATHETERIZATION OF BOTH LEFT AND RIGHT HEART Left 05/06/2022    Procedure: CATHETERIZATION, HEART, BOTH LEFT AND RIGHT;  Surgeon: Michelet Vargas MD;  Location: [facility] CATH/EP LAB;  Service: Cardiology;  Laterality: Left;    COLONOSCOPY N/A 6/23/2023    Procedure: COLONOSCOPY;  Surgeon: Joel Neal III, MD;  Location: [facility] ENDO;  Service: Endoscopy;  Laterality: N/A;    FIXATION KYPHOPLASTY THORACIC SPINE      8-20-13    FOOT SURGERY      left 2nd toe was too long     HERNIA REPAIR  within last 5yrs     INSERTION OF TEMPORARY PACEMAKER N/A 2023    Procedure: INSERTION, PACEMAKER, TEMPORARY;  Surgeon: Bhavesh Peña MD;  Location: [facility] CATH;  Service: Cardiology;  Laterality: N/A;    LEFT HEART CATHETERIZATION Left 2022    Procedure: Left heart cath;  Surgeon: Jose Echols MD;  Location: [facility] CATH/EP LAB;  Service: Cardiology;  Laterality: Left;    SMALL BOWEL ENTEROSCOPY N/A 2023    Procedure: ENTEROSCOPY;  Surgeon: Cornell Aguirre MD;  Location: [facility] ENDO;  Service: Endoscopy;  Laterality: N/A;    TONSILLECTOMY, ADENOIDECTOMY  long time ago    TRANSCATHETER AORTIC VALVE REPLACEMENT (TAVR) Bilateral 2023    Procedure: REPLACEMENT, AORTIC VALVE, TRANSCATHETER (TAVR);  Surgeon: Bhavesh Peña MD;  Location: [facility] CATH;  Service: Cardiology;  Laterality: Bilateral;    TRANSCATHETER AORTIC VALVE REPLACEMENT (TAVR) Bilateral 2023    Procedure: REPLACEMENT, AORTIC VALVE, TRANSCATHETER (TAVR);  Surgeon: Dallin Verma MD;  Location: [facility] CATH;  Service: Cardiology;  Laterality: Bilateral;    TRANSTHORACIC ECHOCARDIOGRAPHY (TTE)  2023    Procedure: ECHOCARDIOGRAM, TRANSTHORACIC;  Surgeon: Bhavesh Peña MD;  Location: [facility] CATH;  Service: Cardiology;;       ALLERGIES:   Latex, Sulfacetamide sodium, Sulfasalazine, Adhesive, Adhesive tape-silicones, and Sulfa (sulfonamide antibiotics)    FAMILY HISTORY:     Family History   Problem Relation Age of Onset    Collagen disease Neg Hx        SOCIAL HISTORY:     Social History     Tobacco Use    Smoking status: Former     Current packs/day: 0.00     Average packs/day: 0.5 packs/day for 35.0 years (17.5 ttl pk-yrs)     Types: Cigarettes     Start date: 10/5/1987     Quit date: 10/5/2022     Years since quittin.9    Smokeless tobacco: Never   Substance Use Topics    Alcohol use: No        Social History     Substance and Sexual Activity   Sexual Activity Not on file        HOME MEDICATIONS:     Prior to Admission medications    Medication Sig  Start Date End Date Taking? Authorizing Provider   albuterol (PROVENTIL/VENTOLIN HFA) 90 mcg/actuation inhaler Inhale 2 puffs into the lungs every 6 (six) hours as needed. 4/15/22   Provider, Historical   aspirin (ECOTRIN) 81 MG EC tablet Take 81 mg by mouth once daily.    Provider, Historical   benazepriL (LOTENSIN) 20 MG tablet Take 0.5 tablets (10 mg total) by mouth once daily. 6/24/23 9/22/23  Mitul Fuller MD   budesonide-formoterol 80-4.5 mcg (SYMBICORT) 80-4.5 mcg/actuation HFAA Inhale 2 puffs into the lungs 2 (two) times daily as needed (Asthma). 4/16/22   Provider, Historical   calcium carbonate (OS-TK) 500 mg calcium (1,250 mg) tablet Take 1 tablet (500 mg total) by mouth once daily. 12/21/22 12/21/23  Murtaza Gudino MD   carvediloL (COREG) 3.125 MG tablet Take 1 tablet (3.125 mg total) by mouth 2 (two) times daily. 1/5/23 1/5/24  Aide Polanco, NP   citalopram (CELEXA) 40 MG tablet Take 1 tablet (40 mg total) by mouth once daily. 2/4/21   Brown Macias MD   clopidogreL (PLAVIX) 75 mg tablet Take 1 tablet (75 mg total) by mouth once daily. 5/3/23   Bhavesh Peña MD   donepeziL (ARICEPT) 5 MG tablet Take 5 mg by mouth nightly. 4/15/22   Provider, Historical   ergocalciferol (ERGOCALCIFEROL) 50,000 unit Cap TAKE 1 CAPSULE (50,000 UNITS TOTAL) EVERY 7 DAYS. 5/15/23   Brown Macias MD   ferrous gluconate 324 mg (37.5 mg iron) Tab tablet Take 324 mg by mouth once daily. 10/25/22   Provider, Historical   furosemide (LASIX) 40 MG tablet Take 1 tablet (40 mg total) by mouth once daily. 5/3/23   Bhavesh Peña MD   glucosamine-chondroitin 500-400 mg tablet Take 1 tablet by mouth once daily.    Provider, Historical   loperamide (IMODIUM A-D) 2 mg Tab Take 1 tablet (2 mg total) by mouth 3 (three) times daily as needed (diarrhea). Take 2 tablets by mouth initially then 1 tablet after each loose stool as needed for diarrhea. 11/30/22   Bridgette Robles MD   loratadine (CLARITIN) 10 mg  tablet Take 1 tablet (10 mg total) by mouth once daily. 12/21/22 12/21/23  Murtaza Gudino MD   metFORMIN (GLUCOPHAGE) 500 MG tablet Take 500 mg by mouth daily with breakfast. 1/18/23   Provider, Historical   multivitamin capsule Take 1 capsule by mouth once daily.    Provider, Historical   omeprazole (PRILOSEC) 20 MG capsule Take 1 capsule (20 mg total) by mouth once daily. 2/4/21   Brown Macias MD   oxybutynin (DITROPAN) 5 MG Tab Take 1 tablet (5 mg total) by mouth 2 (two) times daily. 2/4/21   Brown Macias MD   simvastatin (ZOCOR) 40 MG tablet Take 1 tablet (40 mg total) by mouth every evening. 2/4/21   Brown Macias MD   ALLERGY RELIEF, FEXOFENADINE, 180 mg tablet Take 180 mg by mouth once daily. 1/17/22 5/6/22  Provider, Historical   ezetimibe-simvastatin 10-40 mg (VYTORIN) 10-40 mg per tablet Take 1 tablet by mouth.  5/3/22  Provider, Historical   lisinopril-hydrochlorothiazide (PRINZIDE,ZESTORETIC) 20-12.5 mg per tablet Take 1 tablet by mouth once daily.  6/10/16 5/3/22  Provider, Historical       PHYSICAL EXAM:     BP (!) 185/78   Pulse 107   Temp 98.7 °F (37.1 °C) (Oral)   Resp 20   Ht 5' (1.524 m)   Wt 95.3 kg (210 lb)   LMP  (LMP Unknown)   SpO2 95%   BMI 41.01 kg/m²     Gen: alert, responsive  HEENT:  Eyes - no pallor  External ears with no lesions  Nares patent  Mouth/Throat:  trachea midline  CV: RRR  Lungs: RALES, on supplemental oxygen  Abd: +BS, soft, NT, ND  Ext: no atrophy; +BLE edema   Skin: warm, dry  Neuro: grossly intact  Psych: pleasant     LABS AND IMAGING:     Labs Reviewed   CBC W/ AUTO DIFFERENTIAL - Abnormal; Notable for the following components:       Result Value    RBC 3.46 (*)     Hemoglobin 7.8 (*)     Hematocrit 28.2 (*)     MCH 22.5 (*)     MCHC 27.7 (*)     RDW 21.3 (*)     Platelets 133 (*)     Gran % 75.6 (*)     Lymph % 14.7 (*)     All other components within normal limits   COMPREHENSIVE METABOLIC PANEL - Abnormal; Notable for the  following components:    Potassium 3.1 (*)     CO2 34 (*)     Glucose 122 (*)     Albumin 3.3 (*)     Total Bilirubin 1.3 (*)     Anion Gap 5 (*)     All other components within normal limits   TROPONIN I HIGH SENSITIVITY - Abnormal; Notable for the following components:    Troponin I High Sensitivity 49.4 (*)     All other components within normal limits   B-TYPE NATRIURETIC PEPTIDE - Abnormal; Notable for the following components:     (*)     All other components within normal limits   POCT GLUCOSE - Abnormal; Notable for the following components:    POC Glucose 134 (*)     All other components within normal limits   MAGNESIUM   PROCALCITONIN   OCCULT BLOOD X 1, STOOL   TROPONIN I HIGH SENSITIVITY   PROCALCITONIN   MAGNESIUM   HEMOGLOBIN A1C   CBC W/ AUTO DIFFERENTIAL   COMPREHENSIVE METABOLIC PANEL   URINALYSIS, REFLEX TO URINE CULTURE       Imaging Results              X-Ray Chest AP Portable (In process)                     Labs and images reviewed personally by me.  See my personal assessment/interpretation of results below:    ASSESSMENT & PLAN:   Daryleen G Moran is a 78 y.o. female admitted for    Active Hospital Problems    Diagnosis  POA    Acute on chronic diastolic CHF (congestive heart failure) [I50.33]  Yes    CAD (coronary artery disease) [I25.10]  Yes    CHF (congestive heart failure) [I50.9]  Yes    Paroxysmal atrial fibrillation [I48.0]  Yes    Iron deficiency anemia due to chronic blood loss [D50.0]  Yes    COPD (chronic obstructive pulmonary disease) [J44.9]  Yes    S/P TAVR (transcatheter aortic valve replacement) [Z95.2]  Not Applicable    Type 2 diabetes mellitus, without long-term current use of insulin [E11.9]  Yes     Dx updated per 2019 IMO Load      Hypertension [I10]  Yes    GERD (gastroesophageal reflux disease) [K21.9]  Yes      Resolved Hospital Problems   No resolved problems to display.        Plan    Acute on chronic respiratory failure (home oxygen 2L)  Acute on chronic  HFpEF  - supplemental oxygen, wean as tolerated  - lasix  - strict I/Os, daily weights, 1.5L fluid restrictions / cardiac diet  - echo    Anemia  GERD  - continue PPI  - FOBT  - iron studies  - trending CBC    CAD  - holding asa, plavix due to worsening anemia  - restart when appropriate  - FOBT pending, CBC trending    DM2  - SSI    Chronic conditions as noted above/below; home medications reviewed personally by me and restarted as appropriate  Electrolyte derangement:  Trending BMP; Mg; replacement prn  DVT ppx: lovenox held due to worsening anemia; SCDs  FULL CODE      - The above conditions include an acute and/or chronic illness that poses a threat to life (or bodily function).  - Previous notes/encounters/external records reviewed personally by me.  - Pt's case personally discussed with another physician:  ER physician    Patsy Vernon MD  [facility] Hospitalist  09/08/2023

## 2023-09-08 NOTE — PLAN OF CARE
09/08/23 1540   LOPEZ Message   Medicare Outpatient and Observation Notification regarding financial responsibility Given to patient/caregiver;Explained to patient/caregiver;Signed/date by patient/caregiver   Date LOPEZ was signed 09/08/23   Time LOPEZ was signed [id number]

## 2023-09-08 NOTE — PROGRESS NOTES
Automatic Inhaler to Nebulizer Interchange    fluticasone/vilanterol (Breo Ellipta) 100 mcg/25 mcg changed to budesonide 0.5 mg twice daily AND arformoterol 15 mcg twice daily per [facility] Automatic Therapeutic Substitutions Protocol.    Please contact pharmacy at extension [phone] with any questions.     Thank you,   Anu Garcia

## 2023-09-09 VITALS
OXYGEN SATURATION: 95 % | HEART RATE: 85 BPM | BODY MASS INDEX: 42.16 KG/M2 | TEMPERATURE: 98 F | WEIGHT: 214.75 LBS | DIASTOLIC BLOOD PRESSURE: 55 MMHG | HEIGHT: 60 IN | SYSTOLIC BLOOD PRESSURE: 120 MMHG | RESPIRATION RATE: 20 BRPM

## 2023-09-09 LAB
ALBUMIN SERPL BCP-MCNC: 3.4 G/DL (ref 3.5–5.2)
ALP SERPL-CCNC: 57 U/L (ref 55–135)
ALT SERPL W/O P-5'-P-CCNC: 22 U/L (ref 10–44)
ANION GAP SERPL CALC-SCNC: 6 MMOL/L (ref 8–16)
AST SERPL-CCNC: 23 U/L (ref 10–40)
BASOPHILS # BLD AUTO: 0.06 K/UL (ref 0–0.2)
BASOPHILS NFR BLD: 0.8 % (ref 0–1.9)
BILIRUB SERPL-MCNC: 1.1 MG/DL (ref 0.1–1)
BUN SERPL-MCNC: 25 MG/DL (ref 8–23)
CALCIUM SERPL-MCNC: 8.7 MG/DL (ref 8.7–10.5)
CHLORIDE SERPL-SCNC: 96 MMOL/L (ref 95–110)
CO2 SERPL-SCNC: 36 MMOL/L (ref 23–29)
CREAT SERPL-MCNC: 0.8 MG/DL (ref 0.5–1.4)
DIFFERENTIAL METHOD: ABNORMAL
EOSINOPHIL # BLD AUTO: 0.1 K/UL (ref 0–0.5)
EOSINOPHIL NFR BLD: 1.7 % (ref 0–8)
ERYTHROCYTE [DISTWIDTH] IN BLOOD BY AUTOMATED COUNT: 21.6 % (ref 11.5–14.5)
EST. GFR  (NO RACE VARIABLE): >60 ML/MIN/1.73 M^2
GLUCOSE SERPL-MCNC: 129 MG/DL (ref 70–110)
GLUCOSE SERPL-MCNC: 133 MG/DL (ref 70–110)
GLUCOSE SERPL-MCNC: 152 MG/DL (ref 70–110)
GLUCOSE SERPL-MCNC: 98 MG/DL (ref 70–110)
HCT VFR BLD AUTO: 27.8 % (ref 37–48.5)
HGB BLD-MCNC: 7.7 G/DL (ref 12–16)
IMM GRANULOCYTES # BLD AUTO: 0.02 K/UL (ref 0–0.04)
IMM GRANULOCYTES NFR BLD AUTO: 0.3 % (ref 0–0.5)
LYMPHOCYTES # BLD AUTO: 1.4 K/UL (ref 1–4.8)
LYMPHOCYTES NFR BLD: 18 % (ref 18–48)
MAGNESIUM SERPL-MCNC: 1.7 MG/DL (ref 1.6–2.6)
MCH RBC QN AUTO: 23.1 PG (ref 27–31)
MCHC RBC AUTO-ENTMCNC: 27.7 G/DL (ref 32–36)
MCV RBC AUTO: 83 FL (ref 82–98)
MONOCYTES # BLD AUTO: 0.6 K/UL (ref 0.3–1)
MONOCYTES NFR BLD: 8 % (ref 4–15)
NEUTROPHILS # BLD AUTO: 5.3 K/UL (ref 1.8–7.7)
NEUTROPHILS NFR BLD: 71.2 % (ref 38–73)
NRBC BLD-RTO: 0 /100 WBC
PLATELET # BLD AUTO: 140 K/UL (ref 150–450)
PMV BLD AUTO: 11.3 FL (ref 9.2–12.9)
POTASSIUM SERPL-SCNC: 4.3 MMOL/L (ref 3.5–5.1)
PROT SERPL-MCNC: 6.3 G/DL (ref 6–8.4)
RBC # BLD AUTO: 3.34 M/UL (ref 4–5.4)
SARS-COV-2 RDRP RESP QL NAA+PROBE: NEGATIVE
SODIUM SERPL-SCNC: 138 MMOL/L (ref 136–145)
WBC # BLD AUTO: 7.5 K/UL (ref 3.9–12.7)

## 2023-09-09 PROCEDURE — 94761 N-INVAS EAR/PLS OXIMETRY MLT: CPT

## 2023-09-09 PROCEDURE — 99900035 HC TECH TIME PER 15 MIN (STAT)

## 2023-09-09 PROCEDURE — 25000003 PHARM REV CODE 250: Performed by: FAMILY MEDICINE

## 2023-09-09 PROCEDURE — 94640 AIRWAY INHALATION TREATMENT: CPT

## 2023-09-09 PROCEDURE — 83735 ASSAY OF MAGNESIUM: CPT | Performed by: FAMILY MEDICINE

## 2023-09-09 PROCEDURE — 80053 COMPREHEN METABOLIC PANEL: CPT | Performed by: FAMILY MEDICINE

## 2023-09-09 PROCEDURE — 25000242 PHARM REV CODE 250 ALT 637 W/ HCPCS: Performed by: INTERNAL MEDICINE

## 2023-09-09 PROCEDURE — 36415 COLL VENOUS BLD VENIPUNCTURE: CPT | Performed by: FAMILY MEDICINE

## 2023-09-09 PROCEDURE — 27000221 HC OXYGEN, UP TO 24 HOURS

## 2023-09-09 PROCEDURE — 63600175 PHARM REV CODE 636 W HCPCS: Performed by: FAMILY MEDICINE

## 2023-09-09 PROCEDURE — 25000003 PHARM REV CODE 250: Performed by: STUDENT IN AN ORGANIZED HEALTH CARE EDUCATION/TRAINING PROGRAM

## 2023-09-09 PROCEDURE — 85025 COMPLETE CBC W/AUTO DIFF WBC: CPT | Performed by: FAMILY MEDICINE

## 2023-09-09 PROCEDURE — U0002 COVID-19 LAB TEST NON-CDC: HCPCS | Performed by: INTERNAL MEDICINE

## 2023-09-09 PROCEDURE — 99900031 HC PATIENT EDUCATION (STAT)

## 2023-09-09 PROCEDURE — 25000242 PHARM REV CODE 250 ALT 637 W/ HCPCS: Performed by: FAMILY MEDICINE

## 2023-09-09 PROCEDURE — G0378 HOSPITAL OBSERVATION PER HR: HCPCS

## 2023-09-09 PROCEDURE — 96376 TX/PRO/DX INJ SAME DRUG ADON: CPT

## 2023-09-09 RX ORDER — DILTIAZEM HYDROCHLORIDE 120 MG/1
120 CAPSULE, EXTENDED RELEASE ORAL DAILY
Qty: 30 CAPSULE | Refills: 11 | Status: SHIPPED | OUTPATIENT
Start: 2023-09-09 | End: 2023-09-09 | Stop reason: SDUPTHER

## 2023-09-09 RX ORDER — LISINOPRIL 10 MG/1
10 TABLET ORAL DAILY
Qty: 90 TABLET | Refills: 3 | Status: SHIPPED | OUTPATIENT
Start: 2023-09-10 | End: 2023-09-09 | Stop reason: SDUPTHER

## 2023-09-09 RX ORDER — CARVEDILOL 6.25 MG/1
6.25 TABLET ORAL 2 TIMES DAILY
Status: DISCONTINUED | OUTPATIENT
Start: 2023-09-09 | End: 2023-09-09 | Stop reason: HOSPADM

## 2023-09-09 RX ORDER — LISINOPRIL 10 MG/1
10 TABLET ORAL DAILY
Qty: 90 TABLET | Refills: 3 | Status: ON HOLD | OUTPATIENT
Start: 2023-09-10 | End: 2023-10-21 | Stop reason: HOSPADM

## 2023-09-09 RX ORDER — FLUTICASONE PROPIONATE 50 MCG
2 SPRAY, SUSPENSION (ML) NASAL DAILY
Status: DISCONTINUED | OUTPATIENT
Start: 2023-09-09 | End: 2023-09-09 | Stop reason: HOSPADM

## 2023-09-09 RX ORDER — DILTIAZEM HYDROCHLORIDE 120 MG/1
120 CAPSULE, EXTENDED RELEASE ORAL DAILY
Qty: 30 CAPSULE | Refills: 11 | Status: SHIPPED | OUTPATIENT
Start: 2023-09-09 | End: 2024-02-14 | Stop reason: SDUPTHER

## 2023-09-09 RX ORDER — CARVEDILOL 3.12 MG/1
3.12 TABLET ORAL ONCE
Status: COMPLETED | OUTPATIENT
Start: 2023-09-09 | End: 2023-09-09

## 2023-09-09 RX ORDER — LISINOPRIL 10 MG/1
10 TABLET ORAL DAILY
Status: DISCONTINUED | OUTPATIENT
Start: 2023-09-10 | End: 2023-09-09 | Stop reason: HOSPADM

## 2023-09-09 RX ADMIN — INSULIN ASPART 2 UNITS: 100 INJECTION, SOLUTION INTRAVENOUS; SUBCUTANEOUS at 11:09

## 2023-09-09 RX ADMIN — FERROUS SULFATE TAB 325 MG (65 MG ELEMENTAL FE) 1 EACH: 325 (65 FE) TAB at 08:09

## 2023-09-09 RX ADMIN — ARFORMOTEROL TARTRATE 15 MCG: 15 SOLUTION RESPIRATORY (INHALATION) at 07:09

## 2023-09-09 RX ADMIN — CITALOPRAM HYDROBROMIDE 40 MG: 20 TABLET ORAL at 08:09

## 2023-09-09 RX ADMIN — BUDESONIDE 0.5 MG: 0.5 INHALANT RESPIRATORY (INHALATION) at 07:09

## 2023-09-09 RX ADMIN — CARVEDILOL 3.12 MG: 3.12 TABLET, FILM COATED ORAL at 08:09

## 2023-09-09 RX ADMIN — PANTOPRAZOLE SODIUM 40 MG: 40 TABLET, DELAYED RELEASE ORAL at 05:09

## 2023-09-09 RX ADMIN — CARVEDILOL 3.12 MG: 3.12 TABLET, FILM COATED ORAL at 03:09

## 2023-09-09 RX ADMIN — FLUTICASONE PROPIONATE 100 MCG: 50 SPRAY, METERED NASAL at 08:09

## 2023-09-09 RX ADMIN — LISINOPRIL 20 MG: 20 TABLET ORAL at 08:09

## 2023-09-09 RX ADMIN — OXYBUTYNIN CHLORIDE 5 MG: 5 TABLET ORAL at 08:09

## 2023-09-09 RX ADMIN — FUROSEMIDE 40 MG: 10 INJECTION, SOLUTION INTRAVENOUS at 08:09

## 2023-09-09 NOTE — PROGRESS NOTES
Echo was DC by Dr Burrell. Pt had an echo on 8-27-23 at [facility]. She also had echoes done on 1-5-23 and 2-6-23 all yielding the same results.

## 2023-09-09 NOTE — CARE UPDATE
09/09/23 0738   Patient Assessment/Suction   Level of Consciousness (AVPU) alert   Respiratory Effort Unlabored   All Lung Fields Breath Sounds clear;diminished   PRE-TX-O2   Device (Oxygen Therapy) nasal cannula   $ Is the patient on Low Flow Oxygen? Yes   Flow (L/min) 2   SpO2 95 %   Pulse Oximetry Type Intermittent   $ Pulse Oximetry - Multiple Charge Pulse Oximetry - Multiple   Pulse 108   Resp 16   Aerosol Therapy   $ Aerosol Therapy Charges Aerosol Treatment  (pulmicort)   Daily Review of Necessity (SVN) completed   Respiratory Treatment Status (SVN) given   Treatment Route (SVN) mask   Patient Position (SVN) semi-Marin's   Post Treatment Assessment (SVN) increased aeration   Signs of Intolerance (SVN) none   Breath Sounds Post-Respiratory Treatment   Throughout All Fields Post-Treatment aeration increased   Post-treatment Heart Rate (beats/min) 105   Post-treatment Resp Rate (breaths/min) 15   Education   $ Education Bronchodilator;15 min   Respiratory Evaluation   $ Care Plan Tech Time 15 min

## 2023-09-09 NOTE — DISCHARGE SUMMARY
[facility]  Discharge Summary  Patient Name: Daryleen G Moran MRN: [id number]   Patient Class: OP- Observation  Length of Stay: 0   Admission Date: 9/8/2023  1:40 AM Attending Physician: Wade Burrell MD   Primary Care Provider: Abhi De Oliveira IV, MD Face-to-Face encounter date: 09/09/2023   Chief Complaint: Shortness of Breath (With multiple other complaints)    Date of Discharge: 9/9/2023  Discharge Disposition:Home or Self Care   Condition: Stable       Reason for Hospitalization     Active Hospital Problems    Diagnosis    *Acute on chronic diastolic CHF (congestive heart failure)    CAD (coronary artery disease)    CHF (congestive heart failure)    Paroxysmal atrial fibrillation    Iron deficiency anemia due to chronic blood loss    COPD (chronic obstructive pulmonary disease)    S/P TAVR (transcatheter aortic valve replacement)    Type 2 diabetes mellitus, without long-term current use of insulin     Dx updated per 2019 IMO Load      Hypertension    GERD (gastroesophageal reflux disease)         Brief History of Present Illness          Daryleen G Moran is a 78 y.o. White female   With PMH of chronic respiratory failure on 2LNC,  HFpEF, pAF, CAD, AS s/p TAVR,  COPD, DM2, HTN, HLD,  who presents with SOB.     Onset 1 week ago  Progressively worsening  Much worse today  Pt denies cough to me  +palpitations  No CP  No fever or chills     Pt has been eating large bags of chips  And a lot of other added salt to her food  For the full HPI please refer to the History & Physical from this admission.      Hospital Course By Problem with Pertinent Findings     Acute on chronic respiratory failure (home oxygen 2L)  Acute on chronic HFpEF  AF/RVR  - supplemental oxygen, wean as tolerated  - lasix IV with good response  - strict I/Os, daily weights, 1.5L fluid restrictions / cardiac diet  - she was diuresed and heart rate was controlled with IVP metoprolol  - restarted coreg and increased to home  "dose  - she was at baseline respiratory status and discharged 9/9  - her biggest issue is compliance and she will likely keep returning to ER as long as she continues to eat and drink as she is and not take meds as prescribed.    - restarted her on lasix 40 mg daily, which she stopped taking on her own or reduced dose  - changed diltiazem from q 6 to daily dosing to help compliance  - follow up with pcp next week               Patient was seen and examined on the date of discharge and determined to be suitable for discharge.    Physical Exam  BP (!) 120/55 (BP Location: Right arm, Patient Position: Lying)   Pulse 85   Temp 97.7 °F (36.5 °C) (Oral)   Resp 20   Ht 5' (1.524 m)   Wt 97.4 kg (214 lb 11.7 oz)   LMP  (LMP Unknown)   SpO2 95%   BMI 41.94 kg/m²   Vitals reviewed.    Constitutional: No distress.   HENT: Atraumatic.   Cardiovascular: Normal rate.  S1 S2.    Pulmonary/Chest: Effort normal. No wheezes.   Abdominal: Soft. Bowel sounds are normal. Exhibits no distension and no mass. No tenderness  Neurological: Alert.   Skin: Skin is warm and dry.     Following labs were Reviewed   Recent Labs   Lab 09/09/23  0437   WBC 7.50   HGB 7.7*   HCT 27.8*   *   CALCIUM 8.7   ALBUMIN 3.4*   PROT 6.3      K 4.3   CO2 36*   CL 96   BUN 25*   CREATININE 0.8   ALKPHOS 57   ALT 22   AST 23   BILITOT 1.1*     No results found for: "POCTGLUCOSE"     All labs within the past 24 hours have been reviewed    Microbiology Results (last 7 days)       ** No results found for the last 168 hours. **          X-Ray Chest AP Portable   Final Result          No results found for this or any previous visit.      Consultants and Procedures   Consultants:      Procedures:   * No surgery found *     Discharge Information:   Diet:  Resume Cardiac diet/Diabetic Diet    Physical Activity:  Activity as tolerated    Instructions:  1. Take all medications as prescribed  2. Keep all follow-up appointments  3. Return to the " hospital or call your primary care physicians if any worsening symptoms such as chest pain, shortness of breath, bleeding,  intractable pain, fever >101 occur.      Follow-Up Appointments:  Please call your primary care physician to schedule an appointment in 1 week time.     Follow-up Information       Abhi De Oliveira IV, MD Follow up in 1 week(s).    Specialty: Family Medicine  Contact information:  [address]   [place] [address]  [phone]                               Pending laboratory work/Tests to be performed/followed by the Primary care Physician:    The patient was discharged with discharge instructions reviewed in written and verbal form. All questions were answered and prescriptions were provided. The importance of making follow up appointments and compliance of medications has been emphasized. The patient will follow up in 1 week or sooner as needed with the PCP. Tthe patient understands and agrees with the plan. Upon discharge, patient needs to be on following medications.    Discharge Medications:     Medication List        START taking these medications      diltiaZEM 120 MG Cdcr  Commonly known as: DILACOR XR  Take 1 capsule (120 mg total) by mouth once daily.  Replaces: diltiaZEM 30 MG tablet     lisinopriL 10 MG tablet  Take 1 tablet (10 mg total) by mouth once daily.  Start taking on: September 10, 2023  Replaces: benazepriL 20 MG tablet            CHANGE how you take these medications      ergocalciferol 50,000 unit Cap  Commonly known as: ERGOCALCIFEROL  TAKE 1 CAPSULE (50,000 UNITS TOTAL) EVERY 7 DAYS.  What changed: See the new instructions.            CONTINUE taking these medications      albuterol 90 mcg/actuation inhaler  Commonly known as: PROVENTIL/VENTOLIN HFA     aspirin 81 MG EC tablet  Commonly known as: ECOTRIN     BREO ELLIPTA 100-25 mcg/dose diskus inhaler  Generic drug: fluticasone furoate-vilanteroL     calcium carbonate 500 mg calcium (1,250 mg) tablet  Commonly  known as: OS-TK  Take 1 tablet (500 mg total) by mouth once daily.     carvediloL 12.5 MG tablet  Commonly known as: COREG     citalopram 40 MG tablet  Commonly known as: CeleXA  Take 1 tablet (40 mg total) by mouth once daily.     clopidogreL 75 mg tablet  Commonly known as: PLAVIX  Take 1 tablet (75 mg total) by mouth once daily.     donepeziL 5 MG tablet  Commonly known as: ARICEPT     ferrous gluconate 324 mg (37.5 mg iron) Tab tablet     furosemide 40 MG tablet  Commonly known as: LASIX  Take 1 tablet (40 mg total) by mouth once daily.     glucosamine-chondroitin 500-400 mg tablet     loperamide 2 mg Tab  Commonly known as: IMODIUM A-D  Take 1 tablet (2 mg total) by mouth 3 (three) times daily as needed (diarrhea). Take 2 tablets by mouth initially then 1 tablet after each loose stool as needed for diarrhea.     loratadine 10 mg tablet  Commonly known as: CLARITIN  Take 1 tablet (10 mg total) by mouth once daily.     multivitamin capsule     omeprazole 20 MG capsule  Commonly known as: PRILOSEC  Take 1 capsule (20 mg total) by mouth once daily.     oxybutynin 5 MG Tab  Commonly known as: DITROPAN  Take 1 tablet (5 mg total) by mouth 2 (two) times daily.     simvastatin 10 MG tablet  Commonly known as: ZOCOR            STOP taking these medications      benazepriL 20 MG tablet  Commonly known as: LOTENSIN  Replaced by: lisinopriL 10 MG tablet     diltiaZEM 30 MG tablet  Commonly known as: CARDIZEM  Replaced by: diltiaZEM 120 MG Cdcr               Where to Get Your Medications        These medications were sent to [id number] - [place], [place] - [address] [facility] [address]  [address] [place] [address]      Phone: [phone]   diltiaZEM 120 MG Cdcr  lisinopriL 10 MG tablet           I spent 25 minutes preparing the discharge for this patient including reviewing records from previous encounters, preparation of discharge summary, assessing and final examination of the  patient, discharge medicine reconciliation, discussing plan of care, follow up and education and prescriptions.       Wade Burrell  [facility] System[facility]

## 2023-09-09 NOTE — PLAN OF CARE
Problem: Adult Inpatient Plan of Care  Goal: Plan of Care Review  Outcome: Ongoing, Progressing  Goal: Patient-Specific Goal (Individualized)  Outcome: Ongoing, Progressing     Problem: Bariatric Environmental Safety  Goal: Safety Maintained with Care  Outcome: Ongoing, Progressing     Problem: Diabetes Comorbidity  Goal: Blood Glucose Level Within Targeted Range  Outcome: Ongoing, Progressing

## 2023-09-09 NOTE — PLAN OF CARE
09/09/23 1639   Final Note   Assessment Type Final Discharge Note   Anticipated Discharge Disposition Home   What phone number can be called within the next 1-3 days to see how you are doing after discharge? [phone]   Post-Acute Status   Coverage [employer] MANAGED MEDICARE - [employer] MEDICARE HMO   Discharge Delays None known at this time     Patient cleared for discharge from case management standpoint.    no further case management needs.

## 2023-09-09 NOTE — CARE UPDATE
09/08/23 2106   Patient Assessment/Suction   Level of Consciousness (AVPU) alert   Respiratory Effort Unlabored   Expansion/Accessory Muscles/Retractions no use of accessory muscles   All Lung Fields Breath Sounds clear;diminished   Rhythm/Pattern, Respiratory no shortness of breath reported   Cough Frequency infrequent   Cough Type no productive sputum   PRE-TX-O2   Device (Oxygen Therapy) venturi mask   $ Is the patient on Low Flow Oxygen? Yes   Flow (L/min) 2   SpO2 96 %   Pulse Oximetry Type Continuous   $ Pulse Oximetry - Multiple Charge Pulse Oximetry - Multiple   Pulse 105   Resp (!) 22   Positioning   Head of Bed (HOB) Positioning HOB elevated;HOB at 30 degrees   Aerosol Therapy   $ Aerosol Therapy Charges Aerosol Treatment   Daily Review of Necessity (SVN) completed   Respiratory Treatment Status (SVN) given   Treatment Route (SVN) mask   Patient Position (SVN) semi-Marin's   Post Treatment Assessment (SVN) breath sounds improved   Signs of Intolerance (SVN) none   Breath Sounds Post-Respiratory Treatment   Throughout All Fields Post-Treatment All Fields   Throughout All Fields Post-Treatment aeration increased   Post-treatment Heart Rate (beats/min) 105   Post-treatment Resp Rate (breaths/min) 19   Education   $ Education Bronchodilator;15 min   Respiratory Evaluation   $ Care Plan Tech Time 15 min   $ Eval/Re-eval Charges Re-evaluation

## 2023-09-11 ENCOUNTER — HOSPITAL ENCOUNTER (INPATIENT)
Facility: HOSPITAL | Age: 78
LOS: 2 days | Discharge: HOME HEALTH | DRG: 291 | End: 2023-09-15
Attending: EMERGENCY MEDICINE | Admitting: HOSPITALIST
Payer: MEDICARE

## 2023-09-11 DIAGNOSIS — I50.9 ACUTE ON CHRONIC CONGESTIVE HEART FAILURE, UNSPECIFIED HEART FAILURE TYPE: Primary | ICD-10-CM

## 2023-09-11 DIAGNOSIS — R06.02 SHORTNESS OF BREATH: ICD-10-CM

## 2023-09-11 DIAGNOSIS — R07.9 CHEST PAIN: ICD-10-CM

## 2023-09-11 DIAGNOSIS — D64.9 SYMPTOMATIC ANEMIA: ICD-10-CM

## 2023-09-11 DIAGNOSIS — Z95.2 S/P TAVR (TRANSCATHETER AORTIC VALVE REPLACEMENT): ICD-10-CM

## 2023-09-11 DIAGNOSIS — I50.33 ACUTE ON CHRONIC DIASTOLIC CHF (CONGESTIVE HEART FAILURE): ICD-10-CM

## 2023-09-11 LAB
ABO + RH BLD: NORMAL
ALBUMIN SERPL BCP-MCNC: 3.3 G/DL (ref 3.5–5.2)
ALP SERPL-CCNC: 59 U/L (ref 55–135)
ALT SERPL W/O P-5'-P-CCNC: 30 U/L (ref 10–44)
ANION GAP SERPL CALC-SCNC: 7 MMOL/L (ref 8–16)
APTT PPP: <21 SEC (ref 21–32)
AST SERPL-CCNC: 32 U/L (ref 10–40)
BASOPHILS # BLD AUTO: 0.05 K/UL (ref 0–0.2)
BASOPHILS NFR BLD: 0.7 % (ref 0–1.9)
BILIRUB SERPL-MCNC: 1 MG/DL (ref 0.1–1)
BLD GP AB SCN CELLS X3 SERPL QL: NORMAL
BNP SERPL-MCNC: 505 PG/ML (ref 0–99)
BUN SERPL-MCNC: 18 MG/DL (ref 8–23)
CALCIUM SERPL-MCNC: 8.9 MG/DL (ref 8.7–10.5)
CHLORIDE SERPL-SCNC: 96 MMOL/L (ref 95–110)
CO2 SERPL-SCNC: 34 MMOL/L (ref 23–29)
CREAT SERPL-MCNC: 0.5 MG/DL (ref 0.5–1.4)
DIFFERENTIAL METHOD: ABNORMAL
EOSINOPHIL # BLD AUTO: 0.1 K/UL (ref 0–0.5)
EOSINOPHIL NFR BLD: 1.9 % (ref 0–8)
ERYTHROCYTE [DISTWIDTH] IN BLOOD BY AUTOMATED COUNT: 22.3 % (ref 11.5–14.5)
EST. GFR  (NO RACE VARIABLE): >60 ML/MIN/1.73 M^2
FERRITIN SERPL-MCNC: 14 NG/ML (ref 20–300)
GLUCOSE SERPL-MCNC: 143 MG/DL (ref 70–110)
HCT VFR BLD AUTO: 26.7 % (ref 37–48.5)
HGB BLD-MCNC: 7.4 G/DL (ref 12–16)
IMM GRANULOCYTES # BLD AUTO: 0.01 K/UL (ref 0–0.04)
IMM GRANULOCYTES NFR BLD AUTO: 0.1 % (ref 0–0.5)
INR PPP: 1 (ref 0.8–1.2)
IRON SERPL-MCNC: 19 UG/DL (ref 30–160)
LYMPHOCYTES # BLD AUTO: 0.8 K/UL (ref 1–4.8)
LYMPHOCYTES NFR BLD: 11 % (ref 18–48)
MAGNESIUM SERPL-MCNC: 1.7 MG/DL (ref 1.6–2.6)
MCH RBC QN AUTO: 23 PG (ref 27–31)
MCHC RBC AUTO-ENTMCNC: 27.7 G/DL (ref 32–36)
MCV RBC AUTO: 83 FL (ref 82–98)
MONOCYTES # BLD AUTO: 0.6 K/UL (ref 0.3–1)
MONOCYTES NFR BLD: 8 % (ref 4–15)
NEUTROPHILS # BLD AUTO: 5.7 K/UL (ref 1.8–7.7)
NEUTROPHILS NFR BLD: 78.3 % (ref 38–73)
NRBC BLD-RTO: 0 /100 WBC
PLATELET # BLD AUTO: 162 K/UL (ref 150–450)
PMV BLD AUTO: 12 FL (ref 9.2–12.9)
POTASSIUM SERPL-SCNC: 4.3 MMOL/L (ref 3.5–5.1)
PROT SERPL-MCNC: 6.2 G/DL (ref 6–8.4)
PROTHROMBIN TIME: 11.6 SEC (ref 9–12.5)
RBC # BLD AUTO: 3.22 M/UL (ref 4–5.4)
RETICS/RBC NFR AUTO: 2.4 % (ref 0.5–2.5)
SARS-COV-2 RDRP RESP QL NAA+PROBE: NEGATIVE
SATURATED IRON: 4 % (ref 20–50)
SODIUM SERPL-SCNC: 137 MMOL/L (ref 136–145)
SPECIMEN OUTDATE: NORMAL
TOTAL IRON BINDING CAPACITY: 514 UG/DL (ref 250–450)
TRANSFERRIN SERPL-MCNC: 367 MG/DL (ref 200–375)
TROPONIN I SERPL HS-MCNC: 35.6 PG/ML (ref 0–14.9)
TROPONIN I SERPL HS-MCNC: 37.1 PG/ML (ref 0–14.9)
WBC # BLD AUTO: 7.27 K/UL (ref 3.9–12.7)

## 2023-09-11 PROCEDURE — 93010 ELECTROCARDIOGRAM REPORT: CPT | Mod: ,,, | Performed by: SPECIALIST

## 2023-09-11 PROCEDURE — U0002 COVID-19 LAB TEST NON-CDC: HCPCS | Performed by: EMERGENCY MEDICINE

## 2023-09-11 PROCEDURE — 96375 TX/PRO/DX INJ NEW DRUG ADDON: CPT

## 2023-09-11 PROCEDURE — 63600175 PHARM REV CODE 636 W HCPCS: Performed by: EMERGENCY MEDICINE

## 2023-09-11 PROCEDURE — 36415 COLL VENOUS BLD VENIPUNCTURE: CPT | Performed by: EMERGENCY MEDICINE

## 2023-09-11 PROCEDURE — 80053 COMPREHEN METABOLIC PANEL: CPT | Performed by: EMERGENCY MEDICINE

## 2023-09-11 PROCEDURE — 85730 THROMBOPLASTIN TIME PARTIAL: CPT | Performed by: EMERGENCY MEDICINE

## 2023-09-11 PROCEDURE — 83735 ASSAY OF MAGNESIUM: CPT | Performed by: EMERGENCY MEDICINE

## 2023-09-11 PROCEDURE — 99285 EMERGENCY DEPT VISIT HI MDM: CPT | Mod: 25

## 2023-09-11 PROCEDURE — G0378 HOSPITAL OBSERVATION PER HR: HCPCS

## 2023-09-11 PROCEDURE — 82728 ASSAY OF FERRITIN: CPT | Performed by: EMERGENCY MEDICINE

## 2023-09-11 PROCEDURE — 83540 ASSAY OF IRON: CPT | Performed by: EMERGENCY MEDICINE

## 2023-09-11 PROCEDURE — 86901 BLOOD TYPING SEROLOGIC RH(D): CPT | Performed by: EMERGENCY MEDICINE

## 2023-09-11 PROCEDURE — C9113 INJ PANTOPRAZOLE SODIUM, VIA: HCPCS | Performed by: EMERGENCY MEDICINE

## 2023-09-11 PROCEDURE — 85045 AUTOMATED RETICULOCYTE COUNT: CPT | Performed by: EMERGENCY MEDICINE

## 2023-09-11 PROCEDURE — 93005 ELECTROCARDIOGRAM TRACING: CPT | Performed by: SPECIALIST

## 2023-09-11 PROCEDURE — 83880 ASSAY OF NATRIURETIC PEPTIDE: CPT | Performed by: EMERGENCY MEDICINE

## 2023-09-11 PROCEDURE — 84466 ASSAY OF TRANSFERRIN: CPT | Performed by: EMERGENCY MEDICINE

## 2023-09-11 PROCEDURE — 84484 ASSAY OF TROPONIN QUANT: CPT | Performed by: EMERGENCY MEDICINE

## 2023-09-11 PROCEDURE — 85610 PROTHROMBIN TIME: CPT | Performed by: EMERGENCY MEDICINE

## 2023-09-11 PROCEDURE — 85025 COMPLETE CBC W/AUTO DIFF WBC: CPT | Performed by: EMERGENCY MEDICINE

## 2023-09-11 PROCEDURE — 93010 EKG 12-LEAD: ICD-10-PCS | Mod: ,,, | Performed by: SPECIALIST

## 2023-09-11 RX ORDER — HYDROCODONE BITARTRATE AND ACETAMINOPHEN 5; 325 MG/1; MG/1
1 TABLET ORAL EVERY 6 HOURS PRN
Status: DISCONTINUED | OUTPATIENT
Start: 2023-09-11 | End: 2023-09-15 | Stop reason: HOSPADM

## 2023-09-11 RX ORDER — IBUPROFEN 200 MG
24 TABLET ORAL
Status: DISCONTINUED | OUTPATIENT
Start: 2023-09-11 | End: 2023-09-15 | Stop reason: HOSPADM

## 2023-09-11 RX ORDER — ARFORMOTEROL TARTRATE 15 UG/2ML
15 SOLUTION RESPIRATORY (INHALATION) 2 TIMES DAILY
Status: DISCONTINUED | OUTPATIENT
Start: 2023-09-12 | End: 2023-09-15 | Stop reason: HOSPADM

## 2023-09-11 RX ORDER — OXYBUTYNIN CHLORIDE 5 MG/1
5 TABLET ORAL 2 TIMES DAILY
Status: DISCONTINUED | OUTPATIENT
Start: 2023-09-11 | End: 2023-09-15 | Stop reason: HOSPADM

## 2023-09-11 RX ORDER — FUROSEMIDE 10 MG/ML
40 INJECTION INTRAMUSCULAR; INTRAVENOUS
Status: COMPLETED | OUTPATIENT
Start: 2023-09-11 | End: 2023-09-11

## 2023-09-11 RX ORDER — SODIUM,POTASSIUM PHOSPHATES 280-250MG
2 POWDER IN PACKET (EA) ORAL
Status: DISCONTINUED | OUTPATIENT
Start: 2023-09-11 | End: 2023-09-15 | Stop reason: HOSPADM

## 2023-09-11 RX ORDER — SODIUM CHLORIDE 0.9 % (FLUSH) 0.9 %
10 SYRINGE (ML) INJECTION EVERY 12 HOURS PRN
Status: DISCONTINUED | OUTPATIENT
Start: 2023-09-11 | End: 2023-09-15 | Stop reason: HOSPADM

## 2023-09-11 RX ORDER — MORPHINE SULFATE ORAL SOLUTION 10 MG/5ML
10 SOLUTION ORAL EVERY 6 HOURS PRN
Status: DISCONTINUED | OUTPATIENT
Start: 2023-09-11 | End: 2023-09-15 | Stop reason: HOSPADM

## 2023-09-11 RX ORDER — TALC
6 POWDER (GRAM) TOPICAL NIGHTLY PRN
Status: DISCONTINUED | OUTPATIENT
Start: 2023-09-11 | End: 2023-09-15 | Stop reason: HOSPADM

## 2023-09-11 RX ORDER — CITALOPRAM 20 MG/1
40 TABLET, FILM COATED ORAL DAILY
Status: DISCONTINUED | OUTPATIENT
Start: 2023-09-12 | End: 2023-09-15 | Stop reason: HOSPADM

## 2023-09-11 RX ORDER — HYDRALAZINE HYDROCHLORIDE 20 MG/ML
5 INJECTION INTRAMUSCULAR; INTRAVENOUS EVERY 6 HOURS PRN
Status: DISCONTINUED | OUTPATIENT
Start: 2023-09-11 | End: 2023-09-15 | Stop reason: HOSPADM

## 2023-09-11 RX ORDER — DONEPEZIL HYDROCHLORIDE 5 MG/1
5 TABLET, FILM COATED ORAL NIGHTLY
Status: DISCONTINUED | OUTPATIENT
Start: 2023-09-11 | End: 2023-09-15 | Stop reason: HOSPADM

## 2023-09-11 RX ORDER — POLYETHYLENE GLYCOL 3350 17 G/17G
17 POWDER, FOR SOLUTION ORAL DAILY
Status: DISCONTINUED | OUTPATIENT
Start: 2023-09-12 | End: 2023-09-15 | Stop reason: HOSPADM

## 2023-09-11 RX ORDER — LANOLIN ALCOHOL/MO/W.PET/CERES
800 CREAM (GRAM) TOPICAL
Status: DISCONTINUED | OUTPATIENT
Start: 2023-09-11 | End: 2023-09-15 | Stop reason: HOSPADM

## 2023-09-11 RX ORDER — GLUCAGON 1 MG
1 KIT INJECTION
Status: DISCONTINUED | OUTPATIENT
Start: 2023-09-11 | End: 2023-09-15 | Stop reason: HOSPADM

## 2023-09-11 RX ORDER — FLUTICASONE PROPIONATE 50 MCG
2 SPRAY, SUSPENSION (ML) NASAL DAILY
Status: DISCONTINUED | OUTPATIENT
Start: 2023-09-12 | End: 2023-09-15 | Stop reason: HOSPADM

## 2023-09-11 RX ORDER — ACETAMINOPHEN 325 MG/1
650 TABLET ORAL EVERY 4 HOURS PRN
Status: DISCONTINUED | OUTPATIENT
Start: 2023-09-11 | End: 2023-09-15 | Stop reason: HOSPADM

## 2023-09-11 RX ORDER — FUROSEMIDE 10 MG/ML
40 INJECTION INTRAMUSCULAR; INTRAVENOUS
Status: DISCONTINUED | OUTPATIENT
Start: 2023-09-12 | End: 2023-09-14

## 2023-09-11 RX ORDER — CARVEDILOL 3.12 MG/1
3.12 TABLET ORAL 2 TIMES DAILY
Status: DISCONTINUED | OUTPATIENT
Start: 2023-09-12 | End: 2023-09-12

## 2023-09-11 RX ORDER — PANTOPRAZOLE SODIUM 40 MG/1
40 TABLET, DELAYED RELEASE ORAL 2 TIMES DAILY
Status: DISCONTINUED | OUTPATIENT
Start: 2023-09-11 | End: 2023-09-11

## 2023-09-11 RX ORDER — BUDESONIDE 0.5 MG/2ML
0.5 INHALANT ORAL EVERY 12 HOURS
Status: DISCONTINUED | OUTPATIENT
Start: 2023-09-12 | End: 2023-09-15 | Stop reason: HOSPADM

## 2023-09-11 RX ORDER — IBUPROFEN 200 MG
16 TABLET ORAL
Status: DISCONTINUED | OUTPATIENT
Start: 2023-09-11 | End: 2023-09-15 | Stop reason: HOSPADM

## 2023-09-11 RX ORDER — INSULIN ASPART 100 [IU]/ML
0-5 INJECTION, SOLUTION INTRAVENOUS; SUBCUTANEOUS
Status: DISCONTINUED | OUTPATIENT
Start: 2023-09-11 | End: 2023-09-15 | Stop reason: HOSPADM

## 2023-09-11 RX ORDER — NALOXONE HCL 0.4 MG/ML
0.02 VIAL (ML) INJECTION
Status: DISCONTINUED | OUTPATIENT
Start: 2023-09-11 | End: 2023-09-15 | Stop reason: HOSPADM

## 2023-09-11 RX ORDER — ONDANSETRON 2 MG/ML
4 INJECTION INTRAMUSCULAR; INTRAVENOUS EVERY 8 HOURS PRN
Status: DISCONTINUED | OUTPATIENT
Start: 2023-09-11 | End: 2023-09-15 | Stop reason: HOSPADM

## 2023-09-11 RX ORDER — LANOLIN ALCOHOL/MO/W.PET/CERES
1 CREAM (GRAM) TOPICAL DAILY
Status: DISCONTINUED | OUTPATIENT
Start: 2023-09-12 | End: 2023-09-15 | Stop reason: HOSPADM

## 2023-09-11 RX ORDER — PANTOPRAZOLE SODIUM 40 MG/1
40 TABLET, DELAYED RELEASE ORAL 2 TIMES DAILY
Status: DISCONTINUED | OUTPATIENT
Start: 2023-09-11 | End: 2023-09-15 | Stop reason: HOSPADM

## 2023-09-11 RX ORDER — PANTOPRAZOLE SODIUM 40 MG/10ML
80 INJECTION, POWDER, LYOPHILIZED, FOR SOLUTION INTRAVENOUS
Status: COMPLETED | OUTPATIENT
Start: 2023-09-11 | End: 2023-09-11

## 2023-09-11 RX ORDER — ATORVASTATIN CALCIUM 20 MG/1
20 TABLET, FILM COATED ORAL NIGHTLY
Status: DISCONTINUED | OUTPATIENT
Start: 2023-09-11 | End: 2023-09-15 | Stop reason: HOSPADM

## 2023-09-11 RX ADMIN — FUROSEMIDE 40 MG: 10 INJECTION, SOLUTION INTRAMUSCULAR; INTRAVENOUS at 08:09

## 2023-09-11 RX ADMIN — PANTOPRAZOLE SODIUM 80 MG: 40 INJECTION, POWDER, LYOPHILIZED, FOR SOLUTION INTRAVENOUS at 08:09

## 2023-09-11 NOTE — ED PROVIDER NOTES
Encounter Date: 9/11/2023       History     Chief Complaint   Patient presents with    Shortness of Breath     Began today, but she could not stay ahead of it.     Patient with a history of anemia, congestive heart failure, valvular heart disease.  Recently admitted to the hospital with elevated troponin and congestive heart failure exacerbation.  Patient was treated with diuresis.  Patient discharge.  Patient reports continued shortness breath that was worse with minimal exertion tonight.  No fever chills.  No chest pain, pleurisy hemoptysis.  No gastrointestinal bleeding.      Review of patient's allergies indicates:   Allergen Reactions    Latex      Other reaction(s): Unknown  Other reaction(s): Unknown    Sulfacetamide sodium      Pain perineal area    Sulfasalazine Hives    Adhesive Itching     SKIN GETS RED WITH TAPE AND BANDAIDS    Adhesive tape-silicones Itching     SKIN GETS RED WITH TAPE AND BANDAIDS    Sulfa (sulfonamide antibiotics) Rash     Past Medical History:   Diagnosis Date    Anesthesia complication     BLADDER DYSFUNCTION    Aortic stenosis     Arthritis     Back pain     Diabetes mellitus type II     NO LONGER DIABETIC    Encounter for blood transfusion     Hyperlipidemia     Hypertension     NSTEMI (non-ST elevated myocardial infarction) 05/01/2022    Osteoporosis     Wears glasses      Past Surgical History:   Procedure Laterality Date     vocal cord nodules removed  long time ago     twice    ADRENAL TUMOR      APPENDECTOMY  within last 5yrs    CATHETERIZATION OF BOTH LEFT AND RIGHT HEART Left 05/06/2022    Procedure: CATHETERIZATION, HEART, BOTH LEFT AND RIGHT;  Surgeon: Michelet Vargas MD;  Location: [facility] CATH/EP LAB;  Service: Cardiology;  Laterality: Left;    COLONOSCOPY N/A 6/23/2023    Procedure: COLONOSCOPY;  Surgeon: Joel Neal III, MD;  Location: [facility] ENDO;  Service: Endoscopy;  Laterality: N/A;    FIXATION KYPHOPLASTY THORACIC SPINE      8-20-13    FOOT SURGERY      left  2nd toe was too long     HERNIA REPAIR  within last 5yrs    INSERTION OF TEMPORARY PACEMAKER N/A 2023    Procedure: INSERTION, PACEMAKER, TEMPORARY;  Surgeon: Bhavesh Peña MD;  Location: [facility] [place] CATH;  Service: Cardiology;  Laterality: N/A;    LEFT HEART CATHETERIZATION Left 2022    Procedure: Left heart cath;  Surgeon: Jose Echols MD;  Location: [facility] CATH/EP LAB;  Service: Cardiology;  Laterality: Left;    SMALL BOWEL ENTEROSCOPY N/A 2023    Procedure: ENTEROSCOPY;  Surgeon: Cornell Aguirre MD;  Location: [facility] ENDO;  Service: Endoscopy;  Laterality: N/A;    TONSILLECTOMY, ADENOIDECTOMY  long time ago    TRANSCATHETER AORTIC VALVE REPLACEMENT (TAVR) Bilateral 2023    Procedure: REPLACEMENT, AORTIC VALVE, TRANSCATHETER (TAVR);  Surgeon: Bhavesh Peña MD;  Location: [facility] [place] CATH;  Service: Cardiology;  Laterality: Bilateral;    TRANSCATHETER AORTIC VALVE REPLACEMENT (TAVR) Bilateral 2023    Procedure: REPLACEMENT, AORTIC VALVE, TRANSCATHETER (TAVR);  Surgeon: Dallin Verma MD;  Location: [facility] [place] CATH;  Service: Cardiology;  Laterality: Bilateral;    TRANSTHORACIC ECHOCARDIOGRAPHY (TTE)  2023    Procedure: ECHOCARDIOGRAM, TRANSTHORACIC;  Surgeon: Bhavesh Peña MD;  Location: [facility] [place] CATH;  Service: Cardiology;;     Family History   Problem Relation Age of Onset    Collagen disease Neg Hx      Social History     Tobacco Use    Smoking status: Former     Current packs/day: 0.00     Average packs/day: 0.5 packs/day for 35.0 years (17.5 ttl pk-yrs)     Types: Cigarettes     Start date: 10/5/1987     Quit date: 10/5/2022     Years since quittin.9    Smokeless tobacco: Never   Substance Use Topics    Alcohol use: No    Drug use: No     Review of Systems   Constitutional:  Negative for chills and fever.   HENT:  Negative for congestion.    Eyes:  Negative for visual disturbance.   Respiratory:  Positive for shortness of breath.    Cardiovascular:  Negative for chest pain and palpitations.    Gastrointestinal:  Negative for abdominal pain and vomiting.   Genitourinary:  Negative for dysuria.   Musculoskeletal:  Negative for joint swelling.   Neurological:  Negative for headaches.   Psychiatric/Behavioral:  Negative for confusion.        Physical Exam     Initial Vitals [09/11/23 1821]   BP Pulse Resp Temp SpO2   112/67 90 18 98.3 °F (36.8 °C) 97 %      MAP       --         Physical Exam    Nursing note and vitals reviewed.  Constitutional: She is not diaphoretic. No distress.   HENT:   Head: Normocephalic and atraumatic.   Eyes: Conjunctivae are normal.   Neck:   Normal range of motion.  Cardiovascular:  Normal rate.           Pulmonary/Chest:   Good air movement.  No respiratory distress.  Mild basilar bilateral inspiratory crackles.   Abdominal: Abdomen is soft. There is no abdominal tenderness.   Musculoskeletal:         General: Normal range of motion.      Cervical back: Normal range of motion.     Neurological: She is alert. She has normal strength. No cranial nerve deficit or sensory deficit.   No gross deficits   Skin: No rash noted.   Psychiatric: She has a normal mood and affect.         ED Course   Procedures  Labs Reviewed   CBC W/ AUTO DIFFERENTIAL - Abnormal; Notable for the following components:       Result Value    RBC 3.22 (*)     Hemoglobin 7.4 (*)     Hematocrit 26.7 (*)     MCH 23.0 (*)     MCHC 27.7 (*)     RDW 22.3 (*)     Lymph # 0.8 (*)     Gran % 78.3 (*)     Lymph % 11.0 (*)     All other components within normal limits   COMPREHENSIVE METABOLIC PANEL - Abnormal; Notable for the following components:    CO2 34 (*)     Glucose 143 (*)     Albumin 3.3 (*)     Anion Gap 7 (*)     All other components within normal limits   TROPONIN I HIGH SENSITIVITY - Abnormal; Notable for the following components:    Troponin I High Sensitivity 37.1 (*)     All other components within normal limits   TROPONIN I HIGH SENSITIVITY - Abnormal; Notable for the following components:    Troponin I  High Sensitivity 35.6 (*)     All other components within normal limits   B-TYPE NATRIURETIC PEPTIDE - Abnormal; Notable for the following components:     (*)     All other components within normal limits   IRON AND TIBC - Abnormal; Notable for the following components:    Iron 19 (*)     TIBC 514 (*)     Saturated Iron 4 (*)     All other components within normal limits   PROTIME-INR   APTT   SARS-COV-2 RNA AMPLIFICATION, QUAL   MAGNESIUM   RETICULOCYTES   RETICULOCYTES   FERRITIN   IRON AND TIBC   OCCULT BLOOD X 1, STOOL   FERRITIN   TYPE & SCREEN          Imaging Results              X-Ray Chest AP Portable (Final result)  Result time 09/11/23 19:05:19      Final result by Leonard Hyman MD (09/11/23 19:05:19)                   Narrative:    CLINICAL DATA:  Shortness of breath    TECHNIQUE:  Views: A single AP view.  Limitations: The images are technically satisfactory.    COMPARISON:  September 8, 2023    FINDINGS:  CARDIOVASCULAR: Normal.    LUNGS AND PLEURA: Normal.    MEDIASTINAL AND HILAR STRUCTURES: Normal.    OSSEOUS STRUCTURES: Normal.    ADDITIONAL FINDINGS: None seen.    IMPRESSION:    1.  Negative examination.    This document was created using a voice recognition transcribing system. Incorrect words or phrases may have been missed during proof reading. Please interpret accordingly or contact the radiologist for clarification if necessary.    Electronically signed by:  Leonard Hyman MD  9/11/2023 7:05 PM CDT Workstation: [id number]                                     Medications   furosemide injection 40 mg (has no administration in time range)   hydrALAZINE injection 5 mg (has no administration in time range)   pantoprazole EC tablet 40 mg (has no administration in time range)   furosemide injection 40 mg (40 mg Intravenous Given 9/11/23 2018)   pantoprazole injection 80 mg (80 mg Intravenous Given 9/11/23 2055)     Medical Decision Making  Patient presents with shortness of breath and  significant dyspnea on exertion.  Patient arrives with EMS.  Here patient is relatively hypotensive with systolic blood pressure lowest 95.  No evidence of infectious etiology.  BNP is elevated.  Approximally 3 months ago patient with upper GI bleed.  Patient is anticoagulated due to atrial fibrillation.  Patient has not noticed recent GI bleeding.  Hemoglobin continues to decreased.  Currently hemoglobin 7.4 with significant symptoms.  Patient likely with GI bleed continuing.  Discussed risk and benefits of transfusion.  Patient elects for blood transfusion.  Consent obtained.  Patient may need repeat endoscopy.  Patient with elevated BNP over baseline.  Will give 1 dose of Lasix now.  Type and screen ordered.  Patient given Protonix.    Amount and/or Complexity of Data Reviewed  Independent Historian: EMS     Details: Patient transported with no difficulty  Labs: ordered. Decision-making details documented in ED Course.  Radiology: ordered. Decision-making details documented in ED Course.  ECG/medicine tests:  Decision-making details documented in ED Course.    Risk  Prescription drug management.                               Clinical Impression:   Final diagnoses:  [R06.02] Shortness of breath  [I50.9] Acute on chronic congestive heart failure, unspecified heart failure type (Primary)  [D64.9] Symptomatic anemia        ED Disposition Condition    Observation                 Joel Finch MD  09/11/23 4411

## 2023-09-12 ENCOUNTER — CLINICAL SUPPORT (OUTPATIENT)
Dept: CARDIOLOGY | Facility: HOSPITAL | Age: 78
DRG: 291 | End: 2023-09-12
Attending: EMERGENCY MEDICINE
Payer: MEDICARE

## 2023-09-12 LAB
ALBUMIN SERPL BCP-MCNC: 3.5 G/DL (ref 3.5–5.2)
ALP SERPL-CCNC: 61 U/L (ref 55–135)
ALT SERPL W/O P-5'-P-CCNC: 27 U/L (ref 10–44)
ANION GAP SERPL CALC-SCNC: 9 MMOL/L (ref 8–16)
AST SERPL-CCNC: 26 U/L (ref 10–40)
BASOPHILS # BLD AUTO: 0.04 K/UL (ref 0–0.2)
BASOPHILS NFR BLD: 0.7 % (ref 0–1.9)
BILIRUB SERPL-MCNC: 1.4 MG/DL (ref 0.1–1)
BUN SERPL-MCNC: 16 MG/DL (ref 8–23)
CALCIUM SERPL-MCNC: 9 MG/DL (ref 8.7–10.5)
CHLORIDE SERPL-SCNC: 92 MMOL/L (ref 95–110)
CO2 SERPL-SCNC: 36 MMOL/L (ref 23–29)
CREAT SERPL-MCNC: 0.5 MG/DL (ref 0.5–1.4)
DIFFERENTIAL METHOD: ABNORMAL
EOSINOPHIL # BLD AUTO: 0.1 K/UL (ref 0–0.5)
EOSINOPHIL NFR BLD: 2.3 % (ref 0–8)
ERYTHROCYTE [DISTWIDTH] IN BLOOD BY AUTOMATED COUNT: 22.5 % (ref 11.5–14.5)
EST. GFR  (NO RACE VARIABLE): >60 ML/MIN/1.73 M^2
GLUCOSE SERPL-MCNC: 111 MG/DL (ref 70–110)
GLUCOSE SERPL-MCNC: 121 MG/DL (ref 70–110)
GLUCOSE SERPL-MCNC: 125 MG/DL (ref 70–110)
GLUCOSE SERPL-MCNC: 147 MG/DL (ref 70–110)
GLUCOSE SERPL-MCNC: 169 MG/DL (ref 70–110)
HCT VFR BLD AUTO: 29.3 % (ref 37–48.5)
HGB BLD-MCNC: 8 G/DL (ref 12–16)
IMM GRANULOCYTES # BLD AUTO: 0.03 K/UL (ref 0–0.04)
IMM GRANULOCYTES NFR BLD AUTO: 0.5 % (ref 0–0.5)
LYMPHOCYTES # BLD AUTO: 0.9 K/UL (ref 1–4.8)
LYMPHOCYTES NFR BLD: 15.9 % (ref 18–48)
MAGNESIUM SERPL-MCNC: 1.7 MG/DL (ref 1.6–2.6)
MCH RBC QN AUTO: 22.5 PG (ref 27–31)
MCHC RBC AUTO-ENTMCNC: 27.3 G/DL (ref 32–36)
MCV RBC AUTO: 83 FL (ref 82–98)
MONOCYTES # BLD AUTO: 0.5 K/UL (ref 0.3–1)
MONOCYTES NFR BLD: 9.2 % (ref 4–15)
NEUTROPHILS # BLD AUTO: 4 K/UL (ref 1.8–7.7)
NEUTROPHILS NFR BLD: 71.4 % (ref 38–73)
NRBC BLD-RTO: 0 /100 WBC
OB PNL STL: POSITIVE
PLATELET # BLD AUTO: 170 K/UL (ref 150–450)
PMV BLD AUTO: 11.8 FL (ref 9.2–12.9)
POTASSIUM SERPL-SCNC: 3.8 MMOL/L (ref 3.5–5.1)
PROT SERPL-MCNC: 6.5 G/DL (ref 6–8.4)
RBC # BLD AUTO: 3.55 M/UL (ref 4–5.4)
SODIUM SERPL-SCNC: 137 MMOL/L (ref 136–145)
TROPONIN I SERPL HS-MCNC: 35.3 PG/ML (ref 0–14.9)
TSH SERPL DL<=0.005 MIU/L-ACNC: 1.62 UIU/ML (ref 0.34–5.6)
WBC # BLD AUTO: 5.66 K/UL (ref 3.9–12.7)

## 2023-09-12 PROCEDURE — 96375 TX/PRO/DX INJ NEW DRUG ADDON: CPT

## 2023-09-12 PROCEDURE — 94799 UNLISTED PULMONARY SVC/PX: CPT

## 2023-09-12 PROCEDURE — 63600175 PHARM REV CODE 636 W HCPCS: Performed by: PHYSICAL THERAPY ASSISTANT

## 2023-09-12 PROCEDURE — 25000003 PHARM REV CODE 250: Performed by: PHYSICAL THERAPY ASSISTANT

## 2023-09-12 PROCEDURE — 93306 TTE W/DOPPLER COMPLETE: CPT

## 2023-09-12 PROCEDURE — 27000221 HC OXYGEN, UP TO 24 HOURS

## 2023-09-12 PROCEDURE — 96376 TX/PRO/DX INJ SAME DRUG ADON: CPT

## 2023-09-12 PROCEDURE — 94761 N-INVAS EAR/PLS OXIMETRY MLT: CPT

## 2023-09-12 PROCEDURE — 85025 COMPLETE CBC W/AUTO DIFF WBC: CPT | Performed by: PHYSICAL THERAPY ASSISTANT

## 2023-09-12 PROCEDURE — 25000003 PHARM REV CODE 250: Performed by: NURSE PRACTITIONER

## 2023-09-12 PROCEDURE — 83735 ASSAY OF MAGNESIUM: CPT | Performed by: PHYSICAL THERAPY ASSISTANT

## 2023-09-12 PROCEDURE — 99900031 HC PATIENT EDUCATION (STAT)

## 2023-09-12 PROCEDURE — 93306 ECHO (CUPID ONLY): ICD-10-PCS | Mod: 26,,, | Performed by: INTERNAL MEDICINE

## 2023-09-12 PROCEDURE — 82272 OCCULT BLD FECES 1-3 TESTS: CPT | Performed by: PHYSICAL THERAPY ASSISTANT

## 2023-09-12 PROCEDURE — 80053 COMPREHEN METABOLIC PANEL: CPT | Performed by: PHYSICAL THERAPY ASSISTANT

## 2023-09-12 PROCEDURE — 99900035 HC TECH TIME PER 15 MIN (STAT)

## 2023-09-12 PROCEDURE — 25000003 PHARM REV CODE 250: Performed by: STUDENT IN AN ORGANIZED HEALTH CARE EDUCATION/TRAINING PROGRAM

## 2023-09-12 PROCEDURE — 94640 AIRWAY INHALATION TREATMENT: CPT

## 2023-09-12 PROCEDURE — 25000242 PHARM REV CODE 250 ALT 637 W/ HCPCS: Performed by: PHYSICAL THERAPY ASSISTANT

## 2023-09-12 PROCEDURE — 94760 N-INVAS EAR/PLS OXIMETRY 1: CPT

## 2023-09-12 PROCEDURE — 93306 TTE W/DOPPLER COMPLETE: CPT | Mod: 26,,, | Performed by: INTERNAL MEDICINE

## 2023-09-12 PROCEDURE — G0378 HOSPITAL OBSERVATION PER HR: HCPCS

## 2023-09-12 PROCEDURE — 84484 ASSAY OF TROPONIN QUANT: CPT | Performed by: PHYSICAL THERAPY ASSISTANT

## 2023-09-12 PROCEDURE — 84443 ASSAY THYROID STIM HORMONE: CPT | Performed by: PHYSICAL THERAPY ASSISTANT

## 2023-09-12 PROCEDURE — 96365 THER/PROPH/DIAG IV INF INIT: CPT

## 2023-09-12 PROCEDURE — 63600175 PHARM REV CODE 636 W HCPCS: Performed by: NURSE PRACTITIONER

## 2023-09-12 PROCEDURE — 36415 COLL VENOUS BLD VENIPUNCTURE: CPT | Performed by: PHYSICAL THERAPY ASSISTANT

## 2023-09-12 RX ORDER — CARVEDILOL 6.25 MG/1
6.25 TABLET ORAL 2 TIMES DAILY
Status: DISCONTINUED | OUTPATIENT
Start: 2023-09-13 | End: 2023-09-14

## 2023-09-12 RX ORDER — CARVEDILOL 12.5 MG/1
12.5 TABLET ORAL 2 TIMES DAILY
Status: DISCONTINUED | OUTPATIENT
Start: 2023-09-13 | End: 2023-09-12

## 2023-09-12 RX ORDER — METOPROLOL TARTRATE 1 MG/ML
5 INJECTION, SOLUTION INTRAVENOUS ONCE
Status: COMPLETED | OUTPATIENT
Start: 2023-09-12 | End: 2023-09-12

## 2023-09-12 RX ADMIN — FLUTICASONE PROPIONATE 100 MCG: 50 SPRAY, METERED NASAL at 08:09

## 2023-09-12 RX ADMIN — BUDESONIDE INHALATION 0.5 MG: 0.5 SUSPENSION RESPIRATORY (INHALATION) at 08:09

## 2023-09-12 RX ADMIN — ATORVASTATIN CALCIUM 20 MG: 20 TABLET, FILM COATED ORAL at 08:09

## 2023-09-12 RX ADMIN — FERROUS SULFATE TAB 325 MG (65 MG ELEMENTAL FE) 1 EACH: 325 (65 FE) TAB at 08:09

## 2023-09-12 RX ADMIN — OXYBUTYNIN CHLORIDE 5 MG: 5 TABLET ORAL at 08:09

## 2023-09-12 RX ADMIN — HYDROCODONE BITARTRATE AND ACETAMINOPHEN 1 TABLET: 5; 325 TABLET ORAL at 08:09

## 2023-09-12 RX ADMIN — OXYBUTYNIN CHLORIDE 5 MG: 5 TABLET ORAL at 12:09

## 2023-09-12 RX ADMIN — DONEPEZIL HYDROCHLORIDE 5 MG: 5 TABLET ORAL at 12:09

## 2023-09-12 RX ADMIN — METOPROLOL TARTRATE 5 MG: 1 INJECTION, SOLUTION INTRAVENOUS at 11:09

## 2023-09-12 RX ADMIN — DONEPEZIL HYDROCHLORIDE 5 MG: 5 TABLET ORAL at 08:09

## 2023-09-12 RX ADMIN — PANTOPRAZOLE SODIUM 40 MG: 40 TABLET, DELAYED RELEASE ORAL at 08:09

## 2023-09-12 RX ADMIN — CITALOPRAM HYDROBROMIDE 40 MG: 20 TABLET ORAL at 08:09

## 2023-09-12 RX ADMIN — Medication 6 MG: at 08:09

## 2023-09-12 RX ADMIN — PANTOPRAZOLE SODIUM 40 MG: 40 TABLET, DELAYED RELEASE ORAL at 05:09

## 2023-09-12 RX ADMIN — ARFORMOTEROL TARTRATE 15 MCG: 15 SOLUTION RESPIRATORY (INHALATION) at 08:09

## 2023-09-12 RX ADMIN — ARFORMOTEROL TARTRATE 15 MCG: 15 SOLUTION RESPIRATORY (INHALATION) at 07:09

## 2023-09-12 RX ADMIN — FUROSEMIDE 40 MG: 10 INJECTION, SOLUTION INTRAMUSCULAR; INTRAVENOUS at 08:09

## 2023-09-12 RX ADMIN — BUDESONIDE INHALATION 0.5 MG: 0.5 SUSPENSION RESPIRATORY (INHALATION) at 07:09

## 2023-09-12 RX ADMIN — CARVEDILOL 3.12 MG: 3.12 TABLET, FILM COATED ORAL at 08:09

## 2023-09-12 RX ADMIN — Medication 6 MG: at 12:09

## 2023-09-12 RX ADMIN — SODIUM CHLORIDE 125 MG: 9 INJECTION, SOLUTION INTRAVENOUS at 02:09

## 2023-09-12 RX ADMIN — ATORVASTATIN CALCIUM 20 MG: 20 TABLET, FILM COATED ORAL at 12:09

## 2023-09-12 NOTE — HPI
Patient is a 78-year-old female with past medical history of shortness of breath, anemia, CHF, valvular heart disease, diabetes mellitus type 2, hypertension, AFib, CAD.  Patient presents to the ED today with complaints of shortness of breath worsening over the last 2 days.  Patient was just recently discharged from hospital due to CHF exacerbation and similar symptoms.  At time of discharge, patient states she had no shortness a breath symptoms and felt like she was able to go home.  Patient states since she is been home over the last 2 days shortness of breath continue to come back and she feels the same as she did the previous admission.  Patient had transfusion on 8th, since this transfusion her hemoglobin has continued to decrease.  Hemoglobin 7.4 today, occult stool pending.  Patient was seen by GI in June for small GI bleed which was treated with ablation.  We will consult GI for re-evaluation due to symptomatic anemia.  , troponin 35.6 and 37.1.  Patient hypotensive when she arrived to the ED. patient's last echo January 2023, new echo pending.  Patient's EKG shows AFib continued from last EKG on 9/8/2023, patient is on Plavix and aspirin.  Patient was given Protonix and Lasix in ED. Patient denies nausea, vomiting, visible blood in stools, no extremity edema, or chest pain.  Code status was discussed, patient placed full code.

## 2023-09-12 NOTE — CARE UPDATE
09/12/23 0727   Patient Assessment/Suction   Level of Consciousness (AVPU) alert   Respiratory Effort Normal;Unlabored   Expansion/Accessory Muscles/Retractions no use of accessory muscles;no retractions;expansion symmetric   All Lung Fields Breath Sounds diminished   Rhythm/Pattern, Respiratory pattern regular;depth regular;unlabored   Cough Frequency infrequent   Cough Type congested   PRE-TX-O2   Device (Oxygen Therapy) nasal cannula   $ Is the patient on Low Flow Oxygen? Yes   Flow (L/min) 4  (pt states  she wears 2L home o2. Weaned pt to 2L)   SpO2 100 %   Pulse Oximetry Type Intermittent   $ Pulse Oximetry - Single Charge Pulse Oximetry - Single   Pulse (!) 118   Resp 18   Aerosol Therapy   $ Aerosol Therapy Charges Aerosol Treatment   Daily Review of Necessity (SVN) completed   Respiratory Treatment Status (SVN) given   Treatment Route (SVN) oxygen;mask   Patient Position (SVN) HOB elevated   Post Treatment Assessment (SVN) breath sounds unchanged   Signs of Intolerance (SVN) none   Education   $ Education Bronchodilator;15 min   Respiratory Evaluation   $ Care Plan Tech Time 15 min   $ Eval/Re-eval Charges Evaluation

## 2023-09-12 NOTE — H&P
[facility] - Emergency Dept  Hospital Medicine  History & Physical    Patient Name: Daryleen G Moran  MRN: [id number]  Patient Class: OP- Observation  Admission Date: 9/11/2023  Attending Physician: Patsy Vernon MD   Primary Care Provider: Abhi De Oliveira IV, MD         Patient information was obtained from patient and ER records.     Subjective:     Principal Problem:Acute on chronic diastolic CHF (congestive heart failure)    Chief Complaint:   Chief Complaint   Patient presents with    Shortness of Breath     Began today, but she could not stay ahead of it.        HPI: Patient is a 78-year-old female with past medical history of shortness of breath, anemia, CHF, valvular heart disease, diabetes mellitus type 2, hypertension, AFib, CAD.  Patient presents to the ED today with complaints of shortness of breath worsening over the last 2 days.  Patient was just recently discharged from hospital due to CHF exacerbation and similar symptoms.  At time of discharge, patient states she had no shortness a breath symptoms and felt like she was able to go home.  Patient states since she is been home over the last 2 days shortness of breath continue to come back and she feels the same as she did the previous admission.  Patient had transfusion on 8th, since this transfusion her hemoglobin has continued to decrease.  Hemoglobin 7.4 today, occult stool pending.  Patient was seen by GI in June for small GI bleed which was treated with ablation.  We will consult GI for re-evaluation due to symptomatic anemia.  , troponin 35.6 and 37.1.  Patient hypotensive when she arrived to the ED. patient's last echo January 2023, new echo pending.  Patient's EKG shows AFib continued from last EKG on 9/8/2023, patient is on Plavix and aspirin.  Patient was given Protonix and Lasix in ED. Patient denies nausea, vomiting, visible blood in stools, no extremity edema, or chest pain.  Code status was discussed, patient  placed full code.      Past Medical History:   Diagnosis Date    Anesthesia complication     BLADDER DYSFUNCTION    Aortic stenosis     Arthritis     Back pain     Diabetes mellitus type II     NO LONGER DIABETIC    Encounter for blood transfusion     Hyperlipidemia     Hypertension     NSTEMI (non-ST elevated myocardial infarction) 05/01/2022    Osteoporosis     Wears glasses        Past Surgical History:   Procedure Laterality Date     vocal cord nodules removed  long time ago     twice    ADRENAL TUMOR      APPENDECTOMY  within last 5yrs    CATHETERIZATION OF BOTH LEFT AND RIGHT HEART Left 05/06/2022    Procedure: CATHETERIZATION, HEART, BOTH LEFT AND RIGHT;  Surgeon: Michelet Vargas MD;  Location: [facility] CATH/EP LAB;  Service: Cardiology;  Laterality: Left;    COLONOSCOPY N/A 6/23/2023    Procedure: COLONOSCOPY;  Surgeon: Joel Neal III, MD;  Location: [facility] ENDO;  Service: Endoscopy;  Laterality: N/A;    FIXATION KYPHOPLASTY THORACIC SPINE      8-20-13    FOOT SURGERY      left 2nd toe was too long     HERNIA REPAIR  within last 5yrs    INSERTION OF TEMPORARY PACEMAKER N/A 1/4/2023    Procedure: INSERTION, PACEMAKER, TEMPORARY;  Surgeon: Bhavesh Peña MD;  Location: [facility] CATH;  Service: Cardiology;  Laterality: N/A;    LEFT HEART CATHETERIZATION Left 05/02/2022    Procedure: Left heart cath;  Surgeon: Jose Echols MD;  Location: [facility] CATH/EP LAB;  Service: Cardiology;  Laterality: Left;    SMALL BOWEL ENTEROSCOPY N/A 6/22/2023    Procedure: ENTEROSCOPY;  Surgeon: Cornell Aguirre MD;  Location: [facility] ENDO;  Service: Endoscopy;  Laterality: N/A;    TONSILLECTOMY, ADENOIDECTOMY  long time ago    TRANSCATHETER AORTIC VALVE REPLACEMENT (TAVR) Bilateral 1/4/2023    Procedure: REPLACEMENT, AORTIC VALVE, TRANSCATHETER (TAVR);  Surgeon: Bhavesh Peña MD;  Location: [facility] CATH;  Service: Cardiology;  Laterality: Bilateral;    TRANSCATHETER AORTIC VALVE REPLACEMENT (TAVR) Bilateral  1/4/2023    Procedure: REPLACEMENT, AORTIC VALVE, TRANSCATHETER (TAVR);  Surgeon: Dallin Verma MD;  Location: [facility] CATH;  Service: Cardiology;  Laterality: Bilateral;    TRANSTHORACIC ECHOCARDIOGRAPHY (TTE)  1/4/2023    Procedure: ECHOCARDIOGRAM, TRANSTHORACIC;  Surgeon: Bhavesh Peña MD;  Location: [facility] CATH;  Service: Cardiology;;       Review of patient's allergies indicates:   Allergen Reactions    Latex      Other reaction(s): Unknown  Other reaction(s): Unknown    Sulfacetamide sodium      Pain perineal area    Sulfasalazine Hives    Adhesive Itching     SKIN GETS RED WITH TAPE AND BANDAIDS    Adhesive tape-silicones Itching     SKIN GETS RED WITH TAPE AND BANDAIDS    Sulfa (sulfonamide antibiotics) Rash       Current Facility-Administered Medications on File Prior to Encounter   Medication    [DISCONTINUED] GENERIC EXTERNAL MEDICATION     Current Outpatient Medications on File Prior to Encounter   Medication Sig    albuterol (PROVENTIL/VENTOLIN HFA) 90 mcg/actuation inhaler Inhale 2 puffs into the lungs every 6 (six) hours as needed for Shortness of Breath.    aspirin (ECOTRIN) 81 MG EC tablet Take 81 mg by mouth once daily.    calcium carbonate (OS-TK) 500 mg calcium (1,250 mg) tablet Take 1 tablet (500 mg total) by mouth once daily.    carvediloL (COREG) 12.5 MG tablet Take 12.5 mg by mouth 2 (two) times daily.    citalopram (CELEXA) 40 MG tablet Take 1 tablet (40 mg total) by mouth once daily.    clopidogreL (PLAVIX) 75 mg tablet Take 1 tablet (75 mg total) by mouth once daily.    diltiaZEM (DILACOR XR) 120 MG CDCR Take 1 capsule (120 mg total) by mouth once daily.    donepeziL (ARICEPT) 5 MG tablet Take 5 mg by mouth nightly.    ergocalciferol (ERGOCALCIFEROL) 50,000 unit Cap TAKE 1 CAPSULE (50,000 UNITS TOTAL) EVERY 7 DAYS. (Patient taking differently: Take 50,000 Units by mouth every 7 days.)    ferrous gluconate 324 mg (37.5 mg iron) Tab tablet Take 324 mg by mouth once daily.     fluticasone furoate-vilanteroL (BREO ELLIPTA) 100-25 mcg/dose diskus inhaler Inhale 1 puff into the lungs once daily. Controller    furosemide (LASIX) 40 MG tablet Take 1 tablet (40 mg total) by mouth once daily.    glucosamine-chondroitin 500-400 mg tablet Take 1 tablet by mouth once daily.    lisinopriL 10 MG tablet Take 1 tablet (10 mg total) by mouth once daily.    loperamide (IMODIUM A-D) 2 mg Tab Take 1 tablet (2 mg total) by mouth 3 (three) times daily as needed (diarrhea). Take 2 tablets by mouth initially then 1 tablet after each loose stool as needed for diarrhea.    loratadine (CLARITIN) 10 mg tablet Take 1 tablet (10 mg total) by mouth once daily.    multivitamin capsule Take 1 capsule by mouth once daily.    omeprazole (PRILOSEC) 20 MG capsule Take 1 capsule (20 mg total) by mouth once daily.    oxybutynin (DITROPAN) 5 MG Tab Take 1 tablet (5 mg total) by mouth 2 (two) times daily.    simvastatin (ZOCOR) 10 MG tablet Take 10 mg by mouth every evening.    [DISCONTINUED] ALLERGY RELIEF, FEXOFENADINE, 180 mg tablet Take 180 mg by mouth once daily.    [DISCONTINUED] ezetimibe-simvastatin 10-40 mg (VYTORIN) 10-40 mg per tablet Take 1 tablet by mouth.    [DISCONTINUED] lisinopril-hydrochlorothiazide (PRINZIDE,ZESTORETIC) 20-12.5 mg per tablet Take 1 tablet by mouth once daily.      Family History    None       Tobacco Use    Smoking status: Former     Current packs/day: 0.00     Average packs/day: 0.5 packs/day for 35.0 years (17.5 ttl pk-yrs)     Types: Cigarettes     Start date: 10/5/1987     Quit date: 10/5/2022     Years since quittin.9    Smokeless tobacco: Never   Substance and Sexual Activity    Alcohol use: No    Drug use: No    Sexual activity: Not on file     Review of Systems   Constitutional: Negative.  Negative for appetite change, chills, fatigue, fever and unexpected weight change.   HENT: Negative.  Negative for congestion, ear pain, hearing loss, rhinorrhea, sore  throat and tinnitus.    Eyes: Negative.  Negative for pain, discharge and redness.   Respiratory:  Positive for shortness of breath. Negative for cough, wheezing and stridor.    Cardiovascular: Negative.  Negative for chest pain, palpitations and leg swelling.   Gastrointestinal: Negative.  Negative for abdominal distention, abdominal pain, constipation, diarrhea, nausea and vomiting.   Endocrine: Negative.    Genitourinary: Negative.  Negative for decreased urine volume, difficulty urinating, flank pain, hematuria and urgency.   Musculoskeletal: Negative.  Negative for arthralgias, gait problem and myalgias.   Skin: Negative.  Negative for pallor and rash.   Allergic/Immunologic: Negative.  Negative for environmental allergies, food allergies and immunocompromised state.   Neurological: Negative.  Negative for dizziness, syncope, facial asymmetry, weakness and headaches.   Hematological: Negative.    Psychiatric/Behavioral: Negative.  Negative for agitation, confusion, self-injury and suicidal ideas.      Objective:     Vital Signs (Most Recent):  Temp: 98.3 °F (36.8 °C) (09/11/23 1821)  Pulse: 91 (09/11/23 1900)  Resp: 19 (09/11/23 1900)  BP: 107/60 (09/11/23 1900)  SpO2: 96 % (09/11/23 1900) Vital Signs (24h Range):  Temp:  [98.3 °F (36.8 °C)] 98.3 °F (36.8 °C)  Pulse:  [90-91] 91  Resp:  [18-19] 19  SpO2:  [96 %-97 %] 96 %  BP: (107-112)/(60-67) 107/60     Weight: 96.2 kg (212 lb)  Body mass index is 36.39 kg/m².     Physical Exam  Vitals reviewed.   Constitutional:       General: She is not in acute distress.     Appearance: Normal appearance. She is normal weight. She is not toxic-appearing.   HENT:      Head: Normocephalic and atraumatic.      Nose: Nose normal. No congestion or rhinorrhea.      Mouth/Throat:      Mouth: Mucous membranes are moist.      Pharynx: Oropharynx is clear.   Eyes:      General:         Right eye: No discharge.         Left eye: No discharge.      Extraocular Movements: Extraocular  movements intact.      Conjunctiva/sclera: Conjunctivae normal.      Pupils: Pupils are equal, round, and reactive to light.   Cardiovascular:      Rate and Rhythm: Normal rate. Rhythm irregular.      Pulses: Normal pulses.      Heart sounds: No murmur heard.     No friction rub. No gallop.   Pulmonary:      Effort: Pulmonary effort is normal. No respiratory distress.      Breath sounds: No stridor. Rales present. No wheezing or rhonchi.   Chest:      Chest wall: No tenderness.   Abdominal:      General: Abdomen is flat. Bowel sounds are normal. There is no distension.      Palpations: Abdomen is soft.      Tenderness: There is no abdominal tenderness. There is no guarding.   Musculoskeletal:         General: Normal range of motion.      Cervical back: Normal range of motion and neck supple.   Skin:     General: Skin is warm and dry.      Findings: No rash.   Neurological:      General: No focal deficit present.      Mental Status: She is alert and oriented to person, place, and time. Mental status is at baseline.      Motor: No weakness.   Psychiatric:         Mood and Affect: Mood normal.              CRANIAL NERVES     CN III, IV, VI   Pupils are equal, round, and reactive to light.       Significant Labs: All pertinent labs within the past 24 hours have been reviewed.  CBC:   Recent Labs   Lab 09/11/23  1850   WBC 7.27   HGB 7.4*   HCT 26.7*        CMP:   Recent Labs   Lab 09/11/23  1850      K 4.3   CL 96   CO2 34*   *   BUN 18   CREATININE 0.5   CALCIUM 8.9   PROT 6.2   ALBUMIN 3.3*   BILITOT 1.0   ALKPHOS 59   AST 32   ALT 30   ANIONGAP 7*     Magnesium:   Recent Labs   Lab 09/11/23  1850   MG 1.7     Troponin:   Recent Labs   Lab 09/11/23  1850   TROPONINIHS 37.1*  35.6*       Significant Imaging: I have reviewed all pertinent imaging results/findings within the past 24 hours.    Assessment/Plan:     * Acute on chronic diastolic CHF (congestive heart failure)    supplemental  oxygen per home setting 2L  Lasix  strict I/Os, daily weights, 1.5L fluid restrictions / cardiac diet  echo    Iron deficiency anemia due to chronic blood loss  Hold ASA due to worsening anemia   Iron studies pending   Occult blood pending, CBC trending   GI consulted due to history of GI bleed and worsening anemia     Paroxysmal atrial fibrillation  Continue coreg   TSH pending  Telemetry observation  Trend troponin     CAD (coronary artery disease)  Continue plavix        COPD (chronic obstructive pulmonary disease)  2L O2 to keep saturation 88-94%  Continue to monitor vitals       Hyperlipidemia  Continue statin      GERD (gastroesophageal reflux disease)  Protonix 40mg BID      Hypertension  Hydralazine PRN for BP >160  Continue home meds as tolerated      Type 2 diabetes mellitus, without long-term current use of insulin  Insulin sliding scale   POCT glucose       VTE Risk Mitigation (From admission, onward)    None               On 09/11/2023, patient should be placed in hospital observation services under my care in collaboration with Patsy Vernon MD.      GEOVANNI Plata  Department of Hospital Medicine  [facility] - Emergency Dept

## 2023-09-12 NOTE — CONSULTS
GASTROENTEROLOGY INPATIENT CONSULT NOTE  Patient Name: Daryleen G Moran  Patient MRN: [id number]  Patient : 1945    Admit Date: 2023  Service date: 2023    Reason for Consult: anemia    PCP: Abhi De Oliveira IV, MD    Chief Complaint   Patient presents with    Shortness of Breath     Began today, but she could not stay ahead of it.       HPI: Patient is a 78 y.o. female with PMHx  of chronic anemia, CHF, DM2, HTN, Afib (Plavix and ASA), CAD and chronic shortness of breath who presented to the ER yesterday for increased shortness of breath. Patient was recently discharged from this hospital for CHF exacerbation with similar symptoms. GI is being consulted for anemia. Patient reports shortness of breath improved significantly since coming to hospital. She denies any BRBPR, hematochezia or melena. No abdominal pain or n/v. She reports previously in 2023 she had an EGD and prior to treatment of small bowel AVMs was having melena. She has been diligent with checking her stools since and has not experienced further episodes of melena since that EGD. She is requesting to eat.      CHART REVIEW:  EGD & Colonoscopy 2023: 2 small AVMs treated with APC. Colonoscopy normal.    Past Medical History:  Past Medical History:   Diagnosis Date    Anesthesia complication     BLADDER DYSFUNCTION    Aortic stenosis     Arthritis     Back pain     Diabetes mellitus type II     NO LONGER DIABETIC    Encounter for blood transfusion     Hyperlipidemia     Hypertension     NSTEMI (non-ST elevated myocardial infarction) 2022    Osteoporosis     Wears glasses         Past Surgical History:  Past Surgical History:   Procedure Laterality Date     vocal cord nodules removed  long time ago     twice    ADRENAL TUMOR      APPENDECTOMY  within last 5yrs    CATHETERIZATION OF BOTH LEFT AND RIGHT HEART Left 2022    Procedure: CATHETERIZATION, HEART, BOTH LEFT AND RIGHT;  Surgeon: Michelet Vargas MD;  Location: [facility]  CATH/EP LAB;  Service: Cardiology;  Laterality: Left;    COLONOSCOPY N/A 6/23/2023    Procedure: COLONOSCOPY;  Surgeon: Joel Neal III, MD;  Location: [facility] ENDO;  Service: Endoscopy;  Laterality: N/A;    FIXATION KYPHOPLASTY THORACIC SPINE      8-20-13    FOOT SURGERY      left 2nd toe was too long     HERNIA REPAIR  within last 5yrs    INSERTION OF TEMPORARY PACEMAKER N/A 1/4/2023    Procedure: INSERTION, PACEMAKER, TEMPORARY;  Surgeon: Bhavesh Peña MD;  Location: [facility] CATH;  Service: Cardiology;  Laterality: N/A;    LEFT HEART CATHETERIZATION Left 05/02/2022    Procedure: Left heart cath;  Surgeon: Jose Echols MD;  Location: [facility] CATH/EP LAB;  Service: Cardiology;  Laterality: Left;    SMALL BOWEL ENTEROSCOPY N/A 6/22/2023    Procedure: ENTEROSCOPY;  Surgeon: Cornell Aguirre MD;  Location: [facility] ENDO;  Service: Endoscopy;  Laterality: N/A;    TONSILLECTOMY, ADENOIDECTOMY  long time ago    TRANSCATHETER AORTIC VALVE REPLACEMENT (TAVR) Bilateral 1/4/2023    Procedure: REPLACEMENT, AORTIC VALVE, TRANSCATHETER (TAVR);  Surgeon: Bhavesh Peña MD;  Location: [facility] CATH;  Service: Cardiology;  Laterality: Bilateral;    TRANSCATHETER AORTIC VALVE REPLACEMENT (TAVR) Bilateral 1/4/2023    Procedure: REPLACEMENT, AORTIC VALVE, TRANSCATHETER (TAVR);  Surgeon: Dallin Verma MD;  Location: [facility] CATH;  Service: Cardiology;  Laterality: Bilateral;    TRANSTHORACIC ECHOCARDIOGRAPHY (TTE)  1/4/2023    Procedure: ECHOCARDIOGRAM, TRANSTHORACIC;  Surgeon: Bhavesh Peña MD;  Location: [facility] CATH;  Service: Cardiology;;        Home Medications:  Medications Prior to Admission   Medication Sig Dispense Refill Last Dose    albuterol (PROVENTIL/VENTOLIN HFA) 90 mcg/actuation inhaler Inhale 2 puffs into the lungs every 6 (six) hours as needed for Shortness of Breath.   Unknown    aspirin (ECOTRIN) 81 MG EC tablet Take 81 mg by mouth once daily.   Unknown    calcium carbonate (OS-TK) 500 mg calcium (1,250 mg) tablet Take  1 tablet (500 mg total) by mouth once daily.  0 Unknown    carvediloL (COREG) 12.5 MG tablet Take 12.5 mg by mouth 2 (two) times daily.   Unknown    citalopram (CELEXA) 40 MG tablet Take 1 tablet (40 mg total) by mouth once daily. 90 tablet 3 Unknown    clopidogreL (PLAVIX) 75 mg tablet Take 1 tablet (75 mg total) by mouth once daily. 90 tablet 2 Unknown    diltiaZEM (DILACOR XR) 120 MG CDCR Take 1 capsule (120 mg total) by mouth once daily. 30 capsule 11 Unknown    donepeziL (ARICEPT) 5 MG tablet Take 5 mg by mouth nightly.   Unknown    ergocalciferol (ERGOCALCIFEROL) 50,000 unit Cap TAKE 1 CAPSULE (50,000 UNITS TOTAL) EVERY 7 DAYS. (Patient taking differently: Take 50,000 Units by mouth every 7 days.) 12 capsule 3 Unknown    ferrous gluconate 324 mg (37.5 mg iron) Tab tablet Take 324 mg by mouth once daily.   Unknown    fluticasone furoate-vilanteroL (BREO ELLIPTA) 100-25 mcg/dose diskus inhaler Inhale 1 puff into the lungs once daily. Controller   Unknown    furosemide (LASIX) 40 MG tablet Take 1 tablet (40 mg total) by mouth once daily. 45 tablet 2 Unknown    glucosamine-chondroitin 500-400 mg tablet Take 1 tablet by mouth once daily.   Unknown    lisinopriL 10 MG tablet Take 1 tablet (10 mg total) by mouth once daily. 90 tablet 3     loperamide (IMODIUM A-D) 2 mg Tab Take 1 tablet (2 mg total) by mouth 3 (three) times daily as needed (diarrhea). Take 2 tablets by mouth initially then 1 tablet after each loose stool as needed for diarrhea. 15 tablet 0 Unknown    loratadine (CLARITIN) 10 mg tablet Take 1 tablet (10 mg total) by mouth once daily.  0 Unknown    multivitamin capsule Take 1 capsule by mouth once daily.   Unknown    omeprazole (PRILOSEC) 20 MG capsule Take 1 capsule (20 mg total) by mouth once daily. 90 capsule 3 Unknown    oxybutynin (DITROPAN) 5 MG Tab Take 1 tablet (5 mg total) by mouth 2 (two) times daily. 180 tablet 3 Unknown    simvastatin (ZOCOR) 10 MG tablet Take 10 mg by mouth every evening.    Unknown       Inpatient Medications:   budesonide  0.5 mg Nebulization Q12H    And    arformoteroL  15 mcg Nebulization BID    atorvastatin  20 mg Oral QHS    carvediloL  3.125 mg Oral BID    citalopram  40 mg Oral Daily    donepeziL  5 mg Oral Nightly    ferric gluconate (FERRLECIT) 125 mg in sodium chloride 0.9% 100 mL IVPB  125 mg Intravenous Daily    ferrous sulfate  1 tablet Oral Daily    fluticasone propionate  2 spray Each Nostril Daily    furosemide (LASIX) injection  40 mg Intravenous Q12H    oxybutynin  5 mg Oral BID    pantoprazole  40 mg Oral BID    polyethylene glycol  17 g Oral Daily     acetaminophen, dextrose 50%, dextrose 50%, glucagon (human recombinant), glucose, glucose, hydrALAZINE, HYDROcodone-acetaminophen, insulin aspart U-100, magnesium oxide, magnesium oxide, melatonin, morphine, naloxone, ondansetron, potassium bicarbonate, potassium bicarbonate, potassium bicarbonate, potassium, sodium phosphates, potassium, sodium phosphates, potassium, sodium phosphates, sodium chloride 0.9%    Review of patient's allergies indicates:   Allergen Reactions    Latex      Other reaction(s): Unknown  Other reaction(s): Unknown    Sulfacetamide sodium      Pain perineal area    Sulfasalazine Hives    Adhesive Itching     SKIN GETS RED WITH TAPE AND BANDAIDS    Adhesive tape-silicones Itching     SKIN GETS RED WITH TAPE AND BANDAIDS    Sulfa (sulfonamide antibiotics) Rash       Social History:   Social History     Occupational History    Not on file   Tobacco Use    Smoking status: Former     Current packs/day: 0.00     Average packs/day: 0.5 packs/day for 35.0 years (17.5 ttl pk-yrs)     Types: Cigarettes     Start date: 10/5/1987     Quit date: 10/5/2022     Years since quittin.9    Smokeless tobacco: Never   Substance and Sexual Activity    Alcohol use: No    Drug use: No    Sexual activity: Not on file       Family History:   Family History   Problem Relation Age of Onset    Collagen disease Neg Hx   "      Review of Systems:  A 10 point review of systems was performed and was normal, except as mentioned in the HPI, including constitutional, HEENT, heme, lymph, cardiovascular, respiratory, gastrointestinal, genitourinary, neurologic, endocrine, psychiatric and musculoskeletal.      OBJECTIVE:    Physical Exam:  24 Hour Vital Sign Ranges: Temp:  [97.7 °F (36.5 °C)-98.3 °F (36.8 °C)] 97.8 °F (36.6 °C)  Pulse:  [] 101  Resp:  [18-22] 18  SpO2:  [95 %-100 %] 95 %  BP: ()/(60-86) 96/68  Most recent vitals: BP 96/68 (BP Location: Left arm, Patient Position: Lying)   Pulse 101   Temp 97.8 °F (36.6 °C) (Oral)   Resp 18   Ht 5' 4" (1.626 m)   Wt 96.5 kg (212 lb 11.9 oz)   LMP  (LMP Unknown)   SpO2 95%   BMI 36.52 kg/m²    GEN: chronically ill, awake and alert, non-toxic appearing adult  HEENT: PERRL, sclera anicteric, oral mucosa pink and moist without lesion  NECK: trachea midline; Good ROM  CV: regular rate and rhythm, no murmurs or gallops  RESP: clear to auscultation bilaterally, no wheezes, rhonci or rales, )2 per NC  ABD: soft, non-tender, non-distended, normal bowel sounds  EXT: BLE edema  SKIN: no rashes or jaundice  PSYCH: normal affect    Labs:   Recent Labs     09/11/23 1850 09/12/23  0323   WBC 7.27 5.66   MCV 83 83    170     Recent Labs     09/11/23 1850 09/12/23  0323    137   K 4.3 3.8   CL 96 92*   CO2 34* 36*   BUN 18 16   * 111*     No results for input(s): "ALB" in the last 72 hours.    Invalid input(s): "ALKP", "SGOT", "SGPT", "TBIL", "DBIL", "TPRO"  Recent Labs     09/11/23  1850   INR 1.0         Radiology Review:  X-Ray Chest AP Portable   Final Result            IMPRESSION / RECOMMENDATIONS:  Plan:  Chronic BOZENA  CHF exacerbation w/ shortness of breath  Elevated troponin    -Hgb trend 7.8-8.3-8-7.7-7.4-8.0  -No signs of GI blood loss.   -Needs IV iron replacement. Ferritin is 14. Give dose IV iron now and 1 in AM  -Okay for full liquids advanced as " tolerated  -Will still need outpatient capsule endoscopy for chronic BOZENA  -Repeat cbc in AM. If h/h stable no inpatient endoscopy warranted.     Thank you for this consult.    Donna Maillho  9/12/2023  1:56 PM

## 2023-09-12 NOTE — ASSESSMENT & PLAN NOTE
Hold ASA due to worsening anemia   Iron studies pending   Occult blood pending, CBC trending   GI consulted due to history of GI bleed and worsening anemia

## 2023-09-12 NOTE — PLAN OF CARE
[facility]  Initial Discharge Assessment       Primary Care Provider: Abhi De Oliveira IV, MD    Admission Diagnosis: Acute on chronic congestive heart failure, unspecified heart failure type [I50.9]    Admission Date: 9/11/2023  Expected Discharge Date: 9/13/2023      met with Pt at bedside to complete discharge assessment. Pt AAOx4s. Demographics, PCP, and insurance verified. No home health. No dialysis. Pt reports ability to complete ADLs without assistance. Pt verbalized plan to discharge home via family transport. Pt has no other needs to be addressed at this time.     Transition of Care Barriers: None    Payor: [employer] MANAGED MEDICARE / Plan: [employer] MEDICARE HMO / Product Type: Capitation /     Extended Emergency Contact Information  Primary Emergency Contact: Sally Reardon  Mobile Phone: [phone]  Relation: Daughter    Discharge Plan A: Home with family  Discharge Plan B: Home with family      [facility] - [facility] [address]  [address] [facility] [address]  Phone: [phone] Fax: [phone]    [facility] - [place], [place] - [address]  [address]  [place] [address]  Phone: [phone] Fax: [phone]    [id number] - [place], [place] - [address] [facility] [address]  [address]  [place] [address]  Phone: [phone] Fax: [phone]      Initial Assessment (most recent)       Adult Discharge Assessment - 09/12/23 1347          Discharge Assessment    Assessment Type Discharge Planning Assessment     Confirmed/corrected address, phone number and insurance Yes     Confirmed Demographics Correct on Facesheet     Source of Information patient     Does patient/caregiver understand observation status Yes     Communicated ISAAK with patient/caregiver Date not available/Unable to determine     Reason For Admission Acute on chronic diastolic CHF (congestive heart failure)      People in Home spouse     Do you expect to return to your current living situation? Yes     Do you have help at home or someone to help you manage your care at home? Yes     Who are your caregiver(s) and their phone number(s)? Sally Reardon (Daughter) [phone]     Prior to hospitilization cognitive status: Alert/Oriented     Current cognitive status: Alert/Oriented     Walking or Climbing Stairs ambulation difficulty, assistance 1 person     Home Layout Able to live on 1st floor     Equipment Currently Used at Home none     Readmission within 30 days? No     Patient currently being followed by outpatient case management? No     Do you currently have service(s) that help you manage your care at home? No     Do you take prescription medications? Yes     Do you have prescription coverage? Yes     Coverage [employer] MANAGED MEDICARE     Do you have any problems affording any of your prescribed medications? No     Is the patient taking medications as prescribed? yes     Who is going to help you get home at discharge? Sally Reardon (Daughter) [phone]     How do you get to doctors appointments? car, drives self;family or friend will provide     Are you on dialysis? No     Do you take coumadin? No     Discharge Plan discussed with: Patient     Transition of Care Barriers None     Discharge Plan A Home with family     Discharge Plan B Home with family

## 2023-09-12 NOTE — PLAN OF CARE
Problem: Adult Inpatient Plan of Care  Goal: Plan of Care Review  9/12/2023 1804 by Myrtle Elaine RN  Outcome: Ongoing, Progressing  9/12/2023 1631 by Myrtle Elaine RN  Outcome: Ongoing, Progressing  Goal: Patient-Specific Goal (Individualized)  9/12/2023 1804 by Myrtle Elaine RN  Outcome: Ongoing, Progressing  9/12/2023 1631 by Myrtle Elaine RN  Outcome: Ongoing, Progressing  Goal: Absence of Hospital-Acquired Illness or Injury  9/12/2023 1804 by Myrtle Elaine RN  Outcome: Ongoing, Progressing  9/12/2023 1631 by Myrtle Elaine RN  Outcome: Ongoing, Progressing  Goal: Optimal Comfort and Wellbeing  9/12/2023 1804 by Myrtle Elaine RN  Outcome: Ongoing, Progressing  9/12/2023 1631 by Myrtle Elaine RN  Outcome: Ongoing, Progressing  Goal: Readiness for Transition of Care  9/12/2023 1804 by Myrtle Elaine RN  Outcome: Ongoing, Progressing  9/12/2023 1631 by Myrtle lEaine RN  Outcome: Ongoing, Progressing

## 2023-09-12 NOTE — ASSESSMENT & PLAN NOTE
supplemental oxygen per home setting 2L  Lasix  strict I/Os, daily weights, 1.5L fluid restrictions / cardiac diet  echo

## 2023-09-12 NOTE — SUBJECTIVE & OBJECTIVE
Past Medical History:   Diagnosis Date    Anesthesia complication     BLADDER DYSFUNCTION    Aortic stenosis     Arthritis     Back pain     Diabetes mellitus type II     NO LONGER DIABETIC    Encounter for blood transfusion     Hyperlipidemia     Hypertension     NSTEMI (non-ST elevated myocardial infarction) 05/01/2022    Osteoporosis     Wears glasses        Past Surgical History:   Procedure Laterality Date     vocal cord nodules removed  long time ago     twice    ADRENAL TUMOR      APPENDECTOMY  within last 5yrs    CATHETERIZATION OF BOTH LEFT AND RIGHT HEART Left 05/06/2022    Procedure: CATHETERIZATION, HEART, BOTH LEFT AND RIGHT;  Surgeon: Michelet Vargas MD;  Location: [facility] CATH/EP LAB;  Service: Cardiology;  Laterality: Left;    COLONOSCOPY N/A 6/23/2023    Procedure: COLONOSCOPY;  Surgeon: Joel Neal III, MD;  Location: [facility] ENDO;  Service: Endoscopy;  Laterality: N/A;    FIXATION KYPHOPLASTY THORACIC SPINE      8-20-13    FOOT SURGERY      left 2nd toe was too long     HERNIA REPAIR  within last 5yrs    INSERTION OF TEMPORARY PACEMAKER N/A 1/4/2023    Procedure: INSERTION, PACEMAKER, TEMPORARY;  Surgeon: Bhavesh Peña MD;  Location: [facility] CATH;  Service: Cardiology;  Laterality: N/A;    LEFT HEART CATHETERIZATION Left 05/02/2022    Procedure: Left heart cath;  Surgeon: Jose Echols MD;  Location: [facility] CATH/EP LAB;  Service: Cardiology;  Laterality: Left;    SMALL BOWEL ENTEROSCOPY N/A 6/22/2023    Procedure: ENTEROSCOPY;  Surgeon: Cornell Aguirre MD;  Location: [facility] ENDO;  Service: Endoscopy;  Laterality: N/A;    TONSILLECTOMY, ADENOIDECTOMY  long time ago    TRANSCATHETER AORTIC VALVE REPLACEMENT (TAVR) Bilateral 1/4/2023    Procedure: REPLACEMENT, AORTIC VALVE, TRANSCATHETER (TAVR);  Surgeon: Bhavesh Peña MD;  [facility];  Service: Cardiology;  Laterality: Bilateral;    TRANSCATHETER AORTIC VALVE REPLACEMENT (TAVR) Bilateral 1/4/2023    Procedure: REPLACEMENT, AORTIC VALVE,  TRANSCATHETER (TAVR);  Surgeon: Dallin Verma MD;  Location: [facility] CATH;  Service: Cardiology;  Laterality: Bilateral;    TRANSTHORACIC ECHOCARDIOGRAPHY (TTE)  1/4/2023    Procedure: ECHOCARDIOGRAM, TRANSTHORACIC;  Surgeon: Bhavesh Peña MD;  Location: [facility] CATH;  Service: Cardiology;;       Review of patient's allergies indicates:   Allergen Reactions    Latex      Other reaction(s): Unknown  Other reaction(s): Unknown    Sulfacetamide sodium      Pain perineal area    Sulfasalazine Hives    Adhesive Itching     SKIN GETS RED WITH TAPE AND BANDAIDS    Adhesive tape-silicones Itching     SKIN GETS RED WITH TAPE AND BANDAIDS    Sulfa (sulfonamide antibiotics) Rash       Current Facility-Administered Medications on File Prior to Encounter   Medication    [DISCONTINUED] GENERIC EXTERNAL MEDICATION     Current Outpatient Medications on File Prior to Encounter   Medication Sig    albuterol (PROVENTIL/VENTOLIN HFA) 90 mcg/actuation inhaler Inhale 2 puffs into the lungs every 6 (six) hours as needed for Shortness of Breath.    aspirin (ECOTRIN) 81 MG EC tablet Take 81 mg by mouth once daily.    calcium carbonate (OS-TK) 500 mg calcium (1,250 mg) tablet Take 1 tablet (500 mg total) by mouth once daily.    carvediloL (COREG) 12.5 MG tablet Take 12.5 mg by mouth 2 (two) times daily.    citalopram (CELEXA) 40 MG tablet Take 1 tablet (40 mg total) by mouth once daily.    clopidogreL (PLAVIX) 75 mg tablet Take 1 tablet (75 mg total) by mouth once daily.    diltiaZEM (DILACOR XR) 120 MG CDCR Take 1 capsule (120 mg total) by mouth once daily.    donepeziL (ARICEPT) 5 MG tablet Take 5 mg by mouth nightly.    ergocalciferol (ERGOCALCIFEROL) 50,000 unit Cap TAKE 1 CAPSULE (50,000 UNITS TOTAL) EVERY 7 DAYS. (Patient taking differently: Take 50,000 Units by mouth every 7 days.)    ferrous gluconate 324 mg (37.5 mg iron) Tab tablet Take 324 mg by mouth once daily.    fluticasone furoate-vilanteroL (BREO ELLIPTA) 100-25 mcg/dose  diskus inhaler Inhale 1 puff into the lungs once daily. Controller    furosemide (LASIX) 40 MG tablet Take 1 tablet (40 mg total) by mouth once daily.    glucosamine-chondroitin 500-400 mg tablet Take 1 tablet by mouth once daily.    lisinopriL 10 MG tablet Take 1 tablet (10 mg total) by mouth once daily.    loperamide (IMODIUM A-D) 2 mg Tab Take 1 tablet (2 mg total) by mouth 3 (three) times daily as needed (diarrhea). Take 2 tablets by mouth initially then 1 tablet after each loose stool as needed for diarrhea.    loratadine (CLARITIN) 10 mg tablet Take 1 tablet (10 mg total) by mouth once daily.    multivitamin capsule Take 1 capsule by mouth once daily.    omeprazole (PRILOSEC) 20 MG capsule Take 1 capsule (20 mg total) by mouth once daily.    oxybutynin (DITROPAN) 5 MG Tab Take 1 tablet (5 mg total) by mouth 2 (two) times daily.    simvastatin (ZOCOR) 10 MG tablet Take 10 mg by mouth every evening.    [DISCONTINUED] ALLERGY RELIEF, FEXOFENADINE, 180 mg tablet Take 180 mg by mouth once daily.    [DISCONTINUED] ezetimibe-simvastatin 10-40 mg (VYTORIN) 10-40 mg per tablet Take 1 tablet by mouth.    [DISCONTINUED] lisinopril-hydrochlorothiazide (PRINZIDE,ZESTORETIC) 20-12.5 mg per tablet Take 1 tablet by mouth once daily.      Family History    None       Tobacco Use    Smoking status: Former     Current packs/day: 0.00     Average packs/day: 0.5 packs/day for 35.0 years (17.5 ttl pk-yrs)     Types: Cigarettes     Start date: 10/5/1987     Quit date: 10/5/2022     Years since quittin.9    Smokeless tobacco: Never   Substance and Sexual Activity    Alcohol use: No    Drug use: No    Sexual activity: Not on file     Review of Systems   Constitutional: Negative.  Negative for appetite change, chills, fatigue, fever and unexpected weight change.   HENT: Negative.  Negative for congestion, ear pain, hearing loss, rhinorrhea, sore throat and tinnitus.    Eyes: Negative.  Negative for pain, discharge and redness.    Respiratory:  Positive for shortness of breath. Negative for cough, wheezing and stridor.    Cardiovascular: Negative.  Negative for chest pain, palpitations and leg swelling.   Gastrointestinal: Negative.  Negative for abdominal distention, abdominal pain, constipation, diarrhea, nausea and vomiting.   Endocrine: Negative.    Genitourinary: Negative.  Negative for decreased urine volume, difficulty urinating, flank pain, hematuria and urgency.   Musculoskeletal: Negative.  Negative for arthralgias, gait problem and myalgias.   Skin: Negative.  Negative for pallor and rash.   Allergic/Immunologic: Negative.  Negative for environmental allergies, food allergies and immunocompromised state.   Neurological: Negative.  Negative for dizziness, syncope, facial asymmetry, weakness and headaches.   Hematological: Negative.    Psychiatric/Behavioral: Negative.  Negative for agitation, confusion, self-injury and suicidal ideas.      Objective:     Vital Signs (Most Recent):  Temp: 98.3 °F (36.8 °C) (09/11/23 1821)  Pulse: 91 (09/11/23 1900)  Resp: 19 (09/11/23 1900)  BP: 107/60 (09/11/23 1900)  SpO2: 96 % (09/11/23 1900) Vital Signs (24h Range):  Temp:  [98.3 °F (36.8 °C)] 98.3 °F (36.8 °C)  Pulse:  [90-91] 91  Resp:  [18-19] 19  SpO2:  [96 %-97 %] 96 %  BP: (107-112)/(60-67) 107/60     Weight: 96.2 kg (212 lb)  Body mass index is 36.39 kg/m².     Physical Exam  Vitals reviewed.   Constitutional:       General: She is not in acute distress.     Appearance: Normal appearance. She is normal weight. She is not toxic-appearing.   HENT:      Head: Normocephalic and atraumatic.      Nose: Nose normal. No congestion or rhinorrhea.      Mouth/Throat:      Mouth: Mucous membranes are moist.      Pharynx: Oropharynx is clear.   Eyes:      General:         Right eye: No discharge.         Left eye: No discharge.      Extraocular Movements: Extraocular movements intact.      Conjunctiva/sclera: Conjunctivae normal.      Pupils: Pupils  are equal, round, and reactive to light.   Cardiovascular:      Rate and Rhythm: Normal rate. Rhythm irregular.      Pulses: Normal pulses.      Heart sounds: No murmur heard.     No friction rub. No gallop.   Pulmonary:      Effort: Pulmonary effort is normal. No respiratory distress.      Breath sounds: No stridor. Rales present. No wheezing or rhonchi.   Chest:      Chest wall: No tenderness.   Abdominal:      General: Abdomen is flat. Bowel sounds are normal. There is no distension.      Palpations: Abdomen is soft.      Tenderness: There is no abdominal tenderness. There is no guarding.   Musculoskeletal:         General: Normal range of motion.      Cervical back: Normal range of motion and neck supple.   Skin:     General: Skin is warm and dry.      Findings: No rash.   Neurological:      General: No focal deficit present.      Mental Status: She is alert and oriented to person, place, and time. Mental status is at baseline.      Motor: No weakness.   Psychiatric:         Mood and Affect: Mood normal.              CRANIAL NERVES     CN III, IV, VI   Pupils are equal, round, and reactive to light.       Significant Labs: All pertinent labs within the past 24 hours have been reviewed.  CBC:   Recent Labs   Lab 09/11/23  1850   WBC 7.27   HGB 7.4*   HCT 26.7*        CMP:   Recent Labs   Lab 09/11/23  1850      K 4.3   CL 96   CO2 34*   *   BUN 18   CREATININE 0.5   CALCIUM 8.9   PROT 6.2   ALBUMIN 3.3*   BILITOT 1.0   ALKPHOS 59   AST 32   ALT 30   ANIONGAP 7*     Magnesium:   Recent Labs   Lab 09/11/23  1850   MG 1.7     Troponin:   Recent Labs   Lab 09/11/23  1850   TROPONINIHS 37.1*  35.6*       Significant Imaging: I have reviewed all pertinent imaging results/findings within the past 24 hours.

## 2023-09-13 LAB
ALBUMIN SERPL BCP-MCNC: 3.2 G/DL (ref 3.5–5.2)
ALP SERPL-CCNC: 63 U/L (ref 55–135)
ALT SERPL W/O P-5'-P-CCNC: 30 U/L (ref 10–44)
ANION GAP SERPL CALC-SCNC: 8 MMOL/L (ref 8–16)
AORTIC ROOT ANNULUS: 2.8 CM
AORTIC VALVE CUSP SEPERATION: 0.8 CM
AST SERPL-CCNC: 28 U/L (ref 10–40)
AV INDEX (PROSTH): 0.38
AV MEAN GRADIENT: 13 MMHG
AV PEAK GRADIENT: 23 MMHG
AV VALVE AREA BY VELOCITY RATIO: 0.82 CM²
AV VALVE AREA: 0.86 CM²
AV VELOCITY RATIO: 0.36
BASOPHILS # BLD AUTO: 0.06 K/UL (ref 0–0.2)
BASOPHILS NFR BLD: 0.9 % (ref 0–1.9)
BILIRUB SERPL-MCNC: 1.1 MG/DL (ref 0.1–1)
BSA FOR ECHO PROCEDURE: 2.09 M2
BUN SERPL-MCNC: 20 MG/DL (ref 8–23)
CALCIUM SERPL-MCNC: 8.9 MG/DL (ref 8.7–10.5)
CHLORIDE SERPL-SCNC: 90 MMOL/L (ref 95–110)
CO2 SERPL-SCNC: 34 MMOL/L (ref 23–29)
CREAT SERPL-MCNC: 0.6 MG/DL (ref 0.5–1.4)
CV ECHO LV RWT: 1.07 CM
DIFFERENTIAL METHOD: ABNORMAL
DOP CALC AO PEAK VEL: 2.42 M/S
DOP CALC AO VTI: 46.2 CM
DOP CALC LVOT AREA: 2.3 CM2
DOP CALC LVOT DIAMETER: 1.7 CM
DOP CALC LVOT PEAK VEL: 0.87 M/S
DOP CALC LVOT STROKE VOLUME: 39.7 CM3
DOP CALC MV VTI: 39.6 CM
DOP CALCLVOT PEAK VEL VTI: 17.5 CM
E/E' RATIO: 20.63 M/S
ECHO LV POSTERIOR WALL: 1.75 CM (ref 0.6–1.1)
EOSINOPHIL # BLD AUTO: 0.2 K/UL (ref 0–0.5)
EOSINOPHIL NFR BLD: 2.7 % (ref 0–8)
ERYTHROCYTE [DISTWIDTH] IN BLOOD BY AUTOMATED COUNT: 22.5 % (ref 11.5–14.5)
EST. GFR  (NO RACE VARIABLE): >60 ML/MIN/1.73 M^2
FRACTIONAL SHORTENING: 36 % (ref 28–44)
GLUCOSE SERPL-MCNC: 132 MG/DL (ref 70–110)
GLUCOSE SERPL-MCNC: 133 MG/DL (ref 70–110)
GLUCOSE SERPL-MCNC: 145 MG/DL (ref 70–110)
GLUCOSE SERPL-MCNC: 167 MG/DL (ref 70–110)
GLUCOSE SERPL-MCNC: 192 MG/DL (ref 70–110)
GLUCOSE SERPL-MCNC: 217 MG/DL (ref 70–110)
HCT VFR BLD AUTO: 28.5 % (ref 37–48.5)
HGB BLD-MCNC: 8.1 G/DL (ref 12–16)
IMM GRANULOCYTES # BLD AUTO: 0.01 K/UL (ref 0–0.04)
IMM GRANULOCYTES NFR BLD AUTO: 0.2 % (ref 0–0.5)
INTERVENTRICULAR SEPTUM: 1.71 CM (ref 0.6–1.1)
LEFT INTERNAL DIMENSION IN SYSTOLE: 2.1 CM (ref 2.1–4)
LEFT VENTRICLE DIASTOLIC VOLUME INDEX: 21.29 ML/M2
LEFT VENTRICLE DIASTOLIC VOLUME: 42.8 ML
LEFT VENTRICLE MASS INDEX: 112 G/M2
LEFT VENTRICLE SYSTOLIC VOLUME INDEX: 7.2 ML/M2
LEFT VENTRICLE SYSTOLIC VOLUME: 14.4 ML
LEFT VENTRICULAR INTERNAL DIMENSION IN DIASTOLE: 3.26 CM (ref 3.5–6)
LEFT VENTRICULAR MASS: 224.26 G
LV LATERAL E/E' RATIO: 18.33 M/S
LV SEPTAL E/E' RATIO: 23.57 M/S
LVOT MG: 2 MMHG
LVOT MV: 0.61 CM/S
LYMPHOCYTES # BLD AUTO: 1.3 K/UL (ref 1–4.8)
LYMPHOCYTES NFR BLD: 19.1 % (ref 18–48)
MAGNESIUM SERPL-MCNC: 1.7 MG/DL (ref 1.6–2.6)
MCH RBC QN AUTO: 23 PG (ref 27–31)
MCHC RBC AUTO-ENTMCNC: 28.4 G/DL (ref 32–36)
MCV RBC AUTO: 81 FL (ref 82–98)
MONOCYTES # BLD AUTO: 0.7 K/UL (ref 0.3–1)
MONOCYTES NFR BLD: 10.4 % (ref 4–15)
MV MEAN GRADIENT: 6 MMHG
MV PEAK E VEL: 1.65 M/S
MV PEAK GRADIENT: 13 MMHG
MV VALVE AREA BY CONTINUITY EQUATION: 1 CM2
NEUTROPHILS # BLD AUTO: 4.4 K/UL (ref 1.8–7.7)
NEUTROPHILS NFR BLD: 66.7 % (ref 38–73)
NRBC BLD-RTO: 0 /100 WBC
PISA TR MAX VEL: 2.55 M/S
PLATELET # BLD AUTO: 193 K/UL (ref 150–450)
PMV BLD AUTO: 11.9 FL (ref 9.2–12.9)
POTASSIUM SERPL-SCNC: 3.6 MMOL/L (ref 3.5–5.1)
PROT SERPL-MCNC: 6.1 G/DL (ref 6–8.4)
PV MV: 0.57 M/S
PV PEAK GRADIENT: 2 MMHG
PV PEAK VELOCITY: 0.77 M/S
RA PRESSURE ESTIMATED: 3 MMHG
RBC # BLD AUTO: 3.52 M/UL (ref 4–5.4)
RV TB RVSP: 6 MMHG
SODIUM SERPL-SCNC: 132 MMOL/L (ref 136–145)
TDI LATERAL: 0.09 M/S
TDI SEPTAL: 0.07 M/S
TDI: 0.08 M/S
TR MAX PG: 26 MMHG
TV REST PULMONARY ARTERY PRESSURE: 29 MMHG
WBC # BLD AUTO: 6.65 K/UL (ref 3.9–12.7)
Z-SCORE OF LEFT VENTRICULAR DIMENSION IN END DIASTOLE: -5.91
Z-SCORE OF LEFT VENTRICULAR DIMENSION IN END SYSTOLE: -4.29

## 2023-09-13 PROCEDURE — 83735 ASSAY OF MAGNESIUM: CPT | Performed by: PHYSICAL THERAPY ASSISTANT

## 2023-09-13 PROCEDURE — 97116 GAIT TRAINING THERAPY: CPT

## 2023-09-13 PROCEDURE — 80053 COMPREHEN METABOLIC PANEL: CPT | Performed by: PHYSICAL THERAPY ASSISTANT

## 2023-09-13 PROCEDURE — 25000003 PHARM REV CODE 250: Performed by: PHYSICAL THERAPY ASSISTANT

## 2023-09-13 PROCEDURE — 96366 THER/PROPH/DIAG IV INF ADDON: CPT

## 2023-09-13 PROCEDURE — 25000003 PHARM REV CODE 250: Performed by: STUDENT IN AN ORGANIZED HEALTH CARE EDUCATION/TRAINING PROGRAM

## 2023-09-13 PROCEDURE — 94760 N-INVAS EAR/PLS OXIMETRY 1: CPT

## 2023-09-13 PROCEDURE — 97161 PT EVAL LOW COMPLEX 20 MIN: CPT

## 2023-09-13 PROCEDURE — 25000242 PHARM REV CODE 250 ALT 637 W/ HCPCS: Performed by: PHYSICAL THERAPY ASSISTANT

## 2023-09-13 PROCEDURE — 27000221 HC OXYGEN, UP TO 24 HOURS

## 2023-09-13 PROCEDURE — 25000003 PHARM REV CODE 250: Performed by: NURSE PRACTITIONER

## 2023-09-13 PROCEDURE — 21000000 HC CCU ICU ROOM CHARGE

## 2023-09-13 PROCEDURE — 63600175 PHARM REV CODE 636 W HCPCS: Performed by: HOSPITALIST

## 2023-09-13 PROCEDURE — 63600175 PHARM REV CODE 636 W HCPCS: Performed by: NURSE PRACTITIONER

## 2023-09-13 PROCEDURE — 99900031 HC PATIENT EDUCATION (STAT)

## 2023-09-13 PROCEDURE — 96376 TX/PRO/DX INJ SAME DRUG ADON: CPT

## 2023-09-13 PROCEDURE — 25000003 PHARM REV CODE 250: Performed by: HOSPITALIST

## 2023-09-13 PROCEDURE — 82962 GLUCOSE BLOOD TEST: CPT

## 2023-09-13 PROCEDURE — 63600175 PHARM REV CODE 636 W HCPCS: Performed by: PHYSICAL THERAPY ASSISTANT

## 2023-09-13 PROCEDURE — 36415 COLL VENOUS BLD VENIPUNCTURE: CPT | Performed by: PHYSICAL THERAPY ASSISTANT

## 2023-09-13 PROCEDURE — 85025 COMPLETE CBC W/AUTO DIFF WBC: CPT | Performed by: PHYSICAL THERAPY ASSISTANT

## 2023-09-13 PROCEDURE — 97166 OT EVAL MOD COMPLEX 45 MIN: CPT

## 2023-09-13 PROCEDURE — 99900035 HC TECH TIME PER 15 MIN (STAT)

## 2023-09-13 PROCEDURE — 97530 THERAPEUTIC ACTIVITIES: CPT

## 2023-09-13 PROCEDURE — 94640 AIRWAY INHALATION TREATMENT: CPT

## 2023-09-13 RX ORDER — ENOXAPARIN SODIUM 100 MG/ML
1 INJECTION SUBCUTANEOUS EVERY 12 HOURS
Status: DISCONTINUED | OUTPATIENT
Start: 2023-09-13 | End: 2023-09-15 | Stop reason: HOSPADM

## 2023-09-13 RX ORDER — DILTIAZEM HYDROCHLORIDE 120 MG/1
120 CAPSULE, COATED, EXTENDED RELEASE ORAL DAILY
Status: DISCONTINUED | OUTPATIENT
Start: 2023-09-13 | End: 2023-09-15 | Stop reason: HOSPADM

## 2023-09-13 RX ADMIN — CARVEDILOL 6.25 MG: 6.25 TABLET, FILM COATED ORAL at 09:09

## 2023-09-13 RX ADMIN — SODIUM CHLORIDE 125 MG: 9 INJECTION, SOLUTION INTRAVENOUS at 09:09

## 2023-09-13 RX ADMIN — BUDESONIDE INHALATION 0.5 MG: 0.5 SUSPENSION RESPIRATORY (INHALATION) at 08:09

## 2023-09-13 RX ADMIN — BUDESONIDE INHALATION 0.5 MG: 0.5 SUSPENSION RESPIRATORY (INHALATION) at 07:09

## 2023-09-13 RX ADMIN — FUROSEMIDE 40 MG: 10 INJECTION, SOLUTION INTRAMUSCULAR; INTRAVENOUS at 09:09

## 2023-09-13 RX ADMIN — DILTIAZEM HYDROCHLORIDE 5 MG/HR: 100 INJECTION, POWDER, LYOPHILIZED, FOR SOLUTION INTRAVENOUS at 02:09

## 2023-09-13 RX ADMIN — PANTOPRAZOLE SODIUM 40 MG: 40 TABLET, DELAYED RELEASE ORAL at 05:09

## 2023-09-13 RX ADMIN — CARVEDILOL 6.25 MG: 6.25 TABLET, FILM COATED ORAL at 08:09

## 2023-09-13 RX ADMIN — DONEPEZIL HYDROCHLORIDE 5 MG: 5 TABLET ORAL at 08:09

## 2023-09-13 RX ADMIN — ARFORMOTEROL TARTRATE 15 MCG: 15 SOLUTION RESPIRATORY (INHALATION) at 08:09

## 2023-09-13 RX ADMIN — DILTIAZEM HYDROCHLORIDE 120 MG: 120 CAPSULE, COATED, EXTENDED RELEASE ORAL at 10:09

## 2023-09-13 RX ADMIN — HYDROCODONE BITARTRATE AND ACETAMINOPHEN 1 TABLET: 5; 325 TABLET ORAL at 05:09

## 2023-09-13 RX ADMIN — POTASSIUM BICARBONATE 50 MEQ: 977.5 TABLET, EFFERVESCENT ORAL at 12:09

## 2023-09-13 RX ADMIN — Medication 6 MG: at 08:09

## 2023-09-13 RX ADMIN — OXYBUTYNIN CHLORIDE 5 MG: 5 TABLET ORAL at 08:09

## 2023-09-13 RX ADMIN — FUROSEMIDE 40 MG: 10 INJECTION, SOLUTION INTRAMUSCULAR; INTRAVENOUS at 08:09

## 2023-09-13 RX ADMIN — ENOXAPARIN SODIUM 100 MG: 100 INJECTION SUBCUTANEOUS at 08:09

## 2023-09-13 RX ADMIN — OXYBUTYNIN CHLORIDE 5 MG: 5 TABLET ORAL at 09:09

## 2023-09-13 RX ADMIN — ATORVASTATIN CALCIUM 20 MG: 20 TABLET, FILM COATED ORAL at 08:09

## 2023-09-13 RX ADMIN — FERROUS SULFATE TAB 325 MG (65 MG ELEMENTAL FE) 1 EACH: 325 (65 FE) TAB at 09:09

## 2023-09-13 RX ADMIN — Medication 800 MG: at 12:09

## 2023-09-13 RX ADMIN — CITALOPRAM HYDROBROMIDE 40 MG: 20 TABLET ORAL at 09:09

## 2023-09-13 RX ADMIN — ARFORMOTEROL TARTRATE 15 MCG: 15 SOLUTION RESPIRATORY (INHALATION) at 07:09

## 2023-09-13 NOTE — CARE UPDATE
09/13/23 0730   Patient Assessment/Suction   Level of Consciousness (AVPU) alert   Respiratory Effort Unlabored   Expansion/Accessory Muscles/Retractions no use of accessory muscles   All Lung Fields Breath Sounds coarse   Rhythm/Pattern, Respiratory unlabored   Cough Frequency no cough   Skin Integrity   $ Wound Care Tech Time 15 min   Area Observed Left;Right;Behind ear   Skin Appearance without discoloration   PRE-TX-O2   Device (Oxygen Therapy) nasal cannula   $ Is the patient on Low Flow Oxygen? Yes   Flow (L/min) 3   SpO2 97 %   Pulse Oximetry Type Intermittent   $ Pulse Oximetry - Single Charge Pulse Oximetry - Single   Pulse (!) 121   Resp 20   Aerosol Therapy   $ Aerosol Therapy Charges Aerosol Treatment   Daily Review of Necessity (SVN) completed   Respiratory Treatment Status (SVN) given   Treatment Route (SVN) mask   Patient Position (SVN) semi-Marin's   Post Treatment Assessment (SVN) breath sounds unchanged   Signs of Intolerance (SVN) none   Education   $ Education Bronchodilator;30 min

## 2023-09-13 NOTE — SUBJECTIVE & OBJECTIVE
Interval History: patient seen and examined and upset that she is NPO for possible procedure; she c/o food when given and not wanting to get up - family would like PT eval     Review of Systems   Constitutional:  Positive for activity change and fatigue.   Respiratory:  Positive for cough and shortness of breath.    Cardiovascular: Negative.    Gastrointestinal: Negative.    Genitourinary:  Positive for urgency. Negative for dysuria.   Musculoskeletal:  Positive for arthralgias, back pain and gait problem.   Skin: Negative.    Neurological:  Positive for weakness.   Psychiatric/Behavioral:  Positive for agitation and behavioral problems. The patient is nervous/anxious.      Objective:     Vital Signs (Most Recent):  Temp: 98 °F (36.7 °C) (09/13/23 0800)  Pulse: 109 (09/13/23 0800)  Resp: 20 (09/13/23 0800)  BP: 94/71 (09/13/23 0800)  SpO2: 95 % (09/13/23 0800) Vital Signs (24h Range):  Temp:  [97.7 °F (36.5 °C)-98.2 °F (36.8 °C)] 98 °F (36.7 °C)  Pulse:  [] 109  Resp:  [18-20] 20  SpO2:  [93 %-98 %] 95 %  BP: ()/(53-87) 94/71     Weight: 96.3 kg (212 lb 4.9 oz)  Body mass index is 36.44 kg/m².    Intake/Output Summary (Last 24 hours) at 9/13/2023 0915  Last data filed at 9/13/2023 0449  Gross per 24 hour   Intake 850 ml   Output 1302 ml   Net -452 ml         Physical Exam  Constitutional:       General: She is in acute distress.   HENT:      Head: Normocephalic and atraumatic.      Mouth/Throat:      Mouth: Mucous membranes are dry.      Pharynx: Oropharynx is clear.   Eyes:      Extraocular Movements: Extraocular movements intact.      Conjunctiva/sclera: Conjunctivae normal.   Cardiovascular:      Rate and Rhythm: Regular rhythm. Tachycardia present.      Pulses: Normal pulses.   Pulmonary:      Effort: Respiratory distress present.      Breath sounds: Rales present. No rhonchi.   Abdominal:      General: There is no distension.      Tenderness: There is no abdominal tenderness. There is no guarding.    Musculoskeletal:         General: Tenderness present. No swelling, deformity or signs of injury.      Cervical back: Normal range of motion and neck supple.   Skin:     General: Skin is warm and dry.      Capillary Refill: Capillary refill takes 2 to 3 seconds.   Neurological:      Mental Status: She is alert and oriented to person, place, and time. Mental status is at baseline.   Psychiatric:      Comments: Yelling at family in the room, argumentative              Significant Labs: All pertinent labs within the past 24 hours have been reviewed.  Recent Lab Results  (Last 5 results in the past 24 hours)        09/13/23  0806   09/13/23  0442   09/12/23  2028   09/12/23  1618   09/12/23  1443        Occult Blood         Positive       POC Glucose 192   133   169   147                                Significant Imaging: I have reviewed all pertinent imaging results/findings within the past 24 hours.

## 2023-09-13 NOTE — PLAN OF CARE
09/12/23 2035   Patient Assessment/Suction   Level of Consciousness (AVPU) alert   Respiratory Effort Normal;Unlabored   All Lung Fields Breath Sounds crackles;diminished   Cough Type nonproductive   PRE-TX-O2   Device (Oxygen Therapy) nasal cannula   $ Is the patient on Low Flow Oxygen? Yes   Flow (L/min) 2   SpO2 95 %   Pulse Oximetry Type Intermittent   $ Pulse Oximetry - Multiple Charge Pulse Oximetry - Multiple   Pulse (!) 117   Resp 19   Aerosol Therapy   $ Aerosol Therapy Charges Aerosol Treatment   Daily Review of Necessity (SVN) completed   Respiratory Treatment Status (SVN) given   Treatment Route (SVN) mask   Patient Position (SVN) HOB elevated   Post Treatment Assessment (SVN) breath sounds unchanged   Signs of Intolerance (SVN) none   Breath Sounds Post-Respiratory Treatment   Throughout All Fields Post-Treatment All Fields   Post-treatment Heart Rate (beats/min) 111   Post-treatment Resp Rate (breaths/min) 19   Respiratory Evaluation   $ Care Plan Tech Time 15 min   $ Eval/Re-eval Charges Re-evaluation

## 2023-09-13 NOTE — NURSING
Received pt from [facility]20, Nurse and extender transported pt with monitors, o2, chart, and all belongings. Pt oriented to room, call light in reach, bed in lowest position, and bed wheels locked. Pt in afib rvr; unable to use pt IV. No access. RN attempted x2. Mekhi contacted. ISAURO.

## 2023-09-13 NOTE — HOSPITAL COURSE
Daryleen Moran is a 78 year old female with a past medical history of HFpEF, Afib, TAVR, DM, anemia, HTN, HLD, CAD, GERD and MDD/ANJALI who presented with shortness of breath secondary to an acute on chronic CHF exacerbation and anemia. She underwent EGD and colonoscopy with showed angioectasias at the stomach and duodenum (bleeding) per GI. Her Hgb has remained stable and home DAPT has been restarted. Her course has been complicated by Afib with RVR for which she was started on Lovenox and a diltiazem infusion (also remained on PO Coreg). She has refused the diltiazem infusion and is now on PO dosing. Cardiology has been consulted. She remains on IV Lasix. PT/OT has been consulted and recommends  PT/OT.

## 2023-09-13 NOTE — NURSING
Nurses Note -- 4 Eyes      9/13/2023   1:53 PM      Skin assessed during: Transfer      [] No Altered Skin Integrity Present    []Prevention Measures Documented      [x] Yes- Altered Skin Integrity Present or Discovered   [x] LDA Added if Not in Epic (Describe Wound)   [x] New Altered Skin Integrity was Present on Admit and Documented in LDA (present on admit to ICU Stepdown)   [x] Wound Image Taken    Wound Care Consulted? No    Attending Nurse:  Katina MARLEY    Second RN/Staff Member:  AYAKA Colin

## 2023-09-13 NOTE — PT/OT/SLP EVAL
Occupational Therapy   Evaluation    Name: Daryleen G Moran  MRN: [id number]  Admitting Diagnosis: Acute on chronic diastolic CHF (congestive heart failure)  Recent Surgery: * No surgery found *      Recommendations:     Discharge Recommendations: home health OT  Discharge Equipment Recommendations:  none  Barriers to discharge:  None    Assessment:     Daryleen G Moran is a 78 y.o. female with a medical diagnosis of Acute on chronic diastolic CHF (congestive heart failure).  Performance deficits affecting function: weakness, impaired endurance, impaired self care skills, impaired functional mobility, gait instability, impaired balance, impaired cardiopulmonary response to activity.      Rehab Prognosis: Good; patient would benefit from acute skilled OT services to address these deficits and reach maximum level of function.       Plan:     Patient to be seen 5 x/week to address the above listed problems via self-care/home management, therapeutic activities, therapeutic exercises  Plan of Care Expires: 10/13/23  Plan of Care Reviewed with: patient    Subjective     Chief Complaint: SOB with activity  Patient/Family Comments/goals: increased activity tolerance; to have endurance for meal prep    Occupational Profile:  Living Environment: Lives with her spouse; 1SH no steps; walk-in shower with seat   Previous level of function: Mod (I) with rollator for household distances on 2L 02; drives; does grocery   Equipment Used at Home: rollator, shower chair  Assistance upon Discharge: spouse    Pain/Comfort:  Pain Rating 1: 0/10  Pain Rating Post-Intervention 1: 0/10    Objective:     Communicated with: nurse prior to session.  Patient found up in chair with oxygen, telemetry upon OT entry to room.    General Precautions: Standard, fall, respiratory  Respiratory Status: Nasal cannula, flow 3.5 L/min    Occupational Performance:    Functional Mobility/Transfers:  Patient completed Sit <> Stand Transfer with stand by  assistance  with  rolling walker   Functional Mobility: CGA with RW ~15ft in room; cues provided by OT for pursed lip breathing; sp02 96%  following activity    Activities of Daily Living:  Lower Body Dressing: minimum assistance to don brief    Cognitive/Visual Perceptual:  Cognitive/Psychosocial Skills:     -       Oriented to: Person, Place, Time, and Situation   -       Follows Commands/attention:Follows multistep  commands    Physical Exam:  Upper Extremity Range of Motion:     -       Right Upper Extremity: WFL  -       Left Upper Extremity: WFL  Upper Extremity Strength:    -       Right Upper Extremity: WFL  -       Left Upper Extremity: WFL   Strength:    -       Right Upper Extremity: WFL  -       Left Upper Extremity: WFL  Fine Motor Coordination:    -       Intact  Gross motor coordination:   WFL    AMPAC 6 Click ADL:  AMPAC Total Score: 21    Treatment & Education:  ,Therapist provided facilitation and instruction of proper body mechanics and fall prevention strategies during tasks listed above.   Instructed patient to sit in bedside chair as tolerated daily to increase OOB/activity tolerance.   Instructed patient to use call light to have nursing staff assist with needs/transfers.   Discussed OT POC and answered all questions within OT scope of practice.  Whiteboard updated    Patient left up in chair with all lines intact, call button in reach, and chair alarm on    GOALS:   Multidisciplinary Problems       Occupational Therapy Goals          Problem: Occupational Therapy    Goal Priority Disciplines Outcome Interventions   Occupational Therapy Goal     OT, PT/OT     Description: Goals to be met by: 10/13/23     Patient will increase functional independence with ADLs by performing:    UE Dressing with Modified [place].  LE Dressing with Modified [place].  Grooming while standing at sink with Modified [place].  Toileting from toilet with Modified [place] for hygiene and  clothing management.   Toilet transfer to toilet with Modified [place].                         History:     Past Medical History:   Diagnosis Date    Anesthesia complication     BLADDER DYSFUNCTION    Aortic stenosis     Arthritis     Back pain     Diabetes mellitus type II     NO LONGER DIABETIC    Encounter for blood transfusion     Hyperlipidemia     Hypertension     NSTEMI (non-ST elevated myocardial infarction) 05/01/2022    Osteoporosis     Wears glasses          Past Surgical History:   Procedure Laterality Date     vocal cord nodules removed  long time ago     twice    ADRENAL TUMOR      APPENDECTOMY  within last 5yrs    CATHETERIZATION OF BOTH LEFT AND RIGHT HEART Left 05/06/2022    Procedure: CATHETERIZATION, HEART, BOTH LEFT AND RIGHT;  Surgeon: Michleet Vargas MD;  Location: [facility] CATH/EP LAB;  Service: Cardiology;  Laterality: Left;    COLONOSCOPY N/A 6/23/2023    Procedure: COLONOSCOPY;  Surgeon: Joel Neal III, MD;  Location: [facility] ENDO;  Service: Endoscopy;  Laterality: N/A;    FIXATION KYPHOPLASTY THORACIC SPINE      8-20-13    FOOT SURGERY      left 2nd toe was too long     HERNIA REPAIR  within last 5yrs    INSERTION OF TEMPORARY PACEMAKER N/A 1/4/2023    Procedure: INSERTION, PACEMAKER, TEMPORARY;  Surgeon: Bhavesh Peña MD;  Location: [facility] CATH;  Service: Cardiology;  Laterality: N/A;    LEFT HEART CATHETERIZATION Left 05/02/2022    Procedure: Left heart cath;  Surgeon: Jose Echols MD;  Location: [facility] CATH/EP LAB;  Service: Cardiology;  Laterality: Left;    SMALL BOWEL ENTEROSCOPY N/A 6/22/2023    Procedure: ENTEROSCOPY;  Surgeon: Cornell Aguirre MD;  Location: [facility] ENDO;  Service: Endoscopy;  Laterality: N/A;    TONSILLECTOMY, ADENOIDECTOMY  long time ago    TRANSCATHETER AORTIC VALVE REPLACEMENT (TAVR) Bilateral 1/4/2023    Procedure: REPLACEMENT, AORTIC VALVE, TRANSCATHETER (TAVR);  Surgeon: Bhavesh Peña MD;  Location: [facility] CATH;  Service: Cardiology;  Laterality:  Bilateral;    TRANSCATHETER AORTIC VALVE REPLACEMENT (TAVR) Bilateral 1/4/2023    Procedure: REPLACEMENT, AORTIC VALVE, TRANSCATHETER (TAVR);  Surgeon: Dallin Verma MD;  Location: [facility] CATH;  Service: Cardiology;  Laterality: Bilateral;    TRANSTHORACIC ECHOCARDIOGRAPHY (TTE)  1/4/2023    Procedure: ECHOCARDIOGRAM, TRANSTHORACIC;  Surgeon: Bhavesh Peña MD;  Location: [facility];  Service: Cardiology;;       Time Tracking:     OT Date of Treatment: 09/13/23  OT Start Time: 1020  OT Stop Time: 1040  OT Total Time (min): 20 min    Billable Minutes:Evaluation 10  Therapeutic Activity 10    9/13/2023

## 2023-09-13 NOTE — ASSESSMENT & PLAN NOTE
Hold ASA due to worsening anemia   Iron studies pending   Occult blood positive , CBC trending   GI consulted due to history of GI bleed and worsening anemia   - no indication to scope at this time   -needs pill study outpatient

## 2023-09-13 NOTE — PLAN OF CARE
Secure chat sent to therapy regarding discharge recommendations     Per GI NP, OP capsule study referral placed - patient to follow up with GI OP for capsule study       09/13/23 4908   Discharge Reassessment   Assessment Type Discharge Planning Reassessment   Did the patient's condition or plan change since previous assessment? Yes   Discharge Plan discussed with: Patient   Communicated ISAAK with patient/caregiver Yes

## 2023-09-13 NOTE — PT/OT/SLP EVAL
Physical Therapy Evaluation    Patient Name:  Daryleen G Moran   MRN:  [id number]    Recommendations:     Discharge Recommendations: home health PT   Discharge Equipment Recommendations: none   Barriers to discharge:  high fall risk, decrease activity tolerance, Elevated HR with limited mobility,     Assessment:     Daryleen G Moran is a 78 y.o. female admitted with a medical diagnosis of Acute on chronic diastolic CHF (congestive heart failure).  She presents with the following impairments/functional limitations: weakness, impaired endurance, impaired self care skills, impaired functional mobility, gait instability, impaired balance, decreased lower extremity function, impaired cardiopulmonary response to activity, decreased safety awareness.    Pt found in bed with HOB elevated. Pt foun don 3L O2 via NC. Pt agreeable to visit. Pt requries CGA for all mobility. Patient ambulated 40 ft with RW and CGA. Session limited due to  bpm during ambulation. Pt left in care of her nurse at end of session up in chair.     Rehab Prognosis: Fair; patient would benefit from acute skilled PT services to address these deficits and reach maximum level of function.    Recent Surgery: * No surgery found *      Plan:     During this hospitalization, patient to be seen 5 x/week to address the identified rehab impairments via gait training, therapeutic activities, therapeutic exercises, neuromuscular re-education and progress toward the following goals:    Plan of Care Expires:  10/13/23    Subjective     Chief Complaint: eager to mobilize  Patient/Family Comments/goals: return home with   Pain/Comfort:  Pain Rating 1: 0/10    Patients cultural, spiritual, Roman [employer] conflicts given the current situation: no    Living Environment:  Pt reports that she was staying with her daughter but anticipates discharging home with spouse in a one story home with no ANIL. (-)  2L O2 via NC at home.   Prior to admission, patients level  of function was MI RW/wheelchair.  Equipment used at home: wheelchair, walker, rolling, cane, straight.  DME owned (not currently used): none.  Upon discharge, patient will have assistance from family.    Objective:     Communicated with RN prior to session.  Patient found HOB elevated with peripheral IV, telemetry  upon PT entry to room.    General Precautions: Standard, fall  Orthopedic Precautions:N/A   Braces: N/A  Respiratory Status: Nasal cannula, flow 3 L/min    Exams:  Cognitive Exam:  Patient is oriented to Person, Place, Time, and Situation  RLE ROM: WFL  RLE Strength: WFL  LLE ROM: WFL  LLE Strength: WFL    Functional Mobility:  Bed Mobility:     Supine to Sit: supervision  Transfers:     Sit to Stand:  contact guard assistance with rolling walker  Gait: 40 ft with RW and CGA      AM-PAC 6 CLICK MOBILITY  Total Score:17       Treatment & Education:  Pt educated on POC, discharge recommendation, importance of time OOB, effect of HR on safe mobility, need for assist with mobility, use of call bell to seek assistance as needed and fall prevention      Patient left up in chair with all lines intact, call button in reach, and RN present.    GOALS:   Multidisciplinary Problems       Physical Therapy Goals          Problem: Physical Therapy    Goal Priority Disciplines Outcome Goal Variances Interventions   Physical Therapy Goal     PT, PT/OT Ongoing, Progressing     Description: Goals to be met by: 10/13/23     Patient will increase functional independence with mobility by performin. Supine to sit with Supervision  2. Sit to stand transfer with Supervision  3. Bed to chair transfer with Supervision using Rolling Walker  4. Gait  x 100 feet with Supervision using Rolling Walker.                              History:     Past Medical History:   Diagnosis Date    Anesthesia complication     BLADDER DYSFUNCTION    Aortic stenosis     Arthritis     Back pain     Diabetes mellitus type II     NO LONGER  DIABETIC    Encounter for blood transfusion     Hyperlipidemia     Hypertension     NSTEMI (non-ST elevated myocardial infarction) 05/01/2022    Osteoporosis     Wears glasses        Past Surgical History:   Procedure Laterality Date     vocal cord nodules removed  long time ago     twice    ADRENAL TUMOR      APPENDECTOMY  within last 5yrs    CATHETERIZATION OF BOTH LEFT AND RIGHT HEART Left 05/06/2022    Procedure: CATHETERIZATION, HEART, BOTH LEFT AND RIGHT;  Surgeon: Michelet Vargas MD;  Location: [facility] CATH/EP LAB;  Service: Cardiology;  Laterality: Left;    COLONOSCOPY N/A 6/23/2023    Procedure: COLONOSCOPY;  Surgeon: Joel Neal III, MD;  Location: [facility] ENDO;  Service: Endoscopy;  Laterality: N/A;    FIXATION KYPHOPLASTY THORACIC SPINE      8-20-13    FOOT SURGERY      left 2nd toe was too long     HERNIA REPAIR  within last 5yrs    INSERTION OF TEMPORARY PACEMAKER N/A 1/4/2023    Procedure: INSERTION, PACEMAKER, TEMPORARY;  Surgeon: Bhavesh Peña MD;  Location: [facility] CATH;  Service: Cardiology;  Laterality: N/A;    LEFT HEART CATHETERIZATION Left 05/02/2022    Procedure: Left heart cath;  Surgeon: Jose Echols MD;  Location: [facility] CATH/EP LAB;  Service: Cardiology;  Laterality: Left;    SMALL BOWEL ENTEROSCOPY N/A 6/22/2023    Procedure: ENTEROSCOPY;  Surgeon: Cornell Aguirre MD;  Location: [facility] ENDO;  Service: Endoscopy;  Laterality: N/A;    TONSILLECTOMY, ADENOIDECTOMY  long time ago    TRANSCATHETER AORTIC VALVE REPLACEMENT (TAVR) Bilateral 1/4/2023    Procedure: REPLACEMENT, AORTIC VALVE, TRANSCATHETER (TAVR);  Surgeon: Bhavesh Peña MD;  Location: [facility] CATH;  Service: Cardiology;  Laterality: Bilateral;    TRANSCATHETER AORTIC VALVE REPLACEMENT (TAVR) Bilateral 1/4/2023    Procedure: REPLACEMENT, AORTIC VALVE, TRANSCATHETER (TAVR);  Surgeon: Dallin Verma MD;  Location: [facility] CATH;  Service: Cardiology;  Laterality: Bilateral;    TRANSTHORACIC ECHOCARDIOGRAPHY (TTE)  1/4/2023     Procedure: ECHOCARDIOGRAM, TRANSTHORACIC;  Surgeon: Bhavesh Peña MD;  Location: [facility] CATH;  Service: Cardiology;;       Time Tracking:     PT Received On: 09/13/23  PT Start Time: 0934     PT Stop Time: 0949  PT Total Time (min): 15 min     Billable Minutes: Evaluation 7 and Gait Training 8      09/13/2023

## 2023-09-13 NOTE — PROGRESS NOTES
[facility] Medicine  Progress Note    Patient Name: Daryleen G Moran  MRN: [id number]  Patient Class: OP- Observation   Admission Date: 9/11/2023  Length of Stay: 0 days  Attending Physician: Deedee Rodriguez MD  Primary Care Provider: Abhi De Oliveira IV, MD        Subjective:     Principal Problem:Acute on chronic diastolic CHF (congestive heart failure)        HPI:  Patient is a 78-year-old female with past medical history of shortness of breath, anemia, CHF, valvular heart disease, diabetes mellitus type 2, hypertension, AFib, CAD.  Patient presents to the ED today with complaints of shortness of breath worsening over the last 2 days.  Patient was just recently discharged from hospital due to CHF exacerbation and similar symptoms.  At time of discharge, patient states she had no shortness a breath symptoms and felt like she was able to go home.  Patient states since she is been home over the last 2 days shortness of breath continue to come back and she feels the same as she did the previous admission.  Patient had transfusion on 8th, since this transfusion her hemoglobin has continued to decrease.  Hemoglobin 7.4 today, occult stool pending.  Patient was seen by GI in June for small GI bleed which was treated with ablation.  We will consult GI for re-evaluation due to symptomatic anemia.  , troponin 35.6 and 37.1.  Patient hypotensive when she arrived to the ED. patient's last echo January 2023, new echo pending.  Patient's EKG shows AFib continued from last EKG on 9/8/2023, patient is on Plavix and aspirin.  Patient was given Protonix and Lasix in ED. Patient denies nausea, vomiting, visible blood in stools, no extremity edema, or chest pain.  Code status was discussed, patient placed full code.      Overview/Hospital Course:  Patient admitted with SOB and anemia. She underwent EGD and colonoscopy 6/2023 and had no reported evidence of acute GIB. She needs to complete a pill  endoscopy study outpatietn which was explained previously to her and she has been noncompliant with. The patient is very argumentative with staff and family when she does not get her way or hears something she doesn't like. She is non-compliant with diet and wanting to get up to use restroom. She rather void on a towel in the bed. Explained that to go home she will need PT eval and discussed prior cardiac diet. She dismissed all recommendations. Plan for discharge home tomorrow. Await PT recs.      Interval History: patient seen and examined and upset that she is NPO for possible procedure; she c/o food when given and not wanting to get up - family would like PT eval     Review of Systems   Constitutional:  Positive for activity change and fatigue.   Respiratory:  Positive for cough and shortness of breath.    Cardiovascular: Negative.    Gastrointestinal: Negative.    Genitourinary:  Positive for urgency. Negative for dysuria.   Musculoskeletal:  Positive for arthralgias, back pain and gait problem.   Skin: Negative.    Neurological:  Positive for weakness.   Psychiatric/Behavioral:  Positive for agitation and behavioral problems. The patient is nervous/anxious.      Objective:     Vital Signs (Most Recent):  Temp: 98 °F (36.7 °C) (09/13/23 0800)  Pulse: 109 (09/13/23 0800)  Resp: 20 (09/13/23 0800)  BP: 94/71 (09/13/23 0800)  SpO2: 95 % (09/13/23 0800) Vital Signs (24h Range):  Temp:  [97.7 °F (36.5 °C)-98.2 °F (36.8 °C)] 98 °F (36.7 °C)  Pulse:  [] 109  Resp:  [18-20] 20  SpO2:  [93 %-98 %] 95 %  BP: ()/(53-87) 94/71     Weight: 96.3 kg (212 lb 4.9 oz)  Body mass index is 36.44 kg/m².    Intake/Output Summary (Last 24 hours) at 9/13/2023 0982  Last data filed at 9/13/2023 0449  Gross per 24 hour   Intake 850 ml   Output 1302 ml   Net -452 ml         Physical Exam  Constitutional:       General: She is in acute distress.   HENT:      Head: Normocephalic and atraumatic.      Mouth/Throat:      Mouth:  Mucous membranes are dry.      Pharynx: Oropharynx is clear.   Eyes:      Extraocular Movements: Extraocular movements intact.      Conjunctiva/sclera: Conjunctivae normal.   Cardiovascular:      Rate and Rhythm: Regular rhythm. Tachycardia present.      Pulses: Normal pulses.   Pulmonary:      Effort: Respiratory distress present.      Breath sounds: Rales present. No rhonchi.   Abdominal:      General: There is no distension.      Tenderness: There is no abdominal tenderness. There is no guarding.   Musculoskeletal:         General: Tenderness present. No swelling, deformity or signs of injury.      Cervical back: Normal range of motion and neck supple.   Skin:     General: Skin is warm and dry.      Capillary Refill: Capillary refill takes 2 to 3 seconds.   Neurological:      Mental Status: She is alert and oriented to person, place, and time. Mental status is at baseline.   Psychiatric:      Comments: Yelling at family in the room, argumentative              Significant Labs: All pertinent labs within the past 24 hours have been reviewed.  Recent Lab Results  (Last 5 results in the past 24 hours)        09/13/23  0806   09/13/23  0442   09/12/23  2028   09/12/23  1618   09/12/23  1443        Occult Blood         Positive       POC Glucose 192   133   169   147                                Significant Imaging: I have reviewed all pertinent imaging results/findings within the past 24 hours.      Assessment/Plan:      * Acute on chronic diastolic CHF (congestive heart failure)    supplemental oxygen per home setting 2L  Lasix  strict I/Os, daily weights, 1.5L fluid restrictions / cardiac diet  echo    CAD (coronary artery disease)  Continue plavix        Paroxysmal atrial fibrillation  Continue coreg   TSH pending  Telemetry observation  Trend troponin     Iron deficiency anemia due to chronic blood loss  Hold ASA due to worsening anemia   Iron studies pending   Occult blood positive , CBC trending   GI  consulted due to history of GI bleed and worsening anemia   - no indication to scope at this time   -needs pill study outpatient     COPD (chronic obstructive pulmonary disease)  2L O2 to keep saturation 88-94%  Continue to monitor vitals       Hyperlipidemia  Continue statin      GERD (gastroesophageal reflux disease)  Protonix 40mg BID      Hypertension  Hydralazine PRN for BP >160  Continue home meds as tolerated      Type 2 diabetes mellitus, without long-term current use of insulin  Insulin sliding scale   POCT glucose         VTE Risk Mitigation (From admission, onward)           Ordered     IP VTE HIGH RISK PATIENT  Once         09/11/23 2300     Place sequential compression device  Until discontinued         09/11/23 2300     Reason for No Pharmacological VTE Prophylaxis  Once        Question:  Reasons:  Answer:  Risk of Bleeding    09/11/23 2300                    Discharge Planning   ISAAK: 9/13/2023     Code Status: Full Code   Is the patient medically ready for discharge?:     Reason for patient still in hospital (select all that apply): Patient trending condition and PT / OT recommendations  Discharge Plan A: Home with family                  Deedee Rodriguez MD  Department of Hospital Medicine   [facility]

## 2023-09-14 PROBLEM — E66.9 OBESITY (BMI 30-39.9): Status: ACTIVE | Noted: 2023-09-14

## 2023-09-14 LAB
ALBUMIN SERPL BCP-MCNC: 3.5 G/DL (ref 3.5–5.2)
ALP SERPL-CCNC: 67 U/L (ref 55–135)
ALT SERPL W/O P-5'-P-CCNC: 32 U/L (ref 10–44)
ANION GAP SERPL CALC-SCNC: 6 MMOL/L (ref 8–16)
AST SERPL-CCNC: 29 U/L (ref 10–40)
BASOPHILS # BLD AUTO: 0.07 K/UL (ref 0–0.2)
BASOPHILS NFR BLD: 0.7 % (ref 0–1.9)
BILIRUB SERPL-MCNC: 1.1 MG/DL (ref 0.1–1)
BNP SERPL-MCNC: 164 PG/ML (ref 0–99)
BUN SERPL-MCNC: 21 MG/DL (ref 8–23)
CALCIUM SERPL-MCNC: 9.1 MG/DL (ref 8.7–10.5)
CHLORIDE SERPL-SCNC: 90 MMOL/L (ref 95–110)
CO2 SERPL-SCNC: 37 MMOL/L (ref 23–29)
CREAT SERPL-MCNC: 0.6 MG/DL (ref 0.5–1.4)
DIFFERENTIAL METHOD: ABNORMAL
EOSINOPHIL # BLD AUTO: 0.3 K/UL (ref 0–0.5)
EOSINOPHIL NFR BLD: 3.1 % (ref 0–8)
ERYTHROCYTE [DISTWIDTH] IN BLOOD BY AUTOMATED COUNT: 22.5 % (ref 11.5–14.5)
EST. GFR  (NO RACE VARIABLE): >60 ML/MIN/1.73 M^2
GLUCOSE SERPL-MCNC: 129 MG/DL (ref 70–110)
GLUCOSE SERPL-MCNC: 152 MG/DL (ref 70–110)
GLUCOSE SERPL-MCNC: 156 MG/DL (ref 70–110)
GLUCOSE SERPL-MCNC: 173 MG/DL (ref 70–110)
GLUCOSE SERPL-MCNC: 175 MG/DL (ref 70–110)
HCT VFR BLD AUTO: 31.5 % (ref 37–48.5)
HGB BLD-MCNC: 8.8 G/DL (ref 12–16)
IMM GRANULOCYTES # BLD AUTO: 0.05 K/UL (ref 0–0.04)
IMM GRANULOCYTES NFR BLD AUTO: 0.5 % (ref 0–0.5)
LYMPHOCYTES # BLD AUTO: 1.5 K/UL (ref 1–4.8)
LYMPHOCYTES NFR BLD: 15.4 % (ref 18–48)
MAGNESIUM SERPL-MCNC: 1.8 MG/DL (ref 1.6–2.6)
MCH RBC QN AUTO: 22.8 PG (ref 27–31)
MCHC RBC AUTO-ENTMCNC: 27.9 G/DL (ref 32–36)
MCV RBC AUTO: 82 FL (ref 82–98)
MONOCYTES # BLD AUTO: 0.9 K/UL (ref 0.3–1)
MONOCYTES NFR BLD: 9 % (ref 4–15)
NEUTROPHILS # BLD AUTO: 6.8 K/UL (ref 1.8–7.7)
NEUTROPHILS NFR BLD: 71.3 % (ref 38–73)
NRBC BLD-RTO: 0 /100 WBC
PLATELET # BLD AUTO: 215 K/UL (ref 150–450)
PMV BLD AUTO: 11.6 FL (ref 9.2–12.9)
POTASSIUM SERPL-SCNC: 4.2 MMOL/L (ref 3.5–5.1)
PROT SERPL-MCNC: 6.8 G/DL (ref 6–8.4)
RBC # BLD AUTO: 3.86 M/UL (ref 4–5.4)
SODIUM SERPL-SCNC: 133 MMOL/L (ref 136–145)
WBC # BLD AUTO: 9.53 K/UL (ref 3.9–12.7)

## 2023-09-14 PROCEDURE — 25000003 PHARM REV CODE 250: Performed by: STUDENT IN AN ORGANIZED HEALTH CARE EDUCATION/TRAINING PROGRAM

## 2023-09-14 PROCEDURE — 36415 COLL VENOUS BLD VENIPUNCTURE: CPT | Performed by: PHYSICAL THERAPY ASSISTANT

## 2023-09-14 PROCEDURE — 94640 AIRWAY INHALATION TREATMENT: CPT

## 2023-09-14 PROCEDURE — 99223 PR INITIAL HOSPITAL CARE,LEVL III: ICD-10-PCS | Mod: ,,, | Performed by: GENERAL PRACTICE

## 2023-09-14 PROCEDURE — 83735 ASSAY OF MAGNESIUM: CPT | Performed by: PHYSICAL THERAPY ASSISTANT

## 2023-09-14 PROCEDURE — 63600175 PHARM REV CODE 636 W HCPCS: Performed by: HOSPITALIST

## 2023-09-14 PROCEDURE — 27000221 HC OXYGEN, UP TO 24 HOURS

## 2023-09-14 PROCEDURE — 25000242 PHARM REV CODE 250 ALT 637 W/ HCPCS: Performed by: STUDENT IN AN ORGANIZED HEALTH CARE EDUCATION/TRAINING PROGRAM

## 2023-09-14 PROCEDURE — 97110 THERAPEUTIC EXERCISES: CPT

## 2023-09-14 PROCEDURE — 94761 N-INVAS EAR/PLS OXIMETRY MLT: CPT

## 2023-09-14 PROCEDURE — 99900031 HC PATIENT EDUCATION (STAT)

## 2023-09-14 PROCEDURE — 83880 ASSAY OF NATRIURETIC PEPTIDE: CPT | Performed by: PHYSICAL THERAPY ASSISTANT

## 2023-09-14 PROCEDURE — 25000003 PHARM REV CODE 250: Performed by: HOSPITALIST

## 2023-09-14 PROCEDURE — 97116 GAIT TRAINING THERAPY: CPT | Mod: CQ

## 2023-09-14 PROCEDURE — 63600175 PHARM REV CODE 636 W HCPCS: Performed by: STUDENT IN AN ORGANIZED HEALTH CARE EDUCATION/TRAINING PROGRAM

## 2023-09-14 PROCEDURE — 25000003 PHARM REV CODE 250: Performed by: PHYSICAL THERAPY ASSISTANT

## 2023-09-14 PROCEDURE — 85025 COMPLETE CBC W/AUTO DIFF WBC: CPT | Performed by: PHYSICAL THERAPY ASSISTANT

## 2023-09-14 PROCEDURE — 97535 SELF CARE MNGMENT TRAINING: CPT

## 2023-09-14 PROCEDURE — 99900035 HC TECH TIME PER 15 MIN (STAT)

## 2023-09-14 PROCEDURE — 12000002 HC ACUTE/MED SURGE SEMI-PRIVATE ROOM

## 2023-09-14 PROCEDURE — 82962 GLUCOSE BLOOD TEST: CPT

## 2023-09-14 PROCEDURE — 80053 COMPREHEN METABOLIC PANEL: CPT | Performed by: PHYSICAL THERAPY ASSISTANT

## 2023-09-14 PROCEDURE — 97530 THERAPEUTIC ACTIVITIES: CPT | Mod: CQ

## 2023-09-14 PROCEDURE — 25000242 PHARM REV CODE 250 ALT 637 W/ HCPCS: Performed by: PHYSICAL THERAPY ASSISTANT

## 2023-09-14 PROCEDURE — 63600175 PHARM REV CODE 636 W HCPCS: Performed by: PHYSICAL THERAPY ASSISTANT

## 2023-09-14 PROCEDURE — 99223 1ST HOSP IP/OBS HIGH 75: CPT | Mod: ,,, | Performed by: GENERAL PRACTICE

## 2023-09-14 PROCEDURE — 94799 UNLISTED PULMONARY SVC/PX: CPT

## 2023-09-14 RX ORDER — CARVEDILOL 12.5 MG/1
12.5 TABLET ORAL 2 TIMES DAILY
Status: DISCONTINUED | OUTPATIENT
Start: 2023-09-14 | End: 2023-09-15 | Stop reason: HOSPADM

## 2023-09-14 RX ORDER — FUROSEMIDE 10 MG/ML
40 INJECTION INTRAMUSCULAR; INTRAVENOUS
Status: DISCONTINUED | OUTPATIENT
Start: 2023-09-14 | End: 2023-09-15 | Stop reason: HOSPADM

## 2023-09-14 RX ORDER — ASPIRIN 81 MG/1
81 TABLET ORAL DAILY
Status: DISCONTINUED | OUTPATIENT
Start: 2023-09-14 | End: 2023-09-15 | Stop reason: HOSPADM

## 2023-09-14 RX ORDER — LISINOPRIL 10 MG/1
10 TABLET ORAL DAILY
Status: DISCONTINUED | OUTPATIENT
Start: 2023-09-14 | End: 2023-09-15 | Stop reason: HOSPADM

## 2023-09-14 RX ORDER — CLOPIDOGREL BISULFATE 75 MG/1
75 TABLET ORAL DAILY
Status: DISCONTINUED | OUTPATIENT
Start: 2023-09-14 | End: 2023-09-15 | Stop reason: HOSPADM

## 2023-09-14 RX ORDER — DIGOXIN 125 MCG
0.12 TABLET ORAL DAILY
Status: DISCONTINUED | OUTPATIENT
Start: 2023-09-15 | End: 2023-09-15 | Stop reason: HOSPADM

## 2023-09-14 RX ORDER — MAGNESIUM SULFATE HEPTAHYDRATE 40 MG/ML
2 INJECTION, SOLUTION INTRAVENOUS ONCE
Status: DISCONTINUED | OUTPATIENT
Start: 2023-09-14 | End: 2023-09-15 | Stop reason: HOSPADM

## 2023-09-14 RX ORDER — MAGNESIUM SULFATE HEPTAHYDRATE 40 MG/ML
4 INJECTION, SOLUTION INTRAVENOUS ONCE
Status: COMPLETED | OUTPATIENT
Start: 2023-09-14 | End: 2023-09-14

## 2023-09-14 RX ADMIN — FLUTICASONE PROPIONATE 100 MCG: 50 SPRAY, METERED NASAL at 08:09

## 2023-09-14 RX ADMIN — DILTIAZEM HYDROCHLORIDE 120 MG: 120 CAPSULE, COATED, EXTENDED RELEASE ORAL at 08:09

## 2023-09-14 RX ADMIN — CARVEDILOL 12.5 MG: 12.5 TABLET, FILM COATED ORAL at 09:09

## 2023-09-14 RX ADMIN — FUROSEMIDE 40 MG: 10 INJECTION, SOLUTION INTRAMUSCULAR; INTRAVENOUS at 08:09

## 2023-09-14 RX ADMIN — CITALOPRAM HYDROBROMIDE 40 MG: 20 TABLET ORAL at 08:09

## 2023-09-14 RX ADMIN — CARVEDILOL 6.25 MG: 6.25 TABLET, FILM COATED ORAL at 08:09

## 2023-09-14 RX ADMIN — CLOPIDOGREL BISULFATE 75 MG: 75 TABLET, FILM COATED ORAL at 10:09

## 2023-09-14 RX ADMIN — BUDESONIDE INHALATION 0.5 MG: 0.5 SUSPENSION RESPIRATORY (INHALATION) at 07:09

## 2023-09-14 RX ADMIN — LISINOPRIL 10 MG: 10 TABLET ORAL at 10:09

## 2023-09-14 RX ADMIN — OXYBUTYNIN CHLORIDE 5 MG: 5 TABLET ORAL at 08:09

## 2023-09-14 RX ADMIN — PANTOPRAZOLE SODIUM 40 MG: 40 TABLET, DELAYED RELEASE ORAL at 05:09

## 2023-09-14 RX ADMIN — ASPIRIN 81 MG: 81 TABLET, COATED ORAL at 10:09

## 2023-09-14 RX ADMIN — ATORVASTATIN CALCIUM 20 MG: 20 TABLET, FILM COATED ORAL at 09:09

## 2023-09-14 RX ADMIN — ENOXAPARIN SODIUM 100 MG: 100 INJECTION SUBCUTANEOUS at 09:09

## 2023-09-14 RX ADMIN — FERROUS SULFATE TAB 325 MG (65 MG ELEMENTAL FE) 1 EACH: 325 (65 FE) TAB at 08:09

## 2023-09-14 RX ADMIN — MAGNESIUM SULFATE HEPTAHYDRATE 4 G: 40 INJECTION, SOLUTION INTRAVENOUS at 10:09

## 2023-09-14 RX ADMIN — ENOXAPARIN SODIUM 100 MG: 100 INJECTION SUBCUTANEOUS at 08:09

## 2023-09-14 RX ADMIN — OXYBUTYNIN CHLORIDE 5 MG: 5 TABLET ORAL at 09:09

## 2023-09-14 RX ADMIN — ARFORMOTEROL TARTRATE 15 MCG: 15 SOLUTION RESPIRATORY (INHALATION) at 07:09

## 2023-09-14 RX ADMIN — DONEPEZIL HYDROCHLORIDE 5 MG: 5 TABLET ORAL at 09:09

## 2023-09-14 NOTE — SUBJECTIVE & OBJECTIVE
Interval History: patient seen and examined; now in afib rvr     Review of Systems   Constitutional:  Positive for activity change and fatigue.   Respiratory:  Positive for cough and shortness of breath.    Cardiovascular: Negative.    Gastrointestinal: Negative.    Genitourinary:  Positive for urgency. Negative for dysuria.   Musculoskeletal:  Positive for arthralgias, back pain and gait problem.   Skin: Negative.    Neurological:  Positive for weakness.   Psychiatric/Behavioral:  Positive for agitation and behavioral problems. The patient is nervous/anxious.      Objective:     Vital Signs (Most Recent):  Temp: 98 °F (36.7 °C) (09/13/23 0800)  Pulse: 94 (09/13/23 1830)  Resp: (!) 24 (09/13/23 1830)  BP: 97/73 (09/13/23 1830)  SpO2: 95 % (09/13/23 1830) Vital Signs (24h Range):  Temp:  [98 °F (36.7 °C)-98.2 °F (36.8 °C)] 98 °F (36.7 °C)  Pulse:  [] 94  Resp:  [18-28] 24  SpO2:  [93 %-100 %] 95 %  BP: ()/(55-87) 97/73     Weight: 96.3 kg (212 lb 4.9 oz)  Body mass index is 36.44 kg/m².    Intake/Output Summary (Last 24 hours) at 9/13/2023 2003  Last data filed at 9/13/2023 1732  Gross per 24 hour   Intake 1201.96 ml   Output 1700 ml   Net -498.04 ml           Physical Exam  Constitutional:       General: She is in acute distress.   HENT:      Head: Normocephalic and atraumatic.      Mouth/Throat:      Mouth: Mucous membranes are dry.      Pharynx: Oropharynx is clear.   Eyes:      Extraocular Movements: Extraocular movements intact.      Conjunctiva/sclera: Conjunctivae normal.   Cardiovascular:      Rate and Rhythm: Tachycardia present. Rhythm irregular.      Pulses: Normal pulses.   Pulmonary:      Effort: Respiratory distress present.      Breath sounds: Rales present. No rhonchi.   Abdominal:      General: There is no distension.      Tenderness: There is no abdominal tenderness. There is no guarding.   Musculoskeletal:         General: Tenderness present. No swelling, deformity or signs of injury.       Cervical back: Normal range of motion and neck supple.   Skin:     General: Skin is warm and dry.      Capillary Refill: Capillary refill takes 2 to 3 seconds.   Neurological:      Mental Status: She is alert and oriented to person, place, and time. Mental status is at baseline.   Psychiatric:      Comments: Yelling at family in the room, argumentative              Significant Labs: All pertinent labs within the past 24 hours have been reviewed.  Recent Lab Results  (Last 5 results in the past 24 hours)        09/13/23  1706   09/13/23  1153   09/13/23  0929   09/13/23  0806   09/13/23  0442        Albumin     3.2           Alkaline Phosphatase     63           ALT     30           Anion Gap     8           AST     28           Baso #     0.06           Basophil %     0.9           BILIRUBIN TOTAL     1.1  Comment: For infants and newborns, interpretation of results should be based  on gestational age, weight and in agreement with clinical  observations.    Premature Infant recommended reference ranges:  Up to 24 hours.............<8.0 mg/dL  Up to 48 hours............<12.0 mg/dL  3-5 days..................<15.0 mg/dL  6-29 days.................<15.0 mg/dL             BUN     20           Calcium     8.9           Chloride     90           CO2     34           Creatinine     0.6           Differential Method     Automated           eGFR     >60.0           Eos #     0.2           Eosinophil %     2.7           Glucose     167           Gran # (ANC)     4.4           Gran %     66.7           Hematocrit     28.5           Hemoglobin     8.1           Immature Grans (Abs)     0.01  Comment: Mild elevation in immature granulocytes is non specific and   can be seen in a variety of conditions including stress response,   acute inflammation, trauma and pregnancy. Correlation with other   laboratory and clinical findings is essential.             Immature Granulocytes     0.2           Lymph #     1.3           Lymph  %     19.1           Magnesium      1.7           MCH     23.0           MCHC     28.4           MCV     81           Mono #     0.7           Mono %     10.4           MPV     11.9           nRBC     0           Platelets     193           POC Glucose 145   132     192   133       Potassium     3.6           PROTEIN TOTAL     6.1           RBC     3.52           RDW     22.5           Sodium     132           WBC     6.65                                  Significant Imaging: I have reviewed all pertinent imaging results/findings within the past 24 hours.

## 2023-09-14 NOTE — NURSING TRANSFER
Nursing Transfer Note      9/14/2023   5:57 PM    Nurse giving handoff:AYAKA Ambriz  Nurse receiving handoff:Zunilda    Reason patient is being transferred: downgrade in care    Transfer To: 1103    Transfer via wheelchair    Transfer with 3LNC O2, cardiac monitoring    Transported by RN    VSS    Telemetry: Box  Order for Tele Monitor? Yes    Additional Lines: Oxygen    4eyes on Skin: yes    Medicines sent: insulin pen and nasal spray    Any special needs or follow-up needed: na    Patient belongings transferred with patient: Yes    Chart send with patient: Yes    Notified: pt stated she will notify sister    Patient reassessed at: upon arrival to floor  Upon arrival to floor: cardiac monitor applied, patient oriented to room, call bell in reach, and bed in lowest position    NAD. Nurse at bedside.

## 2023-09-14 NOTE — ASSESSMENT & PLAN NOTE
Continue coreg  Started cardizem infusion - afib RVR today   TSH  Transfer to stepdown   lovenox BID   Trend troponin

## 2023-09-14 NOTE — PROGRESS NOTES
[facility]  Progress Note    Patient Name: Daryleen G Moran  MRN: [id number]  Patient Class: IP- Inpatient   Admission Date: 9/11/2023  Length of Stay: 1 days  Attending Physician: Mitul Fuller MD  Primary Care Provider: Abhi De Oliveira IV, MD        Subjective:     Principal Problem:Acute on chronic diastolic CHF (congestive heart failure)        HPI:  Patient is a 78-year-old female with past medical history of shortness of breath, anemia, CHF, valvular heart disease, diabetes mellitus type 2, hypertension, AFib, CAD.  Patient presents to the ED today with complaints of shortness of breath worsening over the last 2 days.  Patient was just recently discharged from hospital due to CHF exacerbation and similar symptoms.  At time of discharge, patient states she had no shortness a breath symptoms and felt like she was able to go home.  Patient states since she is been home over the last 2 days shortness of breath continue to come back and she feels the same as she did the previous admission.  Patient had transfusion on 8th, since this transfusion her hemoglobin has continued to decrease.  Hemoglobin 7.4 today, occult stool pending.  Patient was seen by GI in June for small GI bleed which was treated with ablation.  We will consult GI for re-evaluation due to symptomatic anemia.  , troponin 35.6 and 37.1.  Patient hypotensive when she arrived to the ED. patient's last echo January 2023, new echo pending.  Patient's EKG shows AFib continued from last EKG on 9/8/2023, patient is on Plavix and aspirin.  Patient was given Protonix and Lasix in ED. Patient denies nausea, vomiting, visible blood in stools, no extremity edema, or chest pain.  Code status was discussed, patient placed full code.      Overview/Hospital Course:  Daryleen Moran is a 78 year old female with a past medical history of HFpEF, Afib, TAVR, DM, anemia, HTN, HLD, CAD, GERD and MDD/ANJALI who presented with  "shortness of breath secondary to an acute on chronic CHF exacerbation and anemia. She underwent EGD and colonoscopy with showed angioectasias at the stomach and duodenum (bleeding) per GI. Her Hgb has remained stable and home DAPT has been restarted. Her course has been complicated by Afib with RVR for which she was started on Lovenox and a diltiazem infusion (also remained on PO Coreg). She has refused the diltiazem infusion and is now on PO dosing. Cardiology has been consulted. She remains on IV Lasix. PT/OT has been consulted and recommends  PT/OT.      Interval History: see "Hospital Course"    Review of Systems   Constitutional:  Positive for activity change and fatigue.   Respiratory:  Positive for cough and shortness of breath.    Genitourinary:  Positive for urgency. Negative for dysuria.   Musculoskeletal:  Positive for arthralgias, back pain and gait problem.   Neurological:  Positive for weakness.   Psychiatric/Behavioral:  Positive for agitation and behavioral problems. The patient is nervous/anxious.      Objective:     Vital Signs (Most Recent):  Temp: 97.6 °F (36.4 °C) (09/14/23 0701)  Pulse: 104 (09/14/23 0900)  Resp: (!) 23 (09/14/23 0900)  BP: (!) 131/92 (09/14/23 0900)  SpO2: 95 % (09/14/23 0900) Vital Signs (24h Range):  Temp:  [97.6 °F (36.4 °C)-98.5 °F (36.9 °C)] 97.6 °F (36.4 °C)  Pulse:  [] 104  Resp:  [15-30] 23  SpO2:  [89 %-100 %] 95 %  BP: ()/(57-92) 131/92     Weight: 96.3 kg (212 lb 4.9 oz)  Body mass index is 36.44 kg/m².    Intake/Output Summary (Last 24 hours) at 9/14/2023 1014  Last data filed at 9/14/2023 0814  Gross per 24 hour   Intake 841.96 ml   Output 2500 ml   Net -1658.04 ml         Physical Exam  Vitals and nursing note reviewed.   Constitutional:       Appearance: She is obese.   HENT:      Head: Normocephalic and atraumatic.      Right Ear: External ear normal.      Left Ear: External ear normal.      Nose: Nose normal.      Mouth/Throat:      Mouth: Mucous " membranes are moist.      Pharynx: Oropharynx is clear.   Eyes:      Extraocular Movements: Extraocular movements intact.      Conjunctiva/sclera: Conjunctivae normal.   Cardiovascular:      Rate and Rhythm: Regular rhythm. Tachycardia present.      Pulses: Normal pulses.      Heart sounds: Normal heart sounds.   Pulmonary:      Effort: Pulmonary effort is normal.      Breath sounds: Normal breath sounds.      Comments: NC O2.  Abdominal:      General: Bowel sounds are normal. There is no distension.      Palpations: Abdomen is soft.      Tenderness: There is no abdominal tenderness.   Musculoskeletal:         General: Normal range of motion.      Cervical back: Normal range of motion and neck supple.   Skin:     General: Skin is warm and dry.   Neurological:      Mental Status: She is alert. Mental status is at baseline.   Psychiatric:         Behavior: Behavior is agitated.             Significant Labs: All pertinent labs within the past 24 hours have been reviewed.    Significant Imaging: I have reviewed all pertinent imaging results/findings within the past 24 hours.      Assessment/Plan:      * Acute on chronic diastolic CHF (congestive heart failure)  -Telemetry  -Strict I's and O's  -Cardiology consulted  -Keep K > 4 and Mg > 2  -IV Lasix 40 Q12H  -Coreg and lisinopril      AVM (arteriovenous malformation)  In stomach and duodenum. Stable.      Paroxysmal atrial fibrillation  -PO diltiazem  -Coreg  -Telemetry  -Cardiology consulted  -Lovenox    Obesity (BMI 30-39.9)  Body mass index is 36.44 kg/m². Morbid obesity complicates all aspects of disease management from diagnostic modalities to treatment.            MDD (major depressive disorder)  -Continue home medications      CAD (coronary artery disease)  -Continue DAPT  -Continue statin  -Cardiology consulted  -Telemetry      Iron deficiency anemia due to chronic blood loss  Angioectasias in stomach and duodenum. Stable.  -Cautiously restart DAPT and start  "Lovenox  -Trend Hgb with CBC  -VCE in outpatient setting    COPD (chronic obstructive pulmonary disease)  -Continue home inhalers  -Continue NC O2      S/P TAVR (transcatheter aortic valve replacement)  Noted.      Hyperlipidemia  -Continue statin      GERD (gastroesophageal reflux disease)  -PPI    Hypertension  -Coreg  -Lisinopril  -Diltiazem  -Continue to monitor      Type 2 diabetes mellitus, without long-term current use of insulin  Patient's FSGs are controlled on current medication regimen.  Last A1c reviewed-   Lab Results   Component Value Date    HGBA1C 6.4 (H) 09/08/2023     Most recent fingerstick glucose reviewed- No results for input(s): "POCTGLUCOSE" in the last 24 hours.  Current correctional scale  Low  Maintain anti-hyperglycemic dose as follows-   Antihyperglycemics (From admission, onward)    Start     Stop Route Frequency Ordered    09/11/23 2300  insulin aspart U-100 pen 0-5 Units         -- SubQ Before meals & nightly PRN 09/11/23 2300        Hold Oral hypoglycemics while patient is in the hospital.        VTE Risk Mitigation (From admission, onward)         Ordered     enoxaparin injection 100 mg  Every 12 hours         09/13/23 2012     IP VTE HIGH RISK PATIENT  Once         09/11/23 2300     Place sequential compression device  Until discontinued         09/11/23 2300     Reason for No Pharmacological VTE Prophylaxis  Once        Question:  Reasons:  Answer:  Risk of Bleeding    09/11/23 2300                Discharge Planning   ISAAK: 9/15/2023     Code Status: Full Code   Is the patient medically ready for discharge?:     Reason for patient still in hospital (select all that apply): Patient trending condition and Consult recommendations  Discharge Plan A: Home with family                  Mitul Fuller MD  Department of Hospital Medicine   [facility]  "

## 2023-09-14 NOTE — PROGRESS NOTES
Replaced by [facility]  Progress Note    Patient Name: Daryleen G Moran  MRN: [id number]  Patient Class: IP- Inpatient   Admission Date: 9/11/2023  Length of Stay: 0 days  Attending Physician: Deedee Rodriguez MD  Primary Care Provider: Abhi De Oliveira IV, MD        Subjective:     Principal Problem:Acute on chronic diastolic CHF (congestive heart failure)        HPI:  Patient is a 78-year-old female with past medical history of shortness of breath, anemia, CHF, valvular heart disease, diabetes mellitus type 2, hypertension, AFib, CAD.  Patient presents to the ED today with complaints of shortness of breath worsening over the last 2 days.  Patient was just recently discharged from hospital due to CHF exacerbation and similar symptoms.  At time of discharge, patient states she had no shortness a breath symptoms and felt like she was able to go home.  Patient states since she is been home over the last 2 days shortness of breath continue to come back and she feels the same as she did the previous admission.  Patient had transfusion on 8th, since this transfusion her hemoglobin has continued to decrease.  Hemoglobin 7.4 today, occult stool pending.  Patient was seen by GI in June for small GI bleed which was treated with ablation.  We will consult GI for re-evaluation due to symptomatic anemia.  , troponin 35.6 and 37.1.  Patient hypotensive when she arrived to the ED. patient's last echo January 2023, new echo pending.  Patient's EKG shows AFib continued from last EKG on 9/8/2023, patient is on Plavix and aspirin.  Patient was given Protonix and Lasix in ED. Patient denies nausea, vomiting, visible blood in stools, no extremity edema, or chest pain.  Code status was discussed, patient placed full code.      Overview/Hospital Course:  Patient admitted with SOB and anemia. She underwent EGD and colonoscopy 6/2023 and had no reported evidence of acute GIB. She needs to complete a pill  endoscopy study outpatient which was explained previously to her and she has been noncompliant with. The patient is very argumentative with staff and family when she does not get her way or hears something she doesn't like. She is non-compliant with diet and wanting to get up to use restroom. She rather void on a towel in the bed. Explained that to go home she will need PT eval and discussed prior cardiac diet. She dismissed all recommendations. Patient went into afib rvr with -140s. She was given her home medication of cardizem and coreg but remained uncontrolled. Transfer to inpatient stepdown bed for cardizem titrating infusion. Lovenox added and will monitor H/H closely ( h/o AVMs). Cardiology consulted.   ECHO indicates G2DD EF 60% with disease of mitral, aortic and tricuspid valves.        Interval History: patient seen and examined; now in afib rvr     Review of Systems   Constitutional:  Positive for activity change and fatigue.   Respiratory:  Positive for cough and shortness of breath.    Cardiovascular: Negative.    Gastrointestinal: Negative.    Genitourinary:  Positive for urgency. Negative for dysuria.   Musculoskeletal:  Positive for arthralgias, back pain and gait problem.   Skin: Negative.    Neurological:  Positive for weakness.   Psychiatric/Behavioral:  Positive for agitation and behavioral problems. The patient is nervous/anxious.      Objective:     Vital Signs (Most Recent):  Temp: 98 °F (36.7 °C) (09/13/23 0800)  Pulse: 94 (09/13/23 1830)  Resp: (!) 24 (09/13/23 1830)  BP: 97/73 (09/13/23 1830)  SpO2: 95 % (09/13/23 1830) Vital Signs (24h Range):  Temp:  [98 °F (36.7 °C)-98.2 °F (36.8 °C)] 98 °F (36.7 °C)  Pulse:  [] 94  Resp:  [18-28] 24  SpO2:  [93 %-100 %] 95 %  BP: ()/(55-87) 97/73     Weight: 96.3 kg (212 lb 4.9 oz)  Body mass index is 36.44 kg/m².    Intake/Output Summary (Last 24 hours) at 9/13/2023 2003  Last data filed at 9/13/2023 1732  Gross per 24 hour   Intake  1201.96 ml   Output 1700 ml   Net -498.04 ml           Physical Exam  Constitutional:       General: She is in acute distress.   HENT:      Head: Normocephalic and atraumatic.      Mouth/Throat:      Mouth: Mucous membranes are dry.      Pharynx: Oropharynx is clear.   Eyes:      Extraocular Movements: Extraocular movements intact.      Conjunctiva/sclera: Conjunctivae normal.   Cardiovascular:      Rate and Rhythm: Tachycardia present. Rhythm irregular.      Pulses: Normal pulses.   Pulmonary:      Effort: Respiratory distress present.      Breath sounds: Rales present. No rhonchi.   Abdominal:      General: There is no distension.      Tenderness: There is no abdominal tenderness. There is no guarding.   Musculoskeletal:         General: Tenderness present. No swelling, deformity or signs of injury.      Cervical back: Normal range of motion and neck supple.   Skin:     General: Skin is warm and dry.      Capillary Refill: Capillary refill takes 2 to 3 seconds.   Neurological:      Mental Status: She is alert and oriented to person, place, and time. Mental status is at baseline.   Psychiatric:      Comments: Yelling at family in the room, argumentative              Significant Labs: All pertinent labs within the past 24 hours have been reviewed.  Recent Lab Results  (Last 5 results in the past 24 hours)        09/13/23  1706   09/13/23  1153   09/13/23  0929   09/13/23  0806   09/13/23  0442        Albumin     3.2           Alkaline Phosphatase     63           ALT     30           Anion Gap     8           AST     28           Baso #     0.06           Basophil %     0.9           BILIRUBIN TOTAL     1.1  Comment: For infants and newborns, interpretation of results should be based  on gestational age, weight and in agreement with clinical  observations.    Premature Infant recommended reference ranges:  Up to 24 hours.............<8.0 mg/dL  Up to 48 hours............<12.0 mg/dL  3-5 days..................<15.0  mg/dL  6-29 days.................<15.0 mg/dL             BUN     20           Calcium     8.9           Chloride     90           CO2     34           Creatinine     0.6           Differential Method     Automated           eGFR     >60.0           Eos #     0.2           Eosinophil %     2.7           Glucose     167           Gran # (ANC)     4.4           Gran %     66.7           Hematocrit     28.5           Hemoglobin     8.1           Immature Grans (Abs)     0.01  Comment: Mild elevation in immature granulocytes is non specific and   can be seen in a variety of conditions including stress response,   acute inflammation, trauma and pregnancy. Correlation with other   laboratory and clinical findings is essential.             Immature Granulocytes     0.2           Lymph #     1.3           Lymph %     19.1           Magnesium      1.7           MCH     23.0           MCHC     28.4           MCV     81           Mono #     0.7           Mono %     10.4           MPV     11.9           nRBC     0           Platelets     193           POC Glucose 145   132     192   133       Potassium     3.6           PROTEIN TOTAL     6.1           RBC     3.52           RDW     22.5           Sodium     132           WBC     6.65                                  Significant Imaging: I have reviewed all pertinent imaging results/findings within the past 24 hours.      Assessment/Plan:      * Acute on chronic diastolic CHF (congestive heart failure)    supplemental oxygen per home setting 2L  Lasix  strict I/Os, daily weights, 1.5L fluid restrictions / cardiac diet  Echo- reviewed   Left Ventricle: The left ventricle is smaller than normal. Moderately increased ventricular mass. Moderately increased wall thickness. There is moderate concentric hypertrophy. Normal wall motion. There is normal systolic function with a visually estimated ejection fraction of 65 - 70%. Grade II diastolic dysfunction.    Left Atrium: Left  atrium is moderately dilated.    Right Ventricle: Normal right ventricular cavity size. Wall thickness is normal. Right ventricle wall motion  is normal. Systolic function is normal.    Aortic Valve: The aortic valve is a trileaflet valve. Moderately calcified cusps. There is moderate stenosis. Aortic valve area by VTI is 0.86 cm². Aortic valve peak velocity is 2.42 m/s. Mean gradient is 13 mmHg. The dimensionless index is 0.38.    Mitral Valve: There is severe mitral annular calcification present.    Tricuspid Valve: There is moderate regurgitation.    IVC/SVC: Normal venous pressure at 3 mmHg.        CAD (coronary artery disease)  Continue plavix and statin   Aspirin initially held due to possible GI procedure  Added lovenox for afib       Paroxysmal atrial fibrillation    Continue coreg  Started cardizem infusion - afib RVR today   TSH  Transfer to stepdown   lovenox BID   Trend troponin     Iron deficiency anemia due to chronic blood loss  Hold ASA due to worsening anemia   Iron studies pending   Occult blood positive , CBC trending   GI consulted due to history of GI bleed and worsening anemia   - no indication to scope at this time   -needs pill study outpatient     COPD (chronic obstructive pulmonary disease)  2L O2 to keep saturation 88-94%  Continue to monitor vitals       Hyperlipidemia  Continue statin      GERD (gastroesophageal reflux disease)  Protonix 40mg BID      Hypertension  Hydralazine PRN for BP >160  Continue home meds as tolerated- coreg and cardizem       Type 2 diabetes mellitus, without long-term current use of insulin  Insulin sliding scale   POCT glucose         VTE Risk Mitigation (From admission, onward)         Ordered     IP VTE HIGH RISK PATIENT  Once         09/11/23 2300     Place sequential compression device  Until discontinued         09/11/23 2300     Reason for No Pharmacological VTE Prophylaxis  Once        Question:  Reasons:  Answer:  Risk of Bleeding    09/11/23 2300                 Discharge Planning   ISAAK: 9/15/2023     Code Status: Full Code   Is the patient medically ready for discharge?:     Reason for patient still in hospital (select all that apply): Patient trending condition and Consult recommendations  Discharge Plan A: Home with family                  Deedee Rodriguez MD  Department of Hospital Medicine   [facility]

## 2023-09-14 NOTE — ASSESSMENT & PLAN NOTE
Angioectasias in stomach and duodenum. Stable.  -Cautiously restart DAPT and start Lovenox  -Trend Hgb with CBC  -VCE in outpatient setting

## 2023-09-14 NOTE — CONSULTS
[facility]  Department of Cardiology  Consult Note      PATIENT NAME: Daryleen G Moran  MRN: [id number]  TODAY'S DATE: 09/14/2023  ADMIT DATE: 9/11/2023                          CONSULT REQUESTED BY: Mitul Fuller MD    SUBJECTIVE     PRINCIPAL PROBLEM: Acute on chronic diastolic CHF (congestive heart failure)      REASON FOR CONSULT:  Acute on Chronic Diastlic CHF            FROM H AND P  Principal Problem:Acute on chronic diastolic CHF (congestive heart failure)     Chief Complaint:        Chief Complaint   Patient presents with    Shortness of Breath       Began today, but she could not stay ahead of it.         HPI: Patient is a 78-year-old female with past medical history of shortness of breath, anemia, CHF, valvular heart disease, diabetes mellitus type 2, hypertension, AFib, CAD.  Patient presents to the ED today with complaints of shortness of breath worsening over the last 2 days.  Patient was just recently discharged from hospital due to CHF exacerbation and similar symptoms.  At time of discharge, patient states she had no shortness a breath symptoms and felt like she was able to go home.  Patient states since she is been home over the last 2 days shortness of breath continue to come back and she feels the same as she did the previous admission.  Patient had transfusion on 8th, since this transfusion her hemoglobin has continued to decrease.  Hemoglobin 7.4 today, occult stool pending.  Patient was seen by GI in June for small GI bleed which was treated with ablation.  We will consult GI for re-evaluation due to symptomatic anemia.  , troponin 35.6 and 37.1.  Patient hypotensive when she arrived to the ED. patient's last echo January 2023, new echo pending.  Patient's EKG shows AFib continued from last EKG on 9/8/2023, patient is on Plavix and aspirin.  Patient was given Protonix and Lasix in ED. Patient denies nausea, vomiting, visible blood in stools, no extremity edema, or chest  pain.  Code status was discussed, patient placed full code.       9/14  Patient in AFIB    Review of patient's allergies indicates:   Allergen Reactions    Latex      Other reaction(s): Unknown  Other reaction(s): Unknown    Sulfacetamide sodium      Pain perineal area    Sulfasalazine Hives    Adhesive Itching     SKIN GETS RED WITH TAPE AND BANDAIDS    Adhesive tape-silicones Itching     SKIN GETS RED WITH TAPE AND BANDAIDS    Sulfa (sulfonamide antibiotics) Rash       Past Medical History:   Diagnosis Date    Anesthesia complication     BLADDER DYSFUNCTION    Aortic stenosis     Arthritis     Back pain     Diabetes mellitus type II     NO LONGER DIABETIC    Encounter for blood transfusion     Hyperlipidemia     Hypertension     NSTEMI (non-ST elevated myocardial infarction) 05/01/2022    Osteoporosis     Wears glasses      Past Surgical History:   Procedure Laterality Date     vocal cord nodules removed  long time ago     twice    ADRENAL TUMOR      APPENDECTOMY  within last 5yrs    CATHETERIZATION OF BOTH LEFT AND RIGHT HEART Left 05/06/2022    Procedure: CATHETERIZATION, HEART, BOTH LEFT AND RIGHT;  Surgeon: Michelet Vargas MD;  Location: [facility] CATH/EP LAB;  Service: Cardiology;  Laterality: Left;    COLONOSCOPY N/A 6/23/2023    Procedure: COLONOSCOPY;  Surgeon: Joel Neal III, MD;  Location: [facility] ENDO;  Service: Endoscopy;  Laterality: N/A;    FIXATION KYPHOPLASTY THORACIC SPINE      8-20-13    FOOT SURGERY      left 2nd toe was too long     HERNIA REPAIR  within last 5yrs    INSERTION OF TEMPORARY PACEMAKER N/A 1/4/2023    Procedure: INSERTION, PACEMAKER, TEMPORARY;  Surgeon: Bhavesh Peña MD;  Location: [facility] CATH;  Service: Cardiology;  Laterality: N/A;    LEFT HEART CATHETERIZATION Left 05/02/2022    Procedure: Left heart cath;  Surgeon: Jose Echols MD;  Location: [facility] CATH/EP LAB;  Service: Cardiology;  Laterality: Left;    SMALL BOWEL ENTEROSCOPY N/A 6/22/2023    Procedure: ENTEROSCOPY;   Surgeon: Cornell Aguirre MD;  Location: [facility] ENDO;  Service: Endoscopy;  Laterality: N/A;    TONSILLECTOMY, ADENOIDECTOMY  long time ago    TRANSCATHETER AORTIC VALVE REPLACEMENT (TAVR) Bilateral 2023    Procedure: REPLACEMENT, AORTIC VALVE, TRANSCATHETER (TAVR);  Surgeon: Bhavesh Peña MD;  Location: [facility] CATH;  Service: Cardiology;  Laterality: Bilateral;    TRANSCATHETER AORTIC VALVE REPLACEMENT (TAVR) Bilateral 2023    Procedure: REPLACEMENT, AORTIC VALVE, TRANSCATHETER (TAVR);  Surgeon: Dallin Verma MD;  Location: [facility] CATH;  Service: Cardiology;  Laterality: Bilateral;    TRANSTHORACIC ECHOCARDIOGRAPHY (TTE)  2023    Procedure: ECHOCARDIOGRAM, TRANSTHORACIC;  Surgeon: Bhavesh Peña MD;  Location: [facility] CATH;  Service: Cardiology;;     Social History     Tobacco Use    Smoking status: Former     Current packs/day: 0.00     Average packs/day: 0.5 packs/day for 35.0 years (17.5 ttl pk-yrs)     Types: Cigarettes     Start date: 10/5/1987     Quit date: 10/5/2022     Years since quittin.9    Smokeless tobacco: Never   Substance Use Topics    Alcohol use: No    Drug use: No        REVIEW OF SYSTEMS    As mentioned in HPI    OBJECTIVE     VITAL SIGNS (Most Recent)  Temp: 98.6 °F (37 °C) (23 1130)  Pulse: 93 (23 1300)  Resp: (!) 21 (23 1300)  BP: (!) 105/59 (23 1300)  SpO2: (!) 94 % (23 1300)    VENTILATION STATUS  Resp: (!) 21 (23 1300)  SpO2: (!) 94 % (23 1300)           I & O (Last 24H):  Intake/Output Summary (Last 24 hours) at 2023 1417  Last data filed at 2023 1302  Gross per 24 hour   Intake 1161.96 ml   Output 3000 ml   Net -1838.04 ml       WEIGHTS  Wt Readings from Last 3 Encounters:   23 0400 96.3 kg (212 lb 4.9 oz)   23 2232 96.5 kg (212 lb 11.9 oz)   23 1821 96.2 kg (212 lb)   23 0551 97.4 kg (214 lb 11.7 oz)   23 0500 100.7 kg (222 lb 0.1 oz)   23 1651 95.3 kg (210 lb)   23 0151 95.3 kg (210  lb)   06/23/23 0613 94 kg (207 lb 3.7 oz)   06/22/23 2306 89.9 kg (198 lb 3.1 oz)   06/22/23 0503 89.9 kg (198 lb 3.1 oz)   06/21/23 2215 94.9 kg (209 lb 3.5 oz)       PHYSICAL EXAM    CONSTITUTIONAL: NAD  HEENT: Normocephalic. No pallor  NECK: no JVD  LUNGS: Coarse rhonchi  HEART: irr systolic murmur  ABDOMEN: soft, non-tender, bowel sounds normal  EXTREMITIES: No edema  SKIN: No rash  NEURO: AAO X 3  PSYCH: normal affect      HOME MEDICATIONS:  No current facility-administered medications on file prior to encounter.     Current Outpatient Medications on File Prior to Encounter   Medication Sig Dispense Refill    albuterol (PROVENTIL/VENTOLIN HFA) 90 mcg/actuation inhaler Inhale 2 puffs into the lungs every 6 (six) hours as needed for Shortness of Breath.      aspirin (ECOTRIN) 81 MG EC tablet Take 81 mg by mouth once daily.      calcium carbonate (OS-TK) 500 mg calcium (1,250 mg) tablet Take 1 tablet (500 mg total) by mouth once daily.  0    carvediloL (COREG) 12.5 MG tablet Take 12.5 mg by mouth 2 (two) times daily.      citalopram (CELEXA) 40 MG tablet Take 1 tablet (40 mg total) by mouth once daily. 90 tablet 3    clopidogreL (PLAVIX) 75 mg tablet Take 1 tablet (75 mg total) by mouth once daily. 90 tablet 2    diltiaZEM (DILACOR XR) 120 MG CDCR Take 1 capsule (120 mg total) by mouth once daily. 30 capsule 11    donepeziL (ARICEPT) 5 MG tablet Take 5 mg by mouth nightly.      ergocalciferol (ERGOCALCIFEROL) 50,000 unit Cap TAKE 1 CAPSULE (50,000 UNITS TOTAL) EVERY 7 DAYS. (Patient taking differently: Take 50,000 Units by mouth every 7 days.) 12 capsule 3    ferrous gluconate 324 mg (37.5 mg iron) Tab tablet Take 324 mg by mouth once daily.      fluticasone furoate-vilanteroL (BREO ELLIPTA) 100-25 mcg/dose diskus inhaler Inhale 1 puff into the lungs once daily. Controller      furosemide (LASIX) 40 MG tablet Take 1 tablet (40 mg total) by mouth once daily. 45 tablet 2    glucosamine-chondroitin 500-400 mg tablet  Take 1 tablet by mouth once daily.      lisinopriL 10 MG tablet Take 1 tablet (10 mg total) by mouth once daily. 90 tablet 3    loperamide (IMODIUM A-D) 2 mg Tab Take 1 tablet (2 mg total) by mouth 3 (three) times daily as needed (diarrhea). Take 2 tablets by mouth initially then 1 tablet after each loose stool as needed for diarrhea. 15 tablet 0    loratadine (CLARITIN) 10 mg tablet Take 1 tablet (10 mg total) by mouth once daily.  0    multivitamin capsule Take 1 capsule by mouth once daily.      omeprazole (PRILOSEC) 20 MG capsule Take 1 capsule (20 mg total) by mouth once daily. 90 capsule 3    oxybutynin (DITROPAN) 5 MG Tab Take 1 tablet (5 mg total) by mouth 2 (two) times daily. 180 tablet 3    simvastatin (ZOCOR) 10 MG tablet Take 10 mg by mouth every evening.      [DISCONTINUED] ALLERGY RELIEF, FEXOFENADINE, 180 mg tablet Take 180 mg by mouth once daily.      [DISCONTINUED] ezetimibe-simvastatin 10-40 mg (VYTORIN) 10-40 mg per tablet Take 1 tablet by mouth.      [DISCONTINUED] lisinopril-hydrochlorothiazide (PRINZIDE,ZESTORETIC) 20-12.5 mg per tablet Take 1 tablet by mouth once daily.          SCHEDULED MEDS:   budesonide  0.5 mg Nebulization Q12H    And    arformoteroL  15 mcg Nebulization BID    aspirin  81 mg Oral Daily    atorvastatin  20 mg Oral QHS    carvediloL  12.5 mg Oral BID    citalopram  40 mg Oral Daily    clopidogreL  75 mg Oral Daily    diltiaZEM  120 mg Oral Daily    donepeziL  5 mg Oral Nightly    enoxparin  1 mg/kg Subcutaneous Q12H (prophylaxis, 0900/2100)    ferrous sulfate  1 tablet Oral Daily    fluticasone propionate  2 spray Each Nostril Daily    furosemide (LASIX) injection  40 mg Intravenous Q12H    lisinopriL  10 mg Oral Daily    oxybutynin  5 mg Oral BID    pantoprazole  40 mg Oral BID    polyethylene glycol  17 g Oral Daily       CONTINUOUS INFUSIONS:    PRN MEDS:acetaminophen, dextrose 50%, dextrose 50%, glucagon (human recombinant), glucose, glucose, hydrALAZINE,  "HYDROcodone-acetaminophen, insulin aspart U-100, magnesium oxide, magnesium oxide, melatonin, morphine, naloxone, ondansetron, potassium bicarbonate, potassium bicarbonate, potassium bicarbonate, potassium, sodium phosphates, potassium, sodium phosphates, potassium, sodium phosphates, sodium chloride 0.9%    LABS AND DIAGNOSTICS     CBC LAST 3 DAYS  Recent Labs   Lab 09/12/23  0323 09/13/23  0929 09/14/23  0716   WBC 5.66 6.65 9.53   RBC 3.55* 3.52* 3.86*   HGB 8.0* 8.1* 8.8*   HCT 29.3* 28.5* 31.5*   MCV 83 81* 82   MCH 22.5* 23.0* 22.8*   MCHC 27.3* 28.4* 27.9*   RDW 22.5* 22.5* 22.5*    193 215   MPV 11.8 11.9 11.6   GRAN 71.4  4.0 66.7  4.4 71.3  6.8   LYMPH 15.9*  0.9* 19.1  1.3 15.4*  1.5   MONO 9.2  0.5 10.4  0.7 9.0  0.9   BASO 0.04 0.06 0.07   NRBC 0 0 0       COAGULATION LAST 3 DAYS  Recent Labs   Lab 09/11/23  1850   INR 1.0   APTT <21.0       CHEMISTRY LAST 3 DAYS  Recent Labs   Lab 09/12/23  0323 09/13/23  0929 09/14/23  0716    132* 133*   K 3.8 3.6 4.2   CL 92* 90* 90*   CO2 36* 34* 37*   ANIONGAP 9 8 6*   BUN 16 20 21   CREATININE 0.5 0.6 0.6   * 167* 129*   CALCIUM 9.0 8.9 9.1   MG 1.7 1.7 1.8   ALBUMIN 3.5 3.2* 3.5   PROT 6.5 6.1 6.8   ALKPHOS 61 63 67   ALT 27 30 32   AST 26 28 29   BILITOT 1.4* 1.1* 1.1*       CARDIAC PROFILE LAST 3 DAYS  Recent Labs   Lab 09/08/23  0211 09/08/23  0435 09/11/23  1850 09/12/23  0323   *  --  505*  --    TROPONINIHS 49.4* 52.8* 37.1*  35.6* 35.3*       ENDOCRINE LAST 3 DAYS  Recent Labs   Lab 09/08/23  1255 09/12/23  0323   TSH  --  1.620   PROCAL <0.05  --        LAST ARTERIAL BLOOD GAS  ABG  No results for input(s): "PH", "PO2", "PCO2", "HCO3", "BE" in the last 168 hours.    LAST 7 DAYS MICROBIOLOGY   Microbiology Results (last 7 days)       ** No results found for the last 168 hours. **            MOST RECENT IMAGING  Echo    Left Ventricle: The left ventricle is smaller than normal. Moderately   increased ventricular mass. " Moderately increased wall thickness. There is   moderate concentric hypertrophy. Normal wall motion. There is normal   systolic function with a visually estimated ejection fraction of 65 - 70%.   Grade II diastolic dysfunction.    Left Atrium: Left atrium is moderately dilated.    Right Ventricle: Normal right ventricular cavity size. Wall thickness   is normal. Right ventricle wall motion  is normal. Systolic function is   normal.    Aortic Valve: The aortic valve is a trileaflet valve. Moderately   calcified cusps. There is moderate stenosis. Aortic valve area by VTI is   0.86 cm². Aortic valve peak velocity is 2.42 m/s. Mean gradient is 13   mmHg. The dimensionless index is 0.38.    Mitral Valve: There is severe mitral annular calcification present.    Tricuspid Valve: There is moderate regurgitation.    IVC/SVC: Normal venous pressure at 3 mmHg.      ECHOCARDIOGRAM RESULTS (last 5)  Results for orders placed during the hospital encounter of 09/11/23    Echo    Interpretation Summary    Left Ventricle: The left ventricle is smaller than normal. Moderately increased ventricular mass. Moderately increased wall thickness. There is moderate concentric hypertrophy. Normal wall motion. There is normal systolic function with a visually estimated ejection fraction of 65 - 70%. Grade II diastolic dysfunction.    Left Atrium: Left atrium is moderately dilated.    Right Ventricle: Normal right ventricular cavity size. Wall thickness is normal. Right ventricle wall motion  is normal. Systolic function is normal.    Aortic Valve: The aortic valve is a trileaflet valve. Moderately calcified cusps. There is moderate stenosis. Aortic valve area by VTI is 0.86 cm². Aortic valve peak velocity is 2.42 m/s. Mean gradient is 13 mmHg. The dimensionless index is 0.38.    Mitral Valve: There is severe mitral annular calcification present.    Tricuspid Valve: There is moderate regurgitation.    IVC/SVC: Normal venous pressure at 3  mmHg.      Results for orders placed during the hospital encounter of 02/06/23    Echo    Interpretation Summary  · The left ventricle is normal in size with moderate concentric hypertrophy and normal systolic function.  · The estimated ejection fraction is 65%.  · Grade I left ventricular diastolic dysfunction.  · Normal right ventricular size with normal right ventricular systolic function.  · There is a transcutaneously-placed aortic bioprosthesis present. There is no aortic insufficiency present. Prosthetic aortic valve is normal.  · The aortic valve mean gradient is 9 mmHg with a dimensionless index of 0.58.  · Mild mitral regurgitation.  · The mean diastolic gradient across the mitral valve is 6 mmHg at a heart rate of bpm.  · There is moderate mitral stenosis.  · Mild tricuspid regurgitation.  · Normal central venous pressure (3 mmHg).  · The estimated PA systolic pressure is 37 mmHg.      Results for orders placed during the hospital encounter of 01/04/23    Echo Saline Bubble? No    Interpretation Summary  · The left ventricle is normal in size with mild concentric hypertrophy and normal systolic function.  · Moderate left atrial enlargement.  · The estimated ejection fraction is 70%.  · Grade I left ventricular diastolic dysfunction.  · There is a transcutaneously-placed aortic bioprosthesis present. There is no aortic insufficiency present. Prosthetic aortic valve is normal.  · The aortic valve mean gradient is 14 mmHg with a dimensionless index of 0.56.  · Normal right ventricular size with normal right ventricular systolic function.  · Mild mitral regurgitation.  · The mean diastolic gradient across the mitral valve is 8 mmHg at a heart rate of bpm.  · There is mild to moderate mitral stenosis.  · Mild tricuspid regurgitation.  · Normal central venous pressure (3 mmHg).  · The estimated PA systolic pressure is 28 mmHg.      Echo    Interpretation Summary  · Intraoperative echocardiogram done during TAVR  procedure  · Normal biventricular function  · Well-positioned balloon expandable TAVR without PVL or central aortic insufficiency      Results for orders placed during the hospital encounter of 09/23/22    Echo    Interpretation Summary  · The left ventricle is normal in size with mild concentric hypertrophy and normal systolic function.  · Moderate left atrial enlargement.  · The estimated ejection fraction is 65%.  · Grade I left ventricular diastolic dysfunction.  · Normal right ventricular size with normal right ventricular systolic function.  · Mild right atrial enlargement.  · Mild aortic regurgitation.  · There is severe aortic valve stenosis.  · Aortic valve area is 0.89 cm2; peak velocity is 4.69 m/s; mean gradient is 53 mmHg.  · Mild-to-moderate mitral regurgitation.  · The mean diastolic gradient across the mitral valve is 7 mmHg at a heart rate of bpm.  · There is mild to moderate mitral stenosis.  · Mild tricuspid regurgitation.  · Normal central venous pressure (3 mmHg).  · The estimated PA systolic pressure is 39 mmHg.      CURRENT/PREVIOUS VISIT EKG  Results for orders placed or performed during the hospital encounter of 09/11/23   EKG 12-lead    Collection Time: 09/11/23  6:47 PM    Narrative    Test Reason : R06.02,    Vent. Rate : 079 BPM     Atrial Rate : 326 BPM     P-R Int : 000 ms          QRS Dur : 070 ms      QT Int : 354 ms       P-R-T Axes : 000 024 040 degrees     QTc Int : 405 ms    Atrial fibrillation  Abnormal ECG  When compared with ECG of 08-SEP-2023 01:49,  No significant change was found    Referred By: [email or web address]   SELF           Confirmed By:            ASSESSMENT/PLAN:     Active Hospital Problems    Diagnosis    *Acute on chronic diastolic CHF (congestive heart failure)    Obesity (BMI 30-39.9)    AVM (arteriovenous malformation)    MDD (major depressive disorder)    CAD (coronary artery disease)    Paroxysmal atrial fibrillation    Iron deficiency anemia due to chronic blood  loss    COPD (chronic obstructive pulmonary disease)    S/P TAVR (transcatheter aortic valve replacement)    GERD (gastroesophageal reflux disease)    Type 2 diabetes mellitus, without long-term current use of insulin     Dx updated per 2019 IMO Load      Hyperlipidemia    Hypertension       ASSESSMENT & PLAN:       Acute on Chronic Diastolic CHF  PAF- Not on anticoagulation at home besides plavix and ASA  S/P TAVR -moderate aortic stenosis on this admit ECHO  Severe Mitral calcification  DM  HTN  Dyslipidemia  Obesity  Hypomagnesemia      RECOMMENDATIONS:    Replace Mag  HR stable continue coreg and Cardizem  Continue to diurese  Check BNP and CXR near euvolemia?  H/o gi bleed this is why she is not on chronic NOAC according to notes  BP is soft consider adding digoxin for better rate control         Beba Winslow NP  Department of Cardiology  Date of Service: 09/14/2023        I have personally interviewed and examined the patient, I have reviewed the Nurse Practitioner's history and physical, assessment, and plan. I agree with the findings and plan.      ADAM Vargas M.D.  Department of Cardiology  Date of Service: 09/14/2023  2:17 PM

## 2023-09-14 NOTE — ASSESSMENT & PLAN NOTE
-Telemetry  -Strict I's and O's  -Cardiology consulted  -Keep K > 4 and Mg > 2  -IV Lasix 40 Q12H  -Coreg and lisinopril

## 2023-09-14 NOTE — ASSESSMENT & PLAN NOTE
Continue plavix and statin   Aspirin initially held due to possible GI procedure  Added lovenox for afib

## 2023-09-14 NOTE — SUBJECTIVE & OBJECTIVE
"Interval History: see "Hospital Course"    Review of Systems   Constitutional:  Positive for activity change and fatigue.   Respiratory:  Positive for cough and shortness of breath.    Genitourinary:  Positive for urgency. Negative for dysuria.   Musculoskeletal:  Positive for arthralgias, back pain and gait problem.   Neurological:  Positive for weakness.   Psychiatric/Behavioral:  Positive for agitation and behavioral problems. The patient is nervous/anxious.      Objective:     Vital Signs (Most Recent):  Temp: 97.6 °F (36.4 °C) (09/14/23 0701)  Pulse: 104 (09/14/23 0900)  Resp: (!) 23 (09/14/23 0900)  BP: (!) 131/92 (09/14/23 0900)  SpO2: 95 % (09/14/23 0900) Vital Signs (24h Range):  Temp:  [97.6 °F (36.4 °C)-98.5 °F (36.9 °C)] 97.6 °F (36.4 °C)  Pulse:  [] 104  Resp:  [15-30] 23  SpO2:  [89 %-100 %] 95 %  BP: ()/(57-92) 131/92     Weight: 96.3 kg (212 lb 4.9 oz)  Body mass index is 36.44 kg/m².    Intake/Output Summary (Last 24 hours) at 9/14/2023 1014  Last data filed at 9/14/2023 0814  Gross per 24 hour   Intake 841.96 ml   Output 2500 ml   Net -1658.04 ml         Physical Exam  Vitals and nursing note reviewed.   Constitutional:       Appearance: She is obese.   HENT:      Head: Normocephalic and atraumatic.      Right Ear: External ear normal.      Left Ear: External ear normal.      Nose: Nose normal.      Mouth/Throat:      Mouth: Mucous membranes are moist.      Pharynx: Oropharynx is clear.   Eyes:      Extraocular Movements: Extraocular movements intact.      Conjunctiva/sclera: Conjunctivae normal.   Cardiovascular:      Rate and Rhythm: Regular rhythm. Tachycardia present.      Pulses: Normal pulses.      Heart sounds: Normal heart sounds.   Pulmonary:      Effort: Pulmonary effort is normal.      Breath sounds: Normal breath sounds.      Comments: NC O2.  Abdominal:      General: Bowel sounds are normal. There is no distension.      Palpations: Abdomen is soft.      Tenderness: There is " no abdominal tenderness.   Musculoskeletal:         General: Normal range of motion.      Cervical back: Normal range of motion and neck supple.   Skin:     General: Skin is warm and dry.   Neurological:      Mental Status: She is alert. Mental status is at baseline.   Psychiatric:         Behavior: Behavior is agitated.             Significant Labs: All pertinent labs within the past 24 hours have been reviewed.    Significant Imaging: I have reviewed all pertinent imaging results/findings within the past 24 hours.

## 2023-09-14 NOTE — PT/OT/SLP PROGRESS
Physical Therapy Treatment    Patient Name:  Daryleen G Moran   MRN:  [id number]    Recommendations:     Discharge Recommendations: home health PT  Discharge Equipment Recommendations: none  Barriers to discharge: None    Assessment:     Daryleen G Moran is a 78 y.o. female admitted with a medical diagnosis of Acute on chronic diastolic CHF (congestive heart failure).  She presents with the following impairments/functional limitations: weakness, impaired endurance, impaired self care skills, gait instability, decreased safety awareness, impaired cardiopulmonary response to activity .    Rehab Prognosis: Fair; patient would benefit from acute skilled PT services to address these deficits and reach maximum level of function.    Recent Surgery: * No surgery found *      Plan:     During this hospitalization, patient to be seen 5 x/week to address the identified rehab impairments via gait training, therapeutic activities, therapeutic exercises, neuromuscular re-education and progress toward the following goals:    Plan of Care Expires:  10/13/23    Subjective     Chief Complaint: Pain at IV site.  Patient/Family Comments/goals: Pt agreeable to PT.  Pain/Comfort:  Pain Rating Post-Intervention 1: 8/10      Objective:     Communicated with RN prior to session.  Patient found up in chair with oxygen, peripheral IV, telemetry upon PT entry to room.     General Precautions: Standard, fall  Orthopedic Precautions: N/A  Braces: N/A  Respiratory Status: Nasal cannula, flow 2 L/min     Functional Mobility:  Transfers:     Sit to Stand:  contact guard assistance with rolling walker  Gait: 100 ft rw CGA      AM-PAC 6 CLICK MOBILITY  Turning over in bed (including adjusting bedclothes, sheets and blankets)?: 3  Sitting down on and standing up from a chair with arms (e.g., wheelchair, bedside commode, etc.): 3  Moving from lying on back to sitting on the side of the bed?: 3  Moving to and from a bed to a chair (including a  wheelchair)?: 3  Need to walk in hospital room?: 3  Climbing 3-5 steps with a railing?: 3  Basic Mobility Total Score: 18       Treatment & Education:  Pt was educated on the following: call light use, importance of OOB activity and functional mobility to negate the negative effects of prolonged bed rest during this hospitalization, safe transfers/ambulation and discharge planning recommendations/options.     Patient left up in chair with all lines intact, call button in reach, and nursing students present..    GOALS:   Multidisciplinary Problems       Physical Therapy Goals          Problem: Physical Therapy    Goal Priority Disciplines Outcome Goal Variances Interventions   Physical Therapy Goal     PT, PT/OT Ongoing, Progressing     Description: Goals to be met by: 10/13/23     Patient will increase functional independence with mobility by performin. Supine to sit with Supervision  2. Sit to stand transfer with Supervision  3. Bed to chair transfer with Supervision using Rolling Walker  4. Gait  x 100 feet with Supervision using Rolling Walker.                              Time Tracking:     PT Received On: 23  PT Start Time: 1051     PT Stop Time: 1114  PT Total Time (min): 23 min     Billable Minutes: Gait Training 15 and Therapeutic Activity 8    Treatment Type: Treatment  PT/PTA: PTA     Number of PTA visits since last PT visit: 0     2023

## 2023-09-14 NOTE — PLAN OF CARE
Problem: Occupational Therapy  Goal: Occupational Therapy Goal  Description: Goals to be met by: 10/13/23     Patient will increase functional independence with ADLs by performing:    UE Dressing with Modified [place].  LE Dressing with Modified [place].  Grooming while standing at sink with Modified [place].  Toileting from toilet with Modified [place] for hygiene and clothing management.   Toilet transfer to toilet with Modified [place].    Outcome: Ongoing, Progressing

## 2023-09-14 NOTE — CARE UPDATE
09/14/23 0747   Patient Assessment/Suction   Level of Consciousness (AVPU) alert   Respiratory Effort Unlabored;Normal   Expansion/Accessory Muscles/Retractions no use of accessory muscles;no retractions   All Lung Fields Breath Sounds Anterior:;Lateral:;diminished;clear   Rhythm/Pattern, Respiratory pattern regular   Skin Integrity   $ Wound Care Tech Time 15 min   Area Observed Cheek;Behind ear;Right;Left   Skin Appearance without discoloration   PRE-TX-O2   Device (Oxygen Therapy) nasal cannula   $ Is the patient on Low Flow Oxygen? Yes   Flow (L/min) 2   SpO2 100 %   Pulse Oximetry Type Continuous   $ Pulse Oximetry - Multiple Charge Pulse Oximetry - Multiple   Pulse (!) 112   Resp 20   Aerosol Therapy   $ Aerosol Therapy Charges Aerosol Treatment   Respiratory Treatment Status (SVN) given   Treatment Route (SVN) mask;oxygen   Patient Position (SVN) semi-Marin's   Post Treatment Assessment (SVN) breath sounds unchanged   Signs of Intolerance (SVN) none   Breath Sounds Post-Respiratory Treatment   Throughout All Fields Post-Treatment All Fields   Throughout All Fields Post-Treatment no change   Post-treatment Heart Rate (beats/min) 96   Post-treatment Resp Rate (breaths/min) 20   Education   $ Education Bronchodilator;15 min

## 2023-09-14 NOTE — ASSESSMENT & PLAN NOTE
supplemental oxygen per home setting 2L  Lasix  strict I/Os, daily weights, 1.5L fluid restrictions / cardiac diet  Echo- reviewed   Left Ventricle: The left ventricle is smaller than normal. Moderately increased ventricular mass. Moderately increased wall thickness. There is moderate concentric hypertrophy. Normal wall motion. There is normal systolic function with a visually estimated ejection fraction of 65 - 70%. Grade II diastolic dysfunction.    Left Atrium: Left atrium is moderately dilated.    Right Ventricle: Normal right ventricular cavity size. Wall thickness is normal. Right ventricle wall motion  is normal. Systolic function is normal.    Aortic Valve: The aortic valve is a trileaflet valve. Moderately calcified cusps. There is moderate stenosis. Aortic valve area by VTI is 0.86 cm². Aortic valve peak velocity is 2.42 m/s. Mean gradient is 13 mmHg. The dimensionless index is 0.38.    Mitral Valve: There is severe mitral annular calcification present.    Tricuspid Valve: There is moderate regurgitation.    IVC/SVC: Normal venous pressure at 3 mmHg.

## 2023-09-14 NOTE — CARE UPDATE
09/13/23 2013   Patient Assessment/Suction   Level of Consciousness (AVPU) alert   Respiratory Effort Unlabored   Expansion/Accessory Muscles/Retractions no retractions;no use of accessory muscles   MIKHAIL Breath Sounds clear   LLL Breath Sounds crackles, fine   RUL Breath Sounds clear   RML Breath Sounds clear   RLL Breath Sounds clear;diminished   Rhythm/Pattern, Respiratory pattern regular   Cough Frequency infrequent   Cough Type good;dry;nonproductive   PRE-TX-O2   Device (Oxygen Therapy) nasal cannula   Flow (L/min) 2   SpO2 97 %   Pulse 105   Resp (!) 30   Aerosol Therapy   $ Aerosol Therapy Charges Aerosol Treatment   Daily Review of Necessity (SVN) completed   Respiratory Treatment Status (SVN) given   Treatment Route (SVN) mask;oxygen   Patient Position (SVN) semi-Marin's   Post Treatment Assessment (SVN) increased aeration   Signs of Intolerance (SVN) none

## 2023-09-14 NOTE — PT/OT/SLP PROGRESS
Occupational Therapy   Treatment    Name: Daryleen G Moran  MRN: [id number]  Admitting Diagnosis:  Acute on chronic diastolic CHF (congestive heart failure)     The primary encounter diagnosis was Acute on chronic congestive heart failure, unspecified heart failure type. Diagnoses of Shortness of breath, Symptomatic anemia, Chest pain, Acute on chronic diastolic CHF (congestive heart failure), and S/P TAVR (transcatheter aortic valve replacement) were also pertinent to this visit.  Pre-op Diagnosis: * No surgery found *      Recommendations:     Discharge Recommendations: home health OT  Discharge Equipment Recommendations:  none  Barriers to discharge:   (low BP with OOB activity)    Assessment:     Daryleen G Moran is a 78 y.o. female with a medical diagnosis of Acute on chronic diastolic CHF (congestive heart failure).  She presents with hypotension and asymptomatic.Performance deficits affecting function are weakness, impaired endurance, impaired self care skills, impaired functional mobility, gait instability, impaired balance, impaired cardiopulmonary response to activity.   Pt agreeable to OT. Pt's blood pressures during tx session: sitting EOB BP 85/47,(MAP 57) and sitting on BSC BP 88/41(MAP 57) and lying 95/43(MAP 62), lying with legs elevated /61(MAP 77). Pt denied any symptoms of dizziness, asymptomatic. Nurse in room and aware. She was able to perform bed mobility SBA, sit<>stand CGA, BSC stand step t/f SBA-CGA, toileting Min A for clothes /brief management. Pt returned to bed for UE AROM ex without complaints or distress.     Rehab Prognosis:  Good; patient would benefit from acute skilled OT services to address these deficits and reach maximum level of function.       Plan:     Patient to be seen 5 x/week to address the above listed problems via self-care/home management, therapeutic activities, therapeutic exercises  Plan of Care Expires: 10/13/23  Plan of Care Reviewed with: patient    Subjective      Chief Complaint: uncomfortable lying in bed  Patient/Family Comments/goals: pt agreeable to OT  Pain/Comfort:  Pain Rating 1: 0/10  Pain Rating Post-Intervention 1: 0/10    Objective:     Communicated with: nurse prior to session.  Patient found HOB elevated with bed alarm, blood pressure cuff, PureWick, pulse ox (continuous), telemetry, oxygen upon OT entry to room.    General Precautions: Standard, fall, diabetic, aspiration    Orthopedic Precautions:N/A  Braces: N/A  Respiratory Status: Nasal cannula, flow 2 L/min     Occupational Performance:     Bed Mobility:    Patient completed Rolling/Turning to Left with  stand by assistance, with side rail, and HOB elevated  Patient completed Scooting/Bridging with stand by assistance  Patient completed Supine to Sit with stand by assistance, with side rail, and HOB elevated  Patient completed Sit to Supine with stand by assistance     Functional Mobility/Transfers:  Patient completed Sit <> Stand Transfer with contact guard assistance  with  hand-held assist   Patient completed Toilet Transfer Step Transfer technique with stand by assistance and contact guard assistance with  bedside commode  Functional Mobility: NA    Activities of Daily Living:  Toileting: minimum assistance clothes management, pt voided on BSC.      [facility] 6 Click ADL: 21    Treatment & Education:  Purpose of OT and POC  Pt present with hypotension and asymptomatic.  Pt. Perfore above OOB activity stated above and returned to supine due to low BP.  Pt performed bed level UE AROM towel/dowel ex 10 x 1 set shld flex/ext, chest press, rows forward and backwards, biceps curls with Supervision. Pt mildly fatigued after ex.  All questions/concerns addressed within scope.     Patient left HOB elevated with all lines intact, call button in reach, legs elevated and nurse notified    GOALS:   Multidisciplinary Problems       Occupational Therapy Goals          Problem: Occupational Therapy    Goal Priority  Disciplines Outcome Interventions   Occupational Therapy Goal     OT, PT/OT Ongoing, Progressing    Description: Goals to be met by: 10/13/23     Patient will increase functional independence with ADLs by performing:    UE Dressing with Modified [place].  LE Dressing with Modified [place].  Grooming while standing at sink with Modified [place].  Toileting from toilet with Modified [place] for hygiene and clothing management.   Toilet transfer to toilet with Modified [place].                         Time Tracking:     OT Date of Treatment: 09/14/23  OT Start Time: 1558  OT Stop Time: 1622  OT Total Time (min): 24 min    Billable Minutes:Self Care/Home Management 10  Therapeutic Exercise 14  Total Time 24    OT/FILI: OT          9/14/2023

## 2023-09-14 NOTE — ASSESSMENT & PLAN NOTE
Body mass index is 36.44 kg/m². Morbid obesity complicates all aspects of disease management from diagnostic modalities to treatment.

## 2023-09-15 VITALS
DIASTOLIC BLOOD PRESSURE: 94 MMHG | BODY MASS INDEX: 36.25 KG/M2 | WEIGHT: 212.31 LBS | TEMPERATURE: 98 F | OXYGEN SATURATION: 96 % | SYSTOLIC BLOOD PRESSURE: 140 MMHG | RESPIRATION RATE: 20 BRPM | HEIGHT: 64 IN | HEART RATE: 84 BPM

## 2023-09-15 LAB
ALBUMIN SERPL BCP-MCNC: 3.3 G/DL (ref 3.5–5.2)
ALP SERPL-CCNC: 65 U/L (ref 55–135)
ALT SERPL W/O P-5'-P-CCNC: 27 U/L (ref 10–44)
ANION GAP SERPL CALC-SCNC: 11 MMOL/L (ref 8–16)
AST SERPL-CCNC: 26 U/L (ref 10–40)
BASOPHILS # BLD AUTO: 0.07 K/UL (ref 0–0.2)
BASOPHILS NFR BLD: 0.7 % (ref 0–1.9)
BILIRUB SERPL-MCNC: 0.9 MG/DL (ref 0.1–1)
BUN SERPL-MCNC: 26 MG/DL (ref 8–23)
CALCIUM SERPL-MCNC: 8.7 MG/DL (ref 8.7–10.5)
CHLORIDE SERPL-SCNC: 86 MMOL/L (ref 95–110)
CO2 SERPL-SCNC: 32 MMOL/L (ref 23–29)
CREAT SERPL-MCNC: 0.9 MG/DL (ref 0.5–1.4)
DIFFERENTIAL METHOD: ABNORMAL
DIGOXIN SERPL-MCNC: <0.2 NG/ML (ref 0.8–2)
EOSINOPHIL # BLD AUTO: 0.3 K/UL (ref 0–0.5)
EOSINOPHIL NFR BLD: 3.1 % (ref 0–8)
ERYTHROCYTE [DISTWIDTH] IN BLOOD BY AUTOMATED COUNT: 23.2 % (ref 11.5–14.5)
EST. GFR  (NO RACE VARIABLE): >60 ML/MIN/1.73 M^2
GLUCOSE SERPL-MCNC: 132 MG/DL (ref 70–110)
GLUCOSE SERPL-MCNC: 137 MG/DL (ref 70–110)
HCT VFR BLD AUTO: 30.7 % (ref 37–48.5)
HGB BLD-MCNC: 8.5 G/DL (ref 12–16)
IMM GRANULOCYTES # BLD AUTO: 0.04 K/UL (ref 0–0.04)
IMM GRANULOCYTES NFR BLD AUTO: 0.4 % (ref 0–0.5)
LYMPHOCYTES # BLD AUTO: 1.3 K/UL (ref 1–4.8)
LYMPHOCYTES NFR BLD: 12.6 % (ref 18–48)
MAGNESIUM SERPL-MCNC: 2.2 MG/DL (ref 1.6–2.6)
MCH RBC QN AUTO: 22.7 PG (ref 27–31)
MCHC RBC AUTO-ENTMCNC: 27.7 G/DL (ref 32–36)
MCV RBC AUTO: 82 FL (ref 82–98)
MONOCYTES # BLD AUTO: 0.8 K/UL (ref 0.3–1)
MONOCYTES NFR BLD: 8 % (ref 4–15)
NEUTROPHILS # BLD AUTO: 7.5 K/UL (ref 1.8–7.7)
NEUTROPHILS NFR BLD: 75.2 % (ref 38–73)
NRBC BLD-RTO: 0 /100 WBC
PHOSPHATE SERPL-MCNC: 4.5 MG/DL (ref 2.7–4.5)
PLATELET # BLD AUTO: 239 K/UL (ref 150–450)
PMV BLD AUTO: ABNORMAL FL (ref 9.2–12.9)
POTASSIUM SERPL-SCNC: 4.2 MMOL/L (ref 3.5–5.1)
PROT SERPL-MCNC: 6.5 G/DL (ref 6–8.4)
RBC # BLD AUTO: 3.75 M/UL (ref 4–5.4)
SODIUM SERPL-SCNC: 129 MMOL/L (ref 136–145)
WBC # BLD AUTO: 9.98 K/UL (ref 3.9–12.7)

## 2023-09-15 PROCEDURE — 25000003 PHARM REV CODE 250: Performed by: STUDENT IN AN ORGANIZED HEALTH CARE EDUCATION/TRAINING PROGRAM

## 2023-09-15 PROCEDURE — 80053 COMPREHEN METABOLIC PANEL: CPT | Performed by: STUDENT IN AN ORGANIZED HEALTH CARE EDUCATION/TRAINING PROGRAM

## 2023-09-15 PROCEDURE — 94640 AIRWAY INHALATION TREATMENT: CPT

## 2023-09-15 PROCEDURE — 36415 COLL VENOUS BLD VENIPUNCTURE: CPT | Performed by: STUDENT IN AN ORGANIZED HEALTH CARE EDUCATION/TRAINING PROGRAM

## 2023-09-15 PROCEDURE — 97535 SELF CARE MNGMENT TRAINING: CPT

## 2023-09-15 PROCEDURE — 84100 ASSAY OF PHOSPHORUS: CPT | Performed by: STUDENT IN AN ORGANIZED HEALTH CARE EDUCATION/TRAINING PROGRAM

## 2023-09-15 PROCEDURE — 85025 COMPLETE CBC W/AUTO DIFF WBC: CPT | Performed by: STUDENT IN AN ORGANIZED HEALTH CARE EDUCATION/TRAINING PROGRAM

## 2023-09-15 PROCEDURE — 94761 N-INVAS EAR/PLS OXIMETRY MLT: CPT

## 2023-09-15 PROCEDURE — 25000242 PHARM REV CODE 250 ALT 637 W/ HCPCS: Performed by: STUDENT IN AN ORGANIZED HEALTH CARE EDUCATION/TRAINING PROGRAM

## 2023-09-15 PROCEDURE — 25000003 PHARM REV CODE 250: Performed by: NURSE PRACTITIONER

## 2023-09-15 PROCEDURE — 27000221 HC OXYGEN, UP TO 24 HOURS

## 2023-09-15 PROCEDURE — 83735 ASSAY OF MAGNESIUM: CPT | Performed by: STUDENT IN AN ORGANIZED HEALTH CARE EDUCATION/TRAINING PROGRAM

## 2023-09-15 PROCEDURE — 80162 ASSAY OF DIGOXIN TOTAL: CPT | Performed by: STUDENT IN AN ORGANIZED HEALTH CARE EDUCATION/TRAINING PROGRAM

## 2023-09-15 PROCEDURE — 63600175 PHARM REV CODE 636 W HCPCS: Performed by: STUDENT IN AN ORGANIZED HEALTH CARE EDUCATION/TRAINING PROGRAM

## 2023-09-15 PROCEDURE — 97116 GAIT TRAINING THERAPY: CPT

## 2023-09-15 RX ORDER — DIGOXIN 125 MCG
0.12 TABLET ORAL DAILY
Qty: 90 TABLET | Refills: 0 | Status: SHIPPED | OUTPATIENT
Start: 2023-09-16 | End: 2023-09-15 | Stop reason: SDUPTHER

## 2023-09-15 RX ORDER — PANTOPRAZOLE SODIUM 40 MG/1
40 TABLET, DELAYED RELEASE ORAL 2 TIMES DAILY
Qty: 180 TABLET | Refills: 0 | Status: SHIPPED | OUTPATIENT
Start: 2023-09-15 | End: 2024-03-08

## 2023-09-15 RX ORDER — PANTOPRAZOLE SODIUM 40 MG/1
40 TABLET, DELAYED RELEASE ORAL 2 TIMES DAILY
Qty: 180 TABLET | Refills: 0 | Status: SHIPPED | OUTPATIENT
Start: 2023-09-15 | End: 2023-09-15 | Stop reason: SDUPTHER

## 2023-09-15 RX ORDER — DIGOXIN 125 MCG
0.12 TABLET ORAL DAILY
Qty: 90 TABLET | Refills: 0 | Status: SHIPPED | OUTPATIENT
Start: 2023-09-16 | End: 2024-02-12 | Stop reason: SDUPTHER

## 2023-09-15 RX ADMIN — BUDESONIDE INHALATION 0.5 MG: 0.5 SUSPENSION RESPIRATORY (INHALATION) at 08:09

## 2023-09-15 RX ADMIN — CLOPIDOGREL BISULFATE 75 MG: 75 TABLET, FILM COATED ORAL at 09:09

## 2023-09-15 RX ADMIN — ENOXAPARIN SODIUM 100 MG: 100 INJECTION SUBCUTANEOUS at 09:09

## 2023-09-15 RX ADMIN — ARFORMOTEROL TARTRATE 15 MCG: 15 SOLUTION RESPIRATORY (INHALATION) at 08:09

## 2023-09-15 RX ADMIN — DIGOXIN 0.12 MG: 125 TABLET ORAL at 09:09

## 2023-09-15 RX ADMIN — OXYBUTYNIN CHLORIDE 5 MG: 5 TABLET ORAL at 09:09

## 2023-09-15 RX ADMIN — PANTOPRAZOLE SODIUM 40 MG: 40 TABLET, DELAYED RELEASE ORAL at 05:09

## 2023-09-15 RX ADMIN — LISINOPRIL 10 MG: 10 TABLET ORAL at 09:09

## 2023-09-15 RX ADMIN — FERROUS SULFATE TAB 325 MG (65 MG ELEMENTAL FE) 1 EACH: 325 (65 FE) TAB at 09:09

## 2023-09-15 RX ADMIN — CITALOPRAM HYDROBROMIDE 40 MG: 20 TABLET ORAL at 09:09

## 2023-09-15 RX ADMIN — DILTIAZEM HYDROCHLORIDE 120 MG: 120 CAPSULE, COATED, EXTENDED RELEASE ORAL at 09:09

## 2023-09-15 RX ADMIN — ASPIRIN 81 MG: 81 TABLET, COATED ORAL at 09:09

## 2023-09-15 RX ADMIN — FLUTICASONE PROPIONATE 100 MCG: 50 SPRAY, METERED NASAL at 09:09

## 2023-09-15 RX ADMIN — FUROSEMIDE 40 MG: 10 INJECTION, SOLUTION INTRAMUSCULAR; INTRAVENOUS at 09:09

## 2023-09-15 RX ADMIN — CARVEDILOL 12.5 MG: 12.5 TABLET, FILM COATED ORAL at 09:09

## 2023-09-15 NOTE — PT/OT/SLP PROGRESS
Physical Therapy Treatment    Patient Name:  Daryleen G Moran   MRN:  [id number]    Recommendations:     Discharge Recommendations: home health PT  Discharge Equipment Recommendations: none  Barriers to discharge: None    Assessment:     Daryleen G Moran is a 78 y.o. female admitted with a medical diagnosis of Acute on chronic diastolic CHF (congestive heart failure).  She presents with the following impairments/functional limitations: weakness, impaired endurance, impaired self care skills, impaired functional mobility, gait instability, impaired balance, impaired cardiopulmonary response to activity.    Rehab Prognosis: Fair; patient would benefit from acute skilled PT services to address these deficits and reach maximum level of function.    Recent Surgery: * No surgery found *      Plan:     During this hospitalization, patient to be seen 5 x/week to address the identified rehab impairments via gait training, therapeutic activities, therapeutic exercises, neuromuscular re-education and progress toward the following goals:    Plan of Care Expires:  10/13/23    Subjective     Chief Complaint: pain  Patient/Family Comments/goals: go home  Pain/Comfort:  Pain Rating 1: 8/10  Location 1: back      Objective:     Communicated with RNDivine prior to session.  Patient found HOB elevated with telemetry, peripheral IV, PureWick upon PT entry to room.     General Precautions: Standard, fall, aspiration  Orthopedic Precautions: N/A  Braces: N/A  Respiratory Status: Nasal cannula, flow 2 L/min     Functional Mobility:  Bed Mobility:     Supine to Sit: minimum assistance  Transfers:     Sit to Stand:  contact guard assistance with rolling walker  Bed to Chair: contact guard assistance with  rolling walker  using  Step Transfer  Gait: 2x45' w/RW and CGA for safety and supplemental O2 2L      AM-PAC 6 CLICK MOBILITY  Turning over in bed (including adjusting bedclothes, sheets and blankets)?: 3  Sitting down on and standing up  from a chair with arms (e.g., wheelchair, bedside commode, etc.): 3  Moving from lying on back to sitting on the side of the bed?: 3  Moving to and from a bed to a chair (including a wheelchair)?: 3  Need to walk in hospital room?: 3  Climbing 3-5 steps with a railing?: 3  Basic Mobility Total Score: 18       Treatment & Education:  Pt was educated on the following: call light use, importance of OOB activity and functional mobility to negate the negative effects of prolonged bed rest during this hospitalization, safe transfers/ambulation and discharge planning recommendations/options.     Patient left up in chair with all lines intact, call button in reach, and RN notified..    GOALS:   Multidisciplinary Problems       Physical Therapy Goals          Problem: Physical Therapy    Goal Priority Disciplines Outcome Goal Variances Interventions   Physical Therapy Goal     PT, PT/OT Ongoing, Progressing     Description: Goals to be met by: 10/13/23     Patient will increase functional independence with mobility by performin. Supine to sit with Supervision  2. Sit to stand transfer with Supervision  3. Bed to chair transfer with Supervision using Rolling Walker  4. Gait  x 100 feet with Supervision using Rolling Walker.                              Time Tracking:     PT Received On: 09/15/23  PT Start Time: 1125     PT Stop Time: 1135  PT Total Time (min): 10 min     Billable Minutes: Gait Training 10    Treatment Type: Treatment  PT/PTA: PT     Number of PTA visits since last PT visit: 0     09/15/2023

## 2023-09-15 NOTE — PLAN OF CARE
Problem: Physical Therapy  Goal: Physical Therapy Goal  Description: Goals to be met by: 10/13/23     Patient will increase functional independence with mobility by performin. Supine to sit with Supervision  2. Sit to stand transfer with Supervision  3. Bed to chair transfer with Supervision using Rolling Walker  4. Gait  x 100 feet with Supervision using Rolling Walker.         Outcome: Ongoing, Progressing

## 2023-09-15 NOTE — PLAN OF CARE
09/14/23 1935   Patient Assessment/Suction   Level of Consciousness (AVPU) alert   Respiratory Effort Normal;Unlabored   All Lung Fields Breath Sounds clear;diminished   Cough Type no productive sputum   Skin Integrity   $ Wound Care Tech Time 15 min   Area Observed Left;Right;Behind ear;Nares   Skin Appearance without discoloration   PRE-TX-O2   Device (Oxygen Therapy) nasal cannula   $ Is the patient on Low Flow Oxygen? Yes   Flow (L/min) 2   SpO2 97 %   Pulse Oximetry Type Intermittent   $ Pulse Oximetry - Multiple Charge Pulse Oximetry - Multiple   Pulse 81   Resp 16   Aerosol Therapy   $ Aerosol Therapy Charges Aerosol Treatment   Daily Review of Necessity (SVN) completed   Respiratory Treatment Status (SVN) given   Treatment Route (SVN) mask   Patient Position (SVN) semi-Marin's   Post Treatment Assessment (SVN) breath sounds unchanged   Signs of Intolerance (SVN) none   Breath Sounds Post-Respiratory Treatment   Throughout All Fields Post-Treatment All Fields   Throughout All Fields Post-Treatment no change   Post-treatment Heart Rate (beats/min) 82   Post-treatment Resp Rate (breaths/min) 16   Respiratory Evaluation   $ Care Plan Tech Time 15 min   $ Eval/Re-eval Charges Re-evaluation   Home Oxygen   Has Home Oxygen? Yes   Liter Flow 2   Duration continuous   Route nasal cannula

## 2023-09-15 NOTE — PLAN OF CARE
Pt clear for DC from case management standpoint. Discharging to home with home health       09/15/23 1208   Final Note   Assessment Type Final Discharge Note   Anticipated Discharge Disposition Home-Health   What phone number can be called within the next 1-3 days to see how you are doing after discharge? [phone]

## 2023-09-15 NOTE — PT/OT/SLP PROGRESS
Occupational Therapy   Treatment    Name: Daryleen G Moran  MRN: [id number]  Admitting Diagnosis:  Acute on chronic diastolic CHF (congestive heart failure)       Recommendations:     Discharge Recommendations: home health OT  Discharge Equipment Recommendations:  none  Barriers to discharge:  None    Assessment:     Daryleen G Moran is a 78 y.o. female with a medical diagnosis of Acute on chronic diastolic CHF (congestive heart failure).  She presents with improving medical acuity and functional mobility. Patient participated in LB dressing sitting in chair, toilet transfer, grooming standing at sink and ambulation using rolling walker. Performance deficits affecting function are weakness, impaired endurance, impaired self care skills, impaired functional mobility, gait instability, impaired balance.     Rehab Prognosis:  Fair; patient would benefit from acute skilled OT services to address these deficits and reach maximum level of function.       Plan:     Patient to be seen 5 x/week to address the above listed problems via self-care/home management, therapeutic activities, therapeutic exercises  Plan of Care Expires: 10/13/23  Plan of Care Reviewed with: patient    Subjective     Chief Complaint: General weakness  Patient/Family Comments/goals: improved functional mobility and ADL independence.  Pain/Comfort:  Pain Rating 1: 0/10  Pain Rating Post-Intervention 1: 0/10    Objective:     Communicated with: nurse prior to session.  Patient found up in chair with telemetry, peripheral IV, PureWick upon OT entry to room.    General Precautions: Standard, fall, aspiration    Orthopedic Precautions:N/A  Braces: N/A  Respiratory Status: Room air     Occupational Performance:     Functional Mobility/Transfers:  Patient completed Sit <> Stand Transfer with stand by assistance  with  rolling walker   Patient completed Toilet Transfer Step Transfer technique with stand by assistance with  rolling walker  Functional Mobility:  ambulated 25 feet in the hospital room with stand by assistance using rolling walker.    Activities of Daily Living:  Grooming: stand by assistance to wash hands standing at sink.  Lower Body Dressing: stand by assistance to don/doff socks sitting in chair.      [facility] 6 Click ADL:      Treatment & Education:  Patient is making positive progress towards all OT goals.    Patient left up in chair with all lines intact and call button in reach    GOALS:   Multidisciplinary Problems       Occupational Therapy Goals          Problem: Occupational Therapy    Goal Priority Disciplines Outcome Interventions   Occupational Therapy Goal     OT, PT/OT Ongoing, Progressing    Description: Goals to be met by: 10/13/23     Patient will increase functional independence with ADLs by performing:    UE Dressing with Modified [place].  LE Dressing with Modified [place].  Grooming while standing at sink with Modified [place].  Toileting from toilet with Modified [place] for hygiene and clothing management.   Toilet transfer to toilet with Modified [place].                         Time Tracking:     OT Date of Treatment: 09/15/23  OT Start Time: 0904  OT Stop Time: 0927  OT Total Time (min): 23 min    Billable Minutes:Self Care/Home Management 23    OT/FILI: OT          9/15/2023

## 2023-09-15 NOTE — PLAN OF CARE
Discharge orders sent to [facility] / [facility].  [facility] date 9/16       09/15/23 1211   Post-Acute Status   Post-Acute Authorization Home health

## 2023-09-15 NOTE — NURSING
IV and telemetry monitor removed. AVS reviewed with patient and all questions answered.  Patient waiting on prescriptions to be delivered to bedside and for family to bring portable oxygen tank.    9097 Prescriptions delivered to bedside. Patient's sister at bedside with portable oxygen tank.

## 2023-09-15 NOTE — DISCHARGE SUMMARY
[facility]  Discharge Summary      Patient Name: Daryleen G Moran  MRN: [id number]  [facility]: [id number]  Patient Class: IP- Inpatient  Admission Date: 9/11/2023  Hospital Length of Stay: 2 days  Discharge Date and Time:  09/15/2023 11:38 AM  Attending Physician: Harjinder Hanna MD   Discharging Provider: Harjinder Hanna MD  Primary Care Provider: Abhi De Oliveira IV, MD    Primary Care Team: Networked reference to record PCT     HPI:   Patient is a 78-year-old female with past medical history of shortness of breath, anemia, CHF, valvular heart disease, diabetes mellitus type 2, hypertension, AFib, CAD.  Patient presents to the ED today with complaints of shortness of breath worsening over the last 2 days.  Patient was just recently discharged from hospital due to CHF exacerbation and similar symptoms.  At time of discharge, patient states she had no shortness a breath symptoms and felt like she was able to go home.  Patient states since she is been home over the last 2 days shortness of breath continue to come back and she feels the same as she did the previous admission.  Patient had transfusion on 8th, since this transfusion her hemoglobin has continued to decrease.  Hemoglobin 7.4 today, occult stool pending.  Patient was seen by GI in June for small GI bleed which was treated with ablation.  We will consult GI for re-evaluation due to symptomatic anemia.  , troponin 35.6 and 37.1.  Patient hypotensive when she arrived to the ED. patient's last echo January 2023, new echo pending.  Patient's EKG shows AFib continued from last EKG on 9/8/2023, patient is on Plavix and aspirin.  Patient was given Protonix and Lasix in ED. Patient denies nausea, vomiting, visible blood in stools, no extremity edema, or chest pain.  Code status was discussed, patient placed full code.      * No surgery found *      Hospital Course:   Daryleen Moran is a 78 year old female with a past medical  history of HFpEF, Afib, TAVR, DM, anemia, HTN, HLD, CAD, GERD and MDD/ANJALI who presented with shortness of breath secondary to an acute on chronic CHF exacerbation and anemia. She underwent EGD and colonoscopy with showed angioectasias at the stomach and duodenum (bleeding) per GI. Her Hgb has remained stable and home DAPT has been restarted. Her course has been complicated by Afib with RVR for which she was started on Lovenox and a diltiazem infusion (also remained on PO Coreg). She has refused the diltiazem infusion and is now on PO dosing. Cardiology has been consulted. She remains on IV Lasix. PT/OT has been consulted and recommends  PT/OT.   Cleared by Cardiology, patient was subsequently discharged on oral digoxin.  Patient has a history of GI bleed hence why she is not on chronic NOAC according to notes.  Patient subsequently discharged on home health to follow up with PCP, cardiologist.    Physical examination on discharge:  Constitutional: No distress.   HENT: NC  Head: Atraumatic.   Cardiovascular: Normal rate, regular rhythm and normal heart sounds.   Pulmonary/Chest: Effort normal. No wheezes.  On chronic nasal oxygen  Abdominal: Soft. Bowel sounds are normal. No distension and no mass. No tenderness  Neurological: Alert.   Skin: Skin is warm and dry.   Psych: Appropriate mood and affect    I have seen the patient on the day of discharge and reviewed the discharge instructions as outlined.    Goals of Care Treatment Preferences:  Code Status: Full Code      Consults:   Consults (From admission, onward)        Status Ordering Provider     Inpatient consult to Cardiology  Once        Provider:  Jose Echols MD    Completed ADOLFO PEARSON consult to case management  Once        Provider:  (Not yet assigned)    Completed PERRY GILLILAND     Inpatient consult to Gastroenterology  Once        Provider:  Joel Neal III, MD    Completed ARIC CHRISTINE          No new Assessment &  Plan notes have been filed under this hospital service since the last note was generated.  Service: Hospital Medicine    Final Active Diagnoses:    Diagnosis Date Noted POA    PRINCIPAL PROBLEM:  Acute on chronic diastolic CHF (congestive heart failure) [I50.33] 01/04/2023 Yes    Obesity (BMI 30-39.9) [E66.9] 09/14/2023 Yes    AVM (arteriovenous malformation) [Q27.30] 06/24/2023 Not Applicable    MDD (major depressive disorder) [F32.9] 06/24/2023 Yes    CAD (coronary artery disease) [I25.10] 12/19/2022 Yes    Paroxysmal atrial fibrillation [I48.0] 05/02/2022 Yes    Iron deficiency anemia due to chronic blood loss [D50.0] 03/12/2020 Yes    COPD (chronic obstructive pulmonary disease) [J44.9] 09/27/2016 Yes    S/P TAVR (transcatheter aortic valve replacement) [Z95.2] 09/16/2016 Not Applicable    GERD (gastroesophageal reflux disease) [K21.9] 09/13/2016 Yes    Type 2 diabetes mellitus, without long-term current use of insulin [E11.9] 09/13/2016 Yes    Hyperlipidemia [E78.5] 09/13/2016 Yes    Hypertension [I10] 09/13/2016 Yes      Problems Resolved During this Admission:       Discharged Condition: good    Disposition: Home-Health Care [facility]    Follow Up:   Follow-up Information     Abhi De Oliveira IV, MD Follow up on 9/20/2023.    Specialty: Family Medicine  Why: at 9AM for hospital follow up  Contact information:  [address]   [place] [address]  [phone]             Bhavesh Peña MD Follow up.    Specialties: Interventional Cardiology, Cardiology  Why: requested apt- office to call you for scheduling  Contact information:  [address] [facility] [address] [phone]                       Patient Instructions:      Ambulatory referral/consult to Home Health   Standing Status: Future   Referral Priority: Routine Referral Type: Home Health   Referral Reason: Specialty Services Required   Requested Specialty: Home Health Services   Number of Visits Requested: 1     Activity as  "tolerated       Significant Diagnostic Studies: Labs:   BMP:   Recent Labs   Lab 09/14/23  0716 09/15/23  0327   * 132*   * 129*   K 4.2 4.2   CL 90* 86*   CO2 37* 32*   BUN 21 26*   CREATININE 0.6 0.9   CALCIUM 9.1 8.7   MG 1.8 2.2   , CMP   Recent Labs   Lab 09/14/23  0716 09/15/23  0327   * 129*   K 4.2 4.2   CL 90* 86*   CO2 37* 32*   * 132*   BUN 21 26*   CREATININE 0.6 0.9   CALCIUM 9.1 8.7   PROT 6.8 6.5   ALBUMIN 3.5 3.3*   BILITOT 1.1* 0.9   ALKPHOS 67 65   AST 29 26   ALT 32 27   ANIONGAP 6* 11   , CBC   Recent Labs   Lab 09/14/23  0716 09/15/23  0327   WBC 9.53 9.98   HGB 8.8* 8.5*   HCT 31.5* 30.7*    239   , Troponin No results for input(s): "TROPONINI" in the last 168 hours. and All labs within the past 24 hours have been reviewed  Microbiology: Blood Culture No results found for: "LABBLOO"  Radiology: X-Ray: CXR: X-Ray Chest 1 View (CXR):   Results for orders placed or performed during the hospital encounter of 09/11/23   X-Ray Chest 1 View    Narrative    CLINICAL DATA:  Shortness of breath    TECHNIQUE:  Views: A single AP view.  Limitations: The images are technically satisfactory.    COMPARISON:  September 11, 2023    FINDINGS:  CARDIOVASCULAR:  1. The heart is upper limits of normal.  2. Chronic atherosclerotic changes are noted in the aorta.    LUNGS AND PLEURA: Normal.    MEDIASTINAL AND HILAR STRUCTURES: Normal.    OSSEOUS STRUCTURES:  1. Multiple vertebral plasties have been performed.    ADDITIONAL FINDINGS: None seen.    IMPRESSION:    1.  Borderline heart size.    This document was created using a voice recognition transcribing system. Incorrect words or phrases may have been missed during proof reading. Please interpret accordingly or contact the radiologist for clarification if necessary.    Electronically signed by:  Leonard Hyman MD  9/14/2023 5:21 PM CDT Workstation: [id number]    and X-Ray Chest PA and Lateral (CXR): No results found for this " visit on 09/11/23.  Cardiac Graphics: Echocardiogram:   2D echo with color flow doppler:   Results for orders placed or performed during the hospital encounter of 09/22/16   2D echo with color flow doppler   Result Value Ref Range    EF + QEF 65 55 - 65    Aortic Valve Stenosis MODERATE (A)     Est. PA Systolic Pressure 30     Tricuspid Valve Regurgitation MILD     Narrative    Date of Procedure: 09/24/2016        TEST DESCRIPTION   Technical Quality: This is a technically challenging study. There is poor endocardial definition.     Aorta: The aortic root is normal in size. Aortic root measures 2.9 cm at Sinuses of Valsalva.     Left Atrium: The left atrial volume index is mildly enlarged, measuring 34.91 cc/m2.     Left Ventricle: The left ventricle is normal in size, with an end-diastolic diameter of 4.8 cm, and an end-systolic diameter of 3.1 cm. Posterior wall thickness is increased, with the septum measuring 0.9 cm and the posterior wall measuring 1.2 cm   across. Relative wall thickness was increased at 0.50, and the LV mass index was increased at 115.2 g/m2 consistent with moderate concentric left ventricular hypertrophy. Global left ventricular systolic function appears normal. Visually estimated   ejection fraction is 60-65%. The LV Doppler derived stroke volume equals 82.0 ccs.       Right Atrium: The right atrium is normal in size, measuring 4.5 cm in length and 3.9 cm in width in the apical view.     Right Ventricle: The right ventricle is normal in size measuring 3.3 cm at the base in the apical right ventricle-focused view. Global right ventricular systolic function appears normal. Tricuspid annular plane systolic excursion (TAPSE) is 2.7 cm. The   estimated PA systolic pressure is greater than 30 mmHg.     Aortic Valve:  The aortic valve is moderately sclerotic with moderately restricted leaflet mobility. The peak velocity obtained across the aortic valve is 3.7 m/s, which translates to a peak  gradient of 55.0 mmHg. The mean gradient is 30.0 mmHg. Using a   left ventricular outflow tract diameter of 2.2 cm, a left ventricular outflow tract velocity time integral of 22 cm, and a peak instantaneous transvalvular velocity time integral of 65 cm, the calculated aortic valve area is 1.26 cm2, consistent with   moderate aortic stenosis.     Mitral Valve:  The mitral valve is normal in structure. The pressure half time is 80.3 msec. The calculated mitral valve area is 2.74 cm2. There is mitral annular calcification.     Tricuspid Valve:  The tricuspid valve is normal in structure. There is mild tricuspid regurgitation.     Pulmonary Valve:  The pulmonic valve is not well seen.     IVC: The IVC is not visualized.     Intracavitary: There is no evidence of pericardial effusion, intracavity mass, thrombi, or vegetation.         CONCLUSIONS     1 - Challenging images.     2 - Concentric LVH with normal left ventricular systolic function (EF 60-65%).     3 - Normal right ventricular systolic function .     4 - Moderate aortic stenosis, YASMIN = 1.26 cm2, peak velocity = 3.7 m/s, mean gradient = 30.0 mmHg.     5 - Mild tricuspid regurgitation.             This document has been electronically    SIGNED BY: Esther Tellez MD On: 09/24/2016 13:25    and Transthoracic echo (TTE) complete (Cupid Only):   Results for orders placed or performed during the hospital encounter of 09/11/23   Echo   Result Value Ref Range    BSA 2.09 m2    LVOT stroke volume 39.70 cm3    LVIDd 3.26 (A) 3.5 - 6.0 cm    LV Systolic Volume 14.40 mL    LV Systolic Volume Index 7.2 mL/m2    LVIDs 2.10 2.1 - 4.0 cm    LV Diastolic Volume 42.80 mL    LV Diastolic Volume Index 21.29 mL/m2    IVS 1.71 (A) 0.6 - 1.1 cm    LVOT diameter 1.70 cm    LVOT area 2.3 cm2    FS 36 28 - 44 %    Left Ventricle Relative Wall Thickness 1.07 cm    Posterior Wall 1.75 (A) 0.6 - 1.1 cm    LV mass 224.26 g    LV Mass Index 112 g/m2    MV Peak E Adarsh 1.65 m/s    TDI LATERAL  0.09 m/s    TDI SEPTAL 0.07 m/s    E/E' ratio 20.63 m/s    TR Max Adarsh 2.55 m/s    LV SEPTAL E/E' RATIO 23.57 m/s    LV LATERAL E/E' RATIO 18.33 m/s    LVOT peak adarsh 0.87 m/s    Left Ventricular Outflow Tract Mean Velocity 0.61 cm/s    Left Ventricular Outflow Tract Mean Gradient 2.00 mmHg    AV mean gradient 13 mmHg    AV peak gradient 23 mmHg    Ao peak adarsh 2.42 m/s    Ao VTI 46.20 cm    LVOT peak VTI 17.50 cm    AV valve area 0.86 cm²    AV Velocity Ratio 0.36     AV index (prosthetic) 0.38     YASMIN by Velocity Ratio 0.82 cm²    MV mean gradient 6 mmHg    MV peak gradient 13 mmHg    MV valve area by continuity eq 1.00 cm2    MV VTI 39.6 cm    Triscuspid Valve Regurgitation Peak Gradient 26 mmHg    PV PEAK VELOCITY 0.77 m/s    PV peak gradient 2 mmHg    Pulmonary Valve Mean Velocity 0.57 m/s    Ao root annulus 2.80 cm    Mean e' 0.08 m/s    ZLVIDS -4.29     ZLVIDD -5.91     AORTIC VALVE CUSP SEPERATION 0.80 cm    TV resting pulmonary artery pressure 29 mmHg    RV TB RVSP 6 mmHg    Est. RA pres 3 mmHg    Narrative      Left Ventricle: The left ventricle is smaller than normal. Moderately   increased ventricular mass. Moderately increased wall thickness. There is   moderate concentric hypertrophy. Normal wall motion. There is normal   systolic function with a visually estimated ejection fraction of 65 - 70%.   Grade II diastolic dysfunction.    Left Atrium: Left atrium is moderately dilated.    Right Ventricle: Normal right ventricular cavity size. Wall thickness   is normal. Right ventricle wall motion  is normal. Systolic function is   normal.    Aortic Valve: The aortic valve is a trileaflet valve. Moderately   calcified cusps. There is moderate stenosis. Aortic valve area by VTI is   0.86 cm². Aortic valve peak velocity is 2.42 m/s. Mean gradient is 13   mmHg. The dimensionless index is 0.38.    Mitral Valve: There is severe mitral annular calcification present.    Tricuspid Valve: There is moderate  regurgitation.    IVC/SVC: Normal venous pressure at 3 mmHg.         Pending Diagnostic Studies:     None         Medications:  Reconciled Home Medications:      Medication List      START taking these medications    digoxin 125 mcg tablet  Commonly known as: LANOXIN  Take 1 tablet (0.125 mg total) by mouth once daily.  Start taking on: September 16, 2023     pantoprazole 40 MG tablet  Commonly known as: PROTONIX  Take 1 tablet (40 mg total) by mouth 2 (two) times daily.        CHANGE how you take these medications    ergocalciferol 50,000 unit Cap  Commonly known as: ERGOCALCIFEROL  TAKE 1 CAPSULE (50,000 UNITS TOTAL) EVERY 7 DAYS.  What changed: See the new instructions.        CONTINUE taking these medications    albuterol 90 mcg/actuation inhaler  Commonly known as: PROVENTIL/VENTOLIN HFA  Inhale 2 puffs into the lungs every 6 (six) hours as needed for Shortness of Breath.     aspirin 81 MG EC tablet  Commonly known as: ECOTRIN  Take 81 mg by mouth once daily.     BREO ELLIPTA 100-25 mcg/dose diskus inhaler  Generic drug: fluticasone furoate-vilanteroL  Inhale 1 puff into the lungs once daily. Controller     calcium carbonate 500 mg calcium (1,250 mg) tablet  Commonly known as: OS-TK  Take 1 tablet (500 mg total) by mouth once daily.     carvediloL 12.5 MG tablet  Commonly known as: COREG  Take 12.5 mg by mouth 2 (two) times daily.     citalopram 40 MG tablet  Commonly known as: CeleXA  Take 1 tablet (40 mg total) by mouth once daily.     clopidogreL 75 mg tablet  Commonly known as: PLAVIX  Take 1 tablet (75 mg total) by mouth once daily.     diltiaZEM 120 MG Cdcr  Commonly known as: DILACOR XR  Take 1 capsule (120 mg total) by mouth once daily.     donepeziL 5 MG tablet  Commonly known as: ARICEPT  Take 5 mg by mouth nightly.     ferrous gluconate 324 mg (37.5 mg iron) Tab tablet  Take 324 mg by mouth once daily.     furosemide 40 MG tablet  Commonly known as: LASIX  Take 1 tablet (40 mg total) by mouth once  daily.     glucosamine-chondroitin 500-400 mg tablet  Take 1 tablet by mouth once daily.     lisinopriL 10 MG tablet  Take 1 tablet (10 mg total) by mouth once daily.     loperamide 2 mg Tab  Commonly known as: IMODIUM A-D  Take 1 tablet (2 mg total) by mouth 3 (three) times daily as needed (diarrhea). Take 2 tablets by mouth initially then 1 tablet after each loose stool as needed for diarrhea.     loratadine 10 mg tablet  Commonly known as: CLARITIN  Take 1 tablet (10 mg total) by mouth once daily.     multivitamin capsule  Take 1 capsule by mouth once daily.     oxybutynin 5 MG Tab  Commonly known as: DITROPAN  Take 1 tablet (5 mg total) by mouth 2 (two) times daily.     simvastatin 10 MG tablet  Commonly known as: ZOCOR  Take 10 mg by mouth every evening.        STOP taking these medications    omeprazole 20 MG capsule  Commonly known as: PRILOSEC            Indwelling Lines/Drains at time of discharge:   Lines/Drains/Airways     Drain  Duration           Female External Urinary Catheter 09/13/23 1332 1 day                Time spent on the discharge of patient: 35 minutes         Harjinder Hanna MD  Department of Hospital Medicine  [facility]

## 2023-09-15 NOTE — PLAN OF CARE
Pt in agreement with HH services- no preference for company. HH referral sent to [facility]- Will need orders sent upon DC    PCP apt scheduled - updated AVS  Sent in basket message to cards requesting follow up apt       09/14/23 1600   Post-Acute Status   Post-Acute Authorization Home Health   Home Health Status Referrals Sent   Patient choice form signed by patient/caregiver List from System Post-Acute Care

## 2023-09-19 ENCOUNTER — PATIENT MESSAGE (OUTPATIENT)
Dept: ADMINISTRATIVE | Facility: CLINIC | Age: 78
End: 2023-09-19
Payer: MEDICARE

## 2023-09-19 ENCOUNTER — PATIENT OUTREACH (OUTPATIENT)
Dept: ADMINISTRATIVE | Facility: CLINIC | Age: 78
End: 2023-09-19
Payer: MEDICARE

## 2023-09-19 NOTE — PROGRESS NOTES
C3 nurse attempted to contact Daryleen G Moran for a TCC post hospital discharge follow up call. No answer. Left voicemail with callback information. The patient has a scheduled HOSFU appointment with Abhi De Oliveira IV, MD on 9/20/23 @ 0900.

## 2023-09-20 ENCOUNTER — TELEPHONE (OUTPATIENT)
Dept: ADMINISTRATIVE | Facility: CLINIC | Age: 78
End: 2023-09-20
Payer: MEDICARE

## 2023-09-20 NOTE — TELEPHONE ENCOUNTER
Spoke to patient daughter who selected option 5 for HOSFU. She states she selected the option but provided me a number to call to speak with the patient. I was able to speak with the patient who states she missed her HOSFU today with her PCP due to the appointment being to early. No NP in pt area, instructed patient to contact her PCP office to reschedule the appointment. Patient stated more trouble for me and ended the call.

## 2023-09-24 ENCOUNTER — PATIENT OUTREACH (OUTPATIENT)
Dept: ADMINISTRATIVE | Facility: OTHER | Age: 78
End: 2023-09-24
Payer: MEDICARE

## 2023-09-24 ENCOUNTER — TELEPHONE (OUTPATIENT)
Dept: ADMINISTRATIVE | Facility: CLINIC | Age: 78
End: 2023-09-24
Payer: MEDICARE

## 2023-09-24 NOTE — PROGRESS NOTES
CHW - Case Closure    This Community Health Worker spoke to patient via telephone today.   Pt reported: Patient declined.   Pt denied any additional needs at this time and agrees with episode closure at this time.  Provided patient with Community Health Worker's contact information and encouraged her to contact this Community Health Worker if additional needs arise.

## 2023-09-25 PROBLEM — J96.10 CHRONIC RESPIRATORY FAILURE: Status: RESOLVED | Noted: 2023-06-24 | Resolved: 2023-09-25

## 2023-10-11 ENCOUNTER — DOCUMENT SCAN (OUTPATIENT)
Dept: HOME HEALTH SERVICES | Facility: HOSPITAL | Age: 78
End: 2023-10-11
Payer: MEDICARE

## 2023-10-12 ENCOUNTER — EXTERNAL HOME HEALTH (OUTPATIENT)
Dept: HOME HEALTH SERVICES | Facility: HOSPITAL | Age: 78
End: 2023-10-12
Payer: MEDICARE

## 2023-10-13 ENCOUNTER — DOCUMENT SCAN (OUTPATIENT)
Dept: HOME HEALTH SERVICES | Facility: HOSPITAL | Age: 78
End: 2023-10-13
Payer: MEDICARE

## 2023-10-18 ENCOUNTER — HOSPITAL ENCOUNTER (INPATIENT)
Facility: HOSPITAL | Age: 78
LOS: 3 days | Discharge: HOME HEALTH | DRG: 291 | End: 2023-10-21
Attending: STUDENT IN AN ORGANIZED HEALTH CARE EDUCATION/TRAINING PROGRAM | Admitting: STUDENT IN AN ORGANIZED HEALTH CARE EDUCATION/TRAINING PROGRAM
Payer: MEDICARE

## 2023-10-18 DIAGNOSIS — I48.91 ATRIAL FIBRILLATION WITH RVR: ICD-10-CM

## 2023-10-18 DIAGNOSIS — I50.9 ACUTE ON CHRONIC CONGESTIVE HEART FAILURE, UNSPECIFIED HEART FAILURE TYPE: Primary | ICD-10-CM

## 2023-10-18 DIAGNOSIS — R06.02 SHORTNESS OF BREATH: ICD-10-CM

## 2023-10-18 DIAGNOSIS — I48.91 A-FIB: ICD-10-CM

## 2023-10-18 DIAGNOSIS — Q27.30 AVM (ARTERIOVENOUS MALFORMATION): ICD-10-CM

## 2023-10-18 PROBLEM — D64.9 ANEMIA: Status: ACTIVE | Noted: 2023-10-18

## 2023-10-18 LAB
ALBUMIN SERPL BCP-MCNC: 4 G/DL (ref 3.5–5.2)
ALP SERPL-CCNC: 67 U/L (ref 55–135)
ALT SERPL W/O P-5'-P-CCNC: 17 U/L (ref 10–44)
ANION GAP SERPL CALC-SCNC: 6 MMOL/L (ref 8–16)
AST SERPL-CCNC: 15 U/L (ref 10–40)
BASOPHILS # BLD AUTO: 0.07 K/UL (ref 0–0.2)
BASOPHILS NFR BLD: 1 % (ref 0–1.9)
BILIRUB SERPL-MCNC: 1.8 MG/DL (ref 0.1–1)
BNP SERPL-MCNC: 514 PG/ML (ref 0–99)
BUN SERPL-MCNC: 11 MG/DL (ref 8–23)
CALCIUM SERPL-MCNC: 9.5 MG/DL (ref 8.7–10.5)
CHLORIDE SERPL-SCNC: 95 MMOL/L (ref 95–110)
CO2 SERPL-SCNC: 34 MMOL/L (ref 23–29)
CREAT SERPL-MCNC: 0.4 MG/DL (ref 0.5–1.4)
DIFFERENTIAL METHOD: ABNORMAL
EOSINOPHIL # BLD AUTO: 0.1 K/UL (ref 0–0.5)
EOSINOPHIL NFR BLD: 1.3 % (ref 0–8)
ERYTHROCYTE [DISTWIDTH] IN BLOOD BY AUTOMATED COUNT: 21 % (ref 11.5–14.5)
EST. GFR  (NO RACE VARIABLE): >60 ML/MIN/1.73 M^2
GLUCOSE SERPL-MCNC: 112 MG/DL (ref 70–110)
HCT VFR BLD AUTO: 25.6 % (ref 37–48.5)
HGB BLD-MCNC: 7.1 G/DL (ref 12–16)
IMM GRANULOCYTES # BLD AUTO: 0.02 K/UL (ref 0–0.04)
IMM GRANULOCYTES NFR BLD AUTO: 0.3 % (ref 0–0.5)
LYMPHOCYTES # BLD AUTO: 0.8 K/UL (ref 1–4.8)
LYMPHOCYTES NFR BLD: 11.9 % (ref 18–48)
MCH RBC QN AUTO: 22.5 PG (ref 27–31)
MCHC RBC AUTO-ENTMCNC: 27.7 G/DL (ref 32–36)
MCV RBC AUTO: 81 FL (ref 82–98)
MONOCYTES # BLD AUTO: 0.7 K/UL (ref 0.3–1)
MONOCYTES NFR BLD: 9.2 % (ref 4–15)
NEUTROPHILS # BLD AUTO: 5.4 K/UL (ref 1.8–7.7)
NEUTROPHILS NFR BLD: 76.3 % (ref 38–73)
NRBC BLD-RTO: 0 /100 WBC
PLATELET # BLD AUTO: 201 K/UL (ref 150–450)
PMV BLD AUTO: 11.1 FL (ref 9.2–12.9)
POTASSIUM SERPL-SCNC: 4 MMOL/L (ref 3.5–5.1)
PROT SERPL-MCNC: 6.6 G/DL (ref 6–8.4)
RBC # BLD AUTO: 3.15 M/UL (ref 4–5.4)
SODIUM SERPL-SCNC: 135 MMOL/L (ref 136–145)
TROPONIN I SERPL HS-MCNC: 36.2 PG/ML (ref 0–14.9)
WBC # BLD AUTO: 7.08 K/UL (ref 3.9–12.7)

## 2023-10-18 PROCEDURE — 99900035 HC TECH TIME PER 15 MIN (STAT)

## 2023-10-18 PROCEDURE — 21000000 HC CCU ICU ROOM CHARGE

## 2023-10-18 PROCEDURE — 83880 ASSAY OF NATRIURETIC PEPTIDE: CPT | Performed by: STUDENT IN AN ORGANIZED HEALTH CARE EDUCATION/TRAINING PROGRAM

## 2023-10-18 PROCEDURE — 96375 TX/PRO/DX INJ NEW DRUG ADDON: CPT

## 2023-10-18 PROCEDURE — 93010 ELECTROCARDIOGRAM REPORT: CPT | Mod: ,,, | Performed by: INTERNAL MEDICINE

## 2023-10-18 PROCEDURE — 25000003 PHARM REV CODE 250: Performed by: STUDENT IN AN ORGANIZED HEALTH CARE EDUCATION/TRAINING PROGRAM

## 2023-10-18 PROCEDURE — 96374 THER/PROPH/DIAG INJ IV PUSH: CPT

## 2023-10-18 PROCEDURE — 84484 ASSAY OF TROPONIN QUANT: CPT | Performed by: STUDENT IN AN ORGANIZED HEALTH CARE EDUCATION/TRAINING PROGRAM

## 2023-10-18 PROCEDURE — 63600175 PHARM REV CODE 636 W HCPCS: Performed by: STUDENT IN AN ORGANIZED HEALTH CARE EDUCATION/TRAINING PROGRAM

## 2023-10-18 PROCEDURE — 85025 COMPLETE CBC W/AUTO DIFF WBC: CPT | Performed by: STUDENT IN AN ORGANIZED HEALTH CARE EDUCATION/TRAINING PROGRAM

## 2023-10-18 PROCEDURE — 27000221 HC OXYGEN, UP TO 24 HOURS

## 2023-10-18 PROCEDURE — 93010 EKG 12-LEAD: ICD-10-PCS | Mod: ,,, | Performed by: INTERNAL MEDICINE

## 2023-10-18 PROCEDURE — 99291 CRITICAL CARE FIRST HOUR: CPT

## 2023-10-18 PROCEDURE — 94761 N-INVAS EAR/PLS OXIMETRY MLT: CPT

## 2023-10-18 PROCEDURE — 80053 COMPREHEN METABOLIC PANEL: CPT | Performed by: STUDENT IN AN ORGANIZED HEALTH CARE EDUCATION/TRAINING PROGRAM

## 2023-10-18 PROCEDURE — 99900031 HC PATIENT EDUCATION (STAT)

## 2023-10-18 PROCEDURE — 93005 ELECTROCARDIOGRAM TRACING: CPT | Performed by: INTERNAL MEDICINE

## 2023-10-18 RX ORDER — DILTIAZEM HYDROCHLORIDE 120 MG/1
120 CAPSULE, COATED, EXTENDED RELEASE ORAL
Status: COMPLETED | OUTPATIENT
Start: 2023-10-18 | End: 2023-10-18

## 2023-10-18 RX ORDER — CLOPIDOGREL BISULFATE 75 MG/1
75 TABLET ORAL DAILY
Status: DISCONTINUED | OUTPATIENT
Start: 2023-10-19 | End: 2023-10-19

## 2023-10-18 RX ORDER — FUROSEMIDE 10 MG/ML
40 INJECTION INTRAMUSCULAR; INTRAVENOUS
Status: COMPLETED | OUTPATIENT
Start: 2023-10-18 | End: 2023-10-18

## 2023-10-18 RX ORDER — ASPIRIN 81 MG/1
81 TABLET ORAL DAILY
Status: DISCONTINUED | OUTPATIENT
Start: 2023-10-19 | End: 2023-10-19

## 2023-10-18 RX ORDER — ATORVASTATIN CALCIUM 20 MG/1
20 TABLET, FILM COATED ORAL NIGHTLY
Status: DISCONTINUED | OUTPATIENT
Start: 2023-10-18 | End: 2023-10-21 | Stop reason: HOSPADM

## 2023-10-18 RX ORDER — DIGOXIN 125 MCG
0.12 TABLET ORAL DAILY
Status: DISCONTINUED | OUTPATIENT
Start: 2023-10-19 | End: 2023-10-21 | Stop reason: HOSPADM

## 2023-10-18 RX ORDER — ALBUTEROL SULFATE 90 UG/1
2 AEROSOL, METERED RESPIRATORY (INHALATION) EVERY 6 HOURS PRN
Status: DISCONTINUED | OUTPATIENT
Start: 2023-10-18 | End: 2023-10-18

## 2023-10-18 RX ORDER — LISINOPRIL 10 MG/1
10 TABLET ORAL DAILY
Status: DISCONTINUED | OUTPATIENT
Start: 2023-10-19 | End: 2023-10-19

## 2023-10-18 RX ORDER — ACETAMINOPHEN 325 MG/1
650 TABLET ORAL EVERY 8 HOURS PRN
Status: DISCONTINUED | OUTPATIENT
Start: 2023-10-18 | End: 2023-10-21 | Stop reason: HOSPADM

## 2023-10-18 RX ORDER — BUDESONIDE 0.5 MG/2ML
0.5 INHALANT ORAL EVERY 12 HOURS
Status: DISCONTINUED | OUTPATIENT
Start: 2023-10-19 | End: 2023-10-21 | Stop reason: HOSPADM

## 2023-10-18 RX ORDER — ALBUTEROL SULFATE 0.83 MG/ML
2.5 SOLUTION RESPIRATORY (INHALATION) EVERY 6 HOURS PRN
Status: DISCONTINUED | OUTPATIENT
Start: 2023-10-18 | End: 2023-10-21 | Stop reason: HOSPADM

## 2023-10-18 RX ORDER — PANTOPRAZOLE SODIUM 40 MG/1
40 TABLET, DELAYED RELEASE ORAL 2 TIMES DAILY
Status: DISCONTINUED | OUTPATIENT
Start: 2023-10-18 | End: 2023-10-21 | Stop reason: HOSPADM

## 2023-10-18 RX ORDER — SODIUM CHLORIDE 0.9 % (FLUSH) 0.9 %
10 SYRINGE (ML) INJECTION EVERY 12 HOURS
Status: DISCONTINUED | OUTPATIENT
Start: 2023-10-18 | End: 2023-10-21 | Stop reason: HOSPADM

## 2023-10-18 RX ORDER — CARVEDILOL 12.5 MG/1
12.5 TABLET ORAL 2 TIMES DAILY
Status: DISCONTINUED | OUTPATIENT
Start: 2023-10-18 | End: 2023-10-21 | Stop reason: HOSPADM

## 2023-10-18 RX ORDER — ARFORMOTEROL TARTRATE 15 UG/2ML
15 SOLUTION RESPIRATORY (INHALATION) 2 TIMES DAILY
Status: DISCONTINUED | OUTPATIENT
Start: 2023-10-19 | End: 2023-10-21 | Stop reason: HOSPADM

## 2023-10-18 RX ORDER — ONDANSETRON 2 MG/ML
4 INJECTION INTRAMUSCULAR; INTRAVENOUS EVERY 8 HOURS PRN
Status: DISCONTINUED | OUTPATIENT
Start: 2023-10-18 | End: 2023-10-21 | Stop reason: HOSPADM

## 2023-10-18 RX ORDER — FUROSEMIDE 10 MG/ML
40 INJECTION INTRAMUSCULAR; INTRAVENOUS
Status: DISCONTINUED | OUTPATIENT
Start: 2023-10-19 | End: 2023-10-19

## 2023-10-18 RX ORDER — DONEPEZIL HYDROCHLORIDE 5 MG/1
5 TABLET, FILM COATED ORAL NIGHTLY
Status: DISCONTINUED | OUTPATIENT
Start: 2023-10-18 | End: 2023-10-21 | Stop reason: HOSPADM

## 2023-10-18 RX ORDER — CITALOPRAM 20 MG/1
20 TABLET, FILM COATED ORAL DAILY
Status: DISCONTINUED | OUTPATIENT
Start: 2023-10-19 | End: 2023-10-21 | Stop reason: HOSPADM

## 2023-10-18 RX ORDER — FLUTICASONE FUROATE AND VILANTEROL 100; 25 UG/1; UG/1
1 POWDER RESPIRATORY (INHALATION) DAILY
Status: DISCONTINUED | OUTPATIENT
Start: 2023-10-19 | End: 2023-10-18

## 2023-10-18 RX ORDER — DILTIAZEM HYDROCHLORIDE 5 MG/ML
20 INJECTION INTRAVENOUS
Status: COMPLETED | OUTPATIENT
Start: 2023-10-18 | End: 2023-10-18

## 2023-10-18 RX ORDER — OXYBUTYNIN CHLORIDE 5 MG/1
5 TABLET ORAL 2 TIMES DAILY
Status: DISCONTINUED | OUTPATIENT
Start: 2023-10-18 | End: 2023-10-21 | Stop reason: HOSPADM

## 2023-10-18 RX ADMIN — DEXTROSE MONOHYDRATE 5 MG/HR: 50 INJECTION, SOLUTION INTRAVENOUS at 10:10

## 2023-10-18 RX ADMIN — DONEPEZIL HYDROCHLORIDE 5 MG: 5 TABLET ORAL at 10:10

## 2023-10-18 RX ADMIN — DILTIAZEM HYDROCHLORIDE 120 MG: 120 CAPSULE, COATED, EXTENDED RELEASE ORAL at 05:10

## 2023-10-18 RX ADMIN — ATORVASTATIN CALCIUM 20 MG: 20 TABLET, FILM COATED ORAL at 10:10

## 2023-10-18 RX ADMIN — CARVEDILOL 12.5 MG: 12.5 TABLET, FILM COATED ORAL at 10:10

## 2023-10-18 RX ADMIN — FUROSEMIDE 40 MG: 10 INJECTION, SOLUTION INTRAMUSCULAR; INTRAVENOUS at 07:10

## 2023-10-18 RX ADMIN — OXYBUTYNIN CHLORIDE 5 MG: 5 TABLET ORAL at 10:10

## 2023-10-18 RX ADMIN — DILTIAZEM HYDROCHLORIDE 20 MG: 5 INJECTION, SOLUTION INTRAVENOUS at 05:10

## 2023-10-18 RX ADMIN — PANTOPRAZOLE SODIUM 40 MG: 40 TABLET, DELAYED RELEASE ORAL at 10:10

## 2023-10-19 LAB
ABO + RH BLD: NORMAL
ALBUMIN SERPL BCP-MCNC: 3.7 G/DL (ref 3.5–5.2)
ALP SERPL-CCNC: 64 U/L (ref 55–135)
ALT SERPL W/O P-5'-P-CCNC: 15 U/L (ref 10–44)
ANION GAP SERPL CALC-SCNC: 7 MMOL/L (ref 8–16)
AST SERPL-CCNC: 14 U/L (ref 10–40)
BILIRUB SERPL-MCNC: 1.3 MG/DL (ref 0.1–1)
BLD GP AB SCN CELLS X3 SERPL QL: NORMAL
BLD PROD TYP BPU: NORMAL
BLOOD UNIT EXPIRATION DATE: NORMAL
BLOOD UNIT TYPE CODE: 6200
BLOOD UNIT TYPE: NORMAL
BUN SERPL-MCNC: 18 MG/DL (ref 8–23)
C DIFF GDH STL QL: NEGATIVE
C DIFF TOX A+B STL QL IA: NEGATIVE
CALCIUM SERPL-MCNC: 9.1 MG/DL (ref 8.7–10.5)
CHLORIDE SERPL-SCNC: 93 MMOL/L (ref 95–110)
CO2 SERPL-SCNC: 35 MMOL/L (ref 23–29)
CODING SYSTEM: NORMAL
CREAT SERPL-MCNC: 0.7 MG/DL (ref 0.5–1.4)
CROSSMATCH INTERPRETATION: NORMAL
DIGOXIN SERPL-MCNC: 0.3 NG/ML (ref 0.8–2)
DISPENSE STATUS: NORMAL
ERYTHROCYTE [DISTWIDTH] IN BLOOD BY AUTOMATED COUNT: 21.2 % (ref 11.5–14.5)
EST. GFR  (NO RACE VARIABLE): >60 ML/MIN/1.73 M^2
GLUCOSE SERPL-MCNC: 124 MG/DL (ref 70–110)
HCT VFR BLD AUTO: 25.6 % (ref 37–48.5)
HGB BLD-MCNC: 7 G/DL (ref 12–16)
HGB BLD-MCNC: 7.9 G/DL (ref 12–16)
MAGNESIUM SERPL-MCNC: 1.8 MG/DL (ref 1.6–2.6)
MCH RBC QN AUTO: 22.3 PG (ref 27–31)
MCHC RBC AUTO-ENTMCNC: 27.3 G/DL (ref 32–36)
MCV RBC AUTO: 82 FL (ref 82–98)
NUM UNITS TRANS PACKED RBC: NORMAL
OB PNL STL: POSITIVE
PLATELET # BLD AUTO: 213 K/UL (ref 150–450)
PMV BLD AUTO: 11 FL (ref 9.2–12.9)
POTASSIUM SERPL-SCNC: 3.7 MMOL/L (ref 3.5–5.1)
PROT SERPL-MCNC: 6.3 G/DL (ref 6–8.4)
RBC # BLD AUTO: 3.14 M/UL (ref 4–5.4)
SODIUM SERPL-SCNC: 135 MMOL/L (ref 136–145)
SPECIMEN OUTDATE: NORMAL
TROPONIN I SERPL HS-MCNC: 33.5 PG/ML (ref 0–14.9)
WBC # BLD AUTO: 7.77 K/UL (ref 3.9–12.7)

## 2023-10-19 PROCEDURE — 87449 NOS EACH ORGANISM AG IA: CPT | Performed by: STUDENT IN AN ORGANIZED HEALTH CARE EDUCATION/TRAINING PROGRAM

## 2023-10-19 PROCEDURE — 94799 UNLISTED PULMONARY SVC/PX: CPT

## 2023-10-19 PROCEDURE — 80053 COMPREHEN METABOLIC PANEL: CPT | Performed by: STUDENT IN AN ORGANIZED HEALTH CARE EDUCATION/TRAINING PROGRAM

## 2023-10-19 PROCEDURE — 84484 ASSAY OF TROPONIN QUANT: CPT | Performed by: STUDENT IN AN ORGANIZED HEALTH CARE EDUCATION/TRAINING PROGRAM

## 2023-10-19 PROCEDURE — C1751 CATH, INF, PER/CENT/MIDLINE: HCPCS

## 2023-10-19 PROCEDURE — 99223 PR INITIAL HOSPITAL CARE,LEVL III: ICD-10-PCS | Mod: ,,, | Performed by: INTERNAL MEDICINE

## 2023-10-19 PROCEDURE — 63600175 PHARM REV CODE 636 W HCPCS: Performed by: STUDENT IN AN ORGANIZED HEALTH CARE EDUCATION/TRAINING PROGRAM

## 2023-10-19 PROCEDURE — 80162 ASSAY OF DIGOXIN TOTAL: CPT | Performed by: STUDENT IN AN ORGANIZED HEALTH CARE EDUCATION/TRAINING PROGRAM

## 2023-10-19 PROCEDURE — 25000003 PHARM REV CODE 250: Performed by: INTERNAL MEDICINE

## 2023-10-19 PROCEDURE — 99223 1ST HOSP IP/OBS HIGH 75: CPT | Mod: ,,, | Performed by: INTERNAL MEDICINE

## 2023-10-19 PROCEDURE — 27000221 HC OXYGEN, UP TO 24 HOURS

## 2023-10-19 PROCEDURE — 25000242 PHARM REV CODE 250 ALT 637 W/ HCPCS: Performed by: STUDENT IN AN ORGANIZED HEALTH CARE EDUCATION/TRAINING PROGRAM

## 2023-10-19 PROCEDURE — 83735 ASSAY OF MAGNESIUM: CPT

## 2023-10-19 PROCEDURE — 94761 N-INVAS EAR/PLS OXIMETRY MLT: CPT

## 2023-10-19 PROCEDURE — 93010 EKG 12-LEAD: ICD-10-PCS | Mod: ,,, | Performed by: INTERNAL MEDICINE

## 2023-10-19 PROCEDURE — 82272 OCCULT BLD FECES 1-3 TESTS: CPT | Performed by: STUDENT IN AN ORGANIZED HEALTH CARE EDUCATION/TRAINING PROGRAM

## 2023-10-19 PROCEDURE — 25000003 PHARM REV CODE 250: Performed by: STUDENT IN AN ORGANIZED HEALTH CARE EDUCATION/TRAINING PROGRAM

## 2023-10-19 PROCEDURE — 99900031 HC PATIENT EDUCATION (STAT)

## 2023-10-19 PROCEDURE — 94640 AIRWAY INHALATION TREATMENT: CPT

## 2023-10-19 PROCEDURE — 93010 ELECTROCARDIOGRAM REPORT: CPT | Mod: ,,, | Performed by: INTERNAL MEDICINE

## 2023-10-19 PROCEDURE — 99900035 HC TECH TIME PER 15 MIN (STAT)

## 2023-10-19 PROCEDURE — 93005 ELECTROCARDIOGRAM TRACING: CPT | Performed by: INTERNAL MEDICINE

## 2023-10-19 PROCEDURE — 85027 COMPLETE CBC AUTOMATED: CPT | Performed by: STUDENT IN AN ORGANIZED HEALTH CARE EDUCATION/TRAINING PROGRAM

## 2023-10-19 PROCEDURE — P9016 RBC LEUKOCYTES REDUCED: HCPCS | Performed by: STUDENT IN AN ORGANIZED HEALTH CARE EDUCATION/TRAINING PROGRAM

## 2023-10-19 PROCEDURE — 36415 COLL VENOUS BLD VENIPUNCTURE: CPT

## 2023-10-19 PROCEDURE — 36430 TRANSFUSION BLD/BLD COMPNT: CPT

## 2023-10-19 PROCEDURE — 36415 COLL VENOUS BLD VENIPUNCTURE: CPT | Performed by: STUDENT IN AN ORGANIZED HEALTH CARE EDUCATION/TRAINING PROGRAM

## 2023-10-19 PROCEDURE — A4216 STERILE WATER/SALINE, 10 ML: HCPCS | Performed by: STUDENT IN AN ORGANIZED HEALTH CARE EDUCATION/TRAINING PROGRAM

## 2023-10-19 PROCEDURE — 86850 RBC ANTIBODY SCREEN: CPT | Performed by: STUDENT IN AN ORGANIZED HEALTH CARE EDUCATION/TRAINING PROGRAM

## 2023-10-19 PROCEDURE — 85018 HEMOGLOBIN: CPT | Performed by: STUDENT IN AN ORGANIZED HEALTH CARE EDUCATION/TRAINING PROGRAM

## 2023-10-19 PROCEDURE — 21000000 HC CCU ICU ROOM CHARGE

## 2023-10-19 PROCEDURE — 86920 COMPATIBILITY TEST SPIN: CPT | Performed by: STUDENT IN AN ORGANIZED HEALTH CARE EDUCATION/TRAINING PROGRAM

## 2023-10-19 PROCEDURE — 63600175 PHARM REV CODE 636 W HCPCS

## 2023-10-19 RX ORDER — HYDROCODONE BITARTRATE AND ACETAMINOPHEN 500; 5 MG/1; MG/1
TABLET ORAL
Status: DISCONTINUED | OUTPATIENT
Start: 2023-10-19 | End: 2023-10-21 | Stop reason: HOSPADM

## 2023-10-19 RX ORDER — LANOLIN ALCOHOL/MO/W.PET/CERES
400 CREAM (GRAM) TOPICAL EVERY 4 HOURS PRN
Status: DISCONTINUED | OUTPATIENT
Start: 2023-10-19 | End: 2023-10-21 | Stop reason: HOSPADM

## 2023-10-19 RX ORDER — POTASSIUM CHLORIDE 20 MEQ/1
20 TABLET, EXTENDED RELEASE ORAL
Status: DISCONTINUED | OUTPATIENT
Start: 2023-10-19 | End: 2023-10-21 | Stop reason: HOSPADM

## 2023-10-19 RX ORDER — LANOLIN ALCOHOL/MO/W.PET/CERES
800 CREAM (GRAM) TOPICAL EVERY 4 HOURS PRN
Status: DISCONTINUED | OUTPATIENT
Start: 2023-10-19 | End: 2023-10-21 | Stop reason: HOSPADM

## 2023-10-19 RX ORDER — FUROSEMIDE 10 MG/ML
20 INJECTION INTRAMUSCULAR; INTRAVENOUS
Status: DISCONTINUED | OUTPATIENT
Start: 2023-10-19 | End: 2023-10-21 | Stop reason: HOSPADM

## 2023-10-19 RX ADMIN — ARFORMOTEROL TARTRATE 15 MCG: 15 SOLUTION RESPIRATORY (INHALATION) at 07:10

## 2023-10-19 RX ADMIN — CARVEDILOL 12.5 MG: 12.5 TABLET, FILM COATED ORAL at 08:10

## 2023-10-19 RX ADMIN — SODIUM CHLORIDE 10 ML: 9 INJECTION INTRAMUSCULAR; INTRAVENOUS; SUBCUTANEOUS at 09:10

## 2023-10-19 RX ADMIN — ATORVASTATIN CALCIUM 20 MG: 20 TABLET, FILM COATED ORAL at 08:10

## 2023-10-19 RX ADMIN — PANTOPRAZOLE SODIUM 40 MG: 40 TABLET, DELAYED RELEASE ORAL at 08:10

## 2023-10-19 RX ADMIN — OXYBUTYNIN CHLORIDE 5 MG: 5 TABLET ORAL at 09:10

## 2023-10-19 RX ADMIN — CITALOPRAM HYDROBROMIDE 20 MG: 20 TABLET ORAL at 09:10

## 2023-10-19 RX ADMIN — DIGOXIN 0.12 MG: 125 TABLET ORAL at 09:10

## 2023-10-19 RX ADMIN — OXYBUTYNIN CHLORIDE 5 MG: 5 TABLET ORAL at 08:10

## 2023-10-19 RX ADMIN — CLOPIDOGREL BISULFATE 75 MG: 75 TABLET, FILM COATED ORAL at 09:10

## 2023-10-19 RX ADMIN — PANTOPRAZOLE SODIUM 40 MG: 40 TABLET, DELAYED RELEASE ORAL at 09:10

## 2023-10-19 RX ADMIN — BUDESONIDE INHALATION 0.5 MG: 0.5 SUSPENSION RESPIRATORY (INHALATION) at 07:10

## 2023-10-19 RX ADMIN — ASPIRIN 81 MG: 81 TABLET, COATED ORAL at 09:10

## 2023-10-19 RX ADMIN — FUROSEMIDE 40 MG: 10 INJECTION, SOLUTION INTRAMUSCULAR; INTRAVENOUS at 09:10

## 2023-10-19 RX ADMIN — CARVEDILOL 12.5 MG: 12.5 TABLET, FILM COATED ORAL at 09:10

## 2023-10-19 RX ADMIN — DONEPEZIL HYDROCHLORIDE 5 MG: 5 TABLET ORAL at 08:10

## 2023-10-19 RX ADMIN — Medication 400 MG: at 02:10

## 2023-10-19 RX ADMIN — POTASSIUM CHLORIDE 20 MEQ: 1500 TABLET, EXTENDED RELEASE ORAL at 05:10

## 2023-10-19 RX ADMIN — LISINOPRIL 10 MG: 10 TABLET ORAL at 09:10

## 2023-10-19 RX ADMIN — FUROSEMIDE 20 MG: 10 INJECTION, SOLUTION INTRAMUSCULAR; INTRAVENOUS at 08:10

## 2023-10-19 NOTE — SUBJECTIVE & OBJECTIVE
Interval History: Patient seen and examined. ARAVIND Started with diarrhea a few days ago still present. Afib controlled this afternoon when I saw her. Did have spell of hypotension and was going to be transferred to ICU for daniel drip but her BP become appropriate enough to remain off the pressor. Got a unit of blood today.      Objective:     Vital Signs (Most Recent):  Temp: 98.6 °F (37 °C) (10/19/23 1600)  Pulse: 86 (10/19/23 1320)  Resp: (!) 47 (10/19/23 1320)  BP: (!) 93/52 (10/19/23 1600)  SpO2: 96 % (10/19/23 1500) Vital Signs (24h Range):  Temp:  [97.7 °F (36.5 °C)-98.8 °F (37.1 °C)] 98.6 °F (37 °C)  Pulse:  [] 86  Resp:  [16-49] 47  SpO2:  [94 %-99 %] 96 %  BP: ()/(46-72) 93/52     Weight: 90 kg (198 lb 6.6 oz)  Body mass index is 38.75 kg/m².    Intake/Output Summary (Last 24 hours) at 10/19/2023 1736  Last data filed at 10/19/2023 1707  Gross per 24 hour   Intake 1293.53 ml   Output 2302 ml   Net -1008.47 ml         Physical Exam  Vitals reviewed.   Constitutional:       General: She is not in acute distress.     Appearance: She is ill-appearing.   HENT:      Head: Normocephalic and atraumatic.   Cardiovascular:      Rate and Rhythm: Normal rate. Rhythm irregular.   Pulmonary:      Effort: Pulmonary effort is normal. No respiratory distress.   Abdominal:      General: Abdomen is flat. Bowel sounds are normal. There is no distension.      Palpations: Abdomen is soft.      Tenderness: There is generalized abdominal tenderness (mild). There is no guarding.   Skin:     General: Skin is warm and dry.      Coloration: Skin is pale.   Neurological:      General: No focal deficit present.      Mental Status: She is alert and oriented to person, place, and time. Mental status is at baseline.   Psychiatric:         Mood and Affect: Affect normal.         Behavior: Behavior normal.         Thought Content: Thought content normal.             Significant Labs: All pertinent labs within the past 24 hours  have been reviewed.    Significant Imaging: I have reviewed all pertinent imaging results/findings within the past 24 hours.

## 2023-10-19 NOTE — PHARMACY MED REC
"              .    Admission Medication History     The home medication history was taken by Johanny Garber.    You may go to "Admission" then "Reconcile Home Medications" tabs to review and/or act upon these items.     The home medication list has been updated by the Pharmacy department.   Please read ALL comments highlighted in yellow.   Please address this information as you see fit.    Feel free to contact us if you have any questions or require assistance.          Medications listed below were obtained from: Patient/family and Analytic software- blinkbox music  No current facility-administered medications on file prior to encounter.     Current Outpatient Medications on File Prior to Encounter   Medication Sig Dispense Refill    albuterol (PROVENTIL/VENTOLIN HFA) 90 mcg/actuation inhaler Inhale 2 puffs into the lungs every 6 (six) hours as needed for Shortness of Breath.      aspirin (ECOTRIN) 81 MG EC tablet Take 81 mg by mouth once daily.      calcium carbonate (OS-TK) 500 mg calcium (1,250 mg) tablet Take 1 tablet (500 mg total) by mouth once daily.  0    carvediloL (COREG) 12.5 MG tablet Take 12.5 mg by mouth 2 (two) times daily.      citalopram (CELEXA) 40 MG tablet Take 1 tablet (40 mg total) by mouth once daily. 90 tablet 3    clopidogreL (PLAVIX) 75 mg tablet Take 1 tablet (75 mg total) by mouth once daily. 90 tablet 2    digoxin (LANOXIN) 125 mcg tablet Take 1 tablet (0.125 mg total) by mouth once daily. 90 tablet 0    diltiaZEM (DILACOR XR) 120 MG CDCR Take 1 capsule (120 mg total) by mouth once daily. 30 capsule 11    donepeziL (ARICEPT) 5 MG tablet Take 5 mg by mouth nightly.      ergocalciferol (ERGOCALCIFEROL) 50,000 unit Cap TAKE 1 CAPSULE (50,000 UNITS TOTAL) EVERY 7 DAYS. (Patient taking differently: Take 50,000 Units by mouth every 7 days.) 12 capsule 3    ferrous gluconate 324 mg (37.5 mg iron) Tab tablet Take 324 mg by mouth once daily.      fluticasone furoate-vilanteroL (BREO ELLIPTA) 100-25 " mcg/dose diskus inhaler Inhale 1 puff into the lungs once daily. Controller      furosemide (LASIX) 40 MG tablet Take 1 tablet (40 mg total) by mouth once daily. 45 tablet 2    glucosamine-chondroitin 500-400 mg tablet Take 1 tablet by mouth once daily.      lisinopriL 10 MG tablet Take 1 tablet (10 mg total) by mouth once daily. 90 tablet 3    loperamide (IMODIUM A-D) 2 mg Tab Take 1 tablet (2 mg total) by mouth 3 (three) times daily as needed (diarrhea). Take 2 tablets by mouth initially then 1 tablet after each loose stool as needed for diarrhea. 15 tablet 0    loratadine (CLARITIN) 10 mg tablet Take 1 tablet (10 mg total) by mouth once daily.  0    multivitamin capsule Take 1 capsule by mouth once daily.      oxybutynin (DITROPAN) 5 MG Tab Take 1 tablet (5 mg total) by mouth 2 (two) times daily. 180 tablet 3    pantoprazole (PROTONIX) 40 MG tablet Take 1 tablet (40 mg total) by mouth 2 (two) times daily. 180 tablet 0    simvastatin (ZOCOR) 10 MG tablet Take 10 mg by mouth every evening.      [DISCONTINUED] ALLERGY RELIEF, FEXOFENADINE, 180 mg tablet Take 180 mg by mouth once daily.      [DISCONTINUED] ezetimibe-simvastatin 10-40 mg (VYTORIN) 10-40 mg per tablet Take 1 tablet by mouth.      [DISCONTINUED] lisinopril-hydrochlorothiazide (PRINZIDE,ZESTORETIC) 20-12.5 mg per tablet Take 1 tablet by mouth once daily.            Johanny Garber  EXT 1924

## 2023-10-19 NOTE — NURSING
Nurses Note -- 4 Eyes      10/18/2023   9:43 PM      Skin assessed during: Admit      [] No Altered Skin Integrity Present    [x]Prevention Measures Documented      [x] Yes- Altered Skin Integrity Present or Discovered   [] LDA Added if Not in Epic (Describe Wound)   [x] New Altered Skin Integrity was Present on Admit and Documented in LDA   [x] Wound Image Taken    Wound Care Consulted? No    Attending Nurse:  Ericka Smith RN/Staff Member:   AYAKA Herndon

## 2023-10-19 NOTE — PROCEDURES
18 Gx 10cm PowerGlide Midline placed to pts LEFT cephalic vein with the use of ultrasound guidance.    Ultrasound guidance: yes  Vessel Caliber: large and patent, compressibility normal  Needle advanced into vessel with real time Ultrasound guidance.  Image recorded and saved.  Sterile sheath used.  Sterile dressing applied  Dressing dated   Education provided to patient re: proper maintenance of line- pt verbalized understanding

## 2023-10-19 NOTE — CARE UPDATE
10/19/23 0737   Patient Assessment/Suction   Level of Consciousness (AVPU) alert   Respiratory Effort Normal;Unlabored   Expansion/Accessory Muscles/Retractions no use of accessory muscles;expansion symmetric   Rhythm/Pattern, Respiratory unlabored;pattern regular;depth regular   PRE-TX-O2   Device (Oxygen Therapy) nasal cannula   $ Is the patient on Low Flow Oxygen? Yes   Flow (L/min) 2   SpO2 95 %   Pulse Oximetry Type Continuous   $ Pulse Oximetry - Multiple Charge Pulse Oximetry - Multiple   Pulse 90   Resp 16   Aerosol Therapy   $ Aerosol Therapy Charges Aerosol Treatment   Daily Review of Necessity (SVN) completed   Respiratory Treatment Status (SVN) given   Patient Position (SVN) HOB elevated   Post Treatment Assessment (SVN) breath sounds unchanged   Signs of Intolerance (SVN) none   Education   $ Education Bronchodilator;15 min

## 2023-10-19 NOTE — PLAN OF CARE
[facility]  Initial Discharge Assessment       Primary Care Provider: Abhi De Oliveira IV, MD    Admission Diagnosis: Acute on chronic congestive heart failure, unspecified heart failure type [I50.9]    Admission Date: 10/18/2023  Expected Discharge Date: 10/20/2023    Transition of Care Barriers: None    Payor: HU[employer] MANAGED MEDICARE / Plan: HU[employer] MEDICARE HMO / Product Type: Capitation /     Extended Emergency Contact Information  Primary Emergency Contact: Sally Reardon  Mobile Phone: [phone]  Relation: Daughter    Discharge Plan A: Home Health  Discharge Plan B: Home with [facility] - [place], [place] - [address]  [address] [address]  Phone: [phone] Fax: [phone]    [facility]. - [place], [place] - [address]  [address]  [place] [address]  Phone: [phone] Fax: [phone]    [id number] - [place], [place] - [address] [facility] [address]  [address]  [place] [address]  Phone: [phone] Fax: [phone]    [facility]  [address]  [facility] [address]  Phone: [phone] Fax: [phone]      DC assessment completed with pt and daughter at bedside. Verified information on facesheet as correct. Lives at listed address with spouse who is not able to assist with anything due to his own health issues. Sally (daughter) is POA; she lives in [place] and checks on them 2-3x a week. PCP is Dr. De Oliveira, last apt was yesterday. Pharmacy for DC is [facility]. Active with [place] [facility]. Denies HD/outpt services. DME- oxygen (2L, [facility], portable/concentrator), bp monitor, glucometer, WC, built in SC, RW. Uses WC mainly but able to walk short distances. Does not drive. Sally provides transportation to apts and will provide transportation home upon DC. Reports taking home medications as prescribed and can currently afford them.  Denies recent inpt stay in last 30 days. DC plan is home with continued HH.     Initial Assessment (most recent)       Adult Discharge Assessment - 10/19/23 6496          Discharge Assessment    Assessment Type Discharge Planning Assessment     Confirmed/corrected address, phone number and insurance Yes     Confirmed Demographics Correct on Facesheet     Source of Information patient;family     Communicated ISAAK with patient/caregiver Yes     Reason For Admission afib with rvr     People in Home spouse     Facility Arrived From: ED     Do you expect to return to your current living situation? Yes     Do you have help at home or someone to help you manage your care at home? No     Prior to hospitilization cognitive status: Alert/Oriented     Current cognitive status: Alert/Oriented     Walking or Climbing Stairs ambulation difficulty, requires equipment     Dressing/Bathing bathing difficulty, requires equipment     Equipment Currently Used at Home wheelchair;oxygen;blood pressure machine;glucometer;shower chair     Readmission within 30 days? No     Patient currently being followed by outpatient case management? No     Do you currently have service(s) that help you manage your care at home? Yes     Name and Contact number of agency [place] [facility]     Is the pt/caregiver preference to resume services with current agency Yes     Do you take prescription medications? Yes     Do you have prescription coverage? Yes     Do you have any problems affording any of your prescribed medications? No     Is the patient taking medications as prescribed? yes     Who is going to help you get home at discharge? daughter     How do you get to doctors appointments? family or friend will provide     Are you on dialysis? No     Do you take coumadin? No     DME Needed Upon Discharge  none     Discharge Plan discussed with: Patient;Adult children     Transition of Care Barriers None     Discharge Plan A Home Health     Discharge Plan B Home  with family

## 2023-10-19 NOTE — CONSULTS
"GASTROENTEROLOGY INPATIENT CONSULT NOTE  Patient Name: Daryleen G Moran  Patient MRN: [id number]  Patient : 1945    Admit Date: 10/18/2023  Service date: 10/19/2023    Reason for Consult: anemia    PCP: Abhi De Oliveira IV, MD    Chief Complaint   Patient presents with    Atrial Fibrillation     Pt's daughter reports " we were at the doctor's office and she was in AFIB the whole time" reports worsening SOB for weeks, pt is home oxygen at 2LNC,   Hx: Afib, COPD, CHF, Dementia        HPI: Patient is a 78 y.o. female with PMHx  Afib, CHF, anticoagulated on aspirin and Plavix with history of AVMs as noted below presented to the hospital with shortness of breath and symptomatic anemia.  Shortness of breath better now.  Has no complaints.  Denies overt bleeding.    CHART REVIEW:  EGD & Colonoscopy 2023: 2 small AVMs treated with APC. Colonoscopy normal.         The esophagus was normal.        A single 2 mm angioectasia with no bleeding was found in the gastric        antrum. Coagulation for tissue destruction using argon plasma at 0.8        liters/minute and 20 jeronimo was successful.        A single 3 mm angioectasia with bleeding was found in the second        portion of the duodenum. Coagulation for hemostasis using argon        plasma at 0.8 liters/minute and 20 jeronimo was successful.        The exam was otherwise without abnormality.     Past Medical History:  Past Medical History:   Diagnosis Date    Anesthesia complication     BLADDER DYSFUNCTION    Aortic stenosis     Arthritis     Back pain     Diabetes mellitus type II     NO LONGER DIABETIC    Encounter for blood transfusion     Hyperlipidemia     Hypertension     NSTEMI (non-ST elevated myocardial infarction) 2022    Osteoporosis     Wears glasses         Past Surgical History:  Past Surgical History:   Procedure Laterality Date     vocal cord nodules removed  long time ago     twice    ADRENAL TUMOR      APPENDECTOMY  within last 5yrs    " CATHETERIZATION OF BOTH LEFT AND RIGHT HEART Left 05/06/2022    Procedure: CATHETERIZATION, HEART, BOTH LEFT AND RIGHT;  Surgeon: Michelet Vargas MD;  Location: [facility] CATH/EP LAB;  Service: Cardiology;  Laterality: Left;    COLONOSCOPY N/A 6/23/2023    Procedure: COLONOSCOPY;  Surgeon: Joel Neal III, MD;  Location: [facility] ENDO;  Service: Endoscopy;  Laterality: N/A;    FIXATION KYPHOPLASTY THORACIC SPINE      8-20-13    FOOT SURGERY      left 2nd toe was too long     HERNIA REPAIR  within last 5yrs    INSERTION OF TEMPORARY PACEMAKER N/A 1/4/2023    Procedure: INSERTION, PACEMAKER, TEMPORARY;  Surgeon: Bhavesh Peña MD;  [facility];  Service: Cardiology;  Laterality: N/A;    LEFT HEART CATHETERIZATION Left 05/02/2022    Procedure: Left heart cath;  Surgeon: Jose Ecohls MD;  Location: [facility] CATH/EP LAB;  Service: Cardiology;  Laterality: Left;    SMALL BOWEL ENTEROSCOPY N/A 6/22/2023    Procedure: ENTEROSCOPY;  Surgeon: Cornell gAuirre MD;  Location: [facility] ENDO;  Service: Endoscopy;  Laterality: N/A;    TONSILLECTOMY, ADENOIDECTOMY  long time ago    TRANSCATHETER AORTIC VALVE REPLACEMENT (TAVR) Bilateral 1/4/2023    Procedure: REPLACEMENT, AORTIC VALVE, TRANSCATHETER (TAVR);  Surgeon: Bhavesh Peña MD;  Location: [facility] CATH;  Service: Cardiology;  Laterality: Bilateral;    TRANSCATHETER AORTIC VALVE REPLACEMENT (TAVR) Bilateral 1/4/2023    Procedure: REPLACEMENT, AORTIC VALVE, TRANSCATHETER (TAVR);  Surgeon: Dallin Verma MD;  [facility];  Service: Cardiology;  Laterality: Bilateral;    TRANSTHORACIC ECHOCARDIOGRAPHY (TTE)  1/4/2023    Procedure: ECHOCARDIOGRAM, TRANSTHORACIC;  Surgeon: Bhavesh Peña MD;  [facility];  Service: Cardiology;;        Home Medications:  Medications Prior to Admission   Medication Sig Dispense Refill Last Dose    albuterol (PROVENTIL/VENTOLIN HFA) 90 mcg/actuation inhaler Inhale 2 puffs into the lungs every 6 (six) hours as needed for Shortness  of Breath.   10/18/2023    aspirin (ECOTRIN) 81 MG EC tablet Take 81 mg by mouth once daily.   10/17/2023    calcium carbonate (OS-TK) 500 mg calcium (1,250 mg) tablet Take 1 tablet (500 mg total) by mouth once daily.  0 10/17/2023    carvediloL (COREG) 12.5 MG tablet Take 12.5 mg by mouth 2 (two) times daily.   10/17/2023    citalopram (CELEXA) 40 MG tablet Take 1 tablet (40 mg total) by mouth once daily. 90 tablet 3 10/17/2023    clopidogreL (PLAVIX) 75 mg tablet Take 1 tablet (75 mg total) by mouth once daily. 90 tablet 2 10/17/2023    digoxin (LANOXIN) 125 mcg tablet Take 1 tablet (0.125 mg total) by mouth once daily. 90 tablet 0 10/17/2023    diltiaZEM (DILACOR XR) 120 MG CDCR Take 1 capsule (120 mg total) by mouth once daily. 30 capsule 11 10/17/2023    donepeziL (ARICEPT) 5 MG tablet Take 5 mg by mouth nightly.   10/17/2023    ergocalciferol (ERGOCALCIFEROL) 50,000 unit Cap TAKE 1 CAPSULE (50,000 UNITS TOTAL) EVERY 7 DAYS. (Patient taking differently: Take 50,000 Units by mouth every 7 days.) 12 capsule 3 10/17/2023    ferrous gluconate 324 mg (37.5 mg iron) Tab tablet Take 324 mg by mouth once daily.   10/17/2023    fluticasone furoate-vilanteroL (BREO ELLIPTA) 100-25 mcg/dose diskus inhaler Inhale 1 puff into the lungs once daily. Controller   10/17/2023    furosemide (LASIX) 40 MG tablet Take 1 tablet (40 mg total) by mouth once daily. 45 tablet 2 10/17/2023    glucosamine-chondroitin 500-400 mg tablet Take 1 tablet by mouth once daily.   10/17/2023    lisinopriL 10 MG tablet Take 1 tablet (10 mg total) by mouth once daily. 90 tablet 3 10/17/2023    loperamide (IMODIUM A-D) 2 mg Tab Take 1 tablet (2 mg total) by mouth 3 (three) times daily as needed (diarrhea). Take 2 tablets by mouth initially then 1 tablet after each loose stool as needed for diarrhea. 15 tablet 0 10/17/2023    loratadine (CLARITIN) 10 mg tablet Take 1 tablet (10 mg total) by mouth once daily.  0 10/17/2023    multivitamin capsule Take  1 capsule by mouth once daily.   10/17/2023    oxybutynin (DITROPAN) 5 MG Tab Take 1 tablet (5 mg total) by mouth 2 (two) times daily. 180 tablet 3 10/17/2023    pantoprazole (PROTONIX) 40 MG tablet Take 1 tablet (40 mg total) by mouth 2 (two) times daily. 180 tablet 0 10/17/2023    simvastatin (ZOCOR) 10 MG tablet Take 10 mg by mouth every evening.   10/17/2023       Inpatient Medications:   arformoteroL  15 mcg Nebulization BID    atorvastatin  20 mg Oral QHS    budesonide  0.5 mg Nebulization Q12H    carvediloL  12.5 mg Oral BID    citalopram  20 mg Oral Daily    digoxin  0.125 mg Oral Daily    donepeziL  5 mg Oral Nightly    furosemide (LASIX) injection  40 mg Intravenous Q12H    lisinopriL  10 mg Oral Daily    oxybutynin  5 mg Oral BID    pantoprazole  40 mg Oral BID    sodium chloride 0.9%  10 mL Intravenous Q12H     0.9%  NaCl infusion (for blood administration), acetaminophen, albuterol sulfate, magnesium oxide, magnesium oxide, magnesium oxide, ondansetron, potassium chloride, potassium chloride, potassium chloride    Review of patient's allergies indicates:   Allergen Reactions    Latex      Other reaction(s): Unknown  Other reaction(s): Unknown    Sulfacetamide sodium      Pain perineal area    Sulfasalazine Hives    Adhesive Itching     SKIN GETS RED WITH TAPE AND BANDAIDS    Adhesive tape-silicones Itching     SKIN GETS RED WITH TAPE AND BANDAIDS    Sulfa (sulfonamide antibiotics) Rash       Social History:   Social History     Occupational History    Not on file   Tobacco Use    Smoking status: Former     Current packs/day: 0.00     Average packs/day: 0.5 packs/day for 35.0 years (17.5 ttl pk-yrs)     Types: Cigarettes     Start date: 10/5/1987     Quit date: 10/5/2022     Years since quittin.0    Smokeless tobacco: Never   Substance and Sexual Activity    Alcohol use: No    Drug use: No    Sexual activity: Not on file       Family History:   Family History   Problem Relation Age of Onset     "Collagen disease Neg Hx        Review of Systems:  A 10 point review of systems was performed and was normal, except as mentioned in the HPI, including constitutional, HEENT, heme, lymph, cardiovascular, respiratory, gastrointestinal, genitourinary, neurologic, endocrine, psychiatric and musculoskeletal.      OBJECTIVE:    Physical Exam:  24 Hour Vital Sign Ranges: Temp:  [97.7 °F (36.5 °C)-98.7 °F (37.1 °C)] 98.4 °F (36.9 °C)  Pulse:  [] 92  Resp:  [16-49] 30  SpO2:  [94 %-99 %] 96 %  BP: ()/(46-92) 119/60  Most recent vitals: /60 (BP Location: Right arm, Patient Position: Sitting)   Pulse 92   Temp 98.4 °F (36.9 °C) (Oral)   Resp (!) 30   Ht 5' (1.524 m)   Wt 90 kg (198 lb 6.6 oz)   LMP  (LMP Unknown)   SpO2 96%   BMI 38.75 kg/m²    GEN: well-developed, well-nourished, awake and alert, non-toxic appearing adult  HEENT: PERRL, sclera anicteric, oral mucosa pink and moist without lesion  NECK: trachea midline; Good ROM  CV: regular rate and rhythm, no murmurs or gallops  RESP: clear to auscultation bilaterally, no wheezes, rhonci or rales  ABD: soft, non-tender, non-distended, normal bowel sounds  EXT: no swelling or edema, 2+ pulses distally  SKIN: no rashes or jaundice  PSYCH: normal affect    Labs:   Recent Labs     10/18/23  1707 10/19/23  0356   WBC 7.08 7.77   MCV 81* 82    213     Recent Labs     10/18/23  1707 10/19/23  0356   * 135*   K 4.0 3.7   CL 95 93*   CO2 34* 35*   BUN 11 18   * 124*     No results for input(s): "ALB" in the last 72 hours.    Invalid input(s): "ALKP", "SGOT", "SGPT", "TBIL", "DBIL", "TPRO"  No results for input(s): "PT", "INR", "PTT" in the last 72 hours.      Radiology Review:  X-Ray Chest AP Portable   Final Result            IMPRESSION / RECOMMENDATIONS:  Acute on chronic anemia in patient with chronic anticoagulation and known history of AVMs  -push enteroscopy tomorrow    Thank you for this consult.    Cornell Aguirre  10/19/2023  1:01 " PM

## 2023-10-19 NOTE — ASSESSMENT & PLAN NOTE
Likely from the afib RVR  - continue lasix IV   - continue home digoxin and coreg  - hold home lisinopril for hypotension  - I/O  - cardio following

## 2023-10-19 NOTE — PROGRESS NOTES
[facility] Medicine  Progress Note    Patient Name: Daryleen G Moran  MRN: [id number]  Patient Class: IP- Inpatient   Admission Date: 10/18/2023  Length of Stay: 1 days  Attending Physician: Bryn Valenzuela MD  Primary Care Provider: Abhi De Oliveira IV, MD        Subjective:     Principal Problem:Atrial fibrillation with RVR        HPI:  Patient is a 78-year-old female with HFpEF, Afib, TAVR, DM, anemia, HTN, HLD, CAD, GERD and MDD/ANJALI who presents with shortness of breath.  Shortness of breath has been ongoing the past several days.  Occurs at rest.  Worsens with any movement.  Does use home oxygen.  Patient not on anticoagulation as she has history of GI bleeds.  Patient denies any fevers, chills, chest pain, nausea, vomiting.  In ED, patient is 96% on 2 L nasal cannula.  Found to be in AFib with RVR.  Blood pressure of 130/92. WBC 7.0 hemoglobin 7.1.  Creatinine 0.4.  Troponin 36.2 and .      Overview/Hospital Course:  78F with PMH HTN, HLD, HFpEF, pAfib on DAPT, s/p TAVR, CAD s/p stent(s), COPD, DM, GERD w/ hx GIB from AVMs, chronic anemia, and MDD/ANJALI is admitted with afib with RVR, acute on chronic HFpEF, and acute on chronic anemia. Initially required diltiazem infusion which attained rate control. Given IV lasix. Home asa and plavix held given acute on chronic anemia with hx GIB. Continued on her home coreg, digoxin, and PPI BID. Cardiology and GI consulted. Given 1u pRBC 10/19. Suffered with diarrhea, Cdiff test and FOBT pending. Suffered with intermittent hypotension.      Interval History: Patient seen and examined. NAEON. Started with diarrhea a few days ago still present. Afib controlled this afternoon when I saw her. Did have spell of hypotension and was going to be transferred to ICU for daniel drip but her BP become appropriate enough to remain off the pressor. Got a unit of blood today.      Objective:     Vital Signs (Most Recent):  Temp: 98.6 °F (37 °C) (10/19/23  1600)  Pulse: 86 (10/19/23 1320)  Resp: (!) 47 (10/19/23 1320)  BP: (!) 93/52 (10/19/23 1600)  SpO2: 96 % (10/19/23 1500) Vital Signs (24h Range):  Temp:  [97.7 °F (36.5 °C)-98.8 °F (37.1 °C)] 98.6 °F (37 °C)  Pulse:  [] 86  Resp:  [16-49] 47  SpO2:  [94 %-99 %] 96 %  BP: ()/(46-72) 93/52     Weight: 90 kg (198 lb 6.6 oz)  Body mass index is 38.75 kg/m².    Intake/Output Summary (Last 24 hours) at 10/19/2023 1736  Last data filed at 10/19/2023 1707  Gross per 24 hour   Intake 1293.53 ml   Output 2302 ml   Net -1008.47 ml         Physical Exam  Vitals reviewed.   Constitutional:       General: She is not in acute distress.     Appearance: She is ill-appearing.   HENT:      Head: Normocephalic and atraumatic.   Cardiovascular:      Rate and Rhythm: Normal rate. Rhythm irregular.   Pulmonary:      Effort: Pulmonary effort is normal. No respiratory distress.   Abdominal:      General: Abdomen is flat. Bowel sounds are normal. There is no distension.      Palpations: Abdomen is soft.      Tenderness: There is generalized abdominal tenderness (mild). There is no guarding.   Skin:     General: Skin is warm and dry.      Coloration: Skin is pale.   Neurological:      General: No focal deficit present.      Mental Status: She is alert and oriented to person, place, and time. Mental status is at baseline.   Psychiatric:         Mood and Affect: Affect normal.         Behavior: Behavior normal.         Thought Content: Thought content normal.             Significant Labs: All pertinent labs within the past 24 hours have been reviewed.    Significant Imaging: I have reviewed all pertinent imaging results/findings within the past 24 hours.      Assessment/Plan:      * Atrial fibrillation with RVR  Rate controlled now  S/p dilt gtt  - tele  - optimize lytes  - continue home coreg and digoxin  - hold DAPT due to anemia / possible GIB  - cardio following    Acute on chronic anemia  Uncontrolled, baseline hgb is 8-9  Hx  GIB from AVM  S/p 1u prbc 10/19  No active bleeding seen  - hold DAPT  - continue PPI BID  - trend hgb   - GI consult: plan for enteroscopy 10/20. NPO MN    Acute on chronic diastolic congestive heart failure  Likely from the afib RVR  - continue lasix IV   - continue home digoxin and coreg  - hold home lisinopril for hypotension  - I/O  - cardio following    CAD (coronary artery disease)  Chronic, stable  - continue home coreg, statin, BP control  - hold DAPT     COPD (chronic obstructive pulmonary disease)  Chronic, stable  - continue nebs    Hypertension  Chronic, with hypotension  - hold lisinopril  - continue the coreg  - monitor close with the iv lasix as well  - low threshold to transfer to ICU if needing the daniel drip    Type 2 diabetes mellitus, without long-term current use of insulin  Well-controlled  - monitor glucose on AM labs and address further if needed    VTE Risk Mitigation (From admission, onward)         Ordered     IP VTE HIGH RISK PATIENT  Once         10/18/23 2006     Place sequential compression device  Until discontinued         10/18/23 2006                Discharge Planning   ISAAK: 10/20/2023     Code Status: Full Code   Is the patient medically ready for discharge?:     Reason for patient still in hospital (select all that apply): Patient trending condition, Laboratory test and Treatment  Discharge Plan A: Home Health          Critical care time spent on the evaluation and treatment of severe organ dysfunction, review of pertinent labs and imaging studies, discussions with consulting providers and discussions with patient/family: 32 minutes.          Bryn Valenzuela MD  Department of Hospital Medicine   [facility]

## 2023-10-19 NOTE — ASSESSMENT & PLAN NOTE
Rate controlled now  S/p dilt gtt  - tele  - optimize lytes  - continue home coreg and digoxin  - hold DAPT due to anemia / possible GIB  - cardio following

## 2023-10-19 NOTE — HPI
Patient is a 78-year-old female with HFpEF, Afib, TAVR, DM, anemia, HTN, HLD, CAD, GERD and MDD/ANJALI who presents with shortness of breath.  Shortness of breath has been ongoing the past several days.  Occurs at rest.  Worsens with any movement.  Does use home oxygen.  Patient not on anticoagulation as she has history of GI bleeds.  Patient denies any fevers, chills, chest pain, nausea, vomiting.  In ED, patient is 96% on 2 L nasal cannula.  Found to be in AFib with RVR.  Blood pressure of 130/92. WBC 7.0 hemoglobin 7.1.  Creatinine 0.4.  Troponin 36.2 and .

## 2023-10-19 NOTE — ASSESSMENT & PLAN NOTE
Continue diltiazem drip.  Not on anticoagulation due to history of GI bleeds.  Cardiology consulted.

## 2023-10-19 NOTE — CONSULTS
[facility]  Department of Cardiology  Consult Note      PATIENT NAME: Daryleen G Moran  MRN: [id number]  TODAY'S DATE: 10/19/2023  ADMIT DATE: 10/18/2023                          CONSULT REQUESTED BY: Bryn Valenzuela MD    SUBJECTIVE     PRINCIPAL PROBLEM: Atrial fibrillation with RVR      REASON FOR CONSULT:    AFRVR CHF      HPI:    Patient was a 78-year-old female with past medical history of HFpEF, atrial fibrillation, TAVR, diabetes mellitus, anemia, hypertension, hyperlipidemia, CAD, GERD, who presented to the ED with complaints of shortness of breath for the past several days with rest and exertion.  Patient was not on anticoagulation as she was history of GI bleeds.  In ED patient was found to be in AFib RVR.    In ED H&H 7.1/25.6, platelet 201, K 4.0, creatinine 0.4, BUN 11, , troponin HS 36.2, repeat 33.5, digoxin level 0.3, chest x-ray consistent with cardiomegaly with atelectasis and/or small pleural effusions, EKG atrial fibrillation with RVR, nonspecific T-wave abnormality in lateral leads    Echo 9/12/23       Left Ventricle: The left ventricle is smaller than normal. Moderately increased ventricular mass. Moderately increased wall thickness. There is moderate concentric hypertrophy. Normal wall motion. There is normal systolic function with a visually estimated ejection fraction of 65 - 70%. Grade II diastolic dysfunction.    Left Atrium: Left atrium is moderately dilated.    Right Ventricle: Normal right ventricular cavity size. Wall thickness is normal. Right ventricle wall motion  is normal. Systolic function is normal.    Aortic Valve: The aortic valve is a trileaflet valve. Moderately calcified cusps. There is moderate stenosis. Aortic valve area by VTI is 0.86 cm². Aortic valve peak velocity is 2.42 m/s. Mean gradient is 13 mmHg. The dimensionless index is 0.38.    Mitral Valve: There is severe mitral annular calcification present.    Tricuspid Valve: There is moderate  regurgitation.    IVC/SVC: Normal venous pressure at 3 mmHg.      Review of patient's allergies indicates:   Allergen Reactions    Latex      Other reaction(s): Unknown  Other reaction(s): Unknown    Sulfacetamide sodium      Pain perineal area    Sulfasalazine Hives    Adhesive Itching     SKIN GETS RED WITH TAPE AND BANDAIDS    Adhesive tape-silicones Itching     SKIN GETS RED WITH TAPE AND BANDAIDS    Sulfa (sulfonamide antibiotics) Rash       Past Medical History:   Diagnosis Date    Anesthesia complication     BLADDER DYSFUNCTION    Aortic stenosis     Arthritis     Back pain     Diabetes mellitus type II     NO LONGER DIABETIC    Encounter for blood transfusion     Hyperlipidemia     Hypertension     NSTEMI (non-ST elevated myocardial infarction) 05/01/2022    Osteoporosis     Wears glasses      Past Surgical History:   Procedure Laterality Date     vocal cord nodules removed  long time ago     twice    ADRENAL TUMOR      APPENDECTOMY  within last 5yrs    CATHETERIZATION OF BOTH LEFT AND RIGHT HEART Left 05/06/2022    Procedure: CATHETERIZATION, HEART, BOTH LEFT AND RIGHT;  Surgeon: Michelet Vargas MD;  Location: [facility] CATH/EP LAB;  Service: Cardiology;  Laterality: Left;    COLONOSCOPY N/A 6/23/2023    Procedure: COLONOSCOPY;  Surgeon: Joel Neal III, MD;  Location: [facility] ENDO;  Service: Endoscopy;  Laterality: N/A;    FIXATION KYPHOPLASTY THORACIC SPINE      8-20-13    FOOT SURGERY      left 2nd toe was too long     HERNIA REPAIR  within last 5yrs    INSERTION OF TEMPORARY PACEMAKER N/A 1/4/2023    Procedure: INSERTION, PACEMAKER, TEMPORARY;  Surgeon: Bhavesh Peña MD;  Location: [facility] CATH;  Service: Cardiology;  Laterality: N/A;    LEFT HEART CATHETERIZATION Left 05/02/2022    Procedure: Left heart cath;  Surgeon: Jose Echols MD;  Location: [facility] CATH/EP LAB;  Service: Cardiology;  Laterality: Left;    SMALL BOWEL ENTEROSCOPY N/A 6/22/2023    Procedure: ENTEROSCOPY;  Surgeon: Cornell Aguirre,  MD;  Location: [facility] ENDO;  Service: Endoscopy;  Laterality: N/A;    TONSILLECTOMY, ADENOIDECTOMY  long time ago    TRANSCATHETER AORTIC VALVE REPLACEMENT (TAVR) Bilateral 2023    Procedure: REPLACEMENT, AORTIC VALVE, TRANSCATHETER (TAVR);  Surgeon: Bhavesh Peña MD;  Location: [facility] CATH;  Service: Cardiology;  Laterality: Bilateral;    TRANSCATHETER AORTIC VALVE REPLACEMENT (TAVR) Bilateral 2023    Procedure: REPLACEMENT, AORTIC VALVE, TRANSCATHETER (TAVR);  Surgeon: Dallin Verma MD;  Location: [facility] CATH;  Service: Cardiology;  Laterality: Bilateral;    TRANSTHORACIC ECHOCARDIOGRAPHY (TTE)  2023    Procedure: ECHOCARDIOGRAM, TRANSTHORACIC;  Surgeon: Bhavesh Peña MD;  Location: [facility] CATH;  Service: Cardiology;;     Social History     Tobacco Use    Smoking status: Former     Current packs/day: 0.00     Average packs/day: 0.5 packs/day for 35.0 years (17.5 ttl pk-yrs)     Types: Cigarettes     Start date: 10/5/1987     Quit date: 10/5/2022     Years since quittin.0    Smokeless tobacco: Never   Substance Use Topics    Alcohol use: No    Drug use: No        REVIEW OF SYSTEMS    As mentioned in HPI    OBJECTIVE     VITAL SIGNS (Most Recent)  Temp: 98.1 °F (36.7 °C) (10/19/23 07)  Pulse: 92 (10/19/23 0901)  Resp: (!) 32 (10/19/23 0901)  BP: 114/62 (10/19/23 0901)  SpO2: (!) 94 % (10/19/23 0901)    VENTILATION STATUS  Resp: (!) 32 (10/19/23 0901)  SpO2: (!) 94 % (10/19/23 0901)           I & O (Last 24H):  Intake/Output Summary (Last 24 hours) at 10/19/2023 0952  Last data filed at 10/19/2023 0901  Gross per 24 hour   Intake 853.53 ml   Output 2002 ml   Net -1148.47 ml       WEIGHTS  Wt Readings from Last 3 Encounters:   10/19/23 0554 90 kg (198 lb 6.6 oz)   10/18/23 2137 89 kg (196 lb 3.4 oz)   10/18/23 2135 89 kg (196 lb 3.4 oz)   10/18/23 1555 91.6 kg (202 lb)   23 0400 96.3 kg (212 lb 4.9 oz)   23 2232 96.5 kg (212 lb 11.9 oz)   23 1821 96.2 kg (212 lb)   23 0551 97.4  kg (214 lb 11.7 oz)   09/09/23 0500 100.7 kg (222 lb 0.1 oz)   09/08/23 1651 95.3 kg (210 lb)   09/08/23 0151 95.3 kg (210 lb)       PHYSICAL EXAM   Limited exam- patient on commode  Alert and oriented  Appears AF on tele  3 L NC      HOME MEDICATIONS:  No current facility-administered medications on file prior to encounter.     Current Outpatient Medications on File Prior to Encounter   Medication Sig Dispense Refill    albuterol (PROVENTIL/VENTOLIN HFA) 90 mcg/actuation inhaler Inhale 2 puffs into the lungs every 6 (six) hours as needed for Shortness of Breath.      aspirin (ECOTRIN) 81 MG EC tablet Take 81 mg by mouth once daily.      calcium carbonate (OS-TK) 500 mg calcium (1,250 mg) tablet Take 1 tablet (500 mg total) by mouth once daily.  0    carvediloL (COREG) 12.5 MG tablet Take 12.5 mg by mouth 2 (two) times daily.      citalopram (CELEXA) 40 MG tablet Take 1 tablet (40 mg total) by mouth once daily. 90 tablet 3    clopidogreL (PLAVIX) 75 mg tablet Take 1 tablet (75 mg total) by mouth once daily. 90 tablet 2    digoxin (LANOXIN) 125 mcg tablet Take 1 tablet (0.125 mg total) by mouth once daily. 90 tablet 0    diltiaZEM (DILACOR XR) 120 MG CDCR Take 1 capsule (120 mg total) by mouth once daily. 30 capsule 11    donepeziL (ARICEPT) 5 MG tablet Take 5 mg by mouth nightly.      ergocalciferol (ERGOCALCIFEROL) 50,000 unit Cap TAKE 1 CAPSULE (50,000 UNITS TOTAL) EVERY 7 DAYS. (Patient taking differently: Take 50,000 Units by mouth every 7 days.) 12 capsule 3    ferrous gluconate 324 mg (37.5 mg iron) Tab tablet Take 324 mg by mouth once daily.      fluticasone furoate-vilanteroL (BREO ELLIPTA) 100-25 mcg/dose diskus inhaler Inhale 1 puff into the lungs once daily. Controller      furosemide (LASIX) 40 MG tablet Take 1 tablet (40 mg total) by mouth once daily. 45 tablet 2    glucosamine-chondroitin 500-400 mg tablet Take 1 tablet by mouth once daily.      lisinopriL 10 MG tablet Take 1 tablet (10 mg total) by  mouth once daily. 90 tablet 3    loperamide (IMODIUM A-D) 2 mg Tab Take 1 tablet (2 mg total) by mouth 3 (three) times daily as needed (diarrhea). Take 2 tablets by mouth initially then 1 tablet after each loose stool as needed for diarrhea. 15 tablet 0    loratadine (CLARITIN) 10 mg tablet Take 1 tablet (10 mg total) by mouth once daily.  0    multivitamin capsule Take 1 capsule by mouth once daily.      oxybutynin (DITROPAN) 5 MG Tab Take 1 tablet (5 mg total) by mouth 2 (two) times daily. 180 tablet 3    pantoprazole (PROTONIX) 40 MG tablet Take 1 tablet (40 mg total) by mouth 2 (two) times daily. 180 tablet 0    simvastatin (ZOCOR) 10 MG tablet Take 10 mg by mouth every evening.      [DISCONTINUED] ALLERGY RELIEF, FEXOFENADINE, 180 mg tablet Take 180 mg by mouth once daily.      [DISCONTINUED] ezetimibe-simvastatin 10-40 mg (VYTORIN) 10-40 mg per tablet Take 1 tablet by mouth.      [DISCONTINUED] lisinopril-hydrochlorothiazide (PRINZIDE,ZESTORETIC) 20-12.5 mg per tablet Take 1 tablet by mouth once daily.          SCHEDULED MEDS:   arformoteroL  15 mcg Nebulization BID    atorvastatin  20 mg Oral QHS    budesonide  0.5 mg Nebulization Q12H    carvediloL  12.5 mg Oral BID    citalopram  20 mg Oral Daily    digoxin  0.125 mg Oral Daily    donepeziL  5 mg Oral Nightly    furosemide (LASIX) injection  40 mg Intravenous Q12H    lisinopriL  10 mg Oral Daily    oxybutynin  5 mg Oral BID    pantoprazole  40 mg Oral BID    sodium chloride 0.9%  10 mL Intravenous Q12H       CONTINUOUS INFUSIONS:   dilTIAZem Stopped (10/19/23 0405)       PRN MEDS:0.9%  NaCl infusion (for blood administration), acetaminophen, albuterol sulfate, magnesium oxide, magnesium oxide, magnesium oxide, ondansetron, potassium chloride, potassium chloride, potassium chloride    LABS AND DIAGNOSTICS     CBC LAST 3 DAYS  Recent Labs   Lab 10/18/23  1707 10/19/23  0356   WBC 7.08 7.77   RBC 3.15* 3.14*   HGB 7.1* 7.0*   HCT 25.6* 25.6*   MCV 81* 82   MCH  "22.5* 22.3*   MCHC 27.7* 27.3*   RDW 21.0* 21.2*    213   MPV 11.1 11.0   GRAN 76.3*  5.4  --    LYMPH 11.9*  0.8*  --    MONO 9.2  0.7  --    BASO 0.07  --    NRBC 0  --        COAGULATION LAST 3 DAYS  No results for input(s): "LABPT", "INR", "APTT" in the last 168 hours.    CHEMISTRY LAST 3 DAYS  Recent Labs   Lab 10/18/23  1707 10/19/23  0356   * 135*   K 4.0 3.7   CL 95 93*   CO2 34* 35*   ANIONGAP 6* 7*   BUN 11 18   CREATININE 0.4* 0.7   * 124*   CALCIUM 9.5 9.1   ALBUMIN 4.0 3.7   PROT 6.6 6.3   ALKPHOS 67 64   ALT 17 15   AST 15 14   BILITOT 1.8* 1.3*       CARDIAC PROFILE LAST 3 DAYS  Recent Labs   Lab 10/18/23  1707 10/19/23  0356   *  --    TROPONINIHS 36.2* 33.5*       ENDOCRINE LAST 3 DAYS  No results for input(s): "TSH", "PROCAL" in the last 168 hours.    LAST ARTERIAL BLOOD GAS  ABG  No results for input(s): "PH", "PO2", "PCO2", "HCO3", "BE" in the last 168 hours.    LAST 7 DAYS MICROBIOLOGY   Microbiology Results (last 7 days)       ** No results found for the last 168 hours. **            MOST RECENT IMAGING  X-Ray Chest AP Portable  HISTORY: CHF    FINDINGS: Portable chest radiograph at 1619 hours compared to prior exams shows stable enlarged cardiac silhouette, with normal pulmonary vascularity and scattered aortic vascular calcifications.    The lungs are symmetrically expanded, with diffuse reticulonodular densities and nonspecific blunting the costophrenic angles. There is no consolidation or pneumothorax. The bones are diffusely osteopenic.    IMPRESSION: Cardiomegaly and scattered nonspecific interstitial opacities, with blunting of the costophrenic angles, potentially atelectasis and/or small pleural effusions.    Electronically signed by:  Zack Castro MD  10/18/2023 04:26 PM CDT Workstation: [id number]      ECHOCARDIOGRAM RESULTS (last 5)  Results for orders placed during the hospital encounter of 09/11/23    Echo    Interpretation Summary    Left Ventricle: " The left ventricle is smaller than normal. Moderately increased ventricular mass. Moderately increased wall thickness. There is moderate concentric hypertrophy. Normal wall motion. There is normal systolic function with a visually estimated ejection fraction of 65 - 70%. Grade II diastolic dysfunction.    Left Atrium: Left atrium is moderately dilated.    Right Ventricle: Normal right ventricular cavity size. Wall thickness is normal. Right ventricle wall motion  is normal. Systolic function is normal.    Aortic Valve: The aortic valve is a trileaflet valve. Moderately calcified cusps. There is moderate stenosis. Aortic valve area by VTI is 0.86 cm². Aortic valve peak velocity is 2.42 m/s. Mean gradient is 13 mmHg. The dimensionless index is 0.38.    Mitral Valve: There is severe mitral annular calcification present.    Tricuspid Valve: There is moderate regurgitation.    IVC/SVC: Normal venous pressure at 3 mmHg.      Results for orders placed during the hospital encounter of 02/06/23    Echo    Interpretation Summary  · The left ventricle is normal in size with moderate concentric hypertrophy and normal systolic function.  · The estimated ejection fraction is 65%.  · Grade I left ventricular diastolic dysfunction.  · Normal right ventricular size with normal right ventricular systolic function.  · There is a transcutaneously-placed aortic bioprosthesis present. There is no aortic insufficiency present. Prosthetic aortic valve is normal.  · The aortic valve mean gradient is 9 mmHg with a dimensionless index of 0.58.  · Mild mitral regurgitation.  · The mean diastolic gradient across the mitral valve is 6 mmHg at a heart rate of bpm.  · There is moderate mitral stenosis.  · Mild tricuspid regurgitation.  · Normal central venous pressure (3 mmHg).  · The estimated PA systolic pressure is 37 mmHg.      Results for orders placed during the hospital encounter of 01/04/23    Echo Saline Bubble? No    Interpretation  Summary  · The left ventricle is normal in size with mild concentric hypertrophy and normal systolic function.  · Moderate left atrial enlargement.  · The estimated ejection fraction is 70%.  · Grade I left ventricular diastolic dysfunction.  · There is a transcutaneously-placed aortic bioprosthesis present. There is no aortic insufficiency present. Prosthetic aortic valve is normal.  · The aortic valve mean gradient is 14 mmHg with a dimensionless index of 0.56.  · Normal right ventricular size with normal right ventricular systolic function.  · Mild mitral regurgitation.  · The mean diastolic gradient across the mitral valve is 8 mmHg at a heart rate of bpm.  · There is mild to moderate mitral stenosis.  · Mild tricuspid regurgitation.  · Normal central venous pressure (3 mmHg).  · The estimated PA systolic pressure is 28 mmHg.      Echo    Interpretation Summary  · Intraoperative echocardiogram done during TAVR procedure  · Normal biventricular function  · Well-positioned balloon expandable TAVR without PVL or central aortic insufficiency      Results for orders placed during the hospital encounter of 09/23/22    Echo    Interpretation Summary  · The left ventricle is normal in size with mild concentric hypertrophy and normal systolic function.  · Moderate left atrial enlargement.  · The estimated ejection fraction is 65%.  · Grade I left ventricular diastolic dysfunction.  · Normal right ventricular size with normal right ventricular systolic function.  · Mild right atrial enlargement.  · Mild aortic regurgitation.  · There is severe aortic valve stenosis.  · Aortic valve area is 0.89 cm2; peak velocity is 4.69 m/s; mean gradient is 53 mmHg.  · Mild-to-moderate mitral regurgitation.  · The mean diastolic gradient across the mitral valve is 7 mmHg at a heart rate of bpm.  · There is mild to moderate mitral stenosis.  · Mild tricuspid regurgitation.  · Normal central venous pressure (3 mmHg).  · The estimated PA  systolic pressure is 39 mmHg.      CURRENT/PREVIOUS VISIT EKG  Results for orders placed or performed during the hospital encounter of 10/18/23   EKG 12-lead    Collection Time: 10/18/23  5:35 PM    Narrative    Test Reason : I48.91,    Vent. Rate : 095 BPM     Atrial Rate : 000 BPM     P-R Int : 000 ms          QRS Dur : 070 ms      QT Int : 290 ms       P-R-T Axes : 000 -04 111 degrees     QTc Int : 364 ms    Atrial fibrillation  Nonspecific T wave abnormality  Abnormal ECG  When compared with ECG of 18-OCT-2023 16:03,  No significant change was found    Referred By: [email or web address]   SELF           Confirmed By:            ASSESSMENT/PLAN:     Active Hospital Problems    Diagnosis    *Atrial fibrillation with RVR    Anemia    Acute on chronic diastolic congestive heart failure    CAD (coronary artery disease)    Hypertension       ASSESSMENT & PLAN:   AFRVR  Acute on Chronic Anemia  HFpEF  S/P TAVR  HTN  CAD    RECOMMENDATIONS:    AFRVR on admission.  Now rate controlled AF.  Not on anticoagulation due to hx of GIB.  GI consulted for acute on chronic anemia with hx GIB and bleeding angioectasia in 6/2023.  ECHO 9/2023: EF 65-70%, grade II diastolic dysfunction.  Moderate AS.  Hx TAVR in Jan 2023.  Acute on chronic HFpEF.  Continue IV lasix. Strict I's and O's. 2 g salt restriction. 1.5 L fluid restriction.  Continue digoxin 5 mg daily.  Hypotensive. Stop lisinopril. Hold Coreg for SBP <100 and HR <60.  Patient would benefit from a Watchman device OP.  Recommend EP eval upon dc.  Continue to check and replace potassium and magnesium. Goal for potassium is 4.0, and goal for magnesium is 2.0.    Thank you for the consult. We will follow.       Keysha Swain NP  Department of Cardiology  Date of Service: 10/19/2023

## 2023-10-19 NOTE — SUBJECTIVE & OBJECTIVE
Past Medical History:   Diagnosis Date    Anesthesia complication     BLADDER DYSFUNCTION    Aortic stenosis     Arthritis     Back pain     Diabetes mellitus type II     NO LONGER DIABETIC    Encounter for blood transfusion     Hyperlipidemia     Hypertension     NSTEMI (non-ST elevated myocardial infarction) 05/01/2022    Osteoporosis     Wears glasses        Past Surgical History:   Procedure Laterality Date     vocal cord nodules removed  long time ago     twice    ADRENAL TUMOR      APPENDECTOMY  within last 5yrs    CATHETERIZATION OF BOTH LEFT AND RIGHT HEART Left 05/06/2022    Procedure: CATHETERIZATION, HEART, BOTH LEFT AND RIGHT;  Surgeon: Michelet Vargas MD;  Location: [facility] CATH/EP LAB;  Service: Cardiology;  Laterality: Left;    COLONOSCOPY N/A 6/23/2023    Procedure: COLONOSCOPY;  Surgeon: Joel Neal III, MD;  Location: [facility] ENDO;  Service: Endoscopy;  Laterality: N/A;    FIXATION KYPHOPLASTY THORACIC SPINE      8-20-13    FOOT SURGERY      left 2nd toe was too long     HERNIA REPAIR  within last 5yrs    INSERTION OF TEMPORARY PACEMAKER N/A 1/4/2023    Procedure: INSERTION, PACEMAKER, TEMPORARY;  Surgeon: Bhavesh Peña MD;  Location: [facility] CATH;  Service: Cardiology;  Laterality: N/A;    LEFT HEART CATHETERIZATION Left 05/02/2022    Procedure: Left heart cath;  Surgeon: Jose Echols MD;  Location: [facility] CATH/EP LAB;  Service: Cardiology;  Laterality: Left;    SMALL BOWEL ENTEROSCOPY N/A 6/22/2023    Procedure: ENTEROSCOPY;  Surgeon: Cornell Aguirre MD;  Location: [facility] ENDO;  Service: Endoscopy;  Laterality: N/A;    TONSILLECTOMY, ADENOIDECTOMY  long time ago    TRANSCATHETER AORTIC VALVE REPLACEMENT (TAVR) Bilateral 1/4/2023    Procedure: REPLACEMENT, AORTIC VALVE, TRANSCATHETER (TAVR);  Surgeon: Bhavesh Peña MD;  [facility];  Service: Cardiology;  Laterality: Bilateral;    TRANSCATHETER AORTIC VALVE REPLACEMENT (TAVR) Bilateral 1/4/2023    Procedure: REPLACEMENT, AORTIC VALVE,  TRANSCATHETER (TAVR);  Surgeon: Dallin Verma MD;  Location: [facility] CATH;  Service: Cardiology;  Laterality: Bilateral;    TRANSTHORACIC ECHOCARDIOGRAPHY (TTE)  1/4/2023    Procedure: ECHOCARDIOGRAM, TRANSTHORACIC;  Surgeon: Bhavesh Peña MD;  Location: [facility] CATH;  Service: Cardiology;;       Review of patient's allergies indicates:   Allergen Reactions    Latex      Other reaction(s): Unknown  Other reaction(s): Unknown    Sulfacetamide sodium      Pain perineal area    Sulfasalazine Hives    Adhesive Itching     SKIN GETS RED WITH TAPE AND BANDAIDS    Adhesive tape-silicones Itching     SKIN GETS RED WITH TAPE AND BANDAIDS    Sulfa (sulfonamide antibiotics) Rash       No current facility-administered medications on file prior to encounter.     Current Outpatient Medications on File Prior to Encounter   Medication Sig    albuterol (PROVENTIL/VENTOLIN HFA) 90 mcg/actuation inhaler Inhale 2 puffs into the lungs every 6 (six) hours as needed for Shortness of Breath.    aspirin (ECOTRIN) 81 MG EC tablet Take 81 mg by mouth once daily.    calcium carbonate (OS-TK) 500 mg calcium (1,250 mg) tablet Take 1 tablet (500 mg total) by mouth once daily.    carvediloL (COREG) 12.5 MG tablet Take 12.5 mg by mouth 2 (two) times daily.    citalopram (CELEXA) 40 MG tablet Take 1 tablet (40 mg total) by mouth once daily.    clopidogreL (PLAVIX) 75 mg tablet Take 1 tablet (75 mg total) by mouth once daily.    digoxin (LANOXIN) 125 mcg tablet Take 1 tablet (0.125 mg total) by mouth once daily.    diltiaZEM (DILACOR XR) 120 MG CDCR Take 1 capsule (120 mg total) by mouth once daily.    donepeziL (ARICEPT) 5 MG tablet Take 5 mg by mouth nightly.    ergocalciferol (ERGOCALCIFEROL) 50,000 unit Cap TAKE 1 CAPSULE (50,000 UNITS TOTAL) EVERY 7 DAYS. (Patient taking differently: Take 50,000 Units by mouth every 7 days.)    ferrous gluconate 324 mg (37.5 mg iron) Tab tablet Take 324 mg by mouth once daily.    fluticasone furoate-vilanteroL  (BREO ELLIPTA) 100-25 mcg/dose diskus inhaler Inhale 1 puff into the lungs once daily. Controller    furosemide (LASIX) 40 MG tablet Take 1 tablet (40 mg total) by mouth once daily.    glucosamine-chondroitin 500-400 mg tablet Take 1 tablet by mouth once daily.    lisinopriL 10 MG tablet Take 1 tablet (10 mg total) by mouth once daily.    loperamide (IMODIUM A-D) 2 mg Tab Take 1 tablet (2 mg total) by mouth 3 (three) times daily as needed (diarrhea). Take 2 tablets by mouth initially then 1 tablet after each loose stool as needed for diarrhea.    loratadine (CLARITIN) 10 mg tablet Take 1 tablet (10 mg total) by mouth once daily.    multivitamin capsule Take 1 capsule by mouth once daily.    oxybutynin (DITROPAN) 5 MG Tab Take 1 tablet (5 mg total) by mouth 2 (two) times daily.    pantoprazole (PROTONIX) 40 MG tablet Take 1 tablet (40 mg total) by mouth 2 (two) times daily.    simvastatin (ZOCOR) 10 MG tablet Take 10 mg by mouth every evening.    [DISCONTINUED] ALLERGY RELIEF, FEXOFENADINE, 180 mg tablet Take 180 mg by mouth once daily.    [DISCONTINUED] ezetimibe-simvastatin 10-40 mg (VYTORIN) 10-40 mg per tablet Take 1 tablet by mouth.    [DISCONTINUED] lisinopril-hydrochlorothiazide (PRINZIDE,ZESTORETIC) 20-12.5 mg per tablet Take 1 tablet by mouth once daily.      Family History    None       Tobacco Use    Smoking status: Former     Current packs/day: 0.00     Average packs/day: 0.5 packs/day for 35.0 years (17.5 ttl pk-yrs)     Types: Cigarettes     Start date: 10/5/1987     Quit date: 10/5/2022     Years since quittin.0    Smokeless tobacco: Never   Substance and Sexual Activity    Alcohol use: No    Drug use: No    Sexual activity: Not on file     Review of Systems   Constitutional: Negative.    HENT: Negative.     Eyes: Negative.    Respiratory:  Positive for shortness of breath. Negative for cough, wheezing and stridor.    Cardiovascular:  Positive for leg swelling. Negative for chest pain and  palpitations.   Gastrointestinal: Negative.    Endocrine: Negative.    Genitourinary: Negative.    Musculoskeletal: Negative.    Skin: Negative.    Neurological: Negative.    Hematological: Negative.    Psychiatric/Behavioral: Negative.       Objective:     Vital Signs (Most Recent):  Temp: 98.7 °F (37.1 °C) (10/18/23 1555)  Pulse: 95 (10/18/23 1730)  Resp: 18 (10/18/23 1730)  BP: (!) 148/58 (10/18/23 1730)  SpO2: 96 % (10/18/23 1730) Vital Signs (24h Range):  Temp:  [98.7 °F (37.1 °C)] 98.7 °F (37.1 °C)  Pulse:  [] 95  Resp:  [18-22] 18  SpO2:  [96 %-99 %] 96 %  BP: (130-150)/(58-92) 148/58     Weight: 91.6 kg (202 lb)  Body mass index is 39.45 kg/m².     Physical Exam  Vitals and nursing note reviewed.   Constitutional:       General: She is not in acute distress.     Appearance: Normal appearance. She is obese.   HENT:      Head: Normocephalic and atraumatic.      Nose: Nose normal.   Eyes:      Extraocular Movements: Extraocular movements intact.      Conjunctiva/sclera: Conjunctivae normal.      Pupils: Pupils are equal, round, and reactive to light.   Cardiovascular:      Rate and Rhythm: Tachycardia present. Rhythm irregular.   Pulmonary:      Effort: Pulmonary effort is normal. No respiratory distress.      Breath sounds: Normal breath sounds. No stridor. No wheezing or rales.   Abdominal:      General: Bowel sounds are normal. There is no distension.      Palpations: Abdomen is soft.      Tenderness: There is no abdominal tenderness.   Musculoskeletal:         General: No swelling or tenderness. Normal range of motion.      Cervical back: Normal range of motion and neck supple.      Right lower leg: Edema present.      Left lower leg: Edema present.   Skin:     General: Skin is warm and dry.   Neurological:      General: No focal deficit present.      Mental Status: She is alert and oriented to person, place, and time.              CRANIAL NERVES     CN III, IV, VI   Pupils are equal, round, and  reactive to light.       Significant Labs: All pertinent labs within the past 24 hours have been reviewed.  Recent Lab Results         10/18/23  1707        Albumin 4.0       ALP 67       ALT 17       Anion Gap 6       AST 15       Baso # 0.07       Basophil % 1.0       BILIRUBIN TOTAL 1.8  Comment: For infants and newborns, interpretation of results should be based  on gestational age, weight and in agreement with clinical  observations.    Premature Infant recommended reference ranges:  Up to 24 hours.............<8.0 mg/dL  Up to 48 hours............<12.0 mg/dL  3-5 days..................<15.0 mg/dL  6-29 days.................<15.0 mg/dL           Comment: Values of less than 100 pg/ml are consistent with non-CHF populations.       BUN 11       Calcium 9.5       Chloride 95       CO2 34       Creatinine 0.4       Differential Method Automated       eGFR >60.0       Eos # 0.1       Eosinophil % 1.3       Glucose 112       Gran # (ANC) 5.4       Gran % 76.3       Hematocrit 25.6       Hemoglobin 7.1       Immature Grans (Abs) 0.02  Comment: Mild elevation in immature granulocytes is non specific and   can be seen in a variety of conditions including stress response,   acute inflammation, trauma and pregnancy. Correlation with other   laboratory and clinical findings is essential.         Immature Granulocytes 0.3       Lymph # 0.8       Lymph % 11.9       MCH 22.5       MCHC 27.7       MCV 81       Mono # 0.7       Mono % 9.2       MPV 11.1       nRBC 0       Platelet Count 201       Potassium 4.0       PROTEIN TOTAL 6.6       RBC 3.15       RDW 21.0       Sodium 135       Troponin I High Sensitivity 36.2  Comment: Troponin results differ between methods. Do not use   results between Troponin methods interchangeably.    Alkaline Phospatase levels above 400 U/L may   cause false positive results.    Access hsTnI should not be used for patients taking   Asfotase robby (Strensiq).         WBC 7.08                Significant Imaging: I have reviewed all pertinent imaging results/findings within the past 24 hours.

## 2023-10-19 NOTE — PROGRESS NOTES
Automatic Inhaler to Nebulizer Interchange    fluticasone/vilanterol (Breo Ellipta) 100 mcg/25 mcg changed to budesonide 0.5 mg twice daily AND arformoterol 15 mcg twice daily per [facility] Automatic Therapeutic Substitutions Protocol.    Please contact pharmacy at extension [phone] with any questions.     Thank you,   Geremias Cantu

## 2023-10-19 NOTE — PROGRESS NOTES
Automatic Inhaler to Nebulizer Interchange    albuterol (Ventolin, ProAir, or Proventil)  mcg given multiple times per day changed to albuterol 2.5 mg Q6H PRN Shortness of Breath  per [facility] Automatic Therapeutic Substitutions Protocol.    Please contact pharmacy at extension [phone] with any questions.     Thank you,   Geremias Cantu

## 2023-10-19 NOTE — PLAN OF CARE
10/18/23 2101   Patient Assessment/Suction   Level of Consciousness (AVPU) alert   Respiratory Effort Normal;Unlabored   All Lung Fields Breath Sounds coarse   PRE-TX-O2   Device (Oxygen Therapy) nasal cannula   $ Is the patient on Low Flow Oxygen? Yes   Flow (L/min) 2   SpO2 96 %   Pulse Oximetry Type Continuous   $ Pulse Oximetry - Multiple Charge Pulse Oximetry - Multiple   Pulse 98   Resp 20   Education   $ Education 15 min   Respiratory Evaluation   $ Care Plan Tech Time 15 min   Home Oxygen   Has Home Oxygen? Yes   Liter Flow 2   Duration continuous   Route nasal cannula

## 2023-10-19 NOTE — PLAN OF CARE
DC assessment attempted x3. Pt on bedside commode each time.       10/19/23 1415   Discharge Assessment   Assessment Type Discharge Planning Assessment

## 2023-10-19 NOTE — ASSESSMENT & PLAN NOTE
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 NPO MN

## 2023-10-19 NOTE — ASSESSMENT & PLAN NOTE
Chronic, with hypotension  - hold lisinopril  - continue the coreg  - monitor close with the iv lasix as well  - low threshold to transfer to ICU if needing the daniel drip

## 2023-10-19 NOTE — HOSPITAL COURSE
78F with PMH HTN, HLD, HFpEF, pAfib on DAPT, s/p TAVR, CAD s/p stent(s), COPD, DM, GERD w/ hx GIB from AVMs, chronic anemia, and MDD/ANJALI is admitted with afib with RVR, acute on chronic HFpEF, and acute on chronic anemia with no reported bleeding. Initially required diltiazem infusion which attained rate control and was weaned off. Given IV lasix. Home asa and plavix held given acute on chronic anemia with hx GIB. Continued on her home coreg, digoxin, and PPI BID. Cardiology and GI consulted. Given 1u pRBC 10/19. Suffered with diarrhea, Cdiff test negative. FOBT positive. Suffered with intermittent hypotension not needing pressor. GI did push enteroscopy without any bleeding source found. IV iron started.    Patient is yelling and cussing at the nursing staff; non-compliant with the care that is being provided. She received IV iron therapy and wants to go home. She has home O2 and Home Health already set up; needs to be resumed. She was counseled on less use of frozen dinners for her meals due to high sodium content. She can continue to hold lisinopril for hypotension. Follow up PCP and cardiology as scheduled. Referral to hematology requested for evaluation and continuation of IV iron therapy. Vitals and labs are stable. Dc home.

## 2023-10-19 NOTE — H&P
"[facility] - Emergency Dept  Hospital Medicine  History & Physical    Patient Name: Daryleen G Moran  MRN: [id number]  Patient Class: IP- Inpatient  Admission Date: 10/18/2023  Attending Physician: Murtaza Ramirez MD   Primary Care Provider: Abhi De Oliveira IV, MD         Patient information was obtained from patient, past medical records and ER records.     Subjective:     Principal Problem:Atrial fibrillation with RVR    Chief Complaint:   Chief Complaint   Patient presents with    Atrial Fibrillation     Pt's daughter reports " we were at the doctor's office and she was in AFIB the whole time" reports worsening SOB for weeks, pt is home oxygen at 2LNC,   Hx: Afib, COPD, CHF, Dementia         HPI: Patient is a 78-year-old female with HFpEF, Afib, TAVR, DM, anemia, HTN, HLD, CAD, GERD and MDD/ANJALI who presents with shortness of breath.  Shortness of breath has been ongoing the past several days.  Occurs at rest.  Worsens with any movement.  Does use home oxygen.  Patient not on anticoagulation as she has history of GI bleeds.  Patient denies any fevers, chills, chest pain, nausea, vomiting.  In ED, patient is 96% on 2 L nasal cannula.  Found to be in AFib with RVR.  Blood pressure of 130/92. WBC 7.0 hemoglobin 7.1.  Creatinine 0.4.  Troponin 36.2 and .      Past Medical History:   Diagnosis Date    Anesthesia complication     BLADDER DYSFUNCTION    Aortic stenosis     Arthritis     Back pain     Diabetes mellitus type II     NO LONGER DIABETIC    Encounter for blood transfusion     Hyperlipidemia     Hypertension     NSTEMI (non-ST elevated myocardial infarction) 05/01/2022    Osteoporosis     Wears glasses        Past Surgical History:   Procedure Laterality Date     vocal cord nodules removed  long time ago     twice    ADRENAL TUMOR      APPENDECTOMY  within last 5yrs    CATHETERIZATION OF BOTH LEFT AND RIGHT HEART Left 05/06/2022    Procedure: CATHETERIZATION, HEART, BOTH LEFT " AND RIGHT;  Surgeon: Michelet Vargas MD;  Location: [facility] CATH/EP LAB;  Service: Cardiology;  Laterality: Left;    COLONOSCOPY N/A 6/23/2023    Procedure: COLONOSCOPY;  Surgeon: Joel Neal III, MD;  Location: [facility] ENDO;  Service: Endoscopy;  Laterality: N/A;    FIXATION KYPHOPLASTY THORACIC SPINE      8-20-13    FOOT SURGERY      left 2nd toe was too long     HERNIA REPAIR  within last 5yrs    INSERTION OF TEMPORARY PACEMAKER N/A 1/4/2023    Procedure: INSERTION, PACEMAKER, TEMPORARY;  Surgeon: Bhavesh Peña MD;  [facility];  Service: Cardiology;  Laterality: N/A;    LEFT HEART CATHETERIZATION Left 05/02/2022    Procedure: Left heart cath;  Surgeon: Jose Echols MD;  Location: [facility] CATH/EP LAB;  Service: Cardiology;  Laterality: Left;    SMALL BOWEL ENTEROSCOPY N/A 6/22/2023    Procedure: ENTEROSCOPY;  Surgeon: Cornell Aguirre MD;  Location: [facility] ENDO;  Service: Endoscopy;  Laterality: N/A;    TONSILLECTOMY, ADENOIDECTOMY  long time ago    TRANSCATHETER AORTIC VALVE REPLACEMENT (TAVR) Bilateral 1/4/2023    Procedure: REPLACEMENT, AORTIC VALVE, TRANSCATHETER (TAVR);  Surgeon: Bhavesh Peña MD;  [facility] CATH;  Service: Cardiology;  Laterality: Bilateral;    TRANSCATHETER AORTIC VALVE REPLACEMENT (TAVR) Bilateral 1/4/2023    Procedure: REPLACEMENT, AORTIC VALVE, TRANSCATHETER (TAVR);  Surgeon: Dallin Verma MD;  [facility] CATH;  Service: Cardiology;  Laterality: Bilateral;    TRANSTHORACIC ECHOCARDIOGRAPHY (TTE)  1/4/2023    Procedure: ECHOCARDIOGRAM, TRANSTHORACIC;  Surgeon: Bhavesh Peña MD;  [facility];  Service: Cardiology;;       Review of patient's allergies indicates:   Allergen Reactions    Latex      Other reaction(s): Unknown  Other reaction(s): Unknown    Sulfacetamide sodium      Pain perineal area    Sulfasalazine Hives    Adhesive Itching     SKIN GETS RED WITH TAPE AND BANDAIDS    Adhesive tape-silicones Itching     SKIN GETS RED WITH  TAPE AND BANDAIDS    Sulfa (sulfonamide antibiotics) Rash       No current facility-administered medications on file prior to encounter.     Current Outpatient Medications on File Prior to Encounter   Medication Sig    albuterol (PROVENTIL/VENTOLIN HFA) 90 mcg/actuation inhaler Inhale 2 puffs into the lungs every 6 (six) hours as needed for Shortness of Breath.    aspirin (ECOTRIN) 81 MG EC tablet Take 81 mg by mouth once daily.    calcium carbonate (OS-TK) 500 mg calcium (1,250 mg) tablet Take 1 tablet (500 mg total) by mouth once daily.    carvediloL (COREG) 12.5 MG tablet Take 12.5 mg by mouth 2 (two) times daily.    citalopram (CELEXA) 40 MG tablet Take 1 tablet (40 mg total) by mouth once daily.    clopidogreL (PLAVIX) 75 mg tablet Take 1 tablet (75 mg total) by mouth once daily.    digoxin (LANOXIN) 125 mcg tablet Take 1 tablet (0.125 mg total) by mouth once daily.    diltiaZEM (DILACOR XR) 120 MG CDCR Take 1 capsule (120 mg total) by mouth once daily.    donepeziL (ARICEPT) 5 MG tablet Take 5 mg by mouth nightly.    ergocalciferol (ERGOCALCIFEROL) 50,000 unit Cap TAKE 1 CAPSULE (50,000 UNITS TOTAL) EVERY 7 DAYS. (Patient taking differently: Take 50,000 Units by mouth every 7 days.)    ferrous gluconate 324 mg (37.5 mg iron) Tab tablet Take 324 mg by mouth once daily.    fluticasone furoate-vilanteroL (BREO ELLIPTA) 100-25 mcg/dose diskus inhaler Inhale 1 puff into the lungs once daily. Controller    furosemide (LASIX) 40 MG tablet Take 1 tablet (40 mg total) by mouth once daily.    glucosamine-chondroitin 500-400 mg tablet Take 1 tablet by mouth once daily.    lisinopriL 10 MG tablet Take 1 tablet (10 mg total) by mouth once daily.    loperamide (IMODIUM A-D) 2 mg Tab Take 1 tablet (2 mg total) by mouth 3 (three) times daily as needed (diarrhea). Take 2 tablets by mouth initially then 1 tablet after each loose stool as needed for diarrhea.    loratadine (CLARITIN) 10 mg tablet Take 1 tablet  (10 mg total) by mouth once daily.    multivitamin capsule Take 1 capsule by mouth once daily.    oxybutynin (DITROPAN) 5 MG Tab Take 1 tablet (5 mg total) by mouth 2 (two) times daily.    pantoprazole (PROTONIX) 40 MG tablet Take 1 tablet (40 mg total) by mouth 2 (two) times daily.    simvastatin (ZOCOR) 10 MG tablet Take 10 mg by mouth every evening.    [DISCONTINUED] ALLERGY RELIEF, FEXOFENADINE, 180 mg tablet Take 180 mg by mouth once daily.    [DISCONTINUED] ezetimibe-simvastatin 10-40 mg (VYTORIN) 10-40 mg per tablet Take 1 tablet by mouth.    [DISCONTINUED] lisinopril-hydrochlorothiazide (PRINZIDE,ZESTORETIC) 20-12.5 mg per tablet Take 1 tablet by mouth once daily.      Family History    None       Tobacco Use    Smoking status: Former     Current packs/day: 0.00     Average packs/day: 0.5 packs/day for 35.0 years (17.5 ttl pk-yrs)     Types: Cigarettes     Start date: 10/5/1987     Quit date: 10/5/2022     Years since quittin.0    Smokeless tobacco: Never   Substance and Sexual Activity    Alcohol use: No    Drug use: No    Sexual activity: Not on file     Review of Systems   Constitutional: Negative.    HENT: Negative.     Eyes: Negative.    Respiratory:  Positive for shortness of breath. Negative for cough, wheezing and stridor.    Cardiovascular:  Positive for leg swelling. Negative for chest pain and palpitations.   Gastrointestinal: Negative.    Endocrine: Negative.    Genitourinary: Negative.    Musculoskeletal: Negative.    Skin: Negative.    Neurological: Negative.    Hematological: Negative.    Psychiatric/Behavioral: Negative.       Objective:     Vital Signs (Most Recent):  Temp: 98.7 °F (37.1 °C) (10/18/23 1555)  Pulse: 95 (10/18/23 1730)  Resp: 18 (10/18/23 1730)  BP: (!) 148/58 (10/18/23 173)  SpO2: 96 % (10/18/23 1730) Vital Signs (24h Range):  Temp:  [98.7 °F (37.1 °C)] 98.7 °F (37.1 °C)  Pulse:  [] 95  Resp:  [18-22] 18  SpO2:  [96 %-99 %] 96 %  BP: (130-150)/(58-92)  148/58     Weight: 91.6 kg (202 lb)  Body mass index is 39.45 kg/m².     Physical Exam  Vitals and nursing note reviewed.   Constitutional:       General: She is not in acute distress.     Appearance: Normal appearance. She is obese.   HENT:      Head: Normocephalic and atraumatic.      Nose: Nose normal.   Eyes:      Extraocular Movements: Extraocular movements intact.      Conjunctiva/sclera: Conjunctivae normal.      Pupils: Pupils are equal, round, and reactive to light.   Cardiovascular:      Rate and Rhythm: Tachycardia present. Rhythm irregular.   Pulmonary:      Effort: Pulmonary effort is normal. No respiratory distress.      Breath sounds: Normal breath sounds. No stridor. No wheezing or rales.   Abdominal:      General: Bowel sounds are normal. There is no distension.      Palpations: Abdomen is soft.      Tenderness: There is no abdominal tenderness.   Musculoskeletal:         General: No swelling or tenderness. Normal range of motion.      Cervical back: Normal range of motion and neck supple.      Right lower leg: Edema present.      Left lower leg: Edema present.   Skin:     General: Skin is warm and dry.   Neurological:      General: No focal deficit present.      Mental Status: She is alert and oriented to person, place, and time.              CRANIAL NERVES     CN III, IV, VI   Pupils are equal, round, and reactive to light.       Significant Labs: All pertinent labs within the past 24 hours have been reviewed.  Recent Lab Results         10/18/23  1707        Albumin 4.0       ALP 67       ALT 17       Anion Gap 6       AST 15       Baso # 0.07       Basophil % 1.0       BILIRUBIN TOTAL 1.8  Comment: For infants and newborns, interpretation of results should be based  on gestational age, weight and in agreement with clinical  observations.    Premature Infant recommended reference ranges:  Up to 24 hours.............<8.0 mg/dL  Up to 48 hours............<12.0 mg/dL  3-5 days..................<15.0  mg/dL  6-29 days.................<15.0 mg/dL           Comment: Values of less than 100 pg/ml are consistent with non-CHF populations.       BUN 11       Calcium 9.5       Chloride 95       CO2 34       Creatinine 0.4       Differential Method Automated       eGFR >60.0       Eos # 0.1       Eosinophil % 1.3       Glucose 112       Gran # (ANC) 5.4       Gran % 76.3       Hematocrit 25.6       Hemoglobin 7.1       Immature Grans (Abs) 0.02  Comment: Mild elevation in immature granulocytes is non specific and   can be seen in a variety of conditions including stress response,   acute inflammation, trauma and pregnancy. Correlation with other   laboratory and clinical findings is essential.         Immature Granulocytes 0.3       Lymph # 0.8       Lymph % 11.9       MCH 22.5       MCHC 27.7       MCV 81       Mono # 0.7       Mono % 9.2       MPV 11.1       nRBC 0       Platelet Count 201       Potassium 4.0       PROTEIN TOTAL 6.6       RBC 3.15       RDW 21.0       Sodium 135       Troponin I High Sensitivity 36.2  Comment: Troponin results differ between methods. Do not use   results between Troponin methods interchangeably.    Alkaline Phospatase levels above 400 U/L may   cause false positive results.    Access hsTnI should not be used for patients taking   Asfotase robby (Strensiq).         WBC 7.08               Significant Imaging: I have reviewed all pertinent imaging results/findings within the past 24 hours.    Assessment/Plan:     * Atrial fibrillation with RVR  Continue diltiazem drip.  Not on anticoagulation due to history of GI bleeds.  Cardiology consulted.    Acute on chronic diastolic congestive heart failure  Patient is identified as having Diastolic (HFpEF) heart failure that is Acute on chronic. CHF is currently controlled. Latest ECHO performed and demonstrates- Results for orders placed during the hospital encounter of 09/11/23    Echo    Interpretation Summary    Left Ventricle: The left  ventricle is smaller than normal. Moderately increased ventricular mass. Moderately increased wall thickness. There is moderate concentric hypertrophy. Normal wall motion. There is normal systolic function with a visually estimated ejection fraction of 65 - 70%. Grade II diastolic dysfunction.    Left Atrium: Left atrium is moderately dilated.    Right Ventricle: Normal right ventricular cavity size. Wall thickness is normal. Right ventricle wall motion  is normal. Systolic function is normal.    Aortic Valve: The aortic valve is a trileaflet valve. Moderately calcified cusps. There is moderate stenosis. Aortic valve area by VTI is 0.86 cm². Aortic valve peak velocity is 2.42 m/s. Mean gradient is 13 mmHg. The dimensionless index is 0.38.    Mitral Valve: There is severe mitral annular calcification present.    Tricuspid Valve: There is moderate regurgitation.    IVC/SVC: Normal venous pressure at 3 mmHg.  . Continue Beta Blocker and Furosemide and monitor clinical status closely. Monitor on telemetry. Patient is on CHF pathway.  Monitor strict Is&Os and daily weights.  Place on fluid restriction of 1.5 L. Cardiology has been consulted. Continue to stress to patient importance of self efficacy and  on diet for CHF. Last BNP reviewed- and noted below   Recent Labs   Lab 10/18/23  1707   *   .  Continue IV Lasix 40 mg b.i.d.    Anemia  Anemia studies ordered and stool occult blood.  CBC daily.      CAD (coronary artery disease)  Continue aspirin and Plavix      Hypertension  Continue home medications        VTE Risk Mitigation (From admission, onward)         Ordered     IP VTE HIGH RISK PATIENT  Once         10/18/23 2006     Place sequential compression device  Until discontinued         10/18/23 2006                               Murtaza Ramirez MD  Department of Hospital Medicine  [facility] - Emergency Dept

## 2023-10-19 NOTE — ED PROVIDER NOTES
"Encounter Date: 10/18/2023       History     Chief Complaint   Patient presents with    Atrial Fibrillation     Pt's daughter reports " we were at the doctor's office and she was in AFIB the whole time" reports worsening SOB for weeks, pt is home oxygen at 2LNC,   Hx: Afib, COPD, CHF, Dementia      78-year-old female with history of aortic stenosis status post valve repair, NSTEMI with stent, COPD, CHF on chronic oxygen presents now for worsening shortness of breath and dyspnea on exertion.  This has been ongoing for the past week.  She went to the doctor's office today was noted to be in a heart rate in the 140s and sent to the emergency department for further evaluation.  On exam here, she reports shortness of breath worse with exertion, in addition to left-sided posterior back pain.  She denies other symptoms at this time.      Review of patient's allergies indicates:   Allergen Reactions    Latex      Other reaction(s): Unknown  Other reaction(s): Unknown    Sulfacetamide sodium      Pain perineal area    Sulfasalazine Hives    Adhesive Itching     SKIN GETS RED WITH TAPE AND BANDAIDS    Adhesive tape-silicones Itching     SKIN GETS RED WITH TAPE AND BANDAIDS    Sulfa (sulfonamide antibiotics) Rash     Past Medical History:   Diagnosis Date    Anesthesia complication     BLADDER DYSFUNCTION    Aortic stenosis     Arthritis     Back pain     Diabetes mellitus type II     NO LONGER DIABETIC    Encounter for blood transfusion     Hyperlipidemia     Hypertension     NSTEMI (non-ST elevated myocardial infarction) 05/01/2022    Osteoporosis     Wears glasses      Past Surgical History:   Procedure Laterality Date     vocal cord nodules removed  long time ago     twice    ADRENAL TUMOR      APPENDECTOMY  within last 5yrs    CATHETERIZATION OF BOTH LEFT AND RIGHT HEART Left 05/06/2022    Procedure: CATHETERIZATION, HEART, BOTH LEFT AND RIGHT;  Surgeon: Michelet Vargas MD;  Location: [facility] CATH/EP LAB;  Service: " Cardiology;  Laterality: Left;    COLONOSCOPY N/A 2023    Procedure: COLONOSCOPY;  Surgeon: Joel Neal III, MD;  Location: [facility] ENDO;  Service: Endoscopy;  Laterality: N/A;    FIXATION KYPHOPLASTY THORACIC SPINE      14    FOOT SURGERY      left 2nd toe was too long     HERNIA REPAIR  within last 5yrs    INSERTION OF TEMPORARY PACEMAKER N/A 2023    Procedure: INSERTION, PACEMAKER, TEMPORARY;  Surgeon: Bhavesh Peña MD;  Location: [facility] CATH;  Service: Cardiology;  Laterality: N/A;    LEFT HEART CATHETERIZATION Left 2022    Procedure: Left heart cath;  Surgeon: Jsoe Echols MD;  Location: [facility] CATH/EP LAB;  Service: Cardiology;  Laterality: Left;    SMALL BOWEL ENTEROSCOPY N/A 2023    Procedure: ENTEROSCOPY;  Surgeon: Cornell Aguirre MD;  Location: [facility] ENDO;  Service: Endoscopy;  Laterality: N/A;    TONSILLECTOMY, ADENOIDECTOMY  long time ago    TRANSCATHETER AORTIC VALVE REPLACEMENT (TAVR) Bilateral 2023    Procedure: REPLACEMENT, AORTIC VALVE, TRANSCATHETER (TAVR);  Surgeon: Bhavesh Peña MD;  Location: [facility] CATH;  Service: Cardiology;  Laterality: Bilateral;    TRANSCATHETER AORTIC VALVE REPLACEMENT (TAVR) Bilateral 2023    Procedure: REPLACEMENT, AORTIC VALVE, TRANSCATHETER (TAVR);  Surgeon: Dallin Verma MD;  Location: [facility] CATH;  Service: Cardiology;  Laterality: Bilateral;    TRANSTHORACIC ECHOCARDIOGRAPHY (TTE)  2023    Procedure: ECHOCARDIOGRAM, TRANSTHORACIC;  Surgeon: Bhavesh Peña MD;  Location: [facility] CATH;  Service: Cardiology;;     Family History   Problem Relation Age of Onset    Collagen disease Neg Hx      Social History     Tobacco Use    Smoking status: Former     Current packs/day: 0.00     Average packs/day: 0.5 packs/day for 35.0 years (17.5 ttl pk-yrs)     Types: Cigarettes     Start date: 10/5/1987     Quit date: 10/5/2022     Years since quittin.0    Smokeless tobacco: Never   Substance Use Topics    Alcohol use: No    Drug use: No      Review of Systems   Constitutional:  Negative for activity change, appetite change, chills, fever and unexpected weight change.   HENT:  Negative for dental problem and drooling.    Eyes:  Negative for discharge and itching.   Respiratory:  Positive for cough, shortness of breath and wheezing. Negative for chest tightness and stridor.    Cardiovascular:  Positive for palpitations. Negative for chest pain and leg swelling.   Gastrointestinal:  Negative for abdominal distention, abdominal pain, diarrhea and nausea.   Genitourinary:  Negative for difficulty urinating, dysuria, frequency and urgency.   Musculoskeletal:  Negative for back pain, gait problem and joint swelling.   Neurological:  Negative for dizziness, syncope, numbness and headaches.   Psychiatric/Behavioral:  Negative for agitation, behavioral problems and confusion.        Physical Exam     Initial Vitals [10/18/23 1555]   BP Pulse Resp Temp SpO2   (!) 130/92 (!) 127 20 98.7 °F (37.1 °C) 99 %      MAP       --         Physical Exam    Nursing note and vitals reviewed.  Constitutional: She appears well-developed and well-nourished. She is not diaphoretic.   HENT:   Head: Normocephalic and atraumatic.   Mouth/Throat: Oropharynx is clear and moist.   Eyes: EOM are normal. Pupils are equal, round, and reactive to light. Right eye exhibits no discharge. Left eye exhibits no discharge.   Neck: No tracheal deviation present.   Normal range of motion.  Cardiovascular:  Intact distal pulses.           Tachycardic, irregular rate   Pulmonary/Chest: She is in respiratory distress. She has wheezes. She exhibits no tenderness.   Tachypneic on 2 L nasal cannula   Abdominal: Abdomen is soft. She exhibits no distension. There is no abdominal tenderness.   Musculoskeletal:         General: No tenderness or edema. Normal range of motion.      Cervical back: Normal range of motion.     Neurological: She is alert and oriented to person, place, and time. She has normal  strength. No cranial nerve deficit or sensory deficit. GCS eye subscore is 4. GCS verbal subscore is 5. GCS motor subscore is 6.   Skin: Skin is warm and dry. No rash noted.   Psychiatric: She has a normal mood and affect. Her behavior is normal. Thought content normal.         ED Course   Critical Care    Date/Time: 10/18/2023 9:41 PM    Performed by: Pedro Delgado MD  Authorized by: Pedro Delgado MD  Direct patient critical care time: 15 minutes  Additional history critical care time: 5 minutes  Ordering / reviewing critical care time: 5 minutes  Documentation critical care time: 5 minutes  Total critical care time (exclusive of procedural time) : 30 minutes  Critical care was necessary to treat or prevent imminent or life-threatening deterioration of the following conditions: cardiac failure.  Critical care was time spent personally by me on the following activities: evaluation of patient's response to treatment, examination of patient, obtaining history from patient or surrogate, ordering and performing treatments and interventions, ordering and review of laboratory studies, ordering and review of radiographic studies, pulse oximetry and re-evaluation of patient's condition.        Labs Reviewed   CBC W/ AUTO DIFFERENTIAL - Abnormal; Notable for the following components:       Result Value    RBC 3.15 (*)     Hemoglobin 7.1 (*)     Hematocrit 25.6 (*)     MCV 81 (*)     MCH 22.5 (*)     MCHC 27.7 (*)     RDW 21.0 (*)     Lymph # 0.8 (*)     Gran % 76.3 (*)     Lymph % 11.9 (*)     All other components within normal limits   COMPREHENSIVE METABOLIC PANEL - Abnormal; Notable for the following components:    Sodium 135 (*)     CO2 34 (*)     Glucose 112 (*)     Creatinine 0.4 (*)     Total Bilirubin 1.8 (*)     Anion Gap 6 (*)     All other components within normal limits   TROPONIN I HIGH SENSITIVITY - Abnormal; Notable for the following components:    Troponin I High Sensitivity 36.2 (*)     All other  components within normal limits   B-TYPE NATRIURETIC PEPTIDE - Abnormal; Notable for the following components:     (*)     All other components within normal limits   IRON AND TIBC   FERRITIN   VITAMIN B12   FOLATE   RETICULOCYTES   MAGNESIUM   OCCULT BLOOD X 1, STOOL     EKG Readings: (Independently Interpreted)   EKG shows regular rate, regular rhythm, no acute ST elevations or depressions, no W[place], normal QRS, normal QT intervals, interpreted by me.     ECG Results              EKG 12-lead (In process)  Result time 10/18/23 16:08:26      In process by Interface, Lab In [facility] (10/18/23 16:08:26)                   Narrative:    Test Reason : R06.02,    Vent. Rate : 117 BPM     Atrial Rate : 111 BPM     P-R Int : 000 ms          QRS Dur : 074 ms      QT Int : 306 ms       P-R-T Axes : 000 -06 045 degrees     QTc Int : 426 ms    Atrial fibrillation with rapid ventricular response  Nonspecific T wave abnormality  Abnormal ECG  When compared with ECG of 11-SEP-2023 18:47,  Nonspecific T wave abnormality now evident in Lateral leads    Referred By: [email or web address]   SELF           Confirmed By:                                   Imaging Results              X-Ray Chest AP Portable (Final result)  Result time 10/18/23 16:26:23      Final result by Zack Castro MD (10/18/23 16:26:23)                   Narrative:    HISTORY: CHF    FINDINGS: Portable chest radiograph at 1619 hours compared to prior exams shows stable enlarged cardiac silhouette, with normal pulmonary vascularity and scattered aortic vascular calcifications.    The lungs are symmetrically expanded, with diffuse reticulonodular densities and nonspecific blunting the costophrenic angles. There is no consolidation or pneumothorax. The bones are diffusely osteopenic.    IMPRESSION: Cardiomegaly and scattered nonspecific interstitial opacities, with blunting of the costophrenic angles, potentially atelectasis and/or small pleural effusions.    Electronically  signed by:  Zack Castro MD  10/18/2023 04:26 PM CDT Workstation: [id number]                                     Medications   diltiaZEM 100 mg in dextrose 5% 100 mL IVPB (ready to mix) (titrating) (has no administration in time range)   furosemide injection 40 mg (has no administration in time range)   aspirin EC tablet 81 mg (has no administration in time range)   carvediloL tablet 12.5 mg (has no administration in time range)   citalopram tablet 20 mg (has no administration in time range)   clopidogreL tablet 75 mg (has no administration in time range)   digoxin tablet 0.125 mg (has no administration in time range)   donepeziL tablet 5 mg (has no administration in time range)   lisinopriL tablet 10 mg (has no administration in time range)   oxybutynin tablet 5 mg (has no administration in time range)   pantoprazole EC tablet 40 mg (has no administration in time range)   atorvastatin tablet 20 mg (has no administration in time range)   sodium chloride 0.9% flush 10 mL (has no administration in time range)   acetaminophen tablet 650 mg (has no administration in time range)   ondansetron injection 4 mg (has no administration in time range)   albuterol nebulizer solution 2.5 mg (has no administration in time range)   budesonide nebulizer solution 0.5 mg (has no administration in time range)   arformoteroL nebulizer solution 15 mcg (has no administration in time range)   diltiaZEM injection 20 mg (20 mg Intravenous Given 10/18/23 1701)   diltiaZEM 24 hr capsule 120 mg (120 mg Oral Given 10/18/23 1741)   furosemide injection 40 mg (40 mg Intravenous Given 10/18/23 1943)     Medical Decision Making  Amount and/or Complexity of Data Reviewed  Labs: ordered.  Radiology: ordered.    Risk  Prescription drug management.  Decision regarding hospitalization.               ED Course as of 10/18/23 2144   Wed Oct 18, 2023   2142 Brain natriuretic peptide(!) [BS]   2142 Troponin I High Sensitivity(!) [BS]   2142 CBC auto  differential(!) [BS]   2142 Comprehensive metabolic panel(!) [BS]   2142 X-Ray Chest AP Portable [BS]   2142 78-year-old female CHF, aortic stenosis, COPD presents with shortness of breath.  Vitals notable initially for heart rate in the 140s, tachypnea to 22, other vitals within normal limits.  EKG shows AFib with RVR, treated with diltiazem push and p.o. diltiazem rate control in the low 100s.  Patient is still significantly short of breath, BNP elevated at 514, chest x-ray without infiltrates, consistent with heart failure exacerbation, patient given diuresis here 40 mg Lasix.  Will admit for heart failure exacerbation.  Of note patient also has chronic anemia, minimally changed.  She also has mildly elevated troponin 36, consistent with patient's baseline.  Case discussed with Dr. Ramirez, who will admit [BS]      ED Course User Index  [BS] Pedro Delgado MD                    Clinical Impression:   Final diagnoses:  [R06.02] Shortness of breath  [I48.91] A-fib  [I50.9] Acute on chronic congestive heart failure, unspecified heart failure type (Primary)        ED Disposition Condition    Admit                 Pedro Delgado MD  10/18/23 2144

## 2023-10-20 ENCOUNTER — ANESTHESIA (OUTPATIENT)
Dept: SURGERY | Facility: HOSPITAL | Age: 78
DRG: 291 | End: 2023-10-20
Payer: MEDICARE

## 2023-10-20 ENCOUNTER — ANESTHESIA EVENT (OUTPATIENT)
Dept: SURGERY | Facility: HOSPITAL | Age: 78
DRG: 291 | End: 2023-10-20
Payer: MEDICARE

## 2023-10-20 LAB
ALBUMIN SERPL BCP-MCNC: 3.5 G/DL (ref 3.5–5.2)
ALP SERPL-CCNC: 56 U/L (ref 55–135)
ALT SERPL W/O P-5'-P-CCNC: 12 U/L (ref 10–44)
ANION GAP SERPL CALC-SCNC: 5 MMOL/L (ref 8–16)
AST SERPL-CCNC: 17 U/L (ref 10–40)
BILIRUB SERPL-MCNC: 1.1 MG/DL (ref 0.1–1)
BUN SERPL-MCNC: 31 MG/DL (ref 8–23)
CALCIUM SERPL-MCNC: 8.4 MG/DL (ref 8.7–10.5)
CHLORIDE SERPL-SCNC: 96 MMOL/L (ref 95–110)
CO2 SERPL-SCNC: 35 MMOL/L (ref 23–29)
CREAT SERPL-MCNC: 0.9 MG/DL (ref 0.5–1.4)
ERYTHROCYTE [DISTWIDTH] IN BLOOD BY AUTOMATED COUNT: 20 % (ref 11.5–14.5)
EST. GFR  (NO RACE VARIABLE): >60 ML/MIN/1.73 M^2
GLUCOSE SERPL-MCNC: 109 MG/DL (ref 70–110)
HCT VFR BLD AUTO: 26.9 % (ref 37–48.5)
HGB BLD-MCNC: 7.8 G/DL (ref 12–16)
MCH RBC QN AUTO: 23.5 PG (ref 27–31)
MCHC RBC AUTO-ENTMCNC: 29 G/DL (ref 32–36)
MCV RBC AUTO: 81 FL (ref 82–98)
PLATELET # BLD AUTO: 180 K/UL (ref 150–450)
PMV BLD AUTO: 11.2 FL (ref 9.2–12.9)
POTASSIUM SERPL-SCNC: 3.9 MMOL/L (ref 3.5–5.1)
PROT SERPL-MCNC: 5.8 G/DL (ref 6–8.4)
RBC # BLD AUTO: 3.32 M/UL (ref 4–5.4)
SODIUM SERPL-SCNC: 136 MMOL/L (ref 136–145)
WBC # BLD AUTO: 7.22 K/UL (ref 3.9–12.7)

## 2023-10-20 PROCEDURE — 37000008 HC ANESTHESIA 1ST 15 MINUTES: Performed by: INTERNAL MEDICINE

## 2023-10-20 PROCEDURE — D9220A PRA ANESTHESIA: ICD-10-PCS | Mod: CRNA,,, | Performed by: NURSE ANESTHETIST, CERTIFIED REGISTERED

## 2023-10-20 PROCEDURE — 85027 COMPLETE CBC AUTOMATED: CPT | Performed by: STUDENT IN AN ORGANIZED HEALTH CARE EDUCATION/TRAINING PROGRAM

## 2023-10-20 PROCEDURE — A4216 STERILE WATER/SALINE, 10 ML: HCPCS | Performed by: STUDENT IN AN ORGANIZED HEALTH CARE EDUCATION/TRAINING PROGRAM

## 2023-10-20 PROCEDURE — 25000003 PHARM REV CODE 250: Performed by: STUDENT IN AN ORGANIZED HEALTH CARE EDUCATION/TRAINING PROGRAM

## 2023-10-20 PROCEDURE — 94761 N-INVAS EAR/PLS OXIMETRY MLT: CPT

## 2023-10-20 PROCEDURE — 63600175 PHARM REV CODE 636 W HCPCS: Performed by: INTERNAL MEDICINE

## 2023-10-20 PROCEDURE — 27000221 HC OXYGEN, UP TO 24 HOURS

## 2023-10-20 PROCEDURE — 94640 AIRWAY INHALATION TREATMENT: CPT

## 2023-10-20 PROCEDURE — 99233 SBSQ HOSP IP/OBS HIGH 50: CPT | Mod: ,,, | Performed by: INTERNAL MEDICINE

## 2023-10-20 PROCEDURE — 25000242 PHARM REV CODE 250 ALT 637 W/ HCPCS: Performed by: STUDENT IN AN ORGANIZED HEALTH CARE EDUCATION/TRAINING PROGRAM

## 2023-10-20 PROCEDURE — 80053 COMPREHEN METABOLIC PANEL: CPT | Performed by: STUDENT IN AN ORGANIZED HEALTH CARE EDUCATION/TRAINING PROGRAM

## 2023-10-20 PROCEDURE — 21000000 HC CCU ICU ROOM CHARGE

## 2023-10-20 PROCEDURE — D9220A PRA ANESTHESIA: ICD-10-PCS | Mod: ANES,,, | Performed by: ANESTHESIOLOGY

## 2023-10-20 PROCEDURE — 63600175 PHARM REV CODE 636 W HCPCS: Performed by: NURSE ANESTHETIST, CERTIFIED REGISTERED

## 2023-10-20 PROCEDURE — 99900031 HC PATIENT EDUCATION (STAT)

## 2023-10-20 PROCEDURE — 25000003 PHARM REV CODE 250: Performed by: INTERNAL MEDICINE

## 2023-10-20 PROCEDURE — 99900035 HC TECH TIME PER 15 MIN (STAT)

## 2023-10-20 PROCEDURE — 37000009 HC ANESTHESIA EA ADD 15 MINS: Performed by: INTERNAL MEDICINE

## 2023-10-20 PROCEDURE — D9220A PRA ANESTHESIA: Mod: ANES,,, | Performed by: ANESTHESIOLOGY

## 2023-10-20 PROCEDURE — D9220A PRA ANESTHESIA: Mod: CRNA,,, | Performed by: NURSE ANESTHETIST, CERTIFIED REGISTERED

## 2023-10-20 PROCEDURE — 99233 PR SUBSEQUENT HOSPITAL CARE,LEVL III: ICD-10-PCS | Mod: ,,, | Performed by: INTERNAL MEDICINE

## 2023-10-20 PROCEDURE — 44360 SMALL BOWEL ENDOSCOPY: CPT | Performed by: INTERNAL MEDICINE

## 2023-10-20 PROCEDURE — 63600175 PHARM REV CODE 636 W HCPCS

## 2023-10-20 RX ORDER — PHENYLEPHRINE HYDROCHLORIDE 10 MG/ML
INJECTION INTRAVENOUS
Status: DISCONTINUED | OUTPATIENT
Start: 2023-10-20 | End: 2023-10-20

## 2023-10-20 RX ORDER — PROPOFOL 10 MG/ML
VIAL (ML) INTRAVENOUS
Status: DISCONTINUED | OUTPATIENT
Start: 2023-10-20 | End: 2023-10-20

## 2023-10-20 RX ORDER — CARBOXYMETHYLCELLULOSE SODIUM 5 MG/ML
1 SOLUTION/ DROPS OPHTHALMIC 3 TIMES DAILY PRN
Status: DISCONTINUED | OUTPATIENT
Start: 2023-10-20 | End: 2023-10-21 | Stop reason: HOSPADM

## 2023-10-20 RX ADMIN — ARFORMOTEROL TARTRATE 15 MCG: 15 SOLUTION RESPIRATORY (INHALATION) at 08:10

## 2023-10-20 RX ADMIN — PANTOPRAZOLE SODIUM 40 MG: 40 TABLET, DELAYED RELEASE ORAL at 08:10

## 2023-10-20 RX ADMIN — CARVEDILOL 12.5 MG: 12.5 TABLET, FILM COATED ORAL at 08:10

## 2023-10-20 RX ADMIN — SODIUM CHLORIDE 10 ML: 9 INJECTION INTRAMUSCULAR; INTRAVENOUS; SUBCUTANEOUS at 09:10

## 2023-10-20 RX ADMIN — ARFORMOTEROL TARTRATE 15 MCG: 15 SOLUTION RESPIRATORY (INHALATION) at 07:10

## 2023-10-20 RX ADMIN — SODIUM CHLORIDE, SODIUM LACTATE, POTASSIUM CHLORIDE, AND CALCIUM CHLORIDE: .6; .31; .03; .02 INJECTION, SOLUTION INTRAVENOUS at 11:10

## 2023-10-20 RX ADMIN — CARBOXYMETHYLCELLULOSE SODIUM 1 DROP: 5 SOLUTION/ DROPS OPHTHALMIC at 08:10

## 2023-10-20 RX ADMIN — SODIUM CHLORIDE 125 MG: 9 INJECTION, SOLUTION INTRAVENOUS at 02:10

## 2023-10-20 RX ADMIN — OXYBUTYNIN CHLORIDE 5 MG: 5 TABLET ORAL at 08:10

## 2023-10-20 RX ADMIN — BUDESONIDE INHALATION 0.5 MG: 0.5 SUSPENSION RESPIRATORY (INHALATION) at 07:10

## 2023-10-20 RX ADMIN — PROPOFOL 100 MG: 10 INJECTION, EMULSION INTRAVENOUS at 12:10

## 2023-10-20 RX ADMIN — FUROSEMIDE 20 MG: 10 INJECTION, SOLUTION INTRAMUSCULAR; INTRAVENOUS at 08:10

## 2023-10-20 RX ADMIN — PHENYLEPHRINE HYDROCHLORIDE 100 MCG: 10 INJECTION INTRAVENOUS at 12:10

## 2023-10-20 RX ADMIN — BUDESONIDE INHALATION 0.5 MG: 0.5 SUSPENSION RESPIRATORY (INHALATION) at 08:10

## 2023-10-20 RX ADMIN — DONEPEZIL HYDROCHLORIDE 5 MG: 5 TABLET ORAL at 08:10

## 2023-10-20 RX ADMIN — ATORVASTATIN CALCIUM 20 MG: 20 TABLET, FILM COATED ORAL at 08:10

## 2023-10-20 RX ADMIN — CITALOPRAM HYDROBROMIDE 20 MG: 20 TABLET ORAL at 08:10

## 2023-10-20 NOTE — BRIEF OP NOTE
Upper endoscopy and push enteroscopy of the small bowel was done.  No active bleeding was demonstrated.  Patient does not see blood in her stool.  She is a known history of small AVMs that may have AVMs distal to the reach of the scope but her primary problem is that of iron-deficiency caused by this bleeding.  She needs IV iron replacement as oral replacement is not satisfied her iron-deficiency.  Her recent BUN were not elevated as that would suggest recent upper GI bleeding.  Needs intravenous iron I have ordered Ferrlecit.  This could be followed up as an outpatient with Hematology to continue iron infusions.

## 2023-10-20 NOTE — PROGRESS NOTES
[facility] Medicine  Progress Note    Patient Name: Daryleen G Moran  MRN: [id number]  Patient Class: IP- Inpatient   Admission Date: 10/18/2023  Length of Stay: 2 days  Attending Physician: Bryn Valenzuela MD  Primary Care Provider: Abhi De Oliveira IV, MD        Subjective:     Principal Problem:Atrial fibrillation with RVR        HPI:  Patient is a 78-year-old female with HFpEF, Afib, TAVR, DM, anemia, HTN, HLD, CAD, GERD and MDD/ANJALI who presents with shortness of breath.  Shortness of breath has been ongoing the past several days.  Occurs at rest.  Worsens with any movement.  Does use home oxygen.  Patient not on anticoagulation as she has history of GI bleeds.  Patient denies any fevers, chills, chest pain, nausea, vomiting.  In ED, patient is 96% on 2 L nasal cannula.  Found to be in AFib with RVR.  Blood pressure of 130/92. WBC 7.0 hemoglobin 7.1.  Creatinine 0.4.  Troponin 36.2 and .      Overview/Hospital Course:  78F with PMH HTN, HLD, HFpEF, pAfib on DAPT, s/p TAVR, CAD s/p stent(s), COPD, DM, GERD w/ hx GIB from AVMs, chronic anemia, and MDD/ANJALI is admitted with afib with RVR, acute on chronic HFpEF, and acute on chronic anemia with no reported bleeding. Initially required diltiazem infusion which attained rate control and was weaned off. Given IV lasix. Home asa and plavix held given acute on chronic anemia with hx GIB. Continued on her home coreg, digoxin, and PPI BID. Cardiology and GI consulted. Given 1u pRBC 10/19. Suffered with diarrhea, Cdiff test negative. FOBT positive. Suffered with intermittent hypotension not needing pressor. GI did push enteroscopy without any bleeding source found. IV iron started.      Interval History: Patient seen and examined. POP. Upper GI scope today showed no bleeding concern.       Objective:     Vital Signs (Most Recent):  Temp: 97.7 °F (36.5 °C) (10/20/23 1500)  Pulse: 86 (10/20/23 1224)  Resp: 18 (10/20/23 1224)  BP: (!) 97/52  (10/20/23 1224)  SpO2: 96 % (10/20/23 1224) Vital Signs (24h Range):  Temp:  [97.3 °F (36.3 °C)-98.5 °F (36.9 °C)] 97.7 °F (36.5 °C)  Pulse:  [83-97] 86  Resp:  [16-45] 18  SpO2:  [89 %-100 %] 96 %  BP: ()/(51-62) 97/52     Weight: 87.2 kg (192 lb 3.9 oz)  Body mass index is 37.54 kg/m².    Intake/Output Summary (Last 24 hours) at 10/20/2023 1654  Last data filed at 10/20/2023 1525  Gross per 24 hour   Intake 481 ml   Output 900 ml   Net -419 ml         Physical Exam  Vitals reviewed.   Constitutional:       General: She is not in acute distress.     Appearance: She is ill-appearing.   HENT:      Head: Normocephalic and atraumatic.   Cardiovascular:      Rate and Rhythm: Normal rate. Rhythm irregular.   Pulmonary:      Effort: Pulmonary effort is normal. No respiratory distress.   Abdominal:      General: Abdomen is flat. Bowel sounds are normal. There is no distension.      Palpations: Abdomen is soft.      Tenderness: There is generalized abdominal tenderness (mild). There is no guarding.   Skin:     General: Skin is warm and dry.      Coloration: Skin is pale.   Neurological:      General: No focal deficit present.      Mental Status: She is alert and oriented to person, place, and time. Mental status is at baseline.   Psychiatric:         Mood and Affect: Affect normal.         Behavior: Behavior normal.         Thought Content: Thought content normal.             Significant Labs: All pertinent labs within the past 24 hours have been reviewed.    Significant Imaging: I have reviewed all pertinent imaging results/findings within the past 24 hours.      Assessment/Plan:      * Atrial fibrillation with RVR  Rate controlled now  S/p dilt gtt  - tele  - optimize lytes  - continue home coreg and digoxin  - hold DAPT due to anemia / possible GIB  - cardio following    Acute on chronic anemia  Stable, baseline hgb is 8-9  Hx GIB from AVM  S/p 1u prbc 10/19  No active bleeding seen  Enteroscopy no bleed  - hold  DAPT  - continue PPI BID  - trend hgb   - GI consult: start IV iron    Acute on chronic diastolic congestive heart failure  Likely from the afib RVR  - continue lasix IV; transition to PO in AM  - continue home digoxin and coreg  - hold home lisinopril for hypotension  - I/O  - cardio following    CAD (coronary artery disease)  Chronic, stable  - continue home coreg, statin, BP control  - hold DAPT     COPD (chronic obstructive pulmonary disease)  Chronic, stable  - continue nebs    Hypertension  Chronic, with hypotension  - hold lisinopril  - continue the coreg  - monitor close with the iv lasix as well    Type 2 diabetes mellitus, without long-term current use of insulin  Well-controlled  - monitor glucose on AM labs and address further if needed      VTE Risk Mitigation (From admission, onward)         Ordered     IP VTE HIGH RISK PATIENT  Once         10/18/23 2006     Place sequential compression device  Until discontinued         10/18/23 2006                Discharge Planning   ISAAK: 10/21/2023     Code Status: Full Code   Is the patient medically ready for discharge?:     Reason for patient still in hospital (select all that apply): Patient trending condition and Treatment  Discharge Plan A: Home Health                  Bryn Valenzuela MD  Department of Hospital Medicine   [facility]

## 2023-10-20 NOTE — ANESTHESIA PREPROCEDURE EVALUATION
10/20/2023  Daryleen G Moran is a 78 y.o., female.      Tobacco Use:  The patient  reports that she quit smoking about 12 months ago. Her smoking use included cigarettes. She started smoking about 36 years ago. She has a 17.5 pack-year smoking history. She has never used smokeless tobacco.     Results for orders placed or performed during the hospital encounter of 10/18/23   EKG 12-lead    Collection Time: 10/19/23  3:37 PM    Narrative    Test Reason : I48.91,    Vent. Rate : 089 BPM     Atrial Rate : 000 BPM     P-R Int : 000 ms          QRS Dur : 070 ms      QT Int : 352 ms       P-R-T Axes : 000 004 005 degrees     QTc Int : 428 ms    Atrial fibrillation  Abnormal ECG  When compared with ECG of 18-OCT-2023 17:35,  Nonspecific T wave abnormality no longer evident in Lateral leads  QT has lengthened    Referred By: [email or web address]   SELF           Confirmed By:              Lab Results   Component Value Date    WBC 7.22 10/20/2023    HGB 7.8 (L) 10/20/2023    HCT 26.9 (L) 10/20/2023    MCV 81 (L) 10/20/2023     10/20/2023     BMP  Lab Results   Component Value Date     10/20/2023    K 3.9 10/20/2023    CL 96 10/20/2023    CO2 35 (H) 10/20/2023    BUN 31 (H) 10/20/2023    CREATININE 0.9 10/20/2023    CALCIUM 8.4 (L) 10/20/2023    ANIONGAP 5 (L) 10/20/2023     10/20/2023     (H) 10/19/2023     (H) 10/18/2023       Results for orders placed during the hospital encounter of 02/06/23    Echo    Interpretation Summary  · The left ventricle is normal in size with moderate concentric hypertrophy and normal systolic function.  · The estimated ejection fraction is 65%.  · Grade I left ventricular diastolic dysfunction.  · Normal right ventricular size with normal right ventricular systolic function.  · There is a transcutaneously-placed aortic bioprosthesis present. There is no aortic  insufficiency present. Prosthetic aortic valve is normal.  · The aortic valve mean gradient is 9 mmHg with a dimensionless index of 0.58.  · Mild mitral regurgitation.  · The mean diastolic gradient across the mitral valve is 6 mmHg at a heart rate of bpm.  · There is moderate mitral stenosis.  · Mild tricuspid regurgitation.  · Normal central venous pressure (3 mmHg).  · The estimated PA systolic pressure is 37 mmHg.        Pre-op Assessment    I have reviewed the Patient Summary Reports.     I have reviewed the Nursing Notes. I have reviewed the NPO Status.   I have reviewed the Medications.     Review of Systems  Anesthesia Hx:  No problems with previous Anesthesia  History of prior surgery of interest to airway management or planning: heart surgery. Denies Family Hx of Anesthesia complications.   Denies Personal Hx of Anesthesia complications.   Social:  Former Smoker, No Alcohol Use    Hematology/Oncology:         -- Anemia: Iron Deficiency Anemia Acute Hematology Comments: Plavix Therapy     Cardiovascular:   Hypertension, poorly controlled Valvular problems/Murmurs, AS Past MI CAD  CABG/stent Dysrhythmias atrial fibrillation CHF PVD hyperlipidemia ECG has been reviewed. Patient post TVAR 1/2023    Patient followed by Dr. Macias  Valvular Heart Disease: Aortic Stenosis (AS), s/p repair Mitral Stenosis (MS), moderate   Congestive Heart Failure (CHF) , Chronic Congestive Heart Failure , NYHA Classification III   Abdominal Aortic Aneurysm  Disorder of Cardiac Rhythm, Atrial Fibrillation, Paroxysmal Atrial Fibrillation    Pulmonary:   COPD, mild Shortness of breath Albuterol and Symbicort inhaler use     Home Oxygen use   Admitted with worsening SOB, improving since admit, on continuous O2 2-3 liters        Hepatic/GI:   GERD  Bowel Conditions:  GI Bleed    Musculoskeletal:   Arthritis  Compression fracture of thoracic vertebra  Lumbar spondylosis  Thoracic spondylosis  DDD (degenerative disc disease),  thoracic  DDD (degenerative disc disease), lumbar  Bursitis of hip  Compression fracture of lumbar vertebra  Closed fracture of dorsal (thoracic) vertebra without mention of spinal cord injury  Back pain  Lumbosacral radiculopathy   Joint Disease:  Arthritis, Osteoarthritis  Bone Disorders: Osteoporosis  Spine Disorders: lumbar Chronic Pain, Degenerative disease and Disc disease    Neurological:   Neuromuscular Disease,  Osteoarthritis  Dementia mild    Endocrine:   Diabetes, well controlled, type 2  Adrenal Disease, Left adrenal mass    Psych:   Psychiatric History          Physical Exam  General: Well nourished, Cooperative, Alert and Oriented    Airway:  Mallampati: III   Mouth Opening: Normal  TM Distance: > 6 cm  Tongue: Normal  Neck ROM: Normal ROM    Dental:  Intact    Chest/Lungs:  Clear to auscultation, Normal Respiratory Rate    Heart:  Rate: Normal  Rhythm: Regular Rhythm  Murmur: Systolic;        Anesthesia Plan  Type of Anesthesia, risks & benefits discussed:    Anesthesia Type: Gen Natural Airway  Intra-op Monitoring Plan: Standard ASA Monitors  Induction:  IV  Informed Consent: Informed consent signed with the Patient and all parties understand the risks and agree with anesthesia plan.  All questions answered.   ASA Score: 3 Emergent  Anesthesia Plan Notes:     GNA  POM   Propofol    Ready For Surgery From Anesthesia Perspective.     .

## 2023-10-20 NOTE — PLAN OF CARE
HH orders sent to [facility]        10/20/23 8955   Post-Acute Status   Post-Acute Authorization Home Health   Home Health Status Referrals Sent

## 2023-10-20 NOTE — PLAN OF CARE
[facility] HH accepted. Notify when DC for LATRE[facility] date       10/20/23 4541   Post-Acute Status   Post-Acute Authorization Home Health   Home Health Status Set-up Complete/Auth obtained

## 2023-10-20 NOTE — [employer] PATIENT INSTRUCTIONS
Discharge Summary/Instructions after an Endoscopic Procedure  Patient Name: Daryleen Moran  Patient MRN: [id number]  Patient YOB: 1945 Friday, October 20, 2023  Otilio Bourgeois MD  RESTRICTIONS:  During your procedure today, you received medications for sedation.  These   medications may affect your judgment, balance and coordination.  Therefore,   for 24 hours, you have the following restrictions:   - DO NOT drive a car, operate machinery, make legal/financial decisions,   sign important papers or drink alcohol.    ACTIVITY:  Today: no heavy lifting, straining or running due to procedural   sedation/anesthesia.  The following day: return to full activity including work.  DIET:  Eat and drink normally unless instructed otherwise.     TREATMENT FOR COMMON SIDE EFFECTS:  - Mild abdominal pain, nausea, belching, bloating or excessive gas:  rest,   eat lightly and use a heating pad.  - Sore Throat: treat with throat lozenges and/or gargle with warm salt   water.  - Because air was used during the procedure, expelling large amounts of air   from your rectum or belching is normal.  - If a bowel prep was taken, you may not have a bowel movement for 1-3 days.    This is normal.  SYMPTOMS TO WATCH FOR AND REPORT TO YOUR PHYSICIAN:  1. Abdominal pain or bloating, other than gas cramps.  2. Chest pain.  3. Back pain.  4. Signs of infection such as: chills or fever occurring within 24 hours   after the procedure.  5. Rectal bleeding, which would show as bright red, maroon, or black stools.   (A tablespoon of blood from the rectum is not serious, especially if   hemorrhoids are present.)  6. Vomiting.  7. Weakness or dizziness.  GO DIRECTLY TO THE NEAREST EMERGENCY ROOM IF YOU HAVE ANY OF THE FOLLOWING:      Difficulty breathing              Chills and/or fever over 101 F   Persistent vomiting and/or vomiting blood   Severe abdominal pain   Severe chest pain   Black, tarry stools   Bleeding- more than one  tablespoon   Any other symptom or condition that you feel may need urgent attention  Your doctor recommends these additional instructions:  If any biopsies were taken, your doctors clinic will contact you in 1 to 2   weeks with any results.  - Return patient to [facility] for ongoing care.   - Recommend an iron supplement IV>  For questions, problems or results please call your physician - Otilio Bourgeois MD at Work:  [phone].  [facility], EMERGENCY ROOM PHONE NUMBER: [phone]  IF A COMPLICATION OR EMERGENCY SITUATION ARISES AND YOU ARE UNABLE TO REACH   YOUR PHYSICIAN - GO DIRECTLY TO THE EMERGENCY ROOM.  Otilio Bourgeois MD  10/20/2023 12:17:12 PM  This report has been verified and signed electronically.  Dear patient,  As a result of recent federal legislation (The Federal Cures Act), you may   receive lab or pathology results from your procedure in your [facility]   account before your physician is able to contact you. Your physician or   their representative will relay the results to you with their   recommendations at their soonest availability.  Thank you,  [employer]

## 2023-10-20 NOTE — ASSESSMENT & PLAN NOTE
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

## 2023-10-20 NOTE — CARE UPDATE
10/19/23 2000   Patient Assessment/Suction   Level of Consciousness (AVPU) alert   Respiratory Effort Normal;Unlabored   Expansion/Accessory Muscles/Retractions no use of accessory muscles   All Lung Fields Breath Sounds equal bilaterally;coarse   Rhythm/Pattern, Respiratory unlabored   Cough Frequency frequent   Cough Type fair;nonproductive   PRE-TX-O2   Device (Oxygen Therapy) nasal cannula   Flow (L/min) 2   SpO2 98 %   Pulse Oximetry Type Continuous   $ Pulse Oximetry - Multiple Charge Pulse Oximetry - Multiple   Pulse 87   Resp 18   Aerosol Therapy   $ Aerosol Therapy Charges Aerosol Treatment  (Pulmicort)   Daily Review of Necessity (SVN) completed   Respiratory Treatment Status (SVN) given   Treatment Route (SVN) mask   Patient Position (SVN) HOB elevated   Post Treatment Assessment (SVN) breath sounds unchanged   Signs of Intolerance (SVN) none   Breath Sounds Post-Respiratory Treatment   Throughout All Fields Post-Treatment All Fields   Throughout All Fields Post-Treatment no change   Post-treatment Heart Rate (beats/min) 87   Post-treatment Resp Rate (breaths/min) 16   Education   $ Education Bronchodilator;15 min   Respiratory Evaluation   $ Care Plan Tech Time 15 min   $ Eval/Re-eval Charges Evaluation   Evaluation For New Orders

## 2023-10-20 NOTE — SUBJECTIVE & OBJECTIVE
Interval History: Patient seen and examined. POP. Upper GI scope today showed no bleeding concern.       Objective:     Vital Signs (Most Recent):  Temp: 97.7 °F (36.5 °C) (10/20/23 1500)  Pulse: 86 (10/20/23 1224)  Resp: 18 (10/20/23 1224)  BP: (!) 97/52 (10/20/23 1224)  SpO2: 96 % (10/20/23 1224) Vital Signs (24h Range):  Temp:  [97.3 °F (36.3 °C)-98.5 °F (36.9 °C)] 97.7 °F (36.5 °C)  Pulse:  [83-97] 86  Resp:  [16-45] 18  SpO2:  [89 %-100 %] 96 %  BP: ()/(51-62) 97/52     Weight: 87.2 kg (192 lb 3.9 oz)  Body mass index is 37.54 kg/m².    Intake/Output Summary (Last 24 hours) at 10/20/2023 1654  Last data filed at 10/20/2023 1525  Gross per 24 hour   Intake 481 ml   Output 900 ml   Net -419 ml         Physical Exam  Vitals reviewed.   Constitutional:       General: She is not in acute distress.     Appearance: She is ill-appearing.   HENT:      Head: Normocephalic and atraumatic.   Cardiovascular:      Rate and Rhythm: Normal rate. Rhythm irregular.   Pulmonary:      Effort: Pulmonary effort is normal. No respiratory distress.   Abdominal:      General: Abdomen is flat. Bowel sounds are normal. There is no distension.      Palpations: Abdomen is soft.      Tenderness: There is generalized abdominal tenderness (mild). There is no guarding.   Skin:     General: Skin is warm and dry.      Coloration: Skin is pale.   Neurological:      General: No focal deficit present.      Mental Status: She is alert and oriented to person, place, and time. Mental status is at baseline.   Psychiatric:         Mood and Affect: Affect normal.         Behavior: Behavior normal.         Thought Content: Thought content normal.             Significant Labs: All pertinent labs within the past 24 hours have been reviewed.    Significant Imaging: I have reviewed all pertinent imaging results/findings within the past 24 hours.

## 2023-10-20 NOTE — ASSESSMENT & PLAN NOTE
Likely from the afib RVR  - continue lasix IV; transition to PO in AM  - continue home digoxin and coreg  - hold home lisinopril for hypotension  - I/O  - cardio following

## 2023-10-20 NOTE — TRANSFER OF CARE
Anesthesia Transfer of Care Note    Patient: Daryleen G Moran    Procedure(s) Performed: Procedure(s) (LRB):  ENTEROSCOPY (N/A)    Patient location: GI    Anesthesia Type: general    Transport from OR: Transported from OR on room air with adequate spontaneous ventilation    Post pain: adequate analgesia    Post assessment: no apparent anesthetic complications    Post vital signs: stable    Level of consciousness: awake    Nausea/Vomiting: no nausea/vomiting    Complications: none    Transfer of care protocol was followed      Last vitals:   Visit Vitals  BP (!) 96/51   Pulse 87   Temp 36.5 °C (97.7 °F)   Resp (!) 22   Ht 5' (1.524 m)   Wt 87.2 kg (192 lb 3.9 oz)   LMP  (LMP Unknown)   SpO2 98%   BMI 37.54 kg/m²

## 2023-10-20 NOTE — CARE UPDATE
10/20/23 0748   Patient Assessment/Suction   Level of Consciousness (AVPU) alert   Respiratory Effort Normal;Unlabored   Expansion/Accessory Muscles/Retractions no use of accessory muscles   All Lung Fields Breath Sounds coarse;diminished;equal bilaterally   Rhythm/Pattern, Respiratory unlabored   Cough Frequency infrequent   Cough Type no productive sputum   Skin Integrity   $ Wound Care Tech Time 15 min   Area Observed Left;Right;Behind ear;Cheek;Upper lip;Nares   Skin Appearance without discoloration   PRE-TX-O2   Device (Oxygen Therapy) nasal cannula   $ Is the patient on Low Flow Oxygen? Yes   Flow (L/min) 2   SpO2 (!) 94 %   Pulse Oximetry Type Continuous   $ Pulse Oximetry - Multiple Charge Pulse Oximetry - Multiple   Pulse 88   Resp 18   Aerosol Therapy   $ Aerosol Therapy Charges Aerosol Treatment   Daily Review of Necessity (SVN) completed   Respiratory Treatment Status (SVN) given   Treatment Route (SVN) mask;oxygen   Patient Position (SVN) semi-Marin's   Post Treatment Assessment (SVN) breath sounds unchanged   Signs of Intolerance (SVN) none   Breath Sounds Post-Respiratory Treatment   Throughout All Fields Post-Treatment All Fields   Throughout All Fields Post-Treatment no change   Post-treatment Heart Rate (beats/min) 92   Post-treatment Resp Rate (breaths/min) 18   Education   $ Education Bronchodilator;15 min

## 2023-10-20 NOTE — PROGRESS NOTES
Critical access hospital  Department of Cardiology  Consult Note      PATIENT NAME: Daryleen G Moran  MRN: [id number]  TODAY'S DATE: 10/20/2023  ADMIT DATE: 10/18/2023                          CONSULT REQUESTED BY: Bryn Valenzuela MD    SUBJECTIVE     PRINCIPAL PROBLEM: Atrial fibrillation with RVR      REASON FOR CONSULT:    AFRVR CHF    INTERVAL HISTORY  10/20/23    Resting in bed.  No acute distress.  Patient had push enteroscopy this a.m. with no significant findings.  AFib on the monitor no events overnight.  Rate controlled AFib.  Patient denies any shortness of breath or acute complaints at this time.      HPI:    Patient was a 78-year-old female with past medical history of HFpEF, atrial fibrillation, TAVR, diabetes mellitus, anemia, hypertension, hyperlipidemia, CAD, GERD, who presented to the ED with complaints of shortness of breath for the past several days with rest and exertion.  Patient was not on anticoagulation as she was history of GI bleeds.  In ED patient was found to be in AFib RVR.    In ED H&H 7.1/25.6, platelet 201, K 4.0, creatinine 0.4, BUN 11, , troponin HS 36.2, repeat 33.5, digoxin level 0.3, chest x-ray consistent with cardiomegaly with atelectasis and/or small pleural effusions, EKG atrial fibrillation with RVR, nonspecific T-wave abnormality in lateral leads    Echo 9/12/23       Left Ventricle: The left ventricle is smaller than normal. Moderately increased ventricular mass. Moderately increased wall thickness. There is moderate concentric hypertrophy. Normal wall motion. There is normal systolic function with a visually estimated ejection fraction of 65 - 70%. Grade II diastolic dysfunction.    Left Atrium: Left atrium is moderately dilated.    Right Ventricle: Normal right ventricular cavity size. Wall thickness is normal. Right ventricle wall motion  is normal. Systolic function is normal.    Aortic Valve: The aortic valve is a trileaflet valve. Moderately calcified cusps.  There is moderate stenosis. Aortic valve area by VTI is 0.86 cm². Aortic valve peak velocity is 2.42 m/s. Mean gradient is 13 mmHg. The dimensionless index is 0.38.    Mitral Valve: There is severe mitral annular calcification present.    Tricuspid Valve: There is moderate regurgitation.    IVC/SVC: Normal venous pressure at 3 mmHg.      Review of patient's allergies indicates:   Allergen Reactions    Latex      Other reaction(s): Unknown  Other reaction(s): Unknown    Sulfacetamide sodium      Pain perineal area    Sulfasalazine Hives    Adhesive Itching     SKIN GETS RED WITH TAPE AND BANDAIDS    Adhesive tape-silicones Itching     SKIN GETS RED WITH TAPE AND BANDAIDS    Sulfa (sulfonamide antibiotics) Rash       Past Medical History:   Diagnosis Date    Anesthesia complication     BLADDER DYSFUNCTION    Aortic stenosis     Arthritis     Back pain     Diabetes mellitus type II     NO LONGER DIABETIC    Encounter for blood transfusion     Hyperlipidemia     Hypertension     NSTEMI (non-ST elevated myocardial infarction) 05/01/2022    Osteoporosis     Wears glasses      Past Surgical History:   Procedure Laterality Date     vocal cord nodules removed  long time ago     twice    ADRENAL TUMOR      APPENDECTOMY  within last 5yrs    CATHETERIZATION OF BOTH LEFT AND RIGHT HEART Left 05/06/2022    Procedure: CATHETERIZATION, HEART, BOTH LEFT AND RIGHT;  Surgeon: Michelet Vargas MD;  Location: [facility] CATH/EP LAB;  Service: Cardiology;  Laterality: Left;    COLONOSCOPY N/A 6/23/2023    Procedure: COLONOSCOPY;  Surgeon: Joel Neal III, MD;  Location: [facility] ENDO;  Service: Endoscopy;  Laterality: N/A;    FIXATION KYPHOPLASTY THORACIC SPINE      8-20-13    FOOT SURGERY      left 2nd toe was too long     HERNIA REPAIR  within last 5yrs    INSERTION OF TEMPORARY PACEMAKER N/A 1/4/2023    Procedure: INSERTION, PACEMAKER, TEMPORARY;  Surgeon: Bhavesh Peña MD;  Location: [facility] CATH;  Service: Cardiology;  Laterality: N/A;     LEFT HEART CATHETERIZATION Left 2022    Procedure: Left heart cath;  Surgeon: Jose Echols MD;  Location: [facility] CATH/EP LAB;  Service: Cardiology;  Laterality: Left;    SMALL BOWEL ENTEROSCOPY N/A 2023    Procedure: ENTEROSCOPY;  Surgeon: Cornell Aguirre MD;  Location: [facility] ENDO;  Service: Endoscopy;  Laterality: N/A;    TONSILLECTOMY, ADENOIDECTOMY  long time ago    TRANSCATHETER AORTIC VALVE REPLACEMENT (TAVR) Bilateral 2023    Procedure: REPLACEMENT, AORTIC VALVE, TRANSCATHETER (TAVR);  Surgeon: Bhavesh Peña MD;  Location: [facility] CATH;  Service: Cardiology;  Laterality: Bilateral;    TRANSCATHETER AORTIC VALVE REPLACEMENT (TAVR) Bilateral 2023    Procedure: REPLACEMENT, AORTIC VALVE, TRANSCATHETER (TAVR);  Surgeon: Dallin Verma MD;  Location: [facility] CATH;  Service: Cardiology;  Laterality: Bilateral;    TRANSTHORACIC ECHOCARDIOGRAPHY (TTE)  2023    Procedure: ECHOCARDIOGRAM, TRANSTHORACIC;  Surgeon: Bhavesh Peña MD;  Location: [facility] CATH;  Service: Cardiology;;     Social History     Tobacco Use    Smoking status: Former     Current packs/day: 0.00     Average packs/day: 0.5 packs/day for 35.0 years (17.5 ttl pk-yrs)     Types: Cigarettes     Start date: 10/5/1987     Quit date: 10/5/2022     Years since quittin.0    Smokeless tobacco: Never   Substance Use Topics    Alcohol use: No    Drug use: No        REVIEW OF SYSTEMS    As mentioned in HPI    OBJECTIVE     VITAL SIGNS (Most Recent)  Temp: 97.3 °F (36.3 °C) (10/20/23 1215)  Pulse: 86 (10/20/23 1224)  Resp: 18 (10/20/23 1224)  BP: (!) 97/52 (10/20/23 1224)  SpO2: 96 % (10/20/23 1224)    VENTILATION STATUS  Resp: 18 (10/20/23 1224)  SpO2: 96 % (10/20/23 1224)           I & O (Last 24H):  Intake/Output Summary (Last 24 hours) at 10/20/2023 1415  Last data filed at 10/20/2023 1210  Gross per 24 hour   Intake 361 ml   Output 200 ml   Net 161 ml       WEIGHTS  Wt Readings from Last 3 Encounters:   10/20/23 0400 87.2 kg (192 lb  3.9 oz)   10/19/23 0554 90 kg (198 lb 6.6 oz)   10/18/23 2137 89 kg (196 lb 3.4 oz)   10/18/23 2135 89 kg (196 lb 3.4 oz)   10/18/23 1555 91.6 kg (202 lb)   09/13/23 0400 96.3 kg (212 lb 4.9 oz)   09/11/23 2232 96.5 kg (212 lb 11.9 oz)   09/11/23 1821 96.2 kg (212 lb)   09/09/23 0551 97.4 kg (214 lb 11.7 oz)   09/09/23 0500 100.7 kg (222 lb 0.1 oz)   09/08/23 1651 95.3 kg (210 lb)   09/08/23 0151 95.3 kg (210 lb)       PHYSICAL EXAM    PHYSICAL EXAM  CONSTITUTIONAL: NAD, obese  HEENT: Normocephalic. No pallor  NECK: no JVD  LUNGS: Diminished; supplemental oxygen  HEART: Irregular rate and rhythm, S1, S2 normal, no murmur   ABDOMEN: soft, non-tender, bowel sounds normal  EXTREMITIES: No edema  SKIN: No rash  NEURO: AAO X 3  PSYCH: normal affect        HOME MEDICATIONS:  No current facility-administered medications on file prior to encounter.     Current Outpatient Medications on File Prior to Encounter   Medication Sig Dispense Refill    albuterol (PROVENTIL/VENTOLIN HFA) 90 mcg/actuation inhaler Inhale 2 puffs into the lungs every 6 (six) hours as needed for Shortness of Breath.      aspirin (ECOTRIN) 81 MG EC tablet Take 81 mg by mouth once daily.      calcium carbonate (OS-TK) 500 mg calcium (1,250 mg) tablet Take 1 tablet (500 mg total) by mouth once daily.  0    carvediloL (COREG) 12.5 MG tablet Take 12.5 mg by mouth 2 (two) times daily.      citalopram (CELEXA) 40 MG tablet Take 1 tablet (40 mg total) by mouth once daily. 90 tablet 3    clopidogreL (PLAVIX) 75 mg tablet Take 1 tablet (75 mg total) by mouth once daily. 90 tablet 2    digoxin (LANOXIN) 125 mcg tablet Take 1 tablet (0.125 mg total) by mouth once daily. 90 tablet 0    diltiaZEM (DILACOR XR) 120 MG CDCR Take 1 capsule (120 mg total) by mouth once daily. 30 capsule 11    donepeziL (ARICEPT) 5 MG tablet Take 5 mg by mouth nightly.      ergocalciferol (ERGOCALCIFEROL) 50,000 unit Cap TAKE 1 CAPSULE (50,000 UNITS TOTAL) EVERY 7 DAYS. (Patient taking  differently: Take 50,000 Units by mouth every 7 days.) 12 capsule 3    ferrous gluconate 324 mg (37.5 mg iron) Tab tablet Take 324 mg by mouth once daily.      fluticasone furoate-vilanteroL (BREO ELLIPTA) 100-25 mcg/dose diskus inhaler Inhale 1 puff into the lungs once daily. Controller      furosemide (LASIX) 40 MG tablet Take 1 tablet (40 mg total) by mouth once daily. 45 tablet 2    glucosamine-chondroitin 500-400 mg tablet Take 1 tablet by mouth once daily.      lisinopriL 10 MG tablet Take 1 tablet (10 mg total) by mouth once daily. 90 tablet 3    loperamide (IMODIUM A-D) 2 mg Tab Take 1 tablet (2 mg total) by mouth 3 (three) times daily as needed (diarrhea). Take 2 tablets by mouth initially then 1 tablet after each loose stool as needed for diarrhea. 15 tablet 0    loratadine (CLARITIN) 10 mg tablet Take 1 tablet (10 mg total) by mouth once daily.  0    multivitamin capsule Take 1 capsule by mouth once daily.      oxybutynin (DITROPAN) 5 MG Tab Take 1 tablet (5 mg total) by mouth 2 (two) times daily. 180 tablet 3    pantoprazole (PROTONIX) 40 MG tablet Take 1 tablet (40 mg total) by mouth 2 (two) times daily. 180 tablet 0    simvastatin (ZOCOR) 10 MG tablet Take 10 mg by mouth every evening.      [DISCONTINUED] ALLERGY RELIEF, FEXOFENADINE, 180 mg tablet Take 180 mg by mouth once daily.      [DISCONTINUED] ezetimibe-simvastatin 10-40 mg (VYTORIN) 10-40 mg per tablet Take 1 tablet by mouth.      [DISCONTINUED] lisinopril-hydrochlorothiazide (PRINZIDE,ZESTORETIC) 20-12.5 mg per tablet Take 1 tablet by mouth once daily.          SCHEDULED MEDS:   arformoteroL  15 mcg Nebulization BID    atorvastatin  20 mg Oral QHS    budesonide  0.5 mg Nebulization Q12H    carvediloL  12.5 mg Oral BID    citalopram  20 mg Oral Daily    digoxin  0.125 mg Oral Daily    donepeziL  5 mg Oral Nightly    ferric gluconate (FERRLECIT) 125 mg in sodium chloride 0.9% 100 mL IVPB  125 mg Intravenous Daily    furosemide (LASIX) injection   "20 mg Intravenous Q12H    oxybutynin  5 mg Oral BID    pantoprazole  40 mg Oral BID    sodium chloride 0.9%  10 mL Intravenous Q12H       CONTINUOUS INFUSIONS:   dilTIAZem Stopped (10/19/23 0405)       PRN MEDS:0.9%  NaCl infusion (for blood administration), acetaminophen, albuterol sulfate, magnesium oxide, magnesium oxide, magnesium oxide, ondansetron, potassium chloride, potassium chloride, potassium chloride    LABS AND DIAGNOSTICS     CBC LAST 3 DAYS  Recent Labs   Lab 10/18/23  1707 10/19/23  0356 10/19/23  2105 10/20/23  0422   WBC 7.08 7.77  --  7.22   RBC 3.15* 3.14*  --  3.32*   HGB 7.1* 7.0* 7.9* 7.8*   HCT 25.6* 25.6*  --  26.9*   MCV 81* 82  --  81*   MCH 22.5* 22.3*  --  23.5*   MCHC 27.7* 27.3*  --  29.0*   RDW 21.0* 21.2*  --  20.0*    213  --  180   MPV 11.1 11.0  --  11.2   GRAN 76.3*  5.4  --   --   --    LYMPH 11.9*  0.8*  --   --   --    MONO 9.2  0.7  --   --   --    BASO 0.07  --   --   --    NRBC 0  --   --   --        COAGULATION LAST 3 DAYS  No results for input(s): "LABPT", "INR", "APTT" in the last 168 hours.    CHEMISTRY LAST 3 DAYS  Recent Labs   Lab 10/18/23  1707 10/19/23  0356 10/20/23  0422   * 135* 136   K 4.0 3.7 3.9   CL 95 93* 96   CO2 34* 35* 35*   ANIONGAP 6* 7* 5*   BUN 11 18 31*   CREATININE 0.4* 0.7 0.9   * 124* 109   CALCIUM 9.5 9.1 8.4*   MG  --  1.8  --    ALBUMIN 4.0 3.7 3.5   PROT 6.6 6.3 5.8*   ALKPHOS 67 64 56   ALT 17 15 12   AST 15 14 17   BILITOT 1.8* 1.3* 1.1*       CARDIAC PROFILE LAST 3 DAYS  Recent Labs   Lab 10/18/23  1707 10/19/23  0356   *  --    TROPONINIHS 36.2* 33.5*       ENDOCRINE LAST 3 DAYS  No results for input(s): "TSH", "PROCAL" in the last 168 hours.    LAST ARTERIAL BLOOD GAS  ABG  No results for input(s): "PH", "PO2", "PCO2", "HCO3", "BE" in the last 168 hours.    LAST 7 DAYS MICROBIOLOGY   Microbiology Results (last 7 days)       Procedure Component Value Units Date/Time    Clostridium difficile EIA [[id number]] " Collected: 10/19/23 1500    Order Status: Completed Specimen: Stool Updated: 10/19/23 2111     C. diff Antigen Negative     C difficile Toxins A+B, EIA Negative     Comment: Testing not recommended for children <24 months old.       Narrative:      Recoll. [id number] by [facility] at 10/19/2023 16:12, reason: stool is   contaminated. Ask for recollect and already notified nurse   Kailyn.            MOST RECENT IMAGING  X-Ray Chest AP Portable  HISTORY: CHF    FINDINGS: Portable chest radiograph at 1619 hours compared to prior exams shows stable enlarged cardiac silhouette, with normal pulmonary vascularity and scattered aortic vascular calcifications.    The lungs are symmetrically expanded, with diffuse reticulonodular densities and nonspecific blunting the costophrenic angles. There is no consolidation or pneumothorax. The bones are diffusely osteopenic.    IMPRESSION: Cardiomegaly and scattered nonspecific interstitial opacities, with blunting of the costophrenic angles, potentially atelectasis and/or small pleural effusions.    Electronically signed by:  Zack Castro MD  10/18/2023 04:26 PM CDT Workstation: [id number]      ECHOCARDIOGRAM RESULTS (last 5)  Results for orders placed during the hospital encounter of 09/11/23    Echo    Interpretation Summary    Left Ventricle: The left ventricle is smaller than normal. Moderately increased ventricular mass. Moderately increased wall thickness. There is moderate concentric hypertrophy. Normal wall motion. There is normal systolic function with a visually estimated ejection fraction of 65 - 70%. Grade II diastolic dysfunction.    Left Atrium: Left atrium is moderately dilated.    Right Ventricle: Normal right ventricular cavity size. Wall thickness is normal. Right ventricle wall motion  is normal. Systolic function is normal.    Aortic Valve: The aortic valve is a trileaflet valve. Moderately calcified cusps. There is moderate stenosis. Aortic valve area by VTI is 0.86  cm². Aortic valve peak velocity is 2.42 m/s. Mean gradient is 13 mmHg. The dimensionless index is 0.38.    Mitral Valve: There is severe mitral annular calcification present.    Tricuspid Valve: There is moderate regurgitation.    IVC/SVC: Normal venous pressure at 3 mmHg.      Results for orders placed during the hospital encounter of 02/06/23    Echo    Interpretation Summary  · The left ventricle is normal in size with moderate concentric hypertrophy and normal systolic function.  · The estimated ejection fraction is 65%.  · Grade I left ventricular diastolic dysfunction.  · Normal right ventricular size with normal right ventricular systolic function.  · There is a transcutaneously-placed aortic bioprosthesis present. There is no aortic insufficiency present. Prosthetic aortic valve is normal.  · The aortic valve mean gradient is 9 mmHg with a dimensionless index of 0.58.  · Mild mitral regurgitation.  · The mean diastolic gradient across the mitral valve is 6 mmHg at a heart rate of bpm.  · There is moderate mitral stenosis.  · Mild tricuspid regurgitation.  · Normal central venous pressure (3 mmHg).  · The estimated PA systolic pressure is 37 mmHg.      Results for orders placed during the hospital encounter of 01/04/23    Echo Saline Bubble? No    Interpretation Summary  · The left ventricle is normal in size with mild concentric hypertrophy and normal systolic function.  · Moderate left atrial enlargement.  · The estimated ejection fraction is 70%.  · Grade I left ventricular diastolic dysfunction.  · There is a transcutaneously-placed aortic bioprosthesis present. There is no aortic insufficiency present. Prosthetic aortic valve is normal.  · The aortic valve mean gradient is 14 mmHg with a dimensionless index of 0.56.  · Normal right ventricular size with normal right ventricular systolic function.  · Mild mitral regurgitation.  · The mean diastolic gradient across the mitral valve is 8 mmHg at a heart  rate of bpm.  · There is mild to moderate mitral stenosis.  · Mild tricuspid regurgitation.  · Normal central venous pressure (3 mmHg).  · The estimated PA systolic pressure is 28 mmHg.      Echo    Interpretation Summary  · Intraoperative echocardiogram done during TAVR procedure  · Normal biventricular function  · Well-positioned balloon expandable TAVR without PVL or central aortic insufficiency      Results for orders placed during the hospital encounter of 09/23/22    Echo    Interpretation Summary  · The left ventricle is normal in size with mild concentric hypertrophy and normal systolic function.  · Moderate left atrial enlargement.  · The estimated ejection fraction is 65%.  · Grade I left ventricular diastolic dysfunction.  · Normal right ventricular size with normal right ventricular systolic function.  · Mild right atrial enlargement.  · Mild aortic regurgitation.  · There is severe aortic valve stenosis.  · Aortic valve area is 0.89 cm2; peak velocity is 4.69 m/s; mean gradient is 53 mmHg.  · Mild-to-moderate mitral regurgitation.  · The mean diastolic gradient across the mitral valve is 7 mmHg at a heart rate of bpm.  · There is mild to moderate mitral stenosis.  · Mild tricuspid regurgitation.  · Normal central venous pressure (3 mmHg).  · The estimated PA systolic pressure is 39 mmHg.      CURRENT/PREVIOUS VISIT EKG  Results for orders placed or performed during the hospital encounter of 10/18/23   EKG 12-lead    Collection Time: 10/19/23  3:37 PM    Narrative    Test Reason : I48.91,    Vent. Rate : 089 BPM     Atrial Rate : 000 BPM     P-R Int : 000 ms          QRS Dur : 070 ms      QT Int : 352 ms       P-R-T Axes : 000 004 005 degrees     QTc Int : 428 ms    Atrial fibrillation  Abnormal ECG  When compared with ECG of 18-OCT-2023 17:35,  Nonspecific T wave abnormality no longer evident in Lateral leads  QT has lengthened    Referred By: [email or web address]   SELF           Confirmed By:             ASSESSMENT/PLAN:     Active Hospital Problems    Diagnosis    *Atrial fibrillation with RVR    Acute on chronic anemia    Acute on chronic diastolic congestive heart failure    CAD (coronary artery disease)    COPD (chronic obstructive pulmonary disease)    Hypertension    Type 2 diabetes mellitus, without long-term current use of insulin     Dx updated per 2019 IMO Load         ASSESSMENT & PLAN:   AFRVR  Acute on Chronic Anemia  HFpEF  S/P TAVR  HTN  CAD    RECOMMENDATIONS:    Rate controlled AF.  Not on anticoagulation due to hx of GIB.  GI following. No significant findings on small bowl enteroscopy.  ECHO 9/2023: EF 65-70%, grade II diastolic dysfunction.  Moderate AS.  Hx TAVR in Jan 2023.  Acute on chronic HFpEF.  Transition to PO diuretics next am. Strict I's and O's. 2 g salt restriction. 1.5 L fluid restriction.  Continue digoxin 0.125 mg daily.  Hold Coreg for SBP <100 and HR <60.  Patient would benefit from a Watchman device OP.  Recommend EP eval upon dc.  Continue to check and replace potassium and magnesium. Goal for potassium is 4.0, and goal for magnesium is 2.0.    Thank you for the consult. We will sign off.      Keysha Swain NP  Department of Cardiology  Date of Service: 10/20/2023

## 2023-10-20 NOTE — ASSESSMENT & PLAN NOTE
Chronic, with hypotension  - hold lisinopril  - continue the coreg  - monitor close with the iv lasix as well

## 2023-10-21 VITALS
DIASTOLIC BLOOD PRESSURE: 55 MMHG | SYSTOLIC BLOOD PRESSURE: 120 MMHG | WEIGHT: 205.5 LBS | RESPIRATION RATE: 20 BRPM | BODY MASS INDEX: 40.34 KG/M2 | HEART RATE: 99 BPM | HEIGHT: 60 IN | OXYGEN SATURATION: 95 % | TEMPERATURE: 98 F

## 2023-10-21 PROCEDURE — 99900031 HC PATIENT EDUCATION (STAT)

## 2023-10-21 PROCEDURE — 25000003 PHARM REV CODE 250: Performed by: STUDENT IN AN ORGANIZED HEALTH CARE EDUCATION/TRAINING PROGRAM

## 2023-10-21 PROCEDURE — 27000221 HC OXYGEN, UP TO 24 HOURS

## 2023-10-21 PROCEDURE — 63600175 PHARM REV CODE 636 W HCPCS: Performed by: INTERNAL MEDICINE

## 2023-10-21 PROCEDURE — 25000242 PHARM REV CODE 250 ALT 637 W/ HCPCS: Performed by: STUDENT IN AN ORGANIZED HEALTH CARE EDUCATION/TRAINING PROGRAM

## 2023-10-21 PROCEDURE — 94640 AIRWAY INHALATION TREATMENT: CPT

## 2023-10-21 PROCEDURE — 94761 N-INVAS EAR/PLS OXIMETRY MLT: CPT

## 2023-10-21 PROCEDURE — 25000003 PHARM REV CODE 250: Performed by: INTERNAL MEDICINE

## 2023-10-21 PROCEDURE — 99900035 HC TECH TIME PER 15 MIN (STAT)

## 2023-10-21 RX ORDER — DIPHENHYDRAMINE HCL 25 MG
25 CAPSULE ORAL ONCE AS NEEDED
Status: DISCONTINUED | OUTPATIENT
Start: 2023-10-21 | End: 2023-10-21 | Stop reason: HOSPADM

## 2023-10-21 RX ADMIN — CITALOPRAM HYDROBROMIDE 20 MG: 20 TABLET ORAL at 08:10

## 2023-10-21 RX ADMIN — CARVEDILOL 12.5 MG: 12.5 TABLET, FILM COATED ORAL at 08:10

## 2023-10-21 RX ADMIN — DIGOXIN 0.12 MG: 125 TABLET ORAL at 08:10

## 2023-10-21 RX ADMIN — PANTOPRAZOLE SODIUM 40 MG: 40 TABLET, DELAYED RELEASE ORAL at 08:10

## 2023-10-21 RX ADMIN — ARFORMOTEROL TARTRATE 15 MCG: 15 SOLUTION RESPIRATORY (INHALATION) at 07:10

## 2023-10-21 RX ADMIN — SODIUM CHLORIDE 125 MG: 9 INJECTION, SOLUTION INTRAVENOUS at 10:10

## 2023-10-21 RX ADMIN — OXYBUTYNIN CHLORIDE 5 MG: 5 TABLET ORAL at 08:10

## 2023-10-21 RX ADMIN — BUDESONIDE INHALATION 0.5 MG: 0.5 SUSPENSION RESPIRATORY (INHALATION) at 07:10

## 2023-10-21 NOTE — ANESTHESIA POSTPROCEDURE EVALUATION
Anesthesia Post Evaluation    Patient: Daryleen G Moran    Procedure(s) Performed: Procedure(s) (LRB):  ENTEROSCOPY (N/A)    Final Anesthesia Type: general      Patient location during evaluation: GI PACU  Patient participation: Yes- Able to Participate  Level of consciousness: awake and alert and oriented  Post-procedure vital signs: reviewed and stable  Pain management: adequate  Airway patency: patent    PONV status at discharge: No PONV  Anesthetic complications: no      Cardiovascular status: blood pressure returned to baseline  Respiratory status: unassisted, spontaneous ventilation and room air  Hydration status: euvolemic  Follow-up not needed.          Vitals Value Taken Time   /55 10/21/23 1000   Temp 36.5 °C (97.7 °F) 10/21/23 0701   Pulse 90 10/21/23 1121   Resp 21 10/21/23 1004   SpO2 96 % 10/21/23 1121   Vitals shown include unvalidated device data.      Event Time   Out of Recovery 10/20/2023 12:24:00         Pain/Phani Score: No data recorded

## 2023-10-21 NOTE — CARE UPDATE
10/21/23 0731   Patient Assessment/Suction   Level of Consciousness (AVPU) alert   Respiratory Effort Normal;Unlabored   Expansion/Accessory Muscles/Retractions no retractions;no use of accessory muscles   All Lung Fields Breath Sounds diminished;equal bilaterally   Rhythm/Pattern, Respiratory no shortness of breath reported;depth regular;unlabored;pattern regular   Cough Frequency infrequent   Cough Type no productive sputum   Skin Integrity   $ Wound Care Tech Time 15 min   Area Observed Right;Left;Behind ear;Cheek;Upper lip;Nares   Skin Appearance without discoloration   PRE-TX-O2   Device (Oxygen Therapy) nasal cannula   $ Is the patient on Low Flow Oxygen? Yes   Flow (L/min) 3  (weaned to 3L)   SpO2 95 %   Pulse Oximetry Type Continuous   $ Pulse Oximetry - Multiple Charge Pulse Oximetry - Multiple   Pulse 92   Resp 18   Aerosol Therapy   $ Aerosol Therapy Charges Aerosol Treatment   Daily Review of Necessity (SVN) completed   Respiratory Treatment Status (SVN) given   Treatment Route (SVN) mask;oxygen   Patient Position (SVN) semi-Marin's   Post Treatment Assessment (SVN) breath sounds unchanged   Signs of Intolerance (SVN) none   Breath Sounds Post-Respiratory Treatment   Throughout All Fields Post-Treatment All Fields   Throughout All Fields Post-Treatment no change   Post-treatment Heart Rate (beats/min) 92   Post-treatment Resp Rate (breaths/min) 18   Education   $ Education Bronchodilator;15 min

## 2023-10-21 NOTE — RESPIRATORY THERAPY
10/20/23 2002   Patient Assessment/Suction   Level of Consciousness (AVPU) alert   Respiratory Effort Normal;Unlabored   Expansion/Accessory Muscles/Retractions no use of accessory muscles   All Lung Fields Breath Sounds Anterior:;diminished   Rhythm/Pattern, Respiratory unlabored;pattern regular;depth regular;no shortness of breath reported   Cough Frequency infrequent   Skin Integrity   $ Wound Care Tech Time 15 min   Area Observed Left;Right;Behind ear;Cheek   Skin Appearance without discoloration   PRE-TX-O2   Device (Oxygen Therapy) nasal cannula   Flow (L/min) 4   SpO2 100 %   Pulse Oximetry Type Continuous   $ Pulse Oximetry - Multiple Charge Pulse Oximetry - Multiple   Pulse 100   Resp 20   BP (!) 162/75   Positioning   Body Position position changed independently   Aerosol Therapy   $ Aerosol Therapy Charges Aerosol Treatment   Daily Review of Necessity (SVN) completed   Respiratory Treatment Status (SVN) given   Treatment Route (SVN) mask;oxygen   Patient Position (SVN) semi-Marin's   Post Treatment Assessment (SVN) breath sounds unchanged   Signs of Intolerance (SVN) none   Breath Sounds Post-Respiratory Treatment   Throughout All Fields Post-Treatment All Fields   Throughout All Fields Post-Treatment no change   Post-treatment Heart Rate (beats/min) 93   Post-treatment Resp Rate (breaths/min) 20   Education   $ Education Bronchodilator;DME Nebulizer;DME Oxygen;15 min

## 2023-10-21 NOTE — DISCHARGE SUMMARY
[facility] Medicine  Discharge Summary      Patient Name: Daryleen G Moran  MRN: [id number]  LUIZ: [id number]  Patient Class: IP- Inpatient  Admission Date: 10/18/2023  Hospital Length of Stay: 3 days  Discharge Date and Time:  10/21/2023 9:51 AM  Attending Physician: No att. providers found   Discharging Provider: Deedee Rodriguez MD  Primary Care Provider: Abhi De Oliveira IV, MD    Primary Care Team: Networked reference to record PCT     HPI:   Patient is a 78-year-old female with HFpEF, Afib, TAVR, DM, anemia, HTN, HLD, CAD, GERD and MDD/ANJALI who presents with shortness of breath.  Shortness of breath has been ongoing the past several days.  Occurs at rest.  Worsens with any movement.  Does use home oxygen.  Patient not on anticoagulation as she has history of GI bleeds.  Patient denies any fevers, chills, chest pain, nausea, vomiting.  In ED, patient is 96% on 2 L nasal cannula.  Found to be in AFib with RVR.  Blood pressure of 130/92. WBC 7.0 hemoglobin 7.1.  Creatinine 0.4.  Troponin 36.2 and .      Procedure(s) (LRB):  ENTEROSCOPY (N/A)      Hospital Course:   78F with PMH HTN, HLD, HFpEF, pAfib on DAPT, s/p TAVR, CAD s/p stent(s), COPD, DM, GERD w/ hx GIB from AVMs, chronic anemia, and MDD/ANJALI is admitted with afib with RVR, acute on chronic HFpEF, and acute on chronic anemia with no reported bleeding. Initially required diltiazem infusion which attained rate control and was weaned off. Given IV lasix. Home asa and plavix held given acute on chronic anemia with hx GIB. Continued on her home coreg, digoxin, and PPI BID. Cardiology and GI consulted. Given 1u pRBC 10/19. Suffered with diarrhea, Cdiff test negative. FOBT positive. Suffered with intermittent hypotension not needing pressor. GI did push enteroscopy without any bleeding source found. IV iron started.    Patient is yelling and cussing at the nursing staff; non-compliant with the care that is being provided. She received  IV iron therapy and wants to go home. She has home O2 and Home Health already set up; needs to be resumed. She was counseled on less use of frozen dinners for her meals due to high sodium content. She can continue to hold lisinopril for hypotension. Follow up PCP and cardiology as scheduled. Referral to hematology requested for evaluation and continuation of IV iron therapy. Vitals and labs are stable. Dc home.        Goals of Care Treatment Preferences:  Code Status: Full Code      Consults:   Consults (From admission, onward)        Status Ordering Provider     Inpatient consult to Midline team  Once        Provider:  (Not yet assigned)    Completed LEEANNA DAVIS     Inpatient consult to Gastroenterology  Once        Provider:  (Not yet assigned)    Completed LEEANNA DAVIS     Inpatient consult to Cardiology  Once        Provider:  Kemal Parikh MD    Completed BOLA NOWAK new Assessment & Plan notes have been filed under this hospital service since the last note was generated.  Service: Hospital Medicine    Final Active Diagnoses:    Diagnosis Date Noted POA    PRINCIPAL PROBLEM:  Atrial fibrillation with RVR [I48.91] 10/18/2023 Yes    Acute on chronic anemia [D64.9] 10/18/2023 Yes    Acute on chronic diastolic congestive heart failure [I50.33] 01/04/2023 Yes    CAD (coronary artery disease) [I25.10] 12/19/2022 Yes    COPD (chronic obstructive pulmonary disease) [J44.9] 09/27/2016 Yes    Hypertension [I10] 09/13/2016 Yes    Type 2 diabetes mellitus, without long-term current use of insulin [E11.9] 09/13/2016 Yes      Problems Resolved During this Admission:       Discharged Condition: stable    Disposition: Home-Health Care c    Follow Up:    Patient Instructions:      Ambulatory referral/consult to Hematology / Oncology   Standing Status: Future   Referral Priority: Routine Referral Type: Consultation   Referral Reason: Specialty Services Required   Requested Specialty: Hematology and  "Oncology   Number of Visits Requested: 1     Diet Cardiac     SUBSEQUENT HOME HEALTH ORDERS   Order Comments: Please resume home health as previously ordered. All orders to be signed by PCP     Order Specific Question Answer Comments   What Home Health Agency is the patient currently using? Other/External      Notify your health care provider if you experience any of the following:  temperature >100.4     Notify your health care provider if you experience any of the following:  persistent nausea and vomiting or diarrhea     Notify your health care provider if you experience any of the following:  increased confusion or weakness     Activity as tolerated       Significant Diagnostic Studies: Labs:   BMP:   Recent Labs   Lab 10/20/23  0422         K 3.9   CL 96   CO2 35*   BUN 31*   CREATININE 0.9   CALCIUM 8.4*   , CBC   Recent Labs   Lab 10/19/23  2105 10/20/23  0422   WBC  --  7.22   HGB 7.9* 7.8*   HCT  --  26.9*   PLT  --  180    and Troponin No results for input(s): "TROPONINI" in the last 168 hours.    Pending Diagnostic Studies:     Procedure Component Value Units Date/Time    CBC Without Differential [[id number]] Collected: 10/21/23 0300    Order Status: Sent Lab Status: In process Updated: 10/21/23 0301    Specimen: Blood     CBC auto differential [[id number]]     Order Status: Sent Lab Status: No result     Specimen: Blood     Comprehensive Metabolic Panel [[id number]] Collected: 10/21/23 0300    Order Status: Sent Lab Status: In process Updated: 10/21/23 0301    Specimen: Blood     Comprehensive metabolic panel [[id number]]     Order Status: Sent Lab Status: No result     Specimen: Blood     Lactic Acid, Plasma [[id number]] Collected: 10/21/23 0300    Order Status: Sent Lab Status: In process Updated: 10/21/23 0301    Specimen: Blood     Lactic Acid, Plasma [[id number]]     Order Status: Sent Lab Status: No result     Specimen: Blood     Lactic acid, plasma [[id number]]     Order Status: Sent " Lab Status: No result     Specimen: Blood          Medications:  Reconciled Home Medications:      Medication List      CHANGE how you take these medications    ergocalciferol 50,000 unit Cap  Commonly known as: ERGOCALCIFEROL  TAKE 1 CAPSULE (50,000 UNITS TOTAL) EVERY 7 DAYS.  What changed: See the new instructions.        CONTINUE taking these medications    albuterol 90 mcg/actuation inhaler  Commonly known as: PROVENTIL/VENTOLIN HFA  Inhale 2 puffs into the lungs every 6 (six) hours as needed for Shortness of Breath.     aspirin 81 MG EC tablet  Commonly known as: ECOTRIN  Take 81 mg by mouth once daily.     BREO ELLIPTA 100-25 mcg/dose diskus inhaler  Generic drug: fluticasone furoate-vilanteroL  Inhale 1 puff into the lungs once daily. Controller     calcium carbonate 500 mg calcium (1,250 mg) tablet  Commonly known as: OS-TK  Take 1 tablet (500 mg total) by mouth once daily.     carvediloL 12.5 MG tablet  Commonly known as: COREG  Take 12.5 mg by mouth 2 (two) times daily.     citalopram 40 MG tablet  Commonly known as: CeleXA  Take 1 tablet (40 mg total) by mouth once daily.     clopidogreL 75 mg tablet  Commonly known as: PLAVIX  Take 1 tablet (75 mg total) by mouth once daily.     digoxin 125 mcg tablet  Commonly known as: LANOXIN  Take 1 tablet (0.125 mg total) by mouth once daily.     diltiaZEM 120 MG Cdcr  Commonly known as: DILACOR XR  Take 1 capsule (120 mg total) by mouth once daily.     donepeziL 5 MG tablet  Commonly known as: ARICEPT  Take 5 mg by mouth nightly.     ferrous gluconate 324 mg (37.5 mg iron) Tab tablet  Take 324 mg by mouth once daily.     furosemide 40 MG tablet  Commonly known as: LASIX  Take 1 tablet (40 mg total) by mouth once daily.     glucosamine-chondroitin 500-400 mg tablet  Take 1 tablet by mouth once daily.     loperamide 2 mg Tab  Commonly known as: IMODIUM A-D  Take 1 tablet (2 mg total) by mouth 3 (three) times daily as needed (diarrhea). Take 2 tablets by mouth  initially then 1 tablet after each loose stool as needed for diarrhea.     loratadine 10 mg tablet  Commonly known as: CLARITIN  Take 1 tablet (10 mg total) by mouth once daily.     multivitamin capsule  Take 1 capsule by mouth once daily.     oxybutynin 5 MG Tab  Commonly known as: DITROPAN  Take 1 tablet (5 mg total) by mouth 2 (two) times daily.     pantoprazole 40 MG tablet  Commonly known as: PROTONIX  Take 1 tablet (40 mg total) by mouth 2 (two) times daily.     simvastatin 10 MG tablet  Commonly known as: ZOCOR  Take 10 mg by mouth every evening.        STOP taking these medications    lisinopriL 10 MG tablet            Indwelling Lines/Drains at time of discharge:   Lines/Drains/Airways     Drain  Duration           Female External Urinary Catheter 10/19/23 0251 2 days                Time spent on the discharge of patient: 40 minutes         Deedee Rodriguez MD  Department of Hospital Medicine  [facility]

## 2023-10-21 NOTE — PLAN OF CARE
Problem: Adult Inpatient Plan of Care  Goal: Plan of Care Review  Outcome: Met  Goal: Patient-Specific Goal (Individualized)  Outcome: Met  Goal: Absence of Hospital-Acquired Illness or Injury  Outcome: Met  Goal: Optimal Comfort and Wellbeing  Outcome: Met  Goal: Readiness for Transition of Care  Outcome: Met     Problem: Diabetes Comorbidity  Goal: Blood Glucose Level Within Targeted Range  Outcome: Met     Problem: Impaired Wound Healing  Goal: Optimal Wound Healing  Outcome: Met     Problem: Skin Injury Risk Increased  Goal: Skin Health and Integrity  Outcome: Met     Problem: Fall Injury Risk  Goal: Absence of Fall and Fall-Related Injury  Outcome: Met     Problem: Infection  Goal: Absence of Infection Signs and Symptoms  Outcome: Met     Problem: Bariatric Environmental Safety  Goal: Safety Maintained with Care  Outcome: Met

## 2023-10-21 NOTE — NURSING
IV dc. Tolerated well. DC instructions, follow up apps, and meds reviewed with pt. Verbalized understanding. Pt brought down via wheelchair with all belongings to family transport.

## 2023-10-21 NOTE — PLAN OF CARE
DC orders and chart reviewed. No discharge needs noted.  Patient cleared for discharge from .  Patient is discharging home with [facility].  DC order and summary sent via [email or web address].         10/21/23 1020   Final Note   Assessment Type Final Discharge Note   Anticipated Discharge Disposition Home-Health   What phone number can be called within the next 1-3 days to see how you are doing after discharge? [phone]   Hospital Resources/Appts/Education Provided Post-Acute resouces added to AVS   Post-Acute Status   Post-Acute Authorization Home Health   Home Health Status Set-up Complete/Auth obtained   Discharge Delays None known at this time

## 2023-10-23 ENCOUNTER — TELEPHONE (OUTPATIENT)
Dept: CASE MANAGEMENT | Facility: HOSPITAL | Age: 78
End: 2023-10-23

## 2023-10-23 ENCOUNTER — TELEPHONE (OUTPATIENT)
Dept: PRIMARY CARE CLINIC | Facility: CLINIC | Age: 78
End: 2023-10-23
Payer: MEDICARE

## 2023-10-23 NOTE — TELEPHONE ENCOUNTER
Left msg for pt's dtr Sally to inform her that pt has a follow up with Nurse Practitioner, Evelin Motley, in Dr. De Oliveira's office, this Friday at 10:00 a.m. for a hospital follow up. Further explained that I tentatively sched a hospital follow up at [facility] for this Friday, just in case Dr. De Oliveira's ofc was not able to get pt in for hospital follow up this week. Requested call back from Sally to confirm that [facility] will be able transport pt to and from hospital follow up with REJI Motley in her PCP, Dr. De Oliveira's office this Friday at 10:00 a.m.

## 2023-10-23 NOTE — TELEPHONE ENCOUNTER
Called and scheduled hospital follow up with REJI Motlye in Dr. Abhi De Oliveira's office (pt's PCP) [phone] to schedule hospital follow up. Staff member scheduling the follow up said that pt has been treating with NP Evelin Motley for over a year now.    Had conversation with pt on 10/22 while still inpt at [facility] and pt only agreed to sched hospital follow up at [facility]charge [facility], if Dr. De Oliveira's ofcewas not able to sched a hosp f/u visit in one week. Hosp f/u with REJI Motley sched for 10/27 at 10:00 a.m. Left voice mail with the information above, on pt's voice mail.

## 2023-10-24 ENCOUNTER — TELEPHONE (OUTPATIENT)
Dept: HEMATOLOGY/ONCOLOGY | Facility: CLINIC | Age: 78
End: 2023-10-24

## 2023-10-25 ENCOUNTER — PATIENT OUTREACH (OUTPATIENT)
Dept: ADMINISTRATIVE | Facility: CLINIC | Age: 78
End: 2023-10-25
Payer: MEDICARE

## 2023-10-25 NOTE — PROGRESS NOTES
C3 nurse spoke with Daryleen G Moran for a TCC post hospital discharge follow up call. The patient has a scheduled HOSFU appointment with [facility] on 10/27/23 @ 1000.

## 2023-10-31 ENCOUNTER — DOCUMENT SCAN (OUTPATIENT)
Dept: HOME HEALTH SERVICES | Facility: HOSPITAL | Age: 78
End: 2023-10-31
Payer: MEDICARE

## 2023-11-06 ENCOUNTER — TELEPHONE (OUTPATIENT)
Dept: HEMATOLOGY/ONCOLOGY | Facility: CLINIC | Age: 78
End: 2023-11-06

## 2023-11-07 NOTE — TELEPHONE ENCOUNTER
She no showed today.    Make an appt for her to come in within 2-3 weeks to discuss recent hospital stay and Fe infusions.

## 2024-01-01 ENCOUNTER — HOSPITAL ENCOUNTER (EMERGENCY)
Facility: HOSPITAL | Age: 79
Discharge: HOME | End: 2024-01-01
Attending: EMERGENCY MEDICINE
Payer: MEDICARE

## 2024-01-01 VITALS
BODY MASS INDEX: 40.25 KG/M2 | DIASTOLIC BLOOD PRESSURE: 91 MMHG | SYSTOLIC BLOOD PRESSURE: 181 MMHG | HEIGHT: 60 IN | WEIGHT: 205 LBS | TEMPERATURE: 98 F | RESPIRATION RATE: 18 BRPM | HEART RATE: 104 BPM | OXYGEN SATURATION: 95 %

## 2024-01-01 DIAGNOSIS — N39.0 URINARY TRACT INFECTION WITHOUT HEMATURIA, SITE UNSPECIFIED: ICD-10-CM

## 2024-01-01 DIAGNOSIS — R10.9 ABDOMINAL PAIN, UNSPECIFIED ABDOMINAL LOCATION: Primary | ICD-10-CM

## 2024-01-01 DIAGNOSIS — R51.9 NONINTRACTABLE HEADACHE, UNSPECIFIED CHRONICITY PATTERN, UNSPECIFIED HEADACHE TYPE: ICD-10-CM

## 2024-01-01 LAB
ALBUMIN SERPL BCP-MCNC: 3.9 G/DL (ref 3.5–5.2)
ALP SERPL-CCNC: 57 U/L (ref 55–135)
ALT SERPL W/O P-5'-P-CCNC: 30 U/L (ref 10–44)
ANION GAP SERPL CALC-SCNC: 9 MMOL/L (ref 8–16)
AST SERPL-CCNC: 37 U/L (ref 10–40)
BACTERIA #/AREA URNS HPF: NEGATIVE /HPF
BASOPHILS # BLD AUTO: 0.07 K/UL (ref 0–0.2)
BASOPHILS NFR BLD: 1 % (ref 0–1.9)
BILIRUB SERPL-MCNC: 0.9 MG/DL (ref 0.1–1)
BILIRUB UR QL STRIP: NEGATIVE
BUN SERPL-MCNC: 8 MG/DL (ref 8–23)
CALCIUM SERPL-MCNC: 8.8 MG/DL (ref 8.7–10.5)
CHLORIDE SERPL-SCNC: 99 MMOL/L (ref 95–110)
CLARITY UR: CLEAR
CO2 SERPL-SCNC: 30 MMOL/L (ref 23–29)
COLOR UR: YELLOW
CREAT SERPL-MCNC: 0.5 MG/DL (ref 0.5–1.4)
DIFFERENTIAL METHOD BLD: ABNORMAL
EOSINOPHIL # BLD AUTO: 0.1 K/UL (ref 0–0.5)
EOSINOPHIL NFR BLD: 1.7 % (ref 0–8)
ERYTHROCYTE [DISTWIDTH] IN BLOOD BY AUTOMATED COUNT: 18.2 % (ref 11.5–14.5)
EST. GFR  (NO RACE VARIABLE): >60 ML/MIN/1.73 M^2
GLUCOSE SERPL-MCNC: 119 MG/DL (ref 70–110)
GLUCOSE UR QL STRIP: NEGATIVE
HCT VFR BLD AUTO: 39.8 % (ref 37–48.5)
HGB BLD-MCNC: 11.3 G/DL (ref 12–16)
HGB UR QL STRIP: NEGATIVE
HYALINE CASTS #/AREA URNS LPF: 27 /LPF
IMM GRANULOCYTES # BLD AUTO: 0.01 K/UL (ref 0–0.04)
IMM GRANULOCYTES NFR BLD AUTO: 0.1 % (ref 0–0.5)
KETONES UR QL STRIP: NEGATIVE
LEUKOCYTE ESTERASE UR QL STRIP: NEGATIVE
LIPASE SERPL-CCNC: 13 U/L (ref 4–60)
LYMPHOCYTES # BLD AUTO: 1.6 K/UL (ref 1–4.8)
LYMPHOCYTES NFR BLD: 22.1 % (ref 18–48)
MCH RBC QN AUTO: 22 PG (ref 27–31)
MCHC RBC AUTO-ENTMCNC: 28.4 G/DL (ref 32–36)
MCV RBC AUTO: 78 FL (ref 82–98)
MICROSCOPIC COMMENT: ABNORMAL
MONOCYTES # BLD AUTO: 0.6 K/UL (ref 0.3–1)
MONOCYTES NFR BLD: 8.4 % (ref 4–15)
NEUTROPHILS # BLD AUTO: 4.8 K/UL (ref 1.8–7.7)
NEUTROPHILS NFR BLD: 66.7 % (ref 38–73)
NITRITE UR QL STRIP: NEGATIVE
NRBC BLD-RTO: 0 /100 WBC
PH UR STRIP: 7 [PH] (ref 5–8)
PLATELET # BLD AUTO: 176 K/UL (ref 150–450)
PLATELET BLD QL SMEAR: ABNORMAL
PMV BLD AUTO: 10.9 FL (ref 9.2–12.9)
POTASSIUM SERPL-SCNC: 3.5 MMOL/L (ref 3.5–5.1)
PROT SERPL-MCNC: 6.5 G/DL (ref 6–8.4)
PROT UR QL STRIP: ABNORMAL
RBC # BLD AUTO: 5.13 M/UL (ref 4–5.4)
RBC #/AREA URNS HPF: 2 /HPF (ref 0–4)
SODIUM SERPL-SCNC: 138 MMOL/L (ref 136–145)
SP GR UR STRIP: >1.03 (ref 1–1.03)
SQUAMOUS #/AREA URNS HPF: 5 /HPF
URN SPEC COLLECT METH UR: ABNORMAL
UROBILINOGEN UR STRIP-ACNC: NEGATIVE EU/DL
WBC # BLD AUTO: 7.25 K/UL (ref 3.9–12.7)
WBC #/AREA URNS HPF: 16 /HPF (ref 0–5)

## 2024-01-01 PROCEDURE — 87086 URINE CULTURE/COLONY COUNT: CPT | Performed by: EMERGENCY MEDICINE

## 2024-01-01 PROCEDURE — 80053 COMPREHEN METABOLIC PANEL: CPT | Performed by: EMERGENCY MEDICINE

## 2024-01-01 PROCEDURE — 81001 URINALYSIS AUTO W/SCOPE: CPT | Performed by: EMERGENCY MEDICINE

## 2024-01-01 PROCEDURE — 83690 ASSAY OF LIPASE: CPT | Performed by: EMERGENCY MEDICINE

## 2024-01-01 PROCEDURE — 25000003 PHARM REV CODE 250: Performed by: EMERGENCY MEDICINE

## 2024-01-01 PROCEDURE — 85025 COMPLETE CBC W/AUTO DIFF WBC: CPT | Performed by: EMERGENCY MEDICINE

## 2024-01-01 PROCEDURE — 99285 EMERGENCY DEPT VISIT HI MDM: CPT | Mod: 25

## 2024-01-01 PROCEDURE — 25500020 PHARM REV CODE 255: Performed by: EMERGENCY MEDICINE

## 2024-01-01 RX ORDER — CEFUROXIME AXETIL 500 MG/1
500 TABLET ORAL 2 TIMES DAILY
Qty: 10 TABLET | Refills: 0 | Status: SHIPPED | OUTPATIENT
Start: 2024-01-01 | End: 2024-01-06

## 2024-01-01 RX ORDER — BUTALBITAL, ACETAMINOPHEN AND CAFFEINE 50; 325; 40 MG/1; MG/1; MG/1
1 TABLET ORAL ONCE
Status: COMPLETED | OUTPATIENT
Start: 2024-01-01 | End: 2024-01-01

## 2024-01-01 RX ORDER — BUTALBITAL, ACETAMINOPHEN AND CAFFEINE 50; 325; 40 MG/1; MG/1; MG/1
1 TABLET ORAL EVERY 4 HOURS PRN
Qty: 20 TABLET | Refills: 0 | Status: SHIPPED | OUTPATIENT
Start: 2024-01-01

## 2024-01-01 RX ADMIN — IOHEXOL 100 ML: 350 INJECTION, SOLUTION INTRAVENOUS at 11:01

## 2024-01-01 RX ADMIN — BUTALBITAL, ACETAMINOPHEN, AND CAFFEINE 1 TABLET: 50; 325; 40 TABLET, COATED ORAL at 12:01

## 2024-01-01 NOTE — Clinical Note
Date: 1/1/2024  Patient: Daryleen G Moran  Admitted: 1/1/2024 10:03 AM  Attending Provider: Fabián Rojas MD    Daryleen G Moran or her authorized caregiver has made the decision for the patient to leave the emergency department against the advice  of her attending physician. She or her authorized caregiver has been informed and understands the inherent risks, including death, disability.  She or her authorized caregiver has decided to accept the responsibility for this decision. Daryleen G Mo ran and all necessary parties have been advised that she may return for further evaluation or treatment. Her condition at time of discharge was undetermined.  Daryleen G Moran had current vital signs as follows:  BP (!) 156/87   Pulse 100   Temp 97 .8 °F (36.6 °C) (Oral)   Resp 18   Ht 5' (1.524 m)   Wt 93 kg (205 lb)   LMP  (LMP Unknown)

## 2024-01-01 NOTE — ED PROVIDER NOTES
Encounter Date: 1/1/2024       History     Chief Complaint   Patient presents with    Abdominal Pain     Patient presents complaining of abdominal pain and discomfort.  Patient describes right-sided abdominal pain that has been ongoing for the last 24 hours.  She denies nausea or vomiting.  At the worst symptoms are mild-to-moderate.  Nothing makes it better or worse.      Review of patient's allergies indicates:   Allergen Reactions    Latex      Other reaction(s): Unknown  Other reaction(s): Unknown    Sulfacetamide sodium      Pain perineal area    Sulfasalazine Hives    Adhesive Itching     SKIN GETS RED WITH TAPE AND BANDAIDS    Adhesive tape-silicones Itching     SKIN GETS RED WITH TAPE AND BANDAIDS    Sulfa (sulfonamide antibiotics) Rash     Past Medical History:   Diagnosis Date    Anesthesia complication     BLADDER DYSFUNCTION    Aortic stenosis     Arthritis     Back pain     Diabetes mellitus type II     NO LONGER DIABETIC    Encounter for blood transfusion     Hyperlipidemia     Hypertension     NSTEMI (non-ST elevated myocardial infarction) 05/01/2022    Osteoporosis     Wears glasses      Past Surgical History:   Procedure Laterality Date     vocal cord nodules removed  long time ago     twice    ADRENAL TUMOR      APPENDECTOMY  within last 5yrs    CATHETERIZATION OF BOTH LEFT AND RIGHT HEART Left 05/06/2022    Procedure: CATHETERIZATION, HEART, BOTH LEFT AND RIGHT;  Surgeon: Michelet Vargas MD;  Location: [facility] CATH/EP LAB;  Service: Cardiology;  Laterality: Left;    COLONOSCOPY N/A 6/23/2023    Procedure: COLONOSCOPY;  Surgeon: Joel Neal III, MD;  Location: [facility] ENDO;  Service: Endoscopy;  Laterality: N/A;    FIXATION KYPHOPLASTY THORACIC SPINE      8-20-13    FOOT SURGERY      left 2nd toe was too long     HERNIA REPAIR  within last 5yrs    INSERTION OF TEMPORARY PACEMAKER N/A 1/4/2023    Procedure: INSERTION, PACEMAKER, TEMPORARY;  Surgeon: Bhavesh Peña MD;  Location: [facility] CATH;   Service: Cardiology;  Laterality: N/A;    LEFT HEART CATHETERIZATION Left 2022    Procedure: Left heart cath;  Surgeon: Jose Echols MD;  Location: [facility] CATH/EP LAB;  Service: Cardiology;  Laterality: Left;    SMALL BOWEL ENTEROSCOPY N/A 2023    Procedure: ENTEROSCOPY;  Surgeon: Cornell Aguirre MD;  Location: [facility] ENDO;  Service: Endoscopy;  Laterality: N/A;    SMALL BOWEL ENTEROSCOPY N/A 10/20/2023    Procedure: ENTEROSCOPY;  Surgeon: Otilio Bourgeois MD;  Location: [facility] ENDO;  Service: Endoscopy;  Laterality: N/A;    TONSILLECTOMY, ADENOIDECTOMY  long time ago    TRANSCATHETER AORTIC VALVE REPLACEMENT (TAVR) Bilateral 2023    Procedure: REPLACEMENT, AORTIC VALVE, TRANSCATHETER (TAVR);  Surgeon: Bhavesh Peña MD;  Location: [facility] CATH;  Service: Cardiology;  Laterality: Bilateral;    TRANSCATHETER AORTIC VALVE REPLACEMENT (TAVR) Bilateral 2023    Procedure: REPLACEMENT, AORTIC VALVE, TRANSCATHETER (TAVR);  Surgeon: Dallin Verma MD;  Location: [facility] CATH;  Service: Cardiology;  Laterality: Bilateral;    TRANSTHORACIC ECHOCARDIOGRAPHY (TTE)  2023    Procedure: ECHOCARDIOGRAM, TRANSTHORACIC;  Surgeon: Bhavesh Peña MD;  Location: [facility] CATH;  Service: Cardiology;;     Family History   Problem Relation Age of Onset    Collagen disease Neg Hx      Social History     Tobacco Use    Smoking status: Former     Current packs/day: 0.00     Average packs/day: 0.5 packs/day for 35.0 years (17.5 ttl pk-yrs)     Types: Cigarettes     Start date: 10/5/1987     Quit date: 10/5/2022     Years since quittin.2    Smokeless tobacco: Never   Substance Use Topics    Alcohol use: No    Drug use: No     Review of Systems   All other systems reviewed and are negative.      Physical Exam     Initial Vitals [24 1021]   BP Pulse Resp Temp SpO2   (!) 156/87 100 18 97.8 °F (36.6 °C) (!) 93 %      MAP       --         Physical Exam    Nursing note and vitals reviewed.  Constitutional: She appears  well-developed and well-nourished.   Pleasant, polite   HENT:   Head: Normocephalic and atraumatic.   Eyes: EOM are normal.   Neck: Neck supple.   Normal range of motion.  Cardiovascular:  Normal rate, regular rhythm, normal heart sounds and intact distal pulses.           Pulmonary/Chest: Breath sounds normal. No respiratory distress.   Abdominal: Abdomen is soft. She exhibits no distension.   Musculoskeletal:      Cervical back: Normal range of motion and neck supple.     Neurological: She is alert and oriented to person, place, and time.   Skin: Skin is warm and dry. Capillary refill takes less than 2 seconds.   Psychiatric: She has a normal mood and affect. Her behavior is normal. Judgment and thought content normal.         ED Course   Procedures  Labs Reviewed   CBC W/ AUTO DIFFERENTIAL - Abnormal; Notable for the following components:       Result Value    Hemoglobin 11.3 (*)     MCV 78 (*)     MCH 22.0 (*)     MCHC 28.4 (*)     RDW 18.2 (*)     All other components within normal limits   COMPREHENSIVE METABOLIC PANEL - Abnormal; Notable for the following components:    CO2 30 (*)     Glucose 119 (*)     All other components within normal limits   URINALYSIS, REFLEX TO URINE CULTURE - Abnormal; Notable for the following components:    Specific Gravity, UA >1.030 (*)     Protein, UA 1+ (*)     All other components within normal limits    Narrative:     Specimen Source->Urine   URINALYSIS MICROSCOPIC - Abnormal; Notable for the following components:    WBC, UA 16 (*)     Hyaline Casts, UA 27 (*)     All other components within normal limits    Narrative:     Specimen Source->Urine   CULTURE, URINE   LIPASE          Imaging Results              CT Head Without Contrast (Final result)  Result time 01/01/24 14:11:41      Final result by Alexis Saucedo MD (01/01/24 14:11:41)                   Narrative:    CMS MANDATED QUALITY DATA - CT RADIATION 436    All CT scans at this facility utilize dose modulation,  iterative reconstruction, and/or weight based dosing when appropriate to reduce radiation dose to as low as reasonably achievable.    CLINICAL HISTORY:  78 years (1945) Female Headache, new or worsening (Age >= 50y) Patient had same day prior CT Abdomen and Pelvis with IV contrast.    TECHNIQUE:  CT HEAD WITHOUT IV CONTRAST. Axial CT of the brain without contrast using soft tissue and bone algorithm. Please note in the acute setting if there is a clinical concern for an acute stroke MRI would be more sensitive/specific for evaluation of ischemia.    COMPARISON:  None available.    FINDINGS:  Mildly suboptimal exam secondary to contrast from the CT of the abdomen/pelvis, this may reduce sensitivity/specificity for subtle intracranial blood.    No acute intracranial hemorrhage, hydrocephalus, herniation or midline shift and the basal and suprasellar cisterns are patent. No acute skull fracture is identified.    Mild diffuse cerebral atrophy for age with moderate periventricular deep cerebral white matter low attenuation, a nonspecific finding in this age group which can be seen in any diffuse white matter process but which is most commonly associated with chronic microvascular ischemic disease. There are scattered atheromatous calcifications in the intracranial internal carotid arteries.    There is a small rounded defect in the left caudate head compatible with a remote lacunar infarct (image 36)    The orbits appear within normal limits noting likely bilateral lens replacement/cataract surgery. External auditory canals are unremarkable. The visualized paranasal sinuses and mastoid air cells are essentially clear.    IMPRESSION:  1. No acute intracranial process.  2. Chronic/involutional findings as noted above.                  .    Electronically signed by:  Alexis Saucedo MD  01/01/2024 02:11 PM [facility] Workstation: [id number]                                     CT Abdomen Pelvis With IV Contrast NO Oral  Contrast (Final result)  Result time 01/01/24 12:00:12      Final result by Alexis Saucedo MD (01/01/24 12:00:12)                   Narrative:    CMS MANDATED QUALITY DATA - CT RADIATION  436    All CT scans at this facility utilize dose modulation, iterative reconstruction, and/or weight based dosing when appropriate to reduce radiation dose to as low as reasonably achievable.    CLINICAL HISTORY:  78 years (1945) Female Abdominal abscess/infection suspected    TECHNIQUE:  CT ABDOMEN PELVIS WITH IV CONTRAST. Axial CT images of the abdomen and pelvis were obtained from the dome of the diaphragm to the proximal thigh.    COMPARISON:  CT from November 21, 2022.    FINDINGS:.  Lower Thorax:  The lungs are essentially clear noting linear dependent atelectasis versus scarring. There is no pleural or pericardial effusion on these images. The heart is normal in size, with an aortic endovascular stent.    CT Abdomen:  Liver: Relative diffuse low-attenuation liver consistent with hepatic steatosis.  Gallbladder: Small stones are seen in the gallbladder without wall thickening, pericholecystic fluid or finding of acute cholecystitis.  Biliary Tree: No intra or extrahepatic ductal dilation.  Spleen: Within normal limits.  Pancreas: The pancreas is normal.  Adrenal Glands: The right adrenal is within normal limits. The left adrenal is not seen.  Kidneys: The kidneys are normal in imaging appearance without hydronephrosis or hydroureter. There is a 12 mm cyst in the right kidney, similar to the previous exam.  Vasculature: There are scattered atheromatous mural plaques in the aorta and its major branch vessels with no aneurysm seen.  Lymph nodes: No abdominal lymphadenopathy is seen.  Intraperitoneal structures: There is no ascites.  Bowel: The bowel is normal in course and caliber with no finding of obstruction, intra-abdominal free air or abscess. The appendix is surgically absent.  Abdominal wall: Large left  parasagittal ventral abdominal wall hernia containing a nondilated loop of transverse colon, the fascial defect measures 5.4 x 5.2 cm (TR X CC), in the hernia sac measures 9.6 x 6.6 x 6.5 cm (TR X AP X CC). Umbilical hernia mesh repair is seen.  Musculoskeletal: No acute osseous abnormality is identified .    CT Pelvis:  Bladder: The bladder is underdistended, that being said there is faint calcification along the right lateral bladder wall with slight wall thickening (axial image 177), unchanged from the previous exam.  Reproductive Organs: The uterus and ovaries are unremarkable.  Pelvic Lymph nodes: No pelvic lymphadenopathy or mass is identified.    IMPRESSION:  1. No finding of bowel obstruction, intra-abdominal free air or abscess.  2. Large left paracentral ventral abdominal hernia containing a nondilated loop of transverse colon.  3. Hepatic parenchymal findings a steatosis.  4. Cholelithiasis without acute cholecystitis.  5. Numerous additional, and incidental findings as noted above.                    .    Electronically signed by:  Alexis Saucedo MD  01/01/2024 12:00 PM [facility] Workstation: [id number]                                     Medications   iohexoL (OMNIPAQUE 350) injection 100 mL (100 mLs Intravenous Given 1/1/24 1132)   butalbital-acetaminophen-caffeine -40 mg per tablet 1 tablet (1 tablet Oral Given 1/1/24 1249)     Medical Decision Making  No apparent distress    Considerations include but are not limited to colitis, ileitis, appendicitis, obstruction, acute kidney injury, dehydration, electrolyte abnormalities    Patient is upset does not want to stay in the emergency department any longer in is frustrated that she does not have an ER bed available.  Unfortunately we have been boarding multiple patients and I was unable to bed this patient.  I apologized to her multiple times.  I did offer to give her a bed as soon as 1 was available.  Patient demanded we take her IV out.  I advised  her I did not have all my labs back but the CT scan did not show anything acute but does have incidental hernia.  Patient asked for something for pain which I provided her Fioricet.  Also sent this prescription for her.  Daughter will come pick this patient up to bring her home.    Upon daughter arrival patient also not complaining of headache.  Head CT obtained shows no evidence of any intracranial abnormality.  Urinalysis obtained does show some white blood cells which could be cause her patient's symptomatology.  Patient given prescription for antibiotic therapy.    Amount and/or Complexity of Data Reviewed  Labs: ordered. Decision-making details documented in ED Course.  Radiology: ordered.    Risk  Prescription drug management.               ED Course as of 01/01/24 1608   Mon Jan 01, 2024   1218 Sodium: 138 [AP]   1218 Potassium: 3.5 [AP]   1218 CO2(!): 30 [AP]   1218 Glucose(!): 119 [AP]   1218 BUN: 8 [AP]   1218 Creatinine: 0.5 [AP]   1218 Calcium: 8.8 [AP]   1218 PROTEIN TOTAL: 6.5 [AP]   1218 Albumin: 3.9 [AP]   1218 BILIRUBIN TOTAL: 0.9 [AP]   1218 ALP: 57 [AP]   1218 AST: 37 [AP]   1218 ALT: 30 [AP]   1233 WBC: 7.25 [AP]   1233 Hemoglobin(!): 11.3 [AP]   1233 Hematocrit: 39.8 [AP]   1233 Platelet Count: 176 [AP]      ED Course User Index  [AP] Fabián Rojas MD                           Clinical Impression:  Final diagnoses:  [R10.9] Abdominal pain, unspecified abdominal location (Primary)  [N39.0] Urinary tract infection without hematuria, site unspecified  [R51.9] Nonintractable headache, unspecified chronicity pattern, unspecified headache type          ED Disposition Condition    Discharge Stable          ED Prescriptions       Medication Sig Dispense Start Date End Date Auth. Provider    butalbital-acetaminophen-caffeine -40 mg (FIORICET, ESGIC) -40 mg per tablet Take 1 tablet by mouth every 4 (four) hours as needed for Pain. 20 tablet 1/1/2024 -- Fabián Rojas MD    cefUROXime  (CEFTIN) 500 MG tablet Take 1 tablet (500 mg total) by mouth 2 (two) times daily. for 5 days 10 tablet 1/1/2024 1/6/2024 Fabián Rojas MD          Follow-up Information       Follow up With Specialties Details Why Contact Info    Abhi De Oliveira IV, MD Family Medicine Schedule an appointment as soon as possible for a visit in 2 days  [address]   [place]  [place] [address]  [phone]               Fabián Rojas MD  01/01/24 1233       Fabián Rojas MD  01/01/24 1603       Fabián Rojas MD  01/01/24 1607

## 2024-01-04 LAB — BACTERIA UR CULT: NO GROWTH

## 2024-02-06 ENCOUNTER — TELEPHONE (OUTPATIENT)
Dept: CARDIOLOGY | Facility: CLINIC | Age: 79
End: 2024-02-06
Payer: MEDICARE

## 2024-02-06 NOTE — TELEPHONE ENCOUNTER
S/w pts daughter, offered tomorrow at 1pm. Daughter isnt able to make it. So made an appt for 1pm on 2/12

## 2024-02-06 NOTE — TELEPHONE ENCOUNTER
----- Message from Francia Culver sent at 2/6/2024  1:14 PM CST -----  Type:  Sooner Appointment Request    Caller is requesting a sooner appointment.  Caller declined first available appointment listed below.  Caller will not accept being placed on the waitlist and is requesting a message be sent to doctor.    Name of Caller:  pt daughter, Sally  When is the first available appointment?  3/7--said she need to be soon sooner--please call and advise  Symptoms:  a fib  Would the patient rather a call back or a response via [facility]? call  Best Call Back Number:  [phone]    Additional Information:  thank you

## 2024-02-12 ENCOUNTER — OFFICE VISIT (OUTPATIENT)
Dept: CARDIOLOGY | Facility: CLINIC | Age: 79
End: 2024-02-12
Payer: MEDICARE

## 2024-02-12 VITALS
WEIGHT: 205 LBS | DIASTOLIC BLOOD PRESSURE: 70 MMHG | BODY MASS INDEX: 40.25 KG/M2 | SYSTOLIC BLOOD PRESSURE: 140 MMHG | OXYGEN SATURATION: 91 % | HEIGHT: 60 IN | HEART RATE: 81 BPM

## 2024-02-12 DIAGNOSIS — D50.0 IRON DEFICIENCY ANEMIA DUE TO CHRONIC BLOOD LOSS: ICD-10-CM

## 2024-02-12 DIAGNOSIS — E78.2 MIXED HYPERLIPIDEMIA: ICD-10-CM

## 2024-02-12 DIAGNOSIS — I48.0 PAROXYSMAL ATRIAL FIBRILLATION: ICD-10-CM

## 2024-02-12 DIAGNOSIS — I10 PRIMARY HYPERTENSION: ICD-10-CM

## 2024-02-12 DIAGNOSIS — D64.9 ANEMIA, UNSPECIFIED TYPE: Primary | ICD-10-CM

## 2024-02-12 DIAGNOSIS — Z95.2 S/P TAVR (TRANSCATHETER AORTIC VALVE REPLACEMENT): ICD-10-CM

## 2024-02-12 DIAGNOSIS — I25.10 CORONARY ARTERY DISEASE INVOLVING NATIVE CORONARY ARTERY OF NATIVE HEART WITHOUT ANGINA PECTORIS: ICD-10-CM

## 2024-02-12 PROCEDURE — 99214 OFFICE O/P EST MOD 30 MIN: CPT | Mod: S$GLB,,, | Performed by: INTERNAL MEDICINE

## 2024-02-12 PROCEDURE — 3077F SYST BP >= 140 MM HG: CPT | Mod: CPTII,S$GLB,, | Performed by: INTERNAL MEDICINE

## 2024-02-12 PROCEDURE — 3288F FALL RISK ASSESSMENT DOCD: CPT | Mod: CPTII,S$GLB,, | Performed by: INTERNAL MEDICINE

## 2024-02-12 PROCEDURE — 3078F DIAST BP <80 MM HG: CPT | Mod: CPTII,S$GLB,, | Performed by: INTERNAL MEDICINE

## 2024-02-12 PROCEDURE — 99999 PR PBB SHADOW E&M-EST. PATIENT-LVL V: CPT | Mod: PBBFAC,,, | Performed by: INTERNAL MEDICINE

## 2024-02-12 PROCEDURE — 1101F PT FALLS ASSESS-DOCD LE1/YR: CPT | Mod: CPTII,S$GLB,, | Performed by: INTERNAL MEDICINE

## 2024-02-12 PROCEDURE — 1159F MED LIST DOCD IN RCRD: CPT | Mod: CPTII,S$GLB,, | Performed by: INTERNAL MEDICINE

## 2024-02-12 PROCEDURE — 1160F RVW MEDS BY RX/DR IN RCRD: CPT | Mod: CPTII,S$GLB,, | Performed by: INTERNAL MEDICINE

## 2024-02-12 PROCEDURE — 1126F AMNT PAIN NOTED NONE PRSNT: CPT | Mod: CPTII,S$GLB,, | Performed by: INTERNAL MEDICINE

## 2024-02-12 RX ORDER — SPIRONOLACTONE 25 MG/1
25 TABLET ORAL DAILY
Qty: 30 TABLET | Refills: 11 | Status: SHIPPED | OUTPATIENT
Start: 2024-02-12 | End: 2025-02-11

## 2024-02-12 RX ORDER — OMEPRAZOLE 20 MG/1
20 CAPSULE, DELAYED RELEASE ORAL
COMMUNITY
Start: 2024-01-14 | End: 2024-02-12 | Stop reason: ALTCHOICE

## 2024-02-12 RX ORDER — FERROUS GLUCONATE 324(37.5)
324 TABLET ORAL DAILY
Qty: 30 TABLET | Refills: 3 | Status: SHIPPED | OUTPATIENT
Start: 2024-02-12

## 2024-02-12 RX ORDER — FERROUS SULFATE 325(65) MG
325 TABLET ORAL
COMMUNITY
Start: 2024-02-02 | End: 2025-02-01

## 2024-02-12 RX ORDER — FERROUS GLUCONATE 324(37.5)
324 TABLET ORAL DAILY
Qty: 90 TABLET | Refills: 3 | Status: SHIPPED | OUTPATIENT
Start: 2024-02-12 | End: 2024-02-12 | Stop reason: SDUPTHER

## 2024-02-12 RX ORDER — SPIRONOLACTONE 25 MG/1
25 TABLET ORAL DAILY
Qty: 30 TABLET | Refills: 11 | Status: SHIPPED | OUTPATIENT
Start: 2024-02-12 | End: 2024-02-12 | Stop reason: SDUPTHER

## 2024-02-12 RX ORDER — DIGOXIN 125 MCG
0.12 TABLET ORAL DAILY
Qty: 30 TABLET | Refills: 0 | Status: SHIPPED | OUTPATIENT
Start: 2024-02-12 | End: 2024-05-12

## 2024-02-12 RX ORDER — DIGOXIN 125 MCG
0.12 TABLET ORAL DAILY
Qty: 90 TABLET | Refills: 0 | Status: SHIPPED | OUTPATIENT
Start: 2024-02-12 | End: 2024-02-12 | Stop reason: SDUPTHER

## 2024-02-12 NOTE — PROGRESS NOTES
Patient ID:  Daryleen G Moran is a 78 y.o. female who presents for follow-up of Hospital Follow Up, Congestive Heart Failure, and Atrial Fibrillation      She had a TAVR done [facility].  She did well during the procedure then she has gone into atrial fibrillation.  She has been on anticoagulation and she has had problems with anemia.  She is getting a blood transfusion every 3 months.  The possibility of a Watchman has been discussed with her.  They are planning to move to memory in the near future as soon as they sell the house.  Today she did not take her diuretics and she is having mild shortness of breath.  She has been off her iron as well as digoxin for the past 2 weeks.        Past Medical History:   Diagnosis Date    Anesthesia complication     BLADDER DYSFUNCTION    Aortic stenosis     Arthritis     Back pain     Diabetes mellitus type II     NO LONGER DIABETIC    Encounter for blood transfusion     Hyperlipidemia     Hypertension     NSTEMI (non-ST elevated myocardial infarction) 05/01/2022    Osteoporosis     Wears glasses         Past Surgical History:   Procedure Laterality Date     vocal cord nodules removed  long time ago     twice    ADRENAL TUMOR      APPENDECTOMY  within last 5yrs    CATHETERIZATION OF BOTH LEFT AND RIGHT HEART Left 05/06/2022    Procedure: CATHETERIZATION, HEART, BOTH LEFT AND RIGHT;  Surgeon: Michelet Vargas MD;  Location: [facility] CATH/EP LAB;  Service: Cardiology;  Laterality: Left;    COLONOSCOPY N/A 6/23/2023    Procedure: COLONOSCOPY;  Surgeon: Joel Neal III, MD;  Location: [facility] ENDO;  Service: Endoscopy;  Laterality: N/A;    FIXATION KYPHOPLASTY THORACIC SPINE      8-20-13    FOOT SURGERY      left 2nd toe was too long     HERNIA REPAIR  within last 5yrs    INSERTION OF TEMPORARY PACEMAKER N/A 1/4/2023    Procedure: INSERTION, PACEMAKER, TEMPORARY;  Surgeon: Bhavesh Peña MD;  Location: [facility] CATH;  Service: Cardiology;  Laterality: N/A;    LEFT  HEART CATHETERIZATION Left 05/02/2022    Procedure: Left heart cath;  Surgeon: Jose Echols MD;  Location: [facility] CATH/EP LAB;  Service: Cardiology;  Laterality: Left;    SMALL BOWEL ENTEROSCOPY N/A 6/22/2023    Procedure: ENTEROSCOPY;  Surgeon: Cornell Aguirre MD;  Location: [facility] ENDO;  Service: Endoscopy;  Laterality: N/A;    SMALL BOWEL ENTEROSCOPY N/A 10/20/2023    Procedure: ENTEROSCOPY;  Surgeon: Otilio Bourgeois MD;  Location: [facility] ENDO;  Service: Endoscopy;  Laterality: N/A;    TONSILLECTOMY, ADENOIDECTOMY  long time ago    TRANSCATHETER AORTIC VALVE REPLACEMENT (TAVR) Bilateral 1/4/2023    Procedure: REPLACEMENT, AORTIC VALVE, TRANSCATHETER (TAVR);  Surgeon: Bhavesh Peña MD;  Location: [facility] CATH;  Service: Cardiology;  Laterality: Bilateral;    TRANSCATHETER AORTIC VALVE REPLACEMENT (TAVR) Bilateral 1/4/2023    Procedure: REPLACEMENT, AORTIC VALVE, TRANSCATHETER (TAVR);  Surgeon: Dallin Verma MD;  Location: [facility] CATH;  Service: Cardiology;  Laterality: Bilateral;    TRANSTHORACIC ECHOCARDIOGRAPHY (TTE)  1/4/2023    Procedure: ECHOCARDIOGRAM, TRANSTHORACIC;  Surgeon: Bhavesh Peña MD;  Location: [facility] CATH;  Service: Cardiology;;          Current Outpatient Medications   Medication Instructions    albuterol (PROVENTIL/VENTOLIN HFA) 90 mcg/actuation inhaler 2 puffs, Inhalation, Every 6 hours PRN    aspirin (ECOTRIN) 81 mg, Oral, Daily    butalbital-acetaminophen-caffeine -40 mg (FIORICET, ESGIC) -40 mg per tablet 1 tablet, Oral, Every 4 hours PRN    calcium carbonate (OS-TK) 500 mg, Oral, Daily    carvediloL (COREG) 12.5 mg, Oral, 2 times daily    citalopram (CELEXA) 40 mg, Oral, Daily    clopidogreL (PLAVIX) 75 mg, Oral, Daily    digoxin (LANOXIN) 0.125 mg, Oral, Daily    diltiaZEM (DILACOR XR) 120 mg, Oral, Daily    donepeziL (ARICEPT) 5 mg, Oral, Nightly    ergocalciferol (ERGOCALCIFEROL) 50,000 unit Cap TAKE 1 CAPSULE (50,000 UNITS TOTAL) EVERY 7 DAYS.    ferrous gluconate 324  mg, Oral, Daily    ferrous sulfate (FEOSOL) 325 mg, Oral, with breakfast    fluticasone furoate-vilanteroL (BREO ELLIPTA) 100-25 mcg/dose diskus inhaler 1 puff, Inhalation, Daily, Controller    furosemide (LASIX) 40 mg, Oral, Daily    glucosamine-chondroitin 500-400 mg tablet 1 tablet, Oral, Daily    loperamide (IMODIUM A-D) 2 mg, Oral, 3 times daily PRN, Take 2 tablets by mouth initially then 1 tablet after each loose stool as needed for diarrhea.    loratadine (CLARITIN) 10 mg, Oral, Daily    multivitamin capsule 1 capsule, Oral, Daily    pantoprazole (PROTONIX) 40 mg, Oral, 2 times daily    simvastatin (ZOCOR) 10 mg, Oral, Nightly    spironolactone (ALDACTONE) 25 mg, Oral, Daily        Review of patient's allergies indicates:   Allergen Reactions    Latex      Other reaction(s): Unknown  Other reaction(s): Unknown    Sulfacetamide sodium      Pain perineal area    Sulfasalazine Hives    Adhesive Itching     SKIN GETS RED WITH TAPE AND BANDAIDS    Adhesive tape-silicones Itching     SKIN GETS RED WITH TAPE AND BANDAIDS    Sulfa (sulfonamide antibiotics) Rash        Review of Systems   Cardiovascular:  Negative for chest pain, irregular heartbeat, leg swelling and palpitations.   Respiratory:  Positive for shortness of breath. Negative for cough.         Objective:     Vitals:    02/12/24 1302   BP: (!) 140/70   BP Location: Left arm   Patient Position: Sitting   BP Method: Medium (Manual)   Pulse: 81   SpO2: (!) 91%   Weight: 93 kg (205 lb)   Height: 5' (1.524 m)       Physical Exam  Vitals and nursing note reviewed.   Constitutional:       Appearance: She is obese.   HENT:      Head: Normocephalic and atraumatic.   Eyes:      Conjunctiva/sclera: Conjunctivae normal.   Cardiovascular:      Rate and Rhythm: Normal rate. Rhythm irregular.      Heart sounds: Normal heart sounds.   Pulmonary:      Effort: Pulmonary effort is normal.      Breath sounds: Normal breath sounds.   Abdominal:      General: Bowel sounds are  normal.      Palpations: Abdomen is soft.   Musculoskeletal:         General: Normal range of motion.   Skin:     General: Skin is warm and dry.   Neurological:      Mental Status: She is alert and oriented to person, place, and time.   Psychiatric:         Behavior: Behavior normal.         Thought Content: Thought content normal.         Judgment: Judgment normal.       CMP  Sodium   Date Value Ref Range Status   01/01/2024 138 136 - 145 mmol/L Final     Potassium   Date Value Ref Range Status   01/01/2024 3.5 3.5 - 5.1 mmol/L Final     Chloride   Date Value Ref Range Status   01/01/2024 99 95 - 110 mmol/L Final     CO2   Date Value Ref Range Status   01/01/2024 30 (H) 23 - 29 mmol/L Final     Glucose   Date Value Ref Range Status   01/01/2024 119 (H) 70 - 110 mg/dL Final     BUN   Date Value Ref Range Status   01/01/2024 8 8 - 23 mg/dL Final     Creatinine   Date Value Ref Range Status   01/01/2024 0.5 0.5 - 1.4 mg/dL Final   08/21/2013 0.8 0.5 - 1.4 mg/dL Final     Calcium   Date Value Ref Range Status   01/01/2024 8.8 8.7 - 10.5 mg/dL Final   08/21/2013 10.3 8.7 - 10.5 mg/dL Final     Total Protein   Date Value Ref Range Status   01/01/2024 6.5 6.0 - 8.4 g/dL Final     Albumin   Date Value Ref Range Status   01/01/2024 3.9 3.5 - 5.2 g/dL Final     Total Bilirubin   Date Value Ref Range Status   01/01/2024 0.9 0.1 - 1.0 mg/dL Final     Comment:     For infants and newborns, interpretation of results should be based  on gestational age, weight and in agreement with clinical  observations.    Premature Infant recommended reference ranges:  Up to 24 hours.............<8.0 mg/dL  Up to 48 hours............<12.0 mg/dL  3-5 days..................<15.0 mg/dL  6-29 days.................<15.0 mg/dL       Alkaline Phosphatase   Date Value Ref Range Status   01/01/2024 57 55 - 135 U/L Final     AST   Date Value Ref Range Status   01/01/2024 37 10 - 40 U/L Final     ALT   Date Value Ref Range Status   01/01/2024 30 10 - 44  U/L Final     Anion Gap   Date Value Ref Range Status   01/01/2024 9 8 - 16 mmol/L Final   08/21/2013 11 5 - 15 meq/L Final     eGFR if    Date Value Ref Range Status   05/06/2022 >60.0 >60 mL/min/1.73 m^2 Final     eGFR if non    Date Value Ref Range Status   05/06/2022 >60.0 >60 mL/min/1.73 m^2 Final     Comment:     Calculation used to obtain the estimated glomerular filtration  rate (eGFR) is the CKD-EPI equation.         BMP  Lab Results   Component Value Date     01/01/2024    K 3.5 01/01/2024    CL 99 01/01/2024    CO2 30 (H) 01/01/2024    BUN 8 01/01/2024    CREATININE 0.5 01/01/2024    CALCIUM 8.8 01/01/2024    ANIONGAP 9 01/01/2024    ESTGFRAFRICA >60.0 05/06/2022    EGFRNONAA >60.0 05/06/2022      BNP  @LABRCNTIP(BNP,BNPTRIAGEBLO)@   Lab Results   Component Value Date    CHOL 152 02/24/2023     Lab Results   Component Value Date    HDL 70 02/24/2023     Lab Results   Component Value Date    LDLCALC 61.6 (L) 02/24/2023     Lab Results   Component Value Date    TRIG 102 02/24/2023     Lab Results   Component Value Date    CHOLHDL 46.1 02/24/2023      Lab Results   Component Value Date    TSH 1.620 09/12/2023     Lab Results   Component Value Date    HGBA1C 6.4 (H) 09/08/2023     Lab Results   Component Value Date    WBC 7.25 01/01/2024    HGB 11.3 (L) 01/01/2024    HCT 39.8 01/01/2024    MCV 78 (L) 01/01/2024     01/01/2024         Results for orders placed during the hospital encounter of 09/11/23    Echo    Interpretation Summary    Left Ventricle: The left ventricle is smaller than normal. Moderately increased ventricular mass. Moderately increased wall thickness. There is moderate concentric hypertrophy. Normal wall motion. There is normal systolic function with a visually estimated ejection fraction of 65 - 70%. Grade II diastolic dysfunction.    Left Atrium: Left atrium is moderately dilated.    Right Ventricle: Normal right ventricular cavity size. Wall  thickness is normal. Right ventricle wall motion  is normal. Systolic function is normal.    Aortic Valve: The aortic valve is a trileaflet valve. Moderately calcified cusps. There is moderate stenosis. Aortic valve area by VTI is 0.86 cm². Aortic valve peak velocity is 2.42 m/s. Mean gradient is 13 mmHg. The dimensionless index is 0.38.    Mitral Valve: There is severe mitral annular calcification present.    Tricuspid Valve: There is moderate regurgitation.    IVC/SVC: Normal venous pressure at 3 mmHg.     Results for orders placed during the hospital encounter of 01/04/23    Cardiac catheterization    Narrative  Procedure performed in the Invasive Lab  - See Procedure Log link below for nursing documentation  - See OpNote on Surgeries Tab for physician findings  - See Imaging Tab for radiologist dictation         Assessment:       S/P TAVR (transcatheter aortic valve replacement)  Will obtain an echocardiogram to assess her aortic valve.    Paroxysmal atrial fibrillation  She is in atrial fibrillation now with rapid ventricular response she is to resume her digoxin    Hypertension  On diltiazem  mg daily, Lasix    Hyperlipidemia  On 10 mg of simvastatin    Iron deficiency anemia due to chronic blood loss  She is to resume her iron.       Plan:       Obtain a CBC, TSH CMP.  Obtain an echocardiogram to assess her left ventricular function.  She will be seen in the office in approximately 1 month.  She is to continue the digoxin, diltiazem for heart rate controlled.  She is to continue simvastatin for her hyperlipidemia.

## 2024-02-14 NOTE — TELEPHONE ENCOUNTER
----- Message from Adan Shields sent at 2/14/2024 12:12 PM CST -----  Regarding: new order  Type:  Patient Returning Call    Who Called:Genevieve Reardon  Who Left Message for Patient:office nurse  Does the patient know what this is regarding?:diltiaZEM (DILACOR XR) 120 MG CDCR  Would the patient rather a call back or a response via [facility]?   Best Call Back Number:[phone]  Additional Information: medication never called in:     [facility] - [place] [place] - [address]  [address] [place] [address]  Phone: [phone] Fax: [phone]

## 2024-02-15 RX ORDER — DILTIAZEM HYDROCHLORIDE 120 MG/1
120 CAPSULE, EXTENDED RELEASE ORAL DAILY
Qty: 30 CAPSULE | Refills: 11 | Status: SHIPPED | OUTPATIENT
Start: 2024-02-15 | End: 2025-02-14

## 2024-03-04 RX ORDER — POTASSIUM CHLORIDE 750 MG/1
10 TABLET, EXTENDED RELEASE ORAL SEE ADMIN INSTRUCTIONS
Qty: 34 TABLET | Refills: 3 | Status: SHIPPED | OUTPATIENT
Start: 2024-03-04

## 2024-03-04 NOTE — TELEPHONE ENCOUNTER
S/w pt & advised potassium low. We are calling in potassium. She is take 2 tabs the 1st 2 days then 1 tab qd

## 2024-03-08 ENCOUNTER — HOSPITAL ENCOUNTER (INPATIENT)
Facility: HOSPITAL | Age: 79
LOS: 2 days | Discharge: HOME | DRG: 291 | End: 2024-03-11
Attending: EMERGENCY MEDICINE | Admitting: INTERNAL MEDICINE
Payer: MEDICARE

## 2024-03-08 ENCOUNTER — CLINICAL SUPPORT (OUTPATIENT)
Dept: CARDIOLOGY | Facility: HOSPITAL | Age: 79
DRG: 291 | End: 2024-03-08
Attending: EMERGENCY MEDICINE
Payer: MEDICARE

## 2024-03-08 VITALS — WEIGHT: 210.13 LBS | BODY MASS INDEX: 41.25 KG/M2 | HEIGHT: 60 IN

## 2024-03-08 DIAGNOSIS — I50.9 HEART FAILURE: ICD-10-CM

## 2024-03-08 DIAGNOSIS — I50.21 ACUTE SYSTOLIC CONGESTIVE HEART FAILURE: ICD-10-CM

## 2024-03-08 DIAGNOSIS — J44.1 COPD EXACERBATION: ICD-10-CM

## 2024-03-08 DIAGNOSIS — I50.9 CONGESTIVE HEART FAILURE, UNSPECIFIED HF CHRONICITY, UNSPECIFIED HEART FAILURE TYPE: Primary | ICD-10-CM

## 2024-03-08 DIAGNOSIS — R07.9 CHEST PAIN: ICD-10-CM

## 2024-03-08 PROBLEM — E11.9 DM (DIABETES MELLITUS): Status: ACTIVE | Noted: 2024-03-08

## 2024-03-08 PROBLEM — I48.91 ATRIAL FIBRILLATION: Status: ACTIVE | Noted: 2024-03-08

## 2024-03-08 PROBLEM — J96.21 ACUTE ON CHRONIC HYPOXIC RESPIRATORY FAILURE: Status: ACTIVE | Noted: 2024-03-08

## 2024-03-08 PROBLEM — R10.9 ABDOMINAL PAIN: Status: ACTIVE | Noted: 2024-03-08

## 2024-03-08 LAB
ALBUMIN SERPL BCP-MCNC: 3.9 G/DL (ref 3.5–5.2)
ALLENS TEST: ABNORMAL
ALP SERPL-CCNC: 69 U/L (ref 55–135)
ALT SERPL W/O P-5'-P-CCNC: 32 U/L (ref 10–44)
ANION GAP SERPL CALC-SCNC: 6 MMOL/L (ref 8–16)
ASCENDING AORTA: 3.2 CM
AST SERPL-CCNC: 38 U/L (ref 10–40)
AV INDEX (PROSTH): 0.45
AV MEAN GRADIENT: 14 MMHG
AV PEAK GRADIENT: 26 MMHG
AV VALVE AREA BY VELOCITY RATIO: 1.41 CM²
AV VALVE AREA: 1.43 CM²
AV VELOCITY RATIO: 0.45
BASOPHILS # BLD AUTO: 0.06 K/UL (ref 0–0.2)
BASOPHILS NFR BLD: 0.9 % (ref 0–1.9)
BILIRUB SERPL-MCNC: 1.4 MG/DL (ref 0.1–1)
BILIRUB UR QL STRIP: NEGATIVE
BNP SERPL-MCNC: 527 PG/ML (ref 0–99)
BSA FOR ECHO PROCEDURE: 2.01 M2
BUN SERPL-MCNC: 10 MG/DL (ref 8–23)
CALCIUM SERPL-MCNC: 9.3 MG/DL (ref 8.7–10.5)
CHLORIDE SERPL-SCNC: 97 MMOL/L (ref 95–110)
CLARITY UR: CLEAR
CO2 SERPL-SCNC: 35 MMOL/L (ref 23–29)
COLOR UR: YELLOW
CREAT SERPL-MCNC: 0.5 MG/DL (ref 0.5–1.4)
CV ECHO LV RWT: 0.56 CM
DELSYS: ABNORMAL
DIFFERENTIAL METHOD BLD: ABNORMAL
DOP CALC AO PEAK VEL: 2.53 M/S
DOP CALC AO VTI: 52.8 CM
DOP CALC LVOT AREA: 3.1 CM2
DOP CALC LVOT DIAMETER: 2 CM
DOP CALC LVOT PEAK VEL: 1.14 M/S
DOP CALC LVOT STROKE VOLUME: 75.36 CM3
DOP CALC MV VTI: 50.3 CM
DOP CALCLVOT PEAK VEL VTI: 24 CM
ECHO LV POSTERIOR WALL: 1.21 CM (ref 0.6–1.1)
EOSINOPHIL # BLD AUTO: 0.1 K/UL (ref 0–0.5)
EOSINOPHIL NFR BLD: 1.4 % (ref 0–8)
ERYTHROCYTE [DISTWIDTH] IN BLOOD BY AUTOMATED COUNT: 22.6 % (ref 11.5–14.5)
EST. GFR  (NO RACE VARIABLE): >60 ML/MIN/1.73 M^2
ESTIMATED AVG GLUCOSE: 143 MG/DL (ref 68–131)
FLOW: 3
FRACTIONAL SHORTENING: 38 % (ref 28–44)
GLUCOSE SERPL-MCNC: 110 MG/DL (ref 70–110)
GLUCOSE SERPL-MCNC: 245 MG/DL (ref 70–110)
GLUCOSE SERPL-MCNC: 249 MG/DL (ref 70–110)
GLUCOSE SERPL-MCNC: 287 MG/DL (ref 70–110)
GLUCOSE UR QL STRIP: NEGATIVE
HBA1C MFR BLD: 6.6 % (ref 4.5–6.2)
HCO3 UR-SCNC: 39.9 MMOL/L (ref 24–28)
HCT VFR BLD AUTO: 34.8 % (ref 37–48.5)
HGB BLD-MCNC: 9.5 G/DL (ref 12–16)
HGB UR QL STRIP: NEGATIVE
IMM GRANULOCYTES # BLD AUTO: 0.02 K/UL (ref 0–0.04)
IMM GRANULOCYTES NFR BLD AUTO: 0.3 % (ref 0–0.5)
INTERVENTRICULAR SEPTUM: 1.41 CM (ref 0.6–1.1)
IVC DIAMETER: 2.8 CM
KETONES UR QL STRIP: NEGATIVE
LEFT ATRIUM SIZE: 4.8 CM
LEFT INTERNAL DIMENSION IN SYSTOLE: 2.67 CM (ref 2.1–4)
LEFT VENTRICLE DIASTOLIC VOLUME INDEX: 43.51 ML/M2
LEFT VENTRICLE DIASTOLIC VOLUME: 83.1 ML
LEFT VENTRICLE MASS INDEX: 110 G/M2
LEFT VENTRICLE SYSTOLIC VOLUME INDEX: 13.8 ML/M2
LEFT VENTRICLE SYSTOLIC VOLUME: 26.3 ML
LEFT VENTRICULAR INTERNAL DIMENSION IN DIASTOLE: 4.3 CM (ref 3.5–6)
LEFT VENTRICULAR MASS: 210.15 G
LEUKOCYTE ESTERASE UR QL STRIP: ABNORMAL
LVOT MG: 3 MMHG
LVOT MV: 0.78 CM/S
LYMPHOCYTES # BLD AUTO: 0.9 K/UL (ref 1–4.8)
LYMPHOCYTES NFR BLD: 14 % (ref 18–48)
MAGNESIUM SERPL-MCNC: 1.7 MG/DL (ref 1.6–2.6)
MCH RBC QN AUTO: 23.2 PG (ref 27–31)
MCHC RBC AUTO-ENTMCNC: 27.3 G/DL (ref 32–36)
MCV RBC AUTO: 85 FL (ref 82–98)
MICROSCOPIC COMMENT: NORMAL
MODE: ABNORMAL
MONOCYTES # BLD AUTO: 0.7 K/UL (ref 0.3–1)
MONOCYTES NFR BLD: 10.1 % (ref 4–15)
MV MEAN GRADIENT: 9 MMHG
MV PEAK GRADIENT: 18 MMHG
MV STENOSIS PRESSURE HALF TIME: 58 MS
MV VALVE AREA BY CONTINUITY EQUATION: 1.5 CM2
MV VALVE AREA P 1/2 METHOD: 3.79 CM2
NEUTROPHILS # BLD AUTO: 4.7 K/UL (ref 1.8–7.7)
NEUTROPHILS NFR BLD: 73.3 % (ref 38–73)
NITRITE UR QL STRIP: NEGATIVE
NRBC BLD-RTO: 0 /100 WBC
PCO2 BLDA: 61.7 MMHG (ref 35–45)
PH SMN: 7.42 [PH] (ref 7.35–7.45)
PH UR STRIP: 7 [PH] (ref 5–8)
PISA MRMAX VEL: 4.73 M/S
PISA TR MAX VEL: 2.89 M/S
PLATELET # BLD AUTO: 173 K/UL (ref 150–450)
PMV BLD AUTO: 10.4 FL (ref 9.2–12.9)
PO2 BLDA: 69 MMHG (ref 80–100)
POC BE: 15 MMOL/L
POC SATURATED O2: 93 % (ref 95–100)
POC TCO2: 42 MMOL/L (ref 23–27)
POTASSIUM SERPL-SCNC: 3.5 MMOL/L (ref 3.5–5.1)
PROT SERPL-MCNC: 6.5 G/DL (ref 6–8.4)
PROT UR QL STRIP: NEGATIVE
PV MV: 0.66 M/S
PV PEAK GRADIENT: 4 MMHG
PV PEAK VELOCITY: 1.04 M/S
RA PRESSURE ESTIMATED: 8 MMHG
RBC # BLD AUTO: 4.09 M/UL (ref 4–5.4)
RIGHT VENTRICULAR END-DIASTOLIC DIMENSION: 4.12 CM
RV TB RVSP: 11 MMHG
RV TISSUE DOPPLER FREE WALL SYSTOLIC VELOCITY 1 (APICAL 4 CHAMBER VIEW): 13.2 CM/S
SAMPLE: ABNORMAL
SITE: ABNORMAL
SODIUM SERPL-SCNC: 138 MMOL/L (ref 136–145)
SP GR UR STRIP: <1.005 (ref 1–1.03)
SP02: 88
SQUAMOUS #/AREA URNS HPF: 1 /HPF
TDI LATERAL: 0.1 M/S
TDI SEPTAL: 0.08 M/S
TDI: 0.09 M/S
TR MAX PG: 33 MMHG
TRICUSPID ANNULAR PLANE SYSTOLIC EXCURSION: 1.32 CM
TROPONIN I SERPL HS-MCNC: 23.5 PG/ML (ref 0–14.9)
TROPONIN I SERPL HS-MCNC: 28.3 PG/ML (ref 0–14.9)
TV REST PULMONARY ARTERY PRESSURE: 41 MMHG
URN SPEC COLLECT METH UR: ABNORMAL
UROBILINOGEN UR STRIP-ACNC: NEGATIVE EU/DL
WBC # BLD AUTO: 6.45 K/UL (ref 3.9–12.7)
WBC #/AREA URNS HPF: 1 /HPF (ref 0–5)
Z-SCORE OF LEFT VENTRICULAR DIMENSION IN END DIASTOLE: -2.23
Z-SCORE OF LEFT VENTRICULAR DIMENSION IN END SYSTOLE: -1.7

## 2024-03-08 PROCEDURE — 25000242 PHARM REV CODE 250 ALT 637 W/ HCPCS: Performed by: STUDENT IN AN ORGANIZED HEALTH CARE EDUCATION/TRAINING PROGRAM

## 2024-03-08 PROCEDURE — G0378 HOSPITAL OBSERVATION PER HR: HCPCS

## 2024-03-08 PROCEDURE — 94640 AIRWAY INHALATION TREATMENT: CPT | Mod: XB

## 2024-03-08 PROCEDURE — 84484 ASSAY OF TROPONIN QUANT: CPT | Performed by: STUDENT IN AN ORGANIZED HEALTH CARE EDUCATION/TRAINING PROGRAM

## 2024-03-08 PROCEDURE — 85025 COMPLETE CBC W/AUTO DIFF WBC: CPT | Performed by: STUDENT IN AN ORGANIZED HEALTH CARE EDUCATION/TRAINING PROGRAM

## 2024-03-08 PROCEDURE — 93010 ELECTROCARDIOGRAM REPORT: CPT | Mod: ,,, | Performed by: GENERAL PRACTICE

## 2024-03-08 PROCEDURE — 93306 TTE W/DOPPLER COMPLETE: CPT | Mod: 26,,, | Performed by: INTERNAL MEDICINE

## 2024-03-08 PROCEDURE — 83036 HEMOGLOBIN GLYCOSYLATED A1C: CPT | Performed by: INTERNAL MEDICINE

## 2024-03-08 PROCEDURE — 81001 URINALYSIS AUTO W/SCOPE: CPT | Performed by: STUDENT IN AN ORGANIZED HEALTH CARE EDUCATION/TRAINING PROGRAM

## 2024-03-08 PROCEDURE — 93005 ELECTROCARDIOGRAM TRACING: CPT | Performed by: GENERAL PRACTICE

## 2024-03-08 PROCEDURE — 99900031 HC PATIENT EDUCATION (STAT)

## 2024-03-08 PROCEDURE — 84484 ASSAY OF TROPONIN QUANT: CPT | Mod: 91 | Performed by: EMERGENCY MEDICINE

## 2024-03-08 PROCEDURE — 94761 N-INVAS EAR/PLS OXIMETRY MLT: CPT

## 2024-03-08 PROCEDURE — 96376 TX/PRO/DX INJ SAME DRUG ADON: CPT

## 2024-03-08 PROCEDURE — 99285 EMERGENCY DEPT VISIT HI MDM: CPT | Mod: 25

## 2024-03-08 PROCEDURE — 80053 COMPREHEN METABOLIC PANEL: CPT | Performed by: STUDENT IN AN ORGANIZED HEALTH CARE EDUCATION/TRAINING PROGRAM

## 2024-03-08 PROCEDURE — 82803 BLOOD GASES ANY COMBINATION: CPT

## 2024-03-08 PROCEDURE — 96367 TX/PROPH/DG ADDL SEQ IV INF: CPT

## 2024-03-08 PROCEDURE — 83880 ASSAY OF NATRIURETIC PEPTIDE: CPT | Performed by: STUDENT IN AN ORGANIZED HEALTH CARE EDUCATION/TRAINING PROGRAM

## 2024-03-08 PROCEDURE — 99223 1ST HOSP IP/OBS HIGH 75: CPT | Mod: 25,,, | Performed by: INTERNAL MEDICINE

## 2024-03-08 PROCEDURE — 96372 THER/PROPH/DIAG INJ SC/IM: CPT | Performed by: INTERNAL MEDICINE

## 2024-03-08 PROCEDURE — 63600175 PHARM REV CODE 636 W HCPCS: Performed by: INTERNAL MEDICINE

## 2024-03-08 PROCEDURE — 25000003 PHARM REV CODE 250: Performed by: INTERNAL MEDICINE

## 2024-03-08 PROCEDURE — 94799 UNLISTED PULMONARY SVC/PX: CPT

## 2024-03-08 PROCEDURE — 83735 ASSAY OF MAGNESIUM: CPT | Performed by: STUDENT IN AN ORGANIZED HEALTH CARE EDUCATION/TRAINING PROGRAM

## 2024-03-08 PROCEDURE — 25000242 PHARM REV CODE 250 ALT 637 W/ HCPCS: Performed by: NURSE PRACTITIONER

## 2024-03-08 PROCEDURE — 94640 AIRWAY INHALATION TREATMENT: CPT

## 2024-03-08 PROCEDURE — 63700000 PHARM REV CODE 250 ALT 637 W/O HCPCS: Performed by: STUDENT IN AN ORGANIZED HEALTH CARE EDUCATION/TRAINING PROGRAM

## 2024-03-08 PROCEDURE — 96375 TX/PRO/DX INJ NEW DRUG ADDON: CPT

## 2024-03-08 PROCEDURE — 27000221 HC OXYGEN, UP TO 24 HOURS

## 2024-03-08 PROCEDURE — 96365 THER/PROPH/DIAG IV INF INIT: CPT

## 2024-03-08 PROCEDURE — 36600 WITHDRAWAL OF ARTERIAL BLOOD: CPT

## 2024-03-08 PROCEDURE — 63600175 PHARM REV CODE 636 W HCPCS: Performed by: NURSE PRACTITIONER

## 2024-03-08 PROCEDURE — 25000003 PHARM REV CODE 250: Performed by: STUDENT IN AN ORGANIZED HEALTH CARE EDUCATION/TRAINING PROGRAM

## 2024-03-08 PROCEDURE — 63600175 PHARM REV CODE 636 W HCPCS: Performed by: STUDENT IN AN ORGANIZED HEALTH CARE EDUCATION/TRAINING PROGRAM

## 2024-03-08 PROCEDURE — 63600175 PHARM REV CODE 636 W HCPCS

## 2024-03-08 PROCEDURE — 93306 TTE W/DOPPLER COMPLETE: CPT

## 2024-03-08 PROCEDURE — 99900035 HC TECH TIME PER 15 MIN (STAT)

## 2024-03-08 RX ORDER — MAGNESIUM SULFATE HEPTAHYDRATE 40 MG/ML
2 INJECTION, SOLUTION INTRAVENOUS ONCE
Status: COMPLETED | OUTPATIENT
Start: 2024-03-08 | End: 2024-03-08

## 2024-03-08 RX ORDER — ALUMINUM HYDROXIDE, MAGNESIUM HYDROXIDE, AND SIMETHICONE 1200; 120; 1200 MG/30ML; MG/30ML; MG/30ML
30 SUSPENSION ORAL 4 TIMES DAILY PRN
Status: DISCONTINUED | OUTPATIENT
Start: 2024-03-08 | End: 2024-03-11 | Stop reason: HOSPADM

## 2024-03-08 RX ORDER — ACETAMINOPHEN 325 MG/1
650 TABLET ORAL EVERY 4 HOURS PRN
Status: DISCONTINUED | OUTPATIENT
Start: 2024-03-08 | End: 2024-03-11 | Stop reason: HOSPADM

## 2024-03-08 RX ORDER — FUROSEMIDE 10 MG/ML
40 INJECTION INTRAMUSCULAR; INTRAVENOUS ONCE
Status: COMPLETED | OUTPATIENT
Start: 2024-03-08 | End: 2024-03-08

## 2024-03-08 RX ORDER — IBUPROFEN 200 MG
24 TABLET ORAL
Status: DISCONTINUED | OUTPATIENT
Start: 2024-03-08 | End: 2024-03-11 | Stop reason: HOSPADM

## 2024-03-08 RX ORDER — INSULIN ASPART 100 [IU]/ML
0-5 INJECTION, SOLUTION INTRAVENOUS; SUBCUTANEOUS
Status: DISCONTINUED | OUTPATIENT
Start: 2024-03-08 | End: 2024-03-11 | Stop reason: HOSPADM

## 2024-03-08 RX ORDER — ONDANSETRON HYDROCHLORIDE 2 MG/ML
4 INJECTION, SOLUTION INTRAVENOUS EVERY 6 HOURS PRN
Status: DISCONTINUED | OUTPATIENT
Start: 2024-03-08 | End: 2024-03-11 | Stop reason: HOSPADM

## 2024-03-08 RX ORDER — DONEPEZIL HYDROCHLORIDE 5 MG/1
5 TABLET, FILM COATED ORAL NIGHTLY
Status: DISCONTINUED | OUTPATIENT
Start: 2024-03-08 | End: 2024-03-11 | Stop reason: HOSPADM

## 2024-03-08 RX ORDER — TALC
6 POWDER (GRAM) TOPICAL NIGHTLY PRN
Status: DISCONTINUED | OUTPATIENT
Start: 2024-03-08 | End: 2024-03-11 | Stop reason: HOSPADM

## 2024-03-08 RX ORDER — SODIUM,POTASSIUM PHOSPHATES 280-250MG
2 POWDER IN PACKET (EA) ORAL
Status: DISCONTINUED | OUTPATIENT
Start: 2024-03-08 | End: 2024-03-11 | Stop reason: HOSPADM

## 2024-03-08 RX ORDER — DIGOXIN 125 MCG
0.12 TABLET ORAL DAILY
Status: DISCONTINUED | OUTPATIENT
Start: 2024-03-08 | End: 2024-03-11 | Stop reason: HOSPADM

## 2024-03-08 RX ORDER — ENOXAPARIN SODIUM 100 MG/ML
40 INJECTION SUBCUTANEOUS EVERY 24 HOURS
Status: DISCONTINUED | OUTPATIENT
Start: 2024-03-08 | End: 2024-03-11 | Stop reason: HOSPADM

## 2024-03-08 RX ORDER — CLOPIDOGREL BISULFATE 75 MG/1
75 TABLET ORAL DAILY
Status: DISCONTINUED | OUTPATIENT
Start: 2024-03-08 | End: 2024-03-11 | Stop reason: HOSPADM

## 2024-03-08 RX ORDER — FUROSEMIDE 10 MG/ML
40 INJECTION INTRAMUSCULAR; INTRAVENOUS
Status: COMPLETED | OUTPATIENT
Start: 2024-03-08 | End: 2024-03-08

## 2024-03-08 RX ORDER — GLUCAGON 1 MG
1 KIT INJECTION
Status: DISCONTINUED | OUTPATIENT
Start: 2024-03-08 | End: 2024-03-11 | Stop reason: HOSPADM

## 2024-03-08 RX ORDER — IBUPROFEN 200 MG
16 TABLET ORAL
Status: DISCONTINUED | OUTPATIENT
Start: 2024-03-08 | End: 2024-03-11 | Stop reason: HOSPADM

## 2024-03-08 RX ORDER — PREDNISONE 20 MG/1
60 TABLET ORAL DAILY
Status: DISCONTINUED | OUTPATIENT
Start: 2024-03-09 | End: 2024-03-09

## 2024-03-08 RX ORDER — DILTIAZEM HYDROCHLORIDE 120 MG/1
120 CAPSULE, COATED, EXTENDED RELEASE ORAL DAILY
Status: DISCONTINUED | OUTPATIENT
Start: 2024-03-08 | End: 2024-03-11 | Stop reason: HOSPADM

## 2024-03-08 RX ORDER — CARVEDILOL 12.5 MG/1
12.5 TABLET ORAL 2 TIMES DAILY
Status: DISCONTINUED | OUTPATIENT
Start: 2024-03-08 | End: 2024-03-11 | Stop reason: HOSPADM

## 2024-03-08 RX ORDER — ACETAMINOPHEN 325 MG/1
650 TABLET ORAL EVERY 8 HOURS PRN
Status: DISCONTINUED | OUTPATIENT
Start: 2024-03-08 | End: 2024-03-11 | Stop reason: HOSPADM

## 2024-03-08 RX ORDER — FUROSEMIDE 10 MG/ML
40 INJECTION INTRAMUSCULAR; INTRAVENOUS DAILY
Status: DISCONTINUED | OUTPATIENT
Start: 2024-03-08 | End: 2024-03-10

## 2024-03-08 RX ORDER — HYDRALAZINE HYDROCHLORIDE 20 MG/ML
10 INJECTION INTRAMUSCULAR; INTRAVENOUS EVERY 6 HOURS PRN
Status: DISCONTINUED | OUTPATIENT
Start: 2024-03-08 | End: 2024-03-11 | Stop reason: HOSPADM

## 2024-03-08 RX ORDER — LANOLIN ALCOHOL/MO/W.PET/CERES
800 CREAM (GRAM) TOPICAL
Status: DISCONTINUED | OUTPATIENT
Start: 2024-03-08 | End: 2024-03-11 | Stop reason: HOSPADM

## 2024-03-08 RX ORDER — ASPIRIN 81 MG/1
81 TABLET ORAL DAILY
Status: DISCONTINUED | OUTPATIENT
Start: 2024-03-08 | End: 2024-03-11 | Stop reason: HOSPADM

## 2024-03-08 RX ORDER — METHYLPREDNISOLONE SOD SUCC 125 MG
125 VIAL (EA) INJECTION
Status: COMPLETED | OUTPATIENT
Start: 2024-03-08 | End: 2024-03-08

## 2024-03-08 RX ORDER — AZITHROMYCIN 250 MG/1
500 TABLET, FILM COATED ORAL
Status: COMPLETED | OUTPATIENT
Start: 2024-03-08 | End: 2024-03-08

## 2024-03-08 RX ORDER — LEVALBUTEROL INHALATION SOLUTION 0.63 MG/3ML
0.63 SOLUTION RESPIRATORY (INHALATION) EVERY 8 HOURS
Status: DISCONTINUED | OUTPATIENT
Start: 2024-03-08 | End: 2024-03-11 | Stop reason: HOSPADM

## 2024-03-08 RX ORDER — ATORVASTATIN CALCIUM 10 MG/1
10 TABLET, FILM COATED ORAL NIGHTLY
Status: DISCONTINUED | OUTPATIENT
Start: 2024-03-08 | End: 2024-03-11 | Stop reason: HOSPADM

## 2024-03-08 RX ORDER — SODIUM CHLORIDE 0.9 % (FLUSH) 0.9 %
2 SYRINGE (ML) INJECTION EVERY 12 HOURS PRN
Status: DISCONTINUED | OUTPATIENT
Start: 2024-03-08 | End: 2024-03-11 | Stop reason: HOSPADM

## 2024-03-08 RX ORDER — NALOXONE HCL 0.4 MG/ML
0.02 VIAL (ML) INJECTION
Status: DISCONTINUED | OUTPATIENT
Start: 2024-03-08 | End: 2024-03-11 | Stop reason: HOSPADM

## 2024-03-08 RX ORDER — HYDROCODONE BITARTRATE AND ACETAMINOPHEN 5; 325 MG/1; MG/1
1 TABLET ORAL EVERY 6 HOURS PRN
Status: DISCONTINUED | OUTPATIENT
Start: 2024-03-08 | End: 2024-03-11 | Stop reason: HOSPADM

## 2024-03-08 RX ORDER — PANTOPRAZOLE SODIUM 40 MG/1
40 TABLET, DELAYED RELEASE ORAL 2 TIMES DAILY
Status: DISCONTINUED | OUTPATIENT
Start: 2024-03-08 | End: 2024-03-11 | Stop reason: HOSPADM

## 2024-03-08 RX ORDER — SODIUM CHLORIDE 0.9 % (FLUSH) 0.9 %
10 SYRINGE (ML) INJECTION
Status: DISCONTINUED | OUTPATIENT
Start: 2024-03-08 | End: 2024-03-11 | Stop reason: HOSPADM

## 2024-03-08 RX ORDER — IPRATROPIUM BROMIDE 0.5 MG/2.5ML
0.5 SOLUTION RESPIRATORY (INHALATION) EVERY 4 HOURS
Status: DISCONTINUED | OUTPATIENT
Start: 2024-03-08 | End: 2024-03-10

## 2024-03-08 RX ORDER — LEVALBUTEROL INHALATION SOLUTION 1.25 MG/3ML
1.25 SOLUTION RESPIRATORY (INHALATION)
Status: COMPLETED | OUTPATIENT
Start: 2024-03-08 | End: 2024-03-08

## 2024-03-08 RX ADMIN — CARVEDILOL 12.5 MG: 12.5 TABLET, FILM COATED ORAL at 08:03

## 2024-03-08 RX ADMIN — IPRATROPIUM BROMIDE 0.5 MG: 0.5 SOLUTION RESPIRATORY (INHALATION) at 09:03

## 2024-03-08 RX ADMIN — IPRATROPIUM BROMIDE 0.5 MG: 0.5 SOLUTION RESPIRATORY (INHALATION) at 04:03

## 2024-03-08 RX ADMIN — PANTOPRAZOLE SODIUM 40 MG: 40 TABLET, DELAYED RELEASE ORAL at 06:03

## 2024-03-08 RX ADMIN — DILTIAZEM HYDROCHLORIDE 120 MG: 120 CAPSULE, COATED, EXTENDED RELEASE ORAL at 10:03

## 2024-03-08 RX ADMIN — MAGNESIUM SULFATE HEPTAHYDRATE 2 G: 40 INJECTION, SOLUTION INTRAVENOUS at 12:03

## 2024-03-08 RX ADMIN — AZITHROMYCIN DIHYDRATE 500 MG: 250 TABLET ORAL at 03:03

## 2024-03-08 RX ADMIN — ENOXAPARIN SODIUM 40 MG: 100 INJECTION SUBCUTANEOUS at 05:03

## 2024-03-08 RX ADMIN — ATORVASTATIN CALCIUM 10 MG: 10 TABLET, FILM COATED ORAL at 08:03

## 2024-03-08 RX ADMIN — FUROSEMIDE 40 MG: 10 INJECTION, SOLUTION INTRAMUSCULAR; INTRAVENOUS at 04:03

## 2024-03-08 RX ADMIN — CEFTRIAXONE SODIUM 1 G: 1 INJECTION, POWDER, FOR SOLUTION INTRAMUSCULAR; INTRAVENOUS at 03:03

## 2024-03-08 RX ADMIN — DONEPEZIL HYDROCHLORIDE 5 MG: 5 TABLET, FILM COATED ORAL at 08:03

## 2024-03-08 RX ADMIN — METHYLPREDNISOLONE SODIUM SUCCINATE 125 MG: 125 INJECTION, POWDER, FOR SOLUTION INTRAMUSCULAR; INTRAVENOUS at 03:03

## 2024-03-08 RX ADMIN — CARVEDILOL 12.5 MG: 12.5 TABLET, FILM COATED ORAL at 10:03

## 2024-03-08 RX ADMIN — DIGOXIN 0.12 MG: 125 TABLET ORAL at 10:03

## 2024-03-08 RX ADMIN — PANTOPRAZOLE SODIUM 40 MG: 40 TABLET, DELAYED RELEASE ORAL at 05:03

## 2024-03-08 RX ADMIN — IPRATROPIUM BROMIDE 0.5 MG: 0.5 SOLUTION RESPIRATORY (INHALATION) at 10:03

## 2024-03-08 RX ADMIN — LEVALBUTEROL HYDROCHLORIDE 0.63 MG: 0.63 SOLUTION RESPIRATORY (INHALATION) at 10:03

## 2024-03-08 RX ADMIN — LEVALBUTEROL HYDROCHLORIDE 1.25 MG: 1.25 SOLUTION RESPIRATORY (INHALATION) at 03:03

## 2024-03-08 RX ADMIN — POTASSIUM BICARBONATE 50 MEQ: 977.5 TABLET, EFFERVESCENT ORAL at 12:03

## 2024-03-08 RX ADMIN — FUROSEMIDE 40 MG: 10 INJECTION, SOLUTION INTRAVENOUS at 12:03

## 2024-03-08 RX ADMIN — LEVALBUTEROL HYDROCHLORIDE 0.63 MG: 0.63 SOLUTION RESPIRATORY (INHALATION) at 04:03

## 2024-03-08 RX ADMIN — CLOPIDOGREL BISULFATE 75 MG: 75 TABLET, FILM COATED ORAL at 10:03

## 2024-03-08 RX ADMIN — FUROSEMIDE 40 MG: 10 INJECTION, SOLUTION INTRAVENOUS at 09:03

## 2024-03-08 RX ADMIN — METHYLPREDNISOLONE SODIUM SUCCINATE 80 MG: 40 INJECTION, POWDER, FOR SOLUTION INTRAMUSCULAR; INTRAVENOUS at 10:03

## 2024-03-08 RX ADMIN — ASPIRIN 81 MG: 81 TABLET, COATED ORAL at 10:03

## 2024-03-08 NOTE — ASSESSMENT & PLAN NOTE
-Cardiology consult  -IV Lasix  -Monitor input/output  -Daily weights  -Telemetry monitoring  -Monitor labs  -Vital sign checks to help ensure hemodynamic stability

## 2024-03-08 NOTE — SUBJECTIVE & OBJECTIVE
Past Medical History:   Diagnosis Date    Anesthesia complication     BLADDER DYSFUNCTION    Aortic stenosis     Arthritis     Back pain     Diabetes mellitus type II     NO LONGER DIABETIC    Encounter for blood transfusion     Hyperlipidemia     Hypertension     NSTEMI (non-ST elevated myocardial infarction) 05/01/2022    Osteoporosis     Wears glasses        Past Surgical History:   Procedure Laterality Date     vocal cord nodules removed  long time ago     twice    ADRENAL TUMOR      APPENDECTOMY  within last 5yrs    CATHETERIZATION OF BOTH LEFT AND RIGHT HEART Left 05/06/2022    Procedure: CATHETERIZATION, HEART, BOTH LEFT AND RIGHT;  Surgeon: Michelet Vargas MD;  Location: [facility] CATH/EP LAB;  Service: Cardiology;  Laterality: Left;    COLONOSCOPY N/A 6/23/2023    Procedure: COLONOSCOPY;  Surgeon: Joel Neal III, MD;  Location: [facility] ENDO;  Service: Endoscopy;  Laterality: N/A;    FIXATION KYPHOPLASTY THORACIC SPINE      8-20-13    FOOT SURGERY      left 2nd toe was too long     HERNIA REPAIR  within last 5yrs    INSERTION OF TEMPORARY PACEMAKER N/A 1/4/2023    Procedure: INSERTION, PACEMAKER, TEMPORARY;  Surgeon: Bhavesh Peña MD;  Location: [facility] CATH;  Service: Cardiology;  Laterality: N/A;    LEFT HEART CATHETERIZATION Left 05/02/2022    Procedure: Left heart cath;  Surgeon: Jose Echols MD;  Location: [facility] CATH/EP LAB;  Service: Cardiology;  Laterality: Left;    SMALL BOWEL ENTEROSCOPY N/A 6/22/2023    Procedure: ENTEROSCOPY;  Surgeon: Cornell Aguirre MD;  Location: [facility] ENDO;  Service: Endoscopy;  Laterality: N/A;    SMALL BOWEL ENTEROSCOPY N/A 10/20/2023    Procedure: ENTEROSCOPY;  Surgeon: Otilio Bourgeois MD;  Location: [facility];  Service: Endoscopy;  Laterality: N/A;    TONSILLECTOMY, ADENOIDECTOMY  long time ago    TRANSCATHETER AORTIC VALVE REPLACEMENT (TAVR) Bilateral 1/4/2023    Procedure: REPLACEMENT, AORTIC VALVE, TRANSCATHETER (TAVR);  Surgeon: Bhavesh Peña MD;  [facility] CATH;  Service: Cardiology;  Laterality: Bilateral;    TRANSCATHETER AORTIC VALVE REPLACEMENT (TAVR) Bilateral 1/4/2023    Procedure: REPLACEMENT, AORTIC VALVE, TRANSCATHETER (TAVR);  Surgeon: Dallin Verma MD;  Location: [facility] CATH;  Service: Cardiology;  Laterality: Bilateral;    TRANSTHORACIC ECHOCARDIOGRAPHY (TTE)  1/4/2023    Procedure: ECHOCARDIOGRAM, TRANSTHORACIC;  Surgeon: Bhavesh Peña MD;  [facility] CATH;  Service: Cardiology;;       Review of patient's allergies indicates:   Allergen Reactions    Latex      Other reaction(s): Unknown  Other reaction(s): Unknown    Sulfacetamide sodium      Pain perineal area    Sulfasalazine Hives    Adhesive Itching     SKIN GETS RED WITH TAPE AND BANDAIDS    Adhesive tape-silicones Itching     SKIN GETS RED WITH TAPE AND BANDAIDS    Sulfa (sulfonamide antibiotics) Rash       No current facility-administered medications on file prior to encounter.     Current Outpatient Medications on File Prior to Encounter   Medication Sig    albuterol (PROVENTIL/VENTOLIN HFA) 90 mcg/actuation inhaler Inhale 2 puffs into the lungs every 6 (six) hours as needed for Shortness of Breath.    aspirin (ECOTRIN) 81 MG EC tablet Take 81 mg by mouth once daily.    butalbital-acetaminophen-caffeine -40 mg (FIORICET, ESGIC) -40 mg per tablet Take 1 tablet by mouth every 4 (four) hours as needed for Pain.    calcium carbonate (OS-TK) 500 mg calcium (1,250 mg) tablet Take 1 tablet (500 mg total) by mouth once daily.    carvediloL (COREG) 12.5 MG tablet Take 12.5 mg by mouth 2 (two) times daily.    citalopram (CELEXA) 40 MG tablet Take 1 tablet (40 mg total) by mouth once daily.    clopidogreL (PLAVIX) 75 mg tablet Take 1 tablet (75 mg total) by mouth once daily.    digoxin (LANOXIN) 125 mcg tablet Take 1 tablet (0.125 mg total) by mouth once daily.    diltiaZEM (DILACOR XR) 120 MG CDCR Take 1 capsule (120 mg total) by mouth once daily.    donepeziL (ARICEPT) 5 MG tablet  Take 5 mg by mouth nightly.    ergocalciferol (ERGOCALCIFEROL) 50,000 unit Cap TAKE 1 CAPSULE (50,000 UNITS TOTAL) EVERY 7 DAYS. (Patient taking differently: Take 50,000 Units by mouth every 7 days.)    ferrous gluconate 324 mg (37.5 mg iron) Tab tablet Take 1 tablet (324 mg total) by mouth once daily.    ferrous sulfate (FEOSOL) 325 mg (65 mg iron) Tab tablet Take 325 mg by mouth with breakfast.    fluticasone furoate-vilanteroL (BREO ELLIPTA) 100-25 mcg/dose diskus inhaler Inhale 1 puff into the lungs once daily. Controller    furosemide (LASIX) 40 MG tablet Take 1 tablet (40 mg total) by mouth once daily. (Patient taking differently: Take 40 mg by mouth daily as needed.)    glucosamine-chondroitin 500-400 mg tablet Take 1 tablet by mouth once daily.    loperamide (IMODIUM A-D) 2 mg Tab Take 1 tablet (2 mg total) by mouth 3 (three) times daily as needed (diarrhea). Take 2 tablets by mouth initially then 1 tablet after each loose stool as needed for diarrhea.    loratadine (CLARITIN) 10 mg tablet Take 1 tablet (10 mg total) by mouth once daily.    multivitamin capsule Take 1 capsule by mouth once daily.    pantoprazole (PROTONIX) 40 MG tablet Take 1 tablet (40 mg total) by mouth 2 (two) times daily.    potassium chloride (KLOR-CON) 10 MEQ TbSR Take 1 tablet (10 mEq total) by mouth As instructed. Take 2 tabs 1st day and 2nd day then 1 tab qd    simvastatin (ZOCOR) 10 MG tablet Take 10 mg by mouth every evening.    spironolactone (ALDACTONE) 25 MG tablet Take 1 tablet (25 mg total) by mouth once daily.    [DISCONTINUED] ALLERGY RELIEF, FEXOFENADINE, 180 mg tablet Take 180 mg by mouth once daily.    [DISCONTINUED] ezetimibe-simvastatin 10-40 mg (VYTORIN) 10-40 mg per tablet Take 1 tablet by mouth.    [DISCONTINUED] lisinopril-hydrochlorothiazide (PRINZIDE,ZESTORETIC) 20-12.5 mg per tablet Take 1 tablet by mouth once daily.      Family History    None       Tobacco Use    Smoking status: Former     Current packs/day:  0.00     Average packs/day: 0.5 packs/day for 35.0 years (17.5 ttl pk-yrs)     Types: Cigarettes     Start date: 10/5/1987     Quit date: 10/5/2022     Years since quittin.4    Smokeless tobacco: Never   Substance and Sexual Activity    Alcohol use: No    Drug use: No    Sexual activity: Not on file     Review of Systems  Objective:     Vital Signs (Most Recent):  Temp: 98.4 °F (36.9 °C) (24 0229)  Pulse: 108 (24 0501)  Resp: 19 (24 0335)  BP: (!) 177/85 (24 0500)  SpO2: (!) 93 % (24 050) Vital Signs (24h Range):  Temp:  [98.4 °F (36.9 °C)] 98.4 °F (36.9 °C)  Pulse:  [] 108  Resp:  [19-20] 19  SpO2:  [87 %-97 %] 93 %  BP: (177-221)/(85-97) 177/85     Weight: 95.3 kg (210 lb)  Body mass index is 41.01 kg/m².     Physical Exam   Gen: In bed, NAD  Head: Normocephalic  CVD: S1/S2  Resp: Diminished BS  GI: Abd soft, ND, some epigastric TTP  Ext: 1+ BLE pitting edema  Skin: Warm, dry  Neuro: Alert         Significant Labs: All pertinent labs within the past 24 hours have been reviewed.  CBC:   Recent Labs   Lab 24   WBC 6.45   HGB 9.5*   HCT 34.8*        CMP:   Recent Labs   Lab 24      K 3.5   CL 97   CO2 35*      BUN 10   CREATININE 0.5   CALCIUM 9.3   PROT 6.5   ALBUMIN 3.9   BILITOT 1.4*   ALKPHOS 69   AST 38   ALT 32   ANIONGAP 6*     Cardiac Markers:   Recent Labs   Lab 24   *     Troponin:   Recent Labs   Lab 24   TROPONINIHS 28.3*       Significant Imaging: I have reviewed all pertinent imaging results/findings within the past 24 hours.

## 2024-03-08 NOTE — ASSESSMENT & PLAN NOTE
Patient's COPD is with exacerbation noted by continued dyspnea and use of accessory muscles for breathing currently.  Patient is currently on COPD Pathway. Continue scheduled inhalers Steroids, Antibiotics, and Supplemental oxygen and monitor respiratory status closely.

## 2024-03-08 NOTE — HOSPITAL COURSE
78 y.o. F who is on 2L home oxygen at baseline and has an extensive history including HTN, DM, CAD, HFpEF (EF 65-70% [9/12/23]), AFib status post TAVR, COPD presented for evaluation of progressively worsening SOB. Last 2D echo showed GIIDD and moderate AS. Patient endorses smoking history of >50 years (quit 3y ago). Additionally, she reports running out of her home inhalers about 3 weeks ago. Patient was given IV lasix at presentation and has had >3000 cc output. Labs promising in that she has no white count, lytes are WNL, and kidney functions acceptable Estimated Creatinine Clearance: 97.3 mL/min (based on SCr of 0.5 mg/dL).    Mixed picture heart failue + COPD ex. Diastolic heart failure, continue diuresis, strict I&Os, 1.5L fluid restriction and supplemental oxygen. COPD ex - One dose IV solumedrol today, add duo nebs xoponex + ipratropium, then start po steroids tomorrow.

## 2024-03-08 NOTE — SUBJECTIVE & OBJECTIVE
INTERVAL HISTORY: continue treatment          Past Medical History:   Diagnosis Date    Anesthesia complication     BLADDER DYSFUNCTION    Aortic stenosis     Arthritis     Back pain     Diabetes mellitus type II     NO LONGER DIABETIC    Encounter for blood transfusion     Hyperlipidemia     Hypertension     NSTEMI (non-ST elevated myocardial infarction) 05/01/2022    Osteoporosis     Wears glasses        Past Surgical History:   Procedure Laterality Date     vocal cord nodules removed  long time ago     twice    ADRENAL TUMOR      APPENDECTOMY  within last 5yrs    CATHETERIZATION OF BOTH LEFT AND RIGHT HEART Left 05/06/2022    Procedure: CATHETERIZATION, HEART, BOTH LEFT AND RIGHT;  Surgeon: Michelet Vargas MD;  Location: [facility] CATH/EP LAB;  Service: Cardiology;  Laterality: Left;    COLONOSCOPY N/A 6/23/2023    Procedure: COLONOSCOPY;  Surgeon: Joel Neal III, MD;  Location: [facility] ENDO;  Service: Endoscopy;  Laterality: N/A;    FIXATION KYPHOPLASTY THORACIC SPINE      8-20-13    FOOT SURGERY      left 2nd toe was too long     HERNIA REPAIR  within last 5yrs    INSERTION OF TEMPORARY PACEMAKER N/A 1/4/2023    Procedure: INSERTION, PACEMAKER, TEMPORARY;  Surgeon: Bhavesh Peña MD;  Location: [facility] CATH;  Service: Cardiology;  Laterality: N/A;    LEFT HEART CATHETERIZATION Left 05/02/2022    Procedure: Left heart cath;  Surgeon: Jose Echols MD;  Location: [facility] CATH/EP LAB;  Service: Cardiology;  Laterality: Left;    SMALL BOWEL ENTEROSCOPY N/A 6/22/2023    Procedure: ENTEROSCOPY;  Surgeon: Cornell Aguirre MD;  Location: [facility] ENDO;  Service: Endoscopy;  Laterality: N/A;    SMALL BOWEL ENTEROSCOPY N/A 10/20/2023    Procedure: ENTEROSCOPY;  Surgeon: Otilio Bourgeois MD;  Location: [facility] ENDO;  Service: Endoscopy;  Laterality: N/A;    TONSILLECTOMY, ADENOIDECTOMY  long time ago    TRANSCATHETER AORTIC VALVE REPLACEMENT (TAVR) Bilateral 1/4/2023    Procedure: REPLACEMENT, AORTIC VALVE, TRANSCATHETER  (TAVR);  Surgeon: Bhavesh Peña MD;  Location: [facility] CATH;  Service: Cardiology;  Laterality: Bilateral;    TRANSCATHETER AORTIC VALVE REPLACEMENT (TAVR) Bilateral 1/4/2023    Procedure: REPLACEMENT, AORTIC VALVE, TRANSCATHETER (TAVR);  Surgeon: Dallin Verma MD;  Location: [facility] CATH;  Service: Cardiology;  Laterality: Bilateral;    TRANSTHORACIC ECHOCARDIOGRAPHY (TTE)  1/4/2023    Procedure: ECHOCARDIOGRAM, TRANSTHORACIC;  Surgeon: Bhavesh Peña MD;  Location: [facility] CATH;  Service: Cardiology;;       Review of patient's allergies indicates:   Allergen Reactions    Latex      Other reaction(s): Unknown  Other reaction(s): Unknown    Sulfacetamide sodium      Pain perineal area    Sulfasalazine Hives    Adhesive Itching     SKIN GETS RED WITH TAPE AND BANDAIDS    Adhesive tape-silicones Itching     SKIN GETS RED WITH TAPE AND BANDAIDS    Sulfa (sulfonamide antibiotics) Rash       No current facility-administered medications on file prior to encounter.     Current Outpatient Medications on File Prior to Encounter   Medication Sig    albuterol (PROVENTIL/VENTOLIN HFA) 90 mcg/actuation inhaler Inhale 2 puffs into the lungs every 6 (six) hours as needed for Shortness of Breath.    aspirin (ECOTRIN) 81 MG EC tablet Take 81 mg by mouth once daily.    butalbital-acetaminophen-caffeine -40 mg (FIORICET, ESGIC) -40 mg per tablet Take 1 tablet by mouth every 4 (four) hours as needed for Pain.    ferrous gluconate 324 mg (37.5 mg iron) Tab tablet Take 1 tablet (324 mg total) by mouth once daily.    ferrous sulfate (FEOSOL) 325 mg (65 mg iron) Tab tablet Take 325 mg by mouth with breakfast.    furosemide (LASIX) 40 MG tablet Take 1 tablet (40 mg total) by mouth once daily. (Patient taking differently: Take 40 mg by mouth daily as needed.)    glucosamine-chondroitin 500-400 mg tablet Take 1 tablet by mouth once daily.    loperamide (IMODIUM A-D) 2 mg Tab Take 1 tablet (2 mg total) by mouth 3 (three) times daily as  needed (diarrhea). Take 2 tablets by mouth initially then 1 tablet after each loose stool as needed for diarrhea.    loratadine (CLARITIN) 10 mg tablet Take 1 tablet (10 mg total) by mouth once daily.    multivitamin capsule Take 1 capsule by mouth once daily.    pantoprazole (PROTONIX) 40 MG tablet Take 1 tablet (40 mg total) by mouth 2 (two) times daily.    potassium chloride (KLOR-CON) 10 MEQ TbSR Take 1 tablet (10 mEq total) by mouth As instructed. Take 2 tabs 1st day and 2nd day then 1 tab qd    simvastatin (ZOCOR) 10 MG tablet Take 10 mg by mouth every evening.    spironolactone (ALDACTONE) 25 MG tablet Take 1 tablet (25 mg total) by mouth once daily.    calcium carbonate (OS-TK) 500 mg calcium (1,250 mg) tablet Take 1 tablet (500 mg total) by mouth once daily.    carvediloL (COREG) 12.5 MG tablet Take 12.5 mg by mouth 2 (two) times daily.    citalopram (CELEXA) 40 MG tablet Take 1 tablet (40 mg total) by mouth once daily.    clopidogreL (PLAVIX) 75 mg tablet Take 1 tablet (75 mg total) by mouth once daily.    digoxin (LANOXIN) 125 mcg tablet Take 1 tablet (0.125 mg total) by mouth once daily.    diltiaZEM (DILACOR XR) 120 MG CDCR Take 1 capsule (120 mg total) by mouth once daily.    donepeziL (ARICEPT) 5 MG tablet Take 5 mg by mouth nightly.    ergocalciferol (ERGOCALCIFEROL) 50,000 unit Cap TAKE 1 CAPSULE (50,000 UNITS TOTAL) EVERY 7 DAYS. (Patient taking differently: Take 50,000 Units by mouth every 7 days.)    fluticasone furoate-vilanteroL (BREO ELLIPTA) 100-25 mcg/dose diskus inhaler Inhale 1 puff into the lungs once daily. Controller    [DISCONTINUED] ALLERGY RELIEF, FEXOFENADINE, 180 mg tablet Take 180 mg by mouth once daily.    [DISCONTINUED] ezetimibe-simvastatin 10-40 mg (VYTORIN) 10-40 mg per tablet Take 1 tablet by mouth.    [DISCONTINUED] lisinopril-hydrochlorothiazide (PRINZIDE,ZESTORETIC) 20-12.5 mg per tablet Take 1 tablet by mouth once daily.      Family History    None       Tobacco Use     Smoking status: Former     Current packs/day: 0.00     Average packs/day: 0.5 packs/day for 35.0 years (17.5 ttl pk-yrs)     Types: Cigarettes     Start date: 10/5/1987     Quit date: 10/5/2022     Years since quittin.4    Smokeless tobacco: Never   Substance and Sexual Activity    Alcohol use: No    Drug use: No    Sexual activity: Not on file     Review of Systems   Constitutional:  Positive for activity change.   Respiratory:  Positive for shortness of breath.    Cardiovascular:  Positive for leg swelling.   Neurological:  Positive for weakness. Negative for tremors.   Psychiatric/Behavioral:  Negative for agitation and confusion.      Objective:     Vital Signs (Most Recent):  Temp: 98.2 °F (36.8 °C) (24 0738)  Pulse: 108 (24 1047)  Resp: 20 (24 1047)  BP: (!) 183/80 (24 1030)  SpO2: (!) 93 % (24 1047) Vital Signs (24h Range):  Temp:  [98.2 °F (36.8 °C)-98.4 °F (36.9 °C)] 98.2 °F (36.8 °C)  Pulse:  [] 108  Resp:  [18-20] 20  SpO2:  [87 %-97 %] 93 %  BP: (167-221)/() 183/80     Weight: 95.3 kg (210 lb)  Body mass index is 41.01 kg/m².     Physical Exam  Eyes:      Extraocular Movements: Extraocular movements intact.      Pupils: Pupils are equal, round, and reactive to light.   Cardiovascular:      Rate and Rhythm: Rhythm irregular.   Pulmonary:      Comments: Diminished posteriorly   Abdominal:      Tenderness: There is no abdominal tenderness.      Hernia: A hernia is present.   Musculoskeletal:      Right lower leg: Edema present.      Left lower leg: Edema present.   Skin:     General: Skin is warm and dry.   Neurological:      Mental Status: She is alert and oriented to person, place, and time.        Gen: In bed, NAD  Head: Normocephalic  CVD: S1/S2  Resp: Diminished BS  GI: Abd soft, ND, some epigastric TTP  Ext: 1+ BLE pitting edema  Skin: Warm, dry  Neuro: Alert       CRANIAL NERVES     CN III, IV, VI   Pupils are equal, round, and reactive to light.        Significant Labs: All pertinent labs within the past 24 hours have been reviewed.  CBC:   Recent Labs   Lab 03/08/24 0312   WBC 6.45   HGB 9.5*   HCT 34.8*          CMP:   Recent Labs   Lab 03/08/24 0312      K 3.5   CL 97   CO2 35*      BUN 10   CREATININE 0.5   CALCIUM 9.3   PROT 6.5   ALBUMIN 3.9   BILITOT 1.4*   ALKPHOS 69   AST 38   ALT 32   ANIONGAP 6*       Cardiac Markers:   Recent Labs   Lab 03/08/24 0312   *       Troponin:   Recent Labs   Lab 03/08/24 0312 03/08/24  0600   TROPONINIHS 28.3* 23.5*         Significant Imaging:    Imaging Results              CT Abdomen Pelvis  Without Contrast (Final result)  Result time 03/08/24 07:36:46      Final result by Ellis Cruz MD (03/08/24 07:36:46)                   Narrative:    CMS MANDATED QUALITY DATA - CT RADIATION - 436    All CT scans at this facility utilize dose modulation, iterative reconstruction, and/or weight based dosing when appropriate to reduce radiation dose to as low as reasonably achievable.  Unless otherwise stated, incidental findings do not require dedicated follow-up imaging.        Reason: Abdominal pain, acute, nonlocalized    TECHNIQUE: CT abdomen and pelvis without IV or oral contrast. Study is tailored for detection of urinary tract calculi and evaluation of solid organs, hollow viscera, and vascular structures is limited.    COMPARISON: 1/1/2024    CT ABDOMEN:  Prior aortic valve replacement and coronary artery calcifications partially visualized. Minor atelectasis affects lung bases along with slight mosaic attenuation suggesting air trapping.    Calcified cholelithiasis lie in otherwise unremarkable gallbladder, unchanged. Liver shows no new abnormality. Noncontrast pancreas, spleen, and right adrenal are normal. Left adrenal gland is either diminutive in size or absent. Noncontrast kidneys are normal, without hydronephrosis or urolithiasis. Minor aortoiliac calcifications unchanged.    Large  hernia containing segment of large intestines and fat courses through left rectus muscle superiorly, unchanged. No associated intestinal obstruction. Postsurgical changes of right hemicolectomy are unchanged. A few colonic diverticula are present. Large and small intestines otherwise unremarkable. Postsurgical changes of ventral hernia repair are again noted.    Mild subcutaneous edema throughout the abdominal wall has increased.    Multilevel thoracolumbar spine compression fractures unchanged along with advanced disc degeneration at L4-L5 and L5-S1 and lower lumbar spine facet joint osteoarthrosis. Prior T10 and T12 vertebroplasty is noted.    CT PELVIS:  Bladder is normal, with adjacent calcification along inferior right bladder wall unchanged (image 190). Noncontrast uterus and adnexa are unremarkable. No free pelvic fluid.    IMPRESSION:    1. Unchanged cholelithiasis.  2. Unchanged left abdominal wall hernias as described.  3. Coronary artery calcifications.    Electronically signed by:  Ellis Cruz MD  03/08/2024 07:36 AM CST Workstation: [id number]                                     X-Ray Chest AP Portable (Final result)  Result time 03/08/24 05:19:01      Final result by Leonila Boucher MD (03/08/24 05:19:01)                   Narrative:    EXAM:  XR Chest, 1 View    CLINICAL HISTORY:  The patient is 78 years old and is Female; shortness of breath    TECHNIQUE:  Frontal view of the chest.    COMPARISON:  Chest radiograph from 10/18/2023    FINDINGS:  LUNGS:  There is mild interstitial prominence which is unchanged and may be chronic. No obvious consolidation.  PLEURAL SPACE:  No significant pleural effusion.  No obvious pneumothorax.  HEART:  Stable mild enlargement of the cardiac silhouette.  The patient is status post TAVR.  MEDIASTINUM:  Unremarkable.  BONES/JOINTS:  Vertebroplasty changes again noted at several levels in the spine. No obvious acute fracture.    IMPRESSION:  Mild interstitial  prominence which is unchanged and may be chronic. Mild interstitial edema not excluded.    Electronically signed by:  Leonila Boucher MD  03/08/2024 05:19 AM CST Workstation: [id number]

## 2024-03-08 NOTE — CARE UPDATE
03/08/24 1612   Patient Assessment/Suction   Level of Consciousness (AVPU) responds to pain   Respiratory Effort Mild;Labored   Expansion/Accessory Muscles/Retractions abdominal muscle use   All Lung Fields Breath Sounds wheezes, expiratory   Rhythm/Pattern, Respiratory gasping   Cough Frequency no cough   PRE-TX-O2   Device (Oxygen Therapy) nasal cannula   $ Is the patient on Low Flow Oxygen? Yes   Flow (L/min) 3   SpO2 (!) 91 %   Pulse Oximetry Type Continuous   $ Pulse Oximetry - Multiple Charge Pulse Oximetry - Multiple   Pulse 89   Resp (!) 36   Aerosol Therapy   $ Aerosol Therapy Charges Aerosol Treatment   Daily Review of Necessity (SVN) completed   Respiratory Treatment Status (SVN) given   Treatment Route (SVN) air;mask   Patient Position (SVN) HOB elevated   Post Treatment Assessment (SVN) breath sounds improved   Signs of Intolerance (SVN) none   Breath Sounds Post-Respiratory Treatment   Throughout All Fields Post-Treatment All Fields   Throughout All Fields Post-Treatment aeration increased   Post-treatment Heart Rate (beats/min) 88   Post-treatment Resp Rate (breaths/min) 28

## 2024-03-08 NOTE — ED PROVIDER NOTES
Encounter Date: 3/8/2024       History     Chief Complaint   Patient presents with    Shortness of Breath     Uses 2LPM NC PRN, SOB x1 week, worse tonight     HPI    78-year-old with history HFpEF (EF 65-70%), AFib status post TAVR, diabetes, hypertension, hyperlipidemia presents to emergency department for shortness of breath.      Patient states that her shortness of breath have been going on for the past few days.  States that it is at rest and worsens with any movement.  States that she uses home oxygen but she has been feeling more shortness of breath ever since then.  Patient was found to have an oxygen saturation of 87% on room air.  States that she took herself off of the oxygen at home because she knew EMS was coming to bring her here.  She denies having chest pain, nausea, vomiting.  States that she has a hernia that has been annoying her.      Review of patient's allergies indicates:   Allergen Reactions    Latex      Other reaction(s): Unknown  Other reaction(s): Unknown    Sulfacetamide sodium      Pain perineal area    Sulfasalazine Hives    Adhesive Itching     SKIN GETS RED WITH TAPE AND BANDAIDS    Adhesive tape-silicones Itching     SKIN GETS RED WITH TAPE AND BANDAIDS    Sulfa (sulfonamide antibiotics) Rash     Past Medical History:   Diagnosis Date    Anesthesia complication     BLADDER DYSFUNCTION    Aortic stenosis     Arthritis     Back pain     Diabetes mellitus type II     NO LONGER DIABETIC    Encounter for blood transfusion     Hyperlipidemia     Hypertension     NSTEMI (non-ST elevated myocardial infarction) 05/01/2022    Osteoporosis     Wears glasses      Past Surgical History:   Procedure Laterality Date     vocal cord nodules removed  long time ago     twice    ADRENAL TUMOR      APPENDECTOMY  within last 5yrs    CATHETERIZATION OF BOTH LEFT AND RIGHT HEART Left 05/06/2022    Procedure: CATHETERIZATION, HEART, BOTH LEFT AND RIGHT;  Surgeon: Michelet Vargas MD;   Location: [facility] CATH/EP LAB;  Service: Cardiology;  Laterality: Left;    COLONOSCOPY N/A 6/23/2023    Procedure: COLONOSCOPY;  Surgeon: Joel Neal III, MD;  Location: [facility] ENDO;  Service: Endoscopy;  Laterality: N/A;    FIXATION KYPHOPLASTY THORACIC SPINE      8-20-13    FOOT SURGERY      left 2nd toe was too long     HERNIA REPAIR  within last 5yrs    INSERTION OF TEMPORARY PACEMAKER N/A 1/4/2023    Procedure: INSERTION, PACEMAKER, TEMPORARY;  Surgeon: Bhavesh Peña MD;  Location: [facility] [place] CATH;  Service: Cardiology;  Laterality: N/A;    LEFT HEART CATHETERIZATION Left 05/02/2022    Procedure: Left heart cath;  Surgeon: Jose Echols MD;  Location: [facility] CATH/EP LAB;  Service: Cardiology;  Laterality: Left;    SMALL BOWEL ENTEROSCOPY N/A 6/22/2023    Procedure: ENTEROSCOPY;  Surgeon: Cornell Aguirre MD;  Location: [facility] ENDO;  Service: Endoscopy;  Laterality: N/A;    SMALL BOWEL ENTEROSCOPY N/A 10/20/2023    Procedure: ENTEROSCOPY;  Surgeon: Otilio Bourgeois MD;  Location: [facility] ENDO;  Service: Endoscopy;  Laterality: N/A;    TONSILLECTOMY, ADENOIDECTOMY  long time ago    TRANSCATHETER AORTIC VALVE REPLACEMENT (TAVR) Bilateral 1/4/2023    Procedure: REPLACEMENT, AORTIC VALVE, TRANSCATHETER (TAVR);  Surgeon: Bhavesh Peña MD;  Location: [facility] [place] CATH;  Service: Cardiology;  Laterality: Bilateral;    TRANSCATHETER AORTIC VALVE REPLACEMENT (TAVR) Bilateral 1/4/2023    Procedure: REPLACEMENT, AORTIC VALVE, TRANSCATHETER (TAVR);  Surgeon: Dallin Verma MD;  Location: [facility] [place] CATH;  Service: Cardiology;  Laterality: Bilateral;    TRANSTHORACIC ECHOCARDIOGRAPHY (TTE)  1/4/2023    Procedure: ECHOCARDIOGRAM, TRANSTHORACIC;  Surgeon: Bhavesh Peña MD;  Location: [facility] [place] CATH;  Service: Cardiology;;     Family History   Problem Relation Age of Onset    Collagen disease Neg Hx      Social History     Tobacco Use    Smoking status: Former     Current packs/day: 0.00     Average packs/day: 0.5 packs/day for  35.0 years (17.5 ttl pk-yrs)     Types: Cigarettes     Start date: 10/5/1987     Quit date: 10/5/2022     Years since quittin.4    Smokeless tobacco: Never   Substance Use Topics    Alcohol use: No    Drug use: No     Review of Systems   Constitutional:  Negative for fatigue and fever.   Respiratory:  Positive for shortness of breath. Negative for chest tightness and stridor.    Genitourinary:  Negative for dysuria.       Physical Exam     Initial Vitals [24 0229]   BP Pulse Resp Temp SpO2   (!) 221/97 107 20 98.4 °F (36.9 °C) (!) 87 %      MAP       --         Physical Exam    Nursing note and vitals reviewed.  Constitutional: She appears well-developed and well-nourished.   HENT:   Head: Normocephalic and atraumatic.   Eyes: EOM are normal. Pupils are equal, round, and reactive to light.   Neck:   Normal range of motion.  Cardiovascular:            Tachycardic with irregular rhythm   Pulmonary/Chest:   Decreased breath sounds in all lung fields   Abdominal:   Large ventral hernia that is not erythematous, warm, hard.  Generalized tenderness palpation of abdomen   Musculoskeletal:      Cervical back: Normal range of motion.     Neurological: She is alert and oriented to person, place, and time.   Skin: Skin is warm and dry. Capillary refill takes less than 2 seconds.   Psychiatric: She has a normal mood and affect.       ED Course   Procedures  Labs Reviewed   MAGNESIUM   CBC W/ AUTO DIFFERENTIAL   COMPREHENSIVE METABOLIC PANEL   TROPONIN I HIGH SENSITIVITY   B-TYPE NATRIURETIC PEPTIDE          Imaging Results    None          Medications   levalbuterol nebulizer solution 1.25 mg (has no administration in time range)     Medical Decision Making  78-year-old with history of COPD, CHF, AFib presents to emergency department with shortness of breath.      Vitals notable for oxygen saturation of 87% on room air, physical examination notable for decreased breath sounds with trace wheezes.      Differential  diagnosis includes COPD exacerbation, CHF exacerbation, electrolyte abnormality, AFib with RVR, pneumonia.      Patient given Xopenex neb as well as Solu-Medrol with improvement in her breath sounds.  Patient found to have cardiomegaly on chest x-ray, BNP elevated to the 500s.  Given 40 mg IV lasix.  Due to intermittent increased oxygen requirement even while I was in the room multiple times, multiple comorbidities, hospital Medicine was consulted for this patient.  She was subsequently been admitted for her hypoxemia, COPD exacerbation, CHF exacerbation.    Stephie Rosa MD, AMMON  [employer] Emergency Medicine PGY-4  03/08/2024 5:19 AM      Problems Addressed:  COPD exacerbation: acute illness or injury    Amount and/or Complexity of Data Reviewed  Independent Historian: EMS  Labs: ordered. Decision-making details documented in ED Course.  Radiology: ordered. Decision-making details documented in ED Course.  ECG/medicine tests: ordered.  Discussion of management or test interpretation with external provider(s): Hospital Medicine     Risk  Prescription drug management.  Decision regarding hospitalization.               ED Course as of 03/08/24 0448   Fri Mar 08, 2024   0419 BNP(!): 527 [MB]   0443 X-Ray Chest AP Portable  My independent read of chest x-ray shows cardiomegaly with mild pulmonary edema [MB]      ED Course User Index  [MB] Stephie Rosa MD                             Clinical Impression:  Final diagnoses:  [R07.9] Chest pain                 Stephie Rosa MD  Resident  03/08/24 0594

## 2024-03-08 NOTE — PLAN OF CARE
[facility] - Emergency Dept  Initial Discharge Assessment       Primary Care Provider: Abhi De Oliveira IV, MD    Admission Diagnosis: Congestive heart failure, unspecified HF chronicity, unspecified heart failure type [I50.9]    Admission Date: 3/8/2024  Expected Discharge Date:     Pt is a 78-year-old female/male who arrived from home with Acute on chronic hypoxic respiratory failure potentially secondary to CHF exacerbation. Information verified as correct on facesheet. The assessment was completed at the patient's bedside.  Pt lives with spouse and adult daughter. Pt does not have a Living Will or Advance Directive. The Pt is oriented to person, places, and times. Pt could not recall last PCP visit.  Pt denies Coumadin, dialysis, HH, and has not been readmitted into the hospital in the last thirty days.  Pt is capable of performing ADLs without assistance.; she uses Oxygen, rolling walker, and wheelchairPt's daughter drives her to and from medical appointments. Pt reports she takes medication as prescribed; pharmacy used [facility].  Pt verbalized plan to discharge home via family transport. Pt has no other needs to be addressed at this time. CM reviewed the chart and will continue to monitor.       Transition of Care Barriers: None    Payor: [employer] MANAGED MEDICARE / Plan: [employer] MEDICARE HMO / Product Type: Capitation /     Extended Emergency Contact Information  Primary Emergency Contact: Sally Reardon  Mobile Phone: [phone]  Relation: Daughter    Discharge Plan A: Home with family  Discharge Plan B: Home      [facility] - [facility] [address] [address]  [address] [facility] [address]  Phone: [phone] Fax: [phone]    [facility] - [place], [place] - [address]  [address]  [place] [address]  Phone: [phone] Fax: [phone]    [id number] - [place], [place] - [address] [facility] [address]  [address] [facility] [address]  [place] [address]  Phone: [phone] Fax: [phone]    [facility]  [address]  [facility] [address]  Phone: [phone] Fax: [phone]      Initial Assessment (most recent)       Adult Discharge Assessment - 03/08/24 1255          Discharge Assessment    Assessment Type Discharge Planning Assessment     Confirmed/corrected address, phone number and insurance Yes     Confirmed Demographics Correct on Facesheet     Source of Information patient     When was your last doctors appointment? --   Pt could not recall last PCP visit.    Does patient/caregiver understand observation status Yes     Communicated ISAAK with patient/caregiver Date not available/Unable to determine     Reason For Admission Acute on chronic hypoxic respiratory failure potentially secondary to CHF exacerbation     People in Home spouse     Facility Arrived From: home     Do you expect to return to your current living situation? Yes     Do you have help at home or someone to help you manage your care at home? No     Prior to hospitilization cognitive status: Alert/Oriented     Current cognitive status: Alert/Oriented     Walking or Climbing Stairs Difficulty yes     Walking or Climbing Stairs ambulation difficulty, requires equipment     Mobility Management Rolling Walker and Wheelchair     Dressing/Bathing Difficulty no     Home Accessibility wheelchair accessible     Equipment Currently Used at Home wheelchair;walker, rolling;oxygen     Readmission within 30 days? No     Patient currently being followed by outpatient case management? No     Do you currently have service(s) that help you manage your care at home? No     Do you take prescription medications? Yes     Do you have prescription coverage? Yes     Coverage Payor: HU[employer] MANAGED MEDICARE - [employer] MEDICARE HMO -     Do you have any problems affording any of your prescribed medications? No     Is the patient taking  medications as prescribed? yes     Who is going to help you get home at discharge? Sally Reardon/daughter [phone]     How do you get to doctors appointments? family or friend will provide     Are you on dialysis? No     Do you take coumadin? No     Discharge Plan A Home with family     Discharge Plan B Home     DME Needed Upon Discharge  none     Discharge Plan discussed with: Patient     Transition of Care Barriers None

## 2024-03-08 NOTE — H&P
[facility] - Emergency Dept  Hospital Medicine  History & Physical    Patient Name: Daryleen G Moran  MRN: [id number]  Patient Class: OP- Observation  Admission Date: 3/8/2024  Attending Physician: Brian Edwards MD  Primary Care Provider: Abhi De Oliveira IV, MD         Patient information was obtained from patient and ER records.     Subjective:     Principal Problem:Acute on chronic hypoxic respiratory failure    Chief Complaint:   Chief Complaint   Patient presents with    Shortness of Breath     Uses 2LPM NC PRN, SOB x1 week, worse tonight        HPI: This is a 79 y/o  female w/ pmh of HTN, DM, CAD, HFpEF (EF 65-70%), AFib status post TAVR, COPD who p/w CC of SOB. Patient reports worsening SOB over the past few days. Patient endorses bilateral leg swelling over the past two weeks and abdominal discomfort over the past two weeks as well. Patient reports using two pillows when she sleeps at night. Patient reports using 2 L of O2 prn at home. Patient denies headache, blurry vision, chest pain, N/V, and dysuria. Patient is afebrile, hypertensive, and saturating ok. Lab work reflects an elevated BNP of 527 and an elevated troponin of 28.3. Chest x-ray manifested mild interstitial prominence which is unchanged and may be chronic. Mild interstitial edema not excluded.     Past Medical History:   Diagnosis Date    Anesthesia complication     BLADDER DYSFUNCTION    Aortic stenosis     Arthritis     Back pain     Diabetes mellitus type II     NO LONGER DIABETIC    Encounter for blood transfusion     Hyperlipidemia     Hypertension     NSTEMI (non-ST elevated myocardial infarction) 05/01/2022    Osteoporosis     Wears glasses        Past Surgical History:   Procedure Laterality Date     vocal cord nodules removed  long time ago     twice    ADRENAL TUMOR      APPENDECTOMY  within last 5yrs    CATHETERIZATION OF BOTH LEFT AND RIGHT HEART Left 05/06/2022    Procedure: CATHETERIZATION, HEART, BOTH LEFT  AND RIGHT;  Surgeon: Michelet Vargas MD;  Location: [facility] CATH/EP LAB;  Service: Cardiology;  Laterality: Left;    COLONOSCOPY N/A 6/23/2023    Procedure: COLONOSCOPY;  Surgeon: Joel Neal III, MD;  Location: [facility] ENDO;  Service: Endoscopy;  Laterality: N/A;    FIXATION KYPHOPLASTY THORACIC SPINE      8-20-13    FOOT SURGERY      left 2nd toe was too long     HERNIA REPAIR  within last 5yrs    INSERTION OF TEMPORARY PACEMAKER N/A 1/4/2023    Procedure: INSERTION, PACEMAKER, TEMPORARY;  Surgeon: Bhavesh Peña MD;  Location: [facility] CATH;  Service: Cardiology;  Laterality: N/A;    LEFT HEART CATHETERIZATION Left 05/02/2022    Procedure: Left heart cath;  Surgeon: Jose Echols MD;  Location: [facility] CATH/EP LAB;  Service: Cardiology;  Laterality: Left;    SMALL BOWEL ENTEROSCOPY N/A 6/22/2023    Procedure: ENTEROSCOPY;  Surgeon: Cornell Aguirre MD;  Location: [facility] ENDO;  Service: Endoscopy;  Laterality: N/A;    SMALL BOWEL ENTEROSCOPY N/A 10/20/2023    Procedure: ENTEROSCOPY;  Surgeon: Otilio Bourgeois MD;  Location: [facility] ENDO;  Service: Endoscopy;  Laterality: N/A;    TONSILLECTOMY, ADENOIDECTOMY  long time ago    TRANSCATHETER AORTIC VALVE REPLACEMENT (TAVR) Bilateral 1/4/2023    Procedure: REPLACEMENT, AORTIC VALVE, TRANSCATHETER (TAVR);  Surgeon: Bhavesh Peña MD;  Location: [facility] CATH;  Service: Cardiology;  Laterality: Bilateral;    TRANSCATHETER AORTIC VALVE REPLACEMENT (TAVR) Bilateral 1/4/2023    Procedure: REPLACEMENT, AORTIC VALVE, TRANSCATHETER (TAVR);  Surgeon: Dallin Verma MD;  Location: [facility] CATH;  Service: Cardiology;  Laterality: Bilateral;    TRANSTHORACIC ECHOCARDIOGRAPHY (TTE)  1/4/2023    Procedure: ECHOCARDIOGRAM, TRANSTHORACIC;  Surgeon: Bhavesh Peña MD;  Location: [facility] CATH;  Service: Cardiology;;       Review of patient's allergies indicates:   Allergen Reactions    Latex      Other reaction(s): Unknown  Other reaction(s): Unknown    Sulfacetamide sodium      Pain perineal  area    Sulfasalazine Hives    Adhesive Itching     SKIN GETS RED WITH TAPE AND BANDAIDS    Adhesive tape-silicones Itching     SKIN GETS RED WITH TAPE AND BANDAIDS    Sulfa (sulfonamide antibiotics) Rash       No current facility-administered medications on file prior to encounter.     Current Outpatient Medications on File Prior to Encounter   Medication Sig    albuterol (PROVENTIL/VENTOLIN HFA) 90 mcg/actuation inhaler Inhale 2 puffs into the lungs every 6 (six) hours as needed for Shortness of Breath.    aspirin (ECOTRIN) 81 MG EC tablet Take 81 mg by mouth once daily.    butalbital-acetaminophen-caffeine -40 mg (FIORICET, ESGIC) -40 mg per tablet Take 1 tablet by mouth every 4 (four) hours as needed for Pain.    calcium carbonate (OS-TK) 500 mg calcium (1,250 mg) tablet Take 1 tablet (500 mg total) by mouth once daily.    carvediloL (COREG) 12.5 MG tablet Take 12.5 mg by mouth 2 (two) times daily.    citalopram (CELEXA) 40 MG tablet Take 1 tablet (40 mg total) by mouth once daily.    clopidogreL (PLAVIX) 75 mg tablet Take 1 tablet (75 mg total) by mouth once daily.    digoxin (LANOXIN) 125 mcg tablet Take 1 tablet (0.125 mg total) by mouth once daily.    diltiaZEM (DILACOR XR) 120 MG CDCR Take 1 capsule (120 mg total) by mouth once daily.    donepeziL (ARICEPT) 5 MG tablet Take 5 mg by mouth nightly.    ergocalciferol (ERGOCALCIFEROL) 50,000 unit Cap TAKE 1 CAPSULE (50,000 UNITS TOTAL) EVERY 7 DAYS. (Patient taking differently: Take 50,000 Units by mouth every 7 days.)    ferrous gluconate 324 mg (37.5 mg iron) Tab tablet Take 1 tablet (324 mg total) by mouth once daily.    ferrous sulfate (FEOSOL) 325 mg (65 mg iron) Tab tablet Take 325 mg by mouth with breakfast.    fluticasone furoate-vilanteroL (BREO ELLIPTA) 100-25 mcg/dose diskus inhaler Inhale 1 puff into the lungs once daily. Controller    furosemide (LASIX) 40 MG tablet Take 1 tablet (40 mg total) by mouth once daily. (Patient taking  differently: Take 40 mg by mouth daily as needed.)    glucosamine-chondroitin 500-400 mg tablet Take 1 tablet by mouth once daily.    loperamide (IMODIUM A-D) 2 mg Tab Take 1 tablet (2 mg total) by mouth 3 (three) times daily as needed (diarrhea). Take 2 tablets by mouth initially then 1 tablet after each loose stool as needed for diarrhea.    loratadine (CLARITIN) 10 mg tablet Take 1 tablet (10 mg total) by mouth once daily.    multivitamin capsule Take 1 capsule by mouth once daily.    pantoprazole (PROTONIX) 40 MG tablet Take 1 tablet (40 mg total) by mouth 2 (two) times daily.    potassium chloride (KLOR-CON) 10 MEQ TbSR Take 1 tablet (10 mEq total) by mouth As instructed. Take 2 tabs 1st day and 2nd day then 1 tab qd    simvastatin (ZOCOR) 10 MG tablet Take 10 mg by mouth every evening.    spironolactone (ALDACTONE) 25 MG tablet Take 1 tablet (25 mg total) by mouth once daily.    [DISCONTINUED] ALLERGY RELIEF, FEXOFENADINE, 180 mg tablet Take 180 mg by mouth once daily.    [DISCONTINUED] ezetimibe-simvastatin 10-40 mg (VYTORIN) 10-40 mg per tablet Take 1 tablet by mouth.    [DISCONTINUED] lisinopril-hydrochlorothiazide (PRINZIDE,ZESTORETIC) 20-12.5 mg per tablet Take 1 tablet by mouth once daily.      Family History    None       Tobacco Use    Smoking status: Former     Current packs/day: 0.00     Average packs/day: 0.5 packs/day for 35.0 years (17.5 ttl pk-yrs)     Types: Cigarettes     Start date: 10/5/1987     Quit date: 10/5/2022     Years since quittin.4    Smokeless tobacco: Never   Substance and Sexual Activity    Alcohol use: No    Drug use: No    Sexual activity: Not on file     Review of Systems  Objective:     Vital Signs (Most Recent):  Temp: 98.4 °F (36.9 °C) (24 022)  Pulse: 108 (24 050)  Resp: 19 (24 0335)  BP: (!) 177/85 (24 0500)  SpO2: (!) 93 % (24 0501) Vital Signs (24h Range):  Temp:  [98.4 °F (36.9 °C)] 98.4 °F (36.9 °C)  Pulse:  [] 108  Resp:   [19-20] 19  SpO2:  [87 %-97 %] 93 %  BP: (177-221)/(85-97) 177/85     Weight: 95.3 kg (210 lb)  Body mass index is 41.01 kg/m².     Physical Exam   Gen: In bed, NAD  Head: Normocephalic  CVD: S1/S2  Resp: Diminished BS  GI: Abd soft, ND, some epigastric TTP  Ext: 1+ BLE pitting edema  Skin: Warm, dry  Neuro: Alert         Significant Labs: All pertinent labs within the past 24 hours have been reviewed.  CBC:   Recent Labs   Lab 03/08/24 0312   WBC 6.45   HGB 9.5*   HCT 34.8*        CMP:   Recent Labs   Lab 03/08/24 0312      K 3.5   CL 97   CO2 35*      BUN 10   CREATININE 0.5   CALCIUM 9.3   PROT 6.5   ALBUMIN 3.9   BILITOT 1.4*   ALKPHOS 69   AST 38   ALT 32   ANIONGAP 6*     Cardiac Markers:   Recent Labs   Lab 03/08/24 0312   *     Troponin:   Recent Labs   Lab 03/08/24 0312   TROPONINIHS 28.3*       Significant Imaging: I have reviewed all pertinent imaging results/findings within the past 24 hours.  Assessment/Plan:     * Acute on chronic hypoxic respiratory failure potentially secondary to CHF exacerbation  -Cardiology consult  -IV Lasix  -Monitor input/output  -Daily weights  -Telemetry monitoring  -Monitor labs  -Vital sign checks to help ensure hemodynamic stability    Atrial fibrillation  -Home meds  -Telemetry monitoring  -Vital sign checks    DM (diabetes mellitus)  -HgA1c  -Insulin Sliding Scale  -Blood glucose checks qAC & HS  -Hypoglycemia protocol  -Monitor labs    Hypertension  -Home meds and adjust as necessary  -Prn Hydralazine  -Vital sign checks    CAD (coronary artery disease)  -Home meds    Abdominal pain  -CTAP  -Analgesic as needed  -Monitor labs and vitals        VTE Risk Mitigation (From admission, onward)           Ordered     enoxaparin injection 40 mg  Daily         03/08/24 0550     IP VTE HIGH RISK PATIENT  Once         03/08/24 0553     Place sequential compression device  Until discontinued         03/08/24 0553     Place sequential compression device   Until discontinued         03/08/24 0550                                    Brian Edwards MD  Department of Hospital Medicine  [facility] - Emergency Dept

## 2024-03-08 NOTE — PLAN OF CARE
03/08/24 1253   LOPEZ Message   Medicare Outpatient and Observation Notification regarding financial responsibility Given to patient/caregiver;Explained to patient/caregiver;Signed/date by patient/caregiver   Date LOPEZ was signed 03/08/24   Time LOPEZ was signed 1922

## 2024-03-08 NOTE — CONSULTS
"[facility] - Emergency Dept  Department of Cardiology  Consult Note      PATIENT NAME: Daryleen G Moran  MRN: [id number]  TODAY'S DATE: 03/08/2024  ADMIT DATE: 3/8/2024                          CONSULT REQUESTED BY: Audi Valdes MD    SUBJECTIVE     PRINCIPAL PROBLEM: Acute on chronic hypoxic respiratory failure      REASON FOR CONSULT:  Respiratory failure, CHF    HPI:  Pt is a 78 year old female with pmh of HTN, T2DM, CAD, HFpEF, Afib, post TAVR, and COPD who came into the ER with complaints of SOB that has been worsening over the last few days. She is also complaining of some worsening leg swelling. She states that she has not been complaint with medications at home. She does not like taking her Lasix before she leaves the house because it causes her to "use the bathroom too much." BNP elevated to 527. Pt currently on 2L NC and satting 93-95%.     Per hospital medicine notes:  This is a 77 y/o  female w/ pmh of HTN, DM, CAD, HFpEF (EF 65-70%), AFib status post TAVR, COPD who p/w CC of SOB. Patient reports worsening SOB over the past few days. Patient endorses bilateral leg swelling over the past two weeks and abdominal discomfort over the past two weeks as well. Patient reports using two pillows when she sleeps at night. Patient reports using 2 L of O2 prn at home. Patient denies headache, blurry vision, chest pain, N/V, and dysuria. Patient is afebrile, hypertensive, and saturating ok. Lab work reflects an elevated BNP of 527 and an elevated troponin of 28.3. Chest x-ray manifested mild interstitial prominence which is unchanged and may be chronic. Mild interstitial edema not excluded.          Review of patient's allergies indicates:   Allergen Reactions    Latex      Other reaction(s): Unknown  Other reaction(s): Unknown    Sulfacetamide sodium      Pain perineal area    Sulfasalazine Hives    Adhesive Itching     SKIN GETS RED WITH TAPE AND BANDAIDS    Adhesive tape-silicones Itching     " SKIN GETS RED WITH TAPE AND BANDAIDS    Sulfa (sulfonamide antibiotics) Rash       Past Medical History:   Diagnosis Date    Anesthesia complication     BLADDER DYSFUNCTION    Aortic stenosis     Arthritis     Back pain     Diabetes mellitus type II     NO LONGER DIABETIC    Encounter for blood transfusion     Hyperlipidemia     Hypertension     NSTEMI (non-ST elevated myocardial infarction) 05/01/2022    Osteoporosis     Wears glasses      Past Surgical History:   Procedure Laterality Date     vocal cord nodules removed  long time ago     twice    ADRENAL TUMOR      APPENDECTOMY  within last 5yrs    CATHETERIZATION OF BOTH LEFT AND RIGHT HEART Left 05/06/2022    Procedure: CATHETERIZATION, HEART, BOTH LEFT AND RIGHT;  Surgeon: Michelet Vargas MD;  Location: [facility] CATH/EP LAB;  Service: Cardiology;  Laterality: Left;    COLONOSCOPY N/A 6/23/2023    Procedure: COLONOSCOPY;  Surgeon: Joel Neal III, MD;  Location: [facility] ENDO;  Service: Endoscopy;  Laterality: N/A;    FIXATION KYPHOPLASTY THORACIC SPINE      8-20-13    FOOT SURGERY      left 2nd toe was too long     HERNIA REPAIR  within last 5yrs    INSERTION OF TEMPORARY PACEMAKER N/A 1/4/2023    Procedure: INSERTION, PACEMAKER, TEMPORARY;  Surgeon: Bhavesh Peña MD;  Location: [facility] CATH;  Service: Cardiology;  Laterality: N/A;    LEFT HEART CATHETERIZATION Left 05/02/2022    Procedure: Left heart cath;  Surgeon: Jose Echols MD;  Location: [facility] CATH/EP LAB;  Service: Cardiology;  Laterality: Left;    SMALL BOWEL ENTEROSCOPY N/A 6/22/2023    Procedure: ENTEROSCOPY;  Surgeon: Cornell Aguirre MD;  Location: [facility] ENDO;  Service: Endoscopy;  Laterality: N/A;    SMALL BOWEL ENTEROSCOPY N/A 10/20/2023    Procedure: ENTEROSCOPY;  Surgeon: Otilio Bourgeois MD;  Location: [facility] ENDO;  Service: Endoscopy;  Laterality: N/A;    TONSILLECTOMY, ADENOIDECTOMY  long time ago    TRANSCATHETER AORTIC VALVE REPLACEMENT (TAVR) Bilateral 1/4/2023    Procedure:  REPLACEMENT, AORTIC VALVE, TRANSCATHETER (TAVR);  Surgeon: Bhavesh Peña MD;  Location: [facility] CATH;  Service: Cardiology;  Laterality: Bilateral;    TRANSCATHETER AORTIC VALVE REPLACEMENT (TAVR) Bilateral 2023    Procedure: REPLACEMENT, AORTIC VALVE, TRANSCATHETER (TAVR);  Surgeon: Dallin Verma MD;  Location: [facility] CATH;  Service: Cardiology;  Laterality: Bilateral;    TRANSTHORACIC ECHOCARDIOGRAPHY (TTE)  2023    Procedure: ECHOCARDIOGRAM, TRANSTHORACIC;  Surgeon: Bhavesh Peña MD;  [facility] CATH;  Service: Cardiology;;     Social History     Tobacco Use    Smoking status: Former     Current packs/day: 0.00     Average packs/day: 0.5 packs/day for 35.0 years (17.5 ttl pk-yrs)     Types: Cigarettes     Start date: 10/5/1987     Quit date: 10/5/2022     Years since quittin.4    Smokeless tobacco: Never   Substance Use Topics    Alcohol use: No    Drug use: No        REVIEW OF SYSTEMS    As mentioned in HPI    OBJECTIVE     VITAL SIGNS (Most Recent)  Temp: 98.2 °F (36.8 °C) (24 0738)  Pulse: (!) 112 (24 0738)  Resp: 19 (24 0335)  BP: (!) 177/85 (24 0500)  SpO2: (!) 93 % (24 0734)    VENTILATION STATUS  Resp: 19 (24 0335)  SpO2: (!) 93 % (24 0734)           I & O (Last 24H):  Intake/Output Summary (Last 24 hours) at 3/8/2024 0935  Last data filed at 3/8/2024 0607  Gross per 24 hour   Intake 100 ml   Output 1000 ml   Net -900 ml       WEIGHTS  Wt Readings from Last 3 Encounters:   24 0229 95.3 kg (210 lb)   24 1302 93 kg (205 lb)   24 1021 93 kg (205 lb)       PHYSICAL EXAM    CONSTITUTIONAL: NAD, obese female   HEENT: Normocephalic. No pallor  NECK:  Not well seen   LUNGS: diminished, coarse, scattered rhonchi   HEART: Afib with rates in the 90s, normal S1 diminished S2 grade 2 systolic murmur   ABDOMEN: soft, non-tender, bowel sounds normal  EXTREMITIES: +1 bilateral leg edema  SKIN: No rash  NEURO: AAO X 3  PSYCH: normal affect,  agitated     HOME MEDICATIONS:  No current facility-administered medications on file prior to encounter.     Current Outpatient Medications on File Prior to Encounter   Medication Sig Dispense Refill    albuterol (PROVENTIL/VENTOLIN HFA) 90 mcg/actuation inhaler Inhale 2 puffs into the lungs every 6 (six) hours as needed for Shortness of Breath.      aspirin (ECOTRIN) 81 MG EC tablet Take 81 mg by mouth once daily.      butalbital-acetaminophen-caffeine -40 mg (FIORICET, ESGIC) -40 mg per tablet Take 1 tablet by mouth every 4 (four) hours as needed for Pain. 20 tablet 0    ferrous gluconate 324 mg (37.5 mg iron) Tab tablet Take 1 tablet (324 mg total) by mouth once daily. 30 tablet 3    ferrous sulfate (FEOSOL) 325 mg (65 mg iron) Tab tablet Take 325 mg by mouth with breakfast.      furosemide (LASIX) 40 MG tablet Take 1 tablet (40 mg total) by mouth once daily. (Patient taking differently: Take 40 mg by mouth daily as needed.) 45 tablet 2    glucosamine-chondroitin 500-400 mg tablet Take 1 tablet by mouth once daily.      loperamide (IMODIUM A-D) 2 mg Tab Take 1 tablet (2 mg total) by mouth 3 (three) times daily as needed (diarrhea). Take 2 tablets by mouth initially then 1 tablet after each loose stool as needed for diarrhea. 15 tablet 0    loratadine (CLARITIN) 10 mg tablet Take 1 tablet (10 mg total) by mouth once daily.  0    multivitamin capsule Take 1 capsule by mouth once daily.      pantoprazole (PROTONIX) 40 MG tablet Take 1 tablet (40 mg total) by mouth 2 (two) times daily. 180 tablet 0    potassium chloride (KLOR-CON) 10 MEQ TbSR Take 1 tablet (10 mEq total) by mouth As instructed. Take 2 tabs 1st day and 2nd day then 1 tab qd 34 tablet 3    simvastatin (ZOCOR) 10 MG tablet Take 10 mg by mouth every evening.      spironolactone (ALDACTONE) 25 MG tablet Take 1 tablet (25 mg total) by mouth once daily. 30 tablet 11    calcium carbonate (OS-TK) 500 mg calcium (1,250 mg) tablet Take 1 tablet  (500 mg total) by mouth once daily.  0    carvediloL (COREG) 12.5 MG tablet Take 12.5 mg by mouth 2 (two) times daily.      citalopram (CELEXA) 40 MG tablet Take 1 tablet (40 mg total) by mouth once daily. 90 tablet 3    clopidogreL (PLAVIX) 75 mg tablet Take 1 tablet (75 mg total) by mouth once daily. 90 tablet 2    digoxin (LANOXIN) 125 mcg tablet Take 1 tablet (0.125 mg total) by mouth once daily. 30 tablet 0    diltiaZEM (DILACOR XR) 120 MG CDCR Take 1 capsule (120 mg total) by mouth once daily. 30 capsule 11    donepeziL (ARICEPT) 5 MG tablet Take 5 mg by mouth nightly.      ergocalciferol (ERGOCALCIFEROL) 50,000 unit Cap TAKE 1 CAPSULE (50,000 UNITS TOTAL) EVERY 7 DAYS. (Patient taking differently: Take 50,000 Units by mouth every 7 days.) 12 capsule 3    fluticasone furoate-vilanteroL (BREO ELLIPTA) 100-25 mcg/dose diskus inhaler Inhale 1 puff into the lungs once daily. Controller      [DISCONTINUED] ALLERGY RELIEF, FEXOFENADINE, 180 mg tablet Take 180 mg by mouth once daily.      [DISCONTINUED] ezetimibe-simvastatin 10-40 mg (VYTORIN) 10-40 mg per tablet Take 1 tablet by mouth.      [DISCONTINUED] lisinopril-hydrochlorothiazide (PRINZIDE,ZESTORETIC) 20-12.5 mg per tablet Take 1 tablet by mouth once daily.          SCHEDULED MEDS:   aspirin  81 mg Oral Daily    atorvastatin  10 mg Oral QHS    carvediloL  12.5 mg Oral BID    clopidogreL  75 mg Oral Daily    digoxin  0.125 mg Oral Daily    diltiaZEM  120 mg Oral Daily    donepeziL  5 mg Oral Nightly    enoxparin  40 mg Subcutaneous Daily    furosemide (LASIX) injection  40 mg Intravenous Daily    ipratropium  0.5 mg Nebulization Q4H    levalbuterol  0.63 mg Nebulization Q8H    methylPREDNISolone sodium succinate injection  80 mg Intravenous Once    pantoprazole  40 mg Oral BID    [START ON 3/9/2024] predniSONE  60 mg Oral Daily       CONTINUOUS INFUSIONS:    PRN MEDS:acetaminophen, acetaminophen, aluminum-magnesium hydroxide-simethicone, dextrose 50% in water  "(D50W), dextrose 50% in water (D50W), dextrose 50% in water (D50W), dextrose 50% in water (D50W), glucagon (human recombinant), glucagon (human recombinant), glucose, glucose, glucose, glucose, hydrALAZINE, HYDROcodone-acetaminophen, magnesium oxide, magnesium oxide, melatonin, naloxone, ondansetron, potassium bicarbonate, potassium bicarbonate, potassium bicarbonate, potassium, sodium phosphates, potassium, sodium phosphates, potassium, sodium phosphates, sodium chloride 0.9%, sodium chloride 0.9%    LABS AND DIAGNOSTICS     CBC LAST 3 DAYS  Recent Labs   Lab 03/04/24 1425 03/08/24 0312   WBC 7.06 6.45   RBC 4.23 4.09   HGB 9.7* 9.5*   HCT 35.7* 34.8*   MCV 84 85   MCH 22.9* 23.2*   MCHC 27.2* 27.3*   RDW 23.4* 22.6*    173   MPV 11.0 10.4   GRAN  --  73.3*  4.7   LYMPH  --  14.0*  0.9*   MONO  --  10.1  0.7   BASO  --  0.06   NRBC  --  0       COAGULATION LAST 3 DAYS  No results for input(s): "LABPT", "INR", "APTT" in the last 168 hours.    CHEMISTRY LAST 3 DAYS  Recent Labs   Lab 03/04/24 1425 03/08/24 0312    138   K 2.9* 3.5   CL 95 97   CO2 39* 35*   ANIONGAP 5* 6*   BUN 7* 10   CREATININE 0.5 0.5   * 110   CALCIUM 9.3 9.3   MG  --  1.7   ALBUMIN 4.0 3.9   PROT 6.7 6.5   ALKPHOS 74 69   ALT 35 32   AST 35 38   BILITOT 1.8* 1.4*       CARDIAC PROFILE LAST 3 DAYS  Recent Labs   Lab 03/04/24 1425 03/08/24 0312 03/08/24  0600   * 527*  --    TROPONINIHS  --  28.3* 23.5*       ENDOCRINE LAST 3 DAYS  Recent Labs   Lab 03/04/24  1425   TSH 3.166       LAST ARTERIAL BLOOD GAS  ABG  No results for input(s): "PH", "PO2", "PCO2", "HCO3", "BE" in the last 168 hours.    LAST 7 DAYS MICROBIOLOGY   Microbiology Results (last 7 days)       ** No results found for the last 168 hours. **            MOST RECENT IMAGING  CT Abdomen Pelvis  Without Contrast  CMS MANDATED QUALITY DATA - CT RADIATION - 436    All CT scans at this facility utilize dose modulation, iterative reconstruction, and/or " weight based dosing when appropriate to reduce radiation dose to as low as reasonably achievable.  Unless otherwise stated, incidental findings do not require dedicated follow-up imaging.    Reason: Abdominal pain, acute, nonlocalized    TECHNIQUE: CT abdomen and pelvis without IV or oral contrast. Study is tailored for detection of urinary tract calculi and evaluation of solid organs, hollow viscera, and vascular structures is limited.    COMPARISON: 1/1/2024    CT ABDOMEN:  Prior aortic valve replacement and coronary artery calcifications partially visualized. Minor atelectasis affects lung bases along with slight mosaic attenuation suggesting air trapping.    Calcified cholelithiasis lie in otherwise unremarkable gallbladder, unchanged. Liver shows no new abnormality. Noncontrast pancreas, spleen, and right adrenal are normal. Left adrenal gland is either diminutive in size or absent. Noncontrast kidneys are normal, without hydronephrosis or urolithiasis. Minor aortoiliac calcifications unchanged.    Large hernia containing segment of large intestines and fat courses through left rectus muscle superiorly, unchanged. No associated intestinal obstruction. Postsurgical changes of right hemicolectomy are unchanged. A few colonic diverticula are present. Large and small intestines otherwise unremarkable. Postsurgical changes of ventral hernia repair are again noted.    Mild subcutaneous edema throughout the abdominal wall has increased.    Multilevel thoracolumbar spine compression fractures unchanged along with advanced disc degeneration at L4-L5 and L5-S1 and lower lumbar spine facet joint osteoarthrosis. Prior T10 and T12 vertebroplasty is noted.    CT PELVIS:  Bladder is normal, with adjacent calcification along inferior right bladder wall unchanged (image 190). Noncontrast uterus and adnexa are unremarkable. No free pelvic fluid.    IMPRESSION:    1. Unchanged cholelithiasis.  2. Unchanged left abdominal wall  hernias as described.  3. Coronary artery calcifications.    Electronically signed by:  Ellis Cruz MD  03/08/2024 07:36 AM CST Workstation: [id number]  X-Ray Chest AP Portable  EXAM:  XR Chest, 1 View    CLINICAL HISTORY:  The patient is 78 years old and is Female; shortness of breath    TECHNIQUE:  Frontal view of the chest.    COMPARISON:  Chest radiograph from 10/18/2023    FINDINGS:  LUNGS:  There is mild interstitial prominence which is unchanged and may be chronic. No obvious consolidation.  PLEURAL SPACE:  No significant pleural effusion.  No obvious pneumothorax.  HEART:  Stable mild enlargement of the cardiac silhouette.  The patient is status post TAVR.  MEDIASTINUM:  Unremarkable.  BONES/JOINTS:  Vertebroplasty changes again noted at several levels in the spine. No obvious acute fracture.    IMPRESSION:  Mild interstitial prominence which is unchanged and may be chronic. Mild interstitial edema not excluded.    Electronically signed by:  Leonila Boucher MD  03/08/2024 05:19 AM CST Workstation: [id number]      ECHOCARDIOGRAM RESULTS (last 5)  Results for orders placed during the hospital encounter of 09/11/23    Echo    Interpretation Summary    Left Ventricle: The left ventricle is smaller than normal. Moderately increased ventricular mass. Moderately increased wall thickness. There is moderate concentric hypertrophy. Normal wall motion. There is normal systolic function with a visually estimated ejection fraction of 65 - 70%. Grade II diastolic dysfunction.    Left Atrium: Left atrium is moderately dilated.    Right Ventricle: Normal right ventricular cavity size. Wall thickness is normal. Right ventricle wall motion  is normal. Systolic function is normal.    Aortic Valve: The aortic valve is a trileaflet valve. Moderately calcified cusps. There is moderate stenosis. Aortic valve area by VTI is 0.86 cm². Aortic valve peak velocity is 2.42 m/s. Mean gradient is 13 mmHg. The dimensionless index is 0.38.     Mitral Valve: There is severe mitral annular calcification present.    Tricuspid Valve: There is moderate regurgitation.    IVC/SVC: Normal venous pressure at 3 mmHg.      Results for orders placed during the hospital encounter of 02/06/23    Echo    Interpretation Summary  · The left ventricle is normal in size with moderate concentric hypertrophy and normal systolic function.  · The estimated ejection fraction is 65%.  · Grade I left ventricular diastolic dysfunction.  · Normal right ventricular size with normal right ventricular systolic function.  · There is a transcutaneously-placed aortic bioprosthesis present. There is no aortic insufficiency present. Prosthetic aortic valve is normal.  · The aortic valve mean gradient is 9 mmHg with a dimensionless index of 0.58.  · Mild mitral regurgitation.  · The mean diastolic gradient across the mitral valve is 6 mmHg at a heart rate of bpm.  · There is moderate mitral stenosis.  · Mild tricuspid regurgitation.  · Normal central venous pressure (3 mmHg).  · The estimated PA systolic pressure is 37 mmHg.      Results for orders placed during the hospital encounter of 01/04/23    Echo Saline Bubble? No    Interpretation Summary  · The left ventricle is normal in size with mild concentric hypertrophy and normal systolic function.  · Moderate left atrial enlargement.  · The estimated ejection fraction is 70%.  · Grade I left ventricular diastolic dysfunction.  · There is a transcutaneously-placed aortic bioprosthesis present. There is no aortic insufficiency present. Prosthetic aortic valve is normal.  · The aortic valve mean gradient is 14 mmHg with a dimensionless index of 0.56.  · Normal right ventricular size with normal right ventricular systolic function.  · Mild mitral regurgitation.  · The mean diastolic gradient across the mitral valve is 8 mmHg at a heart rate of bpm.  · There is mild to moderate mitral stenosis.  · Mild tricuspid regurgitation.  · Normal central  venous pressure (3 mmHg).  · The estimated PA systolic pressure is 28 mmHg.      Echo    Interpretation Summary  · Intraoperative echocardiogram done during TAVR procedure  · Normal biventricular function  · Well-positioned balloon expandable TAVR without PVL or central aortic insufficiency      Results for orders placed during the hospital encounter of 09/23/22    Echo    Interpretation Summary  · The left ventricle is normal in size with mild concentric hypertrophy and normal systolic function.  · Moderate left atrial enlargement.  · The estimated ejection fraction is 65%.  · Grade I left ventricular diastolic dysfunction.  · Normal right ventricular size with normal right ventricular systolic function.  · Mild right atrial enlargement.  · Mild aortic regurgitation.  · There is severe aortic valve stenosis.  · Aortic valve area is 0.89 cm2; peak velocity is 4.69 m/s; mean gradient is 53 mmHg.  · Mild-to-moderate mitral regurgitation.  · The mean diastolic gradient across the mitral valve is 7 mmHg at a heart rate of bpm.  · There is mild to moderate mitral stenosis.  · Mild tricuspid regurgitation.  · Normal central venous pressure (3 mmHg).  · The estimated PA systolic pressure is 39 mmHg.      CURRENT/PREVIOUS VISIT EKG  Results for orders placed or performed during the hospital encounter of 03/08/24   EKG 12-lead    Collection Time: 03/08/24  2:33 AM   Result Value Ref Range    QRS Duration 78 ms    [facility] QTC Calculation 408 ms    Narrative    Test Reason : R07.9,    Vent. Rate : 099 BPM     Atrial Rate : 087 BPM     P-R Int : 000 ms          QRS Dur : 078 ms      QT Int : 318 ms       P-R-T Axes : 000 034 -19 degrees     QTc Int : 408 ms    Atrial fibrillation with premature ventricular or aberrantly conducted  complexes  Abnormal ECG  When compared with ECG of 08-MAR-2024 02:32,  No significant change was found    Referred By: [email or web address]   SELF           Confirmed By:            ASSESSMENT/PLAN:     Active  Hospital Problems    Diagnosis    *Acute on chronic hypoxic respiratory failure potentially secondary to CHF exacerbation    Atrial fibrillation    DM (diabetes mellitus)    Abdominal pain    CAD (coronary artery disease)    Hypertension       ASSESSMENT & PLAN:     Acute on chronic hypoxic respiratory failure   HFpEF - 65%  T2DM  CAD  HTN  Chronic Afib   Obesity       RECOMMENDATIONS:    Continue diuresis with IV lasix 40 mg daily.  Strict I/Os, daily weights, Low sodium diet <2G per day, Fluid restriction <1500cc/24 hours.  2G IV mag for mag 1.7.  Continue aspirin, statin, and Plavix therapy.  Coreg 12.5 mg BID - monitor rate.  Continue home Cardizem 120 mg daily as well as digoxin 0.125 mg daily.  Lovenox subQ.  Continue to check and replace potassium and magnesium. Goal for potassium is 4.0, and goal for magnesium is 2.0.   Will continue to follow, thank you for the consult.       Gregoria Kearney NP  Department of Cardiology  Date of Service: 03/08/2024    78-year-old lady with a complex medical problems including heart failure with preserved ejection fraction chronic atrial fibrillation controlled with Cardizem and digoxin post TAVR and history of CAD and COPD admitted with progressive shortness of breath.  Patient reportedly has not stopped taking her diuretic for few days and noted to have increasing swelling.  Noted to have a BNP of 527 with nonspecific troponin changes.  I have personally interviewed and examined the patient, I have reviewed the Nurse Practitioner's history and physical, assessment, and plan. I agree with the findings and plan.  Recommend to initiate IV diuretic therapy for cautious diuresis maintain on her home regimen to include Cardizem and digoxin.  Encouraged compliance with diet as well as her medications.  She would need home health therapy upon discharge to monitor her fluid status as well as blood pressure assessment and heart failure evaluation as well as medication  administration      Steve Echols M.D.  Department of Cardiology  Date of Service: 03/08/2024  9:35 AM

## 2024-03-08 NOTE — ASSESSMENT & PLAN NOTE
-HgA1c  -Insulin Sliding Scale  -Blood glucose checks qAC & HS  -Hypoglycemia protocol  -Monitor labs

## 2024-03-08 NOTE — HPI
This is a 77 y/o  female w/ pmh of HTN, DM, CAD, HFpEF (EF 65-70%), AFib status post TAVR, COPD who p/w CC of SOB. Patient reports worsening SOB over the past few days. Patient endorses bilateral leg swelling over the past two weeks and abdominal discomfort over the past two weeks as well. Patient reports using two pillows when she sleeps at night. Patient reports using 2 L of O2 prn at home. Patient denies headache, blurry vision, chest pain, N/V, and dysuria. Patient is afebrile, hypertensive, and saturating ok. Lab work reflects an elevated BNP of 527 and an elevated troponin of 28.3. Chest x-ray manifested mild interstitial prominence which is unchanged and may be chronic. Mild interstitial edema not excluded.

## 2024-03-08 NOTE — ASSESSMENT & PLAN NOTE
-While hospitalized will hold any oral hypoglycemic medications if warranted: while hospitalized use combined insulin coverage; basal and prandial SQ to stabilize BGL; provide PRN  POCT glucose monitoring along with aspart and correction coverage - hemoglobin A1c = 6.6 (03/08/2024)  -add detemir secondary to steroid use

## 2024-03-08 NOTE — ED NOTES
Linens, brief, and external urinary catheter changed. Pt tolerated well. Vitals stable. Denies needs at this time.

## 2024-03-08 NOTE — PROGRESS NOTES
Formerly [facility] - Emergency Dept  Hospital Medicine  Progress Note    Patient Name: Daryleen G Moran  MRN: [id number]  Patient Class: OP- Observation   Admission Date: 3/8/2024  Length of Stay: 0 days  Attending Physician: Audi Valdes MD  Primary Care Provider: Abhi De Oliveira IV, MD        Subjective:     Principal Problem:Acute on chronic hypoxic respiratory failure        HPI:  This is a 77 y/o  female w/ pmh of HTN, DM, CAD, HFpEF (EF 65-70%), AFib status post TAVR, COPD who p/w CC of SOB. Patient reports worsening SOB over the past few days. Patient endorses bilateral leg swelling over the past two weeks and abdominal discomfort over the past two weeks as well. Patient reports using two pillows when she sleeps at night. Patient reports using 2 L of O2 prn at home. Patient denies headache, blurry vision, chest pain, N/V, and dysuria. Patient is afebrile, hypertensive, and saturating ok. Lab work reflects an elevated BNP of 527 and an elevated troponin of 28.3. Chest x-ray manifested mild interstitial prominence which is unchanged and may be chronic. Mild interstitial edema not excluded.     Overview/Hospital Course:  78 y.o. F who is on 2L home oxygen at baseline and has an extensive history including HTN, DM, CAD, HFpEF (EF 65-70% [9/12/23]), AFib status post TAVR, COPD presented for evaluation of progressively worsening SOB. Last 2D echo showed GIIDD and moderate AS. Patient endorses smoking history of >50 years (quit 3y ago). Additionally, she reports running out of her home inhalers about 3 weeks ago. Patient was given IV lasix at presentation and has had >3000 cc output. Labs promising in that she has no white count, lytes are WNL, and kidney functions acceptable Estimated Creatinine Clearance: 97.3 mL/min (based on SCr of 0.5 mg/dL).    Mixed picture heart failue + COPD ex. Diastolic heart failure, continue diuresis, strict I&Os, 1.5L fluid restriction and supplemental oxygen. COPD ex -  One dose IV solumedrol today, add duo nebs xoponex + ipratropium, then start po steroids tomorrow.    INTERVAL HISTORY: continue treatment          Past Medical History:   Diagnosis Date    Anesthesia complication     BLADDER DYSFUNCTION    Aortic stenosis     Arthritis     Back pain     Diabetes mellitus type II     NO LONGER DIABETIC    Encounter for blood transfusion     Hyperlipidemia     Hypertension     NSTEMI (non-ST elevated myocardial infarction) 05/01/2022    Osteoporosis     Wears glasses        Past Surgical History:   Procedure Laterality Date     vocal cord nodules removed  long time ago     twice    ADRENAL TUMOR      APPENDECTOMY  within last 5yrs    CATHETERIZATION OF BOTH LEFT AND RIGHT HEART Left 05/06/2022    Procedure: CATHETERIZATION, HEART, BOTH LEFT AND RIGHT;  Surgeon: Michelet Vargas MD;  Location: [facility] CATH/EP LAB;  Service: Cardiology;  Laterality: Left;    COLONOSCOPY N/A 6/23/2023    Procedure: COLONOSCOPY;  Surgeon: Joel Neal III, MD;  Location: [facility] ENDO;  Service: Endoscopy;  Laterality: N/A;    FIXATION KYPHOPLASTY THORACIC SPINE      8-20-13    FOOT SURGERY      left 2nd toe was too long     HERNIA REPAIR  within last 5yrs    INSERTION OF TEMPORARY PACEMAKER N/A 1/4/2023    Procedure: INSERTION, PACEMAKER, TEMPORARY;  Surgeon: Bhavesh Peña MD;  Location: [facility] CATH;  Service: Cardiology;  Laterality: N/A;    LEFT HEART CATHETERIZATION Left 05/02/2022    Procedure: Left heart cath;  Surgeon: Jose Echols MD;  Location: [facility] CATH/EP LAB;  Service: Cardiology;  Laterality: Left;    SMALL BOWEL ENTEROSCOPY N/A 6/22/2023    Procedure: ENTEROSCOPY;  Surgeon: Cornell Aguirre MD;  Location: [facility] ENDO;  Service: Endoscopy;  Laterality: N/A;    SMALL BOWEL ENTEROSCOPY N/A 10/20/2023    Procedure: ENTEROSCOPY;  Surgeon: Otilio Bourgeois MD;  Location: [facility] ENDO;  Service: Endoscopy;  Laterality: N/A;    TONSILLECTOMY, ADENOIDECTOMY  long time ago    TRANSCATHETER AORTIC  VALVE REPLACEMENT (TAVR) Bilateral 1/4/2023    Procedure: REPLACEMENT, AORTIC VALVE, TRANSCATHETER (TAVR);  Surgeon: Bhavesh Peña MD;  Location: [facility] CATH;  Service: Cardiology;  Laterality: Bilateral;    TRANSCATHETER AORTIC VALVE REPLACEMENT (TAVR) Bilateral 1/4/2023    Procedure: REPLACEMENT, AORTIC VALVE, TRANSCATHETER (TAVR);  Surgeon: Dallin Verma MD;  Location: [facility] CATH;  Service: Cardiology;  Laterality: Bilateral;    TRANSTHORACIC ECHOCARDIOGRAPHY (TTE)  1/4/2023    Procedure: ECHOCARDIOGRAM, TRANSTHORACIC;  Surgeon: Bhavesh Peña MD;  Location: [facility] CATH;  Service: Cardiology;;       Review of patient's allergies indicates:   Allergen Reactions    Latex      Other reaction(s): Unknown  Other reaction(s): Unknown    Sulfacetamide sodium      Pain perineal area    Sulfasalazine Hives    Adhesive Itching     SKIN GETS RED WITH TAPE AND BANDAIDS    Adhesive tape-silicones Itching     SKIN GETS RED WITH TAPE AND BANDAIDS    Sulfa (sulfonamide antibiotics) Rash       No current facility-administered medications on file prior to encounter.     Current Outpatient Medications on File Prior to Encounter   Medication Sig    albuterol (PROVENTIL/VENTOLIN HFA) 90 mcg/actuation inhaler Inhale 2 puffs into the lungs every 6 (six) hours as needed for Shortness of Breath.    aspirin (ECOTRIN) 81 MG EC tablet Take 81 mg by mouth once daily.    butalbital-acetaminophen-caffeine -40 mg (FIORICET, ESGIC) -40 mg per tablet Take 1 tablet by mouth every 4 (four) hours as needed for Pain.    ferrous gluconate 324 mg (37.5 mg iron) Tab tablet Take 1 tablet (324 mg total) by mouth once daily.    ferrous sulfate (FEOSOL) 325 mg (65 mg iron) Tab tablet Take 325 mg by mouth with breakfast.    furosemide (LASIX) 40 MG tablet Take 1 tablet (40 mg total) by mouth once daily. (Patient taking differently: Take 40 mg by mouth daily as needed.)    glucosamine-chondroitin 500-400 mg tablet Take 1 tablet by mouth once daily.     loperamide (IMODIUM A-D) 2 mg Tab Take 1 tablet (2 mg total) by mouth 3 (three) times daily as needed (diarrhea). Take 2 tablets by mouth initially then 1 tablet after each loose stool as needed for diarrhea.    loratadine (CLARITIN) 10 mg tablet Take 1 tablet (10 mg total) by mouth once daily.    multivitamin capsule Take 1 capsule by mouth once daily.    pantoprazole (PROTONIX) 40 MG tablet Take 1 tablet (40 mg total) by mouth 2 (two) times daily.    potassium chloride (KLOR-CON) 10 MEQ TbSR Take 1 tablet (10 mEq total) by mouth As instructed. Take 2 tabs 1st day and 2nd day then 1 tab qd    simvastatin (ZOCOR) 10 MG tablet Take 10 mg by mouth every evening.    spironolactone (ALDACTONE) 25 MG tablet Take 1 tablet (25 mg total) by mouth once daily.    calcium carbonate (OS-TK) 500 mg calcium (1,250 mg) tablet Take 1 tablet (500 mg total) by mouth once daily.    carvediloL (COREG) 12.5 MG tablet Take 12.5 mg by mouth 2 (two) times daily.    citalopram (CELEXA) 40 MG tablet Take 1 tablet (40 mg total) by mouth once daily.    clopidogreL (PLAVIX) 75 mg tablet Take 1 tablet (75 mg total) by mouth once daily.    digoxin (LANOXIN) 125 mcg tablet Take 1 tablet (0.125 mg total) by mouth once daily.    diltiaZEM (DILACOR XR) 120 MG CDCR Take 1 capsule (120 mg total) by mouth once daily.    donepeziL (ARICEPT) 5 MG tablet Take 5 mg by mouth nightly.    ergocalciferol (ERGOCALCIFEROL) 50,000 unit Cap TAKE 1 CAPSULE (50,000 UNITS TOTAL) EVERY 7 DAYS. (Patient taking differently: Take 50,000 Units by mouth every 7 days.)    fluticasone furoate-vilanteroL (BREO ELLIPTA) 100-25 mcg/dose diskus inhaler Inhale 1 puff into the lungs once daily. Controller    [DISCONTINUED] ALLERGY RELIEF, FEXOFENADINE, 180 mg tablet Take 180 mg by mouth once daily.    [DISCONTINUED] ezetimibe-simvastatin 10-40 mg (VYTORIN) 10-40 mg per tablet Take 1 tablet by mouth.    [DISCONTINUED] lisinopril-hydrochlorothiazide (PRINZIDE,ZESTORETIC) 20-12.5  mg per tablet Take 1 tablet by mouth once daily.      Family History    None       Tobacco Use    Smoking status: Former     Current packs/day: 0.00     Average packs/day: 0.5 packs/day for 35.0 years (17.5 ttl pk-yrs)     Types: Cigarettes     Start date: 10/5/1987     Quit date: 10/5/2022     Years since quittin.4    Smokeless tobacco: Never   Substance and Sexual Activity    Alcohol use: No    Drug use: No    Sexual activity: Not on file     Review of Systems   Constitutional:  Positive for activity change.   Respiratory:  Positive for shortness of breath.    Cardiovascular:  Positive for leg swelling.   Neurological:  Positive for weakness. Negative for tremors.   Psychiatric/Behavioral:  Negative for agitation and confusion.      Objective:     Vital Signs (Most Recent):  Temp: 98.2 °F (36.8 °C) (24 0738)  Pulse: 108 (24 1047)  Resp: 20 (24 1047)  BP: (!) 183/80 (24 1030)  SpO2: (!) 93 % (24 1047) Vital Signs (24h Range):  Temp:  [98.2 °F (36.8 °C)-98.4 °F (36.9 °C)] 98.2 °F (36.8 °C)  Pulse:  [] 108  Resp:  [18-20] 20  SpO2:  [87 %-97 %] 93 %  BP: (167-221)/() 183/80     Weight: 95.3 kg (210 lb)  Body mass index is 41.01 kg/m².     Physical Exam  Eyes:      Extraocular Movements: Extraocular movements intact.      Pupils: Pupils are equal, round, and reactive to light.   Cardiovascular:      Rate and Rhythm: Rhythm irregular.   Pulmonary:      Comments: Diminished posteriorly   Abdominal:      Tenderness: There is no abdominal tenderness.      Hernia: A hernia is present.   Musculoskeletal:      Right lower leg: Edema present.      Left lower leg: Edema present.   Skin:     General: Skin is warm and dry.   Neurological:      Mental Status: She is alert and oriented to person, place, and time.        Gen: In bed, NAD  Head: Normocephalic  CVD: S1/S2  Resp: Diminished BS  GI: Abd soft, ND, some epigastric TTP  Ext: 1+ BLE pitting edema  Skin: Warm, dry  Neuro: Alert        CRANIAL NERVES     CN III, IV, VI   Pupils are equal, round, and reactive to light.       Significant Labs: All pertinent labs within the past 24 hours have been reviewed.  CBC:   Recent Labs   Lab 03/08/24 0312   WBC 6.45   HGB 9.5*   HCT 34.8*          CMP:   Recent Labs   Lab 03/08/24 0312      K 3.5   CL 97   CO2 35*      BUN 10   CREATININE 0.5   CALCIUM 9.3   PROT 6.5   ALBUMIN 3.9   BILITOT 1.4*   ALKPHOS 69   AST 38   ALT 32   ANIONGAP 6*       Cardiac Markers:   Recent Labs   Lab 03/08/24 0312   *       Troponin:   Recent Labs   Lab 03/08/24 0312 03/08/24  0600   TROPONINIHS 28.3* 23.5*         Significant Imaging:    Imaging Results              CT Abdomen Pelvis  Without Contrast (Final result)  Result time 03/08/24 07:36:46      Final result by Ellis Cruz MD (03/08/24 07:36:46)                   Narrative:    CMS MANDATED QUALITY DATA - CT RADIATION - 436    All CT scans at this facility utilize dose modulation, iterative reconstruction, and/or weight based dosing when appropriate to reduce radiation dose to as low as reasonably achievable.  Unless otherwise stated, incidental findings do not require dedicated follow-up imaging.        Reason: Abdominal pain, acute, nonlocalized    TECHNIQUE: CT abdomen and pelvis without IV or oral contrast. Study is tailored for detection of urinary tract calculi and evaluation of solid organs, hollow viscera, and vascular structures is limited.    COMPARISON: 1/1/2024    CT ABDOMEN:  Prior aortic valve replacement and coronary artery calcifications partially visualized. Minor atelectasis affects lung bases along with slight mosaic attenuation suggesting air trapping.    Calcified cholelithiasis lie in otherwise unremarkable gallbladder, unchanged. Liver shows no new abnormality. Noncontrast pancreas, spleen, and right adrenal are normal. Left adrenal gland is either diminutive in size or absent. Noncontrast kidneys are normal,  without hydronephrosis or urolithiasis. Minor aortoiliac calcifications unchanged.    Large hernia containing segment of large intestines and fat courses through left rectus muscle superiorly, unchanged. No associated intestinal obstruction. Postsurgical changes of right hemicolectomy are unchanged. A few colonic diverticula are present. Large and small intestines otherwise unremarkable. Postsurgical changes of ventral hernia repair are again noted.    Mild subcutaneous edema throughout the abdominal wall has increased.    Multilevel thoracolumbar spine compression fractures unchanged along with advanced disc degeneration at L4-L5 and L5-S1 and lower lumbar spine facet joint osteoarthrosis. Prior T10 and T12 vertebroplasty is noted.    CT PELVIS:  Bladder is normal, with adjacent calcification along inferior right bladder wall unchanged (image 190). Noncontrast uterus and adnexa are unremarkable. No free pelvic fluid.    IMPRESSION:    1. Unchanged cholelithiasis.  2. Unchanged left abdominal wall hernias as described.  3. Coronary artery calcifications.    Electronically signed by:  Ellis Cruz MD  03/08/2024 07:36 AM CST Workstation: [id number]                                     X-Ray Chest AP Portable (Final result)  Result time 03/08/24 05:19:01      Final result by Leonila Boucher MD (03/08/24 05:19:01)                   Narrative:    EXAM:  XR Chest, 1 View    CLINICAL HISTORY:  The patient is 78 years old and is Female; shortness of breath    TECHNIQUE:  Frontal view of the chest.    COMPARISON:  Chest radiograph from 10/18/2023    FINDINGS:  LUNGS:  There is mild interstitial prominence which is unchanged and may be chronic. No obvious consolidation.  PLEURAL SPACE:  No significant pleural effusion.  No obvious pneumothorax.  HEART:  Stable mild enlargement of the cardiac silhouette.  The patient is status post TAVR.  MEDIASTINUM:  Unremarkable.  BONES/JOINTS:  Vertebroplasty changes again noted at several  levels in the spine. No obvious acute fracture.    IMPRESSION:  Mild interstitial prominence which is unchanged and may be chronic. Mild interstitial edema not excluded.    Electronically signed by:  Leonila Boucher MD  03/08/2024 05:19 AM CST Workstation: [id number]                                      Assessment/Plan:      * Acute on chronic hypoxic respiratory failure potentially secondary to CHF exacerbation  -Cardiology consulted  -continue IV Lasix  -Monitor input/output - strict I&Os and 1.5 L fluid restriction - Daily weights  -Telemetry monitoring  -Monitor labs  -Vital sign checks to help ensure hemodynamic stability  -see also COPD and diastolic heart failure    Acute on chronic diastolic heart failure  -see acute on chronic hypoxic respiratory failure  -follow chemistry    COPD exacerbation  Patient's COPD is with exacerbation noted by continued dyspnea and use of accessory muscles for breathing currently.  Patient is currently on COPD Pathway. Continue scheduled inhalers Steroids, Antibiotics, and Supplemental oxygen and monitor respiratory status closely.     Atrial fibrillation  -restart Home meds and monitor on tele    DM (diabetes mellitus)  -While hospitalized will hold any oral hypoglycemic medications if warranted: while hospitalized use combined insulin coverage; basal and prandial SQ to stabilize BGL; provide PRN  POCT glucose monitoring along with aspart and correction coverage - hemoglobin A1c = 6.6 (03/08/2024)  -add detemir secondary to steroid use    Abdominal pain  -CTAP  -Analgesic as needed  -Patient with hernia  -Monitor labs and vitals      CAD (coronary artery disease)  -Home meds    Hypertension  -Home meds and adjust as necessary  -Prn Hydralazine  -Vital sign checks      VTE Risk Mitigation (From admission, onward)           Ordered     enoxaparin injection 40 mg  Daily         03/08/24 0550     IP VTE HIGH RISK PATIENT  Once         03/08/24 0553     Place sequential compression  device  Until discontinued         03/08/24 0553     Place sequential compression device  Until discontinued         03/08/24 0550                    Discharge Planning   ISAAK:      Code Status: Full Code   Is the patient medically ready for discharge?:     Reason for patient still in hospital (select all that apply): Patient unstable, Patient trending condition, and Treatment  Discharge Plan A: Home with family                Nicole Fairbanks, FAB, APRN, FNP-C  [facility]  [facility]  [email or web address]     00:00 23:00

## 2024-03-08 NOTE — ASSESSMENT & PLAN NOTE
-Cardiology consulted  -continue IV Lasix  -Monitor input/output - strict I&Os and 1.5 L fluid restriction - Daily weights  -Telemetry monitoring  -Monitor labs  -Vital sign checks to help ensure hemodynamic stability  -see also COPD and diastolic heart failure

## 2024-03-08 NOTE — ED NOTES
"PT REFUSES MEDS AND RIPS OFF PULSE OX YELLING AT STAFF. DEMANDING WATER.  DIET COMPLETED AND WATER ON TRAY.  STATES CAN YELL AT STAFF AND IF SECURITY IS CALLED "I WILL YELL AT THEM".  DISENGAGED WITH PT. tECH SENT IN TO REATTACH PT TO MONITOR AND WILL LET PT CALM BEFORE INTERACTION.   "

## 2024-03-09 LAB
ALBUMIN SERPL BCP-MCNC: 3.8 G/DL (ref 3.5–5.2)
ALP SERPL-CCNC: 61 U/L (ref 55–135)
ALT SERPL W/O P-5'-P-CCNC: 26 U/L (ref 10–44)
ANION GAP SERPL CALC-SCNC: 5 MMOL/L (ref 8–16)
ANION GAP SERPL CALC-SCNC: 5 MMOL/L (ref 8–16)
AST SERPL-CCNC: 24 U/L (ref 10–40)
BASOPHILS # BLD AUTO: 0.03 K/UL (ref 0–0.2)
BASOPHILS NFR BLD: 0.2 % (ref 0–1.9)
BILIRUB SERPL-MCNC: 1.1 MG/DL (ref 0.1–1)
BUN SERPL-MCNC: 21 MG/DL (ref 8–23)
BUN SERPL-MCNC: 21 MG/DL (ref 8–23)
CALCIUM SERPL-MCNC: 8.8 MG/DL (ref 8.7–10.5)
CALCIUM SERPL-MCNC: 8.8 MG/DL (ref 8.7–10.5)
CHLORIDE SERPL-SCNC: 94 MMOL/L (ref 95–110)
CHLORIDE SERPL-SCNC: 94 MMOL/L (ref 95–110)
CO2 SERPL-SCNC: 41 MMOL/L (ref 23–29)
CO2 SERPL-SCNC: 41 MMOL/L (ref 23–29)
CREAT SERPL-MCNC: 0.6 MG/DL (ref 0.5–1.4)
CREAT SERPL-MCNC: 0.6 MG/DL (ref 0.5–1.4)
DIFFERENTIAL METHOD BLD: ABNORMAL
EOSINOPHIL # BLD AUTO: 0 K/UL (ref 0–0.5)
EOSINOPHIL NFR BLD: 0 % (ref 0–8)
ERYTHROCYTE [DISTWIDTH] IN BLOOD BY AUTOMATED COUNT: 22.5 % (ref 11.5–14.5)
EST. GFR  (NO RACE VARIABLE): >60 ML/MIN/1.73 M^2
EST. GFR  (NO RACE VARIABLE): >60 ML/MIN/1.73 M^2
GLUCOSE SERPL-MCNC: 147 MG/DL (ref 70–110)
GLUCOSE SERPL-MCNC: 176 MG/DL (ref 70–110)
GLUCOSE SERPL-MCNC: 180 MG/DL (ref 70–110)
GLUCOSE SERPL-MCNC: 180 MG/DL (ref 70–110)
GLUCOSE SERPL-MCNC: 192 MG/DL (ref 70–110)
HCT VFR BLD AUTO: 34.9 % (ref 37–48.5)
HGB BLD-MCNC: 9.5 G/DL (ref 12–16)
IMM GRANULOCYTES # BLD AUTO: 0.06 K/UL (ref 0–0.04)
IMM GRANULOCYTES NFR BLD AUTO: 0.5 % (ref 0–0.5)
LYMPHOCYTES # BLD AUTO: 0.7 K/UL (ref 1–4.8)
LYMPHOCYTES NFR BLD: 5.9 % (ref 18–48)
MAGNESIUM SERPL-MCNC: 1.9 MG/DL (ref 1.6–2.6)
MCH RBC QN AUTO: 23.1 PG (ref 27–31)
MCHC RBC AUTO-ENTMCNC: 27.2 G/DL (ref 32–36)
MCV RBC AUTO: 85 FL (ref 82–98)
MONOCYTES # BLD AUTO: 0.9 K/UL (ref 0.3–1)
MONOCYTES NFR BLD: 7.3 % (ref 4–15)
NEUTROPHILS # BLD AUTO: 10.7 K/UL (ref 1.8–7.7)
NEUTROPHILS NFR BLD: 86.1 % (ref 38–73)
NRBC BLD-RTO: 0 /100 WBC
PLATELET # BLD AUTO: 191 K/UL (ref 150–450)
PMV BLD AUTO: 10.7 FL (ref 9.2–12.9)
POTASSIUM SERPL-SCNC: 4.2 MMOL/L (ref 3.5–5.1)
POTASSIUM SERPL-SCNC: 4.2 MMOL/L (ref 3.5–5.1)
PROT SERPL-MCNC: 6.4 G/DL (ref 6–8.4)
RBC # BLD AUTO: 4.11 M/UL (ref 4–5.4)
SODIUM SERPL-SCNC: 140 MMOL/L (ref 136–145)
SODIUM SERPL-SCNC: 140 MMOL/L (ref 136–145)
WBC # BLD AUTO: 12.38 K/UL (ref 3.9–12.7)

## 2024-03-09 PROCEDURE — 27000221 HC OXYGEN, UP TO 24 HOURS

## 2024-03-09 PROCEDURE — 83735 ASSAY OF MAGNESIUM: CPT | Performed by: INTERNAL MEDICINE

## 2024-03-09 PROCEDURE — 12000002 HC ACUTE/MED SURGE SEMI-PRIVATE ROOM

## 2024-03-09 PROCEDURE — 99900035 HC TECH TIME PER 15 MIN (STAT)

## 2024-03-09 PROCEDURE — 99900031 HC PATIENT EDUCATION (STAT)

## 2024-03-09 PROCEDURE — 25000003 PHARM REV CODE 250: Performed by: INTERNAL MEDICINE

## 2024-03-09 PROCEDURE — 63600175 PHARM REV CODE 636 W HCPCS: Performed by: NURSE PRACTITIONER

## 2024-03-09 PROCEDURE — 96376 TX/PRO/DX INJ SAME DRUG ADON: CPT

## 2024-03-09 PROCEDURE — 94640 AIRWAY INHALATION TREATMENT: CPT

## 2024-03-09 PROCEDURE — 36415 COLL VENOUS BLD VENIPUNCTURE: CPT | Performed by: INTERNAL MEDICINE

## 2024-03-09 PROCEDURE — 85025 COMPLETE CBC W/AUTO DIFF WBC: CPT | Performed by: INTERNAL MEDICINE

## 2024-03-09 PROCEDURE — 63600175 PHARM REV CODE 636 W HCPCS: Performed by: INTERNAL MEDICINE

## 2024-03-09 PROCEDURE — 94640 AIRWAY INHALATION TREATMENT: CPT | Mod: XB

## 2024-03-09 PROCEDURE — 94761 N-INVAS EAR/PLS OXIMETRY MLT: CPT

## 2024-03-09 PROCEDURE — 25000242 PHARM REV CODE 250 ALT 637 W/ HCPCS: Performed by: NURSE PRACTITIONER

## 2024-03-09 PROCEDURE — 99232 SBSQ HOSP IP/OBS MODERATE 35: CPT | Mod: ,,, | Performed by: INTERNAL MEDICINE

## 2024-03-09 PROCEDURE — 80053 COMPREHEN METABOLIC PANEL: CPT | Performed by: INTERNAL MEDICINE

## 2024-03-09 PROCEDURE — 94760 N-INVAS EAR/PLS OXIMETRY 1: CPT

## 2024-03-09 RX ORDER — PREDNISONE 20 MG/1
40 TABLET ORAL DAILY
Status: DISCONTINUED | OUTPATIENT
Start: 2024-03-10 | End: 2024-03-11 | Stop reason: HOSPADM

## 2024-03-09 RX ADMIN — DIGOXIN 0.12 MG: 125 TABLET ORAL at 10:03

## 2024-03-09 RX ADMIN — IPRATROPIUM BROMIDE 0.5 MG: 0.5 SOLUTION RESPIRATORY (INHALATION) at 02:03

## 2024-03-09 RX ADMIN — CARVEDILOL 12.5 MG: 12.5 TABLET, FILM COATED ORAL at 08:03

## 2024-03-09 RX ADMIN — DONEPEZIL HYDROCHLORIDE 5 MG: 5 TABLET, FILM COATED ORAL at 09:03

## 2024-03-09 RX ADMIN — IPRATROPIUM BROMIDE 0.5 MG: 0.5 SOLUTION RESPIRATORY (INHALATION) at 11:03

## 2024-03-09 RX ADMIN — LEVALBUTEROL HYDROCHLORIDE 0.63 MG: 0.63 SOLUTION RESPIRATORY (INHALATION) at 01:03

## 2024-03-09 RX ADMIN — FUROSEMIDE 40 MG: 10 INJECTION, SOLUTION INTRAVENOUS at 08:03

## 2024-03-09 RX ADMIN — IPRATROPIUM BROMIDE 0.5 MG: 0.5 SOLUTION RESPIRATORY (INHALATION) at 09:03

## 2024-03-09 RX ADMIN — IPRATROPIUM BROMIDE 0.5 MG: 0.5 SOLUTION RESPIRATORY (INHALATION) at 01:03

## 2024-03-09 RX ADMIN — PANTOPRAZOLE SODIUM 40 MG: 40 TABLET, DELAYED RELEASE ORAL at 05:03

## 2024-03-09 RX ADMIN — IPRATROPIUM BROMIDE 0.5 MG: 0.5 SOLUTION RESPIRATORY (INHALATION) at 07:03

## 2024-03-09 RX ADMIN — IPRATROPIUM BROMIDE 0.5 MG: 0.5 SOLUTION RESPIRATORY (INHALATION) at 03:03

## 2024-03-09 RX ADMIN — ASPIRIN 81 MG: 81 TABLET, COATED ORAL at 08:03

## 2024-03-09 RX ADMIN — CARVEDILOL 12.5 MG: 12.5 TABLET, FILM COATED ORAL at 09:03

## 2024-03-09 RX ADMIN — LEVALBUTEROL HYDROCHLORIDE 0.63 MG: 0.63 SOLUTION RESPIRATORY (INHALATION) at 02:03

## 2024-03-09 RX ADMIN — CLOPIDOGREL BISULFATE 75 MG: 75 TABLET, FILM COATED ORAL at 08:03

## 2024-03-09 RX ADMIN — DILTIAZEM HYDROCHLORIDE 120 MG: 120 CAPSULE, COATED, EXTENDED RELEASE ORAL at 08:03

## 2024-03-09 RX ADMIN — PREDNISONE 60 MG: 20 TABLET ORAL at 08:03

## 2024-03-09 RX ADMIN — ENOXAPARIN SODIUM 40 MG: 100 INJECTION SUBCUTANEOUS at 05:03

## 2024-03-09 RX ADMIN — ATORVASTATIN CALCIUM 10 MG: 10 TABLET, FILM COATED ORAL at 09:03

## 2024-03-09 RX ADMIN — LEVALBUTEROL HYDROCHLORIDE 0.63 MG: 0.63 SOLUTION RESPIRATORY (INHALATION) at 07:03

## 2024-03-09 NOTE — CARE UPDATE
03/08/24 2114   Patient Assessment/Suction   Level of Consciousness (AVPU) alert   Respiratory Effort Normal;Unlabored   Expansion/Accessory Muscles/Retractions no use of accessory muscles;expansion symmetric;no retractions   All Lung Fields Breath Sounds wheezes, expiratory;diminished   Rhythm/Pattern, Respiratory unlabored;no shortness of breath reported;shallow   Cough Frequency no cough   PRE-TX-O2   Device (Oxygen Therapy) nasal cannula   $ Is the patient on Low Flow Oxygen? Yes   Flow (L/min) 3   SpO2 96 %   Pulse Oximetry Type Intermittent   $ Pulse Oximetry - Multiple Charge Pulse Oximetry - Multiple   Pulse 93   Resp (!) 21   Aerosol Therapy   $ Aerosol Therapy Charges Aerosol Treatment  (DUO)   Daily Review of Necessity (SVN) completed   Respiratory Treatment Status (SVN) given   Treatment Route (SVN) oxygen;mask   Patient Position (SVN) HOB elevated   Post Treatment Assessment (SVN) increased aeration   Signs of Intolerance (SVN) none   Breath Sounds Post-Respiratory Treatment   Throughout All Fields Post-Treatment All Fields   Throughout All Fields Post-Treatment aeration increased   Post-treatment Heart Rate (beats/min) 95   Post-treatment Resp Rate (breaths/min) 19

## 2024-03-09 NOTE — PLAN OF CARE
Problem: Skin Injury Risk Increased  Goal: Skin Health and Integrity  Intervention: Promote and Optimize Oral Intake  Flowsheets (Taken 3/9/2024 0940)  Oral Nutrition Promotion:   calorie-dense foods provided   calorie-dense liquids provided   other (see comments)   nutritional therapy counseling provided     Problem: Impaired Wound Healing  Goal: Optimal Wound Healing  Outcome: Ongoing, Progressing  Intervention: Promote Wound Healing  Flowsheets (Taken 3/9/2024 0940)  Oral Nutrition Promotion:   calorie-dense foods provided   calorie-dense liquids provided   other (see comments)   nutritional therapy counseling provided     Problem: Oral Intake Inadequate  Goal: Improved Oral Intake  Outcome: Ongoing, Progressing  Intervention: Promote and Optimize Oral Intake  Flowsheets (Taken 3/9/2024 0940)  Oral Nutrition Promotion:   calorie-dense foods provided   calorie-dense liquids provided   other (see comments)   nutritional therapy counseling provided     Recommendations  1.) Will adjust diet to low Na, 2gm, DM diet; fluid per MD.   2.) Will add Glucerna once daily (to provide 180 kcals, 10gm protein).   3.) Will add Yves BID x14 days to aid in wound healing (to provide 180 kcals, 5gm protein).   4.) Consistent CHO, Heart Health diet handouts provided and attached to d/c paperwork for diet compliance.    Goals:   1.) Pt to consume/tolerate >75% of meals and ONS.   2.) Progression of wound healing.   3.) BG levels to stabilize in normal reference range.  Nutrition Goal Status: new

## 2024-03-09 NOTE — NURSING
Pt offered to be repositioned in bed to prevent bed sores, educated pt on importance of repositioning, pt refused education and responded she does not want to move, Notified MD Barron of Pt Pco2 of 61.7 at 1631 on 03/08/24

## 2024-03-09 NOTE — CARE UPDATE
03/09/24 0711   Patient Assessment/Suction   Level of Consciousness (AVPU) alert   Respiratory Effort Unlabored   Expansion/Accessory Muscles/Retractions no use of accessory muscles   All Lung Fields Breath Sounds wheezes, expiratory   Rhythm/Pattern, Respiratory depth regular;pattern regular;unlabored   Cough Frequency no cough   PRE-TX-O2   Device (Oxygen Therapy) nasal cannula   $ Is the patient on Low Flow Oxygen? Yes   Flow (L/min) 4   SpO2 97 %   Pulse Oximetry Type Intermittent   $ Pulse Oximetry - Multiple Charge Pulse Oximetry - Multiple   Pulse 82   Resp 15   Aerosol Therapy   $ Aerosol Therapy Charges Aerosol Treatment   Daily Review of Necessity (SVN) completed   Respiratory Treatment Status (SVN) given   Treatment Route (SVN) mask   Patient Position (SVN) semi-Marin's   Post Treatment Assessment (SVN) increased aeration   Signs of Intolerance (SVN) none   Breath Sounds Post-Respiratory Treatment   Throughout All Fields Post-Treatment aeration increased   Post-treatment Heart Rate (beats/min) 80   Post-treatment Resp Rate (breaths/min) 15   Education   $ Education Bronchodilator;15 min   Respiratory Evaluation   $ Care Plan Tech Time 15 min

## 2024-03-09 NOTE — NURSING
Pt is refusing fluid restriction and demanding things to drink, educated pt on importance of restricting fluids, Pt  refused education and is demanding drinks at this time, MD notified

## 2024-03-09 NOTE — PROGRESS NOTES
Patient: Tamiko Cyr     MRN: [id number]    [facility] ACCT #: [id number]    : 1945 Age: 78 y.o. Sex:female  Room: 1102 Bed: 1102-a    Admit Date: 3/8/2024   Pt. Type: IP- Inpatient    Admitting Physician: Fredi Avitia MD    Attending Physician: Fredi Avitia MD     Date of service: 3/9/2024        Admission diagnosis  Congestive heart failure, unspecified HF chronicity, unspecified heart failure type [I50.9]       Subjective:  Patient with less shortness of breath today. She is however requiring oxygen. No chest pain    Review of systems  Rest of review of systems is negative    Medications    Current Facility-Administered Medications:     acetaminophen tablet 650 mg, 650 mg, Oral, Q8H PRN, Brian Edwards MD    acetaminophen tablet 650 mg, 650 mg, Oral, Q4H PRN, Brian Edwards MD    aluminum-magnesium hydroxide-simethicone 200-200-20 mg/5 mL suspension 30 mL, 30 mL, Oral, QID PRN, Brian Edwards MD    aspirin EC tablet 81 mg, 81 mg, Oral, Daily, Brian Edwards MD, 81 mg at 24 0859    atorvastatin tablet 10 mg, 10 mg, Oral, QHS, Brian Edwards MD, 10 mg at 24    carvediloL tablet 12.5 mg, 12.5 mg, Oral, BID, Brian Edwards MD, 12.5 mg at 24 08    clopidogreL tablet 75 mg, 75 mg, Oral, Daily, Brian Edwards MD, 75 mg at 24 0858    dextrose 50% injection 12.5 g, 12.5 g, Intravenous, PRN, Brian Edwards MD    dextrose 50% injection 12.5 g, 12.5 g, Intravenous, PRNGrace Omar N, MD    dextrose 50% injection 12.5 g, 12.5 g, Intravenous, PRN, Krista Ham MD    dextrose 50% injection 25 g, 25 g, Intravenous, PRNGrace Omar N, MD    dextrose 50% injection 25 g, 25 g, Intravenous, PRN, Brian Edwards MD    dextrose 50% injection 25 g, 25 g, Intravenous, PRN, Krista Ham MD    digoxin tablet 0.125 mg, 0.125 mg, Oral, Daily, Brian Edwards MD, 0.125 mg at 24 1021    diltiaZEM 24 hr capsule 120 mg, 120 mg, Oral, Daily, Brian Edwards MD, 120 mg at 24 0859    donepeziL tablet 5 mg, 5  mg, Oral, Nightly, Brian Edwards MD, 5 mg at 03/08/24 2059    enoxaparin injection 40 mg, 40 mg, Subcutaneous, Daily, Brian Edwards MD, 40 mg at 03/09/24 1720    furosemide injection 40 mg, 40 mg, Intravenous, Daily, Nicole Fairbanks, FNP, 40 mg at 03/09/24 0858    glucagon (human recombinant) injection 1 mg, 1 mg, Intramuscular, PRN, Brian Edwards MD    glucagon (human recombinant) injection 1 mg, 1 mg, Intramuscular, PRN, Brian Edwards MD    glucagon (human recombinant) injection 1 mg, 1 mg, Intramuscular, PRN, Krista Ham MD    glucose chewable tablet 16 g, 16 g, Oral, PRNGrace Omar N, MD    glucose chewable tablet 16 g, 16 g, Oral, PRNGrace Omar N, MD    glucose chewable tablet 16 g, 16 g, Oral, PRJitendra YAÑEZ Mira S., MD    glucose chewable tablet 24 g, 24 g, Oral, PRN, Brian Edwards MD    glucose chewable tablet 24 g, 24 g, Oral, PRNGrace Omar N, MD    glucose chewable tablet 24 g, 24 g, Oral, Jitendra SMART Mira S., MD    hydrALAZINE injection 10 mg, 10 mg, Intravenous, Q6H PRN, Brian Edwards MD    HYDROcodone-acetaminophen 5-325 mg per tablet 1 tablet, 1 tablet, Oral, Q6H PRN, Brian Edwards MD    insulin aspart U-100 pen 0-5 Units, 0-5 Units, Subcutaneous, QID (AC + HS) PRN, Krista Ham MD    ipratropium 0.02 % nebulizer solution 0.5 mg, 0.5 mg, Nebulization, Q4H, Nicole Fairbanks, FNP, 0.5 mg at 03/09/24 1458    levalbuterol nebulizer solution 0.63 mg, 0.63 mg, Nebulization, Q8H, Nicole Fairbanks, FNP, 0.63 mg at 03/09/24 1458    magnesium oxide tablet 800 mg, 800 mg, Oral, PRN, Brian Edwards MD    magnesium oxide tablet 800 mg, 800 mg, Oral, PRN, Brian Edwards MD    melatonin tablet 6 mg, 6 mg, Oral, Nightly PRN, Brian Edwards MD    naloxone 0.4 mg/mL injection 0.02 mg, 0.02 mg, Intravenous, PRN, Brian Edwards MD    ondansetron injection 4 mg, 4 mg, Intravenous, Q6H PRN, Brian Edwards MD    pantoprazole EC tablet 40 mg, 40 mg, Oral, BID, Brian Edwards MD, 40 mg at 03/09/24 1720    potassium  bicarbonate disintegrating tablet 35 mEq, 35 mEq, Oral, PRN, Brian Edwards MD    potassium bicarbonate disintegrating tablet 50 mEq, 50 mEq, Oral, BERRY, Brian Edwards MD, 50 mEq at 03/08/24 1249    potassium bicarbonate disintegrating tablet 60 mEq, 60 mEq, Oral, PRGrace YAÑEZ Omar N, MD    potassium, sodium phosphates 280-160-250 mg packet 2 packet, 2 packet, Oral, PRGrace YAÑEZ Omar N, MD    potassium, sodium phosphates 280-160-250 mg packet 2 packet, 2 packet, Oral, PRN, Brian Edwards MD    potassium, sodium phosphates 280-160-250 mg packet 2 packet, 2 packet, Oral, Grace SMART Omar N, MD    predniSONE tablet 60 mg, 60 mg, Oral, Daily, Fairbanks, Nicole V., FNP, 60 mg at 03/09/24 0858    sodium chloride 0.9% flush 10 mL, 10 mL, Intravenous, PRGrace YAÑEZ Omar N, MD    sodium chloride 0.9% flush 2 mL, 2 mL, Intravenous, Q12H PRGrace YAÑEZ Omar N, MD     Last Recorded Vitals  Vitals:    03/09/24 1458   BP:    Pulse: 80   Resp: 19   Temp:         Physical Exam  General:  Patient  in mild discomfort; obese  HEENT:  Head is normocephalic atraumatic, PERRLA, EOMI, oral cavity moist  Neck:  Supple, no thyromegaly, trachea midline position, no JVD noted   Respiratory:  O2 2L via nasal cannula; few crackles noted bibasally; no  wheezing heard  Cardiovascular: RRR, S1-S2 normal, no murmurs or rubs or gallops noted  Abdomen:  Soft, nontender, nondistended bowel sounds heard in all quadrants, no hepatosplenomegaly   Genitourinary:  No CVA tenderness  Skin:  No rash, warm and dry   Extremities: No cyanosis, clubbing; edema 1 + in legs; debility, unable to get out of bed  CNS:  Alert and oriented to person, place, and time. No facial asymmetry.  No focal extremity weakness.  Sensation grossly intact.  Psychiatric:  Normal mood and affect.    Laboratory data:   Recent Labs   Lab 03/04/24  1425 03/08/24  0312 03/09/24  1003   WBC 7.06 6.45 12.38   HGB 9.7* 9.5* 9.5*   HCT 35.7* 34.8* 34.9*    173 191       Recent Labs   Lab 03/04/24  1428  03/08/24  0312 03/09/24  1003    138 140  140   K 2.9* 3.5 4.2  4.2   CL 95 97 94*  94*   CO2 39* 35* 41*  41*   BUN 7* 10 21  21   CREATININE 0.5 0.5 0.6  0.6   CALCIUM 9.3 9.3 8.8  8.8   PROT 6.7 6.5 6.4   BILITOT 1.8* 1.4* 1.1*   ALKPHOS 74 69 61   ALT 35 32 26   AST 35 38 24       Imaging studies:  Echo    Left Ventricle: The left ventricle is normal in size. Mildly increased   wall thickness. There is mild concentric hypertrophy. Normal wall motion.   There is normal systolic function with a visually estimated ejection   fraction of 60 - 65%. There is normal diastolic function.    Right Ventricle: Normal right ventricular cavity size. Wall thickness   is normal. Right ventricle wall motion  is normal. Systolic function is   normal.    Left Atrium: Left atrium is moderately dilated.    Aortic Valve: There is a transcatheter valve replacement in the aortic   position. It is reported to be a  23 mm ultra mm Carrion valve.    Mitral Valve: There is moderate bileaflet sclerosis. There is mild   mitral annular calcification present. Moderately restricted motion. There   is moderate stenosis. The mean pressure gradient across the mitral valve   is 9 mmHg at a heart rate of  54 bpm.  With the mitral valve area   estimated to be 1.50 cm2, however area estimated by P1/2t is 3.79 cm2 .   There is no significant regurgitation. No paravalvular regurgitation.    Tricuspid Valve: There is mild to moderate regurgitation with a   centrally directed jet.    Pulmonary Artery: There is mild pulmonary hypertension. The estimated   pulmonary artery systolic pressure is 41 mmHg.    IVC/SVC: Intermediate venous pressure at 8 mmHg.  CT Abdomen Pelvis  Without Contrast  CMS MANDATED QUALITY DATA - CT RADIATION - 436    All CT scans at this facility utilize dose modulation, iterative reconstruction, and/or weight based dosing when appropriate to reduce radiation dose to as low as reasonably achievable.  Unless otherwise  stated, incidental findings do not require dedicated follow-up imaging.    Reason: Abdominal pain, acute, nonlocalized    TECHNIQUE: CT abdomen and pelvis without IV or oral contrast. Study is tailored for detection of urinary tract calculi and evaluation of solid organs, hollow viscera, and vascular structures is limited.    COMPARISON: 1/1/2024    CT ABDOMEN:  Prior aortic valve replacement and coronary artery calcifications partially visualized. Minor atelectasis affects lung bases along with slight mosaic attenuation suggesting air trapping.    Calcified cholelithiasis lie in otherwise unremarkable gallbladder, unchanged. Liver shows no new abnormality. Noncontrast pancreas, spleen, and right adrenal are normal. Left adrenal gland is either diminutive in size or absent. Noncontrast kidneys are normal, without hydronephrosis or urolithiasis. Minor aortoiliac calcifications unchanged.    Large hernia containing segment of large intestines and fat courses through left rectus muscle superiorly, unchanged. No associated intestinal obstruction. Postsurgical changes of right hemicolectomy are unchanged. A few colonic diverticula are present. Large and small intestines otherwise unremarkable. Postsurgical changes of ventral hernia repair are again noted.    Mild subcutaneous edema throughout the abdominal wall has increased.    Multilevel thoracolumbar spine compression fractures unchanged along with advanced disc degeneration at L4-L5 and L5-S1 and lower lumbar spine facet joint osteoarthrosis. Prior T10 and T12 vertebroplasty is noted.    CT PELVIS:  Bladder is normal, with adjacent calcification along inferior right bladder wall unchanged (image 190). Noncontrast uterus and adnexa are unremarkable. No free pelvic fluid.    IMPRESSION:    1. Unchanged cholelithiasis.  2. Unchanged left abdominal wall hernias as described.  3. Coronary artery calcifications.    Electronically signed by:  Ellis Cruz MD  03/08/2024  07:36 AM CST Workstation: [id number]  X-Ray Chest AP Portable  EXAM:  XR Chest, 1 View    CLINICAL HISTORY:  The patient is 78 years old and is Female; shortness of breath    TECHNIQUE:  Frontal view of the chest.    COMPARISON:  Chest radiograph from 10/18/2023    FINDINGS:  LUNGS:  There is mild interstitial prominence which is unchanged and may be chronic. No obvious consolidation.  PLEURAL SPACE:  No significant pleural effusion.  No obvious pneumothorax.  HEART:  Stable mild enlargement of the cardiac silhouette.  The patient is status post TAVR.  MEDIASTINUM:  Unremarkable.  BONES/JOINTS:  Vertebroplasty changes again noted at several levels in the spine. No obvious acute fracture.    IMPRESSION:  Mild interstitial prominence which is unchanged and may be chronic. Mild interstitial edema not excluded.    Electronically signed by:  Leonila Boucher MD  03/08/2024 05:19 AM [facility] Workstation: [id number]       Assessement and Plan:  Active Hospital Problems    Diagnosis  POA    *Acute on chronic hypoxic respiratory failure potentially secondary to CHF exacerbation [J96.21]  Yes    Atrial fibrillation [I48.91]  Yes    DM (diabetes mellitus) [E11.9]  Yes    Abdominal pain [R10.9]  Unknown    Acute on chronic diastolic heart failure [I50.33]  Yes    CAD (coronary artery disease) [I25.10]  Yes    COPD exacerbation [J44.1]  Yes    Hypertension [I10]  Yes      Resolved Hospital Problems   No resolved problems to display.      * Acute on chronic hypoxic respiratory failure potentially secondary to CHF exacerbation  -Cardiology consult  Echo: EF: 60-65%; normal diastolic function  -IV Lasix  -Monitor input/output  -Daily weights  -Telemetry monitoring  -Monitor labs  -Vital sign checks to help ensure hemodynamic stability     Atrial fibrillation  -Home meds  -Telemetry monitoring  -Vital sign checks     DM (diabetes mellitus)  -HgA1c  -Insulin Sliding Scale  -Blood glucose checks qAC & HS  -Hypoglycemia protocol  -Monitor  labs     Hypertension  -Home meds and adjust as necessary  -Prn Hydralazine       CAD (coronary artery disease)  S/p TAVR  -Home meds     Abdominal pain, resolved  -CTAP: cholelithiasis and left abdominal wall hernias, unchanged; coronary calcifications  -Analgesic as needed  -Monitor labs and vitals     COPD  exacerbation  Chronic Home oxygen use  Bronchodilator nebs  Started on prednisone    DVT Prophylaxis: lovenox      Code status: Full Code    Disposition: Await improvement clinically    Disclaimer:  The above note was dictated utilizing speech recognition software.  Efforts were made to minimize and correct any transcription errors.     Fredi Avitia MD

## 2024-03-09 NOTE — CONSULTS
"[facility]  Adult Nutrition   Consult Note (Nutrition Education)     SUMMARY     Recommendations  1.) Will adjust diet to low Na, 2gm, DM diet; fluid per MD.   2.) Will add Glucerna once daily (to provide 180 kcals, 10gm protein).   3.) Will add Yves BID x14 days to aid in wound healing (to provide 180 kcals, 5gm protein).   4.) Consistent CHO, Heart Health diet handouts provided and attached to d/c paperwork for diet compliance.    Goals:   1.) Pt to consume/tolerate >75% of meals and ONS.   2.) Progression of wound healing.   3.) BG levels to stabilize in normal reference range.  Nutrition Goal Status: new    Nutrition Diagnosis PES Statement: Increased nutrient needs (protein) related to wound healing demands as evidenced by Skin breakdown noted to various locations on body: primarily belly, sacrum, buttocks.     Dietitian Rounds Brief  Consult received for diet education. RD assessment triggered further for small wounds. Pt admitted with SOB, edema and found with CHF exacerbation. Pt currently with low Na, 2gm, 1500mL fluid restriction diet. Pt c/o "thirst" and asking for additional liquids. Attempted to explain the reasoning for fluid restriction and diet, however pt dismissed RD. Unknown PO intake of meals at this time. No Gi distress. LBM 3/7. Skin: see media for wounds. Wt reviewed. UBW 96.2kg (2022), CBW 89.2kg reflecting ~4% wt loss/year, not significant. Unable to perform NFPE as pt refusing. Pt appears nourished. Pt also refusing diet education. Provided printout of Consistent Carbohydrate, Heart Healthy Diet handouts with RD information. Also attached education to d/c paperwork.    Nutrition Related Social Determinants of Health: SDOH: Adequate food in home environment    Malnutrition Assessment  To be performed at later date. Pt refused NFPE at this time.    Diet order:   Current Diet Order: low Na, 2gm, 1500mL fluid restriction     Evaluation of Received Nutrient/Fluid Intake  Energy " Calories Required: not meeting needs  Protein Required: not meeting needs  Fluid Required: not meeting needs  Tolerance: tolerating     % Intake of Estimated Energy Needs: 0 - 25 %  % Meal Intake: 0 - 25 %      Intake/Output Summary (Last 24 hours) at 3/9/2024 0937  Last data filed at 3/9/2024 0529  Gross per 24 hour   Intake 485 ml   Output 5350 ml   Net -4865 ml        Anthropometrics  Temp: 97.9 °F (36.6 °C)  Height: 5' (152.4 cm)  Height (inches): 60 in  Weight Method: Bed Scale  Weight: 89.2 kg (196 lb 10.4 oz)  Weight (lb): 196.65 lb  Ideal Body Weight (IBW), Female: 100 lb  % Ideal Body Weight, Female (lb): 196.65 %  BMI (Calculated): 38.4  BMI Grade: 35 - 39.9 - obesity - grade II  Usual Body Weight (UBW), k.2 kg  % Usual Body Weight: 92.92  % Weight Change From Usual Weight: -7.28 %       Estimated/Assessed Needs  Weight Used For Calorie Calculations: 89.2 kg (196 lb 10.4 oz)  Energy Calorie Requirements (kcal): 8567-0358  Energy Need Method: Kcal/kg (20-25)  Protein Requirements: 68-90 (1.5-2.0)  Weight Used For Protein Calculations: 45 kg (99 lb 3.3 oz)  Fluid Requirements (mL): 1500 (per MD)     RDA Method (mL): 1784  CHO Requirement: 175gm CHO daily    Reason for Assessment  Reason For Assessment: consult, identified at risk by screening criteria (education; small wounds noted)  Diagnosis: cardiac disease  Relevant Medical History: HTN, DM, CAD, HFpEF (EF 65-70%), AFib status post TAVR, COPD  Nutrition Discharge Planning: Low Na, diabetic (175gm CHO daily), fluid 1500mL    Nutrition/Diet History  Spiritual, Cultural Beliefs, Hinduism Practices, Values that Affect Care: no  Food Allergies: NKFA  Factors Affecting Nutritional Intake: None identified at this time    Nutrition Risk Screen  Nutrition Risk Screen: no indicators present       Altered Skin Integrity 24 Right anterior Greater trochanter-Wound Image: Images linked       Altered Skin Integrity 24 Left anterior Greater  trochanter-Wound Image: Images linked       Altered Skin Integrity 03/08/24 1935 Sacral spine-Wound Image: Images linked       Altered Skin Integrity 03/08/24 1935-Wound Image: Images linked  MST Score: 0  Have you recently lost weight without trying?: No  Weight loss score: 0  Have you been eating poorly because of a decreased appetite?: No  Appetite score: 0     Weight History:  Wt Readings from Last 10 Encounters:   03/09/24 89.2 kg (196 lb 10.4 oz)   03/08/24 95.3 kg (210 lb 1.6 oz)   02/12/24 93 kg (205 lb)   01/01/24 93 kg (205 lb)   10/21/23 93.2 kg (205 lb 7.5 oz)   09/13/23 96.3 kg (212 lb 4.9 oz)   09/09/23 97.4 kg (214 lb 11.7 oz)   06/23/23 94 kg (207 lb 3.7 oz)   06/21/23 91 kg (200 lb 9.9 oz)   04/03/23 83.9 kg (185 lb)        Lab/Procedures/Meds: Pertinent Labs/Meds Reviewed    Medications:Pertinent Medications Reviewed  Scheduled Meds:   aspirin  81 mg Oral Daily    atorvastatin  10 mg Oral QHS    carvediloL  12.5 mg Oral BID    clopidogreL  75 mg Oral Daily    digoxin  0.125 mg Oral Daily    diltiaZEM  120 mg Oral Daily    donepeziL  5 mg Oral Nightly    enoxparin  40 mg Subcutaneous Daily    furosemide (LASIX) injection  40 mg Intravenous Daily    ipratropium  0.5 mg Nebulization Q4H    levalbuterol  0.63 mg Nebulization Q8H    pantoprazole  40 mg Oral BID    predniSONE  60 mg Oral Daily     Continuous Infusions:  PRN Meds:.acetaminophen, acetaminophen, aluminum-magnesium hydroxide-simethicone, dextrose 50% in water (D50W), dextrose 50% in water (D50W), dextrose 50% in water (D50W), dextrose 50% in water (D50W), dextrose 50% in water (D50W), dextrose 50% in water (D50W), glucagon (human recombinant), glucagon (human recombinant), glucagon (human recombinant), glucose, glucose, glucose, glucose, glucose, glucose, hydrALAZINE, HYDROcodone-acetaminophen, insulin aspart U-100, magnesium oxide, magnesium oxide, melatonin, naloxone, ondansetron, potassium bicarbonate, potassium bicarbonate, potassium  bicarbonate, potassium, sodium phosphates, potassium, sodium phosphates, potassium, sodium phosphates, sodium chloride 0.9%, sodium chloride 0.9%    Labs: Pertinent Labs Reviewed  Clinical Chemistry:  Recent Labs   Lab 03/04/24  1425 03/08/24  0312    138   K 2.9* 3.5   CL 95 97   CO2 39* 35*   * 110   BUN 7* 10   CREATININE 0.5 0.5   CALCIUM 9.3 9.3   PROT 6.7 6.5   ALBUMIN 4.0 3.9   BILITOT 1.8* 1.4*   ALKPHOS 74 69   AST 35 38   ALT 35 32   ANIONGAP 5* 6*   MG  --  1.7     CBC:   Recent Labs   Lab 03/08/24 0312   WBC 6.45   RBC 4.09   HGB 9.5*   HCT 34.8*      MCV 85   MCH 23.2*   MCHC 27.3*     Cardiac Profile:  Recent Labs   Lab 03/04/24  1425 03/08/24  0312   * 527*     Diabetes:  Recent Labs   Lab 03/08/24  0600   HGBA1C 6.6*     Thyroid & Parathyroid:  Recent Labs   Lab 03/04/24  1425   TSH 3.166   FREET4 1.01       Monitor and Evaluation  Food and Nutrient Intake: energy intake, food and beverage intake  Food and Nutrient Adminstration: diet order  Knowledge/Beliefs/Attitudes: food and nutrition knowledge/skill  Physical Activity and Function: nutrition-related ADLs and IADLs  Anthropometric Measurements: weight, weight change  Biochemical Data, Medical Tests and Procedures: electrolyte and renal panel, gastrointestinal profile, glucose/endocrine profile  Nutrition-Focused Physical Findings: overall appearance     Nutrition Risk  Level of Risk/Frequency of Follow-up:  (1x/week)     Nutrition Follow-Up  RD Follow-up?: Yes      Beba Tee RD 03/09/2024 9:37 AM

## 2024-03-09 NOTE — ED NOTES
[facility] PRIOR TO TRANSPORT TO FLOOR.  RED AREA BETWEEN BUTTOCKS AND AT SACRUM.  A FIB CONTINUES. 3 L NC.  SOB NOTED WITH EXERTION. CONTINUES TO DEMAND WATER AND ICE IGNORING FLUID RESTRICTION.  FALLS PRECAUTIONS. CALL LIGHT IN REACH.

## 2024-03-09 NOTE — PROGRESS NOTES
"[facility]  Department of Cardiology  Progress Note    PATIENT NAME: Daryleen G Moran  MRN: [id number]  TODAY'S DATE: 03/09/2024  ADMIT DATE: 3/8/2024    SUBJECTIVE     PRINCIPLE PROBLEM: Acute on chronic hypoxic respiratory failure    INTERVAL HISTORY:    3/9/2024  She is breathing better today.  She has not been compliant with her diuretics    HPI:  Pt is a 78 year old female with pmh of HTN, T2DM, CAD, HFpEF, Afib, post TAVR, and COPD who came into the ER with complaints of SOB that has been worsening over the last few days. She is also complaining of some worsening leg swelling. She states that she has not been complaint with medications at home. She does not like taking her Lasix before she leaves the house because it causes her to "use the bathroom too much." BNP elevated to 527. Pt currently on 2L NC and satting 93-95%.     Review of patient's allergies indicates:   Allergen Reactions    Latex      Other reaction(s): Unknown  Other reaction(s): Unknown    Sulfacetamide sodium      Pain perineal area    Sulfasalazine Hives    Adhesive Itching     SKIN GETS RED WITH TAPE AND BANDAIDS    Adhesive tape-silicones Itching     SKIN GETS RED WITH TAPE AND BANDAIDS    Sulfa (sulfonamide antibiotics) Rash       REVIEW OF SYSTEMS  CARDIOVASCULAR: No recent chest pain, palpitations, arm, neck, or jaw pain  RESPIRATORY:  Shortness of breath  : No blood in the urine  GI: No Nausea, vomiting, constipation, diarrhea, blood, or reflux.    OBJECTIVE     VITAL SIGNS (Most Recent)  Temp: 97.9 °F (36.6 °C) (03/09/24 0707)  Pulse: 82 (03/09/24 0711)  Resp: 15 (03/09/24 0711)  BP: 136/74 (03/09/24 0707)  SpO2: 97 % (03/09/24 0711)    VENTILATION STATUS  Resp: 15 (03/09/24 0711)  SpO2: 97 % (03/09/24 0711)           I & O (Last 24H):  Intake/Output Summary (Last 24 hours) at 3/9/2024 1056  Last data filed at 3/9/2024 0529  Gross per 24 hour   Intake 485 ml   Output 2150 ml   Net -1665 ml       WEIGHTS  Wt Readings from " Last 1 Encounters:   03/09/24 0712 89.2 kg (196 lb 10.4 oz)   03/09/24 0244 89.2 kg (196 lb 10.4 oz)   03/08/24 0229 95.3 kg (210 lb)       PHYSICAL EXAM  CONSTITUTIONAL:  Obese female on continues oxygen therapy  NECK: no carotid bruit, no JVD  LUNGS: CTA  CHEST WALL:  Basal crackles  HEART: regular rate and rhythm, S1, S2 normal, no murmur, click, rub or gallop   ABDOMEN: soft, non-tender; bowel sounds normal; no masses,  no organomegaly  EXTREMITIES: Extremities normal, no edema  NEURO: AAO X 3    SCHEDULED MEDS:   aspirin  81 mg Oral Daily    atorvastatin  10 mg Oral QHS    carvediloL  12.5 mg Oral BID    clopidogreL  75 mg Oral Daily    digoxin  0.125 mg Oral Daily    diltiaZEM  120 mg Oral Daily    donepeziL  5 mg Oral Nightly    enoxparin  40 mg Subcutaneous Daily    furosemide (LASIX) injection  40 mg Intravenous Daily    ipratropium  0.5 mg Nebulization Q4H    levalbuterol  0.63 mg Nebulization Q8H    pantoprazole  40 mg Oral BID    predniSONE  60 mg Oral Daily       CONTINUOUS INFUSIONS:    PRN MEDS:acetaminophen, acetaminophen, aluminum-magnesium hydroxide-simethicone, dextrose 50% in water (D50W), dextrose 50% in water (D50W), dextrose 50% in water (D50W), dextrose 50% in water (D50W), dextrose 50% in water (D50W), dextrose 50% in water (D50W), glucagon (human recombinant), glucagon (human recombinant), glucagon (human recombinant), glucose, glucose, glucose, glucose, glucose, glucose, hydrALAZINE, HYDROcodone-acetaminophen, insulin aspart U-100, magnesium oxide, magnesium oxide, melatonin, naloxone, ondansetron, potassium bicarbonate, potassium bicarbonate, potassium bicarbonate, potassium, sodium phosphates, potassium, sodium phosphates, potassium, sodium phosphates, sodium chloride 0.9%, sodium chloride 0.9%    LABS AND DIAGNOSTICS     CBC LAST 3 DAYS  Recent Labs   Lab 03/04/24  1425 03/08/24  0312 03/09/24  1003   WBC 7.06 6.45 12.38   RBC 4.23 4.09 4.11   HGB 9.7* 9.5* 9.5*   HCT 35.7* 34.8* 34.9*  "  MCV 84 85 85   MCH 22.9* 23.2* 23.1*   MCHC 27.2* 27.3* 27.2*   RDW 23.4* 22.6* 22.5*    173 191   MPV 11.0 10.4 10.7   GRAN  --  73.3*  4.7 86.1*  10.7*   LYMPH  --  14.0*  0.9* 5.9*  0.7*   MONO  --  10.1  0.7 7.3  0.9   BASO  --  0.06 0.03   NRBC  --  0 0       COAGULATION LAST 3 DAYS  No results for input(s): "LABPT", "INR", "APTT" in the last 168 hours.    CHEMISTRY LAST 3 DAYS  Recent Labs   Lab 03/04/24  1425 03/08/24 0312 03/08/24  1631 03/09/24  1003    138  --   --    K 2.9* 3.5  --   --    CL 95 97  --   --    CO2 39* 35*  --   --    ANIONGAP 5* 6*  --   --    BUN 7* 10  --   --    CREATININE 0.5 0.5  --   --    * 110  --   --    CALCIUM 9.3 9.3  --   --    PH  --   --  7.419  --    MG  --  1.7  --  1.9   ALBUMIN 4.0 3.9  --   --    BILITOT 1.8* 1.4*  --   --    PROT 6.7 6.5  --   --    ALKPHOS 74 69  --   --    ALT 35 32  --   --    AST 35 38  --   --        CARDIAC PROFILE LAST 3 DAYS  Recent Labs   Lab 03/04/24  1425 03/08/24 0312 03/08/24  0600   * 527*  --    TROPONINIHS  --  28.3* 23.5*       ENDOCRINE LAST 3 DAYS  Recent Labs   Lab 03/04/24  1425   TSH 3.166       LAST ARTERIAL BLOOD GAS  ABG  Recent Labs   Lab 03/08/24  1631   PH 7.419   PO2 69*   PCO2 61.7*   HCO3 39.9*   BE 15*       LAST 7 DAYS MICROBIOLOGY   Microbiology Results (last 7 days)       ** No results found for the last 168 hours. **            MOST RECENT IMAGING  Echo    Left Ventricle: The left ventricle is normal in size. Mildly increased   wall thickness. There is mild concentric hypertrophy. Normal wall motion.   There is normal systolic function with a visually estimated ejection   fraction of 60 - 65%. There is normal diastolic function.    Right Ventricle: Normal right ventricular cavity size. Wall thickness   is normal. Right ventricle wall motion  is normal. Systolic function is   normal.    Left Atrium: Left atrium is moderately dilated.    Aortic Valve: There is a transcatheter valve " replacement in the aortic   position. It is reported to be a  23 mm ultra mm Carrion valve.    Mitral Valve: There is moderate bileaflet sclerosis. There is mild   mitral annular calcification present. Moderately restricted motion. There   is moderate stenosis. The mean pressure gradient across the mitral valve   is 9 mmHg at a heart rate of  54 bpm.  With the mitral valve area   estimated to be 1.50 cm2, however area estimated by P1/2t is 3.79 cm2 .   There is no significant regurgitation. No paravalvular regurgitation.    Tricuspid Valve: There is mild to moderate regurgitation with a   centrally directed jet.    Pulmonary Artery: There is mild pulmonary hypertension. The estimated   pulmonary artery systolic pressure is 41 mmHg.    IVC/SVC: Intermediate venous pressure at 8 mmHg.  CT Abdomen Pelvis  Without Contrast  CMS MANDATED QUALITY DATA - CT RADIATION - 436    All CT scans at this facility utilize dose modulation, iterative reconstruction, and/or weight based dosing when appropriate to reduce radiation dose to as low as reasonably achievable.  Unless otherwise stated, incidental findings do not require dedicated follow-up imaging.    Reason: Abdominal pain, acute, nonlocalized    TECHNIQUE: CT abdomen and pelvis without IV or oral contrast. Study is tailored for detection of urinary tract calculi and evaluation of solid organs, hollow viscera, and vascular structures is limited.    COMPARISON: 1/1/2024    CT ABDOMEN:  Prior aortic valve replacement and coronary artery calcifications partially visualized. Minor atelectasis affects lung bases along with slight mosaic attenuation suggesting air trapping.    Calcified cholelithiasis lie in otherwise unremarkable gallbladder, unchanged. Liver shows no new abnormality. Noncontrast pancreas, spleen, and right adrenal are normal. Left adrenal gland is either diminutive in size or absent. Noncontrast kidneys are normal, without hydronephrosis or urolithiasis. Minor  aortoiliac calcifications unchanged.    Large hernia containing segment of large intestines and fat courses through left rectus muscle superiorly, unchanged. No associated intestinal obstruction. Postsurgical changes of right hemicolectomy are unchanged. A few colonic diverticula are present. Large and small intestines otherwise unremarkable. Postsurgical changes of ventral hernia repair are again noted.    Mild subcutaneous edema throughout the abdominal wall has increased.    Multilevel thoracolumbar spine compression fractures unchanged along with advanced disc degeneration at L4-L5 and L5-S1 and lower lumbar spine facet joint osteoarthrosis. Prior T10 and T12 vertebroplasty is noted.    CT PELVIS:  Bladder is normal, with adjacent calcification along inferior right bladder wall unchanged (image 190). Noncontrast uterus and adnexa are unremarkable. No free pelvic fluid.    IMPRESSION:    1. Unchanged cholelithiasis.  2. Unchanged left abdominal wall hernias as described.  3. Coronary artery calcifications.    Electronically signed by:  Ellis Cruz MD  03/08/2024 07:36 AM CST Workstation: [id number]  X-Ray Chest AP Portable  EXAM:  XR Chest, 1 View    CLINICAL HISTORY:  The patient is 78 years old and is Female; shortness of breath    TECHNIQUE:  Frontal view of the chest.    COMPARISON:  Chest radiograph from 10/18/2023    FINDINGS:  LUNGS:  There is mild interstitial prominence which is unchanged and may be chronic. No obvious consolidation.  PLEURAL SPACE:  No significant pleural effusion.  No obvious pneumothorax.  HEART:  Stable mild enlargement of the cardiac silhouette.  The patient is status post TAVR.  MEDIASTINUM:  Unremarkable.  BONES/JOINTS:  Vertebroplasty changes again noted at several levels in the spine. No obvious acute fracture.    IMPRESSION:  Mild interstitial prominence which is unchanged and may be chronic. Mild interstitial edema not excluded.    Electronically signed by:  Leonila Boucher MD   03/08/2024 05:19 AM [facility] Workstation: [id number]      [employer]  Results for orders placed during the hospital encounter of 03/08/24    Echo    Interpretation Summary    Left Ventricle: The left ventricle is normal in size. Mildly increased wall thickness. There is mild concentric hypertrophy. Normal wall motion. There is normal systolic function with a visually estimated ejection fraction of 60 - 65%. There is normal diastolic function.    Right Ventricle: Normal right ventricular cavity size. Wall thickness is normal. Right ventricle wall motion  is normal. Systolic function is normal.    Left Atrium: Left atrium is moderately dilated.    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 It is reported to be a  23 mm ultra mm Carrion valve.    Mitral Valve: There is moderate bileaflet sclerosis. There is mild mitral annular calcification present. Moderately restricted motion. There is moderate stenosis. The mean pressure gradient across the mitral valve is 9 mmHg at a heart rate of  54 bpm.  With the mitral valve area estimated to be 1.50 cm2, however area estimated by P1/2t is 3.79 cm2 . There is no significant regurgitation. No paravalvular regurgitation.    Tricuspid Valve: There is mild to moderate regurgitation with a centrally directed jet.    Pulmonary Artery: There is mild pulmonary hypertension. The estimated pulmonary artery systolic pressure is 41 mmHg.    IVC/SVC: Intermediate venous pressure at 8 mmHg.      CURRENT/PREVIOUS VISIT EKG  Results for orders placed or performed during the hospital encounter of 03/08/24   EKG 12-lead    Collection Time: 03/08/24  2:33 AM   Result Value Ref Range    QRS Duration 78 ms    OHS QTC Calculation 408 ms    Narrative    Test Reason : R07.9,    Vent. Rate : 099 BPM     Atrial Rate : 087 BPM     P-R Int : 000 ms          QRS Dur : 078 ms      QT Int : 318 ms       P-R-T Axes : 000 034 -19 degrees     QTc Int : 408 ms    Atrial  fibrillation with premature ventricular or aberrantly conducted  complexes  Abnormal ECG  When compared with ECG of 08-MAR-2024 02:32,  No significant change was found    Referred By: [email or web address]   SELF           Confirmed By:        ASSESSMENT/PLAN:     Active Hospital Problems    Diagnosis    *Acute on chronic hypoxic respiratory failure potentially secondary to CHF exacerbation    Atrial fibrillation    DM (diabetes mellitus)    Abdominal pain    Acute on chronic diastolic heart failure    CAD (coronary artery disease)    COPD exacerbation    Hypertension       ASSESSMENT & PLAN:   Acute on chronic respiratory failure O2 dependent  Heart failure with preserved ejection fraction non compliant with diuretics  Paroxysmal atrial fibrillation not on anticoagulation because of recurrent GI bleed and multiple blood transfusions.  A Watchman has been discussed with the patient previously  Iron deficiency anemia secondary to blood loss  Status post TAVR  Hypertension  Hyperlipidemia on 10 mg of simvastatin      RECOMMENDATIONS:  She is to be compliant with her diuretics this has been discussed with her in detail.        Brown Macias MD  Date of Service: 03/09/2024  10:56 AM

## 2024-03-09 NOTE — NURSING
Pt keeps demanding fluids regardless that she met her 1500 ml fluid restriction, Pt is not compliant with education and is requesting fluid, Pt is also agitated MD notified    Pt also instructed nursing staff on applying barrier cream to wounds, Pt instructed using more cream after a thin amount was applied, Pt instructed on using thin appliances, pt still insisted using more than recommended.

## 2024-03-09 NOTE — NURSING
Nurses Note -- 4 Eyes      03/08/24   1:25 AM      Skin assessed during: Admit      [] No Altered Skin Integrity Present    []Prevention Measures Documented      [x] Yes- Altered Skin Integrity Present or Discovered   [x] LDA Added if Not in Epic (Describe Wound)   [x] New Altered Skin Integrity was Present on Admit and Documented in LDA   [x] Wound Image Taken    Wound Care Consulted? Yes    Attending Nurse:  Christo Smith RN/Staff Member:  [id number]

## 2024-03-10 LAB
ALLENS TEST: ABNORMAL
ANION GAP SERPL CALC-SCNC: 4 MMOL/L (ref 8–16)
BUN SERPL-MCNC: 27 MG/DL (ref 8–23)
CALCIUM SERPL-MCNC: 9 MG/DL (ref 8.7–10.5)
CHLORIDE SERPL-SCNC: 93 MMOL/L (ref 95–110)
CO2 SERPL-SCNC: 40 MMOL/L (ref 23–29)
CREAT SERPL-MCNC: 0.6 MG/DL (ref 0.5–1.4)
DELSYS: ABNORMAL
EST. GFR  (NO RACE VARIABLE): >60 ML/MIN/1.73 M^2
FLOW: 4
GLUCOSE SERPL-MCNC: 169 MG/DL (ref 70–110)
GLUCOSE SERPL-MCNC: 190 MG/DL (ref 70–110)
HCO3 UR-SCNC: 38.8 MMOL/L (ref 24–28)
MODE: ABNORMAL
PCO2 BLDA: 64.5 MMHG (ref 35–45)
PH SMN: 7.39 [PH] (ref 7.35–7.45)
PO2 BLDA: 88 MMHG (ref 80–100)
POC BE: 14 MMOL/L
POC SATURATED O2: 96 % (ref 95–100)
POC TCO2: 41 MMOL/L (ref 23–27)
POTASSIUM SERPL-SCNC: 3.7 MMOL/L (ref 3.5–5.1)
SAMPLE: ABNORMAL
SITE: ABNORMAL
SODIUM SERPL-SCNC: 137 MMOL/L (ref 136–145)

## 2024-03-10 PROCEDURE — 63600175 PHARM REV CODE 636 W HCPCS: Performed by: NURSE PRACTITIONER

## 2024-03-10 PROCEDURE — 25000003 PHARM REV CODE 250: Performed by: INTERNAL MEDICINE

## 2024-03-10 PROCEDURE — 36415 COLL VENOUS BLD VENIPUNCTURE: CPT | Performed by: INTERNAL MEDICINE

## 2024-03-10 PROCEDURE — 99900031 HC PATIENT EDUCATION (STAT)

## 2024-03-10 PROCEDURE — 63600175 PHARM REV CODE 636 W HCPCS: Performed by: INTERNAL MEDICINE

## 2024-03-10 PROCEDURE — 27000221 HC OXYGEN, UP TO 24 HOURS

## 2024-03-10 PROCEDURE — 94761 N-INVAS EAR/PLS OXIMETRY MLT: CPT

## 2024-03-10 PROCEDURE — 94640 AIRWAY INHALATION TREATMENT: CPT

## 2024-03-10 PROCEDURE — 25000242 PHARM REV CODE 250 ALT 637 W/ HCPCS: Performed by: NURSE PRACTITIONER

## 2024-03-10 PROCEDURE — 36600 WITHDRAWAL OF ARTERIAL BLOOD: CPT

## 2024-03-10 PROCEDURE — 25000242 PHARM REV CODE 250 ALT 637 W/ HCPCS: Performed by: INTERNAL MEDICINE

## 2024-03-10 PROCEDURE — 80048 BASIC METABOLIC PNL TOTAL CA: CPT | Performed by: INTERNAL MEDICINE

## 2024-03-10 PROCEDURE — 99900035 HC TECH TIME PER 15 MIN (STAT)

## 2024-03-10 PROCEDURE — 82803 BLOOD GASES ANY COMBINATION: CPT

## 2024-03-10 PROCEDURE — 99232 SBSQ HOSP IP/OBS MODERATE 35: CPT | Mod: ,,, | Performed by: INTERNAL MEDICINE

## 2024-03-10 PROCEDURE — 12000002 HC ACUTE/MED SURGE SEMI-PRIVATE ROOM

## 2024-03-10 RX ORDER — IPRATROPIUM BROMIDE 0.5 MG/2.5ML
0.5 SOLUTION RESPIRATORY (INHALATION) EVERY 8 HOURS
Status: DISCONTINUED | OUTPATIENT
Start: 2024-03-10 | End: 2024-03-11 | Stop reason: HOSPADM

## 2024-03-10 RX ORDER — FUROSEMIDE 40 MG/1
40 TABLET ORAL 2 TIMES DAILY
Status: DISCONTINUED | OUTPATIENT
Start: 2024-03-10 | End: 2024-03-11 | Stop reason: HOSPADM

## 2024-03-10 RX ORDER — BUDESONIDE 0.5 MG/2ML
0.5 INHALANT ORAL EVERY 12 HOURS
Status: DISCONTINUED | OUTPATIENT
Start: 2024-03-10 | End: 2024-03-11 | Stop reason: HOSPADM

## 2024-03-10 RX ORDER — SPIRONOLACTONE 25 MG/1
25 TABLET ORAL DAILY
Status: DISCONTINUED | OUTPATIENT
Start: 2024-03-10 | End: 2024-03-11 | Stop reason: HOSPADM

## 2024-03-10 RX ADMIN — LEVALBUTEROL HYDROCHLORIDE 0.63 MG: 0.63 SOLUTION RESPIRATORY (INHALATION) at 12:03

## 2024-03-10 RX ADMIN — LEVALBUTEROL HYDROCHLORIDE 0.63 MG: 0.63 SOLUTION RESPIRATORY (INHALATION) at 07:03

## 2024-03-10 RX ADMIN — IPRATROPIUM BROMIDE 0.5 MG: 0.5 SOLUTION RESPIRATORY (INHALATION) at 04:03

## 2024-03-10 RX ADMIN — FUROSEMIDE 40 MG: 40 TABLET ORAL at 06:03

## 2024-03-10 RX ADMIN — DIGOXIN 0.12 MG: 125 TABLET ORAL at 01:03

## 2024-03-10 RX ADMIN — PANTOPRAZOLE SODIUM 40 MG: 40 TABLET, DELAYED RELEASE ORAL at 05:03

## 2024-03-10 RX ADMIN — CARVEDILOL 12.5 MG: 12.5 TABLET, FILM COATED ORAL at 09:03

## 2024-03-10 RX ADMIN — FUROSEMIDE 40 MG: 10 INJECTION, SOLUTION INTRAVENOUS at 09:03

## 2024-03-10 RX ADMIN — LEVALBUTEROL HYDROCHLORIDE 0.63 MG: 0.63 SOLUTION RESPIRATORY (INHALATION) at 04:03

## 2024-03-10 RX ADMIN — IPRATROPIUM BROMIDE 0.5 MG: 0.5 SOLUTION RESPIRATORY (INHALATION) at 12:03

## 2024-03-10 RX ADMIN — SPIRONOLACTONE 25 MG: 25 TABLET ORAL at 03:03

## 2024-03-10 RX ADMIN — IPRATROPIUM BROMIDE 0.5 MG: 0.5 SOLUTION RESPIRATORY (INHALATION) at 05:03

## 2024-03-10 RX ADMIN — DONEPEZIL HYDROCHLORIDE 5 MG: 5 TABLET, FILM COATED ORAL at 09:03

## 2024-03-10 RX ADMIN — DILTIAZEM HYDROCHLORIDE 120 MG: 120 CAPSULE, COATED, EXTENDED RELEASE ORAL at 09:03

## 2024-03-10 RX ADMIN — BUDESONIDE 0.5 MG: 0.5 INHALANT RESPIRATORY (INHALATION) at 07:03

## 2024-03-10 RX ADMIN — ASPIRIN 81 MG: 81 TABLET, COATED ORAL at 09:03

## 2024-03-10 RX ADMIN — ATORVASTATIN CALCIUM 10 MG: 10 TABLET, FILM COATED ORAL at 09:03

## 2024-03-10 RX ADMIN — HYDROCODONE BITARTRATE AND ACETAMINOPHEN 1 TABLET: 5; 325 TABLET ORAL at 12:03

## 2024-03-10 RX ADMIN — ENOXAPARIN SODIUM 40 MG: 100 INJECTION SUBCUTANEOUS at 06:03

## 2024-03-10 RX ADMIN — PREDNISONE 40 MG: 20 TABLET ORAL at 09:03

## 2024-03-10 RX ADMIN — PANTOPRAZOLE SODIUM 40 MG: 40 TABLET, DELAYED RELEASE ORAL at 06:03

## 2024-03-10 RX ADMIN — CLOPIDOGREL BISULFATE 75 MG: 75 TABLET, FILM COATED ORAL at 09:03

## 2024-03-10 NOTE — CARE UPDATE
03/10/24 0751   Patient Assessment/Suction   Level of Consciousness (AVPU) alert   Respiratory Effort Normal;Unlabored   Expansion/Accessory Muscles/Retractions no use of accessory muscles;no retractions;expansion symmetric   All Lung Fields Breath Sounds clear   Rhythm/Pattern, Respiratory unlabored;pattern regular;depth regular   Cough Frequency infrequent   Cough Type no productive sputum   PRE-TX-O2   Device (Oxygen Therapy) nasal cannula  (pt states she wears 2L NC at home)   $ Is the patient on Low Flow Oxygen? Yes   Flow (L/min) 4   SpO2 97 %   Pulse Oximetry Type Intermittent   $ Pulse Oximetry - Multiple Charge Pulse Oximetry - Multiple   Pulse 86   Resp 18   Aerosol Therapy   $ Aerosol Therapy Charges Aerosol Treatment   Daily Review of Necessity (SVN) completed   Respiratory Treatment Status (SVN) given   Treatment Route (SVN) oxygen;mask   Patient Position (SVN) HOB elevated   Post Treatment Assessment (SVN) breath sounds unchanged   Signs of Intolerance (SVN) none   Breath Sounds Post-Respiratory Treatment   Throughout All Fields Post-Treatment All Fields   Throughout All Fields Post-Treatment aeration increased   Post-treatment Heart Rate (beats/min) 70   Post-treatment Resp Rate (breaths/min) 18   Education   $ Education Bronchodilator;15 min

## 2024-03-10 NOTE — PROGRESS NOTES
"[facility]  Department of Cardiology  Progress Note    PATIENT NAME: Daryleen G Moran  MRN: [id number]  TODAY'S DATE: 03/10/2024  ADMIT DATE: 3/8/2024    SUBJECTIVE     PRINCIPLE PROBLEM: Acute on chronic hypoxic respiratory failure    INTERVAL HISTORY:    3/10/2024  She is breathing better today.  She would like to go home.    3/9/2024  She is breathing better today.  She has not been compliant with her diuretics    HPI:  Pt is a 78 year old female with pmh of HTN, T2DM, CAD, HFpEF, Afib, post TAVR, and COPD who came into the ER with complaints of SOB that has been worsening over the last few days. She is also complaining of some worsening leg swelling. She states that she has not been complaint with medications at home. She does not like taking her Lasix before she leaves the house because it causes her to "use the bathroom too much." BNP elevated to 527. Pt currently on 2L NC and satting 93-95%.     Review of patient's allergies indicates:   Allergen Reactions    Latex      Other reaction(s): Unknown  Other reaction(s): Unknown    Sulfacetamide sodium      Pain perineal area    Sulfasalazine Hives    Adhesive Itching     SKIN GETS RED WITH TAPE AND BANDAIDS    Adhesive tape-silicones Itching     SKIN GETS RED WITH TAPE AND BANDAIDS    Sulfa (sulfonamide antibiotics) Rash       REVIEW OF SYSTEMS  CARDIOVASCULAR: No recent chest pain, palpitations, arm, neck, or jaw pain  RESPIRATORY:  Shortness of breath  : No blood in the urine  GI: No Nausea, vomiting, constipation, diarrhea, blood, or reflux.    OBJECTIVE     VITAL SIGNS (Most Recent)  Temp: 96.8 °F (36 °C) (03/10/24 0925)  Pulse: 86 (03/10/24 0925)  Resp: 18 (03/10/24 0751)  BP: 121/70 (03/10/24 0925)  SpO2: 97 % (03/10/24 0751)    VENTILATION STATUS  Resp: 18 (03/10/24 0751)  SpO2: 97 % (03/10/24 0751)           I & O (Last 24H):  Intake/Output Summary (Last 24 hours) at 3/10/2024 1340  Last data filed at 3/10/2024 0800  Gross per 24 hour "   Intake --   Output 275 ml   Net -275 ml         WEIGHTS  Wt Readings from Last 1 Encounters:   03/09/24 0712 89.2 kg (196 lb 10.4 oz)   03/09/24 0244 89.2 kg (196 lb 10.4 oz)   03/08/24 0229 95.3 kg (210 lb)       PHYSICAL EXAM  CONSTITUTIONAL:  Obese female on continues oxygen therapy  NECK: no carotid bruit, no JVD  LUNGS: CTA  CHEST WALL:  Basal crackles  HEART: regular rate and rhythm,  ABDOMEN: soft, non-tender; bowel sounds normal; no masses,  no organomegaly  EXTREMITIES: Extremities normal, no edema  NEURO: AAO X 3    SCHEDULED MEDS:   aspirin  81 mg Oral Daily    atorvastatin  10 mg Oral QHS    carvediloL  12.5 mg Oral BID    clopidogreL  75 mg Oral Daily    digoxin  0.125 mg Oral Daily    diltiaZEM  120 mg Oral Daily    donepeziL  5 mg Oral Nightly    enoxparin  40 mg Subcutaneous Daily    furosemide  40 mg Oral BID    ipratropium  0.5 mg Nebulization Q8H    levalbuterol  0.63 mg Nebulization Q8H    pantoprazole  40 mg Oral BID    predniSONE  40 mg Oral Daily       CONTINUOUS INFUSIONS:    PRN MEDS:acetaminophen, acetaminophen, aluminum-magnesium hydroxide-simethicone, dextrose 50% in water (D50W), dextrose 50% in water (D50W), dextrose 50% in water (D50W), dextrose 50% in water (D50W), dextrose 50% in water (D50W), dextrose 50% in water (D50W), glucagon (human recombinant), glucagon (human recombinant), glucagon (human recombinant), glucose, glucose, glucose, glucose, glucose, glucose, hydrALAZINE, HYDROcodone-acetaminophen, insulin aspart U-100, magnesium oxide, magnesium oxide, melatonin, naloxone, ondansetron, potassium bicarbonate, potassium bicarbonate, potassium bicarbonate, potassium, sodium phosphates, potassium, sodium phosphates, potassium, sodium phosphates, sodium chloride 0.9%, sodium chloride 0.9%    LABS AND DIAGNOSTICS     CBC LAST 3 DAYS  Recent Labs   Lab 03/04/24  1425 03/08/24  0312 03/09/24  1003   WBC 7.06 6.45 12.38   RBC 4.23 4.09 4.11   HGB 9.7* 9.5* 9.5*   HCT 35.7* 34.8* 34.9*  "  MCV 84 85 85   MCH 22.9* 23.2* 23.1*   MCHC 27.2* 27.3* 27.2*   RDW 23.4* 22.6* 22.5*    173 191   MPV 11.0 10.4 10.7   GRAN  --  73.3*  4.7 86.1*  10.7*   LYMPH  --  14.0*  0.9* 5.9*  0.7*   MONO  --  10.1  0.7 7.3  0.9   BASO  --  0.06 0.03   NRBC  --  0 0         COAGULATION LAST 3 DAYS  No results for input(s): "LABPT", "INR", "APTT" in the last 168 hours.    CHEMISTRY LAST 3 DAYS  Recent Labs   Lab 03/04/24  1425 03/08/24 0312 03/08/24  1631 03/09/24  1003 03/10/24  0509    138  --  140  140 137   K 2.9* 3.5  --  4.2  4.2 3.7   CL 95 97  --  94*  94* 93*   CO2 39* 35*  --  41*  41* 40*   ANIONGAP 5* 6*  --  5*  5* 4*   BUN 7* 10  --  21  21 27*   CREATININE 0.5 0.5  --  0.6  0.6 0.6   * 110  --  180*  180* 169*   CALCIUM 9.3 9.3  --  8.8  8.8 9.0   PH  --   --  7.419  --   --    MG  --  1.7  --  1.9  --    ALBUMIN 4.0 3.9  --  3.8  --    BILITOT 1.8* 1.4*  --  1.1*  --    PROT 6.7 6.5  --  6.4  --    ALKPHOS 74 69  --  61  --    ALT 35 32  --  26  --    AST 35 38  --  24  --          CARDIAC PROFILE LAST 3 DAYS  Recent Labs   Lab 03/04/24  1425 03/08/24 0312 03/08/24  0600   * 527*  --    TROPONINIHS  --  28.3* 23.5*         ENDOCRINE LAST 3 DAYS  Recent Labs   Lab 03/04/24  1425   TSH 3.166         LAST ARTERIAL BLOOD GAS  ABG  Recent Labs   Lab 03/08/24  1631   PH 7.419   PO2 69*   PCO2 61.7*   HCO3 39.9*   BE 15*         LAST 7 DAYS MICROBIOLOGY   Microbiology Results (last 7 days)       ** No results found for the last 168 hours. **            MOST RECENT IMAGING  Echo    Left Ventricle: The left ventricle is normal in size. Mildly increased   wall thickness. There is mild concentric hypertrophy. Normal wall motion.   There is normal systolic function with a visually estimated ejection   fraction of 60 - 65%. There is normal diastolic function.    Right Ventricle: Normal right ventricular cavity size. Wall thickness   is normal. Right ventricle wall motion  is " normal. Systolic function is   normal.    Left Atrium: Left atrium is moderately dilated.    Aortic Valve: There is a transcatheter valve replacement in the aortic   position. It is reported to be a  23 mm ultra mm Carrion valve.    Mitral Valve: There is moderate bileaflet sclerosis. There is mild   mitral annular calcification present. Moderately restricted motion. There   is moderate stenosis. The mean pressure gradient across the mitral valve   is 9 mmHg at a heart rate of  54 bpm.  With the mitral valve area   estimated to be 1.50 cm2, however area estimated by P1/2t is 3.79 cm2 .   There is no significant regurgitation. No paravalvular regurgitation.    Tricuspid Valve: There is mild to moderate regurgitation with a   centrally directed jet.    Pulmonary Artery: There is mild pulmonary hypertension. The estimated   pulmonary artery systolic pressure is 41 mmHg.    IVC/SVC: Intermediate venous pressure at 8 mmHg.  CT Abdomen Pelvis  Without Contrast  CMS MANDATED QUALITY DATA - CT RADIATION - 436    All CT scans at this facility utilize dose modulation, iterative reconstruction, and/or weight based dosing when appropriate to reduce radiation dose to as low as reasonably achievable.  Unless otherwise stated, incidental findings do not require dedicated follow-up imaging.    Reason: Abdominal pain, acute, nonlocalized    TECHNIQUE: CT abdomen and pelvis without IV or oral contrast. Study is tailored for detection of urinary tract calculi and evaluation of solid organs, hollow viscera, and vascular structures is limited.    COMPARISON: 1/1/2024    CT ABDOMEN:  Prior aortic valve replacement and coronary artery calcifications partially visualized. Minor atelectasis affects lung bases along with slight mosaic attenuation suggesting air trapping.    Calcified cholelithiasis lie in otherwise unremarkable gallbladder, unchanged. Liver shows no new abnormality. Noncontrast pancreas, spleen, and right adrenal are normal.  Left adrenal gland is either diminutive in size or absent. Noncontrast kidneys are normal, without hydronephrosis or urolithiasis. Minor aortoiliac calcifications unchanged.    Large hernia containing segment of large intestines and fat courses through left rectus muscle superiorly, unchanged. No associated intestinal obstruction. Postsurgical changes of right hemicolectomy are unchanged. A few colonic diverticula are present. Large and small intestines otherwise unremarkable. Postsurgical changes of ventral hernia repair are again noted.    Mild subcutaneous edema throughout the abdominal wall has increased.    Multilevel thoracolumbar spine compression fractures unchanged along with advanced disc degeneration at L4-L5 and L5-S1 and lower lumbar spine facet joint osteoarthrosis. Prior T10 and T12 vertebroplasty is noted.    CT PELVIS:  Bladder is normal, with adjacent calcification along inferior right bladder wall unchanged (image 190). Noncontrast uterus and adnexa are unremarkable. No free pelvic fluid.    IMPRESSION:    1. Unchanged cholelithiasis.  2. Unchanged left abdominal wall hernias as described.  3. Coronary artery calcifications.    Electronically signed by:  Ellis Cruz MD  03/08/2024 07:36 AM CST Workstation: [id number]  X-Ray Chest AP Portable  EXAM:  XR Chest, 1 View    CLINICAL HISTORY:  The patient is 78 years old and is Female; shortness of breath    TECHNIQUE:  Frontal view of the chest.    COMPARISON:  Chest radiograph from 10/18/2023    FINDINGS:  LUNGS:  There is mild interstitial prominence which is unchanged and may be chronic. No obvious consolidation.  PLEURAL SPACE:  No significant pleural effusion.  No obvious pneumothorax.  HEART:  Stable mild enlargement of the cardiac silhouette.  The patient is status post TAVR.  MEDIASTINUM:  Unremarkable.  BONES/JOINTS:  Vertebroplasty changes again noted at several levels in the spine. No obvious acute fracture.    IMPRESSION:  Mild  interstitial prominence which is unchanged and may be chronic. Mild interstitial edema not excluded.    Electronically signed by:  Leonila Boucher MD  03/08/2024 05:19 AM [facility] Workstation: [id number]      [facility]  Results for orders placed during the hospital encounter of 03/08/24    Echo    Interpretation Summary    Left Ventricle: The left ventricle is normal in size. Mildly increased wall thickness. There is mild concentric hypertrophy. Normal wall motion. There is normal systolic function with a visually estimated ejection fraction of 60 - 65%. There is normal diastolic function.    Right Ventricle: Normal right ventricular cavity size. Wall thickness is normal. Right ventricle wall motion  is normal. Systolic function is normal.    Left Atrium: Left atrium is moderately dilated.    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 It is reported to be a  23 mm ultra mm Carrion valve.    Mitral Valve: There is moderate bileaflet sclerosis. There is mild mitral annular calcification present. Moderately restricted motion. There is moderate stenosis. The mean pressure gradient across the mitral valve is 9 mmHg at a heart rate of  54 bpm.  With the mitral valve area estimated to be 1.50 cm2, however area estimated by P1/2t is 3.79 cm2 . There is no significant regurgitation. No paravalvular regurgitation.    Tricuspid Valve: There is mild to moderate regurgitation with a centrally directed jet.    Pulmonary Artery: There is mild pulmonary hypertension. The estimated pulmonary artery systolic pressure is 41 mmHg.    IVC/SVC: Intermediate venous pressure at 8 mmHg.      CURRENT/PREVIOUS VISIT EKG  Results for orders placed or performed during the hospital encounter of 03/08/24   EKG 12-lead    Collection Time: 03/08/24  2:33 AM   Result Value Ref Range    QRS Duration 78 ms    OHS QTC Calculation 408 ms    Narrative    Test Reason : R07.9,    Vent. Rate : 099 BPM     Atrial Rate : 087 BPM      P-R Int : 000 ms          QRS Dur : 078 ms      QT Int : 318 ms       P-R-T Axes : 000 034 -19 degrees     QTc Int : 408 ms    Atrial fibrillation with premature ventricular or aberrantly conducted  complexes  Abnormal ECG  When compared with ECG of 08-MAR-2024 02:32,  No significant change was found    Referred By: EM   SELF           Confirmed By:        ASSESSMENT/PLAN:     Active Hospital Problems    Diagnosis    *Acute on chronic hypoxic respiratory failure potentially secondary to CHF exacerbation    Atrial fibrillation    DM (diabetes mellitus)    Abdominal pain    Acute on chronic diastolic heart failure    CAD (coronary artery disease)    COPD exacerbation    Hypertension       ASSESSMENT & PLAN:   Acute on chronic respiratory failure O2 dependent  Heart failure with preserved ejection fraction non compliant with diuretics  Paroxysmal atrial fibrillation not on anticoagulation because of recurrent GI bleed and multiple blood transfusions.  A Watchman has been discussed with the patient previously  Iron deficiency anemia secondary to blood loss  Status post TAVR  Hypertension  Hyperlipidemia on 10 mg of simvastatin      RECOMMENDATIONS:  She is to be compliant with her diuretics this has been discussed with her in detail.  Add spironolactone        Brown Macias MD  Date of Service: 03/10/2024  10:56 AM

## 2024-03-10 NOTE — CARE UPDATE
03/09/24 2116   Patient Assessment/Suction   Level of Consciousness (AVPU) alert   Respiratory Effort Unlabored   Expansion/Accessory Muscles/Retractions no use of accessory muscles   All Lung Fields Breath Sounds clear;diminished   Rhythm/Pattern, Respiratory no shortness of breath reported   Cough Frequency infrequent   Cough Type no productive sputum   PRE-TX-O2   Device (Oxygen Therapy) nasal cannula   $ Is the patient on Low Flow Oxygen? Yes   Flow (L/min) 4   SpO2 98 %   Pulse Oximetry Type Intermittent   $ Pulse Oximetry - Multiple Charge Pulse Oximetry - Multiple   Pulse 85   Resp 17   Temp 97.8 °F (36.6 °C)   /75   Positioning   Head of Bed (HOB) Positioning HOB elevated;HOB at 30 degrees   Aerosol Therapy   $ Aerosol Therapy Charges Aerosol Treatment   Daily Review of Necessity (SVN) completed   Respiratory Treatment Status (SVN) given   Treatment Route (SVN) oxygen   Patient Position (SVN) semi-Marin's   Post Treatment Assessment (SVN) breath sounds improved   Signs of Intolerance (SVN) none   Breath Sounds Post-Respiratory Treatment   Throughout All Fields Post-Treatment All Fields   Throughout All Fields Post-Treatment aeration increased   Post-treatment Heart Rate (beats/min) 88   Post-treatment Resp Rate (breaths/min) 17   Education   $ Education Bronchodilator;15 min

## 2024-03-10 NOTE — NURSING
03/10/2024      Assumed care of patient.   Assessment and vital signs assessed.   Labs and meds reviewed, see flowsheets for more details.  Will continue to monitor this shift.  Last documentation =  Temp: 96.8 °F (36 °C) (03/10/24 0925)  Pulse: 86 (03/10/24 0925)  Resp: 18 (03/10/24 0751)  BP: 121/70 (03/10/24 0925)  SpO2: 97 % (03/10/24 0751)       Pt awake in the bed, sitter in room, looking at tv, call bell in reach, bed in lowest position, no pain or concerns at this time.

## 2024-03-10 NOTE — PROGRESS NOTES
Patient: Tamiko Cyr     MRN: [id number]    [facility] AC[facility] #: [id number]    : 1945 Age: 78 y.o. Sex:female  Room: 1102 Bed: 1102-a    Admit Date: 3/8/2024   Pt. Type: IP- Inpatient    Admitting Physician: Fredi Avitia MD    Attending Physician: Fredi Avitia MD     Date of service: 3/10/2024        Admission diagnosis  Congestive heart failure, unspecified HF chronicity, unspecified heart failure type [I50.9]       Subjective:  Patient with less shortness of breath today. She is requiring 4 L oxygen. No chest pain. She has been noted with intermittent episodes of forgetfulness, as well as trying to remove IV access.    Review of systems  Somewhat limited    Medications    Current Facility-Administered Medications:     acetaminophen tablet 650 mg, 650 mg, Oral, Q8H PRN, Brian Edwards MD    acetaminophen tablet 650 mg, 650 mg, Oral, Q4H PRN, Brian Edwards MD    aluminum-magnesium hydroxide-simethicone 200-200-20 mg/5 mL suspension 30 mL, 30 mL, Oral, QID PRN, Brian Edwards MD    aspirin EC tablet 81 mg, 81 mg, Oral, Daily, Brian Edwards MD, 81 mg at 03/10/24 0925    atorvastatin tablet 10 mg, 10 mg, Oral, QHS, Brian Edwards MD, 10 mg at 24    carvediloL tablet 12.5 mg, 12.5 mg, Oral, BID, Brian Edwards MD, 12.5 mg at 03/10/24 0925    clopidogreL tablet 75 mg, 75 mg, Oral, Daily, Brian Edwards MD, 75 mg at 03/10/24 09    dextrose 50% injection 12.5 g, 12.5 g, Intravenous, PRN, Brian Edwards MD    dextrose 50% injection 12.5 g, 12.5 g, Intravenous, PRNGrace Omar N, MD    dextrose 50% injection 12.5 g, 12.5 g, Intravenous, PRN, Krista Ham MD    dextrose 50% injection 25 g, 25 g, Intravenous, PRNGrace Omar N, MD    dextrose 50% injection 25 g, 25 g, Intravenous, PRN, Brian Edwards MD    dextrose 50% injection 25 g, 25 g, Intravenous, PRN, Krista Ham MD    digoxin tablet 0.125 mg, 0.125 mg, Oral, Daily, Brian Edwards MD, 0.125 mg at 03/10/24 1320    diltiaZEM 24 hr capsule 120 mg, 120 mg,  Oral, Daily, Brian Edwards MD, 120 mg at 03/10/24 0925    donepeziL tablet 5 mg, 5 mg, Oral, Nightly, Brian Edwards MD, 5 mg at 03/09/24 2123    enoxaparin injection 40 mg, 40 mg, Subcutaneous, Daily, Brian Edwards MD, 40 mg at 03/09/24 1720    furosemide tablet 40 mg, 40 mg, Oral, BID, Fredi Avitia MD    glucagon (human recombinant) injection 1 mg, 1 mg, Intramuscular, PRN, Brian Edwards MD    glucagon (human recombinant) injection 1 mg, 1 mg, Intramuscular, PRN, Brian Edwards MD    glucagon (human recombinant) injection 1 mg, 1 mg, Intramuscular, PRJitendra YAÑEZ Mira S., MD    glucose chewable tablet 16 g, 16 g, Oral, PRNGrace Omar N, MD    glucose chewable tablet 16 g, 16 g, Oral, PRN, Brian Edwards MD    glucose chewable tablet 16 g, 16 g, Oral, PRN, Krista Ham MD    glucose chewable tablet 24 g, 24 g, Oral, PRN, Brian Edwards MD    glucose chewable tablet 24 g, 24 g, Oral, PRNGrace Omar N, MD    glucose chewable tablet 24 g, 24 g, Oral, PRJitendra YAÑEZ Mira S., MD    hydrALAZINE injection 10 mg, 10 mg, Intravenous, Q6H PRGrace YAÑEZ Omar N, MD    HYDROcodone-acetaminophen 5-325 mg per tablet 1 tablet, 1 tablet, Oral, Q6H PRN, Brian Edwards MD, 1 tablet at 03/10/24 0018    insulin aspart U-100 pen 0-5 Units, 0-5 Units, Subcutaneous, QID (AC + HS) Jitendra SMART Mira S., MD    ipratropium 0.02 % nebulizer solution 0.5 mg, 0.5 mg, Nebulization, Q8H, Fredi Avitia MD, 0.5 mg at 03/10/24 1600    levalbuterol nebulizer solution 0.63 mg, 0.63 mg, Nebulization, Q8H, Nicole Fairbanks, FNP, 0.63 mg at 03/10/24 1600    magnesium oxide tablet 800 mg, 800 mg, Oral, PRN, Brian Edwards MD    magnesium oxide tablet 800 mg, 800 mg, Oral, PRN, Brian Edwards MD    melatonin tablet 6 mg, 6 mg, Oral, Nightly PRN, Brian Edwards MD    naloxone 0.4 mg/mL injection 0.02 mg, 0.02 mg, Intravenous, PRN, Brian Edwards MD    ondansetron injection 4 mg, 4 mg, Intravenous, Q6H PRN, Brian Edwards MD    pantoprazole EC tablet 40 mg, 40 mg, Oral,  BID, Brian Edwards MD, 40 mg at 03/10/24 0523    potassium bicarbonate disintegrating tablet 35 mEq, 35 mEq, Oral, PRPARI, Brian Edwards MD    potassium bicarbonate disintegrating tablet 50 mEq, 50 mEq, Oral, Grace SMART Omar N, MD, 50 mEq at 03/08/24 1249    potassium bicarbonate disintegrating tablet 60 mEq, 60 mEq, Oral, Grace SMART Omar N, MD    potassium, sodium phosphates 280-160-250 mg packet 2 packet, 2 packet, Oral, PRN, Brian Edwards MD    potassium, sodium phosphates 280-160-250 mg packet 2 packet, 2 packet, Oral, PRNGrace Omar N, MD    potassium, sodium phosphates 280-160-250 mg packet 2 packet, 2 packet, Oral, PRGrace YAÑEZ Omar N, MD    predniSONE tablet 40 mg, 40 mg, Oral, Daily, Fredi Avitia MD, 40 mg at 03/10/24 0925    sodium chloride 0.9% flush 10 mL, 10 mL, Intravenous, PRNGrace Omar N, MD    sodium chloride 0.9% flush 2 mL, 2 mL, Intravenous, Q12H PRPARI, Brian Edwards MD    spironolactone tablet 25 mg, 25 mg, Oral, Daily, Brown Macias MD, 25 mg at 03/10/24 1555     Last Recorded Vitals  Vitals:    03/10/24 1600   BP:    Pulse: 65   Resp: 19   Temp:         Physical Exam  General:  debilitated looking elderly patient in bed ; BMI: 38  HEENT:  Head is normocephalic atraumatic, PERRLA, EOMI, oral cavity moist  Neck:  Supple, no thyromegaly, trachea midline position, no JVD noted   Respiratory:  O2 4L via nasal cannula; few crackles noted bibasally; no  wheezing heard  Cardiovascular: Irregular rhythm,  no murmurs or rubs or gallops noted  Abdomen:  Soft, nontender, nondistended bowel sounds heard in all quadrants, no hepatosplenomegaly   Genitourinary:  No CVA tenderness; pelvic exam deferred  Skin:  No rash, warm and dry   Extremities: No cyanosis, clubbing; edema 1+ in lower extremities; debility, needing assistance to mobilize out of bed  CNS:  Oriented to person, but intermittently disoriented to place and time. No facial asymmetry.  No focal extremity weakness.  Sensation grossly  intact.      Laboratory data:   Recent Labs   Lab 03/04/24  1425 03/08/24  0312 03/09/24  1003   WBC 7.06 6.45 12.38   HGB 9.7* 9.5* 9.5*   HCT 35.7* 34.8* 34.9*    173 191         Recent Labs   Lab 03/04/24  1425 03/08/24  0312 03/09/24  1003 03/10/24  0509    138 140  140 137   K 2.9* 3.5 4.2  4.2 3.7   CL 95 97 94*  94* 93*   CO2 39* 35* 41*  41* 40*   BUN 7* 10 21  21 27*   CREATININE 0.5 0.5 0.6  0.6 0.6   CALCIUM 9.3 9.3 8.8  8.8 9.0   PROT 6.7 6.5 6.4  --    BILITOT 1.8* 1.4* 1.1*  --    ALKPHOS 74 69 61  --    ALT 35 32 26  --    AST 35 38 24  --          Imaging studies:  Echo    Left Ventricle: The left ventricle is normal in size. Mildly increased   wall thickness. There is mild concentric hypertrophy. Normal wall motion.   There is normal systolic function with a visually estimated ejection   fraction of 60 - 65%. There is normal diastolic function.    Right Ventricle: Normal right ventricular cavity size. Wall thickness   is normal. Right ventricle wall motion  is normal. Systolic function is   normal.    Left Atrium: Left atrium is moderately dilated.    Aortic Valve: There is a transcatheter valve replacement in the aortic   position. It is reported to be a  23 mm ultra mm Carrion valve.    Mitral Valve: There is moderate bileaflet sclerosis. There is mild   mitral annular calcification present. Moderately restricted motion. There   is moderate stenosis. The mean pressure gradient across the mitral valve   is 9 mmHg at a heart rate of  54 bpm.  With the mitral valve area   estimated to be 1.50 cm2, however area estimated by P1/2t is 3.79 cm2 .   There is no significant regurgitation. No paravalvular regurgitation.    Tricuspid Valve: There is mild to moderate regurgitation with a   centrally directed jet.    Pulmonary Artery: There is mild pulmonary hypertension. The estimated   pulmonary artery systolic pressure is 41 mmHg.    IVC/SVC: Intermediate venous pressure at 8 mmHg.  CT  Abdomen Pelvis  Without Contrast  CMS MANDATED QUALITY DATA - CT RADIATION - 436    All CT scans at this facility utilize dose modulation, iterative reconstruction, and/or weight based dosing when appropriate to reduce radiation dose to as low as reasonably achievable.  Unless otherwise stated, incidental findings do not require dedicated follow-up imaging.    Reason: Abdominal pain, acute, nonlocalized    TECHNIQUE: CT abdomen and pelvis without IV or oral contrast. Study is tailored for detection of urinary tract calculi and evaluation of solid organs, hollow viscera, and vascular structures is limited.    COMPARISON: 1/1/2024    CT ABDOMEN:  Prior aortic valve replacement and coronary artery calcifications partially visualized. Minor atelectasis affects lung bases along with slight mosaic attenuation suggesting air trapping.    Calcified cholelithiasis lie in otherwise unremarkable gallbladder, unchanged. Liver shows no new abnormality. Noncontrast pancreas, spleen, and right adrenal are normal. Left adrenal gland is either diminutive in size or absent. Noncontrast kidneys are normal, without hydronephrosis or urolithiasis. Minor aortoiliac calcifications unchanged.    Large hernia containing segment of large intestines and fat courses through left rectus muscle superiorly, unchanged. No associated intestinal obstruction. Postsurgical changes of right hemicolectomy are unchanged. A few colonic diverticula are present. Large and small intestines otherwise unremarkable. Postsurgical changes of ventral hernia repair are again noted.    Mild subcutaneous edema throughout the abdominal wall has increased.    Multilevel thoracolumbar spine compression fractures unchanged along with advanced disc degeneration at L4-L5 and L5-S1 and lower lumbar spine facet joint osteoarthrosis. Prior T10 and T12 vertebroplasty is noted.    CT PELVIS:  Bladder is normal, with adjacent calcification along inferior right bladder wall  unchanged (image 190). Noncontrast uterus and adnexa are unremarkable. No free pelvic fluid.    IMPRESSION:    1. Unchanged cholelithiasis.  2. Unchanged left abdominal wall hernias as described.  3. Coronary artery calcifications.    Electronically signed by:  Ellis Cruz MD  03/08/2024 07:36 AM CST Workstation: [id number]  X-Ray Chest AP Portable  EXAM:  XR Chest, 1 View    CLINICAL HISTORY:  The patient is 78 years old and is Female; shortness of breath    TECHNIQUE:  Frontal view of the chest.    COMPARISON:  Chest radiograph from 10/18/2023    FINDINGS:  LUNGS:  There is mild interstitial prominence which is unchanged and may be chronic. No obvious consolidation.  PLEURAL SPACE:  No significant pleural effusion.  No obvious pneumothorax.  HEART:  Stable mild enlargement of the cardiac silhouette.  The patient is status post TAVR.  MEDIASTINUM:  Unremarkable.  BONES/JOINTS:  Vertebroplasty changes again noted at several levels in the spine. No obvious acute fracture.    IMPRESSION:  Mild interstitial prominence which is unchanged and may be chronic. Mild interstitial edema not excluded.    Electronically signed by:  Leonila Boucher MD  03/08/2024 05:19 AM CST Workstation: [id number]       Assessement and Plan:  Active Hospital Problems    Diagnosis  POA    *Acute on chronic hypoxic respiratory failure potentially secondary to CHF exacerbation [J96.21]  Yes    Atrial fibrillation [I48.91]  Yes    DM (diabetes mellitus) [E11.9]  Yes    Abdominal pain [R10.9]  Unknown    Acute on chronic diastolic heart failure [I50.33]  Yes    CAD (coronary artery disease) [I25.10]  Yes    COPD exacerbation [J44.1]  Yes    Hypertension [I10]  Yes      Resolved Hospital Problems   No resolved problems to display.      * Acute on chronic hypoxic respiratory failure potentially secondary to CHF exacerbation  -Cardiology consult  Echo: EF: 60-65%; normal diastolic function  -IV Lasix, changed to PO  On spironolactone, digoxin,  carvedilol  -Monitor input/output  -Daily weights  -Telemetry monitoring  3/10: On 4L O2; titrate off gradually     Atrial fibrillation  S/p TAVR  CAD (coronary artery disease)  -Home meds: plavix, carvedilol, diltiazem  Not on anticoagulant due to h/o recurrent GI bleeds.   Watchman discussed previosly       DM (diabetes mellitus)  -HgA1c  -Insulin Sliding Scale  -Blood glucose checks qAC & HS  -Hypoglycemia protocol  -Monitor labs     Hypertension  -Home meds and adjust as necessary  -Prn Hydralazine     Abdominal pain, resolved  -CTAP: cholelithiasis and left abdominal wall hernias, unchanged; coronary calcifications  -Analgesic as needed  -Monitor labs and vitals     COPD  exacerbation  Chronic  respiratory failure on Home oxygen   Bronchodilator nebs  Started on prednisone PO  Chest xray:Mild interstitial prominence which is unchanged and may be chronic. Mild interstitial edema not excluded.  3/10:  Budesonide nebulizer  ABG; Bipap use as needed    Mental status change   Noted on 3/10  Monitor for hypercapnia and manage accordingly  Sitter at bedside  Get ABG and digoxin level    Debility  Fall precautions  PT for evaluation      DVT Prophylaxis:lovenox      Code status: Full Code    Disposition: Await clinical improvement    Disclaimer:  The above note was dictated utilizing speech recognition software.  Efforts were made to minimize and correct any transcription errors.     Fredi Avitia MD

## 2024-03-11 VITALS
WEIGHT: 200.63 LBS | RESPIRATION RATE: 16 BRPM | BODY MASS INDEX: 39.39 KG/M2 | HEIGHT: 60 IN | SYSTOLIC BLOOD PRESSURE: 137 MMHG | TEMPERATURE: 98 F | HEART RATE: 80 BPM | OXYGEN SATURATION: 96 % | DIASTOLIC BLOOD PRESSURE: 86 MMHG

## 2024-03-11 LAB — GLUCOSE SERPL-MCNC: 120 MG/DL (ref 70–110)

## 2024-03-11 PROCEDURE — 25000242 PHARM REV CODE 250 ALT 637 W/ HCPCS: Performed by: INTERNAL MEDICINE

## 2024-03-11 PROCEDURE — 25000242 PHARM REV CODE 250 ALT 637 W/ HCPCS: Performed by: NURSE PRACTITIONER

## 2024-03-11 PROCEDURE — 99900035 HC TECH TIME PER 15 MIN (STAT)

## 2024-03-11 PROCEDURE — 94760 N-INVAS EAR/PLS OXIMETRY 1: CPT

## 2024-03-11 PROCEDURE — 25000003 PHARM REV CODE 250: Performed by: INTERNAL MEDICINE

## 2024-03-11 PROCEDURE — 99900031 HC PATIENT EDUCATION (STAT)

## 2024-03-11 PROCEDURE — 63600175 PHARM REV CODE 636 W HCPCS: Performed by: INTERNAL MEDICINE

## 2024-03-11 PROCEDURE — 94640 AIRWAY INHALATION TREATMENT: CPT

## 2024-03-11 PROCEDURE — 27000221 HC OXYGEN, UP TO 24 HOURS

## 2024-03-11 RX ORDER — PREDNISONE 20 MG/1
20 TABLET ORAL DAILY
Qty: 5 TABLET | Refills: 0 | Status: SHIPPED | OUTPATIENT
Start: 2024-03-12 | End: 2024-03-17

## 2024-03-11 RX ORDER — FUROSEMIDE 40 MG/1
40 TABLET ORAL DAILY
Qty: 30 TABLET | Refills: 11 | Status: SHIPPED | OUTPATIENT
Start: 2024-03-11 | End: 2025-03-11

## 2024-03-11 RX ADMIN — DIGOXIN 0.12 MG: 125 TABLET ORAL at 09:03

## 2024-03-11 RX ADMIN — CARVEDILOL 12.5 MG: 12.5 TABLET, FILM COATED ORAL at 08:03

## 2024-03-11 RX ADMIN — PREDNISONE 40 MG: 20 TABLET ORAL at 08:03

## 2024-03-11 RX ADMIN — BUDESONIDE 0.5 MG: 0.5 INHALANT RESPIRATORY (INHALATION) at 07:03

## 2024-03-11 RX ADMIN — ASPIRIN 81 MG: 81 TABLET, COATED ORAL at 08:03

## 2024-03-11 RX ADMIN — DILTIAZEM HYDROCHLORIDE 120 MG: 120 CAPSULE, COATED, EXTENDED RELEASE ORAL at 08:03

## 2024-03-11 RX ADMIN — IPRATROPIUM BROMIDE 0.5 MG: 0.5 SOLUTION RESPIRATORY (INHALATION) at 07:03

## 2024-03-11 RX ADMIN — PANTOPRAZOLE SODIUM 40 MG: 40 TABLET, DELAYED RELEASE ORAL at 05:03

## 2024-03-11 RX ADMIN — CLOPIDOGREL BISULFATE 75 MG: 75 TABLET, FILM COATED ORAL at 08:03

## 2024-03-11 RX ADMIN — FUROSEMIDE 40 MG: 40 TABLET ORAL at 08:03

## 2024-03-11 RX ADMIN — LEVALBUTEROL HYDROCHLORIDE 0.63 MG: 0.63 SOLUTION RESPIRATORY (INHALATION) at 07:03

## 2024-03-11 RX ADMIN — SPIRONOLACTONE 25 MG: 25 TABLET ORAL at 08:03

## 2024-03-11 NOTE — PLAN OF CARE
Pt cleared for DC by CM with no needs.    03/11/24 1342   Final Note   Assessment Type Final Discharge Note   Anticipated Discharge Disposition Home

## 2024-03-11 NOTE — PLAN OF CARE
Spoke with the pt at the bedside regarding discharge planned for today; she sees the NP in DR Tariq's office and wants an afternoon appt.   Appt scheduled with Evelin Motley NP for 3/19 @2:00pm; information added to AVS.   Patiet states her sister or daughter will be picking her up and she will let them know to bring her portable oxygen.    03/11/24 1139   Post-Acute Status   Hospital Resources/Appts/Education Provided Appointments scheduled and added to AVS

## 2024-03-11 NOTE — PLAN OF CARE
Problem: Adult Inpatient Plan of Care  Goal: Plan of Care Review  Outcome: Ongoing, Progressing  Goal: Patient-Specific Goal (Individualized)  Outcome: Ongoing, Progressing  Goal: Absence of Hospital-Acquired Illness or Injury  Outcome: Ongoing, Progressing  Goal: Optimal Comfort and Wellbeing  Outcome: Ongoing, Progressing  Goal: Readiness for Transition of Care  Outcome: Ongoing, Progressing     Problem: Bariatric Environmental Safety  Goal: Safety Maintained with Care  Outcome: Ongoing, Progressing     Problem: Diabetes Comorbidity  Goal: Blood Glucose Level Within Targeted Range  Outcome: Ongoing, Progressing     Problem: Skin Injury Risk Increased  Goal: Skin Health and Integrity  Outcome: Ongoing, Progressing     Problem: Impaired Wound Healing  Goal: Optimal Wound Healing  Outcome: Ongoing, Progressing     Problem: Oral Intake Inadequate  Goal: Improved Oral Intake  Outcome: Ongoing, Progressing

## 2024-03-11 NOTE — CARE UPDATE
03/10/24 1943   Patient Assessment/Suction   Level of Consciousness (AVPU) alert   Respiratory Effort Normal;Unlabored   Expansion/Accessory Muscles/Retractions no use of accessory muscles   All Lung Fields Breath Sounds equal bilaterally;diminished;rhonchi   Rhythm/Pattern, Respiratory unlabored   Cough Frequency infrequent   PRE-TX-O2   Device (Oxygen Therapy) nasal cannula   Flow (L/min) 4   SpO2 99 %   Pulse Oximetry Type Intermittent   $ Pulse Oximetry - Multiple Charge Pulse Oximetry - Multiple   Pulse 88   Resp 18   Aerosol Therapy   $ Aerosol Therapy Charges Aerosol Treatment   Daily Review of Necessity (SVN) completed   Respiratory Treatment Status (SVN) given   Treatment Route (SVN) mask   Patient Position (SVN) HOB elevated   Post Treatment Assessment (SVN) breath sounds unchanged   Signs of Intolerance (SVN) none   Breath Sounds Post-Respiratory Treatment   Throughout All Fields Post-Treatment All Fields   Throughout All Fields Post-Treatment no change   Post-treatment Heart Rate (beats/min) 82   Post-treatment Resp Rate (breaths/min) 20   Education   $ Education Bronchodilator;15 min

## 2024-03-11 NOTE — CARE UPDATE
03/11/24 0713   Patient Assessment/Suction   Level of Consciousness (AVPU) alert   Respiratory Effort Unlabored;Normal   Expansion/Accessory Muscles/Retractions no retractions;no use of accessory muscles   All Lung Fields Breath Sounds diminished   Rhythm/Pattern, Respiratory unlabored;pattern regular;depth regular   Cough Frequency infrequent   Cough Type no productive sputum   PRE-TX-O2   Device (Oxygen Therapy) nasal cannula   $ Is the patient on Low Flow Oxygen? Yes   Flow (L/min) 3   SpO2 96 %   Pulse Oximetry Type Intermittent   $ Pulse Oximetry - Single Charge Pulse Oximetry - Single   Pulse 80   Resp 16   Aerosol Therapy   $ Aerosol Therapy Charges Aerosol Treatment   Daily Review of Necessity (SVN) completed   Respiratory Treatment Status (SVN) given   Treatment Route (SVN) mask;oxygen   Patient Position (SVN) HOB elevated   Post Treatment Assessment (SVN) breath sounds unchanged   Signs of Intolerance (SVN) none   Breath Sounds Post-Respiratory Treatment   Throughout All Fields Post-Treatment All Fields   Throughout All Fields Post-Treatment no change   Post-treatment Heart Rate (beats/min) 88   Post-treatment Resp Rate (breaths/min) 18   Equipment Change   $ RT Equipment Treatment nebulizer;Aerosol treatment mask   Nebulizer Changed? Yes   Nebulizer Change Next Due 03/18/24   Education   $ Education 15 min;Bronchodilator

## 2024-03-11 NOTE — NURSING
Nurses Note -- 4 Eyes      3/11/2024   6:56 PM      Skin assessed during: Daily Assessment      [x] No Altered Skin Integrity Present    [x]Prevention Measures Documented      [] Yes- Altered Skin Integrity Present or Discovered   [] LDA Added if Not in Epic (Describe Wound)   [] New Altered Skin Integrity was Present on Admit and Documented in LDA   [] Wound Image Taken    Wound Care Consulted? No    Attending Nurse:  AYAKA Nicholson    Second RN/Staff Member:

## 2024-03-11 NOTE — DISCHARGE SUMMARY
Patient: Tamiko Cyr     MRN: [id number]    [facility] ACCT #: [id number]  : 1945  Age: 78 y.o.  Sex:female   Room: 1102   Admit Date: 3/8/2024    D/C Date:    Pt. Type: IP- Inpatient    Admitting Physician: Fredi Avitia MD  Attending Physician: Fredi Avitia MD   Consulting Physician:   Primary Care Physician: Abhi De Oliveira IV, MD    Reason for Hospitalization  Acute on chronic hypoxic respiratory failure    Discharge Diagnosis  Active Hospital Problems    Diagnosis  POA    *Acute on chronic hypoxic respiratory failure potentially secondary to CHF exacerbation [J96.21]  Yes    Atrial fibrillation [I48.91]  Yes    DM (diabetes mellitus) [E11.9]  Yes    Abdominal pain [R10.9]  Unknown    Acute on chronic diastolic heart failure [I50.33]  Yes    CAD (coronary artery disease) [I25.10]  Yes    COPD exacerbation [J44.1]  Yes    Hypertension [I10]  Yes      Resolved Hospital Problems   No resolved problems to display.       HPI & Hospital Course  Tamiko Cyr is a 78 y.o. female  w/ pmh of HTN, DM, CAD, HFpEF (EF 65-70%), AFib status post TAVR, COPD on home oxygen, presenting with with and swelling in both legs for 2 weeks, associated with abdominal discomfort.  Patient described using 2 pillows when she sleeps at night.  Patient was evaluated in the emergency room and considered to have acute on chronic hypoxic respiratory failure due to CHF exacerbation.  IV diuretic with Lasix was started.  Cardiology was consulted.  2D echocardiogram showed EF of 60-65% with normal diastolic function.  Patient has improved clinically during hospital stay.  She states she feels much better.  She is still requiring oxygen via nasal cannula.  She does have mild- moderate hypercapnia on arterial blood gas but without significant acidosis.  Home furosemide dose has been increased.  Patient is on spironolactone which she will continue.  Patient also treated for COPD exacerbation and placed on DuoNebs as well as budesonide  nebulizer.  She will be discharged home on a short course of prednisone p.o..    Allergies  Latex, Sulfacetamide sodium, Sulfasalazine, Adhesive, Adhesive tape-silicones, and Sulfa (sulfonamide antibiotics)      Physical Exam  General:  debilitated looking elderly patient in bed ; BMI: 38  HEENT:  Head is normocephalic atraumatic, PERRLA, EOMI, oral cavity moist  Neck:  Supple, no thyromegaly, trachea midline position, no JVD noted   Respiratory:  O2 2L via nasal cannula; few crackles noted bibasally; no  wheezing heard  Cardiovascular: Irregular rhythm,  no murmurs or rubs or gallops noted  Abdomen:  Soft, nontender, nondistended bowel sounds heard in all quadrants, no hepatosplenomegaly   Genitourinary:  No CVA tenderness; pelvic exam deferred  Skin:  No rash, warm and dry   Extremities: No cyanosis, clubbing; trace edema in lower extremities; debility, mild, needing assistance to mobilize out of bed  CNS:  Oriented to person,  place and time. No facial asymmetry.  No focal extremity weakness.  Sensation grossly intact.    Procedures and Radiology   Echo    Left Ventricle: The left ventricle is normal in size. Mildly increased   wall thickness. There is mild concentric hypertrophy. Normal wall motion.   There is normal systolic function with a visually estimated ejection   fraction of 60 - 65%. There is normal diastolic function.    Right Ventricle: Normal right ventricular cavity size. Wall thickness   is normal. Right ventricle wall motion  is normal. Systolic function is   normal.    Left Atrium: Left atrium is moderately dilated.    Aortic Valve: There is a transcatheter valve replacement in the aortic   position. It is reported to be a  23 mm ultra mm Carrion valve.    Mitral Valve: There is moderate bileaflet sclerosis. There is mild   mitral annular calcification present. Moderately restricted motion. There   is moderate stenosis. The mean pressure gradient across the mitral valve   is 9 mmHg at a heart  rate of  54 bpm.  With the mitral valve area   estimated to be 1.50 cm2, however area estimated by P1/2t is 3.79 cm2 .   There is no significant regurgitation. No paravalvular regurgitation.    Tricuspid Valve: There is mild to moderate regurgitation with a   centrally directed jet.    Pulmonary Artery: There is mild pulmonary hypertension. The estimated   pulmonary artery systolic pressure is 41 mmHg.    IVC/SVC: Intermediate venous pressure at 8 mmHg.  CT Abdomen Pelvis  Without Contrast  CMS MANDATED QUALITY DATA - CT RADIATION - 436    All CT scans at this facility utilize dose modulation, iterative reconstruction, and/or weight based dosing when appropriate to reduce radiation dose to as low as reasonably achievable.  Unless otherwise stated, incidental findings do not require dedicated follow-up imaging.    Reason: Abdominal pain, acute, nonlocalized    TECHNIQUE: CT abdomen and pelvis without IV or oral contrast. Study is tailored for detection of urinary tract calculi and evaluation of solid organs, hollow viscera, and vascular structures is limited.    COMPARISON: 1/1/2024    CT ABDOMEN:  Prior aortic valve replacement and coronary artery calcifications partially visualized. Minor atelectasis affects lung bases along with slight mosaic attenuation suggesting air trapping.    Calcified cholelithiasis lie in otherwise unremarkable gallbladder, unchanged. Liver shows no new abnormality. Noncontrast pancreas, spleen, and right adrenal are normal. Left adrenal gland is either diminutive in size or absent. Noncontrast kidneys are normal, without hydronephrosis or urolithiasis. Minor aortoiliac calcifications unchanged.    Large hernia containing segment of large intestines and fat courses through left rectus muscle superiorly, unchanged. No associated intestinal obstruction. Postsurgical changes of right hemicolectomy are unchanged. A few colonic diverticula are present. Large and small intestines otherwise  unremarkable. Postsurgical changes of ventral hernia repair are again noted.    Mild subcutaneous edema throughout the abdominal wall has increased.    Multilevel thoracolumbar spine compression fractures unchanged along with advanced disc degeneration at L4-L5 and L5-S1 and lower lumbar spine facet joint osteoarthrosis. Prior T10 and T12 vertebroplasty is noted.    CT PELVIS:  Bladder is normal, with adjacent calcification along inferior right bladder wall unchanged (image 190). Noncontrast uterus and adnexa are unremarkable. No free pelvic fluid.    IMPRESSION:    1. Unchanged cholelithiasis.  2. Unchanged left abdominal wall hernias as described.  3. Coronary artery calcifications.    Electronically signed by:  Ellis Cruz MD  03/08/2024 07:36 AM CST Workstation: [id number]  X-Ray Chest AP Portable  EXAM:  XR Chest, 1 View    CLINICAL HISTORY:  The patient is 78 years old and is Female; shortness of breath    TECHNIQUE:  Frontal view of the chest.    COMPARISON:  Chest radiograph from 10/18/2023    FINDINGS:  LUNGS:  There is mild interstitial prominence which is unchanged and may be chronic. No obvious consolidation.  PLEURAL SPACE:  No significant pleural effusion.  No obvious pneumothorax.  HEART:  Stable mild enlargement of the cardiac silhouette.  The patient is status post TAVR.  MEDIASTINUM:  Unremarkable.  BONES/JOINTS:  Vertebroplasty changes again noted at several levels in the spine. No obvious acute fracture.    IMPRESSION:  Mild interstitial prominence which is unchanged and may be chronic. Mild interstitial edema not excluded.    Electronically signed by:  Leonila Boucher MD  03/08/2024 05:19 AM CST Workstation: [id number]        Discharge Condition: Stable      Follow-Up  Abhi De Oliveira IV, MD   Cardiologist  Pulmonologist    Discharge Medications     Medication List        START taking these medications      predniSONE 20 MG tablet  Commonly known as: DELTASONE  Take 1 tablet (20 mg total) by  mouth once daily. for 5 days  Start taking on: March 12, 2024            CHANGE how you take these medications      ergocalciferol 50,000 unit Cap  Commonly known as: ERGOCALCIFEROL  TAKE 1 CAPSULE (50,000 UNITS TOTAL) EVERY 7 DAYS.  What changed: See the new instructions.     furosemide 40 MG tablet  Commonly known as: LASIX  Take 1 tablet (40 mg total) by mouth once daily.  What changed:   when to take this  reasons to take this            CONTINUE taking these medications      albuterol 90 mcg/actuation inhaler  Commonly known as: PROVENTIL/VENTOLIN HFA     aspirin 81 MG EC tablet  Commonly known as: ECOTRIN     BREO ELLIPTA 100-25 mcg/dose diskus inhaler  Generic drug: fluticasone furoate-vilanteroL     butalbital-acetaminophen-caffeine -40 mg -40 mg per tablet  Commonly known as: FIORICET, ESGIC  Take 1 tablet by mouth every 4 (four) hours as needed for Pain.     calcium carbonate 500 mg calcium (1,250 mg) tablet  Commonly known as: OS-TK  Take 1 tablet (500 mg total) by mouth once daily.     carvediloL 12.5 MG tablet  Commonly known as: COREG     citalopram 40 MG tablet  Commonly known as: CeleXA  Take 1 tablet (40 mg total) by mouth once daily.     clopidogreL 75 mg tablet  Commonly known as: PLAVIX  Take 1 tablet (75 mg total) by mouth once daily.     digoxin 125 mcg tablet  Commonly known as: LANOXIN  Take 1 tablet (0.125 mg total) by mouth once daily.     diltiaZEM 120 MG Cdcr  Commonly known as: DILACOR XR  Take 1 capsule (120 mg total) by mouth once daily.     donepeziL 5 MG tablet  Commonly known as: ARICEPT     ferrous gluconate 324 mg (37.5 mg iron) Tab tablet  Take 1 tablet (324 mg total) by mouth once daily.     ferrous sulfate 325 mg (65 mg iron) Tab tablet  Commonly known as: FEOSOL     glucosamine-chondroitin 500-400 mg tablet     loperamide 2 mg Tab  Commonly known as: IMODIUM A-D  Take 1 tablet (2 mg total) by mouth 3 (three) times daily as needed (diarrhea). Take 2 tablets by  mouth initially then 1 tablet after each loose stool as needed for diarrhea.     multivitamin capsule     pantoprazole 40 MG tablet  Commonly known as: PROTONIX  Take 1 tablet (40 mg total) by mouth 2 (two) times daily.     potassium chloride 10 MEQ Tbsr  Commonly known as: KLOR-CON  Take 1 tablet (10 mEq total) by mouth As instructed. Take 2 tabs 1st day and 2nd day then 1 tab qd     simvastatin 10 MG tablet  Commonly known as: ZOCOR     spironolactone 25 MG tablet  Commonly known as: ALDACTONE  Take 1 tablet (25 mg total) by mouth once daily.            STOP taking these medications      loratadine 10 mg tablet  Commonly known as: CLARITIN               Where to Get Your Medications        These medications were sent to [facility] - [place], [place] - [address] [facility]  [address] [facility], [facility] [address]      Phone: [phone]   furosemide 40 MG tablet  predniSONE 20 MG tablet          Discharge time spent 35 minutes including examination of the patient, discussion of the hospital stay, diagnoses and discharge plan, patient counseling, chart review, preparation of discharge records and preparation of prescriptions    Disclaimer:  The above note was dictated utilizing speech recognition software.  Efforts were made to minimize and correct any transcription errors.     Fredi Avitia MD  Hospitalist

## 2024-03-18 ENCOUNTER — PATIENT OUTREACH (OUTPATIENT)
Dept: ADMINISTRATIVE | Facility: CLINIC | Age: 79
End: 2024-03-18
Payer: MEDICARE

## 2024-03-18 ENCOUNTER — PATIENT MESSAGE (OUTPATIENT)
Dept: ADMINISTRATIVE | Facility: CLINIC | Age: 79
End: 2024-03-18
Payer: MEDICARE

## 2024-03-18 NOTE — PROGRESS NOTES
C3 nurse attempted to contact Daryleen G Moran for a TCC post hospital discharge follow up call. No answer, left voicemail with callback information on the pts phone. Pts daughter was unaware of the pts medications and RCB with the pt. She was informed however of the pts HOSFU appointment tomorrow and the OOC number, both which she wrote down.     The patient has a scheduled HOSFU with Dr. Abhi De Oliveira IV, MD's NP, Evelin Motley on 3/19/24 at 1400. No messages routed at this time.

## 2024-03-23 LAB
OHS QRS DURATION: 78 MS
OHS QTC CALCULATION: 408 MS

## 2024-03-26 ENCOUNTER — PATIENT OUTREACH (OUTPATIENT)
Dept: ADMINISTRATIVE | Facility: OTHER | Age: 79
End: 2024-03-26
Payer: MEDICARE

## 2024-03-26 ENCOUNTER — TELEPHONE (OUTPATIENT)
Dept: ADMINISTRATIVE | Facility: CLINIC | Age: 79
End: 2024-03-26
Payer: MEDICARE

## 2024-06-10 PROBLEM — J96.21 ACUTE ON CHRONIC HYPOXIC RESPIRATORY FAILURE: Status: RESOLVED | Noted: 2024-03-08 | Resolved: 2024-06-10

## 2024-08-20 ENCOUNTER — HOSPITAL ENCOUNTER (INPATIENT)
Facility: HOSPITAL | Age: 79
LOS: 2 days | Discharge: HOME | DRG: 315 | End: 2024-08-23
Attending: EMERGENCY MEDICINE | Admitting: STUDENT IN AN ORGANIZED HEALTH CARE EDUCATION/TRAINING PROGRAM
Payer: MEDICARE

## 2024-08-20 DIAGNOSIS — S39.92XA BACK INJURIES, INITIAL ENCOUNTER: ICD-10-CM

## 2024-08-20 DIAGNOSIS — R07.9 CHEST PAIN: ICD-10-CM

## 2024-08-20 DIAGNOSIS — R79.89 TROPONIN LEVEL ELEVATED: ICD-10-CM

## 2024-08-20 DIAGNOSIS — S22.040A CLOSED WEDGE COMPRESSION FRACTURE OF T4 VERTEBRA, INITIAL ENCOUNTER: ICD-10-CM

## 2024-08-20 DIAGNOSIS — I21.4 NSTEMI (NON-ST ELEVATED MYOCARDIAL INFARCTION): Primary | ICD-10-CM

## 2024-08-20 DIAGNOSIS — S22.080A COMPRESSION FRACTURE OF T12 VERTEBRA, INITIAL ENCOUNTER: ICD-10-CM

## 2024-08-20 DIAGNOSIS — Z87.81 HISTORY OF VERTEBRAL COMPRESSION FRACTURE: ICD-10-CM

## 2024-08-20 DIAGNOSIS — S22.070A CLOSED WEDGE COMPRESSION FRACTURE OF T10 VERTEBRA, INITIAL ENCOUNTER: ICD-10-CM

## 2024-08-20 DIAGNOSIS — N30.00 ACUTE CYSTITIS WITHOUT HEMATURIA: ICD-10-CM

## 2024-08-20 DIAGNOSIS — S32.020A COMPRESSION FRACTURE OF L2 VERTEBRA, INITIAL ENCOUNTER: ICD-10-CM

## 2024-08-20 DIAGNOSIS — I95.9 HYPOTENSION: ICD-10-CM

## 2024-08-20 DIAGNOSIS — I25.10 CAD (CORONARY ARTERY DISEASE): ICD-10-CM

## 2024-08-20 DIAGNOSIS — R53.81 DEBILITY: ICD-10-CM

## 2024-08-20 PROBLEM — J44.9 COPD (CHRONIC OBSTRUCTIVE PULMONARY DISEASE): Status: ACTIVE | Noted: 2024-08-20

## 2024-08-20 LAB
ALBUMIN SERPL BCP-MCNC: 2.8 G/DL (ref 3.5–5.2)
ALLENS TEST: NORMAL
ALP SERPL-CCNC: 79 U/L (ref 55–135)
ALT SERPL W/O P-5'-P-CCNC: 26 U/L (ref 10–44)
ANION GAP SERPL CALC-SCNC: 7 MMOL/L (ref 8–16)
AST SERPL-CCNC: 22 U/L (ref 10–40)
BACTERIA #/AREA URNS AUTO: ABNORMAL /HPF
BASOPHILS # BLD AUTO: 0.02 K/UL (ref 0–0.2)
BASOPHILS NFR BLD: 0.3 % (ref 0–1.9)
BILIRUB SERPL-MCNC: 0.8 MG/DL (ref 0.1–1)
BILIRUB UR QL STRIP: NEGATIVE
BNP SERPL-MCNC: 123 PG/ML (ref 0–99)
BUN SERPL-MCNC: 12 MG/DL (ref 8–23)
CALCIUM SERPL-MCNC: 9 MG/DL (ref 8.7–10.5)
CHLORIDE SERPL-SCNC: 87 MMOL/L (ref 95–110)
CLARITY UR REFRACT.AUTO: CLEAR
CO2 SERPL-SCNC: 37 MMOL/L (ref 23–29)
COLOR UR AUTO: YELLOW
CREAT SERPL-MCNC: 0.6 MG/DL (ref 0.5–1.4)
DIFFERENTIAL METHOD BLD: ABNORMAL
EOSINOPHIL # BLD AUTO: 0.2 K/UL (ref 0–0.5)
EOSINOPHIL NFR BLD: 2.3 % (ref 0–8)
ERYTHROCYTE [DISTWIDTH] IN BLOOD BY AUTOMATED COUNT: 16.9 % (ref 11.5–14.5)
EST. GFR  (NO RACE VARIABLE): >60 ML/MIN/1.73 M^2
GLUCOSE SERPL-MCNC: 134 MG/DL (ref 70–110)
GLUCOSE UR QL STRIP: NEGATIVE
HCT VFR BLD AUTO: 38.6 % (ref 37–48.5)
HCV AB SERPL QL IA: NORMAL
HGB BLD-MCNC: 11.8 G/DL (ref 12–16)
HGB UR QL STRIP: NEGATIVE
HIV 1+2 AB+HIV1 P24 AG SERPL QL IA: NORMAL
HYALINE CASTS UR QL AUTO: 0 /LPF
IMM GRANULOCYTES # BLD AUTO: 0.02 K/UL (ref 0–0.04)
IMM GRANULOCYTES NFR BLD AUTO: 0.3 % (ref 0–0.5)
INFLUENZA A, MOLECULAR: NOT DETECTED
INFLUENZA B, MOLECULAR: NOT DETECTED
KETONES UR QL STRIP: NEGATIVE
LDH SERPL L TO P-CCNC: 1.14 MMOL/L (ref 0.5–2.2)
LEUKOCYTE ESTERASE UR QL STRIP: ABNORMAL
LYMPHOCYTES # BLD AUTO: 0.7 K/UL (ref 1–4.8)
LYMPHOCYTES NFR BLD: 8.3 % (ref 18–48)
MCH RBC QN AUTO: 26.2 PG (ref 27–31)
MCHC RBC AUTO-ENTMCNC: 30.6 G/DL (ref 32–36)
MCV RBC AUTO: 86 FL (ref 82–98)
MICROSCOPIC COMMENT: ABNORMAL
MONOCYTES # BLD AUTO: 0.5 K/UL (ref 0.3–1)
MONOCYTES NFR BLD: 6.6 % (ref 4–15)
NEUTROPHILS # BLD AUTO: 6.5 K/UL (ref 1.8–7.7)
NEUTROPHILS NFR BLD: 82.2 % (ref 38–73)
NITRITE UR QL STRIP: NEGATIVE
NON-SQ EPI CELLS #/AREA URNS AUTO: 1 /HPF
NRBC BLD-RTO: 0 /100 WBC
OHS QRS DURATION: 74 MS
OHS QTC CALCULATION: 429 MS
PH UR STRIP: 7 [PH] (ref 5–8)
PLATELET # BLD AUTO: 135 K/UL (ref 150–450)
PMV BLD AUTO: 10.6 FL (ref 9.2–12.9)
POTASSIUM SERPL-SCNC: 3.8 MMOL/L (ref 3.5–5.1)
PROT SERPL-MCNC: 6.1 G/DL (ref 6–8.4)
PROT UR QL STRIP: NEGATIVE
RBC # BLD AUTO: 4.5 M/UL (ref 4–5.4)
RBC #/AREA URNS AUTO: 1 /HPF (ref 0–4)
RSV AG BY MOLECULAR METHOD: NOT DETECTED
SAMPLE: NORMAL
SARS-COV-2 RNA RESP QL NAA+PROBE: NOT DETECTED
SITE: NORMAL
SODIUM SERPL-SCNC: 131 MMOL/L (ref 136–145)
SP GR UR STRIP: 1.01 (ref 1–1.03)
SQUAMOUS #/AREA URNS AUTO: 1 /HPF
TROPONIN I SERPL DL<=0.01 NG/ML-MCNC: 0.15 NG/ML (ref 0–0.03)
URN SPEC COLLECT METH UR: ABNORMAL
WBC # BLD AUTO: 7.85 K/UL (ref 3.9–12.7)
WBC #/AREA URNS AUTO: 11 /HPF (ref 0–5)

## 2024-08-20 PROCEDURE — 93010 ELECTROCARDIOGRAM REPORT: CPT | Mod: ,,, | Performed by: INTERNAL MEDICINE

## 2024-08-20 PROCEDURE — 85025 COMPLETE CBC W/AUTO DIFF WBC: CPT | Performed by: EMERGENCY MEDICINE

## 2024-08-20 PROCEDURE — 87389 HIV-1 AG W/HIV-1&-2 AB AG IA: CPT | Performed by: PHYSICIAN ASSISTANT

## 2024-08-20 PROCEDURE — 96375 TX/PRO/DX INJ NEW DRUG ADDON: CPT

## 2024-08-20 PROCEDURE — 86803 HEPATITIS C AB TEST: CPT | Performed by: PHYSICIAN ASSISTANT

## 2024-08-20 PROCEDURE — 25000003 PHARM REV CODE 250: Performed by: EMERGENCY MEDICINE

## 2024-08-20 PROCEDURE — 84484 ASSAY OF TROPONIN QUANT: CPT | Performed by: EMERGENCY MEDICINE

## 2024-08-20 PROCEDURE — 99291 CRITICAL CARE FIRST HOUR: CPT

## 2024-08-20 PROCEDURE — 0241U SARS-COV2 (COVID) WITH FLU/RSV BY PCR: CPT | Performed by: EMERGENCY MEDICINE

## 2024-08-20 PROCEDURE — 87077 CULTURE AEROBIC IDENTIFY: CPT | Performed by: EMERGENCY MEDICINE

## 2024-08-20 PROCEDURE — 87186 SC STD MICRODIL/AGAR DIL: CPT | Performed by: EMERGENCY MEDICINE

## 2024-08-20 PROCEDURE — G0378 HOSPITAL OBSERVATION PER HR: HCPCS

## 2024-08-20 PROCEDURE — 99900035 HC TECH TIME PER 15 MIN (STAT)

## 2024-08-20 PROCEDURE — 93010 ELECTROCARDIOGRAM REPORT: CPT | Mod: ,,, | Performed by: STUDENT IN AN ORGANIZED HEALTH CARE EDUCATION/TRAINING PROGRAM

## 2024-08-20 PROCEDURE — 93005 ELECTROCARDIOGRAM TRACING: CPT

## 2024-08-20 PROCEDURE — 83880 ASSAY OF NATRIURETIC PEPTIDE: CPT | Performed by: EMERGENCY MEDICINE

## 2024-08-20 PROCEDURE — 96366 THER/PROPH/DIAG IV INF ADDON: CPT

## 2024-08-20 PROCEDURE — 96365 THER/PROPH/DIAG IV INF INIT: CPT

## 2024-08-20 PROCEDURE — 81001 URINALYSIS AUTO W/SCOPE: CPT | Performed by: EMERGENCY MEDICINE

## 2024-08-20 PROCEDURE — 87086 URINE CULTURE/COLONY COUNT: CPT | Performed by: EMERGENCY MEDICINE

## 2024-08-20 PROCEDURE — 96367 TX/PROPH/DG ADDL SEQ IV INF: CPT

## 2024-08-20 PROCEDURE — 63600175 PHARM REV CODE 636 W HCPCS: Performed by: EMERGENCY MEDICINE

## 2024-08-20 PROCEDURE — 80053 COMPREHEN METABOLIC PANEL: CPT | Performed by: EMERGENCY MEDICINE

## 2024-08-20 PROCEDURE — 83605 ASSAY OF LACTIC ACID: CPT

## 2024-08-20 PROCEDURE — 87088 URINE BACTERIA CULTURE: CPT | Performed by: EMERGENCY MEDICINE

## 2024-08-20 PROCEDURE — 87040 BLOOD CULTURE FOR BACTERIA: CPT | Performed by: EMERGENCY MEDICINE

## 2024-08-20 RX ORDER — HEPARIN SODIUM 5000 [USP'U]/ML
5000 INJECTION, SOLUTION INTRAVENOUS; SUBCUTANEOUS EVERY 8 HOURS
Status: DISCONTINUED | OUTPATIENT
Start: 2024-08-21 | End: 2024-08-21

## 2024-08-20 RX ORDER — SODIUM CHLORIDE 0.9 % (FLUSH) 0.9 %
10 SYRINGE (ML) INJECTION EVERY 12 HOURS PRN
Status: DISCONTINUED | OUTPATIENT
Start: 2024-08-20 | End: 2024-08-23 | Stop reason: HOSPADM

## 2024-08-20 RX ORDER — IBUPROFEN 200 MG
16 TABLET ORAL
Status: DISCONTINUED | OUTPATIENT
Start: 2024-08-20 | End: 2024-08-23 | Stop reason: HOSPADM

## 2024-08-20 RX ORDER — SODIUM CHLORIDE 0.9 % (FLUSH) 0.9 %
10 SYRINGE (ML) INJECTION
Status: DISCONTINUED | OUTPATIENT
Start: 2024-08-20 | End: 2024-08-23 | Stop reason: HOSPADM

## 2024-08-20 RX ORDER — FLUTICASONE FUROATE AND VILANTEROL 100; 25 UG/1; UG/1
1 POWDER RESPIRATORY (INHALATION) DAILY
Status: DISCONTINUED | OUTPATIENT
Start: 2024-08-21 | End: 2024-08-23 | Stop reason: HOSPADM

## 2024-08-20 RX ORDER — CLOPIDOGREL BISULFATE 75 MG/1
75 TABLET ORAL DAILY
Status: DISCONTINUED | OUTPATIENT
Start: 2024-08-21 | End: 2024-08-23 | Stop reason: HOSPADM

## 2024-08-20 RX ORDER — GLUCAGON 1 MG
1 KIT INJECTION
Status: DISCONTINUED | OUTPATIENT
Start: 2024-08-20 | End: 2024-08-23 | Stop reason: HOSPADM

## 2024-08-20 RX ORDER — TRAMADOL HYDROCHLORIDE 50 MG/1
50 TABLET ORAL EVERY 6 HOURS PRN
Status: ON HOLD | COMMUNITY
End: 2024-08-23

## 2024-08-20 RX ORDER — NALOXONE HCL 0.4 MG/ML
0.02 VIAL (ML) INJECTION
Status: DISCONTINUED | OUTPATIENT
Start: 2024-08-20 | End: 2024-08-23 | Stop reason: HOSPADM

## 2024-08-20 RX ORDER — ASPIRIN 325 MG
325 TABLET ORAL
Status: COMPLETED | OUTPATIENT
Start: 2024-08-20 | End: 2024-08-20

## 2024-08-20 RX ORDER — LISINOPRIL 10 MG/1
10 TABLET ORAL DAILY
COMMUNITY

## 2024-08-20 RX ORDER — KETOROLAC TROMETHAMINE 30 MG/ML
10 INJECTION, SOLUTION INTRAMUSCULAR; INTRAVENOUS
Status: COMPLETED | OUTPATIENT
Start: 2024-08-20 | End: 2024-08-20

## 2024-08-20 RX ORDER — IBUPROFEN 200 MG
24 TABLET ORAL
Status: DISCONTINUED | OUTPATIENT
Start: 2024-08-20 | End: 2024-08-23 | Stop reason: HOSPADM

## 2024-08-20 RX ORDER — TALC
6 POWDER (GRAM) TOPICAL NIGHTLY PRN
Status: DISCONTINUED | OUTPATIENT
Start: 2024-08-20 | End: 2024-08-23 | Stop reason: HOSPADM

## 2024-08-20 RX ORDER — NAPROXEN SODIUM 220 MG/1
81 TABLET, FILM COATED ORAL DAILY
Status: DISCONTINUED | OUTPATIENT
Start: 2024-08-21 | End: 2024-08-23 | Stop reason: HOSPADM

## 2024-08-20 RX ADMIN — PIPERACILLIN SODIUM AND TAZOBACTAM SODIUM 4.5 G: 4; .5 INJECTION, POWDER, FOR SOLUTION INTRAVENOUS at 02:08

## 2024-08-20 RX ADMIN — PIPERACILLIN SODIUM AND TAZOBACTAM SODIUM 4.5 G: 4; .5 INJECTION, POWDER, FOR SOLUTION INTRAVENOUS at 10:08

## 2024-08-20 RX ADMIN — VANCOMYCIN HYDROCHLORIDE 2250 MG: 1.25 INJECTION, POWDER, LYOPHILIZED, FOR SOLUTION INTRAVENOUS at 04:08

## 2024-08-20 RX ADMIN — SODIUM CHLORIDE, POTASSIUM CHLORIDE, SODIUM LACTATE AND CALCIUM CHLORIDE 1365 ML: 600; 310; 30; 20 INJECTION, SOLUTION INTRAVENOUS at 02:08

## 2024-08-20 RX ADMIN — KETOROLAC TROMETHAMINE 10 MG: 30 INJECTION, SOLUTION INTRAMUSCULAR; INTRAVENOUS at 02:08

## 2024-08-20 RX ADMIN — ASPIRIN 325 MG ORAL TABLET 325 MG: 325 PILL ORAL at 05:08

## 2024-08-20 NOTE — ED TRIAGE NOTES
Triage note:  Chief Complaint   Patient presents with    Multiple complaints     Back and knee pain, hx dementia, was in nursing home daughter reports hurt back when pushed back in bed, was on antibiotics      Review of patient's allergies indicates:   Allergen Reactions    Latex      Other reaction(s): Unknown  Other reaction(s): Unknown    Sulfacetamide sodium      Pain perineal area    Sulfasalazine Hives    Adhesive Itching     SKIN GETS RED WITH TAPE AND BANDAIDS    Adhesive tape-silicones Itching     SKIN GETS RED WITH TAPE AND BANDAIDS    Sulfa (sulfonamide antibiotics) Rash     Past Medical History:   Diagnosis Date    Anesthesia complication     BLADDER DYSFUNCTION    Aortic stenosis     Arthritis     Back pain     Diabetes mellitus type II     NO LONGER DIABETIC    Encounter for blood transfusion     Hyperlipidemia     Hypertension     NSTEMI (non-ST elevated myocardial infarction) 05/01/2022    Osteoporosis     Wears glasses

## 2024-08-20 NOTE — ED PROVIDER NOTES
Encounter Date: 8/20/2024       History     Chief Complaint   Patient presents with    Multiple complaints     Back and knee pain, hx dementia, was in nursing home daughter reports hurt back when pushed back in bed, was on antibiotics      The patient is a 79-year-old female brought in by her family member directly from a behavioral health unit after her daughter picked her up.  According to the daughter, they are coming to the emergency department secondary to back pain.  This has been an ongoing issue for 3 weeks.  According to the daughter, the patient injured her back while in a rehab facility after an admission for COVID.  Incidentally, the patient is noted to have significant hypotension here in the emergency department.  When asked about infection, the daughter states that the patient has had an elevated white blood cell count and was told that she had an infection a couple of weeks ago at the rehab facility, prior to being sent to the behavioral health facility.  Here in the emergency department, the patient is complaining of diffuse back pain.  She also reports chronic hip and lower extremity pain, secondary to arthritis.  She denies fever, generalized weakness, headache, neck stiffness, rhinorrhea, sore throat, sinus congestion, cough, dysuria, hematuria, diarrhea, hematochezia, constipation, melena, or skin rashes.  Other than the pain, she has no complaints.      Review of patient's allergies indicates:   Allergen Reactions    Latex      Other reaction(s): Unknown  Other reaction(s): Unknown    Sulfacetamide sodium      Pain perineal area    Sulfasalazine Hives    Adhesive Itching     SKIN GETS RED WITH TAPE AND BANDAIDS    Adhesive tape-silicones Itching     SKIN GETS RED WITH TAPE AND BANDAIDS    Sulfa (sulfonamide antibiotics) Rash     Past Medical History:   Diagnosis Date    Anesthesia complication     BLADDER DYSFUNCTION    Aortic stenosis     Arthritis     Back pain     Diabetes mellitus type II      NO LONGER DIABETIC    Encounter for blood transfusion     Hyperlipidemia     Hypertension     NSTEMI (non-ST elevated myocardial infarction) 05/01/2022    Osteoporosis     Wears glasses      Past Surgical History:   Procedure Laterality Date     vocal cord nodules removed  long time ago     twice    ADRENAL TUMOR      APPENDECTOMY  within last 5yrs    CATHETERIZATION OF BOTH LEFT AND RIGHT HEART Left 05/06/2022    Procedure: CATHETERIZATION, HEART, BOTH LEFT AND RIGHT;  Surgeon: Michelet Vargas MD;  Location: [facility] CATH/EP LAB;  Service: Cardiology;  Laterality: Left;    COLONOSCOPY N/A 6/23/2023    Procedure: COLONOSCOPY;  Surgeon: Joel Neal III, MD;  Location: [facility] ENDO;  Service: Endoscopy;  Laterality: N/A;    FIXATION KYPHOPLASTY THORACIC SPINE      8-20-13    FOOT SURGERY      left 2nd toe was too long     HERNIA REPAIR  within last 5yrs    INSERTION OF TEMPORARY PACEMAKER N/A 1/4/2023    Procedure: INSERTION, PACEMAKER, TEMPORARY;  Surgeon: Bhavesh Peña MD;  Location: [facility] CATH;  Service: Cardiology;  Laterality: N/A;    LEFT HEART CATHETERIZATION Left 05/02/2022    Procedure: Left heart cath;  Surgeon: Jose Echols MD;  Location: [facility] CATH/EP LAB;  Service: Cardiology;  Laterality: Left;    SMALL BOWEL ENTEROSCOPY N/A 6/22/2023    Procedure: ENTEROSCOPY;  Surgeon: Cornell Aguirre MD;  Location: [facility] ENDO;  Service: Endoscopy;  Laterality: N/A;    SMALL BOWEL ENTEROSCOPY N/A 10/20/2023    Procedure: ENTEROSCOPY;  Surgeon: Otilio Bourgeois MD;  Location: [facility] ENDO;  Service: Endoscopy;  Laterality: N/A;    TONSILLECTOMY, ADENOIDECTOMY  long time ago    TRANSCATHETER AORTIC VALVE REPLACEMENT (TAVR) Bilateral 1/4/2023    Procedure: REPLACEMENT, AORTIC VALVE, TRANSCATHETER (TAVR);  Surgeon: Bhavesh Peña MD;  Location: [facility] CATH;  Service: Cardiology;  Laterality: Bilateral;    TRANSCATHETER AORTIC VALVE REPLACEMENT (TAVR) Bilateral 1/4/2023    Procedure: REPLACEMENT, AORTIC VALVE,  TRANSCATHETER (TAVR);  Surgeon: Dallin Verma MD;  Location: [facility] CATH;  Service: Cardiology;  Laterality: Bilateral;    TRANSTHORACIC ECHOCARDIOGRAPHY (TTE)  2023    Procedure: ECHOCARDIOGRAM, TRANSTHORACIC;  Surgeon: Bhavesh Peña MD;  Location: [facility] CATH;  Service: Cardiology;;     Family History   Problem Relation Name Age of Onset    Collagen disease Neg Hx       Social History     Tobacco Use    Smoking status: Former     Current packs/day: 0.00     Average packs/day: 0.5 packs/day for 35.0 years (17.5 ttl pk-yrs)     Types: Cigarettes     Start date: 10/5/1987     Quit date: 10/5/2022     Years since quittin.8    Smokeless tobacco: Never   Substance Use Topics    Alcohol use: No    Drug use: No     Review of Systems  See HPI     Physical Exam     Initial Vitals [24 1307]   BP Pulse Resp Temp SpO2   (!) 87/52 94 20 97.6 °F (36.4 °C) (!) 93 %      MAP       --         Physical Exam  General:  Mild distress with pain.  Well-nourished.  Well-developed.  Alert.  HENT:  Dry mucous membranes.  Normocephalic atraumatic.  Oropharynx clear.  Eyes: Pupils equally round and reactive to light.  Extraocular movements intact.  No scleral icterus.  No conjunctival pallor.  Cardiovascular: Regular rate and rhythm.  No murmurs, rubs, or gallops.  Brisk capillary fill.  2+ distal pulses.  Respiratory: Clear to auscultation bilaterally.  No wheezes, rales, or rhonchi.  No respiratory distress.  Abdomen: Soft.  Nontender.  Nondistended.  No guarding.  No rebound.  No masses.  No abdominal bruit auscultated.  Skin: No rashes.  No lesions.  No pallor.  No jaundice.  Neuro: Cranial nerves II through XII grossly intact.  Moving all extremities equally.  No sensory deficits.  Strength 5 out of 5 in all 4 extremities.  Musculoskeletal: Neck supple.  No meningismus.  Diffuse bilateral lower extremity and hip tenderness to palpation, which the patient's daughter states is chronic.  No upper extremity tenderness to  palpation.  No chest wall tenderness to palpation.  There is diffuse back tenderness to palpation.  The patient also complains of pain on passive range of motion of the back.      ED Course   Critical Care    Date/Time: 8/20/2024 8:01 PM    Performed by: Juan Lyman MD  Authorized by: Juan Lyman MD  Direct patient critical care time: 12 minutes  Additional history critical care time: 8 minutes  Ordering / reviewing critical care time: 5 minutes  Documentation critical care time: 9 minutes  Consulting other physicians critical care time: 6 minutes  Total critical care time (exclusive of procedural time) : 40 minutes        Labs Reviewed   CBC W/ AUTO DIFFERENTIAL - Abnormal       Result Value    WBC 7.85      RBC 4.50      Hemoglobin 11.8 (*)     Hematocrit 38.6      MCV 86      MCH 26.2 (*)     MCHC 30.6 (*)     RDW 16.9 (*)     Platelets 135 (*)     MPV 10.6      Immature Granulocytes 0.3      Gran # (ANC) 6.5      Immature Grans (Abs) 0.02      Lymph # 0.7 (*)     Mono # 0.5      Eos # 0.2      Baso # 0.02      nRBC 0      Gran % 82.2 (*)     Lymph % 8.3 (*)     Mono % 6.6      Eosinophil % 2.3      Basophil % 0.3      Differential Method Automated     COMPREHENSIVE METABOLIC PANEL - Abnormal    Sodium 131 (*)     Potassium 3.8      Chloride 87 (*)     CO2 37 (*)     Glucose 134 (*)     BUN 12      Creatinine 0.6      Calcium 9.0      Total Protein 6.1      Albumin 2.8 (*)     Total Bilirubin 0.8      Alkaline Phosphatase 79      AST 22      ALT 26      eGFR >60.0      Anion Gap 7 (*)    TROPONIN I - Abnormal    Troponin I 0.153 (*)    B-TYPE NATRIURETIC PEPTIDE - Abnormal     (*)     Narrative:     Add-on BNP to [id number] per Antonio Lyman MD on  08/20/2024  18:06    CULTURE, BLOOD   CULTURE, BLOOD   HIV 1 / 2 ANTIBODY    HIV 1/2 Ag/Ab Non-reactive      Narrative:     Release to patient->Immediate   HEPATITIS C ANTIBODY    Hepatitis C Ab Non-reactive      Narrative:     Release to  patient->Immediate   SARS-COV2 (COVID) WITH FLU/RSV BY PCR    SARS-CoV2 (COVID-19) Qualitative PCR Not Detected      Influenza A, Molecular Not Detected      Influenza B, Molecular Not Detected      RSV Ag by Molecular Method Not Detected     B-TYPE NATRIURETIC PEPTIDE   URINALYSIS, REFLEX TO URINE CULTURE   ISTAT LACTATE    POC Lactate 1.14      Sample VENOUS      Site Other      Allens Test N/A       EKG Readings: (Independently Interpreted)   I independently interpreted this EKG.  Motion artifact noted.  Atrial fibrillation without RVR.  Right axis deviation noted.  Intervals otherwise normal.  No obvious acute ischemic changes.     ECG Results              EKG 12-lead (Final result)        Collection Time Result Time QRS Duration OHS QTC Calculation    08/20/24 14:23:07 08/20/24 14:59:29 74 429                     Final result by Interface, Lab In [facility] (08/20/24 14:59:38)                   Narrative:    Test Reason : I95.9,    Vent. Rate : 081 BPM     Atrial Rate : 073 BPM     P-R Int : 000 ms          QRS Dur : 074 ms      QT Int : 370 ms       P-R-T Axes : 000 094 007 degrees     QTc Int : 429 ms    Atrial fibrillation  Rightward axis  Abnormal ECG  When compared with ECG of 08-MAR-2024 02:33,  Axis now rightward  Confirmed by Wade Butler MD ([id number]) on 8/20/2024 2:59:27 PM    Referred By: [email or web address]   SELF           Confirmed By:Konstantin Butler MD                                  Imaging Results              X-Ray Lumbar Spine Ap And Lateral (Final result)  Result time 08/20/24 17:52:28      Final result by Sergio Rivero DO (08/20/24 17:52:28)                   Impression:      Numerous remote compression fractures involving T4 through T10, T12, and L2.  No evidence of a new acute fracture or significant detrimental change from prior.      Electronically signed by: Sergio Rivero  Date:    08/20/2024  Time:    17:52               Narrative:    EXAMINATION:  XR THORACIC SPINE AP LATERAL; XR  LUMBAR SPINE AP AND LATERAL    CLINICAL HISTORY:  back injury;  Unspecified injury of lower back, initial encounter    TECHNIQUE:  AP and lateral views of the thoracic spine were performed.    AP, lateral, spot radiographs of the lumbar spine.    COMPARISON:  Thoracolumbar spine radiographs from 11/22/2022. CT of the abdomen and pelvis from 03/08/2024.  CT of the thoracic and lumbar spine from 11/21/2022.    FINDINGS:  The patient is rotated, there is extensive diffuse osteopenia, and there is extensive overlying soft tissue attenuation, limiting the study.    Thoracic spine: There are numerous remote compression fractures of the thoracic spine, involving T4 through T10, and T12.  There is vertebroplasty cement within the T4, T5, T7, T10, and T12 vertebral bodies.  Appearance is similar to prior CT from 11/21/2022, and findings are compatible with remote compression fractures.  There is no evidence of a new or acute compression fracture of the thoracic spine.  There are multilevel degenerative changes.  There are vascular calcifications.  Remaining visualized osseous structures are grossly intact.  There is a prosthetic aortic valve stent.    Lumbar spine: Limited examination.  Again seen is a remote compression fracture of the L2 vertebral body with unchanged height loss from prior.  Remaining vertebral body heights are preserved.  Alignment is normal.  There are multilevel degenerative changes.  There are vascular calcifications.  There are surgical clips.                                       X-Ray Thoracic Spine AP Lateral (Final result)  Result time 08/20/24 17:52:28      Final result by Sergio Rivero DO (08/20/24 17:52:28)                   Impression:      Numerous remote compression fractures involving T4 through T10, T12, and L2.  No evidence of a new acute fracture or significant detrimental change from prior.      Electronically signed by: Sergio Rivero  Date:    08/20/2024  Time:    17:52                Narrative:    EXAMINATION:  XR THORACIC SPINE AP LATERAL; XR LUMBAR SPINE AP AND LATERAL    CLINICAL HISTORY:  back injury;  Unspecified injury of lower back, initial encounter    TECHNIQUE:  AP and lateral views of the thoracic spine were performed.    AP, lateral, spot radiographs of the lumbar spine.    COMPARISON:  Thoracolumbar spine radiographs from 11/22/2022. CT of the abdomen and pelvis from 03/08/2024.  CT of the thoracic and lumbar spine from 11/21/2022.    FINDINGS:  The patient is rotated, there is extensive diffuse osteopenia, and there is extensive overlying soft tissue attenuation, limiting the study.    Thoracic spine: There are numerous remote compression fractures of the thoracic spine, involving T4 through T10, and T12.  There is vertebroplasty cement within the T4, T5, T7, T10, and T12 vertebral bodies.  Appearance is similar to prior CT from 11/21/2022, and findings are compatible with remote compression fractures.  There is no evidence of a new or acute compression fracture of the thoracic spine.  There are multilevel degenerative changes.  There are vascular calcifications.  Remaining visualized osseous structures are grossly intact.  There is a prosthetic aortic valve stent.    Lumbar spine: Limited examination.  Again seen is a remote compression fracture of the L2 vertebral body with unchanged height loss from prior.  Remaining vertebral body heights are preserved.  Alignment is normal.  There are multilevel degenerative changes.  There are vascular calcifications.  There are surgical clips.                                       X-Ray Chest AP Portable (Final result)  Result time 08/20/24 17:46:34      Final result by Sergio Rivero DO (08/20/24 17:46:34)                   Impression:      Suspected increased perihilar interstitial prominence which may be due to artifact from hypoaeration changes with rotation and overlying soft tissue attenuation.  Mild edema or interstitial  pneumonitis are not excluded.      Electronically signed by: Sergio Rivero  Date:    08/20/2024  Time:    17:46               Narrative:    EXAMINATION:  XR CHEST AP PORTABLE    CLINICAL HISTORY:  Sepsis;    TECHNIQUE:  Single frontal view of the chest was performed.    COMPARISON:  03/08/2024    FINDINGS:  The patient is rotated.  There is overlying soft tissue attenuation.  The lungs are hypoexpanded.  Suspected increased perihilar interstitial prominence.  No large focal consolidation.  The pleural spaces are clear.  The cardiac silhouette is enlarged.  There calcifications of the thoracic aorta.  There is a thoracic aortic valve stent.  There are surgical clips.  Osseous structures are intact.                                       Medications   piperacillin-tazobactam (ZOSYN) 4.5 g in D5W 100 mL IVPB (MB+) (0 g Intravenous Stopped 8/20/24 1458)   lactated ringers bolus 1,365 mL (0 mLs Intravenous Stopped 8/20/24 1700)   vancomycin (VANCOCIN) 2,250 mg in D5W 500 mL IVPB (2,250 mg Intravenous New Bag 8/20/24 1612)   ketorolac injection 9.999 mg (9.999 mg Intravenous Given 8/20/24 1429)   aspirin tablet 325 mg (325 mg Oral Given 8/20/24 1753)     Medical Decision Making  This is an emergent evaluation of a critically ill patient.  The patient is noted to be hypotensive.  The etiology is unclear.  Sepsis is on my differential diagnosis, as the patient's daughter states that she has had an elevated white blood cell count and was told that she had an infection by the rehab facility that she was residing in.  Broad-spectrum antibiotics and lactated Ringer's fluid hydration have been ordered.  A workup has also been ordered to assess for infection.  As the daughter also reports that the patient had a previous heart attack which presented with back pain, I will check a troponin and EKG.  In the meantime, x-rays of the back will be ordered.  I have significant concern for the patient's clinical status.  I will  reassess.    2:12 p.m.   The blood pressure is now 126/84.  In speaking to the inpatient psychiatric facility, they do report that she received a significant amount of blood pressure medication today.    4:59 p.m.   X-rays of the thoracic spine, lumbar spine, and chest have yet to be performed.  CBC and CMP show no clinically significant abnormalities.  Of note, the troponin is moderately elevated.  Aspirin has been ordered.  As the daughter mentioned, the patient had a previous MI where her presenting complaint was back pain.  EKG does show atrial fibrillation.  However, there is no evidence of acute ischemia.  Repeat EKG has been ordered.  The patient will require admission.  I will reassess.    6:01 p.m.   The patient is noted to have multiple compression fractures that appear to be chronic.  Chest x-ray is significant for increased interstitial lung markings.  With the patient's elevated troponin, she will require admission.  A BNP has been added secondary to the troponin elevation and the chest x-ray findings.  Blood pressure has improved with IV fluids.  There is no obvious source of infection.  The patient has yet to provide a urine sample.  White blood cell count and lactic acid level are within normal limits.  I do not believe that the patient has sepsis or septic shock.  I will discuss with Hospital Medicine.  The patient may require CTs of the thoracic and lumbar spine as well.    7:59 p.m.   With a system downtime that occurred unexpectedly, we had issues communicating with other physicians.  I have now spoken to Hospital Medicine.  They have agreed to evaluate and admit the patient.  Currently, the patient's vital signs are stable.  Repeat troponin has been ordered.    Amount and/or Complexity of Data Reviewed  Labs: ordered.  Radiology: ordered.    Risk  OTC drugs.  Prescription drug management.                                      Clinical Impression:  Final diagnoses:  [I95.9] Hypotension  [S39.92XA]  Back injuries, initial encounter  [R79.89] Troponin level elevated  [I21.4] NSTEMI (non-ST elevated myocardial infarction) (Primary)  [S22.040A] Closed wedge compression fracture of T4 vertebra, initial encounter  [S22.070A] Closed wedge compression fracture of T10 vertebra, initial encounter  [S22.080A] Compression fracture of T12 vertebra, initial encounter  [S32.020A] Compression fracture of L2 vertebra, initial encounter          ED Disposition Condition    Observation Stable                Juan Lyman MD  08/20/24 2001     No

## 2024-08-20 NOTE — PLAN OF CARE
Sw was unable to complete assessment, pt was asleep. Will attempt assessment at a later time.      Elise Gusman LMSW  Case Management  Emergency Department  [phone]

## 2024-08-21 PROBLEM — I50.20 HFREF (HEART FAILURE WITH REDUCED EJECTION FRACTION): Status: ACTIVE | Noted: 2024-08-21

## 2024-08-21 PROBLEM — I50.32 CHRONIC DIASTOLIC HEART FAILURE: Status: ACTIVE | Noted: 2024-08-21

## 2024-08-21 PROBLEM — R79.89 ELEVATED TROPONIN: Status: ACTIVE | Noted: 2024-08-21

## 2024-08-21 PROBLEM — D69.6 THROMBOCYTOPENIA: Status: ACTIVE | Noted: 2024-08-21

## 2024-08-21 LAB
ALBUMIN SERPL BCP-MCNC: 2.5 G/DL (ref 3.5–5.2)
ALP SERPL-CCNC: 73 U/L (ref 55–135)
ALT SERPL W/O P-5'-P-CCNC: 22 U/L (ref 10–44)
ANION GAP SERPL CALC-SCNC: 10 MMOL/L (ref 8–16)
ANION GAP SERPL CALC-SCNC: 10 MMOL/L (ref 8–16)
ANION GAP SERPL CALC-SCNC: 9 MMOL/L (ref 8–16)
ASCENDING AORTA: 3.41 CM
AST SERPL-CCNC: 22 U/L (ref 10–40)
AV INDEX (PROSTH): 0.51
AV MEAN GRADIENT: 12 MMHG
AV PEAK GRADIENT: 24 MMHG
AV VALVE AREA BY VELOCITY RATIO: 1.45 CM²
AV VALVE AREA: 1.66 CM²
AV VELOCITY RATIO: 0.44
BASOPHILS # BLD AUTO: 0.02 K/UL (ref 0–0.2)
BASOPHILS NFR BLD: 0.3 % (ref 0–1.9)
BILIRUB SERPL-MCNC: 0.8 MG/DL (ref 0.1–1)
BSA FOR ECHO PROCEDURE: 1.98 M2
BUN SERPL-MCNC: 10 MG/DL (ref 8–23)
BUN SERPL-MCNC: 10 MG/DL (ref 8–23)
BUN SERPL-MCNC: 12 MG/DL (ref 8–23)
CALCIUM SERPL-MCNC: 8.9 MG/DL (ref 8.7–10.5)
CALCIUM SERPL-MCNC: 8.9 MG/DL (ref 8.7–10.5)
CALCIUM SERPL-MCNC: 9.3 MG/DL (ref 8.7–10.5)
CHLORIDE SERPL-SCNC: 88 MMOL/L (ref 95–110)
CHLORIDE SERPL-SCNC: 91 MMOL/L (ref 95–110)
CHLORIDE SERPL-SCNC: 91 MMOL/L (ref 95–110)
CO2 SERPL-SCNC: 28 MMOL/L (ref 23–29)
CO2 SERPL-SCNC: 28 MMOL/L (ref 23–29)
CO2 SERPL-SCNC: 31 MMOL/L (ref 23–29)
CREAT SERPL-MCNC: 0.6 MG/DL (ref 0.5–1.4)
CREAT SERPL-MCNC: 0.6 MG/DL (ref 0.5–1.4)
CREAT SERPL-MCNC: 0.7 MG/DL (ref 0.5–1.4)
CV ECHO LV RWT: 0.65 CM
DIFFERENTIAL METHOD BLD: ABNORMAL
DOP CALC AO PEAK VEL: 2.46 M/S
DOP CALC AO VTI: 43.69 CM
DOP CALC LVOT AREA: 3.3 CM2
DOP CALC LVOT DIAMETER: 2.04 CM
DOP CALC LVOT PEAK VEL: 1.09 M/S
DOP CALC LVOT STROKE VOLUME: 72.49 CM3
DOP CALCLVOT PEAK VEL VTI: 22.19 CM
E/E' RATIO: 18.27 M/S
ECHO LV POSTERIOR WALL: 1.25 CM (ref 0.6–1.1)
EOSINOPHIL # BLD AUTO: 0.2 K/UL (ref 0–0.5)
EOSINOPHIL NFR BLD: 2.2 % (ref 0–8)
ERYTHROCYTE [DISTWIDTH] IN BLOOD BY AUTOMATED COUNT: 16.6 % (ref 11.5–14.5)
EST. GFR  (NO RACE VARIABLE): >60 ML/MIN/1.73 M^2
FRACTIONAL SHORTENING: 30 % (ref 28–44)
GLUCOSE SERPL-MCNC: 120 MG/DL (ref 70–110)
GLUCOSE SERPL-MCNC: 135 MG/DL (ref 70–110)
GLUCOSE SERPL-MCNC: 135 MG/DL (ref 70–110)
HCT VFR BLD AUTO: 37.4 % (ref 37–48.5)
HGB BLD-MCNC: 11.3 G/DL (ref 12–16)
HR MV ECHO: 98 BPM
IMM GRANULOCYTES # BLD AUTO: 0.02 K/UL (ref 0–0.04)
IMM GRANULOCYTES NFR BLD AUTO: 0.3 % (ref 0–0.5)
INTERVENTRICULAR SEPTUM: 1.25 CM (ref 0.6–1.1)
IVRT: 54.23 MSEC
LA MAJOR: 6.23 CM
LA MINOR: 5.28 CM
LA WIDTH: 4.11 CM
LEFT ATRIUM SIZE: 4.27 CM
LEFT ATRIUM VOLUME INDEX MOD: 47.5 ML/M2
LEFT ATRIUM VOLUME INDEX: 45.4 ML/M2
LEFT ATRIUM VOLUME MOD: 89.24 CM3
LEFT ATRIUM VOLUME: 85.26 CM3
LEFT INTERNAL DIMENSION IN SYSTOLE: 2.69 CM (ref 2.1–4)
LEFT VENTRICLE DIASTOLIC VOLUME INDEX: 33.74 ML/M2
LEFT VENTRICLE DIASTOLIC VOLUME: 63.44 ML
LEFT VENTRICLE MASS INDEX: 88 G/M2
LEFT VENTRICLE SYSTOLIC VOLUME INDEX: 14.3 ML/M2
LEFT VENTRICLE SYSTOLIC VOLUME: 26.89 ML
LEFT VENTRICULAR INTERNAL DIMENSION IN DIASTOLE: 3.84 CM (ref 3.5–6)
LEFT VENTRICULAR MASS: 165.52 G
LV LATERAL E/E' RATIO: 17.13 M/S
LV SEPTAL E/E' RATIO: 19.57 M/S
LYMPHOCYTES # BLD AUTO: 0.6 K/UL (ref 1–4.8)
LYMPHOCYTES NFR BLD: 7.6 % (ref 18–48)
MAGNESIUM SERPL-MCNC: 1.4 MG/DL (ref 1.6–2.6)
MCH RBC QN AUTO: 25.9 PG (ref 27–31)
MCHC RBC AUTO-ENTMCNC: 30.2 G/DL (ref 32–36)
MCV RBC AUTO: 86 FL (ref 82–98)
MONOCYTES # BLD AUTO: 0.5 K/UL (ref 0.3–1)
MONOCYTES NFR BLD: 7 % (ref 4–15)
MV MEAN GRADIENT: 5 MMHG
MV PEAK E VEL: 1.37 M/S
MV PEAK GRADIENT: 9 MMHG
NEUTROPHILS # BLD AUTO: 6.3 K/UL (ref 1.8–7.7)
NEUTROPHILS NFR BLD: 82.6 % (ref 38–73)
NRBC BLD-RTO: 0 /100 WBC
OHS QRS DURATION: 78 MS
OHS QTC CALCULATION: 409 MS
OSMOLALITY SERPL: 271 MOSM/KG (ref 275–295)
OSMOLALITY UR: 326 MOSM/KG (ref 50–1200)
PHOSPHATE SERPL-MCNC: 4.3 MG/DL (ref 2.7–4.5)
PISA TR MAX VEL: 2.63 M/S
PLATELET # BLD AUTO: 114 K/UL (ref 150–450)
PMV BLD AUTO: 10.6 FL (ref 9.2–12.9)
POTASSIUM SERPL-SCNC: 4.1 MMOL/L (ref 3.5–5.1)
POTASSIUM SERPL-SCNC: 4.1 MMOL/L (ref 3.5–5.1)
POTASSIUM SERPL-SCNC: 4.5 MMOL/L (ref 3.5–5.1)
PROT SERPL-MCNC: 5.6 G/DL (ref 6–8.4)
RA PRESSURE ESTIMATED: 3 MMHG
RBC # BLD AUTO: 4.37 M/UL (ref 4–5.4)
RV TB RVSP: 6 MMHG
SINUS: 3.53 CM
SODIUM SERPL-SCNC: 128 MMOL/L (ref 136–145)
SODIUM SERPL-SCNC: 129 MMOL/L (ref 136–145)
SODIUM SERPL-SCNC: 129 MMOL/L (ref 136–145)
SODIUM UR-SCNC: 53 MMOL/L (ref 20–250)
STJ: 3.05 CM
TDI LATERAL: 0.08 M/S
TDI SEPTAL: 0.07 M/S
TDI: 0.08 M/S
TR MAX PG: 28 MMHG
TRICUSPID ANNULAR PLANE SYSTOLIC EXCURSION: 1.39 CM
TROPONIN I SERPL DL<=0.01 NG/ML-MCNC: 0.12 NG/ML (ref 0–0.03)
TROPONIN I SERPL DL<=0.01 NG/ML-MCNC: 0.14 NG/ML (ref 0–0.03)
TV REST PULMONARY ARTERY PRESSURE: 31 MMHG
WBC # BLD AUTO: 7.62 K/UL (ref 3.9–12.7)
Z-SCORE OF LEFT VENTRICULAR DIMENSION IN END DIASTOLE: -3.13
Z-SCORE OF LEFT VENTRICULAR DIMENSION IN END SYSTOLE: -1.46

## 2024-08-21 PROCEDURE — 96372 THER/PROPH/DIAG INJ SC/IM: CPT

## 2024-08-21 PROCEDURE — 94640 AIRWAY INHALATION TREATMENT: CPT

## 2024-08-21 PROCEDURE — 25000003 PHARM REV CODE 250

## 2024-08-21 PROCEDURE — 83735 ASSAY OF MAGNESIUM: CPT

## 2024-08-21 PROCEDURE — 96376 TX/PRO/DX INJ SAME DRUG ADON: CPT

## 2024-08-21 PROCEDURE — 63600175 PHARM REV CODE 636 W HCPCS

## 2024-08-21 PROCEDURE — 25000003 PHARM REV CODE 250: Performed by: EMERGENCY MEDICINE

## 2024-08-21 PROCEDURE — 25000242 PHARM REV CODE 250 ALT 637 W/ HCPCS

## 2024-08-21 PROCEDURE — 84300 ASSAY OF URINE SODIUM: CPT

## 2024-08-21 PROCEDURE — 36415 COLL VENOUS BLD VENIPUNCTURE: CPT

## 2024-08-21 PROCEDURE — 84484 ASSAY OF TROPONIN QUANT: CPT | Mod: 91 | Performed by: EMERGENCY MEDICINE

## 2024-08-21 PROCEDURE — 83930 ASSAY OF BLOOD OSMOLALITY: CPT

## 2024-08-21 PROCEDURE — 80048 BASIC METABOLIC PNL TOTAL CA: CPT | Mod: XB

## 2024-08-21 PROCEDURE — 36415 COLL VENOUS BLD VENIPUNCTURE: CPT | Performed by: EMERGENCY MEDICINE

## 2024-08-21 PROCEDURE — 85025 COMPLETE CBC W/AUTO DIFF WBC: CPT

## 2024-08-21 PROCEDURE — 63600175 PHARM REV CODE 636 W HCPCS: Performed by: EMERGENCY MEDICINE

## 2024-08-21 PROCEDURE — 83935 ASSAY OF URINE OSMOLALITY: CPT

## 2024-08-21 PROCEDURE — 20600001 HC STEP DOWN PRIVATE ROOM

## 2024-08-21 PROCEDURE — 96366 THER/PROPH/DIAG IV INF ADDON: CPT

## 2024-08-21 PROCEDURE — 84100 ASSAY OF PHOSPHORUS: CPT

## 2024-08-21 PROCEDURE — 94761 N-INVAS EAR/PLS OXIMETRY MLT: CPT

## 2024-08-21 PROCEDURE — 84484 ASSAY OF TROPONIN QUANT: CPT | Performed by: EMERGENCY MEDICINE

## 2024-08-21 PROCEDURE — 80053 COMPREHEN METABOLIC PANEL: CPT

## 2024-08-21 PROCEDURE — 96365 THER/PROPH/DIAG IV INF INIT: CPT | Mod: 59

## 2024-08-21 RX ORDER — SODIUM CHLORIDE, SODIUM LACTATE, POTASSIUM CHLORIDE, CALCIUM CHLORIDE 600; 310; 30; 20 MG/100ML; MG/100ML; MG/100ML; MG/100ML
INJECTION, SOLUTION INTRAVENOUS CONTINUOUS
Status: ACTIVE | OUTPATIENT
Start: 2024-08-21 | End: 2024-08-21

## 2024-08-21 RX ORDER — VENLAFAXINE HYDROCHLORIDE 75 MG/1
75 CAPSULE, EXTENDED RELEASE ORAL DAILY
Status: CANCELLED | OUTPATIENT
Start: 2024-08-21

## 2024-08-21 RX ORDER — OXYBUTYNIN CHLORIDE 5 MG/1
5 TABLET ORAL 2 TIMES DAILY
Status: ON HOLD | COMMUNITY
End: 2024-08-23 | Stop reason: HOSPADM

## 2024-08-21 RX ORDER — DONEPEZIL HYDROCHLORIDE 5 MG/1
5 TABLET, FILM COATED ORAL NIGHTLY
Status: CANCELLED | OUTPATIENT
Start: 2024-08-21

## 2024-08-21 RX ORDER — OMEPRAZOLE 20 MG/1
20 CAPSULE, DELAYED RELEASE ORAL DAILY
Status: ON HOLD | COMMUNITY
Start: 2024-06-10 | End: 2024-08-23 | Stop reason: HOSPADM

## 2024-08-21 RX ORDER — OXYBUTYNIN CHLORIDE 5 MG/1
5 TABLET, EXTENDED RELEASE ORAL DAILY
COMMUNITY
Start: 2024-08-20

## 2024-08-21 RX ORDER — BUDESONIDE AND FORMOTEROL FUMARATE DIHYDRATE 80; 4.5 UG/1; UG/1
2 AEROSOL RESPIRATORY (INHALATION) DAILY
Status: ON HOLD | COMMUNITY
Start: 2024-08-02 | End: 2024-08-23 | Stop reason: HOSPADM

## 2024-08-21 RX ORDER — INSULIN ASPART 100 [IU]/ML
0-5 INJECTION, SOLUTION INTRAVENOUS; SUBCUTANEOUS
Status: CANCELLED | OUTPATIENT
Start: 2024-08-21

## 2024-08-21 RX ORDER — ENOXAPARIN SODIUM 100 MG/ML
40 INJECTION SUBCUTANEOUS EVERY 24 HOURS
Status: DISCONTINUED | OUTPATIENT
Start: 2024-08-21 | End: 2024-08-23 | Stop reason: HOSPADM

## 2024-08-21 RX ORDER — ENOXAPARIN SODIUM 100 MG/ML
40 INJECTION SUBCUTANEOUS EVERY 24 HOURS
Status: CANCELLED | OUTPATIENT
Start: 2024-08-21

## 2024-08-21 RX ORDER — PANTOPRAZOLE SODIUM 40 MG/1
40 TABLET, DELAYED RELEASE ORAL DAILY
Status: DISCONTINUED | OUTPATIENT
Start: 2024-08-21 | End: 2024-08-23 | Stop reason: HOSPADM

## 2024-08-21 RX ORDER — VENLAFAXINE HYDROCHLORIDE 75 MG/1
75 CAPSULE, EXTENDED RELEASE ORAL DAILY
COMMUNITY
Start: 2024-08-20 | End: 2024-08-23 | Stop reason: SDUPTHER

## 2024-08-21 RX ORDER — MAGNESIUM SULFATE HEPTAHYDRATE 40 MG/ML
2 INJECTION, SOLUTION INTRAVENOUS EVERY 4 HOURS
Status: COMPLETED | OUTPATIENT
Start: 2024-08-21 | End: 2024-08-21

## 2024-08-21 RX ORDER — DONEPEZIL HYDROCHLORIDE 5 MG/1
5 TABLET, FILM COATED ORAL NIGHTLY
Status: DISCONTINUED | OUTPATIENT
Start: 2024-08-21 | End: 2024-08-23 | Stop reason: HOSPADM

## 2024-08-21 RX ORDER — ATORVASTATIN CALCIUM 10 MG/1
5 TABLET, FILM COATED ORAL NIGHTLY
COMMUNITY
Start: 2024-08-20

## 2024-08-21 RX ADMIN — PANTOPRAZOLE SODIUM 40 MG: 40 TABLET, DELAYED RELEASE ORAL at 10:08

## 2024-08-21 RX ADMIN — CLOPIDOGREL BISULFATE 75 MG: 75 TABLET ORAL at 08:08

## 2024-08-21 RX ADMIN — ASPIRIN 81 MG CHEWABLE TABLET 81 MG: 81 TABLET CHEWABLE at 08:08

## 2024-08-21 RX ADMIN — PSYLLIUM HUSK 1 PACKET: 3.4 POWDER ORAL at 08:08

## 2024-08-21 RX ADMIN — MAGNESIUM SULFATE HEPTAHYDRATE 2 G: 40 INJECTION, SOLUTION INTRAVENOUS at 10:08

## 2024-08-21 RX ADMIN — HEPARIN SODIUM 5000 UNITS: 5000 INJECTION, SOLUTION INTRAVENOUS; SUBCUTANEOUS at 05:08

## 2024-08-21 RX ADMIN — MAGNESIUM SULFATE HEPTAHYDRATE 2 G: 40 INJECTION, SOLUTION INTRAVENOUS at 02:08

## 2024-08-21 RX ADMIN — SODIUM CHLORIDE, POTASSIUM CHLORIDE, SODIUM LACTATE AND CALCIUM CHLORIDE: 600; 310; 30; 20 INJECTION, SOLUTION INTRAVENOUS at 10:08

## 2024-08-21 RX ADMIN — ENOXAPARIN SODIUM 40 MG: 40 INJECTION SUBCUTANEOUS at 04:08

## 2024-08-21 RX ADMIN — DONEPEZIL HYDROCHLORIDE 5 MG: 5 TABLET, FILM COATED ORAL at 08:08

## 2024-08-21 RX ADMIN — FLUTICASONE FUROATE AND VILANTEROL TRIFENATATE 1 PUFF: 100; 25 POWDER RESPIRATORY (INHALATION) at 09:08

## 2024-08-21 RX ADMIN — PIPERACILLIN SODIUM AND TAZOBACTAM SODIUM 4.5 G: 4; .5 INJECTION, POWDER, FOR SOLUTION INTRAVENOUS at 05:08

## 2024-08-21 NOTE — ED NOTES
Nurses Note -- 4 Eyes      8/20/2024   10:06 PM      Skin assessed during: Admit      [] No Altered Skin Integrity Present    []Prevention Measures Documented      [x] Yes- Altered Skin Integrity Present or Discovered   [x] LDA Added if Not in Epic (Describe Wound)   [x] New Altered Skin Integrity was Present on Admit and Documented in LDA   [] Wound Image Taken: Unable to take updated photos due downtime.     Wound Care Consulted? No    Attending Nurse:  AYAKA Grant     Second RN/Staff Member: AYAKA Angulo

## 2024-08-21 NOTE — PLAN OF CARE
Plan of care discussed with patient and daughter. Patient is free of fall/trauma/injury, fall precautions in place. LR infusing at 100 mL/hr. Magnesium replaced per orders. All questions addressed. Will continue to monitor.

## 2024-08-21 NOTE — PLAN OF CARE
[facility] - Cardiology Stepdown  Initial Discharge Assessment       Primary Care Provider: Abhi De Oliveira IV, MD    Admission Diagnosis: CAD (coronary artery disease) [I25.10]  NSTEMI (non-ST elevated myocardial infarction) [I21.4]  Troponin level elevated [R79.89]  Chest pain [R07.9]  Hypotension [I95.9]  Back injuries, initial encounter [S39.92XA]  Compression fracture of T12 vertebra, initial encounter [S22.080A]  Closed wedge compression fracture of T10 vertebra, initial encounter [S22.070A]  Compression fracture of L2 vertebra, initial encounter [S32.020A]  Closed wedge compression fracture of T4 vertebra, initial encounter [S22.040A]    Admission Date: 8/20/2024  Expected Discharge Date: 8/22/2024         Payor: [employer] MEDICARE / Plan: [employer] MEDICARE HMO / Product Type: Capitation /     Extended Emergency Contact Information  Primary Emergency Contact: Sally Reardon  Mobile Phone: [phone]  Relation: Daughter    Discharge Plan A: Psychiatric hospital  Discharge Plan B: Home with [facility] - [facility] [address]  [address] [address]  Phone: [phone] Fax: [phone]    [facility]. - [place], [place] - [address]  [address]  [place] [address]  Phone: [phone] Fax: [phone]    [id number] - [place], [place] - [address] [facility] [address]  [address]  [place] [address]  Phone: [phone] Fax: [phone]    [facility]  [address] [address]  Phone: [phone] Fax: [phone]      Initial Assessment (most recent)       Adult Discharge Assessment - 08/21/24 1304          Discharge Assessment    Assessment Type Discharge Planning Assessment     Confirmed/corrected address, phone number and insurance Yes     Confirmed Demographics Correct on Facesheet     Source of Information patient;family   did call and speak  with her daughter Sally due to patient unable to answer all questions .    Communicated ISAAK with patient/caregiver Date not available/Unable to determine     Reason For Admission back pain     People in Home spouse;child(maddie), adult;grandchild(maddie)     Facility Arrived From: [facility] in [place]     Do you expect to return to your current living situation? Yes     Do you have help at home or someone to help you manage your care at home? Yes     Who are your caregiver(s) and their phone number(s)? Sally Reardon (daughter) [phone]     Prior to hospitilization cognitive status: Not Oriented to Time     Current cognitive status: Not Oriented to Time     Walking or Climbing Stairs Difficulty yes     Walking or Climbing Stairs ambulation difficulty, requires equipment     Mobility Management rolling walker and wheelchair     Dressing/Bathing Difficulty yes     Dressing/Bathing bathing difficulty, assistance 1 person     Home Accessibility wheelchair accessible     Equipment Currently Used at Home walker, rolling;wheelchair;oxygen     Readmission within 30 days? No     Patient currently being followed by outpatient case management? No     Do you currently have service(s) that help you manage your care at home? No     Do you take prescription medications? Yes     Do you have prescription coverage? Yes     Coverage [employer] Medicare     Do you have any problems affording any of your prescribed medications? No     Is the patient taking medications as prescribed? yes     Who is going to help you get home at discharge? Sally Reardon (daughter) [phone]     How do you get to doctors appointments? family or friend will provide     Are you on dialysis? No     Do you take coumadin? No   ASA    Discharge Plan A Psychiatric hospital     Discharge Plan B Home with family     DME Needed Upon Discharge  other (see comments)   TBD    Discharge Plan discussed with: Adult children;Patient   Sally Reardon  "(daughter) [phone] on phone       Physical Activity    On average, how many days per week do you engage in moderate to strenuous exercise (like a brisk walk)? 0 days     On average, how many minutes do you engage in exercise at this level? 0 min        Financial Resource Strain    How hard is it for you to pay for the very basics like food, housing, medical care, and heating? Somewhat hard   daughter assisting her now with the bills       Housing Stability    In the last 12 months, was there a time when you were not able to pay the mortgage or rent on time? No     At any time in the past 12 months, were you homeless or living in a shelter (including now)? No        Transportation Needs    Has the lack of transportation kept you from medical appointments, meetings, work or from getting things needed for daily living? No        Food Insecurity    Within the past 12 months, you worried that your food would run out before you got the money to buy more. Never true     Within the past 12 months, the food you bought just didn't last and you didn't have money to get more. Never true        Stress    Do you feel stress - tense, restless, nervous, or anxious, or unable to sleep at night because your mind is troubled all the time - these days? Patient declined        Social Isolation    How often do you feel lonely or isolated from those around you?  Patient declined        Alcohol Use    Q1: How often do you have a drink containing alcohol? Never     Q2: How many drinks containing alcohol do you have on a typical day when you are drinking? Patient does not drink     Q3: How often do you have six or more drinks on one occasion? Never        [employer]    In the past 12 months has the electric, gas, oil, or water company threatened to shut off services in your home? Yes   daughter stated she took over paying her utilities due to patient forgetting. daughter stated," she has dementia"       Health Literacy    How often do " "you need to have someone help you when you read instructions, pamphlets, or other written material from your doctor or pharmacy? Sometimes   per daughter," due to dementia"       OTHER    Name(s) of People in Home Sally Reardon (daughter) [phone] and grand daughter.                        CM met with the patient at the bedside and spoke with the daughter Sally Reardon [phone] over her the discharge booklet and placed contact numbers on the white board in the room. Patient alert and sitting up in bed.  Patient verified her name , , PCP, Insurance and Pharmacy . Stated she lives daughter Sally and grandchild and spousein a single story house and has 1 step to point of entry . Stated she is not on coumadin (ASA) nor is she a dialysis patient nor had HH currently but did use [facility] earlier this year . DME's include:home o2 and rollling walker and wheelchair.  Patient stated she took  medication as prescribed and has no problems getting  medication.  Daughter Sally reports her mother is forgetful and has dementia and is in process of moving in with her due to patient spouse in hospital in [place] and they need help at home. She stated the patient came from [facility] unit in [place], [place]. Stated the patient was a t the [facility] and became combative and they sent her to [facility] - daughter stated her mother is not going to a skilled / rehab due to they too rough there. She stated patient has some issues with her back.    Discharge Plan A and Plan B have been determined by review of patient's clinical status, future medical and therapeutic needs, and coverage/benefits for post-acute care in coordination with multidisciplinary team members.    Magda Mercado RN         [phone]          "

## 2024-08-21 NOTE — NURSING
Nurses Note -- 4 Eyes      8/21/2024   3:15 AM      Skin assessed during: Admit      [] No Altered Skin Integrity Present    []Prevention Measures Documented      [x] Yes- Altered Skin Integrity Present or Discovered   [x] LDA Added if Not in Epic (Describe Wound)   [x] New Altered Skin Integrity was Present on Admit and Documented in LDA   [x] Wound Image Taken    Wound Care Consulted? Yes    Attending Nurse:  Aniceto Smith RN/Staff Member:   AYAKA Cabrera

## 2024-08-21 NOTE — CONSULTS
20g x 1.75in PIV placed in Right Forearm by KATYA using USG.  20g x 1.75in PIV placed in Right Upper Arm by KATYA using USG.

## 2024-08-21 NOTE — SUBJECTIVE & OBJECTIVE
Past Medical History:   Diagnosis Date    Anesthesia complication     BLADDER DYSFUNCTION    Aortic stenosis     Arthritis     Back pain     Diabetes mellitus type II     NO LONGER DIABETIC    Encounter for blood transfusion     Hyperlipidemia     Hypertension     NSTEMI (non-ST elevated myocardial infarction) 05/01/2022    Osteoporosis     Wears glasses        Past Surgical History:   Procedure Laterality Date     vocal cord nodules removed  long time ago     twice    ADRENAL TUMOR      APPENDECTOMY  within last 5yrs    CATHETERIZATION OF BOTH LEFT AND RIGHT HEART Left 05/06/2022    Procedure: CATHETERIZATION, HEART, BOTH LEFT AND RIGHT;  Surgeon: Michelet Vargas MD;  Location: [facility] CATH/EP LAB;  Service: Cardiology;  Laterality: Left;    COLONOSCOPY N/A 6/23/2023    Procedure: COLONOSCOPY;  Surgeon: Joel Neal III, MD;  Location: [facility] ENDO;  Service: Endoscopy;  Laterality: N/A;    FIXATION KYPHOPLASTY THORACIC SPINE      8-20-13    FOOT SURGERY      left 2nd toe was too long     HERNIA REPAIR  within last 5yrs    INSERTION OF TEMPORARY PACEMAKER N/A 1/4/2023    Procedure: INSERTION, PACEMAKER, TEMPORARY;  Surgeon: Bhavesh Peña MD;  Location: [facility] CATH;  Service: Cardiology;  Laterality: N/A;    LEFT HEART CATHETERIZATION Left 05/02/2022    Procedure: Left heart cath;  Surgeon: Jose Echols MD;  Location: [facility] CATH/EP LAB;  Service: Cardiology;  Laterality: Left;    SMALL BOWEL ENTEROSCOPY N/A 6/22/2023    Procedure: ENTEROSCOPY;  Surgeon: Cornell Aguirre MD;  Location: [facility] ENDO;  Service: Endoscopy;  Laterality: N/A;    SMALL BOWEL ENTEROSCOPY N/A 10/20/2023    Procedure: ENTEROSCOPY;  Surgeon: Otilio Bourgeois MD;  Location: [facility];  Service: Endoscopy;  Laterality: N/A;    TONSILLECTOMY, ADENOIDECTOMY  long time ago    TRANSCATHETER AORTIC VALVE REPLACEMENT (TAVR) Bilateral 1/4/2023    Procedure: REPLACEMENT, AORTIC VALVE, TRANSCATHETER (TAVR);  Surgeon: Bhavesh Peña MD;  [facility];  Service: Cardiology;  Laterality: Bilateral;    TRANSCATHETER AORTIC VALVE REPLACEMENT (TAVR) Bilateral 1/4/2023    Procedure: REPLACEMENT, AORTIC VALVE, TRANSCATHETER (TAVR);  Surgeon: Dallin Verma MD;  Location: [facility] CATH;  Service: Cardiology;  Laterality: Bilateral;    TRANSTHORACIC ECHOCARDIOGRAPHY (TTE)  1/4/2023    Procedure: ECHOCARDIOGRAM, TRANSTHORACIC;  Surgeon: Bhavesh Peña MD;  Location: [facility] CATH;  Service: Cardiology;;       Review of patient's allergies indicates:   Allergen Reactions    Latex      Other reaction(s): Unknown  Other reaction(s): Unknown    Sulfacetamide sodium      Pain perineal area    Sulfasalazine Hives    Adhesive Itching     SKIN GETS RED WITH TAPE AND BANDAIDS    Adhesive tape-silicones Itching     SKIN GETS RED WITH TAPE AND BANDAIDS    Sulfa (sulfonamide antibiotics) Rash       No current facility-administered medications on file prior to encounter.     Current Outpatient Medications on File Prior to Encounter   Medication Sig    aspirin (ECOTRIN) 81 MG EC tablet Take 81 mg by mouth once daily.    carvediloL (COREG) 12.5 MG tablet Take 12.5 mg by mouth 2 (two) times daily.    clopidogreL (PLAVIX) 75 mg tablet Take 1 tablet (75 mg total) by mouth once daily.    diltiaZEM (DILACOR XR) 120 MG CDCR Take 1 capsule (120 mg total) by mouth once daily.    ergocalciferol (ERGOCALCIFEROL) 50,000 unit Cap TAKE 1 CAPSULE (50,000 UNITS TOTAL) EVERY 7 DAYS. (Patient taking differently: Take 50,000 Units by mouth every 7 days.)    ferrous gluconate 324 mg (37.5 mg iron) Tab tablet Take 1 tablet (324 mg total) by mouth once daily.    ferrous sulfate (FEOSOL) 325 mg (65 mg iron) Tab tablet Take 325 mg by mouth with breakfast.    fluticasone furoate-vilanteroL (BREO ELLIPTA) 100-25 mcg/dose diskus inhaler Inhale 1 puff into the lungs once daily. Controller    furosemide (LASIX) 40 MG tablet Take 1 tablet (40 mg total) by mouth once daily.    lisinopriL 10 MG tablet Take 10 mg  by mouth once daily.    traMADoL (ULTRAM) 50 mg tablet Take 50 mg by mouth every 6 (six) hours as needed for Pain.    albuterol (PROVENTIL/VENTOLIN HFA) 90 mcg/actuation inhaler Inhale 2 puffs into the lungs every 6 (six) hours as needed for Shortness of Breath.    butalbital-acetaminophen-caffeine -40 mg (FIORICET, ESGIC) -40 mg per tablet Take 1 tablet by mouth every 4 (four) hours as needed for Pain.    calcium carbonate (OS-TK) 500 mg calcium (1,250 mg) tablet Take 1 tablet (500 mg total) by mouth once daily.    citalopram (CELEXA) 40 MG tablet Take 1 tablet (40 mg total) by mouth once daily.    digoxin (LANOXIN) 125 mcg tablet Take 1 tablet (0.125 mg total) by mouth once daily.    donepeziL (ARICEPT) 5 MG tablet Take 5 mg by mouth nightly.    glucosamine-chondroitin 500-400 mg tablet Take 1 tablet by mouth once daily.    loperamide (IMODIUM A-D) 2 mg Tab Take 1 tablet (2 mg total) by mouth 3 (three) times daily as needed (diarrhea). Take 2 tablets by mouth initially then 1 tablet after each loose stool as needed for diarrhea.    multivitamin capsule Take 1 capsule by mouth once daily.    pantoprazole (PROTONIX) 40 MG tablet Take 1 tablet (40 mg total) by mouth 2 (two) times daily.    potassium chloride (KLOR-CON) 10 MEQ TbSR Take 1 tablet (10 mEq total) by mouth As instructed. Take 2 tabs 1st day and 2nd day then 1 tab qd    simvastatin (ZOCOR) 10 MG tablet Take 10 mg by mouth every evening.    spironolactone (ALDACTONE) 25 MG tablet Take 1 tablet (25 mg total) by mouth once daily.    [DISCONTINUED] ALLERGY RELIEF, FEXOFENADINE, 180 mg tablet Take 180 mg by mouth once daily.    [DISCONTINUED] ezetimibe-simvastatin 10-40 mg (VYTORIN) 10-40 mg per tablet Take 1 tablet by mouth.    [DISCONTINUED] lisinopril-hydrochlorothiazide (PRINZIDE,ZESTORETIC) 20-12.5 mg per tablet Take 1 tablet by mouth once daily.      Family History    None       Tobacco Use    Smoking status: Former     Current packs/day:  0.00     Average packs/day: 0.5 packs/day for 35.0 years (17.5 ttl pk-yrs)     Types: Cigarettes     Start date: 10/5/1987     Quit date: 10/5/2022     Years since quittin.8    Smokeless tobacco: Never   Substance and Sexual Activity    Alcohol use: No    Drug use: No    Sexual activity: Not Currently     Review of Systems   Respiratory:  Negative for apnea, cough, chest tightness and shortness of breath.    Cardiovascular:  Negative for chest pain and palpitations.   Gastrointestinal:  Negative for nausea and vomiting.   Endocrine: Negative for cold intolerance and heat intolerance.   Genitourinary:  Negative for flank pain, frequency and urgency.   Skin:  Negative for color change and pallor.   Neurological:  Negative for dizziness and speech difficulty.   Psychiatric/Behavioral:  Negative for decreased concentration.      Objective:     Vital Signs (Most Recent):  Temp: 98.1 °F (36.7 °C) (24)  Pulse: 95 (24)  Resp: (!) 22 (24)  BP: 139/74 (24)  SpO2: 96 % (24) Vital Signs (24h Range):  Temp:  [97.6 °F (36.4 °C)-98.3 °F (36.8 °C)] 98.1 °F (36.7 °C)  Pulse:  [78-95] 95  Resp:  [14-24] 22  SpO2:  [93 %-100 %] 96 %  BP: ()/(51-84) 139/74     Weight: 90.7 kg (200 lb)  Body mass index is 39.06 kg/m².     Physical Exam  Constitutional:       General: She is not in acute distress.     Appearance: Normal appearance. She is obese. She is not ill-appearing, toxic-appearing or diaphoretic.   HENT:      Head: Normocephalic and atraumatic.   Cardiovascular:      Rate and Rhythm: Normal rate. Rhythm irregular.      Pulses: Normal pulses.   Pulmonary:      Effort: Pulmonary effort is normal. No respiratory distress.      Breath sounds: No wheezing.   Abdominal:      General: Abdomen is flat. There is no distension.      Palpations: Abdomen is soft. There is no mass.      Tenderness: There is no abdominal tenderness.   Skin:     Coloration: Skin is not jaundiced or  pale.   Neurological:      Mental Status: She is alert. Mental status is at baseline.                Significant Labs: All pertinent labs within the past 24 hours have been reviewed.    Significant Imaging: I have reviewed all pertinent imaging results/findings within the past 24 hours.

## 2024-08-21 NOTE — H&P
[facility] - Cardiology [facility] Medicine  History & Physical    Patient Name: Daryleen G Moran  MRN: [id number]  Patient Class: OP- Observation  Admission Date: 8/20/2024  Attending Physician: Silva Li MD   Primary Care Provider: Abhi De Oliveira IV, MD         Patient information was obtained from patient and ER records.     Subjective:     Principal Problem:<principal problem not specified>    Chief Complaint:   Chief Complaint   Patient presents with    Multiple complaints     Back and knee pain, hx dementia, was in nursing home daughter reports hurt back when pushed back in bed, was on antibiotics         HPI: ED Brief:    The patient is a 79-year-old female brought in by her family member directly from a behavioral health unit after her daughter picked her up. According to the daughter, they are coming to the emergency department secondary to back pain. This has been an ongoing issue for 3 weeks. According to the daughter, the patient injured her back while in a rehab facility after an admission for COVID. Incidentally, the patient is noted to have significant hypotension here in the emergency department.     When asked about infection, the daughter states that the patient has had an elevated white blood cell count and was told that she had an infection a couple of weeks ago at the rehab facility, prior to being sent to the behavioral health facility. Here in the emergency department, the patient is complaining of diffuse back pain. She also reports chronic hip and lower extremity pain, secondary to arthritis.  She denies fever, generalized weakness, headache, neck stiffness, rhinorrhea, sore throat, sinus congestion, cough, dysuria, hematuria, diarrhea, hematochezia, constipation, melena, or skin rashes.  Other than the pain, she has no complaints.     ED Course: The patient arrived critically hypotensive of unclear etiology, with sepsis high on the differential. Per daughter, WBC elevated w/  suspected infection at rehab, so infectious workup was started + vanc/zosyn. CXR s/f increased interstitial lung markings; however, CBC and CMP later came back unrevealing. BP improved s/p LR bolus (potentially iatrogenic 2/2 BP medications given at rehab). D/t hx MI, trops and EKG also ordered; EKG revealing for afib (likely chronic), trops mildly elevated (which led to BNP order); otherwise unrevealing workup.     Overall: Presented d/t likely iatrogenic hypotension 2/2 BP medications, found to have a mildly elevated trop (0.153), admitted for observation for MI r/o. Clinical status otherwise unrevealing and unconcerning. Post-admission, f/u BNP, second trop, watch for signs of MI. If signs of MI, begin ACS protocol.    Past Medical History:   Diagnosis Date    Anesthesia complication     BLADDER DYSFUNCTION    Aortic stenosis     Arthritis     Back pain     Diabetes mellitus type II     NO LONGER DIABETIC    Encounter for blood transfusion     Hyperlipidemia     Hypertension     NSTEMI (non-ST elevated myocardial infarction) 05/01/2022    Osteoporosis     Wears glasses        Past Surgical History:   Procedure Laterality Date     vocal cord nodules removed  long time ago     twice    ADRENAL TUMOR      APPENDECTOMY  within last 5yrs    CATHETERIZATION OF BOTH LEFT AND RIGHT HEART Left 05/06/2022    Procedure: CATHETERIZATION, HEART, BOTH LEFT AND RIGHT;  Surgeon: Michelet Vargas MD;  Location: [facility] CATH/EP LAB;  Service: Cardiology;  Laterality: Left;    COLONOSCOPY N/A 6/23/2023    Procedure: COLONOSCOPY;  Surgeon: Joel Neal III, MD;  Location: [facility] ENDO;  Service: Endoscopy;  Laterality: N/A;    FIXATION KYPHOPLASTY THORACIC SPINE      8-20-13    FOOT SURGERY      left 2nd toe was too long     HERNIA REPAIR  within last 5yrs    INSERTION OF TEMPORARY PACEMAKER N/A 1/4/2023    Procedure: INSERTION, PACEMAKER, TEMPORARY;  Surgeon: Bhavesh Peña MD;  Location: [facility] CATH;  Service: Cardiology;   Laterality: N/A;    LEFT HEART CATHETERIZATION Left 05/02/2022    Procedure: Left heart cath;  Surgeon: Jose Echols MD;  Location: [facility] CATH/EP LAB;  Service: Cardiology;  Laterality: Left;    SMALL BOWEL ENTEROSCOPY N/A 6/22/2023    Procedure: ENTEROSCOPY;  Surgeon: Cornell Aguirre MD;  Location: [facility] ENDO;  Service: Endoscopy;  Laterality: N/A;    SMALL BOWEL ENTEROSCOPY N/A 10/20/2023    Procedure: ENTEROSCOPY;  Surgeon: Otilio Bourgeois MD;  Location: [facility] ENDO;  Service: Endoscopy;  Laterality: N/A;    TONSILLECTOMY, ADENOIDECTOMY  long time ago    TRANSCATHETER AORTIC VALVE REPLACEMENT (TAVR) Bilateral 1/4/2023    Procedure: REPLACEMENT, AORTIC VALVE, TRANSCATHETER (TAVR);  Surgeon: Bhavesh Peña MD;  Location: [email or web address] CATH;  Service: Cardiology;  Laterality: Bilateral;    TRANSCATHETER AORTIC VALVE REPLACEMENT (TAVR) Bilateral 1/4/2023    Procedure: REPLACEMENT, AORTIC VALVE, TRANSCATHETER (TAVR);  Surgeon: Dallin Verma MD;  Location: [email or web address] CATH;  Service: Cardiology;  Laterality: Bilateral;    TRANSTHORACIC ECHOCARDIOGRAPHY (TTE)  1/4/2023    Procedure: ECHOCARDIOGRAM, TRANSTHORACIC;  Surgeon: Bhavesh Peña MD;  Location: [email or web address] CATH;  Service: Cardiology;;       Review of patient's allergies indicates:   Allergen Reactions    Latex      Other reaction(s): Unknown  Other reaction(s): Unknown    Sulfacetamide sodium      Pain perineal area    Sulfasalazine Hives    Adhesive Itching     SKIN GETS RED WITH TAPE AND BANDAIDS    Adhesive tape-silicones Itching     SKIN GETS RED WITH TAPE AND BANDAIDS    Sulfa (sulfonamide antibiotics) Rash       No current facility-administered medications on file prior to encounter.     Current Outpatient Medications on File Prior to Encounter   Medication Sig    aspirin (ECOTRIN) 81 MG EC tablet Take 81 mg by mouth once daily.    carvediloL (COREG) 12.5 MG tablet Take 12.5 mg by mouth 2 (two) times daily.    clopidogreL (PLAVIX) 75 mg tablet Take 1 tablet (75 mg  total) by mouth once daily.    diltiaZEM (DILACOR XR) 120 MG CDCR Take 1 capsule (120 mg total) by mouth once daily.    ergocalciferol (ERGOCALCIFEROL) 50,000 unit Cap TAKE 1 CAPSULE (50,000 UNITS TOTAL) EVERY 7 DAYS. (Patient taking differently: Take 50,000 Units by mouth every 7 days.)    ferrous gluconate 324 mg (37.5 mg iron) Tab tablet Take 1 tablet (324 mg total) by mouth once daily.    ferrous sulfate (FEOSOL) 325 mg (65 mg iron) Tab tablet Take 325 mg by mouth with breakfast.    fluticasone furoate-vilanteroL (BREO ELLIPTA) 100-25 mcg/dose diskus inhaler Inhale 1 puff into the lungs once daily. Controller    furosemide (LASIX) 40 MG tablet Take 1 tablet (40 mg total) by mouth once daily.    lisinopriL 10 MG tablet Take 10 mg by mouth once daily.    traMADoL (ULTRAM) 50 mg tablet Take 50 mg by mouth every 6 (six) hours as needed for Pain.    albuterol (PROVENTIL/VENTOLIN HFA) 90 mcg/actuation inhaler Inhale 2 puffs into the lungs every 6 (six) hours as needed for Shortness of Breath.    butalbital-acetaminophen-caffeine -40 mg (FIORICET, ESGIC) -40 mg per tablet Take 1 tablet by mouth every 4 (four) hours as needed for Pain.    calcium carbonate (OS-TK) 500 mg calcium (1,250 mg) tablet Take 1 tablet (500 mg total) by mouth once daily.    citalopram (CELEXA) 40 MG tablet Take 1 tablet (40 mg total) by mouth once daily.    digoxin (LANOXIN) 125 mcg tablet Take 1 tablet (0.125 mg total) by mouth once daily.    donepeziL (ARICEPT) 5 MG tablet Take 5 mg by mouth nightly.    glucosamine-chondroitin 500-400 mg tablet Take 1 tablet by mouth once daily.    loperamide (IMODIUM A-D) 2 mg Tab Take 1 tablet (2 mg total) by mouth 3 (three) times daily as needed (diarrhea). Take 2 tablets by mouth initially then 1 tablet after each loose stool as needed for diarrhea.    multivitamin capsule Take 1 capsule by mouth once daily.    pantoprazole (PROTONIX) 40 MG tablet Take 1 tablet (40 mg total) by mouth 2 (two)  times daily.    potassium chloride (KLOR-CON) 10 MEQ TbSR Take 1 tablet (10 mEq total) by mouth As instructed. Take 2 tabs 1st day and 2nd day then 1 tab qd    simvastatin (ZOCOR) 10 MG tablet Take 10 mg by mouth every evening.    spironolactone (ALDACTONE) 25 MG tablet Take 1 tablet (25 mg total) by mouth once daily.    [DISCONTINUED] ALLERGY RELIEF, FEXOFENADINE, 180 mg tablet Take 180 mg by mouth once daily.    [DISCONTINUED] ezetimibe-simvastatin 10-40 mg (VYTORIN) 10-40 mg per tablet Take 1 tablet by mouth.    [DISCONTINUED] lisinopril-hydrochlorothiazide (PRINZIDE,ZESTORETIC) 20-12.5 mg per tablet Take 1 tablet by mouth once daily.      Family History    None       Tobacco Use    Smoking status: Former     Current packs/day: 0.00     Average packs/day: 0.5 packs/day for 35.0 years (17.5 ttl pk-yrs)     Types: Cigarettes     Start date: 10/5/1987     Quit date: 10/5/2022     Years since quittin.8    Smokeless tobacco: Never   Substance and Sexual Activity    Alcohol use: No    Drug use: No    Sexual activity: Not Currently     Review of Systems   Respiratory:  Negative for apnea, cough, chest tightness and shortness of breath.    Cardiovascular:  Negative for chest pain and palpitations.   Gastrointestinal:  Negative for nausea and vomiting.   Endocrine: Negative for cold intolerance and heat intolerance.   Genitourinary:  Negative for flank pain, frequency and urgency.   Skin:  Negative for color change and pallor.   Neurological:  Negative for dizziness and speech difficulty.   Psychiatric/Behavioral:  Negative for decreased concentration.      Objective:     Vital Signs (Most Recent):  Temp: 98.1 °F (36.7 °C) (24)  Pulse: 95 (24)  Resp: (!) 22 (24)  BP: 139/74 (24)  SpO2: 96 % (24) Vital Signs (24h Range):  Temp:  [97.6 °F (36.4 °C)-98.3 °F (36.8 °C)] 98.1 °F (36.7 °C)  Pulse:  [78-95] 95  Resp:  [14-24] 22  SpO2:  [93 %-100 %] 96 %  BP:  ()/(51-84) 139/74     Weight: 90.7 kg (200 lb)  Body mass index is 39.06 kg/m².     Physical Exam  Constitutional:       General: She is not in acute distress.     Appearance: Normal appearance. She is obese. She is not ill-appearing, toxic-appearing or diaphoretic.   HENT:      Head: Normocephalic and atraumatic.   Cardiovascular:      Rate and Rhythm: Normal rate. Rhythm irregular.      Pulses: Normal pulses.   Pulmonary:      Effort: Pulmonary effort is normal. No respiratory distress.      Breath sounds: No wheezing.   Abdominal:      General: Abdomen is flat. There is no distension.      Palpations: Abdomen is soft. There is no mass.      Tenderness: There is no abdominal tenderness.   Skin:     Coloration: Skin is not jaundiced or pale.   Neurological:      Mental Status: She is alert. Mental status is at baseline.                Significant Labs: All pertinent labs within the past 24 hours have been reviewed.    Significant Imaging: I have reviewed all pertinent imaging results/findings within the past 24 hours.  Assessment/Plan:     CAD (coronary artery disease)  Patient with known CAD which is controlled Previous hx acute on chronic CHF exacerbation. Will continue DAPT (ASA and Plavix) and monitor for S/Sx of angina/ACS. Continue to monitor on telemetry.     Currently admitted for MI r/o given elevated trops at presentation. While likely chronic 2/2 to existing heart disease, will monitor overnight and trend.    - Echo to eval potential new wall motion abnormalities and repeat trops ordered to trend.    - Continue ASA, clopidogrel.  - Holding carvedilol 2/2 presentation w/ significant hypotension.   - Holding lasix 2/2 initial presentation w/ hypotension.    COPD (chronic obstructive pulmonary disease)  Patient's COPD is controlled currently. Continue scheduled inhalers, supplemental oxygen PRN and monitor respiratory status closely.     - Restarted home fluticasone-vilanterol inhaler 100-25 mcg/dose  inhaler.    Atrial fibrillation  Patient with chronic afib; previous admission hx afib w/ RVR for which she received a diltiazem infusion followed by being placed on oral diltiazem.    -Home diltiazem held in setting of significant hypotension (c/f iatrogenic) at presentation.       VTE Risk Mitigation (From admission, onward)           Ordered     heparin (porcine) injection 5,000 Units  Every 8 hours         08/20/24 2205     IP VTE HIGH RISK PATIENT  Once         08/20/24 2311     Place JOJO hose  Until discontinued         08/20/24 2311     Place sequential compression device  Until discontinued         08/20/24 2205                           On 08/21/2024, patient should be placed in hospital observation services.         Nas Velásquez MD  Department of Hospital Medicine  [facility] - Cardiology Stepdown

## 2024-08-21 NOTE — ASSESSMENT & PLAN NOTE
Patient's COPD is controlled currently. Continue scheduled inhalers, supplemental oxygen PRN and monitor respiratory status closely.     - Restarted home fluticasone-vilanterol inhaler 100-25 mcg/dose inhaler.

## 2024-08-21 NOTE — ASSESSMENT & PLAN NOTE
Acute Care - Occupational Therapy Discharge  [facility]    Patient Name: Loyda Tirado  : 1937    MRN: [id number]                              Today's Date: 2022       Admit Date: 9/10/2022    Visit Dx:     ICD-10-CM ICD-9-CM   1. Hypoxia  R09.02 799.02   2. COVID-19  U07.1 079.89   3. Shortness of breath  R06.02 786.05   4. Acute UTI  N39.0 599.0   5. Hypotension, unspecified hypotension type  I95.9 458.9     Patient Active Problem List   Diagnosis   • Acute UTI (urinary tract infection)   • History of CVA (cerebrovascular accident)   • Chronic pain syndrome   • Dementia (HCC)   • Depression   • Hypertension   • Hyperlipidemia   • Anxiety   • ADRIANNA (acute kidney injury) (HCC)   • Syncope   • Leukocytosis, unspecified type   • Elevated LFTs   • Cardiac enzymes elevated   • Back pain   • Stroke (HCC)   • Squamous cell carcinoma of skin   • External hemorrhoids   • Chronic diarrhea   • Hydronephrosis with ureteral stricture, not elsewhere classified   • Chronic kidney disease   • Pneumonia due to COVID-19 virus     Past Medical History:   Diagnosis Date   • Anemia    • Anxiety    • Arthritis    • CHF    • Chronic diarrhea    • Chronic kidney disease    • CVA 05/15/2022    w/ Left Hemiparesis   • Dementia    • Depression    • GERD    • Hyperlipidemia    • Hypertension    • Low back pain    • Stroke (HCC)    • Tremor      Past Surgical History:   Procedure Laterality Date   • ABDOMINAL WALL ABSCESS INCISION AND DRAINAGE     • BREAST AUGMENTATION Bilateral    • BREAST SURGERY     • BUNIONECTOMY Left    • CATARACT EXTRACTION, BILATERAL     • CYSTOSCOPY W/ URETERAL STENT PLACEMENT Bilateral 2022    Procedure: 1. Cystoscopy 2. Retrograde pyelogram 3. Bilateral ureteral stent placement 4. Fluoroscopy with interpretation  ;  Surgeon: Humberto Fu MD;  Location: [facility] MAIN OR;  Service: Urology;  Laterality: Bilateral;   • TUBAL ABDOMINAL LIGATION        General Information    No  Patient with chronic afib; previous admission hx afib w/ RVR for which she received a diltiazem infusion followed by being placed on oral diltiazem.    -Home diltiazem held in setting of significant hypotension (c/f iatrogenic) at presentation.    documentation.                Mobility/ADL's    No documentation.                Obj/Interventions    No documentation.                Goals/Plan    No documentation.                Clinical Impression    No documentation.                Outcome Measures     Row Name 09/13/22 1558 09/13/22 1150       How much help from another person do you currently need...    Turning from your back to your side while in flat bed without using bedrails? 1  -JH 1  -MM    Moving from lying on back to sitting on the side of a flat bed without bedrails? 1  - 1  -MM    Moving to and from a bed to a chair (including a wheelchair)? 1  -JH 1  -MM    Standing up from a chair using your arms (e.g., wheelchair, bedside chair)? 1  - 1  -MM    Climbing 3-5 steps with a railing? 1  - 1  -MM    To walk in hospital room? 1  - 1  -MM    AM-PAC 6 Clicks Score (PT) 6  - 6  -MM    Highest level of mobility 2 --> Bed activities/dependent transfer  - 2 --> Bed activities/dependent transfer  -MM    Row Name 09/13/22 0830          How much help from another person do you currently need...    Turning from your back to your side while in flat bed without using bedrails? 1  -MR     Moving from lying on back to sitting on the side of a flat bed without bedrails? 1  -MR     Moving to and from a bed to a chair (including a wheelchair)? 1  -MR     Standing up from a chair using your arms (e.g., wheelchair, bedside chair)? 1  -MR     Climbing 3-5 steps with a railing? 1  -MR     To walk in hospital room? 1  -MR     AM-PAC 6 Clicks Score (PT) 6  -MR     Highest level of mobility 2 --> Bed activities/dependent transfer  -MR     Row Name 09/13/22 1558          Functional Assessment    Outcome Measure Options AM-PAC 6 Clicks Basic Mobility (PT)  -           User Key  (r) = Recorded By, (t) = Taken By, (c) = Cosigned By    Initials Name Provider Type    Shayy Kebede, PT Physical Therapist    Jose Manuel Gordon, RN Registered Nurse    MR Roberto Ma  Jeaneth, RN Registered Nurse              Occupational Therapy Education                 Title: PT OT SLP Therapies (In Progress)     Topic: Occupational Therapy (Done)     Point: ADL training (Done)     Description:   Instruct learner(s) on proper safety adaptation and remediation techniques during self care or transfers.   Instruct in proper use of assistive devices.              Learning Progress Summary           Patient Acceptance, E,TB, VU by  at 9/12/2022 1834      Show all documentation for this point (1)                 Point: Home exercise program (Done)     Description:   Instruct learner(s) on appropriate technique for monitoring, assisting and/or progressing therapeutic exercises/activities.              Learning Progress Summary           Patient Acceptance, E,TB, VU by MM at 9/12/2022 1834      Show all documentation for this point (1)                 Point: Precautions (Done)     Description:   Instruct learner(s) on prescribed precautions during self-care and functional transfers.              Learning Progress Summary           Patient Acceptance, E,TB, VU by  at 9/12/2022 1834      Show all documentation for this point (1)                 Point: Body mechanics (Done)     Description:   Instruct learner(s) on proper positioning and spine alignment during self-care, functional mobility activities and/or exercises.              Learning Progress Summary           Patient Acceptance, E,TB, VU by MM at 9/12/2022 1834      Show all documentation for this point (1)                             User Key     Initials Effective Dates Name Provider Type Discipline     07/11/22 -  Jose Manuel Bueno, RN Registered Nurse Nurse              OT Recommendation and Plan           Time Calculation:    Time Calculation- OT     Row Name 09/13/22 1706             Time Calculation- OT    OT Start Time 0940  -MP      OT Stop Time 1000  -MP      OT Time Calculation (min) 20 min  -MP      Total Timed Code Minutes- OT 10  minute(s)  -MP      OT Received On 09/13/22  -RENE            User Key  (r) = Recorded By, (t) = Taken By, (c) = Cosigned By    Initials Name Provider Type    Abdulaziz Boyd OT Occupational Therapist              Therapy Charges for Today     Code Description Service Date Service Provider Modifiers Qty    [id number]  OT THERAPEUTIC ACT EA 15 MIN 9/13/2022 Abdulaziz Rubalcava OT GO 1                  Abdulaziz Rubalcava OT  9/13/2022

## 2024-08-21 NOTE — ED NOTES
Assumed care of patient. Patient is alert and oriented at this time with periods of intermittent confusion. Pt is resting comfortably in bed in NAD. Patient remains in hospital gown, patient remains clean and dry at this time. BP, cardiac, and O2 monitoring continued. Patient updated on plan of care, pt denies any needs or complaints at this time. Patient provided with keyla collazo. Bed locked in lowest position, side rails up x2, call light within reach. VSS. Will continue to monitor.

## 2024-08-21 NOTE — ED NOTES
Telemetry Verification   Patient placed on Telemetry Box  Verified with War Room  Box # [id number]   Monitor Tech War room    Rate 93   Rhythm afib

## 2024-08-21 NOTE — H&P
[facility] - Emergency Dept  [facility]  History & Physical    Patient Name: Daryleen G Moran  MRN: [id number]  Patient Class: OP- Observation  Admission Date: 8/20/2024  Attending Physician: Silva Li MD   Primary Care Provider: Abhi De Oliveira IV, MD         Patient information was obtained from patient and ER records.     Subjective:     Principal Problem:<principal problem not specified>    Chief Complaint:   Chief Complaint   Patient presents with    Multiple complaints     Back and knee pain, hx dementia, was in nursing home daughter reports hurt back when pushed back in bed, was on antibiotics         HPI: ED Brief:    The patient is a 79-year-old female brought in by her family member directly from a behavioral health unit after her daughter picked her up. According to the daughter, they are coming to the emergency department secondary to back pain. This has been an ongoing issue for 3 weeks. According to the daughter, the patient injured her back while in a rehab facility after an admission for COVID. Incidentally, the patient is noted to have significant hypotension here in the emergency department.     When asked about infection, the daughter states that the patient has had an elevated white blood cell count and was told that she had an infection a couple of weeks ago at the rehab facility, prior to being sent to the behavioral health facility. Here in the emergency department, the patient is complaining of diffuse back pain. She also reports chronic hip and lower extremity pain, secondary to arthritis.  She denies fever, generalized weakness, headache, neck stiffness, rhinorrhea, sore throat, sinus congestion, cough, dysuria, hematuria, diarrhea, hematochezia, constipation, melena, or skin rashes.  Other than the pain, she has no complaints.     ED Course: The patient arrived critically hypotensive of unclear etiology, with sepsis high on the differential. Per daughter, WBC elevated w/  suspected infection at rehab, so infectious workup was started + vanc/zosyn. CXR s/f increased interstitial lung markings; however, CBC and CMP later came back unrevealing. BP improved s/p LR bolus (potentially iatrogenic 2/2 BP medications given at rehab). D/t hx MI, trops and EKG also ordered; EKG revealing for afib (likely chronic), trops mildly elevated (which led to BNP order); otherwise unrevealing workup.     Overall: Presented d/t likely iatrogenic hypotension 2/2 BP medications, found to have a mildly elevated trop (0.153), admitted for observation for MI r/o. Clinical status otherwise unrevealing and unconcerning. Post-admission, f/u BNP, second trop, watch for signs of MI. If signs of MI, begin ACS protocol.    Past Medical History:   Diagnosis Date    Anesthesia complication     BLADDER DYSFUNCTION    Aortic stenosis     Arthritis     Back pain     Diabetes mellitus type II     NO LONGER DIABETIC    Encounter for blood transfusion     Hyperlipidemia     Hypertension     NSTEMI (non-ST elevated myocardial infarction) 05/01/2022    Osteoporosis     Wears glasses        Past Surgical History:   Procedure Laterality Date     vocal cord nodules removed  long time ago     twice    ADRENAL TUMOR      APPENDECTOMY  within last 5yrs    CATHETERIZATION OF BOTH LEFT AND RIGHT HEART Left 05/06/2022    Procedure: CATHETERIZATION, HEART, BOTH LEFT AND RIGHT;  Surgeon: Michelet Vargas MD;  Location: [facility] CATH/EP LAB;  Service: Cardiology;  Laterality: Left;    COLONOSCOPY N/A 6/23/2023    Procedure: COLONOSCOPY;  Surgeon: Joel Neal III, MD;  Location: [facility] ENDO;  Service: Endoscopy;  Laterality: N/A;    FIXATION KYPHOPLASTY THORACIC SPINE      8-20-13    FOOT SURGERY      left 2nd toe was too long     HERNIA REPAIR  within last 5yrs    INSERTION OF TEMPORARY PACEMAKER N/A 1/4/2023    Procedure: INSERTION, PACEMAKER, TEMPORARY;  Surgeon: Bhavesh Peña MD;  Location: [facility] CATH;  Service: Cardiology;   Laterality: N/A;    LEFT HEART CATHETERIZATION Left 05/02/2022    Procedure: Left heart cath;  Surgeon: Jose Echols MD;  Location: [facility] CATH/EP LAB;  Service: Cardiology;  Laterality: Left;    SMALL BOWEL ENTEROSCOPY N/A 6/22/2023    Procedure: ENTEROSCOPY;  Surgeon: Cornell Aguirre MD;  Location: [facility] ENDO;  Service: Endoscopy;  Laterality: N/A;    SMALL BOWEL ENTEROSCOPY N/A 10/20/2023    Procedure: ENTEROSCOPY;  Surgeon: Otilio Bourgeois MD;  Location: [facility] ENDO;  Service: Endoscopy;  Laterality: N/A;    TONSILLECTOMY, ADENOIDECTOMY  long time ago    TRANSCATHETER AORTIC VALVE REPLACEMENT (TAVR) Bilateral 1/4/2023    Procedure: REPLACEMENT, AORTIC VALVE, TRANSCATHETER (TAVR);  Surgeon: Bhavesh Peña MD;  Location: [facility] CATH;  Service: Cardiology;  Laterality: Bilateral;    TRANSCATHETER AORTIC VALVE REPLACEMENT (TAVR) Bilateral 1/4/2023    Procedure: REPLACEMENT, AORTIC VALVE, TRANSCATHETER (TAVR);  Surgeon: Dallin Verma MD;  Location: [facility] CATH;  Service: Cardiology;  Laterality: Bilateral;    TRANSTHORACIC ECHOCARDIOGRAPHY (TTE)  1/4/2023    Procedure: ECHOCARDIOGRAM, TRANSTHORACIC;  Surgeon: Bhavesh Peña MD;  Location: [facility] CATH;  Service: Cardiology;;       Review of patient's allergies indicates:   Allergen Reactions    Latex      Other reaction(s): Unknown  Other reaction(s): Unknown    Sulfacetamide sodium      Pain perineal area    Sulfasalazine Hives    Adhesive Itching     SKIN GETS RED WITH TAPE AND BANDAIDS    Adhesive tape-silicones Itching     SKIN GETS RED WITH TAPE AND BANDAIDS    Sulfa (sulfonamide antibiotics) Rash       No current facility-administered medications on file prior to encounter.     Current Outpatient Medications on File Prior to Encounter   Medication Sig    aspirin (ECOTRIN) 81 MG EC tablet Take 81 mg by mouth once daily.    carvediloL (COREG) 12.5 MG tablet Take 12.5 mg by mouth 2 (two) times daily.    clopidogreL (PLAVIX) 75 mg tablet Take 1 tablet (75 mg  total) by mouth once daily.    diltiaZEM (DILACOR XR) 120 MG CDCR Take 1 capsule (120 mg total) by mouth once daily.    ergocalciferol (ERGOCALCIFEROL) 50,000 unit Cap TAKE 1 CAPSULE (50,000 UNITS TOTAL) EVERY 7 DAYS. (Patient taking differently: Take 50,000 Units by mouth every 7 days.)    ferrous gluconate 324 mg (37.5 mg iron) Tab tablet Take 1 tablet (324 mg total) by mouth once daily.    ferrous sulfate (FEOSOL) 325 mg (65 mg iron) Tab tablet Take 325 mg by mouth with breakfast.    fluticasone furoate-vilanteroL (BREO ELLIPTA) 100-25 mcg/dose diskus inhaler Inhale 1 puff into the lungs once daily. Controller    furosemide (LASIX) 40 MG tablet Take 1 tablet (40 mg total) by mouth once daily.    lisinopriL 10 MG tablet Take 10 mg by mouth once daily.    traMADoL (ULTRAM) 50 mg tablet Take 50 mg by mouth every 6 (six) hours as needed for Pain.    albuterol (PROVENTIL/VENTOLIN HFA) 90 mcg/actuation inhaler Inhale 2 puffs into the lungs every 6 (six) hours as needed for Shortness of Breath.    butalbital-acetaminophen-caffeine -40 mg (FIORICET, ESGIC) -40 mg per tablet Take 1 tablet by mouth every 4 (four) hours as needed for Pain.    calcium carbonate (OS-TK) 500 mg calcium (1,250 mg) tablet Take 1 tablet (500 mg total) by mouth once daily.    citalopram (CELEXA) 40 MG tablet Take 1 tablet (40 mg total) by mouth once daily.    digoxin (LANOXIN) 125 mcg tablet Take 1 tablet (0.125 mg total) by mouth once daily.    donepeziL (ARICEPT) 5 MG tablet Take 5 mg by mouth nightly.    glucosamine-chondroitin 500-400 mg tablet Take 1 tablet by mouth once daily.    loperamide (IMODIUM A-D) 2 mg Tab Take 1 tablet (2 mg total) by mouth 3 (three) times daily as needed (diarrhea). Take 2 tablets by mouth initially then 1 tablet after each loose stool as needed for diarrhea.    multivitamin capsule Take 1 capsule by mouth once daily.    pantoprazole (PROTONIX) 40 MG tablet Take 1 tablet (40 mg total) by mouth 2 (two)  times daily.    potassium chloride (KLOR-CON) 10 MEQ TbSR Take 1 tablet (10 mEq total) by mouth As instructed. Take 2 tabs 1st day and 2nd day then 1 tab qd    simvastatin (ZOCOR) 10 MG tablet Take 10 mg by mouth every evening.    spironolactone (ALDACTONE) 25 MG tablet Take 1 tablet (25 mg total) by mouth once daily.    [DISCONTINUED] ALLERGY RELIEF, FEXOFENADINE, 180 mg tablet Take 180 mg by mouth once daily.    [DISCONTINUED] ezetimibe-simvastatin 10-40 mg (VYTORIN) 10-40 mg per tablet Take 1 tablet by mouth.    [DISCONTINUED] lisinopril-hydrochlorothiazide (PRINZIDE,ZESTORETIC) 20-12.5 mg per tablet Take 1 tablet by mouth once daily.      Family History    None       Tobacco Use    Smoking status: Former     Current packs/day: 0.00     Average packs/day: 0.5 packs/day for 35.0 years (17.5 ttl pk-yrs)     Types: Cigarettes     Start date: 10/5/1987     Quit date: 10/5/2022     Years since quittin.8    Smokeless tobacco: Never   Substance and Sexual Activity    Alcohol use: No    Drug use: No    Sexual activity: Not Currently     Review of Systems   Respiratory:  Negative for apnea, cough, chest tightness and shortness of breath.    Cardiovascular:  Negative for chest pain and palpitations.   Gastrointestinal:  Negative for nausea and vomiting.   Endocrine: Negative for cold intolerance and heat intolerance.   Genitourinary:  Negative for flank pain, frequency and urgency.   Skin:  Negative for color change and pallor.   Neurological:  Negative for dizziness and speech difficulty.   Psychiatric/Behavioral:  Negative for decreased concentration.      Objective:     Vital Signs (Most Recent):  Temp: 98.1 °F (36.7 °C) (24)  Pulse: 95 (24)  Resp: (!) 22 (24)  BP: 139/74 (24)  SpO2: 96 % (24) Vital Signs (24h Range):  Temp:  [97.6 °F (36.4 °C)-98.3 °F (36.8 °C)] 98.1 °F (36.7 °C)  Pulse:  [78-95] 95  Resp:  [14-24] 22  SpO2:  [93 %-100 %] 96 %  BP:  ()/(51-84) 139/74     Weight: 90.7 kg (200 lb)  Body mass index is 39.06 kg/m².     Physical Exam  Constitutional:       General: She is not in acute distress.     Appearance: Normal appearance. She is obese. She is not ill-appearing, toxic-appearing or diaphoretic.   HENT:      Head: Normocephalic and atraumatic.   Cardiovascular:      Rate and Rhythm: Normal rate. Rhythm irregular.      Pulses: Normal pulses.   Pulmonary:      Effort: Pulmonary effort is normal. No respiratory distress.      Breath sounds: No wheezing.   Abdominal:      General: Abdomen is flat. There is no distension.      Palpations: Abdomen is soft. There is no mass.      Tenderness: There is no abdominal tenderness.   Skin:     Coloration: Skin is not jaundiced or pale.   Neurological:      Mental Status: She is alert. Mental status is at baseline.                Significant Labs: All pertinent labs within the past 24 hours have been reviewed.    Significant Imaging: I have reviewed all pertinent imaging results/findings within the past 24 hours.  Assessment/Plan:     CAD (coronary artery disease)  Patient with known CAD which is controlled Previous hx acute on chronic CHF exacerbation. Will continue DAPT (ASA and Plavix) and monitor for S/Sx of angina/ACS. Continue to monitor on telemetry.     Currently admitted for MI r/o given elevated trops at presentation. While likely chronic 2/2 to existing heart disease, will monitor overnight and trend.    - Echo to eval potential new wall motion abnormalities and repeat trops ordered to trend.    - Continue ASA, clopidogrel.  - Holding carvedilol 2/2 presentation w/ significant hypotension.   - Holding lasix 2/2 initial presentation w/ hypotension.    COPD (chronic obstructive pulmonary disease)  Patient's COPD is controlled currently. Continue scheduled inhalers, supplemental oxygen PRN and monitor respiratory status closely.     - Restarted home fluticasone-vilanterol inhaler 100-25 mcg/dose  inhaler.    Atrial fibrillation  Patient with chronic afib; previous admission hx afib w/ RVR for which she received a diltiazem infusion followed by being placed on oral diltiazem.    -Home diltiazem held in setting of significant hypotension (c/f iatrogenic) at presentation.       VTE Risk Mitigation (From admission, onward)           Ordered     heparin (porcine) injection 5,000 Units  Every 8 hours         08/20/24 2205     IP VTE HIGH RISK PATIENT  Once         08/20/24 2311     Place JOJO hose  Until discontinued         08/20/24 2311     Place sequential compression device  Until discontinued         08/20/24 2205                           On 08/20/2024, patient should be placed in hospital observation services under my care in collaboration with Dr. Silva Li.         Nas Velásquez MD  Department of Hospital Medicine  [facility] - Emergency Dept

## 2024-08-21 NOTE — PLAN OF CARE
Inpatient Upgrade Note    Daryleen G Moran has warranted treatment spanning two or more midnights of hospital level care for the management of  NSTEMI, hyponatremia . She continues to require IV fluids, daily labs, supplemental oxygen, monitoring of vital signs, and medication adjustments. Her condition is also complicated by the following comorbidities: Hypertension.

## 2024-08-21 NOTE — ASSESSMENT & PLAN NOTE
Patient with known CAD which is controlled Previous hx acute on chronic CHF exacerbation. Will continue DAPT (ASA and Plavix) and monitor for S/Sx of angina/ACS. Continue to monitor on telemetry.     Currently admitted for MI r/o given elevated trops at presentation. While likely chronic 2/2 to existing heart disease, will monitor overnight and trend.    - Echo to eval potential new wall motion abnormalities and repeat trops ordered to trend.    - Continue ASA, clopidogrel.  - Holding carvedilol 2/2 presentation w/ significant hypotension.   - Holding lasix 2/2 initial presentation w/ hypotension.

## 2024-08-21 NOTE — HPI
ED Brief:    The patient is a 79-year-old female brought in by her family member directly from a behavioral health unit after her daughter picked her up. According to the daughter, they are coming to the emergency department secondary to back pain. This has been an ongoing issue for 3 weeks. According to the daughter, the patient injured her back while in a rehab facility after an admission for COVID. Incidentally, the patient is noted to have significant hypotension here in the emergency department.     When asked about infection, the daughter states that the patient has had an elevated white blood cell count and was told that she had an infection a couple of weeks ago at the rehab facility, prior to being sent to the behavioral health facility. Here in the emergency department, the patient is complaining of diffuse back pain. She also reports chronic hip and lower extremity pain, secondary to arthritis.  She denies fever, generalized weakness, headache, neck stiffness, rhinorrhea, sore throat, sinus congestion, cough, dysuria, hematuria, diarrhea, hematochezia, constipation, melena, or skin rashes.  Other than the pain, she has no complaints.     ED Course: The patient arrived critically hypotensive of unclear etiology, with sepsis high on the differential. Per daughter, WBC elevated w/ suspected infection at rehab, so infectious workup was started + vanc/zosyn. CXR s/f increased interstitial lung markings; however, CBC and CMP later came back unrevealing. BP improved s/p LR bolus (potentially iatrogenic 2/2 BP medications given at rehab). D/t hx MI, trops and EKG also ordered; EKG revealing for afib (likely chronic), trops mildly elevated (which led to BNP order); otherwise unrevealing workup.     Overall: Presented d/t likely iatrogenic hypotension 2/2 BP medications, found to have a mildly elevated trop (0.153), admitted for observation for MI r/o. Clinical status otherwise unrevealing and unconcerning.  Post-admission, f/u BNP, second trop, watch for signs of MI. If signs of MI, begin ACS protocol.

## 2024-08-22 PROBLEM — N30.00 ACUTE CYSTITIS WITHOUT HEMATURIA: Status: ACTIVE | Noted: 2024-08-22

## 2024-08-22 LAB
ALBUMIN SERPL BCP-MCNC: 2.5 G/DL (ref 3.5–5.2)
ALP SERPL-CCNC: 78 U/L (ref 55–135)
ALT SERPL W/O P-5'-P-CCNC: 18 U/L (ref 10–44)
ANION GAP SERPL CALC-SCNC: 11 MMOL/L (ref 8–16)
AST SERPL-CCNC: 17 U/L (ref 10–40)
BASOPHILS # BLD AUTO: 0.02 K/UL (ref 0–0.2)
BASOPHILS NFR BLD: 0.4 % (ref 0–1.9)
BILIRUB SERPL-MCNC: 0.7 MG/DL (ref 0.1–1)
BUN SERPL-MCNC: 8 MG/DL (ref 8–23)
CALCIUM SERPL-MCNC: 8.9 MG/DL (ref 8.7–10.5)
CHLORIDE SERPL-SCNC: 94 MMOL/L (ref 95–110)
CO2 SERPL-SCNC: 31 MMOL/L (ref 23–29)
CREAT SERPL-MCNC: 0.6 MG/DL (ref 0.5–1.4)
DIFFERENTIAL METHOD BLD: ABNORMAL
EOSINOPHIL # BLD AUTO: 0.2 K/UL (ref 0–0.5)
EOSINOPHIL NFR BLD: 4.8 % (ref 0–8)
ERYTHROCYTE [DISTWIDTH] IN BLOOD BY AUTOMATED COUNT: 16.9 % (ref 11.5–14.5)
EST. GFR  (NO RACE VARIABLE): >60 ML/MIN/1.73 M^2
GLUCOSE SERPL-MCNC: 93 MG/DL (ref 70–110)
HCT VFR BLD AUTO: 40.6 % (ref 37–48.5)
HGB BLD-MCNC: 12.1 G/DL (ref 12–16)
IMM GRANULOCYTES # BLD AUTO: 0.01 K/UL (ref 0–0.04)
IMM GRANULOCYTES NFR BLD AUTO: 0.2 % (ref 0–0.5)
LYMPHOCYTES # BLD AUTO: 0.9 K/UL (ref 1–4.8)
LYMPHOCYTES NFR BLD: 19.9 % (ref 18–48)
MAGNESIUM SERPL-MCNC: 2 MG/DL (ref 1.6–2.6)
MCH RBC QN AUTO: 25.6 PG (ref 27–31)
MCHC RBC AUTO-ENTMCNC: 29.8 G/DL (ref 32–36)
MCV RBC AUTO: 86 FL (ref 82–98)
MONOCYTES # BLD AUTO: 0.5 K/UL (ref 0.3–1)
MONOCYTES NFR BLD: 11.8 % (ref 4–15)
NEUTROPHILS # BLD AUTO: 2.9 K/UL (ref 1.8–7.7)
NEUTROPHILS NFR BLD: 62.9 % (ref 38–73)
NRBC BLD-RTO: 0 /100 WBC
PHOSPHATE SERPL-MCNC: 3 MG/DL (ref 2.7–4.5)
PLATELET # BLD AUTO: 126 K/UL (ref 150–450)
PMV BLD AUTO: 10.8 FL (ref 9.2–12.9)
POTASSIUM SERPL-SCNC: 3.5 MMOL/L (ref 3.5–5.1)
PROT SERPL-MCNC: 5.7 G/DL (ref 6–8.4)
RBC # BLD AUTO: 4.73 M/UL (ref 4–5.4)
SODIUM SERPL-SCNC: 136 MMOL/L (ref 136–145)
WBC # BLD AUTO: 4.58 K/UL (ref 3.9–12.7)

## 2024-08-22 PROCEDURE — 97161 PT EVAL LOW COMPLEX 20 MIN: CPT

## 2024-08-22 PROCEDURE — 25000003 PHARM REV CODE 250

## 2024-08-22 PROCEDURE — 25000242 PHARM REV CODE 250 ALT 637 W/ HCPCS

## 2024-08-22 PROCEDURE — 97165 OT EVAL LOW COMPLEX 30 MIN: CPT

## 2024-08-22 PROCEDURE — 84100 ASSAY OF PHOSPHORUS: CPT

## 2024-08-22 PROCEDURE — 25000003 PHARM REV CODE 250: Performed by: STUDENT IN AN ORGANIZED HEALTH CARE EDUCATION/TRAINING PROGRAM

## 2024-08-22 PROCEDURE — 83735 ASSAY OF MAGNESIUM: CPT

## 2024-08-22 PROCEDURE — 25000003 PHARM REV CODE 250: Performed by: EMERGENCY MEDICINE

## 2024-08-22 PROCEDURE — 80053 COMPREHEN METABOLIC PANEL: CPT

## 2024-08-22 PROCEDURE — 20600001 HC STEP DOWN PRIVATE ROOM

## 2024-08-22 PROCEDURE — 63600175 PHARM REV CODE 636 W HCPCS

## 2024-08-22 PROCEDURE — 94640 AIRWAY INHALATION TREATMENT: CPT

## 2024-08-22 PROCEDURE — 94761 N-INVAS EAR/PLS OXIMETRY MLT: CPT

## 2024-08-22 PROCEDURE — 36415 COLL VENOUS BLD VENIPUNCTURE: CPT

## 2024-08-22 PROCEDURE — 85025 COMPLETE CBC W/AUTO DIFF WBC: CPT

## 2024-08-22 RX ORDER — CARVEDILOL 12.5 MG/1
12.5 TABLET ORAL 2 TIMES DAILY
Status: DISCONTINUED | OUTPATIENT
Start: 2024-08-22 | End: 2024-08-23 | Stop reason: HOSPADM

## 2024-08-22 RX ADMIN — FLUTICASONE FUROATE AND VILANTEROL TRIFENATATE 1 PUFF: 100; 25 POWDER RESPIRATORY (INHALATION) at 10:08

## 2024-08-22 RX ADMIN — ENOXAPARIN SODIUM 40 MG: 40 INJECTION SUBCUTANEOUS at 05:08

## 2024-08-22 RX ADMIN — CARVEDILOL 12.5 MG: 12.5 TABLET, FILM COATED ORAL at 11:08

## 2024-08-22 RX ADMIN — CLOPIDOGREL BISULFATE 75 MG: 75 TABLET ORAL at 08:08

## 2024-08-22 RX ADMIN — PANTOPRAZOLE SODIUM 40 MG: 40 TABLET, DELAYED RELEASE ORAL at 08:08

## 2024-08-22 RX ADMIN — PSYLLIUM HUSK 1 PACKET: 3.4 POWDER ORAL at 08:08

## 2024-08-22 RX ADMIN — MICONAZOLE NITRATE: 20 OINTMENT TOPICAL at 08:08

## 2024-08-22 RX ADMIN — ASPIRIN 81 MG CHEWABLE TABLET 81 MG: 81 TABLET CHEWABLE at 08:08

## 2024-08-22 RX ADMIN — CARVEDILOL 12.5 MG: 12.5 TABLET, FILM COATED ORAL at 08:08

## 2024-08-22 RX ADMIN — DONEPEZIL HYDROCHLORIDE 5 MG: 5 TABLET, FILM COATED ORAL at 08:08

## 2024-08-22 RX ADMIN — Medication 6 MG: at 08:08

## 2024-08-22 NOTE — ASSESSMENT & PLAN NOTE
Patient with known CAD which is controlled Previous hx acute on chronic CHF exacerbation. Will continue DAPT (ASA and Plavix) and monitor for S/Sx of angina/ACS. Continue to monitor on telemetry.     - Echo showed no new wall motion abnormalities and EKG without ischemic changes. Mild troponin elevation thought to be 2/2 demand from hypotension on admission vs chronic elevation. Repeat troponins flat and pt asymptomatic.    - Continue ASA, clopidogrel.  - restarted home Coreg dose now that BP stabilized  - Holding lasix 2/2 initial presentation w/ hypotension.

## 2024-08-22 NOTE — ASSESSMENT & PLAN NOTE
Echo    Result Date: 8/21/2024    Left Ventricle: The left ventricle is normal in size. Normal wall   thickness. There is concentric remodeling. There is normal systolic   function with a visually estimated ejection fraction of 60 - 65%.   Diastolic function cannot be reliably determined in the presence of mitral   annular calcification.    Right Ventricle: Normal right ventricular cavity size. Wall thickness   is normal. Systolic function is mildly reduced.    The left atrium is moderately dilated.    Aortic Valve: There is a transcatheter valve replacement in the aortic   position. It is reported to be a Carrion Mk S3 valve.    Mitral Valve: There is bileaflet sclerosis. There is moderate to severe   mitral annular calcification present.    Tricuspid Valve: There is mild regurgitation.    Pulmonary Artery: The estimated pulmonary artery systolic pressure is   31 mmHg.    IVC/SVC: Normal venous pressure at 3 mmHg.          -Restart home Coreg now that BP stabilized

## 2024-08-22 NOTE — HOSPITAL COURSE
80yo F w/ dementia, CAD, afib (not on anticoagulation), T2DM, HFpEF, COPD, BOZENA brought into ED by daughter following discharge from [facility] in [place] w/ subacute to acute worsening of back pain in setting of potential fall at prior or a prior facility. On admission she was found to be hypotensive with concern for possible sepsis. Sepsis workup was negative with normal WBC, no systemic sx, and blood cx negative. After workup it was determined that her hypotension was likely 2/2 to polypharmacy and anti-hypertensive regimen which we adjusted while inpatient and she stabilized. Her UA reflexed to culture that showed enterococcus but pt continued to be completely asymptomatic. It was determined that the pt had asymptomatic bacteruria that did not need treatment. Discussions were had with the patient's daughter and grand-daughter who expressed difficulty/concern caring for the patient at home given difficulty with mobility from bed. PT/OT evaluated the patient but was not a candidate for ongoing PT/OT needs due to verbal aggression in the setting of dementia. Home DME including hospital bed and [employer] offer to patient's family in addition to a sitter at home. With additional tools family feeling agreeable/safe with discharge of patient home. Hospital bed and Lashawn lift delivered to home on 8/23 and she was discharged with PCP follow-up.

## 2024-08-22 NOTE — SUBJECTIVE & OBJECTIVE
Interval History: pt HDS overnight no acute events. Urine cultures grew enterococcus but she is asymptomatic with no fevers or WBC. She is anxious to go home at this time.    Review of Systems   Respiratory:  Negative for apnea, cough, chest tightness and shortness of breath.    Cardiovascular:  Negative for chest pain and palpitations.   Gastrointestinal:  Negative for nausea and vomiting.   Endocrine: Negative for cold intolerance and heat intolerance.   Genitourinary:  Negative for flank pain, frequency and urgency.   Skin:  Negative for color change and pallor.   Neurological:  Negative for dizziness and speech difficulty.   Psychiatric/Behavioral:  Negative for decreased concentration.      Objective:     Vital Signs (Most Recent):  Temp: 97.5 °F (36.4 °C) (08/22/24 1126)  Pulse: 106 (08/22/24 1200)  Resp: 18 (08/22/24 1126)  BP: (!) 144/90 (08/22/24 1126)  SpO2: (!) 91 % (08/22/24 1126) Vital Signs (24h Range):  Temp:  [97.4 °F (36.3 °C)-97.8 °F (36.6 °C)] 97.5 °F (36.4 °C)  Pulse:  [] 106  Resp:  [18] 18  SpO2:  [91 %-98 %] 91 %  BP: (133-168)/(74-93) 144/90     Weight: 92.4 kg (203 lb 11.3 oz)  Body mass index is 39.78 kg/m².    Intake/Output Summary (Last 24 hours) at 8/22/2024 1459  Last data filed at 8/22/2024 1130  Gross per 24 hour   Intake 942 ml   Output 2580 ml   Net -1638 ml         Physical Exam  Constitutional:       General: She is not in acute distress.     Appearance: Normal appearance. She is obese. She is not ill-appearing, toxic-appearing or diaphoretic.   HENT:      Head: Normocephalic and atraumatic.   Cardiovascular:      Rate and Rhythm: Normal rate. Rhythm irregular.      Pulses: Normal pulses.   Pulmonary:      Effort: Pulmonary effort is normal. No respiratory distress.      Breath sounds: No wheezing.   Abdominal:      General: Abdomen is flat. There is no distension.      Palpations: Abdomen is soft. There is no mass.      Tenderness: There is no abdominal tenderness.    Skin:     Coloration: Skin is not jaundiced or pale.   Neurological:      Mental Status: She is alert. Mental status is at baseline.             Significant Labs: All pertinent labs within the past 24 hours have been reviewed.  CBC:   Recent Labs   Lab 08/21/24 0418 08/22/24  0320   WBC 7.62 4.58   HGB 11.3* 12.1   HCT 37.4 40.6   * 126*     CMP:   Recent Labs   Lab 08/21/24  0418 08/21/24  1304 08/22/24  0320   * 129*  129* 136   K 4.5 4.1  4.1 3.5   CL 88* 91*  91* 94*   CO2 31* 28  28 31*   * 135*  135* 93   BUN 12 10  10 8   CREATININE 0.7 0.6  0.6 0.6   CALCIUM 9.3 8.9  8.9 8.9   PROT 5.6*  --  5.7*   ALBUMIN 2.5*  --  2.5*   BILITOT 0.8  --  0.7   ALKPHOS 73  --  78   AST 22  --  17   ALT 22  --  18   ANIONGAP 9 10  10 11       Significant Imaging: I have reviewed all pertinent imaging results/findings within the past 24 hours.

## 2024-08-22 NOTE — PROGRESS NOTES
[facility] - Cardiology [facility] Medicine  Progress Note    Patient Name: Daryleen G Moran  MRN: [id number]  Patient Class: IP- Inpatient   Admission Date: 8/20/2024  Length of Stay: 1 days  Attending Physician: Josefa Diaz MD  Primary Care Provider: Abhi De Oliveira IV, MD        Subjective:     Principal Problem:Back pain        HPI:  ED Brief:    The patient is a 79-year-old female brought in by her family member directly from a behavioral health unit after her daughter picked her up. According to the daughter, they are coming to the emergency department secondary to back pain. This has been an ongoing issue for 3 weeks. According to the daughter, the patient injured her back while in a rehab facility after an admission for COVID. Incidentally, the patient is noted to have significant hypotension here in the emergency department.     When asked about infection, the daughter states that the patient has had an elevated white blood cell count and was told that she had an infection a couple of weeks ago at the rehab facility, prior to being sent to the behavioral health facility. Here in the emergency department, the patient is complaining of diffuse back pain. She also reports chronic hip and lower extremity pain, secondary to arthritis.  She denies fever, generalized weakness, headache, neck stiffness, rhinorrhea, sore throat, sinus congestion, cough, dysuria, hematuria, diarrhea, hematochezia, constipation, melena, or skin rashes.  Other than the pain, she has no complaints.     ED Course: The patient arrived critically hypotensive of unclear etiology, with sepsis high on the differential. Per daughter, WBC elevated w/ suspected infection at rehab, so infectious workup was started + vanc/zosyn. CXR s/f increased interstitial lung markings; however, CBC and CMP later came back unrevealing. BP improved s/p LR bolus (potentially iatrogenic 2/2 BP medications given at rehab). D/t hx MI, trops and EKG  also ordered; EKG revealing for afib (likely chronic), trops mildly elevated (which led to BNP order); otherwise unrevealing workup.     Overall: Presented d/t likely iatrogenic hypotension 2/2 BP medications, found to have a mildly elevated trop (0.153), admitted for observation for MI r/o. Clinical status otherwise unrevealing and unconcerning. Post-admission, f/u BNP, second trop, watch for signs of MI. If signs of MI, begin ACS protocol.    Overview/Hospital Course:  78yo F w/ dementia, CAD, afib (not on anticoagulation), T2DM, HFpEF, COPD, BOZENA brought into ED by daughter following discharge from [facility] in [place] w/ subacute to acute worsening of back pain in setting of potential fall at prior or a prior facility. On admission she was found to be hypotensive with concern for possible sepsis. Sepsis workup was negative with normal WBC, no systemic sx, and blood cx negative. After workup it was determined that her hypotension was likely 2/2 to polypharmacy and anti-hypertensive regimen which we adjusted while inpatient and she stabilized. Her UA reflexed to culture that showed enterococcus but pt continued to be completely asymptomatic. It was determined that the pt had asymptomatic bacteruria that did not need treatment. On 8/22 the pt is anxious to go home, and is planning to d/c to home with a home hospital bed and lifting device per PT/OT.    Interval History: pt HDS overnight no acute events. Urine cultures grew enterococcus but she is asymptomatic with no fevers or WBC. She is anxious to go home at this time.    Review of Systems   Respiratory:  Negative for apnea, cough, chest tightness and shortness of breath.    Cardiovascular:  Negative for chest pain and palpitations.   Gastrointestinal:  Negative for nausea and vomiting.   Endocrine: Negative for cold intolerance and heat intolerance.   Genitourinary:  Negative for flank pain, frequency and urgency.   Skin:  Negative for color  change and pallor.   Neurological:  Negative for dizziness and speech difficulty.   Psychiatric/Behavioral:  Negative for decreased concentration.      Objective:     Vital Signs (Most Recent):  Temp: 97.5 °F (36.4 °C) (08/22/24 1126)  Pulse: 106 (08/22/24 1200)  Resp: 18 (08/22/24 1126)  BP: (!) 144/90 (08/22/24 1126)  SpO2: (!) 91 % (08/22/24 1126) Vital Signs (24h Range):  Temp:  [97.4 °F (36.3 °C)-97.8 °F (36.6 °C)] 97.5 °F (36.4 °C)  Pulse:  [] 106  Resp:  [18] 18  SpO2:  [91 %-98 %] 91 %  BP: (133-168)/(74-93) 144/90     Weight: 92.4 kg (203 lb 11.3 oz)  Body mass index is 39.78 kg/m².    Intake/Output Summary (Last 24 hours) at 8/22/2024 1459  Last data filed at 8/22/2024 1130  Gross per 24 hour   Intake 942 ml   Output 2580 ml   Net -1638 ml         Physical Exam  Constitutional:       General: She is not in acute distress.     Appearance: Normal appearance. She is obese. She is not ill-appearing, toxic-appearing or diaphoretic.   HENT:      Head: Normocephalic and atraumatic.   Cardiovascular:      Rate and Rhythm: Normal rate. Rhythm irregular.      Pulses: Normal pulses.   Pulmonary:      Effort: Pulmonary effort is normal. No respiratory distress.      Breath sounds: No wheezing.   Abdominal:      General: Abdomen is flat. There is no distension.      Palpations: Abdomen is soft. There is no mass.      Tenderness: There is no abdominal tenderness.   Skin:     Coloration: Skin is not jaundiced or pale.   Neurological:      Mental Status: She is alert. Mental status is at baseline.             Significant Labs: All pertinent labs within the past 24 hours have been reviewed.  CBC:   Recent Labs   Lab 08/21/24  0418 08/22/24  0320   WBC 7.62 4.58   HGB 11.3* 12.1   HCT 37.4 40.6   * 126*     CMP:   Recent Labs   Lab 08/21/24  0418 08/21/24  1304 08/22/24  0320   * 129*  129* 136   K 4.5 4.1  4.1 3.5   CL 88* 91*  91* 94*   CO2 31* 28  28 31*   * 135*  135* 93   BUN 12 10  10 8    CREATININE 0.7 0.6  0.6 0.6   CALCIUM 9.3 8.9  8.9 8.9   PROT 5.6*  --  5.7*   ALBUMIN 2.5*  --  2.5*   BILITOT 0.8  --  0.7   ALKPHOS 73  --  78   AST 22  --  17   ALT 22  --  18   ANIONGAP 9 10  10 11       Significant Imaging: I have reviewed all pertinent imaging results/findings within the past 24 hours.    Assessment/Plan:      * Back pain  Pt with history of multiple compression fractures, now coming in after supposed fall with chronic back pain.    -Imaging repeated on admission shows several chronic fractures but no acute new injuries to the spine  -seen by PT/OT, determined pt not a candidate for therapy given refusal to work with providers, but she would benefit from home hospital bed and lift device      Acute cystitis without hematuria  Pt with UA showing 2+ leuk esterase, negative for nitrites but did reflex to culture growing enterococcus    -Pt still afebrile normal WBC and asymptomatic   -no need for abx, believe the pt has asymptomatic bacteruria  -continue to monitor      Elevated troponin  See CAD      Thrombocytopenia  Stable and asymptomatic    Chronic diastolic heart failure  Echo    Result Date: 8/21/2024    Left Ventricle: The left ventricle is normal in size. Normal wall   thickness. There is concentric remodeling. There is normal systolic   function with a visually estimated ejection fraction of 60 - 65%.   Diastolic function cannot be reliably determined in the presence of mitral   annular calcification.    Right Ventricle: Normal right ventricular cavity size. Wall thickness   is normal. Systolic function is mildly reduced.    The left atrium is moderately dilated.    Aortic Valve: There is a transcatheter valve replacement in the aortic   position. It is reported to be a Carrion Mk S3 valve.    Mitral Valve: There is bileaflet sclerosis. There is moderate to severe   mitral annular calcification present.    Tricuspid Valve: There is mild regurgitation.    Pulmonary Artery: The  estimated pulmonary artery systolic pressure is   31 mmHg.    IVC/SVC: Normal venous pressure at 3 mmHg.          -Restart home Coreg now that BP stabilized      COPD (chronic obstructive pulmonary disease)  Patient's COPD is controlled currently. Continue scheduled inhalers, supplemental oxygen PRN and monitor respiratory status closely.     - Restarted home fluticasone-vilanterol inhaler 100-25 mcg/dose inhaler.    Atrial fibrillation  Patient with chronic afib; previous admission hx afib w/ RVR for which she received a diltiazem infusion followed by being placed on oral diltiazem.    -Home diltiazem held in setting of significant hypotension (c/f iatrogenic) at presentation.     Dementia in other diseases classified elsewhere, unspecified severity, without behavioral disturbance, psychotic disturbance, mood disturbance, and anxiety  Pt with history of dementia and behavioral disturbance requiring memory care unit previously  -currently stable, continue to monitor  -delirium precautions      Hypomagnesemia  Monitor and replete as necessary    CAD (coronary artery disease)  Patient with known CAD which is controlled Previous hx acute on chronic CHF exacerbation. Will continue DAPT (ASA and Plavix) and monitor for S/Sx of angina/ACS. Continue to monitor on telemetry.     - Echo showed no new wall motion abnormalities and EKG without ischemic changes. Mild troponin elevation thought to be 2/2 demand from hypotension on admission vs chronic elevation. Repeat troponins flat and pt asymptomatic.    - Continue ASA, clopidogrel.  - restarted home Coreg dose now that BP stabilized  - Holding lasix 2/2 initial presentation w/ hypotension.    Hyponatremia  Resolved this morning, continue to monitor and treat    Type 2 diabetes mellitus, without long-term current use of insulin  Glucose well controlled inpatient, continue to monitor      Compression fracture of L2 vertebra  Seen by PT/OT, pt not candidate for long term rehab  or PT/OT due to refusal to work with the providers      Thoracic compression fracture  See compression fracture L2        VTE Risk Mitigation (From admission, onward)           Ordered     enoxaparin injection 40 mg  Daily         08/21/24 0926     IP VTE HIGH RISK PATIENT  Once         08/20/24 2311     Place JOJO hose  Until discontinued         08/20/24 2311     Place sequential compression device  Until discontinued         08/20/24 2205                    Discharge Planning   ISAAK: 8/23/2024     Code Status: Full Code   Is the patient medically ready for discharge?:     Reason for patient still in hospital (select all that apply): Patient trending condition and Treatment  Discharge Plan A: Home with family, Community Services   Discharge Delays: (!) Home Medical Equipment (Insurance, Delivery)              Noah Shea MD  Department of Hospital Medicine   [facility] - Cardiology Stepdown

## 2024-08-22 NOTE — ASSESSMENT & PLAN NOTE
Problem: Pain  Goal: Acceptable pain level achieved/maintained at rest using appropriate pain scale for the patient  Outcome: Monitoring/Evaluating progress      Patient's FSGs are controlled on current medication regimen.  Last A1c reviewed-   Lab Results   Component Value Date    HGBA1C 6.2 05/01/2022     Most recent fingerstick glucose reviewed-   Recent Labs   Lab 11/27/22  1149 11/27/22  1548 11/28/22  0833   POCTGLUCOSE 121* 151* 126*     Current correctional scale  High  Maintain anti-hyperglycemic dose as follows-   Antihyperglycemics (From admission, onward)    Start     Stop Route Frequency Ordered    11/21/22 1546  insulin aspart U-100 pen 1-10 Units         -- SubQ Before meals & nightly PRN 11/21/22 1448          - low dose correction scale  - add long-acting if needed  - BG goal 140-180 inpatient

## 2024-08-22 NOTE — CARE UPDATE
I have reviewed the chart of Daryleen G Moran who is hospitalized for the following:    Active Hospital Problems    Diagnosis    *Back pain    Acute cystitis without hematuria     POA  Urine Cx + enterococcus, susceptibility pending  Antibiotics per primary team      Chronic diastolic heart failure    Thrombocytopenia    Elevated troponin    COPD (chronic obstructive pulmonary disease)    Atrial fibrillation    Obesity (BMI 30-39.9)    BOZENA (iron deficiency anemia)    Dementia in other diseases classified elsewhere, unspecified severity, without behavioral disturbance, psychotic disturbance, mood disturbance, and anxiety    CAD (coronary artery disease)    Hyponatremia    Hypomagnesemia    Type 2 diabetes mellitus, without long-term current use of insulin    Thoracic compression fracture    Compression fracture of L2 vertebra        Neelam Sandoval, REJI  Unit Based [facility]

## 2024-08-22 NOTE — ASSESSMENT & PLAN NOTE
Pt with history of dementia and behavioral disturbance requiring memory care unit previously  -currently stable, continue to monitor  -delirium precautions

## 2024-08-22 NOTE — ASSESSMENT & PLAN NOTE
Pt with UA showing 2+ leuk esterase, negative for nitrites but did reflex to culture growing enterococcus    -Pt still afebrile normal WBC and asymptomatic   -no need for abx, believe the pt has asymptomatic bacteruria  -continue to monitor

## 2024-08-22 NOTE — CONSULTS
[facility] - Cardiology Stepdown    Wound Care     Patient Name:  Daryleen G Moran  MRN:  [id number]  Date: 2024  Diagnosis: Back pain     History:  Past Medical History:   Diagnosis Date    Anesthesia complication     BLADDER DYSFUNCTION    Aortic stenosis     Arthritis     Back pain     Diabetes mellitus type II     NO LONGER DIABETIC    Encounter for blood transfusion     Hyperlipidemia     Hypertension     NSTEMI (non-ST elevated myocardial infarction) 2022    Osteoporosis     Wears glasses      Social History     Socioeconomic History    Marital status:    Tobacco Use    Smoking status: Former     Current packs/day: 0.00     Average packs/day: 0.5 packs/day for 35.0 years (17.5 ttl pk-yrs)     Types: Cigarettes     Start date: 10/5/1987     Quit date: 10/5/2022     Years since quittin.8    Smokeless tobacco: Never   Substance and Sexual Activity    Alcohol use: No    Drug use: No    Sexual activity: Not Currently     Social Determinants of Health     Financial Resource Strain: Medium Risk (2024)    Overall Financial Resource Strain (CARDIA)     Difficulty of Paying Living Expenses: Somewhat hard   Food Insecurity: No Food Insecurity (2024)    Hunger Vital Sign     Worried About Running Out of Food in the Last Year: Never true     Ran Out of Food in the Last Year: Never true   Transportation Needs: No Transportation Needs (2024)    TRANSPORTATION NEEDS     Transportation : No   Physical Activity: Inactive (2024)    Exercise Vital Sign     Days of Exercise per Week: 0 days     Minutes of Exercise per Session: 0 min   Stress: Patient Declined (2024)    [employer] - Occupational Stress Questionnaire     Feeling of Stress : Patient declined   Housing Stability: Low Risk  (2024)    Housing Stability Vital Sign     Unable to Pay for Housing in the Last Year: No     Homeless in the Last Year: No     Precautions:  Allergies as of 2024 -  Reviewed 08/20/2024   Allergen Reaction Noted    Latex  09/27/2022    Sulfacetamide sodium  08/18/2016    Sulfasalazine Hives 09/04/2011    Adhesive Itching 11/26/2013    Adhesive tape-silicones Itching 11/26/2013    Sulfa (sulfonamide antibiotics) Rash 07/25/2013       WOC Assessment Details / Treatment:    Patient seen for wound care: New Consult   Chart reviewed for this encounter.   Labs:   WBC (K/uL)   Date Value   08/22/2024 4.58   08/21/2024 7.62     Glucose (mg/dL)   Date Value   08/22/2024 93   08/21/2024 135 (H)   08/21/2024 135 (H)     Albumin (g/dL)   Date Value   08/22/2024 2.5 (L)   08/21/2024 2.5 (L)       Adan Score: 15    Narrative:  Pt seen for WC consultation for abd fold, back and buttocks.  Chart reviewed for this encounter.   See Flow Sheet for additional documentation and media.    Pt found lying in bed, explained role of WC RN and purpose of assessment. Pt adamantly refused assessment at time d/t back and knee pain. Educated pt on importance of managing skin wounds early, pt asks - 'what wounds?'. Informed pt of consult for her back, buttocks and ABD fold. Pt states her back is just irritation from a back brace she wears.   Media in chart reviewed, per photos, it appears pt has yeast/moisture irritation to abd fold and gluteal cleft. Will order Miconazole ointment for continued care and attempt to follow up on pt at later date.      RECOMMENDATIONS:  BID/PRN - abd fold, gluteal cleft - cleanse gently w/ no rinse bath wipes, pat dry and apply Miconazole ointment to affected area.      Discussed POC with patient and primary nurse.   See EMR for orders & patient education.        Bedside nursing to continue care & monitoring.  Bedside nursing to maintain pressure injury prevention interventions    Thank you for the consult. Wound Care will continue to follow.       08/22/24 1045   WOCN Assessment   WOCN Total Time (mins) 15   Visit Date 08/22/24   Visit Time 1045   Consult Type New   WOCN  Speciality Wound

## 2024-08-22 NOTE — PLAN OF CARE
Problem: Occupational Therapy  Goal: Occupational Therapy Goal  Description: OT orders received and acknowledged. OT evaluation completed. Per chart review, patient has baseline dementia and has cognitive and behavorial barriers to participating in skilled intervention. Patient has no acute therapy goals at this time, therefore, no acute OT services warranted at this time. Continue ROM, positioning, and progressive mobility with nursing staff as able. Current OT orders discharged, please re-consult if patient's functional status changes.     Outcome: Met

## 2024-08-22 NOTE — PLAN OF CARE
Problem: Physical Therapy  Goal: Physical Therapy Goal  Outcome: Met     Diann completed. Pt with baseline dementia and has cognitive and behavorial barriers to participating in skilled intervention. Pt has no acute therapy goals. Continue ROM, positioning, and progressive mobility with nursing staff as able.

## 2024-08-22 NOTE — ASSESSMENT & PLAN NOTE
Seen by PT/OT, pt not candidate for long term rehab or PT/OT due to refusal to work with the providers

## 2024-08-22 NOTE — PLAN OF CARE
[facility] - Cardiology Stepdown  Discharge Reassessment    Per MD team, patient is medically ready for d/c at this time.     SW spoke w/patient's daughter, Sally Reardon ([phone]), to discuss medical readiness and review d/c plan. Plan is to d/c to home w/DME and community resources. SW discussed PT/OT recommendation of a hospital bed and lift device for a safe d/c to home. SW informed daughter that the goal is to d/c patient today; but that d/c may be held until all DME is delivered to ensure a safe d/c plan. Daughter inquired about sitter services and additional programs that [facility] may offer for patient. SW informed daughter that private sitter services would be an out-of-pocket expense and that it would be her responsibility to secure those services for patient. SW encouraged daughter to contact patient's payor to determine program eligibility. SW informed daughter that she can utilize [employer] as well for additional resources in her local area. Daughter verbalized understanding and is agreeable to d/c plan. All questions addressed. MD team aware of d/c plan. SW added [facility] DME team to Secure Chat to facilitate DME eligibility/delivery. Will continue to follow for needs.    Primary Care Provider: Abhi De Oliveira IV, MD    Expected Discharge Date: 8/23/2024    Reassessment (most recent)       Discharge Reassessment - 08/22/24 1427          Discharge Reassessment    Assessment Type Discharge Planning Reassessment     Did the patient's condition or plan change since previous assessment? Yes     Discharge Plan discussed with: Adult children   Sally Reardon (Daughter)  [phone]    Communicated ISAAK with patient/caregiver Yes     Discharge Plan A Home with family;Community Services     Discharge Plan B Home with family;Community Services     DME Needed Upon Discharge  hospital bed;lift device     Transition of Care Barriers Mental illness     Why the patient remains in the hospital Other (see comment)    HME delivery       Post-Acute Status    Post-Acute Authorization HME;Other     HME Status Referrals Sent     Home Health Status --     Coverage [employer] Managed Medicare     Other Status Community Services     Discharge Delays Home Medical Equipment (Insurance, Delivery)                     Patient/family provided list of facilities in-network with patient's payor plan. Providers that are owned, operated, or affiliated with [facility] are included on the list.     Notified that referral sent to below listed facilities from in-network list based on proximity to home/family support:     [facility]      Patient/family instructed to identify preference.    Preferred Facility: (if more than 1, listed in order of descending preference)  [facility]    If an additional preferred facility not listed above is identified, additional referral to be sent. If above facilities unable to accept, will send additional referrals to in-network providers.         ROWENA Vitale, LMSW    Case Management Department  [facility]       8

## 2024-08-22 NOTE — PT/OT/SLP EVAL
Physical Therapy Evaluation and Discharge    Patient Name:  Daryleen G Moran   MRN:  [id number]  Admit Date: 8/20/2024  Admitting Diagnosis:  Back pain  Length of Stay: 1 days  Recent Surgery: * No surgery found *      Recommendations:     Discharge Recommendations:  No Therapy Indicated   Discharge Equipment Recommendations: hospital bed, lift device     Patient requires a hospital bed for positioning of the body in ways that are not feasible with an ordinary bed. The patient requires special positioning for pain relief, limited mobility, and/or being unable to independently make changes in body position without the use of a hospital bed. Pillows and wedges will not be adequate for resolving these positional issues.      Assessment:     Daryleen G Moran is a 79 y.o. female admitted with a medical diagnosis of Back pain. Pt with baseline dementia that is associated with cognitive and behavioral decline. Pt declined all mobility today and perseverated on knee pain and wanting to go home. Pt educated on importance of mobility for strength, endurance, skin/joint integrity, etc. Pt continued to decline. DC acute therapy services due to patient not being able to purposefully participate in skilled intervention.      Problem List:  (pt declined all mobility)  Rehab Prognosis: Poor    Plan:     DC acute PT    Subjective   Communicated with RN prior to session.  Patient found HOB elevated upon PT entry to room, agreeable to evaluation. Daryleen G Moran's alone during session.    Chief Complaint: Multiple complaints (Back and knee pain, hx dementia, was in nursing home daughter reports hurt back when pushed back in bed, was on antibiotics )    Patient/Family Comments/goals: to go home  Pain/Comfort:  Location 1:  (B knees; unrated)  Pain Addressed 1: Cessation of Activity, Distraction    Living Environment:  Pt is a poor historian. Accurate PLOF unknown.     Objective:   Patient found with: telemetry, peripheral IV, PureWick      General Precautions: Standard, Cardiac fall   Orthopedic Precautions:N/A   Braces: N/A   Oxygen Device: Room Air  Vitals: BP (!) 165/93 (BP Location: Left arm, Patient Position: Lying)   Pulse 105   Temp 97.4 °F (36.3 °C) (Oral)   Resp 18   Ht 5' (1.524 m)   Wt 92.4 kg (203 lb 11.3 oz)   LMP  (LMP Unknown)   SpO2 98%   BMI 39.78 kg/m²     Exams:  Cognition:   Alert and Moderately Agitated   Oriented to place and birthday   Command following: one step commands  Fluency: clear/fluent    Pt declined mobility. Bed level assessment of B LE ROM and strength done. Full assessment impaired due to pt declining full movement of B LE.     Pt able to perform B ankle pumps  Pt unable to perform a straight leg raise  Pt only able to flex B knees to ~ 20 degrees  Pt able to have B knees fully extended while resting in bed      Outcome Measures:  AM-PAC 6 CLICK MOBILITY  Turning over in bed (including adjusting bedclothes, sheets and blankets)?:  (pt declined all mobility)     Functional Mobility:  Additional staff present: OT    Pt declined all mobility. Pt moderately agitated and perseverated on wanting to go home and B knee pain.     Therapeutic Activities, Exercises, & Education:   Educated pt on PT role/POC  Provided daily orientation   Pt will require further education        Patient left HOB elevated with all lines intact, call button in reach, bed alarm on, and RN notified.    GOALS:   Multidisciplinary Problems       Physical Therapy Goals       Not on file              Multidisciplinary Problems (Resolved)          Problem: Physical Therapy    Goal Priority Disciplines Outcome Goal Variances Interventions   Physical Therapy Goal   (Resolved)     PT, PT/OT Met                         History:     Past Medical History:   Diagnosis Date    Anesthesia complication     BLADDER DYSFUNCTION    Aortic stenosis     Arthritis     Back pain     Diabetes mellitus type II     NO LONGER DIABETIC    Encounter for blood  transfusion     Hyperlipidemia     Hypertension     NSTEMI (non-ST elevated myocardial infarction) 05/01/2022    Osteoporosis     Wears glasses        Past Surgical History:   Procedure Laterality Date     vocal cord nodules removed  long time ago     twice    ADRENAL TUMOR      APPENDECTOMY  within last 5yrs    CATHETERIZATION OF BOTH LEFT AND RIGHT HEART Left 05/06/2022    Procedure: CATHETERIZATION, HEART, BOTH LEFT AND RIGHT;  Surgeon: Michelet Vargas MD;  Location: [facility] CATH/EP LAB;  Service: Cardiology;  Laterality: Left;    COLONOSCOPY N/A 6/23/2023    Procedure: COLONOSCOPY;  Surgeon: Joel Neal III, MD;  Location: [facility] ENDO;  Service: Endoscopy;  Laterality: N/A;    FIXATION KYPHOPLASTY THORACIC SPINE      8-20-13    FOOT SURGERY      left 2nd toe was too long     HERNIA REPAIR  within last 5yrs    INSERTION OF TEMPORARY PACEMAKER N/A 1/4/2023    Procedure: INSERTION, PACEMAKER, TEMPORARY;  Surgeon: Bhavesh Peña MD;  Location: [facility] CATH;  Service: Cardiology;  Laterality: N/A;    LEFT HEART CATHETERIZATION Left 05/02/2022    Procedure: Left heart cath;  Surgeon: Jose Echols MD;  Location: [facility] CATH/EP LAB;  Service: Cardiology;  Laterality: Left;    SMALL BOWEL ENTEROSCOPY N/A 6/22/2023    Procedure: ENTEROSCOPY;  Surgeon: Cornell Aguirre MD;  Location: [facility] ENDO;  Service: Endoscopy;  Laterality: N/A;    SMALL BOWEL ENTEROSCOPY N/A 10/20/2023    Procedure: ENTEROSCOPY;  Surgeon: Otilio Bourgeois MD;  Location: [facility] ENDO;  Service: Endoscopy;  Laterality: N/A;    TONSILLECTOMY, ADENOIDECTOMY  long time ago    TRANSCATHETER AORTIC VALVE REPLACEMENT (TAVR) Bilateral 1/4/2023    Procedure: REPLACEMENT, AORTIC VALVE, TRANSCATHETER (TAVR);  Surgeon: Bhavesh Peña MD;  Location: [facility] CATH;  Service: Cardiology;  Laterality: Bilateral;    TRANSCATHETER AORTIC VALVE REPLACEMENT (TAVR) Bilateral 1/4/2023    Procedure: REPLACEMENT, AORTIC VALVE, TRANSCATHETER (TAVR);  Surgeon: Dallin SAMUEL  MD Bayron;  Location: [facility] CATH;  Service: Cardiology;  Laterality: Bilateral;    TRANSTHORACIC ECHOCARDIOGRAPHY (TTE)  1/4/2023    Procedure: ECHOCARDIOGRAM, TRANSTHORACIC;  Surgeon: Bhavesh Peña MD;  Location: [facility] CATH;  Service: Cardiology;;       Time Tracking:     PT Received On: 08/22/24  PT Start Time: 0920     PT Stop Time: 0930  PT Total Time (min): 10 min     Billable Minutes: Evaluation 10

## 2024-08-22 NOTE — PT/OT/SLP EVAL
"Occupational Therapy   Co-Evaluation and Discharge Note    Name: Daryleen G Moran  MRN: [id number]  Admitting Diagnosis: Back pain  Recent Surgery: * No surgery found *      Recommendations:     Discharge Recommendations: No Therapy Indicated  Discharge Equipment Recommendations: hospital bed, lift device    DME Justifications (see above for complete DME recommendations)  Hospital Bed - Patient requires a hospital bed for positioning of the body in ways that are not feasible with an ordinary bed. The patient requires special positioning for pain relief, limited mobility, and/or being unable to independently make changes in body position without the use of a hospital bed. Pillows and wedges will not be adequate for resolving these positional issues.    Barriers to discharge:  None    Assessment:     Daryleen G Moran is a 79 y.o. female with a medical diagnosis of Back pain. OT evaluation completed this AM. Per chart review, patient has baseline dementia and has cognitive and behavorial barriers to participating in skilled intervention. Patient declined all ADLs and mobility at this time, repeatedly stating "I want to go home." Patient educated on the benefits of therapy and OOB activity, however patient continued to decline therapy services. Patient has no acute therapy goals at this time, therefore, no acute OT services warranted at this time. Continue ROM, positioning, and progressive mobility with nursing staff as able. Current OT orders discharged 2/2 patient unable and unwilling to purposefully participate in skilled intervention, please re-consult if patient's functional status changes.     Plan:     During this hospitalization, patient does not require further acute OT services.  Please re-consult if situation changes.    Plan of Care Reviewed with: patient    Subjective     Chief Complaint: Pain in B knees (R>L)  Patient/Family Comments/goals: "I want to go home"    Occupational Profile:  Living Environment: Pt " is a poor historian. Accurate living environment unknown. Per chart review, patient arrived from [facility].  Previous level of function: Pt is a poor historian. Accurate PLOF unknown. Per chart review, patient requiring A with ADLs and mobility.  Roles and Routines: Pt is a poor historian. Accurate roles and routines unknown.  Equipment Used at home: walker, rolling, wheelchair, oxygen (per case management)  Assistance upon Discharge: Unknown.    Pain/Comfort:  Pain Rating 1:  (Pt with c/o B knee pain (R>L), however did not rate pain.)  Pain Rating Post-Intervention 1:  (Unrated)    Patients cultural, spiritual, Adventism conflicts given the current situation: no    Objective:     Communicated with: RN prior to session. Patient found HOB elevated with telemetry, peripheral IV, PureWick, bed alarm and no visitors present upon OT entry to room.    General Precautions: Standard, fall  Orthopedic Precautions: N/A  Braces: N/A  Respiratory Status: Room air     Occupational Performance:    Bed Mobility:    Pt declined to complete any bed mobility at this time.    Functional Mobility/Transfers:  Pt declined to complete any transfers/functional mobility at this time.    Activities of Daily Living:  Lower Body Dressing: total A to richard/doff L sock, bed-level  Pt declined to complete any additional ADLs at this time.    Cognitive/Visual Perceptual:  Cognitive/Psychosocial Skills:     -       Oriented to: Person and Place   -       Communication: clear/fluent  -       Memory: Impaired  -       Safety awareness/insight to disability: Impaired   -       Mood/Affect/Coping skills/emotional control: Agitated    Physical Exam:  Sensation:    -       Pt reports no numbness/tingling throughout BUEs.  Dominant hand:    -       Right  Upper Extremity Range of Motion:     -       Right Upper Extremity: WFL  -       Left Upper Extremity: WFL  Upper Extremity Strength:    -       Right Upper Extremity: WFL  -       Left  Upper Extremity: WFL   Strength:    -       Right Upper Extremity: WFL  -       Left Upper Extremity: WFL    AMPAC 6 Click ADL:  AMPAC Total Score: 13    Treatment & Education:  Pt educated on:   Role of OT, OT POC, and discharge planning.  Importance of activity to increase endurance and tolerance for increased participation in daily ADLs.  Utilizing the call bell to request for assistance with all functional mobility to ensure safety during hospital stay.    Pt verbalized understanding and all questions were addressed within the scope of OT.     Patient left HOB elevated with all lines intact, call button in reach, bed alarm on, RN notified, and no visitors present    GOALS:   Multidisciplinary Problems       Occupational Therapy Goals       Not on file              Multidisciplinary Problems (Resolved)          Problem: Occupational Therapy    Goal Priority Disciplines Outcome Interventions   Occupational Therapy Goal   (Resolved)     OT, PT/OT Met    Description: OT orders received and acknowledged. OT evaluation completed. Per chart review, patient has baseline dementia and has cognitive and behavorial barriers to participating in skilled intervention. Patient has no acute therapy goals at this time, therefore, no acute OT services warranted at this time. Continue ROM, positioning, and progressive mobility with nursing staff as able. Current OT orders discharged, please re-consult if patient's functional status changes.                          History:     Past Medical History:   Diagnosis Date    Anesthesia complication     BLADDER DYSFUNCTION    Aortic stenosis     Arthritis     Back pain     Diabetes mellitus type II     NO LONGER DIABETIC    Encounter for blood transfusion     Hyperlipidemia     Hypertension     NSTEMI (non-ST elevated myocardial infarction) 05/01/2022    Osteoporosis     Wears glasses          Past Surgical History:   Procedure Laterality Date     vocal cord nodules removed  long time  ago     twice    ADRENAL TUMOR      APPENDECTOMY  within last 5yrs    CATHETERIZATION OF BOTH LEFT AND RIGHT HEART Left 05/06/2022    Procedure: CATHETERIZATION, HEART, BOTH LEFT AND RIGHT;  Surgeon: Michelet Vargas MD;  Location: [facility] CATH/EP LAB;  Service: Cardiology;  Laterality: Left;    COLONOSCOPY N/A 6/23/2023    Procedure: COLONOSCOPY;  Surgeon: Joel Neal III, MD;  Location: [facility] ENDO;  Service: Endoscopy;  Laterality: N/A;    FIXATION KYPHOPLASTY THORACIC SPINE      8-20-13    FOOT SURGERY      left 2nd toe was too long     HERNIA REPAIR  within last 5yrs    INSERTION OF TEMPORARY PACEMAKER N/A 1/4/2023    Procedure: INSERTION, PACEMAKER, TEMPORARY;  Surgeon: Bhavesh Peña MD;  Location: [facility] CATH;  Service: Cardiology;  Laterality: N/A;    LEFT HEART CATHETERIZATION Left 05/02/2022    Procedure: Left heart cath;  Surgeon: Jose Echols MD;  Location: [facility] CATH/EP LAB;  Service: Cardiology;  Laterality: Left;    SMALL BOWEL ENTEROSCOPY N/A 6/22/2023    Procedure: ENTEROSCOPY;  Surgeon: Cornell Aguirre MD;  Location: [facility] ENDO;  Service: Endoscopy;  Laterality: N/A;    SMALL BOWEL ENTEROSCOPY N/A 10/20/2023    Procedure: ENTEROSCOPY;  Surgeon: Otilio Bourgeois MD;  Location: [facility] ENDO;  Service: Endoscopy;  Laterality: N/A;    TONSILLECTOMY, ADENOIDECTOMY  long time ago    TRANSCATHETER AORTIC VALVE REPLACEMENT (TAVR) Bilateral 1/4/2023    Procedure: REPLACEMENT, AORTIC VALVE, TRANSCATHETER (TAVR);  Surgeon: Bhavesh Peña MD;  Location: [facility] CATH;  Service: Cardiology;  Laterality: Bilateral;    TRANSCATHETER AORTIC VALVE REPLACEMENT (TAVR) Bilateral 1/4/2023    Procedure: REPLACEMENT, AORTIC VALVE, TRANSCATHETER (TAVR);  Surgeon: Dallin Verma MD;  Location: [facility] CATH;  Service: Cardiology;  Laterality: Bilateral;    TRANSTHORACIC ECHOCARDIOGRAPHY (TTE)  1/4/2023    Procedure: ECHOCARDIOGRAM, TRANSTHORACIC;  Surgeon: Bhavesh Peña MD;  Location: [facility] CATH;  Service: Cardiology;;        Time Tracking:     OT Date of Treatment: 08/22/24  OT Start Time: 0920  OT Stop Time: 0930  OT Total Time (min): 10 min    Billable Minutes:Evaluation 10    Co-evaluation/treatment with PT performed due to patient's multiple deficits requiring two skilled therapists to appropriately and safely assess patient's strength, endurance, functional mobility, and ADL performance while facilitating functional tasks in addition to accommodating for patient's activity tolerance and medical acuity.     8/22/2024

## 2024-08-22 NOTE — ASSESSMENT & PLAN NOTE
Pt with history of multiple compression fractures, now coming in after supposed fall with chronic back pain.    -Imaging repeated on admission shows several chronic fractures but no acute new injuries to the spine  -seen by PT/OT, determined pt not a candidate for therapy given refusal to work with providers, but she would benefit from home hospital bed and lift device

## 2024-08-23 ENCOUNTER — NURSE TRIAGE (OUTPATIENT)
Dept: ADMINISTRATIVE | Facility: CLINIC | Age: 79
End: 2024-08-23
Payer: MEDICARE

## 2024-08-23 VITALS
TEMPERATURE: 98 F | WEIGHT: 203.69 LBS | SYSTOLIC BLOOD PRESSURE: 127 MMHG | BODY MASS INDEX: 39.99 KG/M2 | DIASTOLIC BLOOD PRESSURE: 71 MMHG | RESPIRATION RATE: 18 BRPM | HEIGHT: 60 IN | HEART RATE: 94 BPM | OXYGEN SATURATION: 98 %

## 2024-08-23 DIAGNOSIS — F32.9 MAJOR DEPRESSIVE DISORDER WITH CURRENT ACTIVE EPISODE, UNSPECIFIED DEPRESSION EPISODE SEVERITY, UNSPECIFIED WHETHER RECURRENT: Primary | ICD-10-CM

## 2024-08-23 PROBLEM — E83.42 HYPOMAGNESEMIA: Status: RESOLVED | Noted: 2022-12-19 | Resolved: 2024-08-23

## 2024-08-23 PROBLEM — E87.1 HYPONATREMIA: Status: RESOLVED | Noted: 2022-12-19 | Resolved: 2024-08-23

## 2024-08-23 LAB
ANION GAP SERPL CALC-SCNC: 8 MMOL/L (ref 8–16)
BACTERIA UR CULT: ABNORMAL
BUN SERPL-MCNC: 9 MG/DL (ref 8–23)
CALCIUM SERPL-MCNC: 9 MG/DL (ref 8.7–10.5)
CHLORIDE SERPL-SCNC: 96 MMOL/L (ref 95–110)
CO2 SERPL-SCNC: 29 MMOL/L (ref 23–29)
CREAT SERPL-MCNC: 0.5 MG/DL (ref 0.5–1.4)
EST. GFR  (NO RACE VARIABLE): >60 ML/MIN/1.73 M^2
GLUCOSE SERPL-MCNC: 128 MG/DL (ref 70–110)
MAGNESIUM SERPL-MCNC: 1.6 MG/DL (ref 1.6–2.6)
POTASSIUM SERPL-SCNC: 3.9 MMOL/L (ref 3.5–5.1)
SODIUM SERPL-SCNC: 133 MMOL/L (ref 136–145)

## 2024-08-23 PROCEDURE — 25000242 PHARM REV CODE 250 ALT 637 W/ HCPCS

## 2024-08-23 PROCEDURE — 25000003 PHARM REV CODE 250

## 2024-08-23 PROCEDURE — 25000003 PHARM REV CODE 250: Performed by: STUDENT IN AN ORGANIZED HEALTH CARE EDUCATION/TRAINING PROGRAM

## 2024-08-23 PROCEDURE — 94761 N-INVAS EAR/PLS OXIMETRY MLT: CPT

## 2024-08-23 PROCEDURE — 63600175 PHARM REV CODE 636 W HCPCS

## 2024-08-23 PROCEDURE — 36415 COLL VENOUS BLD VENIPUNCTURE: CPT | Performed by: STUDENT IN AN ORGANIZED HEALTH CARE EDUCATION/TRAINING PROGRAM

## 2024-08-23 PROCEDURE — 94640 AIRWAY INHALATION TREATMENT: CPT

## 2024-08-23 PROCEDURE — 83735 ASSAY OF MAGNESIUM: CPT

## 2024-08-23 PROCEDURE — 80048 BASIC METABOLIC PNL TOTAL CA: CPT | Performed by: STUDENT IN AN ORGANIZED HEALTH CARE EDUCATION/TRAINING PROGRAM

## 2024-08-23 RX ORDER — PANTOPRAZOLE SODIUM 40 MG/1
40 TABLET, DELAYED RELEASE ORAL DAILY
Start: 2024-08-23 | End: 2024-11-21

## 2024-08-23 RX ORDER — MAGNESIUM SULFATE HEPTAHYDRATE 40 MG/ML
2 INJECTION, SOLUTION INTRAVENOUS ONCE
Status: COMPLETED | OUTPATIENT
Start: 2024-08-23 | End: 2024-08-23

## 2024-08-23 RX ORDER — ACETAMINOPHEN 325 MG/1
650 TABLET ORAL EVERY 6 HOURS PRN
Status: DISCONTINUED | OUTPATIENT
Start: 2024-08-23 | End: 2024-08-23 | Stop reason: HOSPADM

## 2024-08-23 RX ORDER — VENLAFAXINE HYDROCHLORIDE 75 MG/1
75 CAPSULE, EXTENDED RELEASE ORAL DAILY
Qty: 30 CAPSULE | Refills: 1 | Status: SHIPPED | OUTPATIENT
Start: 2024-08-23

## 2024-08-23 RX ORDER — TRAMADOL HYDROCHLORIDE 50 MG/1
50 TABLET ORAL EVERY 8 HOURS PRN
Qty: 10 TABLET | Refills: 0 | Status: SHIPPED | OUTPATIENT
Start: 2024-08-23 | End: 2024-08-27

## 2024-08-23 RX ORDER — LOPERAMIDE HYDROCHLORIDE 2 MG/1
2 CAPSULE ORAL 4 TIMES DAILY PRN
Status: DISCONTINUED | OUTPATIENT
Start: 2024-08-23 | End: 2024-08-23 | Stop reason: HOSPADM

## 2024-08-23 RX ORDER — FUROSEMIDE 40 MG/1
40 TABLET ORAL DAILY PRN
Qty: 30 TABLET | Refills: 11 | Status: SHIPPED | OUTPATIENT
Start: 2024-08-23 | End: 2025-08-23

## 2024-08-23 RX ORDER — BUTALBITAL, ACETAMINOPHEN AND CAFFEINE 50; 325; 40 MG/1; MG/1; MG/1
1 TABLET ORAL EVERY 4 HOURS PRN
Start: 2024-08-23

## 2024-08-23 RX ADMIN — PANTOPRAZOLE SODIUM 40 MG: 40 TABLET, DELAYED RELEASE ORAL at 09:08

## 2024-08-23 RX ADMIN — MICONAZOLE NITRATE: 20 OINTMENT TOPICAL at 09:08

## 2024-08-23 RX ADMIN — MAGNESIUM SULFATE HEPTAHYDRATE 2 G: 40 INJECTION, SOLUTION INTRAVENOUS at 09:08

## 2024-08-23 RX ADMIN — ASPIRIN 81 MG CHEWABLE TABLET 81 MG: 81 TABLET CHEWABLE at 09:08

## 2024-08-23 RX ADMIN — CLOPIDOGREL BISULFATE 75 MG: 75 TABLET ORAL at 09:08

## 2024-08-23 RX ADMIN — PSYLLIUM HUSK 1 PACKET: 3.4 POWDER ORAL at 09:08

## 2024-08-23 RX ADMIN — ACETAMINOPHEN 650 MG: 325 TABLET ORAL at 01:08

## 2024-08-23 RX ADMIN — LOPERAMIDE HYDROCHLORIDE 2 MG: 2 CAPSULE ORAL at 01:08

## 2024-08-23 RX ADMIN — FLUTICASONE FUROATE AND VILANTEROL TRIFENATATE 1 PUFF: 100; 25 POWDER RESPIRATORY (INHALATION) at 07:08

## 2024-08-23 RX ADMIN — CARVEDILOL 12.5 MG: 12.5 TABLET, FILM COATED ORAL at 09:08

## 2024-08-23 NOTE — PLAN OF CARE
CHW met with patient/family at bedside. Patient experience rounding completed and reviewed the following.     Do you know your discharge plan? Yes or No,    If yes, what is the plan? (Home, Home Health, Rehab, SNF, LTAC, or Other)  Yes Home    Have you discussed your needs and preferences with your SW/CM? Yes or No  Yes Home    If you are discharging home, do you have help at home? Yes or No Yes (daughter)    Do you think you will need help additional at home at discharge? Yes or No   No    Do you currently have difficulty keeping up with bills, affording medicine or buying food? Yes or No No      Assigned SW/CM notified of any patient/family needs or concerns. Appropriate resources provided to address patient's needs.  Magda OCAMPO/ TERRIE Quijano  Case Management  [phone]

## 2024-08-23 NOTE — PLAN OF CARE
Pt discharged per MD orders.  Tele discontinued and returned to station.  IV discontinued; catheter tip intact x.  Medication list and prescriptions reviewed; prescriptions sent to pt preferred pharmacy and printed prescriptions provided.  Pt verbalizes understanding of all written and verbal discharge instructions.  Pt awaiting family/escort arrival.  Will continue to monitor.

## 2024-08-23 NOTE — PLAN OF CARE
[facility] - Cardiology Stepdown  Discharge Final Note    Primary Care Provider: Abhi De Oliveira IV, MD    Expected Discharge Date: 8/23/2024    Final Discharge Note (most recent)       Final Note - 08/23/24 1208          Final Note    Assessment Type Final Discharge Note     Anticipated Discharge Disposition Home or Self Care        Post-Acute Status    Post-Acute Authorization E     [facility] Status Set-up Complete/Auth obtained                 Per Zunilda with [facility] hospital bed and kirk lift will be delivered to the home today and that pt's daughter has someone waiting at the home to receive.  Per nurse Lock pt's daughter will be transporting pt home.      UPDATE 12:56 PM  DME delivered to the home per Zunilda with [facility].      Beba Proctor LMSW  [facility]  [id number]      Important Message from Medicare  Important Message from Medicare regarding Discharge Appeal Rights: Explained to patient/caregiver, Other (comments) (completed via phone with daughter, Tom FALCON witnessed)     Date IMM was signed: 08/23/24  Time IMM was signed: 0952      Contact Info       Abhi De Oliveira IV, MD   Specialty: Family Medicine   Relationship: PCP - General    [address]   [place] [address]   Phone: [phone]       Next Steps: Go on 8/27/2024    Instructions: Appointment at 8:20am

## 2024-08-23 NOTE — PLAN OF CARE
Patient has appointment with PCP Aug 27th at 8:20am. Information added to AVS.      TERRIE Morris  Case Management  [phone]

## 2024-08-23 NOTE — DISCHARGE SUMMARY
[facility] - Cardiology [facility] Medicine  Discharge Summary      Patient Name: Daryleen G Moran  MRN: [id number]  LUIZ: [id number]  Patient Class: IP- Inpatient  Admission Date: 8/20/2024  Hospital Length of Stay: 2 days  Discharge Date and Time:  08/23/2024 12:55 PM  Attending Physician: Josefa Diaz MD   Discharging Provider: Serg Damon MD  Primary Care Provider: Abhi De Oliveira IV, MD  [facility] Medicine Team: [facility] 2 Serg Damon MD  Primary Care Team: [facility] 2    HPI:   ED Brief:    The patient is a 79-year-old female brought in by her family member directly from a behavioral health unit after her daughter picked her up. According to the daughter, they are coming to the emergency department secondary to back pain. This has been an ongoing issue for 3 weeks. According to the daughter, the patient injured her back while in a rehab facility after an admission for COVID. Incidentally, the patient is noted to have significant hypotension here in the emergency department.     When asked about infection, the daughter states that the patient has had an elevated white blood cell count and was told that she had an infection a couple of weeks ago at the rehab facility, prior to being sent to the behavioral health facility. Here in the emergency department, the patient is complaining of diffuse back pain. She also reports chronic hip and lower extremity pain, secondary to arthritis.  She denies fever, generalized weakness, headache, neck stiffness, rhinorrhea, sore throat, sinus congestion, cough, dysuria, hematuria, diarrhea, hematochezia, constipation, melena, or skin rashes.  Other than the pain, she has no complaints.     ED Course: The patient arrived critically hypotensive of unclear etiology, with sepsis high on the differential. Per daughter, WBC elevated w/ suspected infection at rehab, so infectious workup was started + vanc/zosyn. CXR s/f increased interstitial lung markings;  however, CBC and CMP later came back unrevealing. BP improved s/p LR bolus (potentially iatrogenic 2/2 BP medications given at rehab). D/t hx MI, trops and EKG also ordered; EKG revealing for afib (likely chronic), trops mildly elevated (which led to BNP order); otherwise unrevealing workup.     Overall: Presented d/t likely iatrogenic hypotension 2/2 BP medications, found to have a mildly elevated trop (0.153), admitted for observation for MI r/o. Clinical status otherwise unrevealing and unconcerning. Post-admission, f/u BNP, second trop, watch for signs of MI. If signs of MI, begin ACS protocol.    * No surgery found *      Hospital Course:   78yo F w/ dementia, CAD, afib (not on anticoagulation), T2DM, HFpEF, COPD, BOZENA brought into ED by daughter following discharge from [facility] in [place] w/ subacute to acute worsening of back pain in setting of potential fall at prior or a prior facility. On admission she was found to be hypotensive with concern for possible sepsis. Sepsis workup was negative with normal WBC, no systemic sx, and blood cx negative. After workup it was determined that her hypotension was likely 2/2 to polypharmacy and anti-hypertensive regimen which we adjusted while inpatient and she stabilized. Her UA reflexed to culture that showed enterococcus but pt continued to be completely asymptomatic. It was determined that the pt had asymptomatic bacteruria that did not need treatment. Discussions were had with the patient's daughter and grand-daughter who expressed difficulty/concern caring for the patient at home given difficulty with mobility from bed. PT/OT evaluated the patient but was not a candidate for ongoing PT/OT needs due to verbal aggression in the setting of dementia. Home DME including hospital bed and Lashawn Lift offer to patient's family in addition to a sitter at home. With additional tools family feeling agreeable/safe with discharge of patient home. Hospital bed  and Lashawn lift delivered to home on 8/23 and she was discharged with PCP follow-up.     Goals of Care Treatment Preferences:  Code Status: Full Code    Wt Readings from Last 3 Encounters:   08/21/24 92.4 kg (203 lb 11.3 oz)   03/11/24 91 kg (200 lb 9.9 oz)   03/08/24 95.3 kg (210 lb 1.6 oz)     Temp Readings from Last 3 Encounters:   08/23/24 97.9 °F (36.6 °C) (Oral)   03/11/24 98.3 °F (36.8 °C) (Oral)   01/01/24 98 °F (36.7 °C) (Oral)     BP Readings from Last 3 Encounters:   08/23/24 127/71   03/11/24 137/86   02/12/24 (!) 140/70     Pulse Readings from Last 3 Encounters:   08/23/24 94   03/11/24 80   02/12/24 81     Physical Exam  Constitutional:       General: She is not in acute distress.     Appearance: Normal appearance. She is obese. She is not ill-appearing, toxic-appearing or diaphoretic.   HENT:      Head: Normocephalic and atraumatic.   Cardiovascular:      Rate and Rhythm: Normal rate. Rhythm irregular.      Pulses: Normal pulses.   Pulmonary:      Effort: Pulmonary effort is normal. No respiratory distress.      Breath sounds: No wheezing.   Abdominal:      General: Abdomen is flat. There is no distension.      Palpations: Abdomen is soft. There is no mass.      Tenderness: There is no abdominal tenderness.   Skin:     Coloration: Skin is not jaundiced or pale.   Neurological:      Mental Status: She is alert. Mental status is at baseline.     SDOH Screening:  The patient was screened for food insecurity, housing instability, transportation needs, utility difficulties, and interpersonal safety. The social determinant(s) of health identified as a concern this admission are:  Utility difficulties    The plan to address these concerns is: Increased home support with hospital bed, lashawn lift, home sitter.    Social Determinants of Health with Concerns     Utilities: At Risk (8/23/2024)        Consults:   Consults (From admission, onward)          Status Ordering Provider     Inpatient consult to Midline  team  Once        Provider:  (Not yet assigned)    Completed BOLA SANDERSON            No new Assessment & Plan notes have been filed under this hospital service since the last note was generated.  Service: Hospital Medicine    Final Active Diagnoses:    Diagnosis Date Noted POA    PRINCIPAL PROBLEM:  Back pain [M54.9] 12/09/2013 Yes    Acute cystitis without hematuria [N30.00] 08/22/2024 Yes    Chronic diastolic heart failure [I50.32] 08/21/2024 Yes    Thrombocytopenia [D69.6] 08/21/2024 Yes    Elevated troponin [R79.89] 08/21/2024 Yes    COPD (chronic obstructive pulmonary disease) [J44.9] 08/20/2024 Yes    Atrial fibrillation [I48.91] 03/08/2024 Yes    Obesity (BMI 30-39.9) [E66.9] 09/14/2023 Yes    BOZENA (iron deficiency anemia) [D50.9] 06/24/2023 Yes    Dementia in other diseases classified elsewhere, unspecified severity, without behavioral disturbance, psychotic disturbance, mood disturbance, and anxiety [F02.80] 01/19/2023 Yes    CAD (coronary artery disease) [I25.10] 12/19/2022 Yes    Type 2 diabetes mellitus, without long-term current use of insulin [E11.9] 09/13/2016 Yes    Thoracic compression fracture [S22.000A] 07/25/2013 Yes    Compression fracture of L2 vertebra [S32.020A] 07/25/2013 Yes      Problems Resolved During this Admission:    Diagnosis Date Noted Date Resolved POA    Hyponatremia [E87.1] 12/19/2022 08/23/2024 Yes    Hypomagnesemia [E83.42] 12/19/2022 08/23/2024 Yes       Discharged Condition: fair    Disposition: Home-Health Care [facility]    Follow Up:   Follow-up Information       Abhi De Oliveira IV, MD. Go on 8/27/2024.    Specialty: Family Medicine  Why: Appointment at 8:20am  Contact information:  [address]   [place]  [place] [address] [phone]                           Patient Instructions:      PATIENT (PILAR) LIFT FOR HOME USE     Order Specific Question Answer Comments   Height: 5' (1.524 m)    Weight: 92.4 kg (203 lb 11.3 oz)    Does patient have medical equipment at home? walker,  rolling per case management / per case management / per case management   Does patient have medical equipment at home? wheelchair    Does patient have medical equipment at home? oxygen    Length of need (1-99 months): 99      HOSPITAL BED FOR HOME USE     Order Specific Question Answer Comments   Type: Semi-electric    Length of need (1-99 months): 99    Does patient have medical equipment at home? walker, rolling per case management / per case management / per case management   Does patient have medical equipment at home? wheelchair    Does patient have medical equipment at home? oxygen    Height: 5' (1.524 m)    Weight: 92.4 kg (203 lb 11.3 oz)    Please check all that apply: Patient requires frequent changes in body position and/or has an immediate need for a change in body position.        Significant Diagnostic Studies: Radiology: X-Ray:  Thoracic spine: There are numerous remote compression fractures of the thoracic spine, involving T4 through T10, and T12.  There is vertebroplasty cement within the T4, T5, T7, T10, and T12 vertebral bodies.  Appearance is similar to prior CT from 11/21/2022, and findings are compatible with remote compression fractures.  There is no evidence of a new or acute compression fracture of the thoracic spine.  There are multilevel degenerative changes.  There are vascular calcifications.  Remaining visualized osseous structures are grossly intact.  There is a prosthetic aortic valve stent.    Pending Diagnostic Studies:       None           Medications:  Reconciled Home Medications:      Medication List        CHANGE how you take these medications      butalbital-acetaminophen-caffeine -40 mg -40 mg per tablet  Commonly known as: FIORICET, ESGIC  Take 1 tablet by mouth every 4 (four) hours as needed for Pain. Discuss with your primary care provider before taking this medication  What changed: additional instructions     ergocalciferol 50,000 unit Cap  Commonly known as:  ERGOCALCIFEROL  TAKE 1 CAPSULE (50,000 UNITS TOTAL) EVERY 7 DAYS.  What changed: See the new instructions.     furosemide 40 MG tablet  Commonly known as: LASIX  Take 1 tablet (40 mg total) by mouth daily as needed (Take for weight gain > 2 pounds over 24 hours. May discuss with PCP).  What changed:   when to take this  reasons to take this     oxybutynin 5 MG Tr24  Commonly known as: DITROPAN-XL  Take 5 mg by mouth once daily.  What changed: Another medication with the same name was removed. Continue taking this medication, and follow the directions you see here.     pantoprazole 40 MG tablet  Commonly known as: PROTONIX  Take 1 tablet (40 mg total) by mouth once daily.  What changed: when to take this            CONTINUE taking these medications      albuterol 90 mcg/actuation inhaler  Commonly known as: PROVENTIL/VENTOLIN HFA  Inhale 2 puffs into the lungs every 6 (six) hours as needed for Shortness of Breath.     aspirin 81 MG EC tablet  Commonly known as: ECOTRIN  Take 81 mg by mouth once daily.     atorvastatin 10 MG tablet  Commonly known as: LIPITOR  Take 5 mg by mouth every evening.     BREO ELLIPTA 100-25 mcg/dose diskus inhaler  Generic drug: fluticasone furoate-vilanteroL  Inhale 1 puff into the lungs once daily. Controller     calcium carbonate 500 mg calcium (1,250 mg) tablet  Commonly known as: OS-TK  Take 1 tablet (500 mg total) by mouth once daily.     carvediloL 12.5 MG tablet  Commonly known as: COREG  Take 12.5 mg by mouth 2 (two) times daily.     citalopram 40 MG tablet  Commonly known as: CeleXA  Take 1 tablet (40 mg total) by mouth once daily.     clopidogreL 75 mg tablet  Commonly known as: PLAVIX  Take 1 tablet (75 mg total) by mouth once daily.     diltiaZEM 120 MG Cdcr  Commonly known as: DILACOR XR  Take 1 capsule (120 mg total) by mouth once daily.     donepeziL 5 MG tablet  Commonly known as: ARICEPT  Take 5 mg by mouth nightly.     ferrous gluconate 324 mg (37.5 mg iron) Tab  tablet  Take 1 tablet (324 mg total) by mouth once daily.     glucosamine-chondroitin 500-400 mg tablet  Take 1 tablet by mouth once daily.     lisinopriL 10 MG tablet  Take 10 mg by mouth once daily.     loperamide 2 mg Tab  Commonly known as: IMODIUM A-D  Take 1 tablet (2 mg total) by mouth 3 (three) times daily as needed (diarrhea). Take 2 tablets by mouth initially then 1 tablet after each loose stool as needed for diarrhea.     multivitamin capsule  Take 1 capsule by mouth once daily.     potassium chloride 10 MEQ Tbsr  Commonly known as: KLOR-CON  Take 1 tablet (10 mEq total) by mouth As instructed. Take 2 tabs 1st day and 2nd day then 1 tab qd     traMADoL 50 mg tablet  Commonly known as: ULTRAM  Take 50 mg by mouth every 6 (six) hours as needed for Pain.     venlafaxine 75 MG 24 hr capsule  Commonly known as: EFFEXOR-XR  Take 75 mg by mouth once daily.            STOP taking these medications      budesonide-formoterol 80-4.5 mcg 80-4.5 mcg/actuation Hfaa  Commonly known as: SYMBICORT     digoxin 125 mcg tablet  Commonly known as: LANOXIN     ferrous sulfate 325 mg (65 mg iron) Tab tablet  Commonly known as: FEOSOL     omeprazole 20 MG capsule  Commonly known as: PRILOSEC     simvastatin 10 MG tablet  Commonly known as: ZOCOR     spironolactone 25 MG tablet  Commonly known as: ALDACTONE              Indwelling Lines/Drains at time of discharge:   Lines/Drains/Airways       None                Time spent on the discharge of patient: 45 minutes      Serg Damon MD  Department of Hospital Medicine  [facility] - Cardiology Stepdown

## 2024-08-24 NOTE — TELEPHONE ENCOUNTER
Patient daughter asked for triage nurse to tell her mother she cannot sleep in the wheelchair. Advised to call back if symptoms become worse or with further questions.        Reason for Disposition   General information question, no triage required and triager able to answer question    Protocols used: Information Only Call - No Triage-A-

## 2024-08-25 LAB
BACTERIA BLD CULT: NORMAL
BACTERIA BLD CULT: NORMAL

## 2024-08-27 ENCOUNTER — PATIENT OUTREACH (OUTPATIENT)
Dept: ADMINISTRATIVE | Facility: CLINIC | Age: 79
End: 2024-08-27
Payer: MEDICARE

## 2024-08-27 NOTE — PROGRESS NOTES
C3 nurse attempted to contact Daryleen G Moran's daughter Sally for a TCC post hospital discharge follow up call. The patient is unable to conduct the call @ this time. The patient requested a callback.    The patient has a scheduled HOS appointment with Abhi De Oliveira IV on 08/27/24 @ 0820.

## 2024-08-28 NOTE — PROGRESS NOTES
C3 nurse attempted to contact Daryleen G Moran's daughter Sally for a TCC post hospital discharge follow-up call. Sally declined the call at this time.

## 2024-12-04 ENCOUNTER — HOSPITAL ENCOUNTER (INPATIENT)
Facility: HOSPITAL | Age: 79
LOS: 1 days | Discharge: HOME HEALTH | DRG: 392 | End: 2024-12-06
Attending: EMERGENCY MEDICINE | Admitting: INTERNAL MEDICINE
Payer: MEDICARE

## 2024-12-04 DIAGNOSIS — S32.039A CLOSED FRACTURE OF THIRD LUMBAR VERTEBRA, UNSPECIFIED FRACTURE MORPHOLOGY, INITIAL ENCOUNTER: ICD-10-CM

## 2024-12-04 DIAGNOSIS — R07.9 CHEST PAIN: ICD-10-CM

## 2024-12-04 DIAGNOSIS — K52.9 COLITIS: Primary | ICD-10-CM

## 2024-12-04 PROBLEM — S32.031A: Status: ACTIVE | Noted: 2024-12-04

## 2024-12-04 LAB
ABO + RH BLD: NORMAL
ALBUMIN SERPL BCP-MCNC: 3.8 G/DL (ref 3.5–5.2)
ALP SERPL-CCNC: 66 U/L (ref 55–135)
ALT SERPL W/O P-5'-P-CCNC: 9 U/L (ref 10–44)
ANION GAP SERPL CALC-SCNC: 4 MMOL/L (ref 8–16)
AST SERPL-CCNC: 15 U/L (ref 10–40)
BASOPHILS # BLD AUTO: 0.05 K/UL (ref 0–0.2)
BASOPHILS NFR BLD: 0.6 % (ref 0–1.9)
BILIRUB SERPL-MCNC: 0.7 MG/DL (ref 0.1–1)
BILIRUB UR QL STRIP: NEGATIVE
BLD GP AB SCN CELLS X3 SERPL QL: NORMAL
BUN SERPL-MCNC: 13 MG/DL (ref 8–23)
CALCIUM SERPL-MCNC: 9.3 MG/DL (ref 8.7–10.5)
CHLORIDE SERPL-SCNC: 99 MMOL/L (ref 95–110)
CLARITY UR: CLEAR
CO2 SERPL-SCNC: 31 MMOL/L (ref 23–29)
COLOR UR: YELLOW
CREAT SERPL-MCNC: 0.6 MG/DL (ref 0.5–1.4)
CREAT SERPL-MCNC: 0.7 MG/DL (ref 0.5–1.4)
DIFFERENTIAL METHOD BLD: ABNORMAL
EOSINOPHIL # BLD AUTO: 0.1 K/UL (ref 0–0.5)
EOSINOPHIL NFR BLD: 1.2 % (ref 0–8)
ERYTHROCYTE [DISTWIDTH] IN BLOOD BY AUTOMATED COUNT: 15.1 % (ref 11.5–14.5)
EST. GFR  (NO RACE VARIABLE): >60 ML/MIN/1.73 M^2
GLUCOSE SERPL-MCNC: 125 MG/DL (ref 70–110)
GLUCOSE UR QL STRIP: NEGATIVE
HCT VFR BLD AUTO: 46.8 % (ref 37–48.5)
HGB BLD-MCNC: 14.5 G/DL (ref 12–16)
HGB UR QL STRIP: NEGATIVE
IMM GRANULOCYTES # BLD AUTO: 0.03 K/UL (ref 0–0.04)
IMM GRANULOCYTES NFR BLD AUTO: 0.3 % (ref 0–0.5)
KETONES UR QL STRIP: NEGATIVE
LEUKOCYTE ESTERASE UR QL STRIP: NEGATIVE
LIPASE SERPL-CCNC: 19 U/L (ref 4–60)
LYMPHOCYTES # BLD AUTO: 1.3 K/UL (ref 1–4.8)
LYMPHOCYTES NFR BLD: 14.1 % (ref 18–48)
MAGNESIUM SERPL-MCNC: 1.5 MG/DL (ref 1.6–2.6)
MCH RBC QN AUTO: 28.2 PG (ref 27–31)
MCHC RBC AUTO-ENTMCNC: 31 G/DL (ref 32–36)
MCV RBC AUTO: 91 FL (ref 82–98)
MONOCYTES # BLD AUTO: 1 K/UL (ref 0.3–1)
MONOCYTES NFR BLD: 11.1 % (ref 4–15)
NEUTROPHILS # BLD AUTO: 6.6 K/UL (ref 1.8–7.7)
NEUTROPHILS NFR BLD: 72.7 % (ref 38–73)
NITRITE UR QL STRIP: NEGATIVE
NRBC BLD-RTO: 0 /100 WBC
PH UR STRIP: 6 [PH] (ref 5–8)
PLATELET # BLD AUTO: 173 K/UL (ref 150–450)
PMV BLD AUTO: 11 FL (ref 9.2–12.9)
POTASSIUM SERPL-SCNC: 3.8 MMOL/L (ref 3.5–5.1)
PROT SERPL-MCNC: 6.7 G/DL (ref 6–8.4)
PROT UR QL STRIP: NEGATIVE
RBC # BLD AUTO: 5.15 M/UL (ref 4–5.4)
SAMPLE: NORMAL
SODIUM SERPL-SCNC: 134 MMOL/L (ref 136–145)
SP GR UR STRIP: >1.03 (ref 1–1.03)
SPECIMEN OUTDATE: NORMAL
URN SPEC COLLECT METH UR: ABNORMAL
UROBILINOGEN UR STRIP-ACNC: NEGATIVE EU/DL
WBC # BLD AUTO: 9.08 K/UL (ref 3.9–12.7)

## 2024-12-04 PROCEDURE — 80053 COMPREHEN METABOLIC PANEL: CPT | Performed by: NURSE PRACTITIONER

## 2024-12-04 PROCEDURE — 86901 BLOOD TYPING SEROLOGIC RH(D): CPT | Performed by: NURSE PRACTITIONER

## 2024-12-04 PROCEDURE — 99285 EMERGENCY DEPT VISIT HI MDM: CPT | Mod: 25

## 2024-12-04 PROCEDURE — 96367 TX/PROPH/DG ADDL SEQ IV INF: CPT

## 2024-12-04 PROCEDURE — 25000003 PHARM REV CODE 250: Performed by: HOSPITALIST

## 2024-12-04 PROCEDURE — 83735 ASSAY OF MAGNESIUM: CPT | Performed by: NURSE PRACTITIONER

## 2024-12-04 PROCEDURE — 96375 TX/PRO/DX INJ NEW DRUG ADDON: CPT

## 2024-12-04 PROCEDURE — 36415 COLL VENOUS BLD VENIPUNCTURE: CPT | Performed by: NURSE PRACTITIONER

## 2024-12-04 PROCEDURE — 63600175 PHARM REV CODE 636 W HCPCS: Performed by: HOSPITALIST

## 2024-12-04 PROCEDURE — 25500020 PHARM REV CODE 255: Performed by: EMERGENCY MEDICINE

## 2024-12-04 PROCEDURE — 81003 URINALYSIS AUTO W/O SCOPE: CPT | Performed by: NURSE PRACTITIONER

## 2024-12-04 PROCEDURE — 63600175 PHARM REV CODE 636 W HCPCS: Performed by: EMERGENCY MEDICINE

## 2024-12-04 PROCEDURE — 96372 THER/PROPH/DIAG INJ SC/IM: CPT | Performed by: HOSPITALIST

## 2024-12-04 PROCEDURE — 83690 ASSAY OF LIPASE: CPT | Performed by: NURSE PRACTITIONER

## 2024-12-04 PROCEDURE — 82565 ASSAY OF CREATININE: CPT

## 2024-12-04 PROCEDURE — G0378 HOSPITAL OBSERVATION PER HR: HCPCS

## 2024-12-04 PROCEDURE — 85025 COMPLETE CBC W/AUTO DIFF WBC: CPT | Performed by: NURSE PRACTITIONER

## 2024-12-04 PROCEDURE — 25000003 PHARM REV CODE 250: Performed by: EMERGENCY MEDICINE

## 2024-12-04 PROCEDURE — 96365 THER/PROPH/DIAG IV INF INIT: CPT

## 2024-12-04 PROCEDURE — 96366 THER/PROPH/DIAG IV INF ADDON: CPT

## 2024-12-04 RX ORDER — SODIUM CHLORIDE 0.9 % (FLUSH) 0.9 %
2 SYRINGE (ML) INJECTION EVERY 12 HOURS PRN
Status: DISCONTINUED | OUTPATIENT
Start: 2024-12-04 | End: 2024-12-06 | Stop reason: HOSPADM

## 2024-12-04 RX ORDER — CARVEDILOL 3.12 MG/1
12.5 TABLET ORAL 2 TIMES DAILY
Status: DISCONTINUED | OUTPATIENT
Start: 2024-12-04 | End: 2024-12-04

## 2024-12-04 RX ORDER — VENLAFAXINE HYDROCHLORIDE 37.5 MG/1
75 CAPSULE, EXTENDED RELEASE ORAL DAILY
Status: DISCONTINUED | OUTPATIENT
Start: 2024-12-05 | End: 2024-12-06 | Stop reason: HOSPADM

## 2024-12-04 RX ORDER — HYDROCODONE BITARTRATE AND ACETAMINOPHEN 5; 325 MG/1; MG/1
1 TABLET ORAL EVERY 6 HOURS PRN
Status: DISCONTINUED | OUTPATIENT
Start: 2024-12-04 | End: 2024-12-04

## 2024-12-04 RX ORDER — LANOLIN ALCOHOL/MO/W.PET/CERES
800 CREAM (GRAM) TOPICAL
Status: DISCONTINUED | OUTPATIENT
Start: 2024-12-04 | End: 2024-12-06 | Stop reason: HOSPADM

## 2024-12-04 RX ORDER — ACETAMINOPHEN 325 MG/1
650 TABLET ORAL EVERY 4 HOURS PRN
Status: DISCONTINUED | OUTPATIENT
Start: 2024-12-04 | End: 2024-12-04

## 2024-12-04 RX ORDER — FLUTICASONE PROPIONATE 50 MCG
1 SPRAY, SUSPENSION (ML) NASAL DAILY
COMMUNITY

## 2024-12-04 RX ORDER — ACETAMINOPHEN 500 MG
1000 TABLET ORAL 3 TIMES DAILY
Status: DISCONTINUED | OUTPATIENT
Start: 2024-12-04 | End: 2024-12-06 | Stop reason: HOSPADM

## 2024-12-04 RX ORDER — DILTIAZEM HYDROCHLORIDE 120 MG/1
120 CAPSULE, COATED, EXTENDED RELEASE ORAL DAILY
Status: DISCONTINUED | OUTPATIENT
Start: 2024-12-05 | End: 2024-12-04

## 2024-12-04 RX ORDER — ENOXAPARIN SODIUM 100 MG/ML
40 INJECTION SUBCUTANEOUS EVERY 24 HOURS
Status: DISCONTINUED | OUTPATIENT
Start: 2024-12-04 | End: 2024-12-06 | Stop reason: HOSPADM

## 2024-12-04 RX ORDER — MORPHINE SULFATE 2 MG/ML
2 INJECTION, SOLUTION INTRAMUSCULAR; INTRAVENOUS
Status: COMPLETED | OUTPATIENT
Start: 2024-12-04 | End: 2024-12-04

## 2024-12-04 RX ORDER — ONDANSETRON HYDROCHLORIDE 2 MG/ML
4 INJECTION, SOLUTION INTRAVENOUS EVERY 6 HOURS PRN
Status: DISCONTINUED | OUTPATIENT
Start: 2024-12-04 | End: 2024-12-06 | Stop reason: HOSPADM

## 2024-12-04 RX ORDER — FERROUS SULFATE 325(65) MG
325 TABLET ORAL DAILY
COMMUNITY

## 2024-12-04 RX ORDER — TALC
6 POWDER (GRAM) TOPICAL NIGHTLY PRN
Status: DISCONTINUED | OUTPATIENT
Start: 2024-12-04 | End: 2024-12-06 | Stop reason: HOSPADM

## 2024-12-04 RX ORDER — LEVOFLOXACIN 5 MG/ML
500 INJECTION, SOLUTION INTRAVENOUS
Status: DISCONTINUED | OUTPATIENT
Start: 2024-12-04 | End: 2024-12-04

## 2024-12-04 RX ORDER — SODIUM,POTASSIUM PHOSPHATES 280-250MG
2 POWDER IN PACKET (EA) ORAL
Status: DISCONTINUED | OUTPATIENT
Start: 2024-12-04 | End: 2024-12-06 | Stop reason: HOSPADM

## 2024-12-04 RX ORDER — OXYCODONE HYDROCHLORIDE 5 MG/1
5 TABLET ORAL EVERY 6 HOURS PRN
Status: DISCONTINUED | OUTPATIENT
Start: 2024-12-04 | End: 2024-12-06 | Stop reason: HOSPADM

## 2024-12-04 RX ORDER — METHOCARBAMOL 500 MG/1
500 TABLET, FILM COATED ORAL 4 TIMES DAILY
Status: DISCONTINUED | OUTPATIENT
Start: 2024-12-04 | End: 2024-12-06 | Stop reason: HOSPADM

## 2024-12-04 RX ORDER — ALUMINUM HYDROXIDE, MAGNESIUM HYDROXIDE, AND SIMETHICONE 1200; 120; 1200 MG/30ML; MG/30ML; MG/30ML
30 SUSPENSION ORAL 4 TIMES DAILY PRN
Status: DISCONTINUED | OUTPATIENT
Start: 2024-12-04 | End: 2024-12-06 | Stop reason: HOSPADM

## 2024-12-04 RX ORDER — CITALOPRAM 40 MG/1
40 TABLET, FILM COATED ORAL DAILY
COMMUNITY

## 2024-12-04 RX ORDER — SODIUM CHLORIDE 9 MG/ML
INJECTION, SOLUTION INTRAVENOUS CONTINUOUS
Status: DISCONTINUED | OUTPATIENT
Start: 2024-12-04 | End: 2024-12-06 | Stop reason: HOSPADM

## 2024-12-04 RX ORDER — IBUPROFEN 200 MG
24 TABLET ORAL
Status: DISCONTINUED | OUTPATIENT
Start: 2024-12-04 | End: 2024-12-06 | Stop reason: HOSPADM

## 2024-12-04 RX ORDER — ACETAMINOPHEN 325 MG/1
650 TABLET ORAL EVERY 8 HOURS PRN
Status: DISCONTINUED | OUTPATIENT
Start: 2024-12-04 | End: 2024-12-04

## 2024-12-04 RX ORDER — MAGNESIUM SULFATE HEPTAHYDRATE 40 MG/ML
2 INJECTION, SOLUTION INTRAVENOUS ONCE
Status: COMPLETED | OUTPATIENT
Start: 2024-12-04 | End: 2024-12-04

## 2024-12-04 RX ORDER — DONEPEZIL HYDROCHLORIDE 5 MG/1
5 TABLET, FILM COATED ORAL NIGHTLY
Status: DISCONTINUED | OUTPATIENT
Start: 2024-12-04 | End: 2024-12-06 | Stop reason: HOSPADM

## 2024-12-04 RX ORDER — PANTOPRAZOLE SODIUM 40 MG/1
40 TABLET, DELAYED RELEASE ORAL DAILY
Status: DISCONTINUED | OUTPATIENT
Start: 2024-12-05 | End: 2024-12-06 | Stop reason: HOSPADM

## 2024-12-04 RX ORDER — ASPIRIN 81 MG/1
81 TABLET ORAL DAILY
Status: DISCONTINUED | OUTPATIENT
Start: 2024-12-05 | End: 2024-12-06 | Stop reason: HOSPADM

## 2024-12-04 RX ORDER — GLUCAGON 1 MG
1 KIT INJECTION
Status: DISCONTINUED | OUTPATIENT
Start: 2024-12-04 | End: 2024-12-06 | Stop reason: HOSPADM

## 2024-12-04 RX ORDER — CLOPIDOGREL BISULFATE 75 MG/1
75 TABLET ORAL DAILY
Status: DISCONTINUED | OUTPATIENT
Start: 2024-12-05 | End: 2024-12-06 | Stop reason: HOSPADM

## 2024-12-04 RX ORDER — IBUPROFEN 200 MG
16 TABLET ORAL
Status: DISCONTINUED | OUTPATIENT
Start: 2024-12-04 | End: 2024-12-06 | Stop reason: HOSPADM

## 2024-12-04 RX ORDER — HYDROCODONE BITARTRATE AND ACETAMINOPHEN 7.5; 325 MG/1; MG/1
1 TABLET ORAL
COMMUNITY
End: 2024-12-10

## 2024-12-04 RX ORDER — AMOXICILLIN 250 MG
1 CAPSULE ORAL 2 TIMES DAILY PRN
Status: DISCONTINUED | OUTPATIENT
Start: 2024-12-04 | End: 2024-12-06 | Stop reason: HOSPADM

## 2024-12-04 RX ORDER — AMOXICILLIN 500 MG/1
500 CAPSULE ORAL EVERY 8 HOURS
Status: ON HOLD | COMMUNITY
End: 2024-12-06 | Stop reason: HOSPADM

## 2024-12-04 RX ORDER — CITALOPRAM 20 MG/1
40 TABLET, FILM COATED ORAL DAILY
Status: DISCONTINUED | OUTPATIENT
Start: 2024-12-05 | End: 2024-12-06 | Stop reason: HOSPADM

## 2024-12-04 RX ORDER — NALOXONE HCL 0.4 MG/ML
0.02 VIAL (ML) INJECTION
Status: DISCONTINUED | OUTPATIENT
Start: 2024-12-04 | End: 2024-12-06 | Stop reason: HOSPADM

## 2024-12-04 RX ORDER — METRONIDAZOLE 500 MG/100ML
500 INJECTION, SOLUTION INTRAVENOUS
Status: DISCONTINUED | OUTPATIENT
Start: 2024-12-04 | End: 2024-12-04

## 2024-12-04 RX ORDER — GABAPENTIN 300 MG/1
300 CAPSULE ORAL NIGHTLY
Status: DISCONTINUED | OUTPATIENT
Start: 2024-12-04 | End: 2024-12-06 | Stop reason: HOSPADM

## 2024-12-04 RX ADMIN — ENOXAPARIN SODIUM 40 MG: 40 INJECTION SUBCUTANEOUS at 08:12

## 2024-12-04 RX ADMIN — MAGNESIUM SULFATE HEPTAHYDRATE 2 G: 40 INJECTION, SOLUTION INTRAVENOUS at 05:12

## 2024-12-04 RX ADMIN — ACETAMINOPHEN 1000 MG: 500 TABLET, FILM COATED ORAL at 11:12

## 2024-12-04 RX ADMIN — GABAPENTIN 300 MG: 300 CAPSULE ORAL at 11:12

## 2024-12-04 RX ADMIN — MORPHINE SULFATE 2 MG: 2 INJECTION, SOLUTION INTRAMUSCULAR; INTRAVENOUS at 08:12

## 2024-12-04 RX ADMIN — DONEPEZIL HYDROCHLORIDE 5 MG: 5 TABLET, FILM COATED ORAL at 11:12

## 2024-12-04 RX ADMIN — IOHEXOL 100 ML: 350 INJECTION, SOLUTION INTRAVENOUS at 05:12

## 2024-12-04 RX ADMIN — METHOCARBAMOL TABLETS 500 MG: 500 TABLET, COATED ORAL at 11:12

## 2024-12-04 RX ADMIN — PIPERACILLIN SODIUM AND TAZOBACTAM SODIUM 3.38 G: 3; .375 INJECTION, POWDER, FOR SOLUTION INTRAVENOUS at 08:12

## 2024-12-04 NOTE — FIRST PROVIDER EVALUATION
" Emergency Department TeleTriage Encounter Note      CHIEF COMPLAINT    Chief Complaint   Patient presents with    Diarrhea     Family reports dark colored diarrhea x "weeks" worse over the last 3 days       VITAL SIGNS   Initial Vitals [12/04/24 1438]   BP Pulse Resp Temp SpO2   131/88 95 18 98.5 °F (36.9 °C) 98 %      MAP       --            ALLERGIES    Review of patient's allergies indicates:   Allergen Reactions    Latex      Other reaction(s): Unknown  Other reaction(s): Unknown    Sulfacetamide sodium      Pain perineal area    Sulfasalazine Hives    Adhesive Itching     SKIN GETS RED WITH TAPE AND BANDAIDS    Adhesive tape-silicones Itching     SKIN GETS RED WITH TAPE AND BANDAIDS    Sulfa (sulfonamide antibiotics) Rash       PROVIDER TRIAGE NOTE  This is a teletriage evaluation of a 79 y.o. female presenting to the ED with c/o dark colored diarrhea for a few weeks - intermittently. Turned black/dark grey. Also reporting generalized back pain, worse in the lower. Also reports left hand/wrist swelling. Limited physical exam via telehealth: The patient is awake, alert, answering questions appropriately and is not in respiratory distress. Initial orders will be placed and care will be transferred to an alternate provider when patient is roomed for a full evaluation. Any additional orders and the final disposition will be determined by that provider.         ORDERS  Labs Reviewed - No data to display    ED Orders (720h ago, onward)      Start Ordered     Status Ordering Provider    12/04/24 1449 12/04/24 1449  Saline lock IV (18 ga. or larger)  Once         Ordered DESIREE SIMMS PChantell    12/04/24 1449 12/04/24 1449  NPO (Ice Chips)  Once         Ordered DESIREE SIMMS PChantell    12/04/24 1449 12/04/24 1449  CBC auto differential  STAT         Ordered DESIREE SIMMS P.    12/04/24 1449 12/04/24 1449  Comprehensive metabolic panel  STAT         Ordered DESIREE SIMMS P.    12/04/24 1449 12/04/24 1449  Type & Screen  STAT     "     Ordered DESIREE SIMMS P.    12/04/24 1449 12/04/24 1449  Vital signs  Once         Ordered DESIREE SIMMS P.    12/04/24 1449 12/04/24 1449  Cardiac Monitoring - Adult  Continuous        Comments: Notify Physician If:    Ordered DESIREE SIMMS              Virtual Visit Note: The provider triage portion of this emergency department evaluation and documentation was performed via [employer], a HIPAA-compliant telemedicine application, in concert with a tele-presenter in the room. A face to face patient evaluation with one of my colleagues will occur once the patient is placed in an emergency department room.      DISCLAIMER: This note was prepared with [employer] voice recognition transcription software. Garbled syntax, mangled pronouns, and other bizarre constructions may be attributed to that software system.

## 2024-12-04 NOTE — ED PROVIDER NOTES
"Encounter Date: 12/4/2024       History     Chief Complaint   Patient presents with    Diarrhea     Family reports dark colored diarrhea x "weeks" worse over the last 3 days     Patient here with family member.  She has 1-2 week history of loose stools especially in the morning.  Described as gray/dark.  No definite black stools.  No definite blood.  She does have intermittent crampy abdominal pain.  Patient went to go sit on a seat 1 week ago.  She did not fall.  She immediately felt intense back pain.  No radiation to legs.  Pain continues in the emergency department..  No dysuria.  No emesis.      Review of patient's allergies indicates:   Allergen Reactions    Latex      Other reaction(s): Unknown  Other reaction(s): Unknown    Sulfacetamide sodium      Pain perineal area    Sulfasalazine Hives    Adhesive Itching     SKIN GETS RED WITH TAPE AND BANDAIDS    Adhesive tape-silicones Itching     SKIN GETS RED WITH TAPE AND BANDAIDS    Sulfa (sulfonamide antibiotics) Rash     Past Medical History:   Diagnosis Date    Anesthesia complication     BLADDER DYSFUNCTION    Aortic stenosis     Arthritis     Back pain     Diabetes mellitus type II     NO LONGER DIABETIC    Encounter for blood transfusion     Hyperlipidemia     Hypertension     NSTEMI (non-ST elevated myocardial infarction) 05/01/2022    Osteoporosis     Wears glasses      Past Surgical History:   Procedure Laterality Date     vocal cord nodules removed  long time ago     twice    ADRENAL TUMOR      APPENDECTOMY  within last 5yrs    CATHETERIZATION OF BOTH LEFT AND RIGHT HEART Left 05/06/2022    Procedure: CATHETERIZATION, HEART, BOTH LEFT AND RIGHT;  Surgeon: Michelet Vargas MD;  Location: [facility] CATH/EP LAB;  Service: Cardiology;  Laterality: Left;    COLONOSCOPY N/A 6/23/2023    Procedure: COLONOSCOPY;  Surgeon: Joel Neal III, MD;  Location: [facility] ENDO;  Service: Endoscopy;  Laterality: N/A;    FIXATION KYPHOPLASTY THORACIC SPINE      8-20-13    " FOOT SURGERY      left 2nd toe was too long     HERNIA REPAIR  within last 5yrs    INSERTION OF TEMPORARY PACEMAKER N/A 2023    Procedure: INSERTION, PACEMAKER, TEMPORARY;  Surgeon: Bhavesh Peña MD;  Location: [facility] CATH;  Service: Cardiology;  Laterality: N/A;    LEFT HEART CATHETERIZATION Left 2022    Procedure: Left heart cath;  Surgeon: Jose Echols MD;  Location: [facility] CATH/EP LAB;  Service: Cardiology;  Laterality: Left;    SMALL BOWEL ENTEROSCOPY N/A 2023    Procedure: ENTEROSCOPY;  Surgeon: Cornell Aguirre MD;  Location: [facility] ENDO;  Service: Endoscopy;  Laterality: N/A;    SMALL BOWEL ENTEROSCOPY N/A 10/20/2023    Procedure: ENTEROSCOPY;  Surgeon: Otilio Bourgeois MD;  Location: [facility] ENDO;  Service: Endoscopy;  Laterality: N/A;    TONSILLECTOMY, ADENOIDECTOMY  long time ago    TRANSCATHETER AORTIC VALVE REPLACEMENT (TAVR) Bilateral 2023    Procedure: REPLACEMENT, AORTIC VALVE, TRANSCATHETER (TAVR);  Surgeon: Bhavesh Peña MD;  Location: [facility] CATH;  Service: Cardiology;  Laterality: Bilateral;    TRANSCATHETER AORTIC VALVE REPLACEMENT (TAVR) Bilateral 2023    Procedure: REPLACEMENT, AORTIC VALVE, TRANSCATHETER (TAVR);  Surgeon: Dallin Verma MD;  Location: [facility] CATH;  Service: Cardiology;  Laterality: Bilateral;    TRANSTHORACIC ECHOCARDIOGRAPHY (TTE)  2023    Procedure: ECHOCARDIOGRAM, TRANSTHORACIC;  Surgeon: Bhavesh Peña MD;  Location: [facility] CATH;  Service: Cardiology;;     Family History   Problem Relation Name Age of Onset    Collagen disease Neg Hx       Social History     Tobacco Use    Smoking status: Former     Current packs/day: 0.00     Average packs/day: 0.5 packs/day for 35.0 years (17.5 ttl pk-yrs)     Types: Cigarettes     Start date: 10/5/1987     Quit date: 10/5/2022     Years since quittin.1    Smokeless tobacco: Never   Substance Use Topics    Alcohol use: No    Drug use: No     Review of Systems   Constitutional:  Negative for chills and fever.    HENT:  Negative for congestion.    Eyes:  Negative for visual disturbance.   Respiratory:  Negative for shortness of breath.    Cardiovascular:  Negative for chest pain and palpitations.   Gastrointestinal:  Positive for abdominal pain and diarrhea. Negative for blood in stool and vomiting.   Genitourinary:  Negative for dysuria.   Musculoskeletal:  Negative for joint swelling.   Neurological:  Negative for seizures, syncope and headaches.   Psychiatric/Behavioral:  Negative for confusion.        Physical Exam     Initial Vitals [12/04/24 1438]   BP Pulse Resp Temp SpO2   131/88 95 18 98.5 °F (36.9 °C) 98 %      MAP       --         Physical Exam    Nursing note and vitals reviewed.  Constitutional: She is not diaphoretic. No distress.   HENT:   Head: Normocephalic and atraumatic.   Eyes: Conjunctivae are normal.   Neck:   Normal range of motion.  Cardiovascular:  Normal rate.           Pulmonary/Chest: Breath sounds normal.   Abdominal: Abdomen is soft. Bowel sounds are normal. There is no abdominal tenderness.   Tenderness left-greater-than-right per lumbar area.  No real reproducible abdominal tenderness.   Musculoskeletal:         General: Normal range of motion.      Cervical back: Normal range of motion.      Comments: Both legs are neurovascular intact.  Full strength and sensation.     Neurological: She is alert. She has normal strength. No cranial nerve deficit or sensory deficit.   No gross deficits   Skin: No rash noted.   Psychiatric:   Patient is alert.  She is a poor historian.  Family answers questions for patient.         ED Course   Procedures  Labs Reviewed   CBC W/ AUTO DIFFERENTIAL - Abnormal       Result Value    WBC 9.08      RBC 5.15      Hemoglobin 14.5      Hematocrit 46.8      MCV 91      MCH 28.2      MCHC 31.0 (*)     RDW 15.1 (*)     Platelets 173      MPV 11.0      Immature Granulocytes 0.3      Gran # (ANC) 6.6      Immature Grans (Abs) 0.03      Lymph # 1.3      Mono # 1.0      Eos  # 0.1      Baso # 0.05      nRBC 0      Gran % 72.7      Lymph % 14.1 (*)     Mono % 11.1      Eosinophil % 1.2      Basophil % 0.6      Differential Method Automated     COMPREHENSIVE METABOLIC PANEL - Abnormal    Sodium 134 (*)     Potassium 3.8      Chloride 99      CO2 31 (*)     Glucose 125 (*)     BUN 13      Creatinine 0.6      Calcium 9.3      Total Protein 6.7      Albumin 3.8      Total Bilirubin 0.7      Alkaline Phosphatase 66      AST 15      ALT 9 (*)     eGFR >60.0      Anion Gap 4 (*)    URINALYSIS, REFLEX TO URINE CULTURE - Abnormal    Specimen UA Urine, Clean Catch      Color, UA Yellow      Appearance, UA Clear      pH, UA 6.0      Specific Gravity, UA >1.030 (*)     Protein, UA Negative      Glucose, UA Negative      Ketones, UA Negative      Bilirubin (UA) Negative      Occult Blood UA Negative      Nitrite, UA Negative      Urobilinogen, UA Negative      Leukocytes, UA Negative      Narrative:     Specimen Source->Urine   MAGNESIUM - Abnormal    Magnesium 1.5 (*)    CLOSTRIDIUM DIFFICILE   LIPASE   LIPASE    Lipase 19     GASTROINTESTINAL PATHOGENS PANEL, PCR   TYPE & SCREEN    Group & Rh A POS      Indirect Liborio NEG      Specimen Outdate 12/07/2024 23:59     ISTAT CREATININE    POC Creatinine 0.7      Sample VENOUS     POCT CREATININE          Imaging Results              CT Abdomen Pelvis With IV Contrast NO Oral Contrast (Final result)  Result time 12/04/24 17:20:49      Final result by Christie Dacosta MD (12/04/24 17:20:49)                   Impression:      Mild diffuse infectious/inflammatory proctocolitis.    Acute to subacute burst fracture of L3 with moderate height loss, new since prior. At least mild central canal stenosis at L3-4 secondary to retropulsion of the posteroinferior endplate into the spinal canal.      Electronically signed by: Christie Dacosta  Date:    12/04/2024  Time:    17:20               Narrative:    EXAMINATION:  CT ABDOMEN PELVIS WITH IV  CONTRAST    CLINICAL HISTORY:  Abdominal abscess/infection suspected;    TECHNIQUE:  Low dose axial images, sagittal and coronal reformations were obtained from the lung bases to the pubic symphysis following the IV administration of 100 mL of Omnipaque 350 .  Oral contrast was not given.    COMPARISON:  CT 03/08/2024 and radiographs lumbar spine 08/20/2024    FINDINGS:  Soft tissues: Ventral abdominal hernia repair.  Similar abdominal wall hernias containing fat and bowel.    Bones: Acute to subacute burst fracture of L3 with moderate height loss, new since prior.  At least mild central canal stenosis at L3-4 secondary to retropulsion of the posteroinferior endplate into the spinal canal.  Chronic posttraumatic changes involve tube augmentation multiple levels.  Profound osteopenia.    Lower chest: No acute findings.    Lung Bases: Bibasilar dependent atelectasis.    Liver: Fatty liver.    Gallbladder: Cholelithiasis.    Bile Ducts: No evidence of dilated ducts.    Pancreas: No mass or peripancreatic fat stranding.    Spleen: Unremarkable.    Adrenals: Unremarkable.    Kidneys/ Ureters: Bilateral renal cysts and additional hypodense lesions in each kidney which are too small to definitely characterize.    Bladder: Bladder wall thickening suggestive of cystitis.    Reproductive organs: Unremarkable.    GI Tract/Mesentery: Small hiatal hernia.  Colonic diverticulosis, without diverticulitis.  Mild diffuse infectious/inflammatory proctocolitis.  Unremarkable small bowel.  No obstruction.    Peritoneal Space: No ascites. No free air.    Lymphadenopathy: No significant adenopathy.    Vasculature: Severe multifocal atherosclerosis of the abdominal aorta and its branches.                                       X-Ray Hand 3 view Left (Final result)  Result time 12/04/24 15:50:48      Final result by Alexis Saucedo MD (12/04/24 15:50:48)                   Impression:      No acute displaced fracture with scattered  osteoarthritic change as above.      Electronically signed by: Alexis Saucedo  Date:    12/04/2024  Time:    15:50               Narrative:    CLINICAL HISTORY:  (MRN [id number])78 y/o  (1945) F    Pain and Swelling;    TECHNIQUE:  (ARIEL# [id number], Exam time 12/4/2024 15:47)    [id number] XR HAND COMPLETE 3 VIEW LEFT 3 view(s) obtained.    COMPARISON:  None available.    FINDINGS:  There is diffusely decreased osseous mineralization (suggesting osteoporosis/osteopenia) which limits evaluation for subtle fractures  that being said no acute displaced fracture or dislocation is seen.  There is severe osteoarthrosis at the radiocarpal joint, 1st carpometacarpal joint, and 1st metacarpophalangeal joint, with moderate to severe osteoarthrosis in the 1-3 distal interphalangeal joints.  Additional scattered degenerative changes present in the remainder of the and.                                       Medications   magnesium sulfate 2g in water 50mL IVPB (premix) (2 g Intravenous New Bag 12/4/24 1714)   piperacillin-tazobactam (ZOSYN) 3.375 g in D5W 100 mL IVPB (MB+) (has no administration in time range)   morphine injection 2 mg (has no administration in time range)   iohexoL (OMNIPAQUE 350) injection 100 mL (100 mLs Intravenous Given 12/4/24 1708)     Medical Decision Making  Patient presents with diarrhea with abdominal and back pain.  Differential diagnosis includes colitis, diverticulitis, gastroenteritis, vertebral fracture.  Here CBC CMP and urinalysis unremarkable.  CT with colitis and burst fracture of L3 with retropulsion of fragments.  Patient does have acute to subacute burst fracture of L3 vertebrae.  She has no neurologic deficits.  Discussed with Dr. Staples.  Discussed with social worktay.  He will arrange bracing.  Dr. Staples with Neurosurgery recommends MRI and he will see in consultation.  Patient also does have colitis.  Broad-spectrum antibiotics initiated.  Dr. Conroy with Hospital Medicine to  admit.    Amount and/or Complexity of Data Reviewed  Labs:  Decision-making details documented in ED Course.  Radiology: ordered. Decision-making details documented in ED Course.    Risk  Prescription drug management.                                      Clinical Impression:  Final diagnoses:  [K52.9] Colitis (Primary)  [S32.039A] Closed fracture of third lumbar vertebra, unspecified fracture morphology, initial encounter          ED Disposition Condition    Admit Stable                Joel Finch MD  12/04/24 3294

## 2024-12-05 LAB
ALBUMIN SERPL BCP-MCNC: 3.5 G/DL (ref 3.5–5.2)
ALP SERPL-CCNC: 56 U/L (ref 55–135)
ALT SERPL W/O P-5'-P-CCNC: 7 U/L (ref 10–44)
ANION GAP SERPL CALC-SCNC: 5 MMOL/L (ref 8–16)
AST SERPL-CCNC: 11 U/L (ref 10–40)
ASTROVIRUS: NOT DETECTED
BASOPHILS # BLD AUTO: 0.06 K/UL (ref 0–0.2)
BASOPHILS NFR BLD: 1 % (ref 0–1.9)
BILIRUB SERPL-MCNC: 0.6 MG/DL (ref 0.1–1)
BUN SERPL-MCNC: 12 MG/DL (ref 8–23)
C COLI+JEJ+UPSA DNA STL QL NAA+NON-PROBE: NOT DETECTED
C DIFF GDH STL QL: POSITIVE
C DIFF TOX A+B STL QL IA: NEGATIVE
CALCIUM SERPL-MCNC: 9 MG/DL (ref 8.7–10.5)
CHLORIDE SERPL-SCNC: 100 MMOL/L (ref 95–110)
CO2 SERPL-SCNC: 32 MMOL/L (ref 23–29)
CREAT SERPL-MCNC: 0.5 MG/DL (ref 0.5–1.4)
CYCLOSPORA CAYETANENSIS: NOT DETECTED
DIFFERENTIAL METHOD BLD: ABNORMAL
ENTEROAGGREGATIVE E COLI: NOT DETECTED
ENTEROPATHOGENIC E COLI: NOT DETECTED
EOSINOPHIL # BLD AUTO: 0.1 K/UL (ref 0–0.5)
EOSINOPHIL NFR BLD: 2.2 % (ref 0–8)
ERYTHROCYTE [DISTWIDTH] IN BLOOD BY AUTOMATED COUNT: 15 % (ref 11.5–14.5)
EST. GFR  (NO RACE VARIABLE): >60 ML/MIN/1.73 M^2
GLUCOSE SERPL-MCNC: 138 MG/DL (ref 70–110)
GPP - ADENOVIRUS 40/41: NOT DETECTED
GPP - CRYPTOSPORIDIUM: NOT DETECTED
GPP - ENTAMOEBA HISTOLYTICA: NOT DETECTED
GPP - ENTEROTOXIGENIC E COLI (ETEC): NOT DETECTED
GPP - GIARDIA LAMBLIA: NOT DETECTED
GPP - NOROVIRUS GI/GII: NOT DETECTED
GPP - ROTAVIRUS A: NOT DETECTED
GPP - SALMONELLA: NOT DETECTED
GPP - VIBRIO CHOLERA: NOT DETECTED
GPP - YERSINIA ENTEROCOLITICA: NOT DETECTED
HCT VFR BLD AUTO: 42.2 % (ref 37–48.5)
HGB BLD-MCNC: 13.3 G/DL (ref 12–16)
IMM GRANULOCYTES # BLD AUTO: 0.01 K/UL (ref 0–0.04)
IMM GRANULOCYTES NFR BLD AUTO: 0.2 % (ref 0–0.5)
LACTATE PLASV-SCNC: NOT DETECTED MMOL/L
LYMPHOCYTES # BLD AUTO: 1.6 K/UL (ref 1–4.8)
LYMPHOCYTES NFR BLD: 25.2 % (ref 18–48)
MAGNESIUM SERPL-MCNC: 1.8 MG/DL (ref 1.6–2.6)
MCH RBC QN AUTO: 29.1 PG (ref 27–31)
MCHC RBC AUTO-ENTMCNC: 31.5 G/DL (ref 32–36)
MCV RBC AUTO: 92 FL (ref 82–98)
MONOCYTES # BLD AUTO: 1 K/UL (ref 0.3–1)
MONOCYTES NFR BLD: 15.2 % (ref 4–15)
NEUTROPHILS # BLD AUTO: 3.5 K/UL (ref 1.8–7.7)
NEUTROPHILS NFR BLD: 56.2 % (ref 38–73)
NRBC BLD-RTO: 0 /100 WBC
PLATELET # BLD AUTO: 147 K/UL (ref 150–450)
PLESIOMONAS SHIGELLOIDES: NOT DETECTED
PMV BLD AUTO: 11.2 FL (ref 9.2–12.9)
POTASSIUM SERPL-SCNC: 3.8 MMOL/L (ref 3.5–5.1)
PROT SERPL-MCNC: 6 G/DL (ref 6–8.4)
RBC # BLD AUTO: 4.57 M/UL (ref 4–5.4)
SAPOVIRUS: NOT DETECTED
SHIGELLA SP+EIEC IPAH STL QL NAA+PROBE: NOT DETECTED
SODIUM SERPL-SCNC: 137 MMOL/L (ref 136–145)
VIBRIO: NOT DETECTED
WBC # BLD AUTO: 6.27 K/UL (ref 3.9–12.7)

## 2024-12-05 PROCEDURE — 25000003 PHARM REV CODE 250: Performed by: INTERNAL MEDICINE

## 2024-12-05 PROCEDURE — 87493 C DIFF AMPLIFIED PROBE: CPT | Performed by: EMERGENCY MEDICINE

## 2024-12-05 PROCEDURE — 25000003 PHARM REV CODE 250: Performed by: HOSPITALIST

## 2024-12-05 PROCEDURE — 96361 HYDRATE IV INFUSION ADD-ON: CPT

## 2024-12-05 PROCEDURE — 94761 N-INVAS EAR/PLS OXIMETRY MLT: CPT

## 2024-12-05 PROCEDURE — 36415 COLL VENOUS BLD VENIPUNCTURE: CPT | Performed by: HOSPITALIST

## 2024-12-05 PROCEDURE — 87507 IADNA-DNA/RNA PROBE TQ 12-25: CPT | Performed by: EMERGENCY MEDICINE

## 2024-12-05 PROCEDURE — 87324 CLOSTRIDIUM AG IA: CPT | Performed by: EMERGENCY MEDICINE

## 2024-12-05 PROCEDURE — 27000207 HC ISOLATION

## 2024-12-05 PROCEDURE — 99222 1ST HOSP IP/OBS MODERATE 55: CPT | Mod: ,,, | Performed by: NEUROLOGICAL SURGERY

## 2024-12-05 PROCEDURE — 63600175 PHARM REV CODE 636 W HCPCS: Performed by: HOSPITALIST

## 2024-12-05 PROCEDURE — 85025 COMPLETE CBC W/AUTO DIFF WBC: CPT | Performed by: HOSPITALIST

## 2024-12-05 PROCEDURE — 12000002 HC ACUTE/MED SURGE SEMI-PRIVATE ROOM

## 2024-12-05 PROCEDURE — 83735 ASSAY OF MAGNESIUM: CPT | Performed by: HOSPITALIST

## 2024-12-05 PROCEDURE — 80053 COMPREHEN METABOLIC PANEL: CPT | Performed by: HOSPITALIST

## 2024-12-05 PROCEDURE — 63600175 PHARM REV CODE 636 W HCPCS: Performed by: INTERNAL MEDICINE

## 2024-12-05 RX ORDER — L. ACIDOPHILUS/L.BULGARICUS 100MM CELL
1 GRANULES IN PACKET (EA) ORAL 2 TIMES DAILY
Status: DISCONTINUED | OUTPATIENT
Start: 2024-12-05 | End: 2024-12-06 | Stop reason: HOSPADM

## 2024-12-05 RX ORDER — MORPHINE SULFATE 2 MG/ML
2 INJECTION, SOLUTION INTRAMUSCULAR; INTRAVENOUS EVERY 4 HOURS PRN
Status: DISCONTINUED | OUTPATIENT
Start: 2024-12-05 | End: 2024-12-06 | Stop reason: HOSPADM

## 2024-12-05 RX ORDER — LORAZEPAM 2 MG/ML
0.5 INJECTION INTRAMUSCULAR ONCE
Status: COMPLETED | OUTPATIENT
Start: 2024-12-05 | End: 2024-12-05

## 2024-12-05 RX ADMIN — CITALOPRAM HYDROBROMIDE 40 MG: 20 TABLET ORAL at 08:12

## 2024-12-05 RX ADMIN — MORPHINE SULFATE 2 MG: 2 INJECTION, SOLUTION INTRAMUSCULAR; INTRAVENOUS at 01:12

## 2024-12-05 RX ADMIN — VENLAFAXINE HYDROCHLORIDE 75 MG: 37.5 CAPSULE, EXTENDED RELEASE ORAL at 08:12

## 2024-12-05 RX ADMIN — METHOCARBAMOL TABLETS 500 MG: 500 TABLET, COATED ORAL at 08:12

## 2024-12-05 RX ADMIN — LACTOBACILLUS ACIDOPHILUS / LACTOBACILLUS BULGARICUS 1 EACH: 100 MILLION CFU STRENGTH GRANULES at 10:12

## 2024-12-05 RX ADMIN — CLOPIDOGREL BISULFATE 75 MG: 75 TABLET, FILM COATED ORAL at 08:12

## 2024-12-05 RX ADMIN — ASPIRIN 81 MG: 81 TABLET, COATED ORAL at 08:12

## 2024-12-05 RX ADMIN — GABAPENTIN 300 MG: 300 CAPSULE ORAL at 08:12

## 2024-12-05 RX ADMIN — DONEPEZIL HYDROCHLORIDE 5 MG: 5 TABLET, FILM COATED ORAL at 08:12

## 2024-12-05 RX ADMIN — METHOCARBAMOL TABLETS 500 MG: 500 TABLET, COATED ORAL at 02:12

## 2024-12-05 RX ADMIN — OXYCODONE HYDROCHLORIDE 5 MG: 5 TABLET ORAL at 10:12

## 2024-12-05 RX ADMIN — ATORVASTATIN CALCIUM 5 MG: 10 TABLET, FILM COATED ORAL at 08:12

## 2024-12-05 RX ADMIN — ENOXAPARIN SODIUM 40 MG: 40 INJECTION SUBCUTANEOUS at 05:12

## 2024-12-05 RX ADMIN — POTASSIUM BICARBONATE 50 MEQ: 978 TABLET, EFFERVESCENT ORAL at 05:12

## 2024-12-05 RX ADMIN — SODIUM CHLORIDE: 9 INJECTION, SOLUTION INTRAVENOUS at 12:12

## 2024-12-05 RX ADMIN — LORAZEPAM 0.5 MG: 2 INJECTION INTRAMUSCULAR; INTRAVENOUS at 08:12

## 2024-12-05 RX ADMIN — PANTOPRAZOLE SODIUM 40 MG: 40 TABLET, DELAYED RELEASE ORAL at 05:12

## 2024-12-05 RX ADMIN — Medication 800 MG: at 05:12

## 2024-12-05 RX ADMIN — Medication 800 MG: at 12:12

## 2024-12-05 RX ADMIN — ACETAMINOPHEN 1000 MG: 500 TABLET, FILM COATED ORAL at 08:12

## 2024-12-05 RX ADMIN — LACTOBACILLUS ACIDOPHILUS / LACTOBACILLUS BULGARICUS 1 EACH: 100 MILLION CFU STRENGTH GRANULES at 08:12

## 2024-12-05 RX ADMIN — METHOCARBAMOL TABLETS 500 MG: 500 TABLET, COATED ORAL at 05:12

## 2024-12-05 NOTE — ASSESSMENT & PLAN NOTE
Patient's blood pressure range in the last 24 hours was: BP  Min: 111/70  Max: 131/88.  Resume home med of coreg with parameter. Hold diltiazem.

## 2024-12-05 NOTE — HPI
78 yo female with PMH of dementia with agitation, type 2 diabetes, diastolic heart failure, depression and paranoid who was brought to the ER because of severe back pain, and diarrhea. A week ago, patient experienced severe lower acute back pain while trying to get out of bed. Her back pain is severe, 8/10 on pain scale and does not radiate.  Patient can barely move, and has been in bed since then. Patient has also been on antibiotics for 10 days for dental infection.  Three days ago, she started to have perfused watery diarrhea consistent of dark colored stool. She also has diffuse abdominal tenderness. Because of her symptoms, she came to the ER for evaluation.    In the ER, vitals showed bp of 131/88, heart rate 95, respiratory rate of 18, afebrile satting 98% on room air.  CBC with no leukocytosis.  CMP - sodium of 134, bicarb 31.  Mag is 1.5.  CT abdomen pelvis showed mild diffuse infectious/inflammatory proctocolitis, and Acute to subacute burst fracture of L3 with moderate height loss, new since prior. At least mild central canal stenosis at L3-4 secondary to retropulsion of the posteroinferior endplate into the spinal canal.  Morphine and Zosyn has been ordered.  Patient will be admitted to Medicine for further care.

## 2024-12-05 NOTE — PT/OT/SLP PROGRESS
Physical Therapy      Patient Name:  Daryleen G Moran   MRN:  [id number]    Patient not seen today secondary to Other (Comment) (awaiting neurosurgery consult due to lumbar fx). Will follow-up 12/6/24.

## 2024-12-05 NOTE — ASSESSMENT & PLAN NOTE
Rule out C diff in the setting of her colitis and diarrhea.  Patient has been taking amoxicillin for 10 days secondary to dental infection.  IV fluids for hydration.  Hold further antibiotic until C diff has been ruled to prevent worsening symptoms.

## 2024-12-05 NOTE — ASSESSMENT & PLAN NOTE
Resume Aspirin.  Hold cardiazem given bp. Primary team to resume when appropriate.  Resume coreg with parameters.

## 2024-12-05 NOTE — RESPIRATORY THERAPY
12/05/24 0850   Patient Assessment/Suction   Level of Consciousness (AVPU) alert   Respiratory Effort Normal;Unlabored   Rhythm/Pattern, Respiratory unlabored;no shortness of breath reported   PRE-TX-O2   Device (Oxygen Therapy) room air   Pulse Oximetry Type Intermittent   $ Pulse Oximetry - Multiple Charge Pulse Oximetry - Multiple   Pulse 80   Resp 18

## 2024-12-05 NOTE — SUBJECTIVE & OBJECTIVE
Interval History:  Patient is seen and examined.  Patient reports loose watery frequent diarrhea.  Back pain is somewhat stable this morning.  Awaiting neurosurgeon evaluation for L3 compression fracture with retropulsion of disc.  Patient denies any lower extremity weakness numbness or loss of bowel or bladder functions.  No history of fall reported.  Patient reports history of osteoporosis.    Review of Systems   Constitutional:  Negative for chills and fever.   HENT:  Negative for sore throat.    Eyes:  Negative for visual disturbance.   Respiratory:  Negative for cough and shortness of breath.    Cardiovascular:  Negative for chest pain and palpitations.   Gastrointestinal:  Positive for abdominal pain and diarrhea.   Endocrine: Negative for polydipsia and polyuria.   Genitourinary:  Negative for dysuria, frequency and urgency.   Musculoskeletal:  Positive for back pain.   Skin:  Negative for rash.   Neurological:  Negative for syncope.   Psychiatric/Behavioral:  Positive for agitation.      Objective:     Vital Signs (Most Recent):  Temp: 97.3 °F (36.3 °C) (12/05/24 0645)  Pulse: 102 (12/05/24 0645)  Resp: 17 (12/05/24 0645)  BP: 126/70 (12/05/24 0645)  SpO2: 98 % (12/05/24 0645) Vital Signs (24h Range):  Temp:  [97.3 °F (36.3 °C)-98.5 °F (36.9 °C)] 97.3 °F (36.3 °C)  Pulse:  [] 102  Resp:  [17-20] 17  SpO2:  [92 %-98 %] 98 %  BP: (104-153)/(54-89) 126/70     Weight: 92 kg (202 lb 13.2 oz)  Body mass index is 39.61 kg/m².    Intake/Output Summary (Last 24 hours) at 12/5/2024 0744  Last data filed at 12/5/2024 0600  Gross per 24 hour   Intake 150 ml   Output 250 ml   Net -100 ml         Physical Exam  Vitals reviewed.   Constitutional:       Appearance: Normal appearance.   HENT:      Head: Normocephalic and atraumatic.      Mouth/Throat:      Mouth: Mucous membranes are dry.   Eyes:      Extraocular Movements: Extraocular movements intact.      Conjunctiva/sclera: Conjunctivae normal.      Pupils: Pupils  "are equal, round, and reactive to light.   Cardiovascular:      Rate and Rhythm: Normal rate and regular rhythm.   Pulmonary:      Effort: Pulmonary effort is normal.      Breath sounds: Normal breath sounds.   Abdominal:      General: Abdomen is flat. Bowel sounds are normal.      Palpations: Abdomen is soft.   Musculoskeletal:      Cervical back: Normal range of motion and neck supple.      Comments: Severe back pain on minimal movement, and palpation of her lower back.   Skin:     General: Skin is warm.   Neurological:      Mental Status: She is alert.      Comments: Alert to self, place being hospital, and year being 2024.  Does not know the [place] president name.   Psychiatric:         Mood and Affect: Mood normal.         Behavior: Behavior normal.             Significant Labs: All pertinent labs within the past 24 hours have been reviewed.  CBC:   Recent Labs   Lab 12/04/24  1513 12/05/24  0345   WBC 9.08 6.27   HGB 14.5 13.3   HCT 46.8 42.2    147*     CMP:   Recent Labs   Lab 12/04/24  1513 12/05/24  0345   * 137   K 3.8 3.8   CL 99 100   CO2 31* 32*   * 138*   BUN 13 12   CREATININE 0.6 0.5   CALCIUM 9.3 9.0   PROT 6.7 6.0   ALBUMIN 3.8 3.5   BILITOT 0.7 0.6   ALKPHOS 66 56   AST 15 11   ALT 9* 7*   ANIONGAP 4* 5*     Coagulation: No results for input(s): "PT", "INR", "APTT" in the last 48 hours.  Lactic Acid: No results for input(s): "LACTATE" in the last 48 hours.  Urine Studies:   Recent Labs   Lab 12/04/24  1730   COLORU Yellow   APPEARANCEUA Clear   PHUR 6.0   SPECGRAV >1.030*   PROTEINUA Negative   GLUCUA Negative   KETONESU Negative   BILIRUBINUA Negative   OCCULTUA Negative   NITRITE Negative   UROBILINOGEN Negative   LEUKOCYTESUR Negative     Microbiology Results (last 7 days)       Procedure Component Value Units Date/Time    Clostridium difficile EIA [[id number]]     Order Status: No result Specimen: Stool           Significant Imaging:   ECHO ():    Left Ventricle: The " left ventricle is normal in size. Normal wall thickness. There is concentric remodeling. There is normal systolic function with a visually estimated ejection fraction of 60 - 65%. Diastolic function cannot be reliably determined in the presence of mitral annular calcification.    Right Ventricle: Normal right ventricular cavity size. Wall thickness is normal. Systolic function is mildly reduced.    The left atrium is moderately dilated.    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 It is reported to be a Carrion Mk S3 valve.    Mitral Valve: There is bileaflet sclerosis. There is moderate to severe mitral annular calcification present.    Tricuspid Valve: There is mild regurgitation.    Pulmonary Artery: The estimated pulmonary artery systolic pressure is 31 mmHg.    IVC/SVC: Normal venous pressure at 3 mmHg.    CT abdomen and Pelvis with IV contrast:  Mild diffuse infectious/inflammatory proctocolitis.  Acute to subacute burst fracture of L3 with moderate height loss, new since prior. At least mild central canal stenosis at L3-4 secondary to retropulsion of the posteroinferior endplate into the spinal canal.    Left hand x-ray:  No acute displaced fracture with scattered osteoarthritic change as above.

## 2024-12-05 NOTE — PLAN OF CARE
Inpatient Upgrade Note    Daryleen G Moran has warranted treatment spanning two or more midnights of hospital level care for the management of  new onset C. Diff infection and L3 burst fracture . She continues to require IV antibiotics, IV fluids, daily labs, further testing/imaging, monitoring of vital signs, advancing diet, medication adjustments, and further evaluation by consultants. Her condition is also complicated by the following comorbidities: Coronary Artery Disease, Diabetes, and aortic stenosis, arthritis, osteoporosis, hyperlipedemia .

## 2024-12-05 NOTE — PLAN OF CARE
[facility]  Initial Discharge Assessment       Primary Care Provider: Abhi De Oliveira IV, MD    Admission Diagnosis: Colitis [K52.9]    Admission Date: 12/4/2024  Expected Discharge Date: 12/6/2024    Assessment and facesheet verification done at bedside. All demographic information is correct in chart. Patient lives with Sally Reardon ( daughter ) [phone] , who will assist with any needs @ time of discharge.   Current DME : BSC , Hospital bed, wheelchair and RW .     Needs for DC : TBD     Transport home will be provided by Sally Reardon ( daughter ) [phone]     Transition of Care Barriers: None      Payor: [employer] MANAGED MEDICARE / Plan: [employer] MEDICARE HMO / Product Type: Capitation /     Extended Emergency Contact Information  Primary Emergency Contact: Sally Reardon  Mobile Phone: [phone]  Relation: Daughter    Discharge Plan A: Home with family  Discharge Plan B: Home with family      [facility] - [place] [id number] [facility]  [address] [address]  Phone: [phone] Fax: [phone]    [facility] - [place], [place] - [address]  [address]  [place] [address]  Phone: [phone] Fax: [phone]    [id number] - [place], [place] - [address]  [address]  [place] [address]  Phone: [phone] Fax: [phone]    [facility]  [address]  [place] [place] [address]  Phone: [phone] Fax: [phone]      Initial Assessment (most recent)       Adult Discharge Assessment - 12/05/24 1557          Discharge Assessment    Assessment Type Discharge Planning Assessment     Confirmed/corrected address, phone number and insurance Yes     Confirmed Demographics Correct on Facesheet     Source of Information patient     When was your last doctors appointment? --   <3mos    Communicated ISAAK with patient/caregiver Date not available/Unable  to determine     Reason For Admission closed stable burst fracture of third lumbar vertebra     People in Home child(maddie), adult     Do you expect to return to your current living situation? Yes     Do you have help at home or someone to help you manage your care at home? Yes     Who are your caregiver(s) and their phone number(s)? Sally Reardon ( daughter ) [phone]     Prior to hospitilization cognitive status: Alert/Oriented     Current cognitive status: Alert/Oriented     Equipment Currently Used at Home wheelchair;hospital bed;bedside commode;walker, rolling     Readmission within 30 days? No     Do you currently have service(s) that help you manage your care at home? No     Do you take prescription medications? Yes     Do you have prescription coverage? Yes     Do you have any problems affording any of your prescribed medications? No     Is the patient taking medications as prescribed? yes     Who is going to help you get home at discharge? Sally Reardon ( daughter ) [phone]     How do you get to doctors appointments? family or friend will provide     Are you on dialysis? No     Do you take coumadin? No     Discharge Plan A Home with family     Discharge Plan B Home with family     Discharge Plan discussed with: Patient     Transition of Care Barriers None

## 2024-12-05 NOTE — PROGRESS NOTES
[facility] Medicine  Progress Note    Patient Name: Daryleen G Moran  MRN: [id number]  Patient Class: OP- Observation   Admission Date: 12/4/2024  Length of Stay: 0 days  Attending Physician: Paulo Chakraborty MD  Primary Care Provider: Abhi De Oliveira IV, MD        Subjective     Principal Problem:Closed stable burst fracture of third lumbar vertebra        HPI:  78 yo female with PMH of dementia with agitation, type 2 diabetes, diastolic heart failure, depression and paranoid who was brought to the ER because of severe back pain, and diarrhea. A week ago, patient experienced severe lower acute back pain while trying to get out of bed. Her back pain is severe, 8/10 on pain scale and does not radiate.  Patient can barely move, and has been in bed since then. Patient has also been on antibiotics for 10 days for dental infection.  Three days ago, she started to have perfused watery diarrhea consistent of dark colored stool. She also has diffuse abdominal tenderness. Because of her symptoms, she came to the ER for evaluation.    In the ER, vitals showed bp of 131/88, heart rate 95, respiratory rate of 18, afebrile satting 98% on room air.  CBC with no leukocytosis.  CMP - sodium of 134, bicarb 31.  Mag is 1.5.  CT abdomen pelvis showed mild diffuse infectious/inflammatory proctocolitis, and Acute to subacute burst fracture of L3 with moderate height loss, new since prior. At least mild central canal stenosis at L3-4 secondary to retropulsion of the posteroinferior endplate into the spinal canal.  Morphine and Zosyn has been ordered.  Patient will be admitted to Medicine for further care.    Overview/Hospital Course:  No notes on file    Interval History:  Patient is seen and examined.  Patient reports loose watery frequent diarrhea.  Back pain is somewhat stable this morning.  Awaiting neurosurgeon evaluation for L3 compression fracture with retropulsion of disc.  Patient denies any lower extremity  weakness numbness or loss of bowel or bladder functions.  No history of fall reported.  Patient reports history of osteoporosis.    Review of Systems   Constitutional:  Negative for chills and fever.   HENT:  Negative for sore throat.    Eyes:  Negative for visual disturbance.   Respiratory:  Negative for cough and shortness of breath.    Cardiovascular:  Negative for chest pain and palpitations.   Gastrointestinal:  Positive for abdominal pain and diarrhea.   Endocrine: Negative for polydipsia and polyuria.   Genitourinary:  Negative for dysuria, frequency and urgency.   Musculoskeletal:  Positive for back pain.   Skin:  Negative for rash.   Neurological:  Negative for syncope.   Psychiatric/Behavioral:  Positive for agitation.      Objective:     Vital Signs (Most Recent):  Temp: 97.3 °F (36.3 °C) (12/05/24 0645)  Pulse: 102 (12/05/24 0645)  Resp: 17 (12/05/24 0645)  BP: 126/70 (12/05/24 0645)  SpO2: 98 % (12/05/24 0645) Vital Signs (24h Range):  Temp:  [97.3 °F (36.3 °C)-98.5 °F (36.9 °C)] 97.3 °F (36.3 °C)  Pulse:  [] 102  Resp:  [17-20] 17  SpO2:  [92 %-98 %] 98 %  BP: (104-153)/(54-89) 126/70     Weight: 92 kg (202 lb 13.2 oz)  Body mass index is 39.61 kg/m².    Intake/Output Summary (Last 24 hours) at 12/5/2024 0744  Last data filed at 12/5/2024 0600  Gross per 24 hour   Intake 150 ml   Output 250 ml   Net -100 ml         Physical Exam  Vitals reviewed.   Constitutional:       Appearance: Normal appearance.   HENT:      Head: Normocephalic and atraumatic.      Mouth/Throat:      Mouth: Mucous membranes are dry.   Eyes:      Extraocular Movements: Extraocular movements intact.      Conjunctiva/sclera: Conjunctivae normal.      Pupils: Pupils are equal, round, and reactive to light.   Cardiovascular:      Rate and Rhythm: Normal rate and regular rhythm.   Pulmonary:      Effort: Pulmonary effort is normal.      Breath sounds: Normal breath sounds.   Abdominal:      General: Abdomen is flat. Bowel sounds  "are normal.      Palpations: Abdomen is soft.   Musculoskeletal:      Cervical back: Normal range of motion and neck supple.      Comments: Severe back pain on minimal movement, and palpation of her lower back.   Skin:     General: Skin is warm.   Neurological:      Mental Status: She is alert.      Comments: Alert to self, place being hospital, and year being 2024.  Does not know the [place] president name.   Psychiatric:         Mood and Affect: Mood normal.         Behavior: Behavior normal.             Significant Labs: All pertinent labs within the past 24 hours have been reviewed.  CBC:   Recent Labs   Lab 12/04/24  1513 12/05/24  0345   WBC 9.08 6.27   HGB 14.5 13.3   HCT 46.8 42.2    147*     CMP:   Recent Labs   Lab 12/04/24  1513 12/05/24  0345   * 137   K 3.8 3.8   CL 99 100   CO2 31* 32*   * 138*   BUN 13 12   CREATININE 0.6 0.5   CALCIUM 9.3 9.0   PROT 6.7 6.0   ALBUMIN 3.8 3.5   BILITOT 0.7 0.6   ALKPHOS 66 56   AST 15 11   ALT 9* 7*   ANIONGAP 4* 5*     Coagulation: No results for input(s): "PT", "INR", "APTT" in the last 48 hours.  Lactic Acid: No results for input(s): "LACTATE" in the last 48 hours.  Urine Studies:   Recent Labs   Lab 12/04/24  1730   COLORU Yellow   APPEARANCEUA Clear   PHUR 6.0   SPECGRAV >1.030*   PROTEINUA Negative   GLUCUA Negative   KETONESU Negative   BILIRUBINUA Negative   OCCULTUA Negative   NITRITE Negative   UROBILINOGEN Negative   LEUKOCYTESUR Negative     Microbiology Results (last 7 days)       Procedure Component Value Units Date/Time    Clostridium difficile EIA [[id number]]     Order Status: No result Specimen: Stool           Significant Imaging:   ECHO ():    Left Ventricle: The left ventricle is normal in size. Normal wall thickness. There is concentric remodeling. There is normal systolic function with a visually estimated ejection fraction of 60 - 65%. Diastolic function cannot be reliably determined in the presence of mitral annular " calcification.    Right Ventricle: Normal right ventricular cavity size. Wall thickness is normal. Systolic function is mildly reduced.    The left atrium is moderately dilated.    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 It is reported to be a Carrion Mk S3 valve.    Mitral Valve: There is bileaflet sclerosis. There is moderate to severe mitral annular calcification present.    Tricuspid Valve: There is mild regurgitation.    Pulmonary Artery: The estimated pulmonary artery systolic pressure is 31 mmHg.    IVC/SVC: Normal venous pressure at 3 mmHg.    CT abdomen and Pelvis with IV contrast:  Mild diffuse infectious/inflammatory proctocolitis.  Acute to subacute burst fracture of L3 with moderate height loss, new since prior. At least mild central canal stenosis at L3-4 secondary to retropulsion of the posteroinferior endplate into the spinal canal.    Left hand x-ray:  No acute displaced fracture with scattered osteoarthritic change as above.       Assessment and Plan     * Closed stable burst fracture of third lumbar vertebra  - Neurosurgery has been consulted, they recommended MRI of the lumbar spine without contrast.  - They also recommended a back brace.   has been contacted, and the brace rep will come for measurements.  - Will consult pain management.  Patient takes Norco 7.5 mg q.6 hours with no improvement in her symptoms at home.  - I will order Robaxin 500 mg, tylenol 1000 mg tid, gabapentin 300 mg night, and will start oxycodone q.4 hours p.r.n..        Colitis  Rule out C diff in the setting of her colitis and diarrhea.  Patient has been taking amoxicillin for 10 days secondary to dental infection.  IV fluids for hydration.  Hold further antibiotic until C diff has been ruled to prevent worsening symptoms.      CAD (coronary artery disease)  Resume aspirin and plavix.     Paroxysmal atrial fibrillation  Resume Aspirin.  Hold coreg and diltiazem  given bp is on the low side.       Hypertension  Patient's blood pressure range in the last 24 hours was: BP  Min: 104/54  Max: 131/88.  Hold coreg and diltiazem given bp is on the low side. Primary team to resume when appropriate.      Patient is presently at bedrest.  Patient encouraged to wear TLSO brace.  Will follow recommendations from Neurosurgery.  Discussed with bedside nurse to obtain stool sample for stool cultures and C diff analysis.  VTE Risk Mitigation (From admission, onward)           Ordered     enoxaparin injection 40 mg  Daily        Note to Pharmacy: Ht: 5' (1.524 m)  Wt: 92.1 kg (203 lb)  Estimated Creatinine Clearance: 78.2 mL/min (based on SCr of 0.6 mg/dL).  Body mass index is 39.65 kg/m².(Almost)    12/04/24 1905     IP VTE HIGH RISK PATIENT  Once         12/04/24 1905     Place sequential compression device  Until discontinued         12/04/24 1905                    Discharge Planning   ISAAK:  December 6, 2024.       Code Status: Full Code   Medical Readiness for Discharge Date:                            Paulo Chakraborty MD  Department of Hospital Medicine   [facility]

## 2024-12-05 NOTE — PROGRESS NOTES
12/5/24  12noon Pt needs sedation medication by IV. Patient woke up screaming and cussing at staff when we attempted to get her for MRI scan. RN will call when something is ordered.

## 2024-12-05 NOTE — CONSULTS
[facility]  Neurosurgery  Consult Note    Consults  Subjective:     Chief Complaint/Reason for Admission:  Diarrhea    History of Present Illness: Arcadio Cyr 79-year-old female with past medical history dementia, hypertension, paroxysmal atrial fibrillation managed with Plavix and aspirin, coronary artery disease, type 2 diabetes, GERD, status post transcatheter aortic valve replacement presented to the emergency department on 12/04/2024 reporting diarrhea.  Patient also reported approximately 1 week ago when she sat down she felt intense back pain.  CT abdomen pelvis revealed L3 vertebral body acute to subacute burst fracture.  Neurosurgery consulted for burst fracture.    On initiation of encounter, patient was found asleep but awoken when prompted.  No family was present at time of encounter.  Patient reports significant low back pain that is exacerbated with movement.  She denies upper or lower extremity radicular pain.  She denies extremity weakness or paresthesias.    Medications Prior to Admission   Medication Sig Dispense Refill Last Dose/Taking    albuterol (PROVENTIL/VENTOLIN HFA) 90 mcg/actuation inhaler Inhale 2 puffs into the lungs every 6 (six) hours as needed for Shortness of Breath.   Unknown    amoxicillin (AMOXIL) 500 MG capsule Take 500 mg by mouth every 8 (eight) hours.   Unknown    aspirin (ECOTRIN) 81 MG EC tablet Take 81 mg by mouth once daily.       atorvastatin (LIPITOR) 10 MG tablet Take 5 mg by mouth every evening.   Unknown    butalbital-acetaminophen-caffeine -40 mg (FIORICET, ESGIC) -40 mg per tablet Take 1 tablet by mouth every 4 (four) hours as needed for Pain. Discuss with your primary care provider before taking this medication       calcium carbonate (OS-TK) 500 mg calcium (1,250 mg) tablet Take 1 tablet (500 mg total) by mouth once daily.  0     carvediloL (COREG) 12.5 MG tablet Take 12.5 mg by mouth 2 (two) times daily.   Unknown    citalopram  (CELEXA) 40 MG tablet Take 40 mg by mouth once daily.   Unknown    clopidogreL (PLAVIX) 75 mg tablet Take 1 tablet (75 mg total) by mouth once daily. 90 tablet 2 Unknown    diltiaZEM (DILACOR XR) 120 MG CDCR Take 1 capsule (120 mg total) by mouth once daily. 30 capsule 11 Unknown    donepeziL (ARICEPT) 5 MG tablet Take 5 mg by mouth nightly.   Unknown    ergocalciferol (ERGOCALCIFEROL) 50,000 unit Cap TAKE 1 CAPSULE (50,000 UNITS TOTAL) EVERY 7 DAYS. (Patient taking differently: Take 50,000 Units by mouth every 7 days.) 12 capsule 3     FEROSUL 325 mg (65 mg iron) Tab tablet Take 325 mg by mouth once daily.   Unknown    ferrous gluconate 324 mg (37.5 mg iron) Tab tablet Take 1 tablet (324 mg total) by mouth once daily. 30 tablet 3     fluticasone furoate-vilanteroL (BREO ELLIPTA) 100-25 mcg/dose diskus inhaler Inhale 1 puff into the lungs once daily. Controller       fluticasone propionate (FLONASE) 50 mcg/actuation nasal spray 1 spray by Each Nostril route once daily.       furosemide (LASIX) 40 MG tablet Take 1 tablet (40 mg total) by mouth daily as needed (Take for weight gain > 2 pounds over 24 hours. May discuss with PCP). 30 tablet 11 Unknown    glucosamine-chondroitin 500-400 mg tablet Take 1 tablet by mouth once daily.       HYDROcodone-acetaminophen (NORCO) 7.5-325 mg per tablet Take 1 tablet by mouth every 4 to 6 hours as needed for Pain.   Unknown    lisinopriL 10 MG tablet Take 10 mg by mouth once daily.       loperamide (IMODIUM A-D) 2 mg Tab Take 1 tablet (2 mg total) by mouth 3 (three) times daily as needed (diarrhea). Take 2 tablets by mouth initially then 1 tablet after each loose stool as needed for diarrhea. 15 tablet 0     multivitamin capsule Take 1 capsule by mouth once daily.       oxybutynin (DITROPAN-XL) 5 MG TR24 Take 5 mg by mouth once daily.       pantoprazole (PROTONIX) 40 MG tablet Take 1 tablet (40 mg total) by mouth once daily.   Unknown    potassium chloride (KLOR-CON) 10 MEQ TbSR  Take 1 tablet (10 mEq total) by mouth As instructed. Take 2 tabs 1st day and 2nd day then 1 tab qd 34 tablet 3     venlafaxine (EFFEXOR-XR) 75 MG 24 hr capsule Take 1 capsule (75 mg total) by mouth once daily. 30 capsule 1 Unknown       Review of patient's allergies indicates:   Allergen Reactions    Latex      Other reaction(s): Unknown  Other reaction(s): Unknown    Sulfacetamide sodium      Pain perineal area    Sulfasalazine Hives    Adhesive Itching     SKIN GETS RED WITH TAPE AND BANDAIDS    Adhesive tape-silicones Itching     SKIN GETS RED WITH TAPE AND BANDAIDS    Sulfa (sulfonamide antibiotics) Rash       Past Medical History:   Diagnosis Date    Anesthesia complication     BLADDER DYSFUNCTION    Aortic stenosis     Arthritis     Back pain     Diabetes mellitus type II     NO LONGER DIABETIC    Encounter for blood transfusion     Hyperlipidemia     Hypertension     NSTEMI (non-ST elevated myocardial infarction) 05/01/2022    Osteoporosis     Wears glasses      Past Surgical History:   Procedure Laterality Date     vocal cord nodules removed  long time ago     twice    ADRENAL TUMOR      APPENDECTOMY  within last 5yrs    CATHETERIZATION OF BOTH LEFT AND RIGHT HEART Left 05/06/2022    Procedure: CATHETERIZATION, HEART, BOTH LEFT AND RIGHT;  Surgeon: Michelet Vargas MD;  Location: [facility] CATH/EP LAB;  Service: Cardiology;  Laterality: Left;    COLONOSCOPY N/A 6/23/2023    Procedure: COLONOSCOPY;  Surgeon: Joel Neal III, MD;  Location: [facility] ENDO;  Service: Endoscopy;  Laterality: N/A;    FIXATION KYPHOPLASTY THORACIC SPINE      8-20-13    FOOT SURGERY      left 2nd toe was too long     HERNIA REPAIR  within last 5yrs    INSERTION OF TEMPORARY PACEMAKER N/A 1/4/2023    Procedure: INSERTION, PACEMAKER, TEMPORARY;  Surgeon: Bhavesh Peña MD;  Location: [facility] CATH;  Service: Cardiology;  Laterality: N/A;    LEFT HEART CATHETERIZATION Left 05/02/2022    Procedure: Left heart cath;  Surgeon: Jose Alaniz  MD Onesimo;  Location: [facility] CATH/EP LAB;  Service: Cardiology;  Laterality: Left;    SMALL BOWEL ENTEROSCOPY N/A 2023    Procedure: ENTEROSCOPY;  Surgeon: Cornell Aguirre MD;  Location: [facility] ENDO;  Service: Endoscopy;  Laterality: N/A;    SMALL BOWEL ENTEROSCOPY N/A 10/20/2023    Procedure: ENTEROSCOPY;  Surgeon: Otilio Bourgeois MD;  Location: [facility] ENDO;  Service: Endoscopy;  Laterality: N/A;    TONSILLECTOMY, ADENOIDECTOMY  long time ago    TRANSCATHETER AORTIC VALVE REPLACEMENT (TAVR) Bilateral 2023    Procedure: REPLACEMENT, AORTIC VALVE, TRANSCATHETER (TAVR);  Surgeon: Bhavesh Peña MD;  Location: [facility] CATH;  Service: Cardiology;  Laterality: Bilateral;    TRANSCATHETER AORTIC VALVE REPLACEMENT (TAVR) Bilateral 2023    Procedure: REPLACEMENT, AORTIC VALVE, TRANSCATHETER (TAVR);  Surgeon: Dallin Verma MD;  Location: [facility] CATH;  Service: Cardiology;  Laterality: Bilateral;    TRANSTHORACIC ECHOCARDIOGRAPHY (TTE)  2023    Procedure: ECHOCARDIOGRAM, TRANSTHORACIC;  Surgeon: Bhavesh Peña MD;  Location: [facility] CATH;  Service: Cardiology;;     Family History    None       Tobacco Use    Smoking status: Former     Current packs/day: 0.00     Average packs/day: 0.5 packs/day for 35.0 years (17.5 ttl pk-yrs)     Types: Cigarettes     Start date: 10/5/1987     Quit date: 10/5/2022     Years since quittin.1    Smokeless tobacco: Never   Substance and Sexual Activity    Alcohol use: No    Drug use: No    Sexual activity: Not Currently       Objective:     Weight: 92 kg (202 lb 13.2 oz)  Body mass index is 39.61 kg/m².  Vital Signs (Most Recent):  Temp: 98.5 °F (36.9 °C) (24 1102)  Pulse: 93 (24 1102)  Resp: 18 (24 1102)  BP: (!) 144/77 (24 1103)  SpO2: 95 % (24 110) Vital Signs (24h Range):  Temp:  [97.3 °F (36.3 °C)-98.5 °F (36.9 °C)] 98.5 °F (36.9 °C)  Pulse:  [] 93  Resp:  [17-20] 18  SpO2:  [92 %-98 %] 95 %  BP: (104-153)/(54-89) 144/77     Date  "12/05/24 0700 - 12/06/24 0659   Shift 6243-0461 9176-2392 3846-9357 24 Hour Total   INTAKE   P.O. 220   220   Shift Total(mL/kg) 220(2.4)   220(2.4)   OUTPUT   Shift Total(mL/kg)       Weight (kg) 92 92 92 92           Neurosurgery Physical Exam  General:  Moderate distress due to back pain   Neurologic: Alert and oriented. Thought content appropriate.  GCS: E4 V5 M6; Total: 15  Pulmonary: normal respirations, no signs of respiratory distress  Skin: Skin is warm, dry and intact.    Sensory: intact to light touch throughout  Motor Strength: Moves all extremities spontaneously with good tone.    Bilateral upper extremity strength deltoid/biceps/triceps/hand  5/5   Bilateral lower extremity strength iliopsoas/TA/EHL/gastrocnemius 5/5   No abnormal movements seen.            Significant Labs:  Recent Labs   Lab 12/04/24  1513 12/05/24  0345   * 138*   * 137   K 3.8 3.8   CL 99 100   CO2 31* 32*   BUN 13 12   CREATININE 0.6 0.5   CALCIUM 9.3 9.0   MG 1.5* 1.8     Recent Labs   Lab 12/04/24  1513 12/05/24  0345   WBC 9.08 6.27   HGB 14.5 13.3   HCT 46.8 42.2    147*     No results for input(s): "LABPT", "INR", "APTT" in the last 48 hours.  Microbiology Results (last 7 days)       Procedure Component Value Units Date/Time    C Diff Toxin by PCR [[id number]] Collected: 12/05/24 0914    Order Status: No result Updated: 12/05/24 1110    Clostridium difficile EIA [[id number]]  (Abnormal) Collected: 12/05/24 0914    Order Status: Completed Specimen: Stool Updated: 12/05/24 1105     C. diff Antigen Positive     C difficile Toxins A+B, EIA Negative     Comment: Testing not recommended for children <24 months old.       Narrative:      If specimen collected today, this will be a community onset  CDIFF case. If specimen cannot be collected by hospital day  3, discontinue test and follow Diarrhea Decision Tree to  determine if a ROMEO CDIFF order is appropriate. The order will  automatically discontinue if " specimen not collected within  48 hours.  [email or web address]            Assessment/Plan:     * Closed stable burst fracture of third lumbar vertebra  Arcadio Cyr 79-year-old female with past medical history dementia, hypertension, paroxysmal atrial fibrillation managed with Plavix and aspirin, coronary artery disease, type 2 diabetes, GERD, status post transcatheter aortic valve replacement presented to the emergency department on 12/04/2024 reporting diarrhea.  Patient also reported approximately 1 week ago when she sat down she felt intense back pain.  CT abdomen pelvis revealed L3 vertebral body acute to subacute burst fracture.  Neurosurgery consulted for burst fracture.    Neurologically intact.    Pending lumbar MRI.        Thank you for your consult.     VANDANA MADERA PA-C  Neurosurgery  [facility]

## 2024-12-05 NOTE — PT/OT/SLP PROGRESS
Occupational Therapy      Patient Name:  Daryleen G Moran   MRN:  [id number]    Patient not seen today secondary to  (Awaiting Ortho Consult for L3 burst fracture.). Will follow-up tomorrow.    12/5/2024

## 2024-12-05 NOTE — ASSESSMENT & PLAN NOTE
Arcadio Cyr 79-year-old female with past medical history dementia, hypertension, paroxysmal atrial fibrillation managed with Plavix and aspirin, coronary artery disease, type 2 diabetes, GERD, status post transcatheter aortic valve replacement presented to the emergency department on 12/04/2024 reporting diarrhea.  Patient also reported approximately 1 week ago when she sat down she felt intense back pain.  CT abdomen pelvis revealed L3 vertebral body acute to subacute burst fracture.  Neurosurgery consulted for burst fracture.    Neurologically intact.    Pending lumbar MRI.

## 2024-12-05 NOTE — SUBJECTIVE & OBJECTIVE
Past Medical History:   Diagnosis Date    Anesthesia complication     BLADDER DYSFUNCTION    Aortic stenosis     Arthritis     Back pain     Diabetes mellitus type II     NO LONGER DIABETIC    Encounter for blood transfusion     Hyperlipidemia     Hypertension     NSTEMI (non-ST elevated myocardial infarction) 05/01/2022    Osteoporosis     Wears glasses        Past Surgical History:   Procedure Laterality Date     vocal cord nodules removed  long time ago     twice    ADRENAL TUMOR      APPENDECTOMY  within last 5yrs    CATHETERIZATION OF BOTH LEFT AND RIGHT HEART Left 05/06/2022    Procedure: CATHETERIZATION, HEART, BOTH LEFT AND RIGHT;  Surgeon: Michelet Vargas MD;  Location: [facility] CATH/EP LAB;  Service: Cardiology;  Laterality: Left;    COLONOSCOPY N/A 6/23/2023    Procedure: COLONOSCOPY;  Surgeon: Joel Neal III, MD;  Location: [facility] ENDO;  Service: Endoscopy;  Laterality: N/A;    FIXATION KYPHOPLASTY THORACIC SPINE      8-20-13    FOOT SURGERY      left 2nd toe was too long     HERNIA REPAIR  within last 5yrs    INSERTION OF TEMPORARY PACEMAKER N/A 1/4/2023    Procedure: INSERTION, PACEMAKER, TEMPORARY;  Surgeon: Bhavesh Peña MD;  Location: [facility] CATH;  Service: Cardiology;  Laterality: N/A;    LEFT HEART CATHETERIZATION Left 05/02/2022    Procedure: Left heart cath;  Surgeon: Jose Echols MD;  Location: [facility] CATH/EP LAB;  Service: Cardiology;  Laterality: Left;    SMALL BOWEL ENTEROSCOPY N/A 6/22/2023    Procedure: ENTEROSCOPY;  Surgeon: Cornell Aguirre MD;  Location: [facility] ENDO;  Service: Endoscopy;  Laterality: N/A;    SMALL BOWEL ENTEROSCOPY N/A 10/20/2023    Procedure: ENTEROSCOPY;  Surgeon: Otilio Bourgeois MD;  Location: [facility];  Service: Endoscopy;  Laterality: N/A;    TONSILLECTOMY, ADENOIDECTOMY  long time ago    TRANSCATHETER AORTIC VALVE REPLACEMENT (TAVR) Bilateral 1/4/2023    Procedure: REPLACEMENT, AORTIC VALVE, TRANSCATHETER (TAVR);  Surgeon: Bhavesh Peña MD;  [facility];  Service: Cardiology;  Laterality: Bilateral;    TRANSCATHETER AORTIC VALVE REPLACEMENT (TAVR) Bilateral 1/4/2023    Procedure: REPLACEMENT, AORTIC VALVE, TRANSCATHETER (TAVR);  Surgeon: Dallin Verma MD;  Location: [facility] CATH;  Service: Cardiology;  Laterality: Bilateral;    TRANSTHORACIC ECHOCARDIOGRAPHY (TTE)  1/4/2023    Procedure: ECHOCARDIOGRAM, TRANSTHORACIC;  Surgeon: Bhavesh Peña MD;  Location: [facility] CATH;  Service: Cardiology;;       Review of patient's allergies indicates:   Allergen Reactions    Latex      Other reaction(s): Unknown  Other reaction(s): Unknown    Sulfacetamide sodium      Pain perineal area    Sulfasalazine Hives    Adhesive Itching     SKIN GETS RED WITH TAPE AND BANDAIDS    Adhesive tape-silicones Itching     SKIN GETS RED WITH TAPE AND BANDAIDS    Sulfa (sulfonamide antibiotics) Rash       No current facility-administered medications on file prior to encounter.     Current Outpatient Medications on File Prior to Encounter   Medication Sig    albuterol (PROVENTIL/VENTOLIN HFA) 90 mcg/actuation inhaler Inhale 2 puffs into the lungs every 6 (six) hours as needed for Shortness of Breath.    aspirin (ECOTRIN) 81 MG EC tablet Take 81 mg by mouth once daily.    atorvastatin (LIPITOR) 10 MG tablet Take 5 mg by mouth every evening.    butalbital-acetaminophen-caffeine -40 mg (FIORICET, ESGIC) -40 mg per tablet Take 1 tablet by mouth every 4 (four) hours as needed for Pain. Discuss with your primary care provider before taking this medication    calcium carbonate (OS-TK) 500 mg calcium (1,250 mg) tablet Take 1 tablet (500 mg total) by mouth once daily.    carvediloL (COREG) 12.5 MG tablet Take 12.5 mg by mouth 2 (two) times daily.    clopidogreL (PLAVIX) 75 mg tablet Take 1 tablet (75 mg total) by mouth once daily.    diltiaZEM (DILACOR XR) 120 MG CDCR Take 1 capsule (120 mg total) by mouth once daily.    donepeziL (ARICEPT) 5 MG tablet Take 5 mg by mouth nightly.     ergocalciferol (ERGOCALCIFEROL) 50,000 unit Cap TAKE 1 CAPSULE (50,000 UNITS TOTAL) EVERY 7 DAYS. (Patient taking differently: Take 50,000 Units by mouth every 7 days.)    ferrous gluconate 324 mg (37.5 mg iron) Tab tablet Take 1 tablet (324 mg total) by mouth once daily.    fluticasone furoate-vilanteroL (BREO ELLIPTA) 100-25 mcg/dose diskus inhaler Inhale 1 puff into the lungs once daily. Controller    furosemide (LASIX) 40 MG tablet Take 1 tablet (40 mg total) by mouth daily as needed (Take for weight gain > 2 pounds over 24 hours. May discuss with PCP).    glucosamine-chondroitin 500-400 mg tablet Take 1 tablet by mouth once daily.    lisinopriL 10 MG tablet Take 10 mg by mouth once daily.    loperamide (IMODIUM A-D) 2 mg Tab Take 1 tablet (2 mg total) by mouth 3 (three) times daily as needed (diarrhea). Take 2 tablets by mouth initially then 1 tablet after each loose stool as needed for diarrhea.    multivitamin capsule Take 1 capsule by mouth once daily.    oxybutynin (DITROPAN-XL) 5 MG TR24 Take 5 mg by mouth once daily.    pantoprazole (PROTONIX) 40 MG tablet Take 1 tablet (40 mg total) by mouth once daily.    potassium chloride (KLOR-CON) 10 MEQ TbSR Take 1 tablet (10 mEq total) by mouth As instructed. Take 2 tabs 1st day and 2nd day then 1 tab qd    venlafaxine (EFFEXOR-XR) 75 MG 24 hr capsule Take 1 capsule (75 mg total) by mouth once daily.    [DISCONTINUED] ALLERGY RELIEF, FEXOFENADINE, 180 mg tablet Take 180 mg by mouth once daily.    [DISCONTINUED] ezetimibe-simvastatin 10-40 mg (VYTORIN) 10-40 mg per tablet Take 1 tablet by mouth.    [DISCONTINUED] lisinopril-hydrochlorothiazide (PRINZIDE,ZESTORETIC) 20-12.5 mg per tablet Take 1 tablet by mouth once daily.      Family History    None       Tobacco Use    Smoking status: Former     Current packs/day: 0.00     Average packs/day: 0.5 packs/day for 35.0 years (17.5 ttl pk-yrs)     Types: Cigarettes     Start date: 10/5/1987     Quit date: 10/5/2022      Years since quittin.1    Smokeless tobacco: Never   Substance and Sexual Activity    Alcohol use: No    Drug use: No    Sexual activity: Not Currently     Review of Systems   Constitutional:  Negative for chills and fever.   HENT:  Negative for sore throat.    Eyes:  Negative for visual disturbance.   Respiratory:  Negative for cough and shortness of breath.    Cardiovascular:  Negative for chest pain and palpitations.   Gastrointestinal:  Positive for abdominal pain and diarrhea.   Endocrine: Negative for polydipsia and polyuria.   Genitourinary:  Negative for dysuria, frequency and urgency.   Musculoskeletal:  Positive for back pain.   Skin:  Negative for rash.   Neurological:  Negative for syncope.   Psychiatric/Behavioral:  Positive for agitation.      Objective:     Vital Signs (Most Recent):  Temp: 98.5 °F (36.9 °C) (24 1438)  Pulse: 82 (24 1550)  Resp: 18 (24 1438)  BP: 111/70 (24 1550)  SpO2: 95 % (24 1550) Vital Signs (24h Range):  Temp:  [98.5 °F (36.9 °C)] 98.5 °F (36.9 °C)  Pulse:  [82-95] 82  Resp:  [18] 18  SpO2:  [95 %-98 %] 95 %  BP: (111-131)/(70-88) 111/70     Weight: 92.1 kg (203 lb)  Body mass index is 39.65 kg/m².     Physical Exam  Vitals reviewed.   Constitutional:       Appearance: Normal appearance.   HENT:      Head: Normocephalic and atraumatic.      Mouth/Throat:      Mouth: Mucous membranes are dry.   Eyes:      Extraocular Movements: Extraocular movements intact.      Conjunctiva/sclera: Conjunctivae normal.      Pupils: Pupils are equal, round, and reactive to light.   Cardiovascular:      Rate and Rhythm: Normal rate and regular rhythm.   Pulmonary:      Effort: Pulmonary effort is normal.      Breath sounds: Normal breath sounds.   Abdominal:      General: Abdomen is flat. Bowel sounds are normal.      Palpations: Abdomen is soft.   Musculoskeletal:      Cervical back: Normal range of motion and neck supple.      Comments: Severe back pain on minimal  movement, and palpation of her lower back.   Skin:     General: Skin is warm.   Neurological:      Mental Status: She is alert.      Comments: Alert to self, place being hospital, and year being 2024.  Does not know the [place] president name.   Psychiatric:         Mood and Affect: Mood normal.         Behavior: Behavior normal.               Significant Labs: All pertinent labs within the past 24 hours have been reviewed.  Recent Lab Results         12/04/24  1730   12/04/24  1514   12/04/24  1513        Albumin     3.8       ALP     66       ALT     9       Anion Gap     4       Appearance, UA Clear           AST     15       Baso #     0.05       Basophil %     0.6       Bilirubin (UA) Negative           BILIRUBIN TOTAL     0.7  Comment: For infants and newborns, interpretation of results should be based  on gestational age, weight and in agreement with clinical  observations.    Premature Infant recommended reference ranges:  Up to 24 hours.............<8.0 mg/dL  Up to 48 hours............<12.0 mg/dL  3-5 days..................<15.0 mg/dL  6-29 days.................<15.0 mg/dL         BUN     13       Calcium     9.3       Chloride     99       CO2     31       Color, UA Yellow           Creatinine     0.6       Differential Method     Automated       eGFR     >60.0       Eos #     0.1       Eos %     1.2       Glucose     125       Glucose, UA Negative           Gran # (ANC)     6.6       Gran %     72.7       Group & Rh     A POS       Hematocrit     46.8       Hemoglobin     14.5       Immature Grans (Abs)     0.03  Comment: Mild elevation in immature granulocytes is non specific and   can be seen in a variety of conditions including stress response,   acute inflammation, trauma and pregnancy. Correlation with other   laboratory and clinical findings is essential.         Immature Granulocytes     0.3       INDIRECT SHIRA     NEG       Ketones, UA Negative           Leukocyte Esterase, UA Negative            Lipase     19       Lymph #     1.3       Lymph %     14.1       Magnesium      1.5       MCH     28.2       MCHC     31.0       MCV     91       Mono #     1.0       Mono %     11.1       MPV     11.0       NITRITE UA Negative           nRBC     0       Blood, UA Negative           pH, UA 6.0           Platelet Count     173       POC Creatinine   0.7         Potassium     3.8       PROTEIN TOTAL     6.7       Protein, UA Negative  Comment: Recommend a 24 hour urine protein or a urine   protein/creatinine ratio if globulin induced proteinuria is  clinically suspected.             RBC     5.15       RDW     15.1       Sample   VENOUS         Sodium     134       Spec Grav UA >1.030           Specimen Outdate     12/07/2024 23:59       Specimen UA Urine, Clean Catch           UROBILINOGEN UA Negative           WBC     9.08               Significant Imaging: I have reviewed all pertinent imaging results/findings within the past 24 hours.

## 2024-12-05 NOTE — ASSESSMENT & PLAN NOTE
- Neurosurgery has been consulted, they recommended MRI of the lumbar spine without contrast.  - They also recommended a back brace.   has been contacted, and the brace rep will come for measurements.  - Will consult pain management.  Patient takes Norco 7.5 mg q.6 hours with no improvement in her symptoms at home.  - I will order Robaxin 500 mg Co UA 80, gabapentin 300 mg night, and will start oxycodone q.4 hours p.r.n..

## 2024-12-05 NOTE — PLAN OF CARE
DC assessment attempted. Pt currently meeting with palliative care team. CM to try again at different time       12/05/24 7299   Discharge Assessment   Assessment Type Discharge Planning Assessment

## 2024-12-05 NOTE — SUBJECTIVE & OBJECTIVE
Medications Prior to Admission   Medication Sig Dispense Refill Last Dose/Taking    albuterol (PROVENTIL/VENTOLIN HFA) 90 mcg/actuation inhaler Inhale 2 puffs into the lungs every 6 (six) hours as needed for Shortness of Breath.   Unknown    amoxicillin (AMOXIL) 500 MG capsule Take 500 mg by mouth every 8 (eight) hours.   Unknown    aspirin (ECOTRIN) 81 MG EC tablet Take 81 mg by mouth once daily.       atorvastatin (LIPITOR) 10 MG tablet Take 5 mg by mouth every evening.   Unknown    butalbital-acetaminophen-caffeine -40 mg (FIORICET, ESGIC) -40 mg per tablet Take 1 tablet by mouth every 4 (four) hours as needed for Pain. Discuss with your primary care provider before taking this medication       calcium carbonate (OS-TK) 500 mg calcium (1,250 mg) tablet Take 1 tablet (500 mg total) by mouth once daily.  0     carvediloL (COREG) 12.5 MG tablet Take 12.5 mg by mouth 2 (two) times daily.   Unknown    citalopram (CELEXA) 40 MG tablet Take 40 mg by mouth once daily.   Unknown    clopidogreL (PLAVIX) 75 mg tablet Take 1 tablet (75 mg total) by mouth once daily. 90 tablet 2 Unknown    diltiaZEM (DILACOR XR) 120 MG CDCR Take 1 capsule (120 mg total) by mouth once daily. 30 capsule 11 Unknown    donepeziL (ARICEPT) 5 MG tablet Take 5 mg by mouth nightly.   Unknown    ergocalciferol (ERGOCALCIFEROL) 50,000 unit Cap TAKE 1 CAPSULE (50,000 UNITS TOTAL) EVERY 7 DAYS. (Patient taking differently: Take 50,000 Units by mouth every 7 days.) 12 capsule 3     FEROSUL 325 mg (65 mg iron) Tab tablet Take 325 mg by mouth once daily.   Unknown    ferrous gluconate 324 mg (37.5 mg iron) Tab tablet Take 1 tablet (324 mg total) by mouth once daily. 30 tablet 3     fluticasone furoate-vilanteroL (BREO ELLIPTA) 100-25 mcg/dose diskus inhaler Inhale 1 puff into the lungs once daily. Controller       fluticasone propionate (FLONASE) 50 mcg/actuation nasal spray 1 spray by Each Nostril route once daily.       furosemide (LASIX) 40  MG tablet Take 1 tablet (40 mg total) by mouth daily as needed (Take for weight gain > 2 pounds over 24 hours. May discuss with PCP). 30 tablet 11 Unknown    glucosamine-chondroitin 500-400 mg tablet Take 1 tablet by mouth once daily.       HYDROcodone-acetaminophen (NORCO) 7.5-325 mg per tablet Take 1 tablet by mouth every 4 to 6 hours as needed for Pain.   Unknown    lisinopriL 10 MG tablet Take 10 mg by mouth once daily.       loperamide (IMODIUM A-D) 2 mg Tab Take 1 tablet (2 mg total) by mouth 3 (three) times daily as needed (diarrhea). Take 2 tablets by mouth initially then 1 tablet after each loose stool as needed for diarrhea. 15 tablet 0     multivitamin capsule Take 1 capsule by mouth once daily.       oxybutynin (DITROPAN-XL) 5 MG TR24 Take 5 mg by mouth once daily.       pantoprazole (PROTONIX) 40 MG tablet Take 1 tablet (40 mg total) by mouth once daily.   Unknown    potassium chloride (KLOR-CON) 10 MEQ TbSR Take 1 tablet (10 mEq total) by mouth As instructed. Take 2 tabs 1st day and 2nd day then 1 tab qd 34 tablet 3     venlafaxine (EFFEXOR-XR) 75 MG 24 hr capsule Take 1 capsule (75 mg total) by mouth once daily. 30 capsule 1 Unknown       Review of patient's allergies indicates:   Allergen Reactions    Latex      Other reaction(s): Unknown  Other reaction(s): Unknown    Sulfacetamide sodium      Pain perineal area    Sulfasalazine Hives    Adhesive Itching     SKIN GETS RED WITH TAPE AND BANDAIDS    Adhesive tape-silicones Itching     SKIN GETS RED WITH TAPE AND BANDAIDS    Sulfa (sulfonamide antibiotics) Rash       Past Medical History:   Diagnosis Date    Anesthesia complication     BLADDER DYSFUNCTION    Aortic stenosis     Arthritis     Back pain     Diabetes mellitus type II     NO LONGER DIABETIC    Encounter for blood transfusion     Hyperlipidemia     Hypertension     NSTEMI (non-ST elevated myocardial infarction) 05/01/2022    Osteoporosis     Wears glasses      Past Surgical History:    Procedure Laterality Date     vocal cord nodules removed  long time ago     twice    ADRENAL TUMOR      APPENDECTOMY  within last 5yrs    CATHETERIZATION OF BOTH LEFT AND RIGHT HEART Left 05/06/2022    Procedure: CATHETERIZATION, HEART, BOTH LEFT AND RIGHT;  Surgeon: Michelet Vargas MD;  Location: [facility] CATH/EP LAB;  Service: Cardiology;  Laterality: Left;    COLONOSCOPY N/A 6/23/2023    Procedure: COLONOSCOPY;  Surgeon: Joel Neal III, MD;  Location: [facility] ENDO;  Service: Endoscopy;  Laterality: N/A;    FIXATION KYPHOPLASTY THORACIC SPINE      8-20-13    FOOT SURGERY      left 2nd toe was too long     HERNIA REPAIR  within last 5yrs    INSERTION OF TEMPORARY PACEMAKER N/A 1/4/2023    Procedure: INSERTION, PACEMAKER, TEMPORARY;  Surgeon: Bhavesh Peña MD;  Location: [facility] CATH;  Service: Cardiology;  Laterality: N/A;    LEFT HEART CATHETERIZATION Left 05/02/2022    Procedure: Left heart cath;  Surgeon: Jose Echols MD;  Location: [facility] CATH/EP LAB;  Service: Cardiology;  Laterality: Left;    SMALL BOWEL ENTEROSCOPY N/A 6/22/2023    Procedure: ENTEROSCOPY;  Surgeon: Cornell Aguirre MD;  Location: [facility] ENDO;  Service: Endoscopy;  Laterality: N/A;    SMALL BOWEL ENTEROSCOPY N/A 10/20/2023    Procedure: ENTEROSCOPY;  Surgeon: Otilio Bourgeois MD;  Location: [facility] ENDO;  Service: Endoscopy;  Laterality: N/A;    TONSILLECTOMY, ADENOIDECTOMY  long time ago    TRANSCATHETER AORTIC VALVE REPLACEMENT (TAVR) Bilateral 1/4/2023    Procedure: REPLACEMENT, AORTIC VALVE, TRANSCATHETER (TAVR);  Surgeon: Bhavesh Peña MD;  Location: [facility] CATH;  Service: Cardiology;  Laterality: Bilateral;    TRANSCATHETER AORTIC VALVE REPLACEMENT (TAVR) Bilateral 1/4/2023    Procedure: REPLACEMENT, AORTIC VALVE, TRANSCATHETER (TAVR);  Surgeon: Dallin Verma MD;  Location: [facility] CATH;  Service: Cardiology;  Laterality: Bilateral;    TRANSTHORACIC ECHOCARDIOGRAPHY (TTE)  1/4/2023    Procedure: ECHOCARDIOGRAM, TRANSTHORACIC;   Surgeon: Bhavesh Peña MD;  Location: [facility];  Service: Cardiology;;     Family History    None       Tobacco Use    Smoking status: Former     Current packs/day: 0.00     Average packs/day: 0.5 packs/day for 35.0 years (17.5 ttl pk-yrs)     Types: Cigarettes     Start date: 10/5/1987     Quit date: 10/5/2022     Years since quittin.1    Smokeless tobacco: Never   Substance and Sexual Activity    Alcohol use: No    Drug use: No    Sexual activity: Not Currently       Objective:     Weight: 92 kg (202 lb 13.2 oz)  Body mass index is 39.61 kg/m².  Vital Signs (Most Recent):  Temp: 98.5 °F (36.9 °C) (24 1102)  Pulse: 93 (24 1102)  Resp: 18 (24 1102)  BP: (!) 144/77 (24 1103)  SpO2: 95 % (24 1102) Vital Signs (24h Range):  Temp:  [97.3 °F (36.3 °C)-98.5 °F (36.9 °C)] 98.5 °F (36.9 °C)  Pulse:  [] 93  Resp:  [17-20] 18  SpO2:  [92 %-98 %] 95 %  BP: (104-153)/(54-89) 144/77     Date 24 0700 - 24 0659   Shift 5868-0267 2619-9383 3332-6371 24 Hour Total   INTAKE   P.O. 220   220   Shift Total(mL/kg) 220(2.4)   220(2.4)   OUTPUT   Shift Total(mL/kg)       Weight (kg) 92 92 92 92           Neurosurgery Physical Exam  General:  Moderate distress due to back pain   Neurologic: Alert and oriented. Thought content appropriate.  GCS: E4 V5 M6; Total: 15  Pulmonary: normal respirations, no signs of respiratory distress  Skin: Skin is warm, dry and intact.    Sensory: intact to light touch throughout  Motor Strength: Moves all extremities spontaneously with good tone.    Bilateral upper extremity strength deltoid/biceps/triceps/hand  5/5   Bilateral lower extremity strength iliopsoas/TA/EHL/gastrocnemius 5/5   No abnormal movements seen.            Significant Labs:  Recent Labs   Lab 24  1513 24  0345   * 138*   * 137   K 3.8 3.8   CL 99 100   CO2 31* 32*   BUN 13 12   CREATININE 0.6 0.5   CALCIUM 9.3 9.0   MG 1.5* 1.8     Recent Labs   Lab  "12/04/24  1513 12/05/24  0345   WBC 9.08 6.27   HGB 14.5 13.3   HCT 46.8 42.2    147*     No results for input(s): "LABPT", "INR", "APTT" in the last 48 hours.  Microbiology Results (last 7 days)       Procedure Component Value Units Date/Time    C Diff Toxin by PCR [[id number]] Collected: 12/05/24 0914    Order Status: No result Updated: 12/05/24 1110    Clostridium difficile EIA [[id number]]  (Abnormal) Collected: 12/05/24 0914    Order Status: Completed Specimen: Stool Updated: 12/05/24 1105     C. diff Antigen Positive     C difficile Toxins A+B, EIA Negative     Comment: Testing not recommended for children <24 months old.       Narrative:      If specimen collected today, this will be a community onset  CDIFF case. If specimen cannot be collected by hospital day  3, discontinue test and follow Diarrhea Decision Tree to  determine if a [facility] CDIFF order is appropriate. The order will  automatically discontinue if specimen not collected within  48 hours.  [email or web address]            "

## 2024-12-05 NOTE — ASSESSMENT & PLAN NOTE
- Neurosurgery has been consulted, they recommended MRI of the lumbar spine without contrast.  - They also recommended a back brace.   has been contacted, and the brace rep will come for measurements.  - Will consult pain management.  Patient takes Norco 7.5 mg q.6 hours with no improvement in her symptoms at home.  - I will order Robaxin 500 mg, tylenol 1000 mg tid, gabapentin 300 mg night, and will start oxycodone q.4 hours p.r.n..

## 2024-12-05 NOTE — ASSESSMENT & PLAN NOTE
Patient's blood pressure range in the last 24 hours was: BP  Min: 104/54  Max: 131/88.  Hold coreg and diltiazem given bp is on the low side. Primary team to resume when appropriate.

## 2024-12-05 NOTE — HPI
Arcadio Cyr 79-year-old female with past medical history dementia, hypertension, paroxysmal atrial fibrillation managed with Plavix and aspirin, coronary artery disease, type 2 diabetes, GERD, status post transcatheter aortic valve replacement presented to the emergency department on 12/04/2024 reporting diarrhea.  Patient also reported approximately 1 week ago when she sat down she felt intense back pain.  CT abdomen pelvis revealed L3 vertebral body acute to subacute burst fracture.  Neurosurgery consulted for burst fracture.    On initiation of encounter, patient was found asleep but awoken when prompted.  No family was present at time of encounter.  Patient reports significant low back pain that is exacerbated with movement.  She denies upper or lower extremity radicular pain.  She denies extremity weakness or paresthesias.

## 2024-12-05 NOTE — H&P
"  [facility] - Emergency Dept  Hospital Medicine  History & Physical    Patient Name: Daryleen G Moran  MRN: [id number]  Patient Class: OP- Observation  Admission Date: 12/4/2024  Attending Physician: Krista Ham MD  Primary Care Provider: Abhi De Oliveira IV, MD         Patient information was obtained from patient and ER records.     Subjective:     Principal Problem:Closed stable burst fracture of third lumbar vertebra    Chief Complaint:   Chief Complaint   Patient presents with    Diarrhea     Family reports dark colored diarrhea x "weeks" worse over the last 3 days        HPI: 78 yo female with PMH of dementia with agitation, type 2 diabetes, diastolic heart failure, depression and paranoid who was brought to the ER because of severe back pain, and diarrhea. A week ago, patient experienced severe lower acute back pain while trying to get out of bed. Her back pain is severe, 8/10 on pain scale and does not radiate.  Patient can barely move, and has been in bed since then. Patient has also been on antibiotics for 10 days for dental infection.  Three days ago, she started to have perfused watery diarrhea consistent of dark colored stool. She also has diffuse abdominal tenderness. Because of her symptoms, she came to the ER for evaluation.    In the ER, vitals showed bp of 131/88, heart rate 95, respiratory rate of 18, afebrile satting 98% on room air.  CBC with no leukocytosis.  CMP - sodium of 134, bicarb 31.  Mag is 1.5.  CT abdomen pelvis showed mild diffuse infectious/inflammatory proctocolitis, and Acute to subacute burst fracture of L3 with moderate height loss, new since prior. At least mild central canal stenosis at L3-4 secondary to retropulsion of the posteroinferior endplate into the spinal canal.  Morphine and Zosyn has been ordered.  Patient will be admitted to Medicine for further care.    Past Medical History:   Diagnosis Date    Anesthesia complication     BLADDER DYSFUNCTION    " Aortic stenosis     Arthritis     Back pain     Diabetes mellitus type II     NO LONGER DIABETIC    Encounter for blood transfusion     Hyperlipidemia     Hypertension     NSTEMI (non-ST elevated myocardial infarction) 05/01/2022    Osteoporosis     Wears glasses        Past Surgical History:   Procedure Laterality Date     vocal cord nodules removed  long time ago     twice    ADRENAL TUMOR      APPENDECTOMY  within last 5yrs    CATHETERIZATION OF BOTH LEFT AND RIGHT HEART Left 05/06/2022    Procedure: CATHETERIZATION, HEART, BOTH LEFT AND RIGHT;  Surgeon: Michelet Vargas MD;  Location: [facility] CATH/EP LAB;  Service: Cardiology;  Laterality: Left;    COLONOSCOPY N/A 6/23/2023    Procedure: COLONOSCOPY;  Surgeon: Joel Neal III, MD;  Location: [facility] ENDO;  Service: Endoscopy;  Laterality: N/A;    FIXATION KYPHOPLASTY THORACIC SPINE      8-20-13    FOOT SURGERY      left 2nd toe was too long     HERNIA REPAIR  within last 5yrs    INSERTION OF TEMPORARY PACEMAKER N/A 1/4/2023    Procedure: INSERTION, PACEMAKER, TEMPORARY;  Surgeon: Bhavesh Peña MD;  Location: [facility] CATH;  Service: Cardiology;  Laterality: N/A;    LEFT HEART CATHETERIZATION Left 05/02/2022    Procedure: Left heart cath;  Surgeon: Jose Echols MD;  Location: [facility] CATH/EP LAB;  Service: Cardiology;  Laterality: Left;    SMALL BOWEL ENTEROSCOPY N/A 6/22/2023    Procedure: ENTEROSCOPY;  Surgeon: Cornell Aguirre MD;  Location: [facility] ENDO;  Service: Endoscopy;  Laterality: N/A;    SMALL BOWEL ENTEROSCOPY N/A 10/20/2023    Procedure: ENTEROSCOPY;  Surgeon: Otilio Bourgeois MD;  Location: [facility] ENDO;  Service: Endoscopy;  Laterality: N/A;    TONSILLECTOMY, ADENOIDECTOMY  long time ago    TRANSCATHETER AORTIC VALVE REPLACEMENT (TAVR) Bilateral 1/4/2023    Procedure: REPLACEMENT, AORTIC VALVE, TRANSCATHETER (TAVR);  Surgeon: Bhavesh Peña MD;  Location: [facility] CATH;  Service: Cardiology;  Laterality: Bilateral;    TRANSCATHETER AORTIC VALVE  REPLACEMENT (TAVR) Bilateral 1/4/2023    Procedure: REPLACEMENT, AORTIC VALVE, TRANSCATHETER (TAVR);  Surgeon: Dallin Verma MD;  Location: [facility] CATH;  Service: Cardiology;  Laterality: Bilateral;    TRANSTHORACIC ECHOCARDIOGRAPHY (TTE)  1/4/2023    Procedure: ECHOCARDIOGRAM, TRANSTHORACIC;  Surgeon: Bhavesh Peña MD;  Location: [facility] CATH;  Service: Cardiology;;       Review of patient's allergies indicates:   Allergen Reactions    Latex      Other reaction(s): Unknown  Other reaction(s): Unknown    Sulfacetamide sodium      Pain perineal area    Sulfasalazine Hives    Adhesive Itching     SKIN GETS RED WITH TAPE AND BANDAIDS    Adhesive tape-silicones Itching     SKIN GETS RED WITH TAPE AND BANDAIDS    Sulfa (sulfonamide antibiotics) Rash       No current facility-administered medications on file prior to encounter.     Current Outpatient Medications on File Prior to Encounter   Medication Sig    albuterol (PROVENTIL/VENTOLIN HFA) 90 mcg/actuation inhaler Inhale 2 puffs into the lungs every 6 (six) hours as needed for Shortness of Breath.    aspirin (ECOTRIN) 81 MG EC tablet Take 81 mg by mouth once daily.    atorvastatin (LIPITOR) 10 MG tablet Take 5 mg by mouth every evening.    butalbital-acetaminophen-caffeine -40 mg (FIORICET, ESGIC) -40 mg per tablet Take 1 tablet by mouth every 4 (four) hours as needed for Pain. Discuss with your primary care provider before taking this medication    calcium carbonate (OS-TK) 500 mg calcium (1,250 mg) tablet Take 1 tablet (500 mg total) by mouth once daily.    carvediloL (COREG) 12.5 MG tablet Take 12.5 mg by mouth 2 (two) times daily.    clopidogreL (PLAVIX) 75 mg tablet Take 1 tablet (75 mg total) by mouth once daily.    diltiaZEM (DILACOR XR) 120 MG CDCR Take 1 capsule (120 mg total) by mouth once daily.    donepeziL (ARICEPT) 5 MG tablet Take 5 mg by mouth nightly.    ergocalciferol (ERGOCALCIFEROL) 50,000 unit Cap TAKE 1 CAPSULE (50,000 UNITS TOTAL)  EVERY 7 DAYS. (Patient taking differently: Take 50,000 Units by mouth every 7 days.)    ferrous gluconate 324 mg (37.5 mg iron) Tab tablet Take 1 tablet (324 mg total) by mouth once daily.    fluticasone furoate-vilanteroL (BREO ELLIPTA) 100-25 mcg/dose diskus inhaler Inhale 1 puff into the lungs once daily. Controller    furosemide (LASIX) 40 MG tablet Take 1 tablet (40 mg total) by mouth daily as needed (Take for weight gain > 2 pounds over 24 hours. May discuss with PCP).    glucosamine-chondroitin 500-400 mg tablet Take 1 tablet by mouth once daily.    lisinopriL 10 MG tablet Take 10 mg by mouth once daily.    loperamide (IMODIUM A-D) 2 mg Tab Take 1 tablet (2 mg total) by mouth 3 (three) times daily as needed (diarrhea). Take 2 tablets by mouth initially then 1 tablet after each loose stool as needed for diarrhea.    multivitamin capsule Take 1 capsule by mouth once daily.    oxybutynin (DITROPAN-XL) 5 MG TR24 Take 5 mg by mouth once daily.    pantoprazole (PROTONIX) 40 MG tablet Take 1 tablet (40 mg total) by mouth once daily.    potassium chloride (KLOR-CON) 10 MEQ TbSR Take 1 tablet (10 mEq total) by mouth As instructed. Take 2 tabs 1st day and 2nd day then 1 tab qd    venlafaxine (EFFEXOR-XR) 75 MG 24 hr capsule Take 1 capsule (75 mg total) by mouth once daily.    [DISCONTINUED] ALLERGY RELIEF, FEXOFENADINE, 180 mg tablet Take 180 mg by mouth once daily.    [DISCONTINUED] ezetimibe-simvastatin 10-40 mg (VYTORIN) 10-40 mg per tablet Take 1 tablet by mouth.    [DISCONTINUED] lisinopril-hydrochlorothiazide (PRINZIDE,ZESTORETIC) 20-12.5 mg per tablet Take 1 tablet by mouth once daily.      Family History    None       Tobacco Use    Smoking status: Former     Current packs/day: 0.00     Average packs/day: 0.5 packs/day for 35.0 years (17.5 ttl pk-yrs)     Types: Cigarettes     Start date: 10/5/1987     Quit date: 10/5/2022     Years since quittin.1    Smokeless tobacco: Never   Substance and Sexual Activity     Alcohol use: No    Drug use: No    Sexual activity: Not Currently     Review of Systems   Constitutional:  Negative for chills and fever.   HENT:  Negative for sore throat.    Eyes:  Negative for visual disturbance.   Respiratory:  Negative for cough and shortness of breath.    Cardiovascular:  Negative for chest pain and palpitations.   Gastrointestinal:  Positive for abdominal pain and diarrhea.   Endocrine: Negative for polydipsia and polyuria.   Genitourinary:  Negative for dysuria, frequency and urgency.   Musculoskeletal:  Positive for back pain.   Skin:  Negative for rash.   Neurological:  Negative for syncope.   Psychiatric/Behavioral:  Positive for agitation.      Objective:     Vital Signs (Most Recent):  Temp: 98.5 °F (36.9 °C) (12/04/24 1438)  Pulse: 82 (12/04/24 1550)  Resp: 18 (12/04/24 1438)  BP: 111/70 (12/04/24 1550)  SpO2: 95 % (12/04/24 1550) Vital Signs (24h Range):  Temp:  [98.5 °F (36.9 °C)] 98.5 °F (36.9 °C)  Pulse:  [82-95] 82  Resp:  [18] 18  SpO2:  [95 %-98 %] 95 %  BP: (111-131)/(70-88) 111/70     Weight: 92.1 kg (203 lb)  Body mass index is 39.65 kg/m².     Physical Exam  Vitals reviewed.   Constitutional:       Appearance: Normal appearance.   HENT:      Head: Normocephalic and atraumatic.      Mouth/Throat:      Mouth: Mucous membranes are dry.   Eyes:      Extraocular Movements: Extraocular movements intact.      Conjunctiva/sclera: Conjunctivae normal.      Pupils: Pupils are equal, round, and reactive to light.   Cardiovascular:      Rate and Rhythm: Normal rate and regular rhythm.   Pulmonary:      Effort: Pulmonary effort is normal.      Breath sounds: Normal breath sounds.   Abdominal:      General: Abdomen is flat. Bowel sounds are normal.      Palpations: Abdomen is soft.   Musculoskeletal:      Cervical back: Normal range of motion and neck supple.      Comments: Severe back pain on minimal movement, and palpation of her lower back.   Skin:     General: Skin is warm.    Neurological:      Mental Status: She is alert.      Comments: Alert to self, place being hospital, and year being 2024.  Does not know the [place] president name.   Psychiatric:         Mood and Affect: Mood normal.         Behavior: Behavior normal.               Significant Labs: All pertinent labs within the past 24 hours have been reviewed.  Recent Lab Results         12/04/24  1730   12/04/24  1514   12/04/24  1513        Albumin     3.8       ALP     66       ALT     9       Anion Gap     4       Appearance, UA Clear           AST     15       Baso #     0.05       Basophil %     0.6       Bilirubin (UA) Negative           BILIRUBIN TOTAL     0.7  Comment: For infants and newborns, interpretation of results should be based  on gestational age, weight and in agreement with clinical  observations.    Premature Infant recommended reference ranges:  Up to 24 hours.............<8.0 mg/dL  Up to 48 hours............<12.0 mg/dL  3-5 days..................<15.0 mg/dL  6-29 days.................<15.0 mg/dL         BUN     13       Calcium     9.3       Chloride     99       CO2     31       Color, UA Yellow           Creatinine     0.6       Differential Method     Automated       eGFR     >60.0       Eos #     0.1       Eos %     1.2       Glucose     125       Glucose, UA Negative           Gran # (ANC)     6.6       Gran %     72.7       Group & Rh     A POS       Hematocrit     46.8       Hemoglobin     14.5       Immature Grans (Abs)     0.03  Comment: Mild elevation in immature granulocytes is non specific and   can be seen in a variety of conditions including stress response,   acute inflammation, trauma and pregnancy. Correlation with other   laboratory and clinical findings is essential.         Immature Granulocytes     0.3       INDIRECT SHIRA     NEG       Ketones, UA Negative           Leukocyte Esterase, UA Negative           Lipase     19       Lymph #     1.3       Lymph %     14.1       Magnesium       1.5       MCH     28.2       MCHC     31.0       MCV     91       Mono #     1.0       Mono %     11.1       MPV     11.0       NITRITE UA Negative           nRBC     0       Blood, UA Negative           pH, UA 6.0           Platelet Count     173       POC Creatinine   0.7         Potassium     3.8       PROTEIN TOTAL     6.7       Protein, UA Negative  Comment: Recommend a 24 hour urine protein or a urine   protein/creatinine ratio if globulin induced proteinuria is  clinically suspected.             RBC     5.15       RDW     15.1       Sample   VENOUS         Sodium     134       Spec Grav UA >1.030           Specimen Outdate     12/07/2024 23:59       Specimen UA Urine, Clean Catch           UROBILINOGEN UA Negative           WBC     9.08               Significant Imaging: I have reviewed all pertinent imaging results/findings within the past 24 hours.  Assessment/Plan:     * Closed stable burst fracture of third lumbar vertebra  - Neurosurgery has been consulted, they recommended MRI of the lumbar spine without contrast.  - They also recommended a back brace.   has been contacted, and the brace rep will come for measurements.  - Will consult pain management.  Patient takes Norco 7.5 mg q.6 hours with no improvement in her symptoms at home.  - I will order Robaxin 500 mg, tylenol 1000 mg tid, gabapentin 300 mg night, and will start oxycodone q.4 hours p.r.n..        Colitis  Rule out C diff in the setting of her colitis and diarrhea.  Patient has been taking amoxicillin for 10 days secondary to dental infection.  IV fluids for hydration.  Hold further antibiotic until C diff has been ruled to prevent worsening symptoms.      Hypertension  Patient's blood pressure range in the last 24 hours was: BP  Min: 104/54  Max: 131/88.  Hold coreg and diltiazem given bp is on the low side. Primary team to resume when appropriate.      CAD (coronary artery disease)  Resume aspirin and plavix.     Paroxysmal  atrial fibrillation  Resume Aspirin.  Hold coreg and diltiazem given bp is on the low side. Primary team to resume when appropriate.          VTE Risk Mitigation (From admission, onward)           Ordered     enoxaparin injection 40 mg  Daily        Note to Pharmacy: Ht: 5' (1.524 m)  Wt: 92.1 kg (203 lb)  Estimated Creatinine Clearance: 78.2 mL/min (based on SCr of 0.6 mg/dL).  Body mass index is 39.65 kg/m².(Almost)    12/04/24 1905     IP VTE HIGH RISK PATIENT  Once         12/04/24 1905     Place sequential compression device  Until discontinued         12/04/24 1905                       On 12/04/2024, patient should be placed in hospital observation services under my care.             Krista Ham MD  Department of Hospital Medicine  [facility] - Emergency Dept

## 2024-12-06 VITALS
DIASTOLIC BLOOD PRESSURE: 90 MMHG | RESPIRATION RATE: 16 BRPM | OXYGEN SATURATION: 94 % | TEMPERATURE: 98 F | HEIGHT: 60 IN | SYSTOLIC BLOOD PRESSURE: 142 MMHG | WEIGHT: 202.81 LBS | BODY MASS INDEX: 39.82 KG/M2 | HEART RATE: 109 BPM

## 2024-12-06 PROBLEM — K52.9 COLITIS: Status: RESOLVED | Noted: 2024-12-04 | Resolved: 2024-12-06

## 2024-12-06 PROBLEM — R52 ENCOUNTER FOR PAIN MANAGEMENT: Status: ACTIVE | Noted: 2024-12-06

## 2024-12-06 LAB
ALBUMIN SERPL BCP-MCNC: 3.6 G/DL (ref 3.5–5.2)
ALP SERPL-CCNC: 61 U/L (ref 55–135)
ALT SERPL W/O P-5'-P-CCNC: 6 U/L (ref 10–44)
ANION GAP SERPL CALC-SCNC: 6 MMOL/L (ref 8–16)
AST SERPL-CCNC: 12 U/L (ref 10–40)
BASOPHILS # BLD AUTO: 0.06 K/UL (ref 0–0.2)
BASOPHILS NFR BLD: 1.1 % (ref 0–1.9)
BILIRUB SERPL-MCNC: 0.5 MG/DL (ref 0.1–1)
BUN SERPL-MCNC: 13 MG/DL (ref 8–23)
C DIFF TOX GENS STL QL NAA+PROBE: POSITIVE
CALCIUM SERPL-MCNC: 9.3 MG/DL (ref 8.7–10.5)
CHLORIDE SERPL-SCNC: 101 MMOL/L (ref 95–110)
CO2 SERPL-SCNC: 33 MMOL/L (ref 23–29)
CREAT SERPL-MCNC: 0.5 MG/DL (ref 0.5–1.4)
DIFFERENTIAL METHOD BLD: ABNORMAL
EOSINOPHIL # BLD AUTO: 0.2 K/UL (ref 0–0.5)
EOSINOPHIL NFR BLD: 3 % (ref 0–8)
ERYTHROCYTE [DISTWIDTH] IN BLOOD BY AUTOMATED COUNT: 15 % (ref 11.5–14.5)
EST. GFR  (NO RACE VARIABLE): >60 ML/MIN/1.73 M^2
GLUCOSE SERPL-MCNC: 108 MG/DL (ref 70–110)
HCT VFR BLD AUTO: 44.1 % (ref 37–48.5)
HGB BLD-MCNC: 13.9 G/DL (ref 12–16)
IMM GRANULOCYTES # BLD AUTO: 0 K/UL (ref 0–0.04)
IMM GRANULOCYTES NFR BLD AUTO: 0 % (ref 0–0.5)
LYMPHOCYTES # BLD AUTO: 1.6 K/UL (ref 1–4.8)
LYMPHOCYTES NFR BLD: 27.2 % (ref 18–48)
MAGNESIUM SERPL-MCNC: 1.8 MG/DL (ref 1.6–2.6)
MCH RBC QN AUTO: 29 PG (ref 27–31)
MCHC RBC AUTO-ENTMCNC: 31.5 G/DL (ref 32–36)
MCV RBC AUTO: 92 FL (ref 82–98)
MONOCYTES # BLD AUTO: 0.8 K/UL (ref 0.3–1)
MONOCYTES NFR BLD: 14.6 % (ref 4–15)
NEUTROPHILS # BLD AUTO: 3.1 K/UL (ref 1.8–7.7)
NEUTROPHILS NFR BLD: 54.1 % (ref 38–73)
NRBC BLD-RTO: 0 /100 WBC
PLATELET # BLD AUTO: 162 K/UL (ref 150–450)
PMV BLD AUTO: 11.1 FL (ref 9.2–12.9)
POTASSIUM SERPL-SCNC: 4.4 MMOL/L (ref 3.5–5.1)
PROT SERPL-MCNC: 6.3 G/DL (ref 6–8.4)
RBC # BLD AUTO: 4.79 M/UL (ref 4–5.4)
SODIUM SERPL-SCNC: 140 MMOL/L (ref 136–145)
WBC # BLD AUTO: 5.69 K/UL (ref 3.9–12.7)

## 2024-12-06 PROCEDURE — 80053 COMPREHEN METABOLIC PANEL: CPT | Performed by: HOSPITALIST

## 2024-12-06 PROCEDURE — 85025 COMPLETE CBC W/AUTO DIFF WBC: CPT | Performed by: HOSPITALIST

## 2024-12-06 PROCEDURE — 83735 ASSAY OF MAGNESIUM: CPT | Performed by: HOSPITALIST

## 2024-12-06 PROCEDURE — 36415 COLL VENOUS BLD VENIPUNCTURE: CPT | Performed by: HOSPITALIST

## 2024-12-06 PROCEDURE — 25000003 PHARM REV CODE 250: Performed by: HOSPITALIST

## 2024-12-06 PROCEDURE — 99499 UNLISTED E&M SERVICE: CPT | Mod: ,,, | Performed by: HEALTH CARE PROVIDER

## 2024-12-06 PROCEDURE — 99232 SBSQ HOSP IP/OBS MODERATE 35: CPT | Mod: FS,,, | Performed by: NEUROLOGICAL SURGERY

## 2024-12-06 RX ORDER — L. ACIDOPHILUS/L.BULGARICUS 100MM CELL
1 GRANULES IN PACKET (EA) ORAL 2 TIMES DAILY
Qty: 60 PACKET | Refills: 0 | Status: SHIPPED | OUTPATIENT
Start: 2024-12-06

## 2024-12-06 RX ADMIN — OXYCODONE HYDROCHLORIDE 5 MG: 5 TABLET ORAL at 01:12

## 2024-12-06 RX ADMIN — CITALOPRAM HYDROBROMIDE 40 MG: 20 TABLET ORAL at 08:12

## 2024-12-06 RX ADMIN — ASPIRIN 81 MG: 81 TABLET, COATED ORAL at 08:12

## 2024-12-06 RX ADMIN — METHOCARBAMOL TABLETS 500 MG: 500 TABLET, COATED ORAL at 01:12

## 2024-12-06 RX ADMIN — VENLAFAXINE HYDROCHLORIDE 75 MG: 37.5 CAPSULE, EXTENDED RELEASE ORAL at 08:12

## 2024-12-06 RX ADMIN — PANTOPRAZOLE SODIUM 40 MG: 40 TABLET, DELAYED RELEASE ORAL at 07:12

## 2024-12-06 RX ADMIN — METHOCARBAMOL TABLETS 500 MG: 500 TABLET, COATED ORAL at 08:12

## 2024-12-06 RX ADMIN — ACETAMINOPHEN 1000 MG: 500 TABLET, FILM COATED ORAL at 08:12

## 2024-12-06 RX ADMIN — CLOPIDOGREL BISULFATE 75 MG: 75 TABLET, FILM COATED ORAL at 08:12

## 2024-12-06 NOTE — PROGRESS NOTES
[facility]  Neurosurgery  Progress Note    Subjective:     History of Present Illness: Arcadio Cyr 79-year-old female with past medical history dementia, hypertension, paroxysmal atrial fibrillation managed with Plavix and aspirin, coronary artery disease, type 2 diabetes, GERD, status post transcatheter aortic valve replacement presented to the emergency department on 12/04/2024 reporting diarrhea.  Patient also reported approximately 1 week ago when she sat down she felt intense back pain.  CT abdomen pelvis revealed L3 vertebral body acute to subacute burst fracture.  Neurosurgery consulted for burst fracture.    On initiation of encounter, patient was found asleep but awoken when prompted.  No family was present at time of encounter.  Patient reports significant low back pain that is exacerbated with movement.  She denies upper or lower extremity radicular pain.  She denies extremity weakness or paresthesias.    Post-Op Info:  * No surgery found *       Interval History: 12/6:  No acute events overnight.  Afebrile.  Blood pressure 137/72.  Pulse 82.  Respirations 18.  WBC 5.69.  Hemoglobin 13.9.  Hematocrit 44.1.  Platelets 162.  Sodium 140.  Potassium 4.4.  Creatinine 0.5.  Patient found in bed, awake and alert with nursing staff at bedside.  Patient continues to report low back pain however denies extremity radicular pain.  She also denies new extremity weakness or paresthesias.    Medications:  Continuous Infusions:   0.9% NaCl   Intravenous Continuous 75 mL/hr at 12/05/24 0029 New Bag at 12/05/24 0029     Scheduled Meds:   acetaminophen  1,000 mg Oral TID    aspirin  81 mg Oral Daily    atorvastatin  5 mg Oral QHS    citalopram  40 mg Oral Daily    clopidogreL  75 mg Oral Daily    donepeziL  5 mg Oral Nightly    enoxparin  40 mg Subcutaneous Daily    gabapentin  300 mg Oral QHS    lactobacillus acidophilus & bulgar  1 packet Oral BID    methocarbamoL  500 mg Oral QID    pantoprazole  40 mg  Oral Daily    venlafaxine  75 mg Oral Daily     PRN Meds:  Current Facility-Administered Medications:     aluminum-magnesium hydroxide-simethicone, 30 mL, Oral, QID PRN    dextrose 50%, 12.5 g, Intravenous, PRN    dextrose 50%, 25 g, Intravenous, PRN    glucagon (human recombinant), 1 mg, Intramuscular, PRN    glucose, 16 g, Oral, PRN    glucose, 24 g, Oral, PRN    magnesium oxide, 800 mg, Oral, PRN    magnesium oxide, 800 mg, Oral, PRN    melatonin, 6 mg, Oral, Nightly PRN    morphine, 2 mg, Intravenous, Q4H PRN    naloxone, 0.02 mg, Intravenous, PRN    ondansetron, 4 mg, Intravenous, Q6H PRN    oxyCODONE, 5 mg, Oral, Q6H PRN    potassium bicarbonate, 35 mEq, Oral, PRN    potassium bicarbonate, 50 mEq, Oral, PRN    potassium bicarbonate, 60 mEq, Oral, PRN    potassium, sodium phosphates, 2 packet, Oral, PRN    potassium, sodium phosphates, 2 packet, Oral, PRN    potassium, sodium phosphates, 2 packet, Oral, PRN    senna-docusate 8.6-50 mg, 1 tablet, Oral, BID PRN    sodium chloride 0.9%, 2 mL, Intravenous, Q12H PRN       Objective:     Weight: 92 kg (202 lb 13.2 oz)  Body mass index is 39.61 kg/m².  Vital Signs (Most Recent):  Temp: 97.4 °F (36.3 °C) (12/06/24 0710)  Pulse: 82 (12/06/24 0710)  Resp: 18 (12/06/24 0710)  BP: 137/72 (12/06/24 0710)  SpO2: (!) 92 % (12/06/24 0710) Vital Signs (24h Range):  Temp:  [97.4 °F (36.3 °C)-98.5 °F (36.9 °C)] 97.4 °F (36.3 °C)  Pulse:  [] 82  Resp:  [17-18] 18  SpO2:  [92 %-96 %] 92 %  BP: (135-145)/(66-83) 137/72           Neurosurgery Physical Exam  General:  No acute distress.  Neurologic: Alert and oriented. Thought content appropriate.  GCS: E4 V5 M6; Total: 15  Pulmonary: normal respirations, no signs of respiratory distress     Sensory: intact to light touch throughout  Motor Strength: Moves all extremities spontaneously.    Bilateral upper extremity strength deltoid/biceps/triceps/hand  5/5   Bilateral lower extremity strength iliopsoas/TA/EHL/gastrocnemius  "5/5   No abnormal movements seen.     Significant Labs:  Recent Labs   Lab 12/04/24  1513 12/05/24  0345 12/06/24  0423   * 138* 108   * 137 140   K 3.8 3.8 4.4   CL 99 100 101   CO2 31* 32* 33*   BUN 13 12 13   CREATININE 0.6 0.5 0.5   CALCIUM 9.3 9.0 9.3   MG 1.5* 1.8 1.8     Recent Labs   Lab 12/04/24  1513 12/05/24  0345 12/06/24  0423   WBC 9.08 6.27 5.69   HGB 14.5 13.3 13.9   HCT 46.8 42.2 44.1    147* 162     No results for input(s): "LABPT", "INR", "APTT" in the last 48 hours.  Microbiology Results (last 7 days)       Procedure Component Value Units Date/Time    C Diff Toxin by PCR [[id number]] Collected: 12/05/24 0914    Order Status: No result Updated: 12/05/24 1110    Clostridium difficile EIA [[id number]]  (Abnormal) Collected: 12/05/24 0914    Order Status: Completed Specimen: Stool Updated: 12/05/24 1105     C. diff Antigen Positive     C difficile Toxins A+B, EIA Negative     Comment: Testing not recommended for children <24 months old.       Narrative:      If specimen collected today, this will be a community onset  CDIFF case. If specimen cannot be collected by hospital day  3, discontinue test and follow Diarrhea Decision Tree to  determine if a ROMEO CDIFF order is appropriate. The order will  automatically discontinue if specimen not collected within  48 hours.  [email or web address]            Assessment/Plan:     * Closed stable burst fracture of third lumbar vertebra  Arcadio Cyr 79-year-old female with past medical history dementia, hypertension, paroxysmal atrial fibrillation managed with Plavix and aspirin, coronary artery disease, type 2 diabetes, GERD, status post transcatheter aortic valve replacement presented to the emergency department on 12/04/2024 reporting diarrhea.  Patient also reported approximately 1 week ago when she sat down she felt intense back pain.  CT abdomen pelvis revealed L3 vertebral " body acute to subacute burst fracture.  Neurosurgery consulted for burst fracture.    Neurologically intact.    Lumbar MRI revealed L3 and L5 subacute compression fracture. Imaging also revealed chronic T10, and T12 compression s/p vertebroplasty and chronic L2 and L4 compression fractures.There is no significant central spinal canal narrowing and patient is neurologically intact.  Patient's primary concern is lumbar pain.    No acute neurosurgical intervention warranted.    Recommend  -LSO brace when out of bed and when ambulating  -Ibuprofen 600 mg t.i.d. if not contraindicated  -Outpatient pain management referral for L3 and L5 vertebroplasty consideration  -PCP or outpatient endocrinology referral for DEXA scan and osteoporosis management given history of multilevel compression fractures.  -Please contact us for any focal neuro changes.    We will sign off at this time.           VANDANA MADERA PA-C  Neurosurgery  [facility]

## 2024-12-06 NOTE — PLAN OF CARE
Spoke with patient at bedside concerning Home Health placement, to which patient verbalizes understanding and agreement.  Patient choice form signed and scanned into [employer].  Referral sent to the following facilities via [employer] :    [facility]   [phone]     12/06/24 1412   Post-Acute Status   Post-Acute Authorization Home Health   Patient choice form signed by patient/caregiver List with quality metrics by geographic area provided;List from System Post-Acute Care   Discharge Delays None known at this time   Discharge Plan   Discharge Plan A Home Health   Discharge Plan B Home Health

## 2024-12-06 NOTE — PLAN OF CARE
[facility] home health accepted with start of care of 12/7/24 12/06/24 1528   Post-Acute Status   Post-Acute Authorization Home Health   Home Health Status Set-up Complete/Auth obtained

## 2024-12-06 NOTE — PLAN OF CARE
Problem: Infection  Goal: Absence of Infection Signs and Symptoms  Outcome: Progressing     Problem: Adult Inpatient Plan of Care  Goal: Plan of Care Review  Outcome: Progressing  Goal: Patient-Specific Goal (Individualized)  Outcome: Progressing  Goal: Absence of Hospital-Acquired Illness or Injury  Outcome: Progressing  Goal: Optimal Comfort and Wellbeing  Outcome: Progressing  Goal: Readiness for Transition of Care  Outcome: Progressing     Problem: Coping Ineffective  Goal: Effective Coping  Outcome: Progressing     Problem: Diabetes Comorbidity  Goal: Blood Glucose Level Within Targeted Range  Outcome: Progressing     Problem: Wound  Goal: Optimal Coping  Outcome: Progressing  Goal: Optimal Functional Ability  Outcome: Progressing  Goal: Absence of Infection Signs and Symptoms  Outcome: Progressing  Goal: Improved Oral Intake  Outcome: Progressing  Goal: Optimal Pain Control and Function  Outcome: Progressing  Goal: Skin Health and Integrity  Outcome: Progressing  Goal: Optimal Wound Healing  Outcome: Progressing     Problem: Skin Injury Risk Increased  Goal: Skin Health and Integrity  Outcome: Progressing

## 2024-12-06 NOTE — NURSING
Discharge instructions given to patient. Patient verbalizes understanding. PIV removed, fully intact. All questions encouraged and answered. Left floor safely via wheelchair with brace on, with all belongings.

## 2024-12-06 NOTE — ASSESSMENT & PLAN NOTE
Patient's blood pressure range in the last 24 hours was: BP  Min: 135/83  Max: 142/90.  Resume home regimen

## 2024-12-06 NOTE — PLAN OF CARE
12/05/24 2029   Patient Assessment/Suction   Level of Consciousness (AVPU) alert   PRE-TX-O2   Device (Oxygen Therapy) room air   SpO2 96 %   Pulse Oximetry Type Intermittent   $ Pulse Oximetry - Multiple Charge Pulse Oximetry - Multiple   Pulse 75   Resp 18

## 2024-12-06 NOTE — PLAN OF CARE
Patient cleared for discharge by case management to home with home health.       12/06/24 1528   Final Note   Assessment Type Final Discharge Note   Anticipated Discharge Disposition Home-Health   Hospital Resources/Appts/Education Provided Appointments scheduled and added to AVS   Post-Acute Status   Post-Acute Authorization Home Health   Home Health Status Set-up Complete/Auth obtained

## 2024-12-06 NOTE — HOSPITAL COURSE
12/6:  No acute events overnight.  Afebrile.  Blood pressure 137/72.  Pulse 82.  Respirations 18.  WBC 5.69.  Hemoglobin 13.9.  Hematocrit 44.1.  Platelets 162.  Sodium 140.  Potassium 4.4.  Creatinine 0.5.  Patient found in bed, awake and alert with nursing staff at bedside.  Patient continues to report low back pain however denies extremity radicular pain.  She also denies new extremity weakness or paresthesias.

## 2024-12-06 NOTE — PT/OT/SLP PROGRESS
Occupational Therapy      Patient Name:  Daryleen G Moran   MRN:  [id number]    Patient not seen today secondary to Patient unwilling to participate. Will follow-up tomorrow.    12/6/2024

## 2024-12-06 NOTE — DISCHARGE INSTRUCTIONS
Follow up with primary care provider and Neurosurgery as indicated in her discharge paperwork.  Please continue to wear the brace.  He will need to obtain a bone density scan with your primary care provider.  Alternatively, they can refer you to endocrinology for more advanced osteoporosis management.  Please avoid ibuprofen due to the fact that you were also on clopidogrel and aspirin which also affect platelet function.  You may take Tylenol up to 1 g at a time 4 times daily.  Please do not exceed a total 4 g in 1 day.

## 2024-12-06 NOTE — SUBJECTIVE & OBJECTIVE
Interval History: 12/6:  No acute events overnight.  Afebrile.  Blood pressure 137/72.  Pulse 82.  Respirations 18.  WBC 5.69.  Hemoglobin 13.9.  Hematocrit 44.1.  Platelets 162.  Sodium 140.  Potassium 4.4.  Creatinine 0.5.  Patient found in bed, awake and alert with nursing staff at bedside.  Patient continues to report low back pain however denies extremity radicular pain.  She also denies new extremity weakness or paresthesias.    Medications:  Continuous Infusions:   0.9% NaCl   Intravenous Continuous 75 mL/hr at 12/05/24 0029 New Bag at 12/05/24 0029     Scheduled Meds:   acetaminophen  1,000 mg Oral TID    aspirin  81 mg Oral Daily    atorvastatin  5 mg Oral QHS    citalopram  40 mg Oral Daily    clopidogreL  75 mg Oral Daily    donepeziL  5 mg Oral Nightly    enoxparin  40 mg Subcutaneous Daily    gabapentin  300 mg Oral QHS    lactobacillus acidophilus & bulgar  1 packet Oral BID    methocarbamoL  500 mg Oral QID    pantoprazole  40 mg Oral Daily    venlafaxine  75 mg Oral Daily     PRN Meds:  Current Facility-Administered Medications:     aluminum-magnesium hydroxide-simethicone, 30 mL, Oral, QID PRN    dextrose 50%, 12.5 g, Intravenous, PRN    dextrose 50%, 25 g, Intravenous, PRN    glucagon (human recombinant), 1 mg, Intramuscular, PRN    glucose, 16 g, Oral, PRN    glucose, 24 g, Oral, PRN    magnesium oxide, 800 mg, Oral, PRN    magnesium oxide, 800 mg, Oral, PRN    melatonin, 6 mg, Oral, Nightly PRN    morphine, 2 mg, Intravenous, Q4H PRN    naloxone, 0.02 mg, Intravenous, PRN    ondansetron, 4 mg, Intravenous, Q6H PRN    oxyCODONE, 5 mg, Oral, Q6H PRN    potassium bicarbonate, 35 mEq, Oral, PRN    potassium bicarbonate, 50 mEq, Oral, PRN    potassium bicarbonate, 60 mEq, Oral, PRN    potassium, sodium phosphates, 2 packet, Oral, PRN    potassium, sodium phosphates, 2 packet, Oral, PRN    potassium, sodium phosphates, 2 packet, Oral, PRN    senna-docusate 8.6-50 mg, 1 tablet, Oral, BID PRN    sodium  "chloride 0.9%, 2 mL, Intravenous, Q12H PRN       Objective:     Weight: 92 kg (202 lb 13.2 oz)  Body mass index is 39.61 kg/m².  Vital Signs (Most Recent):  Temp: 97.4 °F (36.3 °C) (12/06/24 0710)  Pulse: 82 (12/06/24 0710)  Resp: 18 (12/06/24 0710)  BP: 137/72 (12/06/24 0710)  SpO2: (!) 92 % (12/06/24 0710) Vital Signs (24h Range):  Temp:  [97.4 °F (36.3 °C)-98.5 °F (36.9 °C)] 97.4 °F (36.3 °C)  Pulse:  [] 82  Resp:  [17-18] 18  SpO2:  [92 %-96 %] 92 %  BP: (135-145)/(66-83) 137/72           Neurosurgery Physical Exam  General:  No acute distress.  Neurologic: Alert and oriented. Thought content appropriate.  GCS: E4 V5 M6; Total: 15  Pulmonary: normal respirations, no signs of respiratory distress     Sensory: intact to light touch throughout  Motor Strength: Moves all extremities spontaneously.    Bilateral upper extremity strength deltoid/biceps/triceps/hand  5/5   Bilateral lower extremity strength iliopsoas/TA/EHL/gastrocnemius 5/5   No abnormal movements seen.     Significant Labs:  Recent Labs   Lab 12/04/24  1513 12/05/24  0345 12/06/24  0423   * 138* 108   * 137 140   K 3.8 3.8 4.4   CL 99 100 101   CO2 31* 32* 33*   BUN 13 12 13   CREATININE 0.6 0.5 0.5   CALCIUM 9.3 9.0 9.3   MG 1.5* 1.8 1.8     Recent Labs   Lab 12/04/24  1513 12/05/24  0345 12/06/24  0423   WBC 9.08 6.27 5.69   HGB 14.5 13.3 13.9   HCT 46.8 42.2 44.1    147* 162     No results for input(s): "LABPT", "INR", "APTT" in the last 48 hours.  Microbiology Results (last 7 days)       Procedure Component Value Units Date/Time    C Diff Toxin by PCR [[id number]] Collected: 12/05/24 0914    Order Status: No result Updated: 12/05/24 1110    Clostridium difficile EIA [[id number]]  (Abnormal) Collected: 12/05/24 0914    Order Status: Completed Specimen: Stool Updated: 12/05/24 1105     C. diff Antigen Positive     C difficile Toxins A+B, EIA Negative     Comment: Testing not recommended for children <24 months old.    "    Narrative:      If specimen collected today, this will be a community onset  CDIFF case. If specimen cannot be collected by hospital day  3, discontinue test and follow Diarrhea Decision Tree to  determine if a ROMEO CDIFF order is appropriate. The order will  automatically discontinue if specimen not collected within  48 hours.  [email or web address]

## 2024-12-06 NOTE — ASSESSMENT & PLAN NOTE
Arcadio Cyr 79-year-old female with past medical history dementia, hypertension, paroxysmal atrial fibrillation managed with Plavix and aspirin, coronary artery disease, type 2 diabetes, GERD, status post transcatheter aortic valve replacement presented to the emergency department on 12/04/2024 reporting diarrhea.  Patient also reported approximately 1 week ago when she sat down she felt intense back pain.  CT abdomen pelvis revealed L3 vertebral body acute to subacute burst fracture.  Neurosurgery consulted for burst fracture.    Neurologically intact.    Lumbar MRI revealed L3 and L5 subacute compression fracture. Imaging also revealed chronic T10, and T12 compression s/p vertebroplasty and chronic L2 and L4 compression fractures.There is no significant central spinal canal narrowing and patient is neurologically intact.  Patient's primary concern is lumbar pain.    No acute neurosurgical intervention warranted.    Recommend  -LSO brace when out of bed and when ambulating  -Ibuprofen 600 mg t.i.d. if not contraindicated  -Outpatient pain management referral for L3 and L5 vertebroplasty consideration  -PCP or outpatient endocrinology referral for DEXA scan and osteoporosis management given history of multilevel compression fractures.  -Please contact us for any focal neuro changes.    We will sign off at this time.

## 2024-12-06 NOTE — DISCHARGE SUMMARY
Former[facility], later [facility]  Discharge Summary      Patient Name: Daryleen G Moran  MRN: [id number]  LUIZ: [id number]  Patient Class: IP- Inpatient  Admission Date: 12/4/2024  Hospital Length of Stay: 1 days  Discharge Date and Time:  12/06/2024 1:29 PM  Attending Physician: Neelam Holder MD   Discharging Provider: Neelam Holder MD  Primary Care Provider: Abhi De Oliveira IV, MD    Primary Care Team: Networked reference to record PCT     HPI:   80 yo female with PMH of dementia with agitation, type 2 diabetes, diastolic heart failure, depression and paranoid who was brought to the ER because of severe back pain, and diarrhea. A week ago, patient experienced severe lower acute back pain while trying to get out of bed. Her back pain is severe, 8/10 on pain scale and does not radiate.  Patient can barely move, and has been in bed since then. Patient has also been on antibiotics for 10 days for dental infection.  Three days ago, she started to have perfused watery diarrhea consistent of dark colored stool. She also has diffuse abdominal tenderness. Because of her symptoms, she came to the ER for evaluation.    In the ER, vitals showed bp of 131/88, heart rate 95, respiratory rate of 18, afebrile satting 98% on room air.  CBC with no leukocytosis.  CMP - sodium of 134, bicarb 31.  Mag is 1.5.  CT abdomen pelvis showed mild diffuse infectious/inflammatory proctocolitis, and Acute to subacute burst fracture of L3 with moderate height loss, new since prior. At least mild central canal stenosis at L3-4 secondary to retropulsion of the posteroinferior endplate into the spinal canal.  Morphine and Zosyn has been ordered.  Patient will be admitted to Medicine for further care.    * No surgery found *      Hospital Course:   80 yo female with PMH of dementia with agitation, type 2 diabetes, diastolic heart failure, depression and paranoid who was brought to the ER because of severe back pain, and  diarrhea. A week ago, patient experienced severe lower acute back pain while trying to get out of bed.CT abdomen pelvis showed mild diffuse infectious/inflammatory proctocolitis, and Acute to subacute burst fracture of L3 with moderate height loss, new since prior. At least mild central canal stenosis at L3-4 secondary to retropulsion of the posteroinferior endplate into the spinal canal.  Neurosurgery was consulted and did not feel any acute intervention was necessary at this time.  She was continued on TSL brace.  Unfortunately, due to patient being on dual antiplatelet therapy she is not a candidate for ibuprofen for management.  The appears as though she takes Norco at home.  She can continue taking this as well as up to 1 g Tylenol 4 times daily.  Neurosurgery outpatient follow up was recommended and this information was included in her discharge paperwork.  She can be evaluated for candidacy for vertebroplasty at that time.  Patient declined any physical therapy during her stay and declined any placement for skilled nursing facility should this be recommended or appropriate.  At the time of this decision patient was alert, oriented to person place time and situation and was deemed to be decisional.  She was noted to be stable for discharge home without any additional diarrhea.  She does sent for lactobacilli packets to her pharmacy.  She may follow up with the primary care provider.     Goals of Care Treatment Preferences:  Code Status: Full Code      SDOH Screening:  The patient was screened for food insecurity, housing instability, transportation needs, utility difficulties, and interpersonal safety. The social determinant(s) of health identified as a concern this admission are:  Utility difficulties    The plan to address these concerns is: outpatient social work consult    Social Drivers of Health with Concerns     Utilities: At Risk (12/5/2024)        Consults:   Consults (From admission, onward)           Status Ordering Provider     Inpatient consult to Social Work/Case Management  Once        Provider:  (Not yet assigned)    Acknowledged TARYN RAMIREZ     Inpatient consult to Midline team  Once        Provider:  (Not yet assigned)    Completed TARYN RAMIREZ     Inpatient consult to Palliative Care  Once        Provider:  (Not yet assigned)    Acknowledged OLEG GALDAMEZ     Inpatient consult to Neurosurgery  Once        Provider:  Stephan Staples DO    Acknowledged MILTON MOE            * Closed stable burst fracture of third lumbar vertebra  Continue home Norco, may take 1 g Tylenol 4 times daily at home, outpatient follow up with Neurosurgery, continue brace, we will arrange for home health        CAD (coronary artery disease)  Resume aspirin and plavix.     Paroxysmal atrial fibrillation  Resume home regimen      Hypertension  Patient's blood pressure range in the last 24 hours was: BP  Min: 135/83  Max: 142/90.  Resume home regimen      Final Active Diagnoses:    Diagnosis Date Noted POA    PRINCIPAL PROBLEM:  Closed stable burst fracture of third lumbar vertebra [S32.031A] 12/04/2024 Yes    CAD (coronary artery disease) [I25.10] 12/19/2022 Yes    Paroxysmal atrial fibrillation [I48.0] 05/02/2022 Yes    Hypertension [I10] 09/13/2016 Yes      Problems Resolved During this Admission:    Diagnosis Date Noted Date Resolved POA    Colitis [K52.9] 12/04/2024 12/06/2024 Yes       Discharged Condition: stable    Disposition: Home-Health Care [facility]    Follow Up:   Follow-up Information       Abhi De Oliveira IV, MD. Schedule an appointment as soon as possible for a visit in 1 week(s).    Specialty: Family Medicine  Why: obtain DEXA  Contact information:  [address]   [place]  [place] [address]  [phone]               Stephan Staples DO. Schedule an appointment as soon as possible for a visit in 2 week(s).    Specialty: Neurosurgery  Contact information:  [address] [place] [address] [address]  [place]  [id number]  [phone]                           Patient Instructions:      Ambulatory referral/consult to Neurosurgery   Standing Status: Future   Referral Priority: Routine Referral Type: Consultation   Referral Reason: Specialty Services Required   Requested Specialty: Neurosurgery   Number of Visits Requested: 1     Activity as tolerated       Significant Diagnostic Studies: Labs: BMP:   Recent Labs   Lab 12/04/24  1513 12/05/24  0345 12/06/24  0423   * 138* 108   * 137 140   K 3.8 3.8 4.4   CL 99 100 101   CO2 31* 32* 33*   BUN 13 12 13   CREATININE 0.6 0.5 0.5   CALCIUM 9.3 9.0 9.3   MG 1.5* 1.8 1.8    and CBC   Recent Labs   Lab 12/04/24  1513 12/05/24  0345 12/06/24  0423   WBC 9.08 6.27 5.69   HGB 14.5 13.3 13.9   HCT 46.8 42.2 44.1    147* 162       Pending Diagnostic Studies:       None           Medications:  Reconciled Home Medications:      Medication List        START taking these medications      lactobacillus acidophilus & bulgar 100 million cell packet  Commonly known as: LACTINEX  Take 1 packet (1 each total) by mouth 2 (two) times daily.            CHANGE how you take these medications      ergocalciferol 50,000 unit Cap  Commonly known as: ERGOCALCIFEROL  TAKE 1 CAPSULE (50,000 UNITS TOTAL) EVERY 7 DAYS.  What changed: See the new instructions.            CONTINUE taking these medications      albuterol 90 mcg/actuation inhaler  Commonly known as: PROVENTIL/VENTOLIN HFA  Inhale 2 puffs into the lungs every 6 (six) hours as needed for Shortness of Breath.     aspirin 81 MG EC tablet  Commonly known as: ECOTRIN  Take 81 mg by mouth once daily.     atorvastatin 10 MG tablet  Commonly known as: LIPITOR  Take 5 mg by mouth every evening.     BREO ELLIPTA 100-25 mcg/dose diskus inhaler  Generic drug: fluticasone furoate-vilanteroL  Inhale 1 puff into the lungs once daily. Controller     calcium carbonate 500 mg calcium (1,250 mg) tablet  Commonly known as: OS-TK  Take 1 tablet (500  mg total) by mouth once daily.     carvediloL 12.5 MG tablet  Commonly known as: COREG  Take 12.5 mg by mouth 2 (two) times daily.     citalopram 40 MG tablet  Commonly known as: CeleXA  Take 40 mg by mouth once daily.     clopidogreL 75 mg tablet  Commonly known as: PLAVIX  Take 1 tablet (75 mg total) by mouth once daily.     diltiaZEM 120 MG Cdcr  Commonly known as: DILACOR XR  Take 1 capsule (120 mg total) by mouth once daily.     donepeziL 5 MG tablet  Commonly known as: ARICEPT  Take 5 mg by mouth nightly.     FeroSuL 325 mg (65 mg iron) Tab tablet  Generic drug: ferrous sulfate  Take 325 mg by mouth once daily.     fluticasone propionate 50 mcg/actuation nasal spray  Commonly known as: FLONASE  1 spray by Each Nostril route once daily.     furosemide 40 MG tablet  Commonly known as: LASIX  Take 1 tablet (40 mg total) by mouth daily as needed (Take for weight gain > 2 pounds over 24 hours. May discuss with PCP).     glucosamine-chondroitin 500-400 mg tablet  Take 1 tablet by mouth once daily.     HYDROcodone-acetaminophen 7.5-325 mg per tablet  Commonly known as: NORCO  Take 1 tablet by mouth every 4 to 6 hours as needed for Pain.     loperamide 2 mg Tab  Commonly known as: IMODIUM A-D  Take 1 tablet (2 mg total) by mouth 3 (three) times daily as needed (diarrhea). Take 2 tablets by mouth initially then 1 tablet after each loose stool as needed for diarrhea.     multivitamin capsule  Take 1 capsule by mouth once daily.     pantoprazole 40 MG tablet  Commonly known as: PROTONIX  Take 1 tablet (40 mg total) by mouth once daily.     venlafaxine 75 MG 24 hr capsule  Commonly known as: EFFEXOR-XR  Take 1 capsule (75 mg total) by mouth once daily.            STOP taking these medications      amoxicillin 500 MG capsule  Commonly known as: AMOXIL     butalbital-acetaminophen-caffeine -40 mg -40 mg per tablet  Commonly known as: FIORICET, ESGIC     ferrous gluconate 324 mg (37.5 mg iron) Tab tablet      lisinopriL 10 MG tablet     oxybutynin 5 MG Tr24  Commonly known as: DITROPAN-XL     potassium chloride 10 MEQ Tbsr  Commonly known as: KLOR-CON              Indwelling Lines/Drains at time of discharge:   Lines/Drains/Airways       None                   Time spent on the discharge of patient: 35 minutes         Neelam Holder MD  Department of Hospital Medicine  [facility]

## 2024-12-06 NOTE — ASSESSMENT & PLAN NOTE
Continue home Norco, may take 1 g Tylenol 4 times daily at home, outpatient follow up with Neurosurgery, continue brace, we will arrange for home health

## 2024-12-06 NOTE — PT/OT/SLP PROGRESS
Physical Therapy      Patient Name:  Daryleen G Moran   MRN:  [id number]    Patient not seen today secondary to Patient unwilling to participate. Will follow-up 12/7/24.

## 2024-12-06 NOTE — HOSPITAL COURSE
78 yo female with PMH of dementia with agitation, type 2 diabetes, diastolic heart failure, depression and paranoid who was brought to the ER because of severe back pain, and diarrhea. A week ago, patient experienced severe lower acute back pain while trying to get out of bed.CT abdomen pelvis showed mild diffuse infectious/inflammatory proctocolitis, and Acute to subacute burst fracture of L3 with moderate height loss, new since prior. At least mild central canal stenosis at L3-4 secondary to retropulsion of the posteroinferior endplate into the spinal canal.  Neurosurgery was consulted and did not feel any acute intervention was necessary at this time.  She was continued on TSL brace.  Unfortunately, due to patient being on dual antiplatelet therapy she is not a candidate for ibuprofen for management.  The appears as though she takes Norco at home.  She can continue taking this as well as up to 1 g Tylenol 4 times daily.  Neurosurgery outpatient follow up was recommended and this information was included in her discharge paperwork.  She can be evaluated for candidacy for vertebroplasty at that time.  Patient declined any physical therapy during her stay and declined any placement for skilled nursing facility should this be recommended or appropriate.  At the time of this decision patient was alert, oriented to person place time and situation and was deemed to be decisional.  She was noted to be stable for discharge home without any additional diarrhea.  She does sent for lactobacilli packets to her pharmacy.  She may follow up with the primary care provider.

## 2024-12-06 NOTE — PLAN OF CARE
Spoke with Sally Reardon (Daughter)- [phone], regarding discharge IMM, Understands statement and IMM will be scanned to chart.

## 2024-12-07 NOTE — ASSESSMENT & PLAN NOTE
Pt experiencing acute pain to lower back secondary to acute fractures    Neurosurgery consulted and MRI pending    Pt has chronic pain back pain and pelvic pain secondary to previous fractures. She is on norco 7.5mg at home. She states norco not helping with pain.    She received dose of IV morphine prior to my visit and reports pain has significantly improved.     Recommend to cont with IV morphine for now. Will need to await MRI and further neurosurgery recs.     Will cont to monitor and assist with pain control. Please reach out if I can be of any assistance.

## 2024-12-07 NOTE — HPI
From admission HPI and Hospital Course:   80 yo female with PMH of dementia with agitation, type 2 diabetes, diastolic heart failure, depression and paranoid who was brought to the ER because of severe back pain, and diarrhea. A week ago, patient experienced severe lower acute back pain while trying to get out of bed.CT abdomen pelvis showed mild diffuse infectious/inflammatory proctocolitis, and Acute to subacute burst fracture of L3 with moderate height loss, new since prior. At least mild central canal stenosis at L3-4 secondary to retropulsion of the posteroinferior endplate into the spinal canal.     Palliative medicine consult:  Pt currently admitted and being tx for proctocolitis and Acute to subacute burst fracture of L3. Neurosurgery eval pt and MRI is pending. We have been consulted for pain management.

## 2024-12-07 NOTE — CONSULTS
[facility]  Palliative Medicine  Consult Note    Patient Name: Daryleen G Moran  MRN: [id number]  Admission Date: 12/4/2024  Hospital Length of Stay: 1 days  Code Status: Prior   Attending Provider: No att. providers found  Consulting Provider: Beba Kelly NP  Primary Care Physician: Abhi De Oliveira IV, MD  Principal Problem:Closed stable burst fracture of third lumbar vertebra    Patient information was obtained from patient, relative(s), past medical records, ER records, and primary team.      Consults  Assessment/Plan:     Other  Encounter for pain management  Pt experiencing acute pain to lower back secondary to acute fractures    Neurosurgery consulted and MRI pending    Pt has chronic pain back pain and pelvic pain secondary to previous fractures. She is on norco 7.5mg at home. She states norco not helping with pain.    She received dose of IV morphine prior to my visit and reports pain has significantly improved.     Recommend to cont with IV morphine for now. Will need to await MRI and further neurosurgery recs.     Will cont to monitor and assist with pain control. Please reach out if I can be of any assistance.         Thank you for your consult.       Subjective:     HPI:   From admission HPI and Hospital Course:   78 yo female with PMH of dementia with agitation, type 2 diabetes, diastolic heart failure, depression and paranoid who was brought to the ER because of severe back pain, and diarrhea. A week ago, patient experienced severe lower acute back pain while trying to get out of bed.CT abdomen pelvis showed mild diffuse infectious/inflammatory proctocolitis, and Acute to subacute burst fracture of L3 with moderate height loss, new since prior. At least mild central canal stenosis at L3-4 secondary to retropulsion of the posteroinferior endplate into the spinal canal.     Palliative medicine consult:  Pt currently admitted and being tx for proctocolitis and Acute to subacute burst  fracture of L3. Neurosurgery eval pt and MRI is pending. We have been consulted for pain management.    Hospital Course:  No notes on file    Interval History: Pt seen today for pain management.     Past Medical History:   Diagnosis Date    Anesthesia complication     BLADDER DYSFUNCTION    Aortic stenosis     Arthritis     Back pain     Diabetes mellitus type II     NO LONGER DIABETIC    Encounter for blood transfusion     Hyperlipidemia     Hypertension     NSTEMI (non-ST elevated myocardial infarction) 05/01/2022    Osteoporosis     Wears glasses        Past Surgical History:   Procedure Laterality Date     vocal cord nodules removed  long time ago     twice    ADRENAL TUMOR      APPENDECTOMY  within last 5yrs    CATHETERIZATION OF BOTH LEFT AND RIGHT HEART Left 05/06/2022    Procedure: CATHETERIZATION, HEART, BOTH LEFT AND RIGHT;  Surgeon: Michelet Vargas MD;  Location: [facility] CATH/EP LAB;  Service: Cardiology;  Laterality: Left;    COLONOSCOPY N/A 6/23/2023    Procedure: COLONOSCOPY;  Surgeon: Joel Neal III, MD;  Location: [facility] ENDO;  Service: Endoscopy;  Laterality: N/A;    FIXATION KYPHOPLASTY THORACIC SPINE      8-20-13    FOOT SURGERY      left 2nd toe was too long     HERNIA REPAIR  within last 5yrs    INSERTION OF TEMPORARY PACEMAKER N/A 1/4/2023    Procedure: INSERTION, PACEMAKER, TEMPORARY;  Surgeon: Bhavesh Peña MD;  Location: [facility] CATH;  Service: Cardiology;  Laterality: N/A;    LEFT HEART CATHETERIZATION Left 05/02/2022    Procedure: Left heart cath;  Surgeon: Jose Echols MD;  Location: [facility] CATH/EP LAB;  Service: Cardiology;  Laterality: Left;    SMALL BOWEL ENTEROSCOPY N/A 6/22/2023    Procedure: ENTEROSCOPY;  Surgeon: Cornell Aguirre MD;  Location: [facility] ENDO;  Service: Endoscopy;  Laterality: N/A;    SMALL BOWEL ENTEROSCOPY N/A 10/20/2023    Procedure: ENTEROSCOPY;  Surgeon: Otilio Bourgeois MD;  Location: [facility] ENDO;  Service: Endoscopy;  Laterality: N/A;    TONSILLECTOMY,  ADENOIDECTOMY  long time ago    TRANSCATHETER AORTIC VALVE REPLACEMENT (TAVR) Bilateral 1/4/2023    Procedure: REPLACEMENT, AORTIC VALVE, TRANSCATHETER (TAVR);  Surgeon: Bhavesh Peña MD;  Location: [facility] CATH;  Service: Cardiology;  Laterality: Bilateral;    TRANSCATHETER AORTIC VALVE REPLACEMENT (TAVR) Bilateral 1/4/2023    Procedure: REPLACEMENT, AORTIC VALVE, TRANSCATHETER (TAVR);  Surgeon: Dallin Verma MD;  [facility]ATH;  Service: Cardiology;  Laterality: Bilateral;    TRANSTHORACIC ECHOCARDIOGRAPHY (TTE)  1/4/2023    Procedure: ECHOCARDIOGRAM, TRANSTHORACIC;  Surgeon: Bhavesh Peña MD;  [facility] CATH;  Service: Cardiology;;       Review of patient's allergies indicates:   Allergen Reactions    Latex      Other reaction(s): Unknown  Other reaction(s): Unknown    Sulfacetamide sodium      Pain perineal area    Sulfasalazine Hives    Adhesive Itching     SKIN GETS RED WITH TAPE AND BANDAIDS    Adhesive tape-silicones Itching     SKIN GETS RED WITH TAPE AND BANDAIDS    Sulfa (sulfonamide antibiotics) Rash       Medications:  Continuous Infusions:   0.9% NaCl   Intravenous Continuous   Stopped at 12/06/24 1145     Scheduled Meds:   acetaminophen  1,000 mg Oral TID    aspirin  81 mg Oral Daily    atorvastatin  5 mg Oral QHS    citalopram  40 mg Oral Daily    clopidogreL  75 mg Oral Daily    donepeziL  5 mg Oral Nightly    enoxparin  40 mg Subcutaneous Daily    gabapentin  300 mg Oral QHS    lactobacillus acidophilus & bulgar  1 packet Oral BID    methocarbamoL  500 mg Oral QID    pantoprazole  40 mg Oral Daily    venlafaxine  75 mg Oral Daily     PRN Meds:  Current Facility-Administered Medications:     aluminum-magnesium hydroxide-simethicone, 30 mL, Oral, QID PRN    dextrose 50%, 12.5 g, Intravenous, PRN    dextrose 50%, 25 g, Intravenous, PRN    glucagon (human recombinant), 1 mg, Intramuscular, PRN    glucose, 16 g, Oral, PRN    glucose, 24 g, Oral, PRN    magnesium oxide, 800 mg, Oral, PRN     magnesium oxide, 800 mg, Oral, PRN    melatonin, 6 mg, Oral, Nightly PRN    morphine, 2 mg, Intravenous, Q4H PRN    naloxone, 0.02 mg, Intravenous, PRN    ondansetron, 4 mg, Intravenous, Q6H PRN    oxyCODONE, 5 mg, Oral, Q6H PRN    potassium bicarbonate, 35 mEq, Oral, PRN    potassium bicarbonate, 50 mEq, Oral, PRN    potassium bicarbonate, 60 mEq, Oral, PRN    potassium, sodium phosphates, 2 packet, Oral, PRN    potassium, sodium phosphates, 2 packet, Oral, PRN    potassium, sodium phosphates, 2 packet, Oral, PRN    senna-docusate 8.6-50 mg, 1 tablet, Oral, BID PRN    sodium chloride 0.9%, 2 mL, Intravenous, Q12H PRN    Family History    None       Tobacco Use    Smoking status: Former     Current packs/day: 0.00     Average packs/day: 0.5 packs/day for 35.0 years (17.5 ttl pk-yrs)     Types: Cigarettes     Start date: 10/5/1987     Quit date: 10/5/2022     Years since quittin.1    Smokeless tobacco: Never   Substance and Sexual Activity    Alcohol use: No    Drug use: No    Sexual activity: Not Currently       Review of Systems   Constitutional:  Positive for activity change.   Respiratory:  Negative for shortness of breath.    Cardiovascular:  Negative for chest pain.   Gastrointestinal:  Positive for diarrhea. Negative for abdominal pain.   Genitourinary:  Negative for difficulty urinating.   Musculoskeletal:  Positive for arthralgias and back pain. Negative for gait problem.   Neurological:  Negative for weakness.   All other systems reviewed and are negative.    Objective:     Vital Signs (Most Recent):  Temp: 97.9 °F (36.6 °C) (24 1123)  Pulse: 109 (24 1123)  Resp: 16 (24 1347)  BP: (!) 142/90 (24 1123)  SpO2: (!) 94 % (24 1123) Vital Signs (24h Range):  Temp:  [97.4 °F (36.3 °C)-98.1 °F (36.7 °C)] 97.9 °F (36.6 °C)  Pulse:  [] 109  Resp:  [16-18] 16  SpO2:  [92 %-96 %] 94 %  BP: (136-142)/(72-90) 142/90     Weight: 92 kg (202 lb 13.2 oz)  Body mass index is 39.61  kg/m².       Physical Exam  Vitals and nursing note reviewed.   Constitutional:       General: She is not in acute distress.     Appearance: Normal appearance. She is not ill-appearing.   HENT:      Mouth/Throat:      Mouth: Mucous membranes are moist.      Pharynx: Oropharynx is clear.   Eyes:      General: No scleral icterus.     Pupils: Pupils are equal, round, and reactive to light.   Cardiovascular:      Rate and Rhythm: Normal rate and regular rhythm.      Pulses: Normal pulses.   Pulmonary:      Effort: Pulmonary effort is normal. No respiratory distress.   Abdominal:      General: There is no distension.      Palpations: Abdomen is soft.   Musculoskeletal:         General: No swelling or tenderness. Normal range of motion.   Skin:     General: Skin is warm and dry.   Neurological:      General: No focal deficit present.      Mental Status: She is alert and oriented to person, place, and time.      Sensory: No sensory deficit.      Motor: No weakness.      Coordination: Coordination normal.   Psychiatric:         Mood and Affect: Mood normal.         Behavior: Behavior normal.         Thought Content: Thought content normal.         Judgment: Judgment normal.        Review of Symptoms      Symptom Assessment (ESAS 0-10 Scale)  Pain:  0  Dyspnea:  0  Anxiety:  0  Nausea:  0  Depression:  0  Anorexia:  0  Fatigue:  0  Insomnia:  0  Restlessness:  0  Agitation:  0         Performance Status:  50    Living Arrangements:  Lives with family    Psychosocial/Cultural:   See Palliative Psychosocial Note: No  **Primary  to Follow**  Palliative Care  Consult: No    Advance Care Planning   Advance Care Planning       Significant Labs: All pertinent labs within the past 24 hours have been reviewed.  CBC:   Recent Labs   Lab 12/06/24  0423   WBC 5.69   HGB 13.9   HCT 44.1   MCV 92        BMP:  Recent Labs   Lab 12/06/24  0423         K 4.4      CO2 33*   BUN 13    CREATININE 0.5   CALCIUM 9.3   MG 1.8     LFT:  Lab Results   Component Value Date    AST 12 12/06/2024    ALKPHOS 61 12/06/2024    BILITOT 0.5 12/06/2024     Albumin:   Albumin   Date Value Ref Range Status   12/06/2024 3.6 3.5 - 5.2 g/dL Final     Protein:   Total Protein   Date Value Ref Range Status   12/06/2024 6.3 6.0 - 8.4 g/dL Final     Lactic acid:   Lab Results   Component Value Date    LACTATE 1.0 12/19/2022    LACTATE 1.1 09/24/2016       Significant Imaging: I have reviewed all pertinent imaging results/findings within the past 24 hours.      I spent a total of 75 minutes on the day of the visit. This includes face to face time in  symptom assessment, coordination of care and emotional support.  This also includes non-face to face time preparing to see the patient (eg, review of tests/imaging), obtaining and/or reviewing separately obtained history, documenting clinical information in the electronic or other health record, independently interpreting results and communicating results to the patient/family/caregiver, or care coordinator.    Beba Kelly, NP  Palliative Medicine  [facility]

## 2024-12-07 NOTE — SUBJECTIVE & OBJECTIVE
Interval History: Pt seen today for pain management.     Past Medical History:   Diagnosis Date    Anesthesia complication     BLADDER DYSFUNCTION    Aortic stenosis     Arthritis     Back pain     Diabetes mellitus type II     NO LONGER DIABETIC    Encounter for blood transfusion     Hyperlipidemia     Hypertension     NSTEMI (non-ST elevated myocardial infarction) 05/01/2022    Osteoporosis     Wears glasses        Past Surgical History:   Procedure Laterality Date     vocal cord nodules removed  long time ago     twice    ADRENAL TUMOR      APPENDECTOMY  within last 5yrs    CATHETERIZATION OF BOTH LEFT AND RIGHT HEART Left 05/06/2022    Procedure: CATHETERIZATION, HEART, BOTH LEFT AND RIGHT;  Surgeon: Michelet Vargas MD;  Location: [facility] CATH/EP LAB;  Service: Cardiology;  Laterality: Left;    COLONOSCOPY N/A 6/23/2023    Procedure: COLONOSCOPY;  Surgeon: Joel Neal III, MD;  Location: [facility] ENDO;  Service: Endoscopy;  Laterality: N/A;    FIXATION KYPHOPLASTY THORACIC SPINE      8-20-13    FOOT SURGERY      left 2nd toe was too long     HERNIA REPAIR  within last 5yrs    INSERTION OF TEMPORARY PACEMAKER N/A 1/4/2023    Procedure: INSERTION, PACEMAKER, TEMPORARY;  Surgeon: Bhavesh Peña MD;  Location: [facility] CATH;  Service: Cardiology;  Laterality: N/A;    LEFT HEART CATHETERIZATION Left 05/02/2022    Procedure: Left heart cath;  Surgeon: Jose Echols MD;  Location: [facility] CATH/EP LAB;  Service: Cardiology;  Laterality: Left;    SMALL BOWEL ENTEROSCOPY N/A 6/22/2023    Procedure: ENTEROSCOPY;  Surgeon: Cornell Aguirre MD;  Location: [facility] ENDO;  Service: Endoscopy;  Laterality: N/A;    SMALL BOWEL ENTEROSCOPY N/A 10/20/2023    Procedure: ENTEROSCOPY;  Surgeon: Otilio Bourgeois MD;  Location: [facility] ENDO;  Service: Endoscopy;  Laterality: N/A;    TONSILLECTOMY, ADENOIDECTOMY  long time ago    TRANSCATHETER AORTIC VALVE REPLACEMENT (TAVR) Bilateral 1/4/2023    Procedure: REPLACEMENT, AORTIC VALVE,  TRANSCATHETER (TAVR);  Surgeon: Bhavesh Peña MD;  Location: [facility] CATH;  Service: Cardiology;  Laterality: Bilateral;    TRANSCATHETER AORTIC VALVE REPLACEMENT (TAVR) Bilateral 1/4/2023    Procedure: REPLACEMENT, AORTIC VALVE, TRANSCATHETER (TAVR);  Surgeon: Dallin Verma MD;  Location: [facility] CATH;  Service: Cardiology;  Laterality: Bilateral;    TRANSTHORACIC ECHOCARDIOGRAPHY (TTE)  1/4/2023    Procedure: ECHOCARDIOGRAM, TRANSTHORACIC;  Surgeon: Bhavesh Peña MD;  Location: [facility] CATH;  Service: Cardiology;;       Review of patient's allergies indicates:   Allergen Reactions    Latex      Other reaction(s): Unknown  Other reaction(s): Unknown    Sulfacetamide sodium      Pain perineal area    Sulfasalazine Hives    Adhesive Itching     SKIN GETS RED WITH TAPE AND BANDAIDS    Adhesive tape-silicones Itching     SKIN GETS RED WITH TAPE AND BANDAIDS    Sulfa (sulfonamide antibiotics) Rash       Medications:  Continuous Infusions:   0.9% NaCl   Intravenous Continuous   Stopped at 12/06/24 1145     Scheduled Meds:   acetaminophen  1,000 mg Oral TID    aspirin  81 mg Oral Daily    atorvastatin  5 mg Oral QHS    citalopram  40 mg Oral Daily    clopidogreL  75 mg Oral Daily    donepeziL  5 mg Oral Nightly    enoxparin  40 mg Subcutaneous Daily    gabapentin  300 mg Oral QHS    lactobacillus acidophilus & bulgar  1 packet Oral BID    methocarbamoL  500 mg Oral QID    pantoprazole  40 mg Oral Daily    venlafaxine  75 mg Oral Daily     PRN Meds:  Current Facility-Administered Medications:     aluminum-magnesium hydroxide-simethicone, 30 mL, Oral, QID PRN    dextrose 50%, 12.5 g, Intravenous, PRN    dextrose 50%, 25 g, Intravenous, PRN    glucagon (human recombinant), 1 mg, Intramuscular, PRN    glucose, 16 g, Oral, PRN    glucose, 24 g, Oral, PRN    magnesium oxide, 800 mg, Oral, PRN    magnesium oxide, 800 mg, Oral, PRN    melatonin, 6 mg, Oral, Nightly PRN    morphine, 2 mg, Intravenous, Q4H PRN    naloxone, 0.02 mg,  Intravenous, PRN    ondansetron, 4 mg, Intravenous, Q6H PRN    oxyCODONE, 5 mg, Oral, Q6H PRN    potassium bicarbonate, 35 mEq, Oral, PRN    potassium bicarbonate, 50 mEq, Oral, PRN    potassium bicarbonate, 60 mEq, Oral, PRN    potassium, sodium phosphates, 2 packet, Oral, PRN    potassium, sodium phosphates, 2 packet, Oral, PRN    potassium, sodium phosphates, 2 packet, Oral, PRN    senna-docusate 8.6-50 mg, 1 tablet, Oral, BID PRN    sodium chloride 0.9%, 2 mL, Intravenous, Q12H PRN    Family History    None       Tobacco Use    Smoking status: Former     Current packs/day: 0.00     Average packs/day: 0.5 packs/day for 35.0 years (17.5 ttl pk-yrs)     Types: Cigarettes     Start date: 10/5/1987     Quit date: 10/5/2022     Years since quittin.1    Smokeless tobacco: Never   Substance and Sexual Activity    Alcohol use: No    Drug use: No    Sexual activity: Not Currently       Review of Systems   Constitutional:  Positive for activity change.   Respiratory:  Negative for shortness of breath.    Cardiovascular:  Negative for chest pain.   Gastrointestinal:  Positive for diarrhea. Negative for abdominal pain.   Genitourinary:  Negative for difficulty urinating.   Musculoskeletal:  Positive for arthralgias and back pain. Negative for gait problem.   Neurological:  Negative for weakness.   All other systems reviewed and are negative.    Objective:     Vital Signs (Most Recent):  Temp: 97.9 °F (36.6 °C) (24 1123)  Pulse: 109 (24 1123)  Resp: 16 (24 1347)  BP: (!) 142/90 (24 1123)  SpO2: (!) 94 % (24 1123) Vital Signs (24h Range):  Temp:  [97.4 °F (36.3 °C)-98.1 °F (36.7 °C)] 97.9 °F (36.6 °C)  Pulse:  [] 109  Resp:  [16-18] 16  SpO2:  [92 %-96 %] 94 %  BP: (136-142)/(72-90) 142/90     Weight: 92 kg (202 lb 13.2 oz)  Body mass index is 39.61 kg/m².       Physical Exam  Vitals and nursing note reviewed.   Constitutional:       General: She is not in acute distress.     Appearance:  Normal appearance. She is not ill-appearing.   HENT:      Mouth/Throat:      Mouth: Mucous membranes are moist.      Pharynx: Oropharynx is clear.   Eyes:      General: No scleral icterus.     Pupils: Pupils are equal, round, and reactive to light.   Cardiovascular:      Rate and Rhythm: Normal rate and regular rhythm.      Pulses: Normal pulses.   Pulmonary:      Effort: Pulmonary effort is normal. No respiratory distress.   Abdominal:      General: There is no distension.      Palpations: Abdomen is soft.   Musculoskeletal:         General: No swelling or tenderness. Normal range of motion.   Skin:     General: Skin is warm and dry.   Neurological:      General: No focal deficit present.      Mental Status: She is alert and oriented to person, place, and time.      Sensory: No sensory deficit.      Motor: No weakness.      Coordination: Coordination normal.   Psychiatric:         Mood and Affect: Mood normal.         Behavior: Behavior normal.         Thought Content: Thought content normal.         Judgment: Judgment normal.        Review of Symptoms      Symptom Assessment (ESAS 0-10 Scale)  Pain:  0  Dyspnea:  0  Anxiety:  0  Nausea:  0  Depression:  0  Anorexia:  0  Fatigue:  0  Insomnia:  0  Restlessness:  0  Agitation:  0         Performance Status:  50    Living Arrangements:  Lives with family    Psychosocial/Cultural:   See Palliative Psychosocial Note: No  **Primary  to Follow**  Palliative Care  Consult: No    Advance Care Planning   Advance Care Planning       Significant Labs: All pertinent labs within the past 24 hours have been reviewed.  CBC:   Recent Labs   Lab 12/06/24 0423   WBC 5.69   HGB 13.9   HCT 44.1   MCV 92        BMP:  Recent Labs   Lab 12/06/24 0423         K 4.4      CO2 33*   BUN 13   CREATININE 0.5   CALCIUM 9.3   MG 1.8     LFT:  Lab Results   Component Value Date    AST 12 12/06/2024    ALKPHOS 61 12/06/2024    BILITOT 0.5  12/06/2024     Albumin:   Albumin   Date Value Ref Range Status   12/06/2024 3.6 3.5 - 5.2 g/dL Final     Protein:   Total Protein   Date Value Ref Range Status   12/06/2024 6.3 6.0 - 8.4 g/dL Final     Lactic acid:   Lab Results   Component Value Date    LACTATE 1.0 12/19/2022    LACTATE 1.1 09/24/2016       Significant Imaging: I have reviewed all pertinent imaging results/findings within the past 24 hours.

## 2024-12-09 DIAGNOSIS — M54.9 BACK PAIN, UNSPECIFIED BACK LOCATION, UNSPECIFIED BACK PAIN LATERALITY, UNSPECIFIED CHRONICITY: Primary | ICD-10-CM

## 2024-12-10 ENCOUNTER — HOSPITAL ENCOUNTER (OUTPATIENT)
Facility: HOSPITAL | Age: 79
Discharge: HOME | End: 2024-12-11
Attending: EMERGENCY MEDICINE | Admitting: INTERNAL MEDICINE
Payer: MEDICARE

## 2024-12-10 ENCOUNTER — TELEPHONE (OUTPATIENT)
Dept: NEUROSURGERY | Facility: CLINIC | Age: 79
End: 2024-12-10
Payer: MEDICARE

## 2024-12-10 DIAGNOSIS — A04.72 C. DIFFICILE COLITIS: ICD-10-CM

## 2024-12-10 DIAGNOSIS — S32.001D CLOSED BURST FRACTURE OF LUMBAR VERTEBRA WITH ROUTINE HEALING, SUBSEQUENT ENCOUNTER: Primary | ICD-10-CM

## 2024-12-10 DIAGNOSIS — R07.9 CHEST PAIN: ICD-10-CM

## 2024-12-10 PROBLEM — S32.000A LUMBAR COMPRESSION FRACTURE: Status: ACTIVE | Noted: 2024-12-10

## 2024-12-10 LAB
ALBUMIN SERPL BCP-MCNC: 4 G/DL (ref 3.5–5.2)
ALP SERPL-CCNC: 90 U/L (ref 55–135)
ALT SERPL W/O P-5'-P-CCNC: 12 U/L (ref 10–44)
ANION GAP SERPL CALC-SCNC: 9 MMOL/L (ref 8–16)
AST SERPL-CCNC: 17 U/L (ref 10–40)
BASOPHILS # BLD AUTO: 0.04 K/UL (ref 0–0.2)
BASOPHILS NFR BLD: 0.4 % (ref 0–1.9)
BILIRUB SERPL-MCNC: 0.8 MG/DL (ref 0.1–1)
BUN SERPL-MCNC: 17 MG/DL (ref 8–23)
CALCIUM SERPL-MCNC: 9.8 MG/DL (ref 8.7–10.5)
CHLORIDE SERPL-SCNC: 97 MMOL/L (ref 95–110)
CO2 SERPL-SCNC: 30 MMOL/L (ref 23–29)
CREAT SERPL-MCNC: 0.7 MG/DL (ref 0.5–1.4)
DIFFERENTIAL METHOD BLD: ABNORMAL
EOSINOPHIL # BLD AUTO: 0.1 K/UL (ref 0–0.5)
EOSINOPHIL NFR BLD: 0.6 % (ref 0–8)
ERYTHROCYTE [DISTWIDTH] IN BLOOD BY AUTOMATED COUNT: 14.8 % (ref 11.5–14.5)
EST. GFR  (NO RACE VARIABLE): >60 ML/MIN/1.73 M^2
GLUCOSE SERPL-MCNC: 148 MG/DL (ref 70–110)
HCT VFR BLD AUTO: 49.6 % (ref 37–48.5)
HGB BLD-MCNC: 15.8 G/DL (ref 12–16)
IMM GRANULOCYTES # BLD AUTO: 0.05 K/UL (ref 0–0.04)
IMM GRANULOCYTES NFR BLD AUTO: 0.6 % (ref 0–0.5)
LIPASE SERPL-CCNC: 19 U/L (ref 4–60)
LYMPHOCYTES # BLD AUTO: 1 K/UL (ref 1–4.8)
LYMPHOCYTES NFR BLD: 11.6 % (ref 18–48)
MCH RBC QN AUTO: 28.4 PG (ref 27–31)
MCHC RBC AUTO-ENTMCNC: 31.9 G/DL (ref 32–36)
MCV RBC AUTO: 89 FL (ref 82–98)
MONOCYTES # BLD AUTO: 0.8 K/UL (ref 0.3–1)
MONOCYTES NFR BLD: 8.8 % (ref 4–15)
NEUTROPHILS # BLD AUTO: 7 K/UL (ref 1.8–7.7)
NEUTROPHILS NFR BLD: 78 % (ref 38–73)
NRBC BLD-RTO: 0 /100 WBC
PLATELET # BLD AUTO: 194 K/UL (ref 150–450)
PMV BLD AUTO: 10.8 FL (ref 9.2–12.9)
POTASSIUM SERPL-SCNC: 4.1 MMOL/L (ref 3.5–5.1)
PROT SERPL-MCNC: 7 G/DL (ref 6–8.4)
RBC # BLD AUTO: 5.57 M/UL (ref 4–5.4)
SODIUM SERPL-SCNC: 136 MMOL/L (ref 136–145)
WBC # BLD AUTO: 8.93 K/UL (ref 3.9–12.7)

## 2024-12-10 PROCEDURE — 27000221 HC OXYGEN, UP TO 24 HOURS

## 2024-12-10 PROCEDURE — 99285 EMERGENCY DEPT VISIT HI MDM: CPT | Mod: 25

## 2024-12-10 PROCEDURE — 96365 THER/PROPH/DIAG IV INF INIT: CPT

## 2024-12-10 PROCEDURE — 80053 COMPREHEN METABOLIC PANEL: CPT | Performed by: EMERGENCY MEDICINE

## 2024-12-10 PROCEDURE — 25000003 PHARM REV CODE 250: Performed by: EMERGENCY MEDICINE

## 2024-12-10 PROCEDURE — G0378 HOSPITAL OBSERVATION PER HR: HCPCS

## 2024-12-10 PROCEDURE — 25000003 PHARM REV CODE 250: Performed by: INTERNAL MEDICINE

## 2024-12-10 PROCEDURE — 96361 HYDRATE IV INFUSION ADD-ON: CPT

## 2024-12-10 PROCEDURE — 63600175 PHARM REV CODE 636 W HCPCS: Performed by: EMERGENCY MEDICINE

## 2024-12-10 PROCEDURE — 99900031 HC PATIENT EDUCATION (STAT)

## 2024-12-10 PROCEDURE — 85025 COMPLETE CBC W/AUTO DIFF WBC: CPT | Performed by: EMERGENCY MEDICINE

## 2024-12-10 PROCEDURE — 99900035 HC TECH TIME PER 15 MIN (STAT)

## 2024-12-10 PROCEDURE — 94799 UNLISTED PULMONARY SVC/PX: CPT

## 2024-12-10 PROCEDURE — 63600175 PHARM REV CODE 636 W HCPCS: Mod: JZ,JG | Performed by: INTERNAL MEDICINE

## 2024-12-10 PROCEDURE — 25000242 PHARM REV CODE 250 ALT 637 W/ HCPCS: Performed by: INTERNAL MEDICINE

## 2024-12-10 PROCEDURE — 83690 ASSAY OF LIPASE: CPT | Performed by: EMERGENCY MEDICINE

## 2024-12-10 PROCEDURE — 96375 TX/PRO/DX INJ NEW DRUG ADDON: CPT

## 2024-12-10 PROCEDURE — 94761 N-INVAS EAR/PLS OXIMETRY MLT: CPT

## 2024-12-10 PROCEDURE — 94640 AIRWAY INHALATION TREATMENT: CPT

## 2024-12-10 RX ORDER — CITALOPRAM 20 MG/1
40 TABLET, FILM COATED ORAL DAILY
Status: DISCONTINUED | OUTPATIENT
Start: 2024-12-11 | End: 2024-12-11 | Stop reason: HOSPADM

## 2024-12-10 RX ORDER — DILTIAZEM HYDROCHLORIDE 120 MG/1
120 CAPSULE, COATED, EXTENDED RELEASE ORAL DAILY
Status: DISCONTINUED | OUTPATIENT
Start: 2024-12-11 | End: 2024-12-11 | Stop reason: HOSPADM

## 2024-12-10 RX ORDER — DONEPEZIL HYDROCHLORIDE 5 MG/1
5 TABLET, FILM COATED ORAL NIGHTLY
Status: DISCONTINUED | OUTPATIENT
Start: 2024-12-10 | End: 2024-12-11 | Stop reason: HOSPADM

## 2024-12-10 RX ORDER — HYDROMORPHONE HYDROCHLORIDE 1 MG/ML
0.25 INJECTION, SOLUTION INTRAMUSCULAR; INTRAVENOUS; SUBCUTANEOUS
Status: COMPLETED | OUTPATIENT
Start: 2024-12-10 | End: 2024-12-10

## 2024-12-10 RX ORDER — ASPIRIN 81 MG/1
81 TABLET ORAL DAILY
Status: DISCONTINUED | OUTPATIENT
Start: 2024-12-11 | End: 2024-12-11 | Stop reason: HOSPADM

## 2024-12-10 RX ORDER — LANOLIN ALCOHOL/MO/W.PET/CERES
800 CREAM (GRAM) TOPICAL
Status: DISCONTINUED | OUTPATIENT
Start: 2024-12-10 | End: 2024-12-11 | Stop reason: HOSPADM

## 2024-12-10 RX ORDER — HYDROCODONE BITARTRATE AND ACETAMINOPHEN 5; 325 MG/1; MG/1
1 TABLET ORAL EVERY 6 HOURS PRN
Status: DISCONTINUED | OUTPATIENT
Start: 2024-12-10 | End: 2024-12-11

## 2024-12-10 RX ORDER — ALUMINUM HYDROXIDE, MAGNESIUM HYDROXIDE, AND SIMETHICONE 1200; 120; 1200 MG/30ML; MG/30ML; MG/30ML
30 SUSPENSION ORAL 4 TIMES DAILY PRN
Status: DISCONTINUED | OUTPATIENT
Start: 2024-12-10 | End: 2024-12-11 | Stop reason: HOSPADM

## 2024-12-10 RX ORDER — ALBUTEROL SULFATE 0.83 MG/ML
2.5 SOLUTION RESPIRATORY (INHALATION) EVERY 6 HOURS PRN
Status: DISCONTINUED | OUTPATIENT
Start: 2024-12-10 | End: 2024-12-11 | Stop reason: HOSPADM

## 2024-12-10 RX ORDER — FLUTICASONE FUROATE AND VILANTEROL 100; 25 UG/1; UG/1
1 POWDER RESPIRATORY (INHALATION) DAILY
Status: DISCONTINUED | OUTPATIENT
Start: 2024-12-11 | End: 2024-12-10

## 2024-12-10 RX ORDER — ONDANSETRON HYDROCHLORIDE 2 MG/ML
4 INJECTION, SOLUTION INTRAVENOUS EVERY 6 HOURS PRN
Status: DISCONTINUED | OUTPATIENT
Start: 2024-12-10 | End: 2024-12-11 | Stop reason: HOSPADM

## 2024-12-10 RX ORDER — L. ACIDOPHILUS/L.BULGARICUS 100MM CELL
1 GRANULES IN PACKET (EA) ORAL 2 TIMES DAILY
Status: DISCONTINUED | OUTPATIENT
Start: 2024-12-10 | End: 2024-12-11 | Stop reason: HOSPADM

## 2024-12-10 RX ORDER — ARFORMOTEROL TARTRATE 15 UG/2ML
15 SOLUTION RESPIRATORY (INHALATION) 2 TIMES DAILY
Status: DISCONTINUED | OUTPATIENT
Start: 2024-12-10 | End: 2024-12-11 | Stop reason: HOSPADM

## 2024-12-10 RX ORDER — ALBUTEROL SULFATE 90 UG/1
2 INHALANT RESPIRATORY (INHALATION) EVERY 6 HOURS PRN
Status: DISCONTINUED | OUTPATIENT
Start: 2024-12-10 | End: 2024-12-10

## 2024-12-10 RX ORDER — CLOPIDOGREL BISULFATE 75 MG/1
75 TABLET ORAL DAILY
Status: DISCONTINUED | OUTPATIENT
Start: 2024-12-11 | End: 2024-12-11 | Stop reason: HOSPADM

## 2024-12-10 RX ORDER — FLUTICASONE PROPIONATE 50 MCG
1 SPRAY, SUSPENSION (ML) NASAL DAILY
Status: DISCONTINUED | OUTPATIENT
Start: 2024-12-11 | End: 2024-12-11 | Stop reason: HOSPADM

## 2024-12-10 RX ORDER — HYDROMORPHONE HYDROCHLORIDE 1 MG/ML
0.5 INJECTION, SOLUTION INTRAMUSCULAR; INTRAVENOUS; SUBCUTANEOUS
Status: DISCONTINUED | OUTPATIENT
Start: 2024-12-10 | End: 2024-12-11

## 2024-12-10 RX ORDER — NALOXONE HCL 0.4 MG/ML
0.02 VIAL (ML) INJECTION
Status: DISCONTINUED | OUTPATIENT
Start: 2024-12-10 | End: 2024-12-11 | Stop reason: HOSPADM

## 2024-12-10 RX ORDER — METRONIDAZOLE 500 MG/100ML
500 INJECTION, SOLUTION INTRAVENOUS
Status: DISCONTINUED | OUTPATIENT
Start: 2024-12-10 | End: 2024-12-11 | Stop reason: HOSPADM

## 2024-12-10 RX ORDER — TALC
6 POWDER (GRAM) TOPICAL NIGHTLY PRN
Status: DISCONTINUED | OUTPATIENT
Start: 2024-12-10 | End: 2024-12-11 | Stop reason: HOSPADM

## 2024-12-10 RX ORDER — SODIUM,POTASSIUM PHOSPHATES 280-250MG
2 POWDER IN PACKET (EA) ORAL
Status: DISCONTINUED | OUTPATIENT
Start: 2024-12-10 | End: 2024-12-11 | Stop reason: HOSPADM

## 2024-12-10 RX ORDER — HYOSCYAMINE SULFATE 0.5 MG/ML
0.12 INJECTION, SOLUTION SUBCUTANEOUS
Status: COMPLETED | OUTPATIENT
Start: 2024-12-10 | End: 2024-12-10

## 2024-12-10 RX ORDER — CARVEDILOL 12.5 MG/1
12.5 TABLET ORAL 2 TIMES DAILY
Status: DISCONTINUED | OUTPATIENT
Start: 2024-12-10 | End: 2024-12-11 | Stop reason: HOSPADM

## 2024-12-10 RX ORDER — BUDESONIDE 0.5 MG/2ML
0.5 INHALANT ORAL EVERY 12 HOURS
Status: DISCONTINUED | OUTPATIENT
Start: 2024-12-10 | End: 2024-12-11 | Stop reason: HOSPADM

## 2024-12-10 RX ORDER — ACETAMINOPHEN 325 MG/1
650 TABLET ORAL EVERY 4 HOURS PRN
Status: DISCONTINUED | OUTPATIENT
Start: 2024-12-10 | End: 2024-12-11 | Stop reason: HOSPADM

## 2024-12-10 RX ORDER — ACETAMINOPHEN 325 MG/1
650 TABLET ORAL EVERY 8 HOURS PRN
Status: DISCONTINUED | OUTPATIENT
Start: 2024-12-10 | End: 2024-12-11 | Stop reason: HOSPADM

## 2024-12-10 RX ORDER — ONDANSETRON HYDROCHLORIDE 2 MG/ML
4 INJECTION, SOLUTION INTRAVENOUS
Status: COMPLETED | OUTPATIENT
Start: 2024-12-10 | End: 2024-12-10

## 2024-12-10 RX ORDER — FAMOTIDINE 20 MG/1
20 TABLET, FILM COATED ORAL 2 TIMES DAILY
Status: DISCONTINUED | OUTPATIENT
Start: 2024-12-10 | End: 2024-12-11 | Stop reason: HOSPADM

## 2024-12-10 RX ORDER — ENOXAPARIN SODIUM 100 MG/ML
40 INJECTION SUBCUTANEOUS EVERY 24 HOURS
Status: DISCONTINUED | OUTPATIENT
Start: 2024-12-10 | End: 2024-12-10

## 2024-12-10 RX ADMIN — CARVEDILOL 12.5 MG: 12.5 TABLET, FILM COATED ORAL at 09:12

## 2024-12-10 RX ADMIN — SODIUM CHLORIDE 500 ML: 9 INJECTION, SOLUTION INTRAVENOUS at 03:12

## 2024-12-10 RX ADMIN — HYOSCYAMINE SULFATE 0.12 MG: 0.5 INJECTION, SOLUTION SUBCUTANEOUS at 03:12

## 2024-12-10 RX ADMIN — VANCOMYCIN HYDROCHLORIDE 500 MG: KIT at 09:12

## 2024-12-10 RX ADMIN — HYDROCODONE BITARTRATE AND ACETAMINOPHEN 1 TABLET: 5; 325 TABLET ORAL at 09:12

## 2024-12-10 RX ADMIN — ARFORMOTEROL TARTRATE 15 MCG: 15 SOLUTION RESPIRATORY (INHALATION) at 07:12

## 2024-12-10 RX ADMIN — ONDANSETRON 4 MG: 2 INJECTION INTRAMUSCULAR; INTRAVENOUS at 03:12

## 2024-12-10 RX ADMIN — LACTOBACILLUS ACIDOPHILUS / LACTOBACILLUS BULGARICUS 1 EACH: 100 MILLION CFU STRENGTH GRANULES at 09:12

## 2024-12-10 RX ADMIN — DONEPEZIL HYDROCHLORIDE 5 MG: 5 TABLET, FILM COATED ORAL at 09:12

## 2024-12-10 RX ADMIN — Medication 6 MG: at 09:12

## 2024-12-10 RX ADMIN — HYDROMORPHONE HYDROCHLORIDE 0.25 MG: 0.5 INJECTION, SOLUTION INTRAMUSCULAR; INTRAVENOUS; SUBCUTANEOUS at 03:12

## 2024-12-10 RX ADMIN — FAMOTIDINE 20 MG: 20 TABLET ORAL at 09:12

## 2024-12-10 RX ADMIN — METRONIDAZOLE 500 MG: 500 INJECTION, SOLUTION INTRAVENOUS at 07:12

## 2024-12-10 RX ADMIN — BUDESONIDE INHALATION 0.5 MG: 0.5 SUSPENSION RESPIRATORY (INHALATION) at 07:12

## 2024-12-10 NOTE — H&P
[facility] - Emergency Dept  Hospital Medicine  History & Physical    Patient Name: Daryleen G Moran  MRN: [id number]  Patient Class: OP- Observation  Admission Date: 12/10/2024  Attending Physician: Rubens Schrader MD   Primary Care Provider: Abhi De Oliveira IV, MD         Patient information was obtained from patient, past medical records, and ER records.     Subjective:     Principal Problem:C. difficile colitis    Chief Complaint:   Chief Complaint   Patient presents with    Diarrhea     Back pain with diarrhea.         HPI: 79 year old pt getting admitted with C diff colitis  Pt was admitted very recently here with lumbar compression fractures and diarrhhoea  She was asked to come back to hospital if diarrhea worsens  Pt was having severe loose stools and it went worse yesterday and today and she came back to ER   C Diff PCR came back as positive as per records   Regarding lumbar compression fracture Neurosurgeon advised conservative management with TLSO brace  Plan for verterbroplasty will be made on Neurosurgeon office as per DC summary   When pt was here last time she denied PT/OT & SNF placement !!    Past Medical History:   Diagnosis Date    Anesthesia complication     BLADDER DYSFUNCTION    Aortic stenosis     Arthritis     Back pain     Diabetes mellitus type II     NO LONGER DIABETIC    Encounter for blood transfusion     Encounter for pain management 12/6/2024    Hyperlipidemia     Hypertension     NSTEMI (non-ST elevated myocardial infarction) 05/01/2022    Osteoporosis     Wears glasses        Past Surgical History:   Procedure Laterality Date     vocal cord nodules removed  long time ago     twice    ADRENAL TUMOR      APPENDECTOMY  within last 5yrs    CATHETERIZATION OF BOTH LEFT AND RIGHT HEART Left 05/06/2022    Procedure: CATHETERIZATION, HEART, BOTH LEFT AND RIGHT;  Surgeon: Michelet Vargas MD;  Location: [facility] CATH/EP LAB;  Service: Cardiology;  Laterality: Left;    COLONOSCOPY  N/A 6/23/2023    Procedure: COLONOSCOPY;  Surgeon: Joel Neal III, MD;  Location: [facility] ENDO;  Service: Endoscopy;  Laterality: N/A;    FIXATION KYPHOPLASTY THORACIC SPINE      8-20-13    FOOT SURGERY      left 2nd toe was too long     HERNIA REPAIR  within last 5yrs    INSERTION OF TEMPORARY PACEMAKER N/A 1/4/2023    Procedure: INSERTION, PACEMAKER, TEMPORARY;  Surgeon: Bhavesh Peña MD;  Location: [facility] CATH;  Service: Cardiology;  Laterality: N/A;    LEFT HEART CATHETERIZATION Left 05/02/2022    Procedure: Left heart cath;  Surgeon: Jose Echols MD;  Location: [facility] CATH/EP LAB;  Service: Cardiology;  Laterality: Left;    SMALL BOWEL ENTEROSCOPY N/A 6/22/2023    Procedure: ENTEROSCOPY;  Surgeon: Cornell Aguirre MD;  Location: [facility] ENDO;  Service: Endoscopy;  Laterality: N/A;    SMALL BOWEL ENTEROSCOPY N/A 10/20/2023    Procedure: ENTEROSCOPY;  Surgeon: Otilio Bourgeois MD;  Location: [facility] ENDO;  Service: Endoscopy;  Laterality: N/A;    TONSILLECTOMY, ADENOIDECTOMY  long time ago    TRANSCATHETER AORTIC VALVE REPLACEMENT (TAVR) Bilateral 1/4/2023    Procedure: REPLACEMENT, AORTIC VALVE, TRANSCATHETER (TAVR);  Surgeon: Bhavesh Peña MD;  Location: [facility] CATH;  Service: Cardiology;  Laterality: Bilateral;    TRANSCATHETER AORTIC VALVE REPLACEMENT (TAVR) Bilateral 1/4/2023    Procedure: REPLACEMENT, AORTIC VALVE, TRANSCATHETER (TAVR);  Surgeon: Dallin Verma MD;  Location: [facility] CATH;  Service: Cardiology;  Laterality: Bilateral;    TRANSTHORACIC ECHOCARDIOGRAPHY (TTE)  1/4/2023    Procedure: ECHOCARDIOGRAM, TRANSTHORACIC;  Surgeon: Bhavesh Peña MD;  Location: [facility] CATH;  Service: Cardiology;;       Review of patient's allergies indicates:   Allergen Reactions    Latex      Other reaction(s): Unknown  Other reaction(s): Unknown    Sulfacetamide sodium      Pain perineal area    Sulfasalazine Hives    Adhesive Itching     SKIN GETS RED WITH TAPE AND BANDAIDS    Adhesive tape-silicones Itching      SKIN GETS RED WITH TAPE AND BANDAIDS    Sulfa (sulfonamide antibiotics) Rash       No current facility-administered medications on file prior to encounter.     Current Outpatient Medications on File Prior to Encounter   Medication Sig    albuterol (PROVENTIL/VENTOLIN HFA) 90 mcg/actuation inhaler Inhale 2 puffs into the lungs every 6 (six) hours as needed for Shortness of Breath.    aspirin (ECOTRIN) 81 MG EC tablet Take 81 mg by mouth once daily.    calcium carbonate (OS-TK) 500 mg calcium (1,250 mg) tablet Take 1 tablet (500 mg total) by mouth once daily.    carvediloL (COREG) 12.5 MG tablet Take 12.5 mg by mouth 2 (two) times daily.    citalopram (CELEXA) 40 MG tablet Take 40 mg by mouth once daily.    clopidogreL (PLAVIX) 75 mg tablet Take 1 tablet (75 mg total) by mouth once daily.    diltiaZEM (DILACOR XR) 120 MG CDCR Take 1 capsule (120 mg total) by mouth once daily.    donepeziL (ARICEPT) 5 MG tablet Take 5 mg by mouth nightly.    ergocalciferol (ERGOCALCIFEROL) 50,000 unit Cap TAKE 1 CAPSULE (50,000 UNITS TOTAL) EVERY 7 DAYS. (Patient taking differently: Take 50,000 Units by mouth every 7 days.)    FEROSUL 325 mg (65 mg iron) Tab tablet Take 325 mg by mouth once daily.    fluticasone furoate-vilanteroL (BREO ELLIPTA) 100-25 mcg/dose diskus inhaler Inhale 1 puff into the lungs once daily. Controller    fluticasone propionate (FLONASE) 50 mcg/actuation nasal spray 1 spray by Each Nostril route once daily.    furosemide (LASIX) 40 MG tablet Take 1 tablet (40 mg total) by mouth daily as needed (Take for weight gain > 2 pounds over 24 hours. May discuss with PCP).    lactobacillus acidophilus & bulgar (LACTINEX) 100 million cell packet Take 1 packet (1 each total) by mouth 2 (two) times daily.    multivitamin capsule Take 1 capsule by mouth once daily.    pantoprazole (PROTONIX) 40 MG tablet Take 1 tablet (40 mg total) by mouth once daily. (Patient taking differently: Take 40 mg by mouth 2 (two) times daily.)     venlafaxine (EFFEXOR-XR) 75 MG 24 hr capsule Take 1 capsule (75 mg total) by mouth once daily.    [DISCONTINUED] ALLERGY RELIEF, FEXOFENADINE, 180 mg tablet Take 180 mg by mouth once daily.    [DISCONTINUED] atorvastatin (LIPITOR) 10 MG tablet Take 5 mg by mouth every evening.    [DISCONTINUED] ezetimibe-simvastatin 10-40 mg (VYTORIN) 10-40 mg per tablet Take 1 tablet by mouth.    [DISCONTINUED] glucosamine-chondroitin 500-400 mg tablet Take 1 tablet by mouth once daily.    [DISCONTINUED] HYDROcodone-acetaminophen (NORCO) 7.5-325 mg per tablet Take 1 tablet by mouth every 4 to 6 hours as needed for Pain.    [DISCONTINUED] lisinopril-hydrochlorothiazide (PRINZIDE,ZESTORETIC) 20-12.5 mg per tablet Take 1 tablet by mouth once daily.     [DISCONTINUED] loperamide (IMODIUM A-D) 2 mg Tab Take 1 tablet (2 mg total) by mouth 3 (three) times daily as needed (diarrhea). Take 2 tablets by mouth initially then 1 tablet after each loose stool as needed for diarrhea.     Family History       Problem Relation (Age of Onset)    Arthritis Mother          Tobacco Use    Smoking status: Former     Current packs/day: 0.00     Average packs/day: 0.5 packs/day for 35.0 years (17.5 ttl pk-yrs)     Types: Cigarettes     Start date: 10/5/1987     Quit date: 10/5/2022     Years since quittin.1    Smokeless tobacco: Never   Substance and Sexual Activity    Alcohol use: No    Drug use: No    Sexual activity: Not Currently     Review of Systems   Constitutional:  Negative for activity change and appetite change.   HENT:  Negative for congestion and dental problem.    Eyes:  Negative for discharge and itching.   Respiratory:  Negative for shortness of breath.    Cardiovascular:  Negative for chest pain.   Gastrointestinal:  Positive for diarrhea. Negative for abdominal distention and abdominal pain.   Endocrine: Negative for cold intolerance.   Genitourinary:  Negative for difficulty urinating and dysuria.   Musculoskeletal:  Positive for  back pain. Negative for arthralgias.   Skin:  Negative for color change.   Neurological:  Negative for dizziness and facial asymmetry.   Hematological:  Negative for adenopathy.   Psychiatric/Behavioral:  Negative for agitation and behavioral problems.      Objective:     Vital Signs (Most Recent):  Temp: 98.1 °F (36.7 °C) (12/10/24 1329)  Pulse: 95 (12/10/24 1335)  Resp: 18 (12/10/24 1539)  BP: (!) 134/56 (12/10/24 1331)  SpO2: (!) 94 % (12/10/24 1329) Vital Signs (24h Range):  Temp:  [98.1 °F (36.7 °C)] 98.1 °F (36.7 °C)  Pulse:  [] 95  Resp:  [18] 18  SpO2:  [94 %] 94 %  BP: (115-134)/(56-79) 134/56     Weight: 90.7 kg (200 lb)  Body mass index is 39.06 kg/m².     Physical Exam  Vitals and nursing note reviewed.   Constitutional:       General: She is not in acute distress.  HENT:      Head: Atraumatic.      Right Ear: External ear normal.      Left Ear: External ear normal.      Nose: Nose normal.      Mouth/Throat:      Mouth: Mucous membranes are moist.   Eyes:      Extraocular Movements: Extraocular movements intact.   Cardiovascular:      Rate and Rhythm: Normal rate.   Pulmonary:      Effort: Pulmonary effort is normal.   Musculoskeletal:         General: Normal range of motion.      Cervical back: Normal range of motion.   Skin:     General: Skin is warm.   Neurological:      Mental Status: She is alert and oriented to person, place, and time.   Psychiatric:         Behavior: Behavior normal.                Significant Labs: All pertinent labs within the past 24 hours have been reviewed.  CBC:   Recent Labs   Lab 12/10/24  1529   WBC 8.93   HGB 15.8   HCT 49.6*        CMP:   Recent Labs   Lab 12/10/24  1529      K 4.1   CL 97   CO2 30*   *   BUN 17   CREATININE 0.7   CALCIUM 9.8   PROT 7.0   ALBUMIN 4.0   BILITOT 0.8   ALKPHOS 90   AST 17   ALT 12   ANIONGAP 9       Significant Imaging: I have reviewed all pertinent imaging results/findings within the past 24  hours.    Assessment/Plan:     * C. difficile colitis  Maintain iv flagyl and PO Vancomycin  If her insurance approves Fidaxomicin  she  will benefit from it when she goes home or SNF       Lumbar compression fracture  Multilevel vertebral body compression fracture deformities throughout the thoracolumbar spine, with likely subacute fractures involving the L3 and L5 vertebral bodies   Plan as mentioned in HPI  She should participate in PT/OT and wear TLSO brace  Will benefit from facility placement although being C diff positive will be a stumbling block for SNF placement  If backpain gets worse Primary team need to Consult Neurosurgeon again       COPD (chronic obstructive pulmonary disease)  Stable  Maintain home meds    Obesity (BMI 30-39.9)  Body mass index is 39.06 kg/m². Morbid obesity complicates all aspects of disease management from diagnostic modalities to treatment.       Debility  PT/OT  May need placement  She rejected PT/OT&SNF placement last time       CAD (coronary artery disease)  Aware  Maintain GDMT     Paroxysmal atrial fibrillation  Patient has permanent atrial fibrillation. Patient is currently in sinus rhythm. [id number] Score: 5. The patients heart rate in the last 24 hours is as follows:  Pulse  Min: 95  Max: 105     Antiarrhythmics  diltiaZEM 24 hr capsule 120 mg, Daily, Oral          VTE Risk Mitigation (From admission, onward)           Ordered     IP VTE HIGH RISK PATIENT  Once         12/10/24 1804     Place sequential compression device  Until discontinued         12/10/24 1804                       On 12/10/2024, patient should be placed in hospital observation services under my care.             Rubens Schrader MD  Department of Hospital Medicine  [facility] - Emergency Dept

## 2024-12-10 NOTE — TELEPHONE ENCOUNTER
Called pt ECS[facility], confirmed upcoming appt details with her. Provided address for all relevant appts.

## 2024-12-10 NOTE — ED PROVIDER NOTES
Encounter Date: 12/10/2024       History     Chief Complaint   Patient presents with    Diarrhea     Back pain with diarrhea.      Patient was admitted last week with lumbar burst fracture and colitis.  Labs are positive with C diff colitis.  Told to return if increased diarrhea.  Per daughter patient with multiple episodes of diarrhea this morning.  Patient with difficult getting to the bathroom due to lumbar fracture.  Family having a hard time with home care.  There is some mild abdominal pain.  Fever chills.  No GI bleeding.      Review of patient's allergies indicates:   Allergen Reactions    Latex      Other reaction(s): Unknown  Other reaction(s): Unknown    Sulfacetamide sodium      Pain perineal area    Sulfasalazine Hives    Adhesive Itching     SKIN GETS RED WITH TAPE AND BANDAIDS    Adhesive tape-silicones Itching     SKIN GETS RED WITH TAPE AND BANDAIDS    Sulfa (sulfonamide antibiotics) Rash     Past Medical History:   Diagnosis Date    Anesthesia complication     BLADDER DYSFUNCTION    Aortic stenosis     Arthritis     Back pain     Diabetes mellitus type II     NO LONGER DIABETIC    Encounter for blood transfusion     Encounter for pain management 12/6/2024    Hyperlipidemia     Hypertension     NSTEMI (non-ST elevated myocardial infarction) 05/01/2022    Osteoporosis     Wears glasses      Past Surgical History:   Procedure Laterality Date     vocal cord nodules removed  long time ago     twice    ADRENAL TUMOR      APPENDECTOMY  within last 5yrs    CATHETERIZATION OF BOTH LEFT AND RIGHT HEART Left 05/06/2022    Procedure: CATHETERIZATION, HEART, BOTH LEFT AND RIGHT;  Surgeon: Michelet Vargas MD;  Location: [facility] CATH/EP LAB;  Service: Cardiology;  Laterality: Left;    COLONOSCOPY N/A 6/23/2023    Procedure: COLONOSCOPY;  Surgeon: Joel Neal III, MD;  Location: [facility] ENDO;  Service: Endoscopy;  Laterality: N/A;    FIXATION KYPHOPLASTY THORACIC SPINE      8-20-13    FOOT SURGERY      left  2nd toe was too long     HERNIA REPAIR  within last 5yrs    INSERTION OF TEMPORARY PACEMAKER N/A 2023    Procedure: INSERTION, PACEMAKER, TEMPORARY;  Surgeon: Bhavesh Peña MD;  Location: [facility] CATH;  Service: Cardiology;  Laterality: N/A;    LEFT HEART CATHETERIZATION Left 2022    Procedure: Left heart cath;  Surgeon: Jose Echols MD;  Location: [facility] CATH/EP LAB;  Service: Cardiology;  Laterality: Left;    SMALL BOWEL ENTEROSCOPY N/A 2023    Procedure: ENTEROSCOPY;  Surgeon: Cornell Aguirre MD;  Location: [facility] ENDO;  Service: Endoscopy;  Laterality: N/A;    SMALL BOWEL ENTEROSCOPY N/A 10/20/2023    Procedure: ENTEROSCOPY;  Surgeon: Otilio Bourgeois MD;  Location: [facility] ENDO;  Service: Endoscopy;  Laterality: N/A;    TONSILLECTOMY, ADENOIDECTOMY  long time ago    TRANSCATHETER AORTIC VALVE REPLACEMENT (TAVR) Bilateral 2023    Procedure: REPLACEMENT, AORTIC VALVE, TRANSCATHETER (TAVR);  Surgeon: Bhavesh Peña MD;  Location: [facility] CATH;  Service: Cardiology;  Laterality: Bilateral;    TRANSCATHETER AORTIC VALVE REPLACEMENT (TAVR) Bilateral 2023    Procedure: REPLACEMENT, AORTIC VALVE, TRANSCATHETER (TAVR);  Surgeon: Dallin Verma MD;  Location: [facility] CATH;  Service: Cardiology;  Laterality: Bilateral;    TRANSTHORACIC ECHOCARDIOGRAPHY (TTE)  2023    Procedure: ECHOCARDIOGRAM, TRANSTHORACIC;  Surgeon: Bhavesh Peña MD;  Location: [facility] CATH;  Service: Cardiology;;     Family History   Problem Relation Name Age of Onset    Arthritis Mother      Collagen disease Neg Hx       Social History     Tobacco Use    Smoking status: Former     Current packs/day: 0.00     Average packs/day: 0.5 packs/day for 35.0 years (17.5 ttl pk-yrs)     Types: Cigarettes     Start date: 10/5/1987     Quit date: 10/5/2022     Years since quittin.1    Smokeless tobacco: Never   Substance Use Topics    Alcohol use: No    Drug use: No     Review of Systems   Constitutional:  Negative for chills and fever.    HENT:  Negative for congestion.    Respiratory:  Negative for shortness of breath.    Cardiovascular:  Negative for chest pain and palpitations.   Gastrointestinal:  Positive for abdominal pain, diarrhea and nausea. Negative for blood in stool and vomiting.   Genitourinary:  Negative for dysuria.   Musculoskeletal:  Negative for joint swelling.   Neurological:  Negative for headaches.   Psychiatric/Behavioral:  Negative for hallucinations.        Physical Exam     Initial Vitals [12/10/24 1329]   BP Pulse Resp Temp SpO2   115/79 105 18 98.1 °F (36.7 °C) (!) 94 %      MAP       --         Physical Exam    Nursing note and vitals reviewed.  Constitutional: She is not diaphoretic. No distress.   HENT:   Head: Normocephalic and atraumatic.   Eyes: Conjunctivae are normal.   Neck:   Normal range of motion.  Cardiovascular:  Normal rate.           Pulmonary/Chest: Breath sounds normal.   Abdominal: Abdomen is soft. Bowel sounds are normal.   Mild diffuse abdominal tenderness with no guarding or rebound   Musculoskeletal:         General: Normal range of motion.      Cervical back: Normal range of motion.     Neurological: She is alert.   No gross deficits   Skin: No rash noted.   Psychiatric: She has a normal mood and affect.         ED Course   Procedures  Labs Reviewed   CBC W/ AUTO DIFFERENTIAL - Abnormal       Result Value    WBC 8.93      RBC 5.57 (*)     Hemoglobin 15.8      Hematocrit 49.6 (*)     MCV 89      MCH 28.4      MCHC 31.9 (*)     RDW 14.8 (*)     Platelets 194      MPV 10.8      Immature Granulocytes 0.6 (*)     Gran # (ANC) 7.0      Immature Grans (Abs) 0.05 (*)     Lymph # 1.0      Mono # 0.8      Eos # 0.1      Baso # 0.04      nRBC 0      Gran % 78.0 (*)     Lymph % 11.6 (*)     Mono % 8.8      Eosinophil % 0.6      Basophil % 0.4      Differential Method Automated     COMPREHENSIVE METABOLIC PANEL - Abnormal    Sodium 136      Potassium 4.1      Chloride 97      CO2 30 (*)     Glucose 148 (*)      BUN 17      Creatinine 0.7      Calcium 9.8      Total Protein 7.0      Albumin 4.0      Total Bilirubin 0.8      Alkaline Phosphatase 90      AST 17      ALT 12      eGFR >60.0      Anion Gap 9     LIPASE    Lipase 19     URINALYSIS, REFLEX TO URINE CULTURE          Imaging Results    None          Medications   vancomycin oral suspension 500 mg (has no administration in time range)   aspirin EC tablet 81 mg (has no administration in time range)   carvediloL tablet 12.5 mg (has no administration in time range)   citalopram tablet 40 mg (has no administration in time range)   clopidogreL tablet 75 mg (has no administration in time range)   diltiaZEM 24 hr capsule 120 mg (has no administration in time range)   donepeziL tablet 5 mg (has no administration in time range)   fluticasone propionate 50 mcg/actuation nasal spray 50 mcg (has no administration in time range)   lactobacillus acidophilus & bulgar 100 million cell packet 1 each (has no administration in time range)   melatonin tablet 6 mg (has no administration in time range)   acetaminophen tablet 650 mg (has no administration in time range)   aluminum-magnesium hydroxide-simethicone 200-200-20 mg/5 mL suspension 30 mL (has no administration in time range)   acetaminophen tablet 650 mg (has no administration in time range)   HYDROcodone-acetaminophen 5-325 mg per tablet 1 tablet (has no administration in time range)   naloxone 0.4 mg/mL injection 0.02 mg (has no administration in time range)   potassium bicarbonate disintegrating tablet 50 mEq (has no administration in time range)   potassium bicarbonate disintegrating tablet 35 mEq (has no administration in time range)   potassium bicarbonate disintegrating tablet 60 mEq (has no administration in time range)   magnesium oxide tablet 800 mg (has no administration in time range)   magnesium oxide tablet 800 mg (has no administration in time range)   potassium, sodium phosphates 280-160-250 mg packet 2 packet  (has no administration in time range)   potassium, sodium phosphates 280-160-250 mg packet 2 packet (has no administration in time range)   potassium, sodium phosphates 280-160-250 mg packet 2 packet (has no administration in time range)   ondansetron injection 4 mg (has no administration in time range)   metronidazole IVPB 500 mg (has no administration in time range)   HYDROmorphone injection 0.5 mg (has no administration in time range)   famotidine tablet 20 mg (has no administration in time range)   albuterol nebulizer solution 2.5 mg (has no administration in time range)   budesonide nebulizer solution 0.5 mg (has no administration in time range)     And   arformoteroL nebulizer solution 15 mcg (has no administration in time range)   sodium chloride 0.9% bolus 500 mL 500 mL (0 mLs Intravenous Stopped 12/10/24 1904)   HYDROmorphone injection 0.25 mg (0.25 mg Intravenous Given 12/10/24 1539)   ondansetron injection 4 mg (4 mg Intravenous Given 12/10/24 1540)   hyoscyamine injection 0.125 mg (0.125 mg Intravenous Given 12/10/24 1540)     Medical Decision Making  Patient presents with symptoms of C diff colitis.  Here CBC CMP unremarkable.  Patient is incontinent of stool.  Given lumbar fracture family unable to care for this profuse diarrhea.  Discussed with Dr. Shore with infectious disease.  He recommends beginning vancomycin 500 mg p.o. q.6 hours.  Fluid resuscitation initiated here.  Dr. Schrader with Hospital Medicine consulted for possible admission.    Amount and/or Complexity of Data Reviewed  Labs: ordered. Decision-making details documented in ED Course.    Risk  Prescription drug management.                                      Clinical Impression:  Final diagnoses:  [A04.72] C. difficile colitis  [S32.001D] Closed burst fracture of lumbar vertebra with routine healing, subsequent encounter (Primary)          ED Disposition Condition    Observation                 Joel Finch MD  12/10/24 0963

## 2024-12-10 NOTE — TELEPHONE ENCOUNTER
----- Message from AYAKA Sanders sent at 12/9/2024 11:02 AM CST -----  Please contact pt to provide xray and appt details with Dr. Staples. Thank you.

## 2024-12-11 VITALS
TEMPERATURE: 98 F | WEIGHT: 180.13 LBS | HEART RATE: 118 BPM | BODY MASS INDEX: 35.37 KG/M2 | OXYGEN SATURATION: 94 % | HEIGHT: 60 IN | DIASTOLIC BLOOD PRESSURE: 75 MMHG | RESPIRATION RATE: 17 BRPM | SYSTOLIC BLOOD PRESSURE: 141 MMHG

## 2024-12-11 PROBLEM — A04.72 C. DIFFICILE COLITIS: Status: RESOLVED | Noted: 2024-12-10 | Resolved: 2024-12-11

## 2024-12-11 LAB
ALBUMIN SERPL BCP-MCNC: 3.6 G/DL (ref 3.5–5.2)
ALP SERPL-CCNC: 78 U/L (ref 55–135)
ALT SERPL W/O P-5'-P-CCNC: 8 U/L (ref 10–44)
ANION GAP SERPL CALC-SCNC: 6 MMOL/L (ref 8–16)
AST SERPL-CCNC: 12 U/L (ref 10–40)
BASOPHILS # BLD AUTO: 0.05 K/UL (ref 0–0.2)
BASOPHILS NFR BLD: 0.8 % (ref 0–1.9)
BILIRUB SERPL-MCNC: 0.7 MG/DL (ref 0.1–1)
BUN SERPL-MCNC: 15 MG/DL (ref 8–23)
CALCIUM SERPL-MCNC: 9.2 MG/DL (ref 8.7–10.5)
CHLORIDE SERPL-SCNC: 100 MMOL/L (ref 95–110)
CO2 SERPL-SCNC: 30 MMOL/L (ref 23–29)
CREAT SERPL-MCNC: 0.5 MG/DL (ref 0.5–1.4)
DIFFERENTIAL METHOD BLD: ABNORMAL
EOSINOPHIL # BLD AUTO: 0.1 K/UL (ref 0–0.5)
EOSINOPHIL NFR BLD: 1.2 % (ref 0–8)
ERYTHROCYTE [DISTWIDTH] IN BLOOD BY AUTOMATED COUNT: 14.6 % (ref 11.5–14.5)
EST. GFR  (NO RACE VARIABLE): >60 ML/MIN/1.73 M^2
GLUCOSE SERPL-MCNC: 105 MG/DL (ref 70–110)
HCT VFR BLD AUTO: 45.3 % (ref 37–48.5)
HGB BLD-MCNC: 14.4 G/DL (ref 12–16)
IMM GRANULOCYTES # BLD AUTO: 0.02 K/UL (ref 0–0.04)
IMM GRANULOCYTES NFR BLD AUTO: 0.3 % (ref 0–0.5)
LYMPHOCYTES # BLD AUTO: 1.4 K/UL (ref 1–4.8)
LYMPHOCYTES NFR BLD: 21.9 % (ref 18–48)
MAGNESIUM SERPL-MCNC: 1.5 MG/DL (ref 1.6–2.6)
MCH RBC QN AUTO: 28.3 PG (ref 27–31)
MCHC RBC AUTO-ENTMCNC: 31.8 G/DL (ref 32–36)
MCV RBC AUTO: 89 FL (ref 82–98)
MONOCYTES # BLD AUTO: 0.8 K/UL (ref 0.3–1)
MONOCYTES NFR BLD: 12.8 % (ref 4–15)
NEUTROPHILS # BLD AUTO: 4.1 K/UL (ref 1.8–7.7)
NEUTROPHILS NFR BLD: 63 % (ref 38–73)
NRBC BLD-RTO: 0 /100 WBC
PLATELET # BLD AUTO: 179 K/UL (ref 150–450)
PMV BLD AUTO: 10.6 FL (ref 9.2–12.9)
POTASSIUM SERPL-SCNC: 4 MMOL/L (ref 3.5–5.1)
PROT SERPL-MCNC: 6.3 G/DL (ref 6–8.4)
RBC # BLD AUTO: 5.08 M/UL (ref 4–5.4)
SODIUM SERPL-SCNC: 136 MMOL/L (ref 136–145)
WBC # BLD AUTO: 6.47 K/UL (ref 3.9–12.7)

## 2024-12-11 PROCEDURE — 80053 COMPREHEN METABOLIC PANEL: CPT | Performed by: INTERNAL MEDICINE

## 2024-12-11 PROCEDURE — 85025 COMPLETE CBC W/AUTO DIFF WBC: CPT | Performed by: INTERNAL MEDICINE

## 2024-12-11 PROCEDURE — 96366 THER/PROPH/DIAG IV INF ADDON: CPT

## 2024-12-11 PROCEDURE — 25000003 PHARM REV CODE 250: Performed by: INTERNAL MEDICINE

## 2024-12-11 PROCEDURE — 86580 TB INTRADERMAL TEST: CPT | Performed by: STUDENT IN AN ORGANIZED HEALTH CARE EDUCATION/TRAINING PROGRAM

## 2024-12-11 PROCEDURE — 94640 AIRWAY INHALATION TREATMENT: CPT | Mod: XB

## 2024-12-11 PROCEDURE — 83735 ASSAY OF MAGNESIUM: CPT | Performed by: INTERNAL MEDICINE

## 2024-12-11 PROCEDURE — 36415 COLL VENOUS BLD VENIPUNCTURE: CPT | Performed by: INTERNAL MEDICINE

## 2024-12-11 PROCEDURE — 25000003 PHARM REV CODE 250: Performed by: EMERGENCY MEDICINE

## 2024-12-11 PROCEDURE — 94761 N-INVAS EAR/PLS OXIMETRY MLT: CPT

## 2024-12-11 PROCEDURE — 63600175 PHARM REV CODE 636 W HCPCS: Mod: JZ | Performed by: INTERNAL MEDICINE

## 2024-12-11 PROCEDURE — 25000003 PHARM REV CODE 250: Performed by: STUDENT IN AN ORGANIZED HEALTH CARE EDUCATION/TRAINING PROGRAM

## 2024-12-11 PROCEDURE — 30200315 PPD INTRADERMAL TEST REV CODE 302: Performed by: STUDENT IN AN ORGANIZED HEALTH CARE EDUCATION/TRAINING PROGRAM

## 2024-12-11 PROCEDURE — 25000242 PHARM REV CODE 250 ALT 637 W/ HCPCS: Performed by: INTERNAL MEDICINE

## 2024-12-11 PROCEDURE — G0378 HOSPITAL OBSERVATION PER HR: HCPCS

## 2024-12-11 RX ORDER — OXYCODONE HCL 10 MG/1
10 TABLET, FILM COATED, EXTENDED RELEASE ORAL EVERY 12 HOURS
Status: DISCONTINUED | OUTPATIENT
Start: 2024-12-11 | End: 2024-12-11 | Stop reason: HOSPADM

## 2024-12-11 RX ORDER — VANCOMYCIN HYDROCHLORIDE 125 MG/1
125 CAPSULE ORAL 4 TIMES DAILY
Qty: 36 CAPSULE | Refills: 0 | Status: SHIPPED | OUTPATIENT
Start: 2024-12-11 | End: 2024-12-20

## 2024-12-11 RX ORDER — OXYCODONE AND ACETAMINOPHEN 5; 325 MG/1; MG/1
1 TABLET ORAL EVERY 4 HOURS PRN
Status: DISCONTINUED | OUTPATIENT
Start: 2024-12-11 | End: 2024-12-11 | Stop reason: HOSPADM

## 2024-12-11 RX ORDER — OXYCODONE AND ACETAMINOPHEN 10; 325 MG/1; MG/1
1 TABLET ORAL EVERY 4 HOURS PRN
Status: DISCONTINUED | OUTPATIENT
Start: 2024-12-11 | End: 2024-12-11 | Stop reason: HOSPADM

## 2024-12-11 RX ORDER — GABAPENTIN 100 MG/1
200 CAPSULE ORAL 3 TIMES DAILY
Status: DISCONTINUED | OUTPATIENT
Start: 2024-12-11 | End: 2024-12-11 | Stop reason: HOSPADM

## 2024-12-11 RX ADMIN — CLOPIDOGREL BISULFATE 75 MG: 75 TABLET, FILM COATED ORAL at 08:12

## 2024-12-11 RX ADMIN — METRONIDAZOLE 500 MG: 500 INJECTION, SOLUTION INTRAVENOUS at 11:12

## 2024-12-11 RX ADMIN — LACTOBACILLUS ACIDOPHILUS / LACTOBACILLUS BULGARICUS 1 EACH: 100 MILLION CFU STRENGTH GRANULES at 08:12

## 2024-12-11 RX ADMIN — DILTIAZEM HYDROCHLORIDE 120 MG: 120 CAPSULE, COATED, EXTENDED RELEASE ORAL at 08:12

## 2024-12-11 RX ADMIN — BUDESONIDE INHALATION 0.5 MG: 0.5 SUSPENSION RESPIRATORY (INHALATION) at 07:12

## 2024-12-11 RX ADMIN — ARFORMOTEROL TARTRATE 15 MCG: 15 SOLUTION RESPIRATORY (INHALATION) at 07:12

## 2024-12-11 RX ADMIN — HYDROCODONE BITARTRATE AND ACETAMINOPHEN 1 TABLET: 5; 325 TABLET ORAL at 03:12

## 2024-12-11 RX ADMIN — OXYCODONE HYDROCHLORIDE AND ACETAMINOPHEN 1 TABLET: 10; 325 TABLET ORAL at 03:12

## 2024-12-11 RX ADMIN — TUBERCULIN PURIFIED PROTEIN DERIVATIVE 5 UNITS: 5 INJECTION INTRADERMAL at 09:12

## 2024-12-11 RX ADMIN — GABAPENTIN 200 MG: 100 CAPSULE ORAL at 03:12

## 2024-12-11 RX ADMIN — FLUTICASONE PROPIONATE 50 MCG: 50 SPRAY, METERED NASAL at 08:12

## 2024-12-11 RX ADMIN — ASPIRIN 81 MG: 81 TABLET, COATED ORAL at 08:12

## 2024-12-11 RX ADMIN — METRONIDAZOLE 500 MG: 500 INJECTION, SOLUTION INTRAVENOUS at 03:12

## 2024-12-11 RX ADMIN — VANCOMYCIN HYDROCHLORIDE 125 MG: KIT at 09:12

## 2024-12-11 RX ADMIN — CARVEDILOL 12.5 MG: 12.5 TABLET, FILM COATED ORAL at 08:12

## 2024-12-11 RX ADMIN — CITALOPRAM HYDROBROMIDE 40 MG: 20 TABLET ORAL at 08:12

## 2024-12-11 RX ADMIN — VANCOMYCIN HYDROCHLORIDE 500 MG: KIT at 03:12

## 2024-12-11 RX ADMIN — FAMOTIDINE 20 MG: 20 TABLET ORAL at 08:12

## 2024-12-11 RX ADMIN — OXYCODONE HYDROCHLORIDE 10 MG: 10 TABLET, FILM COATED, EXTENDED RELEASE ORAL at 11:12

## 2024-12-11 NOTE — HPI
79 year old pt getting admitted with C diff colitis  Pt was admitted very recently here with lumbar compression fractures and diarrhhoea  She was asked to come back to hospital if diarrhea worsens  Pt was having severe loose stools and it went worse yesterday and today and she came back to ER   C Diff PCR came back as positive as per records   Regarding lumbar compression fracture Neurosurgeon advised conservative management with TLSO brace  Plan for verterbroplasty will be made on Neurosurgeon office as per DC summary   When pt was here last time she denied PT/OT & SNF placement !!

## 2024-12-11 NOTE — PLAN OF CARE
12/11/24 1515   LOPEZ Message   Medicare Outpatient and Observation Notification regarding financial responsibility Given to patient/caregiver;Explained to patient/caregiver   Date LOPEZ was signed 12/11/24   Time LOPEZ was signed 1517

## 2024-12-11 NOTE — PLAN OF CARE
Patient cleared for discharge by case management to home with home health.     Patient's daughter will pick her up via private vehicle.        12/11/24 150   Final Note   Assessment Type Final Discharge Note   Anticipated Discharge Disposition Home-Health   Hospital Resources/Appts/Education Provided Appointment suggestion unavailable

## 2024-12-11 NOTE — ASSESSMENT & PLAN NOTE
Body mass index is 39.06 kg/m². Morbid obesity complicates all aspects of disease management from diagnostic modalities to treatment.

## 2024-12-11 NOTE — ASSESSMENT & PLAN NOTE
Maintain iv flagyl and PO Vancomycin  If her insurance approves Fidaxomicin  she  will benefit from it when she goes home or SNF

## 2024-12-11 NOTE — PLAN OF CARE
Problem: Adult Inpatient Plan of Care  Goal: Plan of Care Review  Outcome: Met  Goal: Patient-Specific Goal (Individualized)  Outcome: Met  Goal: Absence of Hospital-Acquired Illness or Injury  Outcome: Met  Goal: Optimal Comfort and Wellbeing  Outcome: Met  Goal: Readiness for Transition of Care  Outcome: Met     Problem: Diabetes Comorbidity  Goal: Blood Glucose Level Within Targeted Range  Outcome: Met     Problem: Wound  Goal: Optimal Coping  Outcome: Met  Goal: Optimal Functional Ability  Outcome: Met  Goal: Absence of Infection Signs and Symptoms  Outcome: Met  Goal: Improved Oral Intake  Outcome: Met  Goal: Optimal Pain Control and Function  Outcome: Met  Goal: Skin Health and Integrity  Outcome: Met  Goal: Optimal Wound Healing  Outcome: Met     Problem: Skin Injury Risk Increased  Goal: Skin Health and Integrity  Outcome: Met     Problem: Infection  Goal: Absence of Infection Signs and Symptoms  Outcome: Met

## 2024-12-11 NOTE — PT/OT/SLP PROGRESS
Occupational Therapy      Patient Name:  Daryleen G Moran   MRN:  [id number]    Patient not seen today secondary to Other (Comment) (Pt requested OT evaluation at 9:38am. sencond attempt at 1145 pt refused participation). Will follow-up next available day.    12/11/2024

## 2024-12-11 NOTE — PT/OT/SLP PROGRESS
"Physical Therapy      Patient Name:  Daryleen G Moran   MRN:  [id number]    Patient not seen today secondary to Other (Comment) (first attempt patient requested reattempt later, second attempt patient ademently refused stating "i'm not doing anything. Come back next week."). Will follow-up 12/12/24.    "

## 2024-12-11 NOTE — SUBJECTIVE & OBJECTIVE
Past Medical History:   Diagnosis Date    Anesthesia complication     BLADDER DYSFUNCTION    Aortic stenosis     Arthritis     Back pain     Diabetes mellitus type II     NO LONGER DIABETIC    Encounter for blood transfusion     Encounter for pain management 12/6/2024    Hyperlipidemia     Hypertension     NSTEMI (non-ST elevated myocardial infarction) 05/01/2022    Osteoporosis     Wears glasses        Past Surgical History:   Procedure Laterality Date     vocal cord nodules removed  long time ago     twice    ADRENAL TUMOR      APPENDECTOMY  within last 5yrs    CATHETERIZATION OF BOTH LEFT AND RIGHT HEART Left 05/06/2022    Procedure: CATHETERIZATION, HEART, BOTH LEFT AND RIGHT;  Surgeon: Michelet Vargas MD;  Location: [facility] CATH/EP LAB;  Service: Cardiology;  Laterality: Left;    COLONOSCOPY N/A 6/23/2023    Procedure: COLONOSCOPY;  Surgeon: Joel Neal III, MD;  Location: [facility] ENDO;  Service: Endoscopy;  Laterality: N/A;    FIXATION KYPHOPLASTY THORACIC SPINE      8-20-13    FOOT SURGERY      left 2nd toe was too long     HERNIA REPAIR  within last 5yrs    INSERTION OF TEMPORARY PACEMAKER N/A 1/4/2023    Procedure: INSERTION, PACEMAKER, TEMPORARY;  Surgeon: Bhavesh Peña MD;  Location: [facility] CATH;  Service: Cardiology;  Laterality: N/A;    LEFT HEART CATHETERIZATION Left 05/02/2022    Procedure: Left heart cath;  Surgeon: Jose Echols MD;  Location: [facility] CATH/EP LAB;  Service: Cardiology;  Laterality: Left;    SMALL BOWEL ENTEROSCOPY N/A 6/22/2023    Procedure: ENTEROSCOPY;  Surgeon: Cornell Aguirre MD;  Location: [facility] ENDO;  Service: Endoscopy;  Laterality: N/A;    SMALL BOWEL ENTEROSCOPY N/A 10/20/2023    Procedure: ENTEROSCOPY;  Surgeon: Otilio Bourgeois MD;  Location: [facility] ENDO;  Service: Endoscopy;  Laterality: N/A;    TONSILLECTOMY, ADENOIDECTOMY  long time ago    TRANSCATHETER AORTIC VALVE REPLACEMENT (TAVR) Bilateral 1/4/2023    Procedure: REPLACEMENT, AORTIC VALVE, TRANSCATHETER  (TAVR);  Surgeon: Bhavesh Peña MD;  Location: [facility] CATH;  Service: Cardiology;  Laterality: Bilateral;    TRANSCATHETER AORTIC VALVE REPLACEMENT (TAVR) Bilateral 1/4/2023    Procedure: REPLACEMENT, AORTIC VALVE, TRANSCATHETER (TAVR);  Surgeon: Dallin Verma MD;  Location: [facility] CATH;  Service: Cardiology;  Laterality: Bilateral;    TRANSTHORACIC ECHOCARDIOGRAPHY (TTE)  1/4/2023    Procedure: ECHOCARDIOGRAM, TRANSTHORACIC;  Surgeon: Bhavesh Peña MD;  Location: [facility] CATH;  Service: Cardiology;;       Review of patient's allergies indicates:   Allergen Reactions    Latex      Other reaction(s): Unknown  Other reaction(s): Unknown    Sulfacetamide sodium      Pain perineal area    Sulfasalazine Hives    Adhesive Itching     SKIN GETS RED WITH TAPE AND BANDAIDS    Adhesive tape-silicones Itching     SKIN GETS RED WITH TAPE AND BANDAIDS    Sulfa (sulfonamide antibiotics) Rash       No current facility-administered medications on file prior to encounter.     Current Outpatient Medications on File Prior to Encounter   Medication Sig    albuterol (PROVENTIL/VENTOLIN HFA) 90 mcg/actuation inhaler Inhale 2 puffs into the lungs every 6 (six) hours as needed for Shortness of Breath.    aspirin (ECOTRIN) 81 MG EC tablet Take 81 mg by mouth once daily.    calcium carbonate (OS-TK) 500 mg calcium (1,250 mg) tablet Take 1 tablet (500 mg total) by mouth once daily.    carvediloL (COREG) 12.5 MG tablet Take 12.5 mg by mouth 2 (two) times daily.    citalopram (CELEXA) 40 MG tablet Take 40 mg by mouth once daily.    clopidogreL (PLAVIX) 75 mg tablet Take 1 tablet (75 mg total) by mouth once daily.    diltiaZEM (DILACOR XR) 120 MG CDCR Take 1 capsule (120 mg total) by mouth once daily.    donepeziL (ARICEPT) 5 MG tablet Take 5 mg by mouth nightly.    ergocalciferol (ERGOCALCIFEROL) 50,000 unit Cap TAKE 1 CAPSULE (50,000 UNITS TOTAL) EVERY 7 DAYS. (Patient taking differently: Take 50,000 Units by mouth every 7 days.)    FEROSUL 325  mg (65 mg iron) Tab tablet Take 325 mg by mouth once daily.    fluticasone furoate-vilanteroL (BREO ELLIPTA) 100-25 mcg/dose diskus inhaler Inhale 1 puff into the lungs once daily. Controller    fluticasone propionate (FLONASE) 50 mcg/actuation nasal spray 1 spray by Each Nostril route once daily.    furosemide (LASIX) 40 MG tablet Take 1 tablet (40 mg total) by mouth daily as needed (Take for weight gain > 2 pounds over 24 hours. May discuss with PCP).    lactobacillus acidophilus & bulgar (LACTINEX) 100 million cell packet Take 1 packet (1 each total) by mouth 2 (two) times daily.    multivitamin capsule Take 1 capsule by mouth once daily.    pantoprazole (PROTONIX) 40 MG tablet Take 1 tablet (40 mg total) by mouth once daily. (Patient taking differently: Take 40 mg by mouth 2 (two) times daily.)    venlafaxine (EFFEXOR-XR) 75 MG 24 hr capsule Take 1 capsule (75 mg total) by mouth once daily.    [DISCONTINUED] ALLERGY RELIEF, FEXOFENADINE, 180 mg tablet Take 180 mg by mouth once daily.    [DISCONTINUED] atorvastatin (LIPITOR) 10 MG tablet Take 5 mg by mouth every evening.    [DISCONTINUED] ezetimibe-simvastatin 10-40 mg (VYTORIN) 10-40 mg per tablet Take 1 tablet by mouth.    [DISCONTINUED] glucosamine-chondroitin 500-400 mg tablet Take 1 tablet by mouth once daily.    [DISCONTINUED] HYDROcodone-acetaminophen (NORCO) 7.5-325 mg per tablet Take 1 tablet by mouth every 4 to 6 hours as needed for Pain.    [DISCONTINUED] lisinopril-hydrochlorothiazide (PRINZIDE,ZESTORETIC) 20-12.5 mg per tablet Take 1 tablet by mouth once daily.     [DISCONTINUED] loperamide (IMODIUM A-D) 2 mg Tab Take 1 tablet (2 mg total) by mouth 3 (three) times daily as needed (diarrhea). Take 2 tablets by mouth initially then 1 tablet after each loose stool as needed for diarrhea.     Family History       Problem Relation (Age of Onset)    Arthritis Mother          Tobacco Use    Smoking status: Former     Current packs/day: 0.00     Average  packs/day: 0.5 packs/day for 35.0 years (17.5 ttl pk-yrs)     Types: Cigarettes     Start date: 10/5/1987     Quit date: 10/5/2022     Years since quittin.1    Smokeless tobacco: Never   Substance and Sexual Activity    Alcohol use: No    Drug use: No    Sexual activity: Not Currently     Review of Systems   Constitutional:  Negative for activity change and appetite change.   HENT:  Negative for congestion and dental problem.    Eyes:  Negative for discharge and itching.   Respiratory:  Negative for shortness of breath.    Cardiovascular:  Negative for chest pain.   Gastrointestinal:  Positive for diarrhea. Negative for abdominal distention and abdominal pain.   Endocrine: Negative for cold intolerance.   Genitourinary:  Negative for difficulty urinating and dysuria.   Musculoskeletal:  Positive for back pain. Negative for arthralgias.   Skin:  Negative for color change.   Neurological:  Negative for dizziness and facial asymmetry.   Hematological:  Negative for adenopathy.   Psychiatric/Behavioral:  Negative for agitation and behavioral problems.      Objective:     Vital Signs (Most Recent):  Temp: 98.1 °F (36.7 °C) (12/10/24 1329)  Pulse: 95 (12/10/24 1335)  Resp: 18 (12/10/24 1539)  BP: (!) 134/56 (12/10/24 1331)  SpO2: (!) 94 % (12/10/24 1329) Vital Signs (24h Range):  Temp:  [98.1 °F (36.7 °C)] 98.1 °F (36.7 °C)  Pulse:  [] 95  Resp:  [18] 18  SpO2:  [94 %] 94 %  BP: (115-134)/(56-79) 134/56     Weight: 90.7 kg (200 lb)  Body mass index is 39.06 kg/m².     Physical Exam  Vitals and nursing note reviewed.   Constitutional:       General: She is not in acute distress.  HENT:      Head: Atraumatic.      Right Ear: External ear normal.      Left Ear: External ear normal.      Nose: Nose normal.      Mouth/Throat:      Mouth: Mucous membranes are moist.   Eyes:      Extraocular Movements: Extraocular movements intact.   Cardiovascular:      Rate and Rhythm: Normal rate.   Pulmonary:      Effort: Pulmonary  effort is normal.   Musculoskeletal:         General: Normal range of motion.      Cervical back: Normal range of motion.   Skin:     General: Skin is warm.   Neurological:      Mental Status: She is alert and oriented to person, place, and time.   Psychiatric:         Behavior: Behavior normal.                Significant Labs: All pertinent labs within the past 24 hours have been reviewed.  CBC:   Recent Labs   Lab 12/10/24  1529   WBC 8.93   HGB 15.8   HCT 49.6*        CMP:   Recent Labs   Lab 12/10/24  1529      K 4.1   CL 97   CO2 30*   *   BUN 17   CREATININE 0.7   CALCIUM 9.8   PROT 7.0   ALBUMIN 4.0   BILITOT 0.8   ALKPHOS 90   AST 17   ALT 12   ANIONGAP 9       Significant Imaging: I have reviewed all pertinent imaging results/findings within the past 24 hours.

## 2024-12-11 NOTE — PROGRESS NOTES
Automatic Inhaler to Nebulizer Interchange    albuterol (Ventolin, ProAir, or Proventil)  mcg given multiple times per day changed to albuterol 2.5 mg q6h prn wheezing or shortness of breath  per [facility] Automatic Therapeutic Substitutions Protocol.    Please contact pharmacy at extension [phone] with any questions.     Thank you,   John Dawkins

## 2024-12-11 NOTE — HOSPITAL COURSE
Patient with recent lumbar compression fracture presented with diarrhea.  Diagnosed with C diff, started on vancomycin.  Diarrhea improved, patient is stable for discharge.  Patient instructed to follow up with neurosurgeon outpatient.  Patient again declined skilled nursing facility placement    Physical Exam     Cardiovascular:      Rate and Rhythm: Normal rate.   Pulmonary:      Effort: Pulmonary effort is normal

## 2024-12-11 NOTE — PLAN OF CARE
Initial cm assessment done at bedside during rounds and via chart review. Patient lives at home with her daughter and is a current patient of [facility], Current DME includes : Wheelchair, hospital bed, BSCC,  and rolling walker. Current discharge plan is to home with home health. Patient's daughter will pick her up via private vehicle.        12/11/24 1031   Discharge Planning   Assessment Type Discharge Planning Brief Assessment   Resource/Environmental Concerns none   Support Systems Children   Equipment Currently Used at Home wheelchair;hospital bed;commode;walker, rolling   Current Living Arrangements home   Patient/Family Anticipates Transition to home with help/services   Patient/Family Anticipated Services at Transition home health care   DME Needed Upon Discharge  none   Discharge Plan A Home Health   Discharge Plan B Home Health

## 2024-12-11 NOTE — ASSESSMENT & PLAN NOTE
Multilevel vertebral body compression fracture deformities throughout the thoracolumbar spine, with likely subacute fractures involving the L3 and L5 vertebral bodies   Plan as mentioned in HPI  She should participate in PT/OT and wear TLSO brace  Will benefit from facility placement although being C diff positive will be a stumbling block for SNF placement  If backpain gets worse Primary team need to Consult Neurosurgeon again

## 2024-12-11 NOTE — PROGRESS NOTES
Automatic Inhaler to Nebulizer Interchange    fluticasone/vilanterol (Breo Ellipta) 100 mcg/25 mcg changed to budesonide 0.5 mg twice daily AND arformoterol 15 mcg twice daily per [facility] Automatic Therapeutic Substitutions Protocol.    Please contact pharmacy at extension [phone] with any questions.     Thank you,   John Dawkins

## 2024-12-11 NOTE — DISCHARGE SUMMARY
[facility]  Discharge Summary      Patient Name: Daryleen G Moran  MRN: [id number]  LUIZ: [id number]  Patient Class: OP- Observation  Admission Date: 12/10/2024  Hospital Length of Stay: 0 days  Discharge Date and Time: No discharge date for patient encounter.  Attending Physician: Audi Valdes MD   Discharging Provider: Audi Valdes MD  Primary Care Provider: Abhi De Oliveira IV, MD    Primary Care Team: Networked reference to record PCT     HPI:   79 year old pt getting admitted with C diff colitis  Pt was admitted very recently here with lumbar compression fractures and diarrhhoea  She was asked to come back to hospital if diarrhea worsens  Pt was having severe loose stools and it went worse yesterday and today and she came back to ER   C Diff PCR came back as positive as per records   Regarding lumbar compression fracture Neurosurgeon advised conservative management with TLSO brace  Plan for verterbroplasty will be made on Neurosurgeon office as per DC summary   When pt was here last time she denied PT/OT & SNF placement !!    * No surgery found *      Hospital Course:   Patient with recent lumbar compression fracture presented with diarrhea.  Diagnosed with C diff, started on vancomycin.  Diarrhea improved, patient is stable for discharge.  Patient instructed to follow up with neurosurgeon outpatient.  Patient again declined skilled nursing facility placement    Physical Exam     Cardiovascular:      Rate and Rhythm: Normal rate.   Pulmonary:      Effort: Pulmonary effort is normal     Goals of Care Treatment Preferences:  Code Status: Full Code      SDOH Screening:  The patient was screened for utility difficulties, food insecurity, transport difficulties, housing insecurity, and interpersonal safety and there were no concerns identified this admission.     Consults:   Consults (From admission, onward)          Status Ordering Provider     Inpatient consult to    Once        Provider:  (Not yet assigned)    Acknowledged ROE REEVES            Lumbar compression fracture  Multilevel vertebral body compression fracture deformities throughout the thoracolumbar spine, with likely subacute fractures involving the L3 and L5 vertebral bodies   Plan as mentioned in HPI  She should participate in PT/OT and wear TLSO brace  Will benefit from facility placement although being C diff positive will be a stumbling block for SNF placement  If backpain gets worse Primary team need to Consult Neurosurgeon again       COPD (chronic obstructive pulmonary disease)  Stable  Maintain home meds    Obesity (BMI 30-39.9)  Body mass index is 39.06 kg/m². Morbid obesity complicates all aspects of disease management from diagnostic modalities to treatment.       Debility  PT/OT  May need placement  She rejected PT/OT&SNF placement last time       CAD (coronary artery disease)  Aware  Maintain GDMT     Paroxysmal atrial fibrillation  Patient has permanent atrial fibrillation. Patient is currently in sinus rhythm. [id number] Score: 5. The patients heart rate in the last 24 hours is as follows:  Pulse  Min: 95  Max: 105     Antiarrhythmics  diltiaZEM 24 hr capsule 120 mg, Daily, Oral          Final Active Diagnoses:    Diagnosis Date Noted POA    Lumbar compression fracture [S32.000A] 12/10/2024 Yes    COPD (chronic obstructive pulmonary disease) [J44.9] 08/20/2024 Yes    Obesity (BMI 30-39.9) [E66.9] 09/14/2023 Yes    Debility [R53.81] 12/21/2022 Yes    CAD (coronary artery disease) [I25.10] 12/19/2022 Yes    Paroxysmal atrial fibrillation [I48.0] 05/02/2022 Yes      Problems Resolved During this Admission:    Diagnosis Date Noted Date Resolved POA    PRINCIPAL PROBLEM:  C. difficile colitis [A04.72] 12/10/2024 12/11/2024 Yes       Discharged Condition: good    Disposition: Home or Self Care    Follow Up:   Follow-up Information       [facility] Follow up.    Why: home  health will contact patient to schedule.  Contact information:  [address] [place]  [place] [address]  [phone]             Abhi De Oliveira IV, MD Follow up.    Specialty: Family Medicine  Why: As previously scheduled.  Contact information:  [address]   [place] [address]  [phone]                           Patient Instructions:      Diet Adult Regular     SUBSEQUENT HOME HEALTH ORDERS   Order Comments: Resume home health orders as previously written.     Order Specific Question Answer Comments   What Home Health Agency is the patient currently using? Other/External      Notify your health care provider if you experience any of the following:  temperature >100.4     Notify your health care provider if you experience any of the following:  persistent nausea and vomiting or diarrhea     Notify your health care provider if you experience any of the following:  severe uncontrolled pain     Notify your health care provider if you experience any of the following:  redness, tenderness, or signs of infection (pain, swelling, redness, odor or green/yellow discharge around incision site)     Activity as tolerated       Significant Diagnostic Studies: Labs: CMP   Recent Labs   Lab 12/10/24  1529 12/11/24  0633    136   K 4.1 4.0   CL 97 100   CO2 30* 30*   * 105   BUN 17 15   CREATININE 0.7 0.5   CALCIUM 9.8 9.2   PROT 7.0 6.3   ALBUMIN 4.0 3.6   BILITOT 0.8 0.7   ALKPHOS 90 78   AST 17 12   ALT 12 8*   ANIONGAP 9 6*    and CBC   Recent Labs   Lab 12/10/24  1529 12/11/24  0634   WBC 8.93 6.47   HGB 15.8 14.4   HCT 49.6* 45.3    179       Pending Diagnostic Studies:       None           Medications:  Reconciled Home Medications:      Medication List        START taking these medications      vancomycin 125 MG capsule  Commonly known as: VANCOCIN  Take 1 capsule (125 mg total) by mouth 4 (four) times daily. for 9 days            CHANGE how you take these medications       ergocalciferol 50,000 unit Cap  Commonly known as: ERGOCALCIFEROL  TAKE 1 CAPSULE (50,000 UNITS TOTAL) EVERY 7 DAYS.  What changed: See the new instructions.            CONTINUE taking these medications      albuterol 90 mcg/actuation inhaler  Commonly known as: PROVENTIL/VENTOLIN HFA  Inhale 2 puffs into the lungs every 6 (six) hours as needed for Shortness of Breath.     aspirin 81 MG EC tablet  Commonly known as: ECOTRIN  Take 81 mg by mouth once daily.     BREO ELLIPTA 100-25 mcg/dose diskus inhaler  Generic drug: fluticasone furoate-vilanteroL  Inhale 1 puff into the lungs once daily. Controller     calcium carbonate 500 mg calcium (1,250 mg) tablet  Commonly known as: OS-TK  Take 1 tablet (500 mg total) by mouth once daily.     carvediloL 12.5 MG tablet  Commonly known as: COREG  Take 12.5 mg by mouth 2 (two) times daily.     citalopram 40 MG tablet  Commonly known as: CeleXA  Take 40 mg by mouth once daily.     clopidogreL 75 mg tablet  Commonly known as: PLAVIX  Take 1 tablet (75 mg total) by mouth once daily.     diltiaZEM 120 MG Cdcr  Commonly known as: DILACOR XR  Take 1 capsule (120 mg total) by mouth once daily.     donepeziL 5 MG tablet  Commonly known as: ARICEPT  Take 5 mg by mouth nightly.     FeroSuL 325 mg (65 mg iron) Tab tablet  Generic drug: ferrous sulfate  Take 325 mg by mouth once daily.     fluticasone propionate 50 mcg/actuation nasal spray  Commonly known as: FLONASE  1 spray by Each Nostril route once daily.     furosemide 40 MG tablet  Commonly known as: LASIX  Take 1 tablet (40 mg total) by mouth daily as needed (Take for weight gain > 2 pounds over 24 hours. May discuss with PCP).     lactobacillus acidophilus & bulgar 100 million cell packet  Commonly known as: LACTINEX  Take 1 packet (1 each total) by mouth 2 (two) times daily.     multivitamin capsule  Take 1 capsule by mouth once daily.     venlafaxine 75 MG 24 hr capsule  Commonly known as: EFFEXOR-XR  Take 1 capsule (75 mg  total) by mouth once daily.            STOP taking these medications      pantoprazole 40 MG tablet  Commonly known as: PROTONIX              Indwelling Lines/Drains at time of discharge:   Lines/Drains/Airways       Drain  Duration             Female External Urinary Catheter w/ Suction 12/10/24 2100 <1 day                    Time spent on the discharge of patient: 35 minutes         Audi Valeds MD  Department of Hospital Medicine  [facility]

## 2024-12-11 NOTE — PROGRESS NOTES
Automatic Inhaler to Nebulizer Interchange    fluticasone/vilanterol (Breo Ellipta) 100 mcg/25 mcg changed to budesonide 0.5 mg twice daily AND arformoterol 15 mcg twice daily per [facility] Automatic Therapeutic Substitutions Protocol.    Please contact pharmacy at extension [phone] with any questions.     Thank you,   John Dawkins

## 2024-12-11 NOTE — PLAN OF CARE
Resumption of home health orders sent to [facility] [place] [facility] health via [employer].      12/11/24 4110   Post-Acute Status   Post-Acute Authorization Home Health   Home Health Status Referrals Sent

## 2024-12-11 NOTE — CARE UPDATE
12/10/24 1947   Patient Assessment/Suction   Level of Consciousness (AVPU) alert   Respiratory Effort Normal;Unlabored   Expansion/Accessory Muscles/Retractions no use of accessory muscles   All Lung Fields Breath Sounds Anterior:;coarse   Rhythm/Pattern, Respiratory no shortness of breath reported;depth regular;pattern regular   Cough Frequency infrequent   Cough Type nonproductive   PRE-TX-O2   Device (Oxygen Therapy) nasal cannula   $ Is the patient on Low Flow Oxygen? Yes   Flow (L/min) (Oxygen Therapy) 2   SpO2 (!) 94 %   Pulse Oximetry Type Continuous   $ Pulse Oximetry - Multiple Charge Pulse Oximetry - Multiple   Pulse 78   Resp 20   Aerosol Therapy   $ Aerosol Therapy Charges Aerosol Treatment   Daily Review of Necessity (SVN) completed   Respiratory Treatment Status (SVN) given   Treatment Route (SVN) mask   Patient Position HOB elevated   Post Treatment Assessment (SVN) increased aeration   Signs of Intolerance (SVN) none   Education   $ Education Bronchodilator;Oxygen;15 min   Tobacco Cessation Intervention   Do you use any type of tobacco product? No  (Hx use.)   Are you interested in quitting use of tobacco products? Not interested   Are you interested in Nicotine Replacement for symptom relief? No   Respiratory Evaluation   $ Care Plan Tech Time 15 min   $ Respiratory Evaluation Complete   Evaluation For New Orders   Admitting Diagnosis diarrhea   Cardiac Diagnosis CAD   Pulmonary Diagnosis COPD   Home Oxygen   Has Home Oxygen? Yes   Liter Flow 2   Duration as needed   Route nasal cannula   Mode continuous   Device home concentrator   Home Aerosol, MDI, DPI, and Other Treatments/Therapies   Home Respiratory Therapy Per Patient/Review of Chart Yes   MDI Home Meds/Freq albuterol (PROVENTIL/VENTOLIN HFA) 90 mcg/actuation inhaler   DPI Home Meds/Freq fluticasone furoate-vilanteroL (BREO ELLIPTA) 100-25 mcg/dose diskus inhaler   Oxygen Care Plan   Oxygen Care Plan Per Protocol   SPO2 Goal (%) 95%  cardiac   Rationale SpO2 is <95% (Cardiac Pt.)   Bronchodilator Care Plan   Bronchodilator Care Plan MDI;DPI   MDI Meds w/ frequency   (albuterol (PROVENTIL/VENTOLIN HFA) 90 mcg/actuation inhaler)   DPI Meds w/ frequency   (fluticasone furoate-vilanteroL (BREO ELLIPTA) 100-25 mcg/dose diskus inhaler)   Rationale Maintain home respiratory medicine   Atelectasis Care Plan   Rationale No Rational Found   Airway Clearance Care Plan   Rationale No rationale found

## 2024-12-17 ENCOUNTER — PATIENT OUTREACH (OUTPATIENT)
Dept: ADMINISTRATIVE | Facility: OTHER | Age: 79
End: 2024-12-17
Payer: MEDICARE

## 2024-12-17 NOTE — PROGRESS NOTES
"  CHW - Initial Contact    This Community Health Worker completed OR updated the Social Determinant of Health questionnaire with caregiver via telephone today.    Pt identified barriers of most importance are: medical bills     Support and Services: Daughter Sally stated her and her daughter are staying with patient currently while in recovery. She stated mom has dementia and doesn't remember a lot. She stated she is still bleeding from when catheter was taken out. I advised her to let PCP know when at appt Thursday 19th. She said, "after bills are paid, only thing hard to cover is medical bills and would like [facility] to take a megan case, consider fair billing." I provided Sally with [facility] Financial Assistance number.   Other information discussed the patient needs / wants help with: n/a   Follow up required:   No future outreach task assigned    "

## 2025-01-06 ENCOUNTER — HOSPITAL ENCOUNTER (OUTPATIENT)
Dept: RADIOLOGY | Facility: HOSPITAL | Age: 80
Discharge: HOME | End: 2025-01-06
Attending: NEUROLOGICAL SURGERY
Payer: MEDICARE

## 2025-01-06 DIAGNOSIS — M54.9 BACK PAIN, UNSPECIFIED BACK LOCATION, UNSPECIFIED BACK PAIN LATERALITY, UNSPECIFIED CHRONICITY: ICD-10-CM

## 2025-01-06 PROCEDURE — 72100 X-RAY EXAM L-S SPINE 2/3 VWS: CPT | Mod: TC

## 2025-01-06 PROCEDURE — 72100 X-RAY EXAM L-S SPINE 2/3 VWS: CPT | Mod: 26,,, | Performed by: RADIOLOGY

## 2025-01-07 ENCOUNTER — TELEPHONE (OUTPATIENT)
Dept: NEUROSURGERY | Facility: CLINIC | Age: 80
End: 2025-01-07
Payer: MEDICARE

## 2025-01-07 ENCOUNTER — TELEPHONE (OUTPATIENT)
Dept: CARDIOLOGY | Facility: CLINIC | Age: 80
End: 2025-01-07
Payer: MEDICARE

## 2025-01-07 ENCOUNTER — DOCUMENTATION ONLY (OUTPATIENT)
Dept: NEUROSURGERY | Facility: HOSPITAL | Age: 80
End: 2025-01-07

## 2025-01-07 DIAGNOSIS — M81.0 OSTEOPOROSIS, UNSPECIFIED OSTEOPOROSIS TYPE, UNSPECIFIED PATHOLOGICAL FRACTURE PRESENCE: ICD-10-CM

## 2025-01-07 DIAGNOSIS — S32.039A CLOSED FRACTURE OF THIRD LUMBAR VERTEBRA, UNSPECIFIED FRACTURE MORPHOLOGY, INITIAL ENCOUNTER: Primary | ICD-10-CM

## 2025-01-07 RX ORDER — TRAMADOL HYDROCHLORIDE 50 MG/1
50 TABLET ORAL EVERY 8 HOURS PRN
Qty: 10 TABLET | Refills: 0 | Status: SHIPPED | OUTPATIENT
Start: 2025-01-07

## 2025-01-07 RX ORDER — IBUPROFEN 600 MG/1
600 TABLET ORAL EVERY 6 HOURS PRN
Qty: 120 TABLET | Refills: 0 | Status: SHIPPED | OUTPATIENT
Start: 2025-01-07 | End: 2025-02-06

## 2025-01-07 NOTE — PROGRESS NOTES
Reviewed lumbar x-ray which revealed stable L3 and L5 compression fracture.  No significant progressive height loss or retropulsion was noted.    Per nurse, patient reports significant back pain which has not improved since hospital visit on 12/06/24    Creatinine and GFR reviewed.    Ibuprofen and tramadol ordered for pain.  Daughter reported patient has tolerated ibuprofen in the past.    Patient referred to pain management for vertebroplasty consideration.  Referral also placed for Rheumatology for osteoporosis treatment per patient's request.

## 2025-01-07 NOTE — TELEPHONE ENCOUNTER
Called pt daughter, she informed this nurse that pt in is a large amount of pain which is why she was unable to make her appt with Dr. Staples yesterday and she would not be coming to her appt today with Mirian Schulte PA-C. Patient daughter informed this nurse that the pt has been taking Tylenol since she was at the hospital last without relief from her pain. Informed pt daughter that pt is being ordered Ibuprofen 600 mg for her pain, she is being referred to pain management for a possible vertebroplasty, pt needs to be seen by an endocrinologist regarding her osteoporosis which is most likely contributing to her multiple fx she has had, all the above per Mirian Schulte PA-C. Pt daughter informed this nurse she was unaware, until she was told at the hospital, that pt osteoporosis was contributing to the occurrence of her multiple fractures. Informed pt daughter that per provider pt will continue to have issues with fractures if she does not get treatment for osteoporosis.

## 2025-01-07 NOTE — TELEPHONE ENCOUNTER
----- Message from Meg sent at 1/7/2025  9:13 AM CST -----  Pt's daughter Sally is requesting a callback at  [phone].

## 2025-01-07 NOTE — TELEPHONE ENCOUNTER
----- Message from Dior sent at 1/7/2025  9:04 AM CST -----  Regarding: Call  Type:  Sooner Apoointment Request    Caller is requesting a sooner appointment.  Caller declined first available appointment listed below.  Caller will not accept being placed on the waitlist and is requesting a message be sent to doctor.    Name of Caller:Pt daughter    When is the first available appointment?1/7/25    Symptoms:.    Would the patient rather a call back or a response via [facility]? Call    Best Call Back Number:[phone]    Additional Information: Pt daughter is requesting a call back to reschedule appt for today. Thanks

## 2025-01-08 ENCOUNTER — HOSPITAL ENCOUNTER (EMERGENCY)
Facility: HOSPITAL | Age: 80
Discharge: HOME | End: 2025-01-08
Attending: EMERGENCY MEDICINE
Payer: MEDICARE

## 2025-01-08 VITALS
TEMPERATURE: 98 F | OXYGEN SATURATION: 95 % | RESPIRATION RATE: 18 BRPM | DIASTOLIC BLOOD PRESSURE: 75 MMHG | SYSTOLIC BLOOD PRESSURE: 146 MMHG | HEART RATE: 87 BPM | WEIGHT: 180 LBS | BODY MASS INDEX: 35.15 KG/M2

## 2025-01-08 DIAGNOSIS — K43.9 VENTRAL HERNIA WITHOUT OBSTRUCTION OR GANGRENE: Primary | ICD-10-CM

## 2025-01-08 DIAGNOSIS — R10.9 ABDOMINAL PAIN: ICD-10-CM

## 2025-01-08 LAB
ALBUMIN SERPL BCP-MCNC: 3.4 G/DL (ref 3.5–5.2)
ALP SERPL-CCNC: 64 U/L (ref 55–135)
ALT SERPL W/O P-5'-P-CCNC: 12 U/L (ref 10–44)
ANION GAP SERPL CALC-SCNC: 4 MMOL/L (ref 8–16)
AST SERPL-CCNC: 15 U/L (ref 10–40)
BACTERIA #/AREA URNS HPF: NORMAL /HPF
BASOPHILS # BLD AUTO: 0.06 K/UL (ref 0–0.2)
BASOPHILS NFR BLD: 0.9 % (ref 0–1.9)
BILIRUB SERPL-MCNC: 0.4 MG/DL (ref 0.1–1)
BILIRUB UR QL STRIP: NEGATIVE
BUN SERPL-MCNC: 8 MG/DL (ref 8–23)
CALCIUM SERPL-MCNC: 8.8 MG/DL (ref 8.7–10.5)
CHLORIDE SERPL-SCNC: 98 MMOL/L (ref 95–110)
CLARITY UR: CLEAR
CO2 SERPL-SCNC: 35 MMOL/L (ref 23–29)
COLOR UR: YELLOW
CREAT SERPL-MCNC: 0.5 MG/DL (ref 0.5–1.4)
CRP SERPL-MCNC: 1.3 MG/DL
DIFFERENTIAL METHOD BLD: ABNORMAL
EOSINOPHIL # BLD AUTO: 0.1 K/UL (ref 0–0.5)
EOSINOPHIL NFR BLD: 1.7 % (ref 0–8)
ERYTHROCYTE [DISTWIDTH] IN BLOOD BY AUTOMATED COUNT: 14.9 % (ref 11.5–14.5)
EST. GFR  (NO RACE VARIABLE): >60 ML/MIN/1.73 M^2
GLUCOSE SERPL-MCNC: 117 MG/DL (ref 70–110)
GLUCOSE UR QL STRIP: NEGATIVE
HCT VFR BLD AUTO: 45.9 % (ref 37–48.5)
HGB BLD-MCNC: 14.7 G/DL (ref 12–16)
HGB UR QL STRIP: NEGATIVE
HYALINE CASTS #/AREA URNS LPF: 0 /LPF
IMM GRANULOCYTES # BLD AUTO: 0.02 K/UL (ref 0–0.04)
IMM GRANULOCYTES NFR BLD AUTO: 0.3 % (ref 0–0.5)
KETONES UR QL STRIP: NEGATIVE
LEUKOCYTE ESTERASE UR QL STRIP: NEGATIVE
LIPASE SERPL-CCNC: 56 U/L (ref 4–60)
LYMPHOCYTES # BLD AUTO: 1.4 K/UL (ref 1–4.8)
LYMPHOCYTES NFR BLD: 21.2 % (ref 18–48)
MCH RBC QN AUTO: 29.1 PG (ref 27–31)
MCHC RBC AUTO-ENTMCNC: 32 G/DL (ref 32–36)
MCV RBC AUTO: 91 FL (ref 82–98)
MICROSCOPIC COMMENT: NORMAL
MONOCYTES # BLD AUTO: 0.6 K/UL (ref 0.3–1)
MONOCYTES NFR BLD: 9.4 % (ref 4–15)
NEUTROPHILS # BLD AUTO: 4.4 K/UL (ref 1.8–7.7)
NEUTROPHILS NFR BLD: 66.5 % (ref 38–73)
NITRITE UR QL STRIP: NEGATIVE
NRBC BLD-RTO: 0 /100 WBC
PH UR STRIP: 7 [PH] (ref 5–8)
PLATELET # BLD AUTO: 190 K/UL (ref 150–450)
PMV BLD AUTO: 10.8 FL (ref 9.2–12.9)
POTASSIUM SERPL-SCNC: 4.2 MMOL/L (ref 3.5–5.1)
PROT SERPL-MCNC: 6 G/DL (ref 6–8.4)
PROT UR QL STRIP: ABNORMAL
RBC # BLD AUTO: 5.05 M/UL (ref 4–5.4)
RBC #/AREA URNS HPF: 3 /HPF (ref 0–4)
SODIUM SERPL-SCNC: 137 MMOL/L (ref 136–145)
SP GR UR STRIP: >1.03 (ref 1–1.03)
SQUAMOUS #/AREA URNS HPF: 3 /HPF
URN SPEC COLLECT METH UR: ABNORMAL
UROBILINOGEN UR STRIP-ACNC: NEGATIVE EU/DL
WBC # BLD AUTO: 6.61 K/UL (ref 3.9–12.7)
WBC #/AREA URNS HPF: 1 /HPF (ref 0–5)

## 2025-01-08 PROCEDURE — 96374 THER/PROPH/DIAG INJ IV PUSH: CPT

## 2025-01-08 PROCEDURE — 99285 EMERGENCY DEPT VISIT HI MDM: CPT | Mod: 25

## 2025-01-08 PROCEDURE — 96375 TX/PRO/DX INJ NEW DRUG ADDON: CPT

## 2025-01-08 PROCEDURE — 85025 COMPLETE CBC W/AUTO DIFF WBC: CPT | Performed by: EMERGENCY MEDICINE

## 2025-01-08 PROCEDURE — 25500020 PHARM REV CODE 255: Performed by: EMERGENCY MEDICINE

## 2025-01-08 PROCEDURE — 80053 COMPREHEN METABOLIC PANEL: CPT | Performed by: EMERGENCY MEDICINE

## 2025-01-08 PROCEDURE — 63600175 PHARM REV CODE 636 W HCPCS: Performed by: EMERGENCY MEDICINE

## 2025-01-08 PROCEDURE — 93005 ELECTROCARDIOGRAM TRACING: CPT | Performed by: INTERNAL MEDICINE

## 2025-01-08 PROCEDURE — 93010 ELECTROCARDIOGRAM REPORT: CPT | Mod: ,,, | Performed by: INTERNAL MEDICINE

## 2025-01-08 PROCEDURE — 83690 ASSAY OF LIPASE: CPT | Performed by: EMERGENCY MEDICINE

## 2025-01-08 PROCEDURE — 81001 URINALYSIS AUTO W/SCOPE: CPT | Performed by: EMERGENCY MEDICINE

## 2025-01-08 PROCEDURE — 86140 C-REACTIVE PROTEIN: CPT | Performed by: EMERGENCY MEDICINE

## 2025-01-08 RX ORDER — MORPHINE SULFATE 4 MG/ML
4 INJECTION, SOLUTION INTRAMUSCULAR; INTRAVENOUS
Status: COMPLETED | OUTPATIENT
Start: 2025-01-08 | End: 2025-01-08

## 2025-01-08 RX ORDER — ONDANSETRON HYDROCHLORIDE 2 MG/ML
4 INJECTION, SOLUTION INTRAVENOUS
Status: COMPLETED | OUTPATIENT
Start: 2025-01-08 | End: 2025-01-08

## 2025-01-08 RX ADMIN — MORPHINE SULFATE 4 MG: 4 INJECTION, SOLUTION INTRAMUSCULAR; INTRAVENOUS at 12:01

## 2025-01-08 RX ADMIN — ONDANSETRON 4 MG: 2 INJECTION INTRAMUSCULAR; INTRAVENOUS at 12:01

## 2025-01-08 RX ADMIN — IOHEXOL 100 ML: 350 INJECTION, SOLUTION INTRAVENOUS at 01:01

## 2025-01-08 NOTE — ED PROVIDER NOTES
Encounter Date: 1/8/2025       History     Chief Complaint   Patient presents with    Abdominal Pain     Left sided hernia rigid per ems. Dementia hx      This is a 79-year-old female with past medical history of COPD, hypertension, hyperlipidemia, diabetes, paroxysmal AFib, prior TAVR, coronary artery disease who comes in complaining of abdominal pain.  Patient reports that she has a left-sided hernia and over the past day it has been more painful.  She denies any nausea or vomiting with it.  No constipation or diarrhea.  She denies any fevers or chills.  She denies any exacerbating or alleviating factors otherwise.                Review of patient's allergies indicates:   Allergen Reactions    Latex      Other reaction(s): Unknown  Other reaction(s): Unknown    Sulfacetamide sodium      Pain perineal area    Sulfasalazine Hives    Adhesive Itching     SKIN GETS RED WITH TAPE AND BANDAIDS    Adhesive tape-silicones Itching     SKIN GETS RED WITH TAPE AND BANDAIDS    Sulfa (sulfonamide antibiotics) Rash     Past Medical History:   Diagnosis Date    Anesthesia complication     BLADDER DYSFUNCTION    Aortic stenosis     Arthritis     Back pain     Diabetes mellitus type II     NO LONGER DIABETIC    Encounter for blood transfusion     Encounter for pain management 12/6/2024    Hyperlipidemia     Hypertension     NSTEMI (non-ST elevated myocardial infarction) 05/01/2022    Osteoporosis     Wears glasses      Past Surgical History:   Procedure Laterality Date     vocal cord nodules removed  long time ago     twice    ADRENAL TUMOR      APPENDECTOMY  within last 5yrs    CATHETERIZATION OF BOTH LEFT AND RIGHT HEART Left 05/06/2022    Procedure: CATHETERIZATION, HEART, BOTH LEFT AND RIGHT;  Surgeon: Michelet Vargas MD;  Location: [facility] CATH/EP LAB;  Service: Cardiology;  Laterality: Left;    COLONOSCOPY N/A 6/23/2023    Procedure: COLONOSCOPY;  Surgeon: Joel Neal III, MD;  Location: [facility] ENDO;  Service:  Endoscopy;  Laterality: N/A;    FIXATION KYPHOPLASTY THORACIC SPINE      14    FOOT SURGERY      left 2nd toe was too long     HERNIA REPAIR  within last 5yrs    INSERTION OF TEMPORARY PACEMAKER N/A 2023    Procedure: INSERTION, PACEMAKER, TEMPORARY;  Surgeon: Bhavesh Peña MD;  Location: [facility] CATH;  Service: Cardiology;  Laterality: N/A;    LEFT HEART CATHETERIZATION Left 2022    Procedure: Left heart cath;  Surgeon: Jose Echols MD;  Location: [facility] CATH/EP LAB;  Service: Cardiology;  Laterality: Left;    SMALL BOWEL ENTEROSCOPY N/A 2023    Procedure: ENTEROSCOPY;  Surgeon: Cornell Aguirre MD;  Location: [facility] ENDO;  Service: Endoscopy;  Laterality: N/A;    SMALL BOWEL ENTEROSCOPY N/A 10/20/2023    Procedure: ENTEROSCOPY;  Surgeon: Otilio Bourgeois MD;  Location: [facility] ENDO;  Service: Endoscopy;  Laterality: N/A;    TONSILLECTOMY, ADENOIDECTOMY  long time ago    TRANSCATHETER AORTIC VALVE REPLACEMENT (TAVR) Bilateral 2023    Procedure: REPLACEMENT, AORTIC VALVE, TRANSCATHETER (TAVR);  Surgeon: Bhavesh Peña MD;  Location: [facility] CATH;  Service: Cardiology;  Laterality: Bilateral;    TRANSCATHETER AORTIC VALVE REPLACEMENT (TAVR) Bilateral 2023    Procedure: REPLACEMENT, AORTIC VALVE, TRANSCATHETER (TAVR);  Surgeon: Dallin Verma MD;  Location: [facility] CATH;  Service: Cardiology;  Laterality: Bilateral;    TRANSTHORACIC ECHOCARDIOGRAPHY (TTE)  2023    Procedure: ECHOCARDIOGRAM, TRANSTHORACIC;  Surgeon: Bhavesh Peña MD;  Location: [facility] CATH;  Service: Cardiology;;     Family History   Problem Relation Name Age of Onset    Arthritis Mother      Collagen disease Neg Hx       Social History     Tobacco Use    Smoking status: Former     Current packs/day: 0.00     Average packs/day: 0.5 packs/day for 35.0 years (17.5 ttl pk-yrs)     Types: Cigarettes     Start date: 10/5/1987     Quit date: 10/5/2022     Years since quittin.2    Smokeless tobacco: Never   Substance Use Topics     Alcohol use: No    Drug use: No     Review of Systems   Constitutional:  Negative for chills and fever.   HENT:  Negative for congestion, sore throat and trouble swallowing.    Respiratory:  Negative for cough and shortness of breath.    Cardiovascular:  Negative for chest pain and palpitations.   Gastrointestinal:  Positive for abdominal pain. Negative for diarrhea, nausea and vomiting.   Genitourinary:  Negative for dysuria and flank pain.   Musculoskeletal:  Negative for back pain and neck pain.   Neurological:  Negative for weakness, numbness and headaches.   Psychiatric/Behavioral:  Negative for agitation and confusion.    All other systems reviewed and are negative.      Physical Exam     Initial Vitals [01/08/25 1200]   BP Pulse Resp Temp SpO2   (!) 145/85 88 19 98.2 °F (36.8 °C) 95 %      MAP       --         Physical Exam    Nursing note and vitals reviewed.  Constitutional: Vital signs are normal. She appears well-developed and well-nourished.  Non-toxic appearance. No distress.   HENT:   Head: Normocephalic and atraumatic.   Eyes: EOM are normal. Pupils are equal, round, and reactive to light.   Neck: Neck supple.   Cardiovascular:  Intact distal pulses.           Irregularly irregular   Pulmonary/Chest: Breath sounds normal. She has no wheezes.   Abdominal: There is abdominal tenderness.   Tenderness to palpation with hernia over the left lateral abdominal wall, not reducible.   Musculoskeletal:         General: No tenderness or edema. Normal range of motion.      Cervical back: Neck supple. No rigidity. No muscular tenderness.     Lymphadenopathy:     She has no cervical adenopathy.     She has no axillary adenopathy.   Neurological: She is alert and oriented to person, place, and time. She has normal strength. No cranial nerve deficit or sensory deficit. Gait normal.   Skin: Skin is warm, dry and intact.   Psychiatric: She has a normal mood and affect. Her behavior is normal.         ED Course    Procedures  Labs Reviewed   CBC W/ AUTO DIFFERENTIAL - Abnormal       Result Value    WBC 6.61      RBC 5.05      Hemoglobin 14.7      Hematocrit 45.9      MCV 91      MCH 29.1      MCHC 32.0      RDW 14.9 (*)     Platelets 190      MPV 10.8      Immature Granulocytes 0.3      Gran # (ANC) 4.4      Immature Grans (Abs) 0.02      Lymph # 1.4      Mono # 0.6      Eos # 0.1      Baso # 0.06      nRBC 0      Gran % 66.5      Lymph % 21.2      Mono % 9.4      Eosinophil % 1.7      Basophil % 0.9      Differential Method Automated     COMPREHENSIVE METABOLIC PANEL - Abnormal    Sodium 137      Potassium 4.2      Chloride 98      CO2 35 (*)     Glucose 117 (*)     BUN 8      Creatinine 0.5      Calcium 8.8      Total Protein 6.0      Albumin 3.4 (*)     Total Bilirubin 0.4      Alkaline Phosphatase 64      AST 15      ALT 12      eGFR >60.0      Anion Gap 4 (*)    C-REACTIVE PROTEIN - Abnormal    CRP 1.30 (*)    URINALYSIS, REFLEX TO URINE CULTURE - Abnormal    Specimen UA Urine, Clean Catch      Color, UA Yellow      Appearance, UA Clear      pH, UA 7.0      Specific Gravity, UA >1.030 (*)     Protein, UA 1+ (*)     Glucose, UA Negative      Ketones, UA Negative      Bilirubin (UA) Negative      Occult Blood UA Negative      Nitrite, UA Negative      Urobilinogen, UA Negative      Leukocytes, UA Negative      Narrative:     Specimen Source->Urine   LIPASE    Lipase 56     URINALYSIS MICROSCOPIC    RBC, UA 3      WBC, UA 1      Bacteria Occasional      Squam Epithel, UA 3      Hyaline Casts, UA 0      Microscopic Comment SEE COMMENT      Narrative:     Specimen Source->Urine     EKG Readings: (Independently Interpreted)   EKG was independently interpreted by me contemporaneously with patient care  Time: 90 beats per minute  AFib  Nonspecific T-wave abnormality  Unchanged from prior     ECG Results              EKG 12-lead (In process)        Collection Time Result Time QRS Duration [facility] QTC Calculation    01/08/25  11:33:24 01/08/25 11:38:54 80 445                     In process by Interface, Lab In [facility] (01/08/25 11:39:03)                   Narrative:    Test Reason : R10.9,    Vent. Rate :  90 BPM     Atrial Rate : 119 BPM     P-R Int :    ms          QRS Dur :  80 ms      QT Int : 364 ms       P-R-T Axes :     69  22 degrees    QTcB Int : 445 ms    Atrial fibrillation  Abnormal ECG  When compared with ECG of 20-Aug-2024 17:32,  Nonspecific T wave abnormality no longer evident in Anterior leads    Referred By: AAAREFERRAL SELF           Confirmed By:                                   Imaging Results              CT Abdomen Pelvis With IV Contrast NO Oral Contrast (Final result)  Result time 01/08/25 13:38:56      Final result by Alexis Saucedo MD (01/08/25 13:38:56)                   Impression:      1.  Findings compatible with mild proctocolitis, without finding of bowel obstruction, intra-abdominal free air or abscess.    2.  Mild circumferential bladder wall thickening with adjacent fat stranding, consider correlation with urinalysis for cystitis.    3.  Tiny focal striated nephrograms in the anterior inferior pole the right kidney suspicious for mild pyelonephritis (or possibly vascular disease, and less likely lymphoma or more aggressive process.    4.  No hydronephrosis/hydroureter or evidence of obstructive uropathy.    5.  Large left upper abdominal quadrant ventral abdominal hernia containing a nondilated loop of transverse colon.    6.  Diffuse low-attenuation liver compatible with steatosis.    7.  Cholelithiasis without CT findings of acute cholecystitis.    8.  numerous additional and incidental findings as above.      Electronically signed by: Alexis Saucedo  Date:    01/08/2025  Time:    13:38               Narrative:      CMS MANDATED QUALITY DATA - CT RADIATION - 436    All CT scans at this facility utilize dose modulation, iterative reconstruction, and/or weight based dosing when appropriate to  reduce radiation dose to as low as reasonably achievable.    CLINICAL HISTORY:  ([id number])78 y/o  (1945) F    Abdominal pain, acute, nonlocalized;    TECHNIQUE:  (A#[id number], exam time 1/8/2025 13:28)    CT ABDOMEN PELVIS WITH IV CONTRAST [id number]    Axial CT images of the abdomen and pelvis were obtained from the dome of the diaphragm to the proximal thighs.    COMPARISON:  CT from 12/04/2024    FINDINGS:  Lower Thorax:    The lungs are essentially clear noting linear dependent atelectasis versus scarring. There is no pleural or pericardial effusion on these images. The heart is mildly enlarged in size with an aortic valve endovascular stent.  Annular mitral valve calcification is seen..    CT Abdomen:    Liver: Relative diffuse low-attenuation liver consistent with hepatic steatosis.  There is a slightly nodular liver contour which may relate to the patient's age/senescence or early/mild morphologic features of cirrhosis, consider correlation with history, liver function tests and hepatitis serologies.    Gallbladder: There is a small stone in the gallbladder without CT finding of acute cholecystitis.    Biliary Tree: No intra or extrahepatic ductal dilation.    Spleen: Normal.    Pancreas: The pancreas is normal.    Adrenal Glands: There is an incidental 15 mm diameter right adrenal myelolipoma (image 42).  The left adrenal gland is atrophic/small.    Kidneys: No hydronephrosis/hydroureter or evidence of obstructive uropathy.  There is a 10 mm simple cyst in the upper pole the right kidney.  There is a 5 mm simple cyst in the midpole the left kidney.  No hydronephrosis/hydroureter or evidence of obstructive uropathy is seen.    There is striations in the renal nephrograms anteriorly in the inferior pole the right kidney (image 87 series 3).    Vasculature: Dense calcified mural plaques are seen in the abdominal aorta and at the origin of its major branch vessels.    Lymph nodes: No abdominal lymphadenopathy  is seen.    Intraperitoneal structures: There is no ascites.    Bowel: Mild circumferential colonic wall thickening at the anorectal junction with adjacent meso colic fat stranding (image 165).  The bowel is normal in course and caliber without finding of obstruction, free air or abscess identified. The appendix is surgically absent.    Abdominal wall: Large left parasagittal ventral abdominal wall hernia in the upper abdomen containing a nondilated loop of transverse colon and omentum/fat (image 79).  Ventral abdominal wall hernia mesh is seen adjacent to the umbilicus with a tiny fat containing umbilical hernia.    Musculoskeletal: There is diffusely decreased osseous mineralization (suggesting osteoporosis/osteopenia).  Multilevel vertebral body compression fracture deformities involving the L3, L2, T12, T10, and T9 vertebral bodies, with sclerosis in the L5 vertebral body.  These findings are similar to the previous exam noting vertebroplasty cement at T9 and T12.    CT Pelvis:    Bladder: The bladder is partially decompressed. That being said  there is mild circumferential bladder wall thickening. In the appropriate clinical setting this may relate to a mild degree of infection or inflammation; consider correlation with urinalysis.    Reproductive Organs: Within normal limits, unchanged noting a 12 mm simple cyst in the right ovary.    Pelvic Lymph nodes: No pelvic lymphadenopathy is identified.                                       Medications   morphine injection 4 mg (4 mg Intravenous Given 1/8/25 1228)   ondansetron injection 4 mg (4 mg Intravenous Given 1/8/25 1227)   iohexoL (OMNIPAQUE 350) injection 100 mL (100 mLs Intravenous Given 1/8/25 1323)     Medical Decision Making  This is a 79-year-old female with a history of COPD, hypertension, hyperlipidemia, paroxysmal AFib, prior TAVR, coronary artery disease who comes in complaining of abdominal pain.  On examination vitals are stable.  On physical exam  she has a hernia that is not reducible.  Exam is normal otherwise.    Orders included EKG, CBC, CMP, lipase, CT abdomen and pelvis.  She was given pain medication antiemetics.    Comorbidities contributing to patient's presentation include history of COPD, hypertension, hyperlipidemia, paroxysmal AFib, coronary artery disease.  Acute exacerbation of chronic illness: None    I reviewed patient's external medical notes.  She was recently discharged from [facility] after being admitted for abdominal pain and diarrhea.  At that time she was diagnosed with C diff.  She had not been having any difficulty with her hernia or pain at that time.    Differential diagnosis includes small-bowel obstruction, incarcerated hernia, colitis, pancreatitis, dehydration.      Amount and/or Complexity of Data Reviewed  Independent Historian: EMS     Details: EMS were independent historian of patient's symptoms.  External Data Reviewed: labs, radiology, ECG and notes.     Details: As detailed above  Labs: ordered.     Details: Labs were reviewed were unremarkable.  Radiology: ordered.     Details: CT abdomen and pelvis showed a hernia with no evidence of incarceration.  She does have evidence of colitis and concern for cystitis.  ECG/medicine tests: ordered and independent interpretation performed. Decision-making details documented in ED Course.    Risk  Prescription drug management.  Decision regarding hospitalization.  Risk Details: MDM continued:  Patient's workup is unremarkable.  She does have a hernia that is reducible on re-evaluation after pain medicine.  She has no evidence of incarceration on CT.  I talked to her daughter and patient is still being treated for C diff colitis.  She was just discharged from the hospital recently.  She has not had any worsening symptoms.  The daughter reports that she was only concerned because she was having abdominal pain in her hernia appear to be poking out.  Patient on re-evaluation is  asymptomatic.  She was discharged with close outpatient follow-up.    Social determinants of health:  Patient's niece was going to pick her up and take her home.  This was discussed with the daughter.               ED Course as of 01/08/25 1607   Wed Jan 08, 2025   1419 Talked to the patient's daughter on the phone.  She advised me that the patient is still being treated for C diff colitis.  The reason they called EMS today is because patient's hernia appeared to be more prominent.  Patient was also having more severe pain. [TZ]      ED Course User Index  [TZ] Margarita Bruce MD                           Clinical Impression:  Final diagnoses:  [R10.9] Abdominal pain  [K43.9] Ventral hernia without obstruction or gangrene (Primary)          ED Disposition Condition    Discharge Stable          ED Prescriptions    None       Follow-up Information       Follow up With Specialties Details Why Contact Info    Abhi De Oliveira IV, MD Family Medicine In 2 days  [address]   [place]  [place] [address]  [phone]               Margarita Bruce MD  01/08/25 6750

## 2025-01-13 LAB
OHS QRS DURATION: 80 MS
OHS QTC CALCULATION: 445 MS

## 2025-01-30 ENCOUNTER — TELEPHONE (OUTPATIENT)
Dept: PAIN MEDICINE | Facility: CLINIC | Age: 80
End: 2025-01-30
Payer: MEDICARE

## 2025-01-30 NOTE — TELEPHONE ENCOUNTER
Called  pt to reschedule appt that was cx during the snow weather ,LVM for pt to contact office to schedule appt

## 2025-02-06 ENCOUNTER — OFFICE VISIT (OUTPATIENT)
Dept: CARDIOLOGY | Facility: CLINIC | Age: 80
End: 2025-02-06
Payer: MEDICARE

## 2025-02-06 VITALS
OXYGEN SATURATION: 95 % | DIASTOLIC BLOOD PRESSURE: 80 MMHG | BODY MASS INDEX: 35.15 KG/M2 | HEIGHT: 60 IN | HEART RATE: 81 BPM | SYSTOLIC BLOOD PRESSURE: 140 MMHG

## 2025-02-06 DIAGNOSIS — Q27.30 AVM (ARTERIOVENOUS MALFORMATION): ICD-10-CM

## 2025-02-06 DIAGNOSIS — E66.01 SEVERE OBESITY (BMI 35.0-39.9) WITH COMORBIDITY: ICD-10-CM

## 2025-02-06 DIAGNOSIS — Z95.3 S/P TAVR (TRANSCATHETER AORTIC VALVE REPLACEMENT): Primary | ICD-10-CM

## 2025-02-06 DIAGNOSIS — F02.80 DEMENTIA IN OTHER DISEASES CLASSIFIED ELSEWHERE, UNSPECIFIED SEVERITY, WITHOUT BEHAVIORAL DISTURBANCE, PSYCHOTIC DISTURBANCE, MOOD DISTURBANCE, AND ANXIETY: ICD-10-CM

## 2025-02-06 DIAGNOSIS — D50.0 IRON DEFICIENCY ANEMIA DUE TO CHRONIC BLOOD LOSS: ICD-10-CM

## 2025-02-06 DIAGNOSIS — I48.11 LONGSTANDING PERSISTENT ATRIAL FIBRILLATION: ICD-10-CM

## 2025-02-06 DIAGNOSIS — I25.10 CORONARY ARTERY DISEASE INVOLVING NATIVE CORONARY ARTERY OF NATIVE HEART WITHOUT ANGINA PECTORIS: ICD-10-CM

## 2025-02-06 NOTE — LETTER
2025    Daryleen G Moran  [address]  [place] [address]             [facility]  [address]  [facility] [address]  Phone: [phone]  Fax: [phone] Patient: Daryleen G Moran  : 1945  Referring Doctor:  Type of procedure: dental procedure    Current Outpatient Medications   Medication Sig    albuterol (PROVENTIL/VENTOLIN HFA) 90 mcg/actuation inhaler Inhale 2 puffs into the lungs every 6 (six) hours as needed for Shortness of Breath.    aspirin (ECOTRIN) 81 MG EC tablet Take 81 mg by mouth once daily.    carvediloL (COREG) 12.5 MG tablet Take 12.5 mg by mouth 2 (two) times daily.    citalopram (CELEXA) 40 MG tablet Take 40 mg by mouth once daily.    clopidogreL (PLAVIX) 75 mg tablet Take 1 tablet (75 mg total) by mouth once daily.    diltiaZEM (DILACOR XR) 120 MG CDCR Take 1 capsule (120 mg total) by mouth once daily.    donepeziL (ARICEPT) 5 MG tablet Take 5 mg by mouth nightly.    ergocalciferol (ERGOCALCIFEROL) 50,000 unit Cap TAKE 1 CAPSULE (50,000 UNITS TOTAL) EVERY 7 DAYS.    FEROSUL 325 mg (65 mg iron) Tab tablet Take 325 mg by mouth once daily.    fluticasone furoate-vilanteroL (BREO ELLIPTA) 100-25 mcg/dose diskus inhaler Inhale 1 puff into the lungs once daily. Controller    fluticasone propionate (FLONASE) 50 mcg/actuation nasal spray 1 spray by Each Nostril route once daily.    furosemide (LASIX) 40 MG tablet Take 1 tablet (40 mg total) by mouth daily as needed (Take for weight gain > 2 pounds over 24 hours. May discuss with PCP).    ibuprofen (ADVIL,MOTRIN) 600 MG tablet Take 1 tablet (600 mg total) by mouth every 6 (six) hours as needed for Pain.    lactobacillus acidophilus & bulgar (LACTINEX) 100 million cell packet Take 1 packet (1 each total) by mouth 2 (two) times daily.    multivitamin capsule Take 1 capsule by mouth once daily.    traMADoL (ULTRAM) 50 mg tablet Take 1 tablet (50 mg  total) by mouth every 8 (eight) hours as needed for Pain.    venlafaxine (EFFEXOR-XR) 75 MG 24 hr capsule Take 1 capsule (75 mg total) by mouth once daily.    calcium carbonate (OS-TK) 500 mg calcium (1,250 mg) tablet Take 1 tablet (500 mg total) by mouth once daily.     No current facility-administered medications for this visit.       This patient has been assessed for risk factors for clearance of surgery with the following stipulations:    ___ No contraindications  _x__ Recommendations for antiplatelet/anticoagulant medications: ok to stop plavix for 5 days & asa for 5 days.  __x_ Cleared for surgery with the following contraindications/precautions:  NEEDS ANTIBIOTIC PROPHYLAXIS   ___ Not cleared for surgery due to the following reasons:      If you have any questions regarding the above, please contact my office at [phone]    Sincerely,

## 2025-02-06 NOTE — PROGRESS NOTES
Patient ID:  Daryleen G Moran is a 79 y.o. female who presents for follow-up of Hospital Follow Up (Multiple visits)      S/P TAVR (transcatheter aortic valve replacement)  Will obtain an echocardiogram to assess her aortic valve.     Paroxysmal atrial fibrillation  She is in atrial fibrillation now with rapid ventricular response she is to resume her digoxin     Hypertension  On diltiazem  mg daily, Lasix     Hyperlipidemia  On 10 mg of simvastatin     Iron deficiency anemia due to chronic blood loss  She is to resume her iron.    She had C diff a proximally 1 month ago.  It made her very sick.  She has had recurrent bone fraction of her pelvis and her back.  Her breathing is doing okay she denies any chest pains.  She is going to require dental work for which she needs antibiotic prophylaxis.          Past Medical History:   Diagnosis Date    Anesthesia complication     BLADDER DYSFUNCTION    Aortic stenosis     Arthritis     Back pain     Diabetes mellitus type II     NO LONGER DIABETIC    Encounter for blood transfusion     Encounter for pain management 12/6/2024    Hyperlipidemia     Hypertension     NSTEMI (non-ST elevated myocardial infarction) 05/01/2022    Osteoporosis     Wears glasses         Past Surgical History:   Procedure Laterality Date     vocal cord nodules removed  long time ago     twice    ADRENAL TUMOR      APPENDECTOMY  within last 5yrs    CATHETERIZATION OF BOTH LEFT AND RIGHT HEART Left 05/06/2022    Procedure: CATHETERIZATION, HEART, BOTH LEFT AND RIGHT;  Surgeon: Michelet Vargas MD;  Location: [facility] CATH/EP LAB;  Service: Cardiology;  Laterality: Left;    COLONOSCOPY N/A 6/23/2023    Procedure: COLONOSCOPY;  Surgeon: Joel Neal III, MD;  Location: [facility] ENDO;  Service: Endoscopy;  Laterality: N/A;    FIXATION KYPHOPLASTY THORACIC SPINE      8-20-13    FOOT SURGERY      left 2nd toe was too long     HERNIA REPAIR  within last 5yrs    INSERTION OF TEMPORARY PACEMAKER N/A  1/4/2023    Procedure: INSERTION, PACEMAKER, TEMPORARY;  Surgeon: Bhavesh Peña MD;  Location: [facility] CATH;  Service: Cardiology;  Laterality: N/A;    INTRAMEDULLARY RODDING OF FEMUR Right 2/10/2025    Procedure: INSERTION, INTRAMEDULLARY ALEIDA, FEMUR;  Surgeon: Antwan Ramesh MD;  Location: [facility] OR;  Service: Orthopedics;  Laterality: Right;    LEFT HEART CATHETERIZATION Left 05/02/2022    Procedure: Left heart cath;  Surgeon: Jose Echols MD;  Location: [facility] CATH/EP LAB;  Service: Cardiology;  Laterality: Left;    SMALL BOWEL ENTEROSCOPY N/A 6/22/2023    Procedure: ENTEROSCOPY;  Surgeon: Cornell Aguirre MD;  Location: [facility] ENDO;  Service: Endoscopy;  Laterality: N/A;    SMALL BOWEL ENTEROSCOPY N/A 10/20/2023    Procedure: ENTEROSCOPY;  Surgeon: Otilio Bourgeois MD;  Location: [facility] ENDO;  Service: Endoscopy;  Laterality: N/A;    TONSILLECTOMY, ADENOIDECTOMY  long time ago    TRANSCATHETER AORTIC VALVE REPLACEMENT (TAVR) Bilateral 1/4/2023    Procedure: REPLACEMENT, AORTIC VALVE, TRANSCATHETER (TAVR);  Surgeon: Bhavesh Peña MD;  Location: [facility] CATH;  Service: Cardiology;  Laterality: Bilateral;    TRANSCATHETER AORTIC VALVE REPLACEMENT (TAVR) Bilateral 1/4/2023    Procedure: REPLACEMENT, AORTIC VALVE, TRANSCATHETER (TAVR);  Surgeon: Dallin Verma MD;  Location: [facility] CATH;  Service: Cardiology;  Laterality: Bilateral;    TRANSTHORACIC ECHOCARDIOGRAPHY (TTE)  1/4/2023    Procedure: ECHOCARDIOGRAM, TRANSTHORACIC;  Surgeon: Bhavesh Peña MD;  [facility];  Service: Cardiology;;          Current Outpatient Medications   Medication Instructions    aspirin (ECOTRIN) 81 mg, Oral, Daily    carvediloL (COREG) 12.5 mg, 2 times daily    citalopram (CELEXA) 40 mg, Daily    clopidogreL (PLAVIX) 75 mg, Oral, Daily    diltiaZEM (DILACOR XR) 120 mg, Oral, Daily    donepeziL (ARICEPT) 5 mg, Nightly    FeroSuL 325 mg, Daily    fluticasone furoate-vilanteroL (BREO ELLIPTA) 100-25 mcg/dose diskus inhaler 1  puff, Daily    fluticasone propionate (FLONASE) 50 mcg/actuation nasal spray 1 spray, Daily    furosemide (LASIX) 40 mg, Oral, Daily PRN    lactobacillus acidophilus & bulgar (LACTINEX) 100 million cell packet 1 each, Oral, 2 times daily    multivitamin capsule 1 capsule, Daily    pantoprazole (PROTONIX) 40 mg, 2 times daily    traMADoL (ULTRAM) 50 mg, Oral, Every 8 hours PRN    venlafaxine (EFFEXOR-XR) 75 mg, Oral, Daily        Review of patient's allergies indicates:   Allergen Reactions    Latex      Other reaction(s): Unknown  Other reaction(s): Unknown    Sulfacetamide sodium      Pain perineal area    Sulfasalazine Hives    Adhesive Itching     SKIN GETS RED WITH TAPE AND BANDAIDS    Adhesive tape-silicones Itching     SKIN GETS RED WITH TAPE AND BANDAIDS    Sulfa (sulfonamide antibiotics) Rash        Review of Systems   Cardiovascular:  Negative for chest pain, dyspnea on exertion and palpitations.   Gastrointestinal:  Positive for diarrhea.   Psychiatric/Behavioral:  Positive for memory loss.         Objective:     Vitals:    02/06/25 1605   BP: (!) 140/80   BP Location: Left arm   Patient Position: Sitting   Pulse: 81   SpO2: 95%   Height: 5' (1.524 m)       Physical Exam  Vitals and nursing note reviewed.   Constitutional:       Appearance: She is well-developed.   HENT:      Head: Normocephalic and atraumatic.   Eyes:      Conjunctiva/sclera: Conjunctivae normal.   Cardiovascular:      Rate and Rhythm: Normal rate and regular rhythm.      Heart sounds: Normal heart sounds.   Pulmonary:      Effort: Pulmonary effort is normal.      Breath sounds: Normal breath sounds.   Abdominal:      General: Bowel sounds are normal.      Palpations: Abdomen is soft.   Musculoskeletal:         General: Normal range of motion.   Skin:     General: Skin is warm and dry.   Neurological:      Mental Status: She is alert and oriented to person, place, and time.   Psychiatric:         Behavior: Behavior normal.         Thought  Content: Thought content normal.         Judgment: Judgment normal.       CMP  Sodium   Date Value Ref Range Status   02/18/2025 141 136 - 145 mmol/L Final     Potassium   Date Value Ref Range Status   02/18/2025 4.2 3.5 - 5.1 mmol/L Final     Chloride   Date Value Ref Range Status   02/18/2025 102 95 - 110 mmol/L Final     CO2   Date Value Ref Range Status   02/18/2025 33 (H) 23 - 29 mmol/L Final     Glucose   Date Value Ref Range Status   02/18/2025 124 (H) 70 - 110 mg/dL Final     BUN   Date Value Ref Range Status   02/18/2025 17 8 - 23 mg/dL Final     Creatinine   Date Value Ref Range Status   02/18/2025 0.5 0.5 - 1.4 mg/dL Final   08/21/2013 0.8 0.5 - 1.4 mg/dL Final     Calcium   Date Value Ref Range Status   02/18/2025 8.7 8.7 - 10.5 mg/dL Final   08/21/2013 10.3 8.7 - 10.5 mg/dL Final     Total Protein   Date Value Ref Range Status   02/09/2025 6.4 6.0 - 8.4 g/dL Final     Albumin   Date Value Ref Range Status   02/09/2025 3.6 3.5 - 5.2 g/dL Final     Total Bilirubin   Date Value Ref Range Status   02/09/2025 0.7 0.1 - 1.0 mg/dL Final     Comment:     For infants and newborns, interpretation of results should be based  on gestational age, weight and in agreement with clinical  observations.    Premature Infant recommended reference ranges:  Up to 24 hours.............<8.0 mg/dL  Up to 48 hours............<12.0 mg/dL  3-5 days..................<15.0 mg/dL  6-29 days.................<15.0 mg/dL       Alkaline Phosphatase   Date Value Ref Range Status   02/09/2025 75 55 - 135 U/L Final     AST   Date Value Ref Range Status   02/09/2025 27 10 - 40 U/L Final     ALT   Date Value Ref Range Status   02/09/2025 22 10 - 44 U/L Final     Anion Gap   Date Value Ref Range Status   02/18/2025 6 (L) 8 - 16 mmol/L Final   08/21/2013 11 5 - 15 meq/L Final     eGFR if    Date Value Ref Range Status   05/06/2022 >60.0 >60 mL/min/1.73 m^2 Final     eGFR if non    Date Value Ref Range Status    05/06/2022 >60.0 >60 mL/min/1.73 m^2 Final     Comment:     Calculation used to obtain the estimated glomerular filtration  rate (eGFR) is the CKD-EPI equation.         BMP  Lab Results   Component Value Date     02/18/2025    K 4.2 02/18/2025     02/18/2025    CO2 33 (H) 02/18/2025    BUN 17 02/18/2025    CREATININE 0.5 02/18/2025    CALCIUM 8.7 02/18/2025    ANIONGAP 6 (L) 02/18/2025    ESTGFRAFRICA >60.0 05/06/2022    EGFRNONAA >60.0 05/06/2022      BNP  @LABRCNTIP(BNP,BNPTRIAGEBLO)@   Lab Results   Component Value Date    CHOL 152 02/24/2023     Lab Results   Component Value Date    HDL 70 02/24/2023     Lab Results   Component Value Date    LDLCALC 61.6 (L) 02/24/2023     Lab Results   Component Value Date    TRIG 102 02/24/2023     Lab Results   Component Value Date    CHOLHDL 46.1 02/24/2023      Lab Results   Component Value Date    TSH 1.834 02/10/2025    FREET4 1.01 03/04/2024     Lab Results   Component Value Date    HGBA1C 6.3 (H) 02/10/2025     Lab Results   Component Value Date    WBC 7.04 02/18/2025    HGB 10.4 (L) 02/18/2025    HCT 34.3 (L) 02/18/2025    MCV 96 02/18/2025     02/18/2025         Results for orders placed during the hospital encounter of 08/20/24    Echo    Interpretation Summary    Left Ventricle: The left ventricle is normal in size. Normal wall thickness. There is concentric remodeling. There is normal systolic function with a visually estimated ejection fraction of 60 - 65%. Diastolic function cannot be reliably determined in the presence of mitral annular calcification.    Right Ventricle: Normal right ventricular cavity size. Wall thickness is normal. Systolic function is mildly reduced.    The left atrium is moderately dilated.    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 It is reported to be a Carrion Mk S3 valve.    Mitral Valve: There is bileaflet sclerosis. There is moderate to severe mitral annular  calcification present.    Tricuspid Valve: There is mild regurgitation.    Pulmonary Artery: The estimated pulmonary artery systolic pressure is 31 mmHg.    IVC/SVC: Normal venous pressure at 3 mmHg.     Results for orders placed during the hospital encounter of 01/04/23    Cardiac catheterization    Narrative  Procedure performed in the Invasive Lab  - See Procedure Log link below for nursing documentation  - See OpNote on Surgeries Tab for physician findings  - See Imaging Tab for radiologist dictation     EKG  Results for orders placed or performed during the hospital encounter of 02/09/25   EKG 12-lead    Collection Time: 02/09/25 10:59 PM   Result Value Ref Range    QRS Duration 92 ms    [facility] QTC Calculation 468 ms    Narrative    Test Reason : R07.9,    Vent. Rate : 117 BPM     Atrial Rate : 107 BPM     P-R Int :    ms          QRS Dur :  92 ms      QT Int : 336 ms       P-R-T Axes :     92 -36 degrees    QTcB Int : 468 ms    Atrial fibrillation with rapid ventricular response  Rightward axis  Nonspecific ST abnormality  Abnormal QRS-T angle, consider primary T wave abnormality  Abnormal ECG  No previous ECGs available  Confirmed by Michelet Vargas ([id number]) on 2/11/2025 8:30:38 PM    Referred By: AAAREFERRAL SELF           Confirmed By: Michelet Vargas      Stress  No results found for this or any previous visit.             Assessment:       S/P TAVR (transcatheter aortic valve replacement)  Stable.  Will need antibiotic prophylaxis for dental work.    Hypertension  Controlled on diltiazem  mg, carvedilol 12.5 mg b.i.d., furosemide 20 mg daily.    Iron deficiency anemia due to chronic blood loss  She is to continue her iron    Atrial fibrillation  Paroxysmal atrial fibrillation not on anticoagulation because of recurrent GI bleed and multiple blood transfusions. A Watchman has been discussed with the patient previously     CAD (coronary artery disease)  No symptoms of angina nonobstructive coronary artery  disease    Dementia in other diseases classified elsewhere, unspecified severity, without behavioral disturbance, psychotic disturbance, mood disturbance, and anxiety  Stable       Plan:           She will need antibiotic prophylaxis for dental procedure due to her prosthetic aortic valve.  She is to follow-up in the office in a proximally 6 months.

## 2025-02-09 ENCOUNTER — HOSPITAL ENCOUNTER (INPATIENT)
Facility: HOSPITAL | Age: 80
LOS: 11 days | Discharge: NURSING, REHAB OR LONG-TERM CARE | DRG: 481 | End: 2025-02-20
Attending: EMERGENCY MEDICINE | Admitting: ORTHOPAEDIC SURGERY
Payer: MEDICARE

## 2025-02-09 DIAGNOSIS — S72.001A FRACTURE OF FEMORAL NECK, RIGHT, CLOSED: ICD-10-CM

## 2025-02-09 DIAGNOSIS — I49.9 ARRHYTHMIA: ICD-10-CM

## 2025-02-09 DIAGNOSIS — S72.141A CLOSED 2-PART INTERTROCHANTERIC FRACTURE OF RIGHT FEMUR, INITIAL ENCOUNTER: Primary | ICD-10-CM

## 2025-02-09 DIAGNOSIS — M25.551 RIGHT HIP PAIN: ICD-10-CM

## 2025-02-09 DIAGNOSIS — Z01.811 PRE-OP CHEST EXAM: ICD-10-CM

## 2025-02-09 DIAGNOSIS — R07.9 CHEST PAIN: ICD-10-CM

## 2025-02-09 LAB
ALBUMIN SERPL BCP-MCNC: 3.6 G/DL (ref 3.5–5.2)
ALP SERPL-CCNC: 75 U/L (ref 55–135)
ALT SERPL W/O P-5'-P-CCNC: 22 U/L (ref 10–44)
ANION GAP SERPL CALC-SCNC: 7 MMOL/L (ref 8–16)
AST SERPL-CCNC: 27 U/L (ref 10–40)
BASOPHILS # BLD AUTO: 0.04 K/UL (ref 0–0.2)
BASOPHILS NFR BLD: 0.3 % (ref 0–1.9)
BILIRUB SERPL-MCNC: 0.7 MG/DL (ref 0.1–1)
BUN SERPL-MCNC: 17 MG/DL (ref 8–23)
CALCIUM SERPL-MCNC: 9 MG/DL (ref 8.7–10.5)
CHLORIDE SERPL-SCNC: 103 MMOL/L (ref 95–110)
CO2 SERPL-SCNC: 27 MMOL/L (ref 23–29)
CREAT SERPL-MCNC: 0.5 MG/DL (ref 0.5–1.4)
DIFFERENTIAL METHOD BLD: ABNORMAL
EOSINOPHIL # BLD AUTO: 0.1 K/UL (ref 0–0.5)
EOSINOPHIL NFR BLD: 0.4 % (ref 0–8)
ERYTHROCYTE [DISTWIDTH] IN BLOOD BY AUTOMATED COUNT: 15.3 % (ref 11.5–14.5)
EST. GFR  (NO RACE VARIABLE): >60 ML/MIN/1.73 M^2
GLUCOSE SERPL-MCNC: 154 MG/DL (ref 70–110)
HCT VFR BLD AUTO: 47.8 % (ref 37–48.5)
HGB BLD-MCNC: 15.3 G/DL (ref 12–16)
IMM GRANULOCYTES # BLD AUTO: 0.16 K/UL (ref 0–0.04)
IMM GRANULOCYTES NFR BLD AUTO: 1.3 % (ref 0–0.5)
INR PPP: 1.1 (ref 0.8–1.2)
LYMPHOCYTES # BLD AUTO: 1.3 K/UL (ref 1–4.8)
LYMPHOCYTES NFR BLD: 9.9 % (ref 18–48)
MCH RBC QN AUTO: 29.3 PG (ref 27–31)
MCHC RBC AUTO-ENTMCNC: 32 G/DL (ref 32–36)
MCV RBC AUTO: 92 FL (ref 82–98)
MONOCYTES # BLD AUTO: 0.8 K/UL (ref 0.3–1)
MONOCYTES NFR BLD: 5.9 % (ref 4–15)
NEUTROPHILS # BLD AUTO: 10.5 K/UL (ref 1.8–7.7)
NEUTROPHILS NFR BLD: 82.2 % (ref 38–73)
NRBC BLD-RTO: 0 /100 WBC
PLATELET # BLD AUTO: 163 K/UL (ref 150–450)
PMV BLD AUTO: 11 FL (ref 9.2–12.9)
POCT GLUCOSE: 137 MG/DL (ref 70–110)
POCT GLUCOSE: 203 MG/DL (ref 70–110)
POTASSIUM SERPL-SCNC: 3.2 MMOL/L (ref 3.5–5.1)
PROT SERPL-MCNC: 6.4 G/DL (ref 6–8.4)
PROTHROMBIN TIME: 12.6 SEC (ref 9–12.5)
RBC # BLD AUTO: 5.22 M/UL (ref 4–5.4)
SODIUM SERPL-SCNC: 137 MMOL/L (ref 136–145)
WBC # BLD AUTO: 12.78 K/UL (ref 3.9–12.7)

## 2025-02-09 PROCEDURE — 93005 ELECTROCARDIOGRAM TRACING: CPT | Performed by: GENERAL PRACTICE

## 2025-02-09 PROCEDURE — 96375 TX/PRO/DX INJ NEW DRUG ADDON: CPT

## 2025-02-09 PROCEDURE — 96374 THER/PROPH/DIAG INJ IV PUSH: CPT

## 2025-02-09 PROCEDURE — 80053 COMPREHEN METABOLIC PANEL: CPT | Performed by: EMERGENCY MEDICINE

## 2025-02-09 PROCEDURE — 93010 ELECTROCARDIOGRAM REPORT: CPT | Mod: 76,,, | Performed by: GENERAL PRACTICE

## 2025-02-09 PROCEDURE — 63600175 PHARM REV CODE 636 W HCPCS: Mod: TB,JZ

## 2025-02-09 PROCEDURE — 63600175 PHARM REV CODE 636 W HCPCS: Mod: TB,JZ | Performed by: EMERGENCY MEDICINE

## 2025-02-09 PROCEDURE — 85610 PROTHROMBIN TIME: CPT | Performed by: EMERGENCY MEDICINE

## 2025-02-09 PROCEDURE — 85025 COMPLETE CBC W/AUTO DIFF WBC: CPT | Performed by: EMERGENCY MEDICINE

## 2025-02-09 PROCEDURE — 25000003 PHARM REV CODE 250

## 2025-02-09 PROCEDURE — 12000002 HC ACUTE/MED SURGE SEMI-PRIVATE ROOM

## 2025-02-09 PROCEDURE — 82962 GLUCOSE BLOOD TEST: CPT

## 2025-02-09 PROCEDURE — 99285 EMERGENCY DEPT VISIT HI MDM: CPT | Mod: 25

## 2025-02-09 PROCEDURE — 93010 ELECTROCARDIOGRAM REPORT: CPT | Mod: ,,, | Performed by: GENERAL PRACTICE

## 2025-02-09 PROCEDURE — 25000003 PHARM REV CODE 250: Performed by: INTERNAL MEDICINE

## 2025-02-09 PROCEDURE — 25000003 PHARM REV CODE 250: Performed by: EMERGENCY MEDICINE

## 2025-02-09 RX ORDER — IPRATROPIUM BROMIDE AND ALBUTEROL SULFATE 2.5; .5 MG/3ML; MG/3ML
3 SOLUTION RESPIRATORY (INHALATION) EVERY 6 HOURS PRN
Status: DISCONTINUED | OUTPATIENT
Start: 2025-02-09 | End: 2025-02-20 | Stop reason: HOSPADM

## 2025-02-09 RX ORDER — LANOLIN ALCOHOL/MO/W.PET/CERES
800 CREAM (GRAM) TOPICAL
Status: DISCONTINUED | OUTPATIENT
Start: 2025-02-09 | End: 2025-02-20 | Stop reason: HOSPADM

## 2025-02-09 RX ORDER — CYCLOBENZAPRINE HCL 10 MG
10 TABLET ORAL 3 TIMES DAILY PRN
Status: DISCONTINUED | OUTPATIENT
Start: 2025-02-09 | End: 2025-02-17

## 2025-02-09 RX ORDER — SODIUM,POTASSIUM PHOSPHATES 280-250MG
2 POWDER IN PACKET (EA) ORAL
Status: DISCONTINUED | OUTPATIENT
Start: 2025-02-09 | End: 2025-02-20 | Stop reason: HOSPADM

## 2025-02-09 RX ORDER — HYDROCODONE BITARTRATE AND ACETAMINOPHEN 5; 325 MG/1; MG/1
1 TABLET ORAL EVERY 6 HOURS PRN
Status: DISCONTINUED | OUTPATIENT
Start: 2025-02-09 | End: 2025-02-11

## 2025-02-09 RX ORDER — IBUPROFEN 200 MG
24 TABLET ORAL
Status: DISCONTINUED | OUTPATIENT
Start: 2025-02-09 | End: 2025-02-20 | Stop reason: HOSPADM

## 2025-02-09 RX ORDER — NALOXONE HCL 0.4 MG/ML
0.02 VIAL (ML) INJECTION
Status: DISCONTINUED | OUTPATIENT
Start: 2025-02-09 | End: 2025-02-20 | Stop reason: HOSPADM

## 2025-02-09 RX ORDER — LANOLIN ALCOHOL/MO/W.PET/CERES
1 CREAM (GRAM) TOPICAL DAILY
Status: DISCONTINUED | OUTPATIENT
Start: 2025-02-10 | End: 2025-02-20 | Stop reason: HOSPADM

## 2025-02-09 RX ORDER — ONDANSETRON HYDROCHLORIDE 2 MG/ML
4 INJECTION, SOLUTION INTRAVENOUS EVERY 6 HOURS PRN
Status: DISCONTINUED | OUTPATIENT
Start: 2025-02-09 | End: 2025-02-20 | Stop reason: HOSPADM

## 2025-02-09 RX ORDER — L. ACIDOPHILUS/L.BULGARICUS 100MM CELL
1 GRANULES IN PACKET (EA) ORAL 2 TIMES DAILY
Status: DISCONTINUED | OUTPATIENT
Start: 2025-02-09 | End: 2025-02-20 | Stop reason: HOSPADM

## 2025-02-09 RX ORDER — BUDESONIDE 0.5 MG/2ML
0.5 INHALANT ORAL EVERY 12 HOURS
Status: DISCONTINUED | OUTPATIENT
Start: 2025-02-09 | End: 2025-02-20 | Stop reason: HOSPADM

## 2025-02-09 RX ORDER — ACETAMINOPHEN 325 MG/1
650 TABLET ORAL EVERY 4 HOURS PRN
Status: DISCONTINUED | OUTPATIENT
Start: 2025-02-09 | End: 2025-02-17

## 2025-02-09 RX ORDER — IBUPROFEN 200 MG
16 TABLET ORAL
Status: DISCONTINUED | OUTPATIENT
Start: 2025-02-09 | End: 2025-02-20 | Stop reason: HOSPADM

## 2025-02-09 RX ORDER — GLUCAGON 1 MG
1 KIT INJECTION
Status: DISCONTINUED | OUTPATIENT
Start: 2025-02-09 | End: 2025-02-20 | Stop reason: HOSPADM

## 2025-02-09 RX ORDER — DILTIAZEM HYDROCHLORIDE 120 MG/1
120 CAPSULE, COATED, EXTENDED RELEASE ORAL DAILY
Status: DISCONTINUED | OUTPATIENT
Start: 2025-02-10 | End: 2025-02-20 | Stop reason: HOSPADM

## 2025-02-09 RX ORDER — PANTOPRAZOLE SODIUM 40 MG/1
40 TABLET, DELAYED RELEASE ORAL 2 TIMES DAILY
Status: DISCONTINUED | OUTPATIENT
Start: 2025-02-09 | End: 2025-02-20 | Stop reason: HOSPADM

## 2025-02-09 RX ORDER — DILTIAZEM HYDROCHLORIDE 5 MG/ML
10 INJECTION INTRAVENOUS
Status: COMPLETED | OUTPATIENT
Start: 2025-02-09 | End: 2025-02-09

## 2025-02-09 RX ORDER — HYDROMORPHONE HYDROCHLORIDE 1 MG/ML
1.5 INJECTION, SOLUTION INTRAMUSCULAR; INTRAVENOUS; SUBCUTANEOUS
Status: COMPLETED | OUTPATIENT
Start: 2025-02-09 | End: 2025-02-09

## 2025-02-09 RX ORDER — METOPROLOL TARTRATE 1 MG/ML
5 INJECTION, SOLUTION INTRAVENOUS EVERY 4 HOURS PRN
Status: DISCONTINUED | OUTPATIENT
Start: 2025-02-09 | End: 2025-02-17

## 2025-02-09 RX ORDER — ARFORMOTEROL TARTRATE 15 UG/2ML
15 SOLUTION RESPIRATORY (INHALATION) 2 TIMES DAILY
Status: DISCONTINUED | OUTPATIENT
Start: 2025-02-09 | End: 2025-02-20 | Stop reason: HOSPADM

## 2025-02-09 RX ORDER — VENLAFAXINE HYDROCHLORIDE 37.5 MG/1
75 CAPSULE, EXTENDED RELEASE ORAL NIGHTLY
Status: DISCONTINUED | OUTPATIENT
Start: 2025-02-09 | End: 2025-02-20 | Stop reason: HOSPADM

## 2025-02-09 RX ORDER — DONEPEZIL HYDROCHLORIDE 5 MG/1
5 TABLET, FILM COATED ORAL NIGHTLY
Status: DISCONTINUED | OUTPATIENT
Start: 2025-02-09 | End: 2025-02-20 | Stop reason: HOSPADM

## 2025-02-09 RX ORDER — HYDROMORPHONE HYDROCHLORIDE 1 MG/ML
1 INJECTION, SOLUTION INTRAMUSCULAR; INTRAVENOUS; SUBCUTANEOUS EVERY 4 HOURS PRN
Status: DISCONTINUED | OUTPATIENT
Start: 2025-02-09 | End: 2025-02-09

## 2025-02-09 RX ORDER — TALC
6 POWDER (GRAM) TOPICAL NIGHTLY PRN
Status: DISCONTINUED | OUTPATIENT
Start: 2025-02-09 | End: 2025-02-20 | Stop reason: HOSPADM

## 2025-02-09 RX ORDER — DIAZEPAM 5 MG/1
5 TABLET ORAL EVERY 6 HOURS PRN
Status: DISCONTINUED | OUTPATIENT
Start: 2025-02-09 | End: 2025-02-17

## 2025-02-09 RX ORDER — LIDOCAINE 50 MG/G
1 PATCH TOPICAL
Status: DISCONTINUED | OUTPATIENT
Start: 2025-02-09 | End: 2025-02-20 | Stop reason: HOSPADM

## 2025-02-09 RX ORDER — INSULIN ASPART 100 [IU]/ML
0-10 INJECTION, SOLUTION INTRAVENOUS; SUBCUTANEOUS
Status: DISCONTINUED | OUTPATIENT
Start: 2025-02-09 | End: 2025-02-20 | Stop reason: HOSPADM

## 2025-02-09 RX ORDER — FUROSEMIDE 40 MG/1
40 TABLET ORAL DAILY PRN
Status: DISCONTINUED | OUTPATIENT
Start: 2025-02-09 | End: 2025-02-20 | Stop reason: HOSPADM

## 2025-02-09 RX ORDER — FLUTICASONE FUROATE AND VILANTEROL 100; 25 UG/1; UG/1
1 POWDER RESPIRATORY (INHALATION) DAILY
Status: DISCONTINUED | OUTPATIENT
Start: 2025-02-10 | End: 2025-02-09

## 2025-02-09 RX ORDER — SODIUM CHLORIDE 0.9 % (FLUSH) 0.9 %
10 SYRINGE (ML) INJECTION EVERY 12 HOURS PRN
Status: DISCONTINUED | OUTPATIENT
Start: 2025-02-09 | End: 2025-02-20 | Stop reason: HOSPADM

## 2025-02-09 RX ORDER — HYDROMORPHONE HYDROCHLORIDE 1 MG/ML
1 INJECTION, SOLUTION INTRAMUSCULAR; INTRAVENOUS; SUBCUTANEOUS
Status: DISCONTINUED | OUTPATIENT
Start: 2025-02-09 | End: 2025-02-13

## 2025-02-09 RX ORDER — PANTOPRAZOLE SODIUM 40 MG/1
40 TABLET, DELAYED RELEASE ORAL 2 TIMES DAILY
COMMUNITY
Start: 2025-02-07

## 2025-02-09 RX ORDER — AMOXICILLIN 250 MG
1 CAPSULE ORAL 2 TIMES DAILY PRN
Status: DISCONTINUED | OUTPATIENT
Start: 2025-02-09 | End: 2025-02-17

## 2025-02-09 RX ORDER — CARVEDILOL 12.5 MG/1
12.5 TABLET ORAL 2 TIMES DAILY
Status: DISCONTINUED | OUTPATIENT
Start: 2025-02-09 | End: 2025-02-20 | Stop reason: HOSPADM

## 2025-02-09 RX ORDER — ALUMINUM HYDROXIDE, MAGNESIUM HYDROXIDE, AND SIMETHICONE 1200; 120; 1200 MG/30ML; MG/30ML; MG/30ML
30 SUSPENSION ORAL 4 TIMES DAILY PRN
Status: DISCONTINUED | OUTPATIENT
Start: 2025-02-09 | End: 2025-02-20 | Stop reason: HOSPADM

## 2025-02-09 RX ADMIN — VENLAFAXINE HYDROCHLORIDE 75 MG: 37.5 CAPSULE, EXTENDED RELEASE ORAL at 09:02

## 2025-02-09 RX ADMIN — HYDROMORPHONE HYDROCHLORIDE 1 MG: 1 INJECTION, SOLUTION INTRAMUSCULAR; INTRAVENOUS; SUBCUTANEOUS at 08:02

## 2025-02-09 RX ADMIN — HYDROMORPHONE HYDROCHLORIDE 1.5 MG: 1 INJECTION, SOLUTION INTRAMUSCULAR; INTRAVENOUS; SUBCUTANEOUS at 03:02

## 2025-02-09 RX ADMIN — PANTOPRAZOLE SODIUM 40 MG: 40 TABLET, DELAYED RELEASE ORAL at 09:02

## 2025-02-09 RX ADMIN — LACTOBACILLUS ACIDOPHILUS / LACTOBACILLUS BULGARICUS 1 EACH: 100 MILLION CFU STRENGTH GRANULES at 09:02

## 2025-02-09 RX ADMIN — DONEPEZIL HYDROCHLORIDE 5 MG: 5 TABLET ORAL at 09:02

## 2025-02-09 RX ADMIN — Medication 6 MG: at 09:02

## 2025-02-09 RX ADMIN — DILTIAZEM HYDROCHLORIDE 10 MG: 5 INJECTION INTRAVENOUS at 04:02

## 2025-02-09 RX ADMIN — LIDOCAINE 5% 1 PATCH: 700 PATCH TOPICAL at 06:02

## 2025-02-09 RX ADMIN — METOROPROLOL TARTRATE 5 MG: 5 INJECTION, SOLUTION INTRAVENOUS at 10:02

## 2025-02-09 RX ADMIN — CARVEDILOL 12.5 MG: 12.5 TABLET, FILM COATED ORAL at 09:02

## 2025-02-09 RX ADMIN — SODIUM CHLORIDE 1000 ML: 9 INJECTION, SOLUTION INTRAVENOUS at 04:02

## 2025-02-09 NOTE — HPI
Ms. Cyr is a 79 yr old female with a hx of DM type 2, HTN, s/p TAVR, CAD, dementia, AFib, COPD, chronic diastolic CHF, lumbar and thoracic spondylosis, degenerative disc disease, compression fractures, radiculopathy, arthritis, osteoporosis, GERD, stress urinary incontinence, HLD, left adrenal mass, iron-deficiency anemia, abdominal aneurysm, debility, attention and concentration deficit, vitamin-D deficiency, AVM, diverticulosis, depression, obesity, thrombocytopenia, aortic stenosis, NSTEMI who presented to the ED with a chief complaint of a slip and fall.  Granddaughter at bedside who helps to provide a history.  Patient and granddaughter state that the patient was getting out of bed to transfer to bedside commode when she slipped and fell onto her right side.  She states she immediately started having right hip pain.  Granddaughter and patient both deny head trauma and LOC.  She states that she quit smoking 6 months ago and denies alcohol and recreational drug use.  She denies fever, chills, dyspnea, palpitations, chest pain, abdominal pain, nausea, vomiting, diarrhea, dysuria, hematuria, lightheadedness, dizziness, and syncope.    Upon arrival to ED, patient afebrile, HR of 114, RR of 20, BP of 130/75, satting 96% on RA.  Workup in the ED revealed WBC of 12.78, potassium of 3.2, glucose of 154, PT of 12.6.  CXR shows no acute radiographic abnormalities.  Right hip x-ray shows acute nondisplaced and non comminuted intertrochanteric right femoral neck fracture.  While in the ED patient had heart rate of 143 and EKG showed AFib with RVR.  Patient was given diltiazem 10 mg IV, hydromorphone 1.5 mg IV, 1 L NS bolus while in the ED. patient had improvement with heart rate to 90s-100s.  ED provider discussed case with Orthopedic surgery who plans for OR in the morning.  Patient will be admitted under hospital medicine services for further management.

## 2025-02-09 NOTE — H&P
Formerly [facility] - Emergency Dept  Hospital Medicine  History & Physical    Patient Name: Daryleen G Moran  MRN: [id number]  Patient Class: IP- Inpatient  Admission Date: 2/9/2025  Attending Physician: Neelam Holder MD   Primary Care Provider: Abhi De Oliveira IV, MD         Patient information was obtained from patient, relative(s), past medical records, and ER records.     Subjective:     Principal Problem:Fracture of femoral neck, right, closed    Chief Complaint:   Chief Complaint   Patient presents with    Fall     Slip and fall with left hip pain        HPI: Ms. Cyr is a 79 yr old female with a hx of DM type 2, HTN, s/p TAVR, CAD, dementia, AFib, COPD, chronic diastolic CHF, lumbar and thoracic spondylosis, degenerative disc disease, compression fractures, radiculopathy, arthritis, osteoporosis, GERD, stress urinary incontinence, HLD, left adrenal mass, iron-deficiency anemia, abdominal aneurysm, debility, attention and concentration deficit, vitamin-D deficiency, AVM, diverticulosis, depression, obesity, thrombocytopenia, aortic stenosis, NSTEMI who presented to the ED with a chief complaint of a slip and fall.  Granddaughter at bedside who helps to provide a history.  Patient and granddaughter state that the patient was getting out of bed to transfer to bedside commode when she slipped and fell onto her right side.  She states she immediately started having right hip pain.  Granddaughter and patient both deny head trauma and LOC.  She states that she quit smoking 6 months ago and denies alcohol and recreational drug use.  She denies fever, chills, dyspnea, palpitations, chest pain, abdominal pain, nausea, vomiting, diarrhea, dysuria, hematuria, lightheadedness, dizziness, and syncope.    Upon arrival to ED, patient afebrile, HR of 114, RR of 20, BP of 130/75, satting 96% on RA.  Workup in the ED revealed WBC of 12.78, potassium of 3.2, glucose of 154, PT of 12.6.  CXR shows no acute  radiographic abnormalities.  Right hip x-ray shows acute nondisplaced and non comminuted intertrochanteric right femoral neck fracture.  While in the ED patient had heart rate of 143 and EKG showed AFib with RVR.  Patient was given diltiazem 10 mg IV, hydromorphone 1.5 mg IV, 1 L NS bolus while in the ED. patient had improvement with heart rate to 90s-100s.  ED provider discussed case with Orthopedic surgery who plans for OR in the morning.  Patient will be admitted under hospital medicine services for further management.    Past Medical History:   Diagnosis Date    Anesthesia complication     BLADDER DYSFUNCTION    Aortic stenosis     Arthritis     Back pain     Diabetes mellitus type II     NO LONGER DIABETIC    Encounter for blood transfusion     Encounter for pain management 12/6/2024    Hyperlipidemia     Hypertension     NSTEMI (non-ST elevated myocardial infarction) 05/01/2022    Osteoporosis     Wears glasses        Past Surgical History:   Procedure Laterality Date     vocal cord nodules removed  long time ago     twice    ADRENAL TUMOR      APPENDECTOMY  within last 5yrs    CATHETERIZATION OF BOTH LEFT AND RIGHT HEART Left 05/06/2022    Procedure: CATHETERIZATION, HEART, BOTH LEFT AND RIGHT;  Surgeon: Michelet Vargas MD;  Location: [facility] CATH/EP LAB;  Service: Cardiology;  Laterality: Left;    COLONOSCOPY N/A 6/23/2023    Procedure: COLONOSCOPY;  Surgeon: Joel Neal III, MD;  Location: [facility] ENDO;  Service: Endoscopy;  Laterality: N/A;    FIXATION KYPHOPLASTY THORACIC SPINE      8-20-13    FOOT SURGERY      left 2nd toe was too long     HERNIA REPAIR  within last 5yrs    INSERTION OF TEMPORARY PACEMAKER N/A 1/4/2023    Procedure: INSERTION, PACEMAKER, TEMPORARY;  Surgeon: Bhavesh Peña MD;  Location: [facility] CATH;  Service: Cardiology;  Laterality: N/A;    LEFT HEART CATHETERIZATION Left 05/02/2022    Procedure: Left heart cath;  Surgeon: Jose Echols MD;  Location: [facility] CATH/EP LAB;  Service:  Cardiology;  Laterality: Left;    SMALL BOWEL ENTEROSCOPY N/A 6/22/2023    Procedure: ENTEROSCOPY;  Surgeon: Cornell Aguirre MD;  Location: [facility] ENDO;  Service: Endoscopy;  Laterality: N/A;    SMALL BOWEL ENTEROSCOPY N/A 10/20/2023    Procedure: ENTEROSCOPY;  Surgeon: Otilio Bourgeois MD;  Location: [facility] ENDO;  Service: Endoscopy;  Laterality: N/A;    TONSILLECTOMY, ADENOIDECTOMY  long time ago    TRANSCATHETER AORTIC VALVE REPLACEMENT (TAVR) Bilateral 1/4/2023    Procedure: REPLACEMENT, AORTIC VALVE, TRANSCATHETER (TAVR);  Surgeon: Bhavesh Peña MD;  Location: [facility] CATH;  Service: Cardiology;  Laterality: Bilateral;    TRANSCATHETER AORTIC VALVE REPLACEMENT (TAVR) Bilateral 1/4/2023    Procedure: REPLACEMENT, AORTIC VALVE, TRANSCATHETER (TAVR);  Surgeon: Dallin Verma MD;  [facility];  Service: Cardiology;  Laterality: Bilateral;    TRANSTHORACIC ECHOCARDIOGRAPHY (TTE)  1/4/2023    Procedure: ECHOCARDIOGRAM, TRANSTHORACIC;  Surgeon: Bhavesh Peña MD;  Location: [facility] CATH;  Service: Cardiology;;       Review of patient's allergies indicates:   Allergen Reactions    Latex      Other reaction(s): Unknown  Other reaction(s): Unknown    Sulfacetamide sodium      Pain perineal area    Sulfasalazine Hives    Adhesive Itching     SKIN GETS RED WITH TAPE AND BANDAIDS    Adhesive tape-silicones Itching     SKIN GETS RED WITH TAPE AND BANDAIDS    Sulfa (sulfonamide antibiotics) Rash       No current facility-administered medications on file prior to encounter.     Current Outpatient Medications on File Prior to Encounter   Medication Sig    albuterol (PROVENTIL/VENTOLIN HFA) 90 mcg/actuation inhaler Inhale 2 puffs into the lungs every 6 (six) hours as needed for Shortness of Breath.    aspirin (ECOTRIN) 81 MG EC tablet Take 81 mg by mouth once daily.    calcium carbonate (OS-TK) 500 mg calcium (1,250 mg) tablet Take 1 tablet (500 mg total) by mouth once daily.    carvediloL (COREG) 12.5 MG tablet Take 12.5  mg by mouth 2 (two) times daily.    citalopram (CELEXA) 40 MG tablet Take 40 mg by mouth once daily.    clopidogreL (PLAVIX) 75 mg tablet Take 1 tablet (75 mg total) by mouth once daily.    diltiaZEM (DILACOR XR) 120 MG CDCR Take 1 capsule (120 mg total) by mouth once daily.    donepeziL (ARICEPT) 5 MG tablet Take 5 mg by mouth nightly.    ergocalciferol (ERGOCALCIFEROL) 50,000 unit Cap TAKE 1 CAPSULE (50,000 UNITS TOTAL) EVERY 7 DAYS.    FEROSUL 325 mg (65 mg iron) Tab tablet Take 325 mg by mouth once daily.    fluticasone furoate-vilanteroL (BREO ELLIPTA) 100-25 mcg/dose diskus inhaler Inhale 1 puff into the lungs once daily. Controller    fluticasone propionate (FLONASE) 50 mcg/actuation nasal spray 1 spray by Each Nostril route once daily.    furosemide (LASIX) 40 MG tablet Take 1 tablet (40 mg total) by mouth daily as needed (Take for weight gain > 2 pounds over 24 hours. May discuss with PCP).    lactobacillus acidophilus & bulgar (LACTINEX) 100 million cell packet Take 1 packet (1 each total) by mouth 2 (two) times daily.    multivitamin capsule Take 1 capsule by mouth once daily.    traMADoL (ULTRAM) 50 mg tablet Take 1 tablet (50 mg total) by mouth every 8 (eight) hours as needed for Pain.    venlafaxine (EFFEXOR-XR) 75 MG 24 hr capsule Take 1 capsule (75 mg total) by mouth once daily.    [DISCONTINUED] ALLERGY RELIEF, FEXOFENADINE, 180 mg tablet Take 180 mg by mouth once daily.    [DISCONTINUED] ezetimibe-simvastatin 10-40 mg (VYTORIN) 10-40 mg per tablet Take 1 tablet by mouth.    [DISCONTINUED] lisinopril-hydrochlorothiazide (PRINZIDE,ZESTORETIC) 20-12.5 mg per tablet Take 1 tablet by mouth once daily.      Family History       Problem Relation (Age of Onset)    Arthritis Mother          Tobacco Use    Smoking status: Former     Current packs/day: 0.00     Average packs/day: 0.5 packs/day for 35.0 years (17.5 ttl pk-yrs)     Types: Cigarettes     Start date: 10/5/1987     Quit date: 10/5/2022     Years  since quittin.3    Smokeless tobacco: Never   Substance and Sexual Activity    Alcohol use: No    Drug use: No    Sexual activity: Not Currently     Review of Systems   Constitutional:  Negative for chills and fever.   Respiratory:  Negative for shortness of breath.    Cardiovascular:  Negative for chest pain.   Gastrointestinal:  Negative for abdominal pain, diarrhea, nausea and vomiting.   Genitourinary:  Negative for dysuria and hematuria.   Musculoskeletal:  Positive for arthralgias (Right hip pain).   Neurological:  Negative for dizziness, syncope and light-headedness.     Objective:     Vital Signs (Most Recent):  Temp: 98.5 °F (36.9 °C) (25 1446)  Pulse: 93 (25 1640)  Resp: 13 (25 1640)  BP: 107/60 (25 1640)  SpO2: (!) 92 % (25 1640) Vital Signs (24h Range):  Temp:  [98.5 °F (36.9 °C)] 98.5 °F (36.9 °C)  Pulse:  [] 93  Resp:  [13-20] 13  SpO2:  [87 %-96 %] 92 %  BP: (107-130)/(60-80) 107/60     Weight: 83.9 kg (185 lb)  Body mass index is 36.13 kg/m².     Physical Exam  Vitals reviewed.   Constitutional:       General: She is awake. She is not in acute distress.     Appearance: Normal appearance. She is obese. She is not diaphoretic.   Cardiovascular:      Rate and Rhythm: Normal rate. Rhythm irregularly irregular.      Heart sounds:      No friction rub. No gallop.   Pulmonary:      Effort: Pulmonary effort is normal. No accessory muscle usage or respiratory distress.      Breath sounds: Normal breath sounds. No wheezing, rhonchi or rales.   Abdominal:      General: Bowel sounds are normal.      Palpations: Abdomen is soft.      Tenderness: There is no abdominal tenderness. There is no guarding or rebound.   Musculoskeletal:      Right lower leg: No edema.      Left lower leg: No edema.      Comments: Right hip without ecchymosis or overlying skin changes appreciated.  Denies tenderness to palpation of lateral right hip.  Active and passive ROM significantly limited  due to pain.   Skin:     General: Skin is warm and dry.   Neurological:      Mental Status: She is alert.      Comments: A&O x3 to self, place, and month.  Patient is disoriented to City and year.  She knows that she is in the hospital but is unsure of the name of the hospital.  She states that it is 2027.   Psychiatric:         Behavior: Behavior is cooperative.                Significant Labs: All pertinent labs within the past 24 hours have been reviewed.  CBC:   Recent Labs   Lab 02/09/25  1521   WBC 12.78*   HGB 15.3   HCT 47.8        CMP:   Recent Labs   Lab 02/09/25  1521      K 3.2*      CO2 27   *   BUN 17   CREATININE 0.5   CALCIUM 9.0   PROT 6.4   ALBUMIN 3.6   BILITOT 0.7   ALKPHOS 75   AST 27   ALT 22   ANIONGAP 7*     Coagulation:   Recent Labs   Lab 02/09/25  1521   INR 1.1       Significant Imaging:   Imaging Results              X-Ray Hip 2 or 3 views Right with Pelvis when performed (Final result)  Result time 02/09/25 15:57:35      Final result by Dallin Vyas MD (02/09/25 15:57:35)                   Impression:      1. Acute nondisplaced inter trochanteric right femoral neck fracture.      Electronically signed by: Dallin Vyas  Date:    02/09/2025  Time:    15:57               Narrative:    EXAMINATION:  XR HIP WITH PELVIS WHEN PERFORMED 2 OR 3 VIEWS RIGHT    CLINICAL HISTORY:  Pain in right hip    COMPARISON:  CT abdomen and pelvis, January 2025    FINDINGS:  Three views are provided.  There is an acute nondisplaced and non comminuted inter trochanteric right femoral neck fracture.  No underlying destructive lesion is identified.  There is no dislocation.    No other acute findings are demonstrated.                                       X-Ray Chest AP Portable (Final result)  Result time 02/09/25 15:54:17      Final result by Dallin Vyas MD (02/09/25 15:54:17)                   Impression:      1. No acute radiographic abnormalities.  Chronic  "findings as above.      Electronically signed by: Dallin Lilliam  Date:    02/09/2025  Time:    15:54               Narrative:    EXAMINATION:  XR CHEST AP PORTABLE    CLINICAL HISTORY:  pre op;    COMPARISON:  August 2024    FINDINGS:  The heart is borderline enlarged.  Transcatheter aortic valve replacement is noted.  The aortic arch is calcified.    Mild bilateral interstitial prominence is unchanged in appearance compared to priors and likely chronic, with no active infiltrate or effusion identified.    Changes of multilevel thoracic vertebroplasty are noted.                                     Assessment/Plan:     * Fracture of femoral neck, right, closed  - Patient presented to the ED after a slip and fall.  She was found to have right nondisplaced enter trochanteric femoral neck fracture on right hip x-ray in the ED.  - ED provider discussed case with Orthopedic surgery who plans to take patient to OR in the morning  - NPO at midnight  - PRN analgesia and symptomatic care  - Holding DVT prophylaxis as well as aspirin and Plavix, resume when appropriate to do so      Chronic diastolic heart failure  Patient has Diastolic (HFpEF) heart failure that is Chronic. On presentation their CHF was well compensated. Most recent BNP and echo results are listed below.  No results for input(s): "BNP" in the last 72 hours.  Latest ECHO  Results for orders placed during the hospital encounter of 08/20/24    Echo    Interpretation Summary    Left Ventricle: The left ventricle is normal in size. Normal wall thickness. There is concentric remodeling. There is normal systolic function with a visually estimated ejection fraction of 60 - 65%. Diastolic function cannot be reliably determined in the presence of mitral annular calcification.    Right Ventricle: Normal right ventricular cavity size. Wall thickness is normal. Systolic function is mildly reduced.    The left atrium is moderately dilated.    Aortic Valve: There is a " transcatheter valve replacement in the aortic position. It is reported to be a Carrion Mk S3 valve.    Mitral Valve: There is bileaflet sclerosis. There is moderate to severe mitral annular calcification present.    Tricuspid Valve: There is mild regurgitation.    Pulmonary Artery: The estimated pulmonary artery systolic pressure is 31 mmHg.    IVC/SVC: Normal venous pressure at 3 mmHg.    Current Heart Failure Medications       Plan  - Monitor strict I&Os and daily weights.    - Place on telemetry  - Low sodium diet  - Place on fluid restriction of 1.5 L.   - Cardiology has not been consulted  - The patient's volume status is at their baseline  - Continue home coreg and lasix    COPD (chronic obstructive pulmonary disease)  Patient's COPD is controlled currently.  Patient is currently off COPD Pathway. Continue scheduled inhalers Supplemental oxygen and monitor respiratory status closely.     - DuoNebs Q6H PRN    Atrial fibrillation with RVR  Patient has permanent atrial fibrillation. Patient is currently in atrial fibrillation. [id number] Score: 5. The patients heart rate in the last 24 hours is as follows:  Pulse  Min: 93  Max: 143     Antiarrhythmics       Anticoagulants       Plan  - Replete lytes with a goal of K>4, Mg >2  - Patient is not on anti coag outpatient per home medication list, she is however on DAPT with aspirin and Plavix  - Holding home aspirin and Plavix as patient is planned to go to OR in the morning for repair, resume when appropriate to do so  - Patient's afib is currently controlled  - Patient was given diltiazem 10 mg IV while in the ED as patient's heart rate went to 140s.  I suspect that this is probably just due to uncontrolled pain.  After patient received diltiazem and hydromorphone, heart rate is now controlled in the 90s-100s.    Dementia in other diseases classified elsewhere, unspecified severity, without behavioral disturbance, psychotic disturbance, mood disturbance, and  "anxiety  - History noted  - Patient is currently A&O x3 to self, place, and month. Patient is disoriented to City and year.  She knows that she is in the hospital but is unsure of the name of the hospital.  She states that it is 2027.  - Continue home Aricept    S/P TAVR (transcatheter aortic valve replacement)  - Hx noted      Hypertension  Chronic,  Latest blood pressure and vitals reviewed-     Temp:  [98.5 °F (36.9 °C)]   Pulse:  []   Resp:  [13-20]   BP: (107-130)/(60-80)   SpO2:  [87 %-96 %] .   Home meds for hypertension were reviewed and noted below-  Hypertension Medications               carvediloL (COREG) 12.5 MG tablet Take 12.5 mg by mouth 2 (two) times daily.    diltiaZEM (DILACOR XR) 120 MG CDCR Take 1 capsule (120 mg total) by mouth once daily.    furosemide (LASIX) 40 MG tablet Take 1 tablet (40 mg total) by mouth daily as needed (Take for weight gain > 2 pounds over 24 hours. May discuss with PCP).            While in the hospital, will manage blood pressure as follows; Continue home antihypertensive regimen    Will utilize p.r.n. blood pressure medication only if patient's blood pressure greater than 180/110 and she develops symptoms such as worsening chest pain or shortness of breath.      Type 2 diabetes mellitus, without long-term current use of insulin  Last A1c reviewed-   Lab Results   Component Value Date    HGBA1C 6.6 (H) 03/08/2024     Most recent fingerstick glucose reviewed- No results for input(s): "POCTGLUCOSE" in the last 24 hours.  Current correctional scale  Medium  Maintain anti-hyperglycemic dose as follows-   Antihyperglycemics (From admission, onward)      Start     Stop Route Frequency Ordered    02/09/25 1755  insulin aspart U-100 pen 0-10 Units         -- SubQ Before meals & nightly PRN 02/09/25 1657          Hold Oral hypoglycemics while patient is in the hospital.         VTE Risk Mitigation (From admission, onward)           Ordered     Reason for No Pharmacological " VTE Prophylaxis  Once        Comments: Plan for OR in AM   Question:  Reasons:  Answer:  Physician Provided (leave comment)    02/09/25 1657     IP VTE HIGH RISK PATIENT  Once         02/09/25 1657     Place sequential compression device  Until discontinued         02/09/25 1657                     On 02/09/2025, patient will be placed under hospital medicine services in collaboration with Neelam Holder MD.           BRADEN DalalC  Department of Hospital Medicine  [facility] - Emergency Dept

## 2025-02-09 NOTE — SUBJECTIVE & OBJECTIVE
Past Medical History:   Diagnosis Date    Anesthesia complication     BLADDER DYSFUNCTION    Aortic stenosis     Arthritis     Back pain     Diabetes mellitus type II     NO LONGER DIABETIC    Encounter for blood transfusion     Encounter for pain management 12/6/2024    Hyperlipidemia     Hypertension     NSTEMI (non-ST elevated myocardial infarction) 05/01/2022    Osteoporosis     Wears glasses        Past Surgical History:   Procedure Laterality Date     vocal cord nodules removed  long time ago     twice    ADRENAL TUMOR      APPENDECTOMY  within last 5yrs    CATHETERIZATION OF BOTH LEFT AND RIGHT HEART Left 05/06/2022    Procedure: CATHETERIZATION, HEART, BOTH LEFT AND RIGHT;  Surgeon: Michelet Vargas MD;  Location: [facility] CATH/EP LAB;  Service: Cardiology;  Laterality: Left;    COLONOSCOPY N/A 6/23/2023    Procedure: COLONOSCOPY;  Surgeon: Joel Neal III, MD;  Location: [facility] ENDO;  Service: Endoscopy;  Laterality: N/A;    FIXATION KYPHOPLASTY THORACIC SPINE      8-20-13    FOOT SURGERY      left 2nd toe was too long     HERNIA REPAIR  within last 5yrs    INSERTION OF TEMPORARY PACEMAKER N/A 1/4/2023    Procedure: INSERTION, PACEMAKER, TEMPORARY;  Surgeon: Bhavesh Peña MD;  Location: [facility] CATH;  Service: Cardiology;  Laterality: N/A;    LEFT HEART CATHETERIZATION Left 05/02/2022    Procedure: Left heart cath;  Surgeon: Jose Echols MD;  Location: [facility] CATH/EP LAB;  Service: Cardiology;  Laterality: Left;    SMALL BOWEL ENTEROSCOPY N/A 6/22/2023    Procedure: ENTEROSCOPY;  Surgeon: Cornell Aguirre MD;  Location: [facility] ENDO;  Service: Endoscopy;  Laterality: N/A;    SMALL BOWEL ENTEROSCOPY N/A 10/20/2023    Procedure: ENTEROSCOPY;  Surgeon: Otilio Bourgeois MD;  Location: [facility] ENDO;  Service: Endoscopy;  Laterality: N/A;    TONSILLECTOMY, ADENOIDECTOMY  long time ago    TRANSCATHETER AORTIC VALVE REPLACEMENT (TAVR) Bilateral 1/4/2023    Procedure: REPLACEMENT, AORTIC VALVE, TRANSCATHETER  (TAVR);  Surgeon: Bhavesh Peña MD;  Location: [facility] CATH;  Service: Cardiology;  Laterality: Bilateral;    TRANSCATHETER AORTIC VALVE REPLACEMENT (TAVR) Bilateral 1/4/2023    Procedure: REPLACEMENT, AORTIC VALVE, TRANSCATHETER (TAVR);  Surgeon: Dallin Verma MD;  Location: [facility] CATH;  Service: Cardiology;  Laterality: Bilateral;    TRANSTHORACIC ECHOCARDIOGRAPHY (TTE)  1/4/2023    Procedure: ECHOCARDIOGRAM, TRANSTHORACIC;  Surgeon: Bhavesh Peña MD;  Location: [facility] CATH;  Service: Cardiology;;       Review of patient's allergies indicates:   Allergen Reactions    Latex      Other reaction(s): Unknown  Other reaction(s): Unknown    Sulfacetamide sodium      Pain perineal area    Sulfasalazine Hives    Adhesive Itching     SKIN GETS RED WITH TAPE AND BANDAIDS    Adhesive tape-silicones Itching     SKIN GETS RED WITH TAPE AND BANDAIDS    Sulfa (sulfonamide antibiotics) Rash       No current facility-administered medications on file prior to encounter.     Current Outpatient Medications on File Prior to Encounter   Medication Sig    albuterol (PROVENTIL/VENTOLIN HFA) 90 mcg/actuation inhaler Inhale 2 puffs into the lungs every 6 (six) hours as needed for Shortness of Breath.    aspirin (ECOTRIN) 81 MG EC tablet Take 81 mg by mouth once daily.    calcium carbonate (OS-TK) 500 mg calcium (1,250 mg) tablet Take 1 tablet (500 mg total) by mouth once daily.    carvediloL (COREG) 12.5 MG tablet Take 12.5 mg by mouth 2 (two) times daily.    citalopram (CELEXA) 40 MG tablet Take 40 mg by mouth once daily.    clopidogreL (PLAVIX) 75 mg tablet Take 1 tablet (75 mg total) by mouth once daily.    diltiaZEM (DILACOR XR) 120 MG CDCR Take 1 capsule (120 mg total) by mouth once daily.    donepeziL (ARICEPT) 5 MG tablet Take 5 mg by mouth nightly.    ergocalciferol (ERGOCALCIFEROL) 50,000 unit Cap TAKE 1 CAPSULE (50,000 UNITS TOTAL) EVERY 7 DAYS.    FEROSUL 325 mg (65 mg iron) Tab tablet Take 325 mg by mouth once daily.     fluticasone furoate-vilanteroL (BREO ELLIPTA) 100-25 mcg/dose diskus inhaler Inhale 1 puff into the lungs once daily. Controller    fluticasone propionate (FLONASE) 50 mcg/actuation nasal spray 1 spray by Each Nostril route once daily.    furosemide (LASIX) 40 MG tablet Take 1 tablet (40 mg total) by mouth daily as needed (Take for weight gain > 2 pounds over 24 hours. May discuss with PCP).    lactobacillus acidophilus & bulgar (LACTINEX) 100 million cell packet Take 1 packet (1 each total) by mouth 2 (two) times daily.    multivitamin capsule Take 1 capsule by mouth once daily.    traMADoL (ULTRAM) 50 mg tablet Take 1 tablet (50 mg total) by mouth every 8 (eight) hours as needed for Pain.    venlafaxine (EFFEXOR-XR) 75 MG 24 hr capsule Take 1 capsule (75 mg total) by mouth once daily.    [DISCONTINUED] ALLERGY RELIEF, FEXOFENADINE, 180 mg tablet Take 180 mg by mouth once daily.    [DISCONTINUED] ezetimibe-simvastatin 10-40 mg (VYTORIN) 10-40 mg per tablet Take 1 tablet by mouth.    [DISCONTINUED] lisinopril-hydrochlorothiazide (PRINZIDE,ZESTORETIC) 20-12.5 mg per tablet Take 1 tablet by mouth once daily.      Family History       Problem Relation (Age of Onset)    Arthritis Mother          Tobacco Use    Smoking status: Former     Current packs/day: 0.00     Average packs/day: 0.5 packs/day for 35.0 years (17.5 ttl pk-yrs)     Types: Cigarettes     Start date: 10/5/1987     Quit date: 10/5/2022     Years since quittin.3    Smokeless tobacco: Never   Substance and Sexual Activity    Alcohol use: No    Drug use: No    Sexual activity: Not Currently     Review of Systems   Constitutional:  Negative for chills and fever.   Respiratory:  Negative for shortness of breath.    Cardiovascular:  Negative for chest pain.   Gastrointestinal:  Negative for abdominal pain, diarrhea, nausea and vomiting.   Genitourinary:  Negative for dysuria and hematuria.   Musculoskeletal:  Positive for arthralgias (Right hip pain).    Neurological:  Negative for dizziness, syncope and light-headedness.     Objective:     Vital Signs (Most Recent):  Temp: 98.5 °F (36.9 °C) (02/09/25 1446)  Pulse: 93 (02/09/25 1640)  Resp: 13 (02/09/25 1640)  BP: 107/60 (02/09/25 1640)  SpO2: (!) 92 % (02/09/25 1640) Vital Signs (24h Range):  Temp:  [98.5 °F (36.9 °C)] 98.5 °F (36.9 °C)  Pulse:  [] 93  Resp:  [13-20] 13  SpO2:  [87 %-96 %] 92 %  BP: (107-130)/(60-80) 107/60     Weight: 83.9 kg (185 lb)  Body mass index is 36.13 kg/m².     Physical Exam  Vitals reviewed.   Constitutional:       General: She is awake. She is not in acute distress.     Appearance: Normal appearance. She is obese. She is not diaphoretic.   Cardiovascular:      Rate and Rhythm: Normal rate. Rhythm irregularly irregular.      Heart sounds:      No friction rub. No gallop.   Pulmonary:      Effort: Pulmonary effort is normal. No accessory muscle usage or respiratory distress.      Breath sounds: Normal breath sounds. No wheezing, rhonchi or rales.   Abdominal:      General: Bowel sounds are normal.      Palpations: Abdomen is soft.      Tenderness: There is no abdominal tenderness. There is no guarding or rebound.   Musculoskeletal:      Right lower leg: No edema.      Left lower leg: No edema.      Comments: Right hip without ecchymosis or overlying skin changes appreciated.  Denies tenderness to palpation of lateral right hip.  Active and passive ROM significantly limited due to pain.   Skin:     General: Skin is warm and dry.   Neurological:      Mental Status: She is alert.      Comments: A&O x3 to self, place, and month.  Patient is disoriented to City and year.  She knows that she is in the hospital but is unsure of the name of the hospital.  She states that it is 2027.   Psychiatric:         Behavior: Behavior is cooperative.                Significant Labs: All pertinent labs within the past 24 hours have been reviewed.  CBC:   Recent Labs   Lab 02/09/25  1521   WBC 12.78*    HGB 15.3   HCT 47.8        CMP:   Recent Labs   Lab 02/09/25  1521      K 3.2*      CO2 27   *   BUN 17   CREATININE 0.5   CALCIUM 9.0   PROT 6.4   ALBUMIN 3.6   BILITOT 0.7   ALKPHOS 75   AST 27   ALT 22   ANIONGAP 7*     Coagulation:   Recent Labs   Lab 02/09/25  1521   INR 1.1       Significant Imaging:   Imaging Results              X-Ray Hip 2 or 3 views Right with Pelvis when performed (Final result)  Result time 02/09/25 15:57:35      Final result by Dallin Vyas MD (02/09/25 15:57:35)                   Impression:      1. Acute nondisplaced inter trochanteric right femoral neck fracture.      Electronically signed by: Dallin Vyas  Date:    02/09/2025  Time:    15:57               Narrative:    EXAMINATION:  XR HIP WITH PELVIS WHEN PERFORMED 2 OR 3 VIEWS RIGHT    CLINICAL HISTORY:  Pain in right hip    COMPARISON:  CT abdomen and pelvis, January 2025    FINDINGS:  Three views are provided.  There is an acute nondisplaced and non comminuted inter trochanteric right femoral neck fracture.  No underlying destructive lesion is identified.  There is no dislocation.    No other acute findings are demonstrated.                                       X-Ray Chest AP Portable (Final result)  Result time 02/09/25 15:54:17      Final result by Dallin Vyas MD (02/09/25 15:54:17)                   Impression:      1. No acute radiographic abnormalities.  Chronic findings as above.      Electronically signed by: Dallin Vyas  Date:    02/09/2025  Time:    15:54               Narrative:    EXAMINATION:  XR CHEST AP PORTABLE    CLINICAL HISTORY:  pre op;    COMPARISON:  August 2024    FINDINGS:  The heart is borderline enlarged.  Transcatheter aortic valve replacement is noted.  The aortic arch is calcified.    Mild bilateral interstitial prominence is unchanged in appearance compared to priors and likely chronic, with no active infiltrate or effusion identified.    Changes  of multilevel thoracic vertebroplasty are noted.

## 2025-02-09 NOTE — ED PROVIDER NOTES
Encounter Date: 2/9/2025       History     Chief Complaint   Patient presents with    Fall     Slip and fall with left hip pain     HPI patient is a 79-year-old woman who presents emergency department after a slip and fall complaining of right hip pain.  Denies any head trauma or loss of consciousness.  State she has been unable to move her hip since the fall.  Review of patient's allergies indicates:   Allergen Reactions    Latex      Other reaction(s): Unknown  Other reaction(s): Unknown    Sulfacetamide sodium      Pain perineal area    Sulfasalazine Hives    Adhesive Itching     SKIN GETS RED WITH TAPE AND BANDAIDS    Adhesive tape-silicones Itching     SKIN GETS RED WITH TAPE AND BANDAIDS    Sulfa (sulfonamide antibiotics) Rash     Past Medical History:   Diagnosis Date    Anesthesia complication     BLADDER DYSFUNCTION    Aortic stenosis     Arthritis     Back pain     Diabetes mellitus type II     NO LONGER DIABETIC    Encounter for blood transfusion     Encounter for pain management 12/6/2024    Hyperlipidemia     Hypertension     NSTEMI (non-ST elevated myocardial infarction) 05/01/2022    Osteoporosis     Wears glasses      Past Surgical History:   Procedure Laterality Date     vocal cord nodules removed  long time ago     twice    ADRENAL TUMOR      APPENDECTOMY  within last 5yrs    CATHETERIZATION OF BOTH LEFT AND RIGHT HEART Left 05/06/2022    Procedure: CATHETERIZATION, HEART, BOTH LEFT AND RIGHT;  Surgeon: Michelet Vargas MD;  Location: [facility] CATH/EP LAB;  Service: Cardiology;  Laterality: Left;    COLONOSCOPY N/A 6/23/2023    Procedure: COLONOSCOPY;  Surgeon: Joel Neal III, MD;  Location: [facility] ENDO;  Service: Endoscopy;  Laterality: N/A;    FIXATION KYPHOPLASTY THORACIC SPINE      8-20-13    FOOT SURGERY      left 2nd toe was too long     HERNIA REPAIR  within last 5yrs    INSERTION OF TEMPORARY PACEMAKER N/A 1/4/2023    Procedure: INSERTION, PACEMAKER, TEMPORARY;  Surgeon: Bhavesh Peña  MD;  Location: [facility] CATH;  Service: Cardiology;  Laterality: N/A;    LEFT HEART CATHETERIZATION Left 2022    Procedure: Left heart cath;  Surgeon: Jose Echols MD;  Location: [facility] CATH/EP LAB;  Service: Cardiology;  Laterality: Left;    SMALL BOWEL ENTEROSCOPY N/A 2023    Procedure: ENTEROSCOPY;  Surgeon: Cornell Aguirre MD;  Location: [facility] ENDO;  Service: Endoscopy;  Laterality: N/A;    SMALL BOWEL ENTEROSCOPY N/A 10/20/2023    Procedure: ENTEROSCOPY;  Surgeon: Otilio Bourgeois MD;  Location: [facility] ENDO;  Service: Endoscopy;  Laterality: N/A;    TONSILLECTOMY, ADENOIDECTOMY  long time ago    TRANSCATHETER AORTIC VALVE REPLACEMENT (TAVR) Bilateral 2023    Procedure: REPLACEMENT, AORTIC VALVE, TRANSCATHETER (TAVR);  Surgeon: Bhavesh Peña MD;  Location: [facility] CATH;  Service: Cardiology;  Laterality: Bilateral;    TRANSCATHETER AORTIC VALVE REPLACEMENT (TAVR) Bilateral 2023    Procedure: REPLACEMENT, AORTIC VALVE, TRANSCATHETER (TAVR);  Surgeon: Dallin Verma MD;  Location: [facility] CATH;  Service: Cardiology;  Laterality: Bilateral;    TRANSTHORACIC ECHOCARDIOGRAPHY (TTE)  2023    Procedure: ECHOCARDIOGRAM, TRANSTHORACIC;  Surgeon: Bhavesh Peña MD;  Location: [facility] CATH;  Service: Cardiology;;     Family History   Problem Relation Name Age of Onset    Arthritis Mother      Collagen disease Neg Hx       Social History     Tobacco Use    Smoking status: Former     Current packs/day: 0.00     Average packs/day: 0.5 packs/day for 35.0 years (17.5 ttl pk-yrs)     Types: Cigarettes     Start date: 10/5/1987     Quit date: 10/5/2022     Years since quittin.3    Smokeless tobacco: Never   Substance Use Topics    Alcohol use: No    Drug use: No     Review of Systems   Musculoskeletal:  Positive for arthralgias and gait problem.       Physical Exam     Initial Vitals [25 1446]   BP Pulse Resp Temp SpO2   130/75 (!) 114 20 98.5 °F (36.9 °C) 96 %      MAP       --         Physical  Exam    Nursing note and vitals reviewed.  Constitutional: She appears well-developed and well-nourished. She is Obese . No distress.   HENT:   Head: Normocephalic and atraumatic.   Eyes: EOM are normal. Pupils are equal, round, and reactive to light.   Neck: Neck supple.   Cardiovascular:  An irregularly irregular rhythm present.   Tachycardia present.         Pulmonary/Chest: Breath sounds normal. No respiratory distress.   Abdominal: Abdomen is soft.   Musculoskeletal:         General: Tenderness present.      Cervical back: Neck supple.      Comments: Tenderness to palpation of the right hip, normal distal pulses, able to wiggle toes with normal sensation distally.  Diminished range of motion secondary to pain.     Neurological: She is alert and oriented to person, place, and time.   Skin: Skin is warm and dry.         ED Course   Procedures  Labs Reviewed   CBC W/ AUTO DIFFERENTIAL - Abnormal       Result Value    WBC 12.78 (*)     RBC 5.22      Hemoglobin 15.3      Hematocrit 47.8      MCV 92      MCH 29.3      MCHC 32.0      RDW 15.3 (*)     Platelets 163      MPV 11.0      Immature Granulocytes 1.3 (*)     Gran # (ANC) 10.5 (*)     Immature Grans (Abs) 0.16 (*)     Lymph # 1.3      Mono # 0.8      Eos # 0.1      Baso # 0.04      nRBC 0      Gran % 82.2 (*)     Lymph % 9.9 (*)     Mono % 5.9      Eosinophil % 0.4      Basophil % 0.3      Differential Method Automated     COMPREHENSIVE METABOLIC PANEL - Abnormal    Sodium 137      Potassium 3.2 (*)     Chloride 103      CO2 27      Glucose 154 (*)     BUN 17      Creatinine 0.5      Calcium 9.0      Total Protein 6.4      Albumin 3.6      Total Bilirubin 0.7      Alkaline Phosphatase 75      AST 27      ALT 22      eGFR >60.0      Anion Gap 7 (*)    PROTIME-INR - Abnormal    Prothrombin Time 12.6 (*)     INR 1.1     POCT GLUCOSE - Abnormal    POCT Glucose 137 (*)    POCT GLUCOSE, HAND-HELD DEVICE        ECG Results              EKG 12-lead (In process)         Collection Time Result Time QRS Duration OHS QTC Calculation    02/09/25 15:53:49 02/09/25 16:07:57 84 494                     In process by Interface, [facility] (02/09/25 16:08:03)                   Narrative:    Test Reason : Z01.811,    Vent. Rate : 127 BPM     Atrial Rate :    BPM     P-R Int :    ms          QRS Dur :  84 ms      QT Int : 340 ms       P-R-T Axes :     96 -21 degrees    QTcB Int : 494 ms    Atrial fibrillation with rapid ventricular response  Rightward axis  Nonspecific ST abnormality  Abnormal QRS-T angle, consider primary T wave abnormality  Abnormal ECG  No previous ECGs available    Referred By: AAAREFERRAL SELF           Confirmed By:                                   Imaging Results              X-Ray Hip 2 or 3 views Right with Pelvis when performed (Final result)  Result time 02/09/25 15:57:35      Final result by Dallin Vyas MD (02/09/25 15:57:35)                   Impression:      1. Acute nondisplaced inter trochanteric right femoral neck fracture.      Electronically signed by: Dallin Vyas  Date:    02/09/2025  Time:    15:57               Narrative:    EXAMINATION:  XR HIP WITH PELVIS WHEN PERFORMED 2 OR 3 VIEWS RIGHT    CLINICAL HISTORY:  Pain in right hip    COMPARISON:  CT abdomen and pelvis, January 2025    FINDINGS:  Three views are provided.  There is an acute nondisplaced and non comminuted inter trochanteric right femoral neck fracture.  No underlying destructive lesion is identified.  There is no dislocation.    No other acute findings are demonstrated.                                       X-Ray Chest AP Portable (Final result)  Result time 02/09/25 15:54:17      Final result by Dallin Vyas MD (02/09/25 15:54:17)                   Impression:      1. No acute radiographic abnormalities.  Chronic findings as above.      Electronically signed by: Dallin Vyas  Date:    02/09/2025  Time:    15:54               Narrative:    EXAMINATION:  XR  CHEST AP PORTABLE    CLINICAL HISTORY:  pre op;    COMPARISON:  August 2024    FINDINGS:  The heart is borderline enlarged.  Transcatheter aortic valve replacement is noted.  The aortic arch is calcified.    Mild bilateral interstitial prominence is unchanged in appearance compared to priors and likely chronic, with no active infiltrate or effusion identified.    Changes of multilevel thoracic vertebroplasty are noted.                                       Medications   sodium chloride 0.9% flush 10 mL (has no administration in time range)   melatonin tablet 6 mg (has no administration in time range)   ondansetron injection 4 mg (has no administration in time range)   senna-docusate 8.6-50 mg per tablet 1 tablet (has no administration in time range)   aluminum-magnesium hydroxide-simethicone 200-200-20 mg/5 mL suspension 30 mL (has no administration in time range)   acetaminophen tablet 650 mg (has no administration in time range)   HYDROcodone-acetaminophen 5-325 mg per tablet 1 tablet (has no administration in time range)   naloxone 0.4 mg/mL injection 0.02 mg (has no administration in time range)   potassium bicarbonate disintegrating tablet 50 mEq (has no administration in time range)   potassium bicarbonate disintegrating tablet 35 mEq (has no administration in time range)   potassium bicarbonate disintegrating tablet 60 mEq (has no administration in time range)   magnesium oxide tablet 800 mg (has no administration in time range)   magnesium oxide tablet 800 mg (has no administration in time range)   potassium, sodium phosphates 280-160-250 mg packet 2 packet (has no administration in time range)   potassium, sodium phosphates 280-160-250 mg packet 2 packet (has no administration in time range)   potassium, sodium phosphates 280-160-250 mg packet 2 packet (has no administration in time range)   glucose chewable tablet 16 g (has no administration in time range)   glucose chewable tablet 24 g (has no  administration in time range)   dextrose 50% injection 12.5 g (has no administration in time range)   dextrose 50% injection 25 g (has no administration in time range)   glucagon (human recombinant) injection 1 mg (has no administration in time range)   insulin aspart U-100 pen 0-10 Units (has no administration in time range)   albuterol-ipratropium 2.5 mg-0.5 mg/3 mL nebulizer solution 3 mL (has no administration in time range)   LIDOcaine 5 % patch 1 patch (1 patch Transdermal Patch Applied 2/9/25 1804)   carvediloL tablet 12.5 mg (has no administration in time range)   diltiaZEM 24 hr capsule 120 mg (has no administration in time range)   donepeziL tablet 5 mg (has no administration in time range)   ferrous sulfate tablet 1 each (has no administration in time range)   furosemide tablet 40 mg (has no administration in time range)   lactobacillus acidophilus & bulgar 100 million cell packet 1 each (has no administration in time range)   multivitamin tablet (has no administration in time range)   pantoprazole EC tablet 40 mg (has no administration in time range)   venlafaxine 24 hr capsule 75 mg (has no administration in time range)   budesonide nebulizer solution 0.5 mg (0.5 mg Nebulization Not Given 2/9/25 2000)     And   arformoteroL nebulizer solution 15 mcg (15 mcg Nebulization Not Given 2/9/25 2000)   HYDROmorphone injection 1 mg (has no administration in time range)   cyclobenzaprine tablet 10 mg (has no administration in time range)   diazePAM tablet 5 mg (has no administration in time range)   HYDROmorphone injection 1.5 mg (1.5 mg Intravenous Given 2/9/25 1524)   sodium chloride 0.9% bolus 1,000 mL 1,000 mL (0 mLs Intravenous Stopped 2/9/25 1810)   diltiaZEM injection 10 mg (10 mg Intravenous Given 2/9/25 1633)     Medical Decision Making  79-year-old woman who presents emergency department after a slip and fall complaining of right hip pain.  Denies any head trauma or loss of consciousness.  State she has  been unable to move her hip since the fall.  Differential diagnosis includes hip fracture, dislocation.  Neurovascularly intact.  Pain medicine provided.  Patient also in AFib with RVR and given Cardizem.  X-rays obtained show right intertrochanteric fracture per my interpretation  Discussed with orthopedics who will evaluate the patient for operative repair.  Discussed Hospital Medicine will admit the patient.    Amount and/or Complexity of Data Reviewed  Labs: ordered.  Radiology: ordered.    Risk  Prescription drug management.  Decision regarding hospitalization.                                      Clinical Impression:  Final diagnoses:  [Z01.811] Pre-op chest exam  [M25.551] Right hip pain  [S72.001A] Fracture of femoral neck, right, closed          ED Disposition Condition    Admit                 Antwan Avila MD  02/09/25 2514

## 2025-02-10 ENCOUNTER — ANESTHESIA EVENT (OUTPATIENT)
Dept: SURGERY | Facility: HOSPITAL | Age: 80
End: 2025-02-10
Payer: MEDICARE

## 2025-02-10 ENCOUNTER — ANESTHESIA (OUTPATIENT)
Dept: SURGERY | Facility: HOSPITAL | Age: 80
End: 2025-02-10
Payer: MEDICARE

## 2025-02-10 LAB
ANION GAP SERPL CALC-SCNC: 6 MMOL/L (ref 8–16)
APTT PPP: 29.1 SEC (ref 21–32)
BASOPHILS # BLD AUTO: 0.04 K/UL (ref 0–0.2)
BASOPHILS NFR BLD: 0.3 % (ref 0–1.9)
BUN SERPL-MCNC: 19 MG/DL (ref 8–23)
CALCIUM SERPL-MCNC: 9 MG/DL (ref 8.7–10.5)
CHLORIDE SERPL-SCNC: 102 MMOL/L (ref 95–110)
CO2 SERPL-SCNC: 29 MMOL/L (ref 23–29)
CREAT SERPL-MCNC: 0.5 MG/DL (ref 0.5–1.4)
DIFFERENTIAL METHOD BLD: ABNORMAL
EOSINOPHIL # BLD AUTO: 0.2 K/UL (ref 0–0.5)
EOSINOPHIL NFR BLD: 1.3 % (ref 0–8)
ERYTHROCYTE [DISTWIDTH] IN BLOOD BY AUTOMATED COUNT: 15.6 % (ref 11.5–14.5)
EST. GFR  (NO RACE VARIABLE): >60 ML/MIN/1.73 M^2
ESTIMATED AVG GLUCOSE: 134 MG/DL (ref 68–131)
GLUCOSE SERPL-MCNC: 183 MG/DL (ref 70–110)
HBA1C MFR BLD: 6.3 % (ref 4.5–6.2)
HCT VFR BLD AUTO: 44 % (ref 37–48.5)
HCT VFR BLD AUTO: 47.2 % (ref 37–48.5)
HGB BLD-MCNC: 13.8 G/DL (ref 12–16)
HGB BLD-MCNC: 15.2 G/DL (ref 12–16)
IMM GRANULOCYTES # BLD AUTO: 0.07 K/UL (ref 0–0.04)
IMM GRANULOCYTES NFR BLD AUTO: 0.6 % (ref 0–0.5)
INR PPP: 1.2 (ref 0.8–1.2)
LYMPHOCYTES # BLD AUTO: 0.9 K/UL (ref 1–4.8)
LYMPHOCYTES NFR BLD: 7.6 % (ref 18–48)
MAGNESIUM SERPL-MCNC: 1.4 MG/DL (ref 1.6–2.6)
MCH RBC QN AUTO: 29.8 PG (ref 27–31)
MCHC RBC AUTO-ENTMCNC: 32.2 G/DL (ref 32–36)
MCV RBC AUTO: 93 FL (ref 82–98)
MONOCYTES # BLD AUTO: 0.8 K/UL (ref 0.3–1)
MONOCYTES NFR BLD: 6.5 % (ref 4–15)
NEUTROPHILS # BLD AUTO: 10 K/UL (ref 1.8–7.7)
NEUTROPHILS NFR BLD: 83.7 % (ref 38–73)
NRBC BLD-RTO: 0 /100 WBC
PHOSPHATE SERPL-MCNC: 3.8 MG/DL (ref 2.7–4.5)
PLATELET # BLD AUTO: 124 K/UL (ref 150–450)
PMV BLD AUTO: 11.2 FL (ref 9.2–12.9)
POCT GLUCOSE: 145 MG/DL (ref 70–110)
POCT GLUCOSE: 150 MG/DL (ref 70–110)
POCT GLUCOSE: 157 MG/DL (ref 70–110)
POTASSIUM SERPL-SCNC: 3.6 MMOL/L (ref 3.5–5.1)
PROTHROMBIN TIME: 13.5 SEC (ref 9–12.5)
RBC # BLD AUTO: 5.1 M/UL (ref 4–5.4)
SODIUM SERPL-SCNC: 137 MMOL/L (ref 136–145)
TSH SERPL DL<=0.005 MIU/L-ACNC: 1.83 UIU/ML (ref 0.34–5.6)
WBC # BLD AUTO: 11.96 K/UL (ref 3.9–12.7)

## 2025-02-10 PROCEDURE — 25000003 PHARM REV CODE 250: Performed by: NURSE ANESTHETIST, CERTIFIED REGISTERED

## 2025-02-10 PROCEDURE — 71000033 HC RECOVERY, INTIAL HOUR: Performed by: ORTHOPAEDIC SURGERY

## 2025-02-10 PROCEDURE — 63600175 PHARM REV CODE 636 W HCPCS: Performed by: NURSE ANESTHETIST, CERTIFIED REGISTERED

## 2025-02-10 PROCEDURE — 25000003 PHARM REV CODE 250: Performed by: ANESTHESIOLOGY

## 2025-02-10 PROCEDURE — 99223 1ST HOSP IP/OBS HIGH 75: CPT | Mod: 57,ICN,, | Performed by: ORTHOPAEDIC SURGERY

## 2025-02-10 PROCEDURE — 94640 AIRWAY INHALATION TREATMENT: CPT

## 2025-02-10 PROCEDURE — 84100 ASSAY OF PHOSPHORUS: CPT

## 2025-02-10 PROCEDURE — 94761 N-INVAS EAR/PLS OXIMETRY MLT: CPT

## 2025-02-10 PROCEDURE — 83036 HEMOGLOBIN GLYCOSYLATED A1C: CPT

## 2025-02-10 PROCEDURE — 36000711: Performed by: ORTHOPAEDIC SURGERY

## 2025-02-10 PROCEDURE — 37000009 HC ANESTHESIA EA ADD 15 MINS: Performed by: ORTHOPAEDIC SURGERY

## 2025-02-10 PROCEDURE — 94799 UNLISTED PULMONARY SVC/PX: CPT

## 2025-02-10 PROCEDURE — 25000003 PHARM REV CODE 250: Performed by: FAMILY MEDICINE

## 2025-02-10 PROCEDURE — 36000710: Performed by: ORTHOPAEDIC SURGERY

## 2025-02-10 PROCEDURE — 85730 THROMBOPLASTIN TIME PARTIAL: CPT

## 2025-02-10 PROCEDURE — 99900031 HC PATIENT EDUCATION (STAT)

## 2025-02-10 PROCEDURE — 63600175 PHARM REV CODE 636 W HCPCS: Performed by: ORTHOPAEDIC SURGERY

## 2025-02-10 PROCEDURE — 85014 HEMATOCRIT: CPT | Performed by: FAMILY MEDICINE

## 2025-02-10 PROCEDURE — 80048 BASIC METABOLIC PNL TOTAL CA: CPT

## 2025-02-10 PROCEDURE — 25000003 PHARM REV CODE 250

## 2025-02-10 PROCEDURE — 37000008 HC ANESTHESIA 1ST 15 MINUTES: Performed by: ORTHOPAEDIC SURGERY

## 2025-02-10 PROCEDURE — 25000242 PHARM REV CODE 250 ALT 637 W/ HCPCS: Performed by: ORTHOPAEDIC SURGERY

## 2025-02-10 PROCEDURE — 25000003 PHARM REV CODE 250: Performed by: ORTHOPAEDIC SURGERY

## 2025-02-10 PROCEDURE — 20000000 HC ICU ROOM

## 2025-02-10 PROCEDURE — 85610 PROTHROMBIN TIME: CPT

## 2025-02-10 PROCEDURE — 63600175 PHARM REV CODE 636 W HCPCS: Mod: TB,JZ | Performed by: ORTHOPAEDIC SURGERY

## 2025-02-10 PROCEDURE — 36415 COLL VENOUS BLD VENIPUNCTURE: CPT

## 2025-02-10 PROCEDURE — 27201423 OPTIME MED/SURG SUP & DEVICES STERILE SUPPLY: Performed by: ORTHOPAEDIC SURGERY

## 2025-02-10 PROCEDURE — 27000221 HC OXYGEN, UP TO 24 HOURS

## 2025-02-10 PROCEDURE — C1713 ANCHOR/SCREW BN/BN,TIS/BN: HCPCS | Performed by: ORTHOPAEDIC SURGERY

## 2025-02-10 PROCEDURE — 0QS636Z REPOSITION RIGHT UPPER FEMUR WITH INTRAMEDULLARY INTERNAL FIXATION DEVICE, PERCUTANEOUS APPROACH: ICD-10-PCS | Performed by: ORTHOPAEDIC SURGERY

## 2025-02-10 PROCEDURE — 27245 TREAT THIGH FRACTURE: CPT | Mod: RT,,, | Performed by: ORTHOPAEDIC SURGERY

## 2025-02-10 PROCEDURE — 25000003 PHARM REV CODE 250: Performed by: INTERNAL MEDICINE

## 2025-02-10 PROCEDURE — 85018 HEMOGLOBIN: CPT | Performed by: FAMILY MEDICINE

## 2025-02-10 PROCEDURE — C1769 GUIDE WIRE: HCPCS | Performed by: ORTHOPAEDIC SURGERY

## 2025-02-10 PROCEDURE — 84443 ASSAY THYROID STIM HORMONE: CPT

## 2025-02-10 PROCEDURE — 63600175 PHARM REV CODE 636 W HCPCS: Mod: TB,JZ | Performed by: INTERNAL MEDICINE

## 2025-02-10 PROCEDURE — 71000039 HC RECOVERY, EACH ADD'L HOUR: Performed by: ORTHOPAEDIC SURGERY

## 2025-02-10 PROCEDURE — 85025 COMPLETE CBC W/AUTO DIFF WBC: CPT

## 2025-02-10 PROCEDURE — 99900035 HC TECH TIME PER 15 MIN (STAT)

## 2025-02-10 PROCEDURE — 82962 GLUCOSE BLOOD TEST: CPT | Performed by: ORTHOPAEDIC SURGERY

## 2025-02-10 PROCEDURE — 83735 ASSAY OF MAGNESIUM: CPT

## 2025-02-10 PROCEDURE — 63600175 PHARM REV CODE 636 W HCPCS: Performed by: FAMILY MEDICINE

## 2025-02-10 DEVICE — SCREW FEM NECK PERF GOLD 85MM: Type: IMPLANTABLE DEVICE | Site: HIP | Status: FUNCTIONAL

## 2025-02-10 DEVICE — NAIL IM CANN 130 DEG 11X170: Type: IMPLANTABLE DEVICE | Site: HIP | Status: FUNCTIONAL

## 2025-02-10 DEVICE — SCREW XL25 IM NAIL 36X5MM: Type: IMPLANTABLE DEVICE | Site: HIP | Status: FUNCTIONAL

## 2025-02-10 RX ORDER — DIPHENHYDRAMINE HYDROCHLORIDE 50 MG/ML
12.5 INJECTION INTRAMUSCULAR; INTRAVENOUS EVERY 6 HOURS PRN
Status: DISCONTINUED | OUTPATIENT
Start: 2025-02-10 | End: 2025-02-10 | Stop reason: HOSPADM

## 2025-02-10 RX ORDER — PROPOFOL 10 MG/ML
VIAL (ML) INTRAVENOUS
Status: DISCONTINUED | OUTPATIENT
Start: 2025-02-10 | End: 2025-02-10

## 2025-02-10 RX ORDER — MUPIROCIN 20 MG/G
OINTMENT TOPICAL 2 TIMES DAILY
Status: COMPLETED | OUTPATIENT
Start: 2025-02-10 | End: 2025-02-15

## 2025-02-10 RX ORDER — ACETAMINOPHEN 10 MG/ML
INJECTION, SOLUTION INTRAVENOUS
Status: DISCONTINUED | OUTPATIENT
Start: 2025-02-10 | End: 2025-02-10

## 2025-02-10 RX ORDER — MAGNESIUM SULFATE HEPTAHYDRATE 40 MG/ML
2 INJECTION, SOLUTION INTRAVENOUS ONCE
Status: DISCONTINUED | OUTPATIENT
Start: 2025-02-10 | End: 2025-02-10

## 2025-02-10 RX ORDER — SODIUM CHLORIDE 9 MG/ML
INJECTION, SOLUTION INTRAVENOUS CONTINUOUS
Status: DISCONTINUED | OUTPATIENT
Start: 2025-02-10 | End: 2025-02-12

## 2025-02-10 RX ORDER — CEFAZOLIN SODIUM 1 G/3ML
INJECTION, POWDER, FOR SOLUTION INTRAMUSCULAR; INTRAVENOUS
Status: DISCONTINUED | OUTPATIENT
Start: 2025-02-10 | End: 2025-02-10

## 2025-02-10 RX ORDER — PHENYLEPHRINE HYDROCHLORIDE 10 MG/ML
INJECTION INTRAVENOUS
Status: DISCONTINUED | OUTPATIENT
Start: 2025-02-10 | End: 2025-02-10

## 2025-02-10 RX ORDER — MUPIROCIN 20 MG/G
OINTMENT TOPICAL
Status: DISCONTINUED | OUTPATIENT
Start: 2025-02-10 | End: 2025-02-10 | Stop reason: HOSPADM

## 2025-02-10 RX ORDER — ROCURONIUM BROMIDE 10 MG/ML
INJECTION, SOLUTION INTRAVENOUS
Status: DISCONTINUED | OUTPATIENT
Start: 2025-02-10 | End: 2025-02-10

## 2025-02-10 RX ORDER — METOPROLOL TARTRATE 1 MG/ML
2.5 INJECTION, SOLUTION INTRAVENOUS EVERY 5 MIN PRN
Status: COMPLETED | OUTPATIENT
Start: 2025-02-10 | End: 2025-02-10

## 2025-02-10 RX ORDER — DEXAMETHASONE SODIUM PHOSPHATE 4 MG/ML
INJECTION, SOLUTION INTRA-ARTICULAR; INTRALESIONAL; INTRAMUSCULAR; INTRAVENOUS; SOFT TISSUE
Status: DISCONTINUED | OUTPATIENT
Start: 2025-02-10 | End: 2025-02-10

## 2025-02-10 RX ORDER — OXYCODONE HYDROCHLORIDE 5 MG/1
5 TABLET ORAL
Status: DISCONTINUED | OUTPATIENT
Start: 2025-02-10 | End: 2025-02-10 | Stop reason: HOSPADM

## 2025-02-10 RX ORDER — SODIUM CHLORIDE 0.9 % (FLUSH) 0.9 %
10 SYRINGE (ML) INJECTION
Status: DISCONTINUED | OUTPATIENT
Start: 2025-02-10 | End: 2025-02-10 | Stop reason: HOSPADM

## 2025-02-10 RX ORDER — LIDOCAINE HYDROCHLORIDE 20 MG/ML
INJECTION, SOLUTION EPIDURAL; INFILTRATION; INTRACAUDAL; PERINEURAL
Status: DISCONTINUED | OUTPATIENT
Start: 2025-02-10 | End: 2025-02-10

## 2025-02-10 RX ORDER — FAMOTIDINE 10 MG/ML
INJECTION INTRAVENOUS
Status: DISCONTINUED | OUTPATIENT
Start: 2025-02-10 | End: 2025-02-10

## 2025-02-10 RX ORDER — CEFAZOLIN 2 G/1
2 INJECTION, POWDER, FOR SOLUTION INTRAMUSCULAR; INTRAVENOUS
Status: DISCONTINUED | OUTPATIENT
Start: 2025-02-10 | End: 2025-02-10 | Stop reason: HOSPADM

## 2025-02-10 RX ORDER — DILTIAZEM HYDROCHLORIDE 5 MG/ML
10 INJECTION INTRAVENOUS ONCE
Status: COMPLETED | OUTPATIENT
Start: 2025-02-10 | End: 2025-02-10

## 2025-02-10 RX ORDER — SUCCINYLCHOLINE CHLORIDE 20 MG/ML
INJECTION INTRAMUSCULAR; INTRAVENOUS
Status: DISCONTINUED | OUTPATIENT
Start: 2025-02-10 | End: 2025-02-10

## 2025-02-10 RX ORDER — SODIUM CHLORIDE 0.9 % (FLUSH) 0.9 %
10 SYRINGE (ML) INJECTION
Status: DISCONTINUED | OUTPATIENT
Start: 2025-02-10 | End: 2025-02-20 | Stop reason: HOSPADM

## 2025-02-10 RX ORDER — MAGNESIUM SULFATE HEPTAHYDRATE 40 MG/ML
2 INJECTION, SOLUTION INTRAVENOUS ONCE
Status: COMPLETED | OUTPATIENT
Start: 2025-02-10 | End: 2025-02-10

## 2025-02-10 RX ORDER — CEFAZOLIN 2 G/1
2 INJECTION, POWDER, FOR SOLUTION INTRAMUSCULAR; INTRAVENOUS
Status: COMPLETED | OUTPATIENT
Start: 2025-02-10 | End: 2025-02-11

## 2025-02-10 RX ORDER — CLOPIDOGREL BISULFATE 75 MG/1
75 TABLET ORAL DAILY
Status: DISCONTINUED | OUTPATIENT
Start: 2025-02-11 | End: 2025-02-12

## 2025-02-10 RX ORDER — METOPROLOL TARTRATE 1 MG/ML
2.5 INJECTION, SOLUTION INTRAVENOUS
Status: DISCONTINUED | OUTPATIENT
Start: 2025-02-10 | End: 2025-02-10

## 2025-02-10 RX ORDER — ONDANSETRON HYDROCHLORIDE 2 MG/ML
INJECTION, SOLUTION INTRAMUSCULAR; INTRAVENOUS
Status: DISCONTINUED | OUTPATIENT
Start: 2025-02-10 | End: 2025-02-10

## 2025-02-10 RX ORDER — HYDROMORPHONE HYDROCHLORIDE 1 MG/ML
0.2 INJECTION, SOLUTION INTRAMUSCULAR; INTRAVENOUS; SUBCUTANEOUS EVERY 5 MIN PRN
Status: DISCONTINUED | OUTPATIENT
Start: 2025-02-10 | End: 2025-02-10 | Stop reason: HOSPADM

## 2025-02-10 RX ORDER — FENTANYL CITRATE 50 UG/ML
INJECTION, SOLUTION INTRAMUSCULAR; INTRAVENOUS
Status: DISCONTINUED | OUTPATIENT
Start: 2025-02-10 | End: 2025-02-10

## 2025-02-10 RX ORDER — TRANEXAMIC ACID 10 MG/ML
1000 INJECTION, SOLUTION INTRAVENOUS
Status: COMPLETED | OUTPATIENT
Start: 2025-02-10 | End: 2025-02-10

## 2025-02-10 RX ORDER — NAPROXEN SODIUM 220 MG/1
81 TABLET, FILM COATED ORAL DAILY
Status: DISCONTINUED | OUTPATIENT
Start: 2025-02-11 | End: 2025-02-20 | Stop reason: HOSPADM

## 2025-02-10 RX ORDER — ONDANSETRON HYDROCHLORIDE 2 MG/ML
4 INJECTION, SOLUTION INTRAVENOUS DAILY PRN
Status: DISCONTINUED | OUTPATIENT
Start: 2025-02-10 | End: 2025-02-10 | Stop reason: HOSPADM

## 2025-02-10 RX ORDER — GLUCAGON 1 MG
1 KIT INJECTION
Status: DISCONTINUED | OUTPATIENT
Start: 2025-02-10 | End: 2025-02-10 | Stop reason: HOSPADM

## 2025-02-10 RX ORDER — NOREPINEPHRINE BITARTRATE/D5W 4MG/250ML
0-3 PLASTIC BAG, INJECTION (ML) INTRAVENOUS CONTINUOUS
Status: DISCONTINUED | OUTPATIENT
Start: 2025-02-10 | End: 2025-02-11

## 2025-02-10 RX ADMIN — Medication 800 MG: at 09:02

## 2025-02-10 RX ADMIN — ACETAMINOPHEN 1000 MG: 10 INJECTION, SOLUTION INTRAVENOUS at 02:02

## 2025-02-10 RX ADMIN — FERROUS SULFATE TAB 325 MG (65 MG ELEMENTAL FE) 1 EACH: 325 (65 FE) TAB at 09:02

## 2025-02-10 RX ADMIN — ROCURONIUM BROMIDE 5 MG: 10 INJECTION, SOLUTION INTRAVENOUS at 02:02

## 2025-02-10 RX ADMIN — SODIUM CHLORIDE 1000 ML: 9 INJECTION, SOLUTION INTRAVENOUS at 10:02

## 2025-02-10 RX ADMIN — LIDOCAINE HYDROCHLORIDE 50 MG: 20 INJECTION, SOLUTION INTRAVENOUS at 02:02

## 2025-02-10 RX ADMIN — CARVEDILOL 12.5 MG: 12.5 TABLET, FILM COATED ORAL at 08:02

## 2025-02-10 RX ADMIN — DEXAMETHASONE SODIUM PHOSPHATE 8 MG: 4 INJECTION, SOLUTION INTRAMUSCULAR; INTRAVENOUS at 02:02

## 2025-02-10 RX ADMIN — FENTANYL CITRATE 100 MCG: 0.05 INJECTION, SOLUTION INTRAMUSCULAR; INTRAVENOUS at 02:02

## 2025-02-10 RX ADMIN — BUDESONIDE INHALATION 0.5 MG: 0.5 SUSPENSION RESPIRATORY (INHALATION) at 09:02

## 2025-02-10 RX ADMIN — VENLAFAXINE HYDROCHLORIDE 75 MG: 37.5 CAPSULE, EXTENDED RELEASE ORAL at 08:02

## 2025-02-10 RX ADMIN — PHENYLEPHRINE HYDROCHLORIDE 200 MCG: 10 INJECTION INTRAVENOUS at 02:02

## 2025-02-10 RX ADMIN — HYDROMORPHONE HYDROCHLORIDE 1 MG: 1 INJECTION, SOLUTION INTRAMUSCULAR; INTRAVENOUS; SUBCUTANEOUS at 12:02

## 2025-02-10 RX ADMIN — ROCURONIUM BROMIDE 20 MG: 10 INJECTION, SOLUTION INTRAVENOUS at 02:02

## 2025-02-10 RX ADMIN — Medication 100 MG: at 02:02

## 2025-02-10 RX ADMIN — HYDROMORPHONE HYDROCHLORIDE 1 MG: 1 INJECTION, SOLUTION INTRAMUSCULAR; INTRAVENOUS; SUBCUTANEOUS at 06:02

## 2025-02-10 RX ADMIN — METOROPROLOL TARTRATE 2.5 MG: 5 INJECTION, SOLUTION INTRAVENOUS at 03:02

## 2025-02-10 RX ADMIN — CEFAZOLIN 2 G: 330 INJECTION, POWDER, FOR SOLUTION INTRAMUSCULAR; INTRAVENOUS at 02:02

## 2025-02-10 RX ADMIN — PROPOFOL 130 MG: 10 INJECTION, EMULSION INTRAVENOUS at 02:02

## 2025-02-10 RX ADMIN — HYDROMORPHONE HYDROCHLORIDE 1 MG: 1 INJECTION, SOLUTION INTRAMUSCULAR; INTRAVENOUS; SUBCUTANEOUS at 10:02

## 2025-02-10 RX ADMIN — HYDROMORPHONE HYDROCHLORIDE 1 MG: 1 INJECTION, SOLUTION INTRAMUSCULAR; INTRAVENOUS; SUBCUTANEOUS at 09:02

## 2025-02-10 RX ADMIN — CARVEDILOL 12.5 MG: 12.5 TABLET, FILM COATED ORAL at 09:02

## 2025-02-10 RX ADMIN — LACTOBACILLUS ACIDOPHILUS / LACTOBACILLUS BULGARICUS 1 EACH: 100 MILLION CFU STRENGTH GRANULES at 08:02

## 2025-02-10 RX ADMIN — MAGNESIUM SULFATE IN WATER 2 G: 40 INJECTION, SOLUTION INTRAVENOUS at 11:02

## 2025-02-10 RX ADMIN — SUGAMMADEX 200 MG: 100 INJECTION, SOLUTION INTRAVENOUS at 03:02

## 2025-02-10 RX ADMIN — MUPIROCIN 1 G: 20 OINTMENT TOPICAL at 08:02

## 2025-02-10 RX ADMIN — SODIUM CHLORIDE: 9 INJECTION, SOLUTION INTRAVENOUS at 11:02

## 2025-02-10 RX ADMIN — SODIUM CHLORIDE 250 ML: 9 INJECTION, SOLUTION INTRAVENOUS at 05:02

## 2025-02-10 RX ADMIN — DILTIAZEM HYDROCHLORIDE 10 MG: 5 INJECTION, SOLUTION INTRAVENOUS at 08:02

## 2025-02-10 RX ADMIN — PANTOPRAZOLE SODIUM 40 MG: 40 TABLET, DELAYED RELEASE ORAL at 08:02

## 2025-02-10 RX ADMIN — THERA TABS 1 TABLET: TAB at 09:02

## 2025-02-10 RX ADMIN — CYCLOBENZAPRINE 10 MG: 10 TABLET, FILM COATED ORAL at 12:02

## 2025-02-10 RX ADMIN — ARFORMOTEROL TARTRATE 15 MCG: 15 SOLUTION RESPIRATORY (INHALATION) at 09:02

## 2025-02-10 RX ADMIN — FAMOTIDINE 20 MG: 10 INJECTION, SOLUTION INTRAVENOUS at 02:02

## 2025-02-10 RX ADMIN — ONDANSETRON 4 MG: 2 INJECTION INTRAMUSCULAR; INTRAVENOUS at 02:02

## 2025-02-10 RX ADMIN — SODIUM CHLORIDE, SODIUM LACTATE, POTASSIUM CHLORIDE, AND CALCIUM CHLORIDE: .6; .31; .03; .02 INJECTION, SOLUTION INTRAVENOUS at 02:02

## 2025-02-10 RX ADMIN — CEFAZOLIN 2 G: 2 INJECTION, POWDER, FOR SOLUTION INTRAMUSCULAR; INTRAVENOUS at 08:02

## 2025-02-10 RX ADMIN — DONEPEZIL HYDROCHLORIDE 5 MG: 5 TABLET ORAL at 08:02

## 2025-02-10 RX ADMIN — PANTOPRAZOLE SODIUM 40 MG: 40 TABLET, DELAYED RELEASE ORAL at 09:02

## 2025-02-10 RX ADMIN — TRANEXAMIC ACID 1000 MG: 10 INJECTION, SOLUTION INTRAVENOUS at 02:02

## 2025-02-10 RX ADMIN — HYDROCODONE BITARTRATE AND ACETAMINOPHEN 1 TABLET: 5; 325 TABLET ORAL at 08:02

## 2025-02-10 RX ADMIN — DILTIAZEM HYDROCHLORIDE 120 MG: 120 CAPSULE, COATED, EXTENDED RELEASE ORAL at 09:02

## 2025-02-10 NOTE — PROGRESS NOTES
[facility] Medicine  Progress Note    Patient Name: Daryleen G Moran  MRN: [id number]  Patient Class: IP- Inpatient   Admission Date: 2/9/2025  Length of Stay: 1 days  Attending Physician: Kedar Liu DO  Primary Care Provider: Abhi De Oliveira IV, MD        Subjective     Principal Problem:Fracture of femoral neck, right, closed        HPI:  Ms. Cyr is a 79 yr old female with a hx of DM type 2, HTN, s/p TAVR, CAD, dementia, AFib, COPD, chronic diastolic CHF, lumbar and thoracic spondylosis, degenerative disc disease, compression fractures, radiculopathy, arthritis, osteoporosis, GERD, stress urinary incontinence, HLD, left adrenal mass, iron-deficiency anemia, abdominal aneurysm, debility, attention and concentration deficit, vitamin-D deficiency, AVM, diverticulosis, depression, obesity, thrombocytopenia, aortic stenosis, NSTEMI who presented to the ED with a chief complaint of a slip and fall.  Granddaughter at bedside who helps to provide a history.  Patient and granddaughter state that the patient was getting out of bed to transfer to bedside commode when she slipped and fell onto her right side.  She states she immediately started having right hip pain.  Granddaughter and patient both deny head trauma and LOC.  She states that she quit smoking 6 months ago and denies alcohol and recreational drug use.  She denies fever, chills, dyspnea, palpitations, chest pain, abdominal pain, nausea, vomiting, diarrhea, dysuria, hematuria, lightheadedness, dizziness, and syncope.    Upon arrival to ED, patient afebrile, HR of 114, RR of 20, BP of 130/75, satting 96% on RA.  Workup in the ED revealed WBC of 12.78, potassium of 3.2, glucose of 154, PT of 12.6.  CXR shows no acute radiographic abnormalities.  Right hip x-ray shows acute nondisplaced and non comminuted intertrochanteric right femoral neck fracture.  While in the ED patient had heart rate of 143 and EKG showed AFib with RVR.  Patient  was given diltiazem 10 mg IV, hydromorphone 1.5 mg IV, 1 L NS bolus while in the ED. patient had improvement with heart rate to 90s-100s.  ED provider discussed case with Orthopedic surgery who plans for OR in the morning.  Patient will be admitted under hospital medicine services for further management.    Overview/Hospital Course:  79-year-old female with history of AFib, dementia, compression fracture came in with fall and hip pain and patient was admitted for a right intertrochanteric femur fracture.  Plan for OR on 02/10.  Holding anticoagulation and antiplatelet    Interval History:  Patient said she is in lot of pain    Review of Systems   All other systems reviewed and are negative.    Objective:     Vital Signs (Most Recent):  Temp: 98.3 °F (36.8 °C) (02/10/25 1110)  Pulse: (!) 117 (02/10/25 1540)  Resp: 11 (02/10/25 1540)  BP: 119/75 (02/10/25 1535)  SpO2: 99 % (02/10/25 1540) Vital Signs (24h Range):  Temp:  [97.3 °F (36.3 °C)-98.3 °F (36.8 °C)] 98.3 °F (36.8 °C)  Pulse:  [] 117  Resp:  [10-30] 11  SpO2:  [82 %-99 %] 99 %  BP: (107-151)/() 119/75     Weight: 81.7 kg (180 lb 1.9 oz)  Body mass index is 35.18 kg/m².    Intake/Output Summary (Last 24 hours) at 2/10/2025 1554  Last data filed at 2/10/2025 1535  Gross per 24 hour   Intake 1680 ml   Output 200 ml   Net 1480 ml         Physical Exam  Constitutional:       Appearance: She is ill-appearing.   Cardiovascular:      Rate and Rhythm: Normal rate.   Abdominal:      General: Abdomen is flat. Bowel sounds are normal.   Neurological:      Mental Status: Mental status is at baseline.      Comments: Patient only knows her name.             Significant Labs: All pertinent labs within the past 24 hours have been reviewed.    Significant Imaging: I have reviewed all pertinent imaging results/findings within the past 24 hours.    Assessment and Plan     * Fracture of femoral neck, right, closed  - Patient presented to the ED after a slip and fall.   "She was found to have right nondisplaced enter trochanteric femoral neck fracture on right hip x-ray in the ED.  - ED provider discussed case with Orthopedic surgery who plans to take patient to OR in the morning  - NPO at midnight  - PRN analgesia and symptomatic care  - Holding DVT prophylaxis as well as aspirin and Plavix, resume when appropriate to do so      Chronic diastolic heart failure  Patient has Diastolic (HFpEF) heart failure that is Chronic. On presentation their CHF was well compensated. Most recent BNP and echo results are listed below.  No results for input(s): "BNP" in the last 72 hours.  Latest ECHO  Results for orders placed during the hospital encounter of 08/20/24    Echo    Interpretation Summary    Left Ventricle: The left ventricle is normal in size. Normal wall thickness. There is concentric remodeling. There is normal systolic function with a visually estimated ejection fraction of 60 - 65%. Diastolic function cannot be reliably determined in the presence of mitral annular calcification.    Right Ventricle: Normal right ventricular cavity size. Wall thickness is normal. Systolic function is mildly reduced.    The left atrium is moderately dilated.    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 It is reported to be a Carrion Mk S3 valve.    Mitral Valve: There is bileaflet sclerosis. There is moderate to severe mitral annular calcification present.    Tricuspid Valve: There is mild regurgitation.    Pulmonary Artery: The estimated pulmonary artery systolic pressure is 31 mmHg.    IVC/SVC: Normal venous pressure at 3 mmHg.    Current Heart Failure Medications       Plan  - Monitor strict I&Os and daily weights.    - Place on telemetry  - Low sodium diet  - Place on fluid restriction of 1.5 L.   - Cardiology has not been consulted  - The patient's volume status is at their baseline  - Continue home coreg and lasix    COPD (chronic obstructive pulmonary " disease)  Patient's COPD is controlled currently.  Patient is currently off COPD Pathway. Continue scheduled inhalers Supplemental oxygen and monitor respiratory status closely.     - DuoNebs Q6H PRN    Atrial fibrillation with RVR  Patient has permanent atrial fibrillation. Patient is currently in atrial fibrillation. [id number] Score: 5. The patients heart rate in the last 24 hours is as follows:  Pulse  Min: 93  Max: 143     Antiarrhythmics       Anticoagulants       Plan  - Replete lytes with a goal of K>4, Mg >2  - Patient is not on anti coag outpatient per home medication list, she is however on DAPT with aspirin and Plavix  - Holding home aspirin and Plavix as patient is planned to go to OR in the morning for repair, resume when appropriate to do so  - Patient's afib is currently controlled  - Patient was given diltiazem 10 mg IV while in the ED as patient's heart rate went to 140s.  I suspect that this is probably just due to uncontrolled pain.  After patient received diltiazem and hydromorphone, heart rate is now controlled in the 90s-100s.    Dementia in other diseases classified elsewhere, unspecified severity, without behavioral disturbance, psychotic disturbance, mood disturbance, and anxiety  - History noted  - Patient is currently A&O x3 to self, place, and month. Patient is disoriented to City and year.  She knows that she is in the hospital but is unsure of the name of the hospital.  She states that it is 2027.  - Continue home Aricept    S/P TAVR (transcatheter aortic valve replacement)  - Hx noted      Hypertension  Chronic,  Latest blood pressure and vitals reviewed-     Temp:  [98.5 °F (36.9 °C)]   Pulse:  []   Resp:  [13-20]   BP: (107-130)/(60-80)   SpO2:  [87 %-96 %] .   Home meds for hypertension were reviewed and noted below-  Hypertension Medications               carvediloL (COREG) 12.5 MG tablet Take 12.5 mg by mouth 2 (two) times daily.    diltiaZEM (DILACOR XR) 120 MG CDCR Take 1  "capsule (120 mg total) by mouth once daily.    furosemide (LASIX) 40 MG tablet Take 1 tablet (40 mg total) by mouth daily as needed (Take for weight gain > 2 pounds over 24 hours. May discuss with PCP).            While in the hospital, will manage blood pressure as follows; Continue home antihypertensive regimen    Will utilize p.r.n. blood pressure medication only if patient's blood pressure greater than 180/110 and she develops symptoms such as worsening chest pain or shortness of breath.      Type 2 diabetes mellitus, without long-term current use of insulin  Last A1c reviewed-   Lab Results   Component Value Date    HGBA1C 6.6 (H) 03/08/2024     Most recent fingerstick glucose reviewed- No results for input(s): "POCTGLUCOSE" in the last 24 hours.  Current correctional scale  Medium  Maintain anti-hyperglycemic dose as follows-   Antihyperglycemics (From admission, onward)      Start     Stop Route Frequency Ordered    02/09/25 1755  insulin aspart U-100 pen 0-10 Units         -- SubQ Before meals & nightly PRN 02/09/25 1657          Hold Oral hypoglycemics while patient is in the hospital.         VTE Risk Mitigation (From admission, onward)           Ordered     Reason for No Pharmacological VTE Prophylaxis  Once        Comments: Plan for OR in AM   Question:  Reasons:  Answer:  Physician Provided (leave comment)    02/09/25 1657     IP VTE HIGH RISK PATIENT  Once         02/09/25 1657     Place sequential compression device  Until discontinued         02/09/25 1657                    Discharge Planning   ISAAK:      Code Status: Full Code   Medical Readiness for Discharge Date:   Discharge Plan A: (P) Rehab                        Kedar Liu DO  Department of Hospital Medicine   [facility]    "

## 2025-02-10 NOTE — SUBJECTIVE & OBJECTIVE
Interval History:  Patient said she is in lot of pain    Review of Systems   All other systems reviewed and are negative.    Objective:     Vital Signs (Most Recent):  Temp: 98.3 °F (36.8 °C) (02/10/25 1110)  Pulse: (!) 117 (02/10/25 1540)  Resp: 11 (02/10/25 1540)  BP: 119/75 (02/10/25 1535)  SpO2: 99 % (02/10/25 1540) Vital Signs (24h Range):  Temp:  [97.3 °F (36.3 °C)-98.3 °F (36.8 °C)] 98.3 °F (36.8 °C)  Pulse:  [] 117  Resp:  [10-30] 11  SpO2:  [82 %-99 %] 99 %  BP: (107-151)/() 119/75     Weight: 81.7 kg (180 lb 1.9 oz)  Body mass index is 35.18 kg/m².    Intake/Output Summary (Last 24 hours) at 2/10/2025 1554  Last data filed at 2/10/2025 1535  Gross per 24 hour   Intake 1680 ml   Output 200 ml   Net 1480 ml         Physical Exam  Constitutional:       Appearance: She is ill-appearing.   Cardiovascular:      Rate and Rhythm: Normal rate.   Abdominal:      General: Abdomen is flat. Bowel sounds are normal.   Neurological:      Mental Status: Mental status is at baseline.      Comments: Patient only knows her name.             Significant Labs: All pertinent labs within the past 24 hours have been reviewed.    Significant Imaging: I have reviewed all pertinent imaging results/findings within the past 24 hours.

## 2025-02-10 NOTE — ANESTHESIA PREPROCEDURE EVALUATION
02/10/2025  Daryleen G Moran is a 79 y.o., female.      Tobacco Use:  The patient  reports that she quit smoking about 2 years ago. Her smoking use included cigarettes. She started smoking about 37 years ago. She has a 17.5 pack-year smoking history. She has never used smokeless tobacco.     Results for orders placed or performed during the hospital encounter of 02/09/25   EKG 12-lead    Collection Time: 02/09/25 10:59 PM   Result Value Ref Range    QRS Duration 92 ms    OHS QTC Calculation 468 ms    Narrative    Test Reason : R07.9,    Vent. Rate : 117 BPM     Atrial Rate : 107 BPM     P-R Int :    ms          QRS Dur :  92 ms      QT Int : 336 ms       P-R-T Axes :     92 -36 degrees    QTcB Int : 468 ms    Atrial fibrillation with rapid ventricular response  Rightward axis  Nonspecific ST abnormality  Abnormal QRS-T angle, consider primary T wave abnormality  Abnormal ECG  No previous ECGs available    Referred By: AAAREFERRAL SELF           Confirmed By:         Imaging Results              X-Ray Hip 2 or 3 views Right with Pelvis when performed (Final result)  Result time 02/09/25 15:57:35      Final result by Dallin Vyas MD (02/09/25 15:57:35)                   Impression:      1. Acute nondisplaced inter trochanteric right femoral neck fracture.      Electronically signed by: Dallin Vyas  Date:    02/09/2025  Time:    15:57               Narrative:    EXAMINATION:  XR HIP WITH PELVIS WHEN PERFORMED 2 OR 3 VIEWS RIGHT    CLINICAL HISTORY:  Pain in right hip    COMPARISON:  CT abdomen and pelvis, January 2025    FINDINGS:  Three views are provided.  There is an acute nondisplaced and non comminuted inter trochanteric right femoral neck fracture.  No underlying destructive lesion is identified.  There is no dislocation.    No other acute findings are demonstrated.                                        X-Ray Chest AP Portable (Final result)  Result time 02/09/25 15:54:17      Final result by Dallin Vyas MD (02/09/25 15:54:17)                   Impression:      1. No acute radiographic abnormalities.  Chronic findings as above.      Electronically signed by: Dallin Vyas  Date:    02/09/2025  Time:    15:54               Narrative:    EXAMINATION:  XR CHEST AP PORTABLE    CLINICAL HISTORY:  pre op;    COMPARISON:  August 2024    FINDINGS:  The heart is borderline enlarged.  Transcatheter aortic valve replacement is noted.  The aortic arch is calcified.    Mild bilateral interstitial prominence is unchanged in appearance compared to priors and likely chronic, with no active infiltrate or effusion identified.    Changes of multilevel thoracic vertebroplasty are noted.                                       Lab Results   Component Value Date    WBC 11.96 02/10/2025    HGB 15.2 02/10/2025    HCT 47.2 02/10/2025    MCV 93 02/10/2025     (L) 02/10/2025     BMP  Lab Results   Component Value Date     02/10/2025    K 3.6 02/10/2025     02/10/2025    CO2 29 02/10/2025    BUN 19 02/10/2025    CREATININE 0.5 02/10/2025    CALCIUM 9.0 02/10/2025    ANIONGAP 6 (L) 02/10/2025     (H) 02/10/2025     (H) 02/09/2025     (H) 01/08/2025       Results for orders placed during the hospital encounter of 02/06/23    Echo    Interpretation Summary  · The left ventricle is normal in size with moderate concentric hypertrophy and normal systolic function.  · The estimated ejection fraction is 65%.  · Grade I left ventricular diastolic dysfunction.  · Normal right ventricular size with normal right ventricular systolic function.  · There is a transcutaneously-placed aortic bioprosthesis present. There is no aortic insufficiency present. Prosthetic aortic valve is normal.  · The aortic valve mean gradient is 9 mmHg with a dimensionless index of 0.58.  · Mild mitral  regurgitation.  · The mean diastolic gradient across the mitral valve is 6 mmHg at a heart rate of bpm.  · There is moderate mitral stenosis.  · Mild tricuspid regurgitation.  · Normal central venous pressure (3 mmHg).  · The estimated PA systolic pressure is 37 mmHg.        Pre-op Assessment    I have reviewed the Patient Summary Reports.     I have reviewed the Nursing Notes. I have reviewed the NPO Status.   I have reviewed the Medications.     Review of Systems  Anesthesia Hx:    Daughter reports history of urinary retention and possible pulmonary edema after receiving too much IV fluid during anesthesia in the past. History of prior surgery of interest to airway management or planning: heart surgery.         Denies Family Hx of Anesthesia complications.    Denies Personal Hx of Anesthesia complications.                    Social:  Former Smoker, No Alcohol Use       Hematology/Oncology:       -- Anemia: Iron Deficiency Anemia    acute              Hematology Comments: Plavix Therapy                     Cardiovascular:     Hypertension, poorly controlled Valvular problems/Murmurs (s/p TAVR) Past MI CAD   CABG/stent Dysrhythmias atrial fibrillation  CHF (Chronic diastolic CHF)   PVD hyperlipidemia   ECG has been reviewed. Patient post TVAR 1/2023  Recent EF 60%  Patient followed by Dr. Macias       Valvular Heart Disease: Aortic Stenosis (AS), s/p repair  Mitral Stenosis (MS), moderate     Congestive Heart Failure (CHF) , Chronic Congestive Heart Failure , NYHA Classification III          Abdominal Aortic Aneurysm        Disorder of Cardiac Rhythm, Atrial Fibrillation, Paroxysmal Atrial Fibrillation     Pulmonary:   COPD, moderate   Shortness of breath   Albuterol and Symbicort inhaler use     Home Oxygen use   Admitted with worsening SOB, improving since admit, on continuous O2 2-3 liters                    Hepatic/GI:     GERD         Bowel Conditions:  GI Bleed        Musculoskeletal:  Arthritis    Compression fracture of thoracic vertebra  Lumbar spondylosis  Thoracic spondylosis  DDD (degenerative disc disease), thoracic  DDD (degenerative disc disease), lumbar  Bursitis of hip  Compression fracture of lumbar vertebra  Closed fracture of dorsal (thoracic) vertebra without mention of spinal cord injury  Back pain  Lumbosacral radiculopathy       Joint Disease:  Arthritis, Osteoarthritis   Bone Disorders:    Osteoporosis   Spine Disorders: lumbar Chronic Pain, Degenerative disease and Disc disease           Neurological:    Neuromuscular Disease,           Osteoarthritis    Dementia                         Endocrine:  Diabetes, well controlled, type 2        Adrenal Disease, Left adrenal mass    Psych:  Psychiatric History                  Physical Exam  General: Well nourished, Cooperative, Alert and Confusion    Airway:  Mallampati: III   Mouth Opening: Normal  TM Distance: > 6 cm  Tongue: Normal  Neck ROM: Normal ROM  Chipped left upper central incisor and missing left upper lateral incisor.  Dental:  Periodontal disease    Chest/Lungs:  Clear to auscultation, Normal Respiratory Rate    Heart:  Rate: Normal  Rhythm: Regular Rhythm  Murmur: Systolic;        Anesthesia Plan  Type of Anesthesia, risks & benefits discussed:    Anesthesia Type: Gen ETT  Intra-op Monitoring Plan: Standard ASA Monitors  Induction:  IV  Informed Consent: Informed consent signed with the Patient and all parties understand the risks and agree with anesthesia plan.  All questions answered.   ASA Score: 4 Emergent  Anesthesia Plan Notes: Careful hydration in light of possible postoperative pulmonary edema in the past.    Tylenol, Precedex  Zofran, Decadron, Pepcid    Ready For Surgery From Anesthesia Perspective.     .

## 2025-02-10 NOTE — TRANSFER OF CARE
Anesthesia Transfer of Care Note    Patient: Daryleen G Moran    Procedure(s) Performed: Procedure(s) (LRB):  INSERTION, INTRAMEDULLARY ALEIDA, FEMUR (Right)    Patient location: PACU    Anesthesia Type: general    Transport from OR: Transported from OR on room air with adequate spontaneous ventilation    Post pain: adequate analgesia    Post assessment: no apparent anesthetic complications and tolerated procedure well    Post vital signs: stable    Level of consciousness: sedated    Nausea/Vomiting: no nausea/vomiting    Complications: none    Transfer of care protocol was followed      Last vitals: Visit Vitals  /85   Pulse (!) 122   Temp 36.8 °C (98.3 °F) (Oral)   Resp 20   Ht 5' (1.524 m)   Wt 81.7 kg (180 lb 1.9 oz)   LMP  (LMP Unknown)   SpO2 (!) 92%   BMI 35.18 kg/m²

## 2025-02-10 NOTE — SIGNIFICANT EVENT
Called to bedside by nurse after reduction of a right femur fracture by Orthopedic surgery inpatient with history of AFib.  Blood pressures were soft and heart rate was averaging 140 but appeared sinus on tele.  Check recent TTE EF 65% unable to assess diastolic function.  Nurse had already give inpatient proximally 200 cc of normal saline, recommended 250 cc bolus and stat metoprolol,  patient is already in Trendelenburg.  30-40 minutes later blood pressure still lowering 93/67 most recent and heart rate 136.  Ordered stat H/H, EKG and recommended transferring patient to ICU for pressor rather than targeting elevated heart rate to make sure she is bleeding.

## 2025-02-10 NOTE — BRIEF OP NOTE
[facility]  Brief Operative Note    SUMMARY     Surgery Date: 2/10/2025     Surgeons and Role:     * Antwan Ramesh MD - Primary    Assisting Surgeon: None    Pre-op Diagnosis:  Closed 2-part intertrochanteric fracture of right femur, initial encounter [S72.141A]    Post-op Diagnosis:  Post-Op Diagnosis Codes:     * Closed 2-part intertrochanteric fracture of right femur, initial encounter [S72.141A]    Procedure(s) (LRB):  INSERTION, INTRAMEDULLARY ALEIDA, FEMUR (Right)    Anesthesia: General    Implants:  Implant Name Type Inv. Item Serial No.  Lot No. LRB No. Used Action   NAIL IM CHETNA 130 DEG [id number] - [id number]  NAIL IM CHETNA 130 DEG [id number]  [employer] [id number] Right 1 Implanted   SCREW FEM NECK PERF GOLD 85MM - [id number]  SCREW FEM NECK PERF GOLD 85MM  [employer] [id number] Right 1 Implanted   SCREW XL25 IM NAIL 36X5MM - [id number]  SCREW XL25 IM NAIL 36X5MM  [employer] [id number] Right 1 Implanted       Operative Findings: displaced right femoral neck fracture    Estimated Blood Loss: * No values recorded between 2/10/2025  2:44 PM and 2/10/2025  3:31 PM *    Estimated Blood Loss has been documented.         Specimens:   Specimen (24h ago, onward)      None            [id number]

## 2025-02-10 NOTE — ANESTHESIA POSTPROCEDURE EVALUATION
Anesthesia Post Evaluation    Patient: Daryleen G Moran    Procedure(s) Performed: Procedure(s) (LRB):  INSERTION, INTRAMEDULLARY ALEIDA, FEMUR (Right)    Final Anesthesia Type: general      Patient location during evaluation: PACU  Patient participation: Yes- Able to Participate  Level of consciousness: awake and alert  Post-procedure vital signs: reviewed and stable  Pain management: adequate  Airway patency: patent    PONV status at discharge: No PONV  Anesthetic complications: no      Cardiovascular status: blood pressure returned to baseline, hemodynamically stable and tachycardic  Respiratory status: unassisted, spontaneous ventilation and room air  Hydration status: euvolemic  Follow-up not needed.  Comments: Hospitalist consulted and patient has been seen.  Patient will be transferred to ICU.              Vitals Value Taken Time   /77 02/10/25 1745   Temp 36.7 °C (98.1 °F) 02/10/25 1535   Pulse 134 02/10/25 1749   Resp 13 02/10/25 1749   SpO2 100 % 02/10/25 1749   Vitals shown include unfiled device data.      No case tracking events are documented in the log.      Pain/Phani Score: Pain Rating Prior to Med Admin: 10 (2/10/2025 12:35 PM)  Pain Rating Post Med Admin: 9 (2/10/2025  6:27 AM)  Phani Score: 8 (2/10/2025  5:30 PM)

## 2025-02-10 NOTE — CONSULTS
Patient ID: Daryleen G Moran is a 79 y.o. female    Chief Complaint:  Right hip pain     History of Present Illness:    Daryleen G Moran is a pleasant 79 y.o. female with multiple comorbidities who presented to the ED with a chief complaint of right hip pain. This occurred after a fall from standing height attempting to transfer to a bedside commode. Patient was taken to the ED and X-rays revealed a right intertrochanteric femur fracture. She was then admitted to the medicine service for optimization with orthopedic consultation.  Patient initially was then AFib with RVR which is now controlled.  She is optimized and cleared for surgery.        PAST MEDICAL HISTORY:   Past Medical History:   Diagnosis Date    Anesthesia complication     BLADDER DYSFUNCTION    Aortic stenosis     Arthritis     Back pain     Diabetes mellitus type II     NO LONGER DIABETIC    Encounter for blood transfusion     Encounter for pain management 12/6/2024    Hyperlipidemia     Hypertension     NSTEMI (non-ST elevated myocardial infarction) 05/01/2022    Osteoporosis     Wears glasses      PAST SURGICAL HISTORY:   Past Surgical History:   Procedure Laterality Date     vocal cord nodules removed  long time ago     twice    ADRENAL TUMOR      APPENDECTOMY  within last 5yrs    CATHETERIZATION OF BOTH LEFT AND RIGHT HEART Left 05/06/2022    Procedure: CATHETERIZATION, HEART, BOTH LEFT AND RIGHT;  Surgeon: Michelet Vargas MD;  Location: [facility] CATH/EP LAB;  Service: Cardiology;  Laterality: Left;    COLONOSCOPY N/A 6/23/2023    Procedure: COLONOSCOPY;  Surgeon: Joel Neal III, MD;  Location: [facility] ENDO;  Service: Endoscopy;  Laterality: N/A;    FIXATION KYPHOPLASTY THORACIC SPINE      8-20-13    FOOT SURGERY      left 2nd toe was too long     HERNIA REPAIR  within last 5yrs    INSERTION OF TEMPORARY PACEMAKER N/A 1/4/2023    Procedure: INSERTION, PACEMAKER, TEMPORARY;  Surgeon: Bhavesh Peña MD;  Location: [facility] CATH;  Service:  Cardiology;  Laterality: N/A;    LEFT HEART CATHETERIZATION Left 2022    Procedure: Left heart cath;  Surgeon: Jose Echols MD;  Location: [facility] CATH/EP LAB;  Service: Cardiology;  Laterality: Left;    SMALL BOWEL ENTEROSCOPY N/A 2023    Procedure: ENTEROSCOPY;  Surgeon: Cornell Aguirre MD;  Location: [facility] ENDO;  Service: Endoscopy;  Laterality: N/A;    SMALL BOWEL ENTEROSCOPY N/A 10/20/2023    Procedure: ENTEROSCOPY;  Surgeon: Otilio Bourgeois MD;  Location: [facility] ENDO;  Service: Endoscopy;  Laterality: N/A;    TONSILLECTOMY, ADENOIDECTOMY  long time ago    TRANSCATHETER AORTIC VALVE REPLACEMENT (TAVR) Bilateral 2023    Procedure: REPLACEMENT, AORTIC VALVE, TRANSCATHETER (TAVR);  Surgeon: Bhavesh Peña MD;  Location: [facility] CATH;  Service: Cardiology;  Laterality: Bilateral;    TRANSCATHETER AORTIC VALVE REPLACEMENT (TAVR) Bilateral 2023    Procedure: REPLACEMENT, AORTIC VALVE, TRANSCATHETER (TAVR);  Surgeon: Dallin Verma MD;  Location: [facility] CATH;  Service: Cardiology;  Laterality: Bilateral;    TRANSTHORACIC ECHOCARDIOGRAPHY (TTE)  2023    Procedure: ECHOCARDIOGRAM, TRANSTHORACIC;  Surgeon: Bhavesh Peña MD;  Location: [facility] CATH;  Service: Cardiology;;     FAMILY HISTORY:   Family History   Problem Relation Name Age of Onset    Arthritis Mother      Collagen disease Neg Hx       SOCIAL HISTORY:   Social History     Occupational History    Not on file   Tobacco Use    Smoking status: Former     Current packs/day: 0.00     Average packs/day: 0.5 packs/day for 35.0 years (17.5 ttl pk-yrs)     Types: Cigarettes     Start date: 10/5/1987     Quit date: 10/5/2022     Years since quittin.3    Smokeless tobacco: Never   Substance and Sexual Activity    Alcohol use: No    Drug use: No    Sexual activity: Not Currently        MEDICATIONS: Reviewed per Epic   ALLERGIES:   Review of patient's allergies indicates:   Allergen Reactions    Latex      Other reaction(s): Unknown  Other  reaction(s): Unknown    Sulfacetamide sodium      Pain perineal area    Sulfasalazine Hives    Adhesive Itching     SKIN GETS RED WITH TAPE AND BANDAIDS    Adhesive tape-silicones Itching     SKIN GETS RED WITH TAPE AND BANDAIDS    Sulfa (sulfonamide antibiotics) Rash       Review of Systems:  Unable to obtain    Physical Exam     Vitals:    02/10/25 1235   BP:    Pulse:    Resp: 20   Temp:      Alert and oriented to person, place and time. No acute distress. Well-groomed, not ill appearing. Pupils round and reactive, normal respiratory effort, no audible wheezing.     Hip Exam    Global tenderness to palpation of the right hip  + Logroll  Leg held in shortened/externally rotated position  No evidence of open fracture, no masses/lesions/surgical scars  Neurovascularly intact: intact peroneal, tibial, sciatic nerve function   Palpable distal pulses  Compartments soft and compressible  Warm well perfused extremity, brisk cap refill to toes       Imagin view right Hip X-rays ordered/reviewed by me showing minimally displaced right intertrochanteric femur fracture       Assessment    79 y.o. female with   Right intertrochanteric femur fracture    Plan    --Admit to Internal Medicine service for pre-operative clearance and medical evaluation.   --To OR today for operative fixation of right hip with intramedullary nailing  --Written and verbal consent obtained from daughter  --Pre-operative labs and imaging reviewed by me   --Bed rest, gutierrez, NPO     Risks and complications were discussed including but not limited to the risks of anesthetic complications, infection, wound healing complications, non-union, mal-union, hardware failure, pain, stiffness, DVT, pulmonary embolism, perioperative medical risks (cardiac, pulmonary, renal, neurologic), and death among others were discussed. No guarantees were made and the patient and family elect to proceed. They fully understand the reported 30% mortality risk within the  first year of surgery.

## 2025-02-10 NOTE — OP NOTE
OPERATIVE NOTE    Date of Surgery:  02/10/2025     Preoperative Diagnosis: Right intertrochanteric femur fracture  Postoperative Diagnosis: Right intertrochanteric femur fracture    Procedure:   Right hip intramedullary nailing     Surgeon:   Antwan Ramesh M.D.    ASSISTANT:    Bryan Meade _ PAC     He was present and scrubbed for the entirety of the procedure. He was required for patient positioning, retraction, insertion of implants and closure. Without him I would have been unable to safely perform the case due to only one scrub tech available.     Anesthesia:   General    EBL:    200 cc    Implants:   Synthes Short TFN size 11, 85 mm lag screw     Specimens:   None    Findings:   Stable 2 part IT fx    Dispo:    To PACU extubated/stable      Indications for Procedure:    The patient is a 79 y.o. female who sustained a ground level fall resulting in a right intertrochanteric femur fracture.  We are proceeding to the operating room for cephalomedullary nail fixation to improve function and pain.  The risks, benefits and alternatives to surgery were discussed with the patient and family at length prior to going to the operating room.  Informed consent was obtained for fixation.  We specifically discussed the risks of surgery including malunion, nonunion, cut-out, failure of fixation, as well as anesthetic risks and the 30% mortality risk within one year of surgery.The patient was optimized by Internal Medicine prior to going to the operating room.     Procedure in Detail:    The patient was identified in the preoperative holding area and the site was marked. Patient was wheeled into the operating room and general endotracheal anesthesia was induced on the patient's hospital bed.  Preoperative antibiotics were administered.  The patient was placed onto the [place] fracture table with all bony prominences well padded and both lower extremities in traction boots.  A time-out was undertaken to confirm patient, side,  site, surgery, surgeon and the administration of preoperative antibiotics.  All agreed and we proceeded.    Closed reduction maneuvers were performed on the table gaining good alignment.  The right hip and lower extremity were prepped and draped in sterile fashion.  A starting incision was made proximal to the greater trochanter and the guidewire for the entry reamer was placed in the appropriate position.  Entry reamer was then used. The size 11 short TFN was then inserted into the canal and impacted into the appropriate position using fluoroscopic guidance. An incision was then made distal and lateral and the IT band incised. The cannula was then inserted on the lateral cortex and the guidewire placed center-center into the femoral head and this was checked on the AP and lateral view to verify position. This was measured at 85mm. A step reamer was then used and the size 85mm lag screw was then inserted into the head gaining good compression through the insertion handle.      Attention was then turned distally and a single distal interlocking screw was placed without difficulty through the guide.  Final X-rays were then taken to ensure reduction and stability. The wounds were then copiously irrigated with normal saline solution and the wounds closed with 0 Vicryl suture in the fascia, and all wounds closed with 2-0 Vicryl suture in the subcutaneous tissue, followed by staples. Sterile dressings were applied.    All instrument and sponge counts were reported correct at the end of the case.  There were no complications.  The patient was returned to the hospital bed, extubated, awakened and taken to the recovery room in stable condition.    Disposition:     Transfer back to floor for medical management   Initiate Immediate physical and occupational therapy.  Weight bearing status: WBAT  Multimodal pain management limiting narcotics.  Anticoagulation x1 month.ok to resume ASA plavix POD1    Follow up in my clinic in  2-3 weeks with X-rays of the hip and removal of nito       Antwan Ramesh MD

## 2025-02-10 NOTE — HOSPITAL COURSE
79-year-old female with history of AFib, dementia, compression fracture came in with fall and hip pain and patient was admitted for a right intertrochanteric femur fracture.  Status post cephalomedullary nail fixation  02/10.  Discuss antiplatelet/anticoagulation recommendations with Orthopedic surgery and we will just give ASA and DVT PPX given history of dementia and big fall risk.  S/p surgery - not really complaining of hip pain just her chronic back pain.  Encouraged to work with PT/OT and attempted to call daughter is patient lacks MDM to discuss progressing course and placement options but no answer, left VM.  Patient must more alert and oriented on 02/13 in his agreeable to SNF placement.  Patient's pain was controlled with a combination of ibuprofen, Tylenol, and lidocaine patch.  She was discharged to skilled nursing facility.

## 2025-02-10 NOTE — PROGRESS NOTES
Automatic Inhaler to Nebulizer Interchange    fluticasone/vilanterol (Breo Ellipta) 100 mcg/25 mcg changed to budesonide 0.5 mg twice daily AND arformoterol 15 mcg twice daily per [facility] Automatic Therapeutic Substitutions Protocol.    Please contact pharmacy at extension [phone] with any questions.     Thank you,   Kat Grover

## 2025-02-11 LAB
ANION GAP SERPL CALC-SCNC: 5 MMOL/L (ref 8–16)
BASOPHILS # BLD AUTO: 0.03 K/UL (ref 0–0.2)
BASOPHILS NFR BLD: 0.2 % (ref 0–1.9)
BUN SERPL-MCNC: 22 MG/DL (ref 8–23)
CALCIUM SERPL-MCNC: 8.2 MG/DL (ref 8.7–10.5)
CHLORIDE SERPL-SCNC: 102 MMOL/L (ref 95–110)
CO2 SERPL-SCNC: 27 MMOL/L (ref 23–29)
CREAT SERPL-MCNC: 0.6 MG/DL (ref 0.5–1.4)
DIFFERENTIAL METHOD BLD: ABNORMAL
EOSINOPHIL # BLD AUTO: 0 K/UL (ref 0–0.5)
EOSINOPHIL NFR BLD: 0 % (ref 0–8)
ERYTHROCYTE [DISTWIDTH] IN BLOOD BY AUTOMATED COUNT: 15.6 % (ref 11.5–14.5)
EST. GFR  (NO RACE VARIABLE): >60 ML/MIN/1.73 M^2
GLUCOSE SERPL-MCNC: 199 MG/DL (ref 70–110)
HCT VFR BLD AUTO: 37.4 % (ref 37–48.5)
HGB BLD-MCNC: 11.8 G/DL (ref 12–16)
IMM GRANULOCYTES # BLD AUTO: 0.07 K/UL (ref 0–0.04)
IMM GRANULOCYTES NFR BLD AUTO: 0.5 % (ref 0–0.5)
LYMPHOCYTES # BLD AUTO: 0.9 K/UL (ref 1–4.8)
LYMPHOCYTES NFR BLD: 6.5 % (ref 18–48)
MAGNESIUM SERPL-MCNC: 1.6 MG/DL (ref 1.6–2.6)
MCH RBC QN AUTO: 29.9 PG (ref 27–31)
MCHC RBC AUTO-ENTMCNC: 31.6 G/DL (ref 32–36)
MCV RBC AUTO: 95 FL (ref 82–98)
MONOCYTES # BLD AUTO: 0.5 K/UL (ref 0.3–1)
MONOCYTES NFR BLD: 3.8 % (ref 4–15)
NEUTROPHILS # BLD AUTO: 11.9 K/UL (ref 1.8–7.7)
NEUTROPHILS NFR BLD: 89 % (ref 38–73)
NRBC BLD-RTO: 0 /100 WBC
OHS QRS DURATION: 84 MS
OHS QRS DURATION: 92 MS
OHS QTC CALCULATION: 468 MS
OHS QTC CALCULATION: 494 MS
PHOSPHATE SERPL-MCNC: 3.2 MG/DL (ref 2.7–4.5)
PLATELET # BLD AUTO: 94 K/UL (ref 150–450)
PMV BLD AUTO: 10.9 FL (ref 9.2–12.9)
POCT GLUCOSE: 199 MG/DL (ref 70–110)
POCT GLUCOSE: 244 MG/DL (ref 70–110)
POCT GLUCOSE: 249 MG/DL (ref 70–110)
POTASSIUM SERPL-SCNC: 4.2 MMOL/L (ref 3.5–5.1)
RBC # BLD AUTO: 3.94 M/UL (ref 4–5.4)
SODIUM SERPL-SCNC: 134 MMOL/L (ref 136–145)
WBC # BLD AUTO: 13.39 K/UL (ref 3.9–12.7)

## 2025-02-11 PROCEDURE — 25000003 PHARM REV CODE 250: Performed by: ORTHOPAEDIC SURGERY

## 2025-02-11 PROCEDURE — 80048 BASIC METABOLIC PNL TOTAL CA: CPT | Performed by: ORTHOPAEDIC SURGERY

## 2025-02-11 PROCEDURE — 97530 THERAPEUTIC ACTIVITIES: CPT

## 2025-02-11 PROCEDURE — 94761 N-INVAS EAR/PLS OXIMETRY MLT: CPT

## 2025-02-11 PROCEDURE — 97167 OT EVAL HIGH COMPLEX 60 MIN: CPT

## 2025-02-11 PROCEDURE — 84100 ASSAY OF PHOSPHORUS: CPT | Performed by: ORTHOPAEDIC SURGERY

## 2025-02-11 PROCEDURE — 27000221 HC OXYGEN, UP TO 24 HOURS

## 2025-02-11 PROCEDURE — 63600175 PHARM REV CODE 636 W HCPCS: Performed by: ORTHOPAEDIC SURGERY

## 2025-02-11 PROCEDURE — 94640 AIRWAY INHALATION TREATMENT: CPT

## 2025-02-11 PROCEDURE — 99900035 HC TECH TIME PER 15 MIN (STAT)

## 2025-02-11 PROCEDURE — 85025 COMPLETE CBC W/AUTO DIFF WBC: CPT | Performed by: ORTHOPAEDIC SURGERY

## 2025-02-11 PROCEDURE — 25000242 PHARM REV CODE 250 ALT 637 W/ HCPCS: Performed by: ORTHOPAEDIC SURGERY

## 2025-02-11 PROCEDURE — 63600175 PHARM REV CODE 636 W HCPCS: Mod: TB,JZ | Performed by: ORTHOPAEDIC SURGERY

## 2025-02-11 PROCEDURE — 83735 ASSAY OF MAGNESIUM: CPT | Performed by: ORTHOPAEDIC SURGERY

## 2025-02-11 PROCEDURE — 25000003 PHARM REV CODE 250: Performed by: HOSPITALIST

## 2025-02-11 PROCEDURE — 12000002 HC ACUTE/MED SURGE SEMI-PRIVATE ROOM

## 2025-02-11 PROCEDURE — 99900031 HC PATIENT EDUCATION (STAT)

## 2025-02-11 PROCEDURE — 36415 COLL VENOUS BLD VENIPUNCTURE: CPT | Performed by: ORTHOPAEDIC SURGERY

## 2025-02-11 PROCEDURE — 97161 PT EVAL LOW COMPLEX 20 MIN: CPT

## 2025-02-11 RX ORDER — HYDROCODONE BITARTRATE AND ACETAMINOPHEN 5; 325 MG/1; MG/1
1 TABLET ORAL EVERY 4 HOURS PRN
Status: DISCONTINUED | OUTPATIENT
Start: 2025-02-11 | End: 2025-02-13

## 2025-02-11 RX ADMIN — DONEPEZIL HYDROCHLORIDE 5 MG: 5 TABLET ORAL at 08:02

## 2025-02-11 RX ADMIN — DILTIAZEM HYDROCHLORIDE 120 MG: 120 CAPSULE, COATED, EXTENDED RELEASE ORAL at 08:02

## 2025-02-11 RX ADMIN — ARFORMOTEROL TARTRATE 15 MCG: 15 SOLUTION RESPIRATORY (INHALATION) at 08:02

## 2025-02-11 RX ADMIN — LACTOBACILLUS ACIDOPHILUS / LACTOBACILLUS BULGARICUS 1 EACH: 100 MILLION CFU STRENGTH GRANULES at 08:02

## 2025-02-11 RX ADMIN — PANTOPRAZOLE SODIUM 40 MG: 40 TABLET, DELAYED RELEASE ORAL at 08:02

## 2025-02-11 RX ADMIN — FERROUS SULFATE TAB 325 MG (65 MG ELEMENTAL FE) 1 EACH: 325 (65 FE) TAB at 08:02

## 2025-02-11 RX ADMIN — CLOPIDOGREL BISULFATE 75 MG: 75 TABLET, FILM COATED ORAL at 08:02

## 2025-02-11 RX ADMIN — CYCLOBENZAPRINE 10 MG: 10 TABLET, FILM COATED ORAL at 02:02

## 2025-02-11 RX ADMIN — HYDROCODONE BITARTRATE AND ACETAMINOPHEN 1 TABLET: 5; 325 TABLET ORAL at 09:02

## 2025-02-11 RX ADMIN — CARVEDILOL 12.5 MG: 12.5 TABLET, FILM COATED ORAL at 08:02

## 2025-02-11 RX ADMIN — BUDESONIDE INHALATION 0.5 MG: 0.5 SUSPENSION RESPIRATORY (INHALATION) at 08:02

## 2025-02-11 RX ADMIN — BUDESONIDE INHALATION 0.5 MG: 0.5 SUSPENSION RESPIRATORY (INHALATION) at 07:02

## 2025-02-11 RX ADMIN — ASPIRIN 81 MG: 81 TABLET, CHEWABLE ORAL at 08:02

## 2025-02-11 RX ADMIN — HYDROCODONE BITARTRATE AND ACETAMINOPHEN 1 TABLET: 5; 325 TABLET ORAL at 03:02

## 2025-02-11 RX ADMIN — MUPIROCIN 1 G: 20 OINTMENT TOPICAL at 08:02

## 2025-02-11 RX ADMIN — INSULIN ASPART 2 UNITS: 100 INJECTION, SOLUTION INTRAVENOUS; SUBCUTANEOUS at 09:02

## 2025-02-11 RX ADMIN — HYDROMORPHONE HYDROCHLORIDE 1 MG: 1 INJECTION, SOLUTION INTRAMUSCULAR; INTRAVENOUS; SUBCUTANEOUS at 09:02

## 2025-02-11 RX ADMIN — HYDROCODONE BITARTRATE AND ACETAMINOPHEN 1 TABLET: 5; 325 TABLET ORAL at 07:02

## 2025-02-11 RX ADMIN — CYCLOBENZAPRINE 10 MG: 10 TABLET, FILM COATED ORAL at 07:02

## 2025-02-11 RX ADMIN — DIAZEPAM 5 MG: 5 TABLET ORAL at 05:02

## 2025-02-11 RX ADMIN — ARFORMOTEROL TARTRATE 15 MCG: 15 SOLUTION RESPIRATORY (INHALATION) at 07:02

## 2025-02-11 RX ADMIN — HYDROMORPHONE HYDROCHLORIDE 1 MG: 1 INJECTION, SOLUTION INTRAMUSCULAR; INTRAVENOUS; SUBCUTANEOUS at 01:02

## 2025-02-11 RX ADMIN — THERA TABS 1 TABLET: TAB at 08:02

## 2025-02-11 RX ADMIN — SODIUM CHLORIDE: 9 INJECTION, SOLUTION INTRAVENOUS at 12:02

## 2025-02-11 RX ADMIN — CEFAZOLIN 2 G: 2 INJECTION, POWDER, FOR SOLUTION INTRAMUSCULAR; INTRAVENOUS at 05:02

## 2025-02-11 RX ADMIN — VENLAFAXINE HYDROCHLORIDE 75 MG: 37.5 CAPSULE, EXTENDED RELEASE ORAL at 08:02

## 2025-02-11 NOTE — NURSING
"Patient refused gutierrez placement at this time. Patient stated "it hurts to put it in" Patient agreed to a purwick.  "

## 2025-02-11 NOTE — PLAN OF CARE
SW went to bedside to discuss SNF.  Pt seemed a bit confused, but selected [place] facilities.  SW checked pt's chart and noticed she has dementia.  SW called pt's daughter who shared that she is not agreeable to [place] facilities. She stated that pt needs a facility that can manage her dementia.  SW shared that [facility] has a memory care unit.    Referrals sent to [facility] and [facility].   02/11/25 1536   Post-Acute Status   Post-Acute Authorization Placement   Post-Acute Placement Status Referrals Sent   Patient choice form signed by patient/caregiver List with quality metrics by geographic area provided   Discharge Delays (!) Post-Acute Set-up   Discharge Plan   Discharge Plan A Skilled Nursing Facility   Discharge Plan B Home Health

## 2025-02-11 NOTE — PHYSICIAN QUERY
Please clarify if there is any clinical correlation between  Fracture of femoral neck, right, closed and Osteoporosis.   Due to or associated with each other

## 2025-02-11 NOTE — NURSING
Patient assessment completed at this time. Patient has complaints of surgical pain, will review pain management plan. Patient voiced understanding of room and call light. Care ongoing.

## 2025-02-11 NOTE — PT/OT/SLP EVAL
Occupational Therapy   Evaluation    Name: Daryleen G Moran  MRN: [id number]  Admitting Diagnosis: Fracture of femoral neck, right, closed  Recent Surgery: Procedure(s) (LRB):  INSERTION, INTRAMEDULLARY ALEIDA, FEMUR (Right) 1 Day Post-Op    Recommendations:     Discharge Recommendations: Moderate Intensity Therapy  Discharge Equipment Recommendations:  to be determined by next level of care  Barriers to discharge:   (Increased assist with ADLs, IADLs, and mobility)    Assessment:     Daryleen G Moran is a 79 y.o. female with a medical diagnosis of Fracture of femoral neck, right, closed.  Pt is agreeable to OT evaluation this AM with encouragement. Performance deficits affecting function: weakness, impaired endurance, impaired self care skills, impaired functional mobility, gait instability, impaired balance, impaired cognition, decreased coordination, decreased upper extremity function, decreased lower extremity function, decreased safety awareness, impaired cardiopulmonary response to activity, orthopedic precautions. Pt required redirection to tasks and frequently thought that she was at home throughout session. Pt is a questionable historian and provided history. Limited evaluation secondary to pain and low tolerance to movement. Increased time needed for tasks.    Rehab Prognosis: Fair; patient would benefit from acute skilled OT services to address these deficits and reach maximum level of function.       Plan:     Patient to be seen 5 x/week to address the above listed problems via self-care/home management, therapeutic activities, therapeutic exercises  Plan of Care Expires: 03/11/25  Plan of Care Reviewed with: patient    Subjective     Chief Complaint: R hip pain and back pain; pt did not tolerate movement well  Patient/Family Comments/goals: none stated    Occupational Profile:  Living Environment: Pt states that she lives with her daughter and niece in a [facility]; has a tub and WIS with a shower chair  Previous  level of function: Pt reports mod I prior to admission   Roles and Routines: limited homemaker; mother   Equipment Used at Home: walker, rolling, shower chair  Assistance upon Discharge: yes, from facility     Pain/Comfort:  Pain Rating 1:  (note rated)  Location - Side 1: Right  Location 1: hip  Pain Rating Post-Intervention 1:  (not rated)  Location - Side 2: Bilateral  Location 2: back    Patients cultural, spiritual, Druze conflicts given the current situation: no    Objective:     Communicated with: nursing prior to session.  Patient found HOB elevated with telemetry, blood pressure cuff, pulse ox (continuous), oxygen, peripheral IV upon OT entry to room.    General Precautions: Standard, fall  Orthopedic Precautions: RLE weight bearing as tolerated  Braces: N/A  Respiratory Status: Nasal cannula, flow 3 L/min    Occupational Performance:    Bed Mobility:    Patient completed Scooting/Bridging with maximal assistance and 2 persons  Patient completed Supine to Sit with maximal assistance and 2 persons; pt only made it 50% to EOB before requesting to lay back down due to pain    Activities of Daily Living:  Pt declined ADLs    Cognitive/Visual Perceptual:  Cognitive/Psychosocial Skills:     -       Oriented to: Person   -       Follows Commands/attention:Easily distracted  -       Communication: clear/fluent  -       Memory: Deficits  -       Safety awareness/insight to disability: impaired   -       Mood/Affect/Coping skills/emotional control: Appropriate to situation, Cooperative, and Pleasant    Physical Exam:  Upper Extremity Range of Motion:     -       Right Upper Extremity: WFL bed level  -       Left Upper Extremity: WFL bed level  Upper Extremity Strength:    -       Right Upper Extremity: WFL bed level  -       Left Upper Extremity: WFL bed level   Strength:    -       Right Upper Extremity: fair   -       Left Upper Extremity: fair     AMPAC 6 Click ADL:  AMPAC Total Score:  13    Treatment & Education:  Pt educated on role of OT/POC, importance of OOB/EOB activity, use of call bell, and safety during ADLs, transfers, and functional mobility.      Patient left HOB elevated with all lines intact, call button in reach, bed alarm on, and nursing notified    GOALS:   Multidisciplinary Problems       Occupational Therapy Goals          Problem: Occupational Therapy    Goal Priority Disciplines Outcome Interventions   Occupational Therapy Goal     OT, PT/OT     Description: Goals to be met by: 3/11/25     Patient will increase functional independence with ADLs by performing:    UE Dressing with Minimal Assistance   LE Dressing with Minimal Assistance.  Grooming while seated at sink with Minimal Assistance.  Toileting from bedside commode with Minimal Assistance for hygiene and clothing management.   Toilet transfer to bedside commode with Minimal Assistance.                         History:     Past Medical History:   Diagnosis Date    Anesthesia complication     BLADDER DYSFUNCTION    Aortic stenosis     Arthritis     Back pain     Diabetes mellitus type II     NO LONGER DIABETIC    Encounter for blood transfusion     Encounter for pain management 12/6/2024    Hyperlipidemia     Hypertension     NSTEMI (non-ST elevated myocardial infarction) 05/01/2022    Osteoporosis     Wears glasses          Past Surgical History:   Procedure Laterality Date     vocal cord nodules removed  long time ago     twice    ADRENAL TUMOR      APPENDECTOMY  within last 5yrs    CATHETERIZATION OF BOTH LEFT AND RIGHT HEART Left 05/06/2022    Procedure: CATHETERIZATION, HEART, BOTH LEFT AND RIGHT;  Surgeon: Michelet Vargas MD;  Location: [facility] CATH/EP LAB;  Service: Cardiology;  Laterality: Left;    COLONOSCOPY N/A 6/23/2023    Procedure: COLONOSCOPY;  Surgeon: Joel Neal III, MD;  Location: [facility] ENDO;  Service: Endoscopy;  Laterality: N/A;    FIXATION KYPHOPLASTY THORACIC SPINE      8-20-13    FOOT SURGERY       left 2nd toe was too long     HERNIA REPAIR  within last 5yrs    INSERTION OF TEMPORARY PACEMAKER N/A 1/4/2023    Procedure: INSERTION, PACEMAKER, TEMPORARY;  Surgeon: Bhavesh Peña MD;  Location: [facility] CATH;  Service: Cardiology;  Laterality: N/A;    INTRAMEDULLARY RODDING OF FEMUR Right 2/10/2025    Procedure: INSERTION, INTRAMEDULLARY ALEIDA, FEMUR;  Surgeon: Antwan Ramesh MD;  Location: [facility] OR;  Service: Orthopedics;  Laterality: Right;    LEFT HEART CATHETERIZATION Left 05/02/2022    Procedure: Left heart cath;  Surgeon: Jose Echols MD;  Location: [facility] CATH/EP LAB;  Service: Cardiology;  Laterality: Left;    SMALL BOWEL ENTEROSCOPY N/A 6/22/2023    Procedure: ENTEROSCOPY;  Surgeon: Cornell Aguirre MD;  Location: [facility] ENDO;  Service: Endoscopy;  Laterality: N/A;    SMALL BOWEL ENTEROSCOPY N/A 10/20/2023    Procedure: ENTEROSCOPY;  Surgeon: Otilio Bourgeois MD;  Location: [facility] ENDO;  Service: Endoscopy;  Laterality: N/A;    TONSILLECTOMY, ADENOIDECTOMY  long time ago    TRANSCATHETER AORTIC VALVE REPLACEMENT (TAVR) Bilateral 1/4/2023    Procedure: REPLACEMENT, AORTIC VALVE, TRANSCATHETER (TAVR);  Surgeon: Bhavesh Peña MD;  Location: [facility] CATH;  Service: Cardiology;  Laterality: Bilateral;    TRANSCATHETER AORTIC VALVE REPLACEMENT (TAVR) Bilateral 1/4/2023    Procedure: REPLACEMENT, AORTIC VALVE, TRANSCATHETER (TAVR);  Surgeon: Dallin Verma MD;  Location: [facility] CATH;  Service: Cardiology;  Laterality: Bilateral;    TRANSTHORACIC ECHOCARDIOGRAPHY (TTE)  1/4/2023    Procedure: ECHOCARDIOGRAM, TRANSTHORACIC;  Surgeon: Bhavesh Peña MD;  Location: [facility] CATH;  Service: Cardiology;;       Time Tracking:     OT Date of Treatment: 02/11/25  OT Start Time: 0906  OT Stop Time: 0932  OT Total Time (min): 26 min    Billable Minutes:Evaluation 12  Therapeutic Activity 14    2/11/2025

## 2025-02-11 NOTE — CARE UPDATE
02/11/25 0822   Patient Assessment/Suction   Level of Consciousness (AVPU) alert   Respiratory Effort Normal;Unlabored   Expansion/Accessory Muscles/Retractions no use of accessory muscles;no retractions;expansion symmetric   All Lung Fields Breath Sounds clear   Rhythm/Pattern, Respiratory unlabored   Cough Frequency no cough   PRE-TX-O2   Device (Oxygen Therapy) nasal cannula   $ Is the patient on Low Flow Oxygen? Yes   Flow (L/min) (Oxygen Therapy) 3   SpO2 96 %   Pulse Oximetry Type Continuous   $ Pulse Oximetry - Multiple Charge Pulse Oximetry - Multiple   Pulse 85   Resp 20   Aerosol Therapy   $ Aerosol Therapy Charges Aerosol Treatment   Daily Review of Necessity (SVN) completed   Respiratory Treatment Status (SVN) given   Treatment Route (SVN) oxygen;mask   Patient Position HOB elevated   Post Treatment Assessment (SVN) breath sounds unchanged   Signs of Intolerance (SVN) none   Breath Sounds Post-Respiratory Treatment   Throughout All Fields Post-Treatment All Fields   Throughout All Fields Post-Treatment no change   Post-treatment Heart Rate (beats/min) 81   Post-treatment Resp Rate (breaths/min) 23   Education   $ Education Bronchodilator;30 min

## 2025-02-11 NOTE — CONSULTS
[facility]  Wound Care    Patient Name:  Daryleen G Moran   MRN:  [id number]  Date: 2/11/2025  Diagnosis: Fracture of femoral neck, right, closed    History:     Past Medical History:   Diagnosis Date    Anesthesia complication     BLADDER DYSFUNCTION    Aortic stenosis     Arthritis     Back pain     Diabetes mellitus type II     NO LONGER DIABETIC    Encounter for blood transfusion     Encounter for pain management 12/6/2024    Hyperlipidemia     Hypertension     NSTEMI (non-ST elevated myocardial infarction) 05/01/2022    Osteoporosis     Wears glasses      Allergies as of 02/09/2025 - Reviewed 02/09/2025   Allergen Reaction Noted    Latex  09/27/2022    Sulfacetamide sodium  08/18/2016    Sulfasalazine Hives 09/04/2011    Adhesive Itching 11/26/2013    Adhesive tape-silicones Itching 11/26/2013    Sulfa (sulfonamide antibiotics) Rash 07/25/2013       WO Assessment Details/Treatment     Wound care consulted for MASD to right abdominal fold area. Cleaned area and applied a thin layer of Triad and left open to air. Patient not cooperative with ongoing head to toe skin assessment at this time. Communicated with patients nurse unable to do complete head to toe assessment due to patients refusal. Wound care team will follow up.      02/11/25 1509   WOCN Assessment   WOCN Total Time (mins) 20   Visit Date 02/11/25   Visit Time 1200   Consult Type New   WOCN Speciality Wound   Wound moisture   Intervention assessed;applied;chart review;coordination of care;orders   Teaching on-going        Wound 02/09/25 2300 Moisture associated dermatitis lower Abdomen   Date First Assessed/Time First Assessed: 02/09/25 2300   Present on Original Admission: Yes  Primary Wound Type: Moisture associated dermatitis  Orientation: lower  Location: Abdomen   Wound Image    Dressing Appearance Open to air   Drainage Amount None   Appearance Red;Pink   Care Cleansed with:;Antimicrobial agent;Soap and water;Applied:;Skin Barrier    Dressing Applied  (Triad)       Recommendations made to primary team for Triad every shift. Orders placed.     02/11/2025

## 2025-02-11 NOTE — PLAN OF CARE
Nursing Transfer Note      Reason patient is being transferred: Released from PACU by Dr. Becerra    Transfer To: ICU 2209    Transfer via bed    Transfer with nasal canula to O2 3L, Caridac monitoring    Transported by PACU staff    Medicines sent: None    Chart send with patient: Yes    Notified: daughter    Report to Glenn

## 2025-02-11 NOTE — CARE UPDATE
02/10/25 2122   Patient Assessment/Suction   Level of Consciousness (AVPU) alert   Respiratory Effort Unlabored;Normal   Expansion/Accessory Muscles/Retractions no use of accessory muscles;no retractions;expansion symmetric   All Lung Fields Breath Sounds diminished;equal bilaterally   Rhythm/Pattern, Respiratory no shortness of breath reported;pattern regular;unlabored   Cough Frequency no cough   PRE-TX-O2   Device (Oxygen Therapy) nasal cannula   $ Is the patient on Low Flow Oxygen? Yes   Flow (L/min) (Oxygen Therapy) 3   SpO2 100 %   Pulse Oximetry Type Continuous   $ Pulse Oximetry - Multiple Charge Pulse Oximetry - Multiple   Pulse 101   Resp 20   Aerosol Therapy   $ Aerosol Therapy Charges Aerosol Treatment   Daily Review of Necessity (SVN) completed   Respiratory Treatment Status (SVN) given   Treatment Route (SVN) oxygen;mask   Patient Position HOB elevated   Post Treatment Assessment (SVN) breath sounds unchanged   Signs of Intolerance (SVN) none   Breath Sounds Post-Respiratory Treatment   Throughout All Fields Post-Treatment All Fields   Throughout All Fields Post-Treatment no change   Post-treatment Heart Rate (beats/min) 103   Post-treatment Resp Rate (breaths/min) 18

## 2025-02-11 NOTE — PLAN OF CARE
Plan of care reviewed with patient. Patient/family voiced understanding. Care ongoing.    Problem: Adult Inpatient Plan of Care  Goal: Plan of Care Review  Outcome: Progressing  Goal: Patient-Specific Goal (Individualized)  Outcome: Progressing  Goal: Absence of Hospital-Acquired Illness or Injury  Outcome: Progressing  Goal: Optimal Comfort and Wellbeing  Outcome: Progressing  Goal: Readiness for Transition of Care  Outcome: Progressing

## 2025-02-11 NOTE — PT/OT/SLP EVAL
Physical Therapy Evaluation    Patient Name:  Daryleen G Moran   MRN:  [id number]    Recommendations:     Discharge Recommendations: Moderate Intensity Therapy   Discharge Equipment Recommendations: to be determined by next level of care   Barriers to discharge:  impaired functional mobility, pain, dementia    Assessment:     Daryleen G Moran is a 79 y.o. female admitted with a medical diagnosis of Fracture of femoral neck, right, closed.  She presents with the following impairments/functional limitations: weakness, impaired endurance, impaired self care skills, impaired functional mobility, impaired balance, impaired cognition, decreased lower extremity function, decreased safety awareness, pain, impaired cardiopulmonary response to activity, orthopedic precautions. Patient is agreeable to participation with PT evaluation. She lives with daughter who provides 24/7 care. She has been non-ambulatory for 6 months due to a back injury. She transfers to [facility] and  with assistance. She was educated on RLE WBAT. She requires MaxA x3 for supine to sit transfer with patient very anxious and screaming out during transfer with PT unable to calm patient or redirect. Unable to complete the transfer because of resistance from patient made it unsafe for therapist and nurse. Patient's daughter did ask if patient could have something for anxiety and PT relayed the message to the hospitalist. PT provided education to patient regarding expected post-op pain and that she cannot resist movement due to risk to staff and herself.     Rehab Prognosis: Fair; patient would benefit from acute skilled PT services to address these deficits and reach maximum level of function.    Recent Surgery: Procedure(s) (LRB):  INSERTION, INTRAMEDULLARY ALEIDA, FEMUR (Right) 1 Day Post-Op    Plan:     During this hospitalization, patient to be seen 6 x/week to address the identified rehab impairments via therapeutic activities, therapeutic exercises and progress  toward the following goals:    Plan of Care Expires:  03/11/25    Subjective     Chief Complaint: pain with movement   Patient/Family Comments/goals: daughter requests patient get something for anxiety to help with transfers   Pain/Comfort:  Pain Rating 1:  (not rated)  Location - Side 1: Right  Location 1: hip  Pain Addressed 1: Reposition, Distraction    Patients cultural, spiritual, Moravian conflicts given the current situation:      Living Environment:  Patient lives with daughter in a H   Prior to admission, patients level of function was non-ambulatory for 6 months due to a back injury. She transfers to [facility] and  with assistance. She had 2 falls in the past year. She denies any recent PT. Equipment used at home: walker, rolling, bedside commode, wheelchair.  DME owned (not currently used): none.  Upon discharge, patient will have assistance from facility.    Objective:     Communicated with AYAKA Elizabeth prior to session.  Patient found HOB elevated with bed alarm, blood pressure cuff, oxygen, peripheral IV, pulse ox (continuous), telemetry  upon PT entry to room.    General Precautions: Standard, fall, respiratory  Orthopedic Precautions:RLE weight bearing as tolerated   Braces: N/A  Respiratory Status: Nasal cannula, flow 3 L/min    Exams:  RLE Strength: 2-/5 with significant pain  LLE Strength: WFL    Functional Mobility:  Bed Mobility:     Supine to Sit: maximal assistance and of 3 persons      AM-PAC 6 CLICK MOBILITY  Total Score:8       Treatment & Education:  Patient was educated on the importance of OOB activity and functional mobility to negate negative effects of prolonged bed rest during hospitalization, safe transfers, RLE WBAT, and D/C planning     Patient left HOB elevated with all lines intact, call button in reach, bed alarm on, RN notified, and family present.    GOALS:   Multidisciplinary Problems       Physical Therapy Goals          Problem: Physical Therapy    Goal Priority Disciplines  Outcome Interventions   Physical Therapy Goal     PT, PT/OT     Description: Goals to be met by: 3/11/25     Patient will increase functional independence with mobility by performin. Supine to sit with Moderate Assistance  2. Sit to stand transfer with Moderate Assistance  3. Bed to chair transfer with Moderate Assistance using Rolling Walker  4. Lower extremity exercise program x20 reps per handout, with assistance as needed                         DME Justifications:  TBD at next level of care    History:     Past Medical History:   Diagnosis Date    Anesthesia complication     BLADDER DYSFUNCTION    Aortic stenosis     Arthritis     Back pain     Diabetes mellitus type II     NO LONGER DIABETIC    Encounter for blood transfusion     Encounter for pain management 2024    Hyperlipidemia     Hypertension     NSTEMI (non-ST elevated myocardial infarction) 2022    Osteoporosis     Wears glasses        Past Surgical History:   Procedure Laterality Date     vocal cord nodules removed  long time ago     twice    ADRENAL TUMOR      APPENDECTOMY  within last 5yrs    CATHETERIZATION OF BOTH LEFT AND RIGHT HEART Left 2022    Procedure: CATHETERIZATION, HEART, BOTH LEFT AND RIGHT;  Surgeon: Michelet Vargas MD;  Location: [facility] CATH/EP LAB;  Service: Cardiology;  Laterality: Left;    COLONOSCOPY N/A 2023    Procedure: COLONOSCOPY;  Surgeon: Joel Neal III, MD;  Location: [facility] ENDO;  Service: Endoscopy;  Laterality: N/A;    FIXATION KYPHOPLASTY THORACIC SPINE      820-13    FOOT SURGERY      left 2nd toe was too long     HERNIA REPAIR  within last 5yrs    INSERTION OF TEMPORARY PACEMAKER N/A 2023    Procedure: INSERTION, PACEMAKER, TEMPORARY;  Surgeon: Bhavesh Peña MD;  Location: [facility] CATH;  Service: Cardiology;  Laterality: N/A;    INTRAMEDULLARY RODDING OF FEMUR Right 2/10/2025    Procedure: INSERTION, INTRAMEDULLARY ALEIDA, FEMUR;  Surgeon: Antwan Ramesh MD;  Location: [facility]  OR;  Service: Orthopedics;  Laterality: Right;    LEFT HEART CATHETERIZATION Left 05/02/2022    Procedure: Left heart cath;  Surgeon: Jose Echols MD;  Location: [facility] CATH/EP LAB;  Service: Cardiology;  Laterality: Left;    SMALL BOWEL ENTEROSCOPY N/A 6/22/2023    Procedure: ENTEROSCOPY;  Surgeon: Cornell Aguirre MD;  Location: [facility] ENDO;  Service: Endoscopy;  Laterality: N/A;    SMALL BOWEL ENTEROSCOPY N/A 10/20/2023    Procedure: ENTEROSCOPY;  Surgeon: Otilio Bourgeois MD;  Location: [facility] ENDO;  Service: Endoscopy;  Laterality: N/A;    TONSILLECTOMY, ADENOIDECTOMY  long time ago    TRANSCATHETER AORTIC VALVE REPLACEMENT (TAVR) Bilateral 1/4/2023    Procedure: REPLACEMENT, AORTIC VALVE, TRANSCATHETER (TAVR);  Surgeon: Bhavesh Peña MD;  Location: [facility] CATH;  Service: Cardiology;  Laterality: Bilateral;    TRANSCATHETER AORTIC VALVE REPLACEMENT (TAVR) Bilateral 1/4/2023    Procedure: REPLACEMENT, AORTIC VALVE, TRANSCATHETER (TAVR);  Surgeon: Dallin Verma MD;  Location: [facility] CATH;  Service: Cardiology;  Laterality: Bilateral;    TRANSTHORACIC ECHOCARDIOGRAPHY (TTE)  1/4/2023    Procedure: ECHOCARDIOGRAM, TRANSTHORACIC;  Surgeon: Bhavesh Peña MD;  Location: [facility] CATH;  Service: Cardiology;;       Time Tracking:     PT Received On: 02/11/25  PT Start Time: 1318     PT Stop Time: 1342  PT Total Time (min): 24 min     Billable Minutes: Evaluation 10 and Therapeutic Activity 14      02/11/2025

## 2025-02-12 LAB
ANION GAP SERPL CALC-SCNC: 6 MMOL/L (ref 8–16)
BASOPHILS # BLD AUTO: 0.02 K/UL (ref 0–0.2)
BASOPHILS NFR BLD: 0.2 % (ref 0–1.9)
BUN SERPL-MCNC: 15 MG/DL (ref 8–23)
CALCIUM SERPL-MCNC: 8.5 MG/DL (ref 8.7–10.5)
CHLORIDE SERPL-SCNC: 102 MMOL/L (ref 95–110)
CO2 SERPL-SCNC: 29 MMOL/L (ref 23–29)
CREAT SERPL-MCNC: 0.4 MG/DL (ref 0.5–1.4)
DIFFERENTIAL METHOD BLD: ABNORMAL
EOSINOPHIL # BLD AUTO: 0 K/UL (ref 0–0.5)
EOSINOPHIL NFR BLD: 0.2 % (ref 0–8)
ERYTHROCYTE [DISTWIDTH] IN BLOOD BY AUTOMATED COUNT: 15.5 % (ref 11.5–14.5)
EST. GFR  (NO RACE VARIABLE): >60 ML/MIN/1.73 M^2
GLUCOSE SERPL-MCNC: 150 MG/DL (ref 70–110)
HCT VFR BLD AUTO: 36.4 % (ref 37–48.5)
HGB BLD-MCNC: 10.6 G/DL (ref 12–16)
IMM GRANULOCYTES # BLD AUTO: 0.06 K/UL (ref 0–0.04)
IMM GRANULOCYTES NFR BLD AUTO: 0.5 % (ref 0–0.5)
LYMPHOCYTES # BLD AUTO: 1.1 K/UL (ref 1–4.8)
LYMPHOCYTES NFR BLD: 9.1 % (ref 18–48)
MAGNESIUM SERPL-MCNC: 1.8 MG/DL (ref 1.6–2.6)
MCH RBC QN AUTO: 30.5 PG (ref 27–31)
MCHC RBC AUTO-ENTMCNC: 29.1 G/DL (ref 32–36)
MCV RBC AUTO: 105 FL (ref 82–98)
MONOCYTES # BLD AUTO: 1 K/UL (ref 0.3–1)
MONOCYTES NFR BLD: 8.1 % (ref 4–15)
NEUTROPHILS # BLD AUTO: 10 K/UL (ref 1.8–7.7)
NEUTROPHILS NFR BLD: 81.9 % (ref 38–73)
NRBC BLD-RTO: 0 /100 WBC
PHOSPHATE SERPL-MCNC: 1.9 MG/DL (ref 2.7–4.5)
PLATELET # BLD AUTO: 94 K/UL (ref 150–450)
PLATELET BLD QL SMEAR: ABNORMAL
PMV BLD AUTO: 13.1 FL (ref 9.2–12.9)
POCT GLUCOSE: 152 MG/DL (ref 70–110)
POCT GLUCOSE: 156 MG/DL (ref 70–110)
POCT GLUCOSE: 174 MG/DL (ref 70–110)
POTASSIUM SERPL-SCNC: 3.9 MMOL/L (ref 3.5–5.1)
RBC # BLD AUTO: 3.48 M/UL (ref 4–5.4)
SODIUM SERPL-SCNC: 137 MMOL/L (ref 136–145)
WBC # BLD AUTO: 12.25 K/UL (ref 3.9–12.7)

## 2025-02-12 PROCEDURE — 25000003 PHARM REV CODE 250: Performed by: ORTHOPAEDIC SURGERY

## 2025-02-12 PROCEDURE — 99900031 HC PATIENT EDUCATION (STAT)

## 2025-02-12 PROCEDURE — 94640 AIRWAY INHALATION TREATMENT: CPT

## 2025-02-12 PROCEDURE — 63600175 PHARM REV CODE 636 W HCPCS: Mod: JZ | Performed by: ORTHOPAEDIC SURGERY

## 2025-02-12 PROCEDURE — 83735 ASSAY OF MAGNESIUM: CPT | Performed by: ORTHOPAEDIC SURGERY

## 2025-02-12 PROCEDURE — 97530 THERAPEUTIC ACTIVITIES: CPT

## 2025-02-12 PROCEDURE — 12000002 HC ACUTE/MED SURGE SEMI-PRIVATE ROOM

## 2025-02-12 PROCEDURE — 94761 N-INVAS EAR/PLS OXIMETRY MLT: CPT

## 2025-02-12 PROCEDURE — 99900035 HC TECH TIME PER 15 MIN (STAT)

## 2025-02-12 PROCEDURE — 36415 COLL VENOUS BLD VENIPUNCTURE: CPT | Performed by: ORTHOPAEDIC SURGERY

## 2025-02-12 PROCEDURE — 80048 BASIC METABOLIC PNL TOTAL CA: CPT | Performed by: ORTHOPAEDIC SURGERY

## 2025-02-12 PROCEDURE — 25000003 PHARM REV CODE 250: Performed by: HOSPITALIST

## 2025-02-12 PROCEDURE — 84100 ASSAY OF PHOSPHORUS: CPT | Performed by: ORTHOPAEDIC SURGERY

## 2025-02-12 PROCEDURE — 25000242 PHARM REV CODE 250 ALT 637 W/ HCPCS: Performed by: ORTHOPAEDIC SURGERY

## 2025-02-12 PROCEDURE — 85025 COMPLETE CBC W/AUTO DIFF WBC: CPT | Performed by: ORTHOPAEDIC SURGERY

## 2025-02-12 PROCEDURE — 27000221 HC OXYGEN, UP TO 24 HOURS

## 2025-02-12 PROCEDURE — 21400001 HC TELEMETRY ROOM

## 2025-02-12 RX ORDER — ENOXAPARIN SODIUM 100 MG/ML
40 INJECTION SUBCUTANEOUS EVERY 24 HOURS
Status: DISCONTINUED | OUTPATIENT
Start: 2025-02-12 | End: 2025-02-20 | Stop reason: HOSPADM

## 2025-02-12 RX ADMIN — DONEPEZIL HYDROCHLORIDE 5 MG: 5 TABLET ORAL at 09:02

## 2025-02-12 RX ADMIN — CYCLOBENZAPRINE 10 MG: 10 TABLET, FILM COATED ORAL at 08:02

## 2025-02-12 RX ADMIN — PANTOPRAZOLE SODIUM 40 MG: 40 TABLET, DELAYED RELEASE ORAL at 08:02

## 2025-02-12 RX ADMIN — BUDESONIDE INHALATION 0.5 MG: 0.5 SUSPENSION RESPIRATORY (INHALATION) at 07:02

## 2025-02-12 RX ADMIN — DIAZEPAM 5 MG: 5 TABLET ORAL at 01:02

## 2025-02-12 RX ADMIN — DILTIAZEM HYDROCHLORIDE 120 MG: 120 CAPSULE, COATED, EXTENDED RELEASE ORAL at 08:02

## 2025-02-12 RX ADMIN — VENLAFAXINE HYDROCHLORIDE 75 MG: 37.5 CAPSULE, EXTENDED RELEASE ORAL at 09:02

## 2025-02-12 RX ADMIN — ARFORMOTEROL TARTRATE 15 MCG: 15 SOLUTION RESPIRATORY (INHALATION) at 07:02

## 2025-02-12 RX ADMIN — LACTOBACILLUS ACIDOPHILUS / LACTOBACILLUS BULGARICUS 1 EACH: 100 MILLION CFU STRENGTH GRANULES at 08:02

## 2025-02-12 RX ADMIN — CARVEDILOL 12.5 MG: 12.5 TABLET, FILM COATED ORAL at 09:02

## 2025-02-12 RX ADMIN — MUPIROCIN 1 G: 20 OINTMENT TOPICAL at 08:02

## 2025-02-12 RX ADMIN — DIAZEPAM 5 MG: 5 TABLET ORAL at 08:02

## 2025-02-12 RX ADMIN — CLOPIDOGREL BISULFATE 75 MG: 75 TABLET, FILM COATED ORAL at 08:02

## 2025-02-12 RX ADMIN — CARVEDILOL 12.5 MG: 12.5 TABLET, FILM COATED ORAL at 08:02

## 2025-02-12 RX ADMIN — LACTOBACILLUS ACIDOPHILUS / LACTOBACILLUS BULGARICUS 1 EACH: 100 MILLION CFU STRENGTH GRANULES at 09:02

## 2025-02-12 RX ADMIN — ASPIRIN 81 MG: 81 TABLET, CHEWABLE ORAL at 08:02

## 2025-02-12 RX ADMIN — THERA TABS 1 TABLET: TAB at 08:02

## 2025-02-12 RX ADMIN — FERROUS SULFATE TAB 325 MG (65 MG ELEMENTAL FE) 1 EACH: 325 (65 FE) TAB at 08:02

## 2025-02-12 RX ADMIN — HYDROCODONE BITARTRATE AND ACETAMINOPHEN 1 TABLET: 5; 325 TABLET ORAL at 08:02

## 2025-02-12 RX ADMIN — HYDROMORPHONE HYDROCHLORIDE 1 MG: 1 INJECTION, SOLUTION INTRAMUSCULAR; INTRAVENOUS; SUBCUTANEOUS at 01:02

## 2025-02-12 RX ADMIN — MUPIROCIN 1 G: 20 OINTMENT TOPICAL at 09:02

## 2025-02-12 RX ADMIN — PANTOPRAZOLE SODIUM 40 MG: 40 TABLET, DELAYED RELEASE ORAL at 09:02

## 2025-02-12 NOTE — CARE UPDATE
02/12/25 0123   Patient Assessment/Suction   Level of Consciousness (AVPU) alert   Respiratory Effort Normal;Unlabored   MIKHAIL Breath Sounds clear   LLL Breath Sounds diminished   RUL Breath Sounds clear   RML Breath Sounds diminished   RLL Breath Sounds diminished   Rhythm/Pattern, Respiratory unlabored;pattern regular;no shortness of breath reported   Cough Frequency no cough   PRE-TX-O2   Device (Oxygen Therapy) nasal cannula   $ Is the patient on Low Flow Oxygen? Yes   Flow (L/min) (Oxygen Therapy) 3   SpO2 98 %   Pulse Oximetry Type Intermittent   $ Pulse Oximetry - Multiple Charge Pulse Oximetry - Multiple

## 2025-02-12 NOTE — SUBJECTIVE & OBJECTIVE
Interval History:  Patient is in pain but otherwise no acute complaints    Review of Systems   All other systems reviewed and are negative.    Objective:     Vital Signs (Most Recent):  Temp: 97.8 °F (36.6 °C) (02/12/25 1140)  Pulse: 101 (02/12/25 1140)  Resp: 19 (02/12/25 1140)  BP: 125/80 (02/12/25 1140)  SpO2: 99 % (02/12/25 1140) Vital Signs (24h Range):  Temp:  [97.8 °F (36.6 °C)-98.4 °F (36.9 °C)] 97.8 °F (36.6 °C)  Pulse:  [] 101  Resp:  [12-45] 19  SpO2:  [81 %-100 %] 99 %  BP: ()/(53-90) 125/80     Weight: 81.7 kg (180 lb 1.9 oz)  Body mass index is 35.18 kg/m².    Intake/Output Summary (Last 24 hours) at 2/12/2025 1348  Last data filed at 2/12/2025 1031  Gross per 24 hour   Intake 680 ml   Output 2200 ml   Net -1520 ml         Physical Exam  Constitutional:       Appearance: She is ill-appearing.   Cardiovascular:      Rate and Rhythm: Tachycardia present. Rhythm irregular.      Pulses: Normal pulses.   Pulmonary:      Effort: Pulmonary effort is normal. No respiratory distress.      Breath sounds: No wheezing.   Abdominal:      General: Abdomen is flat. Bowel sounds are normal.   Musculoskeletal:         General: Tenderness present.      Right lower leg: Edema present.   Skin:     General: Skin is warm and dry.   Neurological:      Mental Status: Mental status is at baseline.             Significant Labs: All pertinent labs within the past 24 hours have been reviewed.    Significant Imaging: I have reviewed all pertinent imaging results/findings within the past 24 hours.

## 2025-02-12 NOTE — NURSING
Patient transferred to room 3109 with belongings. Daughter Sally notified and transfer and new room number.

## 2025-02-12 NOTE — SUBJECTIVE & OBJECTIVE
Interval History:  Patient is in pain and can't believe we expect her to do PT while in pain     Review of Systems   All other systems reviewed and are negative.    Objective:     Vital Signs (Most Recent):  Temp: 98.3 °F (36.8 °C) (02/11/25 1701)  Pulse: (!) 116 (02/11/25 2016)  Resp: (!) 22 (02/11/25 2101)  BP: (!) 102/55 (02/11/25 2016)  SpO2: 100 % (02/11/25 1944) Vital Signs (24h Range):  Temp:  [97 °F (36.1 °C)-98.3 °F (36.8 °C)] 98.3 °F (36.8 °C)  Pulse:  [] 116  Resp:  [10-45] 22  SpO2:  [71 %-100 %] 100 %  BP: ()/(48-90) 102/55     Weight: 81.7 kg (180 lb 1.9 oz)  Body mass index is 35.18 kg/m².    Intake/Output Summary (Last 24 hours) at 2/11/2025 2306  Last data filed at 2/11/2025 1801  Gross per 24 hour   Intake 2953 ml   Output 600 ml   Net 2353 ml         Physical Exam  Constitutional:       Appearance: She is ill-appearing.   Cardiovascular:      Rate and Rhythm: Tachycardia present. Rhythm irregular.      Pulses: Normal pulses.   Pulmonary:      Effort: Pulmonary effort is normal. No respiratory distress.      Breath sounds: No wheezing.   Abdominal:      General: Abdomen is flat. Bowel sounds are normal.   Musculoskeletal:         General: Tenderness present.      Right lower leg: Edema present.   Skin:     General: Skin is warm and dry.   Neurological:      Mental Status: Mental status is at baseline.             Significant Labs: All pertinent labs within the past 24 hours have been reviewed.    Significant Imaging: I have reviewed all pertinent imaging results/findings within the past 24 hours.

## 2025-02-12 NOTE — PT/OT/SLP PROGRESS
Occupational Therapy      Patient Name:  Daryleen G Moran   MRN:  [id number]    Patient not seen today secondary to Patient unwilling to participate. Patient initially agreed to oral care at bed level but became combative and verbally abusive as therapist was setting up activity. Will follow-up tomorrow.    2/12/2025

## 2025-02-12 NOTE — NURSING
"Patient assessment completed at this time. Patient is confused to situation and place. Has complaints of surgical pain, will review pain management. Patient stated "nobody cares that I hurt". Patient received education on pain management plan. Patient stated, "I don't give a shit, just give me my medicine."  Patient voiced understanding of room and call light. Care ongoing.  "

## 2025-02-12 NOTE — PROGRESS NOTES
[facility] Medicine  Progress Note    Patient Name: Daryleen G Moran  MRN: [id number]  Patient Class: IP- Inpatient   Admission Date: 2/9/2025  Length of Stay: 2 days  Attending Physician: Deedee Rodriguez MD  Primary Care Provider: Abhi De Oliveira IV, MD        Subjective     Principal Problem:Fracture of femoral neck, right, closed        HPI:  Ms. Cyr is a 79 yr old female with a hx of DM type 2, HTN, s/p TAVR, CAD, dementia, AFib, COPD, chronic diastolic CHF, lumbar and thoracic spondylosis, degenerative disc disease, compression fractures, radiculopathy, arthritis, osteoporosis, GERD, stress urinary incontinence, HLD, left adrenal mass, iron-deficiency anemia, abdominal aneurysm, debility, attention and concentration deficit, vitamin-D deficiency, AVM, diverticulosis, depression, obesity, thrombocytopenia, aortic stenosis, NSTEMI who presented to the ED with a chief complaint of a slip and fall.  Granddaughter at bedside who helps to provide a history.  Patient and granddaughter state that the patient was getting out of bed to transfer to bedside commode when she slipped and fell onto her right side.  She states she immediately started having right hip pain.  Granddaughter and patient both deny head trauma and LOC.  She states that she quit smoking 6 months ago and denies alcohol and recreational drug use.  She denies fever, chills, dyspnea, palpitations, chest pain, abdominal pain, nausea, vomiting, diarrhea, dysuria, hematuria, lightheadedness, dizziness, and syncope.    Upon arrival to ED, patient afebrile, HR of 114, RR of 20, BP of 130/75, satting 96% on RA.  Workup in the ED revealed WBC of 12.78, potassium of 3.2, glucose of 154, PT of 12.6.  CXR shows no acute radiographic abnormalities.  Right hip x-ray shows acute nondisplaced and non comminuted intertrochanteric right femoral neck fracture.  While in the ED patient had heart rate of 143 and EKG showed AFib with RVR.   Patient was given diltiazem 10 mg IV, hydromorphone 1.5 mg IV, 1 L NS bolus while in the ED. patient had improvement with heart rate to 90s-100s.  ED provider discussed case with Orthopedic surgery who plans for OR in the morning.  Patient will be admitted under hospital medicine services for further management.    Overview/Hospital Course:  79-year-old female with history of AFib, dementia, compression fracture came in with fall and hip pain and patient was admitted for a right intertrochanteric femur fracture.  Plan for OR on 02/10.  Holding anticoagulation and antiplatelet    S/p surgery - patient not happy with pain level     Interval History:  Patient is in pain and can't believe we expect her to do PT while in pain     Review of Systems   All other systems reviewed and are negative.    Objective:     Vital Signs (Most Recent):  Temp: 98.3 °F (36.8 °C) (02/11/25 1701)  Pulse: (!) 116 (02/11/25 2016)  Resp: (!) 22 (02/11/25 2101)  BP: (!) 102/55 (02/11/25 2016)  SpO2: 100 % (02/11/25 1944) Vital Signs (24h Range):  Temp:  [97 °F (36.1 °C)-98.3 °F (36.8 °C)] 98.3 °F (36.8 °C)  Pulse:  [] 116  Resp:  [10-45] 22  SpO2:  [71 %-100 %] 100 %  BP: ()/(48-90) 102/55     Weight: 81.7 kg (180 lb 1.9 oz)  Body mass index is 35.18 kg/m².    Intake/Output Summary (Last 24 hours) at 2/11/2025 2306  Last data filed at 2/11/2025 1801  Gross per 24 hour   Intake 2953 ml   Output 600 ml   Net 2353 ml         Physical Exam  Constitutional:       Appearance: She is ill-appearing.   Cardiovascular:      Rate and Rhythm: Tachycardia present. Rhythm irregular.      Pulses: Normal pulses.   Pulmonary:      Effort: Pulmonary effort is normal. No respiratory distress.      Breath sounds: No wheezing.   Abdominal:      General: Abdomen is flat. Bowel sounds are normal.   Musculoskeletal:         General: Tenderness present.      Right lower leg: Edema present.   Skin:     General: Skin is warm and dry.   Neurological:       "Mental Status: Mental status is at baseline.             Significant Labs: All pertinent labs within the past 24 hours have been reviewed.    Significant Imaging: I have reviewed all pertinent imaging results/findings within the past 24 hours.    Assessment and Plan     * Fracture of femoral neck, right, closed  - Patient presented to the ED after a slip and fall.  She was found to have right nondisplaced enter trochanteric femoral neck fracture on right hip x-ray in the ED.  - ED provider discussed case with Orthopedic surgery who plans to take patient to OR in the morning  - NPO at midnight  - PRN analgesia and symptomatic care  - Holding DVT prophylaxis as well as aspirin and Plavix, resume when appropriate to do so      Chronic diastolic heart failure  Patient has Diastolic (HFpEF) heart failure that is Chronic. On presentation their CHF was well compensated. Most recent BNP and echo results are listed below.  No results for input(s): "BNP" in the last 72 hours.  Latest ECHO  Results for orders placed during the hospital encounter of 08/20/24    Echo    Interpretation Summary    Left Ventricle: The left ventricle is normal in size. Normal wall thickness. There is concentric remodeling. There is normal systolic function with a visually estimated ejection fraction of 60 - 65%. Diastolic function cannot be reliably determined in the presence of mitral annular calcification.    Right Ventricle: Normal right ventricular cavity size. Wall thickness is normal. Systolic function is mildly reduced.    The left atrium is moderately dilated.    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 It is reported to be a Carrion Mk S3 valve.    Mitral Valve: There is bileaflet sclerosis. There is moderate to severe mitral annular calcification present.    Tricuspid Valve: There is mild regurgitation.    Pulmonary Artery: The estimated pulmonary artery systolic pressure is 31 mmHg.    IVC/SVC: " Normal venous pressure at 3 mmHg.    Current Heart Failure Medications       Plan  - Monitor strict I&Os and daily weights.    - Place on telemetry  - Low sodium diet  - Place on fluid restriction of 1.5 L.   - Cardiology has not been consulted  - The patient's volume status is at their baseline  - Continue home coreg and lasix    COPD (chronic obstructive pulmonary disease)  Patient's COPD is controlled currently.  Patient is currently off COPD Pathway. Continue scheduled inhalers Supplemental oxygen and monitor respiratory status closely.     - DuoNebs Q6H PRN    Atrial fibrillation with RVR  Patient has permanent atrial fibrillation. Patient is currently in atrial fibrillation. [id number] Score: 5. The patients heart rate in the last 24 hours is as follows:  Pulse  Min: 93  Max: 143     Antiarrhythmics       Anticoagulants       Plan  - Replete lytes with a goal of K>4, Mg >2  - Patient is not on anti coag outpatient per home medication list, she is however on DAPT with aspirin and Plavix  - Holding home aspirin and Plavix as patient is planned to go to OR in the morning for repair, resume when appropriate to do so  - Patient's afib is currently controlled  - Patient was given diltiazem 10 mg IV while in the ED as patient's heart rate went to 140s.  I suspect that this is probably just due to uncontrolled pain.  After patient received diltiazem and hydromorphone, heart rate is now controlled in the 90s-100s.    Dementia in other diseases classified elsewhere, unspecified severity, without behavioral disturbance, psychotic disturbance, mood disturbance, and anxiety  - History noted  - Patient is currently A&O x3 to self, place, and month. Patient is disoriented to City and year.  She knows that she is in the hospital but is unsure of the name of the hospital.  She states that it is 2027.  - Continue home Aricept    S/P TAVR (transcatheter aortic valve replacement)  - Hx noted      Hypertension  Chronic,  Latest  "blood pressure and vitals reviewed-     Temp:  [98.5 °F (36.9 °C)]   Pulse:  []   Resp:  [13-20]   BP: (107-130)/(60-80)   SpO2:  [87 %-96 %] .   Home meds for hypertension were reviewed and noted below-  Hypertension Medications               carvediloL (COREG) 12.5 MG tablet Take 12.5 mg by mouth 2 (two) times daily.    diltiaZEM (DILACOR XR) 120 MG CDCR Take 1 capsule (120 mg total) by mouth once daily.    furosemide (LASIX) 40 MG tablet Take 1 tablet (40 mg total) by mouth daily as needed (Take for weight gain > 2 pounds over 24 hours. May discuss with PCP).            While in the hospital, will manage blood pressure as follows; Continue home antihypertensive regimen    Will utilize p.r.n. blood pressure medication only if patient's blood pressure greater than 180/110 and she develops symptoms such as worsening chest pain or shortness of breath.      Type 2 diabetes mellitus, without long-term current use of insulin  Last A1c reviewed-   Lab Results   Component Value Date    HGBA1C 6.6 (H) 03/08/2024     Most recent fingerstick glucose reviewed- No results for input(s): "POCTGLUCOSE" in the last 24 hours.  Current correctional scale  Medium  Maintain anti-hyperglycemic dose as follows-   Antihyperglycemics (From admission, onward)      Start     Stop Route Frequency Ordered    02/09/25 1755  insulin aspart U-100 pen 0-10 Units         -- SubQ Before meals & nightly PRN 02/09/25 1657          Hold Oral hypoglycemics while patient is in the hospital.         VTE Risk Mitigation (From admission, onward)           Ordered     Reason for No Pharmacological VTE Prophylaxis  Once        Comments: Plan for OR in AM   Question:  Reasons:  Answer:  Physician Provided (leave comment)    02/09/25 1657     IP VTE HIGH RISK PATIENT  Once         02/09/25 1657     Place sequential compression device  Until discontinued         02/09/25 1657                    Discharge Planning   ISAAK: 2/14/2025     Code Status: Full " Code   Medical Readiness for Discharge Date:   Discharge Plan A: Skilled Nursing Facility   Discharge Delays: (!) Post-Acute Set-up        Critical care time spent on the evaluation and treatment of severe organ dysfunction, review of pertinent labs and imaging studies, discussions with consulting providers and discussions with patient/family: 20 minutes.    Please place Justification for DME        Deedee Rodriguez MD  Department of Hospital Medicine   [facility]

## 2025-02-12 NOTE — PROGRESS NOTES
[facility] Medicine  Progress Note    Patient Name: Daryleen G Moran  MRN: [id number]  Patient Class: IP- Inpatient   Admission Date: 2/9/2025  Length of Stay: 3 days  Attending Physician: Carley Phillips DO  Primary Care Provider: Abhi De Oliveira IV, MD        Subjective     Principal Problem:Fracture of femoral neck, right, closed        HPI:  Ms. Cyr is a 79 yr old female with a hx of DM type 2, HTN, s/p TAVR, CAD, dementia, AFib, COPD, chronic diastolic CHF, lumbar and thoracic spondylosis, degenerative disc disease, compression fractures, radiculopathy, arthritis, osteoporosis, GERD, stress urinary incontinence, HLD, left adrenal mass, iron-deficiency anemia, abdominal aneurysm, debility, attention and concentration deficit, vitamin-D deficiency, AVM, diverticulosis, depression, obesity, thrombocytopenia, aortic stenosis, NSTEMI who presented to the ED with a chief complaint of a slip and fall.  Granddaughter at bedside who helps to provide a history.  Patient and granddaughter state that the patient was getting out of bed to transfer to bedside commode when she slipped and fell onto her right side.  She states she immediately started having right hip pain.  Granddaughter and patient both deny head trauma and LOC.  She states that she quit smoking 6 months ago and denies alcohol and recreational drug use.  She denies fever, chills, dyspnea, palpitations, chest pain, abdominal pain, nausea, vomiting, diarrhea, dysuria, hematuria, lightheadedness, dizziness, and syncope.    Upon arrival to ED, patient afebrile, HR of 114, RR of 20, BP of 130/75, satting 96% on RA.  Workup in the ED revealed WBC of 12.78, potassium of 3.2, glucose of 154, PT of 12.6.  CXR shows no acute radiographic abnormalities.  Right hip x-ray shows acute nondisplaced and non comminuted intertrochanteric right femoral neck fracture.  While in the ED patient had heart rate of 143 and EKG showed AFib with RVR.   Patient was given diltiazem 10 mg IV, hydromorphone 1.5 mg IV, 1 L NS bolus while in the ED. patient had improvement with heart rate to 90s-100s.  ED provider discussed case with Orthopedic surgery who plans for OR in the morning.  Patient will be admitted under hospital medicine services for further management.    Overview/Hospital Course:  79-year-old female with history of AFib, dementia, compression fracture came in with fall and hip pain and patient was admitted for a right intertrochanteric femur fracture.  Status post cephalomedullary nail fixation  02/10.  Discuss antiplatelet/anticoagulation recommendations with Orthopedic surgery and we will just give ASA given history of dementia and big fall risk.  S/p surgery - not really complaining of hip pain just her chronic back pain.  Encouraged to work with PT/OT and attempted to call daughter is patient lacks MDM to discuss progressing course and placement options but no answer, left VM.    Interval History:  Patient is in pain but otherwise no acute complaints    Review of Systems   All other systems reviewed and are negative.    Objective:     Vital Signs (Most Recent):  Temp: 97.8 °F (36.6 °C) (02/12/25 1140)  Pulse: 101 (02/12/25 1140)  Resp: 19 (02/12/25 1140)  BP: 125/80 (02/12/25 1140)  SpO2: 99 % (02/12/25 1140) Vital Signs (24h Range):  Temp:  [97.8 °F (36.6 °C)-98.4 °F (36.9 °C)] 97.8 °F (36.6 °C)  Pulse:  [] 101  Resp:  [12-45] 19  SpO2:  [81 %-100 %] 99 %  BP: ()/(53-90) 125/80     Weight: 81.7 kg (180 lb 1.9 oz)  Body mass index is 35.18 kg/m².    Intake/Output Summary (Last 24 hours) at 2/12/2025 1348  Last data filed at 2/12/2025 1031  Gross per 24 hour   Intake 680 ml   Output 2200 ml   Net -1520 ml         Physical Exam  Constitutional:       Appearance: She is ill-appearing.   Cardiovascular:      Rate and Rhythm: Tachycardia present. Rhythm irregular.      Pulses: Normal pulses.   Pulmonary:      Effort: Pulmonary effort is normal.  "No respiratory distress.      Breath sounds: No wheezing.   Abdominal:      General: Abdomen is flat. Bowel sounds are normal.   Musculoskeletal:         General: Tenderness present.      Right lower leg: Edema present.   Skin:     General: Skin is warm and dry.   Neurological:      Mental Status: Mental status is at baseline.             Significant Labs: All pertinent labs within the past 24 hours have been reviewed.    Significant Imaging: I have reviewed all pertinent imaging results/findings within the past 24 hours.    Assessment and Plan     * Fracture of femoral neck, right, closed  - Patient presented to the ED after a slip and fall.  She was found to have right nondisplaced enter trochanteric femoral neck fracture on right hip x-ray in the ED.  - ED provider discussed case with Orthopedic surgery who plans to take patient to OR in the morning  - NPO at midnight  - PRN analgesia and symptomatic care  - Holding DVT prophylaxis as well as aspirin and Plavix, resume when appropriate to do so  - restart ASA    Chronic diastolic heart failure  Patient has Diastolic (HFpEF) heart failure that is Chronic. On presentation their CHF was well compensated. Most recent BNP and echo results are listed below.  No results for input(s): "BNP" in the last 72 hours.  Latest ECHO  Results for orders placed during the hospital encounter of 08/20/24    Echo    Interpretation Summary    Left Ventricle: The left ventricle is normal in size. Normal wall thickness. There is concentric remodeling. There is normal systolic function with a visually estimated ejection fraction of 60 - 65%. Diastolic function cannot be reliably determined in the presence of mitral annular calcification.    Right Ventricle: Normal right ventricular cavity size. Wall thickness is normal. Systolic function is mildly reduced.    The left atrium is moderately dilated.    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 It is " reported to be a Carrion Mk S3 valve.    Mitral Valve: There is bileaflet sclerosis. There is moderate to severe mitral annular calcification present.    Tricuspid Valve: There is mild regurgitation.    Pulmonary Artery: The estimated pulmonary artery systolic pressure is 31 mmHg.    IVC/SVC: Normal venous pressure at 3 mmHg.    Current Heart Failure Medications  carvediloL tablet 12.5 mg, 2 times daily, Oral  furosemide tablet 40 mg, Daily PRN, Oral    Plan  - Monitor strict I&Os and daily weights.    - Place on telemetry  - Low sodium diet  - Place on fluid restriction of 1.5 L.   - Cardiology has not been consulted  - The patient's volume status is at their baseline  - Continue home coreg and lasix    COPD (chronic obstructive pulmonary disease)  Patient's COPD is controlled currently.  Patient is currently off COPD Pathway. Continue scheduled inhalers Supplemental oxygen and monitor respiratory status closely.     - DuoNebs Q6H PRN    Atrial fibrillation with RVR  Patient has permanent atrial fibrillation. Patient is currently in atrial fibrillation. [id number] Score: 5. The patients heart rate in the last 24 hours is as follows:  Pulse  Min: 89  Max: 135     Antiarrhythmics  diltiaZEM 24 hr capsule 120 mg, Daily, Oral  metoprolol injection 5 mg, Every 4 hours PRN, Intravenous    Anticoagulants       Plan  - Replete lytes with a goal of K>4, Mg >2  - Patient is not on anti coag outpatient per home medication list, she is however on DAPT with aspirin and Plavix  - Holding home aspirin and Plavix as patient is planned to go to OR in the morning for repair, resume when appropriate to do so  - Patient's afib is currently controlled  - Patient was given diltiazem 10 mg IV while in the ED as patient's heart rate went to 140s.  I suspect that this is probably just due to uncontrolled pain.  After patient received diltiazem and hydromorphone, heart rate is now controlled in the 90s-100s.  Restarted ASA.  Plan to  "discuss with daughter if safe to restart anticoagulation given fall risk in setting of dementia and injury.    Dementia in other diseases classified elsewhere, unspecified severity, without behavioral disturbance, psychotic disturbance, mood disturbance, and anxiety  - History noted  - Patient is currently A&O x3 to self, place, and month. Patient is disoriented to City and year.  She knows that she is in the hospital but is unsure of the name of the hospital.  She states that it is 2027.  - Continue home Aricept    S/P TAVR (transcatheter aortic valve replacement)  - Hx noted      Hypertension  Chronic,  Latest blood pressure and vitals reviewed-     Temp:  [98.5 °F (36.9 °C)]   Pulse:  []   Resp:  [13-20]   BP: (107-130)/(60-80)   SpO2:  [87 %-96 %] .   Home meds for hypertension were reviewed and noted below-  Hypertension Medications               carvediloL (COREG) 12.5 MG tablet Take 12.5 mg by mouth 2 (two) times daily.    diltiaZEM (DILACOR XR) 120 MG CDCR Take 1 capsule (120 mg total) by mouth once daily.    furosemide (LASIX) 40 MG tablet Take 1 tablet (40 mg total) by mouth daily as needed (Take for weight gain > 2 pounds over 24 hours. May discuss with PCP).            While in the hospital, will manage blood pressure as follows; Continue home antihypertensive regimen    Will utilize p.r.n. blood pressure medication only if patient's blood pressure greater than 180/110 and she develops symptoms such as worsening chest pain or shortness of breath.      Type 2 diabetes mellitus, without long-term current use of insulin  Last A1c reviewed-   Lab Results   Component Value Date    HGBA1C 6.6 (H) 03/08/2024     Most recent fingerstick glucose reviewed- No results for input(s): "POCTGLUCOSE" in the last 24 hours.  Current correctional scale  Medium  Maintain anti-hyperglycemic dose as follows-   Antihyperglycemics (From admission, onward)      Start     Stop Route Frequency Ordered    02/09/25 3341  " insulin aspart U-100 pen 0-10 Units         -- SubQ Before meals & nightly PRN 02/09/25 1657          Hold Oral hypoglycemics while patient is in the hospital.         VTE Risk Mitigation (From admission, onward)           Ordered     enoxaparin injection 40 mg  Every 24 hours         02/12/25 1612     Reason for No Pharmacological VTE Prophylaxis  Once        Comments: Plan for OR in AM   Question:  Reasons:  Answer:  Physician Provided (leave comment)    02/09/25 1657     IP VTE HIGH RISK PATIENT  Once         02/09/25 1657     Place sequential compression device  Until discontinued         02/09/25 1657                    Discharge Planning   ISAAK: 2/14/2025     Code Status: Full Code   Medical Readiness for Discharge Date:   Discharge Plan A: Skilled Nursing Facility   Discharge Delays: (!) Post-Acute Set-up      Treatment, consult recommendation, PT/OT, dispo planning      Please place Justification for DME        Carley Phillips DO  Department of Hospital Medicine   [facility]

## 2025-02-12 NOTE — PLAN OF CARE
Pt with no accepting as of yet; Pt is being reviewed by [facility] pending how Pt pain does and how Pt do working with PT/OT next two days. SW will continue to follow    02/12/25 8588   Post-Acute Status   Post-Acute Authorization Placement

## 2025-02-12 NOTE — RESPIRATORY THERAPY
02/11/25 1944   Patient Assessment/Suction   Level of Consciousness (AVPU) alert   Respiratory Effort Normal;Unlabored   Expansion/Accessory Muscles/Retractions no use of accessory muscles;no retractions   All Lung Fields Breath Sounds Anterior:;Lateral:;clear   Rhythm/Pattern, Respiratory unlabored;no shortness of breath reported;pattern regular;depth regular   Cough Frequency no cough   Skin Integrity   $ Wound Care Tech Time 15 min   Area Observed Left;Right;Behind ear;Cheek   Skin Appearance without discoloration   PRE-TX-O2   Device (Oxygen Therapy) nasal cannula   Flow (L/min) (Oxygen Therapy) 3   SpO2 100 %   Pulse Oximetry Type Continuous   $ Pulse Oximetry - Multiple Charge Pulse Oximetry - Multiple   Pulse (!) 135   Resp 18   Positioning HOB elevated 30 degrees   Aerosol Therapy   $ Aerosol Therapy Charges Aerosol Treatment   Daily Review of Necessity (SVN) completed   Respiratory Treatment Status (SVN) given   Treatment Route (SVN) mask;oxygen   Patient Position HOB elevated   Post Treatment Assessment (SVN) increased aeration   Signs of Intolerance (SVN) none   Education   $ Education Bronchodilator;Oxygen;15 min

## 2025-02-12 NOTE — PT/OT/SLP PROGRESS
Physical Therapy Treatment    Patient Name:  Daryleen G Moran   MRN:  [id number]    Recommendations:     Discharge Recommendations: Moderate Intensity Therapy  Discharge Equipment Recommendations: to be determined by next level of care  Barriers to discharge:  Decreased caregiver support as pt currently requiring assist of 2 persons for safety with mobility.    Assessment:     Daryleen G Moran is a 79 y.o. female admitted with a medical diagnosis of Fracture of femoral neck, right, closed.  She presents with the following impairments/functional limitations: weakness, impaired endurance, impaired self care skills, impaired functional mobility, gait instability, impaired balance, decreased lower extremity function, decreased safety awareness, pain, edema, orthopedic precautions.    Rehab Prognosis: Fair; patient would benefit from acute skilled PT services to address these deficits and reach maximum level of function.    Recent Surgery: Procedure(s) (LRB):  INSERTION, INTRAMEDULLARY ALEIDA, FEMUR (Right) 2 Days Post-Op    Plan:     During this hospitalization, patient to be seen 6 x/week to address the identified rehab impairments via therapeutic activities, therapeutic exercises and progress toward the following goals:    Plan of Care Expires:  03/11/25    Subjective     Chief Complaint: pain(pt screamed out several times during session  Patient/Family Comments/goals: return to prior level of functional mobility  Pain/Comfort:  Pain Rating 1:  (not rated)  Location - Side 1: Right  Location 1: hip  Pain Addressed 1: Reposition, Distraction  Pain Rating Post-Intervention 1:  (not rated)      Objective:     Communicated with AYAKA Urbina prior to session.  Patient found HOB elevated with   upon PT entry to room.     General Precautions: Standard, fall, hearing impaired  Orthopedic Precautions: RLE weight bearing as tolerated  Braces: N/A  Respiratory Status: Nasal cannula, flow 2 L/min     Functional Mobility:  Bed Mobility:      Scooting: moderate assistance and of 1 persons in sitting   Supine to Sit: maximal assistance and of 2 persons plus max vc for technique; pt able to sit EOB with CGA for balance with pt attempting ankle pumps and laq.   Sit to Supine: maximal assistance and of 2 persons  Transfers:     Sit to Stand:  maximal assistance, of 2 persons, and with maximal vc for technique  with hand-held assist and PT/tech assisting pt with gait belt. Pt only able to stand a few seconds each trial x 2 trials.        AM-PAC 6 CLICK MOBILITY          Treatment & Education:  Pt was educated on the following: call light use, importance of OOB activity and functional mobility to negate the negative effects of prolonged bed rest during this hospitalization, safe transfers/ambulation and discharge planning recommendations/options.      Patient left HOB elevated with all lines intact, call button in reach, bed alarm on, and RN notified..    GOALS:   Multidisciplinary Problems       Physical Therapy Goals          Problem: Physical Therapy    Goal Priority Disciplines Outcome Interventions   Physical Therapy Goal     PT, PT/OT     Description: Goals to be met by: 3/11/25     Patient will increase functional independence with mobility by performin. Supine to sit with Moderate Assistance  2. Sit to stand transfer with Moderate Assistance  3. Bed to chair transfer with Moderate Assistance using Rolling Walker  4. Lower extremity exercise program x20 reps per handout, with assistance as needed                         DME Justifications:  No DME recommended requiring DME justifications    Time Tracking:     PT Received On: 25  PT Start Time: 1045     PT Stop Time: 1108  PT Total Time (min): 23 min     Billable Minutes: Therapeutic Activity 23    Treatment Type: Treatment  PT/PTA: PT     Number of PTA visits since last PT visit: 0     2025

## 2025-02-13 LAB
ANION GAP SERPL CALC-SCNC: 6 MMOL/L (ref 8–16)
BASOPHILS # BLD AUTO: 0.03 K/UL (ref 0–0.2)
BASOPHILS NFR BLD: 0.4 % (ref 0–1.9)
BUN SERPL-MCNC: 14 MG/DL (ref 8–23)
CALCIUM SERPL-MCNC: 8.6 MG/DL (ref 8.7–10.5)
CHLORIDE SERPL-SCNC: 101 MMOL/L (ref 95–110)
CO2 SERPL-SCNC: 30 MMOL/L (ref 23–29)
CREAT SERPL-MCNC: 0.4 MG/DL (ref 0.5–1.4)
DIFFERENTIAL METHOD BLD: ABNORMAL
EOSINOPHIL # BLD AUTO: 0.2 K/UL (ref 0–0.5)
EOSINOPHIL NFR BLD: 2.4 % (ref 0–8)
ERYTHROCYTE [DISTWIDTH] IN BLOOD BY AUTOMATED COUNT: 16.1 % (ref 11.5–14.5)
EST. GFR  (NO RACE VARIABLE): >60 ML/MIN/1.73 M^2
GLUCOSE SERPL-MCNC: 120 MG/DL (ref 70–110)
HCT VFR BLD AUTO: 30.7 % (ref 37–48.5)
HGB BLD-MCNC: 9.8 G/DL (ref 12–16)
IMM GRANULOCYTES # BLD AUTO: 0.03 K/UL (ref 0–0.04)
IMM GRANULOCYTES NFR BLD AUTO: 0.4 % (ref 0–0.5)
LYMPHOCYTES # BLD AUTO: 1.3 K/UL (ref 1–4.8)
LYMPHOCYTES NFR BLD: 16.7 % (ref 18–48)
MAGNESIUM SERPL-MCNC: 1.4 MG/DL (ref 1.6–2.6)
MCH RBC QN AUTO: 29.9 PG (ref 27–31)
MCHC RBC AUTO-ENTMCNC: 31.9 G/DL (ref 32–36)
MCV RBC AUTO: 94 FL (ref 82–98)
MONOCYTES # BLD AUTO: 0.7 K/UL (ref 0.3–1)
MONOCYTES NFR BLD: 9.3 % (ref 4–15)
NEUTROPHILS # BLD AUTO: 5.7 K/UL (ref 1.8–7.7)
NEUTROPHILS NFR BLD: 70.8 % (ref 38–73)
NRBC BLD-RTO: 0 /100 WBC
PHOSPHATE SERPL-MCNC: 2.2 MG/DL (ref 2.7–4.5)
PLATELET # BLD AUTO: 103 K/UL (ref 150–450)
PMV BLD AUTO: 11.5 FL (ref 9.2–12.9)
POCT GLUCOSE: 122 MG/DL (ref 70–110)
POCT GLUCOSE: 207 MG/DL (ref 70–110)
POTASSIUM SERPL-SCNC: 3.4 MMOL/L (ref 3.5–5.1)
RBC # BLD AUTO: 3.28 M/UL (ref 4–5.4)
SODIUM SERPL-SCNC: 137 MMOL/L (ref 136–145)
WBC # BLD AUTO: 7.97 K/UL (ref 3.9–12.7)

## 2025-02-13 PROCEDURE — 80048 BASIC METABOLIC PNL TOTAL CA: CPT | Performed by: ORTHOPAEDIC SURGERY

## 2025-02-13 PROCEDURE — 94761 N-INVAS EAR/PLS OXIMETRY MLT: CPT

## 2025-02-13 PROCEDURE — 97530 THERAPEUTIC ACTIVITIES: CPT | Mod: CQ

## 2025-02-13 PROCEDURE — 85025 COMPLETE CBC W/AUTO DIFF WBC: CPT | Performed by: ORTHOPAEDIC SURGERY

## 2025-02-13 PROCEDURE — 21400001 HC TELEMETRY ROOM

## 2025-02-13 PROCEDURE — 25000242 PHARM REV CODE 250 ALT 637 W/ HCPCS: Performed by: ORTHOPAEDIC SURGERY

## 2025-02-13 PROCEDURE — 94640 AIRWAY INHALATION TREATMENT: CPT

## 2025-02-13 PROCEDURE — 99900031 HC PATIENT EDUCATION (STAT)

## 2025-02-13 PROCEDURE — 86580 TB INTRADERMAL TEST: CPT | Performed by: FAMILY MEDICINE

## 2025-02-13 PROCEDURE — 25000003 PHARM REV CODE 250: Performed by: ORTHOPAEDIC SURGERY

## 2025-02-13 PROCEDURE — 97116 GAIT TRAINING THERAPY: CPT | Mod: CQ

## 2025-02-13 PROCEDURE — 83735 ASSAY OF MAGNESIUM: CPT | Performed by: ORTHOPAEDIC SURGERY

## 2025-02-13 PROCEDURE — 63600175 PHARM REV CODE 636 W HCPCS: Performed by: FAMILY MEDICINE

## 2025-02-13 PROCEDURE — 25000003 PHARM REV CODE 250: Performed by: FAMILY MEDICINE

## 2025-02-13 PROCEDURE — 36415 COLL VENOUS BLD VENIPUNCTURE: CPT | Performed by: ORTHOPAEDIC SURGERY

## 2025-02-13 PROCEDURE — 97530 THERAPEUTIC ACTIVITIES: CPT

## 2025-02-13 PROCEDURE — 30200315 PPD INTRADERMAL TEST REV CODE 302: Performed by: FAMILY MEDICINE

## 2025-02-13 PROCEDURE — 84100 ASSAY OF PHOSPHORUS: CPT | Performed by: ORTHOPAEDIC SURGERY

## 2025-02-13 RX ORDER — OXYCODONE AND ACETAMINOPHEN 10; 325 MG/1; MG/1
1 TABLET ORAL EVERY 4 HOURS PRN
Status: DISCONTINUED | OUTPATIENT
Start: 2025-02-13 | End: 2025-02-17

## 2025-02-13 RX ORDER — POTASSIUM CHLORIDE 20 MEQ/1
20 TABLET, EXTENDED RELEASE ORAL ONCE
Status: COMPLETED | OUTPATIENT
Start: 2025-02-13 | End: 2025-02-13

## 2025-02-13 RX ORDER — POLYETHYLENE GLYCOL 3350 17 G/17G
17 POWDER, FOR SOLUTION ORAL DAILY
Status: DISCONTINUED | OUTPATIENT
Start: 2025-02-13 | End: 2025-02-20 | Stop reason: HOSPADM

## 2025-02-13 RX ORDER — MAGNESIUM SULFATE 1 G/100ML
1 INJECTION INTRAVENOUS ONCE
Status: COMPLETED | OUTPATIENT
Start: 2025-02-13 | End: 2025-02-13

## 2025-02-13 RX ORDER — OXYCODONE AND ACETAMINOPHEN 5; 325 MG/1; MG/1
1 TABLET ORAL EVERY 4 HOURS PRN
Status: DISCONTINUED | OUTPATIENT
Start: 2025-02-13 | End: 2025-02-17

## 2025-02-13 RX ADMIN — THERA TABS 1 TABLET: TAB at 09:02

## 2025-02-13 RX ADMIN — VENLAFAXINE HYDROCHLORIDE 75 MG: 37.5 CAPSULE, EXTENDED RELEASE ORAL at 09:02

## 2025-02-13 RX ADMIN — POLYETHYLENE GLYCOL 3350 17 G: 17 POWDER, FOR SOLUTION ORAL at 11:02

## 2025-02-13 RX ADMIN — DILTIAZEM HYDROCHLORIDE 120 MG: 120 CAPSULE, COATED, EXTENDED RELEASE ORAL at 09:02

## 2025-02-13 RX ADMIN — BUDESONIDE INHALATION 0.5 MG: 0.5 SUSPENSION RESPIRATORY (INHALATION) at 07:02

## 2025-02-13 RX ADMIN — OXYCODONE HYDROCHLORIDE AND ACETAMINOPHEN 1 TABLET: 10; 325 TABLET ORAL at 10:02

## 2025-02-13 RX ADMIN — CARVEDILOL 12.5 MG: 12.5 TABLET, FILM COATED ORAL at 09:02

## 2025-02-13 RX ADMIN — LACTOBACILLUS ACIDOPHILUS / LACTOBACILLUS BULGARICUS 1 EACH: 100 MILLION CFU STRENGTH GRANULES at 09:02

## 2025-02-13 RX ADMIN — ENOXAPARIN SODIUM 40 MG: 40 INJECTION SUBCUTANEOUS at 05:02

## 2025-02-13 RX ADMIN — MUPIROCIN 1 G: 20 OINTMENT TOPICAL at 09:02

## 2025-02-13 RX ADMIN — PANTOPRAZOLE SODIUM 40 MG: 40 TABLET, DELAYED RELEASE ORAL at 09:02

## 2025-02-13 RX ADMIN — ASPIRIN 81 MG: 81 TABLET, CHEWABLE ORAL at 09:02

## 2025-02-13 RX ADMIN — MAGNESIUM SULFATE IN DEXTROSE 1 G: 10 INJECTION, SOLUTION INTRAVENOUS at 11:02

## 2025-02-13 RX ADMIN — DONEPEZIL HYDROCHLORIDE 5 MG: 5 TABLET ORAL at 09:02

## 2025-02-13 RX ADMIN — POTASSIUM CHLORIDE 20 MEQ: 1500 TABLET, EXTENDED RELEASE ORAL at 11:02

## 2025-02-13 RX ADMIN — LIDOCAINE 5% 1 PATCH: 700 PATCH TOPICAL at 05:02

## 2025-02-13 RX ADMIN — FERROUS SULFATE TAB 325 MG (65 MG ELEMENTAL FE) 1 EACH: 325 (65 FE) TAB at 09:02

## 2025-02-13 RX ADMIN — ARFORMOTEROL TARTRATE 15 MCG: 15 SOLUTION RESPIRATORY (INHALATION) at 07:02

## 2025-02-13 RX ADMIN — TUBERCULIN PURIFIED PROTEIN DERIVATIVE 5 UNITS: 5 INJECTION, SOLUTION INTRADERMAL at 12:02

## 2025-02-13 NOTE — PT/OT/SLP PROGRESS
Physical Therapy Treatment    Patient Name:  Daryleen G Moran   MRN:  [id number]    Recommendations:     Discharge Recommendations: Moderate Intensity Therapy  Discharge Equipment Recommendations: to be determined by next level of care  Barriers to discharge:  Medical status    Assessment:     Daryleen G Moran is a 79 y.o. female admitted with a medical diagnosis of Fracture of femoral neck, right, closed.  She presents with the following impairments/functional limitations: weakness, impaired endurance, impaired self care skills, impaired functional mobility, gait instability, impaired balance, decreased lower extremity function, decreased safety awareness, pain, orthopedic precautions, edema . Pt premedicated prior to PT. She requires two person assist from supine to sit with trunk and LE management. Three sit to stand trials with lateral weight shifts rw completed requiring mod x2 person assist. VI given for sequencing a step to gait utilizing the rw with verbal understanding from pt. She was able to ambulate 5 feet rw mod x2 person assist.     Rehab Prognosis: Fair; patient would benefit from acute skilled PT services to address these deficits and reach maximum level of function.    Recent Surgery: Procedure(s) (LRB):  INSERTION, INTRAMEDULLARY ALEIDA, FEMUR (Right) 3 Days Post-Op    Plan:     During this hospitalization, patient to be seen 6 x/week to address the identified rehab impairments via therapeutic activities, therapeutic exercises and progress toward the following goals:    Plan of Care Expires:  03/11/25    Subjective     Chief Complaint: R hip pain  Patient/Family Comments/goals: Increased pain with WB  Pain/Comfort:  Pain Rating 1: 6/10  Location - Side 1: Right  Location - Orientation 1: generalized  Location 1: hip  Pain Addressed 1: Pre-medicate for activity, Cessation of Activity  Pain Rating Post-Intervention 1: 6/10 ((10 when standing/moving))      Objective:     Communicated with RN prior to  session.  Patient found HOB elevated with bed alarm, PureWick, peripheral IV, telemetry upon PT entry to room.     General Precautions: Standard, fall, hearing impaired  Orthopedic Precautions: RLE weight bearing as tolerated  Braces: N/A  Respiratory Status: Room air     Functional Mobility:  Bed Mobility:     Supine to Sit: maximal assistance and of 2 persons  Transfers:     Sit to Stand:  moderate assistance and of 2 persons with rolling walker  Bed to Chair: moderate assistance and of 2 persons with  rolling walker  using  Stand Pivot  Gait: 6 feet mod a x2 with rw      AM-PAC 6 CLICK MOBILITY          Treatment & Education:  Pt was educated on the following: call light use, importance of OOB activity and functional mobility to negate the negative effects of prolonged bed rest during this hospitalization, safe transfers/ambulation and discharge planning recommendations/options.     Patient left up in chair with all lines intact, call button in reach, chair alarm on, and RN notified..    GOALS:   Multidisciplinary Problems       Physical Therapy Goals          Problem: Physical Therapy    Goal Priority Disciplines Outcome Interventions   Physical Therapy Goal     PT, PT/OT     Description: Goals to be met by: 3/11/25     Patient will increase functional independence with mobility by performin. Supine to sit with Moderate Assistance  2. Sit to stand transfer with Moderate Assistance  3. Bed to chair transfer with Moderate Assistance using Rolling Walker  4. Lower extremity exercise program x20 reps per handout, with assistance as needed                         DME Justifications:  No DME recommended requiring DME justifications    Time Tracking:     PT Received On: 25  PT Start Time: 1108     PT Stop Time: 1131  PT Total Time (min): 23 min     Billable Minutes: Gait Training 8 and Therapeutic Activity 15    Treatment Type: Treatment  PT/PTA: PTA     Number of PTA visits since last PT visit: 1      02/13/2025

## 2025-02-13 NOTE — CARE UPDATE
02/12/25 1944   Patient Assessment/Suction   Level of Consciousness (AVPU) alert   Respiratory Effort Normal   Expansion/Accessory Muscles/Retractions no use of accessory muscles   All Lung Fields Breath Sounds clear   Rhythm/Pattern, Respiratory unlabored   Cough Frequency no cough   Skin Integrity   $ Wound Care Tech Time 15 min   Area Observed Left;Right;Behind ear;Cheek;Nares   Skin Appearance without discoloration   PRE-TX-O2   Device (Oxygen Therapy) nasal cannula   Flow (L/min) (Oxygen Therapy) 1   SpO2 97 %   Pulse Oximetry Type Intermittent   $ Pulse Oximetry - Multiple Charge Pulse Oximetry - Multiple   Pulse 86   Resp 19   Aerosol Therapy   $ Aerosol Therapy Charges Aerosol Treatment   Daily Review of Necessity (SVN) completed   Respiratory Treatment Status (SVN) given   Treatment Route (SVN) mask   Patient Position HOB elevated   Post Treatment Assessment (SVN) breath sounds unchanged;vital signs unchanged   Signs of Intolerance (SVN) none   Education   $ Education Bronchodilator;15 min   Respiratory Evaluation   $ Care Plan Tech Time 15 min

## 2025-02-13 NOTE — PLAN OF CARE
Problem: Physical Therapy  Goal: Physical Therapy Goal  Description: Goals to be met by: 3/11/25     Patient will increase functional independence with mobility by performin. Supine to sit with Moderate Assistance  2. Sit to stand transfer with Moderate Assistance  3. Bed to chair transfer with Moderate Assistance using Rolling Walker  4. Lower extremity exercise program x20 reps per handout, with assistance as needed    Outcome: Progressing

## 2025-02-13 NOTE — PT/OT/SLP PROGRESS
Occupational Therapy   Treatment    Name: Daryleen G Moran  MRN: [id number]  Admitting Diagnosis:  Fracture of femoral neck, right, closed  3 Days Post-Op    Recommendations:     Discharge Recommendations: Moderate Intensity Therapy  Discharge Equipment Recommendations:  to be determined by next level of care  Barriers to discharge:  Other (Comment) (medical status)    Assessment:     Daryleen G Moran is a 79 y.o. female with a medical diagnosis of Fracture of femoral neck, right, closed.   Performance deficits affecting function are weakness, impaired endurance, impaired self care skills, impaired functional mobility, gait instability, impaired balance, impaired cognition, decreased coordination, decreased upper extremity function, decreased lower extremity function, decreased safety awareness, impaired cardiopulmonary response to activity, orthopedic precautions.     Patient found up in chair and agreeable to OT treatment this PM. She performed chair<>bed t/f with moderate A x2, bed mobility sit>supine with Min A.     Rehab Prognosis:  Fair; patient would benefit from acute skilled OT services to address these deficits and reach maximum level of function.       Plan:     Patient to be seen 5 x/week to address the above listed problems via self-care/home management, therapeutic activities, therapeutic exercises  Plan of Care Expires: 03/11/25  Plan of Care Reviewed with: patient    Subjective     Chief Complaint: I have too many wires  Patient/Family Comments/goals: none stated  Pain/Comfort:  Pain Rating 1: other (see comments) (right hip pain when moving)  Location - Side 1: Right  Location 1: hip  Pain Rating Post-Intervention 1: other (see comments) (right hip pain when moving)    Objective:     Communicated with: nurse and PT prior to session.  Patient found up in chair with PureWick, telemetry, peripheral IV upon OT entry to room.    General Precautions: Standard, hearing impaired, fall    Orthopedic  Precautions:RLE weight bearing as tolerated  Braces: N/A  Respiratory Status: Room air     Occupational Performance:     Bed Mobility:    Patient completed Scooting/Bridging with minimum assistance  Patient completed Sit to Supine with minimum assistance     Functional Mobility/Transfers:  Patient completed Bed <> Chair Transfer using Stand Pivot technique with moderate assistance and of 2 persons with rolling walker        AMPAC 6 Click ADL:      Treatment & Education:  Therapist provided facilitation and instruction of proper body mechanics and fall prevention strategies during tasks listed above.   Instructed patient to sit in bedside chair as tolerated daily to increase OOB/activity tolerance.   Instructed patient to use call light to have nursing staff assist with needs/transfers.   Discussed OT POC and answered all questions within OT scope of practice.        Patient left HOB elevated with all lines intact, call button in reach, and bed alarm on    GOALS:   Multidisciplinary Problems       Occupational Therapy Goals          Problem: Occupational Therapy    Goal Priority Disciplines Outcome Interventions   Occupational Therapy Goal     OT, PT/OT Progressing    Description: Goals to be met by: 3/11/25     Patient will increase functional independence with ADLs by performing:    UE Dressing with Minimal Assistance   LE Dressing with Minimal Assistance.  Grooming while seated at sink with Minimal Assistance.  Toileting from bedside commode with Minimal Assistance for hygiene and clothing management.   Toilet transfer to bedside commode with Minimal Assistance.                         DME Justifications:  No DME recommended requiring DME justifications    Time Tracking:     OT Date of Treatment: 02/13/25  OT Start Time: 1420  OT Stop Time: 1436  OT Total Time (min): 16 min    Billable Minutes:Therapeutic Activity 16    OT/FILI: OT          2/13/2025

## 2025-02-13 NOTE — PROGRESS NOTES
[facility] Medicine  Progress Note    Patient Name: Daryleen G Moran  MRN: [id number]  Patient Class: IP- Inpatient   Admission Date: 2/9/2025  Length of Stay: 4 days  Attending Physician: Carley Phillips DO  Primary Care Provider: Abhi De Oliveira IV, MD        Subjective     Principal Problem:Fracture of femoral neck, right, closed        HPI:  Ms. Cyr is a 79 yr old female with a hx of DM type 2, HTN, s/p TAVR, CAD, dementia, AFib, COPD, chronic diastolic CHF, lumbar and thoracic spondylosis, degenerative disc disease, compression fractures, radiculopathy, arthritis, osteoporosis, GERD, stress urinary incontinence, HLD, left adrenal mass, iron-deficiency anemia, abdominal aneurysm, debility, attention and concentration deficit, vitamin-D deficiency, AVM, diverticulosis, depression, obesity, thrombocytopenia, aortic stenosis, NSTEMI who presented to the ED with a chief complaint of a slip and fall.  Granddaughter at bedside who helps to provide a history.  Patient and granddaughter state that the patient was getting out of bed to transfer to bedside commode when she slipped and fell onto her right side.  She states she immediately started having right hip pain.  Granddaughter and patient both deny head trauma and LOC.  She states that she quit smoking 6 months ago and denies alcohol and recreational drug use.  She denies fever, chills, dyspnea, palpitations, chest pain, abdominal pain, nausea, vomiting, diarrhea, dysuria, hematuria, lightheadedness, dizziness, and syncope.    Upon arrival to ED, patient afebrile, HR of 114, RR of 20, BP of 130/75, satting 96% on RA.  Workup in the ED revealed WBC of 12.78, potassium of 3.2, glucose of 154, PT of 12.6.  CXR shows no acute radiographic abnormalities.  Right hip x-ray shows acute nondisplaced and non comminuted intertrochanteric right femoral neck fracture.  While in the ED patient had heart rate of 143 and EKG showed AFib with RVR.   Patient was given diltiazem 10 mg IV, hydromorphone 1.5 mg IV, 1 L NS bolus while in the ED. patient had improvement with heart rate to 90s-100s.  ED provider discussed case with Orthopedic surgery who plans for OR in the morning.  Patient will be admitted under hospital medicine services for further management.    Overview/Hospital Course:  79-year-old female with history of AFib, dementia, compression fracture came in with fall and hip pain and patient was admitted for a right intertrochanteric femur fracture.  Status post cephalomedullary nail fixation  02/10.  Discuss antiplatelet/anticoagulation recommendations with Orthopedic surgery and we will just give ASA and DVT PPX given history of dementia and big fall risk.  S/p surgery - not really complaining of hip pain just her chronic back pain.  Encouraged to work with PT/OT and attempted to call daughter is patient lacks MDM to discuss progressing course and placement options but no answer, left VM.  Patient must more alert and oriented on 02/13 in his agreeable to SNF placement.     Interval History:  Patient is in pain but otherwise no acute complaints    Review of Systems   All other systems reviewed and are negative.    Objective:     Vital Signs (Most Recent):  Temp: 97.4 °F (36.3 °C) (02/13/25 0830)  Pulse: 86 (02/13/25 0830)  Resp: 16 (02/13/25 1038)  BP: 130/75 (02/13/25 0830)  SpO2: 98 % (02/13/25 0830) Vital Signs (24h Range):  Temp:  [97.4 °F (36.3 °C)-97.5 °F (36.4 °C)] 97.4 °F (36.3 °C)  Pulse:  [] 86  Resp:  [16-19] 16  SpO2:  [97 %-98 %] 98 %  BP: (103-130)/(67-76) 130/75     Weight: 81.7 kg (180 lb 1.9 oz)  Body mass index is 35.18 kg/m².  No intake or output data in the 24 hours ending 02/13/25 1500        Physical Exam  Constitutional:       Appearance: She is ill-appearing.   Cardiovascular:      Rate and Rhythm: Tachycardia present. Rhythm irregular.      Pulses: Normal pulses.   Pulmonary:      Effort: Pulmonary effort is normal. No  "respiratory distress.      Breath sounds: No wheezing.   Abdominal:      General: Abdomen is flat. Bowel sounds are normal.   Musculoskeletal:         General: Tenderness present.      Right lower leg: Edema present.   Skin:     General: Skin is warm and dry.   Neurological:      Mental Status: Mental status is at baseline.             Significant Labs: All pertinent labs within the past 24 hours have been reviewed.    Significant Imaging: I have reviewed all pertinent imaging results/findings within the past 24 hours.    Assessment and Plan     * Fracture of femoral neck, right, closed  - Patient presented to the ED after a slip and fall.  She was found to have right nondisplaced enter trochanteric femoral neck fracture on right hip x-ray in the ED.  - ED provider discussed case with Orthopedic surgery who plans to take patient to OR in the morning  - NPO at midnight  - PRN analgesia and symptomatic care  - Holding DVT prophylaxis as well as aspirin and Plavix, resume when appropriate to do so  - restart ASA    Chronic diastolic heart failure  Patient has Diastolic (HFpEF) heart failure that is Chronic. On presentation their CHF was well compensated. Most recent BNP and echo results are listed below.  No results for input(s): "BNP" in the last 72 hours.  Latest ECHO  Results for orders placed during the hospital encounter of 08/20/24    Echo    Interpretation Summary    Left Ventricle: The left ventricle is normal in size. Normal wall thickness. There is concentric remodeling. There is normal systolic function with a visually estimated ejection fraction of 60 - 65%. Diastolic function cannot be reliably determined in the presence of mitral annular calcification.    Right Ventricle: Normal right ventricular cavity size. Wall thickness is normal. Systolic function is mildly reduced.    The left atrium is moderately dilated.    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 It is " reported to be a Carrion Mk S3 valve.    Mitral Valve: There is bileaflet sclerosis. There is moderate to severe mitral annular calcification present.    Tricuspid Valve: There is mild regurgitation.    Pulmonary Artery: The estimated pulmonary artery systolic pressure is 31 mmHg.    IVC/SVC: Normal venous pressure at 3 mmHg.    Current Heart Failure Medications  carvediloL tablet 12.5 mg, 2 times daily, Oral  furosemide tablet 40 mg, Daily PRN, Oral    Plan  - Monitor strict I&Os and daily weights.    - Place on telemetry  - Low sodium diet  - Place on fluid restriction of 1.5 L.   - Cardiology has not been consulted  - The patient's volume status is at their baseline  - Continue home coreg and lasix    COPD (chronic obstructive pulmonary disease)  Patient's COPD is controlled currently.  Patient is currently off COPD Pathway. Continue scheduled inhalers Supplemental oxygen and monitor respiratory status closely.     - DuoNebs Q6H PRN    Atrial fibrillation with RVR  Patient has permanent atrial fibrillation. Patient is currently in atrial fibrillation. [id number] Score: 5. The patients heart rate in the last 24 hours is as follows:  Pulse  Min: 89  Max: 135     Antiarrhythmics  diltiaZEM 24 hr capsule 120 mg, Daily, Oral  metoprolol injection 5 mg, Every 4 hours PRN, Intravenous    Anticoagulants       Plan  - Replete lytes with a goal of K>4, Mg >2  - Patient is not on anti coag outpatient per home medication list, she is however on DAPT with aspirin and Plavix  - Holding home aspirin and Plavix as patient is planned to go to OR in the morning for repair, resume when appropriate to do so  - Patient's afib is currently controlled  - Patient was given diltiazem 10 mg IV while in the ED as patient's heart rate went to 140s.  I suspect that this is probably just due to uncontrolled pain.  After patient received diltiazem and hydromorphone, heart rate is now controlled in the 90s-100s.  Restarted ASA.  Plan to  "discuss with daughter if safe to restart anticoagulation given fall risk in setting of dementia and injury.    Dementia in other diseases classified elsewhere, unspecified severity, without behavioral disturbance, psychotic disturbance, mood disturbance, and anxiety  - History noted  - Patient is currently A&O x3 to self, place, and month. Patient is disoriented to City and year.  She knows that she is in the hospital but is unsure of the name of the hospital.  She states that it is 2027.  - Continue home Aricept    S/P TAVR (transcatheter aortic valve replacement)  - Hx noted      Hypertension  Chronic,  Latest blood pressure and vitals reviewed-     Temp:  [98.5 °F (36.9 °C)]   Pulse:  []   Resp:  [13-20]   BP: (107-130)/(60-80)   SpO2:  [87 %-96 %] .   Home meds for hypertension were reviewed and noted below-  Hypertension Medications               carvediloL (COREG) 12.5 MG tablet Take 12.5 mg by mouth 2 (two) times daily.    diltiaZEM (DILACOR XR) 120 MG CDCR Take 1 capsule (120 mg total) by mouth once daily.    furosemide (LASIX) 40 MG tablet Take 1 tablet (40 mg total) by mouth daily as needed (Take for weight gain > 2 pounds over 24 hours. May discuss with PCP).            While in the hospital, will manage blood pressure as follows; Continue home antihypertensive regimen    Will utilize p.r.n. blood pressure medication only if patient's blood pressure greater than 180/110 and she develops symptoms such as worsening chest pain or shortness of breath.      Type 2 diabetes mellitus, without long-term current use of insulin  Last A1c reviewed-   Lab Results   Component Value Date    HGBA1C 6.6 (H) 03/08/2024     Most recent fingerstick glucose reviewed- No results for input(s): "POCTGLUCOSE" in the last 24 hours.  Current correctional scale  Medium  Maintain anti-hyperglycemic dose as follows-   Antihyperglycemics (From admission, onward)      Start     Stop Route Frequency Ordered    02/09/25 9357  " insulin aspart U-100 pen 0-10 Units         -- SubQ Before meals & nightly PRN 02/09/25 1657          Hold Oral hypoglycemics while patient is in the hospital.         VTE Risk Mitigation (From admission, onward)           Ordered     enoxaparin injection 40 mg  Every 24 hours         02/12/25 1612     Reason for No Pharmacological VTE Prophylaxis  Once        Comments: Plan for OR in AM   Question:  Reasons:  Answer:  Physician Provided (leave comment)    02/09/25 1657     IP VTE HIGH RISK PATIENT  Once         02/09/25 1657     Place sequential compression device  Until discontinued         02/09/25 1657                    Discharge Planning   ISAAK:      Code Status: Full Code   Medical Readiness for Discharge Date:   Discharge Plan A: Skilled Nursing Facility   Discharge Delays: (!) Post-Acute Set-up      Treatment, consult recommendation, PT/OT, dispo planning      Please place Justification for DME        Carley Phillips DO  Department of Hospital Medicine   [facility]

## 2025-02-13 NOTE — SUBJECTIVE & OBJECTIVE
Interval History:  Patient is in pain but otherwise no acute complaints    Review of Systems   All other systems reviewed and are negative.    Objective:     Vital Signs (Most Recent):  Temp: 97.4 °F (36.3 °C) (02/13/25 0830)  Pulse: 86 (02/13/25 0830)  Resp: 16 (02/13/25 1038)  BP: 130/75 (02/13/25 0830)  SpO2: 98 % (02/13/25 0830) Vital Signs (24h Range):  Temp:  [97.4 °F (36.3 °C)-97.5 °F (36.4 °C)] 97.4 °F (36.3 °C)  Pulse:  [] 86  Resp:  [16-19] 16  SpO2:  [97 %-98 %] 98 %  BP: (103-130)/(67-76) 130/75     Weight: 81.7 kg (180 lb 1.9 oz)  Body mass index is 35.18 kg/m².  No intake or output data in the 24 hours ending 02/13/25 1500        Physical Exam  Constitutional:       Appearance: She is ill-appearing.   Cardiovascular:      Rate and Rhythm: Tachycardia present. Rhythm irregular.      Pulses: Normal pulses.   Pulmonary:      Effort: Pulmonary effort is normal. No respiratory distress.      Breath sounds: No wheezing.   Abdominal:      General: Abdomen is flat. Bowel sounds are normal.   Musculoskeletal:         General: Tenderness present.      Right lower leg: Edema present.   Skin:     General: Skin is warm and dry.   Neurological:      Mental Status: Mental status is at baseline.             Significant Labs: All pertinent labs within the past 24 hours have been reviewed.    Significant Imaging: I have reviewed all pertinent imaging results/findings within the past 24 hours.

## 2025-02-13 NOTE — PLAN OF CARE
Problem: Adult Inpatient Plan of Care  Goal: Plan of Care Review  Outcome: Progressing  Goal: Patient-Specific Goal (Individualized)  Outcome: Progressing  Goal: Absence of Hospital-Acquired Illness or Injury  Outcome: Progressing  Goal: Optimal Comfort and Wellbeing  Outcome: Progressing  Goal: Readiness for Transition of Care  Outcome: Progressing     Problem: Skin Injury Risk Increased  Goal: Skin Health and Integrity  Outcome: Progressing     Problem: Diabetes Comorbidity  Goal: Blood Glucose Level Within Targeted Range  Outcome: Progressing     Problem: Wound  Goal: Optimal Coping  Outcome: Progressing  Goal: Optimal Functional Ability  Outcome: Progressing  Goal: Absence of Infection Signs and Symptoms  Outcome: Progressing  Goal: Improved Oral Intake  Outcome: Progressing  Goal: Optimal Pain Control and Function  Outcome: Progressing  Goal: Skin Health and Integrity  Outcome: Progressing  Goal: Optimal Wound Healing  Outcome: Progressing     Problem: Infection  Goal: Absence of Infection Signs and Symptoms  Outcome: Progressing     Problem: Fall Injury Risk  Goal: Absence of Fall and Fall-Related Injury  Outcome: Progressing

## 2025-02-13 NOTE — PLAN OF CARE
Problem: Occupational Therapy  Goal: Occupational Therapy Goal  Description: Goals to be met by: 3/11/25     Patient will increase functional independence with ADLs by performing:    UE Dressing with Minimal Assistance   LE Dressing with Minimal Assistance.  Grooming while seated at sink with Minimal Assistance.  Toileting from bedside commode with Minimal Assistance for hygiene and clothing management.   Toilet transfer to bedside commode with Minimal Assistance.    Outcome: Progressing

## 2025-02-13 NOTE — CARE UPDATE
02/13/25 0706   Patient Assessment/Suction   Level of Consciousness (AVPU) alert   Respiratory Effort Normal;Unlabored   All Lung Fields Breath Sounds Anterior:;Lateral:;clear   Rhythm/Pattern, Respiratory unlabored;pattern regular;depth regular   Cough Frequency no cough   PRE-TX-O2   Device (Oxygen Therapy) room air   SpO2 97 %   Pulse Oximetry Type Intermittent   $ Pulse Oximetry - Multiple Charge Pulse Oximetry - Multiple   Pulse 94   Resp 16   Aerosol Therapy   $ Aerosol Therapy Charges Aerosol Treatment   Daily Review of Necessity (SVN) completed   Respiratory Treatment Status (SVN) given   Treatment Route (SVN) mask;oxygen   Patient Position HOB elevated   Post Treatment Assessment (SVN) breath sounds improved   Signs of Intolerance (SVN) none   Breath Sounds Post-Respiratory Treatment   Throughout All Fields Post-Treatment All Fields   Throughout All Fields Post-Treatment aeration increased   Post-treatment Heart Rate (beats/min) 95   Post-treatment Resp Rate (breaths/min) 16   Education   $ Education Bronchodilator;15 min

## 2025-02-14 LAB
ANION GAP SERPL CALC-SCNC: 6 MMOL/L (ref 8–16)
BASOPHILS # BLD AUTO: 0.06 K/UL (ref 0–0.2)
BASOPHILS NFR BLD: 0.9 % (ref 0–1.9)
BUN SERPL-MCNC: 11 MG/DL (ref 8–23)
CALCIUM SERPL-MCNC: 8.2 MG/DL (ref 8.7–10.5)
CHLORIDE SERPL-SCNC: 102 MMOL/L (ref 95–110)
CO2 SERPL-SCNC: 32 MMOL/L (ref 23–29)
CREAT SERPL-MCNC: 0.4 MG/DL (ref 0.5–1.4)
DIFFERENTIAL METHOD BLD: ABNORMAL
EOSINOPHIL # BLD AUTO: 0.2 K/UL (ref 0–0.5)
EOSINOPHIL NFR BLD: 3.7 % (ref 0–8)
ERYTHROCYTE [DISTWIDTH] IN BLOOD BY AUTOMATED COUNT: 16.2 % (ref 11.5–14.5)
EST. GFR  (NO RACE VARIABLE): >60 ML/MIN/1.73 M^2
GLUCOSE SERPL-MCNC: 116 MG/DL (ref 70–110)
HCT VFR BLD AUTO: 31.6 % (ref 37–48.5)
HGB BLD-MCNC: 10.3 G/DL (ref 12–16)
IMM GRANULOCYTES # BLD AUTO: 0.03 K/UL (ref 0–0.04)
IMM GRANULOCYTES NFR BLD AUTO: 0.5 % (ref 0–0.5)
LYMPHOCYTES # BLD AUTO: 1.6 K/UL (ref 1–4.8)
LYMPHOCYTES NFR BLD: 24.5 % (ref 18–48)
MAGNESIUM SERPL-MCNC: 1.5 MG/DL (ref 1.6–2.6)
MCH RBC QN AUTO: 30.3 PG (ref 27–31)
MCHC RBC AUTO-ENTMCNC: 32.6 G/DL (ref 32–36)
MCV RBC AUTO: 93 FL (ref 82–98)
MONOCYTES # BLD AUTO: 0.8 K/UL (ref 0.3–1)
MONOCYTES NFR BLD: 11.7 % (ref 4–15)
NEUTROPHILS # BLD AUTO: 3.9 K/UL (ref 1.8–7.7)
NEUTROPHILS NFR BLD: 58.7 % (ref 38–73)
NRBC BLD-RTO: 0 /100 WBC
PHOSPHATE SERPL-MCNC: 3 MG/DL (ref 2.7–4.5)
PLATELET # BLD AUTO: 111 K/UL (ref 150–450)
PMV BLD AUTO: 11.4 FL (ref 9.2–12.9)
POCT GLUCOSE: 150 MG/DL (ref 70–110)
POCT GLUCOSE: 98 MG/DL (ref 70–110)
POTASSIUM SERPL-SCNC: 3.7 MMOL/L (ref 3.5–5.1)
RBC # BLD AUTO: 3.4 M/UL (ref 4–5.4)
SODIUM SERPL-SCNC: 140 MMOL/L (ref 136–145)
WBC # BLD AUTO: 6.56 K/UL (ref 3.9–12.7)

## 2025-02-14 PROCEDURE — 80048 BASIC METABOLIC PNL TOTAL CA: CPT | Performed by: ORTHOPAEDIC SURGERY

## 2025-02-14 PROCEDURE — 94761 N-INVAS EAR/PLS OXIMETRY MLT: CPT

## 2025-02-14 PROCEDURE — 83735 ASSAY OF MAGNESIUM: CPT | Performed by: ORTHOPAEDIC SURGERY

## 2025-02-14 PROCEDURE — 21400001 HC TELEMETRY ROOM

## 2025-02-14 PROCEDURE — 84100 ASSAY OF PHOSPHORUS: CPT | Performed by: ORTHOPAEDIC SURGERY

## 2025-02-14 PROCEDURE — 85025 COMPLETE CBC W/AUTO DIFF WBC: CPT | Performed by: ORTHOPAEDIC SURGERY

## 2025-02-14 PROCEDURE — 94640 AIRWAY INHALATION TREATMENT: CPT

## 2025-02-14 PROCEDURE — 25000242 PHARM REV CODE 250 ALT 637 W/ HCPCS: Performed by: ORTHOPAEDIC SURGERY

## 2025-02-14 PROCEDURE — 25000003 PHARM REV CODE 250: Performed by: FAMILY MEDICINE

## 2025-02-14 PROCEDURE — 25000003 PHARM REV CODE 250: Performed by: ORTHOPAEDIC SURGERY

## 2025-02-14 PROCEDURE — 63600175 PHARM REV CODE 636 W HCPCS: Performed by: FAMILY MEDICINE

## 2025-02-14 PROCEDURE — 99900031 HC PATIENT EDUCATION (STAT)

## 2025-02-14 PROCEDURE — 36415 COLL VENOUS BLD VENIPUNCTURE: CPT | Performed by: ORTHOPAEDIC SURGERY

## 2025-02-14 RX ORDER — SYRING-NEEDL,DISP,INSUL,0.3 ML 29 G X1/2"
296 SYRINGE, EMPTY DISPOSABLE MISCELLANEOUS ONCE
Status: COMPLETED | OUTPATIENT
Start: 2025-02-14 | End: 2025-02-14

## 2025-02-14 RX ADMIN — LACTOBACILLUS ACIDOPHILUS / LACTOBACILLUS BULGARICUS 1 EACH: 100 MILLION CFU STRENGTH GRANULES at 09:02

## 2025-02-14 RX ADMIN — ARFORMOTEROL TARTRATE 15 MCG: 15 SOLUTION RESPIRATORY (INHALATION) at 07:02

## 2025-02-14 RX ADMIN — BUDESONIDE INHALATION 0.5 MG: 0.5 SUSPENSION RESPIRATORY (INHALATION) at 07:02

## 2025-02-14 RX ADMIN — FERROUS SULFATE TAB 325 MG (65 MG ELEMENTAL FE) 1 EACH: 325 (65 FE) TAB at 09:02

## 2025-02-14 RX ADMIN — MUPIROCIN 1 G: 20 OINTMENT TOPICAL at 09:02

## 2025-02-14 RX ADMIN — POLYETHYLENE GLYCOL 3350 17 G: 17 POWDER, FOR SOLUTION ORAL at 09:02

## 2025-02-14 RX ADMIN — PANTOPRAZOLE SODIUM 40 MG: 40 TABLET, DELAYED RELEASE ORAL at 09:02

## 2025-02-14 RX ADMIN — OXYCODONE HYDROCHLORIDE AND ACETAMINOPHEN 1 TABLET: 10; 325 TABLET ORAL at 09:02

## 2025-02-14 RX ADMIN — ASPIRIN 81 MG: 81 TABLET, CHEWABLE ORAL at 09:02

## 2025-02-14 RX ADMIN — CARVEDILOL 12.5 MG: 12.5 TABLET, FILM COATED ORAL at 09:02

## 2025-02-14 RX ADMIN — VENLAFAXINE HYDROCHLORIDE 75 MG: 37.5 CAPSULE, EXTENDED RELEASE ORAL at 09:02

## 2025-02-14 RX ADMIN — DONEPEZIL HYDROCHLORIDE 5 MG: 5 TABLET ORAL at 09:02

## 2025-02-14 RX ADMIN — DILTIAZEM HYDROCHLORIDE 120 MG: 120 CAPSULE, COATED, EXTENDED RELEASE ORAL at 09:02

## 2025-02-14 RX ADMIN — THERA TABS 1 TABLET: TAB at 09:02

## 2025-02-14 RX ADMIN — MAGNESIUM CITRATE 296 ML: 1.75 LIQUID ORAL at 09:02

## 2025-02-14 RX ADMIN — SENNOSIDES AND DOCUSATE SODIUM 1 TABLET: 50; 8.6 TABLET ORAL at 05:02

## 2025-02-14 RX ADMIN — SENNOSIDES AND DOCUSATE SODIUM 1 TABLET: 50; 8.6 TABLET ORAL at 11:02

## 2025-02-14 RX ADMIN — ENOXAPARIN SODIUM 40 MG: 40 INJECTION SUBCUTANEOUS at 05:02

## 2025-02-14 NOTE — PT/OT/SLP PROGRESS
Occupational Therapy      Patient Name:  Daryleen G Moran   MRN:  [id number]    Patient not seen today secondary to Patient unwilling to participate. Patient says she is too constipated for therapy Will follow-up tomorrow.    2/14/2025

## 2025-02-14 NOTE — PROGRESS NOTES
[facility]  Progress Note    Patient Name: Daryleen G Moran  MRN: [id number]  Patient Class: IP- Inpatient   Admission Date: 2/9/2025  Length of Stay: 5 days  Attending Physician: Carley Phillips DO  Primary Care Provider: Abhi De Oliveira IV, MD        Subjective     Principal Problem:Fracture of femoral neck, right, closed        HPI:  Ms. Cyr is a 79 yr old female with a hx of DM type 2, HTN, s/p TAVR, CAD, dementia, AFib, COPD, chronic diastolic CHF, lumbar and thoracic spondylosis, degenerative disc disease, compression fractures, radiculopathy, arthritis, osteoporosis, GERD, stress urinary incontinence, HLD, left adrenal mass, iron-deficiency anemia, abdominal aneurysm, debility, attention and concentration deficit, vitamin-D deficiency, AVM, diverticulosis, depression, obesity, thrombocytopenia, aortic stenosis, NSTEMI who presented to the ED with a chief complaint of a slip and fall.  Granddaughter at bedside who helps to provide a history.  Patient and granddaughter state that the patient was getting out of bed to transfer to bedside commode when she slipped and fell onto her right side.  She states she immediately started having right hip pain.  Granddaughter and patient both deny head trauma and LOC.  She states that she quit smoking 6 months ago and denies alcohol and recreational drug use.  She denies fever, chills, dyspnea, palpitations, chest pain, abdominal pain, nausea, vomiting, diarrhea, dysuria, hematuria, lightheadedness, dizziness, and syncope.    Upon arrival to ED, patient afebrile, HR of 114, RR of 20, BP of 130/75, satting 96% on RA.  Workup in the ED revealed WBC of 12.78, potassium of 3.2, glucose of 154, PT of 12.6.  CXR shows no acute radiographic abnormalities.  Right hip x-ray shows acute nondisplaced and non comminuted intertrochanteric right femoral neck fracture.  While in the ED patient had heart rate of 143 and EKG showed AFib with RVR.   Patient was given diltiazem 10 mg IV, hydromorphone 1.5 mg IV, 1 L NS bolus while in the ED. patient had improvement with heart rate to 90s-100s.  ED provider discussed case with Orthopedic surgery who plans for OR in the morning.  Patient will be admitted under hospital medicine services for further management.    Overview/Hospital Course:  79-year-old female with history of AFib, dementia, compression fracture came in with fall and hip pain and patient was admitted for a right intertrochanteric femur fracture.  Status post cephalomedullary nail fixation  02/10.  Discuss antiplatelet/anticoagulation recommendations with Orthopedic surgery and we will just give ASA and DVT PPX given history of dementia and big fall risk.  S/p surgery - not really complaining of hip pain just her chronic back pain.  Encouraged to work with PT/OT and attempted to call daughter is patient lacks MDM to discuss progressing course and placement options but no answer, left VM.  Patient must more alert and oriented on 02/13 in his agreeable to SNF placement.     Interval History:  Patient is in pain but otherwise no acute complaints    Review of Systems   All other systems reviewed and are negative.    Objective:     Vital Signs (Most Recent):  Temp: 98.2 °F (36.8 °C) (02/14/25 1206)  Pulse: 85 (02/14/25 1206)  Resp: 17 (02/14/25 1206)  BP: (!) 140/75 (02/14/25 1206)  SpO2: 100 % (02/14/25 1206) Vital Signs (24h Range):  Temp:  [97.2 °F (36.2 °C)-98.2 °F (36.8 °C)] 98.2 °F (36.8 °C)  Pulse:  [85-96] 85  Resp:  [16-18] 17  SpO2:  [92 %-100 %] 100 %  BP: (112-142)/(65-83) 140/75     Weight: 81.7 kg (180 lb 1.9 oz)  Body mass index is 35.18 kg/m².    Intake/Output Summary (Last 24 hours) at 2/14/2025 1500  Last data filed at 2/14/2025 1424  Gross per 24 hour   Intake 300 ml   Output 600 ml   Net -300 ml           Physical Exam  Constitutional:       Appearance: She is ill-appearing.   Cardiovascular:      Rate and Rhythm: Tachycardia present.  "Rhythm irregular.      Pulses: Normal pulses.   Pulmonary:      Effort: Pulmonary effort is normal. No respiratory distress.      Breath sounds: No wheezing.   Abdominal:      General: Abdomen is flat. Bowel sounds are normal.   Musculoskeletal:         General: Tenderness present.      Right lower leg: Edema present.   Skin:     General: Skin is warm and dry.   Neurological:      Mental Status: Mental status is at baseline.             Significant Labs: All pertinent labs within the past 24 hours have been reviewed.    Significant Imaging: I have reviewed all pertinent imaging results/findings within the past 24 hours.    Assessment and Plan     * Fracture of femoral neck, right, closed  - Patient presented to the ED after a slip and fall.  She was found to have right nondisplaced enter trochanteric femoral neck fracture on right hip x-ray in the ED.  - ED provider discussed case with Orthopedic surgery who plans to take patient to OR in the morning  - NPO at midnight  - PRN analgesia and symptomatic care  - Holding DVT prophylaxis as well as aspirin and Plavix, resume when appropriate to do so  - restart ASA    Chronic diastolic heart failure  Patient has Diastolic (HFpEF) heart failure that is Chronic. On presentation their CHF was well compensated. Most recent BNP and echo results are listed below.  No results for input(s): "BNP" in the last 72 hours.  Latest ECHO  Results for orders placed during the hospital encounter of 08/20/24    Echo    Interpretation Summary    Left Ventricle: The left ventricle is normal in size. Normal wall thickness. There is concentric remodeling. There is normal systolic function with a visually estimated ejection fraction of 60 - 65%. Diastolic function cannot be reliably determined in the presence of mitral annular calcification.    Right Ventricle: Normal right ventricular cavity size. Wall thickness is normal. Systolic function is mildly reduced.    The left atrium is moderately " dilated.    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 It is reported to be a Carrion Mk S3 valve.    Mitral Valve: There is bileaflet sclerosis. There is moderate to severe mitral annular calcification present.    Tricuspid Valve: There is mild regurgitation.    Pulmonary Artery: The estimated pulmonary artery systolic pressure is 31 mmHg.    IVC/SVC: Normal venous pressure at 3 mmHg.    Current Heart Failure Medications  carvediloL tablet 12.5 mg, 2 times daily, Oral  furosemide tablet 40 mg, Daily PRN, Oral    Plan  - Monitor strict I&Os and daily weights.    - Place on telemetry  - Low sodium diet  - Place on fluid restriction of 1.5 L.   - Cardiology has not been consulted  - The patient's volume status is at their baseline  - Continue home coreg and lasix    COPD (chronic obstructive pulmonary disease)  Patient's COPD is controlled currently.  Patient is currently off COPD Pathway. Continue scheduled inhalers Supplemental oxygen and monitor respiratory status closely.     - DuoNebs Q6H PRN    Atrial fibrillation with RVR  Patient has permanent atrial fibrillation. Patient is currently in atrial fibrillation. [id number] Score: 5. The patients heart rate in the last 24 hours is as follows:  Pulse  Min: 89  Max: 135     Antiarrhythmics  diltiaZEM 24 hr capsule 120 mg, Daily, Oral  metoprolol injection 5 mg, Every 4 hours PRN, Intravenous    Anticoagulants       Plan  - Replete lytes with a goal of K>4, Mg >2  - Patient is not on anti coag outpatient per home medication list, she is however on DAPT with aspirin and Plavix  - Holding home aspirin and Plavix as patient is planned to go to OR in the morning for repair, resume when appropriate to do so  - Patient's afib is currently controlled  - Patient was given diltiazem 10 mg IV while in the ED as patient's heart rate went to 140s.  I suspect that this is probably just due to uncontrolled pain.  After patient received  "diltiazem and hydromorphone, heart rate is now controlled in the 90s-100s.  Restarted ASA.  Plan to discuss with daughter if safe to restart anticoagulation given fall risk in setting of dementia and injury.    Dementia in other diseases classified elsewhere, unspecified severity, without behavioral disturbance, psychotic disturbance, mood disturbance, and anxiety  - History noted  - Patient is currently A&O x3 to self, place, and month. Patient is disoriented to City and year.  She knows that she is in the hospital but is unsure of the name of the hospital.  She states that it is 2027.  - Continue home Aricept    S/P TAVR (transcatheter aortic valve replacement)  - Hx noted      Hypertension  Chronic,  Latest blood pressure and vitals reviewed-     Temp:  [98.5 °F (36.9 °C)]   Pulse:  []   Resp:  [13-20]   BP: (107-130)/(60-80)   SpO2:  [87 %-96 %] .   Home meds for hypertension were reviewed and noted below-  Hypertension Medications               carvediloL (COREG) 12.5 MG tablet Take 12.5 mg by mouth 2 (two) times daily.    diltiaZEM (DILACOR XR) 120 MG CDCR Take 1 capsule (120 mg total) by mouth once daily.    furosemide (LASIX) 40 MG tablet Take 1 tablet (40 mg total) by mouth daily as needed (Take for weight gain > 2 pounds over 24 hours. May discuss with PCP).            While in the hospital, will manage blood pressure as follows; Continue home antihypertensive regimen    Will utilize p.r.n. blood pressure medication only if patient's blood pressure greater than 180/110 and she develops symptoms such as worsening chest pain or shortness of breath.      Type 2 diabetes mellitus, without long-term current use of insulin  Last A1c reviewed-   Lab Results   Component Value Date    HGBA1C 6.6 (H) 03/08/2024     Most recent fingerstick glucose reviewed- No results for input(s): "POCTGLUCOSE" in the last 24 hours.  Current correctional scale  Medium  Maintain anti-hyperglycemic dose as follows- "   Antihyperglycemics (From admission, onward)      Start     Stop Route Frequency Ordered    02/09/25 1755  insulin aspart U-100 pen 0-10 Units         -- SubQ Before meals & nightly PRN 02/09/25 1657          Hold Oral hypoglycemics while patient is in the hospital.         VTE Risk Mitigation (From admission, onward)           Ordered     enoxaparin injection 40 mg  Every 24 hours         02/12/25 1612     Reason for No Pharmacological VTE Prophylaxis  Once        Comments: Plan for OR in AM   Question:  Reasons:  Answer:  Physician Provided (leave comment)    02/09/25 1657     IP VTE HIGH RISK PATIENT  Once         02/09/25 1657     Place sequential compression device  Until discontinued         02/09/25 1657                    Discharge Planning   ISAAK:      Code Status: Full Code   Medical Readiness for Discharge Date:   Discharge Plan A: Skilled Nursing Facility   Discharge Delays: (!) Post-Acute Set-up      Treatment, consult recommendation, PT/OT, dispo planning      Please place Justification for DME        Carley Phillips DO  Department of Hospital Medicine   [facility]

## 2025-02-14 NOTE — CARE UPDATE
02/13/25 1916   Patient Assessment/Suction   Level of Consciousness (AVPU) alert   Respiratory Effort Unlabored;Normal   Expansion/Accessory Muscles/Retractions no use of accessory muscles;no retractions;expansion symmetric   All Lung Fields Breath Sounds clear   Rhythm/Pattern, Respiratory unlabored;pattern regular;no shortness of breath reported   Cough Frequency no cough   PRE-TX-O2   Device (Oxygen Therapy) room air   SpO2 95 %   Pulse Oximetry Type Intermittent   Pulse 85   Resp 17   Aerosol Therapy   $ Aerosol Therapy Charges Aerosol Treatment  (brov)   Daily Review of Necessity (SVN) completed   Respiratory Treatment Status (SVN) given   Treatment Route (SVN) mask;oxygen   Patient Position HOB elevated   Post Treatment Assessment (SVN) increased aeration   Signs of Intolerance (SVN) none   Breath Sounds Post-Respiratory Treatment   Throughout All Fields Post-Treatment All Fields   Throughout All Fields Post-Treatment aeration increased   Post-treatment Heart Rate (beats/min) 85   Post-treatment Resp Rate (breaths/min) 18

## 2025-02-14 NOTE — CARE UPDATE
02/14/25 0713   Patient Assessment/Suction   Level of Consciousness (AVPU) alert   Respiratory Effort Normal;Unlabored   All Lung Fields Breath Sounds Anterior:;Lateral:;clear   Rhythm/Pattern, Respiratory unlabored;pattern regular;depth regular   Cough Frequency no cough   PRE-TX-O2   Device (Oxygen Therapy) room air   SpO2 100 %   Pulse Oximetry Type Intermittent   $ Pulse Oximetry - Multiple Charge Pulse Oximetry - Multiple   Pulse 95   Resp 16   Aerosol Therapy   $ Aerosol Therapy Charges Aerosol Treatment   Daily Review of Necessity (SVN) completed   Respiratory Treatment Status (SVN) given   Treatment Route (SVN) mask;oxygen   Patient Position HOB elevated   Post Treatment Assessment (SVN) breath sounds improved   Signs of Intolerance (SVN) none   Breath Sounds Post-Respiratory Treatment   Throughout All Fields Post-Treatment All Fields   Throughout All Fields Post-Treatment aeration increased   Post-treatment Heart Rate (beats/min) 95   Post-treatment Resp Rate (breaths/min) 16   Education   $ Education Bronchodilator;15 min

## 2025-02-14 NOTE — PT/OT/SLP PROGRESS
"Physical Therapy      Patient Name:  Daryleen G Moran   MRN:  [id number]    Patient declined participation x2 attempts due to c/o of constipation "if you're not going to shove your finger up my a** and dig it out, then you can get out of my room" . Will follow-up next scheduled visit.    "

## 2025-02-14 NOTE — NURSING
Patient's IV became dislodged. IV removed and bandaged appropriately. Patient adamantly refuses a new IV at this time.

## 2025-02-14 NOTE — SUBJECTIVE & OBJECTIVE
Interval History:  Patient is in pain but otherwise no acute complaints    Review of Systems   All other systems reviewed and are negative.    Objective:     Vital Signs (Most Recent):  Temp: 98.2 °F (36.8 °C) (02/14/25 1206)  Pulse: 85 (02/14/25 1206)  Resp: 17 (02/14/25 1206)  BP: (!) 140/75 (02/14/25 1206)  SpO2: 100 % (02/14/25 1206) Vital Signs (24h Range):  Temp:  [97.2 °F (36.2 °C)-98.2 °F (36.8 °C)] 98.2 °F (36.8 °C)  Pulse:  [85-96] 85  Resp:  [16-18] 17  SpO2:  [92 %-100 %] 100 %  BP: (112-142)/(65-83) 140/75     Weight: 81.7 kg (180 lb 1.9 oz)  Body mass index is 35.18 kg/m².    Intake/Output Summary (Last 24 hours) at 2/14/2025 1500  Last data filed at 2/14/2025 1424  Gross per 24 hour   Intake 300 ml   Output 600 ml   Net -300 ml           Physical Exam  Constitutional:       Appearance: She is ill-appearing.   Cardiovascular:      Rate and Rhythm: Tachycardia present. Rhythm irregular.      Pulses: Normal pulses.   Pulmonary:      Effort: Pulmonary effort is normal. No respiratory distress.      Breath sounds: No wheezing.   Abdominal:      General: Abdomen is flat. Bowel sounds are normal.   Musculoskeletal:         General: Tenderness present.      Right lower leg: Edema present.   Skin:     General: Skin is warm and dry.   Neurological:      Mental Status: Mental status is at baseline.             Significant Labs: All pertinent labs within the past 24 hours have been reviewed.    Significant Imaging: I have reviewed all pertinent imaging results/findings within the past 24 hours.

## 2025-02-15 LAB
ANION GAP SERPL CALC-SCNC: 5 MMOL/L (ref 8–16)
BASOPHILS # BLD AUTO: 0.06 K/UL (ref 0–0.2)
BASOPHILS NFR BLD: 0.7 % (ref 0–1.9)
BUN SERPL-MCNC: 11 MG/DL (ref 8–23)
CALCIUM SERPL-MCNC: 8.5 MG/DL (ref 8.7–10.5)
CHLORIDE SERPL-SCNC: 99 MMOL/L (ref 95–110)
CO2 SERPL-SCNC: 32 MMOL/L (ref 23–29)
CREAT SERPL-MCNC: 0.4 MG/DL (ref 0.5–1.4)
DIFFERENTIAL METHOD BLD: ABNORMAL
EOSINOPHIL # BLD AUTO: 0.2 K/UL (ref 0–0.5)
EOSINOPHIL NFR BLD: 1.7 % (ref 0–8)
ERYTHROCYTE [DISTWIDTH] IN BLOOD BY AUTOMATED COUNT: 16.1 % (ref 11.5–14.5)
EST. GFR  (NO RACE VARIABLE): >60 ML/MIN/1.73 M^2
GLUCOSE SERPL-MCNC: 162 MG/DL (ref 70–110)
HCT VFR BLD AUTO: 31.4 % (ref 37–48.5)
HGB BLD-MCNC: 9.9 G/DL (ref 12–16)
IMM GRANULOCYTES # BLD AUTO: 0.02 K/UL (ref 0–0.04)
IMM GRANULOCYTES NFR BLD AUTO: 0.2 % (ref 0–0.5)
LYMPHOCYTES # BLD AUTO: 1.4 K/UL (ref 1–4.8)
LYMPHOCYTES NFR BLD: 16.4 % (ref 18–48)
MAGNESIUM SERPL-MCNC: 1.8 MG/DL (ref 1.6–2.6)
MCH RBC QN AUTO: 29.7 PG (ref 27–31)
MCHC RBC AUTO-ENTMCNC: 31.5 G/DL (ref 32–36)
MCV RBC AUTO: 94 FL (ref 82–98)
MONOCYTES # BLD AUTO: 0.5 K/UL (ref 0.3–1)
MONOCYTES NFR BLD: 6.2 % (ref 4–15)
NEUTROPHILS # BLD AUTO: 6.5 K/UL (ref 1.8–7.7)
NEUTROPHILS NFR BLD: 74.8 % (ref 38–73)
NRBC BLD-RTO: 0 /100 WBC
PHOSPHATE SERPL-MCNC: 3.2 MG/DL (ref 2.7–4.5)
PLATELET # BLD AUTO: 148 K/UL (ref 150–450)
PMV BLD AUTO: 11.1 FL (ref 9.2–12.9)
POCT GLUCOSE: 131 MG/DL (ref 70–110)
POCT GLUCOSE: 158 MG/DL (ref 70–110)
POCT GLUCOSE: 166 MG/DL (ref 70–110)
POTASSIUM SERPL-SCNC: 3.7 MMOL/L (ref 3.5–5.1)
RBC # BLD AUTO: 3.33 M/UL (ref 4–5.4)
SODIUM SERPL-SCNC: 136 MMOL/L (ref 136–145)
WBC # BLD AUTO: 8.73 K/UL (ref 3.9–12.7)

## 2025-02-15 PROCEDURE — 84100 ASSAY OF PHOSPHORUS: CPT | Performed by: ORTHOPAEDIC SURGERY

## 2025-02-15 PROCEDURE — 25000003 PHARM REV CODE 250: Performed by: ORTHOPAEDIC SURGERY

## 2025-02-15 PROCEDURE — 36415 COLL VENOUS BLD VENIPUNCTURE: CPT | Performed by: ORTHOPAEDIC SURGERY

## 2025-02-15 PROCEDURE — 85025 COMPLETE CBC W/AUTO DIFF WBC: CPT | Performed by: ORTHOPAEDIC SURGERY

## 2025-02-15 PROCEDURE — 97530 THERAPEUTIC ACTIVITIES: CPT | Mod: CQ

## 2025-02-15 PROCEDURE — 80048 BASIC METABOLIC PNL TOTAL CA: CPT | Performed by: ORTHOPAEDIC SURGERY

## 2025-02-15 PROCEDURE — 21400001 HC TELEMETRY ROOM

## 2025-02-15 PROCEDURE — 25000242 PHARM REV CODE 250 ALT 637 W/ HCPCS: Performed by: ORTHOPAEDIC SURGERY

## 2025-02-15 PROCEDURE — 94640 AIRWAY INHALATION TREATMENT: CPT

## 2025-02-15 PROCEDURE — 63600175 PHARM REV CODE 636 W HCPCS: Performed by: ORTHOPAEDIC SURGERY

## 2025-02-15 PROCEDURE — 25000003 PHARM REV CODE 250: Performed by: FAMILY MEDICINE

## 2025-02-15 PROCEDURE — 63600175 PHARM REV CODE 636 W HCPCS: Performed by: FAMILY MEDICINE

## 2025-02-15 PROCEDURE — 83735 ASSAY OF MAGNESIUM: CPT | Performed by: ORTHOPAEDIC SURGERY

## 2025-02-15 PROCEDURE — 99900031 HC PATIENT EDUCATION (STAT)

## 2025-02-15 PROCEDURE — 97110 THERAPEUTIC EXERCISES: CPT | Mod: CQ

## 2025-02-15 PROCEDURE — 94761 N-INVAS EAR/PLS OXIMETRY MLT: CPT

## 2025-02-15 RX ADMIN — OXYCODONE HYDROCHLORIDE AND ACETAMINOPHEN 1 TABLET: 10; 325 TABLET ORAL at 05:02

## 2025-02-15 RX ADMIN — ENOXAPARIN SODIUM 40 MG: 40 INJECTION SUBCUTANEOUS at 05:02

## 2025-02-15 RX ADMIN — PANTOPRAZOLE SODIUM 40 MG: 40 TABLET, DELAYED RELEASE ORAL at 09:02

## 2025-02-15 RX ADMIN — DILTIAZEM HYDROCHLORIDE 120 MG: 120 CAPSULE, COATED, EXTENDED RELEASE ORAL at 09:02

## 2025-02-15 RX ADMIN — ASPIRIN 81 MG: 81 TABLET, CHEWABLE ORAL at 09:02

## 2025-02-15 RX ADMIN — INSULIN ASPART 1 UNITS: 100 INJECTION, SOLUTION INTRAVENOUS; SUBCUTANEOUS at 09:02

## 2025-02-15 RX ADMIN — THERA TABS 1 TABLET: TAB at 09:02

## 2025-02-15 RX ADMIN — ARFORMOTEROL TARTRATE 15 MCG: 15 SOLUTION RESPIRATORY (INHALATION) at 06:02

## 2025-02-15 RX ADMIN — ARFORMOTEROL TARTRATE 15 MCG: 15 SOLUTION RESPIRATORY (INHALATION) at 08:02

## 2025-02-15 RX ADMIN — OXYCODONE HYDROCHLORIDE AND ACETAMINOPHEN 1 TABLET: 10; 325 TABLET ORAL at 09:02

## 2025-02-15 RX ADMIN — DIAZEPAM 5 MG: 5 TABLET ORAL at 09:02

## 2025-02-15 RX ADMIN — INSULIN ASPART 2 UNITS: 100 INJECTION, SOLUTION INTRAVENOUS; SUBCUTANEOUS at 12:02

## 2025-02-15 RX ADMIN — FERROUS SULFATE TAB 325 MG (65 MG ELEMENTAL FE) 1 EACH: 325 (65 FE) TAB at 09:02

## 2025-02-15 RX ADMIN — BUDESONIDE INHALATION 0.5 MG: 0.5 SUSPENSION RESPIRATORY (INHALATION) at 08:02

## 2025-02-15 RX ADMIN — POLYETHYLENE GLYCOL 3350 17 G: 17 POWDER, FOR SOLUTION ORAL at 09:02

## 2025-02-15 RX ADMIN — LACTOBACILLUS ACIDOPHILUS / LACTOBACILLUS BULGARICUS 1 EACH: 100 MILLION CFU STRENGTH GRANULES at 09:02

## 2025-02-15 RX ADMIN — BUDESONIDE INHALATION 0.5 MG: 0.5 SUSPENSION RESPIRATORY (INHALATION) at 06:02

## 2025-02-15 RX ADMIN — CYCLOBENZAPRINE 10 MG: 10 TABLET, FILM COATED ORAL at 10:02

## 2025-02-15 RX ADMIN — CARVEDILOL 12.5 MG: 12.5 TABLET, FILM COATED ORAL at 09:02

## 2025-02-15 RX ADMIN — DONEPEZIL HYDROCHLORIDE 5 MG: 5 TABLET ORAL at 09:02

## 2025-02-15 RX ADMIN — VENLAFAXINE HYDROCHLORIDE 75 MG: 37.5 CAPSULE, EXTENDED RELEASE ORAL at 09:02

## 2025-02-15 RX ADMIN — LIDOCAINE 5% 1 PATCH: 700 PATCH TOPICAL at 05:02

## 2025-02-15 RX ADMIN — MUPIROCIN 1 G: 20 OINTMENT TOPICAL at 09:02

## 2025-02-15 RX ADMIN — OXYCODONE HYDROCHLORIDE AND ACETAMINOPHEN 1 TABLET: 10; 325 TABLET ORAL at 10:02

## 2025-02-15 NOTE — SUBJECTIVE & OBJECTIVE
Interval History:  Patient seen and examined, no acute complaints    Review of Systems   All other systems reviewed and are negative.    Objective:     Vital Signs (Most Recent):  Temp: 98.1 °F (36.7 °C) (02/15/25 1148)  Pulse: 96 (02/15/25 1148)  Resp: 18 (02/15/25 1148)  BP: 100/65 (02/15/25 1148)  SpO2: 95 % (02/15/25 1148) Vital Signs (24h Range):  Temp:  [98.1 °F (36.7 °C)-98.4 °F (36.9 °C)] 98.1 °F (36.7 °C)  Pulse:  [] 96  Resp:  [17-20] 18  SpO2:  [95 %-100 %] 95 %  BP: ()/(65-72) 100/65     Weight: 81.7 kg (180 lb 1.9 oz)  Body mass index is 35.18 kg/m².    Intake/Output Summary (Last 24 hours) at 2/15/2025 1447  Last data filed at 2/15/2025 1359  Gross per 24 hour   Intake 960 ml   Output 1000 ml   Net -40 ml           Physical Exam  Constitutional:       Appearance: She is ill-appearing.   Cardiovascular:      Rate and Rhythm: Tachycardia present. Rhythm irregular.      Pulses: Normal pulses.   Pulmonary:      Effort: Pulmonary effort is normal. No respiratory distress.      Breath sounds: No wheezing.   Abdominal:      General: Abdomen is flat. Bowel sounds are normal.   Musculoskeletal:         General: Tenderness present.      Right lower leg: Edema present.   Skin:     General: Skin is warm and dry.   Neurological:      Mental Status: Mental status is at baseline.             Significant Labs: All pertinent labs within the past 24 hours have been reviewed.    Significant Imaging: I have reviewed all pertinent imaging results/findings within the past 24 hours.

## 2025-02-15 NOTE — PT/OT/SLP PROGRESS
Physical Therapy Treatment    Patient Name:  Daryleen G Moran   MRN:  [id number]    Recommendations:     Discharge Recommendations: Moderate Intensity Therapy  Discharge Equipment Recommendations: to be determined by next level of care  Barriers to discharge:  Increased caregiver burden    Assessment:     Daryleen G Moran is a 79 y.o. female admitted with a medical diagnosis of Fracture of femoral neck, right, closed.  She presents with the following impairments/functional limitations: weakness, impaired endurance, impaired self care skills, impaired functional mobility, gait instability, impaired balance, decreased lower extremity function, decreased safety awareness, pain, decreased ROM, impaired cardiopulmonary response to activity, orthopedic precautions . Pt agreeable to PT. She doesn't remember talking to me yesterday and reports frustration of getting her days/nights mixed up. However, she refuses to let us open the curtains and leave the lights on in the room. She required mod a for bed mobility with VI for sequencing. Mod a for sit to stand t/f with rw x5 reps. Lateral weight shifts completed in standing to encourage WB on painful LE. Pt reports she isn't able to advance LE due to c/o pain. Le ex performed seated eob with VI for technique.     Rehab Prognosis: Fair; patient would benefit from acute skilled PT services to address these deficits and reach maximum level of function.    Recent Surgery: Procedure(s) (LRB):  INSERTION, INTRAMEDULLARY ALEIDA, FEMUR (Right) 5 Days Post-Op    Plan:     During this hospitalization, patient to be seen 6 x/week to address the identified rehab impairments via therapeutic activities, therapeutic exercises and progress toward the following goals:    Plan of Care Expires:  03/11/25    Subjective     Chief Complaint: R hip pain  Patient/Family Comments/goals: Pt agreeable to OOB activity  Pain/Comfort:  Pain Rating 1: 6/10  Location - Side 1: Right  Location - Orientation 1:  generalized  Location 1: hip  Pain Addressed 1: Pre-medicate for activity, Cessation of Activity  Pain Rating Post-Intervention 1: 6/10      Objective:     Communicated with RN prior to session.  Patient found HOB elevated with PureWick, telemetry, peripheral IV upon PT entry to room.     General Precautions: Standard, fall, hearing impaired  Orthopedic Precautions: RLE weight bearing as tolerated  Braces: N/A  Respiratory Status: Room air     Functional Mobility:  Bed Mobility:     Supine to Sit: moderate assistance and of 2 persons  Sit to Supine: moderate assistance and of 2 persons  Transfers:     Sit to Stand:  moderate assistance with rolling walker  Balance: Fair- static stand      AM-PAC 6 CLICK MOBILITY          Treatment & Education:  Pt was educated on the following: call light use, importance of OOB activity and functional mobility to negate the negative effects of prolonged bed rest during this hospitalization, safe transfers/ambulation and discharge planning recommendations/options.   Pt performed B LE there ex sitting x 30 reps with vc for proper execution and joint protection: ap, laq, marching, hip abd/add, gs.    Patient left HOB elevated with all lines intact, call button in reach, bed alarm on, and RN notified..    GOALS:   Multidisciplinary Problems       Physical Therapy Goals          Problem: Physical Therapy    Goal Priority Disciplines Outcome Interventions   Physical Therapy Goal     PT, PT/OT Progressing    Description: Goals to be met by: 3/11/25     Patient will increase functional independence with mobility by performin. Supine to sit with Moderate Assistance  2. Sit to stand transfer with Moderate Assistance  3. Bed to chair transfer with Moderate Assistance using Rolling Walker  4. Lower extremity exercise program x20 reps per handout, with assistance as needed                             Time Tracking:     PT Received On: 02/15/25  PT Start Time: 1034     PT Stop Time:  1058  PT Total Time (min): 24 min     Billable Minutes: Therapeutic Activity 14 and Therapeutic Exercise 10    Treatment Type: Treatment  PT/PTA: PTA     Number of PTA visits since last PT visit: 2     02/15/2025

## 2025-02-15 NOTE — PROGRESS NOTES
[facility]  Progress Note    Patient Name: Daryleen G Moran  MRN: [id number]  Patient Class: IP- Inpatient   Admission Date: 2/9/2025  Length of Stay: 6 days  Attending Physician: Carley Phillips DO  Primary Care Provider: Abhi De Oliveira IV, MD        Subjective     Principal Problem:Fracture of femoral neck, right, closed        HPI:  Ms. Cyr is a 79 yr old female with a hx of DM type 2, HTN, s/p TAVR, CAD, dementia, AFib, COPD, chronic diastolic CHF, lumbar and thoracic spondylosis, degenerative disc disease, compression fractures, radiculopathy, arthritis, osteoporosis, GERD, stress urinary incontinence, HLD, left adrenal mass, iron-deficiency anemia, abdominal aneurysm, debility, attention and concentration deficit, vitamin-D deficiency, AVM, diverticulosis, depression, obesity, thrombocytopenia, aortic stenosis, NSTEMI who presented to the ED with a chief complaint of a slip and fall.  Granddaughter at bedside who helps to provide a history.  Patient and granddaughter state that the patient was getting out of bed to transfer to bedside commode when she slipped and fell onto her right side.  She states she immediately started having right hip pain.  Granddaughter and patient both deny head trauma and LOC.  She states that she quit smoking 6 months ago and denies alcohol and recreational drug use.  She denies fever, chills, dyspnea, palpitations, chest pain, abdominal pain, nausea, vomiting, diarrhea, dysuria, hematuria, lightheadedness, dizziness, and syncope.    Upon arrival to ED, patient afebrile, HR of 114, RR of 20, BP of 130/75, satting 96% on RA.  Workup in the ED revealed WBC of 12.78, potassium of 3.2, glucose of 154, PT of 12.6.  CXR shows no acute radiographic abnormalities.  Right hip x-ray shows acute nondisplaced and non comminuted intertrochanteric right femoral neck fracture.  While in the ED patient had heart rate of 143 and EKG showed AFib with RVR.   Patient was given diltiazem 10 mg IV, hydromorphone 1.5 mg IV, 1 L NS bolus while in the ED. patient had improvement with heart rate to 90s-100s.  ED provider discussed case with Orthopedic surgery who plans for OR in the morning.  Patient will be admitted under hospital medicine services for further management.    Overview/Hospital Course:  79-year-old female with history of AFib, dementia, compression fracture came in with fall and hip pain and patient was admitted for a right intertrochanteric femur fracture.  Status post cephalomedullary nail fixation  02/10.  Discuss antiplatelet/anticoagulation recommendations with Orthopedic surgery and we will just give ASA and DVT PPX given history of dementia and big fall risk.  S/p surgery - not really complaining of hip pain just her chronic back pain.  Encouraged to work with PT/OT and attempted to call daughter is patient lacks MDM to discuss progressing course and placement options but no answer, left VM.  Patient must more alert and oriented on 02/13 in his agreeable to SNF placement.     Interval History:  Patient seen and examined, no acute complaints    Review of Systems   All other systems reviewed and are negative.    Objective:     Vital Signs (Most Recent):  Temp: 98.1 °F (36.7 °C) (02/15/25 1148)  Pulse: 96 (02/15/25 1148)  Resp: 18 (02/15/25 1148)  BP: 100/65 (02/15/25 1148)  SpO2: 95 % (02/15/25 1148) Vital Signs (24h Range):  Temp:  [98.1 °F (36.7 °C)-98.4 °F (36.9 °C)] 98.1 °F (36.7 °C)  Pulse:  [] 96  Resp:  [17-20] 18  SpO2:  [95 %-100 %] 95 %  BP: ()/(65-72) 100/65     Weight: 81.7 kg (180 lb 1.9 oz)  Body mass index is 35.18 kg/m².    Intake/Output Summary (Last 24 hours) at 2/15/2025 1447  Last data filed at 2/15/2025 1359  Gross per 24 hour   Intake 960 ml   Output 1000 ml   Net -40 ml           Physical Exam  Constitutional:       Appearance: She is ill-appearing.   Cardiovascular:      Rate and Rhythm: Tachycardia present. Rhythm  "irregular.      Pulses: Normal pulses.   Pulmonary:      Effort: Pulmonary effort is normal. No respiratory distress.      Breath sounds: No wheezing.   Abdominal:      General: Abdomen is flat. Bowel sounds are normal.   Musculoskeletal:         General: Tenderness present.      Right lower leg: Edema present.   Skin:     General: Skin is warm and dry.   Neurological:      Mental Status: Mental status is at baseline.             Significant Labs: All pertinent labs within the past 24 hours have been reviewed.    Significant Imaging: I have reviewed all pertinent imaging results/findings within the past 24 hours.    Assessment and Plan     * Fracture of femoral neck, right, closed  - Patient presented to the ED after a slip and fall.  She was found to have right nondisplaced enter trochanteric femoral neck fracture on right hip x-ray in the ED.  - ED provider discussed case with Orthopedic surgery who plans to take patient to OR in the morning  - NPO at midnight  - PRN analgesia and symptomatic care  - Holding DVT prophylaxis as well as aspirin and Plavix, resume when appropriate to do so  - restart ASA    Chronic diastolic heart failure  Patient has Diastolic (HFpEF) heart failure that is Chronic. On presentation their CHF was well compensated. Most recent BNP and echo results are listed below.  No results for input(s): "BNP" in the last 72 hours.  Latest ECHO  Results for orders placed during the hospital encounter of 08/20/24    Echo    Interpretation Summary    Left Ventricle: The left ventricle is normal in size. Normal wall thickness. There is concentric remodeling. There is normal systolic function with a visually estimated ejection fraction of 60 - 65%. Diastolic function cannot be reliably determined in the presence of mitral annular calcification.    Right Ventricle: Normal right ventricular cavity size. Wall thickness is normal. Systolic function is mildly reduced.    The left atrium is moderately " dilated.    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 It is reported to be a Carrion Mk S3 valve.    Mitral Valve: There is bileaflet sclerosis. There is moderate to severe mitral annular calcification present.    Tricuspid Valve: There is mild regurgitation.    Pulmonary Artery: The estimated pulmonary artery systolic pressure is 31 mmHg.    IVC/SVC: Normal venous pressure at 3 mmHg.    Current Heart Failure Medications  carvediloL tablet 12.5 mg, 2 times daily, Oral  furosemide tablet 40 mg, Daily PRN, Oral    Plan  - Monitor strict I&Os and daily weights.    - Place on telemetry  - Low sodium diet  - Place on fluid restriction of 1.5 L.   - Cardiology has not been consulted  - The patient's volume status is at their baseline  - Continue home coreg and lasix    COPD (chronic obstructive pulmonary disease)  Patient's COPD is controlled currently.  Patient is currently off COPD Pathway. Continue scheduled inhalers Supplemental oxygen and monitor respiratory status closely.     - DuoNebs Q6H PRN    Atrial fibrillation with RVR  Patient has permanent atrial fibrillation. Patient is currently in atrial fibrillation. [id number] Score: 5. The patients heart rate in the last 24 hours is as follows:  Pulse  Min: 89  Max: 135     Antiarrhythmics  diltiaZEM 24 hr capsule 120 mg, Daily, Oral  metoprolol injection 5 mg, Every 4 hours PRN, Intravenous    Anticoagulants       Plan  - Replete lytes with a goal of K>4, Mg >2  - Patient is not on anti coag outpatient per home medication list, she is however on DAPT with aspirin and Plavix  - Holding home aspirin and Plavix as patient is planned to go to OR in the morning for repair, resume when appropriate to do so  - Patient's afib is currently controlled  - Patient was given diltiazem 10 mg IV while in the ED as patient's heart rate went to 140s.  I suspect that this is probably just due to uncontrolled pain.  After patient received  "diltiazem and hydromorphone, heart rate is now controlled in the 90s-100s.  Restarted ASA.  Plan to discuss with daughter if safe to restart anticoagulation given fall risk in setting of dementia and injury.    Dementia in other diseases classified elsewhere, unspecified severity, without behavioral disturbance, psychotic disturbance, mood disturbance, and anxiety  - History noted  - Patient is currently A&O x3 to self, place, and month. Patient is disoriented to City and year.  She knows that she is in the hospital but is unsure of the name of the hospital.  She states that it is 2027.  - Continue home Aricept    S/P TAVR (transcatheter aortic valve replacement)  - Hx noted      Hypertension  Chronic,  Latest blood pressure and vitals reviewed-     Temp:  [98.5 °F (36.9 °C)]   Pulse:  []   Resp:  [13-20]   BP: (107-130)/(60-80)   SpO2:  [87 %-96 %] .   Home meds for hypertension were reviewed and noted below-  Hypertension Medications               carvediloL (COREG) 12.5 MG tablet Take 12.5 mg by mouth 2 (two) times daily.    diltiaZEM (DILACOR XR) 120 MG CDCR Take 1 capsule (120 mg total) by mouth once daily.    furosemide (LASIX) 40 MG tablet Take 1 tablet (40 mg total) by mouth daily as needed (Take for weight gain > 2 pounds over 24 hours. May discuss with PCP).            While in the hospital, will manage blood pressure as follows; Continue home antihypertensive regimen    Will utilize p.r.n. blood pressure medication only if patient's blood pressure greater than 180/110 and she develops symptoms such as worsening chest pain or shortness of breath.      Type 2 diabetes mellitus, without long-term current use of insulin  Last A1c reviewed-   Lab Results   Component Value Date    HGBA1C 6.6 (H) 03/08/2024     Most recent fingerstick glucose reviewed- No results for input(s): "POCTGLUCOSE" in the last 24 hours.  Current correctional scale  Medium  Maintain anti-hyperglycemic dose as follows- "   Antihyperglycemics (From admission, onward)      Start     Stop Route Frequency Ordered    02/09/25 1755  insulin aspart U-100 pen 0-10 Units         -- SubQ Before meals & nightly PRN 02/09/25 1657          Hold Oral hypoglycemics while patient is in the hospital.         VTE Risk Mitigation (From admission, onward)           Ordered     enoxaparin injection 40 mg  Every 24 hours         02/12/25 1612     Reason for No Pharmacological VTE Prophylaxis  Once        Comments: Plan for OR in AM   Question:  Reasons:  Answer:  Physician Provided (leave comment)    02/09/25 1657     IP VTE HIGH RISK PATIENT  Once         02/09/25 1657     Place sequential compression device  Until discontinued         02/09/25 1657                    Discharge Planning   ISAAK:      Code Status: Full Code   Medical Readiness for Discharge Date:   Discharge Plan A: Skilled Nursing Facility   Discharge Delays: (!) Post-Acute Set-up      Treatment, PT/OT, dispo planning      Please place Justification for DME        Carley Phillips DO  Department of Hospital Medicine   [facility]

## 2025-02-15 NOTE — CARE UPDATE
02/14/25 1921   Patient Assessment/Suction   Level of Consciousness (AVPU) alert   Respiratory Effort Normal;Unlabored   Expansion/Accessory Muscles/Retractions no use of accessory muscles;no retractions   All Lung Fields Breath Sounds clear   Rhythm/Pattern, Respiratory unlabored;pattern regular;no shortness of breath reported   Cough Frequency no cough   PRE-TX-O2   Device (Oxygen Therapy) room air   SpO2 95 %   Pulse Oximetry Type Intermittent   Pulse 86   Resp 17   Aerosol Therapy   $ Aerosol Therapy Charges Aerosol Treatment  (brov)   Daily Review of Necessity (SVN) completed   Respiratory Treatment Status (SVN) given   Treatment Route (SVN) mask;oxygen   Patient Position HOB elevated   Post Treatment Assessment (SVN) increased aeration   Signs of Intolerance (SVN) none   Breath Sounds Post-Respiratory Treatment   Throughout All Fields Post-Treatment All Fields   Throughout All Fields Post-Treatment aeration increased   Post-treatment Heart Rate (beats/min) 88   Post-treatment Resp Rate (breaths/min) 17

## 2025-02-16 LAB
ANION GAP SERPL CALC-SCNC: 5 MMOL/L (ref 8–16)
BASOPHILS # BLD AUTO: 0.04 K/UL (ref 0–0.2)
BASOPHILS NFR BLD: 0.7 % (ref 0–1.9)
BUN SERPL-MCNC: 12 MG/DL (ref 8–23)
CALCIUM SERPL-MCNC: 8.2 MG/DL (ref 8.7–10.5)
CHLORIDE SERPL-SCNC: 100 MMOL/L (ref 95–110)
CO2 SERPL-SCNC: 32 MMOL/L (ref 23–29)
CREAT SERPL-MCNC: 0.4 MG/DL (ref 0.5–1.4)
DIFFERENTIAL METHOD BLD: ABNORMAL
EOSINOPHIL # BLD AUTO: 0.2 K/UL (ref 0–0.5)
EOSINOPHIL NFR BLD: 3.5 % (ref 0–8)
ERYTHROCYTE [DISTWIDTH] IN BLOOD BY AUTOMATED COUNT: 16.6 % (ref 11.5–14.5)
EST. GFR  (NO RACE VARIABLE): >60 ML/MIN/1.73 M^2
GLUCOSE SERPL-MCNC: 129 MG/DL (ref 70–110)
HCT VFR BLD AUTO: 31.7 % (ref 37–48.5)
HGB BLD-MCNC: 9.7 G/DL (ref 12–16)
IMM GRANULOCYTES # BLD AUTO: 0.02 K/UL (ref 0–0.04)
IMM GRANULOCYTES NFR BLD AUTO: 0.4 % (ref 0–0.5)
LYMPHOCYTES # BLD AUTO: 1.5 K/UL (ref 1–4.8)
LYMPHOCYTES NFR BLD: 27 % (ref 18–48)
MAGNESIUM SERPL-MCNC: 1.8 MG/DL (ref 1.6–2.6)
MCH RBC QN AUTO: 29.4 PG (ref 27–31)
MCHC RBC AUTO-ENTMCNC: 30.6 G/DL (ref 32–36)
MCV RBC AUTO: 96 FL (ref 82–98)
MONOCYTES # BLD AUTO: 0.6 K/UL (ref 0.3–1)
MONOCYTES NFR BLD: 11.6 % (ref 4–15)
NEUTROPHILS # BLD AUTO: 3.1 K/UL (ref 1.8–7.7)
NEUTROPHILS NFR BLD: 56.8 % (ref 38–73)
NRBC BLD-RTO: 0 /100 WBC
PHOSPHATE SERPL-MCNC: 4.1 MG/DL (ref 2.7–4.5)
PLATELET # BLD AUTO: 149 K/UL (ref 150–450)
PMV BLD AUTO: 11.1 FL (ref 9.2–12.9)
POCT GLUCOSE: 119 MG/DL (ref 70–110)
POCT GLUCOSE: 143 MG/DL (ref 70–110)
POCT GLUCOSE: 144 MG/DL (ref 70–110)
POCT GLUCOSE: 178 MG/DL (ref 70–110)
POTASSIUM SERPL-SCNC: 4.1 MMOL/L (ref 3.5–5.1)
RBC # BLD AUTO: 3.3 M/UL (ref 4–5.4)
SODIUM SERPL-SCNC: 137 MMOL/L (ref 136–145)
WBC # BLD AUTO: 5.44 K/UL (ref 3.9–12.7)

## 2025-02-16 PROCEDURE — 94761 N-INVAS EAR/PLS OXIMETRY MLT: CPT

## 2025-02-16 PROCEDURE — 94640 AIRWAY INHALATION TREATMENT: CPT

## 2025-02-16 PROCEDURE — 99900031 HC PATIENT EDUCATION (STAT)

## 2025-02-16 PROCEDURE — 84100 ASSAY OF PHOSPHORUS: CPT | Performed by: ORTHOPAEDIC SURGERY

## 2025-02-16 PROCEDURE — 25000003 PHARM REV CODE 250: Performed by: ORTHOPAEDIC SURGERY

## 2025-02-16 PROCEDURE — 83735 ASSAY OF MAGNESIUM: CPT | Performed by: ORTHOPAEDIC SURGERY

## 2025-02-16 PROCEDURE — 36415 COLL VENOUS BLD VENIPUNCTURE: CPT | Performed by: ORTHOPAEDIC SURGERY

## 2025-02-16 PROCEDURE — 85025 COMPLETE CBC W/AUTO DIFF WBC: CPT | Performed by: ORTHOPAEDIC SURGERY

## 2025-02-16 PROCEDURE — 21400001 HC TELEMETRY ROOM

## 2025-02-16 PROCEDURE — 25000242 PHARM REV CODE 250 ALT 637 W/ HCPCS: Performed by: ORTHOPAEDIC SURGERY

## 2025-02-16 PROCEDURE — 80048 BASIC METABOLIC PNL TOTAL CA: CPT | Performed by: ORTHOPAEDIC SURGERY

## 2025-02-16 PROCEDURE — 25000003 PHARM REV CODE 250: Performed by: FAMILY MEDICINE

## 2025-02-16 PROCEDURE — 99900035 HC TECH TIME PER 15 MIN (STAT)

## 2025-02-16 PROCEDURE — 63600175 PHARM REV CODE 636 W HCPCS: Performed by: FAMILY MEDICINE

## 2025-02-16 RX ADMIN — LACTOBACILLUS ACIDOPHILUS / LACTOBACILLUS BULGARICUS 1 EACH: 100 MILLION CFU STRENGTH GRANULES at 08:02

## 2025-02-16 RX ADMIN — CARVEDILOL 12.5 MG: 12.5 TABLET, FILM COATED ORAL at 08:02

## 2025-02-16 RX ADMIN — OXYCODONE HYDROCHLORIDE AND ACETAMINOPHEN 1 TABLET: 10; 325 TABLET ORAL at 05:02

## 2025-02-16 RX ADMIN — BUDESONIDE INHALATION 0.5 MG: 0.5 SUSPENSION RESPIRATORY (INHALATION) at 07:02

## 2025-02-16 RX ADMIN — LIDOCAINE 5% 1 PATCH: 700 PATCH TOPICAL at 05:02

## 2025-02-16 RX ADMIN — INSULIN ASPART 2 UNITS: 100 INJECTION, SOLUTION INTRAVENOUS; SUBCUTANEOUS at 07:02

## 2025-02-16 RX ADMIN — DONEPEZIL HYDROCHLORIDE 5 MG: 5 TABLET ORAL at 08:02

## 2025-02-16 RX ADMIN — THERA TABS 1 TABLET: TAB at 08:02

## 2025-02-16 RX ADMIN — ENOXAPARIN SODIUM 40 MG: 40 INJECTION SUBCUTANEOUS at 05:02

## 2025-02-16 RX ADMIN — FERROUS SULFATE TAB 325 MG (65 MG ELEMENTAL FE) 1 EACH: 325 (65 FE) TAB at 08:02

## 2025-02-16 RX ADMIN — POLYETHYLENE GLYCOL 3350 17 G: 17 POWDER, FOR SOLUTION ORAL at 08:02

## 2025-02-16 RX ADMIN — ARFORMOTEROL TARTRATE 15 MCG: 15 SOLUTION RESPIRATORY (INHALATION) at 07:02

## 2025-02-16 RX ADMIN — ASPIRIN 81 MG: 81 TABLET, CHEWABLE ORAL at 09:02

## 2025-02-16 RX ADMIN — IPRATROPIUM BROMIDE AND ALBUTEROL SULFATE 3 ML: .5; 3 SOLUTION RESPIRATORY (INHALATION) at 07:02

## 2025-02-16 RX ADMIN — PANTOPRAZOLE SODIUM 40 MG: 40 TABLET, DELAYED RELEASE ORAL at 08:02

## 2025-02-16 RX ADMIN — OXYCODONE HYDROCHLORIDE AND ACETAMINOPHEN 1 TABLET: 10; 325 TABLET ORAL at 08:02

## 2025-02-16 RX ADMIN — VENLAFAXINE HYDROCHLORIDE 75 MG: 37.5 CAPSULE, EXTENDED RELEASE ORAL at 08:02

## 2025-02-16 NOTE — PROGRESS NOTES
[facility] Medicine  Progress Note    Patient Name: Daryleen G Moran  MRN: [id number]  Patient Class: IP- Inpatient   Admission Date: 2/9/2025  Length of Stay: 7 days  Attending Physician: Carley Phillips DO  Primary Care Provider: Abhi De Oliveira IV, MD        Subjective     Principal Problem:Fracture of femoral neck, right, closed        HPI:  Ms. Cyr is a 79 yr old female with a hx of DM type 2, HTN, s/p TAVR, CAD, dementia, AFib, COPD, chronic diastolic CHF, lumbar and thoracic spondylosis, degenerative disc disease, compression fractures, radiculopathy, arthritis, osteoporosis, GERD, stress urinary incontinence, HLD, left adrenal mass, iron-deficiency anemia, abdominal aneurysm, debility, attention and concentration deficit, vitamin-D deficiency, AVM, diverticulosis, depression, obesity, thrombocytopenia, aortic stenosis, NSTEMI who presented to the ED with a chief complaint of a slip and fall.  Granddaughter at bedside who helps to provide a history.  Patient and granddaughter state that the patient was getting out of bed to transfer to bedside commode when she slipped and fell onto her right side.  She states she immediately started having right hip pain.  Granddaughter and patient both deny head trauma and LOC.  She states that she quit smoking 6 months ago and denies alcohol and recreational drug use.  She denies fever, chills, dyspnea, palpitations, chest pain, abdominal pain, nausea, vomiting, diarrhea, dysuria, hematuria, lightheadedness, dizziness, and syncope.    Upon arrival to ED, patient afebrile, HR of 114, RR of 20, BP of 130/75, satting 96% on RA.  Workup in the ED revealed WBC of 12.78, potassium of 3.2, glucose of 154, PT of 12.6.  CXR shows no acute radiographic abnormalities.  Right hip x-ray shows acute nondisplaced and non comminuted intertrochanteric right femoral neck fracture.  While in the ED patient had heart rate of 143 and EKG showed AFib with RVR.   Patient was given diltiazem 10 mg IV, hydromorphone 1.5 mg IV, 1 L NS bolus while in the ED. patient had improvement with heart rate to 90s-100s.  ED provider discussed case with Orthopedic surgery who plans for OR in the morning.  Patient will be admitted under hospital medicine services for further management.    Overview/Hospital Course:  79-year-old female with history of AFib, dementia, compression fracture came in with fall and hip pain and patient was admitted for a right intertrochanteric femur fracture.  Status post cephalomedullary nail fixation  02/10.  Discuss antiplatelet/anticoagulation recommendations with Orthopedic surgery and we will just give ASA and DVT PPX given history of dementia and big fall risk.  S/p surgery - not really complaining of hip pain just her chronic back pain.  Encouraged to work with PT/OT and attempted to call daughter is patient lacks MDM to discuss progressing course and placement options but no answer, left VM.  Patient must more alert and oriented on 02/13 in his agreeable to SNF placement.     Interval History:  Patient seen and examined, no acute complaints    Review of Systems   All other systems reviewed and are negative.    Objective:     Vital Signs (Most Recent):  Temp: 97.6 °F (36.4 °C) (02/16/25 1212)  Pulse: 95 (02/16/25 1212)  Resp: 18 (02/16/25 1212)  BP: 126/69 (02/16/25 1212)  SpO2: (!) 88 % (02/16/25 1212) Vital Signs (24h Range):  Temp:  [97.5 °F (36.4 °C)-98.4 °F (36.9 °C)] 97.6 °F (36.4 °C)  Pulse:  [80-95] 95  Resp:  [16-19] 18  SpO2:  [88 %-96 %] 88 %  BP: (107-126)/(66-88) 126/69     Weight: 81.7 kg (180 lb 1.9 oz)  Body mass index is 35.18 kg/m².    Intake/Output Summary (Last 24 hours) at 2/16/2025 1456  Last data filed at 2/16/2025 0433  Gross per 24 hour   Intake 729.57 ml   Output 2500 ml   Net -1770.43 ml           Physical Exam  Constitutional:       Appearance: She is ill-appearing.   Cardiovascular:      Rate and Rhythm: Tachycardia present.  "Rhythm irregular.      Pulses: Normal pulses.   Pulmonary:      Effort: Pulmonary effort is normal. No respiratory distress.      Breath sounds: No wheezing.   Abdominal:      General: Abdomen is flat. Bowel sounds are normal.   Musculoskeletal:         General: Tenderness present.      Right lower leg: Edema present.   Skin:     General: Skin is warm and dry.   Neurological:      Mental Status: Mental status is at baseline.             Significant Labs: All pertinent labs within the past 24 hours have been reviewed.    Significant Imaging: I have reviewed all pertinent imaging results/findings within the past 24 hours.    Assessment and Plan     * Fracture of femoral neck, right, closed  - Patient presented to the ED after a slip and fall.  She was found to have right nondisplaced enter trochanteric femoral neck fracture on right hip x-ray in the ED.  - ED provider discussed case with Orthopedic surgery who plans to take patient to OR in the morning  - NPO at midnight  - PRN analgesia and symptomatic care  - Holding DVT prophylaxis as well as aspirin and Plavix, resume when appropriate to do so  - restart ASA    Chronic diastolic heart failure  Patient has Diastolic (HFpEF) heart failure that is Chronic. On presentation their CHF was well compensated. Most recent BNP and echo results are listed below.  No results for input(s): "BNP" in the last 72 hours.  Latest ECHO  Results for orders placed during the hospital encounter of 08/20/24    Echo    Interpretation Summary    Left Ventricle: The left ventricle is normal in size. Normal wall thickness. There is concentric remodeling. There is normal systolic function with a visually estimated ejection fraction of 60 - 65%. Diastolic function cannot be reliably determined in the presence of mitral annular calcification.    Right Ventricle: Normal right ventricular cavity size. Wall thickness is normal. Systolic function is mildly reduced.    The left atrium is moderately " dilated.    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 It is reported to be a Carrion Mk S3 valve.    Mitral Valve: There is bileaflet sclerosis. There is moderate to severe mitral annular calcification present.    Tricuspid Valve: There is mild regurgitation.    Pulmonary Artery: The estimated pulmonary artery systolic pressure is 31 mmHg.    IVC/SVC: Normal venous pressure at 3 mmHg.    Current Heart Failure Medications  carvediloL tablet 12.5 mg, 2 times daily, Oral  furosemide tablet 40 mg, Daily PRN, Oral    Plan  - Monitor strict I&Os and daily weights.    - Place on telemetry  - Low sodium diet  - Place on fluid restriction of 1.5 L.   - Cardiology has not been consulted  - The patient's volume status is at their baseline  - Continue home coreg and lasix    COPD (chronic obstructive pulmonary disease)  Patient's COPD is controlled currently.  Patient is currently off COPD Pathway. Continue scheduled inhalers Supplemental oxygen and monitor respiratory status closely.     - DuoNebs Q6H PRN    Atrial fibrillation with RVR  Patient has permanent atrial fibrillation. Patient is currently in atrial fibrillation. [id number] Score: 5. The patients heart rate in the last 24 hours is as follows:  Pulse  Min: 89  Max: 135     Antiarrhythmics  diltiaZEM 24 hr capsule 120 mg, Daily, Oral  metoprolol injection 5 mg, Every 4 hours PRN, Intravenous    Anticoagulants       Plan  - Replete lytes with a goal of K>4, Mg >2  - Patient is not on anti coag outpatient per home medication list, she is however on DAPT with aspirin and Plavix  - Holding home aspirin and Plavix as patient is planned to go to OR in the morning for repair, resume when appropriate to do so  - Patient's afib is currently controlled  - Patient was given diltiazem 10 mg IV while in the ED as patient's heart rate went to 140s.  I suspect that this is probably just due to uncontrolled pain.  After patient received  "diltiazem and hydromorphone, heart rate is now controlled in the 90s-100s.  Restarted ASA.  Plan to discuss with daughter if safe to restart anticoagulation given fall risk in setting of dementia and injury.    Dementia in other diseases classified elsewhere, unspecified severity, without behavioral disturbance, psychotic disturbance, mood disturbance, and anxiety  - History noted  - Patient is currently A&O x3 to self, place, and month. Patient is disoriented to City and year.  She knows that she is in the hospital but is unsure of the name of the hospital.  She states that it is 2027.  - Continue home Aricept    S/P TAVR (transcatheter aortic valve replacement)  - Hx noted      Hypertension  Chronic,  Latest blood pressure and vitals reviewed-     Temp:  [98.5 °F (36.9 °C)]   Pulse:  []   Resp:  [13-20]   BP: (107-130)/(60-80)   SpO2:  [87 %-96 %] .   Home meds for hypertension were reviewed and noted below-  Hypertension Medications               carvediloL (COREG) 12.5 MG tablet Take 12.5 mg by mouth 2 (two) times daily.    diltiaZEM (DILACOR XR) 120 MG CDCR Take 1 capsule (120 mg total) by mouth once daily.    furosemide (LASIX) 40 MG tablet Take 1 tablet (40 mg total) by mouth daily as needed (Take for weight gain > 2 pounds over 24 hours. May discuss with PCP).            While in the hospital, will manage blood pressure as follows; Continue home antihypertensive regimen    Will utilize p.r.n. blood pressure medication only if patient's blood pressure greater than 180/110 and she develops symptoms such as worsening chest pain or shortness of breath.      Type 2 diabetes mellitus, without long-term current use of insulin  Last A1c reviewed-   Lab Results   Component Value Date    HGBA1C 6.6 (H) 03/08/2024     Most recent fingerstick glucose reviewed- No results for input(s): "POCTGLUCOSE" in the last 24 hours.  Current correctional scale  Medium  Maintain anti-hyperglycemic dose as follows- "   Antihyperglycemics (From admission, onward)      Start     Stop Route Frequency Ordered    02/09/25 1755  insulin aspart U-100 pen 0-10 Units         -- SubQ Before meals & nightly PRN 02/09/25 1657          Hold Oral hypoglycemics while patient is in the hospital.         VTE Risk Mitigation (From admission, onward)           Ordered     enoxaparin injection 40 mg  Every 24 hours         02/12/25 1612     Reason for No Pharmacological VTE Prophylaxis  Once        Comments: Plan for OR in AM   Question:  Reasons:  Answer:  Physician Provided (leave comment)    02/09/25 1657     IP VTE HIGH RISK PATIENT  Once         02/09/25 1657     Place sequential compression device  Until discontinued         02/09/25 1657                    Discharge Planning   ISAAK: 2/18/2025     Code Status: Full Code   Medical Readiness for Discharge Date:   Discharge Plan A: Skilled Nursing Facility   Discharge Delays: (!) Post-Acute Set-up      Treatment, consult recommendation, PT/OT, dispo planning      Please place Justification for DME        Carley Phillips DO  Department of Hospital Medicine   [facility]

## 2025-02-16 NOTE — CARE UPDATE
02/15/25 2020   Patient Assessment/Suction   Level of Consciousness (AVPU) alert   Respiratory Effort Normal;Unlabored   Expansion/Accessory Muscles/Retractions no use of accessory muscles;no retractions;expansion symmetric   All Lung Fields Breath Sounds clear;equal bilaterally   Rhythm/Pattern, Respiratory no shortness of breath reported;unlabored   Cough Frequency no cough   PRE-TX-O2   Device (Oxygen Therapy) room air   SpO2 96 %   Pulse Oximetry Type Continuous   $ Pulse Oximetry - Multiple Charge Pulse Oximetry - Multiple   Pulse 80   Resp 16   Aerosol Therapy   $ Aerosol Therapy Charges Aerosol Treatment   Daily Review of Necessity (SVN) completed   Respiratory Treatment Status (SVN) given   Treatment Route (SVN) mask;oxygen   Patient Position HOB elevated   Post Treatment Assessment (SVN) breath sounds unchanged   Signs of Intolerance (SVN) none   Breath Sounds Post-Respiratory Treatment   Throughout All Fields Post-Treatment All Fields   Throughout All Fields Post-Treatment no change   Post-treatment Heart Rate (beats/min) 77   Post-treatment Resp Rate (breaths/min) 18

## 2025-02-16 NOTE — SUBJECTIVE & OBJECTIVE
Interval History:  Patient seen and examined, no acute complaints    Review of Systems   All other systems reviewed and are negative.    Objective:     Vital Signs (Most Recent):  Temp: 97.6 °F (36.4 °C) (02/16/25 1212)  Pulse: 95 (02/16/25 1212)  Resp: 18 (02/16/25 1212)  BP: 126/69 (02/16/25 1212)  SpO2: (!) 88 % (02/16/25 1212) Vital Signs (24h Range):  Temp:  [97.5 °F (36.4 °C)-98.4 °F (36.9 °C)] 97.6 °F (36.4 °C)  Pulse:  [80-95] 95  Resp:  [16-19] 18  SpO2:  [88 %-96 %] 88 %  BP: (107-126)/(66-88) 126/69     Weight: 81.7 kg (180 lb 1.9 oz)  Body mass index is 35.18 kg/m².    Intake/Output Summary (Last 24 hours) at 2/16/2025 1456  Last data filed at 2/16/2025 0433  Gross per 24 hour   Intake 729.57 ml   Output 2500 ml   Net -1770.43 ml           Physical Exam  Constitutional:       Appearance: She is ill-appearing.   Cardiovascular:      Rate and Rhythm: Tachycardia present. Rhythm irregular.      Pulses: Normal pulses.   Pulmonary:      Effort: Pulmonary effort is normal. No respiratory distress.      Breath sounds: No wheezing.   Abdominal:      General: Abdomen is flat. Bowel sounds are normal.   Musculoskeletal:         General: Tenderness present.      Right lower leg: Edema present.   Skin:     General: Skin is warm and dry.   Neurological:      Mental Status: Mental status is at baseline.             Significant Labs: All pertinent labs within the past 24 hours have been reviewed.    Significant Imaging: I have reviewed all pertinent imaging results/findings within the past 24 hours.

## 2025-02-16 NOTE — PLAN OF CARE
Problem: Adult Inpatient Plan of Care  Goal: Plan of Care Review  2/16/2025 1544 by Isabel Whitman LPN  Outcome: Progressing  2/16/2025 1445 by Isabel Whitman LPN  Outcome: Progressing  Goal: Patient-Specific Goal (Individualized)  2/16/2025 1544 by Isabel Whitman LPN  Outcome: Progressing  2/16/2025 1445 by Isabel Whitman LPN  Outcome: Progressing  Goal: Absence of Hospital-Acquired Illness or Injury  2/16/2025 1544 by Isabel Whitman LPN  Outcome: Progressing  2/16/2025 1445 by Isabel Whitman LPN  Outcome: Progressing  Goal: Optimal Comfort and Wellbeing  2/16/2025 1544 by Isabel Whitman LPN  Outcome: Progressing  2/16/2025 1445 by Isabel Whitman LPN  Outcome: Progressing  Goal: Readiness for Transition of Care  2/16/2025 1544 by Isabel Whitman LPN  Outcome: Progressing  2/16/2025 1445 by Isabel Whitman LPN  Outcome: Progressing     Problem: Skin Injury Risk Increased  Goal: Skin Health and Integrity  2/16/2025 1544 by Isabel Whitman LPN  Outcome: Progressing  2/16/2025 1445 by Isabel Whitman LPN  Outcome: Progressing     Problem: Diabetes Comorbidity  Goal: Blood Glucose Level Within Targeted Range  2/16/2025 1544 by Isabel Whitman LPN  Outcome: Progressing  2/16/2025 1445 by Isabel Whitman LPN  Outcome: Progressing     Problem: Wound  Goal: Optimal Coping  2/16/2025 1544 by Isabel Whitman LPN  Outcome: Progressing  2/16/2025 1445 by Isabel Whitman LPN  Outcome: Progressing     Problem: Wound  Goal: Optimal Coping  2/16/2025 1544 by Isabel Whitman LPN  Outcome: Progressing  2/16/2025 1445 by Isabel Whitman LPN  Outcome: Progressing  Goal: Optimal Functional Ability  2/16/2025 1544 by Isabel Whitman LPN  Outcome: Progressing  2/16/2025 1445 by Isabel Whitman LPN  Outcome: Progressing  Goal: Absence of Infection Signs and Symptoms  2/16/2025 1544 by Isabel Whitman LPN  Outcome: Progressing  2/16/2025 1445 by Isabel Whitman LPN  Outcome: Progressing  Goal: Improved Oral  Intake  2/16/2025 1544 by Isabel Whitman LPN  Outcome: Progressing  2/16/2025 1445 by Isabel Whitman LPN  Outcome: Progressing  Goal: Optimal Pain Control and Function  2/16/2025 1544 by Isabel Whitman LPN  Outcome: Progressing  2/16/2025 1445 by Isabel Whitman LPN  Outcome: Progressing  Goal: Skin Health and Integrity  2/16/2025 1544 by Isabel Whitman LPN  Outcome: Progressing  2/16/2025 1445 by Isabel Whitman LPN  Outcome: Progressing  Goal: Optimal Wound Healing  2/16/2025 1544 by Isabel Whitman LPN  Outcome: Progressing  2/16/2025 1445 by Isabel Whitman LPN  Outcome: Progressing     Problem: Infection  Goal: Absence of Infection Signs and Symptoms  2/16/2025 1544 by Isabel Whitman LPN  Outcome: Progressing  2/16/2025 1445 by Isabel Whitman LPN  Outcome: Progressing     Problem: Fall Injury Risk  Goal: Absence of Fall and Fall-Related Injury  2/16/2025 1544 by Isabel Whitman LPN  Outcome: Progressing  2/16/2025 1445 by Isabel Whitman LPN  Outcome: Progressing

## 2025-02-16 NOTE — PLAN OF CARE
Problem: Adult Inpatient Plan of Care  Goal: Plan of Care Review  Outcome: Progressing     Problem: Diabetes Comorbidity  Goal: Blood Glucose Level Within Targeted Range  Outcome: Progressing     Problem: Wound  Goal: Optimal Coping  Outcome: Progressing

## 2025-02-17 PROBLEM — D64.9 ANEMIA: Status: ACTIVE | Noted: 2025-02-17

## 2025-02-17 PROBLEM — E66.9 OBESITY: Status: ACTIVE | Noted: 2025-02-17

## 2025-02-17 LAB
ANION GAP SERPL CALC-SCNC: 3 MMOL/L (ref 8–16)
BASOPHILS # BLD AUTO: 0.06 K/UL (ref 0–0.2)
BASOPHILS NFR BLD: 0.9 % (ref 0–1.9)
BUN SERPL-MCNC: 15 MG/DL (ref 8–23)
CALCIUM SERPL-MCNC: 8.5 MG/DL (ref 8.7–10.5)
CHLORIDE SERPL-SCNC: 100 MMOL/L (ref 95–110)
CO2 SERPL-SCNC: 34 MMOL/L (ref 23–29)
CREAT SERPL-MCNC: 0.5 MG/DL (ref 0.5–1.4)
DIFFERENTIAL METHOD BLD: ABNORMAL
EOSINOPHIL # BLD AUTO: 0.2 K/UL (ref 0–0.5)
EOSINOPHIL NFR BLD: 2.4 % (ref 0–8)
ERYTHROCYTE [DISTWIDTH] IN BLOOD BY AUTOMATED COUNT: 16.7 % (ref 11.5–14.5)
EST. GFR  (NO RACE VARIABLE): >60 ML/MIN/1.73 M^2
GLUCOSE SERPL-MCNC: 125 MG/DL (ref 70–110)
HCT VFR BLD AUTO: 32.9 % (ref 37–48.5)
HGB BLD-MCNC: 10.3 G/DL (ref 12–16)
IMM GRANULOCYTES # BLD AUTO: 0.03 K/UL (ref 0–0.04)
IMM GRANULOCYTES NFR BLD AUTO: 0.4 % (ref 0–0.5)
LYMPHOCYTES # BLD AUTO: 1.5 K/UL (ref 1–4.8)
LYMPHOCYTES NFR BLD: 21.3 % (ref 18–48)
MAGNESIUM SERPL-MCNC: 1.7 MG/DL (ref 1.6–2.6)
MCH RBC QN AUTO: 30 PG (ref 27–31)
MCHC RBC AUTO-ENTMCNC: 31.3 G/DL (ref 32–36)
MCV RBC AUTO: 96 FL (ref 82–98)
MONOCYTES # BLD AUTO: 0.8 K/UL (ref 0.3–1)
MONOCYTES NFR BLD: 11.5 % (ref 4–15)
NEUTROPHILS # BLD AUTO: 4.5 K/UL (ref 1.8–7.7)
NEUTROPHILS NFR BLD: 63.5 % (ref 38–73)
NRBC BLD-RTO: 0 /100 WBC
PHOSPHATE SERPL-MCNC: 4.1 MG/DL (ref 2.7–4.5)
PLATELET # BLD AUTO: 193 K/UL (ref 150–450)
PMV BLD AUTO: 10.8 FL (ref 9.2–12.9)
POCT GLUCOSE: 134 MG/DL (ref 70–110)
POTASSIUM SERPL-SCNC: 4.4 MMOL/L (ref 3.5–5.1)
RBC # BLD AUTO: 3.43 M/UL (ref 4–5.4)
SODIUM SERPL-SCNC: 137 MMOL/L (ref 136–145)
WBC # BLD AUTO: 7.05 K/UL (ref 3.9–12.7)

## 2025-02-17 PROCEDURE — 94640 AIRWAY INHALATION TREATMENT: CPT

## 2025-02-17 PROCEDURE — 80048 BASIC METABOLIC PNL TOTAL CA: CPT | Performed by: ORTHOPAEDIC SURGERY

## 2025-02-17 PROCEDURE — 84100 ASSAY OF PHOSPHORUS: CPT | Performed by: ORTHOPAEDIC SURGERY

## 2025-02-17 PROCEDURE — 21400001 HC TELEMETRY ROOM

## 2025-02-17 PROCEDURE — 97535 SELF CARE MNGMENT TRAINING: CPT

## 2025-02-17 PROCEDURE — 63600175 PHARM REV CODE 636 W HCPCS: Mod: JZ | Performed by: HOSPITALIST

## 2025-02-17 PROCEDURE — 94761 N-INVAS EAR/PLS OXIMETRY MLT: CPT

## 2025-02-17 PROCEDURE — 99900031 HC PATIENT EDUCATION (STAT)

## 2025-02-17 PROCEDURE — 25000242 PHARM REV CODE 250 ALT 637 W/ HCPCS: Performed by: ORTHOPAEDIC SURGERY

## 2025-02-17 PROCEDURE — 83735 ASSAY OF MAGNESIUM: CPT | Performed by: ORTHOPAEDIC SURGERY

## 2025-02-17 PROCEDURE — 85025 COMPLETE CBC W/AUTO DIFF WBC: CPT | Performed by: ORTHOPAEDIC SURGERY

## 2025-02-17 PROCEDURE — 25000003 PHARM REV CODE 250: Performed by: ORTHOPAEDIC SURGERY

## 2025-02-17 PROCEDURE — 25000003 PHARM REV CODE 250: Performed by: HOSPITALIST

## 2025-02-17 PROCEDURE — 36415 COLL VENOUS BLD VENIPUNCTURE: CPT | Performed by: ORTHOPAEDIC SURGERY

## 2025-02-17 PROCEDURE — 97530 THERAPEUTIC ACTIVITIES: CPT

## 2025-02-17 RX ORDER — AMOXICILLIN 250 MG
1 CAPSULE ORAL DAILY
Status: DISCONTINUED | OUTPATIENT
Start: 2025-02-17 | End: 2025-02-20 | Stop reason: HOSPADM

## 2025-02-17 RX ORDER — ACETAMINOPHEN 500 MG
1000 TABLET ORAL EVERY 6 HOURS PRN
Status: DISCONTINUED | OUTPATIENT
Start: 2025-02-17 | End: 2025-02-20 | Stop reason: HOSPADM

## 2025-02-17 RX ORDER — KETOROLAC TROMETHAMINE 30 MG/ML
15 INJECTION, SOLUTION INTRAMUSCULAR; INTRAVENOUS ONCE
Status: COMPLETED | OUTPATIENT
Start: 2025-02-17 | End: 2025-02-17

## 2025-02-17 RX ORDER — IBUPROFEN 200 MG
600 TABLET ORAL EVERY 6 HOURS PRN
Status: DISCONTINUED | OUTPATIENT
Start: 2025-02-17 | End: 2025-02-20 | Stop reason: HOSPADM

## 2025-02-17 RX ADMIN — CARVEDILOL 12.5 MG: 12.5 TABLET, FILM COATED ORAL at 08:02

## 2025-02-17 RX ADMIN — IBUPROFEN 600 MG: 200 TABLET, FILM COATED ORAL at 07:02

## 2025-02-17 RX ADMIN — PANTOPRAZOLE SODIUM 40 MG: 40 TABLET, DELAYED RELEASE ORAL at 08:02

## 2025-02-17 RX ADMIN — ARFORMOTEROL TARTRATE 15 MCG: 15 SOLUTION RESPIRATORY (INHALATION) at 06:02

## 2025-02-17 RX ADMIN — VENLAFAXINE HYDROCHLORIDE 75 MG: 37.5 CAPSULE, EXTENDED RELEASE ORAL at 08:02

## 2025-02-17 RX ADMIN — KETOROLAC TROMETHAMINE 15 MG: 30 INJECTION, SOLUTION INTRAMUSCULAR; INTRAVENOUS at 10:02

## 2025-02-17 RX ADMIN — ARFORMOTEROL TARTRATE 15 MCG: 15 SOLUTION RESPIRATORY (INHALATION) at 07:02

## 2025-02-17 RX ADMIN — LACTOBACILLUS ACIDOPHILUS / LACTOBACILLUS BULGARICUS 1 EACH: 100 MILLION CFU STRENGTH GRANULES at 08:02

## 2025-02-17 RX ADMIN — ASPIRIN 81 MG: 81 TABLET, CHEWABLE ORAL at 08:02

## 2025-02-17 RX ADMIN — BUDESONIDE INHALATION 0.5 MG: 0.5 SUSPENSION RESPIRATORY (INHALATION) at 07:02

## 2025-02-17 RX ADMIN — SENNOSIDES AND DOCUSATE SODIUM 1 TABLET: 50; 8.6 TABLET ORAL at 10:02

## 2025-02-17 RX ADMIN — FERROUS SULFATE TAB 325 MG (65 MG ELEMENTAL FE) 1 EACH: 325 (65 FE) TAB at 08:02

## 2025-02-17 RX ADMIN — IBUPROFEN 600 MG: 200 TABLET, FILM COATED ORAL at 10:02

## 2025-02-17 RX ADMIN — DILTIAZEM HYDROCHLORIDE 120 MG: 120 CAPSULE, COATED, EXTENDED RELEASE ORAL at 08:02

## 2025-02-17 RX ADMIN — DONEPEZIL HYDROCHLORIDE 5 MG: 5 TABLET ORAL at 08:02

## 2025-02-17 RX ADMIN — BUDESONIDE INHALATION 0.5 MG: 0.5 SUSPENSION RESPIRATORY (INHALATION) at 06:02

## 2025-02-17 RX ADMIN — THERA TABS 1 TABLET: TAB at 08:02
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
## 2025-02-17 NOTE — PT/OT/SLP PROGRESS
Occupational Therapy   Treatment    Name: Daryleen G Moran  MRN: [id number]  Admitting Diagnosis:  Fracture of femoral neck, right, closed  7 Days Post-Op  The primary encounter diagnosis was Closed 2-part intertrochanteric fracture of right femur, initial encounter. Diagnoses of Pre-op chest exam, Right hip pain, Fracture of femoral neck, right, closed, Chest pain, and Arrhythmia were also pertinent to this visit.  Pre-op Diagnosis: Closed 2-part intertrochanteric fracture of right femur, initial encounter [S72.141A] s/p Procedure(s):  INSERTION, INTRAMEDULLARY ALEIDA, FEMUR     Recommendations:     Discharge Recommendations: Moderate Intensity Therapy  Discharge Equipment Recommendations:  to be determined by next level of care  Barriers to discharge:  None    Assessment:     Daryleen G Moran is a 79 y.o. female with a medical diagnosis of Fracture of femoral neck, right, closed.  She presents with Performance deficits affecting function are weakness, impaired endurance, impaired self care skills, impaired functional mobility, gait instability, impaired balance, decreased lower extremity function, decreased safety awareness, pain, decreased ROM, impaired skin, impaired cardiopulmonary response to activity, edema, impaired joint extensibility, orthopedic precautions.        Pt found lying in bed, motivated to use BSC because she had to have a BM. She was Max A sit<>stand w/ rail and HHA, Max A BSC stand pivot to L and step transfer to R to BSC. Pt c/o RLE pain, yelling out w/ RLE movement however wanted to continue. Total A for hygiene and clothes management in sitting. Min A to scoot along EOB in sitting before returning to supine w/ Mod A. Pt was anxious and demanding however able to be redirected and explain to her what was going on.    Rehab Prognosis:  Good; patient would benefit from acute skilled OT services to address these deficits and reach maximum level of function.       Plan:     Patient to be seen 5 x/week  to address the above listed problems via self-care/home management, therapeutic activities, therapeutic exercises  Plan of Care Expires: 03/11/25  Plan of Care Reviewed with: patient    Subjective     Chief Complaint: RLE pain  w/ movement and WB.  Patient/Family Comments/goals: pt agreeable to use BSC.  Pain/Comfort:  Pain Rating 1:  (none reported at rest in bed. Pt yelling out w/ RLE movement and WB)  Location - Side 1: Right  Location - Orientation 1: generalized  Location 1: hip  Pain Addressed 1: Distraction, Cessation of Activity, Nurse notified, Reposition, Pre-medicate for activity  Pain Rating Post-Intervention 1: 3/10    Objective:     Communicated with: nurse prior to session.  Patient found HOB elevated with bed alarm, telemetry, PureWick, peripheral IV upon OT entry to room.    General Precautions: Standard, fall, hearing impaired (dementia)    Orthopedic Precautions:RLE weight bearing as tolerated  Braces: N/A  Respiratory Status: Room air     Occupational Performance:     Bed Mobility:    Patient completed Scooting/Bridging with moderate assistance  Patient completed Supine to Sit with moderate assistance, with side rail, and HOB elevated, extra time and RLE assist w/ movement  Patient completed Sit to Supine with moderate assistance and for BLE lifting assist     Functional Mobility/Transfers:  Patient completed Sit <> Stand Transfer with maximal assistance  with  hand-held assist, bed elevated, bed rail used   Patient completed Toilet Transfer Stand Pivot and Step Transfer technique with maximal assistance with  hand-held assist and bedside commode  Functional Mobility: pt performed seated lateral scooting along bed w/ Min A toward HOB . Assist for RLE advancement and WS.    Activities of Daily Living:  Feeding:  setup HOB elevated  Toileting: total assistance for hygiene after BM seated on BSC, soiled brief and PW removed in supine, replaced clean PW and adult brief seated EOB.    Treatment &  Education:  Pt seen for safe self care activities and fx mobility retraining as stated above.  All questions/concerns addressed within scope.  Call staff for BTB/OOB assist. Call bell use explained. Pt acknowledged.  Purpose of OT and POC  Sequencing cues for sc and fx mobility, verbal redirection, calming tech : PLB and deep breathing tech.       Patient left HOB elevated with all lines intact, call button in reach, bed alarm on, and nurse notified    GOALS:   Multidisciplinary Problems       Occupational Therapy Goals          Problem: Occupational Therapy    Goal Priority Disciplines Outcome Interventions   Occupational Therapy Goal     OT, PT/OT Progressing    Description: Goals to be met by: 3/11/25     Patient will increase functional independence with ADLs by performing:    UE Dressing with Minimal Assistance   LE Dressing with Minimal Assistance.  Grooming while seated at sink with Minimal Assistance.  Toileting from bedside commode with Minimal Assistance for hygiene and clothing management.   Toilet transfer to bedside commode with Minimal Assistance.                         DME Justifications:  No DME recommended requiring DME justifications    Time Tracking:     OT Date of Treatment: 02/17/25  OT Start Time: 1710  OT Stop Time: 1737  OT Total Time (min): 27 min    Billable Minutes:Self Care/Home Management 17  Therapeutic Activity 10  Total Time 27    OT/FILI: OT          2/17/2025

## 2025-02-17 NOTE — CARE UPDATE
02/17/25 0658   Patient Assessment/Suction   Level of Consciousness (AVPU) alert   Respiratory Effort Normal;Unlabored   All Lung Fields Breath Sounds Anterior:;Lateral:;clear   RLL Breath Sounds clear;equal bilaterally   Rhythm/Pattern, Respiratory pattern regular;depth regular;unlabored   Cough Frequency no cough   PRE-TX-O2   Device (Oxygen Therapy) room air   SpO2 95 %   Pulse Oximetry Type Intermittent   $ Pulse Oximetry - Multiple Charge Pulse Oximetry - Multiple   Pulse 70   Resp 16   Aerosol Therapy   $ Aerosol Therapy Charges Aerosol Treatment   Daily Review of Necessity (SVN) completed   Respiratory Treatment Status (SVN) given   Treatment Route (SVN) mask;oxygen   Patient Position HOB elevated   Post Treatment Assessment (SVN) breath sounds improved   Signs of Intolerance (SVN) none   Breath Sounds Post-Respiratory Treatment   Throughout All Fields Post-Treatment All Fields   Throughout All Fields Post-Treatment aeration increased   Post-treatment Resp Rate (breaths/min) 15   Education   $ Education Bronchodilator;15 min

## 2025-02-17 NOTE — PROGRESS NOTES
[facility]  Progress Note    Patient Name: Daryleen G Moran  MRN: [id number]  Patient Class: IP- Inpatient   Admission Date: 2/9/2025  Length of Stay: 8 days  Attending Physician: Sanjay Aquino MD  Primary Care Provider: Abhi De Oliveira IV, MD        Subjective     Principal Problem:Fracture of femoral neck, right, closed        HPI:  Ms. Cyr is a 79 yr old female with a hx of DM type 2, HTN, s/p TAVR, CAD, dementia, AFib, COPD, chronic diastolic CHF, lumbar and thoracic spondylosis, degenerative disc disease, compression fractures, radiculopathy, arthritis, osteoporosis, GERD, stress urinary incontinence, HLD, left adrenal mass, iron-deficiency anemia, abdominal aneurysm, debility, attention and concentration deficit, vitamin-D deficiency, AVM, diverticulosis, depression, obesity, thrombocytopenia, aortic stenosis, NSTEMI who presented to the ED with a chief complaint of a slip and fall.  Granddaughter at bedside who helps to provide a history.  Patient and granddaughter state that the patient was getting out of bed to transfer to bedside commode when she slipped and fell onto her right side.  She states she immediately started having right hip pain.  Granddaughter and patient both deny head trauma and LOC.  She states that she quit smoking 6 months ago and denies alcohol and recreational drug use.  She denies fever, chills, dyspnea, palpitations, chest pain, abdominal pain, nausea, vomiting, diarrhea, dysuria, hematuria, lightheadedness, dizziness, and syncope.    Upon arrival to ED, patient afebrile, HR of 114, RR of 20, BP of 130/75, satting 96% on RA.  Workup in the ED revealed WBC of 12.78, potassium of 3.2, glucose of 154, PT of 12.6.  CXR shows no acute radiographic abnormalities.  Right hip x-ray shows acute nondisplaced and non comminuted intertrochanteric right femoral neck fracture.  While in the ED patient had heart rate of 143 and EKG showed AFib with RVR.  Patient  was given diltiazem 10 mg IV, hydromorphone 1.5 mg IV, 1 L NS bolus while in the ED. patient had improvement with heart rate to 90s-100s.  ED provider discussed case with Orthopedic surgery who plans for OR in the morning.  Patient will be admitted under hospital medicine services for further management.    Overview/Hospital Course:  79-year-old female with history of AFib, dementia, compression fracture came in with fall and hip pain and patient was admitted for a right intertrochanteric femur fracture.  Status post cephalomedullary nail fixation  02/10.  Discuss antiplatelet/anticoagulation recommendations with Orthopedic surgery and we will just give ASA and DVT PPX given history of dementia and big fall risk.  S/p surgery - not really complaining of hip pain just her chronic back pain.  Encouraged to work with PT/OT and attempted to call daughter is patient lacks MDM to discuss progressing course and placement options but no answer, left VM.  Patient must more alert and oriented on 02/13 in his agreeable to SNF placement.     Interval History:  Patient seen and examined.  Complains of significant pain in her leg.  Plan of care discussed with the patient in detail.    Review of Systems  Objective:     Vital Signs (Most Recent):  Temp: 98.3 °F (36.8 °C) (02/17/25 1201)  Pulse: 99 (02/17/25 1201)  Resp: 20 (02/17/25 0833)  BP: (!) 116/56 (02/17/25 1201)  SpO2: 95 % (02/17/25 1201) Vital Signs (24h Range):  Temp:  [97.9 °F (36.6 °C)-98.5 °F (36.9 °C)] 98.3 °F (36.8 °C)  Pulse:  [] 99  Resp:  [16-20] 20  SpO2:  [90 %-97 %] 95 %  BP: (108-129)/(56-84) 116/56     Weight: 81.7 kg (180 lb 1.9 oz)  Body mass index is 35.18 kg/m².    Intake/Output Summary (Last 24 hours) at 2/17/2025 1445  Last data filed at 2/17/2025 1300  Gross per 24 hour   Intake 240 ml   Output 4150 ml   Net -3910 ml         Physical Exam  Vitals and nursing note reviewed.   Constitutional:       General: She is not in acute distress.  Eyes:       Pupils: Pupils are equal, round, and reactive to light.   Cardiovascular:      Rate and Rhythm: Normal rate and regular rhythm.      Heart sounds: No murmur heard.  Pulmonary:      Effort: Pulmonary effort is normal.      Breath sounds: Normal breath sounds.   Abdominal:      General: There is no distension.      Palpations: Abdomen is soft.      Tenderness: There is no abdominal tenderness.   Musculoskeletal:      Comments: Right lower extremity with dressing in place.  Noted swelling of right thigh, as well as ecchymoses around incision site.  Appropriate tenderness noted.   Skin:     Coloration: Skin is not pale.      Findings: No rash.   Neurological:      Mental Status: She is alert and oriented to person, place, and time.             Significant Labs: All pertinent labs within the past 24 hours have been reviewed.    Significant Imaging: I have reviewed all pertinent imaging results/findings within the past 24 hours.    Assessment and Plan     * Fracture of femoral neck, right, closed  Patient is status post hip fracture ORIF.  Still having significant pain postoperatively.  Adjust pain medication to minimize confusion.  Continue DVT prophylaxis.  Defer further operative management to Orthopedic surgery.    Obesity  Body mass index is 35.18 kg/m². Morbid obesity complicates all aspects of disease management from diagnostic modalities to treatment. Weight loss encouraged and health benefits explained to patient.         Anemia  Anemia is likely due to chronic disease due to Chronic Kidney Disease. Most recent hemoglobin and hematocrit are listed below.  Recent Labs     02/15/25  1003 02/16/25  0609 02/17/25  0557   HGB 9.9* 9.7* 10.3*   HCT 31.4* 31.7* 32.9*     Plan  - Monitor serial CBC: Daily  - Transfuse PRBC if patient becomes hemodynamically unstable, symptomatic or H/H drops below 7/21.  - Patient has not received any PRBC transfusions to date  - Patient's anemia is currently stable    Chronic diastolic  heart failure  Patient has Diastolic (HFpEF) heart failure that is Chronic. On presentation their CHF was well compensated.     Latest ECHO  Results for orders placed during the hospital encounter of 08/20/24    Echo    Interpretation Summary    Left Ventricle: The left ventricle is normal in size. Normal wall thickness. There is concentric remodeling. There is normal systolic function with a visually estimated ejection fraction of 60 - 65%. Diastolic function cannot be reliably determined in the presence of mitral annular calcification.    Right Ventricle: Normal right ventricular cavity size. Wall thickness is normal. Systolic function is mildly reduced.    The left atrium is moderately dilated.    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 It is reported to be a Carrion Mk S3 valve.    Mitral Valve: There is bileaflet sclerosis. There is moderate to severe mitral annular calcification present.    Tricuspid Valve: There is mild regurgitation.    Pulmonary Artery: The estimated pulmonary artery systolic pressure is 31 mmHg.    IVC/SVC: Normal venous pressure at 3 mmHg.    Current Heart Failure Medications  carvediloL tablet 12.5 mg, 2 times daily, Oral  furosemide tablet 40 mg, Daily PRN, Oral    Plan  - Monitor strict I&Os and daily weights.    - Place on telemetry  - Low sodium diet  - Place on fluid restriction of 1.5 L.   - Cardiology has not been consulted  - The patient's volume status is at their baseline  - Continue home coreg and lasix    COPD (chronic obstructive pulmonary disease)  Patient's COPD is controlled currently.  Patient is currently off COPD Pathway. Continue scheduled inhalers Supplemental oxygen and monitor respiratory status closely.     - DuoNebs Q6H PRN    Atrial fibrillation with RVR  Patient has permanent atrial fibrillation. Patient is currently in atrial fibrillation. [id number] Score: 5. The patients heart rate in the last 24 hours is as  follows:  Pulse  Min: 70  Max: 112     Antiarrhythmics  diltiaZEM 24 hr capsule 120 mg, Daily, Oral    Anticoagulants  enoxaparin injection 40 mg, Every 24 hours, Subcutaneous    Plan  - Replete lytes with a goal of K>4, Mg >2  - Patient is not on anti coag outpatient per home medication list, she is however on DAPT with aspirin and Plavix  - Holding home aspirin and Plavix as patient is planned to go to OR in the morning for repair, resume when appropriate to do so  - Patient's afib is currently controlled  - Restarted ASA.  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 Dementia is moderate. The patient does not have signs of behavioral disturbance. Home dementia medications are continued.. Continue non-pharmacologic interventions to prevent delirium (No VS between 11PM-5AM, activity during day, opening blinds, providing glasses/hearing aids, and up in chair during daytime). Will avoid narcotics and benzos unless absolutely necessary. PRN anti-psychotics are prescribed to avoid self harm behaviors.      S/P TAVR (transcatheter aortic valve replacement)  - Hx noted      Hypertension  Chronic,  Latest blood pressure and vitals reviewed-     Temp:  [97.9 °F (36.6 °C)-98.5 °F (36.9 °C)]   Pulse:  []   Resp:  [16-20]   BP: (108-129)/(56-84)   SpO2:  [90 %-97 %] .   Home meds for hypertension were reviewed and noted below-  Hypertension Medications               carvediloL (COREG) 12.5 MG tablet Take 12.5 mg by mouth 2 (two) times daily.    diltiaZEM (DILACOR XR) 120 MG CDCR Take 1 capsule (120 mg total) by mouth once daily.    furosemide (LASIX) 40 MG tablet Take 1 tablet (40 mg total) by mouth daily as needed (Take for weight gain > 2 pounds over 24 hours. May discuss with  PCP).            While in the hospital, will manage blood pressure as follows; Continue home antihypertensive regimen    Will utilize p.r.n. blood pressure medication only if patient's blood pressure greater than 180/110 and she develops symptoms such as worsening chest pain or shortness of breath.      Type 2 diabetes mellitus, without long-term current use of insulin  Last A1c reviewed-   Lab Results   Component Value Date    HGBA1C 6.3 (H) 02/10/2025     Most recent fingerstick glucose reviewed-   Recent Labs   Lab 02/16/25  1634 02/16/25  1906 02/17/25  0609   POCTGLUCOSE 144* 143* 134*     Current correctional scale  Medium  Maintain anti-hyperglycemic dose as follows-   Antihyperglycemics (From admission, onward)      Start     Stop Route Frequency Ordered    02/09/25 1755  insulin aspart U-100 pen 0-10 Units         -- SubQ Before meals & nightly PRN 02/09/25 1657          Hold Oral hypoglycemics while patient is in the hospital.         VTE Risk Mitigation (From admission, onward)           Ordered     enoxaparin injection 40 mg  Every 24 hours         02/12/25 1612     Reason for No Pharmacological VTE Prophylaxis  Once        Comments: Plan for OR in AM   Question:  Reasons:  Answer:  Physician Provided (leave comment)    02/09/25 1657     IP VTE HIGH RISK PATIENT  Once         02/09/25 1657     Place sequential compression device  Until discontinued         02/09/25 1657                    Discharge Planning   ISAAK:      Code Status: Full Code   Medical Readiness for Discharge Date:   Discharge Plan A: Skilled Nursing Facility   Discharge Delays: (!) Post-Acute Set-up            Please place Justification for DME        Sanjay Aquino MD  Department of Hospital Medicine   [facility]

## 2025-02-17 NOTE — PLAN OF CARE
02/16/25 1944   Patient Assessment/Suction   Level of Consciousness (AVPU) alert   Respiratory Effort Normal;Unlabored   Expansion/Accessory Muscles/Retractions no use of accessory muscles;expansion symmetric   All Lung Fields Breath Sounds clear;equal bilaterally   Rhythm/Pattern, Respiratory no shortness of breath reported;depth regular;pattern regular;unlabored   Cough Frequency no cough   PRE-TX-O2   Device (Oxygen Therapy) room air   SpO2 95 %   Pulse Oximetry Type Intermittent   $ Pulse Oximetry - Multiple Charge Pulse Oximetry - Multiple   Pulse (!) 112   Resp 18   Aerosol Therapy   $ Aerosol Therapy Charges Refused   Education   $ Education Bronchodilator;15 min

## 2025-02-17 NOTE — PT/OT/SLP PROGRESS
"Physical Therapy Treatment    Patient Name:  Daryleen G Moran   MRN:  [id number]    Recommendations:     Discharge Recommendations: Moderate Intensity Therapy  Discharge Equipment Recommendations: to be determined by next level of care  Barriers to discharge: Decreased caregiver support as pt currently requiring assist of 2 persons for safety with bed mobility and transfers.    Assessment:     Daryleen G Moran is a 79 y.o. female admitted with a medical diagnosis of Fracture of femoral neck, right, closed.  She presents with the following impairments/functional limitations: weakness, impaired endurance, impaired functional mobility, gait instability, impaired balance, impaired self care skills, impaired cognition, decreased safety awareness, decreased lower extremity function, pain, edema, orthopedic precautions.    Rehab Prognosis: Fair; patient would benefit from acute skilled PT services to address these deficits and reach maximum level of function.    Recent Surgery: Procedure(s) (LRB):  INSERTION, INTRAMEDULLARY ALEIDA, FEMUR (Right) 7 Days Post-Op    Plan:     During this hospitalization, patient to be seen 6 x/week to address the identified rehab impairments via therapeutic activities, therapeutic exercises, neuromuscular re-education and progress toward the following goals:    Plan of Care Expires:  03/11/25    Subjective     Chief Complaint: pain right hip, not rated  Patient/Family Comments/goals: "I never said I didn't want rehab, but it hurts and I need the good pain medicine."  Pain/Comfort:  Pain Rating 1:  (not rated)  Location - Side 1: Right  Location 1: hip  Pain Addressed 1: Pre-medicate for activity, Reposition, Distraction, Nurse notified      Objective:     Communicated with RN prior to session.  Patient found HOB elevated with bed alarm, peripheral IV, PureWick, telemetry upon PT entry to room.     General Precautions: Standard, fall, hearing impaired  Orthopedic Precautions: RLE weight bearing as " tolerated  Braces: N/A  Respiratory Status: Room air     Functional Mobility:  Bed Mobility:  After sitting EOB for there ex pt was soiled. Functional Mobility below performed to clean pt and richard clean brief and PureWick.   Rolling Left:  moderate assistance  Rolling Right: maximal assistance  Scooting: maximal assistance and of 2 persons  Bridging: vc for pt to lift her bottom partially off bed to adjust the clean brief.  Supine to Sit: maximal assistance and of 2 persons  Sit to Supine: maximal assistance and of 2 persons  Transfers:     Sit to Stand:  pt refused to work on standing today due to c/o problems with pain control today and pt found to be soiled.      AM-PAC 6 CLICK MOBILITY          Treatment & Education:  Pt performed B LE there ex sitting x 10-15 reps with vc for proper execution and joint protection: ap, laq, marching.    Pt was educated on the following: call light use, importance of OOB activity and functional mobility to negate the negative effects of prolonged bed rest during this hospitalization, safe transfers/ambulation and discharge planning recommendations/options.        Patient left HOB elevated with all lines intact, call button in reach, bed alarm on, RN notified, and IV RN  present to put in a new IV.    GOALS:   Multidisciplinary Problems       Physical Therapy Goals          Problem: Physical Therapy    Goal Priority Disciplines Outcome Interventions   Physical Therapy Goal     PT, PT/OT Progressing    Description: Goals to be met by: 3/11/25     Patient will increase functional independence with mobility by performin. Supine to sit with Moderate Assistance  2. Sit to stand transfer with Moderate Assistance  3. Bed to chair transfer with Moderate Assistance using Rolling Walker  4. Lower extremity exercise program x20 reps per handout, with assistance as needed                         DME Justifications:  No DME recommended requiring DME justifications    Time Tracking:      PT Received On: 02/17/25  PT Start Time: 1057     PT Stop Time: 1136  PT Total Time (min): 39 min     Billable Minutes: Therapeutic Activity 39    Treatment Type: Treatment  PT/PTA: PT     Number of PTA visits since last PT visit: 0     02/17/2025

## 2025-02-17 NOTE — SUBJECTIVE & OBJECTIVE
Interval History:  Patient seen and examined.  Complains of significant pain in her leg.  Plan of care discussed with the patient in detail.    Review of Systems  Objective:     Vital Signs (Most Recent):  Temp: 98.3 °F (36.8 °C) (02/17/25 1201)  Pulse: 99 (02/17/25 1201)  Resp: 20 (02/17/25 0833)  BP: (!) 116/56 (02/17/25 1201)  SpO2: 95 % (02/17/25 1201) Vital Signs (24h Range):  Temp:  [97.9 °F (36.6 °C)-98.5 °F (36.9 °C)] 98.3 °F (36.8 °C)  Pulse:  [] 99  Resp:  [16-20] 20  SpO2:  [90 %-97 %] 95 %  BP: (108-129)/(56-84) 116/56     Weight: 81.7 kg (180 lb 1.9 oz)  Body mass index is 35.18 kg/m².    Intake/Output Summary (Last 24 hours) at 2/17/2025 1445  Last data filed at 2/17/2025 1300  Gross per 24 hour   Intake 240 ml   Output 4150 ml   Net -3910 ml         Physical Exam  Vitals and nursing note reviewed.   Constitutional:       General: She is not in acute distress.  Eyes:      Pupils: Pupils are equal, round, and reactive to light.   Cardiovascular:      Rate and Rhythm: Normal rate and regular rhythm.      Heart sounds: No murmur heard.  Pulmonary:      Effort: Pulmonary effort is normal.      Breath sounds: Normal breath sounds.   Abdominal:      General: There is no distension.      Palpations: Abdomen is soft.      Tenderness: There is no abdominal tenderness.   Musculoskeletal:      Comments: Right lower extremity with dressing in place.  Noted swelling of right thigh, as well as ecchymoses around incision site.  Appropriate tenderness noted.   Skin:     Coloration: Skin is not pale.      Findings: No rash.   Neurological:      Mental Status: She is alert and oriented to person, place, and time.             Significant Labs: All pertinent labs within the past 24 hours have been reviewed.    Significant Imaging: I have reviewed all pertinent imaging results/findings within the past 24 hours.

## 2025-02-18 PROBLEM — E66.01 SEVERE OBESITY (BMI 35.0-39.9) WITH COMORBIDITY: Status: ACTIVE | Noted: 2025-02-17

## 2025-02-18 LAB
ANION GAP SERPL CALC-SCNC: 6 MMOL/L (ref 8–16)
BASOPHILS # BLD AUTO: 0.04 K/UL (ref 0–0.2)
BASOPHILS NFR BLD: 0.6 % (ref 0–1.9)
BUN SERPL-MCNC: 17 MG/DL (ref 8–23)
CALCIUM SERPL-MCNC: 8.7 MG/DL (ref 8.7–10.5)
CHLORIDE SERPL-SCNC: 102 MMOL/L (ref 95–110)
CO2 SERPL-SCNC: 33 MMOL/L (ref 23–29)
CREAT SERPL-MCNC: 0.5 MG/DL (ref 0.5–1.4)
DIFFERENTIAL METHOD BLD: ABNORMAL
EOSINOPHIL # BLD AUTO: 0.2 K/UL (ref 0–0.5)
EOSINOPHIL NFR BLD: 3 % (ref 0–8)
ERYTHROCYTE [DISTWIDTH] IN BLOOD BY AUTOMATED COUNT: 17 % (ref 11.5–14.5)
EST. GFR  (NO RACE VARIABLE): >60 ML/MIN/1.73 M^2
GLUCOSE SERPL-MCNC: 124 MG/DL (ref 70–110)
HCT VFR BLD AUTO: 34.3 % (ref 37–48.5)
HGB BLD-MCNC: 10.4 G/DL (ref 12–16)
IMM GRANULOCYTES # BLD AUTO: 0.05 K/UL (ref 0–0.04)
IMM GRANULOCYTES NFR BLD AUTO: 0.7 % (ref 0–0.5)
LYMPHOCYTES # BLD AUTO: 1.5 K/UL (ref 1–4.8)
LYMPHOCYTES NFR BLD: 21.2 % (ref 18–48)
MAGNESIUM SERPL-MCNC: 1.7 MG/DL (ref 1.6–2.6)
MCH RBC QN AUTO: 29.2 PG (ref 27–31)
MCHC RBC AUTO-ENTMCNC: 30.3 G/DL (ref 32–36)
MCV RBC AUTO: 96 FL (ref 82–98)
MONOCYTES # BLD AUTO: 0.8 K/UL (ref 0.3–1)
MONOCYTES NFR BLD: 11.1 % (ref 4–15)
NEUTROPHILS # BLD AUTO: 4.5 K/UL (ref 1.8–7.7)
NEUTROPHILS NFR BLD: 63.4 % (ref 38–73)
NRBC BLD-RTO: 0 /100 WBC
PHOSPHATE SERPL-MCNC: 4.4 MG/DL (ref 2.7–4.5)
PLATELET # BLD AUTO: 197 K/UL (ref 150–450)
PMV BLD AUTO: 10.7 FL (ref 9.2–12.9)
POCT GLUCOSE: 120 MG/DL (ref 70–110)
POCT GLUCOSE: 127 MG/DL (ref 70–110)
POCT GLUCOSE: 163 MG/DL (ref 70–110)
POTASSIUM SERPL-SCNC: 4.2 MMOL/L (ref 3.5–5.1)
RBC # BLD AUTO: 3.56 M/UL (ref 4–5.4)
SODIUM SERPL-SCNC: 141 MMOL/L (ref 136–145)
WBC # BLD AUTO: 7.04 K/UL (ref 3.9–12.7)

## 2025-02-18 PROCEDURE — 97535 SELF CARE MNGMENT TRAINING: CPT | Mod: CO

## 2025-02-18 PROCEDURE — 94761 N-INVAS EAR/PLS OXIMETRY MLT: CPT

## 2025-02-18 PROCEDURE — 85025 COMPLETE CBC W/AUTO DIFF WBC: CPT | Performed by: ORTHOPAEDIC SURGERY

## 2025-02-18 PROCEDURE — 25000242 PHARM REV CODE 250 ALT 637 W/ HCPCS: Performed by: ORTHOPAEDIC SURGERY

## 2025-02-18 PROCEDURE — 63600175 PHARM REV CODE 636 W HCPCS: Performed by: FAMILY MEDICINE

## 2025-02-18 PROCEDURE — 94640 AIRWAY INHALATION TREATMENT: CPT

## 2025-02-18 PROCEDURE — 84100 ASSAY OF PHOSPHORUS: CPT | Performed by: ORTHOPAEDIC SURGERY

## 2025-02-18 PROCEDURE — 25000003 PHARM REV CODE 250: Performed by: FAMILY MEDICINE

## 2025-02-18 PROCEDURE — 80048 BASIC METABOLIC PNL TOTAL CA: CPT | Performed by: ORTHOPAEDIC SURGERY

## 2025-02-18 PROCEDURE — 25000003 PHARM REV CODE 250: Performed by: HOSPITALIST

## 2025-02-18 PROCEDURE — 99900031 HC PATIENT EDUCATION (STAT)

## 2025-02-18 PROCEDURE — 83735 ASSAY OF MAGNESIUM: CPT | Performed by: ORTHOPAEDIC SURGERY

## 2025-02-18 PROCEDURE — 97530 THERAPEUTIC ACTIVITIES: CPT

## 2025-02-18 PROCEDURE — 21400001 HC TELEMETRY ROOM

## 2025-02-18 PROCEDURE — 25000003 PHARM REV CODE 250: Performed by: ORTHOPAEDIC SURGERY

## 2025-02-18 PROCEDURE — 36415 COLL VENOUS BLD VENIPUNCTURE: CPT | Performed by: ORTHOPAEDIC SURGERY

## 2025-02-18 RX ORDER — ACETAMINOPHEN 500 MG
1000 TABLET ORAL EVERY 6 HOURS PRN
Qty: 30 TABLET | Refills: 0 | Status: SHIPPED | OUTPATIENT
Start: 2025-02-18 | End: 2025-02-28

## 2025-02-18 RX ORDER — AMOXICILLIN 250 MG
1 CAPSULE ORAL DAILY
Qty: 10 TABLET | Refills: 0 | Status: SHIPPED | OUTPATIENT
Start: 2025-02-19 | End: 2025-03-01

## 2025-02-18 RX ORDER — LIDOCAINE 50 MG/G
1 PATCH TOPICAL DAILY
Qty: 10 PATCH | Refills: 0 | Status: SHIPPED | OUTPATIENT
Start: 2025-02-18 | End: 2025-02-20

## 2025-02-18 RX ORDER — BUDESONIDE 0.5 MG/2ML
0.5 INHALANT ORAL EVERY 12 HOURS
Qty: 120 ML | Refills: 11 | Status: SHIPPED | OUTPATIENT
Start: 2025-02-18 | End: 2025-02-18 | Stop reason: HOSPADM

## 2025-02-18 RX ORDER — ARFORMOTEROL TARTRATE 15 UG/2ML
15 SOLUTION RESPIRATORY (INHALATION) 2 TIMES DAILY
Qty: 120 ML | Refills: 11 | Status: SHIPPED | OUTPATIENT
Start: 2025-02-18 | End: 2026-02-18

## 2025-02-18 RX ORDER — IBUPROFEN 600 MG/1
600 TABLET ORAL EVERY 6 HOURS PRN
Qty: 30 TABLET | Refills: 0 | Status: SHIPPED | OUTPATIENT
Start: 2025-02-18

## 2025-02-18 RX ADMIN — CARVEDILOL 12.5 MG: 12.5 TABLET, FILM COATED ORAL at 09:02

## 2025-02-18 RX ADMIN — ARFORMOTEROL TARTRATE 15 MCG: 15 SOLUTION RESPIRATORY (INHALATION) at 06:02

## 2025-02-18 RX ADMIN — BUDESONIDE INHALATION 0.5 MG: 0.5 SUSPENSION RESPIRATORY (INHALATION) at 07:02

## 2025-02-18 RX ADMIN — ARFORMOTEROL TARTRATE 15 MCG: 15 SOLUTION RESPIRATORY (INHALATION) at 07:02

## 2025-02-18 RX ADMIN — LIDOCAINE 5% 1 PATCH: 700 PATCH TOPICAL at 05:02

## 2025-02-18 RX ADMIN — IBUPROFEN 600 MG: 200 TABLET, FILM COATED ORAL at 11:02

## 2025-02-18 RX ADMIN — ENOXAPARIN SODIUM 40 MG: 40 INJECTION SUBCUTANEOUS at 05:02

## 2025-02-18 RX ADMIN — LACTOBACILLUS ACIDOPHILUS / LACTOBACILLUS BULGARICUS 1 EACH: 100 MILLION CFU STRENGTH GRANULES at 09:02

## 2025-02-18 RX ADMIN — ASPIRIN 81 MG: 81 TABLET, CHEWABLE ORAL at 09:02

## 2025-02-18 RX ADMIN — IBUPROFEN 600 MG: 200 TABLET, FILM COATED ORAL at 01:02

## 2025-02-18 RX ADMIN — POLYETHYLENE GLYCOL 3350 17 G: 17 POWDER, FOR SOLUTION ORAL at 09:02

## 2025-02-18 RX ADMIN — DILTIAZEM HYDROCHLORIDE 120 MG: 120 CAPSULE, COATED, EXTENDED RELEASE ORAL at 09:02

## 2025-02-18 RX ADMIN — VENLAFAXINE HYDROCHLORIDE 75 MG: 37.5 CAPSULE, EXTENDED RELEASE ORAL at 08:02

## 2025-02-18 RX ADMIN — DONEPEZIL HYDROCHLORIDE 5 MG: 5 TABLET ORAL at 08:02

## 2025-02-18 RX ADMIN — PANTOPRAZOLE SODIUM 40 MG: 40 TABLET, DELAYED RELEASE ORAL at 09:02

## 2025-02-18 RX ADMIN — ACETAMINOPHEN 1000 MG: 500 TABLET, FILM COATED ORAL at 09:02

## 2025-02-18 RX ADMIN — PANTOPRAZOLE SODIUM 40 MG: 40 TABLET, DELAYED RELEASE ORAL at 08:02

## 2025-02-18 RX ADMIN — BUDESONIDE INHALATION 0.5 MG: 0.5 SUSPENSION RESPIRATORY (INHALATION) at 06:02

## 2025-02-18 RX ADMIN — FERROUS SULFATE TAB 325 MG (65 MG ELEMENTAL FE) 1 EACH: 325 (65 FE) TAB at 09:02

## 2025-02-18 RX ADMIN — CARVEDILOL 12.5 MG: 12.5 TABLET, FILM COATED ORAL at 08:02

## 2025-02-18 RX ADMIN — SENNOSIDES AND DOCUSATE SODIUM 1 TABLET: 50; 8.6 TABLET ORAL at 09:02

## 2025-02-18 RX ADMIN — THERA TABS 1 TABLET: TAB at 10:02

## 2025-02-18 RX ADMIN — LACTOBACILLUS ACIDOPHILUS / LACTOBACILLUS BULGARICUS 1 EACH: 100 MILLION CFU STRENGTH GRANULES at 08:02

## 2025-02-18 NOTE — DISCHARGE INSTRUCTIONS
[facility]  Facility Transfer Orders        Admit to: [facility]    Diagnoses:   Active Hospital Problems    Diagnosis  POA    *Fracture of femoral neck, right, closed [S72.001A]  Yes     Priority: 1 - High    Anemia [D64.9]  Yes    Severe obesity (BMI 35.0-39.9) with comorbidity [E66.01]  Yes    Chronic diastolic heart failure [I50.32]  Yes    COPD (chronic obstructive pulmonary disease) [J44.9]  Yes    Atrial fibrillation with RVR [I48.91]  Yes    Dementia in other diseases classified elsewhere, unspecified severity, without behavioral disturbance, psychotic disturbance, mood disturbance, and anxiety [F02.80]  Yes     Sanjay Aquino MD2/17/2025 2:48 PM    Patient with 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 Dementia is moderate. The patient does not have signs of behavioral disturbance. Home dementia medications are continued.. Continue non-pharmacologic interventions to prevent delirium (No VS between 11PM-5AM, activity during day, opening blinds, providing glasses/hearing aids, and up in chair during daytime). Will avoid narcotics and benzos unless absolutely necessary. PRN anti-psychotics are prescribed to avoid self harm behaviors.            S/P TAVR (transcatheter aortic valve replacement) [Z95.2]  Not Applicable    Hypertension [I10]  Yes    Type 2 diabetes mellitus, without long-term current use of insulin [E11.9]  Yes      Resolved Hospital Problems   No resolved problems to display.     Allergies:   Review of patient's allergies indicates:   Allergen Reactions    Latex      Other reaction(s): Unknown  Other reaction(s): Unknown    Sulfacetamide sodium      Pain perineal area    Sulfasalazine Hives    Adhesive Itching     SKIN GETS RED WITH TAPE AND BANDAIDS    Adhesive tape-silicones Itching     SKIN GETS RED WITH TAPE AND BANDAIDS    Sulfa (sulfonamide antibiotics) Rash       Code Status: Full    Vitals: Routine       Diet: regular diet    Activity: Activity as  tolerated    Nursing Precautions: Fall and Pressure ulcer prevention    Bed/Surface: Pressure Redistribution    Consults: PT to evaluate and treat- 5 times a week, OT to evaluate and treat- 5 times a week, and Wound Care    Oxygen: room air    Dialysis: Patient is not on dialysis.     Labs:  None                 Pending Diagnostic Studies:       None          Imaging: None    Miscellaneous Care:   Inspect and clean wound daily     IV Access: n/a     Medications: Discontinue all previous medication orders, if any. See new list below.  Current Discharge Medication List        START taking these medications    Details   acetaminophen (TYLENOL) 500 MG tablet Take 2 tablets (1,000 mg total) by mouth every 6 (six) hours as needed for Pain.  Qty: 30 tablet, Refills: 0      arformoteroL (BROVANA) 15 mcg/2 mL Nebu Take 2 mLs (15 mcg total) by nebulization 2 (two) times daily. Controller  Qty: 120 mL, Refills: 11      ibuprofen (ADVIL,MOTRIN) 600 MG tablet Take 1 tablet (600 mg total) by mouth every 6 (six) hours as needed (pain).  Qty: 30 tablet, Refills: 0      LIDOcaine (LIDODERM) 5 % Place 1 patch onto the skin once daily. Remove & Discard patch within 12 hours or as directed by MD  Qty: 10 patch, Refills: 0      senna-docusate 8.6-50 mg (PERICOLACE) 8.6-50 mg per tablet Take 1 tablet by mouth once daily. for 10 days  Qty: 10 tablet, Refills: 0           CONTINUE these medications which have NOT CHANGED    Details   aspirin (ECOTRIN) 81 MG EC tablet Take 81 mg by mouth once daily.      carvediloL (COREG) 12.5 MG tablet Take 12.5 mg by mouth 2 (two) times daily.      citalopram (CELEXA) 40 MG tablet Take 40 mg by mouth once daily.      diltiaZEM (DILACOR XR) 120 MG CDCR Take 1 capsule (120 mg total) by mouth once daily.  Qty: 30 capsule, Refills: 11      donepeziL (ARICEPT) 5 MG tablet Take 5 mg by mouth nightly.      FEROSUL 325 mg (65 mg iron) Tab tablet Take 325 mg by mouth once daily.      fluticasone  furoate-vilanteroL (BREO ELLIPTA) 100-25 mcg/dose diskus inhaler Inhale 1 puff into the lungs once daily. Controller      fluticasone propionate (FLONASE) 50 mcg/actuation nasal spray 1 spray by Each Nostril route once daily.      lactobacillus acidophilus & bulgar (LACTINEX) 100 million cell packet Take 1 packet (1 each total) by mouth 2 (two) times daily.  Qty: 60 packet, Refills: 0      multivitamin capsule Take 1 capsule by mouth once daily.      pantoprazole (PROTONIX) 40 MG tablet Take 40 mg by mouth 2 (two) times daily.      venlafaxine (EFFEXOR-XR) 75 MG 24 hr capsule Take 1 capsule (75 mg total) by mouth once daily.  Qty: 30 capsule, Refills: 1    Associated Diagnoses: Major depressive disorder with current active episode, unspecified depression episode severity, unspecified whether recurrent      furosemide (LASIX) 40 MG tablet Take 1 tablet (40 mg total) by mouth daily as needed (Take for weight gain > 2 pounds over 24 hours. May discuss with PCP).  Qty: 30 tablet, Refills: 11           STOP taking these medications       clopidogreL (PLAVIX) 75 mg tablet Comments:   Reason for Stopping:         traMADoL (ULTRAM) 50 mg tablet Comments:   Reason for Stopping:             Follow up:       Immunizations Administered as of 2/18/2025       Name Date Dose VIS Date Route Exp Date    COVID-19, vector-nr, rS-Ad26 (J&J) 3/12/2021 0.5 mL -- -- --    Lot: [id number]     External: Auto Reconciled From Outside Source                   Sanjay Aquino MD

## 2025-02-18 NOTE — CARE UPDATE
02/18/25 0704   Patient Assessment/Suction   Level of Consciousness (AVPU) alert   Respiratory Effort Normal;Unlabored   All Lung Fields Breath Sounds Anterior:;Lateral:;clear   Rhythm/Pattern, Respiratory unlabored;pattern regular;depth regular   Cough Frequency no cough   PRE-TX-O2   Device (Oxygen Therapy) room air   SpO2 95 %   Pulse Oximetry Type Intermittent   $ Pulse Oximetry - Multiple Charge Pulse Oximetry - Multiple   Pulse 67   Resp 17   Aerosol Therapy   $ Aerosol Therapy Charges Aerosol Treatment   Daily Review of Necessity (SVN) completed   Respiratory Treatment Status (SVN) given   Treatment Route (SVN) mask;oxygen   Patient Position HOB elevated   Post Treatment Assessment (SVN) breath sounds improved   Signs of Intolerance (SVN) none   Breath Sounds Post-Respiratory Treatment   Throughout All Fields Post-Treatment All Fields   Throughout All Fields Post-Treatment aeration increased   Post-treatment Heart Rate (beats/min) 67   Post-treatment Resp Rate (breaths/min) 17   Education   $ Education Bronchodilator;15 min

## 2025-02-18 NOTE — PROGRESS NOTES
[facility] Medicine  Progress Note    Patient Name: Daryleen G Moran  MRN: [id number]  Patient Class: IP- Inpatient   Admission Date: 2/9/2025  Length of Stay: 9 days  Attending Physician: Sanjay Aquino MD  Primary Care Provider: Abhi De Oliveria IV, MD        Subjective     Principal Problem:Fracture of femoral neck, right, closed        HPI:  Ms. Cyr is a 79 yr old female with a hx of DM type 2, HTN, s/p TAVR, CAD, dementia, AFib, COPD, chronic diastolic CHF, lumbar and thoracic spondylosis, degenerative disc disease, compression fractures, radiculopathy, arthritis, osteoporosis, GERD, stress urinary incontinence, HLD, left adrenal mass, iron-deficiency anemia, abdominal aneurysm, debility, attention and concentration deficit, vitamin-D deficiency, AVM, diverticulosis, depression, obesity, thrombocytopenia, aortic stenosis, NSTEMI who presented to the ED with a chief complaint of a slip and fall.  Granddaughter at bedside who helps to provide a history.  Patient and granddaughter state that the patient was getting out of bed to transfer to bedside commode when she slipped and fell onto her right side.  She states she immediately started having right hip pain.  Granddaughter and patient both deny head trauma and LOC.  She states that she quit smoking 6 months ago and denies alcohol and recreational drug use.  She denies fever, chills, dyspnea, palpitations, chest pain, abdominal pain, nausea, vomiting, diarrhea, dysuria, hematuria, lightheadedness, dizziness, and syncope.    Upon arrival to ED, patient afebrile, HR of 114, RR of 20, BP of 130/75, satting 96% on RA.  Workup in the ED revealed WBC of 12.78, potassium of 3.2, glucose of 154, PT of 12.6.  CXR shows no acute radiographic abnormalities.  Right hip x-ray shows acute nondisplaced and non comminuted intertrochanteric right femoral neck fracture.  While in the ED patient had heart rate of 143 and EKG showed AFib with RVR.  Patient  was given diltiazem 10 mg IV, hydromorphone 1.5 mg IV, 1 L NS bolus while in the ED. patient had improvement with heart rate to 90s-100s.  ED provider discussed case with Orthopedic surgery who plans for OR in the morning.  Patient will be admitted under hospital medicine services for further management.    Overview/Hospital Course:  79-year-old female with history of AFib, dementia, compression fracture came in with fall and hip pain and patient was admitted for a right intertrochanteric femur fracture.  Status post cephalomedullary nail fixation  02/10.  Discuss antiplatelet/anticoagulation recommendations with Orthopedic surgery and we will just give ASA and DVT PPX given history of dementia and big fall risk.  S/p surgery - not really complaining of hip pain just her chronic back pain.  Encouraged to work with PT/OT and attempted to call daughter is patient lacks MDM to discuss progressing course and placement options but no answer, left VM.  Patient must more alert and oriented on 02/13 in his agreeable to SNF placement.  Patient's pain was controlled with a combination of ibuprofen, Tylenol, and lidocaine patch.  She was discharged to skilled nursing facility.    Interval History:  Patient seen and examined.  Pain improved. No issues overnight.  Plan of care discussed with the patient in detail.    Review of Systems  Objective:     Vital Signs (Most Recent):  Temp: 98.1 °F (36.7 °C) (02/18/25 1154)  Pulse: 98 (02/18/25 1154)  Resp: 16 (02/18/25 1154)  BP: 106/69 (02/18/25 1154)  SpO2: (!) 93 % (02/18/25 1154) Vital Signs (24h Range):  Temp:  [97.5 °F (36.4 °C)-98.3 °F (36.8 °C)] 98.1 °F (36.7 °C)  Pulse:  [] 98  Resp:  [16-20] 16  SpO2:  [92 %-98 %] 93 %  BP: ()/(47-78) 106/69     Weight: 81.7 kg (180 lb 1.9 oz)  Body mass index is 35.18 kg/m².    Intake/Output Summary (Last 24 hours) at 2/18/2025 3646  Last data filed at 2/18/2025 0920  Gross per 24 hour   Intake 120 ml   Output 1051 ml   Net -931  ml         Physical Exam  Vitals and nursing note reviewed.   Constitutional:       General: She is not in acute distress.  Eyes:      Pupils: Pupils are equal, round, and reactive to light.   Cardiovascular:      Rate and Rhythm: Normal rate and regular rhythm.      Heart sounds: No murmur heard.  Pulmonary:      Effort: Pulmonary effort is normal.      Breath sounds: Normal breath sounds.   Abdominal:      General: There is no distension.      Palpations: Abdomen is soft.      Tenderness: There is no abdominal tenderness.   Musculoskeletal:      Comments: Right lower extremity with dressing in place.  Noted swelling of right thigh, as well as ecchymoses around incision site.  Appropriate tenderness noted.   Skin:     Coloration: Skin is not pale.      Findings: No rash.   Neurological:      Mental Status: She is alert and oriented to person, place, and time.             Significant Labs: All pertinent labs within the past 24 hours have been reviewed.    Significant Imaging: I have reviewed all pertinent imaging results/findings within the past 24 hours.    Assessment and Plan     * Fracture of femoral neck, right, closed  Patient is status post hip fracture ORIF.  Still having significant pain postoperatively.  Adjust pain medication to minimize confusion.  Continue DVT prophylaxis.  Defer further operative management to Orthopedic surgery.    Severe obesity (BMI 35.0-39.9) with comorbidity  Body mass index is 35.18 kg/m². Morbid obesity complicates all aspects of disease management from diagnostic modalities to treatment. Weight loss encouraged and health benefits explained to patient.         Anemia  Anemia is likely due to chronic disease due to Chronic Kidney Disease. Most recent hemoglobin and hematocrit are listed below.  Recent Labs     02/16/25  0609 02/17/25  0557 02/18/25  0632   HGB 9.7* 10.3* 10.4*   HCT 31.7* 32.9* 34.3*       Plan  - Monitor serial CBC: Daily  - Transfuse PRBC if patient becomes  hemodynamically unstable, symptomatic or H/H drops below 7/21.  - Patient has not received any PRBC transfusions to date  - Patient's anemia is currently stable    Chronic diastolic heart failure  Patient has Diastolic (HFpEF) heart failure that is Chronic. On presentation their CHF was well compensated.     Latest ECHO  Results for orders placed during the hospital encounter of 08/20/24    Echo    Interpretation Summary    Left Ventricle: The left ventricle is normal in size. Normal wall thickness. There is concentric remodeling. There is normal systolic function with a visually estimated ejection fraction of 60 - 65%. Diastolic function cannot be reliably determined in the presence of mitral annular calcification.    Right Ventricle: Normal right ventricular cavity size. Wall thickness is normal. Systolic function is mildly reduced.    The left atrium is moderately dilated.    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 It is reported to be a Carrion Mk S3 valve.    Mitral Valve: There is bileaflet sclerosis. There is moderate to severe mitral annular calcification present.    Tricuspid Valve: There is mild regurgitation.    Pulmonary Artery: The estimated pulmonary artery systolic pressure is 31 mmHg.    IVC/SVC: Normal venous pressure at 3 mmHg.    Current Heart Failure Medications  carvediloL tablet 12.5 mg, 2 times daily, Oral  furosemide tablet 40 mg, Daily PRN, Oral    Plan  - Monitor strict I&Os and daily weights.    - Place on telemetry  - Low sodium diet  - Place on fluid restriction of 1.5 L.   - Cardiology has not been consulted  - The patient's volume status is at their baseline  - Continue home coreg and lasix    COPD (chronic obstructive pulmonary disease)  Patient's COPD is controlled currently.  Patient is currently off COPD Pathway. Continue scheduled inhalers Supplemental oxygen and monitor respiratory status closely.     - DuoNebs Q6H PRN    Atrial  fibrillation with RVR  Patient has permanent atrial fibrillation. Patient is currently in atrial fibrillation. [id number] Score: 5. The patients heart rate in the last 24 hours is as follows:  Pulse  Min: 67  Max: 109     Antiarrhythmics  diltiaZEM 24 hr capsule 120 mg, Daily, Oral    Anticoagulants  enoxaparin injection 40 mg, Every 24 hours, Subcutaneous    Plan  - Replete lytes with a goal of K>4, Mg >2  - Patient is not on anti coag outpatient per home medication list, she is however on DAPT with aspirin and Plavix  - Holding home aspirin and Plavix as patient is planned to go to OR in the morning for repair, resume when appropriate to do so  - Patient's afib is currently controlled  - Restarted ASA.  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 Dementia is moderate. The patient does not have signs of behavioral disturbance. Home dementia medications are continued.. Continue non-pharmacologic interventions to prevent delirium (No VS between 11PM-5AM, activity during day, opening blinds, providing glasses/hearing aids, and up in chair during daytime). Will avoid narcotics and benzos unless absolutely necessary. PRN anti-psychotics are prescribed to avoid self harm behaviors.      S/P TAVR (transcatheter aortic valve replacement)  - Hx noted      Hypertension  Chronic,  Latest blood pressure and vitals reviewed-     Temp:  [97.5 °F (36.4 °C)-98.3 °F (36.8 °C)]   Pulse:  []   Resp:  [16-20]   BP: ()/(47-78)   SpO2:  [92 %-98 %] .   Home meds for hypertension were reviewed and noted below-  Hypertension Medications               carvediloL (COREG) 12.5 MG tablet Take 12.5 mg by mouth 2 (two) times daily.    diltiaZEM (DILACOR XR) 120 MG CDCR Take 1 capsule (120 mg  total) by mouth once daily.    furosemide (LASIX) 40 MG tablet Take 1 tablet (40 mg total) by mouth daily as needed (Take for weight gain > 2 pounds over 24 hours. May discuss with PCP).            While in the hospital, will manage blood pressure as follows; Continue home antihypertensive regimen    Will utilize p.r.n. blood pressure medication only if patient's blood pressure greater than 180/110 and she develops symptoms such as worsening chest pain or shortness of breath.      Type 2 diabetes mellitus, without long-term current use of insulin  Last A1c reviewed-   Lab Results   Component Value Date    HGBA1C 6.3 (H) 02/10/2025     Most recent fingerstick glucose reviewed-   Recent Labs   Lab 02/18/25  0743 02/18/25  1320   POCTGLUCOSE 120* 127*       Current correctional scale  Medium  Maintain anti-hyperglycemic dose as follows-   Antihyperglycemics (From admission, onward)      Start     Stop Route Frequency Ordered    02/09/25 1755  insulin aspart U-100 pen 0-10 Units         -- SubQ Before meals & nightly PRN 02/09/25 1657          Hold Oral hypoglycemics while patient is in the hospital.         VTE Risk Mitigation (From admission, onward)           Ordered     enoxaparin injection 40 mg  Every 24 hours         02/12/25 1612     Reason for No Pharmacological VTE Prophylaxis  Once        Comments: Plan for OR in AM   Question:  Reasons:  Answer:  Physician Provided (leave comment)    02/09/25 1657     IP VTE HIGH RISK PATIENT  Once         02/09/25 1657     Place sequential compression device  Until discontinued         02/09/25 1657                    Discharge Planning   ISAAK: 2/19/2025     Code Status: Full Code   Medical Readiness for Discharge Date: 2/18/2025  Discharge Plan A: Skilled Nursing Facility   Discharge Delays: (!) Post-Acute Set-up            Please place Justification for DME        Sanjay Aquino MD  Department of Hospital Medicine   [facility]

## 2025-02-18 NOTE — CARE UPDATE
02/17/25 1955   Patient Assessment/Suction   Level of Consciousness (AVPU) alert   Respiratory Effort Unlabored   Expansion/Accessory Muscles/Retractions expansion symmetric;no retractions   All Lung Fields Breath Sounds Anterior:;Lateral:;clear   PRE-TX-O2   Device (Oxygen Therapy) room air   SpO2 96 %   Pulse Oximetry Type Intermittent   $ Pulse Oximetry - Multiple Charge Pulse Oximetry - Multiple   Pulse 83   Resp 20   Aerosol Therapy   $ Aerosol Therapy Charges Aerosol Treatment   Daily Review of Necessity (SVN) completed   Respiratory Treatment Status (SVN) given   Treatment Route (SVN) mask;oxygen   Patient Position HOB elevated   Post Treatment Assessment (SVN) breath sounds unchanged   Signs of Intolerance (SVN) none   Breath Sounds Post-Respiratory Treatment   Throughout All Fields Post-Treatment All Fields   Throughout All Fields Post-Treatment no change   Post-treatment Heart Rate (beats/min) 85   Post-treatment Resp Rate (breaths/min) 20   Education   $ Education Bronchodilator;15 min

## 2025-02-18 NOTE — PLAN OF CARE
Problem: Occupational Therapy  Goal: Occupational Therapy Goal  Description: Goals to be met by: 3/11/25     Patient will increase functional independence with ADLs by performing:    UE Dressing with Minimal Assistance   LE Dressing with Minimal Assistance.  Grooming while seated at sink with Minimal Assistance.  Toileting from bedside commode with Minimal Assistance for hygiene and clothing management. -MaxA and pt supporting stand through BUE on bed rails.  Toilet transfer to bedside commode with Minimal Assistance. ModA overall.    Outcome: Progressing- improved activation of R quad requiring less assist to advance RLE out of or into bed.

## 2025-02-18 NOTE — PLAN OF CARE
[place] to submit for auth Pending PT/OT notes from today.    PT/OT notified via chat at 930am to see Pt as early as possible. Kyle agreed  to see Pt first this AM. Pending notes for SNF auth.     131pm- SW spoke with Pt daughter Sally who is also agreeable to dc plan SNF at [place]   02/18/25 1030   Post-Acute Status   Post-Acute Authorization Placement

## 2025-02-18 NOTE — PLAN OF CARE
Spoke with Sally Reardon (Daughter)- [phone], regarding discharge IMM , Understands statement  and IMM will be scanned to chart.

## 2025-02-18 NOTE — SUBJECTIVE & OBJECTIVE
Interval History:  Patient seen and examined.  Pain improved. No issues overnight.  Plan of care discussed with the patient in detail.    Review of Systems  Objective:     Vital Signs (Most Recent):  Temp: 98.1 °F (36.7 °C) (02/18/25 1154)  Pulse: 98 (02/18/25 1154)  Resp: 16 (02/18/25 1154)  BP: 106/69 (02/18/25 1154)  SpO2: (!) 93 % (02/18/25 1154) Vital Signs (24h Range):  Temp:  [97.5 °F (36.4 °C)-98.3 °F (36.8 °C)] 98.1 °F (36.7 °C)  Pulse:  [] 98  Resp:  [16-20] 16  SpO2:  [92 %-98 %] 93 %  BP: ()/(47-78) 106/69     Weight: 81.7 kg (180 lb 1.9 oz)  Body mass index is 35.18 kg/m².    Intake/Output Summary (Last 24 hours) at 2/18/2025 1425  Last data filed at 2/18/2025 0947  Gross per 24 hour   Intake 120 ml   Output 1051 ml   Net -931 ml         Physical Exam  Vitals and nursing note reviewed.   Constitutional:       General: She is not in acute distress.  Eyes:      Pupils: Pupils are equal, round, and reactive to light.   Cardiovascular:      Rate and Rhythm: Normal rate and regular rhythm.      Heart sounds: No murmur heard.  Pulmonary:      Effort: Pulmonary effort is normal.      Breath sounds: Normal breath sounds.   Abdominal:      General: There is no distension.      Palpations: Abdomen is soft.      Tenderness: There is no abdominal tenderness.   Musculoskeletal:      Comments: Right lower extremity with dressing in place.  Noted swelling of right thigh, as well as ecchymoses around incision site.  Appropriate tenderness noted.   Skin:     Coloration: Skin is not pale.      Findings: No rash.   Neurological:      Mental Status: She is alert and oriented to person, place, and time.             Significant Labs: All pertinent labs within the past 24 hours have been reviewed.    Significant Imaging: I have reviewed all pertinent imaging results/findings within the past 24 hours.

## 2025-02-18 NOTE — PT/OT/SLP PROGRESS
Physical Therapy Treatment    Patient Name:  Daryleen G Moran   MRN:  [id number]    Recommendations:     Discharge Recommendations: Moderate Intensity Therapy  Discharge Equipment Recommendations: to be determined by next level of care  Barriers to discharge: Decreased caregiver support and pt currently requiring assist of 2 persons    Assessment:     Daryleen G Moran is a 79 y.o. female admitted with a medical diagnosis of Fracture of femoral neck, right, closed.  She presents with the following impairments/functional limitations: weakness, impaired endurance, impaired functional mobility, gait instability, impaired balance, impaired self care skills, impaired cognition, decreased safety awareness, decreased lower extremity function, pain, edema, orthopedic precautions.    Rehab Prognosis: Fair; patient would benefit from acute skilled PT services to address these deficits and reach maximum level of function.    Recent Surgery: Procedure(s) (LRB):  INSERTION, INTRAMEDULLARY ALEIDA, FEMUR (Right) 8 Days Post-Op    Plan:     During this hospitalization, patient to be seen 6 x/week to address the identified rehab impairments via therapeutic activities, therapeutic exercises, neuromuscular re-education and progress toward the following goals:    Plan of Care Expires:  03/11/25    Subjective     Chief Complaint: R hip pain.  Patient/Family Comments/goals: return to prior level of functional mobility  Pain/Comfort:  Pain Rating 1:  (not rated)  Location - Side 1: Right  Location 1: hip  Pain Addressed 1: Reposition, Distraction, Cessation of Activity, Nurse notified      Objective:     Communicated with AYAKA Humphreys prior to and after session.  Patient found HOB elevated with bed alarm, peripheral IV, PureWick, telemetry upon PT entry to room.     General Precautions: Standard, fall, hearing impaired  Orthopedic Precautions: RLE weight bearing as tolerated  Braces: N/A  Respiratory Status: Room air     Functional Mobility:  Bed  Mobility:     Rolling Left:  minimum assistance and moderate assistance using bed rail  Rolling Right: moderate assistance and extra time due to pain while using bed rail  Scooting: moderate assistance, of 1 persons, and in sitting  Bridging: minimum assistance, moderate assistance, and vc  Supine to Sit: moderate assistance and of 2 persons and with pt using bed rail  Sit to Supine: maximal assistance and of 1-2 persons  Transfers:     Sit to Stand:  moderate assistance and of 2 persons with rolling walker and x 2 trials. While standing pt and a BM in her brief as she did not have enough notice to attempt [place] [facility] Once pt returned to bed PT & Tech cleaned pt as noted above for rolling right and left.   Gait: pt able to take 2 side steps with each standing trial using rolling walker and moderate to minimum assistance of 2 persons & maximal vc for sequencing and technique.      AM-PAC 6 CLICK MOBILITY          Treatment & Education:  Pt performed B LE there ex sitting x 10-15 reps with vc for proper execution and joint protection: ap, laq, marching.    Pt was educated on the following: call light use, importance of OOB activity and functional mobility to negate the negative effects of prolonged bed rest during this hospitalization, safe transfers/ambulation and discharge planning recommendations/options.      Patient left HOB elevated with all lines intact, call button in reach, bed alarm on, and RN notified..    GOALS:   Multidisciplinary Problems       Physical Therapy Goals          Problem: Physical Therapy    Goal Priority Disciplines Outcome Interventions   Physical Therapy Goal     PT, PT/OT Progressing    Description: Goals to be met by: 3/11/25     Patient will increase functional independence with mobility by performin. Supine to sit with Moderate Assistance  2. Sit to stand transfer with Moderate Assistance  3. Bed to chair transfer with Moderate Assistance using Rolling Walker  4. Lower  extremity exercise program x20 reps per handout, with assistance as needed                         DME Justifications:  No DME recommended requiring DME justifications    Time Tracking:     PT Received On: 02/18/25  PT Start Time: 1349     PT Stop Time: 1423  PT Total Time (min): 34 min     Billable Minutes: Therapeutic Activity 34    Treatment Type: Treatment  PT/PTA: PT     Number of PTA visits since last PT visit: 0     02/18/2025

## 2025-02-18 NOTE — PLAN OF CARE
Problem: Wound  Goal: Improved Oral Intake  Intervention: Promote and Optimize Oral Intake  Flowsheets (Taken 2/18/2025 5656)  Nutrition Interventions: other (see comments)   RD clarified patient diet to 2000 kcal diabetic diet.

## 2025-02-18 NOTE — ASSESSMENT & PLAN NOTE
Paroxysmal atrial fibrillation not on anticoagulation because of recurrent GI bleed and multiple blood transfusions. A Watchman has been discussed with the patient previously

## 2025-02-18 NOTE — PROGRESS NOTES
[facility]  Adult Nutrition   Progress Note (Initial Assessment)     SUMMARY     Recommendations  Recommendation/Intervention: 1. Recommend Diabetic diet 2000 kcal per history and weight status. 2.  to obtain daily menu choices.  Nutrition Goal Status: new    Nutrition Goals:  PO intake will meet >75% of estimated needs by RD follow up.    Nutrition Interventions: Carbohydrate modified diet    Nutrition Diagnosis PES Statement:   Limited adherence to nutrition related recommendations related to history of diabetes as evidenced by excessive intake of non-carbohydrate controlled diet.      Nutrition Diagnosis Status:   New    Dietitian Rounds Brief  Patient screened for LOS. Patient diet changed to diabetic per history and labs. Patient requesting more regular food, RD provided sugar-free vanilla pudding.. Patient overweight with good appetite and intake. Last BM 2/18/25 per patient. RD to monitor for intake, weightr and status change PRN.    Nutrition Related Social Determinants of Health: SDOH: None Identified  Food Insecurity: Patient Unable To Answer (2/10/2025)    Hunger Vital Sign     Worried About Running Out of Food in the Last Year: Patient unable to answer     Ran Out of Food in the Last Year: Patient unable to answer       Malnutrition Assessment   No evidence of malnutrition at this time.          Diet order:   Current Diet Order: Regular diet                 Evaluation of Received Nutrient/Fluid Intake  Energy Calories Required: meeting needs  Protein Required: meeting needs  Fluid Required: meeting needs  Tolerance: tolerating     % Intake of Estimated Energy Needs: 75 - 100 %  % Meal Intake: 75 - 100 %      Intake/Output Summary (Last 24 hours) at 2/18/2025 1633  Last data filed at 2/18/2025 1447  Gross per 24 hour   Intake 360 ml   Output 1051 ml   Net -691 ml        Anthropometrics  Height: 5' (152.4 cm)  Height (inches): 60 in  Height Method: Stated  Weight: 81.7 kg (180 lb 1.9  oz)  Weight (lb): 180.12 lb  Weight Method: Bed Scale  Ideal Body Weight (IBW), Female: 100 lb  % Ideal Body Weight, Female (lb): 180.12 %  BMI (Calculated): 35.2       Estimated/Assessed Needs  Weight Used For Calorie Calculations: 81.7 kg (180 lb 1.9 oz)  Energy Calorie Requirements (kcal): 6878-8369 kcal/day (20-25 kcal/kg)  Energy Need Method: Kcal/kg  Protein Requirements: 67-90 gm/day (1.5-2.0 gm/kg IBW).  Weight Used For Protein Calculations: 45 kg (99 lb 3.3 oz) (IBW)     Estimated Fluid Requirement Method: RDA Method  RDA Method (mL): 1634  CHO Requirement: 204-255 gm/day (13-17 cho servings/day).    Reason for Assessment  Reason For Assessment: length of stay  Diagnosis: trauma (femoral neck fracture due to slip and fall.)  General Information Comments: Patient presented to the ED with a chief complaint of a slip and fall. Patient with right hip intramedullary nailing [facility] 2/10/25.  Nutrition Discharge Planning: Regular diet    Final diagnoses:  [Z01.811] Pre-op chest exam  [M25.551] Right hip pain  [S72.001A] Fracture of femoral neck, right, closed     Past Medical History:   Diagnosis Date    Anesthesia complication     BLADDER DYSFUNCTION    Aortic stenosis     Arthritis     Back pain     Diabetes mellitus type II     NO LONGER DIABETIC    Encounter for blood transfusion     Encounter for pain management 12/6/2024    Hyperlipidemia     Hypertension     NSTEMI (non-ST elevated myocardial infarction) 05/01/2022    Osteoporosis     Wears glasses         Nutrition/Diet History  Nutrition Intake History: Good appetite and intake epr patient.  Food Preferences: Obtained by .  Spiritual, Cultural Beliefs, Bahai Practices, Values that Affect Care: no  Food Allergies: NKFA  Factors Affecting Nutritional Intake: None identified at this time    Nutrition Risk Screen          Wound 02/09/25 2300 Moisture associated dermatitis lower Abdomen-Wound Image: Images linked  MST Score: 0  Have you recently  lost weight without trying?: No  Weight loss score: 0  Have you been eating poorly because of a decreased appetite?: No  Appetite score: 0       Weight History:  Wt Readings from Last 10 Encounters:   02/09/25 81.7 kg (180 lb 1.9 oz)   01/08/25 81.6 kg (180 lb)   12/10/24 81.7 kg (180 lb 1.9 oz)   12/05/24 92 kg (202 lb 13.2 oz)   08/21/24 92.4 kg (203 lb 11.3 oz)   03/11/24 91 kg (200 lb 9.9 oz)   03/08/24 95.3 kg (210 lb 1.6 oz)   02/12/24 93 kg (205 lb)   01/01/24 93 kg (205 lb)   10/21/23 93.2 kg (205 lb 7.5 oz)        Lab/Procedures/Meds: Pertinent Labs/Meds Reviewed    Medications:Pertinent Medications Reviewed  Scheduled Meds:   budesonide  0.5 mg Nebulization Q12H    And    arformoteroL  15 mcg Nebulization BID    aspirin  81 mg Oral Daily    carvediloL  12.5 mg Oral BID    diltiaZEM  120 mg Oral Daily    donepeziL  5 mg Oral Nightly    enoxparin  40 mg Subcutaneous Q24H (prophylaxis, 1700)    ferrous sulfate  1 tablet Oral Daily    lactobacillus acidophilus & bulgar  1 packet Oral BID    LIDOcaine  1 patch Transdermal Q24H    multivitamin  1 tablet Oral Daily    pantoprazole  40 mg Oral BID    polyethylene glycol  17 g Oral Daily    senna-docusate 8.6-50 mg  1 tablet Oral Daily    venlafaxine  75 mg Oral QHS     Continuous Infusions:  PRN Meds:.  Current Facility-Administered Medications:     acetaminophen, 1,000 mg, Oral, Q6H PRN    albuterol-ipratropium, 3 mL, Nebulization, Q6H PRN    aluminum-magnesium hydroxide-simethicone, 30 mL, Oral, QID PRN    dextrose 50%, 12.5 g, Intravenous, PRN    dextrose 50%, 25 g, Intravenous, PRN    furosemide, 40 mg, Oral, Daily PRN    glucagon (human recombinant), 1 mg, Intramuscular, PRN    glucose, 16 g, Oral, PRN    glucose, 24 g, Oral, PRN    ibuprofen, 600 mg, Oral, Q6H PRN    insulin aspart U-100, 0-10 Units, Subcutaneous, QID (AC + HS) PRN    magnesium oxide, 800 mg, Oral, PRN    magnesium oxide, 800 mg, Oral, PRN    melatonin, 6 mg, Oral, Nightly PRN    naloxone,  "0.02 mg, Intravenous, PRN    ondansetron, 4 mg, Intravenous, Q6H PRN    potassium bicarbonate, 35 mEq, Oral, PRN    potassium bicarbonate, 50 mEq, Oral, PRN    potassium bicarbonate, 60 mEq, Oral, PRN    potassium, sodium phosphates, 2 packet, Oral, PRN    potassium, sodium phosphates, 2 packet, Oral, PRN    potassium, sodium phosphates, 2 packet, Oral, PRN    sodium chloride 0.9%, 10 mL, Intravenous, Q12H PRN    sodium chloride 0.9%, 10 mL, Intravenous, PRN    Labs: Pertinent Labs Reviewed  Clinical Chemistry:  Recent Labs   Lab 02/16/25  0609 02/17/25  0557 02/18/25  0632    137 141   K 4.1 4.4 4.2    100 102   CO2 32* 34* 33*   * 125* 124*   BUN 12 15 17   CREATININE 0.4* 0.5 0.5   CALCIUM 8.2* 8.5* 8.7   ANIONGAP 5* 3* 6*   MG 1.8 1.7 1.7   PHOS 4.1 4.1 4.4     CBC:   Recent Labs   Lab 02/18/25  0632   WBC 7.04   RBC 3.56*   HGB 10.4*   HCT 34.3*      MCV 96   MCH 29.2   MCHC 30.3*     Lipid Panel:  No results for input(s): "CHOL", "HDL", "LDLCALC", "TRIG", "CHOLHDL" in the last 168 hours.  Cardiac Profile:  No results for input(s): "BNP", "CPK", "CPKMB", "TROPONINI", "CKTOTAL" in the last 168 hours.  Inflammatory Labs:  No results for input(s): "CRP" in the last 168 hours.  Diabetes:  Recent Labs   Lab 02/18/25  0743 02/18/25  1320 02/18/25  1618   POCTGLUCOSE 120* 127* 163*     Thyroid & Parathyroid:  No results for input(s): "TSH", "FREET4", "A9CORSR", "P6OMKBN", "THYROIDAB" in the last 168 hours.    Monitor and Evaluation  Food and Nutrient Intake: energy intake, parenteral nutrition intake, food and beverage intake  Food and Nutrient Adminstration: diet order  Knowledge/Beliefs/Attitudes: food and nutrition knowledge/skill  Physical Activity and Function: nutrition-related ADLs and IADLs  Anthropometric Measurements: weight change, body mass index, weight  Biochemical Data, Medical Tests and Procedures: gastrointestinal profile, lipid profile, glucose/endocrine profile, electrolyte " and renal panel, inflammatory profile  Nutrition-Focused Physical Findings: overall appearance     Nutrition Risk  Level of Risk/Frequency of Follow-up:  (1 x / week)     Nutrition Follow-Up  RD Follow-up?: Yes

## 2025-02-18 NOTE — PT/OT/SLP PROGRESS
Occupational Therapy   Treatment    Name: Daryleen G Moran  MRN: [id number]  Admitting Diagnosis:  Fracture of femoral neck, right, closed  8 Days Post-Op    Recommendations:     Discharge Recommendations: Moderate Intensity Therapy  Discharge Equipment Recommendations:  to be determined by next level of care  Barriers to discharge:  None    Assessment:     Daryleen G Moran is a 79 y.o. female with a medical diagnosis of Fracture of femoral neck, right, closed.  She presents with [place], improving quad control of the RLE requiring less assist to advance RLE off of and into bed this session. Pt displays anxiousness with mobility and c/o too much pressure assisting her at her trunk, then c/o not holding on to her securely enough, difficult to please, requires assurance and step by step instructions to allow for increased independence with tasks. She is having frequent bowel movements and not calling for assist prior to voiding them in her diaper, though she is continent an d participatory with MCCOLLUM for transfer to [facility] for BM this session, also. Pt reported being proud of herself for mobilizing through this extended session and noted decreased pain over yesterday. Performance deficits affecting function are impaired cognition, weakness, impaired endurance, impaired self care skills, impaired functional mobility, gait instability, impaired balance, decreased upper extremity function, decreased lower extremity function, decreased safety awareness, pain, decreased ROM, impaired skin, edema, orthopedic precautions.     Rehab Prognosis:  Fair; patient would benefit from acute skilled OT services to address these deficits and reach maximum level of function.       Plan:     Patient to be seen 5 x/week to address the above listed problems via self-care/home management, therapeutic activities, therapeutic exercises  Plan of Care Expires: 03/11/25  Plan of Care Reviewed with: patient    Subjective     Chief Complaint: I'm wet! I'm  "cold! Get this stuff off of me! (Pt had sweat the bed in her sleep and was very upset by it)  Patient/Family Comments/goals: "I've got to poop again"  Pain/Comfort:  Pain Rating 1: other (see comments) (decreased pain over yesterday)  Location - Side 1: Right  Location - Orientation 1: generalized  Location 1: hip  Pain Addressed 1: Pre-medicate for activity, Reposition, Distraction, Nurse notified  Pain Rating Post-Intervention 1: other (see comments) (pain is minimal at rest)    Objective:     Communicated with: nurse, Juliann and Loc prior to session.  Patient found HOB elevated with bed alarm, telemetry, PureWick, peripheral IV upon OT entry to room.    General Precautions: Standard, fall, hearing impaired    Orthopedic Precautions:RLE weight bearing as tolerated  Braces: N/A  Respiratory Status: Room air     Occupational Performance:     Bed Mobility:    Patient completed Rolling/Turning to Left with  minimum assistance, with side rail, and with leg lift  Patient completed Rolling/Turning to Right with moderate assistance, with side rail, and unable to maintain sidelying on surgery side 2° increased pain.  Patient completed Scooting into bed and along EOB with minimum assistance  Patient completed Supine to Sit with moderate assistance, with side rail, and posterior support on the RLE allowing pt to lead the transfers.  Patient completed Sit to Supine with moderate assistance, with side rail, and BLE lift with posterior support on the RLE allowing pt to lead the transfers.  Patient completed Bridging for diaper adjustment with minimal clearance from bed with VC but to move all the way up to the HOB required ModA x 2.     Functional Mobility/Transfers:  Patient completed Sit <> Stand Transfer with minimum assistance  with  hand-held assist and bed rail and pt maintaining stand ~1 min for BM hygiene.   Patient completed Bed>BSC to her L at foot of bed with Shon and VC for safe sequencing and offloading RLE during " advancement.  Patient completed BSC>Bed to her R back into bed with ModA (due to weaker LE stepping first) and increased assist for offloading during advancement as pt fatigued.  Functional Mobility: as described above    Activities of Daily Living:  Bathing: of upper body while EOB with encouragement and pt remarking that she needs a full bath.  Toileting: maximal assistance and of 2 persons for safety 2° ~1 min stand tolerance and inability to assist with hygiene while maintaining stand on bed rails with Levi      [facility] 6 Click ADL: 18    Treatment & Education:  -MCCOLLUM ed for increased BLE in bed, TherEx participation even when therapy is not present, and provided feedback on the improvement of her RLE movement this date. MCCOLLUM encouraged pt to mobilize to chair for meals, and to call for assist prior to voiding in her diaper so that staff can assist her to the BSC, resulting in increased transfers and utilization of the repaired (RLE) leg. Pt v/u and will require reinforcement.    Patient left HOB elevated with all lines intact, call button in reach, bed alarm on, and nurseJuliann present    GOALS:   Multidisciplinary Problems       Occupational Therapy Goals          Problem: Occupational Therapy    Goal Priority Disciplines Outcome Interventions   Occupational Therapy Goal     OT, PT/OT Progressing    Description: Goals to be met by: 3/11/25     Patient will increase functional independence with ADLs by performing:    UE Dressing with Minimal Assistance   LE Dressing with Minimal Assistance.  Grooming while seated at sink with Minimal Assistance.  Toileting from bedside commode with Minimal Assistance for hygiene and clothing management.   Toilet transfer to bedside commode with Minimal Assistance.                       Time Tracking:     OT Date of Treatment: 02/18/25  OT Start Time: 1052  OT Stop Time: 1149  OT Total Time (min): 57 min    Billable Minutes:Self Care/Home Management 57 min    OT/FILI: FILI      Number of FILI visits since last OT visit: 1    2/18/2025

## 2025-02-18 NOTE — PLAN OF CARE
Problem: Adult Inpatient Plan of Care  Goal: Plan of Care Review  Outcome: Progressing     Problem: Skin Injury Risk Increased  Goal: Skin Health and Integrity  Outcome: Progressing     Problem: Diabetes Comorbidity  Goal: Blood Glucose Level Within Targeted Range  Outcome: Progressing     Problem: Wound  Goal: Optimal Coping  Outcome: Progressing

## 2025-02-19 LAB
POCT GLUCOSE: 115 MG/DL (ref 70–110)
POCT GLUCOSE: 163 MG/DL (ref 70–110)

## 2025-02-19 PROCEDURE — 25000003 PHARM REV CODE 250: Performed by: HOSPITALIST

## 2025-02-19 PROCEDURE — 30200315 PPD INTRADERMAL TEST REV CODE 302: Performed by: HOSPITALIST

## 2025-02-19 PROCEDURE — 99900031 HC PATIENT EDUCATION (STAT)

## 2025-02-19 PROCEDURE — 94640 AIRWAY INHALATION TREATMENT: CPT

## 2025-02-19 PROCEDURE — 25000242 PHARM REV CODE 250 ALT 637 W/ HCPCS: Performed by: ORTHOPAEDIC SURGERY

## 2025-02-19 PROCEDURE — 25000003 PHARM REV CODE 250: Performed by: ORTHOPAEDIC SURGERY

## 2025-02-19 PROCEDURE — 86580 TB INTRADERMAL TEST: CPT | Performed by: HOSPITALIST

## 2025-02-19 PROCEDURE — 63600175 PHARM REV CODE 636 W HCPCS: Performed by: FAMILY MEDICINE

## 2025-02-19 PROCEDURE — 21400001 HC TELEMETRY ROOM

## 2025-02-19 PROCEDURE — 97110 THERAPEUTIC EXERCISES: CPT

## 2025-02-19 PROCEDURE — 94761 N-INVAS EAR/PLS OXIMETRY MLT: CPT

## 2025-02-19 RX ADMIN — THERA TABS 1 TABLET: TAB at 09:02

## 2025-02-19 RX ADMIN — PANTOPRAZOLE SODIUM 40 MG: 40 TABLET, DELAYED RELEASE ORAL at 09:02

## 2025-02-19 RX ADMIN — ENOXAPARIN SODIUM 40 MG: 40 INJECTION SUBCUTANEOUS at 06:02

## 2025-02-19 RX ADMIN — LIDOCAINE 5% 1 PATCH: 700 PATCH TOPICAL at 06:02

## 2025-02-19 RX ADMIN — TUBERCULIN PURIFIED PROTEIN DERIVATIVE 5 UNITS: 5 INJECTION, SOLUTION INTRADERMAL at 06:02

## 2025-02-19 RX ADMIN — CARVEDILOL 12.5 MG: 12.5 TABLET, FILM COATED ORAL at 09:02

## 2025-02-19 RX ADMIN — LACTOBACILLUS ACIDOPHILUS / LACTOBACILLUS BULGARICUS 1 EACH: 100 MILLION CFU STRENGTH GRANULES at 09:02

## 2025-02-19 RX ADMIN — FERROUS SULFATE TAB 325 MG (65 MG ELEMENTAL FE) 1 EACH: 325 (65 FE) TAB at 09:02

## 2025-02-19 RX ADMIN — ACETAMINOPHEN 1000 MG: 500 TABLET, FILM COATED ORAL at 06:02

## 2025-02-19 RX ADMIN — VENLAFAXINE HYDROCHLORIDE 75 MG: 37.5 CAPSULE, EXTENDED RELEASE ORAL at 09:02

## 2025-02-19 RX ADMIN — DONEPEZIL HYDROCHLORIDE 5 MG: 5 TABLET ORAL at 09:02

## 2025-02-19 RX ADMIN — ARFORMOTEROL TARTRATE 15 MCG: 15 SOLUTION RESPIRATORY (INHALATION) at 07:02

## 2025-02-19 RX ADMIN — BUDESONIDE INHALATION 0.5 MG: 0.5 SUSPENSION RESPIRATORY (INHALATION) at 07:02

## 2025-02-19 RX ADMIN — ASPIRIN 81 MG: 81 TABLET, CHEWABLE ORAL at 09:02

## 2025-02-19 RX ADMIN — Medication 6 MG: at 09:02

## 2025-02-19 NOTE — CARE UPDATE
02/19/25 0726   Patient Assessment/Suction   Level of Consciousness (AVPU) alert   Respiratory Effort Normal;Unlabored   All Lung Fields Breath Sounds diminished;rhonchi   Rhythm/Pattern, Respiratory unlabored   Cough Frequency infrequent   Cough Type congested;loose;nonproductive;good   PRE-TX-O2   Device (Oxygen Therapy) room air   SpO2 (!) 94 %   Pulse Oximetry Type Intermittent   $ Pulse Oximetry - Multiple Charge Pulse Oximetry - Multiple   Pulse 94   Resp 16   Aerosol Therapy   $ Aerosol Therapy Charges Aerosol Treatment  (brovana)   Daily Review of Necessity (SVN) completed   Respiratory Treatment Status (SVN) given   Treatment Route (SVN) mask;oxygen   Patient Position HOB elevated   Post Treatment Assessment (SVN) vital signs unchanged;breath sounds improved   Signs of Intolerance (SVN) none

## 2025-02-19 NOTE — PROGRESS NOTES
Critical access hospital Medicine  Progress Note    Patient Name: Daryleen G Moran  MRN: [id number]  Patient Class: IP- Inpatient   Admission Date: 2/9/2025  Length of Stay: 10 days  Attending Physician: Sanjay Aquino MD  Primary Care Provider: Abhi De Oliveira IV, MD        Subjective     Principal Problem:Fracture of femoral neck, right, closed        HPI:  Ms. Cyr is a 79 yr old female with a hx of DM type 2, HTN, s/p TAVR, CAD, dementia, AFib, COPD, chronic diastolic CHF, lumbar and thoracic spondylosis, degenerative disc disease, compression fractures, radiculopathy, arthritis, osteoporosis, GERD, stress urinary incontinence, HLD, left adrenal mass, iron-deficiency anemia, abdominal aneurysm, debility, attention and concentration deficit, vitamin-D deficiency, AVM, diverticulosis, depression, obesity, thrombocytopenia, aortic stenosis, NSTEMI who presented to the ED with a chief complaint of a slip and fall.  Granddaughter at bedside who helps to provide a history.  Patient and granddaughter state that the patient was getting out of bed to transfer to bedside commode when she slipped and fell onto her right side.  She states she immediately started having right hip pain.  Granddaughter and patient both deny head trauma and LOC.  She states that she quit smoking 6 months ago and denies alcohol and recreational drug use.  She denies fever, chills, dyspnea, palpitations, chest pain, abdominal pain, nausea, vomiting, diarrhea, dysuria, hematuria, lightheadedness, dizziness, and syncope.    Upon arrival to ED, patient afebrile, HR of 114, RR of 20, BP of 130/75, satting 96% on RA.  Workup in the ED revealed WBC of 12.78, potassium of 3.2, glucose of 154, PT of 12.6.  CXR shows no acute radiographic abnormalities.  Right hip x-ray shows acute nondisplaced and non comminuted intertrochanteric right femoral neck fracture.  While in the ED patient had heart rate of 143 and EKG showed AFib with RVR.  Patient  was given diltiazem 10 mg IV, hydromorphone 1.5 mg IV, 1 L NS bolus while in the ED. patient had improvement with heart rate to 90s-100s.  ED provider discussed case with Orthopedic surgery who plans for OR in the morning.  Patient will be admitted under hospital medicine services for further management.    Overview/Hospital Course:  79-year-old female with history of AFib, dementia, compression fracture came in with fall and hip pain and patient was admitted for a right intertrochanteric femur fracture.  Status post cephalomedullary nail fixation  02/10.  Discuss antiplatelet/anticoagulation recommendations with Orthopedic surgery and we will just give ASA and DVT PPX given history of dementia and big fall risk.  S/p surgery - not really complaining of hip pain just her chronic back pain.  Encouraged to work with PT/OT and attempted to call daughter is patient lacks MDM to discuss progressing course and placement options but no answer, left VM.  Patient must more alert and oriented on 02/13 in his agreeable to SNF placement.  Patient's pain was controlled with a combination of ibuprofen, Tylenol, and lidocaine patch.  She was discharged to skilled nursing facility.    Interval History:  Patient seen and examined.  Pain improved. No issues overnight.  Plan of care discussed with the patient in detail.    Review of Systems  Objective:     Vital Signs (Most Recent):  Temp: 99 °F (37.2 °C) (02/19/25 1553)  Pulse: 107 (02/19/25 1621)  Resp: 18 (02/19/25 1553)  BP: (!) 165/80 (02/19/25 1553)  SpO2: 97 % (02/19/25 1553) Vital Signs (24h Range):  Temp:  [97.6 °F (36.4 °C)-99.5 °F (37.5 °C)] 99 °F (37.2 °C)  Pulse:  [] 107  Resp:  [16-18] 18  SpO2:  [89 %-97 %] 97 %  BP: (111-165)/(50-82) 165/80     Weight: 81.7 kg (180 lb 1.9 oz)  Body mass index is 35.18 kg/m².    Intake/Output Summary (Last 24 hours) at 2/19/2025 1625  Last data filed at 2/19/2025 0900  Gross per 24 hour   Intake 240 ml   Output 1600 ml   Net -1360  ml         Physical Exam  Vitals and nursing note reviewed.   Constitutional:       General: She is not in acute distress.  Eyes:      Pupils: Pupils are equal, round, and reactive to light.   Cardiovascular:      Rate and Rhythm: Normal rate and regular rhythm.      Heart sounds: No murmur heard.  Pulmonary:      Effort: Pulmonary effort is normal.      Breath sounds: Normal breath sounds.   Abdominal:      General: There is no distension.      Palpations: Abdomen is soft.      Tenderness: There is no abdominal tenderness.   Musculoskeletal:      Comments: Right lower extremity with dressing in place.  Noted swelling of right thigh, as well as ecchymoses around incision site.  Appropriate tenderness noted.   Skin:     Coloration: Skin is not pale.      Findings: No rash.   Neurological:      Mental Status: She is alert and oriented to person, place, and time.             Significant Labs: All pertinent labs within the past 24 hours have been reviewed.    Significant Imaging: I have reviewed all pertinent imaging results/findings within the past 24 hours.    Assessment and Plan     * Fracture of femoral neck, right, closed  Patient is status post hip fracture ORIF.  Still having significant pain postoperatively.  Adjust pain medication to minimize confusion.  Continue DVT prophylaxis.  Defer further operative management to Orthopedic surgery.    Severe obesity (BMI 35.0-39.9) with comorbidity  Body mass index is 35.18 kg/m². Morbid obesity complicates all aspects of disease management from diagnostic modalities to treatment. Weight loss encouraged and health benefits explained to patient.         Anemia  Anemia is likely due to chronic disease due to Chronic Kidney Disease. Most recent hemoglobin and hematocrit are listed below.  Recent Labs     02/17/25  0557 02/18/25  0632   HGB 10.3* 10.4*   HCT 32.9* 34.3*       Plan  - Monitor serial CBC: Daily  - Transfuse PRBC if patient becomes hemodynamically unstable,  symptomatic or H/H drops below 7/21.  - Patient has not received any PRBC transfusions to date  - Patient's anemia is currently stable    Chronic diastolic heart failure  Patient has Diastolic (HFpEF) heart failure that is Chronic. On presentation their CHF was well compensated.     Latest ECHO  Results for orders placed during the hospital encounter of 08/20/24    Echo    Interpretation Summary    Left Ventricle: The left ventricle is normal in size. Normal wall thickness. There is concentric remodeling. There is normal systolic function with a visually estimated ejection fraction of 60 - 65%. Diastolic function cannot be reliably determined in the presence of mitral annular calcification.    Right Ventricle: Normal right ventricular cavity size. Wall thickness is normal. Systolic function is mildly reduced.    The left atrium is moderately dilated.    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 It is reported to be a Carrion Mk S3 valve.    Mitral Valve: There is bileaflet sclerosis. There is moderate to severe mitral annular calcification present.    Tricuspid Valve: There is mild regurgitation.    Pulmonary Artery: The estimated pulmonary artery systolic pressure is 31 mmHg.    IVC/SVC: Normal venous pressure at 3 mmHg.    Current Heart Failure Medications  carvediloL tablet 12.5 mg, 2 times daily, Oral  furosemide tablet 40 mg, Daily PRN, Oral    Plan  - Monitor strict I&Os and daily weights.    - Place on telemetry  - Low sodium diet  - Place on fluid restriction of 1.5 L.   - Cardiology has not been consulted  - The patient's volume status is at their baseline  - Continue home coreg and lasix    COPD (chronic obstructive pulmonary disease)  Patient's COPD is controlled currently.  Patient is currently off COPD Pathway. Continue scheduled inhalers Supplemental oxygen and monitor respiratory status closely.     - DuoNebs Q6H PRN    Atrial fibrillation with RVR  Patient  has permanent atrial fibrillation. Patient is currently in atrial fibrillation. [id number] Score: 5. The patients heart rate in the last 24 hours is as follows:  Pulse  Min: 82  Max: 123     Antiarrhythmics  diltiaZEM 24 hr capsule 120 mg, Daily, Oral    Anticoagulants  enoxaparin injection 40 mg, Every 24 hours, Subcutaneous    Plan  - Replete lytes with a goal of K>4, Mg >2  - Patient is not on anti coag outpatient per home medication list, she is however on DAPT with aspirin and Plavix  - Holding home aspirin and Plavix as patient is planned to go to OR in the morning for repair, resume when appropriate to do so  - Patient's afib is currently controlled  - Restarted ASA.  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 Dementia is moderate. The patient does not have signs of behavioral disturbance. Home dementia medications are continued.. Continue non-pharmacologic interventions to prevent delirium (No VS between 11PM-5AM, activity during day, opening blinds, providing glasses/hearing aids, and up in chair during daytime). Will avoid narcotics and benzos unless absolutely necessary. PRN anti-psychotics are prescribed to avoid self harm behaviors.      S/P TAVR (transcatheter aortic valve replacement)  - Hx noted      Hypertension  Chronic,  Latest blood pressure and vitals reviewed-     Temp:  [97.6 °F (36.4 °C)-99.5 °F (37.5 °C)]   Pulse:  []   Resp:  [16-18]   BP: (111-165)/(50-82)   SpO2:  [89 %-97 %] .   Home meds for hypertension were reviewed and noted below-  Hypertension Medications               carvediloL (COREG) 12.5 MG tablet Take 12.5 mg by mouth 2 (two) times daily.    diltiaZEM (DILACOR XR) 120 MG CDCR Take 1 capsule (120 mg total) by mouth once daily.     furosemide (LASIX) 40 MG tablet Take 1 tablet (40 mg total) by mouth daily as needed (Take for weight gain > 2 pounds over 24 hours. May discuss with PCP).            While in the hospital, will manage blood pressure as follows; Continue home antihypertensive regimen    Will utilize p.r.n. blood pressure medication only if patient's blood pressure greater than 180/110 and she develops symptoms such as worsening chest pain or shortness of breath.      Type 2 diabetes mellitus, without long-term current use of insulin  Last A1c reviewed-   Lab Results   Component Value Date    HGBA1C 6.3 (H) 02/10/2025     Most recent fingerstick glucose reviewed-   Recent Labs   Lab 02/19/25  1554   POCTGLUCOSE 115*       Current correctional scale  Medium  Maintain anti-hyperglycemic dose as follows-   Antihyperglycemics (From admission, onward)      Start     Stop Route Frequency Ordered    02/09/25 1755  insulin aspart U-100 pen 0-10 Units         -- SubQ Before meals & nightly PRN 02/09/25 1657          Hold Oral hypoglycemics while patient is in the hospital.         VTE Risk Mitigation (From admission, onward)           Ordered     enoxaparin injection 40 mg  Every 24 hours         02/12/25 1612     Reason for No Pharmacological VTE Prophylaxis  Once        Comments: Plan for OR in AM   Question:  Reasons:  Answer:  Physician Provided (leave comment)    02/09/25 1657     IP VTE HIGH RISK PATIENT  Once         02/09/25 1657     Place sequential compression device  Until discontinued         02/09/25 1657                    Discharge Planning   ISAAK: 2/20/2025     Code Status: Full Code   Medical Readiness for Discharge Date: 2/18/2025  Discharge Plan A: Skilled Nursing Facility   Discharge Delays: (!) Post-Acute Set-up            Please place Justification for DME        Sanjay Aquino MD  Department of Hospital Medicine   [facility]

## 2025-02-19 NOTE — PLAN OF CARE
900am- ADRIANA called Satish in regards to SNF auth; No answer per  staff in morning meeting that should be over around 10am. SW to call back after 10am    1021am- ADRIANA spoke with Satish; Per Satish she will submit for auth today (she was out yesterday 2/18 for training)  soon as DON review updated therapy notes. ADRIANA will continue to follow.     1:08pm- ADRIANA called [facility] to check to make sure auth was submitted. No answer left vm     309pm- ADRIANA called [facility] at [phone] to verify that auth was submitted. Per [facility] rep no SNF auth has been built to them. ADRIANA attempted to call satish again no answer.     321pm- Auth submitted Per Satish   02/19/25 0944   Post-Acute Status   Post-Acute Authorization Placement   Discharge Plan   Discharge Plan A Skilled Nursing Facility   Discharge Plan B Skilled Nursing Facility

## 2025-02-19 NOTE — PT/OT/SLP PROGRESS
"Physical Therapy Treatment    Patient Name:  Daryleen G Moran   MRN:  [id number]    Recommendations:     Discharge Recommendations: Moderate Intensity Therapy  Discharge Equipment Recommendations: to be determined by next level of care  Barriers to discharge: Decreased caregiver support    Assessment:     Daryleen G Moran is a 79 y.o. female admitted with a medical diagnosis of Fracture of femoral neck, right, closed.  She presents with the following impairments/functional limitations: weakness, impaired endurance, impaired functional mobility, gait instability, impaired balance, impaired self care skills, impaired cognition, decreased safety awareness, decreased lower extremity function, pain, edema, orthopedic precautions.    Rehab Prognosis: Fair and Poor; patient would benefit from acute skilled PT services to address these deficits and reach maximum level of function.    Recent Surgery: Procedure(s) (LRB):  INSERTION, INTRAMEDULLARY ALEIDA, FEMUR (Right) 9 Days Post-Op    Plan:     During this hospitalization, patient to be seen 6 x/week to address the identified rehab impairments via therapeutic activities, therapeutic exercises, neuromuscular re-education and progress toward the following goals:    Plan of Care Expires:  03/11/25    Subjective     Chief Complaint: pain right hip/thigh, but not rated  Patient/Family Comments/goals: "I thought I was going to the rehab!?"  Pain/Comfort:  Pain Rating 1:  (not rated)  Location - Side 1: Right  Location 1: hip  Pain Addressed 1: Reposition, Distraction, Cessation of Activity      Objective:     Communicated with AYAKA Humphreys prior to session.  Patient found HOB elevated with bed alarm, peripheral IV, PureWick, telemetry upon PT entry to room.     General Precautions: Standard, fall, hearing impaired  Orthopedic Precautions: RLE weight bearing as tolerated  Braces: N/A  Respiratory Status: Room air     Functional Mobility:  N/A      AM-PAC 6 CLICK MOBILITY  Turning over in " bed (including adjusting bedclothes, sheets and blankets)?: 2  Sitting down on and standing up from a chair with arms (e.g., wheelchair, bedside commode, etc.): 2  Moving from lying on back to sitting on the side of the bed?: 2  Moving to and from a bed to a chair (including a wheelchair)?: 2  Need to walk in hospital room?: 1  Climbing 3-5 steps with a railing?: 1  Basic Mobility Total Score: 10       Treatment & Education:  Pt performed B LE there ex supine x 5 reps each with vc for proper execution: ap, hs, qs/gs, TKE, hip abd/add(with assist R LE).    Pt was educated on the following: call light use, importance of OOB activity and functional mobility to negate the negative effects of prolonged bed rest during this hospitalization, safe transfers/ambulation and discharge planning recommendations/options.        Patient left HOB elevated with all lines intact, call button in reach, bed alarm on, and RN notified.    GOALS:   Multidisciplinary Problems       Physical Therapy Goals          Problem: Physical Therapy    Goal Priority Disciplines Outcome Interventions   Physical Therapy Goal     PT, PT/OT Progressing    Description: Goals to be met by: 3/11/25     Patient will increase functional independence with mobility by performin. Supine to sit with Moderate Assistance  2. Sit to stand transfer with Moderate Assistance  3. Bed to chair transfer with Moderate Assistance using Rolling Walker  4. Lower extremity exercise program x20 reps per handout, with assistance as needed                         DME Justifications:  No DME recommended requiring DME justifications    Time Tracking:     PT Received On: 25  PT Start Time: 1050     PT Stop Time: 1105  PT Total Time (min): 15 min     Billable Minutes: Therapeutic Exercise 15    Treatment Type: Treatment  PT/PTA: PT     Number of PTA visits since last PT visit: 0     2025

## 2025-02-19 NOTE — PT/OT/SLP PROGRESS
Occupational Therapy      Patient Name:  Daryleen G Moran   MRN:  [id number]    Attempted OT tx. Patient unavailable. Will follow up tomorrow.    2/19/2025

## 2025-02-19 NOTE — SUBJECTIVE & OBJECTIVE
Interval History:  Patient seen and examined.  Pain improved. No issues overnight.  Plan of care discussed with the patient in detail.    Review of Systems  Objective:     Vital Signs (Most Recent):  Temp: 99 °F (37.2 °C) (02/19/25 1553)  Pulse: 107 (02/19/25 1621)  Resp: 18 (02/19/25 1553)  BP: (!) 165/80 (02/19/25 1553)  SpO2: 97 % (02/19/25 1553) Vital Signs (24h Range):  Temp:  [97.6 °F (36.4 °C)-99.5 °F (37.5 °C)] 99 °F (37.2 °C)  Pulse:  [] 107  Resp:  [16-18] 18  SpO2:  [89 %-97 %] 97 %  BP: (111-165)/(50-82) 165/80     Weight: 81.7 kg (180 lb 1.9 oz)  Body mass index is 35.18 kg/m².    Intake/Output Summary (Last 24 hours) at 2/19/2025 1625  Last data filed at 2/19/2025 0900  Gross per 24 hour   Intake 240 ml   Output 1600 ml   Net -1360 ml         Physical Exam  Vitals and nursing note reviewed.   Constitutional:       General: She is not in acute distress.  Eyes:      Pupils: Pupils are equal, round, and reactive to light.   Cardiovascular:      Rate and Rhythm: Normal rate and regular rhythm.      Heart sounds: No murmur heard.  Pulmonary:      Effort: Pulmonary effort is normal.      Breath sounds: Normal breath sounds.   Abdominal:      General: There is no distension.      Palpations: Abdomen is soft.      Tenderness: There is no abdominal tenderness.   Musculoskeletal:      Comments: Right lower extremity with dressing in place.  Noted swelling of right thigh, as well as ecchymoses around incision site.  Appropriate tenderness noted.   Skin:     Coloration: Skin is not pale.      Findings: No rash.   Neurological:      Mental Status: She is alert and oriented to person, place, and time.             Significant Labs: All pertinent labs within the past 24 hours have been reviewed.    Significant Imaging: I have reviewed all pertinent imaging results/findings within the past 24 hours.

## 2025-02-19 NOTE — CARE UPDATE
02/18/25 185   Patient Assessment/Suction   Level of Consciousness (AVPU) alert   Respiratory Effort Normal;Unlabored   Expansion/Accessory Muscles/Retractions no retractions;no use of accessory muscles   MIKHAIL Breath Sounds clear   LLL Breath Sounds clear   RUL Breath Sounds clear   RML Breath Sounds clear   RLL Breath Sounds clear   Rhythm/Pattern, Respiratory unlabored;pattern regular;no shortness of breath reported   Cough Frequency no cough   PRE-TX-O2   Device (Oxygen Therapy) room air   SpO2 95 %   Pulse Oximetry Type Intermittent   $ Pulse Oximetry - Multiple Charge Pulse Oximetry - Multiple   Aerosol Therapy   $ Aerosol Therapy Charges Aerosol Treatment  (Brovana given)   Daily Review of Necessity (SVN) completed   Respiratory Treatment Status (SVN) given   Treatment Route (SVN) mask   Patient Position HOB elevated   Post Treatment Assessment (SVN) increased aeration   Signs of Intolerance (SVN) none   Education   $ Education Bronchodilator;15 min

## 2025-02-19 NOTE — ASSESSMENT & PLAN NOTE
"Last A1c reviewed-   Lab Results   Component Value Date    HGBA1C 6.6 (H) 03/08/2024     Most recent fingerstick glucose reviewed- No results for input(s): "POCTGLUCOSE" in the last 24 hours.  Current correctional scale  Medium  Maintain anti-hyperglycemic dose as follows-   Antihyperglycemics (From admission, onward)      Start     Stop Route Frequency Ordered    02/09/25 1755  insulin aspart U-100 pen 0-10 Units         -- SubQ Before meals & nightly PRN 02/09/25 1657          Hold Oral hypoglycemics while patient is in the hospital.     "
"Patient has Diastolic (HFpEF) heart failure that is Chronic. On presentation their CHF was well compensated. Most recent BNP and echo results are listed below.  No results for input(s): "BNP" in the last 72 hours.  Latest ECHO  Results for orders placed during the hospital encounter of 08/20/24    Echo    Interpretation Summary    Left Ventricle: The left ventricle is normal in size. Normal wall thickness. There is concentric remodeling. There is normal systolic function with a visually estimated ejection fraction of 60 - 65%. Diastolic function cannot be reliably determined in the presence of mitral annular calcification.    Right Ventricle: Normal right ventricular cavity size. Wall thickness is normal. Systolic function is mildly reduced.    The left atrium is moderately dilated.    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 It is reported to be a Carrion Mk S3 valve.    Mitral Valve: There is bileaflet sclerosis. There is moderate to severe mitral annular calcification present.    Tricuspid Valve: There is mild regurgitation.    Pulmonary Artery: The estimated pulmonary artery systolic pressure is 31 mmHg.    IVC/SVC: Normal venous pressure at 3 mmHg.    Current Heart Failure Medications       Plan  - Monitor strict I&Os and daily weights.    - Place on telemetry  - Low sodium diet  - Place on fluid restriction of 1.5 L.   - Cardiology has not been consulted  - The patient's volume status is at their baseline  - Continue home coreg and lasix  "
"Patient has Diastolic (HFpEF) heart failure that is Chronic. On presentation their CHF was well compensated. Most recent BNP and echo results are listed below.  No results for input(s): "BNP" in the last 72 hours.  Latest ECHO  Results for orders placed during the hospital encounter of 08/20/24    Echo    Interpretation Summary    Left Ventricle: The left ventricle is normal in size. Normal wall thickness. There is concentric remodeling. There is normal systolic function with a visually estimated ejection fraction of 60 - 65%. Diastolic function cannot be reliably determined in the presence of mitral annular calcification.    Right Ventricle: Normal right ventricular cavity size. Wall thickness is normal. Systolic function is mildly reduced.    The left atrium is moderately dilated.  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 It is reported to be a Carrion Mk S3 valve.    Mitral Valve: There is bileaflet sclerosis. There is moderate to severe mitral annular calcification present.    Tricuspid Valve: There is mild regurgitation.    Pulmonary Artery: The estimated pulmonary artery systolic pressure is 31 mmHg.    IVC/SVC: Normal venous pressure at 3 mmHg.    Current Heart Failure Medications  carvediloL tablet 12.5 mg, 2 times daily, Oral  furosemide tablet 40 mg, Daily PRN, Oral    Plan  - Monitor strict I&Os and daily weights.    - Place on telemetry  - Low sodium diet  - Place on fluid restriction of 1.5 L.   - Cardiology has not been consulted  - The patient's volume status is at their baseline  - Continue home coreg and lasix  "
- History noted  - Patient is currently A&O x3 to self, place, and month. Patient is disoriented to City and year.  She knows that she is in the hospital but is unsure of the name of the hospital.  She states that it is 2027.  - Continue home Aricept  
- Hx noted    
- Patient presented to the ED after a slip and fall.  She was found to have right nondisplaced enter trochanteric femoral neck fracture on right hip x-ray in the ED.  - ED provider discussed case with Orthopedic surgery who plans to take patient to OR in the morning  - NPO at midnight  - PRN analgesia and symptomatic care  - Holding DVT prophylaxis as well as aspirin and Plavix, resume when appropriate to do so    
- Patient presented to the ED after a slip and fall.  She was found to have right nondisplaced enter trochanteric femoral neck fracture on right hip x-ray in the ED.  - ED provider discussed case with Orthopedic surgery who plans to take patient to OR in the morning  - NPO at midnight  - PRN analgesia and symptomatic care  - Holding DVT prophylaxis as well as aspirin and Plavix, resume when appropriate to do so  - restart ASA  
Anemia is likely due to chronic disease due to Chronic Kidney Disease. Most recent hemoglobin and hematocrit are listed below.  Recent Labs     02/15/25  1003 02/16/25  0609 02/17/25  0557   HGB 9.9* 9.7* 10.3*   HCT 31.4* 31.7* 32.9*     Plan  - Monitor serial CBC: Daily  - Transfuse PRBC if patient becomes hemodynamically unstable, symptomatic or H/H drops below 7/21.  - Patient has not received any PRBC transfusions to date  - Patient's anemia is currently stable  
Anemia is likely due to chronic disease due to Chronic Kidney Disease. Most recent hemoglobin and hematocrit are listed below.  Recent Labs     02/16/25  0609 02/17/25  0557 02/18/25  0632   HGB 9.7* 10.3* 10.4*   HCT 31.7* 32.9* 34.3*       Plan  - Monitor serial CBC: Daily  - Transfuse PRBC if patient becomes hemodynamically unstable, symptomatic or H/H drops below 7/21.  - Patient has not received any PRBC transfusions to date  - Patient's anemia is currently stable  
Anemia is likely due to chronic disease due to Chronic Kidney Disease. Most recent hemoglobin and hematocrit are listed below.  Recent Labs     02/17/25  0557 02/18/25  0632   HGB 10.3* 10.4*   HCT 32.9* 34.3*       Plan  - Monitor serial CBC: Daily  - Transfuse PRBC if patient becomes hemodynamically unstable, symptomatic or H/H drops below 7/21.  - Patient has not received any PRBC transfusions to date  - Patient's anemia is currently stable  
Body mass index is 35.18 kg/m². Morbid obesity complicates all aspects of disease management from diagnostic modalities to treatment. Weight loss encouraged and health benefits explained to patient.       
Chronic,  Latest blood pressure and vitals reviewed-     Temp:  [97.5 °F (36.4 °C)-98.3 °F (36.8 °C)]   Pulse:  []   Resp:  [16-20]   BP: ()/(47-78)   SpO2:  [92 %-98 %] .   Home meds for hypertension were reviewed and noted below-  Hypertension Medications               carvediloL (COREG) 12.5 MG tablet Take 12.5 mg by mouth 2 (two) times daily.    diltiaZEM (DILACOR XR) 120 MG CDCR Take 1 capsule (120 mg total) by mouth once daily.    furosemide (LASIX) 40 MG tablet Take 1 tablet (40 mg total) by mouth daily as needed (Take for weight gain > 2 pounds over 24 hours. May discuss with PCP).            While in the hospital, will manage blood pressure as follows; Continue home antihypertensive regimen    Will utilize p.r.n. blood pressure medication only if patient's blood pressure greater than 180/110 and she develops symptoms such as worsening chest pain or shortness of breath.    
Chronic,  Latest blood pressure and vitals reviewed-     Temp:  [97.6 °F (36.4 °C)-99.5 °F (37.5 °C)]   Pulse:  []   Resp:  [16-18]   BP: (111-165)/(50-82)   SpO2:  [89 %-97 %] .   Home meds for hypertension were reviewed and noted below-  Hypertension Medications               carvediloL (COREG) 12.5 MG tablet Take 12.5 mg by mouth 2 (two) times daily.    diltiaZEM (DILACOR XR) 120 MG CDCR Take 1 capsule (120 mg total) by mouth once daily.    furosemide (LASIX) 40 MG tablet Take 1 tablet (40 mg total) by mouth daily as needed (Take for weight gain > 2 pounds over 24 hours. May discuss with PCP).            While in the hospital, will manage blood pressure as follows; Continue home antihypertensive regimen    Will utilize p.r.n. blood pressure medication only if patient's blood pressure greater than 180/110 and she develops symptoms such as worsening chest pain or shortness of breath.    
Chronic,  Latest blood pressure and vitals reviewed-     Temp:  [97.9 °F (36.6 °C)-98.5 °F (36.9 °C)]   Pulse:  []   Resp:  [16-20]   BP: (108-129)/(56-84)   SpO2:  [90 %-97 %] .   Home meds for hypertension were reviewed and noted below-  Hypertension Medications               carvediloL (COREG) 12.5 MG tablet Take 12.5 mg by mouth 2 (two) times daily.    diltiaZEM (DILACOR XR) 120 MG CDCR Take 1 capsule (120 mg total) by mouth once daily.    furosemide (LASIX) 40 MG tablet Take 1 tablet (40 mg total) by mouth daily as needed (Take for weight gain > 2 pounds over 24 hours. May discuss with PCP).            While in the hospital, will manage blood pressure as follows; Continue home antihypertensive regimen    Will utilize p.r.n. blood pressure medication only if patient's blood pressure greater than 180/110 and she develops symptoms such as worsening chest pain or shortness of breath.    
Chronic,  Latest blood pressure and vitals reviewed-     Temp:  [98.5 °F (36.9 °C)]   Pulse:  []   Resp:  [13-20]   BP: (107-130)/(60-80)   SpO2:  [87 %-96 %] .   Home meds for hypertension were reviewed and noted below-  Hypertension Medications               carvediloL (COREG) 12.5 MG tablet Take 12.5 mg by mouth 2 (two) times daily.    diltiaZEM (DILACOR XR) 120 MG CDCR Take 1 capsule (120 mg total) by mouth once daily.    furosemide (LASIX) 40 MG tablet Take 1 tablet (40 mg total) by mouth daily as needed (Take for weight gain > 2 pounds over 24 hours. May discuss with PCP).            While in the hospital, will manage blood pressure as follows; Continue home antihypertensive regimen    Will utilize p.r.n. blood pressure medication only if patient's blood pressure greater than 180/110 and she develops symptoms such as worsening chest pain or shortness of breath.    
Last A1c reviewed-   Lab Results   Component Value Date    HGBA1C 6.3 (H) 02/10/2025     Most recent fingerstick glucose reviewed-   Recent Labs   Lab 02/16/25  1634 02/16/25  1906 02/17/25  0609   POCTGLUCOSE 144* 143* 134*     Current correctional scale  Medium  Maintain anti-hyperglycemic dose as follows-   Antihyperglycemics (From admission, onward)    Start     Stop Route Frequency Ordered    02/09/25 1755  insulin aspart U-100 pen 0-10 Units         -- SubQ Before meals & nightly PRN 02/09/25 1657        Hold Oral hypoglycemics while patient is in the hospital.     
Last A1c reviewed-   Lab Results   Component Value Date    HGBA1C 6.3 (H) 02/10/2025     Most recent fingerstick glucose reviewed-   Recent Labs   Lab 02/18/25  0743 02/18/25  1320   POCTGLUCOSE 120* 127*       Current correctional scale  Medium  Maintain anti-hyperglycemic dose as follows-   Antihyperglycemics (From admission, onward)    Start     Stop Route Frequency Ordered    02/09/25 1755  insulin aspart U-100 pen 0-10 Units         -- SubQ Before meals & nightly PRN 02/09/25 1657        Hold Oral hypoglycemics while patient is in the hospital.     
Last A1c reviewed-   Lab Results   Component Value Date    HGBA1C 6.3 (H) 02/10/2025     Most recent fingerstick glucose reviewed-   Recent Labs   Lab 02/19/25  1554   POCTGLUCOSE 115*       Current correctional scale  Medium  Maintain anti-hyperglycemic dose as follows-   Antihyperglycemics (From admission, onward)    Start     Stop Route Frequency Ordered    02/09/25 1755  insulin aspart U-100 pen 0-10 Units         -- SubQ Before meals & nightly PRN 02/09/25 1657        Hold Oral hypoglycemics while patient is in the hospital.     
Patient has Diastolic (HFpEF) heart failure that is Chronic. On presentation their CHF was well compensated.     Latest ECHO  Results for orders placed during the hospital encounter of 08/20/24    Echo    Interpretation Summary    Left Ventricle: The left ventricle is normal in size. Normal wall thickness. There is concentric remodeling. There is normal systolic function with a visually estimated ejection fraction of 60 - 65%. Diastolic function cannot be reliably determined in the presence of mitral annular calcification.    Right Ventricle: Normal right ventricular cavity size. Wall thickness is normal. Systolic function is mildly reduced.    The left atrium is moderately dilated.    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 It is reported to be a Carrion Mk S3 valve.    Mitral Valve: There is bileaflet sclerosis. There is moderate to severe mitral annular calcification present.    Tricuspid Valve: There is mild regurgitation.    Pulmonary Artery: The estimated pulmonary artery systolic pressure is 31 mmHg.    IVC/SVC: Normal venous pressure at 3 mmHg.    Current Heart Failure Medications  carvediloL tablet 12.5 mg, 2 times daily, Oral  furosemide tablet 40 mg, Daily PRN, Oral    Plan  - Monitor strict I&Os and daily weights.    - Place on telemetry  - Low sodium diet  - Place on fluid restriction of 1.5 L.   - Cardiology has not been consulted  - The patient's volume status is at their baseline  - Continue home coreg and lasix  
Patient has Diastolic (HFpEF) heart failure that is Chronic. On presentation their CHF was well compensated.     Latest ECHO  Results for orders placed during the hospital encounter of 08/20/24    Echo    Interpretation Summary    Left Ventricle: The left ventricle is normal in size. Normal wall thickness. There is concentric remodeling. There is normal systolic function with a visually estimated ejection fraction of 60 - 65%. Diastolic function cannot be reliably determined in the presence of mitral annular calcification.    Right Ventricle: Normal right ventricular cavity size. Wall thickness is normal. Systolic function is mildly reduced.    The left atrium is moderately dilated.  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 It is reported to be a Carrion Mk S3 valve.    Mitral Valve: There is bileaflet sclerosis. There is moderate to severe mitral annular calcification present.    Tricuspid Valve: There is mild regurgitation.    Pulmonary Artery: The estimated pulmonary artery systolic pressure is 31 mmHg.    IVC/SVC: Normal venous pressure at 3 mmHg.    Current Heart Failure Medications  carvediloL tablet 12.5 mg, 2 times daily, Oral  furosemide tablet 40 mg, Daily PRN, Oral    Plan  - Monitor strict I&Os and daily weights.    - Place on telemetry  - Low sodium diet  - Place on fluid restriction of 1.5 L.   - Cardiology has not been consulted  - The patient's volume status is at their baseline  - Continue home coreg and lasix  
Patient has Diastolic (HFpEF) heart failure that is Chronic. On presentation their CHF was well compensated.     Latest ECHO  Results for orders placed during the hospital encounter of 08/20/24    Echo    Interpretation Summary    Left Ventricle: The left ventricle is normal in size. Normal wall thickness. There is concentric remodeling. There is normal systolic function with a visually estimated ejection fraction of 60 - 65%. Diastolic function cannot be reliably determined in the presence of mitral annular calcification.    Right Ventricle: Normal right ventricular cavity size. Wall thickness is normal. Systolic function is mildly reduced.    The left atrium is moderately dilated.  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 It is reported to be a Carrion Mk S3 valve.    Mitral Valve: There is bileaflet sclerosis. There is moderate to severe mitral annular calcification present.    Tricuspid Valve: There is mild regurgitation.    Pulmonary Artery: The estimated pulmonary artery systolic pressure is 31 mmHg.    IVC/SVC: Normal venous pressure at 3 mmHg.    Current Heart Failure Medications  carvediloL tablet 12.5 mg, 2 times daily, Oral  furosemide tablet 40 mg, Daily PRN, Oral    Plan  - Monitor strict I&Os and daily weights.    - Place on telemetry  - Low sodium diet  - Place on fluid restriction of 1.5 L.   - Cardiology has not been consulted  - The patient's volume status is at their baseline  - Continue home coreg and lasix  
Patient has permanent atrial fibrillation. Patient is currently in atrial fibrillation. [id number] Score: 5. The patients heart rate in the last 24 hours is as follows:  Pulse  Min: 89  Max: 135     Antiarrhythmics  diltiaZEM 24 hr capsule 120 mg, Daily, Oral  metoprolol injection 5 mg, Every 4 hours PRN, Intravenous    Anticoagulants       Plan  - Replete lytes with a goal of K>4, Mg >2  - Patient is not on anti coag outpatient per home medication list, she is however on DAPT with aspirin and Plavix  - Holding home aspirin and Plavix as patient is planned to go to OR in the morning for repair, resume when appropriate to do so  - Patient's afib is currently controlled  - Patient was given diltiazem 10 mg IV while in the ED as patient's heart rate went to 140s.  I suspect that this is probably just due to uncontrolled pain.  After patient received diltiazem and hydromorphone, heart rate is now controlled in the 90s-100s.  Restarted ASA.  Plan to discuss with daughter if safe to restart anticoagulation given fall risk in setting of dementia and injury.  
Patient has permanent atrial fibrillation. Patient is currently in atrial fibrillation. [id number] Score: 5. The patients heart rate in the last 24 hours is as follows:  Pulse  Min: 82  Max: 123     Antiarrhythmics  diltiaZEM 24 hr capsule 120 mg, Daily, Oral    Anticoagulants  enoxaparin injection 40 mg, Every 24 hours, Subcutaneous    Plan  - Replete lytes with a goal of K>4, Mg >2  - Patient is not on anti coag outpatient per home medication list, she is however on DAPT with aspirin and Plavix  - Holding home aspirin and Plavix as patient is planned to go to OR in the morning for repair, resume when appropriate to do so  - Patient's afib is currently controlled  - Restarted ASA.  Risk likely outweighs benefit and restarting anticoagulation  
Patient has permanent atrial fibrillation. Patient is currently in atrial fibrillation. [id number] Score: 5. The patients heart rate in the last 24 hours is as follows:  Pulse  Min: 67  Max: 109     Antiarrhythmics  diltiaZEM 24 hr capsule 120 mg, Daily, Oral    Anticoagulants  enoxaparin injection 40 mg, Every 24 hours, Subcutaneous    Plan  - Replete lytes with a goal of K>4, Mg >2  - Patient is not on anti coag outpatient per home medication list, she is however on DAPT with aspirin and Plavix  - Holding home aspirin and Plavix as patient is planned to go to OR in the morning for repair, resume when appropriate to do so  - Patient's afib is currently controlled  - Restarted ASA.  Risk likely outweighs benefit and restarting anticoagulation  
Patient has permanent atrial fibrillation. Patient is currently in atrial fibrillation. [id number] Score: 5. The patients heart rate in the last 24 hours is as follows:  Pulse  Min: 70  Max: 112     Antiarrhythmics  diltiaZEM 24 hr capsule 120 mg, Daily, Oral    Anticoagulants  enoxaparin injection 40 mg, Every 24 hours, Subcutaneous    Plan  - Replete lytes with a goal of K>4, Mg >2  - Patient is not on anti coag outpatient per home medication list, she is however on DAPT with aspirin and Plavix  - Holding home aspirin and Plavix as patient is planned to go to OR in the morning for repair, resume when appropriate to do so  - Patient's afib is currently controlled  - Restarted ASA.  Risk likely outweighs benefit and restarting anticoagulation  
Patient has permanent atrial fibrillation. Patient is currently in atrial fibrillation. [id number] Score: 5. The patients heart rate in the last 24 hours is as follows:  Pulse  Min: 93  Max: 143     Antiarrhythmics       Anticoagulants       Plan  - Replete lytes with a goal of K>4, Mg >2  - Patient is not on anti coag outpatient per home medication list, she is however on DAPT with aspirin and Plavix  - Holding home aspirin and Plavix as patient is planned to go to OR in the morning for repair, resume when appropriate to do so  - Patient's afib is currently controlled  - Patient was given diltiazem 10 mg IV while in the ED as patient's heart rate went to 140s.  I suspect that this is probably just due to uncontrolled pain.  After patient received diltiazem and hydromorphone, heart rate is now controlled in the 90s-100s.  
Patient is status post hip fracture ORIF.  Still having significant pain postoperatively.  Adjust pain medication to minimize confusion.  Continue DVT prophylaxis.  Defer further operative management to Orthopedic surgery.  
Patient with 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 Dementia is moderate. The patient does not have signs of behavioral disturbance. Home dementia medications are continued.. Continue non-pharmacologic interventions to prevent delirium (No VS between 11PM-5AM, activity during day, opening blinds, providing glasses/hearing aids, and up in chair during daytime). Will avoid narcotics and benzos unless absolutely necessary. PRN anti-psychotics are prescribed to avoid self harm behaviors.    
Patient's COPD is controlled currently.  Patient is currently off COPD Pathway. Continue scheduled inhalers Supplemental oxygen and monitor respiratory status closely.     - DuoNebs Q6H PRN  
My signature below certifies that the above stated patient is homebound and upon completion of the Face-To-Face encounter, has the need for intermittent skilled nursing, physical therapy and/or speech or occupational therapy services in their home for their current diagnosis as outlined in their initial plan of care. These services will continue to be monitored by myself or another physician.

## 2025-02-20 VITALS
OXYGEN SATURATION: 97 % | HEART RATE: 96 BPM | TEMPERATURE: 98 F | DIASTOLIC BLOOD PRESSURE: 63 MMHG | WEIGHT: 180.13 LBS | SYSTOLIC BLOOD PRESSURE: 134 MMHG | RESPIRATION RATE: 20 BRPM | BODY MASS INDEX: 35.37 KG/M2 | HEIGHT: 60 IN

## 2025-02-20 LAB
ANION GAP SERPL CALC-SCNC: 4 MMOL/L (ref 8–16)
BASOPHILS # BLD AUTO: 0.04 K/UL (ref 0–0.2)
BASOPHILS NFR BLD: 0.6 % (ref 0–1.9)
BUN SERPL-MCNC: 13 MG/DL (ref 8–23)
CALCIUM SERPL-MCNC: 9 MG/DL (ref 8.7–10.5)
CHLORIDE SERPL-SCNC: 106 MMOL/L (ref 95–110)
CO2 SERPL-SCNC: 31 MMOL/L (ref 23–29)
CREAT SERPL-MCNC: 0.6 MG/DL (ref 0.5–1.4)
DIFFERENTIAL METHOD BLD: ABNORMAL
EOSINOPHIL # BLD AUTO: 0.1 K/UL (ref 0–0.5)
EOSINOPHIL NFR BLD: 1.8 % (ref 0–8)
ERYTHROCYTE [DISTWIDTH] IN BLOOD BY AUTOMATED COUNT: 18 % (ref 11.5–14.5)
EST. GFR  (NO RACE VARIABLE): >60 ML/MIN/1.73 M^2
GLUCOSE SERPL-MCNC: 140 MG/DL (ref 70–110)
HCT VFR BLD AUTO: 35.3 % (ref 37–48.5)
HGB BLD-MCNC: 10.9 G/DL (ref 12–16)
IMM GRANULOCYTES # BLD AUTO: 0.03 K/UL (ref 0–0.04)
IMM GRANULOCYTES NFR BLD AUTO: 0.5 % (ref 0–0.5)
LYMPHOCYTES # BLD AUTO: 1.3 K/UL (ref 1–4.8)
LYMPHOCYTES NFR BLD: 19.5 % (ref 18–48)
MAGNESIUM SERPL-MCNC: 1.5 MG/DL (ref 1.6–2.6)
MCH RBC QN AUTO: 30.2 PG (ref 27–31)
MCHC RBC AUTO-ENTMCNC: 30.9 G/DL (ref 32–36)
MCV RBC AUTO: 98 FL (ref 82–98)
MONOCYTES # BLD AUTO: 0.6 K/UL (ref 0.3–1)
MONOCYTES NFR BLD: 9.2 % (ref 4–15)
NEUTROPHILS # BLD AUTO: 4.5 K/UL (ref 1.8–7.7)
NEUTROPHILS NFR BLD: 68.4 % (ref 38–73)
NRBC BLD-RTO: 0 /100 WBC
PHOSPHATE SERPL-MCNC: 4 MG/DL (ref 2.7–4.5)
PLATELET # BLD AUTO: 211 K/UL (ref 150–450)
PMV BLD AUTO: 10.4 FL (ref 9.2–12.9)
POTASSIUM SERPL-SCNC: 4.8 MMOL/L (ref 3.5–5.1)
RBC # BLD AUTO: 3.61 M/UL (ref 4–5.4)
SODIUM SERPL-SCNC: 141 MMOL/L (ref 136–145)
WBC # BLD AUTO: 6.55 K/UL (ref 3.9–12.7)

## 2025-02-20 PROCEDURE — 85025 COMPLETE CBC W/AUTO DIFF WBC: CPT | Performed by: ORTHOPAEDIC SURGERY

## 2025-02-20 PROCEDURE — 97530 THERAPEUTIC ACTIVITIES: CPT

## 2025-02-20 PROCEDURE — 25000003 PHARM REV CODE 250: Performed by: FAMILY MEDICINE

## 2025-02-20 PROCEDURE — 25000003 PHARM REV CODE 250: Performed by: ORTHOPAEDIC SURGERY

## 2025-02-20 PROCEDURE — 97535 SELF CARE MNGMENT TRAINING: CPT

## 2025-02-20 PROCEDURE — 25000003 PHARM REV CODE 250: Performed by: HOSPITALIST

## 2025-02-20 PROCEDURE — 84100 ASSAY OF PHOSPHORUS: CPT | Performed by: ORTHOPAEDIC SURGERY

## 2025-02-20 PROCEDURE — 25000242 PHARM REV CODE 250 ALT 637 W/ HCPCS: Performed by: ORTHOPAEDIC SURGERY

## 2025-02-20 PROCEDURE — 83735 ASSAY OF MAGNESIUM: CPT | Performed by: ORTHOPAEDIC SURGERY

## 2025-02-20 PROCEDURE — 80048 BASIC METABOLIC PNL TOTAL CA: CPT | Performed by: ORTHOPAEDIC SURGERY

## 2025-02-20 PROCEDURE — 94640 AIRWAY INHALATION TREATMENT: CPT

## 2025-02-20 PROCEDURE — 94761 N-INVAS EAR/PLS OXIMETRY MLT: CPT

## 2025-02-20 PROCEDURE — 36415 COLL VENOUS BLD VENIPUNCTURE: CPT | Performed by: ORTHOPAEDIC SURGERY

## 2025-02-20 RX ORDER — LIDOCAINE 50 MG/G
1 PATCH TOPICAL DAILY
Qty: 10 PATCH | Refills: 0 | Status: SHIPPED | OUTPATIENT
Start: 2025-02-20

## 2025-02-20 RX ADMIN — FERROUS SULFATE TAB 325 MG (65 MG ELEMENTAL FE) 1 EACH: 325 (65 FE) TAB at 08:02

## 2025-02-20 RX ADMIN — ARFORMOTEROL TARTRATE 15 MCG: 15 SOLUTION RESPIRATORY (INHALATION) at 07:02

## 2025-02-20 RX ADMIN — LACTOBACILLUS ACIDOPHILUS / LACTOBACILLUS BULGARICUS 1 EACH: 100 MILLION CFU STRENGTH GRANULES at 08:02

## 2025-02-20 RX ADMIN — BUDESONIDE INHALATION 0.5 MG: 0.5 SUSPENSION RESPIRATORY (INHALATION) at 07:02

## 2025-02-20 RX ADMIN — ASPIRIN 81 MG: 81 TABLET, CHEWABLE ORAL at 08:02

## 2025-02-20 RX ADMIN — THERA TABS 1 TABLET: TAB at 08:02

## 2025-02-20 RX ADMIN — ACETAMINOPHEN 1000 MG: 500 TABLET, FILM COATED ORAL at 04:02

## 2025-02-20 RX ADMIN — PANTOPRAZOLE SODIUM 40 MG: 40 TABLET, DELAYED RELEASE ORAL at 08:02

## 2025-02-20 RX ADMIN — SENNOSIDES AND DOCUSATE SODIUM 1 TABLET: 50; 8.6 TABLET ORAL at 08:02

## 2025-02-20 RX ADMIN — DILTIAZEM HYDROCHLORIDE 120 MG: 120 CAPSULE, COATED, EXTENDED RELEASE ORAL at 08:02

## 2025-02-20 RX ADMIN — POLYETHYLENE GLYCOL 3350 17 G: 17 POWDER, FOR SOLUTION ORAL at 08:02

## 2025-02-20 RX ADMIN — CARVEDILOL 12.5 MG: 12.5 TABLET, FILM COATED ORAL at 08:02

## 2025-02-20 NOTE — CARE UPDATE
02/19/25 1938   Patient Assessment/Suction   Level of Consciousness (AVPU) alert   Respiratory Effort Unlabored   Expansion/Accessory Muscles/Retractions expansion symmetric;no retractions   All Lung Fields Breath Sounds Anterior:;Lateral:;coarse   Rhythm/Pattern, Respiratory depth regular;pattern regular   Cough Frequency infrequent   PRE-TX-O2   Device (Oxygen Therapy) room air   SpO2 95 %   Pulse Oximetry Type Intermittent   $ Pulse Oximetry - Multiple Charge Pulse Oximetry - Multiple   Pulse (!) 115   Resp 20   Aerosol Therapy   $ Aerosol Therapy Charges Aerosol Treatment   Daily Review of Necessity (SVN) completed   Respiratory Treatment Status (SVN) given   Treatment Route (SVN) mask;oxygen   Patient Position HOB elevated   Post Treatment Assessment (SVN) breath sounds unchanged   Signs of Intolerance (SVN) none   Breath Sounds Post-Respiratory Treatment   Throughout All Fields Post-Treatment All Fields   Throughout All Fields Post-Treatment no change   Post-treatment Heart Rate (beats/min) 116   Post-treatment Resp Rate (breaths/min) 20   Education   $ Education Bronchodilator;15 min

## 2025-02-20 NOTE — NURSING
Transport to rehab facility Awake, alert oriented via wheelchair accompanied by Escort x1. In good sprits. Medicated with Tylenol prior to transport. Drsg to rt hip dry/intact. Denied needs

## 2025-02-20 NOTE — PLAN OF CARE
Auth was submitted around 3pm on yesterday; John covarrubias CM from [facility] was working on auth this morning and should have a update soon. SW to call [employer] at 12pm if no update is given by then.      12:55pm_ ADRIANA called [facility] at [phone] spoke with Mattie. Per Mattie auth is approved for [facility] auth #[id number]    140- dc orders faxed to [facility] out today pending report info    210: ADRIANA called [facility] at [phone] to speak with Mary Jane. Per  staff Mary Jane is in a meeting for a hour and ADRIANA would have to call back after. ADRIANA to continue to follow     02/20/25 1026   Post-Acute Status   Post-Acute Authorization Placement   Post-Acute Placement Status Pending payor review/awaiting authorization (if required)   Discharge Plan   Discharge Plan A Skilled Nursing Facility   Discharge Plan B Skilled Nursing Facility

## 2025-02-20 NOTE — PT/OT/SLP PROGRESS
Occupational Therapy   Treatment    Name: Daryleen G Moran  MRN: [id number]  Admitting Diagnosis:  Fracture of femoral neck, right, closed  10 Days Post-Op    Recommendations:     Discharge Recommendations: Moderate Intensity Therapy  Discharge Equipment Recommendations:  to be determined by next level of care  Barriers to discharge:  Decreased caregiver support    Assessment:     Daryleen G Moran is a 79 y.o. female with a medical diagnosis of Fracture of femoral neck, right, closed.  Performance deficits affecting function are impaired cognition, weakness, impaired endurance, impaired self care skills, impaired functional mobility, gait instability, impaired balance, decreased upper extremity function, decreased lower extremity function, decreased safety awareness, pain, decreased ROM, impaired skin, edema, orthopedic precautions.     Patient found HOB with soiled brief, performed bed mobility rolling to Right and Left using handrails with Max A for brief change, total assist for toileting hygiene, patient refused OOB activity.    Rehab Prognosis:  Fair; patient would benefit from acute skilled OT services to address these deficits and reach maximum level of function.       Plan:     Patient to be seen 5 x/week to address the above listed problems via self-care/home management, therapeutic activities, therapeutic exercises  Plan of Care Expires: 03/11/25  Plan of Care Reviewed with: patient    Subjective     Chief Complaint: I want to die  Patient/Family Comments/goals: none stated  Pain/Comfort:       Objective:     Communicated with: nurse prior to session.  Patient found HOB elevated with peripheral IV, telemetry, bed alarm upon OT entry to room.    General Precautions: Standard, fall    Orthopedic Precautions:RLE weight bearing as tolerated  Braces: N/A  Respiratory Status: Room air     Occupational Performance:     Bed Mobility:    Patient completed Rolling/Turning to Left with  maximal assistance  Patient  completed Rolling/Turning to Right with maximal assistance         Activities of Daily Living:  Toileting: total assistance at bed level for hygiene and clothing management      [facility] 6 Click ADL: 14    Treatment & Education:  Therapist provided facilitation and instruction of proper body mechanics and fall prevention strategies during tasks listed above.   Instructed patient to sit in bedside chair as tolerated daily to increase OOB/activity tolerance.   Instructed patient to use call light to have nursing staff assist with needs/transfers.   Discussed OT POC and answered all questions within OT scope of practice.        Patient left HOB elevated with all lines intact, call button in reach, and PT Debbi present    GOALS:   Multidisciplinary Problems       Occupational Therapy Goals          Problem: Occupational Therapy    Goal Priority Disciplines Outcome Interventions   Occupational Therapy Goal     OT, PT/OT Progressing    Description: Goals to be met by: 3/11/25     Patient will increase functional independence with ADLs by performing:    UE Dressing with Minimal Assistance   LE Dressing with Minimal Assistance.  Grooming while seated at sink with Minimal Assistance.  Toileting from bedside commode with Minimal Assistance for hygiene and clothing management.   Toilet transfer to bedside commode with Minimal Assistance.                         DME Justifications:  No DME recommended requiring DME justifications    Time Tracking:     OT Date of Treatment: 02/20/25  OT Start Time: 1016  OT Stop Time: 1030  OT Total Time (min): 14 min    Billable Minutes:Self Care/Home Management 14    OT/FILI: OT     Number of FILI visits since last OT visit: 1 2/20/2025

## 2025-02-20 NOTE — PLAN OF CARE
Patient cleared for discharge from case management standpoint.    SW informed JAYMIE Garduno that report can be called to Mary Jane at [phone]. Facility to provide transport      Chart and discharge orders reviewed.  Patient discharged to SNF at [facility] with no further case management needs.       02/20/25 1515   Final Note   Assessment Type Final Discharge Note   Anticipated Discharge Disposition SNF   Post-Acute Status   Post-Acute Placement Status Set-up Complete/Auth obtained   Discharge Delays None known at this time

## 2025-02-20 NOTE — PT/OT/SLP PROGRESS
"Physical Therapy Treatment    Patient Name:  Daryleen G Moran   MRN:  [id number]    Recommendations:     Discharge Recommendations: Moderate Intensity Therapy  Discharge Equipment Recommendations: to be determined by next level of care  Barriers to discharge: Decreased caregiver support    Assessment:     Daryleen G Moran is a 79 y.o. female admitted with a medical diagnosis of Fracture of femoral neck, right, closed.  She presents with the following impairments/functional limitations: weakness, impaired endurance, impaired functional mobility, gait instability, impaired balance, impaired self care skills, impaired cognition, decreased safety awareness, decreased lower extremity function, pain, edema, orthopedic precautions Pt found completing OT. Reluctantly agrees to PT. She is very demanding and appears very angry at the world.States she wants to die.She required max A x 2 for bed mobility, Sit<> stand with RW max A x 2. Able to take 5 side steps to L with mod A x 2. After brief seated rest, pt demanding to get back into bed. Educated her on benefits of participation with therapy to return to \[facility] [place] [facility]\"".    Rehab Prognosis: Fair; patient would benefit from acute skilled PT services to address these deficits and reach maximum level of function.    Recent Surgery: Procedure(s) (LRB):  INSERTION, INTRAMEDULLARY ALEIDA, FEMUR (Right) 10 Days Post-Op    Plan:     During this hospitalization, patient to be seen 6 x/week to address the identified rehab impairments via gait training, therapeutic activities, therapeutic exercises, neuromuscular re-education and progress toward the following goals:    Plan of Care Expires:  03/11/25    Subjective     Chief Complaint: back pain  Patient/Family Comments/goals: "home or die"  Pain/Comfort:  Pain Rating 1:  (not rated)  Location - Orientation 1: lower  Location 1: back  Pain Addressed 1: Distraction, Cessation of Activity  Pain Rating Post-Intervention 1:  (no complaints at " rest)      Objective:     Communicated with nurse prior to session.  Patient found supine with bed alarm, telemetry, peripheral IV upon PT entry to room.     General Precautions: Standard, fall, hearing impaired  Orthopedic Precautions: RLE weight bearing as tolerated  Braces: N/A  Respiratory Status: Room air     Functional Mobility:  Bed Mobility:     Scooting: maximal assistance and of 2 persons  Supine to Sit: maximal assistance and of 2 persons  Sit to Supine: maximal assistance and of 2 persons  Transfers:     Sit to Stand:  maximal assistance and of 2 persons with rolling walker  Gait: mod A x 2 with RW to take 5 side steps to left      AM-PAC 6 CLICK MOBILITY          Treatment & Education:  Educated in importance of increased activity, participation with PT, positioning of r hip/precautions, fall prevention, use of call light    Patient left HOB elevated with all lines intact, call button in reach, and bed alarm on..    GOALS:   Multidisciplinary Problems       Physical Therapy Goals          Problem: Physical Therapy    Goal Priority Disciplines Outcome Interventions   Physical Therapy Goal     PT, PT/OT Progressing    Description: Goals to be met by: 3/11/25     Patient will increase functional independence with mobility by performin. Supine to sit with Moderate Assistance  2. Sit to stand transfer with Moderate Assistance  3. Bed to chair transfer with Moderate Assistance using Rolling Walker  4. Lower extremity exercise program x20 reps per handout, with assistance as needed                         Time Tracking:     PT Received On: 25  PT Start Time: 1028     PT Stop Time: 1040  PT Total Time (min): 12 min     Billable Minutes: Therapeutic Activity 12    Treatment Type: Treatment  PT/PTA: PT     Number of PTA visits since last PT visit: 0     2025

## 2025-02-21 NOTE — DISCHARGE SUMMARY
[facility] Medicine  Discharge Summary      Patient Name: Daryleen G Moran  MRN: [id number]  [facility]: [id number]  Patient Class: IP- Inpatient  Admission Date: 2/9/2025  Hospital Length of Stay: 11 days  Discharge Date and Time: 2/20/2025  4:48 PM  Attending Physician: No att. providers found   Discharging Provider: Sanjay Aquino MD  Primary Care Provider: Abhi De Oliveira IV, MD    Primary Care Team: Networked reference to record PCT     HPI:   Ms. Cyr is a 79 yr old female with a hx of DM type 2, HTN, s/p TAVR, CAD, dementia, AFib, COPD, chronic diastolic CHF, lumbar and thoracic spondylosis, degenerative disc disease, compression fractures, radiculopathy, arthritis, osteoporosis, GERD, stress urinary incontinence, HLD, left adrenal mass, iron-deficiency anemia, abdominal aneurysm, debility, attention and concentration deficit, vitamin-D deficiency, AVM, diverticulosis, depression, obesity, thrombocytopenia, aortic stenosis, NSTEMI who presented to the ED with a chief complaint of a slip and fall.  Granddaughter at bedside who helps to provide a history.  Patient and granddaughter state that the patient was getting out of bed to transfer to bedside commode when she slipped and fell onto her right side.  She states she immediately started having right hip pain.  Granddaughter and patient both deny head trauma and LOC.  She states that she quit smoking 6 months ago and denies alcohol and recreational drug use.  She denies fever, chills, dyspnea, palpitations, chest pain, abdominal pain, nausea, vomiting, diarrhea, dysuria, hematuria, lightheadedness, dizziness, and syncope.    Upon arrival to ED, patient afebrile, HR of 114, RR of 20, BP of 130/75, satting 96% on RA.  Workup in the ED revealed WBC of 12.78, potassium of 3.2, glucose of 154, PT of 12.6.  CXR shows no acute radiographic abnormalities.  Right hip x-ray shows acute nondisplaced and non comminuted intertrochanteric right femoral  neck fracture.  While in the ED patient had heart rate of 143 and EKG showed AFib with RVR.  Patient was given diltiazem 10 mg IV, hydromorphone 1.5 mg IV, 1 L NS bolus while in the ED. patient had improvement with heart rate to 90s-100s.  ED provider discussed case with Orthopedic surgery who plans for OR in the morning.  Patient will be admitted under hospital medicine services for further management.    Procedure(s) (LRB):  INSERTION, INTRAMEDULLARY ALEIDA, FEMUR (Right)      Hospital Course:   79-year-old female with history of AFib, dementia, compression fracture came in with fall and hip pain and patient was admitted for a right intertrochanteric femur fracture.  Status post cephalomedullary nail fixation  02/10.  Discuss antiplatelet/anticoagulation recommendations with Orthopedic surgery and we will just give ASA and DVT PPX given history of dementia and big fall risk.  S/p surgery - not really complaining of hip pain just her chronic back pain.  Encouraged to work with PT/OT and attempted to call daughter is patient lacks MDM to discuss progressing course and placement options but no answer, left VM.  Patient must more alert and oriented on 02/13 in his agreeable to SNF placement.  Patient's pain was controlled with a combination of ibuprofen, Tylenol, and lidocaine patch.  She was discharged to skilled nursing facility.     Goals of Care Treatment Preferences:  Code Status: Full Code      SDOH Screening:  The patient was unable to be screened for utility difficulties, food insecurity, transport difficulties, housing insecurity, and interpersonal safety, so no concerns could be identified this admission.     Consults:   Consults (From admission, onward)          Status Ordering Provider     Inpatient consult to PICC Line Nurse  Once        Provider:  (Not yet assigned)    Completed ASUNCION MAHMOOD     Inpatient consult to Orthopedics  Once        Provider:  Antwan Ramesh MD    Completed RANDEE GATES  M            * Fracture of femoral neck, right, closed  Patient is status post hip fracture ORIF.  Still having significant pain postoperatively.  Adjust pain medication to minimize confusion.  Continue DVT prophylaxis.  Defer further operative management to Orthopedic surgery.    Severe obesity (BMI 35.0-39.9) with comorbidity  Body mass index is 35.18 kg/m². Morbid obesity complicates all aspects of disease management from diagnostic modalities to treatment. Weight loss encouraged and health benefits explained to patient.         Anemia  Anemia is likely due to chronic disease due to Chronic Kidney Disease. Most recent hemoglobin and hematocrit are listed below.  Recent Labs     02/17/25  0557 02/18/25  0632   HGB 10.3* 10.4*   HCT 32.9* 34.3*       Plan  - Monitor serial CBC: Daily  - Transfuse PRBC if patient becomes hemodynamically unstable, symptomatic or H/H drops below 7/21.  - Patient has not received any PRBC transfusions to date  - Patient's anemia is currently stable    Chronic diastolic heart failure  Patient has Diastolic (HFpEF) heart failure that is Chronic. On presentation their CHF was well compensated.     Latest ECHO  Results for orders placed during the hospital encounter of 08/20/24    Echo    Interpretation Summary    Left Ventricle: The left ventricle is normal in size. Normal wall thickness. There is concentric remodeling. There is normal systolic function with a visually estimated ejection fraction of 60 - 65%. Diastolic function cannot be reliably determined in the presence of mitral annular calcification.    Right Ventricle: Normal right ventricular cavity size. Wall thickness is normal. Systolic function is mildly reduced.    The left atrium is moderately dilated.    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 It is reported to be a Carrion Mk S3 valve.    Mitral Valve: There is bileaflet sclerosis. There is moderate to severe mitral annular  calcification present.    Tricuspid Valve: There is mild regurgitation.    Pulmonary Artery: The estimated pulmonary artery systolic pressure is 31 mmHg.    IVC/SVC: Normal venous pressure at 3 mmHg.    Current Heart Failure Medications  carvediloL tablet 12.5 mg, 2 times daily, Oral  furosemide tablet 40 mg, Daily PRN, Oral    Plan  - Monitor strict I&Os and daily weights.    - Place on telemetry  - Low sodium diet  - Place on fluid restriction of 1.5 L.   - Cardiology has not been consulted  - The patient's volume status is at their baseline  - Continue home coreg and lasix    COPD (chronic obstructive pulmonary disease)  Patient's COPD is controlled currently.  Patient is currently off COPD Pathway. Continue scheduled inhalers Supplemental oxygen and monitor respiratory status closely.     - DuoNebs Q6H PRN    Atrial fibrillation with RVR  Patient has permanent atrial fibrillation. Patient is currently in atrial fibrillation. [id number] Score: 5. The patients heart rate in the last 24 hours is as follows:  Pulse  Min: 82  Max: 123     Antiarrhythmics  diltiaZEM 24 hr capsule 120 mg, Daily, Oral    Anticoagulants  enoxaparin injection 40 mg, Every 24 hours, Subcutaneous    Plan  - Replete lytes with a goal of K>4, Mg >2  - Patient is not on anti coag outpatient per home medication list, she is however on DAPT with aspirin and Plavix  - Holding home aspirin and Plavix as patient is planned to go to OR in the morning for repair, resume when appropriate to do so  - Patient's afib is currently controlled  - Restarted ASA.  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 Dementia is moderate. The patient does not have signs of behavioral disturbance. Home dementia medications are  continued.. Continue non-pharmacologic interventions to prevent delirium (No VS between 11PM-5AM, activity during day, opening blinds, providing glasses/hearing aids, and up in chair during daytime). Will avoid narcotics and benzos unless absolutely necessary. PRN anti-psychotics are prescribed to avoid self harm behaviors.      S/P TAVR (transcatheter aortic valve replacement)  - Hx noted      Hypertension  Chronic,  Latest blood pressure and vitals reviewed-     Temp:  [97.6 °F (36.4 °C)-99.5 °F (37.5 °C)]   Pulse:  []   Resp:  [16-18]   BP: (111-165)/(50-82)   SpO2:  [89 %-97 %] .   Home meds for hypertension were reviewed and noted below-  Hypertension Medications               carvediloL (COREG) 12.5 MG tablet Take 12.5 mg by mouth 2 (two) times daily.    diltiaZEM (DILACOR XR) 120 MG CDCR Take 1 capsule (120 mg total) by mouth once daily.    furosemide (LASIX) 40 MG tablet Take 1 tablet (40 mg total) by mouth daily as needed (Take for weight gain > 2 pounds over 24 hours. May discuss with PCP).            While in the hospital, will manage blood pressure as follows; Continue home antihypertensive regimen    Will utilize p.r.n. blood pressure medication only if patient's blood pressure greater than 180/110 and she develops symptoms such as worsening chest pain or shortness of breath.      Type 2 diabetes mellitus, without long-term current use of insulin  Last A1c reviewed-   Lab Results   Component Value Date    HGBA1C 6.3 (H) 02/10/2025     Most recent fingerstick glucose reviewed-   Recent Labs   Lab 02/19/25  1554   POCTGLUCOSE 115*       Current correctional scale  Medium  Maintain anti-hyperglycemic dose as follows-   Antihyperglycemics (From admission, onward)      Start     Stop Route Frequency Ordered    02/09/25 1755  insulin aspart U-100 pen 0-10 Units         -- SubQ Before meals & nightly PRN 02/09/25 1657          Hold Oral hypoglycemics while patient is in the hospital.         Final Active  Diagnoses:    Diagnosis Date Noted POA    PRINCIPAL PROBLEM:  Fracture of femoral neck, right, closed [S72.001A] 02/09/2025 Yes    Anemia [D64.9] 02/17/2025 Yes    Severe obesity (BMI 35.0-39.9) with comorbidity [E66.01] 02/17/2025 Yes    Chronic diastolic heart failure [I50.32] 08/21/2024 Yes    COPD (chronic obstructive pulmonary disease) [J44.9] 08/20/2024 Yes    Atrial fibrillation with RVR [I48.91] 10/18/2023 Yes    Dementia in other diseases classified elsewhere, unspecified severity, without behavioral disturbance, psychotic disturbance, mood disturbance, and anxiety [F02.80] 01/19/2023 Yes    S/P TAVR (transcatheter aortic valve replacement) [Z95.3] 09/16/2016 Not Applicable    Hypertension [I10] 09/13/2016 Yes    Type 2 diabetes mellitus, without long-term current use of insulin [E11.9] 09/13/2016 Yes      Problems Resolved During this Admission:       Discharged Condition: stable    Disposition: Skilled Nursing Facility    Follow Up:    Patient Instructions:      Ambulatory referral/consult to Outpatient Case Management   Referral Priority: Routine Referral Type: Consultation   Referral Reason: Specialty Services Required   Number of Visits Requested: 1     Diet Adult Regular     No dressing needed     Activity as tolerated       Significant Diagnostic Studies: N/A    Pending Diagnostic Studies:       None           Medications:  Reconciled Home Medications:      Medication List        START taking these medications      acetaminophen 500 MG tablet  Commonly known as: TYLENOL  Take 2 tablets (1,000 mg total) by mouth every 6 (six) hours as needed for Pain.     arformoteroL 15 mcg/2 mL Nebu  Commonly known as: BROVANA  Take 2 mLs (15 mcg total) by nebulization 2 (two) times daily. Controller     ibuprofen 600 MG tablet  Commonly known as: ADVIL,MOTRIN  Take 1 tablet (600 mg total) by mouth every 6 (six) hours as needed (pain).     LIDOcaine 5 %  Commonly known as: LIDODERM  Place 1 patch onto the skin once  daily. Apply to right hip   Remove & Discard patch within 12 hours or as directed by MD     senna-docusate 8.6-50 mg 8.6-50 mg per tablet  Commonly known as: PERICOLACE  Take 1 tablet by mouth once daily. for 10 days            CHANGE how you take these medications      venlafaxine 75 MG 24 hr capsule  Commonly known as: EFFEXOR-XR  Take 1 capsule (75 mg total) by mouth once daily.  What changed: when to take this            CONTINUE taking these medications      aspirin 81 MG EC tablet  Commonly known as: ECOTRIN  Take 81 mg by mouth once daily.     BREO ELLIPTA 100-25 mcg/dose diskus inhaler  Generic drug: fluticasone furoate-vilanteroL  Inhale 1 puff into the lungs once daily. Controller     carvediloL 12.5 MG tablet  Commonly known as: COREG  Take 12.5 mg by mouth 2 (two) times daily.     citalopram 40 MG tablet  Commonly known as: CeleXA  Take 40 mg by mouth once daily.     diltiaZEM 120 MG Cdcr  Commonly known as: DILACOR XR  Take 1 capsule (120 mg total) by mouth once daily.     donepeziL 5 MG tablet  Commonly known as: ARICEPT  Take 5 mg by mouth nightly.     FeroSuL 325 mg (65 mg iron) Tab tablet  Generic drug: ferrous sulfate  Take 325 mg by mouth once daily.     fluticasone propionate 50 mcg/actuation nasal spray  Commonly known as: FLONASE  1 spray by Each Nostril route once daily.     furosemide 40 MG tablet  Commonly known as: LASIX  Take 1 tablet (40 mg total) by mouth daily as needed (Take for weight gain > 2 pounds over 24 hours. May discuss with PCP).     lactobacillus acidophilus & bulgar 100 million cell packet  Commonly known as: LACTINEX  Take 1 packet (1 each total) by mouth 2 (two) times daily.     multivitamin capsule  Take 1 capsule by mouth once daily.     pantoprazole 40 MG tablet  Commonly known as: PROTONIX  Take 40 mg by mouth 2 (two) times daily.            STOP taking these medications      clopidogreL 75 mg tablet  Commonly known as: PLAVIX     traMADoL 50 mg tablet  Commonly known  as: ULTRAM              Indwelling Lines/Drains at time of discharge:   Lines/Drains/Airways       None                   Time spent on the discharge of patient: 32 minutes         Sanjay Aquino MD  Department of Hospital Medicine  [facility]

## 2025-02-24 DIAGNOSIS — S72.001D CLOSED FRACTURE OF NECK OF RIGHT FEMUR WITH ROUTINE HEALING, SUBSEQUENT ENCOUNTER: Primary | ICD-10-CM

## 2025-04-21 ENCOUNTER — HOSPITAL ENCOUNTER (EMERGENCY)
Facility: HOSPITAL | Age: 80
Discharge: HOME | End: 2025-04-21
Attending: EMERGENCY MEDICINE
Payer: MEDICARE

## 2025-04-21 VITALS
BODY MASS INDEX: 35.34 KG/M2 | HEIGHT: 60 IN | SYSTOLIC BLOOD PRESSURE: 128 MMHG | WEIGHT: 180 LBS | OXYGEN SATURATION: 100 % | DIASTOLIC BLOOD PRESSURE: 72 MMHG | RESPIRATION RATE: 16 BRPM | TEMPERATURE: 98 F | HEART RATE: 89 BPM

## 2025-04-21 DIAGNOSIS — R19.7 DIARRHEA, UNSPECIFIED TYPE: Primary | ICD-10-CM

## 2025-04-21 LAB
ABSOLUTE EOSINOPHIL (SMH): 0.12 K/UL
ABSOLUTE MONOCYTE (SMH): 0.78 K/UL (ref 0.3–1)
ABSOLUTE NEUTROPHIL COUNT (SMH): 6.3 K/UL (ref 1.8–7.7)
ALBUMIN SERPL-MCNC: 3.9 G/DL (ref 3.5–5.2)
ALP SERPL-CCNC: 95 UNIT/L (ref 55–135)
ALT SERPL-CCNC: 21 UNIT/L (ref 10–44)
ANION GAP (SMH): 9 MMOL/L (ref 8–16)
APTT PPP: 25.1 SECONDS (ref 21–32)
AST SERPL-CCNC: 27 UNIT/L (ref 10–40)
BASOPHILS # BLD AUTO: 0.08 K/UL
BASOPHILS NFR BLD AUTO: 0.9 %
BILIRUB SERPL-MCNC: 0.5 MG/DL (ref 0.1–1)
BUN SERPL-MCNC: 19 MG/DL (ref 8–23)
CALCIUM SERPL-MCNC: 9.5 MG/DL (ref 8.7–10.5)
CHLORIDE SERPL-SCNC: 101 MMOL/L (ref 95–110)
CO2 SERPL-SCNC: 27 MMOL/L (ref 23–29)
CREAT SERPL-MCNC: 0.7 MG/DL (ref 0.5–1.4)
ERYTHROCYTE [DISTWIDTH] IN BLOOD BY AUTOMATED COUNT: 13.3 % (ref 11.5–14.5)
GFR SERPLBLD CREATININE-BSD FMLA CKD-EPI: >60 ML/MIN/1.73/M2
GLUCOSE SERPL-MCNC: 129 MG/DL (ref 70–110)
HCT VFR BLD AUTO: 48.3 % (ref 37–48.5)
HGB BLD-MCNC: 15.5 GM/DL (ref 12–16)
IMM GRANULOCYTES # BLD AUTO: 0.03 K/UL (ref 0–0.04)
IMM GRANULOCYTES NFR BLD AUTO: 0.3 % (ref 0–0.5)
INDIRECT COOMBS: NORMAL
INR PPP: 1.1 (ref 0.8–1.2)
LYMPHOCYTES # BLD AUTO: 1.73 K/UL (ref 1–4.8)
MCH RBC QN AUTO: 30.5 PG (ref 27–31)
MCHC RBC AUTO-ENTMCNC: 32.1 G/DL (ref 32–36)
MCV RBC AUTO: 95 FL (ref 82–98)
NUCLEATED RBC (/100WBC) (SMH): 0 /100 WBC
OB PNL STL: NEGATIVE
PLATELET # BLD AUTO: 210 K/UL (ref 150–450)
PMV BLD AUTO: 10.5 FL (ref 9.2–12.9)
POTASSIUM SERPL-SCNC: 4.3 MMOL/L (ref 3.5–5.1)
PROT SERPL-MCNC: 6.8 GM/DL (ref 6–8.4)
PROTHROMBIN TIME: 12.1 SECONDS (ref 9–12.5)
RBC # BLD AUTO: 5.08 M/UL (ref 4–5.4)
RELATIVE EOSINOPHIL (SMH): 1.3 % (ref 0–8)
RELATIVE LYMPHOCYTE (SMH): 19.1 % (ref 18–48)
RELATIVE MONOCYTE (SMH): 8.6 % (ref 4–15)
RELATIVE NEUTROPHIL (SMH): 69.8 % (ref 38–73)
RH BLD: NORMAL
SODIUM SERPL-SCNC: 137 MMOL/L (ref 136–145)
SPECIMEN OUTDATE: NORMAL
WBC # BLD AUTO: 9.04 K/UL (ref 3.9–12.7)

## 2025-04-21 PROCEDURE — 36415 COLL VENOUS BLD VENIPUNCTURE: CPT | Performed by: EMERGENCY MEDICINE

## 2025-04-21 PROCEDURE — 93010 ELECTROCARDIOGRAM REPORT: CPT | Mod: ,,, | Performed by: INTERNAL MEDICINE

## 2025-04-21 PROCEDURE — 93005 ELECTROCARDIOGRAM TRACING: CPT | Performed by: INTERNAL MEDICINE

## 2025-04-21 PROCEDURE — 85730 THROMBOPLASTIN TIME PARTIAL: CPT | Performed by: NURSE PRACTITIONER

## 2025-04-21 PROCEDURE — 82040 ASSAY OF SERUM ALBUMIN: CPT | Performed by: NURSE PRACTITIONER

## 2025-04-21 PROCEDURE — 85610 PROTHROMBIN TIME: CPT | Performed by: NURSE PRACTITIONER

## 2025-04-21 PROCEDURE — 85025 COMPLETE CBC W/AUTO DIFF WBC: CPT | Performed by: NURSE PRACTITIONER

## 2025-04-21 PROCEDURE — 86850 RBC ANTIBODY SCREEN: CPT | Performed by: NURSE PRACTITIONER

## 2025-04-21 PROCEDURE — 99284 EMERGENCY DEPT VISIT MOD MDM: CPT | Mod: 25

## 2025-04-21 PROCEDURE — 82272 OCCULT BLD FECES 1-3 TESTS: CPT | Performed by: NURSE PRACTITIONER

## 2025-04-21 NOTE — FIRST PROVIDER EVALUATION
" Emergency Department TeleTriage Encounter Note      CHIEF COMPLAINT    Chief Complaint   Patient presents with    Diarrhea     Diarrhea x "months"  caregiver reports black stools for x4 days.         VITAL SIGNS   Initial Vitals [04/21/25 1632]   BP Pulse Resp Temp SpO2   (!) 150/78 97 18 98.3 °F (36.8 °C) (!) 94 %      MAP       --            ALLERGIES    Review of patient's allergies indicates:   Allergen Reactions    Latex      Other reaction(s): Unknown  Other reaction(s): Unknown    Sulfacetamide sodium      Pain perineal area    Sulfasalazine Hives    Adhesive Itching     SKIN GETS RED WITH TAPE AND BANDAIDS    Adhesive tape-silicones Itching     SKIN GETS RED WITH TAPE AND BANDAIDS    Sulfa (sulfonamide antibiotics) Rash       PROVIDER TRIAGE NOTE  80 year old female presents to the ER with complaints of black diarrhea x 4 days alsong with watery diarrhea for months. In past 4 days has become lightheaded and fatigued. No syncopal episodes. Patient is overall poor historian so history is limited. Unsure of blood thinner use. No abdominal pain. No chest pain or palpitations.    AAOx3, respirations even and non- labored, stable vitals, normal coloration of skin, sitting upright in triage chair, appears in no acute distress.          ORDERS  Labs Reviewed - No data to display    ED Orders (720h ago, onward)      None              Virtual Visit Note: The provider triage portion of this emergency department evaluation and documentation was performed via [employer], a HIPAA-compliant telemedicine application, in concert with a tele-presenter in the room. A face to face patient evaluation with one of my colleagues will occur once the patient is placed in an emergency department room.      DISCLAIMER: This note was prepared with [employer] voice recognition transcription software. Garbled syntax, mangled pronouns, and other bizarre constructions may be attributed to that software system.    "

## 2025-04-22 NOTE — DISCHARGE INSTRUCTIONS
Please follow-up with your primary care physician and GI specialist as soon as possible for re-evaluation.  Your stool test tonight was negative for blood.  You are not anemic and overall your basic blood work looked good.  You may return to the emergency room for any acutely worsening symptoms or concerns.

## 2025-04-22 NOTE — ED PROVIDER NOTES
"Encounter Date: 4/21/2025       History     Chief Complaint   Patient presents with    Diarrhea     Diarrhea x "months"  caregiver reports black stools for x4 days.       HPI  Pt w/ PMHx C diff in December 2024 treated with oral vancomycin, diabetes, hypertension, s/p TAVR, CAD, dementia, AFib, COPD, chronic diastolic CHF, chronic back pain, iron-deficiency anemia, abdominal aneurysm, diverticulosis who presents to the ED at the concern of her daughter for seeing intermittent dark stools for a couple of days.  She describes as dark and mucousy.  Has been taking Imodium as needed for diarrhea.  The diarrhea has been fairly chronic and intermittent for several months.  The patient denies any chest or abdominal pain.  She denies fever.  Denies nausea or vomiting.  Denies urinary complaints.  Has not yet followed up with GI and has not yet had colonoscopy following the previous diagnosis of C diff.    Review of patient's allergies indicates:   Allergen Reactions    Latex      Other reaction(s): Unknown  Other reaction(s): Unknown    Sulfacetamide sodium      Pain perineal area    Sulfasalazine Hives    Adhesive Itching     SKIN GETS RED WITH TAPE AND BANDAIDS    Adhesive tape-silicones Itching     SKIN GETS RED WITH TAPE AND BANDAIDS    Sulfa (sulfonamide antibiotics) Rash     Past Medical History:   Diagnosis Date    Anesthesia complication     BLADDER DYSFUNCTION    Aortic stenosis     Arthritis     Back pain     Diabetes mellitus type II     NO LONGER DIABETIC    Encounter for blood transfusion     Encounter for pain management 12/6/2024    Hyperlipidemia     Hypertension     NSTEMI (non-ST elevated myocardial infarction) 05/01/2022    Osteoporosis     Wears glasses      Past Surgical History:   Procedure Laterality Date     vocal cord nodules removed  long time ago     twice    ADRENAL TUMOR      APPENDECTOMY  within last 5yrs    CATHETERIZATION OF BOTH LEFT AND RIGHT HEART Left 05/06/2022    Procedure: " CATHETERIZATION, HEART, BOTH LEFT AND RIGHT;  Surgeon: Michelet Vargas MD;  Location: [facility] CATH/EP LAB;  Service: Cardiology;  Laterality: Left;    COLONOSCOPY N/A 6/23/2023    Procedure: COLONOSCOPY;  Surgeon: Joel Neal III, MD;  Location: [facility] ENDO;  Service: Endoscopy;  Laterality: N/A;    FIXATION KYPHOPLASTY THORACIC SPINE      8-20-13    FOOT SURGERY      left 2nd toe was too long     HERNIA REPAIR  within last 5yrs    INSERTION OF TEMPORARY PACEMAKER N/A 1/4/2023    Procedure: INSERTION, PACEMAKER, TEMPORARY;  Surgeon: Bhavesh Peña MD;  Location: [facility] CATH;  Service: Cardiology;  Laterality: N/A;    INTRAMEDULLARY RODDING OF FEMUR Right 2/10/2025    Procedure: INSERTION, INTRAMEDULLARY ALEIDA, FEMUR;  Surgeon: Antwan Ramesh MD;  Location: [facility] OR;  Service: Orthopedics;  Laterality: Right;    LEFT HEART CATHETERIZATION Left 05/02/2022    Procedure: Left heart cath;  Surgeon: Jose Echols MD;  Location: [facility] CATH/EP LAB;  Service: Cardiology;  Laterality: Left;    SMALL BOWEL ENTEROSCOPY N/A 6/22/2023    Procedure: ENTEROSCOPY;  Surgeon: Cornell Aguirre MD;  Location: [facility] ENDO;  Service: Endoscopy;  Laterality: N/A;    SMALL BOWEL ENTEROSCOPY N/A 10/20/2023    Procedure: ENTEROSCOPY;  Surgeon: Otilio Bourgeois MD;  Location: [facility] ENDO;  Service: Endoscopy;  Laterality: N/A;    TONSILLECTOMY, ADENOIDECTOMY  long time ago    TRANSCATHETER AORTIC VALVE REPLACEMENT (TAVR) Bilateral 1/4/2023    Procedure: REPLACEMENT, AORTIC VALVE, TRANSCATHETER (TAVR);  Surgeon: Bhavesh Peña MD;  Location: [facility] CATH;  Service: Cardiology;  Laterality: Bilateral;    TRANSCATHETER AORTIC VALVE REPLACEMENT (TAVR) Bilateral 1/4/2023    Procedure: REPLACEMENT, AORTIC VALVE, TRANSCATHETER (TAVR);  Surgeon: Dallin Verma MD;  Location: [facility] CATH;  Service: Cardiology;  Laterality: Bilateral;    TRANSTHORACIC ECHOCARDIOGRAPHY (TTE)  1/4/2023    Procedure: ECHOCARDIOGRAM, TRANSTHORACIC;  Surgeon: Bhavesh JOHNSON  MD Mariana;  Location: [facility];  Service: Cardiology;;     Family History   Problem Relation Name Age of Onset    Arthritis Mother      Collagen disease Neg Hx       Social History[1]  Review of Systems   Constitutional:  Negative for fever.   HENT:  Negative for sore throat.    Respiratory:  Negative for shortness of breath.    Cardiovascular:  Negative for chest pain.   Gastrointestinal:  Positive for diarrhea. Negative for abdominal pain and nausea.   Genitourinary:  Negative for dysuria.   Musculoskeletal:  Negative for back pain.   Skin:  Negative for rash.   Neurological:  Negative for weakness.   Hematological:  Does not bruise/bleed easily.       Physical Exam     Initial Vitals [04/21/25 1632]   BP Pulse Resp Temp SpO2   (!) 150/78 97 18 98.3 °F (36.8 °C) (!) 94 %      MAP       --         Physical Exam    Nursing note and vitals reviewed.  Constitutional: She appears well-developed and well-nourished. No distress.   HENT:   Head: Normocephalic and atraumatic. Mouth/Throat: Oropharynx is clear and moist.   Eyes: EOM are normal. Pupils are equal, round, and reactive to light.   Neck: Neck supple.   Normal range of motion.  Cardiovascular:  Normal rate and regular rhythm.           Pulmonary/Chest: Breath sounds normal. No respiratory distress.   Abdominal: Abdomen is soft. There is no abdominal tenderness. There is no rebound and no guarding.   Genitourinary:    Genitourinary Comments: Dark brown stool on KINZA, no gross blood     Musculoskeletal:         General: No tenderness or edema. Normal range of motion.      Cervical back: Normal range of motion and neck supple.     Neurological: She is alert and oriented to person, place, and time. She has normal strength. No cranial nerve deficit. GCS score is 15. GCS eye subscore is 4. GCS verbal subscore is 5. GCS motor subscore is 6.   Skin: Skin is warm and dry. Capillary refill takes less than 2 seconds. No rash noted.   Psychiatric: She has a normal mood and  affect. Thought content normal.         ED Course   Procedures  Labs Reviewed   COMPREHENSIVE METABOLIC PANEL - Abnormal       Result Value    Sodium 137      Potassium 4.3      Chloride 101      CO2 27      Glucose 129 (*)     BUN 19      Creatinine 0.7      Calcium 9.5      Protein Total 6.8      Albumin 3.9      Bilirubin Total 0.5      ALP 95      AST 27      ALT 21      Anion Gap 9      eGFR >60     PROTIME-INR - Normal    PT 12.1      INR 1.1     APTT - Normal    PTT 25.1     OCCULT BLOOD X 1, STOOL - Normal    OCCULT BLOOD STOOL Negative     CBC WITH DIFFERENTIAL - Normal    WBC 9.04      RBC 5.08      Hgb 15.5      Hct 48.3      MCV 95      MCH 30.5      MCHC 32.1      RDW 13.3      Platelet Count 210      MPV 10.5      Nucleated RBC 0      Neut % 69.8      Lymph % 19.1      Mono % 8.6      Eos % 1.3      Basophil % 0.9      Imm Grans % 0.3      Neut # 6.3      Lymph # 1.73      Mono # 0.78      Eos # 0.12      Baso # 0.08      Imm Grans # 0.03     CBC W/ AUTO DIFFERENTIAL    Narrative:     The following orders were created for panel order CBC auto differential.  Procedure                               Abnormality         Status                     ---------                               -----------         ------                     CBC with Differential[[id number]]       Normal              Final result                 Please view results for these tests on the individual orders.   EXTRA TUBES    Narrative:     The following orders were created for panel order EXTRA TUBES.  Procedure                               Abnormality         Status                     ---------                               -----------         ------                     Light Green Top Hold[[id number]]                            In process                   Please view results for these tests on the individual orders.   LIGHT GREEN TOP HOLD   TYPE & SCREEN    Specimen Outdate 04/24/2025 23:59      Group & Rh A POS      Indirect  Liborio NEG       EKG Readings: (Independently Interpreted)   Afib, HR 87, nonspecific T wave abnormalities, no STEMI.     ECG Results              EKG 12-lead (Final result)        Collection Time Result Time QRS Duration OHS QTC Calculation    04/21/25 19:13:48 04/30/25 18:59:26 78 440                     Final result by Interface, Lab In [facility] (04/30/25 18:59:33)                   Narrative:    Test Reason : R42,    Vent. Rate :  87 BPM     Atrial Rate :    BPM     P-R Int :    ms          QRS Dur :  78 ms      QT Int : 366 ms       P-R-T Axes :     85  18 degrees    QTcB Int : 440 ms    Atrial fibrillation  Abnormal ECG  When compared with ECG of 09-Feb-2025 22:59,  Nonspecific T wave abnormality, improved in Inferior leads  Confirmed by Steve Echols ([id number]) on 4/30/2025 6:59:24 PM    Referred By: AAAREFERRAL SELF           Confirmed By: Steve Echols                                     EKG 12-lead (Final result)  Result time 04/30/25 13:33:05      Final result by Unknown User (04/30/25 13:33:05)                                      Imaging Results    None          Medications - No data to display  Medical Decision Making  Amount and/or Complexity of Data Reviewed  External Data Reviewed: notes.  Labs: ordered. Decision-making details documented in ED Course.  ECG/medicine tests: ordered and independent interpretation performed. Decision-making details documented in ED Course.                     Pt presents to ED as above.  Vitals stable.  Differential includes but not limited to GI bleeding 2/2 PUD, gastritis,, diverticulosis, malignancy, infectious diarrhea.  All labs reviewed by me and significant for normal WBC count, normal Hb, stable electrolytes and renal fxn.  Stool occult blood negative.  Unlikely to have GIB.  Diarrhea has been intermittent.  Labs stable w/o SIRS criteria.  Unlikely recurrent Cdiff.  Recommend PCP and GI f/u for recheck.  Pt comfortable for d/c home.  ED return precautions  discussed and provided.                 Clinical Impression:  Final diagnoses:  [R19.7] Diarrhea, unspecified type (Primary)          ED Disposition Condition    Discharge Stable          ED Prescriptions    None       Follow-up Information       Follow up With Specialties Details Why Contact Info Additional Information    [facility] - Emergency Dept Emergency Medicine  As needed, If symptoms worsen [address] [facility] [id number] [phone] 1st floor                 [1]   Social History  Tobacco Use    Smoking status: Former     Current packs/day: 0.00     Average packs/day: 0.5 packs/day for 35.0 years (17.5 ttl pk-yrs)     Types: Cigarettes     Start date: 10/5/1987     Quit date: 10/5/2022     Years since quittin.5    Smokeless tobacco: Never   Substance Use Topics    Alcohol use: No    Drug use: No        Beba Simeon MD  [phone]

## 2025-04-30 LAB
OHS QRS DURATION: 78 MS
OHS QTC CALCULATION: 440 MS

## 2025-05-14 ENCOUNTER — TELEPHONE (OUTPATIENT)
Dept: CARDIOLOGY | Facility: CLINIC | Age: 80
End: 2025-05-14
Payer: MEDICARE

## 2025-05-14 NOTE — TELEPHONE ENCOUNTER
----- Message from Sangeethachi sent at 5/14/2025  4:00 PM CDT -----  Regarding: advice  Type:  Needs Medical AdviceWho Called:sabina with [facility] Call Back Number: [phone] Additional Information: sabina salguero she has some questions about the sx clearance they received.  please call to discuss.

## 2025-08-29 ENCOUNTER — OFFICE VISIT (OUTPATIENT)
Dept: PAIN MEDICINE | Facility: CLINIC | Age: 80
End: 2025-08-29
Payer: MEDICARE

## 2025-09-04 ENCOUNTER — TELEPHONE (OUTPATIENT)
Dept: CARDIOLOGY | Facility: CLINIC | Age: 80
End: 2025-09-04
Payer: MEDICARE

## (undated) DEVICE — COVER MAYO STND XL 30X57IN

## (undated) DEVICE — TUBING CYSTO SINGLE V4608-20

## (undated) DEVICE — JELLY LUBRICATING TUBE 4OZ 4OZLUB

## (undated) DEVICE — COVER CAMERA OPERATING ROOM

## (undated) DEVICE — Device

## (undated) DEVICE — BANDAGE MATRIX HK LOOP 6IN 5YD

## (undated) DEVICE — GLOVE SENSICARE PI GRN 8

## (undated) DEVICE — BIT DRILL QCK-CPLG 4.2MM

## (undated) DEVICE — SUTURE CHROMIC 3-0 SH 27 G122H

## (undated) DEVICE — PADDING CAST SOFT-ROLL 6 X 4

## (undated) DEVICE — BANDAGE KERLIX   441106

## (undated) DEVICE — DRAPE UNDER BUTTOCKS W/SUCTION PORT

## (undated) DEVICE — BIT DRILL CANN QC FLEX 16MM

## (undated) DEVICE — SHEATH INTRODUCER SLENDER 6FX10CM

## (undated) DEVICE — DRAPE 3/4

## (undated) DEVICE — CATHETER DIAGNOSTIC DXTERITY 5FR JL4.0

## (undated) DEVICE — TRAY SKIN PREP DRY

## (undated) DEVICE — PAD MATERNITY

## (undated) DEVICE — SUCTION YANKAUER 34970

## (undated) DEVICE — GUIDEWIRE DOUBLE ENDED .035 DIA. 150CML

## (undated) DEVICE — DRESSING FOAM MEPILEX 4X4

## (undated) DEVICE — DRAPE STERI-DRAPE 83X125 IOBAN

## (undated) DEVICE — PAD ABDOMINAL STERILE 5X9IN

## (undated) DEVICE — SUT MONOCRYL VIOLET CTX 0 36IN

## (undated) DEVICE — COVER MAYO STAND 89601

## (undated) DEVICE — SET BASIN O R 31451126

## (undated) DEVICE — SUTURE SILK 2-0 SH 18 CR C012D

## (undated) DEVICE — SOLUTION PREP IODINE 4OZ

## (undated) DEVICE — SUT VICRYL PLUS 0 CT1 36IN

## (undated) DEVICE — GLOVE SENSICARE PI SURG 7.5

## (undated) DEVICE — BIT DRILL CANN TAPERED 10MM

## (undated) DEVICE — DRAPE FULL SHEET 70X100IN

## (undated) DEVICE — SYRINGE 30ML 302832

## (undated) DEVICE — REAMER STEPPED DHS DCS

## (undated) DEVICE — GUIDEWIRE J TIP EXCHANGE FIXED CORE 260

## (undated) DEVICE — SUT ETHILON 3-0 PS2 18 BLK

## (undated) DEVICE — MASTISOL WITH SPRAY PUMP SURGICAL ADHESIVE 1/2 OZ

## (undated) DEVICE — WIRE GUIDE 3.2MM 400MM
Type: IMPLANTABLE DEVICE | Site: HIP | Status: NON-FUNCTIONAL
Removed: 2025-02-10

## (undated) DEVICE — SYRINGE HYPODERMC LL 22G 1.5 ECLIPSE 30

## (undated) DEVICE — SUTURE VICRYL 2-0 SH 18 VCP775D

## (undated) DEVICE — GOWN X-LARGE 044674

## (undated) DEVICE — CATHETER DIAGNOSTIC DXTERITY 6F MPA 110

## (undated) DEVICE — GUIDEWIRE FIXED CORE 3MM JTIP 25X260

## (undated) DEVICE — CATHETER DIAGNOSTIC DXTERITY 6FR JL 4

## (undated) DEVICE — SOLUTION H2O IRRIGATION 3000ML R8006

## (undated) DEVICE — GLOVE BIOGEL PI ORTHO PRO BROWN SZ 7.5

## (undated) DEVICE — SOLUTION IRRI NS BOTTLE 1000ML R5200-01

## (undated) DEVICE — SOLUTION SCRUB IODINE 4OZ

## (undated) DEVICE — TRAY GENERAL SURGERY SMH

## (undated) DEVICE — SHEATH PINNACLE 6FRX10CM W/GUIDEWIRE

## (undated) DEVICE — COVER LIGHT HANDLE LB53

## (undated) DEVICE — HEMOSTAT VASC BAND REG 24CM

## (undated) DEVICE — CATHETER DIAGNOSTIC DXTERITY 6FR JR 4.0

## (undated) DEVICE — PACK LITHOTOMY 88521

## (undated) DEVICE — ADHESIVE TISSUE EXOFIN

## (undated) DEVICE — GOWN SMART LRG 044673

## (undated) DEVICE — CATHETER DIAGNOSTIC DXTERITY 5FR JR4.0

## (undated) DEVICE — CATHETER FOLEY 16FR 5CC

## (undated) DEVICE — CATHETER DIAGNOSTIC DXTERITY 6F PIGST

## (undated) DEVICE — BNDG COFLEX FOAM LF2 ST 4X5YD

## (undated) DEVICE — DRAPE C-ARMOR EQUIPMENT COVER

## (undated) DEVICE — PAD BOVIE ADULT

## (undated) DEVICE — SHEATH PINNACLE 5FRX10CM W/GUIDEWIRE

## (undated) DEVICE — CATHETER RADIAL TIG 4.0 OPTITORQUE 5X110